# Patient Record
Sex: MALE | Race: BLACK OR AFRICAN AMERICAN | Employment: OTHER | URBAN - METROPOLITAN AREA
[De-identification: names, ages, dates, MRNs, and addresses within clinical notes are randomized per-mention and may not be internally consistent; named-entity substitution may affect disease eponyms.]

---

## 2017-04-02 ENCOUNTER — HOSPITAL ENCOUNTER (OUTPATIENT)
Age: 62
Setting detail: OBSERVATION
LOS: 1 days | Discharge: HOME | End: 2017-04-04
Attending: EMERGENCY MEDICINE | Admitting: INTERNAL MEDICINE
Payer: MEDICARE

## 2017-04-02 DIAGNOSIS — N41.0 PROSTATITIS, ACUTE: Primary | ICD-10-CM

## 2017-04-02 DIAGNOSIS — D64.9 SEVERE ANEMIA: ICD-10-CM

## 2017-04-02 DIAGNOSIS — N28.9 RENAL INSUFFICIENCY: ICD-10-CM

## 2017-04-02 PROCEDURE — 99284 EMERGENCY DEPT VISIT MOD MDM: CPT

## 2017-04-02 PROCEDURE — 96375 TX/PRO/DX INJ NEW DRUG ADDON: CPT

## 2017-04-02 PROCEDURE — 96374 THER/PROPH/DIAG INJ IV PUSH: CPT

## 2017-04-02 PROCEDURE — 77030013169 SET IV BLD ICUM -A

## 2017-04-02 PROCEDURE — 96376 TX/PRO/DX INJ SAME DRUG ADON: CPT

## 2017-04-02 RX ORDER — CIPROFLOXACIN 500 MG/1
500 TABLET ORAL 2 TIMES DAILY
COMMUNITY
End: 2017-05-10

## 2017-04-02 NOTE — IP AVS SNAPSHOT
[address] [facility] 
[phone] Patient: Loida Blake. MRN: [id number] :1955 You are allergic to the following Allergen Reactions Mercury (Bulk) Hives Blisters Recent Documentation Height Weight BMI Smoking Status 1.727 m 84.1 kg 28.19 kg/m2 Former Smoker Unresulted Labs Order Current Status FREE LIGHT CHAINS, KAPPA/LAMBDA, QT In process IMMUNOELECTROPHORESIS (IMMUNOFIX.) In process PROTEIN ELECTROPHORESIS In process PROTEIN ELECTROPHORESIS, URINE RANDOM In process PSA W/ REFLX FREE PSA In process Emergency Contacts  (Rel.) Home Phone Work Phone Mobile Phone [address] (Spouse) [phone] -- [phone] About your hospitalization You were admitted on:  April 3, 2017 You last received care in the:  hospitals 1 MEDICAL ONCOLOGY You were discharged on:  2017 Why you were hospitalized Your primary diagnosis was:  Not on File Your diagnoses also included:  Anemia Providers Seen During Your Hospitalizations Provider Role Specialty Primary office phone Jaison Kimball MD Attending Provider Emergency Medicine [phone] Efren Buchanan MD Attending Provider Hospitalist [phone] Levern Shone, MD Attending Provider Hospitalist [phone] Rita Polk MD Attending Provider Internal Medicine [phone] Your Primary Care Physician (PCP) Primary Care Physician Office Phone Office Fax Rody Best [phone] Follow-up Information Follow up With Details Comments Contact Info Satya Atkinson MD   [address] [place] [address] [address] [place] [address] 
[phone] Current Discharge Medication List  
  
START taking these medications Dose & Instructions Dispensing Information Comments Morning Noon Evening Bedtime  
 tamsulosin 0.4 mg capsule Commonly known as:  FLOMAX Your last dose was: Your next dose is:    
   
   
 Dose:  0.4 mg Take 1 Cap by mouth daily. Quantity:  30 Cap Refills:  1 CONTINUE these medications which have CHANGED Dose & Instructions Dispensing Information Comments Morning Noon Evening Bedtime RYTHMOL 150 mg tablet Generic drug:  propafenone What changed:  Another medication with the same name was removed. Continue taking this medication, and follow the directions you see here. Your last dose was: Your next dose is:    
   
   
 Dose:  50 mg Take 50 mg by mouth every eight (8) hours. Refills:  0 CONTINUE these medications which have NOT CHANGED Dose & Instructions Dispensing Information Comments Morning Noon Evening Bedtime ABILIFY 5 mg tablet Generic drug:  ARIPiprazole Your last dose was: Your next dose is:    
   
   
 Dose:  2.5 mg Take 2.5 mg by mouth every morning. Refills:  0  
     
   
   
   
  
 albuterol 90 mcg/actuation inhaler Commonly known as:  PROVENTIL HFA, VENTOLIN HFA, PROAIR HFA Your last dose was: Your next dose is:    
   
   
 Dose:  2 Puff Take 2 Puffs by inhalation every four (4) hours as needed for Wheezing. Quantity:  1 Inhaler Refills:  0 AMBIEN CR 12.5 mg tablet Generic drug:  zolpidem CR Your last dose was: Your next dose is:    
   
   
 Dose:  12.5 mg Take 12.5 mg by mouth nightly as needed. Refills:  0  
     
   
   
   
  
 amLODIPine 10 mg tablet Commonly known as:  Claudell Hesselbach Your last dose was: Your next dose is:    
   
   
 Dose:  10 mg Take 10 mg by mouth daily. Refills:  0  
     
   
   
   
  
 buPROPion  mg tablet Commonly known as:  Sully Smallwood Your last dose was: Your next dose is:    
   
   
 Dose:  150 mg Take 1 Tab by mouth daily (with dinner). Quantity:  30 Tab Refills:  11  
     
   
   
   
  
 CIALIS PO Your last dose was: Your next dose is:    
   
   
 Dose:  20 mg Take 20 mg by mouth as needed. Refills:  0  
     
   
   
   
  
 CIPRO 500 mg tablet Generic drug:  ciprofloxacin HCl Your last dose was: Your next dose is:    
   
   
 Dose:  500 mg Take 500 mg by mouth two (2) times a day. Refills:  0 LEVITRA PO Your last dose was: Your next dose is: Take  by mouth as needed. Refills:  0  
     
   
   
   
  
 losartan 50 mg tablet Commonly known as:  COZAAR Your last dose was: Your next dose is:    
   
   
 Dose:  100 mg Take 100 mg by mouth daily. Refills:  0  
     
   
   
   
  
 metFORMIN 500 mg tablet Commonly known as:  GLUCOPHAGE Your last dose was: Your next dose is:    
   
   
 Dose:  500 mg Take 500 mg by mouth two (2) times daily (with meals). Refills:  0  
     
   
   
   
  
 NUCYNTA 100 mg tablet Generic drug:  tapentadol Your last dose was: Your next dose is:    
   
   
 Dose:  100 mg Take 100 mg by mouth three (3) times daily. Refills:  0 Where to Get Your Medications Information on where to get these meds will be given to you by the nurse or doctor. ! Ask your nurse or doctor about these medications  
  tamsulosin 0.4 mg capsule Discharge Instructions None Discharge Orders None [facility] Announcement We are excited to announce that we are making your provider's discharge notes available to you in [facility]. You will see these notes when they are completed and signed by the physician that discharged you from your recent hospital stay.   If you have any questions or concerns about any information you see in [employer], please call the Health Information Department where you were seen or reach out to your Primary Care Provider for more information about your plan of care. Introducing [facility]! [employer] introduces [employer] patient portal. Now you can access parts of your medical record, email your doctor's office, and request medication refills online. 1. In your internet browser, go to [email or web address] 2. Click on the First Time User? Click Here link in the Sign In box. You will see the New Member Sign Up page. 3. Enter your [employer] Access Code exactly as it appears below. You will not need to use this code after youve completed the sign-up process. If you do not sign up before the expiration date, you must request a new code. · [employer] Access Code: [email or web address] Expires: 7/1/2017 11:34 PM 
 
4. Enter the last four digits of your Social Security Number (xxxx) and Date of Birth (mm/dd/yyyy) as indicated and click Submit. You will be taken to the next sign-up page. 5. Create a [employer] ID. This will be your [employer] login ID and cannot be changed, so think of one that is secure and easy to remember. 6. Create a [employer] password. You can change your password at any time. 7. Enter your Password Reset Question and Answer. This can be used at a later time if you forget your password. 8. Enter your e-mail address. You will receive e-mail notification when new information is available in [address] 9. Click Sign Up. You can now view and download portions of your medical record. 10. Click the Download Summary menu link to download a portable copy of your medical information. If you have questions, please visit the Frequently Asked Questions section of the [employer] website. Remember, [employer] is NOT to be used for urgent needs. For medical emergencies, dial 911. Now available from your iPhone and Android! General Information Please provide this summary of care documentation to your next provider. Patient Signature:  ____________________________________________________________ Date:  ____________________________________________________________  
  
Nadreia Canes Provider Signature:  ____________________________________________________________ Date:  ____________________________________________________________

## 2017-04-03 ENCOUNTER — APPOINTMENT (OUTPATIENT)
Dept: CT IMAGING | Age: 62
End: 2017-04-03
Attending: EMERGENCY MEDICINE
Payer: MEDICARE

## 2017-04-03 ENCOUNTER — APPOINTMENT (OUTPATIENT)
Dept: GENERAL RADIOLOGY | Age: 62
End: 2017-04-03
Attending: INTERNAL MEDICINE
Payer: MEDICARE

## 2017-04-03 PROBLEM — D64.9 ANEMIA: Status: ACTIVE | Noted: 2017-04-03

## 2017-04-03 LAB
ALBUMIN SERPL BCP-MCNC: 3 G/DL (ref 3.5–5)
ALBUMIN/GLOB SERPL: 0.3 {RATIO} (ref 1.1–2.2)
ALP SERPL-CCNC: 75 U/L (ref 45–117)
ALT SERPL-CCNC: 28 U/L (ref 12–78)
ANION GAP BLD CALC-SCNC: 13 MMOL/L (ref 5–15)
APPEARANCE UR: CLEAR
AST SERPL W P-5'-P-CCNC: 28 U/L (ref 15–37)
BACTERIA URNS QL MICRO: NEGATIVE /HPF
BASOPHILS # BLD AUTO: 0 K/UL (ref 0–0.1)
BASOPHILS # BLD: 0 % (ref 0–1)
BILIRUB SERPL-MCNC: 0.1 MG/DL (ref 0.2–1)
BILIRUB UR QL: NEGATIVE
BUN SERPL-MCNC: 22 MG/DL (ref 6–20)
BUN/CREAT SERPL: 12 (ref 12–20)
CALCIUM SERPL-MCNC: 9 MG/DL (ref 8.5–10.1)
CHLORIDE SERPL-SCNC: 100 MMOL/L (ref 97–108)
CO2 SERPL-SCNC: 23 MMOL/L (ref 21–32)
COLOR UR: ABNORMAL
CREAT SERPL-MCNC: 1.77 MG/DL (ref 0.7–1.3)
DIFFERENTIAL METHOD BLD: ABNORMAL
EOSINOPHIL # BLD: 0 K/UL (ref 0–0.4)
EOSINOPHIL NFR BLD: 0 % (ref 0–7)
EPITH CASTS URNS QL MICRO: ABNORMAL /LPF
ERYTHROCYTE [DISTWIDTH] IN BLOOD BY AUTOMATED COUNT: 22.5 % (ref 11.5–14.5)
FERRITIN SERPL-MCNC: 125 NG/ML (ref 26–388)
GLOBULIN SER CALC-MCNC: 8.8 G/DL (ref 2–4)
GLUCOSE BLD STRIP.AUTO-MCNC: 162 MG/DL (ref 65–100)
GLUCOSE BLD STRIP.AUTO-MCNC: 93 MG/DL (ref 65–100)
GLUCOSE BLD STRIP.AUTO-MCNC: 96 MG/DL (ref 65–100)
GLUCOSE SERPL-MCNC: 83 MG/DL (ref 65–100)
GLUCOSE UR STRIP.AUTO-MCNC: NEGATIVE MG/DL
HAPTOGLOB SERPL-MCNC: 78 MG/DL (ref 30–200)
HCT VFR BLD AUTO: 19.4 % (ref 36.6–50.3)
HGB BLD-MCNC: 6.4 G/DL (ref 12.1–17)
HGB UR QL STRIP: NEGATIVE
HYALINE CASTS URNS QL MICRO: ABNORMAL /LPF (ref 0–5)
IRON SATN MFR SERPL: 37 % (ref 20–50)
IRON SERPL-MCNC: 93 UG/DL (ref 35–150)
KETONES UR QL STRIP.AUTO: NEGATIVE MG/DL
LDH SERPL L TO P-CCNC: 81 U/L (ref 85–241)
LEUKOCYTE ESTERASE UR QL STRIP.AUTO: NEGATIVE
LYMPHOCYTES # BLD AUTO: 33 % (ref 12–49)
LYMPHOCYTES # BLD: 1.6 K/UL (ref 0.8–3.5)
MCH RBC QN AUTO: 27.7 PG (ref 26–34)
MCHC RBC AUTO-ENTMCNC: 33 G/DL (ref 30–36.5)
MCV RBC AUTO: 84 FL (ref 80–99)
MONOCYTES # BLD: 0.5 K/UL (ref 0–1)
MONOCYTES NFR BLD AUTO: 11 % (ref 5–13)
NEUTS SEG # BLD: 2.6 K/UL (ref 1.8–8)
NEUTS SEG NFR BLD AUTO: 56 % (ref 32–75)
NITRITE UR QL STRIP.AUTO: NEGATIVE
PH UR STRIP: 7.5 [PH] (ref 5–8)
PLATELET # BLD AUTO: 123 K/UL (ref 150–400)
POTASSIUM SERPL-SCNC: 4.5 MMOL/L (ref 3.5–5.1)
PROT SERPL-MCNC: 11.8 G/DL (ref 6.4–8.2)
PROT UR STRIP-MCNC: 30 MG/DL
RBC # BLD AUTO: 2.31 M/UL (ref 4.1–5.7)
RBC #/AREA URNS HPF: ABNORMAL /HPF (ref 0–5)
RBC MORPH BLD: ABNORMAL
RBC MORPH BLD: ABNORMAL
RETICS/RBC NFR AUTO: 1 % (ref 0.7–2.1)
SERVICE CMNT-IMP: ABNORMAL
SERVICE CMNT-IMP: NORMAL
SERVICE CMNT-IMP: NORMAL
SODIUM SERPL-SCNC: 136 MMOL/L (ref 136–145)
SP GR UR REFRACTOMETRY: 1.02 (ref 1–1.03)
TIBC SERPL-MCNC: 254 UG/DL (ref 250–450)
UA: UC IF INDICATED,UAUC: ABNORMAL
UROBILINOGEN UR QL STRIP.AUTO: 0.2 EU/DL (ref 0.2–1)
WBC # BLD AUTO: 4.7 K/UL (ref 4.1–11.1)
WBC URNS QL MICRO: ABNORMAL /HPF (ref 0–4)

## 2017-04-03 PROCEDURE — 83010 ASSAY OF HAPTOGLOBIN QUANT: CPT | Performed by: INTERNAL MEDICINE

## 2017-04-03 PROCEDURE — 85025 COMPLETE CBC W/AUTO DIFF WBC: CPT | Performed by: EMERGENCY MEDICINE

## 2017-04-03 PROCEDURE — 85045 AUTOMATED RETICULOCYTE COUNT: CPT | Performed by: INTERNAL MEDICINE

## 2017-04-03 PROCEDURE — 72110 X-RAY EXAM L-2 SPINE 4/>VWS: CPT

## 2017-04-03 PROCEDURE — 36430 TRANSFUSION BLD/BLD COMPNT: CPT

## 2017-04-03 PROCEDURE — 74011250637 HC RX REV CODE- 250/637: Performed by: INTERNAL MEDICINE

## 2017-04-03 PROCEDURE — 74176 CT ABD & PELVIS W/O CONTRAST: CPT

## 2017-04-03 PROCEDURE — 74011250636 HC RX REV CODE- 250/636

## 2017-04-03 PROCEDURE — 83540 ASSAY OF IRON: CPT | Performed by: INTERNAL MEDICINE

## 2017-04-03 PROCEDURE — 36415 COLL VENOUS BLD VENIPUNCTURE: CPT | Performed by: EMERGENCY MEDICINE

## 2017-04-03 PROCEDURE — 99218 HC RM OBSERVATION: CPT

## 2017-04-03 PROCEDURE — 82962 GLUCOSE BLOOD TEST: CPT

## 2017-04-03 PROCEDURE — 81001 URINALYSIS AUTO W/SCOPE: CPT | Performed by: EMERGENCY MEDICINE

## 2017-04-03 PROCEDURE — 82728 ASSAY OF FERRITIN: CPT | Performed by: INTERNAL MEDICINE

## 2017-04-03 PROCEDURE — 74011250636 HC RX REV CODE- 250/636: Performed by: EMERGENCY MEDICINE

## 2017-04-03 PROCEDURE — 80053 COMPREHEN METABOLIC PANEL: CPT | Performed by: EMERGENCY MEDICINE

## 2017-04-03 PROCEDURE — 86920 COMPATIBILITY TEST SPIN: CPT | Performed by: EMERGENCY MEDICINE

## 2017-04-03 PROCEDURE — 86900 BLOOD TYPING SEROLOGIC ABO: CPT | Performed by: EMERGENCY MEDICINE

## 2017-04-03 PROCEDURE — 83615 LACTATE (LD) (LDH) ENZYME: CPT | Performed by: INTERNAL MEDICINE

## 2017-04-03 PROCEDURE — 74011636320 HC RX REV CODE- 636/320: Performed by: EMERGENCY MEDICINE

## 2017-04-03 PROCEDURE — 84156 ASSAY OF PROTEIN URINE: CPT | Performed by: INTERNAL MEDICINE

## 2017-04-03 PROCEDURE — P9016 RBC LEUKOCYTES REDUCED: HCPCS | Performed by: EMERGENCY MEDICINE

## 2017-04-03 RX ORDER — ONDANSETRON 2 MG/ML
4 INJECTION INTRAMUSCULAR; INTRAVENOUS
Status: COMPLETED | OUTPATIENT
Start: 2017-04-03 | End: 2017-04-03

## 2017-04-03 RX ORDER — PROPAFENONE HYDROCHLORIDE 150 MG/1
150 TABLET, FILM COATED ORAL EVERY 8 HOURS
COMMUNITY
End: 2018-11-01

## 2017-04-03 RX ORDER — PROPAFENONE HYDROCHLORIDE 150 MG/1
150 TABLET, FILM COATED ORAL EVERY 8 HOURS
Status: DISCONTINUED | OUTPATIENT
Start: 2017-04-03 | End: 2017-04-05 | Stop reason: HOSPADM

## 2017-04-03 RX ORDER — SODIUM CHLORIDE 0.9 % (FLUSH) 0.9 %
10 SYRINGE (ML) INJECTION
Status: DISCONTINUED | OUTPATIENT
Start: 2017-04-03 | End: 2017-04-03

## 2017-04-03 RX ORDER — SODIUM CHLORIDE 0.9 % (FLUSH) 0.9 %
5-10 SYRINGE (ML) INJECTION AS NEEDED
Status: DISCONTINUED | OUTPATIENT
Start: 2017-04-03 | End: 2017-04-05 | Stop reason: HOSPADM

## 2017-04-03 RX ORDER — CIPROFLOXACIN 250 MG/1
500 TABLET, FILM COATED ORAL 2 TIMES DAILY
Status: DISCONTINUED | OUTPATIENT
Start: 2017-04-03 | End: 2017-04-05 | Stop reason: HOSPADM

## 2017-04-03 RX ORDER — SODIUM CHLORIDE 0.9 % (FLUSH) 0.9 %
5-10 SYRINGE (ML) INJECTION EVERY 8 HOURS
Status: DISCONTINUED | OUTPATIENT
Start: 2017-04-03 | End: 2017-04-05 | Stop reason: HOSPADM

## 2017-04-03 RX ORDER — MORPHINE SULFATE 4 MG/ML
INJECTION, SOLUTION INTRAMUSCULAR; INTRAVENOUS
Status: COMPLETED
Start: 2017-04-03 | End: 2017-04-03

## 2017-04-03 RX ORDER — SODIUM CHLORIDE 9 MG/ML
250 INJECTION, SOLUTION INTRAVENOUS AS NEEDED
Status: DISCONTINUED | OUTPATIENT
Start: 2017-04-03 | End: 2017-04-05 | Stop reason: HOSPADM

## 2017-04-03 RX ORDER — MORPHINE SULFATE 2 MG/ML
2 INJECTION, SOLUTION INTRAMUSCULAR; INTRAVENOUS
Status: COMPLETED | OUTPATIENT
Start: 2017-04-03 | End: 2017-04-03

## 2017-04-03 RX ORDER — ARIPIPRAZOLE 5 MG/1
2.5 TABLET ORAL
Status: DISCONTINUED | OUTPATIENT
Start: 2017-04-03 | End: 2017-04-05 | Stop reason: HOSPADM

## 2017-04-03 RX ORDER — PROPAFENONE HYDROCHLORIDE 225 MG/1
225 CAPSULE, EXTENDED RELEASE ORAL 2 TIMES DAILY
Status: DISCONTINUED | OUTPATIENT
Start: 2017-04-03 | End: 2017-04-03 | Stop reason: CLARIF

## 2017-04-03 RX ORDER — AMLODIPINE BESYLATE 5 MG/1
10 TABLET ORAL DAILY
Status: DISCONTINUED | OUTPATIENT
Start: 2017-04-03 | End: 2017-04-05 | Stop reason: HOSPADM

## 2017-04-03 RX ORDER — FACIAL-BODY WIPES
10 EACH TOPICAL DAILY PRN
Status: DISCONTINUED | OUTPATIENT
Start: 2017-04-03 | End: 2017-04-05 | Stop reason: HOSPADM

## 2017-04-03 RX ORDER — BUPROPION HYDROCHLORIDE 150 MG/1
150 TABLET ORAL
Status: DISCONTINUED | OUTPATIENT
Start: 2017-04-03 | End: 2017-04-05 | Stop reason: HOSPADM

## 2017-04-03 RX ORDER — TAMSULOSIN HYDROCHLORIDE 0.4 MG/1
0.4 CAPSULE ORAL DAILY
Status: DISCONTINUED | OUTPATIENT
Start: 2017-04-03 | End: 2017-04-05 | Stop reason: HOSPADM

## 2017-04-03 RX ORDER — INSULIN LISPRO 100 [IU]/ML
INJECTION, SOLUTION INTRAVENOUS; SUBCUTANEOUS
Status: DISCONTINUED | OUTPATIENT
Start: 2017-04-03 | End: 2017-04-05 | Stop reason: HOSPADM

## 2017-04-03 RX ORDER — SODIUM CHLORIDE 9 MG/ML
50 INJECTION, SOLUTION INTRAVENOUS
Status: DISCONTINUED | OUTPATIENT
Start: 2017-04-03 | End: 2017-04-03

## 2017-04-03 RX ORDER — OXYCODONE HYDROCHLORIDE 5 MG/1
5 TABLET ORAL
Status: DISCONTINUED | OUTPATIENT
Start: 2017-04-03 | End: 2017-04-05 | Stop reason: HOSPADM

## 2017-04-03 RX ORDER — ACETAMINOPHEN 325 MG/1
650 TABLET ORAL
Status: DISCONTINUED | OUTPATIENT
Start: 2017-04-03 | End: 2017-04-05 | Stop reason: HOSPADM

## 2017-04-03 RX ORDER — ONDANSETRON 2 MG/ML
4 INJECTION INTRAMUSCULAR; INTRAVENOUS
Status: DISCONTINUED | OUTPATIENT
Start: 2017-04-03 | End: 2017-04-05 | Stop reason: HOSPADM

## 2017-04-03 RX ORDER — NALOXONE HYDROCHLORIDE 0.4 MG/ML
0.4 INJECTION, SOLUTION INTRAMUSCULAR; INTRAVENOUS; SUBCUTANEOUS AS NEEDED
Status: DISCONTINUED | OUTPATIENT
Start: 2017-04-03 | End: 2017-04-05 | Stop reason: HOSPADM

## 2017-04-03 RX ORDER — MORPHINE SULFATE 4 MG/ML
4 INJECTION, SOLUTION INTRAMUSCULAR; INTRAVENOUS
Status: COMPLETED | OUTPATIENT
Start: 2017-04-03 | End: 2017-04-03

## 2017-04-03 RX ORDER — DEXTROSE 50 % IN WATER (D50W) INTRAVENOUS SYRINGE
12.5-25 AS NEEDED
Status: DISCONTINUED | OUTPATIENT
Start: 2017-04-03 | End: 2017-04-05 | Stop reason: HOSPADM

## 2017-04-03 RX ORDER — MAGNESIUM SULFATE 100 %
4 CRYSTALS MISCELLANEOUS AS NEEDED
Status: DISCONTINUED | OUTPATIENT
Start: 2017-04-03 | End: 2017-04-05 | Stop reason: HOSPADM

## 2017-04-03 RX ADMIN — ONDANSETRON HYDROCHLORIDE 4 MG: 2 INJECTION, SOLUTION INTRAMUSCULAR; INTRAVENOUS at 01:50

## 2017-04-03 RX ADMIN — AMLODIPINE BESYLATE 10 MG: 5 TABLET ORAL at 09:10

## 2017-04-03 RX ADMIN — PROPAFENONE HYDROCHLORIDE 150 MG: 150 TABLET, FILM COATED ORAL at 14:45

## 2017-04-03 RX ADMIN — PROPAFENONE HYDROCHLORIDE 150 MG: 150 TABLET, FILM COATED ORAL at 21:49

## 2017-04-03 RX ADMIN — ARIPIPRAZOLE 2.5 MG: 5 TABLET ORAL at 10:14

## 2017-04-03 RX ADMIN — MORPHINE SULFATE 4 MG: 4 INJECTION, SOLUTION INTRAMUSCULAR; INTRAVENOUS at 01:50

## 2017-04-03 RX ADMIN — DIATRIZOATE MEGLUMINE AND DIATRIZOATE SODIUM 30 ML: 660; 100 LIQUID ORAL; RECTAL at 02:00

## 2017-04-03 RX ADMIN — BUPROPION HYDROCHLORIDE 150 MG: 150 TABLET, FILM COATED, EXTENDED RELEASE ORAL at 17:22

## 2017-04-03 RX ADMIN — CIPROFLOXACIN HYDROCHLORIDE 500 MG: 250 TABLET, FILM COATED ORAL at 17:22

## 2017-04-03 RX ADMIN — OXYCODONE HYDROCHLORIDE 5 MG: 5 TABLET ORAL at 09:08

## 2017-04-03 RX ADMIN — Medication 10 ML: at 09:12

## 2017-04-03 RX ADMIN — OXYCODONE HYDROCHLORIDE 5 MG: 5 TABLET ORAL at 14:45

## 2017-04-03 RX ADMIN — CIPROFLOXACIN HYDROCHLORIDE 500 MG: 250 TABLET, FILM COATED ORAL at 09:09

## 2017-04-03 RX ADMIN — Medication 10 ML: at 21:50

## 2017-04-03 RX ADMIN — TAMSULOSIN HYDROCHLORIDE 0.4 MG: 0.4 CAPSULE ORAL at 14:44

## 2017-04-03 RX ADMIN — PROPAFENONE HYDROCHLORIDE 150 MG: 150 TABLET, FILM COATED ORAL at 09:09

## 2017-04-03 RX ADMIN — OXYCODONE HYDROCHLORIDE 5 MG: 5 TABLET ORAL at 20:11

## 2017-04-03 RX ADMIN — Medication 1 CAPSULE: at 14:44

## 2017-04-03 RX ADMIN — Medication 2 MG: at 04:37

## 2017-04-03 RX ADMIN — Medication 10 ML: at 14:47

## 2017-04-03 NOTE — CONSULTS
[address]   0 [facility]       Name:  Edelmira Chan   MR#:  [id number]   :  1955   Account #:  [de-identified]    Date of Consultation:  2017   Date of Adm:  2017       REFERRING PHYSICIAN: Dr. Melinda Gao: Anemia and possible multiple   myeloma. HISTORY OF PRESENT ILLNESS: The patient is a 65-year-old   gentleman who came to the emergency room last evening because he   was having more pain in his rectal region. He had been just started on   Cipro for prostatitis just last week. He was having some pain radiating   down his legs. No other complaints, but his hemoglobin was found to   be 6.4 and this had been trending down. The patient was admitted for   further evaluation. He was given 2 units of packed red blood cells   overnight and we are asked to see him for further evaluation. The   patient reports that he is feeling a little bit better this morning with no   new complaints. PAST MEDICAL HISTORY: Significant for prostatitis, hepatitis C,   diabetes, chronic pain, GERD, prostatitis, some depression. He has a   history of hypertension as well. He has got a history of some   arrhythmia     SOCIAL HISTORY: He is a former smoker. He quit back in . He   does not drink. FAMILY HISTORY: No history of any cancers. ALLERGIES: MERCURY. CURRENT MEDICATIONS: He is on   1. Amlodipine. 2. Abilify. 3. Wellbutrin. 4. Ciprofloxacin. 5. Maria Eugenia-Q.   6. Rythmol. 7. Nucynta. REVIEW OF SYSTEMS: A 12-point review of systems was done and   negative except for as above. PHYSICAL EXAMINATION   VITAL SIGNS: Temperature 99.0, pulse 91, blood pressure 159/91,   respirations 20, saturating 95% on room air. GENERAL: Sitting up in chair in no acute distress. HEENT: Normocephalic, atraumatic. Extraocular muscles intact. Oropharynx clear, without lesions. NECK: Supple.  No cervical, supraclavicular lymphadenopathy   appreciated. CARDIOVASCULAR: Regular rate and rhythm. LUNGS: Clear to auscultation bilaterally. ABDOMEN: Normoactive bowel sounds, soft, nontender,   nondistended. No hepatosplenomegaly. EXTREMITIES: No clubbing, cyanosis or edema. NEUROLOGIC: Nonfocal.    LABORATORY VALUES: White blood cell count 4.7, hemoglobin 6.4,   platelets 668, reticulocyte count 1.0. Chemistries: Sodium 136,   potassium 4.5, chloride 100, bicarbonate 23, glucose 83, BUN 22,   creatinine 1.77, total protein 11.8, total bilirubin 0.1, ALT 28, AST 28,   alkaline phosphatase 75, LDH 81. Iron panel normal. Haptoglobin   normal at 78. IMPRESSION AND PLAN: The patient is a 19-year-old    gentleman who we were asked to see for anemia. He was admitted   with some lower back and rectal pain, thought possibly due to   prostatitis. He has been having some pain radiating down his legs as   well. He was found to have a hemoglobin in the 6 range. He was   admitted for 2 units of packed red blood cells. He did well with that. At   this point, his iron levels look normal. No signs of hemolysis. We are   going to check for signs for possible multiple myeloma. Check SPEP   with immunofixation and serum free light chain ratio, urine free light   chain ratio. We will see what all that shows. He will continue to get   treatment for his prostatitis. He is getting x-rays of his lumbar spine. We will continue to follow along with you and make further   recommendations as needed. We will also check B12 and folate and   continue to follow along with you.         MD KITTY Henderson / GUS   D:  04/03/2017   10:38   T:  04/03/2017   11:07   Job #:  [id number]

## 2017-04-03 NOTE — PROGRESS NOTES
Oncology Nursing Communication Tool      7:26 PM  4/3/2017     Bedside shift change report given to Sanjiv Mooney, RN (incoming nurse) by Sal Brothers RN (outgoing nurse) on Edward Baker Sr. a 63 y.o. male who was admitted on 4/2/2017 11:41 PM. Report included the following information SBAR. Significant changes during shift:       Issues for physician to address:             Code Status: Full Code     Infections: MRSA     Allergies: Mercury (bulk)     Current diet: DIET DIABETIC CONSISTENT CARB Regular       Pain Controlled [x] yes [] no   Bowel Movement [] yes [x] no   Last Bowel Movement (date)                 Vital Signs:   Patient Vitals for the past 12 hrs:   Temp Pulse Resp BP SpO2   04/03/17 1406 - 87 18 121/78 -   04/03/17 1356 99.1 °F (37.3 °C) 87 18 125/81 92 %   04/03/17 1330 99.1 °F (37.3 °C) 81 18 122/79 93 %   04/03/17 1300 98.9 °F (37.2 °C) 83 18 139/87 95 %   04/03/17 1245 98.8 °F (37.1 °C) 84 18 132/87 96 %   04/03/17 1226 98.8 °F (37.1 °C) 84 20 138/89 96 %   04/03/17 1154 99 °F (37.2 °C) - 20 121/85 93 %   04/03/17 1147 98.8 °F (37.1 °C) 77 20 112/75 93 %   04/03/17 1131 98.8 °F (37.1 °C) - 20 111/72 93 %   04/03/17 1013 99 °F (37.2 °C) 91 20 (!) 159/91 95 %   04/03/17 0754 99 °F (37.2 °C) 81 20 142/87 96 %      Intake & Output:     Intake/Output Summary (Last 24 hours) at 04/03/17 1926  Last data filed at 04/03/17 0754   Gross per 24 hour   Intake              310 ml   Output                0 ml   Net              310 ml      Laboratory Results:     Recent Results (from the past 12 hour(s))   GLUCOSE, POC    Collection Time: 04/03/17  8:12 AM   Result Value Ref Range    Glucose (POC) 93 65 - 100 mg/dL    Performed by Bev Cowan    GLUCOSE, POC    Collection Time: 04/03/17  4:23 PM   Result Value Ref Range    Glucose (POC) 96 65 - 100 mg/dL    Performed by [address] [place] [address] for questions and clarifications were given to the incoming nurse. Patient's bed is in low position, side rails x2, door open PRN, call bell within reach and patient not in distress.       Shweta Hylton RN

## 2017-04-03 NOTE — PROGRESS NOTES
Instructed to notify the nurse of any itching and or chills. Verbalizes understanding. Vss.2nd unit of blood up. Nurse at bedside x15 mins of the start of blood Tx. Nurse call bell within reach.

## 2017-04-03 NOTE — PROGRESS NOTES
Spiritual Care Assessment/Progress Notes    Reyna Wu [id number]  [id number]    1955  63 y.o.  male    Patient Telephone Number: [phone] (home)   Hoahaoism Affiliation: Adrienne Topete   Language: English   Extended Emergency Contact Information  Primary Emergency Contact: [address] Phone: [phone]  Mobile Phone: [phone]  Relation: Spouse   Patient Active Problem List    Diagnosis Date Noted    Anemia 04/03/2017    Chronic hepatitis C ([facility] [facility] 03/30/2014    Depression 03/27/2014    Symptomatic PVCs 07/13/2011    Hypertension 07/13/2011    Diabetes ([facility] 07/13/2011        Date: 4/3/2017       Level of Hoahaoism/Spiritual Activity:  []         Involved in [place] tradition/spiritual practice    []         Not involved in [place] tradition/spiritual practice  []         Spiritually oriented    []         Claims no spiritual orientation    []         seeking spiritual identity  []         Feels alienated from Mandaen practice/tradition  []         Feels angry about Mandaen practice/tradition  []         Spirituality/Mandaen tradition  a resource for coping at this time.   []         Not able to assess due to medical condition    Services Provided Today:  []         crisis intervention    []         reading Scriptures  [x]         spiritual assessment    []         prayer  [x]         empathic listening/emotional support  []         rites and rituals (cite in comments)  []         life review     []         Mandaen support  []         theological development   []         advocacy  []         ethical dialog     []         blessing  []         bereavement support    []         support to family  []         anticipatory grief support   [x]         help with AMD  []         spiritual guidance    []         meditation      Spiritual Care Needs  []         Emotional Support  []         Spiritual/Hoahaoism Care  []         Loss/Adjustment  [] Advocacy/Referral                /Ethics  [x]         No needs expressed at               this time  []         Other: (note in               comments)  Spiritual Care Plan  []         Follow up visits with               pt/family  []         Provide materials  []         Schedule sacraments  []         Contact Community               Clergy  [x]         Follow up as needed  []         Other: (note in               comments)     Comments: Request from in-basket to assist with Advance Medical Directive (AMD) in oncology unit. Consulted with patients nurse. Explained the document briefly to patient. Pt did not want to complete the form at this moment.  left the form and a right to decide booklet with the pt to look over. Pt will contact chaplains when ready to fill it out. No further needs were expressed at this time. Advised of  availability. Coretta Lee M.S.   Spiritual Care Department  If need arise please call JENY ([phone])

## 2017-04-03 NOTE — PROGRESS NOTES
Primary Nurse Roxane Small RN and Clovis Lopez RN performed a dual skin assessment on this patient No impairment noted  Haris score is 20. Skin intact.

## 2017-04-03 NOTE — H&P
Hospitalist Admission Note    NAME:  Lennox Gable Sr.   :   1955   MRN:   [id number]     Date of admit:  4/3/2017    PCP: Audrey Hansen MD    Assessment/Plan:      Anemia (4/3/2017) of chronic disease POA symptomatic, HgB 6.4  No bleeding except for some blood in his    Stool brown heme negative in ED  Recently started treatment for prostatitis, ? Source of inflammation  No evidence of hemolysis, normal retic count, LDH, t bili  Some features worrisome for underlying myeloma(d/w ED MD)   HgB gradually decreasing 11.4 to 8.9 to 8.1 to 6.4   Renal function gradually decreasing over 6 months 1.29 to 1.77   Total protein increasing 7.7 to 11.8   Lori present   Admit, 2 units pRBCS  Serial HgBs  Check iron studies  Check TSH  Check SPEP with immunofixation, UPEP  Diagnostic possibilities include myeloma d/w pt and wife  Ask hematology to see    Rectal pain POA   Recent prostatitis POA started on cipro  No clear pathology on CT scan abdomen/pelvis  Check L-spine X-rays  Continue Tapentadol  PRN oxycodone and tylenol  No pathology on rectal exam  Continue cipro started as an outpatient    ? Chronic renal failure stage 3 POA with rising creatinine  Creatinine increasing 1.29 to 1.77  Stop ARB for now  W/U for myeloma  No hydronephrosis on CT scan    DM type 2 POA on metformin  POC, SSI  Hold metformin in house    Essential HTN POA Continue norvasc  SVT POA on profenone SR  Hold cozaar  PRN hydralazine    DDD C -spine POA  Chronic pain syndrome POA   History of substance abuse POA  Continue tapentadol  PRN narcotics, avoid long-term use    Anxiety/depression POA  Continue abilify and wellbutrin    DVT prophylaxis: SCDs    Stress ulcer prophylaxis: Not indicated    Code status: Full code    Next of Kin: wife    Subjective:   CHIEF COMPLAINT:  My rectum has been burning for days    HISTORY OF PRESENT ILLNESS:      63 y.o.    male:  Known prostatitis from , treated  Saw Urologist recently for routine appointment, prostate not \"normal\" on exam   UA and urine culture abnormal, normal PSA, felt to have prostatitis  Started on PO ciprofloxacin 3/28/17  Developing increasing pain in the rectal region x 304 days, not improved with taking the cipro   No diarrhea   No pain on moving bowels   Throbbing or burning pain, not relieved with his usual pain meds   Progressively severe pain, unmanageable which prompted the ED visit  Legs have felt heavy and has had moderate pain radiating down legs x weeks  No fevers or chills, weight loss, cough, SOB, CP, abdominal pain or back pain  No prior history or any hematologic disorders    ED HD stable  HgB 6.4, has no prior anemia  Rectal exam without melena or blood, stool was heme negative  We were called to admit the patient      Past Medical History:   Diagnosis Date    Acid reflux     Arrhythmia     pvc's    Chronic pain     neck    Depression     Diabetes ([facility] [address]     Femoral hernia 7/11/2012    Hepatitis C     Hypertension     Inguinal hernia 7/11/2012    Liver disease     hepatitis c    Other ill-defined conditions     Hx of PVCs since 2009    Prostatitis, acute     Psychiatric disorder     depression        Past Surgical History:   Procedure Laterality Date    COLONOSCOPY N/A 9/20/2016    COLONOSCOPY performed by [facility] at [facility]    HX APPENDECTOMY      HX CERVICAL FUSION  2008    x 1 as of 4/30/2014    HX HEENT      foreign body removed from right eye    HX HERNIA REPAIR  2012    [facility]      HX ORTHOPAEDIC      cervical fusion    HX OTHER SURGICAL  2000    liver bx - chronic hepatitis       Social History:  Social History   Substance Use Topics    Smoking status: Former Smoker     Packs/day: 1.00     Years: 12.00     Types: Cigarettes     Quit date: 1/1/1988    Smokeless tobacco: Never Used      Comment: former cigarette smoker    Alcohol use No      Comment: former alcoholic-quit [id number]        FAMILY HISTORY:  Family History   Problem Relation Age of Onset    Arthritis-osteo Mother     Arthritis-osteo Father     Diabetes Father     Hypertension Maternal Grandmother     Diabetes Maternal Grandmother     Hypertension Maternal Grandfather     Diabetes Maternal Grandfather    3 children    Allergies   Allergen Reactions    Mercury (Bulk) Hives     Blisters          Prior to Admission medications    Medication Sig Start Date End Date Taking? Authorizing Provider   ciprofloxacin HCl (CIPRO) 500 mg tablet Take 500 mg by mouth two (2) times a day. Yes Goldy Diaz MD   zolpidem CR (AMBIEN CR) 12.5 mg tablet Take 12.5 mg by mouth nightly as needed. Yes Historical Provider   TADALAFIL (CIALIS PO) Take 20 mg by mouth as needed. Yes Historical Provider   losartan (COZAAR) 50 mg tablet Take 100 mg by mouth daily. Yes Historical Provider   amLODIPine (NORVASC) 10 mg tablet Take 10 mg by mouth daily. Yes Historical Provider   tapentadol (NUCYNTA) 100 mg tablet Take 100 mg by mouth three (3) times daily. Yes Goldy Diaz MD   aripiprazole (ABILIFY) 5 mg tablet Take 2.5 mg by mouth every morning. Yes Goldy Diaz MD   buPROPion XL (WELLBUTRIN XL) 150 mg tablet Take 1 Tab by mouth daily (with dinner). 4/25/10  Yes Sebastien Mccray MD   metformin (GLUCOPHAGE) 500 mg tablet Take 500 mg by mouth two (2) times daily (with meals). Yes Goldy Diaz MD   albuterol (PROVENTIL HFA, VENTOLIN HFA, PROAIR HFA) 90 mcg/actuation inhaler Take 2 Puffs by inhalation every four (4) hours as needed for Wheezing. 12/2/16   Cyndy Bruner MD   VARDENAFIL HCL (LEVITRA PO) Take  by mouth as needed. Historical Provider   propafenone SR (RYTHMOL SR) 225 mg SR capsule Take 225 mg by mouth two (2) times a day.       Historical Provider       REVIEW OF SYSTEMS:  Bold equals positive    Yes Total of 12 systems reviewed as follows:  Constitutional: fever Chills  weight loss  Eyes:   Diplopia visual changes  eye pain  ENT: coryza  Sore throat Dysphagia  Respiratory:  cough or sputum  Hemoptysis dyspnea  Cards:  chest pain  orthopnea LE edema  GI:   Nausea/vomiting Diarrhea abdominal pain    melena  BRBPR   Rectal pain  Genitourinary:  frequency  dysuria hematuria  Integument:  Rash Nodules  Hematologic:  Easy bruising, negative gum/nose bleeding  Endocrine: Polyuria/poldipsia heat/cold intolerance  Musculoskel: Myalgias Joint paiin/swelling/redness Gout    Pain radiating down his legs  Neurological:  Headaches  focal motor or sensory changes    Legs feel heavy    Objective:   VITALS:    Patient Vitals for the past 24 hrs:   Temp Pulse Resp BP SpO2   17 0640 99.2 °F (37.3 °C) 85 20 145/88 96 %   17 0600 - - - 118/66 96 %   17 0545 98.9 °F (37.2 °C) - - 124/75 92 %   17 0530 98.8 °F (37.1 °C) - - 121/72 91 %   17 0515 98.9 °F (37.2 °C) 85 16 125/68 92 %   17 0500 99.1 °F (37.3 °C) 84 18 124/72 93 %   17 0442 98.8 °F (37.1 °C) 87 18 131/75 94 %   17 0315 - - - 128/65 95 %   17 0300 - - - 124/75 98 %   17 0245 - - - 138/79 100 %   17 0230 - - - 130/82 100 %   17 0215 - - - 146/86 97 %   17 0200 - - - 130/79 93 %   17 0100 - - - 120/59 96 %   17 0045 - - - 123/89 99 %   17 0042 - - - - 99 %   17 0040 - - - 129/59 -   17 2340 99.8 °F (37.7 °C) 96 18 137/79 99 %     Temp (24hrs), Av.1 °F (37.3 °C), Min:98.8 °F (37.1 °C), Max:99.8 °F (37.7 °C)      O2 Device: Room air    PHYSICAL EXAM:   General:    Alert, cooperative in no distress     HEENT: Normocephalic, atraumatic    PERRL, Sclera no icterus    Nasal mucosa without masses or discharge  Hearing intact to voice    Oropharynx without erythema or exudate  No thrush  Pale MM  Neck:  No meningismus, trachea midline, no carotid bruits     Thyroid not enlarged, no nodules or tenderness  Lungs:   Clear to auscultation bilaterally.  No wheezing or rales    No accessory muscle use or retractions. Heart:   Regular rate and rhythm,  no murmur or gallop. No LE edema  Abdomen:   Soft, non-tender. Not distended. Bowel sounds normal.     No masses, No Hepatosplenomegaly, No Rebound or guarding  Lymph nodes: No cervical or inguinal ED  Musculoskeletal:  No Joint swelling, erythema, warmth. No Cyanosis or clubbing  Skin:      No rashes     Not Jaundiced   No nodules or thickening  Neurologic: Alert and oriented X 3, follows commands     Cranial nerves 2 to 12 intact    Symmetric motor strength bilaterally         LAB DATA REVIEWED:    Recent Results (from the past 24 hour(s))   URINALYSIS W/ REFLEX CULTURE    Collection Time: 04/03/17 12:48 AM   Result Value Ref Range    Color YELLOW/STRAW      Appearance CLEAR CLEAR      Specific gravity 1.018 1.003 - 1.030      pH (UA) 7.5 5.0 - 8.0      Protein 30 (A) NEG mg/dL    Glucose NEGATIVE  NEG mg/dL    Ketone NEGATIVE  NEG mg/dL    Bilirubin NEGATIVE  NEG      Blood NEGATIVE  NEG      Urobilinogen 0.2 0.2 - 1.0 EU/dL    Nitrites NEGATIVE  NEG      Leukocyte Esterase NEGATIVE  NEG      WBC 0-4 0 - 4 /hpf    RBC 0-5 0 - 5 /hpf    Epithelial cells FEW FEW /lpf    Bacteria NEGATIVE  NEG /hpf    UA:UC IF INDICATED CULTURE NOT INDICATED BY UA RESULT CNI      Hyaline cast 0-2 0 - 5 /lpf   CBC WITH AUTOMATED DIFF    Collection Time: 04/03/17  1:49 AM   Result Value Ref Range    WBC 4.7 4.1 - 11.1 K/uL    RBC 2.31 (L) 4.10 - 5.70 M/uL    HGB 6.4 (L) 12.1 - 17.0 g/dL    HCT 19.4 (L) 36.6 - 50.3 %    MCV 84.0 80.0 - 99.0 FL    MCH 27.7 26.0 - 34.0 PG    MCHC 33.0 30.0 - 36.5 g/dL    RDW 22.5 (H) 11.5 - 14.5 %    PLATELET 393 (L) 954 - 400 K/uL    NEUTROPHILS 56 32 - 75 %    LYMPHOCYTES 33 12 - 49 %    MONOCYTES 11 5 - 13 %    EOSINOPHILS 0 0 - 7 %    BASOPHILS 0 0 - 1 %    ABS. NEUTROPHILS 2.6 1.8 - 8.0 K/UL    ABS. LYMPHOCYTES 1.6 0.8 - 3.5 K/UL    ABS. MONOCYTES 0.5 0.0 - 1.0 K/UL    ABS. EOSINOPHILS 0.0 0.0 - 0.4 K/UL    ABS.  BASOPHILS 0.0 0.0 - 0.1 K/UL RBC COMMENTS ANISOCYTOSIS  2+        RBC COMMENTS ROULEAUX  PRESENT        DF SMEAR SCANNED     METABOLIC PANEL, COMPREHENSIVE    Collection Time: 04/03/17  1:49 AM   Result Value Ref Range    Sodium 136 136 - 145 mmol/L    Potassium 4.5 3.5 - 5.1 mmol/L    Chloride 100 97 - 108 mmol/L    CO2 23 21 - 32 mmol/L    Anion gap 13 5 - 15 mmol/L    Glucose 83 65 - 100 mg/dL    BUN 22 (H) 6 - 20 MG/DL    Creatinine 1.77 (H) 0.70 - 1.30 MG/DL    BUN/Creatinine ratio 12 12 - 20      GFR est AA 48 (L) >60 ml/min/1.73m2    GFR est non-AA 40 (L) >60 ml/min/1.73m2    Calcium 9.0 8.5 - 10.1 MG/DL    Bilirubin, total 0.1 (L) 0.2 - 1.0 MG/DL    ALT (SGPT) 28 12 - 78 U/L    AST (SGOT) 28 15 - 37 U/L    Alk.  phosphatase 75 45 - 117 U/L    Protein, total 11.8 (H) 6.4 - 8.2 g/dL    Albumin 3.0 (L) 3.5 - 5.0 g/dL    Globulin 8.8 (H) 2.0 - 4.0 g/dL    A-G Ratio 0.3 (L) 1.1 - 2.2     TYPE & SCREEN    Collection Time: 04/03/17  3:00 AM   Result Value Ref Range    Crossmatch Expiration 04/06/2017     ABO/Rh(D) O NEGATIVE     Antibody screen NEG     Unit number [id number]     Blood component type  LR AS3,1     Unit division 00     Status of unit ALLOCATED     Crossmatch result Compatible     Unit number [id number]     Blood component type  LR AS3,2     Unit division 00     Status of unit ISSUED     Crossmatch result Compatible    FERRITIN    Collection Time: 04/03/17  4:20 AM   Result Value Ref Range    Ferritin 125 26 - 388 NG/ML   IRON PROFILE    Collection Time: 04/03/17  4:20 AM   Result Value Ref Range    Iron 93 35 - 150 ug/dL    TIBC 254 250 - 450 ug/dL    Iron % saturation 37 20 - 50 %   LD    Collection Time: 04/03/17  4:20 AM   Result Value Ref Range    LD 81 (L) 85 - 241 U/L   HAPTOGLOBIN    Collection Time: 04/03/17  4:20 AM   Result Value Ref Range    Haptoglobin 78 30 - 200 mg/dL   RETICULOCYTE COUNT    Collection Time: 04/03/17  4:20 AM   Result Value Ref Range    Reticulocyte count 1.0 0.7 - 2.1 %       I have reviewed the results of all available imaging studies. Yes I have personally reviewed the actual    xray        CXR read by radiology and reviewed by myself shows    CT scan abdomen/pelvis FINDINGS:   No urinary tract stones are seen. There is no hydroureteronephrosis. The kidneys  are normal in size. There is no perirenal fluid or ascites. There is no  prostatolith abscess or enlargement. Liver shows no apparent significant finding without contrast. Pancreas, adrenal  glands, spleen and aorta show no significant enlargement. No focal inflammation  is seen. There is no free air or substantial adenopathy. The bladder is midline and the distal ureters are not dilated. There is no  apparent pelvic mass. The appendix is absent. There is a 4.5 cm spherical fluid collection in the left groin possibly chronic  seroma related to previous herniorrhaphy, apparently present by surgical history  in 2015. IMPRESSION: No Acute Disease.    ___________________________________________________    Inpatient is warranted for this patient because they presents with Legs heavy, rectal burning. I have a high level of concern for anemia of chronic disease  I anticipate the stay in the hospital will span at least 2 midnights. My assessment of the clinical condition and my plan of care is as outlined above  __________________________________________________    Care Plan discussed with:    Patient, Wife, ED Doc     I saw the patient personally, took a history and did a complete physical exam at the bedside. I performed complex decision making in coming up with a diagnostic and treatment plan for the patient. I reviewed the patient's past medical records, current laboratory and radiology results, and actual Xray films/EKG. I have also discussed this case with the involved ED physician.     Risk of deterioration: High    Total Time Coordinating Admission: 65     minutes    Total Critical Care Time:     Admitting Physician: Tadeo Steve Elly Gong MD

## 2017-04-03 NOTE — ED PROVIDER NOTES
HPI Comments: Pauline Pena is a 63 y.o. male with hx of prostatitis who presents ambulatory to [facility] ED with CC of rectal pain x ~1 week, becoming progressively worse x ~3 days. Pt reports he was diagnosed with prostatitis (3/28/17), and started on Cipro, which he has been taking as prescribed without significant improvement in his symptoms. He states his pain is exacerbated by sitting down. Pt states he was scheduled for prostate biopsy (3/31/17), which has been rescheduled since onset of his symptoms, and states he is currently scheduled for follow up with his Urologist in ~2 weeks. Pt also notes increased urinary frequency since onset of his symptoms. He reports last BM this morning, and denies associated pain. He also notes he has had blood in his semen for \"a while. \" Pt reports he takes Nucynta daily, which he notes has not improved his current symptoms. He specifically denies nausea, vomiting, melena, hematochezia, hematuria, difficulty urinating, and ABD pain. PCP: Hayde Cassidy MD  Urology: Deborah Askew MD    There are no other complaints, changes, or physical findings at this time. The history is provided by the patient.         Past Medical History:   Diagnosis Date    Acid reflux     Arrhythmia     pvc's    Chronic pain     neck    Depression     Diabetes ([address]     Femoral hernia 7/11/2012    Hepatitis C     Hypertension     Inguinal hernia 7/11/2012    Liver disease     hepatitis c    Other ill-defined conditions     Hx of PVCs since 2009    Prostatitis, acute     Psychiatric disorder     depression       Past Surgical History:   Procedure Laterality Date    COLONOSCOPY N/A 9/20/2016    COLONOSCOPY performed by Paulette Schultz MD at [facility] ENDOSCOPY    HX APPENDECTOMY      HX CERVICAL FUSION  2008    x 1 as of 4/30/2014    HX HEENT      foreign body removed from right eye    [address]  2012    [facility]      HX ORTHOPAEDIC      cervical fusion    HX OTHER SURGICAL  2000    liver bx - chronic hepatitis         Family History:   Problem Relation Age of Onset    Arthritis-osteo Mother     Arthritis-osteo Father     Diabetes Father     Hypertension Maternal Grandmother     Diabetes Maternal Grandmother     Hypertension Maternal Grandfather     Diabetes Maternal Grandfather        Social History     Social History    Marital status:      Spouse name: N/A    Number of children: N/A    Years of education: N/A     Occupational History    Not on file. Social History Main Topics    Smoking status: Former Smoker     Packs/day: 1.00     Years: 12.00     Types: Cigarettes     Quit date: 1/1/1989    Smokeless tobacco: Never Used      Comment: former cigarette smoker    Alcohol use No      Comment: former alcoholic-quit [id number]    Drug use: No    Sexual activity: Not on file     Other Topics Concern    Not on file     Social History Narrative         ALLERGIES: Mercury (bulk)    Review of Systems   Constitutional: Negative. HENT: Negative. Eyes: Negative. Respiratory: Negative. Cardiovascular: Negative. Gastrointestinal: Positive for rectal pain. Negative for abdominal pain, blood in stool, nausea and vomiting.        -melena   Genitourinary: Negative. Negative for difficulty urinating and hematuria. Musculoskeletal: Negative. Skin: Negative. Neurological: Negative. All other systems reviewed and are negative.       Patient Vitals for the past 12 hrs:   Temp Pulse Resp BP SpO2   04/03/17 0640 99.2 °F (37.3 °C) 85 20 145/88 96 %   04/03/17 0600 - - - 118/66 96 %   04/03/17 0545 98.9 °F (37.2 °C) - - 124/75 92 %   04/03/17 0530 98.8 °F (37.1 °C) - - 121/72 91 %   04/03/17 0515 98.9 °F (37.2 °C) 85 16 125/68 92 %   04/03/17 0500 99.1 °F (37.3 °C) 84 18 124/72 93 %   04/03/17 0442 98.8 °F (37.1 °C) 87 18 131/75 94 %   04/03/17 0315 - - - 128/65 95 %   04/03/17 0300 - - - 124/75 98 %   04/03/17 0245 - - - 138/79 100 %   04/03/17 0230 - - - 130/82 100 %   04/03/17 0215 - - - 146/86 97 %   04/03/17 0200 - - - 130/79 93 %   04/03/17 0100 - - - 120/59 96 %   04/03/17 0045 - - - 123/89 99 %   04/03/17 0042 - - - - 99 %   04/03/17 0040 - - - 129/59 -   04/02/17 2340 99.8 °F (37.7 °C) 96 18 137/79 99 %            Physical Exam     General appearance - well nourished; appears uncomfortable  Eyes - pupils equal and reactive, extraocular eye movements intact  ENT - mucous membranes moist, pharynx normal without lesions  Neck - supple, no significant adenopathy; non-tender to palpation  Chest - clear to auscultation, no wheezes, rales or rhonchi; non-tender to palpation  Heart - normal rate and regular rhythm, S1 and S2 normal, no murmurs noted  Abdomen - soft, nontender, nondistended, no masses or organomegaly  : prostate tender, feels enlarged  Musculoskeletal - no joint tenderness, deformity or swelling; normal ROM  Extremities - peripheral pulses normal, no pedal edema  Skin - normal coloration and turgor, no rashes  Neurological - alert, oriented x3, normal speech, no focal findings or movement disorder noted      MDM  Number of Diagnoses or Management Options  Diagnosis management comments: DDx: UTI, diverticulitis, prostatitis, perirectal abscess       Amount and/or Complexity of Data Reviewed  Clinical lab tests: ordered and reviewed  Tests in the radiology section of CPT®: ordered and reviewed  Review and summarize past medical records: yes  Discuss the patient with other providers: yes (Hospitalist)  Independent visualization of images, tracings, or specimens: yes    Risk of Complications, Morbidity, and/or Mortality  Presenting problems: high  Diagnostic procedures: high  Management options: high    Critical Care  Total time providing critical care: 30-74 minutes    Patient Progress  Patient progress: stable    ED Course       Procedures    Procedure Note - Rectal Exam:   2:23 AM  Performed by: Janny Maciel MD  Chaperoned by: Lindsay Billings Hadder  Rectal exam performed. Brown stool was collected. Stool was Hemoccult tested, and found to be heme Negative. The procedure took 1-15 minutes, and pt tolerated well. CONSULT NOTE:   4:12 AM  Reta Hahn MD spoke with Dr. Kasia Bedolla,   Specialty: Hospitalist  Discussed pt's hx, disposition, and available diagnostic and imaging results. Reviewed care plans. Consultant will evaluate pt for admission. CRITICAL CARE NOTE :    IMPENDING DETERIORATION -Metabolic and Renal  ASSOCIATED RISK FACTORS - Metabolic changes  MANAGEMENT- Bedside Assessment and Supervision of Care  INTERPRETATION -  CT Scan and Blood Pressure  INTERVENTIONS - transfusion  CASE REVIEW - Hospitalist  TREATMENT RESPONSE -Stable  PERFORMED BY - Self    I have spent 30-74 minutes of critical care time involved in lab review, consultations with specialist, family decision- making, bedside attention and documentation. During this entire length of time I was immediately available to the patient .     Reta Mora MD      LABORATORY TESTS:  Recent Results (from the past 12 hour(s))   URINALYSIS W/ REFLEX CULTURE    Collection Time: 04/03/17 12:48 AM   Result Value Ref Range    Color YELLOW/STRAW      Appearance CLEAR CLEAR      Specific gravity 1.018 1.003 - 1.030      pH (UA) 7.5 5.0 - 8.0      Protein 30 (A) NEG mg/dL    Glucose NEGATIVE  NEG mg/dL    Ketone NEGATIVE  NEG mg/dL    Bilirubin NEGATIVE  NEG      Blood NEGATIVE  NEG      Urobilinogen 0.2 0.2 - 1.0 EU/dL    Nitrites NEGATIVE  NEG      Leukocyte Esterase NEGATIVE  NEG      WBC 0-4 0 - 4 /hpf    RBC 0-5 0 - 5 /hpf    Epithelial cells FEW FEW /lpf    Bacteria NEGATIVE  NEG /hpf    UA:UC IF INDICATED CULTURE NOT INDICATED BY UA RESULT CNI      Hyaline cast 0-2 0 - 5 /lpf   CBC WITH AUTOMATED DIFF    Collection Time: 04/03/17  1:49 AM   Result Value Ref Range    WBC 4.7 4.1 - 11.1 K/uL    RBC 2.31 (L) 4.10 - 5.70 M/uL    HGB 6.4 (L) 12.1 - 17.0 g/dL    HCT 19.4 (L) 36.6 - 50.3 % MCV 84.0 80.0 - 99.0 FL    MCH 27.7 26.0 - 34.0 PG    MCHC 33.0 30.0 - 36.5 g/dL    RDW 22.5 (H) 11.5 - 14.5 %    PLATELET 623 (L) 793 - 400 K/uL    NEUTROPHILS 56 32 - 75 %    LYMPHOCYTES 33 12 - 49 %    MONOCYTES 11 5 - 13 %    EOSINOPHILS 0 0 - 7 %    BASOPHILS 0 0 - 1 %    ABS. NEUTROPHILS 2.6 1.8 - 8.0 K/UL    ABS. LYMPHOCYTES 1.6 0.8 - 3.5 K/UL    ABS. MONOCYTES 0.5 0.0 - 1.0 K/UL    ABS. EOSINOPHILS 0.0 0.0 - 0.4 K/UL    ABS. BASOPHILS 0.0 0.0 - 0.1 K/UL    RBC COMMENTS ANISOCYTOSIS  2+        RBC COMMENTS ROULEAUX  PRESENT        DF SMEAR SCANNED     METABOLIC PANEL, COMPREHENSIVE    Collection Time: 04/03/17  1:49 AM   Result Value Ref Range    Sodium 136 136 - 145 mmol/L    Potassium 4.5 3.5 - 5.1 mmol/L    Chloride 100 97 - 108 mmol/L    CO2 23 21 - 32 mmol/L    Anion gap 13 5 - 15 mmol/L    Glucose 83 65 - 100 mg/dL    BUN 22 (H) 6 - 20 MG/DL    Creatinine 1.77 (H) 0.70 - 1.30 MG/DL    BUN/Creatinine ratio 12 12 - 20      GFR est AA 48 (L) >60 ml/min/1.73m2    GFR est non-AA 40 (L) >60 ml/min/1.73m2    Calcium 9.0 8.5 - 10.1 MG/DL    Bilirubin, total 0.1 (L) 0.2 - 1.0 MG/DL    ALT (SGPT) 28 12 - 78 U/L    AST (SGOT) 28 15 - 37 U/L    Alk.  phosphatase 75 45 - 117 U/L    Protein, total 11.8 (H) 6.4 - 8.2 g/dL    Albumin 3.0 (L) 3.5 - 5.0 g/dL    Globulin 8.8 (H) 2.0 - 4.0 g/dL    A-G Ratio 0.3 (L) 1.1 - 2.2     TYPE & SCREEN    Collection Time: 04/03/17  3:00 AM   Result Value Ref Range    Crossmatch Expiration 04/06/2017     ABO/Rh(D) O NEGATIVE     Antibody screen NEG     Unit number [id number]     Blood component type  LR AS3,1     Unit division 00     Status of unit ALLOCATED     Crossmatch result Compatible     Unit number [id number]     Blood component type RC LR AS3,2     Unit division 00     Status of unit ISSUED     Crossmatch result Compatible        IMAGING RESULTS:  CT Results  (Last 48 hours)               04/03/17 0329  CT ABD PELV WO CONT Final result    Impression:  IMPRESSION: No Acute Disease. Narrative:  INDICATION:  Prostatitis,  pelvic pain        EXAM: CT Abdomen and CT Pelvis without IV contrast. Oral contrast was   administered. CT dose reduction was achieved through use of a standardized protocol tailored   for this examination and automatic exposure control for dose modulation. FINDINGS:    No urinary tract stones are seen. There is no hydroureteronephrosis. The kidneys   are normal in size. There is no perirenal fluid or ascites. There is no   prostatolith abscess or enlargement. Liver shows no apparent significant finding without contrast. Pancreas, adrenal   glands, spleen and aorta show no significant enlargement. No focal inflammation   is seen. There is no free air or substantial adenopathy. The bladder is midline and the distal ureters are not dilated. There is no   apparent pelvic mass. The appendix is absent. There is a 4.5 cm spherical fluid collection in the left groin possibly chronic   seroma related to previous herniorrhaphy, apparently present by surgical history   in 2015. MEDICATIONS GIVEN:  Medications   0.9% sodium chloride infusion 250 mL (not administered)   morphine injection 4 mg (4 mg IntraVENous Given 4/3/17 0150)   ondansetron (ZOFRAN) injection 4 mg (4 mg IntraVENous Given 4/3/17 0150)   diatrizoate meglumine-d.sodium (MD-GASTROVIEW,GASTROGRAFIN) 66-10 % contrast solution 30 mL (30 mL Oral Given 4/3/17 0200)   morphine injection 2 mg (2 mg IntraVENous Given 4/3/17 0437)       IMPRESSION:  1. Prostatitis, acute    2. Severe anemia    3. Renal insufficiency        PLAN:  1. Admit to Hospitalist    ADMIT NOTE:  4:12 AM  The patient is being admitted to the hospital by Dr. Erlinda Oliver. The results of their tests and reasons for their admission have been discussed with the patient and/or available family.   They convey agreement and understanding for the need to be admitted and for their admission diagnosis. This note is prepared by Nicole Hampton, acting as Scribe for Reta Millan MD.    Katherine Miller MD: The scribe's documentation has been prepared under my direction and personally reviewed by me in its entirety. I confirm that the note above accurately reflects all work, treatment, procedures, and medical decision making performed by me. This note will not be viewable in [address]

## 2017-04-03 NOTE — ROUTINE PROCESS
Oncology Nursing Communication Tool      8:46 AM  4/3/2017     Bedside shift change report given to Eleni Mary RN (incoming nurse) by Na Crystal RN (outgoing nurse) on Juliaette Landau. a 63 y.o. male who was admitted on 4/2/2017 11:41 PM. Report included the following information SBAR, Kardex, ED Summary, Intake/Output, MAR and Recent Results. Significant changes during shift: report received from [address] in the ED at 0610. Pt arrived on the unit via stretcher at 0625 & was able to ambulate to  from stretcher in the [place] w/o diff. Pt's wife with him. Admission database completed. Pt had #1/2 units of blood infusing on arrival to unit, VSS. Pt oriented to room. Denied pain. 1st unit of blood completed w/o difficulty during bedside change of shift report. Issues for physician to address: pt has question about rhythmol medication dose ordered .             Code Status: Full Code     Infections: MRSA     Allergies: Mercury (bulk)     Current diet: DIET DIABETIC CONSISTENT CARB Regular       Pain Controlled [x] yes [] no   Bowel Movement [] yes [x] no   Last Bowel Movement (date) 4/2/17            Vital Signs:   Patient Vitals for the past 12 hrs:   Temp Pulse Resp BP SpO2   04/03/17 0754 99 °F (37.2 °C) 81 20 142/87 96 %   04/03/17 0640 99.2 °F (37.3 °C) 85 20 145/88 96 %   04/03/17 0600 - - - 118/66 96 %   04/03/17 0545 98.9 °F (37.2 °C) - - 124/75 92 %   04/03/17 0530 98.8 °F (37.1 °C) - - 121/72 91 %   04/03/17 0515 98.9 °F (37.2 °C) 85 16 125/68 92 %   04/03/17 0500 99.1 °F (37.3 °C) 84 18 124/72 93 %   04/03/17 0442 98.8 °F (37.1 °C) 87 18 131/75 94 %   04/03/17 0315 - - - 128/65 95 %   04/03/17 0300 - - - 124/75 98 %   04/03/17 0245 - - - 138/79 100 %   04/03/17 0230 - - - 130/82 100 %   04/03/17 0215 - - - 146/86 97 %   04/03/17 0200 - - - 130/79 93 %   04/03/17 0100 - - - 120/59 96 %   04/03/17 0045 - - - 123/89 99 %   04/03/17 0042 - - - - 99 %   04/03/17 0040 - - - 129/59 -   04/02/17 2340 99.8 °F (37.7 °C) 96 18 137/79 99 %      Intake & Output:     Intake/Output Summary (Last 24 hours) at 04/03/17 0846  Last data filed at 04/03/17 0754   Gross per 24 hour   Intake              310 ml   Output                0 ml   Net              310 ml      Laboratory Results:     Recent Results (from the past 12 hour(s))   URINALYSIS W/ REFLEX CULTURE    Collection Time: 04/03/17 12:48 AM   Result Value Ref Range    Color YELLOW/STRAW      Appearance CLEAR CLEAR      Specific gravity 1.018 1.003 - 1.030      pH (UA) 7.5 5.0 - 8.0      Protein 30 (A) NEG mg/dL    Glucose NEGATIVE  NEG mg/dL    Ketone NEGATIVE  NEG mg/dL    Bilirubin NEGATIVE  NEG      Blood NEGATIVE  NEG      Urobilinogen 0.2 0.2 - 1.0 EU/dL    Nitrites NEGATIVE  NEG      Leukocyte Esterase NEGATIVE  NEG      WBC 0-4 0 - 4 /hpf    RBC 0-5 0 - 5 /hpf    Epithelial cells FEW FEW /lpf    Bacteria NEGATIVE  NEG /hpf    UA:UC IF INDICATED CULTURE NOT INDICATED BY UA RESULT CNI      Hyaline cast 0-2 0 - 5 /lpf   CBC WITH AUTOMATED DIFF    Collection Time: 04/03/17  1:49 AM   Result Value Ref Range    WBC 4.7 4.1 - 11.1 K/uL    RBC 2.31 (L) 4.10 - 5.70 M/uL    HGB 6.4 (L) 12.1 - 17.0 g/dL    HCT 19.4 (L) 36.6 - 50.3 %    MCV 84.0 80.0 - 99.0 FL    MCH 27.7 26.0 - 34.0 PG    MCHC 33.0 30.0 - 36.5 g/dL    RDW 22.5 (H) 11.5 - 14.5 %    PLATELET 900 (L) 914 - 400 K/uL    NEUTROPHILS 56 32 - 75 %    LYMPHOCYTES 33 12 - 49 %    MONOCYTES 11 5 - 13 %    EOSINOPHILS 0 0 - 7 %    BASOPHILS 0 0 - 1 %    ABS. NEUTROPHILS 2.6 1.8 - 8.0 K/UL    ABS. LYMPHOCYTES 1.6 0.8 - 3.5 K/UL    ABS. MONOCYTES 0.5 0.0 - 1.0 K/UL    ABS. EOSINOPHILS 0.0 0.0 - 0.4 K/UL    ABS.  BASOPHILS 0.0 0.0 - 0.1 K/UL    RBC COMMENTS ANISOCYTOSIS  2+        RBC COMMENTS ROULEAUX  PRESENT        DF SMEAR SCANNED     METABOLIC PANEL, COMPREHENSIVE    Collection Time: 04/03/17  1:49 AM   Result Value Ref Range    Sodium 136 136 - 145 mmol/L    Potassium 4.5 3.5 - 5.1 mmol/L    Chloride 100 97 - 108 mmol/L    CO2 23 21 - 32 mmol/L    Anion gap 13 5 - 15 mmol/L    Glucose 83 65 - 100 mg/dL    BUN 22 (H) 6 - 20 MG/DL    Creatinine 1.77 (H) 0.70 - 1.30 MG/DL    BUN/Creatinine ratio 12 12 - 20      GFR est AA 48 (L) >60 ml/min/1.73m2    GFR est non-AA 40 (L) >60 ml/min/1.73m2    Calcium 9.0 8.5 - 10.1 MG/DL    Bilirubin, total 0.1 (L) 0.2 - 1.0 MG/DL    ALT (SGPT) 28 12 - 78 U/L    AST (SGOT) 28 15 - 37 U/L    Alk. phosphatase 75 45 - 117 U/L    Protein, total 11.8 (H) 6.4 - 8.2 g/dL    Albumin 3.0 (L) 3.5 - 5.0 g/dL    Globulin 8.8 (H) 2.0 - 4.0 g/dL    A-G Ratio 0.3 (L) 1.1 - 2.2     TYPE & SCREEN    Collection Time: 04/03/17  3:00 AM   Result Value Ref Range    Crossmatch Expiration 04/06/2017     ABO/Rh(D) O NEGATIVE     Antibody screen NEG     Unit number [id number]     Blood component type  LR AS3,1     Unit division 00     Status of unit ALLOCATED     Crossmatch result Compatible     Unit number [id number]     Blood component type  LR AS3,2     Unit division 00     Status of unit ISSUED     Crossmatch result Compatible    FERRITIN    Collection Time: 04/03/17  4:20 AM   Result Value Ref Range    Ferritin 125 26 - 388 NG/ML   IRON PROFILE    Collection Time: 04/03/17  4:20 AM   Result Value Ref Range    Iron 93 35 - 150 ug/dL    TIBC 254 250 - 450 ug/dL    Iron % saturation 37 20 - 50 %   LD    Collection Time: 04/03/17  4:20 AM   Result Value Ref Range    LD 81 (L) 85 - 241 U/L   HAPTOGLOBIN    Collection Time: 04/03/17  4:20 AM   Result Value Ref Range    Haptoglobin 78 30 - 200 mg/dL   RETICULOCYTE COUNT    Collection Time: 04/03/17  4:20 AM   Result Value Ref Range    Reticulocyte count 1.0 0.7 - 2.1 %   GLUCOSE, POC    Collection Time: 04/03/17  8:12 AM   Result Value Ref Range    Glucose (POC) 93 65 - 100 mg/dL    Performed by [address] [place] [address] for questions and clarifications were given to the incoming nurse.  Patient's bed is in low position, side rails x2, door open PRN, call bell within reach and patient not in distress.       Gosia Pisano RN

## 2017-04-03 NOTE — ROUTINE PROCESS
TRANSFER - OUT REPORT:    Verbal report given to Wilbert Luis (name) on [employer]  being transferred to Oncology (unit) for routine progression of care       Report consisted of patients Situation, Background, Assessment and   Recommendations(SBAR). Information from the following report(s) SBAR, Kardex, ED Summary, Intake/Output, MAR and Recent Results was reviewed with the receiving nurse. Lines:       Opportunity for questions and clarification was provided.       Patient transported with:   Registered Nurse

## 2017-04-03 NOTE — ED NOTES
Bedside and Verbal shift change report given to CLEVELAND Rodríguez (oncoming nurse) by Alma Kenyon (offgoing nurse). Report included the following information SBAR, ED Summary, Intake/Output, MAR and Recent Results.

## 2017-04-03 NOTE — ACP (ADVANCE CARE PLANNING)
Request from in-basket to assist with Advance Medical Directive (AMD) in oncology unit. Consulted with patients nurse. Explained the document briefly to patient. Pt did not want to complete the form at this moment.  left the form and a right to decide booklet with the pt to look over. Pt will contact chaplains when ready to fill it out. No further needs were expressed at this time. Advised of  availability. Ema Francisco M.S.   Spiritual Care Department  If need arise please call Enrike-KARLENE ([phone])

## 2017-04-03 NOTE — ED NOTES
Assumed care of patient from [facility]  , introduced self to patient as primary nurse at this time. Updated patient on plan of care at this time. Pt has no other questions at this time. Bed in lowest position and locked. Side rails up x2 and call bell within reach.

## 2017-04-03 NOTE — PROGRESS NOTES
Pt. Signed his Observ. Letters, [employer], and his  Code 44 sheet.   These were placed on his chart and copies were given to pt. -- Eros Rivera

## 2017-04-03 NOTE — ED NOTES
Blood Transfusion discussed with patient by Dr. Juju Vela. Consent signed by patient, Dr. Brooke Norris and this RN.

## 2017-04-03 NOTE — PROGRESS NOTES
Hospitalist Progress Note    NAME: Mirella Pryor Sr.   :  1955   MRN:  [id number]       Interim Hospital Summary: 63 y.o. male whom presented on 2017 with      Assessment / Plan:  Anemia of chronic disease POA symptomatic, HgB 6.4, no signs of active bleeding, transfusing 2 units PRBC, monitor. Prostatitis/BPH?/Rectal pain: c/w Cipro, add flomax, digital exam prostate around 60g, needs to F/U with Urology as outpatient, check PSA. Hyperproteinemia: concerning for MM, electrophoresis requested, Hematology in the case, should continue follow up with them as outpatient. ? Chronic renal failure stage 3 POA with rising creatinine, start flomax, monitor. No hydronephrosis on CT scan  DM type 2 POA on metformin, POC, SSI, Hold metformin in house if creatinine is high may not be able to resume this. Essential HTN POA Continue norvasc  SVT POA on profenone SR,  Hold cozaar, PRN hydralazine  DDD C -spine POA  Chronic pain syndrome POA   History of substance abuse POA Continue tapentadol PRN narcotics, avoid long-term use  Anxiety/depression POA Continue abilify and wellbutrin  Next of Kin: wife  Code status: Full  Prophylaxis: SCD's  Recommended Disposition: Home w/Family     Subjective:     Chief Complaint / Reason for Physician Visit  \"I have rectal pain\". Discussed with RN events overnight. Review of Systems:  Symptom Y/N Comments  Symptom Y/N Comments   Fever/Chills    Chest Pain     Poor Appetite    Edema     Cough    Abdominal Pain     Sputum    Joint Pain     SOB/GONZALES    Pruritis/Rash     Nausea/vomit    Tolerating PT/OT     Diarrhea    Tolerating Diet y    Constipation    Other       Could NOT obtain due to:      Objective:     VITALS:   Last 24hrs VS reviewed since prior progress note.  Most recent are:  Patient Vitals for the past 24 hrs:   Temp Pulse Resp BP SpO2   17 1406 - 87 18 121/78 -   17 1356 99.1 °F (37.3 °C) 87 18 125/81 92 %   17 1330 99.1 °F (37.3 °C) 81 18 122/79 93 %   04/03/17 1300 98.9 °F (37.2 °C) 83 18 139/87 95 %   04/03/17 1245 98.8 °F (37.1 °C) 84 18 132/87 96 %   04/03/17 1226 98.8 °F (37.1 °C) 84 20 138/89 96 %   04/03/17 1154 99 °F (37.2 °C) - 20 121/85 93 %   04/03/17 1147 98.8 °F (37.1 °C) 77 20 112/75 93 %   04/03/17 1131 98.8 °F (37.1 °C) - 20 111/72 93 %   04/03/17 1013 99 °F (37.2 °C) 91 20 (!) 159/91 95 %   04/03/17 0754 99 °F (37.2 °C) 81 20 142/87 96 %   04/03/17 0640 99.2 °F (37.3 °C) 85 20 145/88 96 %   04/03/17 0600 - - - 118/66 96 %   04/03/17 0545 98.9 °F (37.2 °C) - - 124/75 92 %   04/03/17 0530 98.8 °F (37.1 °C) - - 121/72 91 %   04/03/17 0515 98.9 °F (37.2 °C) 85 16 125/68 92 %   04/03/17 0500 99.1 °F (37.3 °C) 84 18 124/72 93 %   04/03/17 0442 98.8 °F (37.1 °C) 87 18 131/75 94 %   04/03/17 0315 - - - 128/65 95 %   04/03/17 0300 - - - 124/75 98 %   04/03/17 0245 - - - 138/79 100 %   04/03/17 0230 - - - 130/82 100 %   04/03/17 0215 - - - 146/86 97 %   04/03/17 0200 - - - 130/79 93 %   04/03/17 0100 - - - 120/59 96 %   04/03/17 0045 - - - 123/89 99 %   04/03/17 0042 - - - - 99 %   04/03/17 0040 - - - 129/59 -   04/02/17 2340 99.8 °F (37.7 °C) 96 18 137/79 99 %       Intake/Output Summary (Last 24 hours) at 04/03/17 1416  Last data filed at 04/03/17 0754   Gross per 24 hour   Intake              310 ml   Output                0 ml   Net              310 ml        PHYSICAL EXAM:  General: WD, WN. Alert, cooperative, no acute distress    EENT:  EOMI. Anicteric sclerae. MMM  Resp:  CTA bilaterally, no wheezing or rales. No accessory muscle use  CV:  Regular  rhythm,  No edema  GI:  Soft, Non distended, Non tender.  +Bowel sounds  Neurologic:  Alert and oriented X 3, normal speech,   Psych:   Good insight. Not anxious nor agitated  Skin:  No rashes.   No jaundice    Reviewed most current lab test results and cultures  YES  Reviewed most current radiology test results   YES  Review and summation of old records today    NO  Reviewed patient's current orders and MAR    YES  PMH/SH reviewed - no change compared to H&P  ________________________________________________________________________  Care Plan discussed with:    Comments   Patient y    Family  y wife   RN y    Care Manager     Consultant                        Multidiciplinary team rounds were held today with , nursing, pharmacist and clinical coordinator. Patient's plan of care was discussed; medications were reviewed and discharge planning was addressed. ________________________________________________________________________  Total NON critical care TIME:  35   Minutes    Total CRITICAL CARE TIME Spent:   Minutes non procedure based      Comments   >50% of visit spent in counseling and coordination of care     ________________________________________________________________________  Nona Antoine MD     Procedures: see electronic medical records for all procedures/Xrays and details which were not copied into this note but were reviewed prior to creation of Plan. LABS:  I reviewed today's most current labs and imaging studies.   Pertinent labs include:  Recent Labs      04/03/17   0149   WBC  4.7   HGB  6.4*   HCT  19.4*   PLT  123*     Recent Labs      04/03/17   0149   NA  136   K  4.5   CL  100   CO2  23   GLU  83   BUN  22*   CREA  1.77*   CA  9.0   ALB  3.0*   TBILI  0.1*   SGOT  28   ALT  28       Signed: Nona Antoine MD

## 2017-04-04 VITALS
BODY MASS INDEX: 28.1 KG/M2 | RESPIRATION RATE: 18 BRPM | WEIGHT: 185.41 LBS | HEART RATE: 88 BPM | TEMPERATURE: 98.8 F | SYSTOLIC BLOOD PRESSURE: 120 MMHG | DIASTOLIC BLOOD PRESSURE: 86 MMHG | OXYGEN SATURATION: 95 % | HEIGHT: 68 IN

## 2017-04-04 LAB
ABO + RH BLD: NORMAL
ALBUMIN SERPL BCP-MCNC: 2.8 G/DL (ref 3.5–5)
ALBUMIN/GLOB SERPL: 0.3 {RATIO} (ref 1.1–2.2)
ALP SERPL-CCNC: 54 U/L (ref 45–117)
ALT SERPL-CCNC: 25 U/L (ref 12–78)
ANION GAP BLD CALC-SCNC: 12 MMOL/L (ref 5–15)
AST SERPL W P-5'-P-CCNC: 23 U/L (ref 15–37)
BACTERIA SPEC CULT: ABNORMAL
BACTERIA SPEC CULT: ABNORMAL
BASOPHILS # BLD AUTO: 0 K/UL (ref 0–0.1)
BASOPHILS # BLD: 0 % (ref 0–1)
BILIRUB SERPL-MCNC: 0.3 MG/DL (ref 0.2–1)
BLD PROD TYP BPU: NORMAL
BLD PROD TYP BPU: NORMAL
BLOOD GROUP ANTIBODIES SERPL: NORMAL
BPU ID: NORMAL
BPU ID: NORMAL
BUN SERPL-MCNC: 17 MG/DL (ref 6–20)
BUN/CREAT SERPL: 10 (ref 12–20)
CALCIUM SERPL-MCNC: 9.4 MG/DL (ref 8.5–10.1)
CHLORIDE SERPL-SCNC: 103 MMOL/L (ref 97–108)
CO2 SERPL-SCNC: 23 MMOL/L (ref 21–32)
CREAT SERPL-MCNC: 1.65 MG/DL (ref 0.7–1.3)
CROSSMATCH RESULT,%XM: NORMAL
CROSSMATCH RESULT,%XM: NORMAL
DIFFERENTIAL METHOD BLD: ABNORMAL
EOSINOPHIL # BLD: 0 K/UL (ref 0–0.4)
EOSINOPHIL NFR BLD: 0 % (ref 0–7)
ERYTHROCYTE [DISTWIDTH] IN BLOOD BY AUTOMATED COUNT: 20.6 % (ref 11.5–14.5)
GLOBULIN SER CALC-MCNC: 9.1 G/DL (ref 2–4)
GLUCOSE BLD STRIP.AUTO-MCNC: 103 MG/DL (ref 65–100)
GLUCOSE BLD STRIP.AUTO-MCNC: 110 MG/DL (ref 65–100)
GLUCOSE BLD STRIP.AUTO-MCNC: 154 MG/DL (ref 65–100)
GLUCOSE SERPL-MCNC: 83 MG/DL (ref 65–100)
HCT VFR BLD AUTO: 23.3 % (ref 36.6–50.3)
HGB BLD-MCNC: 7.8 G/DL (ref 12.1–17)
LYMPHOCYTES # BLD AUTO: 29 % (ref 12–49)
LYMPHOCYTES # BLD: 1.4 K/UL (ref 0.8–3.5)
MAGNESIUM SERPL-MCNC: 3 MG/DL (ref 1.6–2.4)
MCH RBC QN AUTO: 28.4 PG (ref 26–34)
MCHC RBC AUTO-ENTMCNC: 33.5 G/DL (ref 30–36.5)
MCV RBC AUTO: 84.7 FL (ref 80–99)
MONOCYTES # BLD: 0.7 K/UL (ref 0–1)
MONOCYTES NFR BLD AUTO: 15 % (ref 5–13)
NEUTS SEG # BLD: 2.6 K/UL (ref 1.8–8)
NEUTS SEG NFR BLD AUTO: 56 % (ref 32–75)
PLATELET # BLD AUTO: 115 K/UL (ref 150–400)
POTASSIUM SERPL-SCNC: 4.3 MMOL/L (ref 3.5–5.1)
PROT SERPL-MCNC: 11.9 G/DL (ref 6.4–8.2)
RBC # BLD AUTO: 2.75 M/UL (ref 4.1–5.7)
RBC MORPH BLD: ABNORMAL
SERVICE CMNT-IMP: ABNORMAL
SODIUM SERPL-SCNC: 138 MMOL/L (ref 136–145)
SPECIMEN EXP DATE BLD: NORMAL
STATUS OF UNIT,%ST: NORMAL
STATUS OF UNIT,%ST: NORMAL
UNIT DIVISION, %UDIV: 0
UNIT DIVISION, %UDIV: 0
WBC # BLD AUTO: 4.7 K/UL (ref 4.1–11.1)

## 2017-04-04 PROCEDURE — 85025 COMPLETE CBC W/AUTO DIFF WBC: CPT | Performed by: INTERNAL MEDICINE

## 2017-04-04 PROCEDURE — 82784 ASSAY IGA/IGD/IGG/IGM EACH: CPT | Performed by: INTERNAL MEDICINE

## 2017-04-04 PROCEDURE — 80053 COMPREHEN METABOLIC PANEL: CPT | Performed by: INTERNAL MEDICINE

## 2017-04-04 PROCEDURE — 83735 ASSAY OF MAGNESIUM: CPT | Performed by: INTERNAL MEDICINE

## 2017-04-04 PROCEDURE — 74011250637 HC RX REV CODE- 250/637: Performed by: INTERNAL MEDICINE

## 2017-04-04 PROCEDURE — 82962 GLUCOSE BLOOD TEST: CPT

## 2017-04-04 PROCEDURE — 83883 ASSAY NEPHELOMETRY NOT SPEC: CPT | Performed by: INTERNAL MEDICINE

## 2017-04-04 PROCEDURE — G8979 MOBILITY GOAL STATUS: HCPCS

## 2017-04-04 PROCEDURE — G8980 MOBILITY D/C STATUS: HCPCS

## 2017-04-04 PROCEDURE — 97161 PT EVAL LOW COMPLEX 20 MIN: CPT

## 2017-04-04 PROCEDURE — G8978 MOBILITY CURRENT STATUS: HCPCS

## 2017-04-04 PROCEDURE — 36415 COLL VENOUS BLD VENIPUNCTURE: CPT | Performed by: INTERNAL MEDICINE

## 2017-04-04 PROCEDURE — 99218 HC RM OBSERVATION: CPT

## 2017-04-04 PROCEDURE — 84153 ASSAY OF PSA TOTAL: CPT | Performed by: INTERNAL MEDICINE

## 2017-04-04 PROCEDURE — 84165 PROTEIN E-PHORESIS SERUM: CPT | Performed by: INTERNAL MEDICINE

## 2017-04-04 RX ORDER — TAMSULOSIN HYDROCHLORIDE 0.4 MG/1
0.4 CAPSULE ORAL DAILY
Qty: 30 CAP | Refills: 1 | Status: SHIPPED | OUTPATIENT
Start: 2017-04-04

## 2017-04-04 RX ADMIN — OXYCODONE HYDROCHLORIDE 5 MG: 5 TABLET ORAL at 00:03

## 2017-04-04 RX ADMIN — OXYCODONE HYDROCHLORIDE 5 MG: 5 TABLET ORAL at 06:30

## 2017-04-04 RX ADMIN — ACETAMINOPHEN 650 MG: 325 TABLET, FILM COATED ORAL at 07:13

## 2017-04-04 RX ADMIN — PROPAFENONE HYDROCHLORIDE 150 MG: 150 TABLET, FILM COATED ORAL at 14:58

## 2017-04-04 RX ADMIN — Medication 10 ML: at 06:29

## 2017-04-04 RX ADMIN — Medication 10 ML: at 14:59

## 2017-04-04 RX ADMIN — TAMSULOSIN HYDROCHLORIDE 0.4 MG: 0.4 CAPSULE ORAL at 10:56

## 2017-04-04 RX ADMIN — AMLODIPINE BESYLATE 10 MG: 5 TABLET ORAL at 10:56

## 2017-04-04 RX ADMIN — CIPROFLOXACIN HYDROCHLORIDE 500 MG: 250 TABLET, FILM COATED ORAL at 10:56

## 2017-04-04 RX ADMIN — ACETAMINOPHEN 650 MG: 325 TABLET, FILM COATED ORAL at 00:03

## 2017-04-04 RX ADMIN — OXYCODONE HYDROCHLORIDE 5 MG: 5 TABLET ORAL at 14:58

## 2017-04-04 RX ADMIN — CIPROFLOXACIN HYDROCHLORIDE 500 MG: 250 TABLET, FILM COATED ORAL at 17:10

## 2017-04-04 RX ADMIN — PROPAFENONE HYDROCHLORIDE 150 MG: 150 TABLET, FILM COATED ORAL at 06:30

## 2017-04-04 RX ADMIN — OXYCODONE HYDROCHLORIDE 5 MG: 5 TABLET ORAL at 11:02

## 2017-04-04 RX ADMIN — BUPROPION HYDROCHLORIDE 150 MG: 150 TABLET, FILM COATED, EXTENDED RELEASE ORAL at 17:10

## 2017-04-04 RX ADMIN — Medication 1 CAPSULE: at 10:56

## 2017-04-04 RX ADMIN — ARIPIPRAZOLE 2.5 MG: 5 TABLET ORAL at 11:02

## 2017-04-04 NOTE — PROGRESS NOTES
Pt. Admitted from the ER due to anemia with Hgb. 6.4. He was complaining of rectal burning and pain due to prostatitis. He has been on Cipro for this. He was seen by urologist recently and was told prostate was not \"normal\". He is being seen by heme/onc., Dr. Severo Postal, to be worked up for [employer]. Myeloma. Pt. has hx. Of DM 2, Chronic renal failure, HTN, DDD Cspine with fusion, Chronic pain,  and Hepatitis C. He lives with his wife, is ambulatory, and independent with his ADL. They have 6 children  And 17 grandchildren. CM will follow . -- Troy Cordova, [phone]    Care Management Interventions  PCP Verified by CM: Yes  Mode of Transport at Discharge:  Other (see comment) (wife)  Transition of Care Consult (CM Consult): Discharge Planning  MyChart Signup: No  Discharge Durable Medical Equipment: No  Health Maintenance Reviewed: Yes  Physical Therapy Consult: Yes  Occupational Therapy Consult: No  Speech Therapy Consult: No  Current Support Network: Lives with Spouse, Own Home  Confirm Follow Up Transport: Family  Plan discussed with Pt/Family/Caregiver: Yes  Discharge Location  Discharge Placement: Home with family assistance

## 2017-04-04 NOTE — DISCHARGE SUMMARY
Hospitalist Discharge Summary     Patient ID:  Fazal Payne  [id number]  63 y.o.  1955    PCP on record: Mary Xavier MD    Admit date: 4/2/2017  Discharge date and time: 4/4/2017      DISCHARGE DIAGNOSIS:    Anemia of chronic disease  Prostatitis/BPH?/Rectal pain  Hyperproteinemia  ? Chronic renal failure stage 3  DM type 2 POA  Essential HTN POA   SVT POA   DDD C -spine POA  Chronic pain syndrome POA   History of substance abuse POA   Anxiety/depression POA       CONSULTATIONS:  IP CONSULT TO HEMATOLOGY    Excerpted HPI from H&P of Mary Ann Nunez MD:  63 y.o.  male:  Known prostatitis from 2012, treated  Saw Urologist recently for routine appointment, prostate not \"normal\" on exam  UA and urine culture abnormal, normal PSA, felt to have prostatitis  Started on PO ciprofloxacin 3/28/17  Developing increasing pain in the rectal region x 304 days, not improved with taking the cipro  No diarrhea  No pain on moving bowels  Throbbing or burning pain, not relieved with his usual pain meds  Progressively severe pain, unmanageable which prompted the ED visit  Legs have felt heavy and has had moderate pain radiating down legs x weeks  No fevers or chills, weight loss, cough, SOB, CP, abdominal pain or back pain  No prior history or any hematologic disorders     ED HD stable  HgB 6.4, has no prior anemia  Rectal exam without melena or blood, stool was heme negative  We were called to admit the patient    ______________________________________________________________________  DISCHARGE SUMMARY/HOSPITAL COURSE:  for full details see H&P, daily progress notes, labs, consult notes. Anemia of chronic disease POA symptomatic, HgB 6.4, no signs of active bleeding, transfusing 2 units PRBC, monitor., HB ~ 8, seen by hematology, ok to discharge from hematology stand point. Will f/u hematology as outpt.    Prostatitis/BPH?/Rectal pain: follows with urology, is on long course of cipro by urology, c/w Cipro and flomax,  F/U with Urology as outpatient  Hyperproteinemia: concerning for MM, electrophoresis requested, Hematology in the case, will f/u for results as outpt. ? Chronic renal failure stage 3 POA   Monitor closely as outpt  No hydronephrosis on CT scan  DM type 2 POA   on metformin with CKD for years and lactic acid was normal on admission so it was continued. Talked to paient at length about possible side effects and pt voiced understanding. He does not want any changes made while in hospital but will discuss with PCP to consider transitioning to other medication. Essential HTN POA Continue norvasc  SVT POA resume home medications  DDD C -spine POA  Chronic pain syndrome POA   History of substance abuse POA Continue tapentadol  Anxiety/depression POA Continue abilify and wellbutrin  Next of Kin: wife  Code status: Full  Prophylaxis: SCD's  Recommended Disposition: Home w/Family        _______________________________________________________________________  Patient seen and examined by me on discharge day. Pertinent Findings:  Gen:    Not in distress  Chest: Clear lungs  CVS:   Regular rhythm. No edema  Abd:  Soft, not distended, not tender  Neuro:  Alert, CN 2-12 grossly intact  _______________________________________________________________________  DISCHARGE MEDICATIONS:   Current Discharge Medication List      START taking these medications    Details   tamsulosin (FLOMAX) 0.4 mg capsule Take 1 Cap by mouth daily. Qty: 30 Cap, Refills: 1         CONTINUE these medications which have NOT CHANGED    Details   propafenone (RYTHMOL) 150 mg tablet Take 50 mg by mouth every eight (8) hours. ciprofloxacin HCl (CIPRO) 500 mg tablet Take 500 mg by mouth two (2) times a day. zolpidem CR (AMBIEN CR) 12.5 mg tablet Take 12.5 mg by mouth nightly as needed. TADALAFIL (CIALIS PO) Take 20 mg by mouth as needed. losartan (COZAAR) 50 mg tablet Take 100 mg by mouth daily. amLODIPine (NORVASC) 10 mg tablet Take 10 mg by mouth daily. tapentadol (NUCYNTA) 100 mg tablet Take 100 mg by mouth three (3) times daily. aripiprazole (ABILIFY) 5 mg tablet Take 2.5 mg by mouth every morning. buPROPion XL (WELLBUTRIN XL) 150 mg tablet Take 1 Tab by mouth daily (with dinner). Qty: 30 Tab, Refills: 11      metformin (GLUCOPHAGE) 500 mg tablet Take 500 mg by mouth two (2) times daily (with meals). albuterol (PROVENTIL HFA, VENTOLIN HFA, PROAIR HFA) 90 mcg/actuation inhaler Take 2 Puffs by inhalation every four (4) hours as needed for Wheezing. Qty: 1 Inhaler, Refills: 0      VARDENAFIL HCL (LEVITRA PO) Take  by mouth as needed. STOP taking these medications       propafenone SR (RYTHMOL SR) 225 mg SR capsule Comments:   Reason for Stopping:               My Recommended Diet, Activity, Wound Care, and follow-up labs are listed in the patient's Discharge Insturctions which I have personally completed and reviewed.     _______________________________________________________________________  DISPOSITION:    Home with Family: x   Home with HH/PT/OT/RN:    SNF/LTC:    YANIV:    OTHER:        Condition at Discharge:  Stable  _______________________________________________________________________  Follow up with:   PCP : Janes Talbert MD  Follow-up Information     Follow up With Details Comments Rajan Naylor MD   [address] [place] [address]  [phone]                Total time in minutes spent coordinating this discharge (includes going over instructions, follow-up, prescriptions, and preparing report for sign off to her PCP) :  35 minutes    Signed:  Sergio David MD

## 2017-04-04 NOTE — PROGRESS NOTES
Hematology Oncology Progress Note    Follow up for: Anemia    Chart notes reviewed since last visit. Case discussed with following:    Patient complains of the following: No complaints    Additional concerns noted by the staff:     Patient Vitals for the past 24 hrs:   BP Temp Pulse Resp SpO2   04/04/17 0626 128/86 98.9 °F (37.2 °C) 75 18 95 %   04/03/17 2146 114/80 99.3 °F (37.4 °C) 80 18 94 %   04/03/17 1406 121/78 - 87 18 -   04/03/17 1356 125/81 99.1 °F (37.3 °C) 87 18 92 %   04/03/17 1330 122/79 99.1 °F (37.3 °C) 81 18 93 %   04/03/17 1300 139/87 98.9 °F (37.2 °C) 83 18 95 %   04/03/17 1245 132/87 98.8 °F (37.1 °C) 84 18 96 %   04/03/17 1226 138/89 98.8 °F (37.1 °C) 84 20 96 %   04/03/17 1154 121/85 99 °F (37.2 °C) - 20 93 %   04/03/17 1147 112/75 98.8 °F (37.1 °C) 77 20 93 %   04/03/17 1131 111/72 98.8 °F (37.1 °C) - 20 93 %   04/03/17 1013 (!) 159/91 99 °F (37.2 °C) 91 20 95 %       Review of Systems:  12 point ROS done and negative except as above    Physical Examination:  Gen NAD  CV reg  Lungs clear  abd benign    Labs:  Recent Results (from the past 24 hour(s))   CULTURE, MRSA    Collection Time: 04/03/17  9:15 AM   Result Value Ref Range    Special Requests: NO SPECIAL REQUESTS      Culture result: MRSA PRESENT (A)      Culture result:            Screening of patient nares for MRSA is for surveillance purposes and, if positive, to facilitate isolation considerations in high risk settings. It is not intended for automatic decolonization interventions per se as regimens are not sufficiently effective to warrant routine use.    GLUCOSE, POC    Collection Time: 04/03/17  4:23 PM   Result Value Ref Range    Glucose (POC) 96 65 - 100 mg/dL    Performed by [address], POC    Collection Time: 04/03/17  9:48 PM   Result Value Ref Range    Glucose (POC) 162 (H) 65 - 100 mg/dL    Performed by [address], COMPREHENSIVE    Collection Time: 04/04/17  6:32 AM   Result Value Ref Range    Sodium 138 136 - 145 mmol/L    Potassium 4.3 3.5 - 5.1 mmol/L    Chloride 103 97 - 108 mmol/L    CO2 23 21 - 32 mmol/L    Anion gap 12 5 - 15 mmol/L    Glucose 83 65 - 100 mg/dL    BUN 17 6 - 20 MG/DL    Creatinine 1.65 (H) 0.70 - 1.30 MG/DL    BUN/Creatinine ratio 10 (L) 12 - 20      GFR est AA 52 (L) >60 ml/min/1.73m2    GFR est non-AA 43 (L) >60 ml/min/1.73m2    Calcium 9.4 8.5 - 10.1 MG/DL    Bilirubin, total 0.3 0.2 - 1.0 MG/DL    ALT (SGPT) 25 12 - 78 U/L    AST (SGOT) 23 15 - 37 U/L    Alk. phosphatase 54 45 - 117 U/L    Protein, total 11.9 (H) 6.4 - 8.2 g/dL    Albumin 2.8 (L) 3.5 - 5.0 g/dL    Globulin 9.1 (H) 2.0 - 4.0 g/dL    A-G Ratio 0.3 (L) 1.1 - 2.2     CBC WITH AUTOMATED DIFF    Collection Time: 04/04/17  6:32 AM   Result Value Ref Range    WBC 4.7 4.1 - 11.1 K/uL    RBC 2.75 (L) 4.10 - 5.70 M/uL    HGB 7.8 (L) 12.1 - 17.0 g/dL    HCT 23.3 (L) 36.6 - 50.3 %    MCV 84.7 80.0 - 99.0 FL    MCH 28.4 26.0 - 34.0 PG    MCHC 33.5 30.0 - 36.5 g/dL    RDW 20.6 (H) 11.5 - 14.5 %    PLATELET 431 (L) 490 - 400 K/uL    NEUTROPHILS 56 32 - 75 %    LYMPHOCYTES 29 12 - 49 %    MONOCYTES 15 (H) 5 - 13 %    EOSINOPHILS 0 0 - 7 %    BASOPHILS 0 0 - 1 %    ABS. NEUTROPHILS 2.6 1.8 - 8.0 K/UL    ABS. LYMPHOCYTES 1.4 0.8 - 3.5 K/UL    ABS. MONOCYTES 0.7 0.0 - 1.0 K/UL    ABS. EOSINOPHILS 0.0 0.0 - 0.4 K/UL    ABS.  BASOPHILS 0.0 0.0 - 0.1 K/UL    RBC COMMENTS ANISOCYTOSIS  2+        RBC COMMENTS HYPOCHROMIA  1+        RBC COMMENTS ROULEAUX  PRESENT        DF SMEAR SCANNED     MAGNESIUM    Collection Time: 04/04/17  6:32 AM   Result Value Ref Range    Magnesium 3.0 (H) 1.6 - 2.4 mg/dL   GLUCOSE, POC    Collection Time: 04/04/17  7:46 AM   Result Value Ref Range    Glucose (POC) 103 (H) 65 - 100 mg/dL    Performed by Claudia Wen        Assessment and Plan:   Anemia  -seems to be anemia of chronic disease  -Labs looking for myeloma pending  -Lumbar spine films negative  -Patient can f/u with me in office to follow up on myeloma labs  -HBG better after blood    Prostatitis  -Cont abx per hospitalists

## 2017-04-04 NOTE — PROGRESS NOTES
physical Therapy EVALUATION/DISCHARGE  Patient: Sabrina Victor Sr. (66 y.o. male)  Date: 4/4/2017  Primary Diagnosis: Anemia  Anemia        Precautions:      ASSESSMENT :  Based on the objective data described below, the patient presents with baseline independent functional mobility, performing all bed mobility, sit<>stand transfers, standing ADLs, and ambulation without AD use at independent level. Pt independently ambulated >400ft exhibiting a decrease in gait speed however steady, stable gait with no LOB or difficulty noted. Static and dynamic balance abilities intact as pt independently donned gown to back side, independently donned shoes to bilateral feet in standing, and during standing ADLs at sink. Pt with reports of feeling \"much better\" after receiving PRBCs and feels that he is close to his baseline level. At this time, pt has no further skilled therapy needs as he is functioning at his baseline independent level. Pt is safe to be up ad mina within room and within hallways as well as to discharge home w/ no further therapy needs when medically appropriate. DC PT. Skilled physical therapy is not indicated at this time. PLAN :  Discharge Recommendations: None  Further Equipment Recommendations for Discharge: None     SUBJECTIVE:   Patient stated It's kind of weird to think about having someone else's blood inside me but I do feel better.     OBJECTIVE DATA SUMMARY:   HISTORY:    Past Medical History:   Diagnosis Date    Acid reflux     Arrhythmia     pvc's    Chronic pain     neck    Depression     Diabetes ([facility] [address]     Femoral hernia 7/11/2012    Hepatitis C     Hypertension     Inguinal hernia 7/11/2012    Liver disease     hepatitis c    Other ill-defined conditions     Hx of PVCs since 2009    Prostatitis, acute     Psychiatric disorder     depression     Past Surgical History:   Procedure Laterality Date    COLONOSCOPY N/A 9/20/2016    COLON[facility] by Eloise Landau, [facility] at [facility] ENDOSCOPY    HX APPENDECTOMY      HX CERVICAL FUSION  2008    x 1 as of 4/30/2014    HX HEENT      foreign body removed from right eye    HX HERNIA REPAIR  2012    [facility]      HX ORTHOPAEDIC      cervical fusion    HX OTHER SURGICAL  2000    liver bx - chronic hepatitis     Prior Level of Function/Home Situation: Independent w/ ambulation, ADLs, and denies history of falls. Lives with wife. Still driving. Retired. Personal factors and/or comorbidities impacting plan of care:     Home Situation  Home Environment: Private residence  # Steps to Enter: 3  Rails to Enter: Yes  One/Two Story Residence: Two story  # of Interior Steps: 13  Lift Chair Available: Yes  Living Alone: No  Support Systems: Spouse/Significant Other/Partner, Family member(s)  Patient Expects to be Discharged to[de-identified] Private residence  Current DME Used/Available at Home: Glucometer, Nebulizer, Blood pressure cuff, Wheelchair    EXAMINATION/PRESENTATION/DECISION MAKING:   Critical Behavior:              Hearing: Auditory  Auditory Impairment: None  Skin:  Intact  Edema: none noted   Range Of Motion:  AROM: Within functional limits                       Strength:    Strength:  Within functional limits                    Tone & Sensation:   Tone: Normal              Sensation: Intact               Coordination:  Coordination: Within functional limits  Vision:      Functional Mobility:  Bed Mobility:  Rolling: Independent  Supine to Sit: Independent     Scooting: Independent  Transfers:  Sit to Stand: Independent  Stand to Sit: Independent        Bed to Chair: Independent              Balance:   Sitting: Intact  Standing: Intact  Ambulation/Gait Training:  Distance (ft): 400 Feet (ft)  Assistive Device: Gait belt  Ambulation - Level of Assistance: Independent        Gait Abnormalities: Decreased step clearance        Base of Support: Widened     Speed/Janey: Pace decreased (<100 feet/min)                         Pt independently ambulated >400ft exhibiting a decrease in gait speed however steady, stable gait with no LOB or difficulty noted . Functional Measure:  Barthel Index:    Bathin  Bladder: 10  Bowels: 10  Groomin  Dressing: 10  Feeding: 10  Mobility: 15  Stairs: 10  Toilet Use: 10  Transfer (Bed to Chair and Back): 15  Total: 100       Barthel and G-code impairment scale:  Percentage of impairment CH  0% CI  1-19% CJ  20-39% CK  40-59% CL  60-79% CM  80-99% CN  100%   Barthel Score 0-100 100 99-80 79-60 59-40 20-39 1-19   0   Barthel Score 0-20 20 17-19 13-16 9-12 5-8 1-4 0      The Barthel ADL Index: Guidelines  1. The index should be used as a record of what a patient does, not as a record of what a patient could do. 2. The main aim is to establish degree of independence from any help, physical or verbal, however minor and for whatever reason. 3. The need for supervision renders the patient not independent. 4. A patient's performance should be established using the best available evidence. Asking the patient, friends/relatives and nurses are the usual sources, but direct observation and common sense are also important. However direct testing is not needed. 5. Usually the patient's performance over the preceding 24-48 hours is important, but occasionally longer periods will be relevant. 6. Middle categories imply that the patient supplies over 50 per cent of the effort. 7. Use of aids to be independent is allowed. Lenny Gordon., Barthel, D.W. ([id number]). Functional evaluation: the Barthel Index. [facility] (14)2. Tania Dee katy NICOLASA Quinones, Sina Biggs., Jaison Adams., [employer], [address] [place] [address] (). Measuring the change indisability after inpatient rehabilitation; comparison of the responsiveness of the Barthel Index and Functional [place] Measure. Journal of Neurology, Neurosurgery, and Psychiatry, 66(4), 393-542.   Queen Masood, NBearJ.A, RHODA Link, & Brayan Mccoy M.A. (2004.) Assessment of post-stroke quality of life in cost-effectiveness studies: The usefulness of the Barthel Index and the EuroQoL-5D. Quality of Life Research, 13, 007-38       G codes: In compliance with CMSs Claims Based Outcome Reporting, the following G-code set was chosen for this patient based on their primary functional limitation being treated: The outcome measure chosen to determine the severity of the functional limitation was the Barthel Index with a score of 100/100 which was correlated with the impairment scale. ? Mobility - Walking and Moving Around:     - CURRENT STATUS: CH - 0% impaired, limited or restricted    - GOAL STATUS: CH - 0% impaired, limited or restricted    - D/C STATUS:  CH - 0% impaired, limited or restricted        Physical Therapy Evaluation Charge Determination   History Examination Presentation Decision-Making   LOW Complexity : Zero comorbidities / personal factors that will impact the outcome / POC LOW Complexity : 1-2 Standardized tests and measures addressing body structure, function, activity limitation and / or participation in recreation  LOW Complexity : Stable, uncomplicated  LOW Complexity : FOTO score of       Based on the above components, the patient evaluation is determined to be of the following complexity level: LOW     Pain:  Pain Scale 1: Numeric (0 - 10)  Pain Intensity 1: 0     Activity Tolerance:   VSS throughout on RA  Please refer to the flowsheet for vital signs taken during this treatment. After treatment:   [x]   Patient left in no apparent distress sitting up in chair  []   Patient left in no apparent distress in bed  [x]   Call bell left within reach  [x]   Nursing notified  []   Caregiver present  []   Bed alarm activated    COMMUNICATION/EDUCATION:   Communication/Collaboration:  [x]   Fall prevention education was provided and the patient/caregiver indicated understanding.   [x]   Patient/family have participated as able and agree with findings and recommendations. []   Patient is unable to participate in plan of care at this time.   Findings and recommendations were discussed with: Registered Nurse    Thank you for this referral.  Jonette Lefort, PT, DPT   Time Calculation: 10 mins

## 2017-04-04 NOTE — PROGRESS NOTES
Discharge instructions reviewed and received along with prescriptions ,verbalizes understanding,condition stable. Discharge folder given to patient.

## 2017-04-05 LAB
ALBUMIN MFR UR ELPH: 14.3 %
ALBUMIN SERPL ELPH-MCNC: 3.9 G/DL (ref 2.9–4.4)
ALBUMIN/GLOB SERPL: 0.5 {RATIO} (ref 0.7–1.7)
ALPHA1 GLOB MFR UR ELPH: 3.2 %
ALPHA1 GLOB SERPL ELPH-MCNC: 0.2 G/DL (ref 0–0.4)
ALPHA2 GLOB 24H MFR UR ELPH: 7 %
ALPHA2 GLOB SERPL ELPH-MCNC: 1 G/DL (ref 0.4–1)
B-GLOBULIN 24H MFR UR ELPH: 21.5 %
B-GLOBULIN SERPL ELPH-MCNC: 1 G/DL (ref 0.7–1.3)
GAMMA GLOB 24H MFR UR ELPH: 54.1 %
GAMMA GLOB SERPL ELPH-MCNC: 5.4 G/DL (ref 0.4–1.8)
GLOBULIN SER CALC-MCNC: 7.7 G/DL (ref 2.2–3.9)
IGA SERPL-MCNC: 5738 MG/DL (ref 90–386)
IGG SERPL-MCNC: 207 MG/DL (ref 700–1600)
IGM SERPL-MCNC: <5 MG/DL (ref 20–172)
KAPPA LC FREE SER-MCNC: 1484 MG/L (ref 3.3–19.4)
KAPPA LC FREE/LAMBDA FREE SER: 570.77 {RATIO} (ref 0.26–1.65)
LAMBDA LC FREE SERPL-MCNC: 2.6 MG/L (ref 5.71–26.3)
M PROTEIN MFR UR ELPH: 44.3 %
M PROTEIN SERPL ELPH-MCNC: 4.7 G/DL
PLEASE NOTE:, 133800: ABNORMAL
PROT PATTERN SERPL IFE-IMP: ABNORMAL
PROT SERPL-MCNC: 11.6 G/DL (ref 6–8.5)
PROT UR-MCNC: 21.5 MG/DL
PSA SERPL-MCNC: 0.2 NG/ML (ref 0–4)
REFLEX CRITERIA: NORMAL

## 2017-04-07 LAB
HBA1C MFR BLD HPLC: NORMAL %
MICROALBUMIN UR TEST STR-MCNC: NORMAL MG/DL

## 2017-05-05 ENCOUNTER — OFFICE VISIT (OUTPATIENT)
Dept: ONCOLOGY | Age: 62
End: 2017-05-05

## 2017-05-05 VITALS
WEIGHT: 188 LBS | HEART RATE: 88 BPM | SYSTOLIC BLOOD PRESSURE: 131 MMHG | DIASTOLIC BLOOD PRESSURE: 85 MMHG | TEMPERATURE: 98.7 F | OXYGEN SATURATION: 96 % | BODY MASS INDEX: 28.59 KG/M2 | RESPIRATION RATE: 18 BRPM

## 2017-05-05 DIAGNOSIS — C90.00 MULTIPLE MYELOMA NOT HAVING ACHIEVED REMISSION (HCC): Primary | ICD-10-CM

## 2017-05-05 DIAGNOSIS — D64.9 SEVERE ANEMIA: ICD-10-CM

## 2017-05-05 DIAGNOSIS — N28.9 RENAL INSUFFICIENCY: ICD-10-CM

## 2017-05-05 NOTE — PATIENT INSTRUCTIONS
You will need a bone marrow biopsy to confirm your diagnosis of myeloma. Once your diagnosis is confirmed, treatment will consist of three medications: Dexamethasone, Revlimid, and Velcade (RVD). Dexamethasone is a steroid and will increase your blood sugar. Follow closely with your PCP to manage your diabetes. You will take Dexamethasone 40 mg daily on days 1, 8, and 15 every 21 days (1 cycle). Revlimid is an oral medication that is taken daily for 14 days in a row then you will have 7 days off before starting the medication again. You will repeat this every 21 days (1 cycle). Velcade is an injection that you will be given in the Outpatient Infusion Center. The outpatient infusion center is located in [facility], [address]. This is a shot that you will receive on days 1,4,8,11 every 21 days (1 cycle). We will send a prescription for Acyclovir to your pharmacy. This is to help prevent shingles during your treatment. Bone Marrow Aspiration and Biopsy: Before Your Procedure  What is bone marrow aspiration and biopsy? Bone marrow aspiration is a procedure that takes out a small amount of bone marrow fluid through a needle. Bone marrow biopsy uses a needle to take out a small amount of bone with the marrow inside it. These samples are then checked under a microscope. The hip bone is the most commonly used area for these procedures. Aspiration and biopsy are often done to find a blood problem or an infection. They also may be used to find out if a cancer has spread to the bone marrow. You may get medicine to help you relax before the procedure. The doctor will inject numbing medicine in the skin over your bone. He or she will put a needle through your skin and into your bone to reach the bone marrow. You may feel pressure or some dull pain during the procedure. After the doctor takes the sample, he or she will remove the needle. The doctor may need to take more than one sample.  This can come from the same spot or from a different area on your body. When the procedure is done, the doctor or a nurse will put pressure on the area to stop any bleeding. Follow-up care is a key part of your treatment and safety. Be sure to make and go to all appointments, and call your doctor if you are having problems. It's also a good idea to know your test results and keep a list of the medicines you take. What happens on the day of the procedure? At the hospital or doctor's office  · A doctor or nurse will give you medicine to numb the area where the needle will go. You may feel pain and hear a crunching sound when the needle enters your bone. This usually lasts only a few seconds. But you may have some discomfort during the procedure. · The procedure will take about 20 to 30 minutes. · You will have a bandage over the area where the doctor put the needle. Going home  · You may need someone to drive you home. · You will be given more specific instructions about recovering from your procedure, including activity and when you may return to work. · Your doctor will call you with the results of your test.  When should you call your doctor? · You have questions or concerns. · You dont understand how to prepare for your procedure. · You become ill before the procedure (such as fever, flu, or a cold). · You need to reschedule or have changed your mind about having the procedure. Where can you learn more? Go to http://charley-avi.info/. Enter X869 in the search box to learn more about \"Bone Marrow Aspiration and Biopsy: Before Your Procedure. \"  Current as of: October 14, 2016  Content Version: 11.2  © 1609-1803 [employer]. Care instructions adapted under license by [employer] (which disclaims liability or warranty for this information).  If you have questions about a medical condition or this instruction, always ask your healthcare professional. Tristan Kendall disclaims any warranty or liability for your use of this information. Multiple Myeloma: Care Instructions  Your Care Instructions  Multiple myeloma is a rare type of cancer that causes uncontrolled production of plasma cells, a type of white blood cell in the bone marrow. Plasma cells make antibodies that help your body fight infection. When there are too many plasma cells, they can crowd out normal blood cells. This causes a reduction in red blood cells (anemia) or platelets (thrombocytopenia). The plasma cells can collect in the bone to form small, painful tumors that can sometimes lead to broken bones. The plasma cells can also cause kidney problems. Multiple myeloma is usually treated with medicines called chemotherapy to reduce the number of abnormal cells, antibiotics to help fight infection, and pain medicine. Radiation therapy may be used to treat bone tumors. When you find out that you have cancer, you may feel many emotions and may need some help coping. Seek out family, friends, and counselors for support. You also can do things at home to make yourself feel better while you go through treatment. Call the [employer] (0-652.341.3595) or visit its website at [employer] for more information. Follow-up care is a key part of your treatment and safety. Be sure to make and go to all appointments, and call your doctor if you are having problems. It's also a good idea to know your test results and keep a list of the medicines you take. How can you care for yourself at home? · Tell your doctor if you are experiencing new pain, or pain that interferes with your daily activities. Do not try to \"tough it out. \"  · Take your medicines exactly as prescribed. Call your doctor if you think you are having a problem with your medicine. You may get medicine for nausea and vomiting if you have these side effects. · Eat healthy food.  If you do not feel like eating, try to eat food that has protein and extra calories to keep up your strength and prevent weight loss. Drink liquid meal replacements for extra calories and protein. Try to eat your main meal early. · Get some physical activity every day, but do not get too tired. Keep doing the hobbies you enjoy as your energy allows. · Take steps to control your stress and workload. Learn relaxation techniques. ¨ Share your feelings. Stress and tension affect our emotions. By expressing your feelings to others, you may be able to understand and cope with them. ¨ Consider joining a support group. Talking about a problem with your spouse, a good friend, or other people with similar problems is a good way to reduce tension and stress. ¨ Express yourself through art. Try writing, crafts, dance, or art to relieve stress. Some dance, writing, or art groups may be available just for people who have cancer. ¨ Be kind to your body and mind. Getting enough sleep, eating a healthy diet, and taking time to do things you enjoy can contribute to an overall feeling of balance in your life and can help reduce stress. ¨ Get help if you need it. Discuss your concerns with your doctor or counselor. · If you are vomiting or have diarrhea:  ¨ Drink plenty of fluids (enough so that your urine is light yellow or clear like water) to prevent dehydration. Choose water and other caffeine-free clear liquids until you feel better. If you have kidney, heart, or liver disease and have to limit fluids, talk with your doctor before you increase the amount of fluids you drink. ¨ When you are able to eat, try clear soups, mild foods, and liquids until all symptoms are gone for 12 to 48 hours. Other good choices include dry toast, crackers, cooked cereal, and gelatin dessert, such as Jell-O.  · If you have not already done so, prepare a list of advance directives.  Advance directives are instructions to your doctor and family members about what kind of care you want if you become unable to speak or express yourself. When should you call for help? Call 911 anytime you think you may need emergency care. For example, call if:  · You passed out (lost consciousness). · You have trouble breathing. · You cough up blood. · You vomit blood or what looks like coffee grounds. · You pass maroon or very bloody stools. · You have symptoms of a stroke. These may include:  ¨ Sudden numbness, tingling, weakness, or loss of movement in your face, arm, or leg, especially on only one side of your body. ¨ Sudden vision changes. ¨ Sudden trouble speaking. ¨ Sudden confusion or trouble understanding simple statements. ¨ Sudden problems with walking or balance. ¨ A sudden, severe headache that is different from past headaches. Call your doctor now or seek immediate medical care if:  · You have new or worse pain. · You are dizzy or lightheaded, or you feel like you may faint. · You feel confused or very sleepy. · You have trouble standing or walking. · You are sick to your stomach or cannot keep fluids down. · You are easily short of breath. · You have any unusual bleeding, such as:  ¨ Blood spots under the skin. ¨ A nosebleed that you cannot stop. ¨ Bleeding gums when you brush your teeth. ¨ Blood in your urine. ¨ Vaginal bleeding when you are not having your period, or heavy period bleeding. · Your stools are black and tarlike or have streaks of blood. · You have signs of an infection, such as:  ¨ Increased pain, swelling, warmth, or redness. ¨ Red streaks leading from a bruise. ¨ Pus draining from a wound. ¨ A fever or chills. ¨ Burning when you urinate. Watch closely for changes in your health, and be sure to contact your doctor if:  · You cough up yellow or green mucus. · You have trouble controlling your pain. Where can you learn more? Go to http://charley-avi.info/.   Enter S242 in the search box to learn more about \"Multiple Myeloma: Care Instructions. \"  Current as of: July 26, 2016  Content Version: 11.2  © 8094-0606 [employer], [facility]. Care instructions adapted under license by [employer] (which disclaims liability or warranty for this information). If you have questions about a medical condition or this instruction, always ask your healthcare professional. [place] [address] any warranty or liability for your use of this information.

## 2017-05-05 NOTE — MR AVS SNAPSHOT
Visit Information Date & Time Provider Department Dept. Phone Encounter #  
 5/5/2017  2:00 PM Eliel Anglin MD [address] Oncology at [facility] [phone] [id number] Upcoming Health Maintenance Date Due  
 FOOT EXAM Q1 10/9/1967 MICROALBUMIN Q1 10/9/1967 EYE EXAM RETINAL OR DILATED Q1 10/9/1967 DTaP/Tdap/Td series (1 - Tdap) 10/9/1978 FOBT Q 1 YEAR AGE 50-75 10/9/2007 HEMOGLOBIN A1C Q6M 6/24/2011 LIPID PANEL Q1 7/14/2012 INFLUENZA AGE 9 TO ADULT 8/1/2017 Allergies as of 5/5/2017  Review Complete On: 5/5/2017 By: Andrew Wilkins RN Severity Noted Reaction Type Reactions Mercury (Bulk) Low 04/21/2010   Topical Hives Blisters Current Immunizations  Never Reviewed Name Date Influenza Vaccine Split 11/14/2010  6:57 PM  
 Pneumococcal Vaccine (Unspecified Type) 7/14/2011  6:30 PM  
  
 Not reviewed this visit You Were Diagnosed With   
  
 Codes Comments Multiple myel[facility] not having achieved remission ([facility]    -  Primary ICD-10-CM: C90.00 ICD-9-CM: 203.00 Vitals BP Pulse Temp Resp Weight(growth percentile) SpO2  
 131/85 88 98.7 °F (37.1 °C) 18 188 lb (85.3 kg) 96% BMI Smoking Status 28.59 kg/m2 Former Smoker BMI and BSA Data Body Mass Index Body Surface Area 28.59 kg/m 2 2.02 m 2 Preferred Pharmacy Pharmacy Name Phone [id number] [phone] Your Updated Medication List  
  
   
This list is accurate as of: 5/5/17  2:17 PM.  Always use your most recent med list.  
  
  
  
  
 ABILIFY 5 mg tablet Generic drug:  ARIPiprazole Take 2.5 mg by mouth every morning. albuterol 90 mcg/actuation inhaler Commonly known as:  PROVENTIL HFA, VENTOLIN HFA, PROAIR HFA Take 2 Puffs by inhalation every four (4) hours as needed for Wheezing. AMBIEN CR 12.5 mg tablet Generic drug:  zolpidem CR Take 12.5 mg by mouth nightly as needed. amLODIPine 10 mg tablet Commonly known as:  Sherald Burkitt Take 10 mg by mouth daily. buPROPion  mg tablet Commonly known as:  Laura Silverio Take 1 Tab by mouth daily (with dinner). CIALIS PO Take 20 mg by mouth as needed. CIPRO 500 mg tablet Generic drug:  ciprofloxacin HCl Take 500 mg by mouth two (2) times a day. LEVITRA PO Take  by mouth as needed. losartan 50 mg tablet Commonly known as:  COZAAR Take 100 mg by mouth daily. metFORMIN 500 mg tablet Commonly known as:  GLUCOPHAGE Take 500 mg by mouth two (2) times daily (with meals). NUCYNTA 100 mg tablet Generic drug:  tapentadol Take 100 mg by mouth three (3) times daily. RYTHMOL 150 mg tablet Generic drug:  propafenone Take 50 mg by mouth every eight (8) hours. tamsulosin 0.4 mg capsule Commonly known as:  FLOMAX Take 1 Cap by mouth daily. Patient Instructions You will need a bone marrow biopsy to confirm your diagnosis of myeloma. Once your diagnosis is confirmed, treatment will consist of three medications: Dexamethasone, Revlimid, and Velcade (RVD). Dexamethasone is a steroid and will increase your blood sugar. Follow closely with your PCP to manage your diabetes. You will take Dexamethasone 40 mg daily on days 1, 8, and 15 every 21 days (1 cycle). Revlimid is an oral medication that is taken daily for 14 days in a row then you will have 7 days off before starting the medication again. You will repeat this every 21 days (1 cycle). Velcade is an injection that you will be given in the Outpatient Infusion Center. The outpatient infusion center is located in [facility] [address]. This is a shot that you will receive on days 1,4,8,11 every 21 days (1 cycle). We will send a prescription for Acyclovir to your pharmacy.  This is to help prevent shingles during your treatment. Bone Marrow Aspiration and Biopsy: Before Your Procedure What is bone marrow aspiration and biopsy? Bone marrow aspiration is a procedure that takes out a small amount of bone marrow fluid through a needle. Bone marrow biopsy uses a needle to take out a small amount of bone with the marrow inside it. These samples are then checked under a microscope. The hip bone is the most commonly used area for these procedures. Aspiration and biopsy are often done to find a blood problem or an infection. They also may be used to find out if a cancer has spread to the bone marrow. You may get medicine to help you relax before the procedure. The doctor will inject numbing medicine in the skin over your bone. He or she will put a needle through your skin and into your bone to reach the bone marrow. You may feel pressure or some dull pain during the procedure. After the doctor takes the sample, he or she will remove the needle. The doctor may need to take more than one sample. This can come from the same spot or from a different area on your body. When the procedure is done, the doctor or a nurse will put pressure on the area to stop any bleeding. Follow-up care is a key part of your treatment and safety. Be sure to make and go to all appointments, and call your doctor if you are having problems. It's also a good idea to know your test results and keep a list of the medicines you take. What happens on the day of the procedure? At the hospital or doctor's office · A doctor or nurse will give you medicine to numb the area where the needle will go. You may feel pain and hear a crunching sound when the needle enters your bone. This usually lasts only a few seconds. But you may have some discomfort during the procedure. · The procedure will take about 20 to 30 minutes. · You will have a bandage over the area where the doctor put the needle. Going home · You may need someone to drive you home. · You will be given more specific instructions about recovering from your procedure, including activity and when you may return to work. · Your doctor will call you with the results of your test. 
When should you call your doctor? · You have questions or concerns. · You dont understand how to prepare for your procedure. · You become ill before the procedure (such as fever, flu, or a cold). · You need to reschedule or have changed your mind about having the procedure. Where can you learn more? Go to http://charley-avi.info/. Enter O619 in the search box to learn more about \"Bone Marrow Aspiration and Biopsy: Before Your Procedure. \" Current as of: October 14, 2016 Content Version: 11.2 © 6773-0714 [employer]. Care instructions adapted under license by [employer] (which disclaims liability or warranty for this information). If you have questions about a medical condition or this instruction, always ask your healthcare professional. [place] [address] any warranty or liability for your use of this information. Multiple Myeloma: Care Instructions Your Care Instructions Multiple myeloma is a rare type of cancer that causes uncontrolled production of plasma cells, a type of white blood cell in the bone marrow. Plasma cells make antibodies that help your body fight infection. When there are too many plasma cells, they can crowd out normal blood cells. This causes a reduction in red blood cells (anemia) or platelets (thrombocytopenia). The plasma cells can collect in the bone to form small, painful tumors that can sometimes lead to broken bones. The plasma cells can also cause kidney problems.  
Multiple myeloma is usually treated with medicines called chemotherapy to reduce the number of abnormal cells, antibiotics to help fight infection, and pain medicine. Radiation therapy may be used to treat bone tumors. When you find out that you have cancer, you may feel many emotions and may need some help coping. Seek out family, friends, and counselors for support. You also can do things at home to make yourself feel better while you go through treatment. Call the Kristen Nunez ([phone]) or visit its website at [address] for more information. Follow-up care is a key part of your treatment and safety. Be sure to make and go to all appointments, and call your doctor if you are having problems. It's also a good idea to know your test results and keep a list of the medicines you take. How can you care for yourself at home? · Tell your doctor if you are experiencing new pain, or pain that interferes with your daily activities. Do not try to \"tough it out. \" · Take your medicines exactly as prescribed. Call your doctor if you think you are having a problem with your medicine. You may get medicine for nausea and vomiting if you have these side effects. · Eat healthy food. If you do not feel like eating, try to eat food that has protein and extra calories to keep up your strength and prevent weight loss. Drink liquid meal replacements for extra calories and protein. Try to eat your main meal early. · Get some physical activity every day, but do not get too tired. Keep doing the hobbies you enjoy as your energy allows. · Take steps to control your stress and workload. Learn relaxation techniques. ¨ Share your feelings. Stress and tension affect our emotions. By expressing your feelings to others, you may be able to understand and cope with them. ¨ Consider joining a support group. Talking about a problem with your spouse, a good friend, or other people with similar problems is a good way to reduce tension and stress. ¨ Express yourself through art.  Try writing, crafts, dance, or art to relieve stress. Some dance, writing, or art groups may be available just for people who have cancer. ¨ Be kind to your body and mind. Getting enough sleep, eating a healthy diet, and taking time to do things you enjoy can contribute to an overall feeling of balance in your life and can help reduce stress. ¨ Get help if you need it. Discuss your concerns with your doctor or counselor. · If you are vomiting or have diarrhea: ¨ Drink plenty of fluids (enough so that your urine is light yellow or clear like water) to prevent dehydration. Choose water and other caffeine-free clear liquids until you feel better. If you have kidney, heart, or liver disease and have to limit fluids, talk with your doctor before you increase the amount of fluids you drink. ¨ When you are able to eat, try clear soups, mild foods, and liquids until all symptoms are gone for 12 to 48 hours. Other good choices include dry toast, crackers, cooked cereal, and gelatin dessert, such as Jell-O. · If you have not already done so, prepare a list of advance directives. Advance directives are instructions to your doctor and family members about what kind of care you want if you become unable to speak or express yourself. When should you call for help? Call 911 anytime you think you may need emergency care. For example, call if: 
· You passed out (lost consciousness). · You have trouble breathing. · You cough up blood. · You vomit blood or what looks like coffee grounds. · You pass maroon or very bloody stools. · You have symptoms of a stroke. These may include: 
¨ Sudden numbness, tingling, weakness, or loss of movement in your face, arm, or leg, especially on only one side of your body. ¨ Sudden vision changes. ¨ Sudden trouble speaking. ¨ Sudden confusion or trouble understanding simple statements. ¨ Sudden problems with walking or balance. ¨ A sudden, severe headache that is different from past headaches. Call your doctor now or seek immediate medical care if: 
· You have new or worse pain. · You are dizzy or lightheaded, or you feel like you may faint. · You feel confused or very sleepy. · You have trouble standing or walking. · You are sick to your stomach or cannot keep fluids down. · You are easily short of breath. · You have any unusual bleeding, such as: ¨ Blood spots under the skin. ¨ A nosebleed that you cannot stop. ¨ Bleeding gums when you brush your teeth. ¨ Blood in your urine. ¨ Vaginal bleeding when you are not having your period, or heavy period bleeding. · Your stools are black and tarlike or have streaks of blood. · You have signs of an infection, such as: 
¨ Increased pain, swelling, warmth, or redness. ¨ Red streaks leading from a bruise. ¨ Pus draining from a wound. ¨ A fever or chills. ¨ Burning when you urinate. Watch closely for changes in your health, and be sure to contact your doctor if: 
· You cough up yellow or green mucus. · You have trouble controlling your pain. Where can you learn more? Go to http://charley-avi.info/. Enter T558 in the search box to learn more about \"Multiple Myeloma: Care Instructions. \" Current as of: July 26, 2016 Content Version: 11.2 © 8183-4806 [employer] World. Care instructions adapted under license by [employer] (which disclaims liability or warranty for this information). If you have questions about a medical condition or this instruction, always ask your healthcare professional. [place] [address] any warranty or liability for your use of this information. Introducing [facility]! Shayy Zuniga introduces [employer] patient portal. Now you can access parts of your medical record, email your doctor's office, and request medication refills online. 1. In your internet browser, go to [email or web address] 2. Click on the First Time User? Click Here link in the Sign In box. You will see the New Member Sign Up page. 3. Enter your ADINCON Access Code exactly as it appears below. You will not need to use this code after youve completed the sign-up process. If you do not sign up before the expiration date, you must request a new code. · ADINCON Access Code: [email or web address] Expires: 7/1/2017 11:34 PM 
 
4. Enter the last four digits of your Social Security Number (xxxx) and Date of Birth (mm/dd/yyyy) as indicated and click Submit. You will be taken to the next sign-up page. 5. Create a ADINCON ID. This will be your ADINCON login ID and cannot be changed, so think of one that is secure and easy to remember. 6. Create a ADINCON password. You can change your password at any time. 7. Enter your Password Reset Question and Answer. This can be used at a later time if you forget your password. 8. Enter your e-mail address. You will receive e-mail notification when new information is available in [address] 9. Click Sign Up. You can now view and download portions of your medical record. 10. Click the Download Summary menu link to download a portable copy of your medical information. If you have questions, please visit the Frequently Asked Questions section of the ADINCON website. Remember, ADINCON is NOT to be used for urgent needs. For medical emergencies, dial 911. Now available from your iPhone and Android! Please provide this summary of care documentation to your next provider. Your primary care clinician is listed as Hernán Phan. If you have any questions after today's visit, please call [phone].

## 2017-05-05 NOTE — PROGRESS NOTES
Hematology Consultation        Patient: Yamilka Hays Sr. MRN: [id number]  SSN: [id number]    YOB: 1955  Age: 63 y.o. Sex: male        Subjective:      Yamilka Hays Sr. is a 63 y.o. male who I am seeing in consultation and second opinion for anemia, renal insufficiency and paraproteinemia on request of Dr. Simone Kaplan. He was admitted to the hospital earlier this month for rectal pain and was noted to have severe anemia and renal insufficiency. He was seen by Dr. Simone Kaplan and laboratory studies showed presence of monoclonal gammopathy. A tentative diagnosis of myeloma was made and the patient was then asked to see for an opinion. He denies pain at this time. No recent infections. He suffers with DM but it is diet controlled. He has received systemic anti-viral treatment for chronic Hep C with successful eradication of the virus. He is accompanied by his wife to this office visit.           Review of Systems:    Constitutional: negative  Eyes: negative  Ears, Nose, Mouth, Throat, and Face: negative  Respiratory: negative  Cardiovascular: negative  Gastrointestinal: negative  Genitourinary:negative  Integument/Breast: negative  Hematologic/Lymphatic: negative  Musculoskeletal:negative  Neurological: negative      Past Medical History:   Diagnosis Date    Acid reflux     Arrhythmia     pvc's    Chronic pain     neck    Depression     [facility]     Femoral hernia 7/11/2012    Hepatitis C     Hypertension     Inguinal hernia 7/11/2012    Liver disease     hepatitis c    Other ill-defined conditions     Hx of PVCs since 2009    Prostatitis, acute     Psychiatric disorder     depression     Past Surgical History:   Procedure Laterality Date    COLONOSCOPY N/A 9/20/2016    COLONOSCOPY performed by Carleen [facility] at [facility] ENDOSCOPY    HX APPENDECTOMY      HX CERVICAL FUSION  2008    x 1 as of 4/30/2014    HX HEENT      foreign body removed from right eye    HX [facility]ERN[facility] REPAIR 2012    [facility]      HX ORTHOPAEDIC      cervical fusion    HX OTHER SURGICAL  2000    liver bx - chronic hepatitis      Family History   Problem Relation Age of Onset    Arthritis-osteo Mother     Arthritis-osteo Father     Diabetes Father     Hypertension Maternal Grandmother     Diabetes Maternal Grandmother     Hypertension Maternal Grandfather     Diabetes Maternal Grandfather      Social History   Substance Use Topics    Smoking status: Former Smoker     Packs/day: 1.00     Years: 12.00     Types: Cigarettes     Quit date: 1/1/1989    Smokeless tobacco: Never Used      Comment: former cigarette smoker    Alcohol use No      Comment: former alcoholic-quit [id number]      Prior to Admission medications    Medication Sig Start Date End Date Taking? Authorizing Provider   tamsulosin (FLOMAX) 0.4 mg capsule Take 1 Cap by mouth daily. 4/4/17  Yes Radha Moy MD   propafenone (RYTHMOL) 150 mg tablet Take 50 mg by mouth every eight (8) hours. Yes Historical Provider   ciprofloxacin HCl (CIPRO) 500 mg tablet Take 500 mg by mouth two (2) times a day. Yes Goldy Diaz MD   VARDENAFIL HCL (LEVITRA PO) Take  by mouth as needed. Yes Historical Provider   zolpidem CR (AMBIEN CR) 12.5 mg tablet Take 12.5 mg by mouth nightly as needed. Yes Historical Provider   TADALAFIL (CIALIS PO) Take 20 mg by mouth as needed. Yes Historical Provider   losartan (COZAAR) 50 mg tablet Take 100 mg by mouth daily. Yes Historical Provider   amLODIPine (NORVASC) 10 mg tablet Take 10 mg by mouth daily. Yes Historical Provider   tapentadol (NUCYNTA) 100 mg tablet Take 100 mg by mouth three (3) times daily. Yes Goldy Diaz MD   aripiprazole (ABILIFY) 5 mg tablet Take 2.5 mg by mouth every morning. Yes Goldy Diaz MD   buPROPion XL (WELLBUTRIN XL) 150 mg tablet Take 1 Tab by mouth daily (with dinner).  4/25/10  Yes Jimmie Rodrigues MD   albuterol (PROVENTIL HFA, VENTOLIN HFA, PROAIR HFA) 90 mcg/actuation inhaler Take 2 Puffs by inhalation every four (4) hours as needed for Wheezing. 16   Cyndy Bruner MD   metformin (GLUCOPHAGE) 500 mg tablet Take 500 mg by mouth two (2) times daily (with meals). Goldy Diaz MD              Allergies   Allergen Reactions    Mercury (Bulk) Hives     Blisters             Objective:     Vitals:    17 1323   BP: 131/85   Pulse: 88   Resp: 18   Temp: 98.7 °F (37.1 °C)   SpO2: 96%   Weight: 188 lb (85.3 kg)            Physical Exam:    GENERAL: alert, cooperative  EYE: negative  LYMPHATIC: Cervical, supraclavicular, and axillary nodes normal.   THROAT & NECK: normal and no erythema or exudates noted. LUNG: clear to auscultation bilaterally  HEART: regular rate and rhythm  ABDOMEN: soft, non-tender  EXTREMITIES: no cyanosis or edema  SKIN: Normal.  NEUROLOGIC: negative      Lab Results   Component Value Date/Time    WBC 4.7 2017 06:32 AM    HGB 7.8 2017 06:32 AM    HCT 23.3 2017 06:32 AM    PLATELET 906  06:32 AM    MCV 84.7 2017 06:32 AM       Lab Results   Component Value Date/Time    Sodium 138 2017 06:32 AM    Potassium 4.3 2017 06:32 AM    Chloride 103 2017 06:32 AM    CO2 23 2017 06:32 AM    Anion gap 12 2017 06:32 AM    Glucose 83 2017 06:32 AM    BUN 17 2017 06:32 AM    Creatinine 1.65 2017 06:32 AM    BUN/Creatinine ratio 10 2017 06:32 AM    GFR est AA 52 2017 06:32 AM    GFR est non-AA 43 2017 06:32 AM    Calcium 9.4 2017 06:32 AM    Bilirubin, total 0.3 2017 06:32 AM    AST (SGOT) 23 2017 06:32 AM    Alk. phosphatase 54 2017 06:32 AM    Protein, total 11.9 2017 06:32 AM    Albumin 2.8 2017 06:32 AM    Globulin 9.1 2017 06:32 AM    A-G Ratio 0.3 2017 06:32 AM    ALT (SGPT) 25 2017 06:32 AM       M-spike: 4.7, IgA kappa    Serum F           Assessment:     1.  Multiple Myeloma    Staging in progress  Cytogenetics pending    I spent 65 minute with the patient in a face-to-face encounter. I explained him the stage of the disease, pathophysiology of the disease and the treatment approaches. I answered all his questions. We shall establish the Dx with a bone marrow biopsy  I will stage the disease with PET and MRI of the spine. Then we shall start treatment with systemic therapy. Triple drug combination achieves a high rate of response than two drug combination. Four classes of drugs with most activity in this disease   Steroids, Proteosome inhibitors, Immunomodulatory drugs and Chemotherapy    Thus I will treat him with combination of Revlimid, Velcade, Dexamethasone    For those completing all 4 cycles of therapy the JOYNER is 95% with 47% CR/nCR and 67% VGPR or better. Progression-free survival was significantly improved with RVd (median, 43 months, vs. 31 months in the Rd group. The rates of overall response were 71% and 63% in the RVd and Rd, respectively [SWOG  KIM 2015]        Plan:       1. Bone marrow biopsy  2. PET CT  3. MRI T-L-S spine  4. Return in 2 weeks        Signed by: Jhony Banegas MD                     May 5, 2017       CC. Monica Hudson MD  CC.  Darrian Jacobs MD

## 2017-05-05 NOTE — PROGRESS NOTES
Pt is new Pt here to discuss a new diagnosis of a blood disorder, labs are in the computer. Pt reports pain 5/10 in neck from prior fusion.

## 2017-05-10 ENCOUNTER — HOSPITAL ENCOUNTER (OUTPATIENT)
Dept: CT IMAGING | Age: 62
Discharge: HOME | End: 2017-05-10
Attending: INTERNAL MEDICINE
Payer: MEDICARE

## 2017-05-10 VITALS
OXYGEN SATURATION: 99 % | HEIGHT: 68 IN | HEART RATE: 74 BPM | RESPIRATION RATE: 18 BRPM | WEIGHT: 184 LBS | SYSTOLIC BLOOD PRESSURE: 118 MMHG | BODY MASS INDEX: 27.89 KG/M2 | DIASTOLIC BLOOD PRESSURE: 69 MMHG | TEMPERATURE: 98.8 F

## 2017-05-10 DIAGNOSIS — C90.00 MULTIPLE MYELOMA NOT HAVING ACHIEVED REMISSION (HCC): ICD-10-CM

## 2017-05-10 LAB
BASOPHILS # BLD AUTO: 0 K/UL (ref 0–0.1)
BASOPHILS # BLD: 0 % (ref 0–1)
DIFFERENTIAL METHOD BLD: ABNORMAL
EOSINOPHIL # BLD: 0 K/UL (ref 0–0.4)
EOSINOPHIL NFR BLD: 1 % (ref 0–7)
ERYTHROCYTE [DISTWIDTH] IN BLOOD BY AUTOMATED COUNT: 23.9 % (ref 11.5–14.5)
HCT VFR BLD AUTO: 21.3 % (ref 36.6–50.3)
HGB BLD-MCNC: 6.9 G/DL (ref 12.1–17)
LYMPHOCYTES # BLD AUTO: 39 % (ref 12–49)
LYMPHOCYTES # BLD: 1.1 K/UL (ref 0.8–3.5)
MCH RBC QN AUTO: 27.5 PG (ref 26–34)
MCHC RBC AUTO-ENTMCNC: 32.4 G/DL (ref 30–36.5)
MCV RBC AUTO: 84.9 FL (ref 80–99)
MONOCYTES # BLD: 0.3 K/UL (ref 0–1)
MONOCYTES NFR BLD AUTO: 11 % (ref 5–13)
NEUTS SEG # BLD: 1.5 K/UL (ref 1.8–8)
NEUTS SEG NFR BLD AUTO: 49 % (ref 32–75)
PLATELET # BLD AUTO: 138 K/UL (ref 150–400)
RBC # BLD AUTO: 2.51 M/UL (ref 4.1–5.7)
RBC MORPH BLD: ABNORMAL
WBC # BLD AUTO: 2.9 K/UL (ref 4.1–11.1)

## 2017-05-10 PROCEDURE — 88280 CHROMOSOME KARYOTYPE STUDY: CPT | Performed by: INTERNAL MEDICINE

## 2017-05-10 PROCEDURE — 74011250636 HC RX REV CODE- 250/636: Performed by: RADIOLOGY

## 2017-05-10 PROCEDURE — 88305 TISSUE EXAM BY PATHOLOGIST: CPT | Performed by: INTERNAL MEDICINE

## 2017-05-10 PROCEDURE — 88374 M/PHMTRC ALYS ISHQUANT/SEMIQ: CPT | Performed by: INTERNAL MEDICINE

## 2017-05-10 PROCEDURE — 38221 DX BONE MARROW BIOPSIES: CPT

## 2017-05-10 PROCEDURE — 88365 INSITU HYBRIDIZATION (FISH): CPT | Performed by: INTERNAL MEDICINE

## 2017-05-10 PROCEDURE — 88342 IMHCHEM/IMCYTCHM 1ST ANTB: CPT | Performed by: INTERNAL MEDICINE

## 2017-05-10 PROCEDURE — 36415 COLL VENOUS BLD VENIPUNCTURE: CPT | Performed by: RADIOLOGY

## 2017-05-10 PROCEDURE — 88264 CHROMOSOME ANALYSIS 20-25: CPT | Performed by: INTERNAL MEDICINE

## 2017-05-10 PROCEDURE — 88341 IMHCHEM/IMCYTCHM EA ADD ANTB: CPT | Performed by: INTERNAL MEDICINE

## 2017-05-10 PROCEDURE — 88311 DECALCIFY TISSUE: CPT | Performed by: INTERNAL MEDICINE

## 2017-05-10 PROCEDURE — 88237 TISSUE CULTURE BONE MARROW: CPT | Performed by: INTERNAL MEDICINE

## 2017-05-10 PROCEDURE — 88185 FLOWCYTOMETRY/TC ADD-ON: CPT | Performed by: INTERNAL MEDICINE

## 2017-05-10 PROCEDURE — 88184 FLOWCYTOMETRY/ TC 1 MARKER: CPT | Performed by: INTERNAL MEDICINE

## 2017-05-10 PROCEDURE — 81342 TRG GENE REARRANGEMENT ANAL: CPT | Performed by: INTERNAL MEDICINE

## 2017-05-10 PROCEDURE — 85025 COMPLETE CBC W/AUTO DIFF WBC: CPT | Performed by: RADIOLOGY

## 2017-05-10 PROCEDURE — 88313 SPECIAL STAINS GROUP 2: CPT | Performed by: INTERNAL MEDICINE

## 2017-05-10 RX ORDER — FENTANYL CITRATE 50 UG/ML
12.5-5 INJECTION, SOLUTION INTRAMUSCULAR; INTRAVENOUS
Status: DISCONTINUED | OUTPATIENT
Start: 2017-05-10 | End: 2017-05-10

## 2017-05-10 RX ORDER — MIDAZOLAM HYDROCHLORIDE 1 MG/ML
.5-1 INJECTION, SOLUTION INTRAMUSCULAR; INTRAVENOUS
Status: DISCONTINUED | OUTPATIENT
Start: 2017-05-10 | End: 2017-05-10

## 2017-05-10 RX ORDER — SODIUM CHLORIDE 9 MG/ML
25 INJECTION, SOLUTION INTRAVENOUS CONTINUOUS
Status: DISCONTINUED | OUTPATIENT
Start: 2017-05-10 | End: 2017-05-10

## 2017-05-10 RX ADMIN — FENTANYL CITRATE 25 MCG: 50 INJECTION, SOLUTION INTRAMUSCULAR; INTRAVENOUS at 10:20

## 2017-05-10 RX ADMIN — SODIUM CHLORIDE 25 ML/HR: 900 INJECTION, SOLUTION INTRAVENOUS at 09:58

## 2017-05-10 RX ADMIN — MIDAZOLAM HYDROCHLORIDE 1 MG: 1 INJECTION INTRAMUSCULAR; INTRAVENOUS at 10:20

## 2017-05-10 RX ADMIN — FENTANYL CITRATE 25 MCG: 50 INJECTION, SOLUTION INTRAMUSCULAR; INTRAVENOUS at 10:09

## 2017-05-10 RX ADMIN — FENTANYL CITRATE 25 MCG: 50 INJECTION, SOLUTION INTRAMUSCULAR; INTRAVENOUS at 10:05

## 2017-05-10 RX ADMIN — FENTANYL CITRATE 25 MCG: 50 INJECTION, SOLUTION INTRAMUSCULAR; INTRAVENOUS at 10:15

## 2017-05-10 RX ADMIN — MIDAZOLAM HYDROCHLORIDE 2 MG: 1 INJECTION INTRAMUSCULAR; INTRAVENOUS at 10:05

## 2017-05-10 RX ADMIN — MIDAZOLAM HYDROCHLORIDE 1 MG: 1 INJECTION INTRAMUSCULAR; INTRAVENOUS at 10:15

## 2017-05-10 RX ADMIN — MIDAZOLAM HYDROCHLORIDE 1 MG: 1 INJECTION INTRAMUSCULAR; INTRAVENOUS at 10:09

## 2017-05-10 NOTE — PROGRESS NOTES
Name of procedure: Bone Marrow Biopsy    Complications, if any, r/t procedure: none    Sedation medications given: 5 mg Versed, 100 mcg Fentanyl    Sedation tolerated: well    VS : Stable     Pt tolerated procedure well. VSS. No C/O pain. Dressing to site D&I. No bleeding or hematoma noted to site. IV D/Cd. Discharge instructions given. Pt and family verbalize understanding. Copy on chart and copy given to pt. Pt taken to car by wheelchair and taken home by family. NAD noted at time of discharge.

## 2017-05-10 NOTE — H&P
Radiology History and Physical    Patient: Lorena Christian Sr. 63 y.o. male       Chief Complaint: No chief complaint on file. History of Present Illness: ct guided bone marrow biopsy and aspirate    History:    Past Medical History:   Diagnosis Date    Acid reflux     Arrhythmia     pvc's    Chronic pain     neck    Depression     Diabetes (HCC)     Femoral hernia 7/11/2012    Hepatitis C     Hypertension     Inguinal hernia 7/11/2012    Liver disease     hepatitis c    Other ill-defined conditions     Hx of PVCs since 2009    Prostatitis, acute     Psychiatric disorder     depression     Family History   Problem Relation Age of Onset    Arthritis-osteo Mother     Arthritis-osteo Father     Diabetes Father     Hypertension Maternal Grandmother     Diabetes Maternal Grandmother     Hypertension Maternal Grandfather     Diabetes Maternal Grandfather      Social History     Social History    Marital status:      Spouse name: N/A    Number of children: N/A    Years of education: N/A     Occupational History    Not on file. 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  VARDENAFIL HCL (LEVITRA PO) Take  by mouth as needed.  zolpidem CR (AMBIEN CR) 12.5 mg tablet Take 12.5 mg by mouth nightly as needed.  TADALAFIL (CIALIS PO) Take 20 mg by mouth as needed.     losartan (COZAAR) 50 mg tablet Take 100 mg by mouth daily.  tapentadol (NUCYNTA) 100 mg tablet Take 100 mg by mouth three (3) times daily.  aripiprazole (ABILIFY) 5 mg tablet Take 2.5 mg by mouth every morning.  buPROPion XL (WELLBUTRIN XL) 150 mg tablet Take 1 Tab by mouth daily (with dinner).  tamsulosin (FLOMAX) 0.4 mg capsule Take 1 Cap by mouth daily.  amLODIPine (NORVASC) 10 mg tablet Take 10 mg by mouth daily. No current facility-administered medications for this encounter. Physical Exam:  Blood pressure 118/69, pulse 74, temperature 98.8 °F (37.1 °C), resp. rate 18, height 5' 8\" (1.727 m), weight 83.5 kg (184 lb), SpO2 99 %. LUNG: clear to auscultation bilaterally, HEART: regular rate and rhythm      Alerts:    Hospital Problems  Date Reviewed: 5/5/2017    None          Laboratory:      Recent Labs      05/10/17   0915   HGB  6.9*   HCT  21.3*   WBC  2.9*   PLT  138*         Plan of Care/Planned Procedure:  Risks, benefits, and alternatives reviewed with patient and he agrees to proceed with the procedure.        Gudelia Rodriguez MD

## 2017-05-10 NOTE — IP AVS SNAPSHOT
[address] [facility] 
[phone] Patient: Pauline Sheffield. MRN: [id number] :1955 You are allergic to the following Allergen Reactions Mercury (Bulk) Hives Blisters Recent Documentation Height Weight BMI Smoking Status 1.727 m 83.5 kg 27.98 kg/m2 Former Smoker Unresulted Labs Order Current Status CBC WITH AUTOMATED DIFF In process Emergency Contacts Name Discharge Info Relation Home Work Mobile Danielle Hong  Spouse [3] [phone] About your hospitalization You were admitted on:  May 10, 2017 You last received care in the:  [facility] RAD CT You were discharged on:  May 10, 2017 Unit phone number:  [phone] Why you were hospitalized Your primary diagnosis was:  Not on File Providers Seen During Your Hospitalizations Provider Role Specialty Primary office phone Melvi Mckinnon MD Attending Provider Hematology and Oncology [phone] Your Primary Care Physician (PCP) Primary Care Physician Office Phone Office Fax [address] [phone] Follow-up Information None Your Appointments Wednesday May 10, 2017  9:30 AM EDT  
CT BX BNE MARRW NDL/TROCAR with Cindy Ramachandran MD, [facility] CT 3 [facility] RAD CT ([address]) 200 [facility]  
[phone] DIET RESTRICTIONS 1. For invasive and sedation procedures, patient should be NPO 8 hours prior to exam, unless instructed otherwise. 2. Take all medications not requiring food with sip of water, excluding blood thinners and diabetic medications. GENERAL INSTRUCTIONS 1. Bring a list of all medications you are currently taking, including over the counter medications.  2. Blood thinners and platelet inhibitors must be stopped 3-5 days prior to procedure. Consult your ordering physician prior to stopping them. 3. Check in at registration 1 hour before your appointment time unless you were instructed to do otherwise. 4. If sedation is required, you must have a  present before procedure is started. 5. The procedure may last 1-1 ½ hours. You may be required to stay 4-6 hours after the procedure for observation. 6. Bring any non [facility] facility films/images pertaining to the area of interest with you on the day of appointment. Patient should report to outpatient registration ([facility]) 30 minutes prior to the appointment time unless instructed otherwise. Friday May 12, 2017  4:00 PM EDT  
RAGHU PET/CT TMR IMG SKULL THIGH with [id number] PET 1 [facility] RAD PET ([address]) 200 [facility] 83.  
[phone] 1. Patient should arrive 30 minutes early to register. Patient's entire visit to the hospital will last about 2 Hours. 2.  Eat a low carb/high protein diet the evening before your procedure. Stay away from food and drink that contains sugars the night before and ESPECIALLY the day of the test. 3.  Do Not eat 4 hours prior to your procedure. The patient may drink all the water they want, up until the time of their appointment. Encourage patient to be well hydrated. Coffee is ok, as long as an artificial sweetener is used instead of sugar. 4. Take meds with water or saltine crackers if food required. 5.  Do not exercise the morning of your procedure. 6.  If you take insulin, it must be taken at least 4 hours before your procedure. 7.  You should bring medications for pain, anxiety, or claustrophobia if you need them. If you take medications for anxiety or claustrophobia, a ride needs to come with you. 8.  Materials for this procedure are ordered in advance and are time-sensitive.   If you need to cancel or reschedule, you must call [phone] at least 48 hours prior to your appointment time. 9.  Bring recent CT scan results from other facilities with you. Please have patients report to:  [facility]: ER Registration SFM: 2001 [facility], Radiation/Oncology Department (left of the fireplace) [facility]: OP Registration MOB 1. Patient should report to outpatient registration ([facility]) 30 minutes prior to the appointment time unless instructed otherwise. Monday May 15, 2017  3:15 PM EDT  
MRI CERV SPINE W WO CONT with [facility] MRI 2 [facility] MRI [address]) 200 [facility]  
[phone] 1. Please bring a list or a bag of your current medications to your appointment 2. Please be sure to remove ALL hair clips, pins, extensions, etc., prior to arriving for your MRI procedure. 3. Bring any non [facility] films or CDs pertaining to the area being imaged with you on the day of appointment. 4. A written order with a valid diagnosis and Physicians  signature is required for all scheduled tests. 5. Check in at registration 30min before your appointment time unless you were instructed to do otherwise. Patient should report to outpatient registration ([facility]) 30 minutes prior to the appointment time unless instructed otherwise. Monday May 15, 2017  4:00 PM EDT MRI LUMB SPINE W WO CONT with [facility] MRI 2 [facility] MRI [address]) [address] [place] [address]  
[phone] 1. Please bring a list or a bag of your current medications to your appointment 2. Please be sure to remove ALL hair clips, pins, extensions, etc., prior to arriving for your MRI procedure.  3. Bring any non [facility] films or CDs pertaining to the area being imaged with you on the day of appointment. 4. A written order with a valid diagnosis and Physicians  signature is required for all scheduled tests. 5. Check in at registration 30min before your appointment time unless you were instructed to do otherwise. Patient should report to outpatient registration ([facility]) 30 minutes prior to the appointment time unless instructed otherwise. Monday May 15, 2017  5:00 PM EDT  
MRI [facility] SPINE W WO CONT with ED [facility] MRI 2 [address]  MRI [facility]) [address] [place] [address] 83. [phone] 1. Please bring a list or a bag of your current medications to your appointment 2. Please be sure to remove ALL hair clips, pins, extensions, etc., prior to arriving for your MRI procedure. 3. Bring any non [facility] films or CDs pertaining to the area being imaged with you on the day of appointment. 4. A written order with a valid diagnosis and Physicians  signature is required for all scheduled tests. 5. Check in at registration 30min before your appointment time unless you were instructed to do otherwise. Patient should report to outpatient registration ([facility]) 30 minutes prior to the appointment time unless instructed otherwise. Friday May 26, 2017 10:00 AM EDT Any with Melvi Mckinnon MD  
[address] Oncology at [facility]) [address] [place] [address] [address] [facility] 83. [phone] Current Discharge Medication List  
  
ASK your doctor about these medications Dose & Instructions Dispensing Information Comments Morning Noon Evening Bedtime ABILIFY 5 mg tablet Generic drug:  ARIPiprazole Your last dose was: Your next dose is:    
   
   
 Dose:  2.5 mg Take 2.5 mg by mouth every morning. Refills:  0 AMBIEN CR 12.5 mg tablet Generic drug:  zolpidem CR Your last dose was: Your next dose is:    
   
   
 Dose:  12.5 mg Take 12.5 mg by mouth nightly as needed. Refills:  0  
     
   
   
   
  
 amLODIPine 10 mg tablet Commonly known as:  Marianna Barajast Your last dose was: Your next dose is:    
   
   
 Dose:  10 mg Take 10 mg by mouth daily. Refills:  0  
     
   
   
   
  
 buPROPion  mg tablet Commonly known as:  Coye Ores Your last dose was: Your next dose is:    
   
   
 Dose:  150 mg Take 1 Tab by mouth daily (with dinner). Quantity:  30 Tab Refills:  11  
     
   
   
   
  
 CIALIS PO Your last dose was: Your next dose is:    
   
   
 Dose:  20 mg Take 20 mg by mouth as needed. Refills:  0 LEVITRA PO Your last dose was: Your next dose is: Take  by mouth as needed. Refills:  0  
     
   
   
   
  
 losartan 50 mg tablet Commonly known as:  COZAAR Your last dose was: Your next dose is:    
   
   
 Dose:  100 mg Take 100 mg by mouth daily. Refills:  0  
     
   
   
   
  
 NUCYNTA 100 mg tablet Generic drug:  tapentadol Your last dose was: Your next dose is:    
   
   
 Dose:  100 mg Take 100 mg by mouth three (3) times daily. Refills:  0  
     
   
   
   
  
 RYTHMOL 150 mg tablet Generic drug:  propafenone Your last dose was: Your next dose is:    
   
   
 Dose:  50 mg Take 50 mg by mouth every eight (8) hours. Refills:  0  
     
   
   
   
  
 tamsulosin 0.4 mg capsule Commonly known as:  FLOMAX Your last dose was: Your next dose is:    
   
   
 Dose:  0.4 mg Take 1 Cap by mouth daily. Quantity:  30 Cap Refills:  1 [facility]charge [facility]ructions [facility] Special Procedures/Radiology Department Radiologist:  Dr. Jesús Hernandez Date:   5/10/2017 Bone Marrow Biopsy You may have an aching pain in the biopsy site tonight. Take Tylenol, as directed on the label, for pain, or take your prescribed pain medication. Avoid ibuprofen and aspirin for the next 48 hours as these drugs may cause you to bruise or bleed. Watch for signs of infection:  Redness, pain, drainage, fever and chills. If this occurs, call your doctor. Resume your previous diet and follow medication reconciliation form. Rest today. Because you received sedation, do not drive or sign any documents until tomorrow morning. It may take up to 3 days for your test results to become available to your physician. Call your physician if you have not heard anything after 3 business days. If you have any questions or concerns, please call [phone] and ask to speak to the nurse on-call. Discharge Orders None Introducing [facility]! Sonal Conner introduces RadPad patient portal. Now you can access parts of your medical record, email your doctor's office, and request medication refills online. 1. In your internet browser, go to [email or web address] 2. Click on the First Time User? Click Here link in the Sign In box. You will see the New Member Sign Up page. 3. Enter your RadPad Access Code exactly as it appears below. You will not need to use this code after youve completed the sign-up process. If you do not sign up before the expiration date, you must request a new code. · RadPad Access Code: [email or web address] Expires: 7/1/2017 11:34 PM 
 
4. Enter the last four digits of your Social Security Number (xxxx) and Date of Birth (mm/dd/yyyy) as indicated and click Submit. You will be taken to the next sign-up page. 5. Create a RadPad ID. This will be your RadPad login ID and cannot be changed, so think of one that is secure and easy to remember. 6. Create a [employer] password. You can change your password at any time. 7. Enter your Password Reset Question and Answer. This can be used at a later time if you forget your password. 8. Enter your e-mail address. You will receive e-mail notification when new information is available in [address] 9. Click Sign Up. You can now view and download portions of your medical record. 10. Click the Download Summary menu link to download a portable copy of your medical information. If you have questions, please visit the Frequently Asked Questions section of the [employer] website. Remember, [employer] is NOT to be used for urgent needs. For medical emergencies, dial 911. Now available from your iPhone and Android! General Information Please provide this summary of care documentation to your next provider. Patient Signature:  ____________________________________________________________ Date:  ____________________________________________________________  
  
Maddi Geiger Provider Signature:  ____________________________________________________________ Date:  ____________________________________________________________

## 2017-05-10 NOTE — DISCHARGE INSTRUCTIONS
[address] [facility]  Special Procedures/Radiology Department      Radiologist:  Dr. Rai Nino    Date:   5/10/2017        Bone Marrow Biopsy    You may have an aching pain in the biopsy site tonight. Take Tylenol, as directed on the label, for pain, or take your prescribed pain medication. Avoid ibuprofen and aspirin for the next 48 hours as these drugs may cause you to bruise or bleed. Watch for signs of infection:  Redness, pain, drainage, fever and chills. If this occurs, call your doctor. Resume your previous diet and follow medication reconciliation form. Rest today. Because you received sedation, do not drive or sign any documents until tomorrow morning. It may take up to 3 days for your test results to become available to your physician. Call your physician if you have not heard anything after 3 business days. If you have any questions or concerns, please call [phone] and ask to speak to the nurse on-call.

## 2017-05-11 ENCOUNTER — DOCUMENTATION ONLY (OUTPATIENT)
Dept: ONCOLOGY | Age: 62
End: 2017-05-11

## 2017-05-12 ENCOUNTER — HOSPITAL ENCOUNTER (OUTPATIENT)
Dept: PET IMAGING | Age: 62
Discharge: HOME | End: 2017-05-12
Attending: INTERNAL MEDICINE
Payer: MEDICARE

## 2017-05-12 VITALS — WEIGHT: 185 LBS | HEIGHT: 68 IN | BODY MASS INDEX: 28.04 KG/M2

## 2017-05-12 DIAGNOSIS — C90.00 MULTIPLE MYELOMA NOT HAVING ACHIEVED REMISSION (HCC): ICD-10-CM

## 2017-05-12 PROCEDURE — A9552 F18 FDG: HCPCS

## 2017-05-12 RX ORDER — SODIUM CHLORIDE 0.9 % (FLUSH) 0.9 %
10 SYRINGE (ML) INJECTION
Status: COMPLETED | OUTPATIENT
Start: 2017-05-12 | End: 2017-05-12

## 2017-05-12 RX ADMIN — Medication 10 ML: at 15:28

## 2017-05-16 ENCOUNTER — DOCUMENTATION ONLY (OUTPATIENT)
Dept: ONCOLOGY | Age: 62
End: 2017-05-16

## 2017-05-16 NOTE — PROGRESS NOTES
I called pt, . HIPAA verified by two patient identifiers. to inform he would be receiving a call from [facility] to discuss revlimid. l went over the [employer] risk management with pt and answered his questions. He was curious about the staging of his diease and I informed him we are awaiting his scan results and for the MD to document on this. We will see him in the office on 5/26 to discuss his staging but he will like a call once scan is noted on.

## 2017-05-19 ENCOUNTER — OFFICE VISIT (OUTPATIENT)
Dept: ONCOLOGY | Age: 62
End: 2017-05-19

## 2017-05-19 ENCOUNTER — HOSPITAL ENCOUNTER (OUTPATIENT)
Dept: INFUSION THERAPY | Age: 62
Discharge: HOME | End: 2017-05-19
Payer: MEDICARE

## 2017-05-19 VITALS
RESPIRATION RATE: 16 BRPM | TEMPERATURE: 97.5 F | SYSTOLIC BLOOD PRESSURE: 131 MMHG | BODY MASS INDEX: 27.92 KG/M2 | WEIGHT: 184.2 LBS | OXYGEN SATURATION: 98 % | HEIGHT: 68 IN | DIASTOLIC BLOOD PRESSURE: 86 MMHG | HEART RATE: 85 BPM

## 2017-05-19 VITALS
DIASTOLIC BLOOD PRESSURE: 80 MMHG | TEMPERATURE: 98.1 F | SYSTOLIC BLOOD PRESSURE: 120 MMHG | OXYGEN SATURATION: 98 % | HEART RATE: 74 BPM | RESPIRATION RATE: 16 BRPM

## 2017-05-19 DIAGNOSIS — E83.52 HYPERCALCEMIA OF MALIGNANCY: ICD-10-CM

## 2017-05-19 DIAGNOSIS — C90.00 MULTIPLE MYELOMA NOT HAVING ACHIEVED REMISSION (HCC): Primary | ICD-10-CM

## 2017-05-19 DIAGNOSIS — D64.9 SEVERE ANEMIA: ICD-10-CM

## 2017-05-19 PROCEDURE — 86920 COMPATIBILITY TEST SPIN: CPT | Performed by: INTERNAL MEDICINE

## 2017-05-19 PROCEDURE — 86900 BLOOD TYPING SEROLOGIC ABO: CPT | Performed by: INTERNAL MEDICINE

## 2017-05-19 PROCEDURE — 36415 COLL VENOUS BLD VENIPUNCTURE: CPT | Performed by: INTERNAL MEDICINE

## 2017-05-19 RX ORDER — ACETAMINOPHEN 325 MG/1
650 TABLET ORAL ONCE
Status: COMPLETED | OUTPATIENT
Start: 2017-05-22 | End: 2017-05-22

## 2017-05-19 RX ORDER — DIPHENHYDRAMINE HCL 25 MG
25 CAPSULE ORAL ONCE
Status: COMPLETED | OUTPATIENT
Start: 2017-05-22 | End: 2017-05-22

## 2017-05-19 NOTE — PROGRESS NOTES
62 y/o AAM here for f/u appt for multiple myeloma. He needs to go over plan of care and gather more information on his drugs he will be receiving. He will be receiving RVD and zometa. His family is here with him, spouse and son. Appetite slightly lower than usual but denies nausea. .he has swelling of ankles, but reports it to his positions on his recliner, it does go away once walking. Denies pain. Mr. Remigio Handy has a reminder for a \"due or due soon\" health maintenance. I have asked that he contact his primary care provider for follow-up on this health maintenance.

## 2017-05-19 NOTE — PROGRESS NOTES
[facility] Lab Draw Note:  Arrived - 1110    Visit Vitals    /80 (BP 1 Location: Left arm, BP Patient Position: Sitting)    Pulse 74    Temp 98.1 °F (36.7 °C)    Resp 16    SpO2 98%       Labs drawn peripherally from LAC arm -   Pt left at 1119 - Tolerated well. Pt denies any acute problems/changes. Discharged from [facility] ambulatory. No distress. See connect care for pending lab results.

## 2017-05-22 ENCOUNTER — HOSPITAL ENCOUNTER (OUTPATIENT)
Dept: INFUSION THERAPY | Age: 62
Discharge: HOME | End: 2017-05-22
Payer: MEDICARE

## 2017-05-22 VITALS
TEMPERATURE: 98.5 F | SYSTOLIC BLOOD PRESSURE: 143 MMHG | HEART RATE: 75 BPM | DIASTOLIC BLOOD PRESSURE: 85 MMHG | OXYGEN SATURATION: 98 % | RESPIRATION RATE: 18 BRPM

## 2017-05-22 PROCEDURE — 36415 COLL VENOUS BLD VENIPUNCTURE: CPT | Performed by: INTERNAL MEDICINE

## 2017-05-22 PROCEDURE — 36430 TRANSFUSION BLD/BLD COMPNT: CPT

## 2017-05-22 PROCEDURE — 82232 ASSAY OF BETA-2 PROTEIN: CPT | Performed by: INTERNAL MEDICINE

## 2017-05-22 PROCEDURE — 74011250637 HC RX REV CODE- 250/637: Performed by: INTERNAL MEDICINE

## 2017-05-22 PROCEDURE — 74011250636 HC RX REV CODE- 250/636: Performed by: INTERNAL MEDICINE

## 2017-05-22 PROCEDURE — 77030013169 SET IV BLD ICUM -A

## 2017-05-22 PROCEDURE — P9016 RBC LEUKOCYTES REDUCED: HCPCS | Performed by: INTERNAL MEDICINE

## 2017-05-22 RX ORDER — SODIUM CHLORIDE 9 MG/ML
250 INJECTION, SOLUTION INTRAVENOUS AS NEEDED
Status: DISCONTINUED | OUTPATIENT
Start: 2017-05-22 | End: 2017-05-26 | Stop reason: HOSPADM

## 2017-05-22 RX ORDER — SODIUM CHLORIDE 0.9 % (FLUSH) 0.9 %
10-40 SYRINGE (ML) INJECTION AS NEEDED
Status: ACTIVE | OUTPATIENT
Start: 2017-05-22 | End: 2017-05-23

## 2017-05-22 RX ADMIN — SODIUM CHLORIDE 250 ML: 900 INJECTION, SOLUTION INTRAVENOUS at 10:40

## 2017-05-22 RX ADMIN — Medication 10 ML: at 10:33

## 2017-05-22 RX ADMIN — ACETAMINOPHEN 650 MG: 325 TABLET ORAL at 10:13

## 2017-05-22 RX ADMIN — DIPHENHYDRAMINE HYDROCHLORIDE 25 MG: 25 CAPSULE ORAL at 10:13

## 2017-05-22 NOTE — DISCHARGE INSTRUCTIONS
OUTPATIENT INFUSION CENTER    DISCHARGE INSTRUCTIONS FOR:  BLOOD TRANSFUSION    We hope you are feeling better after your blood transfusion. Some mild tenderness or slight bruising at your IV site is normal.  Avoid lifting or heavy use of that extremity for the rest of the day. Drink plenty of fluids, eat a normal diet and get some rest.    There are some important signs that you need to watch for in case you experience a delayed reaction to the blood you have received. Call your physician immediately if you develop any of the following symptoms:    1. Severe headache or backache;    2. Fever above 100 degrees;    3. Chills;    4. Difficulty breathing;    5.  Blood or red color in urine;    6. The feeling of weakness or constant fatigue;    7. Yellowing of the whites of your eyes or skin (jaundice). If your physician is not available, call or go to the nearest emergency room, or dial 911.     Getachew Yeager., Signature: ___________________________ 5/22/2017  Atiya Bernal RN

## 2017-05-22 NOTE — PROGRESS NOTES
Pt arrived to [facility] ambulatory for 2 Units Blood Transfusion in no acute distress at 1000.  Assessment unremarkable. #22G PIV established in right forearm site without issue and positive blood return noted.  Labs obtained including Beta 2 microglobulin. Visit Vitals    /80 (BP 1 Location: Right arm, BP Patient Position: Supine)    Pulse 77    Temp 98.3 °F (36.8 °C)    Resp 18    SpO2 98%       The following medications administered:  NS @ KVO  Tylenol 650mg PO  Benadryl 25mg PO  1 Unit PRBC's started at 1045, completed at 1245  1 Unit PRBC's started at 1355, completed at 1555    Visit Vitals    /85 (BP 1 Location: Left arm, BP Patient Position: Supine)    Pulse 75    Temp 98.5 °F (36.9 °C)    Resp 18    SpO2 98%       Pt tolerated treatment well.  No adverse reaction noted. Pt declined to stay for 1 hour post transfusion monitoring. Reviewed signs/symptoms of blood transfusion reaction. Pt verbalized understanding. IV flushed per policy and removed, 2x2 and coban placed.  Pt discharged ambulatory in no acute distress at 1610, accompanied by daughter. Next appointment 6/6/17 @ 1100.

## 2017-05-23 LAB
ABO + RH BLD: NORMAL
B2 MICROGLOB SERPL-MCNC: 6.2 MG/L (ref 0.6–2.4)
BLD PROD TYP BPU: NORMAL
BLD PROD TYP BPU: NORMAL
BLOOD GROUP ANTIBODIES SERPL: NORMAL
BPU ID: NORMAL
BPU ID: NORMAL
CROSSMATCH RESULT,%XM: NORMAL
CROSSMATCH RESULT,%XM: NORMAL
SPECIMEN EXP DATE BLD: NORMAL
STATUS OF UNIT,%ST: NORMAL
STATUS OF UNIT,%ST: NORMAL
UNIT DIVISION, %UDIV: 0
UNIT DIVISION, %UDIV: 0

## 2017-05-24 ENCOUNTER — DOCUMENTATION ONLY (OUTPATIENT)
Dept: ONCOLOGY | Age: 62
End: 2017-05-24

## 2017-05-24 DIAGNOSIS — C90.00 MULTIPLE MYELOMA, REMISSION STATUS UNSPECIFIED (HCC): Primary | ICD-10-CM

## 2017-05-24 NOTE — PROGRESS NOTES
Pt will be getting dexamethasone in the infusion center. Infusion center will be giving this to the patient.

## 2017-05-24 NOTE — PROGRESS NOTES
I called, HIPAA verified by two patient identifiers. Informed pt we will be giving pt the dexamethasone in the infusion center instead of outpatient. He stated understanding.

## 2017-06-06 ENCOUNTER — HOSPITAL ENCOUNTER (OUTPATIENT)
Dept: INFUSION THERAPY | Age: 62
Discharge: HOME | End: 2017-06-06
Payer: SELF-PAY

## 2017-06-06 VITALS
OXYGEN SATURATION: 95 % | TEMPERATURE: 97.9 F | WEIGHT: 186 LBS | SYSTOLIC BLOOD PRESSURE: 125 MMHG | HEART RATE: 79 BPM | DIASTOLIC BLOOD PRESSURE: 74 MMHG | BODY MASS INDEX: 28.28 KG/M2 | RESPIRATION RATE: 18 BRPM

## 2017-06-06 DIAGNOSIS — C90.00 MULTIPLE MYELOMA, REMISSION STATUS UNSPECIFIED (HCC): Primary | ICD-10-CM

## 2017-06-06 LAB
ALBUMIN SERPL BCP-MCNC: 2.7 G/DL (ref 3.5–5)
ALBUMIN/GLOB SERPL: 0.3 {RATIO} (ref 1.1–2.2)
ALP SERPL-CCNC: 87 U/L (ref 45–117)
ALT SERPL-CCNC: 23 U/L (ref 12–78)
ANION GAP BLD CALC-SCNC: 6 MMOL/L (ref 5–15)
AST SERPL W P-5'-P-CCNC: 22 U/L (ref 15–37)
BASOPHILS # BLD AUTO: 0 K/UL (ref 0–0.1)
BASOPHILS # BLD: 0 % (ref 0–1)
BILIRUB DIRECT SERPL-MCNC: 0.1 MG/DL (ref 0–0.2)
BILIRUB SERPL-MCNC: 0.2 MG/DL (ref 0.2–1)
BUN SERPL-MCNC: 21 MG/DL (ref 6–20)
BUN/CREAT SERPL: 13 (ref 12–20)
CALCIUM SERPL-MCNC: 9 MG/DL (ref 8.5–10.1)
CHLORIDE SERPL-SCNC: 99 MMOL/L (ref 97–108)
CO2 SERPL-SCNC: 28 MMOL/L (ref 21–32)
CREAT SERPL-MCNC: 1.65 MG/DL (ref 0.7–1.3)
EOSINOPHIL # BLD: 0 K/UL (ref 0–0.4)
EOSINOPHIL NFR BLD: 1 % (ref 0–7)
ERYTHROCYTE [DISTWIDTH] IN BLOOD BY AUTOMATED COUNT: 21.5 % (ref 11.5–14.5)
GLOBULIN SER CALC-MCNC: 9.8 G/DL (ref 2–4)
GLUCOSE SERPL-MCNC: 113 MG/DL (ref 65–100)
HCT VFR BLD AUTO: 21.1 % (ref 36.6–50.3)
HGB BLD-MCNC: 6.6 G/DL (ref 12.1–17)
IGA SERPL-MCNC: 5610 MG/DL (ref 70–400)
IGG SERPL-MCNC: 184 MG/DL (ref 700–1600)
IGM SERPL-MCNC: <21 MG/DL (ref 40–230)
LYMPHOCYTES # BLD AUTO: 32 % (ref 12–49)
LYMPHOCYTES # BLD: 0.8 K/UL (ref 0.8–3.5)
MCH RBC QN AUTO: 26.9 PG (ref 26–34)
MCHC RBC AUTO-ENTMCNC: 31.3 G/DL (ref 30–36.5)
MCV RBC AUTO: 86.1 FL (ref 80–99)
MONOCYTES # BLD: 0.2 K/UL (ref 0–1)
MONOCYTES NFR BLD AUTO: 9 % (ref 5–13)
NEUTS SEG # BLD: 1.6 K/UL (ref 1.8–8)
NEUTS SEG NFR BLD AUTO: 58 % (ref 32–75)
PLATELET # BLD AUTO: 75 K/UL (ref 150–400)
POTASSIUM SERPL-SCNC: 4 MMOL/L (ref 3.5–5.1)
PROT SERPL-MCNC: 12.5 G/DL (ref 6.4–8.2)
RBC # BLD AUTO: 2.45 M/UL (ref 4.1–5.7)
RBC MORPH BLD: ABNORMAL
SODIUM SERPL-SCNC: 133 MMOL/L (ref 136–145)
WBC # BLD AUTO: 2.6 K/UL (ref 4.1–11.1)

## 2017-06-06 PROCEDURE — 96374 THER/PROPH/DIAG INJ IV PUSH: CPT

## 2017-06-06 PROCEDURE — 82784 ASSAY IGA/IGD/IGG/IGM EACH: CPT | Performed by: INTERNAL MEDICINE

## 2017-06-06 PROCEDURE — 96401 CHEMO ANTI-NEOPL SQ/IM: CPT

## 2017-06-06 PROCEDURE — 83883 ASSAY NEPHELOMETRY NOT SPEC: CPT | Performed by: INTERNAL MEDICINE

## 2017-06-06 PROCEDURE — 96365 THER/PROPH/DIAG IV INF INIT: CPT

## 2017-06-06 PROCEDURE — 86920 COMPATIBILITY TEST SPIN: CPT | Performed by: NURSE PRACTITIONER

## 2017-06-06 PROCEDURE — 80048 BASIC METABOLIC PNL TOTAL CA: CPT | Performed by: INTERNAL MEDICINE

## 2017-06-06 PROCEDURE — 74011250636 HC RX REV CODE- 250/636: Performed by: INTERNAL MEDICINE

## 2017-06-06 PROCEDURE — 74011000258 HC RX REV CODE- 258: Performed by: INTERNAL MEDICINE

## 2017-06-06 PROCEDURE — 36415 COLL VENOUS BLD VENIPUNCTURE: CPT | Performed by: INTERNAL MEDICINE

## 2017-06-06 PROCEDURE — 85025 COMPLETE CBC W/AUTO DIFF WBC: CPT | Performed by: INTERNAL MEDICINE

## 2017-06-06 PROCEDURE — 74011000250 HC RX REV CODE- 250: Performed by: INTERNAL MEDICINE

## 2017-06-06 PROCEDURE — 80076 HEPATIC FUNCTION PANEL: CPT | Performed by: INTERNAL MEDICINE

## 2017-06-06 PROCEDURE — 84165 PROTEIN E-PHORESIS SERUM: CPT | Performed by: INTERNAL MEDICINE

## 2017-06-06 PROCEDURE — 86900 BLOOD TYPING SEROLOGIC ABO: CPT | Performed by: NURSE PRACTITIONER

## 2017-06-06 PROCEDURE — 74011250636 HC RX REV CODE- 250/636

## 2017-06-06 PROCEDURE — A9270 NON-COVERED ITEM OR SERVICE: HCPCS | Performed by: INTERNAL MEDICINE

## 2017-06-06 RX ORDER — DEXAMETHASONE 4 MG/1
40 TABLET ORAL ONCE
Status: COMPLETED | OUTPATIENT
Start: 2017-06-06 | End: 2017-06-06

## 2017-06-06 RX ORDER — ACETAMINOPHEN 325 MG/1
650 TABLET ORAL ONCE
Status: COMPLETED | OUTPATIENT
Start: 2017-06-07 | End: 2017-06-07

## 2017-06-06 RX ORDER — SODIUM CHLORIDE 0.9 % (FLUSH) 0.9 %
10-40 SYRINGE (ML) INJECTION AS NEEDED
Status: DISCONTINUED | OUTPATIENT
Start: 2017-06-06 | End: 2017-06-10 | Stop reason: HOSPADM

## 2017-06-06 RX ORDER — VALACYCLOVIR HYDROCHLORIDE 1 G/1
1000 TABLET, FILM COATED ORAL DAILY
Qty: 90 TAB | Refills: 3 | Status: SHIPPED | OUTPATIENT
Start: 2017-06-06 | End: 2018-11-01

## 2017-06-06 RX ORDER — DIPHENHYDRAMINE HCL 25 MG
25 CAPSULE ORAL ONCE
Status: COMPLETED | OUTPATIENT
Start: 2017-06-07 | End: 2017-06-07

## 2017-06-06 RX ADMIN — ZOLEDRONIC ACID 3.3 MG: 4 INJECTION, SOLUTION, CONCENTRATE INTRAVENOUS at 14:02

## 2017-06-06 RX ADMIN — DEXAMETHASONE 40 MG: 4 TABLET ORAL at 13:59

## 2017-06-06 RX ADMIN — BORTEZOMIB 2.6 MG: 3.5 INJECTION, POWDER, LYOPHILIZED, FOR SOLUTION INTRAVENOUS; SUBCUTANEOUS at 14:02

## 2017-06-06 RX ADMIN — Medication 10 ML: at 14:06

## 2017-06-06 NOTE — DISCHARGE INSTRUCTIONS
OUTPATIENT INFUSION CENTER    DISCHARGE INSTRUCTIONS FOR:  CHEMOTHERAPY / BIOTHERAPY    Chemotherapy has the potential to cause many side effects. The following are general precautions that chemo patients should take:    1. Practice good hand washing:   * Use soap and water for at least 15 seconds, covering all areas of hands. * Always wash hands before eating. * Wash hands after contact with public surfaces such as door knobs and         handles, shopping carts, telephones and elevator buttons. 2. Get plenty of rest:    * You will likely experience fatigue three to five days following your treatment. It may last as long as seven days. 3. Drink plenty of fluids. Water is best.    4. Eat a well balanced diet:  * Small frequent meals may help if you are having trouble with nausea or your  appetite. Some people also do well with nutritional supplements. 5. Pace yourself with daily activities:   * Take frequent breaks and ask for help if you need it. 6. Exercise is very important:  * It will increase circulation and will help the fatigue. Do what you can each day. 7. If your regimen results in hair loss:  *  You will likely notice effects between two and three weeks following your first treatment. Some lose all hair while others only experience thinning. 8. Practice good oral hygiene:   *  Notify your M.D. immediately if any mouth sores or discomfort develop. 9. Protect yourself from the sun. Signs/Symptoms of an allergic reaction and/or some side effects may require immediate medical attention. Notify your physician if you develop one or more of the following:     Temperature of 100.5 degrees or greater;   Skin redness, itching, swelling, blistering, weeping, crusting, rash, or hives;    Wheezing, chest tightness, cough, or shortness of breath;   Swelling of the face, eyelids, lips, tongue, or throat;  Severe, persistent headache;  Stuffy nose, runny nose, sneezing;   Red (bloodshot), itchy, swollen, or watery eyes;   Stomach  pain, nausea, vomiting, diarrhea, or bloody stools;  Mouth sores        Your physician should also be aware of the following symptoms:    Persistent and unresolved nausea and/or vomiting;   Persistent and unresolved diarrhea or constipation;   Numbness/tingling/burning of the extremities, including the fingers and toes; Bleeding or unexplained bruising; Unexplained redness/swelling/pain in the arms or legs; Shortness of breath or fatigue that worsens;   Pain with urination or blood in the urine; Chills;  Cough, especially a productive cough;  Mouth sores or a white coating of the tongue; Redness, swelling, pain or drainage at the port-a-cath or IV site; Increased feeling of bloating or water retention; Excessive weight loss or gain;  Ringing in the ears; Difficulty swallowing;  Dizziness, vertigo, lightheadedness or fainting. Aiden Hong . Signature: ____________________________ 6/6/2017  Mike Goodman RN     MyChart Activation    Thank you for enrolling in [address] Please follow the instructions below to securely access your online medical record. [employer] allows you to send messages to your doctor, view your test results, renew your prescriptions, schedule appointments, and more. How Do I Sign Up? 1. In your internet browser, go to [email or web address]. 2. Click on the First Time User? Click Here link in the Sign In box. You will see the New Member Sign Up page. 3. Enter your [employer] Access Code exactly as it appears below. You will not need to use this code after youve completed the sign-up process. If you do not sign up before the expiration date, you must request a new code. [employer] Access Code: [email or web address]  Expires: 7/1/2017 11:34 PM     4. Enter the last four digits of your Social Security Number (xxxx) and Date of Birth (mm/dd/yyyy) as indicated and click Submit.  You will be taken to the next sign-up page. 5. Create a [employer] ID. This will be your [employer] login ID and cannot be changed, so think of one that is secure and easy to remember. 6. Create a [employer] password. You can change your password at any time. 7. Enter your Password Reset Question and Answer. This can be used at a later time if you forget your password. 8. Enter your e-mail address. You will receive e-mail notification when new information is available in [address] 9. Click Sign Up. You can now view your medical record. Additional Information    Remember, [employer] is NOT to be used for urgent needs. For medical emergencies, dial 911. Now available from your iPhone and Android! [address]

## 2017-06-06 NOTE — PROGRESS NOTES
1100 Pt arrived at [facility] ambulatory and in no distress for C1D1 VRD/ zometa. Assessment completed, no new complaints voiced. Reviewed drug handouts, side effect management, scheduling of medications, disease process. Per pt he sees a psychiatrist and is on meds for depression. Per wife, pt sits at home most of the time and doesn't go out. Encouraged pt to follow up with his doctor if he thinks depression symptoms are worse, and encouraged him to get out and be active. IV started left forearm with 22 gauge, labs drawn. Pt states site his hurting, restarted right forearm with 22 gauge. Labs resulted, Jalyn Night NP notified of platelets 75, hgb 6.6. Orders to treat today, transfuse tomorrow with 2 units PBRCs. Pt scheduled, type and crossmatch obtained. Pt's wife asked about anti-viral meds, per Julienne, pt to  prescription today. He is due to see Dr. Juan Cruz with Friday's velcade appointment    Visit Vitals    /74 (BP 1 Location: Left arm, BP Patient Position: Sitting)    Pulse 79    Temp 97.9 °F (36.6 °C)    Resp 18    Wt 84.4 kg (186 lb)    SpO2 95%    BMI 28.28 kg/m2       Medications received:  Dexamethasone 40 mg PO  Revlimid PO - pt's own supply  velcade 2.6 SQ left abdomen  zometa 3.3 mg IV over 15 minutes    Right IV flushed, capped and secured for transfusion tomorrow per pt request.  Discharge instructions reviewed with pt, copy signed and given. Pt also given handout on [employer]. 1440 Tolerated treatment well, no adverse reaction noted. D/Cd from [facility] ambulatory and in no distress accompanied by wife.   Next appt tomorrow for transfusion    Recent Results (from the past 12 hour(s))   CBC WITH AUTOMATED DIFF    Collection Time: 06/06/17 11:36 AM   Result Value Ref Range    WBC 2.6 (L) 4.1 - 11.1 K/uL    RBC 2.45 (L) 4.10 - 5.70 M/uL    HGB 6.6 (L) 12.1 - 17.0 g/dL    HCT 21.1 (L) 36.6 - 50.3 %    MCV 86.1 80.0 - 99.0 FL    MCH 26.9 26.0 - 34.0 PG    MCHC 31.3 30.0 - 36.5 g/dL    RDW 21.5 (H) 11.5 - 14.5 %    PLATELET 75 (L) 357 - 400 K/uL    NEUTROPHILS 58 32 - 75 %    LYMPHOCYTES 32 12 - 49 %    MONOCYTES 9 5 - 13 %    EOSINOPHILS 1 0 - 7 %    BASOPHILS 0 0 - 1 %    ABS. NEUTROPHILS 1.6 (L) 1.8 - 8.0 K/UL    ABS. LYMPHOCYTES 0.8 0.8 - 3.5 K/UL    ABS. MONOCYTES 0.2 0.0 - 1.0 K/UL    ABS. EOSINOPHILS 0.0 0.0 - 0.4 K/UL    ABS. BASOPHILS 0.0 0.0 - 0.1 K/UL    RBC COMMENTS HYPOCHROMIA  1+        RBC COMMENTS ROULEAUX  PRESENT        RBC COMMENTS ANISOCYTOSIS  2+       METABOLIC PANEL, BASIC    Collection Time: 06/06/17 11:36 AM   Result Value Ref Range    Sodium 133 (L) 136 - 145 mmol/L    Potassium 4.0 3.5 - 5.1 mmol/L    Chloride 99 97 - 108 mmol/L    CO2 28 21 - 32 mmol/L    Anion gap 6 5 - 15 mmol/L    Glucose 113 (H) 65 - 100 mg/dL    BUN 21 (H) 6 - 20 MG/DL    Creatinine 1.65 (H) 0.70 - 1.30 MG/DL    BUN/Creatinine ratio 13 12 - 20      GFR est AA 52 (L) >60 ml/min/1.73m2    GFR est non-AA 43 (L) >60 ml/min/1.73m2    Calcium 9.0 8.5 - 10.1 MG/DL   HEPATIC FUNCTION PANEL    Collection Time: 06/06/17 11:36 AM   Result Value Ref Range    Protein, total 12.5 (H) 6.4 - 8.2 g/dL    Albumin 2.7 (L) 3.5 - 5.0 g/dL    Globulin 9.8 (H) 2.0 - 4.0 g/dL    A-G Ratio 0.3 (L) 1.1 - 2.2      Bilirubin, total 0.2 0.2 - 1.0 MG/DL    Bilirubin, direct 0.1 0.0 - 0.2 MG/DL    Alk. phosphatase 87 45 - 117 U/L    AST (SGOT) 22 15 - 37 U/L    ALT (SGPT) 23 12 - 78 U/L   IMMUNOGLOBULINS, G/A/M, QT.     Collection Time: 06/06/17 11:36 AM   Result Value Ref Range    Immunoglobulin G 184 (L) 700 - 1600 mg/dL    Immunoglobulin A 5610 (H) 70 - 400 mg/dL    Immunoglobulin M <21 (L) 40 - 230 mg/dL   TYPE & CROSSMATCH    Collection Time: 06/06/17  1:37 PM   Result Value Ref Range    Crossmatch Expiration 06/09/2017     ABO/Rh(D) O NEGATIVE     Antibody screen NEG     Unit number [id number]     Blood component type RC LR AS1     Unit division 00     Status of unit ALLOCATED     Crossmatch result Compatible     Unit number [id number]     Blood component type RC LR AS1     Unit division 00     Status of unit ALLOCATED     Crossmatch result Compatible

## 2017-06-06 NOTE — PROGRESS NOTES
Problem: Patient Education: Go to Education Activity  Goal: Patient/Family Education  Outcome: Progressing Towards Goal  Regimen schedule, drugs and side effects reviewed with patient and wife.

## 2017-06-07 ENCOUNTER — HOSPITAL ENCOUNTER (OUTPATIENT)
Dept: INFUSION THERAPY | Age: 62
Discharge: HOME | End: 2017-06-07
Payer: SELF-PAY

## 2017-06-07 VITALS
HEART RATE: 86 BPM | OXYGEN SATURATION: 98 % | TEMPERATURE: 98.3 F | DIASTOLIC BLOOD PRESSURE: 69 MMHG | RESPIRATION RATE: 18 BRPM | SYSTOLIC BLOOD PRESSURE: 119 MMHG

## 2017-06-07 LAB
ALBUMIN SERPL ELPH-MCNC: 3.8 G/DL (ref 2.9–4.4)
ALBUMIN/GLOB SERPL: 0.5 {RATIO} (ref 0.7–1.7)
ALPHA1 GLOB SERPL ELPH-MCNC: 0.2 G/DL (ref 0–0.4)
ALPHA2 GLOB SERPL ELPH-MCNC: 0.8 G/DL (ref 0.4–1)
B-GLOBULIN SERPL ELPH-MCNC: 1 G/DL (ref 0.7–1.3)
GAMMA GLOB SERPL ELPH-MCNC: 6.1 G/DL (ref 0.4–1.8)
GLOBULIN SER CALC-MCNC: 8 G/DL (ref 2.2–3.9)
M PROTEIN SERPL ELPH-MCNC: 5.8 G/DL
PROT SERPL-MCNC: 11.8 G/DL (ref 6–8.5)

## 2017-06-07 PROCEDURE — 74011250636 HC RX REV CODE- 250/636: Performed by: INTERNAL MEDICINE

## 2017-06-07 PROCEDURE — P9016 RBC LEUKOCYTES REDUCED: HCPCS | Performed by: NURSE PRACTITIONER

## 2017-06-07 PROCEDURE — 74011250637 HC RX REV CODE- 250/637: Performed by: INTERNAL MEDICINE

## 2017-06-07 PROCEDURE — 77030013169 SET IV BLD ICUM -A

## 2017-06-07 PROCEDURE — 36415 COLL VENOUS BLD VENIPUNCTURE: CPT | Performed by: INTERNAL MEDICINE

## 2017-06-07 PROCEDURE — 36430 TRANSFUSION BLD/BLD COMPNT: CPT

## 2017-06-07 PROCEDURE — 82232 ASSAY OF BETA-2 PROTEIN: CPT | Performed by: INTERNAL MEDICINE

## 2017-06-07 RX ORDER — SODIUM CHLORIDE 0.9 % (FLUSH) 0.9 %
10-40 SYRINGE (ML) INJECTION AS NEEDED
Status: ACTIVE | OUTPATIENT
Start: 2017-06-07 | End: 2017-06-08

## 2017-06-07 RX ORDER — SODIUM CHLORIDE 9 MG/ML
250 INJECTION, SOLUTION INTRAVENOUS AS NEEDED
Status: DISCONTINUED | OUTPATIENT
Start: 2017-06-07 | End: 2017-06-11 | Stop reason: HOSPADM

## 2017-06-07 RX ORDER — INSULIN ASPART 100 [IU]/ML
INJECTION, SUSPENSION SUBCUTANEOUS
COMMUNITY
End: 2017-07-10 | Stop reason: ALTCHOICE

## 2017-06-07 RX ORDER — LENALIDOMIDE 25 MG/1
CAPSULE ORAL
COMMUNITY
End: 2017-08-27

## 2017-06-07 RX ADMIN — Medication 10 ML: at 14:00

## 2017-06-07 RX ADMIN — Medication 10 ML: at 08:46

## 2017-06-07 RX ADMIN — ACETAMINOPHEN 650 MG: 325 TABLET ORAL at 08:23

## 2017-06-07 RX ADMIN — DIPHENHYDRAMINE HYDROCHLORIDE 25 MG: 25 CAPSULE ORAL at 08:23

## 2017-06-07 RX ADMIN — SODIUM CHLORIDE 250 ML: 900 INJECTION, SOLUTION INTRAVENOUS at 08:48

## 2017-06-07 NOTE — PROGRESS NOTES
0800-Pt arrived at [facility] in no distress for transfusion of 2 units PRBCs. Assessment completed, no new complaints voiced. #22 PIV in R wrist flushed without difficulty with positive blood return noted. Signs/symptoms of adverse blood reaction discussed with pt, voiced understanding. Beta 2 microglobulin drawn and sent to lab for processing. Please follow up with CC for results. Medications received:   NS @ KVO   Tylenol 650 mg PO  Benadryl 25 mg PO    0850: 1st unit PRBCs started and infusing without difficulty, will monitor. 1045: 1st unit completed without adverse reaction noted, NS flushing line. 1105: 2nd unit PRBCs started and infusing without difficulty   1305: 2nd unit completed without adverse reaction noted, NS flushing line. Patient Vitals for the past 12 hrs:   Temp Pulse Resp BP SpO2   06/07/17 1405 98.3 °F (36.8 °C) 86 18 119/69 -   06/07/17 1305 98.5 °F (36.9 °C) 81 18 117/71 -   06/07/17 1205 98.3 °F (36.8 °C) 89 18 127/76 -   06/07/17 1135 98.2 °F (36.8 °C) 88 18 130/76 -   06/07/17 1120 98.1 °F (36.7 °C) 87 18 132/78 -   06/07/17 1100 98.1 °F (36.7 °C) 86 18 135/76 -   06/07/17 1045 98.2 °F (36.8 °C) 83 18 124/71 -   06/07/17 0950 98.6 °F (37 °C) 83 18 121/78 -   06/07/17 0920 98.1 °F (36.7 °C) 82 18 127/83 -   06/07/17 0905 98.5 °F (36.9 °C) 81 18 128/84 -   06/07/17 0844 98.8 °F (37.1 °C) 82 18 119/71 -   06/07/17 0809 98.2 °F (36.8 °C) 80 18 133/78 98 %       Pt monitored for 1 hour post transfusion. Tolerated transfusion well, no adverse reaction noted. PIV discontinued. D/C instructions reviewed, copy to pt, voiced understanding. 1410-D/Cd from [facility] in no distress accompanied by wife.  Next appt 6/9 at 10 AM.

## 2017-06-07 NOTE — DISCHARGE INSTRUCTIONS
OUTPATIENT INFUSION CENTER    DISCHARGE INSTRUCTIONS FOR:  BLOOD TRANSFUSION    We hope you are feeling better after your blood transfusion. Some mild tenderness or slight bruising at your IV site is normal.  Avoid lifting or heavy use of that extremity for the rest of the day. Drink plenty of fluids, eat a normal diet and get some rest.    There are some important signs that you need to watch for in case you experience a delayed reaction to the blood you have received. Call your physician immediately if you develop any of the following symptoms:    1. Severe headache or backache;    2. Fever above 100 degrees;    3. Chills;    4. Difficulty breathing;    5.  Blood or red color in urine;    6. The feeling of weakness or constant fatigue;    7. Yellowing of the whites of your eyes or skin (jaundice). If your physician is not available, call or go to the nearest emergency room, or dial 911.     Zoraida Brasher., Signature: ___________________________ 6/7/2017  Miguel Turner RN

## 2017-06-08 ENCOUNTER — HOSPITAL ENCOUNTER (EMERGENCY)
Age: 62
Discharge: HOME | End: 2017-06-08
Attending: EMERGENCY MEDICINE
Payer: MEDICARE

## 2017-06-08 ENCOUNTER — APPOINTMENT (OUTPATIENT)
Dept: GENERAL RADIOLOGY | Age: 62
End: 2017-06-08
Attending: EMERGENCY MEDICINE
Payer: MEDICARE

## 2017-06-08 ENCOUNTER — TELEPHONE (OUTPATIENT)
Dept: ONCOLOGY | Age: 62
End: 2017-06-08

## 2017-06-08 VITALS
WEIGHT: 194.45 LBS | RESPIRATION RATE: 16 BRPM | TEMPERATURE: 99.6 F | HEART RATE: 92 BPM | HEIGHT: 68 IN | BODY MASS INDEX: 29.47 KG/M2 | DIASTOLIC BLOOD PRESSURE: 90 MMHG | OXYGEN SATURATION: 94 % | SYSTOLIC BLOOD PRESSURE: 132 MMHG

## 2017-06-08 DIAGNOSIS — R09.81 NASAL CONGESTION: Primary | ICD-10-CM

## 2017-06-08 DIAGNOSIS — J06.9 UPPER RESPIRATORY TRACT INFECTION, UNSPECIFIED TYPE: ICD-10-CM

## 2017-06-08 DIAGNOSIS — D64.9 ANEMIA, UNSPECIFIED TYPE: ICD-10-CM

## 2017-06-08 DIAGNOSIS — R50.9 FEVER, UNSPECIFIED FEVER CAUSE: ICD-10-CM

## 2017-06-08 LAB
ABO + RH BLD: NORMAL
ALBUMIN SERPL BCP-MCNC: 2.7 G/DL (ref 3.5–5)
ALBUMIN/GLOB SERPL: 0.3 {RATIO} (ref 1.1–2.2)
ALP SERPL-CCNC: 80 U/L (ref 45–117)
ALT SERPL-CCNC: 25 U/L (ref 12–78)
ANION GAP BLD CALC-SCNC: 9 MMOL/L (ref 5–15)
APPEARANCE UR: CLEAR
AST SERPL W P-5'-P-CCNC: 23 U/L (ref 15–37)
B2 MICROGLOB SERPL-MCNC: 5.9 MG/L (ref 0.6–2.4)
BACTERIA URNS QL MICRO: NEGATIVE /HPF
BASOPHILS # BLD AUTO: 0 K/UL (ref 0–0.1)
BASOPHILS # BLD: 0 % (ref 0–1)
BILIRUB SERPL-MCNC: 0.5 MG/DL (ref 0.2–1)
BILIRUB UR QL: NEGATIVE
BLD PROD TYP BPU: NORMAL
BLD PROD TYP BPU: NORMAL
BLOOD GROUP ANTIBODIES SERPL: NORMAL
BPU ID: NORMAL
BPU ID: NORMAL
BUN SERPL-MCNC: 32 MG/DL (ref 6–20)
BUN/CREAT SERPL: 20 (ref 12–20)
CALCIUM SERPL-MCNC: 7.9 MG/DL (ref 8.5–10.1)
CHLORIDE SERPL-SCNC: 105 MMOL/L (ref 97–108)
CO2 SERPL-SCNC: 22 MMOL/L (ref 21–32)
COLOR UR: ABNORMAL
CREAT SERPL-MCNC: 1.59 MG/DL (ref 0.7–1.3)
CROSSMATCH RESULT,%XM: NORMAL
CROSSMATCH RESULT,%XM: NORMAL
DIFFERENTIAL METHOD BLD: ABNORMAL
EOSINOPHIL # BLD: 0 K/UL (ref 0–0.4)
EOSINOPHIL NFR BLD: 0 % (ref 0–7)
EPITH CASTS URNS QL MICRO: ABNORMAL /LPF
ERYTHROCYTE [DISTWIDTH] IN BLOOD BY AUTOMATED COUNT: 21.5 % (ref 11.5–14.5)
GLOBULIN SER CALC-MCNC: 9.2 G/DL (ref 2–4)
GLUCOSE SERPL-MCNC: 94 MG/DL (ref 65–100)
GLUCOSE UR STRIP.AUTO-MCNC: NEGATIVE MG/DL
HCT VFR BLD AUTO: 22.9 % (ref 36.6–50.3)
HGB BLD-MCNC: 7.6 G/DL (ref 12.1–17)
HGB UR QL STRIP: ABNORMAL
HYALINE CASTS URNS QL MICRO: ABNORMAL /LPF (ref 0–5)
IGA SERPL-MCNC: 6325 MG/DL (ref 90–386)
IGG SERPL-MCNC: 215 MG/DL (ref 700–1600)
IGM SERPL-MCNC: <5 MG/DL (ref 20–172)
KETONES UR QL STRIP.AUTO: NEGATIVE MG/DL
LACTATE SERPL-SCNC: 1.3 MMOL/L (ref 0.4–2)
LEUKOCYTE ESTERASE UR QL STRIP.AUTO: NEGATIVE
LYMPHOCYTES # BLD AUTO: 8 % (ref 12–49)
LYMPHOCYTES # BLD: 0.3 K/UL (ref 0.8–3.5)
MCH RBC QN AUTO: 27.9 PG (ref 26–34)
MCHC RBC AUTO-ENTMCNC: 33.2 G/DL (ref 30–36.5)
MCV RBC AUTO: 84.2 FL (ref 80–99)
MONOCYTES # BLD: 0.2 K/UL (ref 0–1)
MONOCYTES NFR BLD AUTO: 5 % (ref 5–13)
NEUTS SEG # BLD: 2.8 K/UL (ref 1.8–8)
NEUTS SEG NFR BLD AUTO: 87 % (ref 32–75)
NITRITE UR QL STRIP.AUTO: NEGATIVE
PH UR STRIP: 6 [PH] (ref 5–8)
PLATELET # BLD AUTO: 73 K/UL (ref 150–400)
POTASSIUM SERPL-SCNC: 3.7 MMOL/L (ref 3.5–5.1)
PROT PATTERN SERPL IFE-IMP: ABNORMAL
PROT SERPL-MCNC: 11.9 G/DL (ref 6.4–8.2)
PROT UR STRIP-MCNC: NEGATIVE MG/DL
RBC # BLD AUTO: 2.72 M/UL (ref 4.1–5.7)
RBC #/AREA URNS HPF: ABNORMAL /HPF (ref 0–5)
RBC MORPH BLD: ABNORMAL
SODIUM SERPL-SCNC: 136 MMOL/L (ref 136–145)
SP GR UR REFRACTOMETRY: 1.02 (ref 1–1.03)
SPECIMEN EXP DATE BLD: NORMAL
STATUS OF UNIT,%ST: NORMAL
STATUS OF UNIT,%ST: NORMAL
UA: UC IF INDICATED,UAUC: ABNORMAL
UNIT DIVISION, %UDIV: 0
UNIT DIVISION, %UDIV: 0
UROBILINOGEN UR QL STRIP.AUTO: 0.2 EU/DL (ref 0.2–1)
WBC # BLD AUTO: 3.3 K/UL (ref 4.1–11.1)
WBC URNS QL MICRO: ABNORMAL /HPF (ref 0–4)

## 2017-06-08 PROCEDURE — 96361 HYDRATE IV INFUSION ADD-ON: CPT

## 2017-06-08 PROCEDURE — 96360 HYDRATION IV INFUSION INIT: CPT

## 2017-06-08 PROCEDURE — 71020 XR CHEST PA LAT: CPT

## 2017-06-08 PROCEDURE — 81001 URINALYSIS AUTO W/SCOPE: CPT | Performed by: EMERGENCY MEDICINE

## 2017-06-08 PROCEDURE — 80053 COMPREHEN METABOLIC PANEL: CPT | Performed by: EMERGENCY MEDICINE

## 2017-06-08 PROCEDURE — 99285 EMERGENCY DEPT VISIT HI MDM: CPT

## 2017-06-08 PROCEDURE — 74011250637 HC RX REV CODE- 250/637: Performed by: EMERGENCY MEDICINE

## 2017-06-08 PROCEDURE — 83605 ASSAY OF LACTIC ACID: CPT | Performed by: EMERGENCY MEDICINE

## 2017-06-08 PROCEDURE — 87040 BLOOD CULTURE FOR BACTERIA: CPT | Performed by: EMERGENCY MEDICINE

## 2017-06-08 PROCEDURE — 85025 COMPLETE CBC W/AUTO DIFF WBC: CPT | Performed by: EMERGENCY MEDICINE

## 2017-06-08 PROCEDURE — 36415 COLL VENOUS BLD VENIPUNCTURE: CPT | Performed by: EMERGENCY MEDICINE

## 2017-06-08 PROCEDURE — 74011250636 HC RX REV CODE- 250/636: Performed by: EMERGENCY MEDICINE

## 2017-06-08 RX ORDER — IBUPROFEN 600 MG/1
600 TABLET ORAL
Status: COMPLETED | OUTPATIENT
Start: 2017-06-08 | End: 2017-06-08

## 2017-06-08 RX ORDER — AMOXICILLIN AND CLAVULANATE POTASSIUM 875; 125 MG/1; MG/1
1 TABLET, FILM COATED ORAL 2 TIMES DAILY
Qty: 20 TAB | Refills: 0 | Status: SHIPPED | OUTPATIENT
Start: 2017-06-08 | End: 2017-06-18

## 2017-06-08 RX ORDER — ACETAMINOPHEN 325 MG/1
975 TABLET ORAL
Status: COMPLETED | OUTPATIENT
Start: 2017-06-08 | End: 2017-06-08

## 2017-06-08 RX ORDER — AMOXICILLIN AND CLAVULANATE POTASSIUM 875; 125 MG/1; MG/1
1 TABLET, FILM COATED ORAL
Status: COMPLETED | OUTPATIENT
Start: 2017-06-08 | End: 2017-06-08

## 2017-06-08 RX ADMIN — ACETAMINOPHEN 975 MG: 325 TABLET, FILM COATED ORAL at 04:14

## 2017-06-08 RX ADMIN — AMOXICILLIN AND CLAVULANATE POTASSIUM 1 TABLET: 875; 125 TABLET, FILM COATED ORAL at 03:58

## 2017-06-08 RX ADMIN — IBUPROFEN 600 MG: 600 TABLET, FILM COATED ORAL at 03:12

## 2017-06-08 RX ADMIN — SODIUM CHLORIDE 1000 ML: 900 INJECTION, SOLUTION INTRAVENOUS at 01:53

## 2017-06-08 NOTE — ED NOTES
Discharge instructions reviewed with pt and copy given along with RX by Dr Clint Shelley. All questions answered at this time. Pt discharged via wheelchair.

## 2017-06-08 NOTE — ED PROVIDER NOTES
HPI Comments: Car Ochoa is a 63 y.o. male with a pertinent PMHx of CA, DM, and HTN who presents ambulatory to the ED c/o fever since this afternoon. He endorses associated congestion and chills. Pt states that he has been taking Tylenol around the clock with no relief of symptoms. He explains that he received a blood transfusion today, and had his first chemotherapy session on 5/6/17 for the treatment of melanoma. Pt notes that when he took his fever the first two times, it was 98.3 and 99.3 respectively. When he took his fever again just PTA in the ED, it was 100.6. Pt specifically denies cough, nor Hx of CHF. Social hx: +(former) Tobacco use, +(former) EtOH use, - Illicit drug use    PCP: Susana Molina MD  Hematologist:   Jhony Banegas MD  There are no other complaints, changes or physical findings at this time. The history is provided by the patient. No  was used.         Past Medical History:   Diagnosis Date    Acid reflux     Arrhythmia     pvc's    Chronic pain     neck    Depression     Diabetes ([facility] [address]     Femoral hernia 7/11/2012    Hepatitis C     Hypertension     Inguinal hernia 7/11/2012    Liver disease     hepatitis c    Other ill-defined conditions     Hx of PVCs since 2009    Prostatitis, acute     Psychiatric disorder     depression       Past Surgical History:   Procedure Laterality Date    COLONOSCOPY N/A 9/20/2016    COL[facility]COPY    HX APPENDECTOMY      HX CERVICAL FUSION  2008    x 1 as of 4/30/2014    HX HEENT      foreign body removed from right eye    HX HERNIA REPAIR  2012    [facility]      HX ORTHOPAEDIC      cervical fusion    HX OTHER SURGICAL  2000    liver bx - chronic hepatitis         Family History:   Problem Relation Age of Onset    Arthritis-osteo Mother     Arthritis-osteo Father     Diabetes Father     Hypertension Maternal Grandmother     Diabetes Maternal Grandmother     Hypertension Maternal Grandfather     Diabetes Maternal Grandfather        Social History     Social History    Marital status:      Spouse name: N/A    Number of children: N/A    Years of education: N/A     Occupational History    Not on file. Social History Main Topics    Smoking status: Former Smoker     Packs/day: 1.00     Years: 12.00     Types: Cigarettes     Quit date: 1/1/1989    Smokeless tobacco: Never Used      Comment: former cigarette smoker    Alcohol use No      Comment: former alcoholic-quit 2833    Drug use: No    Sexual activity: Not on file     Other Topics Concern    Not on file     Social History Narrative         ALLERGIES: Mercury (bulk)    Review of Systems   Constitutional: Positive for chills and fever. HENT: Positive for congestion. Negative for rhinorrhea, sneezing and sore throat. Eyes: Negative. Negative for redness and visual disturbance. Respiratory: Negative. Negative for cough, shortness of breath and wheezing. Cardiovascular: Negative. Negative for chest pain and leg swelling. Gastrointestinal: Negative. Negative for abdominal pain, diarrhea, nausea and vomiting. Genitourinary: Negative. Negative for difficulty urinating, discharge and frequency. Musculoskeletal: Negative. Negative for arthralgias, back pain, myalgias and neck stiffness. Skin: Negative. Negative for color change and rash. Neurological: Negative. Negative for dizziness, syncope, weakness, numbness and headaches. Hematological: Negative for adenopathy. Psychiatric/Behavioral: Negative. All other systems reviewed and are negative.       Patient Vitals for the past 12 hrs:   Temp Pulse Resp BP SpO2   06/08/17 0510 99.6 °F (37.6 °C) - - - -   06/08/17 0500 - - - 132/90 94 %   06/08/17 0430 - - - 137/84 91 %   06/08/17 0400 - - - 126/69 91 %   06/08/17 0359 (!) 102.6 °F (39.2 °C) - - - -   06/08/17 0330 - - - 127/72 91 %   06/08/17 0308 - - - 130/76 92 %   06/08/17 0304 (!) 102.4 °F (39.1 °C) - - - -   06/08/17 0033 (!) 101 °F (38.3 °C) 92 16 (!) 134/97 93 %             Physical Exam   Constitutional: He is oriented to person, place, and time. Tired and mildly ill appearing  Febrile, but no other SIRS criteria   HENT:   Head: Atraumatic. Nasal congestion   Eyes: EOM are normal.   Cardiovascular: Normal rate, regular rhythm, normal heart sounds and intact distal pulses. Exam reveals no gallop and no friction rub. No murmur heard. Pulmonary/Chest: Effort normal and breath sounds normal. No respiratory distress. He has no wheezes. He has no rales. He exhibits no tenderness. Abdominal: Soft. Bowel sounds are normal. He exhibits no distension and no mass. There is no tenderness. There is no rebound and no guarding. Musculoskeletal: Normal range of motion. He exhibits no edema or tenderness. Neurological: He is alert and oriented to person, place, and time. Skin: Skin is warm. Psychiatric: He has a normal mood and affect. Nursing note and vitals reviewed. MDM  Number of Diagnoses or Management Options  Diagnosis management comments: DDx: Pt is s/p tranfusion and s/p chemotherapy. Pt could be having a mild transfusion reaction vs infection due to recent chemotherapy. Will treat pt for both and consult with Dr. Chavez Avalos. Amount and/or Complexity of Data Reviewed  Clinical lab tests: ordered and reviewed  Tests in the radiology section of CPT®: ordered and reviewed  Review and summarize past medical records: yes  Discuss the patient with other providers: yes (Hematology)    Patient Progress  Patient progress: stable    ED Course       Procedures    PROGRESS NOTE:  3:07 AM  Pt's temperature is now 102.   Written by Ulices Tejeda ED Scribe, as dictated by Lyndon Fleischer, MD.    CONSULT NOTE:  3:13 AM  Lyndon Fleischer, MD spoke with Benjy Veloz  Specialty: Hematology  Discussed patient's hx, disposition, and available diagnostic and imaging results. Reviewed care plans. Consultant agrees with plans as outlined. Since the pt is not neutropenic, lower concern for infection however, recommend treating any focal symptoms/findings with antibiotics. Rajat Gamino does not think his symptoms are related to the transfusion. Dr. Fredrick Leblanc and Parish Robertson MD agreed on Augmentin, and to call Dr. Nicho Fowler in the morning. Written by SARIAH Estrada, as dictated by Parish Robertson MD.    PROGRESS NOTE:  5:13 AM  Pt's fever has decreased to 99.6. Written by SARIAH Estrada, as dictated by Parish Robertson MD.    LABORATORY TESTS:  Recent Results (from the past 12 hour(s))   CBC WITH AUTOMATED DIFF    Collection Time: 06/08/17  1:12 AM   Result Value Ref Range    WBC 3.3 (L) 4.1 - 11.1 K/uL    RBC 2.72 (L) 4.10 - 5.70 M/uL    HGB 7.6 (L) 12.1 - 17.0 g/dL    HCT 22.9 (L) 36.6 - 50.3 %    MCV 84.2 80.0 - 99.0 FL    MCH 27.9 26.0 - 34.0 PG    MCHC 33.2 30.0 - 36.5 g/dL    RDW 21.5 (H) 11.5 - 14.5 %    PLATELET 73 (L) 752 - 400 K/uL    NEUTROPHILS 87 (H) 32 - 75 %    LYMPHOCYTES 8 (L) 12 - 49 %    MONOCYTES 5 5 - 13 %    EOSINOPHILS 0 0 - 7 %    BASOPHILS 0 0 - 1 %    ABS. NEUTROPHILS 2.8 1.8 - 8.0 K/UL    ABS. LYMPHOCYTES 0.3 (L) 0.8 - 3.5 K/UL    ABS. MONOCYTES 0.2 0.0 - 1.0 K/UL    ABS. EOSINOPHILS 0.0 0.0 - 0.4 K/UL    ABS.  BASOPHILS 0.0 0.0 - 0.1 K/UL    DF SMEAR SCANNED      RBC COMMENTS ROULEAUX  ANISOCYTOSIS  2+       METABOLIC PANEL, COMPREHENSIVE    Collection Time: 06/08/17  1:12 AM   Result Value Ref Range    Sodium 136 136 - 145 mmol/L    Potassium 3.7 3.5 - 5.1 mmol/L    Chloride 105 97 - 108 mmol/L    CO2 22 21 - 32 mmol/L    Anion gap 9 5 - 15 mmol/L    Glucose 94 65 - 100 mg/dL    BUN 32 (H) 6 - 20 MG/DL    Creatinine 1.59 (H) 0.70 - 1.30 MG/DL    BUN/Creatinine ratio 20 12 - 20      GFR est AA 54 (L) >60 ml/min/1.73m2    GFR est non-AA 45 (L) >60 ml/min/1.73m2    Calcium 7.9 (L) 8.5 - 10.1 MG/DL    Bilirubin, total 0.5 0.2 - 1.0 MG/DL    ALT (SGPT) 25 12 - 78 U/L    AST (SGOT) 23 15 - 37 U/L    Alk. phosphatase 80 45 - 117 U/L    Protein, total 11.9 (H) 6.4 - 8.2 g/dL    Albumin 2.7 (L) 3.5 - 5.0 g/dL    Globulin 9.2 (H) 2.0 - 4.0 g/dL    A-G Ratio 0.3 (L) 1.1 - 2.2     LACTIC ACID, PLASMA    Collection Time: 06/08/17  1:12 AM   Result Value Ref Range    Lactic acid 1.3 0.4 - 2.0 MMOL/L   URINALYSIS W/ REFLEX CULTURE    Collection Time: 06/08/17  3:14 AM   Result Value Ref Range    Color YELLOW/STRAW      Appearance CLEAR CLEAR      Specific gravity 1.018 1.003 - 1.030      pH (UA) 6.0 5.0 - 8.0      Protein NEGATIVE  NEG mg/dL    Glucose NEGATIVE  NEG mg/dL    Ketone NEGATIVE  NEG mg/dL    Bilirubin NEGATIVE  NEG      Blood TRACE (A) NEG      Urobilinogen 0.2 0.2 - 1.0 EU/dL    Nitrites NEGATIVE  NEG      Leukocyte Esterase NEGATIVE  NEG      UA:UC IF INDICATED CULTURE NOT INDICATED BY UA RESULT CNI      WBC 0-4 0 - 4 /hpf    RBC 0-5 0 - 5 /hpf    Epithelial cells FEW FEW /lpf    Bacteria NEGATIVE  NEG /hpf    Hyaline cast 0-2 0 - 5 /lpf       IMAGING RESULTS:    CXR Results  (Last 48 hours)               06/08/17 0132  XR CHEST PA LAT Final result    Impression:  Impression: No acute process or change compared to the prior exam.           Narrative:  Exam:  2 view chest       Indication: Fever, chest pain       Comparison to 2/2/2016. PA and lateral views demonstrate normal heart size. There is no acute process in   the lung fields. The osseous structures are unremarkable. MEDICATIONS GIVEN:  Medications   sodium chloride 0.9 % bolus infusion 1,000 mL (0 mL IntraVENous IV Completed 6/8/17 0417)   ibuprofen (MOTRIN) tablet 600 mg (600 mg Oral Given 6/8/17 0312)   amoxicillin-clavulanate (AUGMENTIN) 875-125 mg per tablet 1 Tab (1 Tab Oral Given 6/8/17 2941)   acetaminophen (TYLENOL) tablet 975 mg (975 mg Oral Given 6/8/17 3814)       IMPRESSION:  1. Nasal congestion    2. Upper respiratory tract infection, unspecified type    3. Fever, unspecified fever cause    4. Anemia, unspecified type        PLAN:  1. Current Discharge Medication List      START taking these medications    Details   amoxicillin-clavulanate (AUGMENTIN) 875-125 mg per tablet Take 1 Tab by mouth two (2) times a day for 10 days. Qty: 20 Tab, Refills: 0           2. Follow-up Information     Follow up With Details Comments Contact Info    Summer Trevizo MD Call today  [facility] [address]   [place] [facility]  [phone]      hospitals EMERGENCY DEPT  If symptoms worsen [address] [place] [address] Drive  [address] [facility]  [phone]        Return to ED if worse     DISCHARGE NOTE  3:52 AM  The patient has been re-evaluated and is ready for discharge. Reviewed available results with patient. Counseled patient on diagnosis and care plan. Patient has expressed understanding, and all questions have been answered. Patient agrees with plan and agrees to follow up as recommended, or return to the ED if their symptoms worsen. Discharge instructions have been provided and explained to the patient, along with reasons to return to the ED. ATTESTATION:  This note is prepared by Le Montanez, acting as Scribe for Casey Cooper MD.    Casey Cooper MD: The scribe's documentation has been prepared under my direction and personally reviewed by me in its entirety. I confirm that the note above accurately reflects all work, treatment, procedures, and medical decision making performed by me.

## 2017-06-08 NOTE — ED TRIAGE NOTES
Assumed care of patient in the Emergency Department, addressed patient and family with AIDET process. The patient presents to the ED with complaints of fever since midnight. Reports started feeling bad at 4pm yesterday, and this evening started with a fever. Reports getting chemo yesterday and a blood transfusion. Patient has multiple myeloma. The patient denies chest pain or shortness of breath, denies dizziness or weakness. The patient denies nausea, vomiting, and diarrhea. The patient is connected to the continuous pulse oximeter and blood pressure cuff. Patient is resting comfortably, bed in the lowest position, side rails raised, call bell in hand, lights dim. Instructed patient to not get up without assistance, and to ring the call bell for any questions or concerns. Updated patient on the plan of care.

## 2017-06-08 NOTE — ED NOTES
Bedside and Verbal shift change report given to [address] (oncoming nurse) by Julio Doss (offgoing nurse). Report included the following information SBAR, Kardex, ED Summary, Procedure Summary, Intake/Output, MAR, Recent Results and Med Rec Status.

## 2017-06-08 NOTE — TELEPHONE ENCOUNTER
Patient called office HIPAA verified by two patient identifiers, to inform that he went to ED last night with fever of 103, later after receiving transfusion of blood 6/7/17, and wanted to know if he should still take his oral chemo. Patient in formed that he should still take the Revlimid and the doctor will see him in the morning 6/9/17.

## 2017-06-08 NOTE — ED NOTES
Assumed care of patient from [facility]. Patient resting comfortably in no apparent distress. On monitor x 2. Call bell within reach. Plan of care discussed.

## 2017-06-08 NOTE — LETTER
[address] 
[facility] EMERGENCY DEPT 
[address] [place] [address] [phone] 
[phone] Work/School Note Date: 6/8/2017 To Whom It May concern: 
 
Marlene Roberts Sr. was seen and treated today in the emergency room by the following provider(s): 
Attending Provider: Sergio Clifford MD. Marlene Roberts Sr. was accompanied by his wife, Rosy Shanks. She will be unable to attend work today. Sincerely, Sergio Clifford MD

## 2017-06-08 NOTE — DISCHARGE INSTRUCTIONS
Anemia: Care Instructions  Your Care Instructions    Anemia is a low level of red blood cells, which carry oxygen throughout your body. Many things can cause anemia. Lack of iron is one of the most common causes. Your body needs iron to make hemoglobin, a substance in red blood cells that carries oxygen from the lungs to your body's cells. Without enough iron, the body produces fewer and smaller red blood cells. As a result, your body's cells do not get enough oxygen, and you feel tired and weak. And you may have trouble concentrating. Bleeding is the most common cause of a lack of iron. You may have heavy menstrual bleeding or bleeding caused by conditions such as ulcers, hemorrhoids, or cancer. Regular use of aspirin or other anti-inflammatory medicines (such as ibuprofen) also can cause bleeding in some people. A lack of iron in your diet also can cause anemia, especially at times when the body needs more iron, such as during pregnancy, infancy, and the teen years. Your doctor may have prescribed iron pills. It may take several months of treatment for your iron levels to return to normal. Your doctor also may suggest that you eat foods that are rich in iron, such as meat and beans. There are many other causes of anemia. It is not always due to a lack of iron. Finding the specific cause of your anemia will help your doctor find the right treatment for you. Follow-up care is a key part of your treatment and safety. Be sure to make and go to all appointments, and call your doctor if you are having problems. It's also a good idea to know your test results and keep a list of the medicines you take. How can you care for yourself at home? · Take your medicines exactly as prescribed. Call your doctor if you think you are having a problem with your medicine. · If your doctor recommends iron pills, take them as directed:  ¨ Try to take the pills on an empty stomach about 1 hour before or 2 hours after meals. But you may need to take iron with food to avoid an upset stomach. ¨ Do not take antacids or drink milk or caffeine drinks (such as coffee, tea, or cola) at the same time or within 2 hours of the time that you take your iron. They can make it hard for your body to absorb the iron. ¨ Vitamin C (from food or supplements) helps your body absorb iron. Try taking iron pills with a glass of orange juice or some other food that is high in vitamin C, such as citrus fruits. ¨ Iron pills may cause stomach problems, such as heartburn, nausea, diarrhea, constipation, and cramps. Be sure to drink plenty of fluids, and include fruits, vegetables, and fiber in your diet each day. Iron pills often make your bowel movements dark or green. ¨ If you forget to take an iron pill, do not take a double dose of iron the next time you take a pill. ¨ Keep iron pills out of the reach of small children. An overdose of iron can be very dangerous. · Follow your doctor's advice about eating iron-rich foods. These include red meat, shellfish, poultry, eggs, beans, raisins, whole-grain bread, and leafy green vegetables. · Steam vegetables to help them keep their iron content. When should you call for help? Call 911 anytime you think you may need emergency care. For example, call if:  · You have symptoms of a heart attack. These may include:  ¨ Chest pain or pressure, or a strange feeling in the chest.  ¨ Sweating. ¨ Shortness of breath. ¨ Nausea or vomiting. ¨ Pain, pressure, or a strange feeling in the back, neck, jaw, or upper belly or in one or both shoulders or arms. ¨ Lightheadedness or sudden weakness. ¨ A fast or irregular heartbeat. After you call 911, the  may tell you to chew 1 adult-strength or 2 to 4 low-dose aspirin. Wait for an ambulance. Do not try to drive yourself. · You passed out (lost consciousness).   Call your doctor now or seek immediate medical care if:  · You have new or increased shortness of breath. · You are dizzy or lightheaded, or you feel like you may faint. · Your fatigue and weakness continue or get worse. · You have any abnormal bleeding, such as:  ¨ Nosebleeds. ¨ Vaginal bleeding that is different (heavier, more frequent, at a different time of the month) than what you are used to. ¨ Bloody or black stools, or rectal bleeding. ¨ Bloody or pink urine. Watch closely for changes in your health, and be sure to contact your doctor if:  · You do not get better as expected. Where can you learn more? Go to http://charley-avi.info/. Enter R301 in the search box to learn more about \"Anemia: Care Instructions. \"  Current as of: October 13, 2016  Content Version: 11.2  © 1203-8773 [employer]. Care instructions adapted under license by [employer] (which disclaims liability or warranty for this information). If you have questions about a medical condition or this instruction, always ask your healthcare professional. [place] [address] any warranty or liability for your use of this information.

## 2017-06-09 ENCOUNTER — HOSPITAL ENCOUNTER (OUTPATIENT)
Dept: INFUSION THERAPY | Age: 62
Discharge: HOME | End: 2017-06-09
Payer: SELF-PAY

## 2017-06-09 ENCOUNTER — OFFICE VISIT (OUTPATIENT)
Dept: ONCOLOGY | Age: 62
End: 2017-06-09

## 2017-06-09 VITALS
RESPIRATION RATE: 18 BRPM | TEMPERATURE: 98.4 F | BODY MASS INDEX: 27.89 KG/M2 | WEIGHT: 184 LBS | SYSTOLIC BLOOD PRESSURE: 124 MMHG | HEIGHT: 68 IN | HEART RATE: 81 BPM | OXYGEN SATURATION: 97 % | DIASTOLIC BLOOD PRESSURE: 75 MMHG

## 2017-06-09 DIAGNOSIS — T45.1X5A CHEMOTHERAPY-INDUCED THROMBOCYTOPENIA: ICD-10-CM

## 2017-06-09 DIAGNOSIS — C90.00 MULTIPLE MYELOMA NOT HAVING ACHIEVED REMISSION (HCC): Primary | ICD-10-CM

## 2017-06-09 DIAGNOSIS — E83.52 HYPERCALCEMIA: ICD-10-CM

## 2017-06-09 DIAGNOSIS — T45.1X5A ANEMIA ASSOCIATED WITH CHEMOTHERAPY: ICD-10-CM

## 2017-06-09 DIAGNOSIS — D69.59 CHEMOTHERAPY-INDUCED THROMBOCYTOPENIA: ICD-10-CM

## 2017-06-09 DIAGNOSIS — D64.81 ANEMIA ASSOCIATED WITH CHEMOTHERAPY: ICD-10-CM

## 2017-06-09 LAB
ALBUMIN SERPL BCP-MCNC: 2.6 G/DL (ref 3.5–5)
ALBUMIN/GLOB SERPL: 0.3 {RATIO} (ref 1.1–2.2)
ALP SERPL-CCNC: 65 U/L (ref 45–117)
ALT SERPL-CCNC: 26 U/L (ref 12–78)
ANION GAP BLD CALC-SCNC: 7 MMOL/L (ref 5–15)
AST SERPL W P-5'-P-CCNC: 28 U/L (ref 15–37)
BASOPHILS # BLD AUTO: 0 K/UL (ref 0–0.1)
BASOPHILS # BLD: 0 % (ref 0–1)
BILIRUB SERPL-MCNC: 0.6 MG/DL (ref 0.2–1)
BUN SERPL-MCNC: 25 MG/DL (ref 6–20)
BUN/CREAT SERPL: 18 (ref 12–20)
CALCIUM SERPL-MCNC: 7.4 MG/DL (ref 8.5–10.1)
CHLORIDE SERPL-SCNC: 104 MMOL/L (ref 97–108)
CO2 SERPL-SCNC: 22 MMOL/L (ref 21–32)
CREAT SERPL-MCNC: 1.42 MG/DL (ref 0.7–1.3)
EOSINOPHIL # BLD: 0 K/UL (ref 0–0.4)
EOSINOPHIL NFR BLD: 2 % (ref 0–7)
ERYTHROCYTE [DISTWIDTH] IN BLOOD BY AUTOMATED COUNT: 21.3 % (ref 11.5–14.5)
GLOBULIN SER CALC-MCNC: 8.9 G/DL (ref 2–4)
GLUCOSE SERPL-MCNC: 105 MG/DL (ref 65–100)
HCT VFR BLD AUTO: 24.6 % (ref 36.6–50.3)
HGB BLD-MCNC: 8.1 G/DL (ref 12.1–17)
LYMPHOCYTES # BLD AUTO: 21 % (ref 12–49)
LYMPHOCYTES # BLD: 0.3 K/UL (ref 0.8–3.5)
MCH RBC QN AUTO: 27.9 PG (ref 26–34)
MCHC RBC AUTO-ENTMCNC: 32.9 G/DL (ref 30–36.5)
MCV RBC AUTO: 84.8 FL (ref 80–99)
MONOCYTES # BLD: 0.2 K/UL (ref 0–1)
MONOCYTES NFR BLD AUTO: 13 % (ref 5–13)
NEUTS SEG # BLD: 1 K/UL (ref 1.8–8)
NEUTS SEG NFR BLD AUTO: 64 % (ref 32–75)
PLATELET # BLD AUTO: 64 K/UL (ref 150–400)
POTASSIUM SERPL-SCNC: 3.3 MMOL/L (ref 3.5–5.1)
PROT SERPL-MCNC: 11.5 G/DL (ref 6.4–8.2)
RBC # BLD AUTO: 2.9 M/UL (ref 4.1–5.7)
RBC MORPH BLD: ABNORMAL
RBC MORPH BLD: ABNORMAL
SODIUM SERPL-SCNC: 133 MMOL/L (ref 136–145)
WBC # BLD AUTO: 1.5 K/UL (ref 4.1–11.1)

## 2017-06-09 PROCEDURE — 85025 COMPLETE CBC W/AUTO DIFF WBC: CPT | Performed by: INTERNAL MEDICINE

## 2017-06-09 PROCEDURE — 80053 COMPREHEN METABOLIC PANEL: CPT | Performed by: INTERNAL MEDICINE

## 2017-06-09 PROCEDURE — 36415 COLL VENOUS BLD VENIPUNCTURE: CPT | Performed by: INTERNAL MEDICINE

## 2017-06-09 RX ORDER — DEXAMETHASONE 2 MG/1
10 TABLET ORAL
Qty: 120 TAB | Refills: 3 | Status: SHIPPED | OUTPATIENT
Start: 2017-06-09 | End: 2017-06-30

## 2017-06-09 RX ORDER — INSULIN GLARGINE 100 [IU]/ML
INJECTION, SOLUTION SUBCUTANEOUS
Qty: 15 ML | Refills: 1 | Status: SHIPPED | OUTPATIENT
Start: 2017-06-09 | End: 2018-02-21 | Stop reason: SDUPTHER

## 2017-06-09 NOTE — PROGRESS NOTES
Hematology Follow Up        Patient: Sabrina Victor Sr. MRN: [id number]  SSN: [id number]    YOB: 1955  Age: 63 y.o. Sex: male          Diagnosis:     1. Multiple Myeloma, IgA Kappa   [place] stage IIIB      Treatment:     1. Systemic therapy with RVd - Cycle 1 Day 4      Subjective:      Sabrina Victor Sr. is a 63 y.o. male with a new diagnosis of IgA kappa myeloma. Bone marrow shows 100% aberrant plasma cells. He is fatigued. No bone pains or recent infection. He suffers with DM but it is diet controlled. He has received systemic anti-viral treatment for chronic Hep C with successful eradication of the virus. Mr. Maddy Butler is receiving systemic therapy with RVd. He has been having intermittent fevers and chills. He went to the emergency room earlier this week and blood cultures were negative.        Review of Systems:    Constitutional: fatigue, fevers  Eyes: negative  Ears, Nose, Mouth, Throat, and Face: negative  Respiratory: negative  Cardiovascular: negative  Gastrointestinal: negative  Genitourinary:negative  Integument/Breast: negative  Hematologic/Lymphatic: negative  Musculoskeletal:negative  Neurological: negative      Past Medical History:   Diagnosis Date    Acid reflux     Arrhythmia     pvc's    Chronic pain     neck    Depression     Diabetes ([address]     Femoral hernia 7/11/2012    Hepatitis C     Hypertension     Inguinal hernia 7/11/2012    Liver disease     hepatitis c    Other ill-defined conditions     Hx of PVCs since 2009    Prostatitis, acute     Psychiatric disorder     depression     Past Surgical History:   Procedure Laterality Date    COLONOSCOPY N/A 9/20/2016    COLONOSCOPY performed by Eloise Landau, [facility] at [facility] ENDOSCOPY    HX APPENDECTOMY      HX CERVICAL FUSION  2008    x 1 as of 4/30/2014    HX HEENT      foreign body removed from right eye    HX HERNIA REPAIR  2012    [facility]      HX ORTHOPAEDIC      cervical fusion    HX OTHER SURGICAL  2000    liver bx - chronic hepatitis      Family History   Problem Relation Age of Onset    Arthritis-osteo Mother     Arthritis-osteo Father     Diabetes Father     Hypertension Maternal Grandmother     Diabetes Maternal Grandmother     Hypertension Maternal Grandfather     Diabetes Maternal Grandfather      Social History   Substance Use Topics    Smoking status: Former Smoker     Packs/day: 1.00     Years: 12.00     Types: Cigarettes     Quit date: 1/1/1989    Smokeless tobacco: Never Used      Comment: former cigarette smoker    Alcohol use No      Comment: former alcoholic-quit [id number]      Prior to Admission medications    Medication Sig Start Date End Date Taking? Authorizing Provider   amoxicillin-clavulanate (AUGMENTIN) 875-125 mg per tablet Take 1 Tab by mouth two (2) times a day for 10 days. 6/8/17 6/18/17 Yes Arina Sullivan MD   lenalidomide 25 mg cap Take  by mouth. Yes Historical Provider   insulin aspart protamine/insulin aspart (NOVOLOG MIX 70-30) 100 unit/mL (70-30) injection by SubCUTAneous route. Yes Historical Provider   valACYclovir (VALTREX) 1 gram tablet Take 1 Tab by mouth daily. 6/6/17  Yes Ying Carter MD   tamsulosin (FLOMAX) 0.4 mg capsule Take 1 Cap by mouth daily. 4/4/17  Yes Ramon Sharma MD   propafenone (RYTHMOL) 150 mg tablet Take 50 mg by mouth every eight (8) hours. Yes Historical Provider   zolpidem CR (AMBIEN CR) 12.5 mg tablet Take 12.5 mg by mouth nightly as needed. Yes Historical Provider   TADALAFIL (CIALIS PO) Take 20 mg by mouth as needed. Yes Historical Provider   losartan (COZAAR) 50 mg tablet Take 100 mg by mouth daily. Yes Historical Provider   amLODIPine (NORVASC) 10 mg tablet Take 10 mg by mouth daily. Yes Historical Provider   tapentadol (NUCYNTA) 100 mg tablet Take 100 mg by mouth three (3) times daily. Yes Goldy Diaz MD   aripiprazole (ABILIFY) 5 mg tablet Take 2.5 mg by mouth every morning.    Yes Goldy Diaz MD buPROPion XL (WELLBUTRIN XL) 150 mg tablet Take 1 Tab by mouth daily (with dinner). 4/25/10  Yes Yoselyn Jerry MD              Allergies   Allergen Reactions    Mercury (Bulk) Hives     Blisters             Objective:     Vitals:    17 1044   BP: 124/75   Pulse: 81   Resp: 18   Temp: 98.4 °F (36.9 °C)   SpO2: 97%   Weight: 184 lb (83.5 kg)   Height: 5' 8\" (1.727 m)            Physical Exam:    GENERAL: alert, cooperative  EYE: negative  LYMPHATIC: Cervical, supraclavicular, and axillary nodes normal.   THROAT & NECK: normal and no erythema or exudates noted. LUNG: clear to auscultation bilaterally  HEART: regular rate and rhythm  ABDOMEN: soft, non-tender  EXTREMITIES: no cyanosis or edema  SKIN: Normal.  NEUROLOGIC: negative      Lab Results   Component Value Date/Time    WBC 1.5 2017 10:24 AM    HGB 8.1 2017 10:24 AM    HCT 24.6 2017 10:24 AM    PLATELET 64  10:24 AM    MCV 84.8 2017 10:24 AM       Lab Results   Component Value Date/Time    Sodium 133 2017 10:24 AM    Potassium 3.3 2017 10:24 AM    Chloride 104 2017 10:24 AM    CO2 22 2017 10:24 AM    Anion gap 7 2017 10:24 AM    Glucose 105 2017 10:24 AM    BUN 25 2017 10:24 AM    Creatinine 1.42 2017 10:24 AM    BUN/Creatinine ratio 18 2017 10:24 AM    GFR est AA >60 2017 10:24 AM    GFR est non-AA 51 2017 10:24 AM    Calcium 7.4 2017 10:24 AM    Bilirubin, total 0.6 2017 10:24 AM    AST (SGOT) 28 2017 10:24 AM    Alk. phosphatase 65 2017 10:24 AM    Protein, total 11.5 2017 10:24 AM    Albumin 2.6 2017 10:24 AM    Globulin 8.9 2017 10:24 AM    A-G Ratio 0.3 2017 10:24 AM    ALT (SGPT) 26 2017 10:24 AM       M-spike: 5.8, IgA kappa    Serum F       Assessment:     1.  Multiple Myeloma, IgA Kappa   [place]ie [place] stage IIIB    Cytogenetics -pending  ECOG PS - 0   Intent of therapy: palliative    Receiving systemic therapy with RVd - Cycle 1 Day 4   Hold today d/t thrombocytopenia - Dose reduce Lenalidomide to 15 mg and Bortezomib to 1.1 mg/m2    Tolerating treatment   A detailed system by system evaluation of side effect was performed to assess chemotherapy related toxicity. Blood counts are low. Results reviewed with the patient. Symptom management form reviewed with patient. 2. Anemia, myeloma related    > s/p 2 units PRBCs on 6/7      3. Thrombocytopenia   chemotherapy and bone marrow infiltration    > Hold therapy  > Reduce the dose of Revlimid and Velcade      4. Hypercalcemia, myeloma related   Resolved    > Zometa      5. Diabetes    > Continue Novolog sliding scale  > Start Lantus SQ 10 units at bedtime  > Refer to Endocrine      Plan:       > Hold chemotherapy today and for next few days  > Decrease Revlimid to 15 mg daily untill platelet count is above 10,000  > Decrease Velcade to 1.1 mg/m2  > Continue Zometa  > Dexamethasone 10 mg daily (on the days he is not taking 40 mg)  > Lantus SQ 10 units at bedtime  > Refer to Endocrine for DM management  > Follow-up in 1 week        Signed by: Abiola Barrera MD                     Eva 10, 2017          CC. Tremaine Cr MD  CC.  Anahi Maldonado MD

## 2017-06-09 NOTE — PROGRESS NOTES
1005 Pt arrived at [facility] ambulatory and in no distress for C1D4. Assessment completed. Pt reports feeling like he is walking in slow motion, like he is on sand. Pt having night fevers. Pt reports fever of 103 6/7/17 and 101  6/8/17. Labs drawn. Pt to MD office for scheduled visit. Visit Vitals    Pulse (P) 79    Temp (P) 97.8 °F (36.6 °C)    Resp (P) 18     Recent Results (from the past 12 hour(s))   CBC WITH AUTOMATED DIFF    Collection Time: 06/09/17 10:24 AM   Result Value Ref Range    WBC 1.5 (L) 4.1 - 11.1 K/uL    RBC 2.90 (L) 4.10 - 5.70 M/uL    HGB 8.1 (L) 12.1 - 17.0 g/dL    HCT 24.6 (L) 36.6 - 50.3 %    MCV 84.8 80.0 - 99.0 FL    MCH 27.9 26.0 - 34.0 PG    MCHC 32.9 30.0 - 36.5 g/dL    RDW 21.3 (H) 11.5 - 14.5 %    PLATELET 64 (L) 132 - 400 K/uL    NEUTROPHILS 64 32 - 75 %    LYMPHOCYTES 21 12 - 49 %    MONOCYTES 13 5 - 13 %    EOSINOPHILS 2 0 - 7 %    BASOPHILS 0 0 - 1 %    ABS. NEUTROPHILS 1.0 (L) 1.8 - 8.0 K/UL    ABS. LYMPHOCYTES 0.3 (L) 0.8 - 3.5 K/UL    ABS. MONOCYTES 0.2 0.0 - 1.0 K/UL    ABS. EOSINOPHILS 0.0 0.0 - 0.4 K/UL    ABS. BASOPHILS 0.0 0.0 - 0.1 K/UL    RBC COMMENTS ANISOCYTOSIS  2+        RBC COMMENTS HYPOCHROMIA  1+       METABOLIC PANEL, COMPREHENSIVE    Collection Time: 06/09/17 10:24 AM   Result Value Ref Range    Sodium 133 (L) 136 - 145 mmol/L    Potassium 3.3 (L) 3.5 - 5.1 mmol/L    Chloride 104 97 - 108 mmol/L    CO2 22 21 - 32 mmol/L    Anion gap 7 5 - 15 mmol/L    Glucose 105 (H) 65 - 100 mg/dL    BUN 25 (H) 6 - 20 MG/DL    Creatinine 1.42 (H) 0.70 - 1.30 MG/DL    BUN/Creatinine ratio 18 12 - 20      GFR est AA >60 >60 ml/min/1.73m2    GFR est non-AA 51 (L) >60 ml/min/1.73m2    Calcium 7.4 (L) 8.5 - 10.1 MG/DL    Bilirubin, total 0.6 0.2 - 1.0 MG/DL    ALT (SGPT) 26 12 - 78 U/L    AST (SGOT) 28 15 - 37 U/L    Alk.  phosphatase 65 45 - 117 U/L    Protein, total 11.5 (H) 6.4 - 8.2 g/dL    Albumin 2.6 (L) 3.5 - 5.0 g/dL    Globulin 8.9 (H) 2.0 - 4.0 g/dL    A-G Ratio 0.3 (L) 1.1 - 2.2         1200 Phone called received from [facility]. Treatment being held and pt will not return to [facility] today.

## 2017-06-09 NOTE — PATIENT INSTRUCTIONS
Start taking Dexamethasone 10 mg daily on days you do not receive Dexamethasone in the infusion center. A prescription for long-acting insulin, Lantus, has been sent to your pharmacy. You should inject 10 units at bedtime. Hold your Revlimid until next Tuesday. We will send you a new prescription with a lower dose, 15 mg daily. We will check your blood counts on Tuesday to see if you should restart.

## 2017-06-09 NOTE — MR AVS SNAPSHOT
Visit Information Date & Time Provider Department Dept. Phone Encounter #  
 6/9/2017 10:30 AM Roly Glezist, NP [address] [facility] at [facility] [phone] [id number] Upcoming Health Maintenance Date Due  
 FOOT EXAM Q1 10/9/1967 MICROALBUMIN Q1 10/9/1967 EYE EXAM RETINAL OR DILATED Q1 10/9/1967 DTaP/Tdap/Td series (1 - Tdap) 10/9/1978 FOBT Q 1 YEAR AGE 50-75 10/9/2007 HEMOGLOBIN A1C Q6M 6/24/2011 Pneumococcal 19-64 Highest Risk (2 of 3 - PCV13) 7/14/2012 LIPID PANEL Q1 7/14/2012 INFLUENZA AGE 9 TO ADULT 8/1/2017 Allergies as of 6/9/2017  Review Complete On: 6/9/2017 By: Pearl Ward LPN Severity Noted Reaction Type Reactions Mercury (Bulk) Low 04/21/2010   Topical Hives Blisters Current Immunizations  Reviewed on 6/9/2017 Name Date Influenza Vaccine Split 11/14/2010  6:57 PM  
 Pneumococcal Vaccine (Unspecified Type) 7/14/2011  6:30 PM  
  
 Reviewed by Pearl Ward LPN on 6/1/8164 at 57:51 AM  
You Were Diagnosed With   
  
 Codes Comments Multiple myeloma not having achieved remission ([facility]    -  Primary ICD-10-CM: C90.00 ICD-9-CM: 203.00 Vitals BP Pulse Temp Resp Height(growth percentile) Weight(growth percentile) 124/75 81 98.4 °F (36.9 °C) 18 5' 8\" (1.727 m) 184 lb (83.5 kg) SpO2 BMI Smoking Status 97% 27.98 kg/m2 Former Smoker Vitals History BMI and BSA Data Body Mass Index Body Surface Area  
 27.98 kg/m 2 2 m 2 Preferred Pharmacy Pharmacy Name Phone [phone], [address] AT [facility] [address] [phone] Your Updated Medication List  
  
   
This list is accurate as of: 6/9/17 11:49 AM.  Always use your most recent med list.  
  
  
  
  
 ABILIFY 5 mg tablet Generic drug:  ARIPiprazole Take 2.5 mg by mouth every morning. AMBIEN CR 12.5 mg tablet Generic drug:  zolpidem CR Take 12.5 mg by mouth nightly as needed. amLODIPine 10 mg tablet Commonly known as:  Mosetta Hark Take 10 mg by mouth daily. amoxicillin-clavulanate 875-125 mg per tablet Commonly known as:  AUGMENTIN Take 1 Tab by mouth two (2) times a day for 10 days. buPROPion  mg tablet Commonly known as:  Jeppie Reagin Take 1 Tab by mouth daily (with dinner). CIALIS PO Take 20 mg by mouth as needed. dexamethasone 2 mg tablet Commonly known as:  DECADRON Take 5 Tabs by mouth Daily (before breakfast). Take 10 mg daily on days you do not receive dexamethasone in the infusion center  
  
 insulin glargine 100 unit/mL (3 mL) Inpn Commonly known as:  LANTUS,BASAGLAR  
10 units daily at bedtime  
  
 lenalidomide 25 mg Cap Take  by mouth.  
  
 losartan 50 mg tablet Commonly known as:  COZAAR Take 100 mg by mouth daily. NovoLOG Mix 70-30 100 unit/mL (70-30) injection Generic drug:  insulin aspart protamine/insulin aspart  
by SubCUTAneous route. NUCYNTA 100 mg tablet Generic drug:  tapentadol Take 100 mg by mouth three (3) times daily. RYTHMOL 150 mg tablet Generic drug:  propafenone Take 50 mg by mouth every eight (8) hours. tamsulosin 0.4 mg capsule Commonly known as:  FLOMAX Take 1 Cap by mouth daily. valACYclovir 1 gram tablet Commonly known as:  VALTREX Take 1 Tab by mouth daily. Prescriptions Sent to Pharmacy Refills  
 dexamethasone (DECADRON) 2 mg tablet 3 Sig: Take 5 Tabs by mouth Daily (before breakfast). Take 10 mg daily on days you do not receive dexamethasone in the infusion center Class: Normal  
 Pharmacy: [facility] [address] [place] [address] #: [phone] Route: Oral  
 insulin glargine (LANTUS,BASAGLAR) 100 unit/mL (3 mL) inpn 1 Sig: 10 units daily at bedtime Class: Normal  
 Pharmacy: [facility] [address] [place] [address] #: [phone] To-Do List   
 06/13/2017  8:00 AM  
  Appointment with CHAIR 3 [facility] JESSICA at [facility] ([phone])  
  
 06/16/2017  8:00 AM  
  Appointment with [facility] at [facility] ([phone])  
  
 06/27/2017 10:00 AM  
  Appointment with [facility] at [facility] ([phone])  
  
 06/30/2017 9:00 AM  
  Appointment with [facility] at [facility] ([phone])  
  
 07/03/2017 10:00 AM  
  Appointment with [address] at [facility] ([phone])  
  
 07/06/2017 10:00 AM  
  Appointment with [facility] at [facility] ([phone])  
  
 07/18/2017 10:00 AM  
  Appointment with [facility] at [facility] ([phone])  
  
 07/21/2017 10:00 AM  
  Appointment with [facility] at [facility] ([phone])  
  
 07/25/2017 10:00 AM  
  Appointment with [address] at [facility] ([phone])  
  
 07/28/2017 10:00 AM  
  Appointment with [facility] at [facility] ([phone]) Patient Instructions Start taking Dexamethasone 10 mg daily on days you do not receive Dexamethasone in the infusion center. A prescription for long-acting insulin, Lantus, has been sent to your pharmacy. You should inject 10 units at bedtime. Hold your Revlimid until next Tuesday. We will send you a new prescription with a lower dose, 15 mg daily. We will check your blood counts on Tuesday to see if you should restart. Introducing [facility]! Mechelle Llanso introduces [employer] patient portal. Now you can access parts of your medical record, email your doctor's office, and request medication refills online. 1. In your internet browser, go to [email or web address] 2. Click on the First Time User? Click Here link in the Sign In box. You will see the New Member Sign Up page. 3. Enter your [employer] Access Code exactly as it appears below. You will not need to use this code after youve completed the sign-up process. If you do not sign up before the expiration date, you must request a new code. · [employer] Access Code: [email or web address] Expires: 7/1/2017 11:34 PM 
 
4. Enter the last four digits of your Social Security Number (xxxx) and Date of Birth (mm/dd/yyyy) as indicated and click Submit. You will be taken to the next sign-up page. 5. Create a [employer] ID. This will be your [employer] login ID and cannot be changed, so think of one that is secure and easy to remember. 6. Create a [employer] password. You can change your password at any time. 7. Enter your Password Reset Question and Answer. This can be used at a later time if you forget your password. 8. Enter your e-mail address. You will receive e-mail notification when new information is available in [address] 9. Click Sign Up. You can now view and download portions of your medical record. 10. Click the Download Summary menu link to download a portable copy of your medical information. If you have questions, please visit the Frequently Asked Questions section of the [employer] website. Remember, [employer] is NOT to be used for urgent needs. For medical emergencies, dial 911. Now available from your iPhone and Android! Please provide this summary of care documentation to your next provider. Your primary care clinician is listed as Gabriela Gupta. If you have any questions after today's visit, please call [phone].

## 2017-06-09 NOTE — PROGRESS NOTES
Negro Banegas. is a 63 y.o. male    had concerns including Follow-up and Multiple Myeloma. Seen in ED for fever of 103 on 6/7 after blood transfusion, currently on Revlimid and Valcade, c/omild chills /fever and decreased libido, denies sob and pain    Mr. Jessica Haddad has a reminder for a \"due or due soon\" health maintenance. I have asked that he contact his primary care provider for follow-up on this health maintenance.

## 2017-06-13 ENCOUNTER — HOSPITAL ENCOUNTER (OUTPATIENT)
Dept: INFUSION THERAPY | Age: 62
Discharge: HOME | End: 2017-06-13
Payer: SELF-PAY

## 2017-06-13 VITALS
OXYGEN SATURATION: 100 % | TEMPERATURE: 97.7 F | WEIGHT: 182.6 LBS | BODY MASS INDEX: 27.68 KG/M2 | HEART RATE: 75 BPM | RESPIRATION RATE: 16 BRPM | HEIGHT: 68 IN | DIASTOLIC BLOOD PRESSURE: 72 MMHG | SYSTOLIC BLOOD PRESSURE: 115 MMHG

## 2017-06-13 LAB
BASOPHILS # BLD AUTO: 0 K/UL (ref 0–0.1)
BASOPHILS # BLD: 0 % (ref 0–1)
EOSINOPHIL # BLD: 0 K/UL (ref 0–0.4)
EOSINOPHIL NFR BLD: 0 % (ref 0–7)
ERYTHROCYTE [DISTWIDTH] IN BLOOD BY AUTOMATED COUNT: 20.8 % (ref 11.5–14.5)
HCT VFR BLD AUTO: 24.6 % (ref 36.6–50.3)
HGB BLD-MCNC: 7.8 G/DL (ref 12.1–17)
KAPPA LC FREE SER-MCNC: NORMAL MG/L
LAMBDA LC FREE SERPL-MCNC: NORMAL
LYMPHOCYTES # BLD AUTO: 14 % (ref 12–49)
LYMPHOCYTES # BLD: 0.7 K/UL (ref 0.8–3.5)
MCH RBC QN AUTO: 27.6 PG (ref 26–34)
MCHC RBC AUTO-ENTMCNC: 31.7 G/DL (ref 30–36.5)
MCV RBC AUTO: 86.9 FL (ref 80–99)
MONOCYTES # BLD: 0.6 K/UL (ref 0–1)
MONOCYTES NFR BLD AUTO: 13 % (ref 5–13)
NEUTS SEG # BLD: 3.6 K/UL (ref 1.8–8)
NEUTS SEG NFR BLD AUTO: 73 % (ref 32–75)
PLATELET # BLD AUTO: 89 K/UL (ref 150–400)
RBC # BLD AUTO: 2.83 M/UL (ref 4.1–5.7)
RBC MORPH BLD: ABNORMAL
WBC # BLD AUTO: 4.9 K/UL (ref 4.1–11.1)

## 2017-06-13 PROCEDURE — 74011250636 HC RX REV CODE- 250/636: Performed by: INTERNAL MEDICINE

## 2017-06-13 PROCEDURE — A9270 NON-COVERED ITEM OR SERVICE: HCPCS | Performed by: INTERNAL MEDICINE

## 2017-06-13 PROCEDURE — 74011000250 HC RX REV CODE- 250: Performed by: INTERNAL MEDICINE

## 2017-06-13 PROCEDURE — 36415 COLL VENOUS BLD VENIPUNCTURE: CPT | Performed by: INTERNAL MEDICINE

## 2017-06-13 PROCEDURE — 85025 COMPLETE CBC W/AUTO DIFF WBC: CPT | Performed by: INTERNAL MEDICINE

## 2017-06-13 PROCEDURE — 74011250636 HC RX REV CODE- 250/636

## 2017-06-13 PROCEDURE — 96401 CHEMO ANTI-NEOPL SQ/IM: CPT

## 2017-06-13 RX ORDER — DEXAMETHASONE 4 MG/1
40 TABLET ORAL ONCE
Status: COMPLETED | OUTPATIENT
Start: 2017-06-13 | End: 2017-06-13

## 2017-06-13 RX ADMIN — DEXAMETHASONE 40 MG: 4 TABLET ORAL at 10:09

## 2017-06-13 RX ADMIN — SODIUM CHLORIDE 2.2 MG: 9 INJECTION INTRAMUSCULAR; INTRAVENOUS; SUBCUTANEOUS at 10:29

## 2017-06-13 NOTE — PROGRESS NOTES
0805 Pt arrived at [facility] ambulatory and in no distress for C1D4. Assessment completed, no new complaints voiced. Pt reports night fevers and chills have subsided. CBCap failed. Spec sent to lab. /72  Pulse 75  Temp 97.7 °F (36.5 °C)  Resp 16  SpO2 100%     Recent Results (from the past 12 hour(s))   CBC WITH AUTOMATED DIFF    Collection Time: 06/13/17  8:37 AM   Result Value Ref Range    WBC 4.9 4.1 - 11.1 K/uL    RBC 2.83 (L) 4.10 - 5.70 M/uL    HGB 7.8 (L) 12.1 - 17.0 g/dL    HCT 24.6 (L) 36.6 - 50.3 %    MCV 86.9 80.0 - 99.0 FL    MCH 27.6 26.0 - 34.0 PG    MCHC 31.7 30.0 - 36.5 g/dL    RDW 20.8 (H) 11.5 - 14.5 %    PLATELET 89 (L) 144 - 400 K/uL    NEUTROPHILS 73 32 - 75 %    LYMPHOCYTES 14 12 - 49 %    MONOCYTES 13 5 - 13 %    EOSINOPHILS 0 0 - 7 %    BASOPHILS 0 0 - 1 %    ABS. NEUTROPHILS 3.6 1.8 - 8.0 K/UL    ABS. LYMPHOCYTES 0.7 (L) 0.8 - 3.5 K/UL    ABS. MONOCYTES 0.6 0.0 - 1.0 K/UL    ABS. EOSINOPHILS 0.0 0.0 - 0.4 K/UL    ABS. BASOPHILS 0.0 0.0 - 0.1 K/UL    RBC COMMENTS ANISOCYTOSIS  2+        RBC COMMENTS ROULEAUX  PRESENT        RBC COMMENTS TARGET CELLS  1+           Medications received:  Decadron 40 mg PO  Velcade sq    1030 Tolerated treatment well, no adverse reaction noted. D/Cd from [facility] ambulatory and in no distress accompanied by spouse. Next appt 6/16 @ 0800.

## 2017-06-14 LAB
BACTERIA SPEC CULT: NORMAL
BACTERIA SPEC CULT: NORMAL
SERVICE CMNT-IMP: NORMAL
SERVICE CMNT-IMP: NORMAL

## 2017-06-16 ENCOUNTER — OFFICE VISIT (OUTPATIENT)
Dept: ONCOLOGY | Age: 62
End: 2017-06-16

## 2017-06-16 ENCOUNTER — HOSPITAL ENCOUNTER (OUTPATIENT)
Dept: INFUSION THERAPY | Age: 62
Discharge: HOME | End: 2017-06-16
Payer: SELF-PAY

## 2017-06-16 VITALS
TEMPERATURE: 98.6 F | DIASTOLIC BLOOD PRESSURE: 76 MMHG | OXYGEN SATURATION: 97 % | SYSTOLIC BLOOD PRESSURE: 120 MMHG | WEIGHT: 180.6 LBS | RESPIRATION RATE: 16 BRPM | HEIGHT: 68 IN | BODY MASS INDEX: 27.37 KG/M2 | HEART RATE: 80 BPM

## 2017-06-16 VITALS
HEIGHT: 68 IN | HEART RATE: 77 BPM | DIASTOLIC BLOOD PRESSURE: 75 MMHG | WEIGHT: 180.44 LBS | RESPIRATION RATE: 18 BRPM | BODY MASS INDEX: 27.35 KG/M2 | SYSTOLIC BLOOD PRESSURE: 120 MMHG | OXYGEN SATURATION: 100 % | TEMPERATURE: 98.9 F

## 2017-06-16 DIAGNOSIS — D64.81 ANEMIA ASSOCIATED WITH CHEMOTHERAPY: ICD-10-CM

## 2017-06-16 DIAGNOSIS — T45.1X5A ANEMIA ASSOCIATED WITH CHEMOTHERAPY: ICD-10-CM

## 2017-06-16 DIAGNOSIS — C90.00 MULTIPLE MYELOMA NOT HAVING ACHIEVED REMISSION (HCC): Primary | ICD-10-CM

## 2017-06-16 PROCEDURE — 74011250636 HC RX REV CODE- 250/636: Performed by: INTERNAL MEDICINE

## 2017-06-16 PROCEDURE — 74011250636 HC RX REV CODE- 250/636

## 2017-06-16 PROCEDURE — 96401 CHEMO ANTI-NEOPL SQ/IM: CPT

## 2017-06-16 PROCEDURE — 74011000250 HC RX REV CODE- 250: Performed by: INTERNAL MEDICINE

## 2017-06-16 RX ADMIN — BORTEZOMIB 2.2 MG: 3.5 INJECTION, POWDER, LYOPHILIZED, FOR SOLUTION INTRAVENOUS; SUBCUTANEOUS at 11:37

## 2017-06-16 NOTE — PROGRESS NOTES
Hematology Follow Up        Patient: Ann-Marie Hernandez Sr. MRN: [id number]  SSN: [id number]    YOB: 1955  Age: 63 y.o. Sex: male          Diagnosis:     1. Multiple Myeloma, IgA Kappa   Durie Burbank stage IIIB      Treatment:     1. Systemic therapy with RVd - Cycle 1 Day 11      Subjective:      Ann-Marie Hernandez Sr. is a 63 y.o. male with a new diagnosis of IgA kappa myeloma. Bone marrow shows 100% aberrant plasma cells. He is fatigued. No bone pains or recent infection. He suffers with DM but it is diet controlled. He has received systemic anti-viral treatment for chronic Hep C with successful eradication of the virus. Mr. Lorean Crigler is receiving systemic therapy with RVd. He is fatigued but otherwise tolerating treatment well.         Review of Systems:    Constitutional: fatigue  Eyes: negative  Ears, Nose, Mouth, Throat, and Face: negative  Respiratory: negative  Cardiovascular: negative  Gastrointestinal: negative  Genitourinary:negative  Integument/Breast: negative  Hematologic/Lymphatic: negative  Musculoskeletal:negative  Neurological: negative      Past Medical History:   Diagnosis Date    Acid reflux     Arrhythmia     pvc's    Chronic pain     neck    Depression     Diabetes ([facility]     Femoral hernia 7/11/2012    Hepatitis C     Hypertension     Inguinal hernia 7/11/2012    Liver disease     hepatitis c    Other ill-defined conditions     Hx of PVCs since 2009    Prostatitis, acute     Psychiatric disorder     depression     Past Surgical History:   Procedure Laterality Date    COLONOSCOPY N/A 9/20/2016    COLONOSCOPY performed by Khloe Gee MD at [facility] ENDOSCOPY    HX APPENDECTOMY      HX CERVICAL FUSION  2008    x 1 as of 4/30/2014    HX HEENT      foreign body removed from right eye    HX HERNIA REPAIR  2012    [facility]      HX ORTHOPAEDIC      cervical fusion    HX OTHER SURGICAL  2000    liver bx - chronic hepatitis      Family History   Problem Relation Age of Onset    Arthritis-osteo Mother     Arthritis-osteo Father     Diabetes Father     Hypertension Maternal Grandmother     Diabetes Maternal Grandmother     Hypertension Maternal Grandfather     Diabetes Maternal Grandfather      Social History   Substance Use Topics    Smoking status: Former Smoker     Packs/day: 1.00     Years: 12.00     Types: Cigarettes     Quit date: 1/1/1989    Smokeless tobacco: Never Used      Comment: former cigarette smoker    Alcohol use No      Comment: former alcoholic-quit 1351      Prior to Admission medications    Medication Sig Start Date End Date Taking? Authorizing Provider   dexamethasone (DECADRON) 2 mg tablet Take 5 Tabs by mouth Daily (before breakfast). Take 10 mg daily on days you do not receive dexamethasone in the infusion center 6/9/17  Yes Michael Patel NP   insulin glargine (LANTUS,BASAGLAR) 100 unit/mL (3 mL) inpn 10 units daily at bedtime 6/9/17  Yes Kayla Long NP   amoxicillin-clavulanate (AUGMENTIN) 875-125 mg per tablet Take 1 Tab by mouth two (2) times a day for 10 days. 6/8/17 6/18/17 Yes Sergio Clifford MD   lenalidomide 25 mg cap Take  by mouth. Yes Historical Provider   insulin aspart protamine/insulin aspart (NOVOLOG MIX 70-30) 100 unit/mL (70-30) injection by SubCUTAneous route. Yes Historical Provider   valACYclovir (VALTREX) 1 gram tablet Take 1 Tab by mouth daily. 6/6/17  Yes Peyton Mcarthur MD   tamsulosin (FLOMAX) 0.4 mg capsule Take 1 Cap by mouth daily. 4/4/17  Yes Stella Green MD   propafenone (RYTHMOL) 150 mg tablet Take 50 mg by mouth every eight (8) hours. Yes Historical Provider   zolpidem CR (AMBIEN CR) 12.5 mg tablet Take 12.5 mg by mouth nightly as needed. Yes Historical Provider   TADALAFIL (CIALIS PO) Take 20 mg by mouth as needed. Yes Historical Provider   losartan (COZAAR) 50 mg tablet Take 100 mg by mouth daily.    Yes Historical Provider   amLODIPine (NORVASC) 10 mg tablet Take 10 mg by mouth daily. Yes Historical Provider   tapentadol (NUCYNTA) 100 mg tablet Take 100 mg by mouth three (3) times daily. Yes Goldy Diaz MD   aripiprazole (ABILIFY) 5 mg tablet Take 2.5 mg by mouth every morning. Yes Goldy Diaz MD   buPROPion XL (WELLBUTRIN XL) 150 mg tablet Take 1 Tab by mouth daily (with dinner). 4/25/10  Yes Fab Muñoz MD              Allergies   Allergen Reactions    Mercury (Bulk) Hives     Blisters             Objective:     Vitals:    17 1110   BP: 120/76   Pulse: 80   Resp: 16   Temp: 98.6 °F (37 °C)   TempSrc: Oral   SpO2: 97%   Weight: 180 lb 9.6 oz (81.9 kg)   Height: 5' 8.11\" (1.73 m)            Physical Exam:    GENERAL: alert, cooperative  EYE: negative  LYMPHATIC: Cervical, supraclavicular, and axillary nodes normal.   THROAT & NECK: normal and no erythema or exudates noted. LUNG: clear to auscultation bilaterally  HEART: regular rate and rhythm  ABDOMEN: soft, non-tender  EXTREMITIES: no cyanosis or edema  SKIN: Normal.  NEUROLOGIC: negative      Lab Results   Component Value Date/Time    WBC 4.9 2017 08:37 AM    HGB 7.8 2017 08:37 AM    HCT 24.6 2017 08:37 AM    PLATELET 89  08:37 AM    MCV 86.9 2017 08:37 AM       M-spike: 5.8, IgA kappa    Serum F       Assessment:     1. Multiple Myeloma, IgA Kappa   Durie Louin stage IIIB    Cytogenetics -pending  ECOG PS - 0   Intent of therapy: palliative    Receiving systemic therapy with RVd - Cycle 1 Day 11   (Dose reduced Lenalidomide to 15 mg and Bortezomib to 1.1 mg/m2, Dexamethasone 40 mg on Days 1 and 8 only)    Tolerating treatment   A detailed system by system evaluation of side effect was performed to assess chemotherapy related toxicity. Blood counts are acceptable. Results reviewed with the patient. Symptom management form reviewed with patient. 2. Anemia, myeloma related    > s/p 2 units PRBCs on       3.  Thrombocytopenia   chemotherapy and bone marrow infiltration    > Revlimid reduced to 15 mg daily  > Velcade reduced to 1.1 mg/m2      4. Hypercalcemia, myeloma related   Resolved    > Zometa      5. Diabetes    > Continue Novolog sliding scale  > Start Lantus SQ 10 units at bedtime  > Refer to Endocrine      Plan:       > Continue chemotherapy with RVd  > Continue Zometa  > Decrease Dexamethasone to 4 mg daily (on the days he is not taking 40 mg)  > Follow-up in 3 weeks        Signed by: Jose De Jesus Broussard MD                     June 17, 2017          CC. Mirela Salazar MD  CC.  Radha Mayer MD

## 2017-06-16 NOTE — PROGRESS NOTES
Pt arrived to [facility] ambulatory in no acute distress at 1040 for Velcade C1D11.  Assessment unremarkable except pt c/o generalized fatigue. Pt to office for visit. Visit Vitals    /75 (BP 1 Location: Left arm, BP Patient Position: At rest)    Pulse 77    Temp 98.9 °F (37.2 °C)    Resp 18    Ht 5' 8.11\" (1.73 m)    Wt 81.8 kg (180 lb 7 oz)    SpO2 100%    BMI 27.35 kg/m2       Labs obtained: Drawn on 6/13/17    The following medications administered:  Velcade 2.2 mg Sub-Q injection in the R side abdomen    Pt tolerated treatment well. No adverse reactions noted.  Pt discharged ambulatory in no acute distress at 1140, accompanied by self.   Next appointment 6/27/17

## 2017-06-16 NOTE — PROGRESS NOTES
64 y/o AAM here for f/u appt for multiple myeloma. Cycle 1 day 11 is today. He is on dex 10mg daily and 40mg once weekly days 1,8,15 of chemo. We reduced him to 15mg which he  Begun on Tuesday which is a week supply. Somewhat reduced level of attention span reported and mild anxiety. Energy level is \"so/so\"pt reports \"i am pain but it is a different kind of pain from my neck so i dont report it\" he will report if he has a different type of pain. Mr. Hammad Blake has a reminder for a \"due or due soon\" health maintenance. I have asked that he contact his primary care provider for follow-up on this health maintenance.

## 2017-06-22 RX ORDER — DEXAMETHASONE 4 MG/1
40 TABLET ORAL ONCE
Status: COMPLETED | OUTPATIENT
Start: 2017-06-27 | End: 2017-06-27

## 2017-06-27 ENCOUNTER — HOSPITAL ENCOUNTER (OUTPATIENT)
Dept: INFUSION THERAPY | Age: 62
Discharge: HOME | End: 2017-06-27
Payer: SELF-PAY

## 2017-06-27 VITALS
TEMPERATURE: 98.6 F | RESPIRATION RATE: 20 BRPM | HEART RATE: 73 BPM | OXYGEN SATURATION: 99 % | SYSTOLIC BLOOD PRESSURE: 112 MMHG | BODY MASS INDEX: 27.13 KG/M2 | WEIGHT: 179 LBS | HEIGHT: 68 IN | DIASTOLIC BLOOD PRESSURE: 74 MMHG

## 2017-06-27 LAB
ALBUMIN SERPL BCP-MCNC: 3.1 G/DL (ref 3.5–5)
ALBUMIN/GLOB SERPL: 0.5 {RATIO} (ref 1.1–2.2)
ALP SERPL-CCNC: 95 U/L (ref 45–117)
ALT SERPL-CCNC: 39 U/L (ref 12–78)
ANION GAP BLD CALC-SCNC: 5 MMOL/L (ref 5–15)
AST SERPL W P-5'-P-CCNC: 15 U/L (ref 15–37)
BASO+EOS+MONOS # BLD AUTO: 0.6 K/UL (ref 0.2–1.2)
BASO+EOS+MONOS # BLD AUTO: 16 % (ref 3.2–16.9)
BILIRUB DIRECT SERPL-MCNC: <0.1 MG/DL (ref 0–0.2)
BILIRUB SERPL-MCNC: 0.2 MG/DL (ref 0.2–1)
BUN SERPL-MCNC: 22 MG/DL (ref 6–20)
BUN/CREAT SERPL: 18 (ref 12–20)
CALCIUM SERPL-MCNC: 8.2 MG/DL (ref 8.5–10.1)
CHLORIDE SERPL-SCNC: 105 MMOL/L (ref 97–108)
CO2 SERPL-SCNC: 27 MMOL/L (ref 21–32)
CREAT SERPL-MCNC: 1.25 MG/DL (ref 0.7–1.3)
DIFFERENTIAL METHOD BLD: ABNORMAL
ERYTHROCYTE [DISTWIDTH] IN BLOOD BY AUTOMATED COUNT: 21.6 % (ref 11.8–15.8)
GLOBULIN SER CALC-MCNC: 6.4 G/DL (ref 2–4)
GLUCOSE SERPL-MCNC: 144 MG/DL (ref 65–100)
HCT VFR BLD AUTO: 25 % (ref 36.6–50.3)
HGB BLD-MCNC: 8.1 G/DL (ref 12.1–17)
IGA SERPL-MCNC: 2880 MG/DL (ref 70–400)
IGG SERPL-MCNC: 202 MG/DL (ref 700–1600)
IGM SERPL-MCNC: <21 MG/DL (ref 40–230)
LYMPHOCYTES # BLD AUTO: 30 % (ref 12–49)
LYMPHOCYTES # BLD: 1.1 K/UL (ref 0.8–3.5)
MCH RBC QN AUTO: 29.5 PG (ref 26–34)
MCHC RBC AUTO-ENTMCNC: 32.4 G/DL (ref 30–36.5)
MCV RBC AUTO: 90.9 FL (ref 80–99)
NEUTS SEG # BLD: 2.1 K/UL (ref 1.8–8)
NEUTS SEG NFR BLD AUTO: 54 % (ref 32–75)
PLATELET # BLD AUTO: 112 K/UL (ref 150–400)
POTASSIUM SERPL-SCNC: 4.1 MMOL/L (ref 3.5–5.1)
PROT SERPL-MCNC: 9.5 G/DL (ref 6.4–8.2)
RBC # BLD AUTO: 2.75 M/UL (ref 4.1–5.7)
SODIUM SERPL-SCNC: 137 MMOL/L (ref 136–145)
WBC # BLD AUTO: 3.8 K/UL (ref 4.1–11.1)

## 2017-06-27 PROCEDURE — 74011250636 HC RX REV CODE- 250/636

## 2017-06-27 PROCEDURE — 80076 HEPATIC FUNCTION PANEL: CPT | Performed by: INTERNAL MEDICINE

## 2017-06-27 PROCEDURE — 36415 COLL VENOUS BLD VENIPUNCTURE: CPT | Performed by: INTERNAL MEDICINE

## 2017-06-27 PROCEDURE — 74011250636 HC RX REV CODE- 250/636: Performed by: INTERNAL MEDICINE

## 2017-06-27 PROCEDURE — 74011000258 HC RX REV CODE- 258: Performed by: INTERNAL MEDICINE

## 2017-06-27 PROCEDURE — 96375 TX/PRO/DX INJ NEW DRUG ADDON: CPT

## 2017-06-27 PROCEDURE — 83883 ASSAY NEPHELOMETRY NOT SPEC: CPT | Performed by: INTERNAL MEDICINE

## 2017-06-27 PROCEDURE — 85025 COMPLETE CBC W/AUTO DIFF WBC: CPT | Performed by: INTERNAL MEDICINE

## 2017-06-27 PROCEDURE — 84165 PROTEIN E-PHORESIS SERUM: CPT | Performed by: INTERNAL MEDICINE

## 2017-06-27 PROCEDURE — 74011000250 HC RX REV CODE- 250: Performed by: INTERNAL MEDICINE

## 2017-06-27 PROCEDURE — 80048 BASIC METABOLIC PNL TOTAL CA: CPT | Performed by: INTERNAL MEDICINE

## 2017-06-27 PROCEDURE — 96374 THER/PROPH/DIAG INJ IV PUSH: CPT

## 2017-06-27 PROCEDURE — 82784 ASSAY IGA/IGD/IGG/IGM EACH: CPT | Performed by: INTERNAL MEDICINE

## 2017-06-27 PROCEDURE — 96401 CHEMO ANTI-NEOPL SQ/IM: CPT

## 2017-06-27 PROCEDURE — A9270 NON-COVERED ITEM OR SERVICE: HCPCS | Performed by: INTERNAL MEDICINE

## 2017-06-27 RX ORDER — SODIUM CHLORIDE 0.9 % (FLUSH) 0.9 %
10-40 SYRINGE (ML) INJECTION AS NEEDED
Status: DISCONTINUED | OUTPATIENT
Start: 2017-06-27 | End: 2017-07-01 | Stop reason: HOSPADM

## 2017-06-27 RX ADMIN — DEXAMETHASONE 40 MG: 4 TABLET ORAL at 11:22

## 2017-06-27 RX ADMIN — Medication 10 ML: at 11:24

## 2017-06-27 RX ADMIN — BORTEZOMIB 2.2 MG: 3.5 INJECTION, POWDER, LYOPHILIZED, FOR SOLUTION INTRAVENOUS; SUBCUTANEOUS at 11:55

## 2017-06-27 RX ADMIN — SODIUM CHLORIDE 4 MG: 900 INJECTION, SOLUTION INTRAVENOUS at 11:55

## 2017-06-27 NOTE — PROGRESS NOTES
0950 Pt arrived at [facility] and in no distress for C2D1 VRD. Assessment completed, pt admits to fatigue as only problem. IV started x 2 attempts left forearm, labs drawn. Visit Vitals    /73 (BP 1 Location: Left arm, BP Patient Position: Sitting)    Pulse 79    Temp 98.6 °F (37 °C)    Resp 18    Ht 5' 8.11\" (1.73 m)    Wt 81.2 kg (179 lb)    SpO2 99%    BMI 27.13 kg/m2       Medications received:  zometa 4 mg IV over 15 minutes  velcade 2.2 mg SQ Left abdomen    1215 Tolerated treatment well, no adverse reaction noted. D/Cd from [facility] and in no distress. Next appt Friday    Recent Results (from the past 8 hour(s))   CBC WITH 3 PART DIFF    Collection Time: 06/27/17 10:02 AM   Result Value Ref Range    WBC 3.8 (L) 4.1 - 11.1 K/uL    RBC 2.75 (L) 4.10 - 5.70 M/uL    HGB 8.1 (L) 12.1 - 17.0 g/dL    HCT 25.0 (L) 36.6 - 50.3 %    MCV 90.9 80.0 - 99.0 FL    MCH 29.5 26.0 - 34.0 PG    MCHC 32.4 30.0 - 36.5 g/dL    RDW 21.6 (H) 11.8 - 15.8 %    PLATELET 459 (L) 479 - 400 K/uL    NEUTROPHILS 54 32 - 75 %    MIXED CELLS 16 3.2 - 16.9 %    LYMPHOCYTES 30 12 - 49 %    ABS. NEUTROPHILS 2.1 1.8 - 8.0 K/UL    ABS. MIXED CELLS 0.6 0.2 - 1.2 K/uL    ABS.  LYMPHOCYTES 1.1 0.8 - 3.5 K/UL    DF AUTOMATED     METABOLIC PANEL, BASIC    Collection Time: 06/27/17 10:02 AM   Result Value Ref Range    Sodium 137 136 - 145 mmol/L    Potassium 4.1 3.5 - 5.1 mmol/L    Chloride 105 97 - 108 mmol/L    CO2 27 21 - 32 mmol/L    Anion gap 5 5 - 15 mmol/L    Glucose 144 (H) 65 - 100 mg/dL    BUN 22 (H) 6 - 20 MG/DL    Creatinine 1.25 0.70 - 1.30 MG/DL    BUN/Creatinine ratio 18 12 - 20      GFR est AA >60 >60 ml/min/1.73m2    GFR est non-AA 59 (L) >60 ml/min/1.73m2    Calcium 8.2 (L) 8.5 - 10.1 MG/DL   HEPATIC FUNCTION PANEL    Collection Time: 06/27/17 10:02 AM   Result Value Ref Range    Protein, total 9.5 (H) 6.4 - 8.2 g/dL    Albumin 3.1 (L) 3.5 - 5.0 g/dL    Globulin 6.4 (H) 2.0 - 4.0 g/dL    A-G Ratio 0.5 (L) 1.1 - 2.2      Bilirubin, total 0.2 0.2 - 1.0 MG/DL    Bilirubin, direct <0.1 0.0 - 0.2 MG/DL    Alk.  phosphatase 95 45 - 117 U/L    AST (SGOT) 15 15 - 37 U/L    ALT (SGPT) 39 12 - 78 U/L

## 2017-06-28 LAB
ALBUMIN SERPL ELPH-MCNC: 3.7 G/DL (ref 2.9–4.4)
ALBUMIN/GLOB SERPL: 0.7 {RATIO} (ref 0.7–1.7)
ALPHA1 GLOB SERPL ELPH-MCNC: 0.2 G/DL (ref 0–0.4)
ALPHA2 GLOB SERPL ELPH-MCNC: 0.9 G/DL (ref 0.4–1)
B-GLOBULIN SERPL ELPH-MCNC: 0.9 G/DL (ref 0.7–1.3)
GAMMA GLOB SERPL ELPH-MCNC: 3.4 G/DL (ref 0.4–1.8)
GLOBULIN SER CALC-MCNC: 5.4 G/DL (ref 2.2–3.9)
IGA SERPL-MCNC: 3095 MG/DL (ref 90–386)
IGG SERPL-MCNC: 217 MG/DL (ref 700–1600)
IGM SERPL-MCNC: <5 MG/DL (ref 20–172)
KAPPA LC FREE SER-MCNC: 1141.6 MG/L (ref 3.3–19.4)
KAPPA LC FREE/LAMBDA FREE SER: 496.35 {RATIO} (ref 0.26–1.65)
LAMBDA LC FREE SERPL-MCNC: 2.3 MG/L (ref 5.7–26.3)
M PROTEIN SERPL ELPH-MCNC: 2.9 G/DL
PROT PATTERN SERPL IFE-IMP: ABNORMAL
PROT SERPL-MCNC: 9.1 G/DL (ref 6–8.5)

## 2017-06-30 ENCOUNTER — HOSPITAL ENCOUNTER (OUTPATIENT)
Dept: INFUSION THERAPY | Age: 62
Discharge: HOME | End: 2017-06-30
Payer: SELF-PAY

## 2017-06-30 ENCOUNTER — OFFICE VISIT (OUTPATIENT)
Dept: ONCOLOGY | Age: 62
End: 2017-06-30

## 2017-06-30 VITALS
BODY MASS INDEX: 27.34 KG/M2 | WEIGHT: 180.4 LBS | TEMPERATURE: 98.8 F | SYSTOLIC BLOOD PRESSURE: 113 MMHG | HEIGHT: 68 IN | RESPIRATION RATE: 18 BRPM | HEART RATE: 85 BPM | OXYGEN SATURATION: 97 % | DIASTOLIC BLOOD PRESSURE: 72 MMHG

## 2017-06-30 VITALS
DIASTOLIC BLOOD PRESSURE: 64 MMHG | OXYGEN SATURATION: 100 % | RESPIRATION RATE: 18 BRPM | TEMPERATURE: 99.4 F | SYSTOLIC BLOOD PRESSURE: 111 MMHG | HEART RATE: 88 BPM

## 2017-06-30 DIAGNOSIS — C90.00 MULTIPLE MYELOMA NOT HAVING ACHIEVED REMISSION (HCC): Primary | ICD-10-CM

## 2017-06-30 DIAGNOSIS — N28.9 RENAL INSUFFICIENCY: ICD-10-CM

## 2017-06-30 DIAGNOSIS — E83.52 HYPERCALCEMIA OF MALIGNANCY: ICD-10-CM

## 2017-06-30 DIAGNOSIS — E83.52 HYPERCALCEMIA: ICD-10-CM

## 2017-06-30 PROCEDURE — 74011250636 HC RX REV CODE- 250/636

## 2017-06-30 PROCEDURE — 74011000250 HC RX REV CODE- 250: Performed by: INTERNAL MEDICINE

## 2017-06-30 PROCEDURE — 96401 CHEMO ANTI-NEOPL SQ/IM: CPT

## 2017-06-30 PROCEDURE — 74011250636 HC RX REV CODE- 250/636: Performed by: INTERNAL MEDICINE

## 2017-06-30 RX ORDER — DEXAMETHASONE 4 MG/1
40 TABLET ORAL ONCE
Status: ACTIVE | OUTPATIENT
Start: 2017-07-03 | End: 2017-07-03

## 2017-06-30 RX ADMIN — BORTEZOMIB 2.2 MG: 3.5 INJECTION, POWDER, LYOPHILIZED, FOR SOLUTION INTRAVENOUS; SUBCUTANEOUS at 09:38

## 2017-06-30 NOTE — PROGRESS NOTES
Natalee Del Valle. is a 63 y.o. male here today for follow up for   had concerns including Follow-up and Multiple Myeloma. C2,D4 ofRVd, Zometa, c/o decreased libido and moderate erectile impotence,denies pain and sob. Mr. Dulce Beckwith has a reminder for a \"due or due soon\" health maintenance. I have asked that he contact his primary care provider for follow-up on this health maintenance.

## 2017-06-30 NOTE — PROGRESS NOTES
0850 Pt arrived at [facility] ambulatory and in no distress for C2D4 velcade. Assessment completed, no new complaints voiced. Visit Vitals    /64 (BP 1 Location: Left arm, BP Patient Position: Sitting)    Pulse 88    Temp 99.4 °F (37.4 °C)    Resp 18    SpO2 100%       Medications received:  Velcade 2.2 mg SQ right abdomen    0940 Tolerated treatment well, no adverse reaction noted. D/Cd from [facility] ambulatory and in no distress.   Next appt Monday 7/3/17

## 2017-06-30 NOTE — PROGRESS NOTES
Hematology Follow Up        Patient: Quiana Shen Sr. MRN: [id number]  SSN: [id number]    YOB: 1955  Age: 63 y.o. Sex: male          Diagnosis:     1. Multiple Myeloma, IgA Kappa   [place] stage IIIB      Treatment:       1. Systemic therapy with RVd - Cycle 2 Day 4      Subjective:      Quiana Shen Sr. is a 63 y.o. male with a new diagnosis of IgA kappa myeloma. Bone marrow shows 100% aberrant plasma cells. He is fatigued. No bone pains or recent infection. He suffers with DM but it is diet controlled. He has received systemic anti-viral treatment for chronic Hep C with successful eradication of the virus. Mr. Julio De Leon is receiving systemic therapy with RVd. He has achieved a partial response after 1 cycle of treatment. He is tolerating treatment well.         Review of Systems:    Constitutional: fatigue  Eyes: negative  Ears, Nose, Mouth, Throat, and Face: negative  Respiratory: negative  Cardiovascular: negative  Gastrointestinal: negative  Genitourinary:negative  Integument/Breast: negative  Hematologic/Lymphatic: negative  Musculoskeletal:negative  Neurological: negative      Past Medical History:   Diagnosis Date    Acid reflux     Arrhythmia     pvc's    Chronic pain     neck    Depression     Diabetes ([facility] [address]     Femoral hernia 7/11/2012    Hepatitis C     Hypertension     Inguinal hernia 7/11/2012    Liver disease     hepatitis c    Other ill-defined conditions     Hx of PVCs since 2009    Prostatitis, acute     Psychiatric disorder     depression     Past Surgical History:   Procedure Laterality Date    COLONOSCOPY N/A 9/20/2016    COLONOSCOPY performed by Jose Luque MD at \[facility]\"" ENDOSCOPY    HX APPENDECTOMY      HX CERVICAL FUSION  2008    x 1 as of 4/30/2014    HX HEENT      foreign body removed from right eye    HX HERNIA REPAIR  2012    [facility]      HX ORTHOPAEDIC      cervical fusion    HX OTHER SURGICAL  2000    liver bx - chronic hepatitis      Family History   Problem Relation Age of Onset    Arthritis-osteo Mother     Arthritis-osteo Father     Diabetes Father     Hypertension Maternal Grandmother     Diabetes Maternal Grandmother     Hypertension Maternal Grandfather     Diabetes Maternal Grandfather      Social History   Substance Use Topics    Smoking status: Former Smoker     Packs/day: 1.00     Years: 12.00     Types: Cigarettes     Quit date: 1/1/1989    Smokeless tobacco: Never Used      Comment: former cigarette smoker    Alcohol use No      Comment: former alcoholic-quit 2252      Prior to Admission medications    Medication Sig Start Date End Date Taking? Authorizing Provider   insulin glargine (LANTUS,BASAGLAR) 100 unit/mL (3 mL) inpn 10 units daily at bedtime 6/9/17  Yes Media Jose Elias Kapinos, NP   lenalidomide 25 mg cap Take  by mouth. Yes Historical Provider   insulin aspart protamine/insulin aspart (NOVOLOG MIX 70-30) 100 unit/mL (70-30) injection by SubCUTAneous route. Yes Historical Provider   valACYclovir (VALTREX) 1 gram tablet Take 1 Tab by mouth daily. 6/6/17  Yes Patric Cohen MD   tamsulosin (FLOMAX) 0.4 mg capsule Take 1 Cap by mouth daily. 4/4/17  Yes Fatou Rincon MD   propafenone (RYTHMOL) 150 mg tablet Take 50 mg by mouth every eight (8) hours. Yes Historical Provider   zolpidem CR (AMBIEN CR) 12.5 mg tablet Take 12.5 mg by mouth nightly as needed. Yes Historical Provider   TADALAFIL (CIALIS PO) Take 20 mg by mouth as needed. Yes Historical Provider   losartan (COZAAR) 50 mg tablet Take 100 mg by mouth daily. Yes Historical Provider   amLODIPine (NORVASC) 10 mg tablet Take 10 mg by mouth daily. Yes Historical Provider   tapentadol (NUCYNTA) 100 mg tablet Take 100 mg by mouth three (3) times daily. Yes Goldy Diaz MD   aripiprazole (ABILIFY) 5 mg tablet Take 2.5 mg by mouth every morning.    Yes Goldy Diaz MD   buPROPion XL (WELLBUTRIN XL) 150 mg tablet Take 1 Tab by mouth daily (with dinner). 4/25/10  Yes Vinny Spence MD              Allergies   Allergen Reactions    Mercury (Bulk) Hives     Blisters             Objective:     Vitals:    17 0954   BP: 113/72   Pulse: 85   Resp: 18   Temp: 98.8 °F (37.1 °C)   SpO2: 97%   Weight: 180 lb 6.4 oz (81.8 kg)   Height: 5' 8.11\" (1.73 m)            Physical Exam:    GENERAL: alert, cooperative  EYE: negative  LYMPHATIC: Cervical, supraclavicular, and axillary nodes normal.   THROAT & NECK: normal and no erythema or exudates noted. LUNG: clear to auscultation bilaterally  HEART: regular rate and rhythm  ABDOMEN: soft, non-tender  EXTREMITIES: no cyanosis or edema  SKIN: Normal.  NEUROLOGIC: negative      Lab Results   Component Value Date/Time    WBC 3.8 2017 10:02 AM    HGB 8.1 2017 10:02 AM    HCT 25.0 2017 10:02 AM    PLATELET 159 9646 10:02 AM    MCV 90.9 2017 10:02 AM       M-spike: 5.8 => 2.9, IgA kappa    Serum F => 496       Assessment:     1. Multiple Myeloma, IgA Kappa   Durie [place] stage IIIB    Cytogenetics -pending  ECOG PS - 0   Intent of therapy: palliative    Receiving systemic therapy with RVd - Cycle 2 Day 4    Tolerating treatment   No side effects  A detailed system by system evaluation of side effect was performed to assess chemotherapy related toxicity. Blood counts are acceptable. Results reviewed with the patient. 50% reduction in M-protein level - Has achieved Partial Response with one cycle of therapy  Symptom management form reviewed with patient. 2. Anemia, myeloma related - improved    > s/p 2 units PRBCs on       3. Thrombocytopenia - improved   From myeloma    > On reduced dose of Revlimid - will increase to 25 mg  > On reduced dose of Velcade - will increase to 1.3 mg/m2      4. Hypercalcemia, myeloma related   Resolved    > Zometa      5.  Diabetes    > Continue Novolog sliding scale  > Continue Lantus SQ 10 units at bedtime  > Refer to Endocrine      Plan: > Continue chemotherapy with RVd - increase Revlimid to 25 mg and Velcade to 1.3 mg/m2  > Continue Zometa  > Continue Valacyclovir prophylaxis   > Follow-up in 3 weeks        Signed by: Luc Vance MD                     June 30, 2017          CCBear Gastelum MD

## 2017-07-03 ENCOUNTER — HOSPITAL ENCOUNTER (OUTPATIENT)
Dept: INFUSION THERAPY | Age: 62
Discharge: HOME | End: 2017-07-03
Payer: COMMERCIAL

## 2017-07-03 VITALS
SYSTOLIC BLOOD PRESSURE: 111 MMHG | RESPIRATION RATE: 20 BRPM | TEMPERATURE: 98.4 F | DIASTOLIC BLOOD PRESSURE: 73 MMHG | OXYGEN SATURATION: 99 % | HEART RATE: 90 BPM

## 2017-07-03 LAB
BASOPHILS # BLD AUTO: 0 K/UL (ref 0–0.1)
BASOPHILS # BLD: 1 % (ref 0–1)
DIFFERENTIAL METHOD BLD: ABNORMAL
EOSINOPHIL # BLD: 0.1 K/UL (ref 0–0.4)
EOSINOPHIL NFR BLD: 7 % (ref 0–7)
ERYTHROCYTE [DISTWIDTH] IN BLOOD BY AUTOMATED COUNT: 21.2 % (ref 11.5–14.5)
HCT VFR BLD AUTO: 27.7 % (ref 36.6–50.3)
HGB BLD-MCNC: 8.7 G/DL (ref 12.1–17)
LYMPHOCYTES # BLD AUTO: 33 % (ref 12–49)
LYMPHOCYTES # BLD: 0.6 K/UL (ref 0.8–3.5)
MCH RBC QN AUTO: 28.7 PG (ref 26–34)
MCHC RBC AUTO-ENTMCNC: 31.4 G/DL (ref 30–36.5)
MCV RBC AUTO: 91.4 FL (ref 80–99)
MONOCYTES # BLD: 0.2 K/UL (ref 0–1)
MONOCYTES NFR BLD AUTO: 13 % (ref 5–13)
NEUTS SEG # BLD: 0.9 K/UL (ref 1.8–8)
NEUTS SEG NFR BLD AUTO: 46 % (ref 32–75)
PLATELET # BLD AUTO: 74 K/UL (ref 150–400)
PLATELET COMMENTS,PCOM: ABNORMAL
RBC # BLD AUTO: 3.03 M/UL (ref 4.1–5.7)
RBC MORPH BLD: ABNORMAL
WBC # BLD AUTO: 1.8 K/UL (ref 4.1–11.1)

## 2017-07-03 PROCEDURE — 85025 COMPLETE CBC W/AUTO DIFF WBC: CPT | Performed by: INTERNAL MEDICINE

## 2017-07-03 PROCEDURE — 36415 COLL VENOUS BLD VENIPUNCTURE: CPT | Performed by: INTERNAL MEDICINE

## 2017-07-03 NOTE — PROGRESS NOTES
0950 Pt arrived at [facility] ambulatory and in no distress for C2D8 VRD. Treatment HELD. Assessment completed, no new complaints voiced. CBC drawn via left arm. CBC resulted, ANC 0.9, plt 74. Kirti Jimenez NP notified by Rupal Bhandari PharmD. Orders to hold revlimid, hold Velcade/ dexamethasone until Thursday, recheck CBC. Pt instructed above. Questions answered, pt reassured. Visit Vitals    /73 (BP 1 Location: Left arm, BP Patient Position: Sitting)    Pulse 90    Temp 98.4 °F (36.9 °C)    Resp 20    SpO2 99%       1100 Tolerated treatment well, no adverse reaction noted. D/Cd from [facility] ambulatory and in no distress. Next appt Thursday    Recent Results (from the past 8 hour(s))   CBC WITH AUTOMATED DIFF    Collection Time: 07/03/17  9:52 AM   Result Value Ref Range    WBC 1.8 (L) 4.1 - 11.1 K/uL    RBC 3.03 (L) 4.10 - 5.70 M/uL    HGB 8.7 (L) 12.1 - 17.0 g/dL    HCT 27.7 (L) 36.6 - 50.3 %    MCV 91.4 80.0 - 99.0 FL    MCH 28.7 26.0 - 34.0 PG    MCHC 31.4 30.0 - 36.5 g/dL    RDW 21.2 (H) 11.5 - 14.5 %    PLATELET 74 (L) 277 - 400 K/uL    NEUTROPHILS 46 32 - 75 %    LYMPHOCYTES 33 12 - 49 %    MONOCYTES 13 5 - 13 %    EOSINOPHILS 7 0 - 7 %    BASOPHILS 1 0 - 1 %    ABS. NEUTROPHILS 0.9 (L) 1.8 - 8.0 K/UL    ABS. LYMPHOCYTES 0.6 (L) 0.8 - 3.5 K/UL    ABS. MONOCYTES 0.2 0.0 - 1.0 K/UL    ABS. EOSINOPHILS 0.1 0.0 - 0.4 K/UL    ABS.  BASOPHILS 0.0 0.0 - 0.1 K/UL    PLATELET COMMENTS LARGE PLATELETS      RBC COMMENTS ROULEAUX  PRESENT        RBC COMMENTS ANISOCYTOSIS  2+        RBC COMMENTS HYPOCHROMIA  1+        RBC COMMENTS POLYCHROMASIA  PRESENT        DF SMEAR SCANNED

## 2017-07-06 ENCOUNTER — HOSPITAL ENCOUNTER (OUTPATIENT)
Dept: INFUSION THERAPY | Age: 62
Discharge: HOME | End: 2017-07-06
Payer: COMMERCIAL

## 2017-07-06 VITALS
HEART RATE: 85 BPM | SYSTOLIC BLOOD PRESSURE: 106 MMHG | BODY MASS INDEX: 28.24 KG/M2 | OXYGEN SATURATION: 100 % | DIASTOLIC BLOOD PRESSURE: 70 MMHG | TEMPERATURE: 98.9 F | WEIGHT: 186.31 LBS | RESPIRATION RATE: 18 BRPM | HEIGHT: 68 IN

## 2017-07-06 LAB
BASO+EOS+MONOS # BLD AUTO: 0.3 K/UL (ref 0.2–1.2)
BASO+EOS+MONOS # BLD AUTO: 17 % (ref 3.2–16.9)
DIFFERENTIAL METHOD BLD: ABNORMAL
ERYTHROCYTE [DISTWIDTH] IN BLOOD BY AUTOMATED COUNT: 21 % (ref 11.8–15.8)
HCT VFR BLD AUTO: 26.8 % (ref 36.6–50.3)
HGB BLD-MCNC: 8.7 G/DL (ref 12.1–17)
LYMPHOCYTES # BLD AUTO: 21 % (ref 12–49)
LYMPHOCYTES # BLD: 0.4 K/UL (ref 0.8–3.5)
MCH RBC QN AUTO: 29.8 PG (ref 26–34)
MCHC RBC AUTO-ENTMCNC: 32.5 G/DL (ref 30–36.5)
MCV RBC AUTO: 91.8 FL (ref 80–99)
NEUTS SEG # BLD: 1.2 K/UL (ref 1.8–8)
NEUTS SEG NFR BLD AUTO: 63 % (ref 32–75)
PLATELET # BLD AUTO: 100 K/UL (ref 150–400)
RBC # BLD AUTO: 2.92 M/UL (ref 4.1–5.7)
WBC # BLD AUTO: 1.9 K/UL (ref 4.1–11.1)

## 2017-07-06 PROCEDURE — 74011250636 HC RX REV CODE- 250/636: Performed by: INTERNAL MEDICINE

## 2017-07-06 PROCEDURE — 74011250636 HC RX REV CODE- 250/636

## 2017-07-06 PROCEDURE — 36415 COLL VENOUS BLD VENIPUNCTURE: CPT | Performed by: INTERNAL MEDICINE

## 2017-07-06 PROCEDURE — 85025 COMPLETE CBC W/AUTO DIFF WBC: CPT | Performed by: INTERNAL MEDICINE

## 2017-07-06 PROCEDURE — 74011000250 HC RX REV CODE- 250: Performed by: INTERNAL MEDICINE

## 2017-07-06 PROCEDURE — 96401 CHEMO ANTI-NEOPL SQ/IM: CPT

## 2017-07-06 RX ADMIN — BORTEZOMIB 2.2 MG: 3.5 INJECTION, POWDER, LYOPHILIZED, FOR SOLUTION INTRAVENOUS; SUBCUTANEOUS at 11:58

## 2017-07-06 NOTE — PROGRESS NOTES
0997 Pt arrived at [facility] ambulatory and in no distress for VRD C2D11. Assessment unremarkable except pt has a red rash to his trunk, bilateral legs and slightly on his arms. Pt reports he has not been taking his lenalidomide Rico Salazar NP notified of [address] 1.2 as well as of the rash. Per Rico Salazar NP have the patient stop lenalidomide. Also pt is to come to Dr. Maria Luisa Atkinson office 7//7/17 @ 0815 to follow up about his rash. Pt voiced understanding. VS:   Visit Vitals    /70 (BP 1 Location: Left arm, BP Patient Position: At rest)    Pulse 85    Temp 98.9 °F (37.2 °C)    Resp 18    Ht 5' 8.11\" (1.73 m)    Wt 84.5 kg (186 lb 5 oz)    SpO2 100%    BMI 28.24 kg/m2       Labs:   Recent Results (from the past 12 hour(s))   CBC WITH 3 PART DIFF    Collection Time: 07/06/17 10:19 AM   Result Value Ref Range    WBC 1.9 (L) 4.1 - 11.1 K/uL    RBC 2.92 (L) 4.10 - 5.70 M/uL    HGB 8.7 (L) 12.1 - 17.0 g/dL    HCT 26.8 (L) 36.6 - 50.3 %    MCV 91.8 80.0 - 99.0 FL    MCH 29.8 26.0 - 34.0 PG    MCHC 32.5 30.0 - 36.5 g/dL    RDW 21.0 (H) 11.8 - 15.8 %    PLATELET 762 (L) 254 - 400 K/uL    NEUTROPHILS 63 32 - 75 %    MIXED CELLS 17 (H) 3.2 - 16.9 %    LYMPHOCYTES 21 12 - 49 %    ABS. NEUTROPHILS 1.2 (L) 1.8 - 8.0 K/UL    ABS. MIXED CELLS 0.3 0.2 - 1.2 K/uL    ABS. LYMPHOCYTES 0.4 (L) 0.8 - 3.5 K/UL    DF AUTOMATED           Medications received:  Velcade 2.2 mg Sub-Q injection in Left abdomen        1205 Tolerated treatment well, no adverse reaction noted. D/Cd from [facility] ambulatory and in no distress accompanied by self.   Next appt 7/18/17

## 2017-07-07 ENCOUNTER — APPOINTMENT (OUTPATIENT)
Dept: INFUSION THERAPY | Age: 62
End: 2017-07-07
Payer: COMMERCIAL

## 2017-07-10 ENCOUNTER — OFFICE VISIT (OUTPATIENT)
Dept: ENDOCRINOLOGY | Age: 62
End: 2017-07-10

## 2017-07-10 VITALS
SYSTOLIC BLOOD PRESSURE: 104 MMHG | WEIGHT: 184.5 LBS | HEART RATE: 97 BPM | BODY MASS INDEX: 27.96 KG/M2 | HEIGHT: 68 IN | DIASTOLIC BLOOD PRESSURE: 70 MMHG

## 2017-07-10 DIAGNOSIS — R73.9 STEROID-INDUCED HYPERGLYCEMIA: ICD-10-CM

## 2017-07-10 DIAGNOSIS — T38.0X5A STEROID-INDUCED HYPERGLYCEMIA: ICD-10-CM

## 2017-07-10 DIAGNOSIS — E11.9 TYPE 2 DIABETES MELLITUS WITHOUT COMPLICATION, WITH LONG-TERM CURRENT USE OF INSULIN (HCC): Primary | ICD-10-CM

## 2017-07-10 DIAGNOSIS — Z79.4 TYPE 2 DIABETES MELLITUS WITHOUT COMPLICATION, WITH LONG-TERM CURRENT USE OF INSULIN (HCC): Primary | ICD-10-CM

## 2017-07-10 DIAGNOSIS — I10 ESSENTIAL HYPERTENSION WITH GOAL BLOOD PRESSURE LESS THAN 130/80: ICD-10-CM

## 2017-07-10 RX ORDER — BLOOD SUGAR DIAGNOSTIC
STRIP MISCELLANEOUS
Refills: 4 | COMMUNITY
Start: 2017-07-03 | End: 2018-11-01

## 2017-07-10 RX ORDER — INSULIN LISPRO 100 [IU]/ML
4-6 INJECTION, SOLUTION INTRAVENOUS; SUBCUTANEOUS
COMMUNITY
End: 2019-07-09

## 2017-07-10 RX ORDER — PEN NEEDLE, DIABETIC 31 GX5/16"
NEEDLE, DISPOSABLE MISCELLANEOUS
Refills: 2 | COMMUNITY
Start: 2017-06-07 | End: 2018-11-01

## 2017-07-10 RX ORDER — LENALIDOMIDE 15 MG/1
10 CAPSULE ORAL
COMMUNITY
Start: 2017-06-12 | End: 2017-08-27

## 2017-07-10 RX ORDER — LOSARTAN POTASSIUM 100 MG/1
TABLET ORAL
Refills: 4 | COMMUNITY
Start: 2017-07-03 | End: 2018-11-01

## 2017-07-10 NOTE — MR AVS SNAPSHOT
Visit Information Date & Time Provider Department Dept. Phone Encounter #  
 7/10/2017  9:50 AM Karina Salazar, [address] [facility] [phone] [id number] Follow-up Instructions Return in about 1 month (around 8/10/2017). Your Appointments 7/14/2017 11:45 AM  
Any with Cesar Ruiz, [place] [address] Oncology at [facility]) Appt Note: onc 2 wk f/u  
 [address] [address]  
[address] [place] [address] [address] [facility] [address] Rajan Teran Upcoming Health Maintenance Date Due  
 FOOT EXAM Q1 10/9/1967 MICROALBUMIN Q1 10/9/1967 EYE EXAM RETINAL OR DILATED Q1 10/9/1967 DTaP/Tdap/Td series (1 - Tdap) 10/9/1978 FOBT Q 1 YEAR AGE 50-75 10/9/2007 HEMOGLOBIN A1C Q6M 6/24/2011 Pneumococcal 19-64 Highest Risk (2 of 3 - PCV13) 7/14/2012 LIPID PANEL Q1 7/14/2012 INFLUENZA AGE 9 TO ADULT 8/1/2017 Allergies as of 7/10/2017  Review Complete On: 7/10/2017 By: Karina Salazar MD  
  
 Severity Noted Reaction Type Reactions Mercury (Bulk) Low 04/21/2010   Topical Hives Blisters Current Immunizations  Reviewed on 7/6/2017 Name Date Influenza Vaccine Split 11/14/2010  6:57 PM  
 Pneumococcal Vaccine (Unspecified Type) 7/14/2011  6:30 PM  
  
 Not reviewed this visit You Were Diagnosed With   
  
 Codes Comments Type 2 diabetes mellitus without complication, with long-term current use of insulin (HCC)    -  Primary ICD-10-CM: E11.9, Z79.4 ICD-9-CM: 250.00, V58.67 Essential hypertension with goal blood pressure less than 130/80     ICD-10-CM: I10 
ICD-9-CM: 401.9 Steroid-induced hyperglycemia     ICD-10-CM: R73.9 ICD-9-CM: 790.29 Vitals BP Pulse Height(growth percentile) Weight(growth percentile) BMI Smoking Status  104/70 (BP 1 Location: Left arm, BP Patient Position: Sitting) 97 5' 8\" (1.727 m) 184 lb 8 oz (83.7 kg) 28.05 kg/m2 Former Smoker BMI and BSA Data Body Mass Index Body Surface Area 28.05 kg/m 2 2 m 2 Preferred Pharmacy Pharmacy Name Phone [facility] [phone] - [address], [address] AT [place] [address] [phone] Your Updated Medication List  
  
   
This list is accurate as of: 7/10/17 10:35 AM.  Always use your most recent med list.  
  
  
  
  
 ABILIFY 5 mg tablet Generic drug:  ARIPiprazole Take 2.5 mg by mouth every morning. AMBIEN CR 12.5 mg tablet Generic drug:  zolpidem CR Take 12.5 mg by mouth nightly as needed. amLODIPine 10 mg tablet Commonly known as:  Chong Ortiz Take 10 mg by mouth daily. BD INSULIN PEN NEEDLE UF MINI 31 gauge x 3/16\" Ndle Generic drug:  Insulin Needles (Disposable) USE AS DIRECTED  
  
 buPROPion  mg tablet Commonly known as:  [place] Luke Take 1 Tab by mouth daily (with dinner). CIALIS PO Take 20 mg by mouth as needed. insulin glargine 100 unit/mL (3 mL) Inpn Commonly known as:  LANTUS,BASAGLAR  
10 units daily at bedtime * lenalidomide 25 mg Cap Take  by mouth. * REVLIMID 15 mg Cap Generic drug:  lenalidomide * losartan 100 mg tablet Commonly known as:  COZAAR TK 1 T PO QD  
  
 * losartan 50 mg tablet Commonly known as:  COZAAR Take 100 mg by mouth daily. NovoLOG Mix 70-30 100 unit/mL (70-30) injection Generic drug:  insulin aspart protamine/insulin aspart  
by SubCUTAneous route. NUCYNTA 100 mg tablet Generic drug:  tapentadol Take 100 mg by mouth three (3) times daily. ONETOUCH ULTRA TEST strip Generic drug:  glucose blood VI test strips USE TO TEST BLOOD SUGAR TID BEFORE EACH MEAL UTD. RYTHMOL 150 mg tablet Generic drug:  propafenone Take 50 mg by mouth every eight (8) hours. tamsulosin 0.4 mg capsule Commonly known as:  FLOMAX Take 1 Cap by mouth daily. valACYclovir 1 gram tablet Commonly known as:  VALTREX Take 1 Tab by mouth daily. * Notice: This list has 4 medication(s) that are the same as other medications prescribed for you. Read the directions carefully, and ask your doctor or other care provider to review them with you. Follow-up Instructions Return in about 1 month (around 8/10/2017). To-Do List   
 07/18/2017 10:00 AM  
  Appointment with [address] at [facility] ([phone])  
  
 07/21/2017 10:00 AM  
  Appointment with [address] at [facility] ([phone])  
  
 07/25/2017 10:00 AM  
  Appointment with [address] [place] [address] at [facility] ([phone])  
  
 07/28/2017 10:00 AM  
  Appointment with [address] at [facility] ([phone]) Patient Instructions Lantus 15 units, Tuesday & Wednesday Lantus 10 units, Thursday & Friday Lantus 10 units only if needed Saturday, Sunday, and Monday ( if glucose >150 at bedtime ) Humalog 3 units with breakfast and dinner Tuesday and Wednesday, Humalog per correction scale as needed in addition to the 3 units on Tuesday and Wednesday, Humalog per correction scale as needed for all other days with breakfast and dinner only also. Correction Scale  1:25>150 Currently you are taking insulin with your meals. As we have discussed it is important to always check your glucose level just before taking your mealtime insulin dose. Sometimes before eating your meal, your glucose level is already much higher than the goal and so you may require a few units of extra insulin. This is in addition to the scheduled dose that you plan to take for the meal. Using the scale below, add the amount of extra insulin you need to the dose you already plan to take and inject together as one injection.  As always continue to monitor your glucose afterward to assure recovery of your glucose levels. IF GLUCOSE IS:                 THEN TAKE: 
    0   Extra Unit 151-175   1   Extra Unit 
176-200   2   Extra Units 253-054   3   Extra Units 226-250   4   Extra Units 251-275   5   Extra Units 276-300   6   Extra Units 093-975   7   Extra Units 
 
---------------------------------------------------------------------------------------------------------------------- Below you will find a glucose log sheet which you can use to record your blood sugars. Without checking and recording what your home glucose levels are, it will be difficult to make any changes to your medication dose, even when significant changes may be needed. Please feel free to use the log below to record your home glucose levels. At the very least, I would like for you to login the entire 2-3 weeks just before your visit so we can make your visit much more productive and beneficial to you. GLUCOSE LOG SHEET: 
 
Date Breakfast Lunch Dinner Bedtime Comments ? GLUCOSE LOG SHEET: 
 
Date Breakfast Lunch Dinner Bedtime Comments ? GLUCOSE LOG SHEET: 
 
Date Breakfast Lunch Dinner Bedtime Comments ? Dr. Suresh Beat Back to basics: When you have diabetes or pre-diabetes, as you know, your body has difficulty dealing with the sugar it absorbs from your meals. An unrelated but very relevant term you may have heard before, quantitative easing, has a new meaning: By gradually reducing the load of sugars entering the body, you ease the stress on the body and allow it to catch up with the work it must do. This in turn will allow your body to work better. On top of this, remember one point, the more sugar stress your body has, the more you enter a state of glucose toxicity and this is a state where you become even more resistant. Thats right higher sugar = more resistance, not only to your own bodies attempt to fix the problem but also resistance to your medications. This is why medications work best when a proper diet is followed. Tip 1. Dont forget the protein. When you add a portion of meat or other low carb protein food in your meal, it provides healthy calories which contribute to reducing that feeling of hunger that drives you to eat what you dont want. Tip 2. Portions are a real thing. Before you eat, stop and look at your plate/table. Count how many items have sugars/carbs in them. A sandwich (bread) ? Sudie Chalk ? Sweet drink ? Potatoe ? Pasta ? Rice ? You would be surprised when you become aware. Aim for a reasonable portion of carbs, and if you feel you absolutely cannot do this, at least start working toward this. 45-60 grams is usually more than enough in one meal. And YES, than includes the desert! Tip 3. Three meals per day, snack-free in between! Your body needs a break. Eating an adequate meal keeps you from getting hungry and reaching for a snack in between meals. Recall that most of the time, diabetic patients are not treating these snacks. Dont eat dinner late at night, but rather allow more overnight fasting time for your body to recover.  If you eat dinner at 8 PM, try 6 PM.  Sporadic eating is the opposite of what you need, and adjusting to a regular eating schedule such as this will not only be a great benefit to your body, but your medications will also tend to work better at keeping your diabetes under control. Tip 4. There is no best diet when it comes to weight loss. When comparing diets and outcomes, the main ingredient when searching for weight loss was calories ! Lower calories = better weight loss. Pick a healthy diet thats right for you, i.e. diabetes friendly, and evaluate your daily calorie intake. And of course this would be of no use without exercise. Calories in need calories out. Introducing [facility]! Jennifer Knight introduces [employer] patient portal. Now you can access parts of your medical record, email your doctor's office, and request medication refills online. 1. In your internet browser, go to [email or web address] 2. Click on the First Time User? Click Here link in the Sign In box. You will see the New Member Sign Up page. 3. Enter your [employer] Access Code exactly as it appears below. You will not need to use this code after youve completed the sign-up process. If you do not sign up before the expiration date, you must request a new code. · [employer] Access Code: [email or web address] Expires: 10/1/2017  8:12 AM 
 
4. Enter the last four digits of your Social Security Number (xxxx) and Date of Birth (mm/dd/yyyy) as indicated and click Submit. You will be taken to the next sign-up page. 5. Create a [employer] ID. This will be your e[employer] login ID and cannot be changed, so think of one that is secure and easy to remember. 6. Create a [employer] password. You can change your password at any time. 7. Enter your Password Reset Question and Answer. This can be used at a later time if you forget your password. 8. Enter your e-mail address.  You will receive e-mail notification when new information is available in [employer]. 9. Click Sign Up. You can now view and download portions of your medical record. 10. Click the Download Summary menu link to download a portable copy of your medical information. If you have questions, please visit the Frequently Asked Questions section of the [employer] website. Remember, [employer] is NOT to be used for urgent needs. For medical emergencies, dial 911. Now available from your iPhone and Android! Please provide this summary of care documentation to your next provider. Your primary care clinician is listed as Be Chicas. If you have any questions after today's visit, please call [phone].

## 2017-07-10 NOTE — PATIENT INSTRUCTIONS
Lantus 15 units, Tuesday & Wednesday  Lantus 10 units, Thursday & Friday  Lantus 10 units only if needed Saturday, Sunday, and Monday ( if glucose >150 at bedtime )    Humalog 3 units with breakfast and dinner Tuesday and Wednesday,   Humalog per correction scale as needed in addition to the 3 units on Tuesday and Wednesday,  Humalog per correction scale as needed for all other days with breakfast and dinner only also. Correction Scale  1:25>150    Currently you are taking insulin with your meals. As we have discussed it is important to always check your glucose level just before taking your mealtime insulin dose. Sometimes before eating your meal, your glucose level is already much higher than the goal and so you may require a few units of extra insulin. This is in addition to the scheduled dose that you plan to take for the meal. Using the scale below, add the amount of extra insulin you need to the dose you already plan to take and inject together as one injection. As always continue to monitor your glucose afterward to assure recovery of your glucose levels. IF GLUCOSE IS:                 THEN TAKE:      0   Extra Unit  151-175   1   Extra Unit  176-200   2   Extra Units  201-225   3   Extra Units  226-250   4   Extra Units  251-275   5   Extra Units  276-300   6   Extra Units  301-325   7   Extra Units    ----------------------------------------------------------------------------------------------------------------------    Below you will find a glucose log sheet which you can use to record your blood sugars. Without checking and recording what your home glucose levels are, it will be difficult to make any changes to your medication dose, even when significant changes may be needed. Please feel free to use the log below to record your home glucose levels.  At the very least, I would like for you to login the entire 2-3 weeks just before your visit so we can make your visit much more productive and beneficial to you. GLUCOSE LOG SHEET:    Date Breakfast Lunch Dinner Bedtime Comments ? GLUCOSE LOG SHEET:    Date Breakfast Lunch Dinner Bedtime Comments ? GLUCOSE LOG SHEET:    Date Breakfast Lunch Dinner Bedtime Comments ? Dr. Kiya Kaur to basics:    When you have diabetes or pre-diabetes, as you know, your body has difficulty dealing with the sugar it absorbs from your meals. An unrelated but very relevant term you may have heard before, quantitative easing, has a new meaning: By gradually reducing the load of sugars entering the body, you ease the stress on the body and allow it to catch up with the work it must do. This in turn will allow your body to work better. On top of this, remember one point, the more sugar stress your body has, the more you enter a state of glucose toxicity and this is a state where you become even more resistant. Thats right higher sugar = more resistance, not only to your own bodies attempt to fix the problem but also resistance to your medications. This is why medications work best when a proper diet is followed. Tip 1. Dont forget the protein.  When you add a portion of meat or other low carb protein food in your meal, it provides healthy calories which contribute to reducing that feeling of hunger that drives you to eat what you dont want. Tip 2. Portions are a real thing. Before you eat, stop and look at your plate/table. Count how many items have sugars/carbs in them. A sandwich (bread) ? Deatrice Spillers ? Sweet drink ? Potatoe ? Pasta ? Rice ? You would be surprised when you become aware. Aim for a reasonable portion of carbs, and if you feel you absolutely cannot do this, at least start working toward this. 45-60 grams is usually more than enough in one meal. And YES, than includes the desert! Tip 3. Three meals per day, snack-free in between! Your body needs a break. Eating an adequate meal keeps you from getting hungry and reaching for a snack in between meals. Recall that most of the time, diabetic patients are not treating these snacks. Dont eat dinner late at night, but rather allow more overnight fasting time for your body to recover. If you eat dinner at 8 PM, try 6 PM.  Sporadic eating is the opposite of what you need, and adjusting to a regular eating schedule such as this will not only be a great benefit to your body, but your medications will also tend to work better at keeping your diabetes under control. Tip 4. There is no best diet when it comes to weight loss. When comparing diets and outcomes, the main ingredient when searching for weight loss was calories ! Lower calories = better weight loss. Pick a healthy diet thats right for you, i.e. diabetes friendly, and evaluate your daily calorie intake. And of course this would be of no use without exercise. Calories in need calories out.

## 2017-07-10 NOTE — PROGRESS NOTES
CONSULTATION REQUESTED BY: Lorraine Paiz MD     REASON FOR CONSULT:  Uncontrolled type 2 diabetes    CHIEF COMPLAINT: Blood glucose is high    HISTORY OF PRESENT ILLNESS:   Alice Miller Sr. is a 63 y.o. male with a PMHx as noted below who was referred to our endocrinology clinic for evaluation of uncontrolled type 2 diabetes with steroid induced hyperglycemia. Patient was diagnosed with diabetes in 2000 and also with multiple myeloma around April 2017, for which he was referred to Dr. Krystyna De León for evaluation and management. Prior to that he had stable diet controlled diabetes with A1c of 6.0%, followed by Dr. Seng Mendez. Patient was subsequently started on RVd therapy along with dexamethasone for induction chemo and continues on dexamethasone 40mg every Tuesday (2 weeks on , 1 week off), no longer taking steroids in between. Patient notes he is aiming for 14 weeks of treatment with potential marrow transplant according to the patient. Diabetes History:  Diabetes was diagnosed in 2000. Family History of diabetes is positive in his father and his maternal grandmother. Last A1c prior to initial visit was 6.0% (reported by patient) around 1st of may 2017. Previously diet controlled prior to RVd therapy. Current Home Regimen:  - lantus 10 units at bedtime, held when gluc at bedtime <130 (with subsequent AM gluco of 102-11)  - Humalog sliding scale, reports: 0-200 no insulin, 200-240 = 3 units, 240-260 = 4 units, >300 = 6 units    Review of home glucose: checks 3-4x/day  Blood sugars are generally good when weekly steroid wears off. Tuesday-Thurs blood sugars run 200's, then back down with fasting gluc 100-130       Using the Humalog BID T/W only, taking it fasting and mid-day, not necessarily with food.      Review of most recent diabetes-related labs:  Lab Results   Component Value Date    HBA1C 8.3 (H) 12/24/2010    CHOL 101 07/14/2011    LDLC 27.8 07/14/2011    GFRAA >60 06/27/2017 GFRNA 59 (L) 06/27/2017    TSH 0.94 09/19/2011         PAST MEDICAL/SURGICAL HISTORY:   Past Medical History:   Diagnosis Date    Acid reflux     Arrhythmia     pvc's    Chronic pain     neck    Depression     Diabetes ([address]     Femoral hernia 7/11/2012    Hepatitis C     Hypertension     Inguinal hernia 7/11/2012    Liver disease     hepatitis c    Other ill-defined conditions     Hx of PVCs since 2009    Prostatitis, acute     Psychiatric disorder     depression     Past Surgical History:   Procedure Laterality Date    COLONOSCOPY N/A 9/20/2016    COL[facility] performed [facility]ukri [facility] at [facility]COPY    HX APPENDECTOMY      HX CERVICAL FUSION  2008    x 1 as of 4/30/2014    HX HEENT      foreign body removed from right eye    HX HERNIA REPAIR  2012    [facility]      HX ORTHOPAEDIC      cervical fusion    HX OTHER SURGICAL  2000    liver bx - chronic hepatitis       ALLERGIES:   Allergies   Allergen Reactions    Mercury (Bulk) Hives     Blisters         MEDICATIONS ON ADMISSION:     Current Outpatient Prescriptions:     ONETOUCH ULTRA TEST strip, USE TO TEST BLOOD SUGAR TID BEFORE EACH MEAL UTD., Disp: , Rfl: 4    REVLIMID 15 mg cap, , Disp: , Rfl:     losartan (COZAAR) 100 mg tablet, TK 1 T PO QD, Disp: , Rfl: 4    insulin glargine (LANTUS,BASAGLAR) 100 unit/mL (3 mL) inpn, 10 units daily at bedtime, Disp: 15 mL, Rfl: 1    insulin aspart protamine/insulin aspart (NOVOLOG MIX 70-30) 100 unit/mL (70-30) injection, by SubCUTAneous route., Disp: , Rfl:     valACYclovir (VALTREX) 1 gram tablet, Take 1 Tab by mouth daily. , Disp: 90 Tab, Rfl: 3    propafenone (RYTHMOL) 150 mg tablet, Take 50 mg by mouth every eight (8) hours. , Disp: , Rfl:     zolpidem CR (AMBIEN CR) 12.5 mg tablet, Take 12.5 mg by mouth nightly as needed. , Disp: , Rfl:     TADALAFIL (CIALIS PO), Take 20 mg by mouth as needed. , Disp: , Rfl:     losartan (COZAAR) 50 mg tablet, Take 100 mg by mouth daily. , Disp: , Rfl:     amLODIPine (NORVASC) 10 mg tablet, Take 10 mg by mouth daily. , Disp: , Rfl:     tapentadol (NUCYNTA) 100 mg tablet, Take 100 mg by mouth three (3) times daily. , Disp: , Rfl:     aripiprazole (ABILIFY) 5 mg tablet, Take 2.5 mg by mouth every morning., Disp: , Rfl:     buPROPion XL (WELLBUTRIN XL) 150 mg tablet, Take 1 Tab by mouth daily (with dinner). , Disp: 30 Tab, Rfl: 11    BD INSULIN PEN NEEDLE UF MINI 31 gauge x 3/16\" ndle, USE AS DIRECTED, Disp: , Rfl: 2    lenalidomide 25 mg cap, Take  by mouth., Disp: , Rfl:     tamsulosin (FLOMAX) 0.4 mg capsule, Take 1 Cap by mouth daily. , Disp: 30 Cap, Rfl: 1    SOCIAL HISTORY:   Social History     Social History    Marital status:      Spouse name: N/A    Number of children: N/A    Years of education: N/A     Occupational History    Not on file. Social History Main Topics    Smoking status: Former Smoker     Packs/day: 1.00     Years: 12.00     Types: Cigarettes     Quit date: 1/1/1989    Smokeless tobacco: Never Used      Comment: former cigarette smoker    Alcohol use No      Comment: former alcoholic-quit [id number]    Drug use: No    Sexual activity: Not on file     Other Topics Concern    Not on file     Social History Narrative       FAMILY HISTORY:  Family History   Problem Relation Age of Onset    Arthritis-osteo Mother     Arthritis-osteo Father     Diabetes Father     Hypertension Maternal Grandmother     Diabetes Maternal Grandmother     Hypertension Maternal Grandfather     Diabetes Maternal Grandfather        REVIEW OF SYSTEMS: Complete ROS assessed and noted for that which is described above, all else are negative.   Eyes: normal  ENT: normal  CVS: normal  Resp: normal  GI: normal  : normal  GYN: normal  Endocrine: normal  Integument: normal  Musculoskeletal: normal  Neuro: normal  Psych: normal      PHYSICAL EXAMINATION:    VITAL SIGNS:  Visit Vitals    /70 (BP 1 Location: Left arm, BP Patient Position: Sitting)    Pulse 97    Ht 5' 8\" (1.727 m)    Wt 184 lb 8 oz (83.7 kg)    BMI 28.05 kg/m2       GENERAL: NCAT, Sitting comfortably, NAD  EYES: EOMI, non-icteric, no proptosis  Ear/Nose/Throat: NCAT, no inflammation, no masses  LYMPH NODES: No LAD  CARDIOVASCULAR: S1 S2, RRR, No murmur, 2+ radial pulses  RESPIRATORY: CTA b/l, no wheeze/rales  GASTROINTESTINAL: NT, ND  MUSCULOSKELETAL: Normal ROM, no atrophy  SKIN: warm, no edema/rash/ or other skin changes  NEUROLOGIC: 5/5 power all extremities, no tremor, AAOx3  PSYCHIATRIC: Normal affect, Normal insight and judgement    REVIEW OF LABORATORY AND RADIOLOGY DATA:   Labs and documentation have been reviewed as described above. ASSESSMENT AND PLAN:   Alice Miller Sr. is a 63 y.o. male with a PMHx as noted above who was referred to our endocrinology clinic for evaluation of uncontrolled type 2 diabetes with steroid induced hyperglycemia. Problems:  Type 2 diabetes Uncontrolled  Steroid induced hyperglycemia  Hypertension    We had the pleasure of reviewing together the basics of diabetes including basic pathophysiology and diabetes care. We further discussed the importance of checking home glucose regularly and taking all of their scheduled medications in order to have the best possible outcome. I was able to answer any questions they had in clinic today and they are invited to reach me if they have any further questions. Based upon our discussion together today we have decided to make the following changes: Today we spent time clarifying his patterns of hyperglycemia with treatment for multiple myeloma including dexamethasone. We have uncovered that the first few days have been very difficult to control, and that he achieves return to insulin sensitivity after those first two days. We discussed taking a customized approach in his case which is appropriate, with care however to avoid hypoglycemia.  Currently his sliding scale only allows treatment if his blood sugar rises above 200, however this is not ideal for a diabetic patient since the consequences of hyperglycemia is still present when due to steroid use. We thus agreed on the following below with understanding that this is a starting point and will likely need to be adjusted. Since his blood sugars generally is always >200 during the first two days of receiving steroids, with the lowest numbers around the 170's during those days, we will focus with a more aggressive approach during those days. We discussed his use of chronic long acting steroids and the possibility of adrenal insufficiency soon after discontinuing therapy if used for more than several months, and that we would consider that if he is not feeling well after his course of treatments. Patient denies history of lipidemia and notes his PCP Dr. Brennon Gamez has already collected his diabetes labs and has performed his annual foot exam, and we are working to get in touch with his office to get those records. PLAN  Type 2 Diabetes  Medications:  Lantus 15 units, Tuesday & Wednesday  Lantus 10 units, Thursday & Friday  Lantus 10 units only if needed Saturday, Sunday, and Monday ( if glucose >150 at bedtime )    Humalog 3 units with breakfast and dinner Tuesday and Wednesday,   Humalog per correction scale as needed in addition to the 3 units on Tuesday and Wednesday,  Humalog per correction scale as needed for all other days with breakfast and dinner only also. Correction Scale  1:25>150  IF GLUCOSE IS:                 THEN TAKE:      0   Extra Unit  151-175   1   Extra Unit  176-200   2   Extra Units  201-225   3   Extra Units  226-250   4   Extra Units  251-275   5   Extra Units  276-300   6   Extra Units  301-325   7   Extra Units    Advised to check glucose ACHS  Provided with glucose log sheets for later review. Labs:  Will retrieve from Dr. Leena Ortega office  Feet: Declares it performed already by Dr. Leena Ortega office. Eyes: Advised updated eye exam report faxed to our office for review. Kidney: GFR/Urine microalbumin as discussed, GFR recovering per lab trend. HTN: BP stable on current medications, norvasc, losartan. Lipids: reports on lipidemia, will review outside labs when received. RTC: I would like to see them back in 2 weeks for close f/u, he was advised to give me a call if he is having any issues with his blood sugars otherwise. He is advised to bring his log provided today so that we can adequately continue to customize his treatment. >60 minutes spent together with patient today of which >50% of this time was spent in counseling and coordination of care. Mateo Ramesh.  [address] [facility]

## 2017-07-13 ENCOUNTER — DOCUMENTATION ONLY (OUTPATIENT)
Dept: ENDOCRINOLOGY | Age: 62
End: 2017-07-13

## 2017-07-13 NOTE — PROGRESS NOTES
Received outside labs from [facility],  Patient only missing an updated lipid panel, will review on f/u. Mateo Ramesh.  [address] [facility]  [address] [place] [address]

## 2017-07-14 ENCOUNTER — OFFICE VISIT (OUTPATIENT)
Dept: ONCOLOGY | Age: 62
End: 2017-07-14

## 2017-07-14 VITALS
WEIGHT: 184 LBS | OXYGEN SATURATION: 98 % | BODY MASS INDEX: 27.89 KG/M2 | TEMPERATURE: 99 F | RESPIRATION RATE: 18 BRPM | HEART RATE: 76 BPM | SYSTOLIC BLOOD PRESSURE: 103 MMHG | DIASTOLIC BLOOD PRESSURE: 76 MMHG | HEIGHT: 68 IN

## 2017-07-14 DIAGNOSIS — C90.00 MULTIPLE MYELOMA NOT HAVING ACHIEVED REMISSION (HCC): Primary | ICD-10-CM

## 2017-07-14 NOTE — PROGRESS NOTES
Jannetta Felty. 63year old male here for follow of Multiple Myeloma, currently on Velcade, Revlimid /Zometa    Mr. Lulu Reid has a reminder for a \"due or due soon\" health maintenance. I have asked that he contact his primary care provider for follow-up on this health maintenance.

## 2017-07-14 NOTE — PROGRESS NOTES
Hematology Follow Up        Patient: Viri Chung Sr. MRN: [id number]  SSN: [id number]    YOB: 1955  Age: 63 y.o. Sex: male          Diagnosis:     1. Multiple Myeloma, IgA Kappa   [place] stage IIIB      Treatment:       1. Systemic therapy with RVd - s/p 2 cycles      Subjective:      Viri Chung Sr. is a 63 y.o. male with a new diagnosis of IgA kappa myeloma. Bone marrow shows 100% aberrant plasma cells. He is fatigued. No bone pains or recent infection. He suffers with DM but it is diet controlled. He has received systemic anti-viral treatment for chronic Hep C with successful eradication of the virus. Mr. Rogelio Amezquita is receiving systemic therapy with RVd. He has achieved a partial response after 1 cycle of treatment. He had a mild rash on his face and chest last week which resolved after 1 day. He is currently on his off week and feels well.      Review of Systems:    Constitutional: fatigue  Eyes: negative  Ears, Nose, Mouth, Throat, and Face: negative  Respiratory: negative  Cardiovascular: negative  Gastrointestinal: negative  Genitourinary:negative  Integument/Breast: negative  Hematologic/Lymphatic: negative  Musculoskeletal:negative  Neurological: negative      Past Medical History:   Diagnosis Date    Acid reflux     Arrhythmia     pvc's    Chronic pain     neck    Depression     Diabetes ([address]     Femoral hernia 7/11/2012    Hepatitis C     Hypertension     Inguinal hernia 7/11/2012    Liver disease     hepatitis c    Other ill-defined conditions     Hx of PVCs since 2009    Prostatitis, acute     Psychiatric disorder     depression     Past Surgical History:   Procedure Laterality Date    COLONOSCOPY N/A 9/20/2016    [facility] END[facility]COPY    HX APPENDECTOMY      HX CERVICAL FUSION  2008    x 1 as of 4/30/2014    HX HEENT      foreign body removed from right eye    [facility]X [facility]THOPAEDIC      cervical fusion    HX OTHER SURGICAL  2000    liver bx - chronic hepatitis      Family History   Problem Relation Age of Onset    Arthritis-osteo Mother     Arthritis-osteo Father     Diabetes Father     Hypertension Maternal Grandmother     Diabetes Maternal Grandmother     Hypertension Maternal Grandfather     Diabetes Maternal Grandfather      Social History   Substance Use Topics    Smoking status: Former Smoker     Packs/day: 1.00     Years: 12.00     Types: Cigarettes     Quit date: 1/1/1989    Smokeless tobacco: Never Used      Comment: former cigarette smoker    Alcohol use No      Comment: former alcoholic-quit [id number]      Prior to Admission medications    Medication Sig Start Date End Date Taking? Authorizing Provider   ONETOUCH ULTRA TEST strip USE TO TEST BLOOD SUGAR TID BEFORE EACH MEAL UTD. 7/3/17  Yes Historical Provider   REVLIMID 15 mg cap  6/12/17  Yes Historical Provider   losartan (COZAAR) 100 mg tablet TK 1 T PO QD 7/3/17  Yes Historical Provider   BD INSULIN PEN NEEDLE UF MINI 31 gauge x 3/16\" ndle USE AS DIRECTED 6/7/17  Yes Historical Provider   insulin lispro (HUMALOG) 100 unit/mL kwikpen 3 Units by SubCUTAneous route Before breakfast, lunch, and dinner. Plus correction sliding scale as needed   Yes Historical Provider   insulin glargine (LANTUS,BASAGLAR) 100 unit/mL (3 mL) inpn 10 units daily at bedtime 6/9/17  Yes Natividad Long NP   lenalidomide 25 mg cap Take  by mouth. Yes Historical Provider   valACYclovir (VALTREX) 1 gram tablet Take 1 Tab by mouth daily. 6/6/17  Yes Rita Green MD   tamsulosin (FLOMAX) 0.4 mg capsule Take 1 Cap by mouth daily. 4/4/17  Yes Corey Paz MD   propafenone (RYTHMOL) 150 mg tablet Take 50 mg by mouth every eight (8) hours. Yes Historical Provider   zolpidem CR (AMBIEN CR) 12.5 mg tablet Take 12.5 mg by mouth nightly as needed. Yes Historical Provider   TADALAFIL (CIALIS PO) Take 20 mg by mouth as needed.    Yes Historical Provider   losartan (COZAAR) 50 mg tablet Take 100 mg by mouth daily. Yes Historical Provider   amLODIPine (NORVASC) 10 mg tablet Take 10 mg by mouth daily. Yes Historical Provider   tapentadol (NUCYNTA) 100 mg tablet Take 100 mg by mouth three (3) times daily. Yes Goldy Diaz MD   aripiprazole (ABILIFY) 5 mg tablet Take 2.5 mg by mouth every morning. Yes Goldy Diaz MD   buPROPion XL (WELLBUTRIN XL) 150 mg tablet Take 1 Tab by mouth daily (with dinner). 4/25/10  Yes Dash Nunes MD              Allergies   Allergen Reactions    Mercury (Bulk) Hives     Blisters             Objective:     Vitals:    17 1138   BP: 103/76   Pulse: 76   Resp: 18   Temp: 99 °F (37.2 °C)   TempSrc: Oral   SpO2: 98%   Weight: 184 lb (83.5 kg)   Height: 5' 8\" (1.727 m)            Physical Exam:    GENERAL: alert, cooperative  EYE: negative  LYMPHATIC: Cervical, supraclavicular, and axillary nodes normal.   THROAT & NECK: normal and no erythema or exudates noted. LUNG: clear to auscultation bilaterally  HEART: regular rate and rhythm  ABDOMEN: soft, non-tender  EXTREMITIES: no cyanosis or edema  SKIN: Normal.  NEUROLOGIC: negative      Lab Results   Component Value Date/Time    WBC 1.9 2017 10:19 AM    HGB 8.7 2017 10:19 AM    HCT 26.8 2017 10:19 AM    PLATELET 371  10:19 AM    MCV 91.8 2017 10:19 AM       M-spike: 5.8 => 2.9, IgA kappa    Serum F => 496       Assessment:     1. Multiple Myeloma, IgA Kappa   Durie [place] stage IIIB    Cytogenetics -pending  ECOG PS - 0   Intent of therapy: palliative    Receiving systemic therapy with RVd - s/p 2 cycles    Tolerating treatment   A detailed system by system evaluation of side effect was performed to assess chemotherapy related toxicity. Blood counts are acceptable. Results reviewed with the patient. Responding well to the treatment  Symptom management form reviewed with patient.       2. Anemia, myeloma related - improved    > s/p 2 units PRBCs on 6/7      3. Thrombocytopenia - improved   From myeloma    > On reduced dose of Revlimid 15 mg  > On reduced dose of Velcade - will increase to 1.3 mg/m2      4. Hypercalcemia, myeloma related   Resolved    > Zometa      5.  Diabetes    > Continue Novolog sliding scale  > Continue Lantus SQ 10 units at bedtime  > Endocrine on board        Plan:       > Continue chemotherapy with RVd  > Continue Zometa  > Continue Valacyclovir prophylaxis   > Follow-up in 3 weeks        Signed by: Dottie Slade MD                     July 14, 2017            Christi Espinoza MD

## 2017-07-18 ENCOUNTER — HOSPITAL ENCOUNTER (OUTPATIENT)
Dept: INFUSION THERAPY | Age: 62
Discharge: HOME | End: 2017-07-18
Payer: COMMERCIAL

## 2017-07-18 VITALS
TEMPERATURE: 98.8 F | HEART RATE: 80 BPM | DIASTOLIC BLOOD PRESSURE: 70 MMHG | SYSTOLIC BLOOD PRESSURE: 112 MMHG | RESPIRATION RATE: 18 BRPM | BODY MASS INDEX: 28.3 KG/M2 | WEIGHT: 186.7 LBS | HEIGHT: 68 IN

## 2017-07-18 LAB
ALBUMIN SERPL BCP-MCNC: 3.4 G/DL (ref 3.5–5)
ALBUMIN SERPL BCP-MCNC: 3.5 G/DL (ref 3.5–5)
ALBUMIN/GLOB SERPL: 0.7 {RATIO} (ref 1.1–2.2)
ALP SERPL-CCNC: 116 U/L (ref 45–117)
ALT SERPL-CCNC: 23 U/L (ref 12–78)
ANION GAP BLD CALC-SCNC: 6 MMOL/L (ref 5–15)
AST SERPL W P-5'-P-CCNC: 18 U/L (ref 15–37)
BASO+EOS+MONOS # BLD AUTO: 0.5 K/UL (ref 0.2–1.2)
BASO+EOS+MONOS # BLD AUTO: 21 % (ref 3.2–16.9)
BILIRUB DIRECT SERPL-MCNC: <0.1 MG/DL (ref 0–0.2)
BILIRUB SERPL-MCNC: 0.2 MG/DL (ref 0.2–1)
BUN SERPL-MCNC: 15 MG/DL (ref 6–20)
BUN/CREAT SERPL: 12 (ref 12–20)
CALCIUM SERPL-MCNC: 8.3 MG/DL (ref 8.5–10.1)
CHLORIDE SERPL-SCNC: 107 MMOL/L (ref 97–108)
CO2 SERPL-SCNC: 26 MMOL/L (ref 21–32)
CREAT SERPL-MCNC: 1.23 MG/DL (ref 0.7–1.3)
DIFFERENTIAL METHOD BLD: ABNORMAL
ERYTHROCYTE [DISTWIDTH] IN BLOOD BY AUTOMATED COUNT: 20.6 % (ref 11.8–15.8)
GLOBULIN SER CALC-MCNC: 4.9 G/DL (ref 2–4)
GLUCOSE SERPL-MCNC: 104 MG/DL (ref 65–100)
HCT VFR BLD AUTO: 26.6 % (ref 36.6–50.3)
HGB BLD-MCNC: 8.6 G/DL (ref 12.1–17)
IGA SERPL-MCNC: 1820 MG/DL (ref 70–400)
IGG SERPL-MCNC: 210 MG/DL (ref 700–1600)
IGM SERPL-MCNC: <21 MG/DL (ref 40–230)
LYMPHOCYTES # BLD AUTO: 34 % (ref 12–49)
LYMPHOCYTES # BLD: 0.8 K/UL (ref 0.8–3.5)
MCH RBC QN AUTO: 29.4 PG (ref 26–34)
MCHC RBC AUTO-ENTMCNC: 32.3 G/DL (ref 30–36.5)
MCV RBC AUTO: 90.8 FL (ref 80–99)
NEUTS SEG # BLD: 1.2 K/UL (ref 1.8–8)
NEUTS SEG NFR BLD AUTO: 45 % (ref 32–75)
PHOSPHATE SERPL-MCNC: 2.9 MG/DL (ref 2.6–4.7)
PLATELET # BLD AUTO: 133 K/UL (ref 150–400)
POTASSIUM SERPL-SCNC: 4.1 MMOL/L (ref 3.5–5.1)
PROT SERPL-MCNC: 8.3 G/DL (ref 6.4–8.2)
RBC # BLD AUTO: 2.93 M/UL (ref 4.1–5.7)
SODIUM SERPL-SCNC: 139 MMOL/L (ref 136–145)
WBC # BLD AUTO: 2.5 K/UL (ref 4.1–11.1)

## 2017-07-18 PROCEDURE — 74011000258 HC RX REV CODE- 258: Performed by: INTERNAL MEDICINE

## 2017-07-18 PROCEDURE — 74011000250 HC RX REV CODE- 250: Performed by: INTERNAL MEDICINE

## 2017-07-18 PROCEDURE — 36415 COLL VENOUS BLD VENIPUNCTURE: CPT | Performed by: INTERNAL MEDICINE

## 2017-07-18 PROCEDURE — 96374 THER/PROPH/DIAG INJ IV PUSH: CPT

## 2017-07-18 PROCEDURE — 83883 ASSAY NEPHELOMETRY NOT SPEC: CPT | Performed by: INTERNAL MEDICINE

## 2017-07-18 PROCEDURE — A9270 NON-COVERED ITEM OR SERVICE: HCPCS | Performed by: INTERNAL MEDICINE

## 2017-07-18 PROCEDURE — 74011250636 HC RX REV CODE- 250/636: Performed by: INTERNAL MEDICINE

## 2017-07-18 PROCEDURE — 82784 ASSAY IGA/IGD/IGG/IGM EACH: CPT | Performed by: INTERNAL MEDICINE

## 2017-07-18 PROCEDURE — 96401 CHEMO ANTI-NEOPL SQ/IM: CPT

## 2017-07-18 PROCEDURE — 85025 COMPLETE CBC W/AUTO DIFF WBC: CPT | Performed by: INTERNAL MEDICINE

## 2017-07-18 PROCEDURE — 80076 HEPATIC FUNCTION PANEL: CPT | Performed by: INTERNAL MEDICINE

## 2017-07-18 PROCEDURE — 80069 RENAL FUNCTION PANEL: CPT | Performed by: INTERNAL MEDICINE

## 2017-07-18 PROCEDURE — 84165 PROTEIN E-PHORESIS SERUM: CPT | Performed by: INTERNAL MEDICINE

## 2017-07-18 RX ORDER — SODIUM CHLORIDE 0.9 % (FLUSH) 0.9 %
10-40 SYRINGE (ML) INJECTION AS NEEDED
Status: ACTIVE | OUTPATIENT
Start: 2017-07-18 | End: 2017-07-19

## 2017-07-18 RX ORDER — DEXAMETHASONE 4 MG/1
40 TABLET ORAL ONCE
Status: COMPLETED | OUTPATIENT
Start: 2017-07-18 | End: 2017-07-18

## 2017-07-18 RX ADMIN — SODIUM CHLORIDE 4 MG: 900 INJECTION, SOLUTION INTRAVENOUS at 11:45

## 2017-07-18 RX ADMIN — DEXAMETHASONE 40 MG: 4 TABLET ORAL at 11:48

## 2017-07-18 RX ADMIN — BORTEZOMIB 2.2 MG: 3.5 INJECTION, POWDER, LYOPHILIZED, FOR SOLUTION INTRAVENOUS; SUBCUTANEOUS at 12:00

## 2017-07-18 RX ADMIN — Medication 10 ML: at 12:13

## 2017-07-18 NOTE — PROGRESS NOTES
Pt arrived to [facility] ambulatory in no acute distress at 0955 for VRD C3D1/Zometa.  Assessment unremarkable. IV established in R forearm site WNL, and positive blood return noted.  Labs obtained, CBCap, Renal Panel, Hepatic Panel, SPEP, Immunoglobulins, Serum Free Light Chains, Immunofixation. Visit Vitals    /70 (BP 1 Location: Left arm, BP Patient Position: Sitting)    Pulse 80    Temp 98.8 °F (37.1 °C)    Resp 18    Ht 5' 8\" (1.727 m)    Wt 84.7 kg (186 lb 11.2 oz)    BMI 28.39 kg/m2     Recent Results (from the past 12 hour(s))   RENAL FUNCTION PANEL    Collection Time: 07/18/17  9:58 AM   Result Value Ref Range    Sodium 139 136 - 145 mmol/L    Potassium 4.1 3.5 - 5.1 mmol/L    Chloride 107 97 - 108 mmol/L    CO2 26 21 - 32 mmol/L    Anion gap 6 5 - 15 mmol/L    Glucose 104 (H) 65 - 100 mg/dL    BUN 15 6 - 20 MG/DL    Creatinine 1.23 0.70 - 1.30 MG/DL    BUN/Creatinine ratio 12 12 - 20      GFR est AA >60 >60 ml/min/1.73m2    GFR est non-AA >60 >60 ml/min/1.73m2    Calcium 8.3 (L) 8.5 - 10.1 MG/DL    Phosphorus 2.9 2.6 - 4.7 MG/DL    Albumin 3.5 3.5 - 5.0 g/dL   HEPATIC FUNCTION PANEL    Collection Time: 07/18/17  9:58 AM   Result Value Ref Range    Protein, total 8.3 (H) 6.4 - 8.2 g/dL    Albumin 3.4 (L) 3.5 - 5.0 g/dL    Globulin 4.9 (H) 2.0 - 4.0 g/dL    A-G Ratio 0.7 (L) 1.1 - 2.2      Bilirubin, total 0.2 0.2 - 1.0 MG/DL    Bilirubin, direct <0.1 0.0 - 0.2 MG/DL    Alk. phosphatase 116 45 - 117 U/L    AST (SGOT) 18 15 - 37 U/L    ALT (SGPT) 23 12 - 78 U/L   IMMUNOGLOBULINS, G/A/M, QT.     Collection Time: 07/18/17  9:58 AM   Result Value Ref Range    Immunoglobulin G 210 (L) 700 - 1600 mg/dL    Immunoglobulin A 1820 (H) 70 - 400 mg/dL    Immunoglobulin M <21 (L) 40 - 230 mg/dL   CBC WITH 3 PART DIFF    Collection Time: 07/18/17 10:00 AM   Result Value Ref Range    WBC 2.5 (L) 4.1 - 11.1 K/uL    RBC 2.93 (L) 4.10 - 5.70 M/uL    HGB 8.6 (L) 12.1 - 17.0 g/dL    HCT 26.6 (L) 36.6 - 50.3 % MCV 90.8 80.0 - 99.0 FL    MCH 29.4 26.0 - 34.0 PG    MCHC 32.3 30.0 - 36.5 g/dL    RDW 20.6 (H) 11.8 - 15.8 %    PLATELET 287 (L) 001 - 400 K/uL    NEUTROPHILS 45 32 - 75 %    MIXED CELLS 21 (H) 3.2 - 16.9 %    LYMPHOCYTES 34 12 - 49 %    ABS. NEUTROPHILS 1.2 (L) 1.8 - 8.0 K/UL    ABS. MIXED CELLS 0.5 0.2 - 1.2 K/uL    ABS. LYMPHOCYTES 0.8 0.8 - 3.5 K/UL    DF AUTOMATED       The following medications administered:  Decadron 40mg PO  Zometa 4mg IV over 15 minutes (PIV restarted due to infiltration)  Velcade 2.2mg SQ in abdomen    Pt tolerated treatment well.  IV flushed per policy and removed, 2x2 and coban placed.  Pt discharged ambulatory in no acute distress at 1215, accompanied by self. Next appointment 7/21/17 at 1000.

## 2017-07-19 LAB
ALBUMIN SERPL ELPH-MCNC: 3.6 G/DL (ref 2.9–4.4)
ALBUMIN/GLOB SERPL: 0.8 {RATIO} (ref 0.7–1.7)
ALPHA1 GLOB SERPL ELPH-MCNC: 0.2 G/DL (ref 0–0.4)
ALPHA2 GLOB SERPL ELPH-MCNC: 1 G/DL (ref 0.4–1)
B-GLOBULIN SERPL ELPH-MCNC: 0.9 G/DL (ref 0.7–1.3)
GAMMA GLOB SERPL ELPH-MCNC: 2.4 G/DL (ref 0.4–1.8)
GLOBULIN SER CALC-MCNC: 4.7 G/DL (ref 2.2–3.9)
IGA SERPL-MCNC: >6400 MG/DL (ref 90–386)
IGG SERPL-MCNC: 228 MG/DL (ref 700–1600)
IGM SERPL-MCNC: 6 MG/DL (ref 20–172)
KAPPA LC FREE SER-MCNC: 911.1 MG/L (ref 3.3–19.4)
KAPPA LC FREE/LAMBDA FREE SER: 246.24 {RATIO} (ref 0.26–1.65)
LAMBDA LC FREE SERPL-MCNC: 3.7 MG/L (ref 5.7–26.3)
M PROTEIN SERPL ELPH-MCNC: 1.9 G/DL
PROT PATTERN SERPL IFE-IMP: ABNORMAL
PROT SERPL-MCNC: 8.3 G/DL (ref 6–8.5)

## 2017-07-21 ENCOUNTER — HOSPITAL ENCOUNTER (OUTPATIENT)
Dept: INFUSION THERAPY | Age: 62
Discharge: HOME | End: 2017-07-21
Payer: COMMERCIAL

## 2017-07-21 ENCOUNTER — OFFICE VISIT (OUTPATIENT)
Dept: ENDOCRINOLOGY | Age: 62
End: 2017-07-21

## 2017-07-21 VITALS
SYSTOLIC BLOOD PRESSURE: 98 MMHG | HEIGHT: 68 IN | WEIGHT: 187.6 LBS | DIASTOLIC BLOOD PRESSURE: 64 MMHG | BODY MASS INDEX: 28.43 KG/M2 | HEART RATE: 83 BPM

## 2017-07-21 VITALS
WEIGHT: 187.3 LBS | DIASTOLIC BLOOD PRESSURE: 72 MMHG | HEART RATE: 98 BPM | BODY MASS INDEX: 28.39 KG/M2 | HEIGHT: 68 IN | SYSTOLIC BLOOD PRESSURE: 122 MMHG | RESPIRATION RATE: 18 BRPM | OXYGEN SATURATION: 99 % | TEMPERATURE: 98.3 F

## 2017-07-21 DIAGNOSIS — R73.9 STEROID-INDUCED HYPERGLYCEMIA: ICD-10-CM

## 2017-07-21 DIAGNOSIS — T38.0X5A STEROID-INDUCED HYPERGLYCEMIA: ICD-10-CM

## 2017-07-21 DIAGNOSIS — E11.9 TYPE 2 DIABETES MELLITUS WITHOUT COMPLICATION, WITH LONG-TERM CURRENT USE OF INSULIN (HCC): Primary | ICD-10-CM

## 2017-07-21 DIAGNOSIS — I10 ESSENTIAL HYPERTENSION WITH GOAL BLOOD PRESSURE LESS THAN 130/80: ICD-10-CM

## 2017-07-21 DIAGNOSIS — Z79.4 TYPE 2 DIABETES MELLITUS WITHOUT COMPLICATION, WITH LONG-TERM CURRENT USE OF INSULIN (HCC): Primary | ICD-10-CM

## 2017-07-21 PROCEDURE — 74011250636 HC RX REV CODE- 250/636: Performed by: INTERNAL MEDICINE

## 2017-07-21 PROCEDURE — 96401 CHEMO ANTI-NEOPL SQ/IM: CPT

## 2017-07-21 PROCEDURE — 74011000250 HC RX REV CODE- 250: Performed by: INTERNAL MEDICINE

## 2017-07-21 RX ORDER — DEXAMETHASONE 4 MG/1
40 TABLET ORAL ONCE
Status: ACTIVE | OUTPATIENT
Start: 2017-07-25 | End: 2017-07-25

## 2017-07-21 RX ADMIN — BORTEZOMIB 2.2 MG: 3.5 INJECTION, POWDER, LYOPHILIZED, FOR SOLUTION INTRAVENOUS; SUBCUTANEOUS at 10:00

## 2017-07-21 NOTE — PATIENT INSTRUCTIONS
Current Home Regimen: (as of changes during prior visit)  Lantus 15 units, Tuesday  Lantus 10 units, Wednesday    Humalog per correction scale as needed with meals based on blood sugar:     Correction Scale  1:25>150    Currently you are taking insulin with your meals. As we have discussed it is important to always check your glucose level just before taking your mealtime insulin dose. Sometimes before eating your meal, your glucose level is already much higher than the goal and so you may require a few units of extra insulin. This is in addition to the scheduled dose that you plan to take for the meal. Using the scale below, add the amount of extra insulin you need to the dose you already plan to take and inject together as one injection. As always continue to monitor your glucose afterward to assure recovery of your glucose levels. IF GLUCOSE IS:                 THEN TAKE:      0   Extra Unit  151-175   1   Extra Unit  176-200   2   Extra Units  201-225   3   Extra Units  226-250   4   Extra Units  251-275   5   Extra Units  276-300   6   Extra Units  301-325   7   Extra Units    ----------------------------------------------------------------------------------------------------------------------    Below you will find a glucose log sheet which you can use to record your blood sugars. Without checking and recording what your home glucose levels are, it will be difficult to make any changes to your medication dose, even when significant changes may be needed. Please feel free to use the log below to record your home glucose levels. At the very least, I would like for you to login the entire 2-3 weeks just before your visit so we can make your visit much more productive and beneficial to you. GLUCOSE LOG SHEET:    Date Breakfast Lunch Dinner Bedtime Comments ? GLUCOSE LOG SHEET:    Date Breakfast Lunch Dinner Bedtime Comments ? GLUCOSE LOG SHEET:    Date Breakfast Lunch Dinner Bedtime Comments ?

## 2017-07-21 NOTE — PROGRESS NOTES
[facility] - Chemotherapy Progress Note    6571- Pt admit to [facility] for VRD C3D4 ambulatory in stable condition. Assessment completed. No new concerns voiced. Visit Vitals    /72 (BP 1 Location: Left arm, BP Patient Position: Sitting)    Pulse 98    Temp 98.3 °F (36.8 °C)    Resp 18    Ht 5' 8\" (1.727 m)    Wt 85 kg (187 lb 4.8 oz)    SpO2 99%    BMI 28.48 kg/m2       Medications:  Velcade 2.2 mg SQ injection to R side of abdomen    1005- Pt tolerated treatment well. D/c home ambulatory in no distress.  Pt aware of next [facility] appointment scheduled for 7/25 at 10 AM.

## 2017-07-21 NOTE — MR AVS SNAPSHOT
Visit Information Date & Time Provider Department Dept. Phone Encounter #  
 7/21/2017  2:50 PM Lis Deshpande, [address] [facility] End[facility]ology [phone] [id number] Follow-up Instructions Return in about 6 weeks (around 9/1/2017). Your Appointments 8/4/2017 11:45 AM  
Any with Roc Downey NP [address] Oncology at [facility]) Appt Note: onc 3 wk f/u  
 [phone]  
[address] [place] [address] [address] [facility] [address] Rajan Teran Upcoming Health Maintenance Date Due  
 EYE EXAM RETINAL OR DILATED Q1 10/9/1967 DTaP/Tdap/Td series (1 - Tdap) 10/9/1978 FOBT Q 1 YEAR AGE 50-75 10/9/2007 Pneumococcal 19-64 Highest Risk (2 of 3 - PCV13) 7/14/2012 LIPID PANEL Q1 7/14/2012 INFLUENZA AGE 9 TO ADULT 8/1/2017 HEMOGLOBIN A1C Q6M 10/7/2017 MICROALBUMIN Q1 4/7/2018 FOOT EXAM Q1 7/10/2018 Allergies as of 7/21/2017  Review Complete On: 7/21/2017 By: Lis Deshpande MD  
  
 Severity Noted Reaction Type Reactions Mercury (Bulk) Low 04/21/2010   Topical Hives Blisters Current Immunizations  Reviewed on 7/21/2017 Name Date Influenza Vaccine Split 11/14/2010  6:57 PM  
 Pneumococcal Vaccine (Unspecified Type) 7/14/2011  6:30 PM  
  
 Reviewed by Roma Eddy RN on 7/21/2017 at  9:58 AM  
You Were Diagnosed With   
  
 Codes Comments Type 2 diabetes mellitus without complication, with long-term current use of insulin (HCC)    -  Primary ICD-10-CM: E11.9, Z79.4 ICD-9-CM: 250.00, V58.67 Essential hypertension with goal blood pressure less than 130/80     ICD-10-CM: I10 
ICD-9-CM: 401.9 Steroid-induced hyperglycemia     ICD-10-CM: R73.9 ICD-9-CM: 790.29 Vitals BP Pulse Height(growth percentile) Weight(growth percentile) BMI Smoking Status 98/64 (BP 1 Location: Left arm, BP Patient Position: Sitting) 83 5' 8\" (1.727 m) 187 lb 9.6 oz (85.1 kg) 28.52 kg/m2 Former Smoker Vitals History BMI and BSA Data Body Mass Index Body Surface Area 28.52 kg/m 2 2.02 m 2 Preferred Pharmacy Pharmacy Name Phone [phone], [address] AT [place] [address] [phone] Your Updated Medication List  
  
   
This list is accurate as of: 7/21/17  3:13 PM.  Always use your most recent med list.  
  
  
  
  
 ABILIFY 5 mg tablet Generic drug:  ARIPiprazole Take 2.5 mg by mouth every morning. AMBIEN CR 12.5 mg tablet Generic drug:  zolpidem CR Take 12.5 mg by mouth nightly as needed. amLODIPine 10 mg tablet Commonly known as:  Vita Matar Take 10 mg by mouth daily. BD INSULIN PEN NEEDLE UF MINI 31 gauge x 3/16\" Ndle Generic drug:  Insulin Needles (Disposable) USE AS DIRECTED  
  
 buPROPion  mg tablet Commonly known as:  Stephane Priestly Take 1 Tab by mouth daily (with dinner). CIALIS PO Take 20 mg by mouth as needed. insulin glargine 100 unit/mL (3 mL) Inpn Commonly known as:  LANTUS,BASAGLAR  
10 units daily at bedtime  
  
 insulin lispro 100 unit/mL kwikpen Commonly known as:  HUMALOG  
3 Units by SubCUTAneous route Before breakfast, lunch, and dinner. Plus correction sliding scale as needed * lenalidomide 25 mg Cap Take  by mouth. * REVLIMID 15 mg Cap Generic drug:  lenalidomide * losartan 100 mg tablet Commonly known as:  COZAAR TK 1 T PO QD  
  
 * losartan 50 mg tablet Commonly known as:  COZAAR Take 100 mg by mouth daily. NUCYNTA 100 mg tablet Generic drug:  tapentadol Take 100 mg by mouth three (3) times daily. ONETOUCH ULTRA TEST strip Generic drug:  glucose blood VI test strips USE TO TEST BLOOD SUGAR TID BEFORE EACH MEAL UTD. RYTHMOL 150 mg tablet Generic drug:  propafenone Take 50 mg by mouth every eight (8) hours. tamsulosin 0.4 mg capsule Commonly known as:  FLOMAX Take 1 Cap by mouth daily. valACYclovir 1 gram tablet Commonly known as:  VALTREX Take 1 Tab by mouth daily. * Notice: This list has 4 medication(s) that are the same as other medications prescribed for you. Read the directions carefully, and ask your doctor or other care provider to review them with you. We Performed the Following LIPID PANEL [56663 CPT(R)] Follow-up Instructions Return in about 6 weeks (around 9/1/2017). To-Do List   
 07/25/2017  8:00 AM  
  App[facility] with CHAIR 3 [facility] ([phone])  
  
 07/28/2017 10:00 AM  
  Appointment with [address] at [facility] ([phone]) Patient Instructions Current Home Regimen: (as of changes during prior visit) Lantus 15 units, Tuesday Lantus 10 units, Wednesday Humalog per correction scale as needed with meals based on blood sugar:  
 
Correction Scale  1:25>150 Currently you are taking insulin with your meals. As we have discussed it is important to always check your glucose level just before taking your mealtime insulin dose. Sometimes before eating your meal, your glucose level is already much higher than the goal and so you may require a few units of extra insulin. This is in addition to the scheduled dose that you plan to take for the meal. Using the scale below, add the amount of extra insulin you need to the dose you already plan to take and inject together as one injection. As always continue to monitor your glucose afterward to assure recovery of your glucose levels. IF GLUCOSE IS:                 THEN TAKE: 
    0   Extra Unit 151-175   1   Extra Unit 
176-200   2   Extra Units 805-982   3   Extra Units 226-250   4   Extra Units 251-275   5   Extra Units 276-300   6   Extra Units 886-818   7   Extra Units 
 
---------------------------------------------------------------------------------------------------------------------- Below you will find a glucose log sheet which you can use to record your blood sugars. Without checking and recording what your home glucose levels are, it will be difficult to make any changes to your medication dose, even when significant changes may be needed. Please feel free to use the log below to record your home glucose levels. At the very least, I would like for you to login the entire 2-3 weeks just before your visit so we can make your visit much more productive and beneficial to you. GLUCOSE LOG SHEET: 
 
Date Breakfast Lunch Dinner Bedtime Comments ? GLUCOSE LOG SHEET: 
 
Date Breakfast Lunch Dinner Bedtime Comments ? GLUCOSE LOG SHEET: 
 
Date Breakfast Lunch Dinner Bedtime Comments ? Introducing [facility]! Jimmie Joy introduces W4 patient portal. Now you can access parts of your medical record, email your doctor's office, and request medication refills online. 1. In your internet browser, go to [email or web address] 2. Click on the First Time User? Click Here link in the Sign In box. You will see the New Member Sign Up page. 3. Enter your [employer] Access Code exactly as it appears below. You will not need to use this code after youve completed the sign-up process. If you do not sign up before the expiration date, you must request a new code. · [employer] Access Code: [email or web address] Expires: 10/1/2017  8:12 AM 
 
4. Enter the last four digits of your Social Security Number (xxxx) and Date of Birth (mm/dd/yyyy) as indicated and click Submit. You will be taken to the next sign-up page. 5. Create a [employer] ID. This will be your [employer] login ID and cannot be changed, so think of one that is secure and easy to remember. 6. Create a [employer] password. You can change your password at any time. 7. Enter your Password Reset Question and Answer. This can be used at a later time if you forget your password. 8. Enter your e-mail address. You will receive e-mail notification when new information is available in [address] 9. Click Sign Up. You can now view and download portions of your medical record. 10. Click the Download Summary menu link to download a portable copy of your medical information. If you have questions, please visit the Frequently Asked Questions section of the [employer] website. Remember, [employer] is NOT to be used for urgent needs. For medical emergencies, dial 911. Now available from your iPhone and Android! Please provide this summary of care documentation to your next provider. Your primary care clinician is listed as Schuyler Broussard. If you have any questions after today's visit, please call [phone].

## 2017-07-21 NOTE — PROGRESS NOTES
CHIEF COMPLAINT: f/u uncontrolled diabetes, exacerbated by steroids    HISTORY OF PRESENT ILLNESS:   Nishant Mckeon Sr. is a 63 y.o. male with a PMHx as noted below who presents for f/u of uncontrolled type 2 diabetes with steroid induced hyperglycemia. Initial History:  Patient was diagnosed with diabetes in 2000 and also with multiple myeloma around April 2017, for which he was referred to Dr. Gregorio Cosme for evaluation and management. Prior to that he had stable diet controlled diabetes with A1c of 6.0%, followed by Dr. Isidro Cordova. Patient was subsequently started on RVd therapy along with dexamethasone for induction chemo and continues on dexamethasone 40mg every Tuesday (2 weeks on , 1 week off), no longer taking steroids in between. Patient notes he is aiming for 14 weeks of treatment with potential marrow transplant according to the patient. INTERVAL HISTORY:     Current Home Regimen: (as of changes during prior visit)  Lantus 15 units, Tuesday & Wednesday  Lantus 10 units, Thursday & Friday  Lantus 10 units only if needed Saturday, Sunday, and Monday ( if glucose >150 at bedtime )    Humalog 3 units with breakfast and dinner Tuesday and Wednesday,   Humalog per correction scale as needed in addition to the 3 units on Tuesday and Wednesday,  Humalog per correction scale as needed for all other days with breakfast and dinner only also.      Correction Scale  1:25>150    Review of home glucose: checks 3-4x/day  -130  Lunch 107-130's, 172  Dinner 110-125, 212  Bedtime 105-147, 314  (didnt take the 15 units this day)    Review of most recent diabetes-related labs:  Lab Results   Component Value Date    HBA1C 8.3 (H) 12/24/2010    LXA4JNEG 6.6% 04/07/2017    CHOL 101 07/14/2011    LDLC 27.8 07/14/2011    GFRAA >60 07/18/2017    GFRNA >60 07/18/2017    MALBEXT microalbumin:Cr ratio 54 04/07/2017    TSH 0.94 09/19/2011       Feeling well, no chest pain or SOB,    PAST MEDICAL/SURGICAL HISTORY:   Past Medical History:   Diagnosis Date    Acid reflux     Arrhythmia     pvc's    Chronic pain     neck    Depression     Diabetes ([address]     Femoral hernia 7/11/2012    Hepatitis C     Hypertension     Inguinal hernia 7/11/2012    Liver disease     hepatitis c    Other ill-defined conditions     Hx of PVCs since 2009    Prostatitis, acute     Psychiatric disorder     depression     Past Surgical History:   Procedure Laterality Date    COLONOSCOPY N/A 9/20/2016    COLONOSCOPY performed by Abdon Sanford MD at [facility] ENDOSCOPY    HX APPENDECTOMY      HX CERVICAL FUSION  2008    x 1 as of 4/30/2014    HX HEENT      foreign body removed from right eye    HX [facility]ERN[facility] REPAIR  [facility]    [facility]      HX ORTHOPAEDIC      cervical fusion    HX OTHER SURGICAL  2000    liver bx - chronic hepatitis       ALLERGIES:   Allergies   Allergen Reactions    Mercury (Bulk) Hives     Blisters         MEDICATIONS ON ADMISSION:     Current Outpatient Prescriptions:     ONETOUCH ULTRA TEST strip, USE TO TEST BLOOD SUGAR TID BEFORE EACH MEAL UTD., Disp: , Rfl: 4    REVLIMID 15 mg cap, , Disp: , Rfl:     losartan (COZAAR) 100 mg tablet, TK 1 T PO QD, Disp: , Rfl: 4    BD INSULIN PEN NEEDLE UF MINI 31 gauge x 3/16\" ndle, USE AS DIRECTED, Disp: , Rfl: 2    insulin lispro (HUMALOG) 100 unit/mL kwikpen, 3 Units by SubCUTAneous route Before breakfast, lunch, and dinner. Plus correction sliding scale as needed, Disp: , Rfl:     insulin glargine (LANTUS,BASAGLAR) 100 unit/mL (3 mL) inpn, 10 units daily at bedtime, Disp: 15 mL, Rfl: 1    lenalidomide 25 mg cap, Take  by mouth., Disp: , Rfl:     valACYclovir (VALTREX) 1 gram tablet, Take 1 Tab by mouth daily. , Disp: 90 Tab, Rfl: 3    tamsulosin (FLOMAX) 0.4 mg capsule, Take 1 Cap by mouth daily. , Disp: 30 Cap, Rfl: 1    propafenone (RYTHMOL) 150 mg tablet, Take 50 mg by mouth every eight (8) hours. , Disp: , Rfl:     zolpidem CR (AMBIEN CR) 12.5 mg tablet, Take 12.5 mg by mouth nightly as needed. , Disp: , Rfl:     TADALAFIL (CIALIS PO), Take 20 mg by mouth as needed. , Disp: , Rfl:     amLODIPine (NORVASC) 10 mg tablet, Take 10 mg by mouth daily. , Disp: , Rfl:     tapentadol (NUCYNTA) 100 mg tablet, Take 100 mg by mouth three (3) times daily. , Disp: , Rfl:     aripiprazole (ABILIFY) 5 mg tablet, Take 2.5 mg by mouth every morning., Disp: , Rfl:     buPROPion XL (WELLBUTRIN XL) 150 mg tablet, Take 1 Tab by mouth daily (with dinner). , Disp: 30 Tab, Rfl: 11    losartan (COZAAR) 50 mg tablet, Take 100 mg by mouth daily. , Disp: , Rfl:   No current facility-administered medications for this visit. Facility-Administered Medications Ordered in Other Visits:     [START ON 7/25/2017] bortezomib (VELCADE) 2.2 mg in sodium chloride 0.9 % SQ chemo syringe, 2.2 mg, SubCUTAneous, ONCE, MD Yvette Marmolejo  [START ON 7/25/2017] dexamethasone (DECADRON) tablet 40 mg, 40 mg, Oral, ONCE, MD Yvette Marmolejo  [START ON 7/28/2017] bortezomib (VELCADE) 2.2 mg in sodium chloride 0.9 % SQ chemo syringe, 2.2 mg, SubCUTAneous, ONCE, Tiffanie Mendez MD    SOCIAL HISTORY:   Social History     Social History    Marital status:      Spouse name: N/A    Number of children: N/A    Years of education: N/A     Occupational History    Not on file.      Social History Main Topics    Smoking status: Former Smoker     Packs/day: 1.00     Years: 12.00     Types: Cigarettes     Quit date: 1/1/1989    Smokeless tobacco: Never Used      Comment: former cigarette smoker    Alcohol use No      Comment: former alcoholic-quit 3074    Drug use: No    Sexual activity: Not on file     Other Topics Concern    Not on file     Social History Narrative       FAMILY HISTORY:  Family History   Problem Relation Age of Onset    Arthritis-osteo Mother     Arthritis-osteo Father     Diabetes Father     Hypertension Maternal Grandmother     Diabetes Maternal Grandmother     Hypertension Maternal Grandfather     Diabetes Maternal Grandfather        REVIEW OF SYSTEMS: Complete ROS assessed and noted for that which is described above, all else are negative. Eyes: normal  ENT: normal  CVS: normal  Resp: normal  GI: normal  : normal  GYN: normal  Endocrine: normal  Integument: normal  Musculoskeletal: normal  Neuro: normal  Psych: normal      PHYSICAL EXAMINATION:    VITAL SIGNS:  Visit Vitals    BP 98/64 (BP 1 Location: Left arm, BP Patient Position: Sitting)    Pulse 83    Ht 5' 8\" (1.727 m)    Wt 187 lb 9.6 oz (85.1 kg)    BMI 28.52 kg/m2       GENERAL: NCAT, Sitting comfortably, NAD  EYES: EOMI, non-icteric, no proptosis  Ear/Nose/Throat: NCAT, no inflammation, no masses  LYMPH NODES: No LAD  CARDIOVASCULAR: S1 S2, RRR, No murmur, 2+ radial pulses  RESPIRATORY: CTA b/l, no wheeze/rales  GASTROINTESTINAL: NT, ND  MUSCULOSKELETAL: Normal ROM, no atrophy  SKIN: warm, no edema/rash/ or other skin changes  NEUROLOGIC: 5/5 power all extremities, no tremor, AAOx3  PSYCHIATRIC: Normal affect, Normal insight and judgement    REVIEW OF LABORATORY AND RADIOLOGY DATA:   Labs and documentation have been reviewed as described above. ASSESSMENT AND PLAN:   Maris Smallwood Sr. is a 63 y.o. male with a PMHx as noted above who presents for f/u of uncontrolled type 2 diabetes with steroid induced hyperglycemia. Problems:  Type 2 diabetes Uncontrolled  Steroid induced hyperglycemia  Hypertension    Patient has good response with lantus at 15 units on the first day of steroids, 10 units on the second day. He was noted to fear taking 15 units but when he didn't sugars went up to the 300's. He has not required much humalog with his meals so we will keep it as needed based on his blood sugars for now. His insulin resistance improves after the first two days quite well. We discussed taking the lantus soon after his injection rather than waiting till bedtime.      PLAN  Type 2 Diabetes  Medications:  Lantus 15 units, Tuesday  Lantus 10 units, Wednesday  Humalog 1:25>150 correction scale provided as needed  Advised to check glucose ACHS  Provided with glucose log sheets for later review. HTN: norvasc, losartan, advised to stay hydrated in this heat  Lipids: will obtain lipid panel, ordered through lab as not fasting today    RTC: 6 week f/u, if remains stable at that point can extend f/u appts further, he is advised he can reach me here if he has any concerns between now and his next visit. Laura Mccauley.  [facility] Endocrinology  [address] [place] [address]

## 2017-07-24 ENCOUNTER — DOCUMENTATION ONLY (OUTPATIENT)
Dept: ONCOLOGY | Age: 62
End: 2017-07-24

## 2017-07-24 NOTE — PROGRESS NOTES
I called pt to inquire about a call he placed to [facility]. Pt reports 3 days ago he started having peeling of some fingers and peeling where his toes meet the sole of his feet. He called robert JACKSON on saturday but did not receive a call back. Pt has hx of rash with drug but he denies the rash occurring this time, only the peeling. Tomorrow is day 8 of revlimid. Pt does not feel the peeling has gotten worse he has been using utterly smooth as directed by [facility], he said he was using vaseline before. He will be in the [facility] tomorrow and we will follow up with him over there.

## 2017-07-25 ENCOUNTER — DOCUMENTATION ONLY (OUTPATIENT)
Dept: ONCOLOGY | Age: 62
End: 2017-07-25

## 2017-07-25 ENCOUNTER — HOSPITAL ENCOUNTER (OUTPATIENT)
Dept: INFUSION THERAPY | Age: 62
Discharge: HOME | End: 2017-07-25
Payer: COMMERCIAL

## 2017-07-25 VITALS — OXYGEN SATURATION: 100 %

## 2017-07-25 LAB
BASOPHILS # BLD AUTO: 0 K/UL (ref 0–0.1)
BASOPHILS # BLD: 0 % (ref 0–1)
EOSINOPHIL # BLD: 0.3 K/UL (ref 0–0.4)
EOSINOPHIL NFR BLD: 11 % (ref 0–7)
ERYTHROCYTE [DISTWIDTH] IN BLOOD BY AUTOMATED COUNT: 20.2 % (ref 11.5–14.5)
HCT VFR BLD AUTO: 27.5 % (ref 36.6–50.3)
HGB BLD-MCNC: 8.6 G/DL (ref 12.1–17)
LYMPHOCYTES # BLD AUTO: 33 % (ref 12–49)
LYMPHOCYTES # BLD: 0.8 K/UL (ref 0.8–3.5)
MCH RBC QN AUTO: 28.7 PG (ref 26–34)
MCHC RBC AUTO-ENTMCNC: 31.3 G/DL (ref 30–36.5)
MCV RBC AUTO: 91.7 FL (ref 80–99)
MONOCYTES # BLD: 0.1 K/UL (ref 0–1)
MONOCYTES NFR BLD AUTO: 5 % (ref 5–13)
NEUTS SEG # BLD: 1.1 K/UL (ref 1.8–8)
NEUTS SEG NFR BLD AUTO: 51 % (ref 32–75)
PLATELET # BLD AUTO: 97 K/UL (ref 150–400)
RBC # BLD AUTO: 3 M/UL (ref 4.1–5.7)
RBC MORPH BLD: ABNORMAL
WBC # BLD AUTO: 2.3 K/UL (ref 4.1–11.1)
WBC MORPH BLD: ABNORMAL

## 2017-07-25 PROCEDURE — 85025 COMPLETE CBC W/AUTO DIFF WBC: CPT | Performed by: INTERNAL MEDICINE

## 2017-07-25 PROCEDURE — 36415 COLL VENOUS BLD VENIPUNCTURE: CPT | Performed by: INTERNAL MEDICINE

## 2017-07-25 NOTE — PROGRESS NOTES
Pt arrived to [facility] ambulatory in no acute distress at 0800 for VRD C3D8.  Assessment unremarkable except peeling/dryness of hands. Labs obtained, CBC. Mounika Blanchard NP examined Pt's hands at [facility]. Visit Vitals    BP (P) 115/74 (BP 1 Location: Left arm, BP Patient Position: Sitting)    Pulse (P) 80    Temp (P) 99 °F (37.2 °C)    Resp (P) 18    Ht (P) 5' 8\" (1.727 m)    Wt (P) 83.1 kg (183 lb 4.8 oz)    SpO2 100%    BMI (P) 27.87 kg/m2     Recent Results (from the past 12 hour(s))   CBC WITH AUTOMATED DIFF    Collection Time: 07/25/17  8:32 AM   Result Value Ref Range    WBC 2.3 (L) 4.1 - 11.1 K/uL    RBC 3.00 (L) 4.10 - 5.70 M/uL    HGB 8.6 (L) 12.1 - 17.0 g/dL    HCT 27.5 (L) 36.6 - 50.3 %    MCV 91.7 80.0 - 99.0 FL    MCH 28.7 26.0 - 34.0 PG    MCHC 31.3 30.0 - 36.5 g/dL    RDW 20.2 (H) 11.5 - 14.5 %    PLATELET 97 (L) 017 - 400 K/uL    NEUTROPHILS 51 32 - 75 %    LYMPHOCYTES 33 12 - 49 %    MONOCYTES 5 5 - 13 %    EOSINOPHILS 11 (H) 0 - 7 %    BASOPHILS 0 0 - 1 %    ABS. NEUTROPHILS 1.1 (L) 1.8 - 8.0 K/UL    ABS. LYMPHOCYTES 0.8 0.8 - 3.5 K/UL    ABS. MONOCYTES 0.1 0.0 - 1.0 K/UL    ABS. EOSINOPHILS 0.3 0.0 - 0.4 K/UL    ABS. BASOPHILS 0.0 0.0 - 0.1 K/UL    RBC COMMENTS ANISOCYTOSIS  2+        RBC COMMENTS POLYCHROMASIA  1+        RBC COMMENTS SCHISTOCYTES  PRESENT        RBC COMMENTS TARGET CELLS  1+        WBC COMMENTS LARGE PLATELETS       The following medications administered:  NONE    Treatment held today, will resume with C3D11 on Friday. Pt discharged ambulatory in no acute distress at 0930, accompanied by self. Next appointment 7/28/17 at 1000.

## 2017-07-25 NOTE — PROGRESS NOTES
Mr. Jimmy Montelongo was in the infusion center for treatment today. Both hands and right foot are excoriated. He has been moisturizing daily but not as frequently as he should. Instructed him to moisturize hands and feet multiple times throughout the day and after washing hands. He is currently on Day 8 of Revlimid 15 mg daily. We will hold Revlimid for the remainder of this cycle and resume at 10 mg daily. We will hold Velcade today and resume on Friday as scheduled.     Trinidad Homans, NP

## 2017-07-28 ENCOUNTER — HOSPITAL ENCOUNTER (OUTPATIENT)
Dept: INFUSION THERAPY | Age: 62
Discharge: HOME | End: 2017-07-28
Payer: COMMERCIAL

## 2017-07-28 VITALS
RESPIRATION RATE: 18 BRPM | HEIGHT: 68 IN | OXYGEN SATURATION: 99 % | DIASTOLIC BLOOD PRESSURE: 56 MMHG | BODY MASS INDEX: 27.78 KG/M2 | TEMPERATURE: 98.7 F | HEART RATE: 62 BPM | WEIGHT: 183.3 LBS | SYSTOLIC BLOOD PRESSURE: 115 MMHG

## 2017-07-28 PROCEDURE — 74011250636 HC RX REV CODE- 250/636: Performed by: INTERNAL MEDICINE

## 2017-07-28 PROCEDURE — 74011000250 HC RX REV CODE- 250: Performed by: INTERNAL MEDICINE

## 2017-07-28 PROCEDURE — 96401 CHEMO ANTI-NEOPL SQ/IM: CPT

## 2017-07-28 RX ADMIN — BORTEZOMIB 2.2 MG: 3.5 INJECTION, POWDER, LYOPHILIZED, FOR SOLUTION INTRAVENOUS; SUBCUTANEOUS at 10:51

## 2017-07-28 NOTE — PROGRESS NOTES
Pt arrived to [id number] [facility]. ambulatory in no acute distress at 0959 for VRD C3D11.  Assessment unremarkable except pt hands peeling bilaterally. Per pt the peeling in his hands has improved with the lotion he is using. Patient Vitals for the past 12 hrs:   Temp Pulse Resp BP SpO2   07/28/17 1138 - 62 - - -   07/28/17 1002 98.7 °F (37.1 °C) (!) 48 18 115/56 99 %        Labs obtained: 7/25/17    The following medications administered:  Velcade 2.2 mg Sub-Q injection in R side of abdomen    Pt tolerated treatment well. No adverse reactions noted. Pt discharged ambulatory in no acute distress at 1059, accompanied by self.   Next appointment 8/8/17

## 2017-08-04 ENCOUNTER — OFFICE VISIT (OUTPATIENT)
Dept: ONCOLOGY | Age: 62
End: 2017-08-04

## 2017-08-04 VITALS
HEIGHT: 68 IN | DIASTOLIC BLOOD PRESSURE: 75 MMHG | SYSTOLIC BLOOD PRESSURE: 112 MMHG | WEIGHT: 187.2 LBS | HEART RATE: 85 BPM | RESPIRATION RATE: 18 BRPM | TEMPERATURE: 98.6 F | BODY MASS INDEX: 28.37 KG/M2 | OXYGEN SATURATION: 97 %

## 2017-08-04 DIAGNOSIS — D69.59 CHEMOTHERAPY-INDUCED THROMBOCYTOPENIA: ICD-10-CM

## 2017-08-04 DIAGNOSIS — T45.1X5A ANEMIA ASSOCIATED WITH CHEMOTHERAPY: ICD-10-CM

## 2017-08-04 DIAGNOSIS — R60.0 BILATERAL EDEMA OF LOWER EXTREMITY: ICD-10-CM

## 2017-08-04 DIAGNOSIS — E83.52 HYPERCALCEMIA: ICD-10-CM

## 2017-08-04 DIAGNOSIS — D64.81 ANEMIA ASSOCIATED WITH CHEMOTHERAPY: ICD-10-CM

## 2017-08-04 DIAGNOSIS — C90.00 MULTIPLE MYELOMA NOT HAVING ACHIEVED REMISSION (HCC): Primary | ICD-10-CM

## 2017-08-04 DIAGNOSIS — T45.1X5A CHEMOTHERAPY-INDUCED THROMBOCYTOPENIA: ICD-10-CM

## 2017-08-04 RX ORDER — FUROSEMIDE 20 MG/1
TABLET ORAL
Qty: 90 TAB | Refills: 0 | Status: SHIPPED | OUTPATIENT
Start: 2017-08-04 | End: 2017-11-07 | Stop reason: SDUPTHER

## 2017-08-04 RX ORDER — FUROSEMIDE 20 MG/1
20 TABLET ORAL DAILY
Qty: 30 TAB | Refills: 0 | Status: SHIPPED | OUTPATIENT
Start: 2017-08-04 | End: 2017-08-04 | Stop reason: SDUPTHER

## 2017-08-04 NOTE — PROGRESS NOTES
John Montano. is a 63 y.o. male  Here for 3 week follow up for Multiple Myeloma. Patient states pain in the neck #4/10. Mr. Yaz Lobo has a reminder for a \"due or due soon\" health maintenance. I have asked that he contact his primary care provider for follow-up on this health maintenance.

## 2017-08-04 NOTE — PROGRESS NOTES
Hematology Follow Up        Patient: Evelyn Bryan Sr. MRN: [id number]  SSN: [id number]    YOB: 1955  Age: 63 y.o. Sex: male          Diagnosis:     1. Multiple Myeloma, IgA Kappa   [place] stage IIIB   t (14;16)      Treatment:       1. Systemic therapy with RVd - s/p 3 cycles      Subjective:      Evelyn Bryan Sr. is a 63 y.o. male with a new diagnosis of IgA kappa myeloma. Bone marrow shows 100% aberrant plasma cells. He is fatigued. No bone pains or recent infection. He suffers with DM but it is diet controlled. He has received systemic anti-viral treatment for chronic Hep C with successful eradication of the virus. Mr. Herman Sanchez is receiving systemic therapy with RVd. He is having a good response to treatment. Hand and feet are improving. He has been moisturizing and this has improved significantly. He notes bilateral lower extremity edema.      Review of Systems:    Constitutional: fatigue  Eyes: negative  Ears, Nose, Mouth, Throat, and Face: negative  Respiratory: negative  Cardiovascular: negative  Gastrointestinal: negative  Genitourinary:negative  Integument/Breast: negative  Hematologic/Lymphatic: negative  Musculoskeletal:bilateral lower extremity edema  Neurological: negative      Past Medical History:   Diagnosis Date    Acid reflux     Arrhythmia     pvc's    Chronic pain     neck    Depression     Diabetes ([facility] [address]     Femoral hernia 7/11/2012    Hepatitis C     Hypertension     Inguinal hernia 7/11/2012    Liver disease     hepatitis c    Other ill-defined conditions     Hx of PVCs since 2009    Prostatitis, acute     Psychiatric disorder     depression     Past Surgical History:   Procedure Laterality Date    COLONOSCOPY N/A 9/20/2016    [facility]    HX APPENDECTOMY      HX CERVICAL FUSION  2008    x 1 as of 4/30/2014    HX HEENT      foreign body removed from right eye    [facility]      HX ORTHOPAEDIC      cervical fusion    HX OTHER SURGICAL  2000    liver bx - chronic hepatitis      Family History   Problem Relation Age of Onset    Arthritis-osteo Mother     Arthritis-osteo Father     Diabetes Father     Hypertension Maternal Grandmother     Diabetes Maternal Grandmother     Hypertension Maternal Grandfather     Diabetes Maternal Grandfather      Social History   Substance Use Topics    Smoking status: Former Smoker     Packs/day: 1.00     Years: 12.00     Types: Cigarettes     Quit date: 1/1/1989    Smokeless tobacco: Never Used      Comment: former cigarette smoker    Alcohol use No      Comment: former alcoholic-quit [id number]      Prior to Admission medications    Medication Sig Start Date End Date Taking? Authorizing Provider   ONETOUCH ULTRA TEST strip USE TO TEST BLOOD SUGAR TID BEFORE EACH MEAL UTD. 7/3/17  Yes Historical Provider   REVLIMID 15 mg cap  6/12/17  Yes Historical Provider   losartan (COZAAR) 100 mg tablet TK 1 T PO QD 7/3/17  Yes Historical Provider   BD INSULIN PEN NEEDLE UF MINI 31 gauge x 3/16\" ndle USE AS DIRECTED 6/7/17  Yes Historical Provider   insulin lispro (HUMALOG) 100 unit/mL kwikpen 3 Units by SubCUTAneous route Before breakfast, lunch, and dinner. Plus correction sliding scale as needed   Yes Historical Provider   insulin glargine (LANTUS,BASAGLAR) 100 unit/mL (3 mL) inpn 10 units daily at bedtime 6/9/17  Yes Lashell Long NP   lenalidomide 25 mg cap Take  by mouth. Yes Historical Provider   valACYclovir (VALTREX) 1 gram tablet Take 1 Tab by mouth daily. 6/6/17  Yes Amanda Tenorio MD   tamsulosin (FLOMAX) 0.4 mg capsule Take 1 Cap by mouth daily. 4/4/17  Yes Tyrese Rodriguez MD   propafenone (RYTHMOL) 150 mg tablet Take 50 mg by mouth every eight (8) hours. Yes Historical Provider   zolpidem CR (AMBIEN CR) 12.5 mg tablet Take 12.5 mg by mouth nightly as needed.      Yes Historical Provider   TADALAFIL (CIALIS PO) Take 20 mg by mouth as needed. Yes Historical Provider   losartan (COZAAR) 50 mg tablet Take 100 mg by mouth daily. Yes Historical Provider   amLODIPine (NORVASC) 10 mg tablet Take 10 mg by mouth daily. Yes Historical Provider   tapentadol (NUCYNTA) 100 mg tablet Take 100 mg by mouth three (3) times daily. Yes Goldy Diaz MD   aripiprazole (ABILIFY) 5 mg tablet Take 2.5 mg by mouth every morning. Yes Goldy Diaz MD   buPROPion XL (WELLBUTRIN XL) 150 mg tablet Take 1 Tab by mouth daily (with dinner). 4/25/10  Yes Calista Parker MD              Allergies   Allergen Reactions    Mercury (Bulk) Hives     Blisters             Objective:     Vitals:    17 1120   BP: 112/75   Pulse: 85   Resp: 18   Temp: 98.6 °F (37 °C)   TempSrc: Oral   SpO2: 97%   Weight: 187 lb 3.2 oz (84.9 kg)   Height: 5' 8\" (1.727 m)            Physical Exam:    GENERAL: alert, cooperative  EYE: negative  LYMPHATIC: Cervical, supraclavicular, and axillary nodes normal.   THROAT & NECK: normal and no erythema or exudates noted. LUNG: clear to auscultation bilaterally  HEART: regular rate and rhythm  ABDOMEN: soft, non-tender  EXTREMITIES: 2+ b/l LE edema  SKIN: Normal.  NEUROLOGIC: negative      Lab Results   Component Value Date/Time    WBC 2.3 2017 08:32 AM    HGB 8.6 2017 08:32 AM    HCT 27.5 2017 08:32 AM    PLATELET 97  08:32 AM    MCV 91.7 2017 08:32 AM       M-spike: 5.8 => 2.9 => 1.9, IgA kappa    Serum F => 496 => 246       Assessment:     1. Multiple Myeloma, IgA Kappa   Dur[place] stage IIIB    Cytogenetics/FISH: t(14;16) - high risk  ECOG PS - 0   Intent of therapy: palliative    Receiving systemic therapy with RVd - s/p 3 cycles    Tolerating treatment   A detailed system by system evaluation of side effect was performed to assess chemotherapy related toxicity. Blood counts are acceptable. Results reviewed with the patient.   Responding well to the treatment  Symptom management form reviewed with patient. 2. Anemia, myeloma related - improved    > s/p 2 units PRBCs on 6/7      3. Thrombocytopenia - improved   From myeloma    > On reduced dose of Revlimid 10 mg  > On reduced dose of Velcade - 1.1 mg/m2      4. Hypercalcemia, myeloma related   Resolved    > Zometa      5. Hand-foot syndrome - improved    > Moisturizing liberally  > Revlimid dose reduced to 10 mg daily      7. Bilateral lower extremity edema    > Furosemide 20 mg daily      Plan:       > Continue chemotherapy with RVd  > Continue Zometa  > Continue Valacyclovir prophylaxis   > Furosemide 20 mg daily  > Follow-up in 6 weeks        I saw the patient in conjunction with Bert Segura NP.          Signed by: Emma Garcia MD                     August 4, 2017            Sade Nixon MD

## 2017-08-06 RX ORDER — DEXAMETHASONE 4 MG/1
40 TABLET ORAL ONCE
Status: ACTIVE | OUTPATIENT
Start: 2017-08-08 | End: 2017-08-08

## 2017-08-08 ENCOUNTER — HOSPITAL ENCOUNTER (OUTPATIENT)
Dept: INFUSION THERAPY | Age: 62
Discharge: HOME | End: 2017-08-08
Payer: MEDICARE

## 2017-08-08 VITALS
HEIGHT: 68 IN | SYSTOLIC BLOOD PRESSURE: 104 MMHG | RESPIRATION RATE: 18 BRPM | HEART RATE: 72 BPM | OXYGEN SATURATION: 99 % | BODY MASS INDEX: 28.92 KG/M2 | TEMPERATURE: 98.7 F | DIASTOLIC BLOOD PRESSURE: 68 MMHG | WEIGHT: 190.8 LBS

## 2017-08-08 LAB
ALBUMIN SERPL BCP-MCNC: 3.4 G/DL (ref 3.5–5)
ALBUMIN/GLOB SERPL: 0.6 {RATIO} (ref 1.1–2.2)
ALP SERPL-CCNC: 97 U/L (ref 45–117)
ALT SERPL-CCNC: 20 U/L (ref 12–78)
ANION GAP BLD CALC-SCNC: 6 MMOL/L (ref 5–15)
AST SERPL W P-5'-P-CCNC: 16 U/L (ref 15–37)
BASOPHILS # BLD AUTO: 0 K/UL
BASOPHILS # BLD: 1 %
BILIRUB DIRECT SERPL-MCNC: <0.1 MG/DL (ref 0–0.2)
BILIRUB SERPL-MCNC: 0.2 MG/DL (ref 0.2–1)
BUN SERPL-MCNC: 12 MG/DL (ref 6–20)
BUN/CREAT SERPL: 10 (ref 12–20)
CALCIUM SERPL-MCNC: 7.7 MG/DL (ref 8.5–10.1)
CHLORIDE SERPL-SCNC: 107 MMOL/L (ref 97–108)
CO2 SERPL-SCNC: 25 MMOL/L (ref 21–32)
CREAT SERPL-MCNC: 1.22 MG/DL (ref 0.7–1.3)
DIFFERENTIAL METHOD BLD: ABNORMAL
EOSINOPHIL # BLD: 0 K/UL
EOSINOPHIL NFR BLD: 1 %
ERYTHROCYTE [DISTWIDTH] IN BLOOD BY AUTOMATED COUNT: 19.6 % (ref 11.5–14.5)
GLOBULIN SER CALC-MCNC: 5.4 G/DL (ref 2–4)
GLUCOSE SERPL-MCNC: 103 MG/DL (ref 65–100)
HCT VFR BLD AUTO: 30.1 % (ref 36.6–50.3)
HGB BLD-MCNC: 9.4 G/DL (ref 12.1–17)
IGA SERPL-MCNC: 2150 MG/DL (ref 70–400)
IGG SERPL-MCNC: 203 MG/DL (ref 700–1600)
IGM SERPL-MCNC: <21 MG/DL (ref 40–230)
LYMPHOCYTES # BLD AUTO: 42 %
LYMPHOCYTES # BLD: 0.8 K/UL
MCH RBC QN AUTO: 28.7 PG (ref 26–34)
MCHC RBC AUTO-ENTMCNC: 31.2 G/DL (ref 30–36.5)
MCV RBC AUTO: 91.8 FL (ref 80–99)
MONOCYTES # BLD: 0.4 K/UL
MONOCYTES NFR BLD AUTO: 21 %
NEUTS SEG # BLD: 0.7 K/UL
NEUTS SEG NFR BLD AUTO: 35 %
PLATELET # BLD AUTO: 116 K/UL (ref 150–400)
POTASSIUM SERPL-SCNC: 4 MMOL/L (ref 3.5–5.1)
PROT SERPL-MCNC: 8.8 G/DL (ref 6.4–8.2)
RBC # BLD AUTO: 3.28 M/UL (ref 4.1–5.7)
RBC MORPH BLD: ABNORMAL
RBC MORPH BLD: ABNORMAL
SODIUM SERPL-SCNC: 138 MMOL/L (ref 136–145)
WBC # BLD AUTO: 1.9 K/UL (ref 4.1–11.1)

## 2017-08-08 PROCEDURE — 74011000258 HC RX REV CODE- 258: Performed by: INTERNAL MEDICINE

## 2017-08-08 PROCEDURE — 80048 BASIC METABOLIC PNL TOTAL CA: CPT | Performed by: INTERNAL MEDICINE

## 2017-08-08 PROCEDURE — 82784 ASSAY IGA/IGD/IGG/IGM EACH: CPT | Performed by: INTERNAL MEDICINE

## 2017-08-08 PROCEDURE — 74011250636 HC RX REV CODE- 250/636: Performed by: INTERNAL MEDICINE

## 2017-08-08 PROCEDURE — 36415 COLL VENOUS BLD VENIPUNCTURE: CPT | Performed by: INTERNAL MEDICINE

## 2017-08-08 PROCEDURE — 84165 PROTEIN E-PHORESIS SERUM: CPT | Performed by: INTERNAL MEDICINE

## 2017-08-08 PROCEDURE — 96374 THER/PROPH/DIAG INJ IV PUSH: CPT

## 2017-08-08 PROCEDURE — 85025 COMPLETE CBC W/AUTO DIFF WBC: CPT | Performed by: INTERNAL MEDICINE

## 2017-08-08 PROCEDURE — 80076 HEPATIC FUNCTION PANEL: CPT | Performed by: INTERNAL MEDICINE

## 2017-08-08 PROCEDURE — 83883 ASSAY NEPHELOMETRY NOT SPEC: CPT | Performed by: INTERNAL MEDICINE

## 2017-08-08 RX ORDER — SODIUM CHLORIDE 0.9 % (FLUSH) 0.9 %
10-40 SYRINGE (ML) INJECTION AS NEEDED
Status: ACTIVE | OUTPATIENT
Start: 2017-08-08 | End: 2017-08-09

## 2017-08-08 RX ORDER — SODIUM CHLORIDE 9 MG/ML
25 INJECTION, SOLUTION INTRAVENOUS AS NEEDED
Status: DISPENSED | OUTPATIENT
Start: 2017-08-08 | End: 2017-08-09

## 2017-08-08 RX ADMIN — SODIUM CHLORIDE 25 ML/HR: 900 INJECTION, SOLUTION INTRAVENOUS at 12:09

## 2017-08-08 RX ADMIN — SODIUM CHLORIDE 4 MG: 900 INJECTION, SOLUTION INTRAVENOUS at 12:15

## 2017-08-08 RX ADMIN — Medication 10 ML: at 10:18

## 2017-08-08 RX ADMIN — Medication 10 ML: at 12:35

## 2017-08-08 NOTE — PROGRESS NOTES
Outpatient Infusion Center - Chemotherapy Progress Note    1000- Pt admit to [facility] for VRD + Zometa C4D1 ambulatory in stable condition. Assessment completed. No concerns voiced, pt's hands are no longer peeling. PIV established with positive blood return. Labs drawn per order and sent. Trudy Das NP at chairside assessing pt. Orders received to HOLD Velcade today due to lab results and resume next week. OK to proceed with Zometa. Pt's Revlimid dose to be reduced to 5 mg PO. Pt verbalizes understanding. Pt educated on importance of hand hygiene and avoiding others who are sick to decrease risk for infection. Visit Vitals    /69 (BP 1 Location: Left arm, BP Patient Position: Sitting)    Pulse 80    Temp 98.7 °F (37.1 °C)    Resp 18    Ht 5' 8\" (1.727 m)    Wt 86.5 kg (190 lb 12.8 oz)    SpO2 99%    BMI 29.01 kg/m2     Recent Results (from the past 12 hour(s))   CBC WITH AUTOMATED DIFF    Collection Time: 08/08/17 10:04 AM   Result Value Ref Range    WBC 1.9 (L) 4.1 - 11.1 K/uL    RBC 3.28 (L) 4.10 - 5.70 M/uL    HGB 9.4 (L) 12.1 - 17.0 g/dL    HCT 30.1 (L) 36.6 - 50.3 %    MCV 91.8 80.0 - 99.0 FL    MCH 28.7 26.0 - 34.0 PG    MCHC 31.2 30.0 - 36.5 g/dL    RDW 19.6 (H) 11.5 - 14.5 %    PLATELET 655 (L) 926 - 400 K/uL    NEUTROPHILS 35 %    LYMPHOCYTES 42 %    MONOCYTES 21 %    EOSINOPHILS 1 %    BASOPHILS 1 %    ABS. NEUTROPHILS 0.7 K/UL    ABS. LYMPHOCYTES 0.8 K/UL    ABS. MONOCYTES 0.4 K/UL    ABS. EOSINOPHILS 0.0 K/UL    ABS.  BASOPHILS 0.0 K/UL    RBC COMMENTS ANISOCYTOSIS  1+        RBC COMMENTS SPHEROCYTES  PRESENT        DF MANUAL     METABOLIC PANEL, BASIC    Collection Time: 08/08/17 10:04 AM   Result Value Ref Range    Sodium 138 136 - 145 mmol/L    Potassium 4.0 3.5 - 5.1 mmol/L    Chloride 107 97 - 108 mmol/L    CO2 25 21 - 32 mmol/L    Anion gap 6 5 - 15 mmol/L    Glucose 103 (H) 65 - 100 mg/dL    BUN 12 6 - 20 MG/DL    Creatinine 1.22 0.70 - 1.30 MG/DL    BUN/Creatinine ratio 10 (L) 12 - 20      GFR est AA >60 >60 ml/min/1.73m2    GFR est non-AA >60 >60 ml/min/1.73m2    Calcium 7.7 (L) 8.5 - 10.1 MG/DL   HEPATIC FUNCTION PANEL    Collection Time: 08/08/17 10:04 AM   Result Value Ref Range    Protein, total 8.8 (H) 6.4 - 8.2 g/dL    Albumin 3.4 (L) 3.5 - 5.0 g/dL    Globulin 5.4 (H) 2.0 - 4.0 g/dL    A-G Ratio 0.6 (L) 1.1 - 2.2      Bilirubin, total 0.2 0.2 - 1.0 MG/DL    Bilirubin, direct <0.1 0.0 - 0.2 MG/DL    Alk. phosphatase 97 45 - 117 U/L    AST (SGOT) 16 15 - 37 U/L    ALT (SGPT) 20 12 - 78 U/L     *Please follow up in CC for remaining lab results. Medications:  Velcade- HELD  NS KVO  Zometa 4 mg IV    Visit Vitals    /68    Pulse 72    Temp 98.7 °F (37.1 °C)    Resp 18    Ht 5' 8\" (1.727 m)    Wt 86.5 kg (190 lb 12.8 oz)    SpO2 99%    BMI 29.01 kg/m2       1235- Pt tolerated treatment well. PIV maintained positive blood return throughout treatment, flushed with positive blood return at conclusion and removed. D/c home ambulatory in no distress.  Pt aware of next [facility] appointment scheduled for 8/11 at 10 AM.

## 2017-08-09 LAB
ALBUMIN SERPL ELPH-MCNC: 3.8 G/DL (ref 2.9–4.4)
ALBUMIN/GLOB SERPL: 0.9 {RATIO} (ref 0.7–1.7)
ALPHA1 GLOB SERPL ELPH-MCNC: 0.2 G/DL (ref 0–0.4)
ALPHA2 GLOB SERPL ELPH-MCNC: 0.8 G/DL (ref 0.4–1)
B-GLOBULIN SERPL ELPH-MCNC: 0.8 G/DL (ref 0.7–1.3)
GAMMA GLOB SERPL ELPH-MCNC: 2.4 G/DL (ref 0.4–1.8)
GLOBULIN SER CALC-MCNC: 4.1 G/DL (ref 2.2–3.9)
IGA SERPL-MCNC: 2177 MG/DL (ref 90–386)
IGG SERPL-MCNC: 193 MG/DL (ref 700–1600)
IGM SERPL-MCNC: <5 MG/DL (ref 20–172)
KAPPA LC FREE SER-MCNC: 856.6 MG/L (ref 3.3–19.4)
KAPPA LC FREE/LAMBDA FREE SER: 305.93 {RATIO} (ref 0.26–1.65)
LAMBDA LC FREE SERPL-MCNC: 2.8 MG/L (ref 5.7–26.3)
M PROTEIN SERPL ELPH-MCNC: 2.1 G/DL
PROT PATTERN SERPL IFE-IMP: ABNORMAL
PROT SERPL-MCNC: 7.9 G/DL (ref 6–8.5)

## 2017-08-11 ENCOUNTER — HOSPITAL ENCOUNTER (OUTPATIENT)
Dept: INFUSION THERAPY | Age: 62
Discharge: HOME | End: 2017-08-11
Payer: MEDICARE

## 2017-08-11 ENCOUNTER — HOSPITAL ENCOUNTER (EMERGENCY)
Age: 62
Discharge: HOME | End: 2017-08-11
Attending: EMERGENCY MEDICINE
Payer: COMMERCIAL

## 2017-08-11 ENCOUNTER — APPOINTMENT (OUTPATIENT)
Dept: GENERAL RADIOLOGY | Age: 62
End: 2017-08-11
Attending: PHYSICIAN ASSISTANT
Payer: COMMERCIAL

## 2017-08-11 VITALS
WEIGHT: 189.82 LBS | DIASTOLIC BLOOD PRESSURE: 69 MMHG | TEMPERATURE: 98.2 F | BODY MASS INDEX: 28.86 KG/M2 | SYSTOLIC BLOOD PRESSURE: 126 MMHG | HEART RATE: 83 BPM | OXYGEN SATURATION: 100 % | RESPIRATION RATE: 16 BRPM

## 2017-08-11 DIAGNOSIS — S16.1XXA CERVICAL STRAIN, INITIAL ENCOUNTER: Primary | ICD-10-CM
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
## 2017-08-11 PROCEDURE — 99282 EMERGENCY DEPT VISIT SF MDM: CPT

## 2017-08-11 PROCEDURE — 72050 X-RAY EXAM NECK SPINE 4/5VWS: CPT

## 2017-08-11 RX ORDER — DEXAMETHASONE 4 MG/1
40 TABLET ORAL ONCE
Status: COMPLETED | OUTPATIENT
Start: 2017-08-15 | End: 2017-08-15
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
## 2017-08-11 NOTE — ED NOTES
Patient discharged and given discharge instructions by MALINDA Hawthorne. Patient had an opportunity to ask questions. Patient verbalized understanding of discharge instructions. Patient ambulatory from ED, discharge instructions and prescriptions in hand. Patient accompanied by family.

## 2017-08-15 ENCOUNTER — HOSPITAL ENCOUNTER (OUTPATIENT)
Dept: INFUSION THERAPY | Age: 62
Discharge: HOME | End: 2017-08-15
Payer: MEDICARE

## 2017-08-15 VITALS
HEART RATE: 72 BPM | BODY MASS INDEX: 28.89 KG/M2 | HEIGHT: 68 IN | WEIGHT: 190.6 LBS | RESPIRATION RATE: 18 BRPM | DIASTOLIC BLOOD PRESSURE: 75 MMHG | SYSTOLIC BLOOD PRESSURE: 113 MMHG | OXYGEN SATURATION: 99 % | TEMPERATURE: 98.8 F

## 2017-08-15 LAB
ALBUMIN SERPL BCP-MCNC: 3.5 G/DL (ref 3.5–5)
ALBUMIN/GLOB SERPL: 0.6 {RATIO} (ref 1.1–2.2)
ALP SERPL-CCNC: 92 U/L (ref 45–117)
ALT SERPL-CCNC: 21 U/L (ref 12–78)
ANION GAP BLD CALC-SCNC: 8 MMOL/L (ref 5–15)
AST SERPL W P-5'-P-CCNC: 16 U/L (ref 15–37)
BASOPHILS # BLD AUTO: 0 K/UL (ref 0–0.1)
BASOPHILS # BLD: 1 % (ref 0–1)
BILIRUB DIRECT SERPL-MCNC: <0.1 MG/DL (ref 0–0.2)
BILIRUB SERPL-MCNC: 0.2 MG/DL (ref 0.2–1)
BUN SERPL-MCNC: 13 MG/DL (ref 6–20)
BUN/CREAT SERPL: 11 (ref 12–20)
CALCIUM SERPL-MCNC: 8.9 MG/DL (ref 8.5–10.1)
CHLORIDE SERPL-SCNC: 105 MMOL/L (ref 97–108)
CO2 SERPL-SCNC: 25 MMOL/L (ref 21–32)
CREAT SERPL-MCNC: 1.23 MG/DL (ref 0.7–1.3)
CRP SERPL-MCNC: <0.29 MG/DL (ref 0–0.6)
EOSINOPHIL # BLD: 0 K/UL (ref 0–0.4)
EOSINOPHIL NFR BLD: 2 % (ref 0–7)
ERYTHROCYTE [DISTWIDTH] IN BLOOD BY AUTOMATED COUNT: 19.3 % (ref 11.5–14.5)
GLOBULIN SER CALC-MCNC: 6 G/DL (ref 2–4)
GLUCOSE SERPL-MCNC: 108 MG/DL (ref 65–100)
HCT VFR BLD AUTO: 28.5 % (ref 36.6–50.3)
HGB BLD-MCNC: 9 G/DL (ref 12.1–17)
IGA SERPL-MCNC: 2670 MG/DL (ref 70–400)
IGG SERPL-MCNC: 216 MG/DL (ref 700–1600)
IGM SERPL-MCNC: <21 MG/DL (ref 40–230)
LYMPHOCYTES # BLD AUTO: 43 % (ref 12–49)
LYMPHOCYTES # BLD: 0.9 K/UL (ref 0.8–3.5)
MCH RBC QN AUTO: 28.7 PG (ref 26–34)
MCHC RBC AUTO-ENTMCNC: 31.6 G/DL (ref 30–36.5)
MCV RBC AUTO: 90.8 FL (ref 80–99)
MONOCYTES # BLD: 0.2 K/UL (ref 0–1)
MONOCYTES NFR BLD AUTO: 9 % (ref 5–13)
NEUTS SEG # BLD: 1 K/UL (ref 1.8–8)
NEUTS SEG NFR BLD AUTO: 45 % (ref 32–75)
PLATELET # BLD AUTO: 111 K/UL (ref 150–400)
POTASSIUM SERPL-SCNC: 4 MMOL/L (ref 3.5–5.1)
PROT SERPL-MCNC: 9.5 G/DL (ref 6.4–8.2)
RBC # BLD AUTO: 3.14 M/UL (ref 4.1–5.7)
RBC MORPH BLD: ABNORMAL
SODIUM SERPL-SCNC: 138 MMOL/L (ref 136–145)
WBC # BLD AUTO: 2.1 K/UL (ref 4.1–11.1)

## 2017-08-15 PROCEDURE — 85025 COMPLETE CBC W/AUTO DIFF WBC: CPT | Performed by: INTERNAL MEDICINE

## 2017-08-15 PROCEDURE — 74011250636 HC RX REV CODE- 250/636: Performed by: INTERNAL MEDICINE

## 2017-08-15 PROCEDURE — 36415 COLL VENOUS BLD VENIPUNCTURE: CPT | Performed by: INTERNAL MEDICINE

## 2017-08-15 PROCEDURE — 74011000258 HC RX REV CODE- 258: Performed by: INTERNAL MEDICINE

## 2017-08-15 PROCEDURE — 82784 ASSAY IGA/IGD/IGG/IGM EACH: CPT | Performed by: INTERNAL MEDICINE

## 2017-08-15 PROCEDURE — 84165 PROTEIN E-PHORESIS SERUM: CPT | Performed by: INTERNAL MEDICINE

## 2017-08-15 PROCEDURE — 74011250636 HC RX REV CODE- 250/636

## 2017-08-15 PROCEDURE — 80076 HEPATIC FUNCTION PANEL: CPT | Performed by: INTERNAL MEDICINE

## 2017-08-15 PROCEDURE — 96402 CHEMO HORMON ANTINEOPL SQ/IM: CPT

## 2017-08-15 PROCEDURE — 80048 BASIC METABOLIC PNL TOTAL CA: CPT | Performed by: INTERNAL MEDICINE

## 2017-08-15 PROCEDURE — A9270 NON-COVERED ITEM OR SERVICE: HCPCS | Performed by: INTERNAL MEDICINE

## 2017-08-15 PROCEDURE — 86140 C-REACTIVE PROTEIN: CPT | Performed by: INTERNAL MEDICINE

## 2017-08-15 PROCEDURE — 83883 ASSAY NEPHELOMETRY NOT SPEC: CPT | Performed by: INTERNAL MEDICINE

## 2017-08-15 RX ADMIN — BORTEZOMIB 2.2 MG: 3.5 INJECTION, POWDER, LYOPHILIZED, FOR SOLUTION INTRAVENOUS; SUBCUTANEOUS at 12:04

## 2017-08-15 RX ADMIN — DEXAMETHASONE 40 MG: 4 TABLET ORAL at 11:40

## 2017-08-15 NOTE — PROGRESS NOTES
1005 Pt admit to [facility] for C4D1 Velcade ambulatory in stable condition. Assessment completed. No new concerns voiced. Labs drawn peripherally per order and sent for processing. Labs reviewed, spoke with Adalberto Beach NP ok to treat. Visit Vitals    /75    Pulse 72    Temp 98.8 °F (37.1 °C)    Resp 18    SpO2 99%       Medications:  Velcade SQ left abdomen        Chemotherapy Flowsheet 8/15/2017   Cycle C4D1   Date 8/15/2017   Drug / Regimen Velcade   Notes given           1205 Pt tolerated treatment well. D/c home ambulatory in no distress. Pt aware of next appointment scheduled for 8/22/17. Recent Results (from the past 12 hour(s))   CBC WITH AUTOMATED DIFF    Collection Time: 08/15/17 10:12 AM   Result Value Ref Range    WBC 2.1 (L) 4.1 - 11.1 K/uL    RBC 3.14 (L) 4.10 - 5.70 M/uL    HGB 9.0 (L) 12.1 - 17.0 g/dL    HCT 28.5 (L) 36.6 - 50.3 %    MCV 90.8 80.0 - 99.0 FL    MCH 28.7 26.0 - 34.0 PG    MCHC 31.6 30.0 - 36.5 g/dL    RDW 19.3 (H) 11.5 - 14.5 %    PLATELET 665 (L) 113 - 400 K/uL    NEUTROPHILS 45 32 - 75 %    LYMPHOCYTES 43 12 - 49 %    MONOCYTES 9 5 - 13 %    EOSINOPHILS 2 0 - 7 %    BASOPHILS 1 0 - 1 %    ABS. NEUTROPHILS 1.0 (L) 1.8 - 8.0 K/UL    ABS. LYMPHOCYTES 0.9 0.8 - 3.5 K/UL    ABS. MONOCYTES 0.2 0.0 - 1.0 K/UL    ABS. EOSINOPHILS 0.0 0.0 - 0.4 K/UL    ABS.  BASOPHILS 0.0 0.0 - 0.1 K/UL    RBC COMMENTS ANISOCYTOSIS  2+       METABOLIC PANEL, BASIC    Collection Time: 08/15/17 10:12 AM   Result Value Ref Range    Sodium 138 136 - 145 mmol/L    Potassium 4.0 3.5 - 5.1 mmol/L    Chloride 105 97 - 108 mmol/L    CO2 25 21 - 32 mmol/L    Anion gap 8 5 - 15 mmol/L    Glucose 108 (H) 65 - 100 mg/dL    BUN 13 6 - 20 MG/DL    Creatinine 1.23 0.70 - 1.30 MG/DL    BUN/Creatinine ratio 11 (L) 12 - 20      GFR est AA >60 >60 ml/min/1.73m2    GFR est non-AA >60 >60 ml/min/1.73m2    Calcium 8.9 8.5 - 10.1 MG/DL   HEPATIC FUNCTION PANEL    Collection Time: 08/15/17 10:12 AM   Result Value Ref Range Protein, total 9.5 (H) 6.4 - 8.2 g/dL    Albumin 3.5 3.5 - 5.0 g/dL    Globulin 6.0 (H) 2.0 - 4.0 g/dL    A-G Ratio 0.6 (L) 1.1 - 2.2      Bilirubin, total 0.2 0.2 - 1.0 MG/DL    Bilirubin, direct <0.1 0.0 - 0.2 MG/DL    Alk.  phosphatase 92 45 - 117 U/L    AST (SGOT) 16 15 - 37 U/L    ALT (SGPT) 21 12 - 78 U/L

## 2017-08-16 LAB
ALBUMIN SERPL ELPH-MCNC: 3.8 G/DL (ref 2.9–4.4)
ALBUMIN/GLOB SERPL: 0.8 {RATIO} (ref 0.7–1.7)
ALPHA1 GLOB SERPL ELPH-MCNC: 0.2 G/DL (ref 0–0.4)
ALPHA2 GLOB SERPL ELPH-MCNC: 0.9 G/DL (ref 0.4–1)
B-GLOBULIN SERPL ELPH-MCNC: 0.9 G/DL (ref 0.7–1.3)
GAMMA GLOB SERPL ELPH-MCNC: 3 G/DL (ref 0.4–1.8)
GLOBULIN SER CALC-MCNC: 5 G/DL (ref 2.2–3.9)
KAPPA LC FREE SER-MCNC: 943.1 MG/L (ref 3.3–19.4)
KAPPA LC FREE/LAMBDA FREE SER: 314.37 {RATIO} (ref 0.26–1.65)
LAMBDA LC FREE SERPL-MCNC: 3 MG/L (ref 5.7–26.3)
M PROTEIN SERPL ELPH-MCNC: 2.8 G/DL
PROT SERPL-MCNC: 8.8 G/DL (ref 6–8.5)

## 2017-08-16 NOTE — PROGRESS NOTES
Kappa lambda ratio is not improving as rapidly as I would like. Will re-evaluate after 1-2 more cycles of therapy.

## 2017-08-18 ENCOUNTER — APPOINTMENT (OUTPATIENT)
Dept: INFUSION THERAPY | Age: 62
End: 2017-08-18
Payer: MEDICARE

## 2017-08-18 ENCOUNTER — OFFICE VISIT (OUTPATIENT)
Dept: ONCOLOGY | Age: 62
End: 2017-08-18

## 2017-08-18 VITALS
RESPIRATION RATE: 18 BRPM | OXYGEN SATURATION: 97 % | TEMPERATURE: 98.6 F | HEIGHT: 68 IN | DIASTOLIC BLOOD PRESSURE: 63 MMHG | SYSTOLIC BLOOD PRESSURE: 104 MMHG | WEIGHT: 193.8 LBS | HEART RATE: 60 BPM | BODY MASS INDEX: 29.37 KG/M2

## 2017-08-18 DIAGNOSIS — T45.1X5A ANEMIA ASSOCIATED WITH CHEMOTHERAPY: ICD-10-CM

## 2017-08-18 DIAGNOSIS — D69.59 CHEMOTHERAPY-INDUCED THROMBOCYTOPENIA: ICD-10-CM

## 2017-08-18 DIAGNOSIS — C90.00 MULTIPLE MYELOMA NOT HAVING ACHIEVED REMISSION (HCC): Primary | ICD-10-CM

## 2017-08-18 DIAGNOSIS — E83.52 HYPERCALCEMIA: ICD-10-CM

## 2017-08-18 DIAGNOSIS — D64.81 ANEMIA ASSOCIATED WITH CHEMOTHERAPY: ICD-10-CM

## 2017-08-18 DIAGNOSIS — T45.1X5A CHEMOTHERAPY-INDUCED THROMBOCYTOPENIA: ICD-10-CM

## 2017-08-18 NOTE — MR AVS SNAPSHOT
Visit Information Date & Time Provider Department Dept. Phone Encounter #  
 8/18/2017  8:45 AM Roc Downey NP [id number] [facility] at [facility] [phone] [id number] Your Appointments 9/1/2017  2:50 PM  
Follow Up with MD Gee [facility]) [address] \[facility] [place] [facility]\"" [address] [phone] [address] [place] [address] Road 9/15/2017 11:45 AM  
Any with AN[facility] at [facility]) Appt Note: onc 6 wk f/u  
 [address] [facility] [address] [id number]  
[address] [place] [address] Drive [address] Rajan Teran Upcoming Health Maintenance Date Due  
 EYE EXAM RETINAL OR DILATED Q1 10/9/1967 DTaP/Tdap/Td series (1 - Tdap) 10/9/1978 FOBT Q 1 YEAR AGE 50-75 10/9/2007 Pneumococcal 19-64 Highest Risk (2 of 3 - PCV13) 7/14/2012 LIPID PANEL Q1 7/14/2012 INFLUENZA AGE 9 TO ADULT 8/1/2017 ZOSTER VACCINE AGE 60> 8/9/2017 HEMOGLOBIN A1C Q6M 10/7/2017 MICROALBUMIN Q1 4/11/2018 FOOT EXAM Q1 7/10/2018 Allergies as of 8/18/2017  Review Complete On: 8/18/2017 By: Kaycee Hart LPN Severity Noted Reaction Type Reactions Mercury (Bulk) Low 04/21/2010   Topical Hives Blisters Current Immunizations  Reviewed on 8/18/2017 Name Date Influenza Vaccine Split 11/14/2010  6:57 PM  
 ZZZ-RETIRED (DO NOT USE) Pneumococcal Vaccine (Unspecified Type) 7/14/2011  6:30 PM  
  
 Reviewed by Kaycee Hart LPN on 5/16/3966 at  8:36 AM  
Vitals BP Pulse Temp Resp Height(growth percentile) Weight(growth percentile) 104/63 (BP 1 Location: Left arm, BP Patient Position: Sitting) 60 98.6 °F (37 °C) (Oral) 18 5' 8\" (1.727 m) 193 lb 12.8 oz (87.9 kg) SpO2 BMI Smoking Status 97% 29.47 kg/m2 Former Smoker Vitals History BMI and BSA Data Body Mass Index Body Surface Area  
 29.47 kg/m 2 2.05 m 2 Preferred Pharmacy Pharmacy Name Phone [facility] [phone], [address] AT [place] [address] [phone] Your Updated Medication List  
  
   
This list is accurate as of: 8/18/17  9:19 AM.  Always use your most recent med list.  
  
  
  
  
 ABILIFY 5 mg tablet Generic drug:  ARIPiprazole Take 2.5 mg by mouth every morning. AMBIEN CR 12.5 mg tablet Generic drug:  zolpidem CR Take 12.5 mg by mouth nightly as needed. amLODIPine 10 mg tablet Commonly known as:  Caralee Dupes Take 10 mg by mouth daily. BD INSULIN PEN NEEDLE UF MINI 31 gauge x 3/16\" Ndle Generic drug:  Insulin Needles (Disposable) USE AS DIRECTED  
  
 buPROPion  mg tablet Commonly known as:  Rita Erm Take 1 Tab by mouth daily (with dinner). CIALIS PO Take 20 mg by mouth as needed. furosemide 20 mg tablet Commonly known as:  LASIX TAKE 1 TABLET BY MOUTH DAILY  
  
 insulin glargine 100 unit/mL (3 mL) Inpn Commonly known as:  LANTUS,BASAGLAR  
10 units daily at bedtime  
  
 insulin lispro 100 unit/mL kwikpen Commonly known as:  HUMALOG  
3 Units by SubCUTAneous route Before breakfast, lunch, and dinner. Plus correction sliding scale as needed * lenalidomide 25 mg Cap Take  by mouth. * REVLIMID 15 mg Cap Generic drug:  lenalidomide 10 mg.  
  
 * losartan 100 mg tablet Commonly known as:  COZAAR TK 1 T PO QD  
  
 * losartan 50 mg tablet Commonly known as:  COZAAR Take 100 mg by mouth daily. NUCYNTA 100 mg tablet Generic drug:  tapentadol Take 100 mg by mouth three (3) times daily. ONETOUCH ULTRA TEST strip Generic drug:  glucose blood VI test strips USE TO TEST BLOOD SUGAR TID BEFORE EACH MEAL UTD. RYTHMOL 150 mg tablet Generic drug:  propafenone Take 50 mg by mouth every eight (8) hours. tamsulosin 0.4 mg capsule Commonly known as:  FLOMAX Take 1 Cap by mouth daily. valACYclovir 1 gram tablet Commonly known as:  VALTREX Take 1 Tab by mouth daily. * Notice: This list has 4 medication(s) that are the same as other medications prescribed for you. Read the directions carefully, and ask your doctor or other care provider to review them with you. To-Do List   
 08/22/2017 10:00 AM  
  Appointment with CHAIR 3 [address] at [facility] ([phone]) Patient Instructions We will switch your treatment to VTD-PACE 
 
VTD-PACE (bortezomib, dexamethasone, thalidomide, cisplatin, adriamycin, cyclophosphamide, and etoposide - will omit thalidomide), followed by Kyporlis/Pom/Dex/Daratumumab quadruplet This is a continuous infusion over 5-6 days, so you will need to be admitted to the hospital for treatment. Prior to starting chemotherapy, you will need: 
 Port placed Echocardiogram 
 
We will have this done when you are in the hospital. We will plan to admit you on Tuesday, 8/22. Introducing [facility]! Sharifa Fallon introduces [employer] patient portal. Now you can access parts of your medical record, email your doctor's office, and request medication refills online. 1. In your internet browser, go to [email or web address] 2. Click on the First Time User? Click Here link in the Sign In box. You will see the New Member Sign Up page. 3. Enter your [employer] Access Code exactly as it appears below. You will not need to use this code after youve completed the sign-up process. If you do not sign up before the expiration date, you must request a new code. · [employer] Access Code: [email or web address] Expires: 10/1/2017  8:12 AM 
 
4.  Enter the last four digits of your Social Security Number (xxxx) and Date of Birth (mm/dd/yyyy) as indicated and click Submit. You will be taken to the next sign-up page. 5. Create a [employer] ID. This will be your [employer] login ID and cannot be changed, so think of one that is secure and easy to remember. 6. Create a [employer] password. You can change your password at any time. 7. Enter your Password Reset Question and Answer. This can be used at a later time if you forget your password. 8. Enter your e-mail address. You will receive e-mail notification when new information is available in [address] 9. Click Sign Up. You can now view and download portions of your medical record. 10. Click the Download Summary menu link to download a portable copy of your medical information. If you have questions, please visit the Frequently Asked Questions section of the [employer] website. Remember, [employer] is NOT to be used for urgent needs. For medical emergencies, dial 911. Now available from your iPhone and Android! Please provide this summary of care documentation to your next provider. Your primary care clinician is listed as Jesse Lornez. If you have any questions after today's visit, please call [phone].

## 2017-08-18 NOTE — PATIENT INSTRUCTIONS
We will switch your treatment to VTD-PACE    VTD-PACE (bortezomib, dexamethasone, thalidomide, cisplatin, adriamycin, cyclophosphamide, and etoposide - will omit thalidomide), followed by Kyporlis/Pom/Dex/Daratumumab quadruplet    This is a continuous infusion over 5-6 days, so you will need to be admitted to the hospital for treatment. Prior to starting chemotherapy, you will need:   Port placed   Echocardiogram    We will have this done when you are in the hospital. We will plan to admit you on Tuesday, 8/22.

## 2017-08-18 NOTE — PROGRESS NOTES
Hematology Follow Up        Patient: Evelyn Bryan Sr. MRN: [id number]  SSN: [id number]    YOB: 1955  Age: 63 y.o. Sex: male          Diagnosis:     1. Multiple Myeloma, IgA Kappa   Durie [place] stage IIIB   t (14;16)      Treatment:       1. Systemic therapy with RVd - s/p 4 cycles      Subjective:      Evelyn Bryan Sr. is a 63 y.o. male with a new diagnosis of IgA kappa myeloma. Bone marrow shows 100% aberrant plasma cells. He is fatigued. No bone pains or recent infection. He suffers with DM but it is diet controlled. He has received systemic anti-viral treatment for chronic Hep C with successful eradication of the virus. Mr. Herman Sanchez is receiving systemic therapy with RVd. He had an initial good response to treatment. However d/t on-going side effects, treatment was dose reduced and paraprotein is now rising. He is here today to discuss alternate treatment options.     Review of Systems:    Constitutional: fatigue  Eyes: negative  Ears, Nose, Mouth, Throat, and Face: negative  Respiratory: negative  Cardiovascular: negative  Gastrointestinal: negative  Genitourinary:negative  Integument/Breast: negative  Hematologic/Lymphatic: negative  Musculoskeletal:bilateral lower extremity edema  Neurological: negative      Past Medical History:   Diagnosis Date    Acid reflux     Arrhythmia     pvc's    Chronic pain     neck    Depression     Diabetes ([address]     Femoral hernia 7/11/2012    Hepatitis C     Hypertension     Inguinal hernia 7/11/2012    Liver disease     hepatitis c    Other ill-defined conditions     Hx of PVCs since 2009    Prostatitis, acute     Psychiatric disorder     depression     Past Surgical History:   Procedure Laterality Date    COLONOSCOPY N/A 9/20/2016    COLONOSCOPY performed by Zev Franklin [facility] at [facility] ENDOSCOPY    HX APPENDECTOMY      HX CERVICAL FUSION  2008    x 1 as of 4/30/2014    HX HEENT      foreign body removed from right eye    [facility]      HX ORTHOPAEDIC      cervical fusion    HX OTHER SURGICAL  2000    liver bx - chronic hepatitis      Family History   Problem Relation Age of Onset    Arthritis-osteo Mother     Arthritis-osteo Father     Diabetes Father     Hypertension Maternal Grandmother     Diabetes Maternal Grandmother     Hypertension Maternal Grandfather     Diabetes Maternal Grandfather      Social History   Substance Use Topics    Smoking status: Former Smoker     Packs/day: 1.00     Years: 12.00     Types: Cigarettes     Quit date: 1/1/1989    Smokeless tobacco: Never Used      Comment: former cigarette smoker    Alcohol use No      Comment: former alcoholic-quit [id number]      Prior to Admission medications    Medication Sig Start Date End Date Taking? Authorizing Provider   furosemide (LASIX) 20 mg tablet TAKE 1 TABLET BY MOUTH DAILY 8/4/17  Yes Anuel Long NP   ONETOUCH ULTRA TEST strip USE TO TEST BLOOD SUGAR TID BEFORE EACH MEAL UTD. 7/3/17  Yes Historical Provider   REVLIMID 15 mg cap 10 mg. 6/12/17  Yes Historical Provider   losartan (COZAAR) 100 mg tablet TK 1 T PO QD 7/3/17  Yes Historical Provider   BD INSULIN PEN NEEDLE UF MINI 31 gauge x 3/16\" ndle USE AS DIRECTED 6/7/17  Yes Historical Provider   insulin lispro (HUMALOG) 100 unit/mL kwikpen 3 Units by SubCUTAneous route Before breakfast, lunch, and dinner. Plus correction sliding scale as needed   Yes Historical Provider   insulin glargine (LANTUS,BASAGLAR) 100 unit/mL (3 mL) inpn 10 units daily at bedtime 6/9/17  Yes Macie Long NP   valACYclovir (VALTREX) 1 gram tablet Take 1 Tab by mouth daily. 6/6/17  Yes Lori Perez MD   tamsulosin (FLOMAX) 0.4 mg capsule Take 1 Cap by mouth daily. 4/4/17  Yes Constance Velazquez MD   propafenone (RYTHMOL) 150 mg tablet Take 50 mg by mouth every eight (8) hours. Yes Historical Provider   zolpidem CR (AMBIEN CR) 12.5 mg tablet Take 12.5 mg by mouth nightly as needed.      Yes Historical Provider   TADALAFIL (CIALIS PO) Take 20 mg by mouth as needed. Yes Historical Provider   losartan (COZAAR) 50 mg tablet Take 100 mg by mouth daily. Yes Historical Provider   amLODIPine (NORVASC) 10 mg tablet Take 10 mg by mouth daily. Yes Historical Provider   tapentadol (NUCYNTA) 100 mg tablet Take 100 mg by mouth three (3) times daily. Yes Goldy Diaz MD   aripiprazole (ABILIFY) 5 mg tablet Take 2.5 mg by mouth every morning. Yes Goldy Diaz MD   buPROPion XL (WELLBUTRIN XL) 150 mg tablet Take 1 Tab by mouth daily (with dinner). 4/25/10  Yes Neto Yi MD   lenalidomide 25 mg cap Take  by mouth. Historical Provider              Allergies   Allergen Reactions    Mercury (Bulk) Hives     Blisters             Objective:     Vitals:    17 0834   BP: 104/63   Pulse: 60   Resp: 18   Temp: 98.6 °F (37 °C)   TempSrc: Oral   SpO2: 97%   Weight: 193 lb 12.8 oz (87.9 kg)   Height: 5' 8\" (1.727 m)            Physical Exam:    GENERAL: alert, cooperative  EYE: negative  LYMPHATIC: Cervical, supraclavicular, and axillary nodes normal.   THROAT & NECK: normal and no erythema or exudates noted. LUNG: clear to auscultation bilaterally  HEART: regular rate and rhythm  ABDOMEN: soft, non-tender  EXTREMITIES: 2+ b/l LE edema  SKIN: Normal.  NEUROLOGIC: negative      Lab Results   Component Value Date/Time    WBC 2.1 08/15/2017 10:12 AM    HGB 9.0 08/15/2017 10:12 AM    HCT 28.5 08/15/2017 10:12 AM    PLATELET 939  10:12 AM    MCV 90.8 08/15/2017 10:12 AM       M-spike: 5.8 => 2.9 => 1.9 => 2.1 => 2.8, IgA kappa    Serum F => 496 => 246 => 305 => 314       Assessment:     1. Multiple Myeloma, IgA Kappa   Durie [place] stage IIIB    Cytogenetics/FISH: t(14;16) - high risk  ECOG PS - 0   Intent of therapy: palliative    Receiving systemic therapy with RVd - s/p 3 cycles  Dose reduced Revlimid and Velcade d/t continued cytopenias. M-spike now rising. This is primary refractory myeloma. Carries poor prognosis  I discussed the case with Dr. Zechariah Alexander at [facility] [place] and Dr. Deyanira Alva at [facility]. All in agreement with aggressive multiagent chemotherapy. I will admit him to the hospital and treat him with VD-PACE (bortezomib, dexamethasone, thalidomide, cisplatin, adriamycin, cyclophosphamide, and etoposide - will omit thalidomide). I counseled the patient regarding the chemotherapy. Discussions included side-effect, toxicity, benefit and risks of chemotherapy. He understood the expected side-effect which includes alopecia, nausea, peripheral neuropathy, neutropenic fever, anemia, need for transfusion, non-ischemic cardiomyopathy and renal failure. I spent 65 minute with the patient in a face-to-face encounter. I explained him the stage of the disease, pathophysiology of the disease and the treatment approaches. I answered all his questions. Following one cycle of VD-PACE, I will have him seen by Dr. Deyanira Alva at [facility] for consideration of allogeneic transplant. He will need bridge therapy until he gets to transplant. I plan to treat him with a 1-3 cycles of Kyporlis/Pom/Dex/Daratumumab quadruplet till he is ready for transplant. Symptom management form reviewed with patient. 2. Anemia, myeloma related - improved    > s/p 2 units PRBCs on 6/7      3. Thrombocytopenia - improved   From myeloma    > On reduced dose of Revlimid and Velcade      4. Hypercalcemia, myeloma related   Resolved    > Zometa      5. Hand-foot syndrome - improved    > Moisturizing liberally  > Revlimid dose reduced to 10 mg daily      7. Bilateral lower extremity edema    > Furosemide 20 mg daily      Plan:       > Stop chemotherapy with RVd  > Continue Zometa  > Plan to admit next week for VD-PACE  > Port placement and echocardiogram in hospital      I saw the patient in conjunction with Zulma Baires NP. Signed by: Ngozi Mcgowan MD                     August 18, 2017          CCBear Lee Margie Dorman MD

## 2017-08-18 NOTE — PROGRESS NOTES
Iliana Gutierrez. is a 63 y.o. male here for follow of multiple myeloma. Mr. Erin Hsu has a reminder for a \"due or due soon\" health maintenance. I have asked that he contact his primary care provider for follow-up on this health maintenance.

## 2017-08-21 LAB
IGA SERPL-MCNC: 2747 MG/DL (ref 90–386)
IGG SERPL-MCNC: 221 MG/DL (ref 700–1600)
IGM SERPL-MCNC: <5 MG/DL (ref 20–172)
PROT PATTERN SERPL IFE-IMP: ABNORMAL

## 2017-08-21 RX ORDER — LORAZEPAM 1 MG/1
1 TABLET ORAL
Status: DISCONTINUED | OUTPATIENT
Start: 2017-08-21 | End: 2017-08-27 | Stop reason: HOSPADM

## 2017-08-21 RX ORDER — AMOXICILLIN 250 MG
2 CAPSULE ORAL EVERY 12 HOURS
Status: DISCONTINUED | OUTPATIENT
Start: 2017-08-21 | End: 2017-08-22

## 2017-08-21 RX ORDER — ENOXAPARIN SODIUM 100 MG/ML
40 INJECTION SUBCUTANEOUS DAILY
Status: DISCONTINUED | OUTPATIENT
Start: 2017-08-21 | End: 2017-08-27 | Stop reason: HOSPADM

## 2017-08-21 RX ORDER — ONDANSETRON 2 MG/ML
8 INJECTION INTRAMUSCULAR; INTRAVENOUS
Status: DISCONTINUED | OUTPATIENT
Start: 2017-08-21 | End: 2017-08-27 | Stop reason: HOSPADM

## 2017-08-22 ENCOUNTER — HOSPITAL ENCOUNTER (OUTPATIENT)
Dept: INFUSION THERAPY | Age: 62
End: 2017-08-22
Payer: MEDICARE

## 2017-08-22 ENCOUNTER — HOSPITAL ENCOUNTER (INPATIENT)
Age: 62
LOS: 5 days | Discharge: HOME | DRG: 846 | End: 2017-08-27
Attending: INTERNAL MEDICINE | Admitting: INTERNAL MEDICINE
Payer: COMMERCIAL

## 2017-08-22 DIAGNOSIS — R06.6 HICCUPS: ICD-10-CM

## 2017-08-22 DIAGNOSIS — T45.1X5A ANEMIA ASSOCIATED WITH CHEMOTHERAPY: ICD-10-CM

## 2017-08-22 DIAGNOSIS — D64.81 ANEMIA ASSOCIATED WITH CHEMOTHERAPY: ICD-10-CM

## 2017-08-22 DIAGNOSIS — N28.9 RENAL INSUFFICIENCY: ICD-10-CM

## 2017-08-22 DIAGNOSIS — B18.2 CHRONIC HEPATITIS C WITHOUT HEPATIC COMA (HCC): ICD-10-CM

## 2017-08-22 DIAGNOSIS — C90.00 MULTIPLE MYELOMA NOT HAVING ACHIEVED REMISSION (HCC): ICD-10-CM

## 2017-08-22 LAB
ALBUMIN SERPL-MCNC: 3.4 G/DL (ref 3.5–5)
ALBUMIN/GLOB SERPL: 0.6 {RATIO} (ref 1.1–2.2)
ALP SERPL-CCNC: 65 U/L (ref 45–117)
ALT SERPL-CCNC: 21 U/L (ref 12–78)
ANION GAP SERPL CALC-SCNC: 6 MMOL/L (ref 5–15)
AST SERPL-CCNC: 16 U/L (ref 15–37)
BASOPHILS # BLD: 0 K/UL (ref 0–0.1)
BASOPHILS NFR BLD: 0 % (ref 0–1)
BILIRUB SERPL-MCNC: 0.2 MG/DL (ref 0.2–1)
BUN SERPL-MCNC: 23 MG/DL (ref 6–20)
BUN/CREAT SERPL: 17 (ref 12–20)
CALCIUM SERPL-MCNC: 8.8 MG/DL (ref 8.5–10.1)
CHLORIDE SERPL-SCNC: 106 MMOL/L (ref 97–108)
CO2 SERPL-SCNC: 25 MMOL/L (ref 21–32)
CREAT SERPL-MCNC: 1.39 MG/DL (ref 0.7–1.3)
EOSINOPHIL # BLD: 0.1 K/UL (ref 0–0.4)
EOSINOPHIL NFR BLD: 2 % (ref 0–7)
ERYTHROCYTE [DISTWIDTH] IN BLOOD BY AUTOMATED COUNT: 18.8 % (ref 11.5–14.5)
GLOBULIN SER CALC-MCNC: 5.7 G/DL (ref 2–4)
GLUCOSE BLD STRIP.AUTO-MCNC: 135 MG/DL (ref 65–100)
GLUCOSE BLD STRIP.AUTO-MCNC: 179 MG/DL (ref 65–100)
GLUCOSE BLD STRIP.AUTO-MCNC: 98 MG/DL (ref 65–100)
GLUCOSE SERPL-MCNC: 105 MG/DL (ref 65–100)
HCT VFR BLD AUTO: 28.8 % (ref 36.6–50.3)
HGB BLD-MCNC: 9.4 G/DL (ref 12.1–17)
LYMPHOCYTES # BLD: 0.9 K/UL (ref 0.8–3.5)
LYMPHOCYTES NFR BLD: 29 % (ref 12–49)
MCH RBC QN AUTO: 29.9 PG (ref 26–34)
MCHC RBC AUTO-ENTMCNC: 32.6 G/DL (ref 30–36.5)
MCV RBC AUTO: 91.7 FL (ref 80–99)
MONOCYTES # BLD: 0.6 K/UL (ref 0–1)
MONOCYTES NFR BLD: 18 % (ref 5–13)
NEUTS SEG # BLD: 1.6 K/UL (ref 1.8–8)
NEUTS SEG NFR BLD: 51 % (ref 32–75)
PLATELET # BLD AUTO: 123 K/UL (ref 150–400)
POTASSIUM SERPL-SCNC: 4.3 MMOL/L (ref 3.5–5.1)
PROT SERPL-MCNC: 9.1 G/DL (ref 6.4–8.2)
RBC # BLD AUTO: 3.14 M/UL (ref 4.1–5.7)
SERVICE CMNT-IMP: ABNORMAL
SERVICE CMNT-IMP: ABNORMAL
SERVICE CMNT-IMP: NORMAL
SODIUM SERPL-SCNC: 137 MMOL/L (ref 136–145)
WBC # BLD AUTO: 3.1 K/UL (ref 4.1–11.1)

## 2017-08-22 PROCEDURE — C1751 CATH, INF, PER/CENT/MIDLINE: HCPCS

## 2017-08-22 PROCEDURE — 76937 US GUIDE VASCULAR ACCESS: CPT

## 2017-08-22 PROCEDURE — 74011250637 HC RX REV CODE- 250/637: Performed by: INTERNAL MEDICINE

## 2017-08-22 PROCEDURE — 36415 COLL VENOUS BLD VENIPUNCTURE: CPT | Performed by: INTERNAL MEDICINE

## 2017-08-22 PROCEDURE — 36569 INSJ PICC 5 YR+ W/O IMAGING: CPT | Performed by: INTERNAL MEDICINE

## 2017-08-22 PROCEDURE — 77030018719 HC DRSG PTCH ANTIMIC J&J -A

## 2017-08-22 PROCEDURE — 74011000258 HC RX REV CODE- 258: Performed by: INTERNAL MEDICINE

## 2017-08-22 PROCEDURE — 77030018786 HC NDL GD F/USND BARD -B

## 2017-08-22 PROCEDURE — 82962 GLUCOSE BLOOD TEST: CPT

## 2017-08-22 PROCEDURE — 93306 TTE W/DOPPLER COMPLETE: CPT

## 2017-08-22 PROCEDURE — 74011000250 HC RX REV CODE- 250: Performed by: INTERNAL MEDICINE

## 2017-08-22 PROCEDURE — 3E03305 INTRODUCTION OF OTHER ANTINEOPLASTIC INTO PERIPHERAL VEIN, PERCUTANEOUS APPROACH: ICD-10-PCS | Performed by: INTERNAL MEDICINE

## 2017-08-22 PROCEDURE — 65270000015 HC RM PRIVATE ONCOLOGY

## 2017-08-22 PROCEDURE — 85025 COMPLETE CBC W/AUTO DIFF WBC: CPT | Performed by: INTERNAL MEDICINE

## 2017-08-22 PROCEDURE — 74011250636 HC RX REV CODE- 250/636: Performed by: INTERNAL MEDICINE

## 2017-08-22 PROCEDURE — 06HY33Z INSERTION OF INFUSION DEVICE INTO LOWER VEIN, PERCUTANEOUS APPROACH: ICD-10-PCS | Performed by: INTERNAL MEDICINE

## 2017-08-22 PROCEDURE — 80053 COMPREHEN METABOLIC PANEL: CPT | Performed by: INTERNAL MEDICINE

## 2017-08-22 PROCEDURE — 74011250637 HC RX REV CODE- 250/637: Performed by: NURSE PRACTITIONER

## 2017-08-22 PROCEDURE — 74011636637 HC RX REV CODE- 636/637: Performed by: INTERNAL MEDICINE

## 2017-08-22 RX ORDER — AMOXICILLIN 250 MG
2 CAPSULE ORAL EVERY 12 HOURS
Status: DISCONTINUED | OUTPATIENT
Start: 2017-08-22 | End: 2017-08-27 | Stop reason: HOSPADM

## 2017-08-22 RX ORDER — SODIUM CHLORIDE 9 MG/ML
25 INJECTION, SOLUTION INTRAVENOUS CONTINUOUS
Status: DISCONTINUED | OUTPATIENT
Start: 2017-08-22 | End: 2017-08-27 | Stop reason: HOSPADM

## 2017-08-22 RX ORDER — INSULIN ASPART 100 [IU]/ML
3 INJECTION, SOLUTION INTRAVENOUS; SUBCUTANEOUS
Status: DISCONTINUED | OUTPATIENT
Start: 2017-08-22 | End: 2017-08-22 | Stop reason: CLARIF

## 2017-08-22 RX ORDER — ARIPIPRAZOLE 5 MG/1
2.5 TABLET ORAL
Status: DISCONTINUED | OUTPATIENT
Start: 2017-08-22 | End: 2017-08-27 | Stop reason: HOSPADM

## 2017-08-22 RX ORDER — MAGNESIUM SULFATE 100 %
4 CRYSTALS MISCELLANEOUS AS NEEDED
Status: DISCONTINUED | OUTPATIENT
Start: 2017-08-22 | End: 2017-08-27 | Stop reason: HOSPADM

## 2017-08-22 RX ORDER — EPINEPHRINE 1 MG/ML
0.3 INJECTION, SOLUTION, CONCENTRATE INTRAVENOUS
Status: DISCONTINUED | OUTPATIENT
Start: 2017-08-22 | End: 2017-08-27 | Stop reason: HOSPADM

## 2017-08-22 RX ORDER — LOSARTAN POTASSIUM 50 MG/1
100 TABLET ORAL DAILY
Status: DISCONTINUED | OUTPATIENT
Start: 2017-08-23 | End: 2017-08-27 | Stop reason: HOSPADM

## 2017-08-22 RX ORDER — DIPHENHYDRAMINE HYDROCHLORIDE 50 MG/ML
50 INJECTION, SOLUTION INTRAMUSCULAR; INTRAVENOUS
Status: DISCONTINUED | OUTPATIENT
Start: 2017-08-22 | End: 2017-08-27 | Stop reason: HOSPADM

## 2017-08-22 RX ORDER — ALBUTEROL SULFATE 0.83 MG/ML
2.5 SOLUTION RESPIRATORY (INHALATION)
Status: DISCONTINUED | OUTPATIENT
Start: 2017-08-22 | End: 2017-08-27 | Stop reason: HOSPADM

## 2017-08-22 RX ORDER — TAMSULOSIN HYDROCHLORIDE 0.4 MG/1
0.4 CAPSULE ORAL DAILY
Status: DISCONTINUED | OUTPATIENT
Start: 2017-08-22 | End: 2017-08-22

## 2017-08-22 RX ORDER — INSULIN LISPRO 100 [IU]/ML
3 INJECTION, SOLUTION INTRAVENOUS; SUBCUTANEOUS
Status: DISCONTINUED | OUTPATIENT
Start: 2017-08-22 | End: 2017-08-22

## 2017-08-22 RX ORDER — SODIUM CHLORIDE 0.9 % (FLUSH) 0.9 %
10-40 SYRINGE (ML) INJECTION EVERY 8 HOURS
Status: DISCONTINUED | OUTPATIENT
Start: 2017-08-22 | End: 2017-08-27 | Stop reason: HOSPADM

## 2017-08-22 RX ORDER — ONDANSETRON 2 MG/ML
8 INJECTION INTRAMUSCULAR; INTRAVENOUS
Status: DISCONTINUED | OUTPATIENT
Start: 2017-08-22 | End: 2017-08-27 | Stop reason: HOSPADM

## 2017-08-22 RX ORDER — DEXAMETHASONE 4 MG/1
40 TABLET ORAL DAILY
Status: COMPLETED | OUTPATIENT
Start: 2017-08-22 | End: 2017-08-25

## 2017-08-22 RX ORDER — PANTOPRAZOLE SODIUM 40 MG/1
40 TABLET, DELAYED RELEASE ORAL
Status: DISCONTINUED | OUTPATIENT
Start: 2017-08-23 | End: 2017-08-27 | Stop reason: HOSPADM

## 2017-08-22 RX ORDER — BUPROPION HYDROCHLORIDE 150 MG/1
150 TABLET ORAL
Status: DISCONTINUED | OUTPATIENT
Start: 2017-08-22 | End: 2017-08-22

## 2017-08-22 RX ORDER — VALACYCLOVIR HYDROCHLORIDE 500 MG/1
500 TABLET, FILM COATED ORAL EVERY 8 HOURS
Status: DISCONTINUED | OUTPATIENT
Start: 2017-08-22 | End: 2017-08-27 | Stop reason: HOSPADM

## 2017-08-22 RX ORDER — FUROSEMIDE 20 MG/1
20 TABLET ORAL DAILY
Status: DISCONTINUED | OUTPATIENT
Start: 2017-08-22 | End: 2017-08-27 | Stop reason: HOSPADM

## 2017-08-22 RX ORDER — INSULIN GLARGINE 100 [IU]/ML
10 INJECTION, SOLUTION SUBCUTANEOUS
Status: DISCONTINUED | OUTPATIENT
Start: 2017-08-22 | End: 2017-08-26

## 2017-08-22 RX ORDER — HEPARIN 100 UNIT/ML
300 SYRINGE INTRAVENOUS AS NEEDED
Status: DISCONTINUED | OUTPATIENT
Start: 2017-08-22 | End: 2017-08-27 | Stop reason: HOSPADM

## 2017-08-22 RX ORDER — DEXAMETHASONE SODIUM PHOSPHATE 4 MG/ML
40 INJECTION, SOLUTION INTRA-ARTICULAR; INTRALESIONAL; INTRAMUSCULAR; INTRAVENOUS; SOFT TISSUE DAILY
Status: DISCONTINUED | OUTPATIENT
Start: 2017-08-22 | End: 2017-08-22

## 2017-08-22 RX ORDER — SULFAMETHOXAZOLE AND TRIMETHOPRIM 800; 160 MG/1; MG/1
1 TABLET ORAL DAILY
Status: DISCONTINUED | OUTPATIENT
Start: 2017-08-23 | End: 2017-08-27 | Stop reason: HOSPADM

## 2017-08-22 RX ORDER — SODIUM CHLORIDE 9 MG/ML
999 INJECTION, SOLUTION INTRAVENOUS
Status: DISCONTINUED | OUTPATIENT
Start: 2017-08-22 | End: 2017-08-27 | Stop reason: HOSPADM

## 2017-08-22 RX ORDER — SODIUM CHLORIDE 9 MG/ML
100 INJECTION, SOLUTION INTRAVENOUS
Status: DISCONTINUED | OUTPATIENT
Start: 2017-08-22 | End: 2017-08-27 | Stop reason: HOSPADM

## 2017-08-22 RX ORDER — DEXTROSE 50 % IN WATER (D50W) INTRAVENOUS SYRINGE
12.5-25 AS NEEDED
Status: DISCONTINUED | OUTPATIENT
Start: 2017-08-22 | End: 2017-08-27 | Stop reason: HOSPADM

## 2017-08-22 RX ORDER — AMLODIPINE BESYLATE 5 MG/1
10 TABLET ORAL DAILY
Status: DISCONTINUED | OUTPATIENT
Start: 2017-08-22 | End: 2017-08-27 | Stop reason: HOSPADM

## 2017-08-22 RX ORDER — ACETAMINOPHEN 325 MG/1
650 TABLET ORAL
Status: DISCONTINUED | OUTPATIENT
Start: 2017-08-22 | End: 2017-08-27 | Stop reason: HOSPADM

## 2017-08-22 RX ORDER — FLUCONAZOLE 100 MG/1
400 TABLET ORAL DAILY
Status: DISCONTINUED | OUTPATIENT
Start: 2017-08-23 | End: 2017-08-27 | Stop reason: HOSPADM

## 2017-08-22 RX ORDER — GRANISETRON HYDROCHLORIDE 1 MG/ML
1 INJECTION, SOLUTION INTRAVENOUS ONCE
Status: COMPLETED | OUTPATIENT
Start: 2017-08-22 | End: 2017-08-22

## 2017-08-22 RX ORDER — SODIUM CHLORIDE 0.9 % (FLUSH) 0.9 %
10-30 SYRINGE (ML) INJECTION AS NEEDED
Status: DISCONTINUED | OUTPATIENT
Start: 2017-08-22 | End: 2017-08-27 | Stop reason: HOSPADM

## 2017-08-22 RX ORDER — VALACYCLOVIR HYDROCHLORIDE 500 MG/1
1000 TABLET, FILM COATED ORAL DAILY
Status: DISCONTINUED | OUTPATIENT
Start: 2017-08-22 | End: 2017-08-22

## 2017-08-22 RX ORDER — PROPAFENONE HYDROCHLORIDE 150 MG/1
150 TABLET, FILM COATED ORAL EVERY 8 HOURS
Status: DISCONTINUED | OUTPATIENT
Start: 2017-08-22 | End: 2017-08-27 | Stop reason: HOSPADM

## 2017-08-22 RX ORDER — LEVOFLOXACIN 750 MG/1
750 TABLET ORAL DAILY
Status: DISCONTINUED | OUTPATIENT
Start: 2017-08-23 | End: 2017-08-27 | Stop reason: HOSPADM

## 2017-08-22 RX ORDER — ZOLPIDEM TARTRATE 12.5 MG/1
12.5 TABLET, FILM COATED, EXTENDED RELEASE ORAL
Status: DISCONTINUED | OUTPATIENT
Start: 2017-08-22 | End: 2017-08-27 | Stop reason: HOSPADM

## 2017-08-22 RX ORDER — BUPROPION HYDROCHLORIDE 150 MG/1
150 TABLET ORAL 2 TIMES DAILY
Status: DISCONTINUED | OUTPATIENT
Start: 2017-08-22 | End: 2017-08-27 | Stop reason: HOSPADM

## 2017-08-22 RX ORDER — HYDROCORTISONE SODIUM SUCCINATE 100 MG/2ML
100 INJECTION, POWDER, FOR SOLUTION INTRAMUSCULAR; INTRAVENOUS
Status: DISCONTINUED | OUTPATIENT
Start: 2017-08-22 | End: 2017-08-27 | Stop reason: HOSPADM

## 2017-08-22 RX ORDER — INSULIN LISPRO 100 [IU]/ML
INJECTION, SOLUTION INTRAVENOUS; SUBCUTANEOUS
Status: DISCONTINUED | OUTPATIENT
Start: 2017-08-22 | End: 2017-08-27 | Stop reason: HOSPADM

## 2017-08-22 RX ORDER — SODIUM CHLORIDE 0.9 % (FLUSH) 0.9 %
10 SYRINGE (ML) INJECTION EVERY 24 HOURS
Status: DISCONTINUED | OUTPATIENT
Start: 2017-08-22 | End: 2017-08-27 | Stop reason: HOSPADM

## 2017-08-22 RX ADMIN — PROPAFENONE HYDROCHLORIDE 150 MG: 150 TABLET, FILM COATED ORAL at 21:41

## 2017-08-22 RX ADMIN — MAGNESIUM SULFATE HEPTAHYDRATE: 500 INJECTION, SOLUTION INTRAMUSCULAR; INTRAVENOUS at 17:34

## 2017-08-22 RX ADMIN — VALACYCLOVIR HYDROCHLORIDE 500 MG: 500 TABLET, FILM COATED ORAL at 15:39

## 2017-08-22 RX ADMIN — INSULIN GLARGINE 10 UNITS: 100 INJECTION, SOLUTION SUBCUTANEOUS at 21:42

## 2017-08-22 RX ADMIN — SODIUM CHLORIDE 150 MG: 900 INJECTION, SOLUTION INTRAVENOUS at 21:43

## 2017-08-22 RX ADMIN — CISPLATIN 20 MG: 1 INJECTION, SOLUTION INTRAVENOUS at 22:49

## 2017-08-22 RX ADMIN — GRANISETRON HYDROCHLORIDE 1 MG: 1 INJECTION INTRAVENOUS at 21:59

## 2017-08-22 RX ADMIN — BORTEZOMIB 2 MG: 3.5 INJECTION, POWDER, LYOPHILIZED, FOR SOLUTION INTRAVENOUS; SUBCUTANEOUS at 22:01

## 2017-08-22 RX ADMIN — Medication 10 ML: at 21:42

## 2017-08-22 RX ADMIN — SODIUM CHLORIDE 20 MG: 900 INJECTION, SOLUTION INTRAVENOUS at 22:59

## 2017-08-22 RX ADMIN — Medication 10 ML: at 17:05

## 2017-08-22 RX ADMIN — BUPROPION HYDROCHLORIDE 150 MG: 150 TABLET, FILM COATED, EXTENDED RELEASE ORAL at 17:39

## 2017-08-22 RX ADMIN — VALACYCLOVIR HYDROCHLORIDE 500 MG: 500 TABLET, FILM COATED ORAL at 21:39

## 2017-08-22 RX ADMIN — SODIUM CHLORIDE 25 ML/HR: 900 INJECTION, SOLUTION INTRAVENOUS at 15:32

## 2017-08-22 RX ADMIN — DOCUSATE SODIUM AND SENNOSIDES 2 TABLET: 8.6; 5 TABLET, FILM COATED ORAL at 22:48

## 2017-08-22 RX ADMIN — ZOLPIDEM TARTRATE 12.5 MG: 12.5 TABLET, FILM COATED, EXTENDED RELEASE ORAL at 21:40

## 2017-08-22 RX ADMIN — DEXAMETHASONE 40 MG: 4 TABLET ORAL at 15:37

## 2017-08-22 NOTE — PROGRESS NOTES
Primary Nurse Merline Holler and Chester Ascencio, [place] performed a dual skin assessment on this patient No impairment noted  Haris score is 23

## 2017-08-22 NOTE — H&P
History and Physical        Patient: Dallin Mcqueen Sr. MRN: [id number]  SSN: [id number]    YOB: 1955  Age: 63 y.o. Sex: male          Reason for Admission:     Multiple Myeloma, IgA Kappa - inpatient chemotherapy with VD-PACE      Subjective:      Dallin Mcqueen Sr. is a 63 y.o. male with a diagnosis of IgA kappa myeloma. Bone marrow shows 100% aberrant plasma cells. He suffers with DM but it is diet controlled. He has received systemic anti-viral treatment for chronic Hep C with successful eradication of the virus. Mr. Taniya Hamilton received 4 cycles of systemic therapy with RVd. He had an initial good response to treatment. However d/t on-going side effects, treatment was dose reduced and paraprotein is now rising. He is being admitted for inpatient chemotherapy. Mr. Taniya Hamilton feels well today and is accompanied by his wife and several family members. We had a lengthy discussion about the treatment plan and answered questions.       Review of Systems:    Constitutional: fatigue  Eyes: negative  Ears, Nose, Mouth, Throat, and Face: negative  Respiratory: negative  Cardiovascular: negative  Gastrointestinal: negative  Genitourinary:negative  Integument/Breast: negative  Hematologic/Lymphatic: negative  Musculoskeletal:bilateral lower extremity edema  Neurological: negative      Past Medical History:   Diagnosis Date    Acid reflux     Arrhythmia     pvc's    Chronic pain     neck    Depression     Diabetes ([address]     Femoral hernia 7/11/2012    Hepatitis C     Hypertension     Inguinal hernia 7/11/2012    Liver disease     hepatitis c    Other ill-defined conditions     Hx of PVCs since 2009    Prostatitis, acute     Psychiatric disorder     depression     Past Surgical History:   Procedure Laterality Date    COLONOSCOPY N/A 9/20/2016    COLONOSCOPY performed by Shellie Finley MD at [facility] ENDOSCOPY    HX APPENDECTOMY      HX CERVICAL FUSION  2008    x 1 as of 4/30/2014  HX HEENT      foreign body removed from right eye    [facility]      HX ORTHOPAEDIC      cervical fusion    HX OTHER SURGICAL  2000    liver bx - chronic hepatitis      Family History   Problem Relation Age of Onset    Arthritis-osteo Mother     Arthritis-osteo Father     Diabetes Father     Hypertension Maternal Grandmother     Diabetes Maternal Grandmother     Hypertension Maternal Grandfather     Diabetes Maternal Grandfather      Social History   Substance Use Topics    Smoking status: Former Smoker     Packs/day: 1.00     Years: 12.00     Types: Cigarettes     Quit date: 1/1/1989    Smokeless tobacco: Never Used      Comment: former cigarette smoker    Alcohol use No      Comment: former alcoholic-quit [id number]      Prior to Admission medications    Medication Sig Start Date End Date Taking? Authorizing Provider   amLODIPine (NORVASC) 10 mg tablet Take 10 mg by mouth daily. Yes Historical Provider   furosemide (LASIX) 20 mg tablet TAKE 1 TABLET BY MOUTH DAILY 8/4/17   Maris Ortiz NP   ONETOUCH ULTRA TEST strip USE TO TEST BLOOD SUGAR TID BEFORE EACH MEAL UTD. 7/3/17   Historical Provider   REVLIMID 15 mg cap 10 mg. 6/12/17   Historical Provider   losartan (COZAAR) 100 mg tablet TK 1 T PO QD 7/3/17   Historical Provider   BD INSULIN PEN NEEDLE UF MINI 31 gauge x 3/16\" ndle USE AS DIRECTED 6/7/17   Historical Provider   insulin lispro (HUMALOG) 100 unit/mL kwikpen 3 Units by SubCUTAneous route Before breakfast, lunch, and dinner. Plus correction sliding scale as needed    Historical Provider   insulin glargine (LANTUS,BASAGLAR) 100 unit/mL (3 mL) inpn 10 units daily at bedtime 6/9/17   Maris Ortiz NP   lenalidomide 25 mg cap Take  by mouth. Historical Provider   valACYclovir (VALTREX) 1 gram tablet Take 1 Tab by mouth daily. 6/6/17   Amanda Tenorio MD   tamsulosin (FLOMAX) 0.4 mg capsule Take 1 Cap by mouth daily.  4/4/17   Tyrese Rodriguez MD   propafenone (RYTHMOL) 150 mg tablet Take 50 mg by mouth every eight (8) hours. Historical Provider   zolpidem CR (AMBIEN CR) 12.5 mg tablet Take 12.5 mg by mouth nightly as needed. Historical Provider   TADALAFIL (CIALIS PO) Take 20 mg by mouth as needed. Historical Provider   losartan (COZAAR) 50 mg tablet Take 100 mg by mouth daily. Historical Provider   tapentadol (NUCYNTA) 100 mg tablet Take 100 mg by mouth three (3) times daily. Goldy Diaz MD   aripiprazole (ABILIFY) 5 mg tablet Take 2.5 mg by mouth every morning. Goldy Diaz MD   buPROPion XL (WELLBUTRIN XL) 150 mg tablet Take 1 Tab by mouth daily (with dinner). 4/25/10   Jakob Batista MD              Allergies   Allergen Reactions    Mercury (Bulk) Hives     Blisters             Objective:     Vitals:    17 1106   BP: 143/83   Pulse: 90   Resp: 16   Temp: 98.8 °F (37.1 °C)   SpO2: 95%            Physical Exam:    GENERAL: alert, cooperative  EYE: negative  LYMPHATIC: Cervical, supraclavicular, and axillary nodes normal.   THROAT & NECK: normal and no erythema or exudates noted. LUNG: clear to auscultation bilaterally  HEART: regular rate and rhythm  ABDOMEN: soft, non-tender  EXTREMITIES: negative  SKIN: Normal.  NEUROLOGIC: negative      Lab Results   Component Value Date/Time    WBC 2.1 08/15/2017 10:12 AM    HGB 9.0 08/15/2017 10:12 AM    HCT 28.5 08/15/2017 10:12 AM    PLATELET 010  10:12 AM    MCV 90.8 08/15/2017 10:12 AM       M-spike: 5.8 => 2.9 => 1.9 => 2.1 => 2.8, IgA kappa    Serum F => 496 => 246 => 305 => 314       Assessment:     1. Multiple Myeloma, IgA Kappa   Durie [place] stage IIIB    Cytogenetics/FISH: t(14;16) - high risk  ECOG PS - 0   Intent of therapy: palliative    Receiving systemic therapy with RVd - s/p 3 cycles  Dose reduced Revlimid and Velcade d/t continued cytopenias. M-spike now rising. This is primary refractory myeloma.  Carries poor prognosis  I discussed the case with Dr. Osmar Rainey at [facility] and Dr. Miguel Sullivan at [facility]. All in agreement with aggressive multiagent chemotherapy. I will admit him to the hospital and treat him with VD-PACE (bortezomib, dexamethasone, thalidomide, cisplatin, adriamycin, cyclophosphamide, and etoposide - will omit thalidomide). Receiving inpatient chemotherapy   VD-PACE - Cycle 1 Day 1    Reviewed the expected side-effects of chemotherapy which includes alopecia, nausea, peripheral neuropathy, neutropenic fever, anemia, need for transfusion, non-ischemic cardiomyopathy and renal failure. Neulasta after hospital discharge in [facility] on 8/28 at 4:00 pm      2. Diabetes    > Sliding scale insulin  > Hospitalist consult for diabetes management        Plan:       > PICC today - port will be placed outpatient  > Echocardiogram   > Hospitalist consult for diabetes management  > Start VD-PACE      Signed by: Jose Schofield NP                     August 22, 2017         Attending Physician Note:     I have reviewed the history, physical examination, assessment and the plan of the care of the patient. I saw the patient with Lisa Kelly and I participated in the examination and critical decision making. I agree with the note as stated above. Mr. Erin Hsu is a gentleman with primary refractory myeloma who is being admitted to the hospital for multiagent chemotherapy with VD-PACE. He feels anxious but otherwise well.        Signed by: Kaylan Shetty MD                     August 22, 2017

## 2017-08-22 NOTE — PROGRESS NOTES
PICC (Peripherally Inserted Central Catheter) line insertion  procedure note :     Procedure explained to patient along with risks and benefits  and patient agreed to proceed. Informed consent obtained from  patient. Patient teaching completed. Timeout completed. Pre-procedure assessment done. Maximum sterile barrier precautions observed throughout procedure. Lidocaine 1%  3.0    ml sq given prior to cannulation. Cannulated basilic  vein using ultrasound guidance and modified seldinger technique. Inserted 5  [place] double  lumen PICC to right arm using [place] Tip Location System and  [address]. Pt has    sinus   rhythm. PICC tip location was confirmed by 3 CG tip positioning system, indicating tall P wave and no negative deflection before P wave which would indicate that the PICC tip is properly placed in the distal SVC or at the [place]. PICC tip location was  confirmed by 2 PICC nurses and 3CG printout placed on patient's chart. Blood return verified and flushed with 20 ml normal saline in each port. Sterile dressing applied with biopatch, statLock and occlusive dressing as per protocol. Curos caps applied to each port. Patient tolerated procedure well with minimal blood loss ( less than 5 ml.)   Patient education material provided. PICC procedure performed by  :  Millie Alves RN. PICC nurse  Assisted by : Sachin De Leon RN  PICC nurse  Reason for access : reliable access / MD order/ Vesicant IV medication infusion / Chemo medication  Complications related to insertion  : none  X-Ray : not applicable  Notified primary nurse    RN  that  PICC line can be used. Total Trimmed Length :  43   cm   External Length : 0  cm   PICC line site arm circumference:  32    cm   PICC catheter occupies  15   % of vein  Type of PICC: Bard Solo Power PICC   Ref # :     [id number]     Lot # :  [id number]   Expiration Date :    2018-10-31     Millie Mountain RN. BSN. CRNI,CMSRN.  Clinician IV . PICC Nurse, Vascular Access Team.

## 2017-08-22 NOTE — PROGRESS NOTES
Patient Chemo worksheet:   Normal saline with 10 MEQ potassium, 1 gram magnesium sulfate. 500 ml bag. Started at [employer]. Runs for 1 hour.

## 2017-08-22 NOTE — PROGRESS NOTES
Oncology Nursing Communication Tool      7:50 PM  8/22/2017     Bedside and Verbal shift change report given to [facility], RN (incoming nurse) by Brooke Carrillo (outgoing nurse) on Kourtney Culver Sr. a 63 y.o. male who was admitted on 8/22/2017 10:41 AM. Report included the following information SBAR, Kardex, Intake/Output, MAR and Accordion. Oncoming RN aware of chemo orders and medications that still need to be administered. Significant changes during shift: Chemo day 1. Patients wife to bring some meds from home for patient. Issues for physician to address:none          Code Status: Full Code     Infections: MRSA     Allergies: Mercury (bulk)     Current diet: DIET REGULAR       Pain Controlled [x] yes [] no   Bowel Movement [] yes [x] no   Last Bowel Movement (date)             Vital Signs:   Patient Vitals for the past 12 hrs:   Temp Pulse Resp BP SpO2   08/22/17 1525 98.9 °F (37.2 °C) 80 18 122/70 -   08/22/17 1106 98.8 °F (37.1 °C) 90 16 143/83 95 %      Intake & Output:   No intake or output data in the 24 hours ending 08/22/17 1950   Laboratory Results:     Recent Results (from the past 12 hour(s))   GLUCOSE, POC    Collection Time: 08/22/17 11:07 AM   Result Value Ref Range    Glucose (POC) 135 (H) 65 - 100 mg/dL    Performed by Susie     CBC WITH AUTOMATED DIFF    Collection Time: 08/22/17  3:20 PM   Result Value Ref Range    WBC 3.1 (L) 4.1 - 11.1 K/uL    RBC 3.14 (L) 4.10 - 5.70 M/uL    HGB 9.4 (L) 12.1 - 17.0 g/dL    HCT 28.8 (L) 36.6 - 50.3 %    MCV 91.7 80.0 - 99.0 FL    MCH 29.9 26.0 - 34.0 PG    MCHC 32.6 30.0 - 36.5 g/dL    RDW 18.8 (H) 11.5 - 14.5 %    PLATELET 683 (L) 400 - 400 K/uL    NEUTROPHILS 51 32 - 75 %    LYMPHOCYTES 29 12 - 49 %    MONOCYTES 18 (H) 5 - 13 %    EOSINOPHILS 2 0 - 7 %    BASOPHILS 0 0 - 1 %    ABS. NEUTROPHILS 1.6 (L) 1.8 - 8.0 K/UL    ABS. LYMPHOCYTES 0.9 0.8 - 3.5 K/UL    ABS. MONOCYTES 0.6 0.0 - 1.0 K/UL    ABS.  EOSINOPHILS 0.1 0.0 - 0.4 K/UL    ABS. BASOPHILS 0.0 0.0 - 0.1 K/UL   METABOLIC PANEL, COMPREHENSIVE    Collection Time: 08/22/17  3:20 PM   Result Value Ref Range    Sodium 137 136 - 145 mmol/L    Potassium 4.3 3.5 - 5.1 mmol/L    Chloride 106 97 - 108 mmol/L    CO2 25 21 - 32 mmol/L    Anion gap 6 5 - 15 mmol/L    Glucose 105 (H) 65 - 100 mg/dL    BUN 23 (H) 6 - 20 MG/DL    Creatinine 1.39 (H) 0.70 - 1.30 MG/DL    BUN/Creatinine ratio 17 12 - 20      GFR est AA >60 >60 ml/min/1.73m2    GFR est non-AA 52 (L) >60 ml/min/1.73m2    Calcium 8.8 8.5 - 10.1 MG/DL    Bilirubin, total 0.2 0.2 - 1.0 MG/DL    ALT (SGPT) 21 12 - 78 U/L    AST (SGOT) 16 15 - 37 U/L    Alk. phosphatase 65 45 - 117 U/L    Protein, total 9.1 (H) 6.4 - 8.2 g/dL    Albumin 3.4 (L) 3.5 - 5.0 g/dL    Globulin 5.7 (H) 2.0 - 4.0 g/dL    A-G Ratio 0.6 (L) 1.1 - 2.2     GLUCOSE, POC    Collection Time: 08/22/17  4:31 PM   Result Value Ref Range    Glucose (POC) 98 65 - 100 mg/dL    Performed by [address] [place] [address] for questions and clarifications were given to the incoming nurse. Patient's bed is in low position, side rails x2, door open PRN, call bell within reach and patient not in distress.       Ulysses Morgan

## 2017-08-22 NOTE — IP AVS SNAPSHOT
[address] [facility] 
[phone] Patient: Hannah Ramesh. MRN: [id number] :1955 You are allergic to the following Allergen Reactions Mercury (Bulk) Hives Blisters Recent Documentation Weight BMI Smoking Status 85.9 kg 28.8 kg/m2 Former Smoker Emergency Contacts  (Rel.) Home Phone Work Phone Mobile Phone [address] (Spouse) [phone] -- [phone] About your hospitalization You were admitted on:  2017 You last received care in the:  [address] You were discharged on:  2017 Why you were hospitalized Your primary diagnosis was:  Not on File Your diagnoses also included:  Multiple Myeloma (Hcc), Anemia Associated With Chemotherapy, Renal Insufficiency Providers Seen During Your Hospitalizations Provider Role Specialty Primary office phone Lori Perez MD Attending Provider Hematology and Oncology [phone] Your Primary Care Physician (PCP) Primary Care Physician Office Phone Office Fax [address] [phone] Follow-up Information Follow up With Details Comments Contact Info Jayden Ohara MD   [address] [place] [address] 
[phone] Appointments for Next 14 days 2017  4:00 PM  INFUSION [facility]  
 2017  2:50 PM  FOLLOW UP [facility] Current Discharge Medication List  
  
START taking these medications Dose & Instructions Dispensing Information Comments Morning Noon Evening Bedtime  
 fluconazole 200 mg tablet Commonly known as:  DIFLUCAN Your last dose was: Your next dose is:    
   
   
 Dose:  400 mg Take 2 Tabs by mouth daily for 30 days.  FDA advises cautious prescribing of oral fluconazole in pregnancy. Quantity:  60 Tab Refills:  0  
     
   
   
   
  
 levoFLOXacin 750 mg tablet Commonly known as:  Javon Backbone Your last dose was: Your next dose is:    
   
   
 Dose:  750 mg Take 1 Tab by mouth daily for 30 days. Quantity:  30 Tab Refills:  0  
     
   
   
   
  
 trimethoprim-sulfamethoxazole 160-800 mg per tablet Commonly known as:  BACTRIM DS, SEPTRA DS Your last dose was: Your next dose is:    
   
   
 Dose:  1 Tab Take 1 Tab by mouth daily for 30 days. Quantity:  30 Tab Refills:  0 CONTINUE these medications which have CHANGED Dose & Instructions Dispensing Information Comments Morning Noon Evening Bedtime  
 losartan 100 mg tablet Commonly known as:  COZAAR What changed:  Another medication with the same name was removed. Continue taking this medication, and follow the directions you see here. Your last dose was: Your next dose is:    
   
   
 TK 1 T PO QD Refills:  4 CONTINUE these medications which have NOT CHANGED Dose & Instructions Dispensing Information Comments Morning Noon Evening Bedtime ABILIFY 5 mg tablet Generic drug:  ARIPiprazole Your last dose was: Your next dose is:    
   
   
 Dose:  2.5 mg Take 2.5 mg by mouth every morning. Refills:  0 AMBIEN CR 12.5 mg tablet Generic drug:  zolpidem CR Your last dose was: Your next dose is:    
   
   
 Dose:  12.5 mg Take 12.5 mg by mouth nightly as needed. Refills:  0  
     
   
   
   
  
 amLODIPine 10 mg tablet Commonly known as:  Sundra Ventura Your last dose was: Your next dose is:    
   
   
 Dose:  10 mg Take 10 mg by mouth daily. Refills:  0 BD INSULIN PEN NEEDLE UF MINI 31 gauge x 3/16\" Ndle Generic drug:  Insulin Needles (Disposable) Your last dose was: Your next dose is: USE AS DIRECTED Refills:  2  
     
   
   
   
  
 buPROPion  mg tablet Commonly known as:  Miguel Murphy Your last dose was: Your next dose is:    
   
   
 Dose:  150 mg Take 1 Tab by mouth daily (with dinner). Quantity:  30 Tab Refills:  11  
     
   
   
   
  
 CIALIS PO Your last dose was: Your next dose is:    
   
   
 Dose:  20 mg Take 20 mg by mouth as needed. Refills:  0  
     
   
   
   
  
 furosemide 20 mg tablet Commonly known as:  LASIX Your last dose was: Your next dose is: TAKE 1 TABLET BY MOUTH DAILY Quantity:  90 Tab Refills:  0  
 **Patient requests 90 days supply**  
    
   
   
   
  
 insulin glargine 100 unit/mL (3 mL) Inpn Commonly known as:  Melvina Núñez Your last dose was: Your next dose is:    
   
   
 10 units daily at bedtime Quantity:  15 mL Refills:  1  
     
   
   
   
  
 insulin lispro 100 unit/mL kwikpen Commonly known as:  HUMALOG Your last dose was: Your next dose is:    
   
   
 Dose:  3 Units 3 Units by SubCUTAneous route Before breakfast, lunch, and dinner. Plus correction sliding scale as needed Refills:  0  
     
   
   
   
  
 NUCYNTA 100 mg tablet Generic drug:  tapentadol Your last dose was: Your next dose is:    
   
   
 Dose:  100 mg Take 100 mg by mouth three (3) times daily. Refills:  0  
     
   
   
   
  
 ONETOUCH ULTRA TEST strip Generic drug:  glucose blood VI test strips Your last dose was: Your next dose is:    
   
   
 USE TO TEST BLOOD SUGAR TID BEFORE EACH MEAL UTD. Refills:  4 RYTHMOL 150 mg tablet Generic drug:  propafenone Your last dose was: Your next dose is:    
   
   
 Dose:  150 mg Take 150 mg by mouth every eight (8) hours. Refills:  0 tamsulosin 0.4 mg capsule Commonly known as:  FLOMAX Your last dose was: Your next dose is:    
   
   
 Dose:  0.4 mg Take 1 Cap by mouth daily. Quantity:  30 Cap Refills:  1  
     
   
   
   
  
 valACYclovir 1 gram tablet Commonly known as:  VALTREX Your last dose was: Your next dose is:    
   
   
 Dose:  1000 mg Take 1 Tab by mouth daily. Quantity:  90 Tab Refills:  3 STOP taking these medications   
 lenalidomide 25 mg Cap REVLIMID 15 mg Cap Generic drug:  lenalidomide ASK your doctor about these medications Dose & Instructions Dispensing Information Comments Morning Noon Evening Bedtime  
 prochlorperazine 10 mg tablet Commonly known as:  COMPAZINE Your last dose was: Your next dose is: TAKE 1 TABLET BY MOUTH EVERY 6 HOURS AS NEEDED FOR UP TO 7 DAYS. Quantity:  385 Tab Refills:  0  
 **Patient requests 90 days supply** Where to Get Your Medications These medications were sent to [facility], [address] [place] [address] AT [address] [place] [address], [address] [place] [id number] Hours:  24-hours Phone:  [phone]  
  fluconazole 200 mg tablet  
 levoFLOXacin 750 mg tablet  
 prochlorperazine 10 mg tablet  
 trimethoprim-sulfamethoxazole 160-800 mg per tablet Discharge Instructions [facility] Monday, [facility] at 4:00 pm for a Neulasta injection to boost your white blood cell count. Discharge Orders None [facility] Announcement We are excited to announce that we are making your provider's discharge notes available to you in [facility].   You will see these notes when they are completed and signed by the physician that discharged you from your recent hospital stay. If you have any questions or concerns about any information you see in [employer], please call the Health Information Department where you were seen or reach out to your Primary Care Provider for more information about your plan of care. Introducing hospitals & HEALTH SERVICES! [facility] introduces [employer] patient portal. Now you can access parts of your medical record, email your doctor's office, and request medication refills online. 1. In your internet browser, go to [email or web address] 2. Click on the First Time User? Click Here link in the Sign In box. You will see the New Member Sign Up page. 3. Enter your [employer] Access Code exactly as it appears below. You will not need to use this code after youve completed the sign-up process. If you do not sign up before the expiration date, you must request a new code. · [employer] Access Code: [email or web address] Expires: 10/1/2017  8:12 AM 
 
4. Enter the last four digits of your Social Security Number (xxxx) and Date of Birth (mm/dd/yyyy) as indicated and click Submit. You will be taken to the next sign-up page. 5. Create a [employer] ID. This will be your [employer] login ID and cannot be changed, so think of one that is secure and easy to remember. 6. Create a [employer] password. You can change your password at any time. 7. Enter your Password Reset Question and Answer. This can be used at a later time if you forget your password. 8. Enter your e-mail address. You will receive e-mail notification when new information is available in [address] 9. Click Sign Up. You can now view and download portions of your medical record. 10. Click the Download Summary menu link to download a portable copy of your medical information. If you have questions, please visit the Frequently Asked Questions section of the [employer] website. Remember, [employer] is NOT to be used for urgent needs. For medical emergencies, dial 911. Now available from your iPhone and Android! General Information Please provide this summary of care documentation to your next provider. Patient Signature:  ____________________________________________________________ Date:  ____________________________________________________________  
  
Arna Pipestone Provider Signature:  ____________________________________________________________ Date:  ____________________________________________________________

## 2017-08-23 ENCOUNTER — APPOINTMENT (OUTPATIENT)
Dept: ULTRASOUND IMAGING | Age: 62
DRG: 846 | End: 2017-08-23
Attending: INTERNAL MEDICINE
Payer: COMMERCIAL

## 2017-08-23 PROBLEM — N28.9 RENAL INSUFFICIENCY: Status: ACTIVE | Noted: 2017-08-23

## 2017-08-23 PROBLEM — D64.81 ANEMIA ASSOCIATED WITH CHEMOTHERAPY: Status: ACTIVE | Noted: 2017-08-23

## 2017-08-23 PROBLEM — T45.1X5A ANEMIA ASSOCIATED WITH CHEMOTHERAPY: Status: ACTIVE | Noted: 2017-08-23

## 2017-08-23 LAB
ANION GAP SERPL CALC-SCNC: 7 MMOL/L (ref 5–15)
BACTERIA SPEC CULT: NORMAL
BACTERIA SPEC CULT: NORMAL
BASOPHILS # BLD: 0 K/UL
BASOPHILS NFR BLD: 0 %
BUN SERPL-MCNC: 26 MG/DL (ref 6–20)
BUN/CREAT SERPL: 21 (ref 12–20)
CALCIUM SERPL-MCNC: 9.2 MG/DL (ref 8.5–10.1)
CHLORIDE SERPL-SCNC: 107 MMOL/L (ref 97–108)
CO2 SERPL-SCNC: 21 MMOL/L (ref 21–32)
CREAT SERPL-MCNC: 1.25 MG/DL (ref 0.7–1.3)
DIFFERENTIAL METHOD BLD: ABNORMAL
EOSINOPHIL # BLD: 0 K/UL
EOSINOPHIL NFR BLD: 0 %
ERYTHROCYTE [DISTWIDTH] IN BLOOD BY AUTOMATED COUNT: 18.8 % (ref 11.5–14.5)
GLUCOSE BLD STRIP.AUTO-MCNC: 136 MG/DL (ref 65–100)
GLUCOSE BLD STRIP.AUTO-MCNC: 143 MG/DL (ref 65–100)
GLUCOSE BLD STRIP.AUTO-MCNC: 183 MG/DL (ref 65–100)
GLUCOSE BLD STRIP.AUTO-MCNC: 202 MG/DL (ref 65–100)
GLUCOSE SERPL-MCNC: 155 MG/DL (ref 65–100)
HCT VFR BLD AUTO: 30.5 % (ref 36.6–50.3)
HGB BLD-MCNC: 9.5 G/DL (ref 12.1–17)
LYMPHOCYTES # BLD: 0.4 K/UL
LYMPHOCYTES NFR BLD: 5 %
MCH RBC QN AUTO: 28.5 PG (ref 26–34)
MCHC RBC AUTO-ENTMCNC: 31.1 G/DL (ref 30–36.5)
MCV RBC AUTO: 91.6 FL (ref 80–99)
METAMYELOCYTES NFR BLD MANUAL: 1 %
MONOCYTES # BLD: 0.2 K/UL
MONOCYTES NFR BLD: 3 %
NEUTS BAND NFR BLD MANUAL: 6 %
NEUTS SEG # BLD: 7.2 K/UL
NEUTS SEG NFR BLD: 85 %
PLATELET # BLD AUTO: 128 K/UL (ref 150–400)
POTASSIUM SERPL-SCNC: 5.4 MMOL/L (ref 3.5–5.1)
RBC # BLD AUTO: 3.33 M/UL (ref 4.1–5.7)
RBC MORPH BLD: ABNORMAL
SERVICE CMNT-IMP: ABNORMAL
SERVICE CMNT-IMP: NORMAL
SODIUM SERPL-SCNC: 135 MMOL/L (ref 136–145)
WBC # BLD AUTO: 7.9 K/UL (ref 4.1–11.1)
WBC MORPH BLD: ABNORMAL

## 2017-08-23 PROCEDURE — 82962 GLUCOSE BLOOD TEST: CPT

## 2017-08-23 PROCEDURE — 74011250637 HC RX REV CODE- 250/637: Performed by: INTERNAL MEDICINE

## 2017-08-23 PROCEDURE — 74011636637 HC RX REV CODE- 636/637: Performed by: INTERNAL MEDICINE

## 2017-08-23 PROCEDURE — 93971 EXTREMITY STUDY: CPT

## 2017-08-23 PROCEDURE — 74011250636 HC RX REV CODE- 250/636: Performed by: INTERNAL MEDICINE

## 2017-08-23 PROCEDURE — 85025 COMPLETE CBC W/AUTO DIFF WBC: CPT | Performed by: NURSE PRACTITIONER

## 2017-08-23 PROCEDURE — 80048 BASIC METABOLIC PNL TOTAL CA: CPT | Performed by: NURSE PRACTITIONER

## 2017-08-23 PROCEDURE — 65270000015 HC RM PRIVATE ONCOLOGY

## 2017-08-23 PROCEDURE — 74011250637 HC RX REV CODE- 250/637: Performed by: NURSE PRACTITIONER

## 2017-08-23 PROCEDURE — 36415 COLL VENOUS BLD VENIPUNCTURE: CPT | Performed by: NURSE PRACTITIONER

## 2017-08-23 RX ORDER — OXYCODONE HYDROCHLORIDE 5 MG/1
5 TABLET ORAL
Status: DISCONTINUED | OUTPATIENT
Start: 2017-08-23 | End: 2017-08-27 | Stop reason: HOSPADM

## 2017-08-23 RX ADMIN — VALACYCLOVIR HYDROCHLORIDE 500 MG: 500 TABLET, FILM COATED ORAL at 16:07

## 2017-08-23 RX ADMIN — BUPROPION HYDROCHLORIDE 150 MG: 150 TABLET, FILM COATED, EXTENDED RELEASE ORAL at 09:15

## 2017-08-23 RX ADMIN — INSULIN LISPRO 3 UNITS: 100 INJECTION, SOLUTION INTRAVENOUS; SUBCUTANEOUS at 11:47

## 2017-08-23 RX ADMIN — Medication 10 ML: at 16:11

## 2017-08-23 RX ADMIN — Medication 10 ML: at 21:10

## 2017-08-23 RX ADMIN — ARIPIPRAZOLE 2.5 MG: 5 TABLET ORAL at 09:16

## 2017-08-23 RX ADMIN — INSULIN LISPRO 2 UNITS: 100 INJECTION, SOLUTION INTRAVENOUS; SUBCUTANEOUS at 17:53

## 2017-08-23 RX ADMIN — ZOLPIDEM TARTRATE 12.5 MG: 12.5 TABLET, FILM COATED, EXTENDED RELEASE ORAL at 21:09

## 2017-08-23 RX ADMIN — PROPAFENONE HYDROCHLORIDE 150 MG: 150 TABLET, FILM COATED ORAL at 21:09

## 2017-08-23 RX ADMIN — CISPLATIN 20 MG: 1 INJECTION, SOLUTION INTRAVENOUS at 21:31

## 2017-08-23 RX ADMIN — VALACYCLOVIR HYDROCHLORIDE 500 MG: 500 TABLET, FILM COATED ORAL at 06:29

## 2017-08-23 RX ADMIN — PANTOPRAZOLE SODIUM 40 MG: 40 TABLET, DELAYED RELEASE ORAL at 09:15

## 2017-08-23 RX ADMIN — Medication 10 ML: at 06:29

## 2017-08-23 RX ADMIN — ENOXAPARIN SODIUM 40 MG: 40 INJECTION SUBCUTANEOUS at 11:11

## 2017-08-23 RX ADMIN — SODIUM CHLORIDE 20 MG: 900 INJECTION, SOLUTION INTRAVENOUS at 21:33

## 2017-08-23 RX ADMIN — INSULIN GLARGINE 10 UNITS: 100 INJECTION, SOLUTION SUBCUTANEOUS at 21:10

## 2017-08-23 RX ADMIN — FUROSEMIDE 20 MG: 20 TABLET ORAL at 11:12

## 2017-08-23 RX ADMIN — BUPROPION HYDROCHLORIDE 150 MG: 150 TABLET, FILM COATED, EXTENDED RELEASE ORAL at 17:53

## 2017-08-23 RX ADMIN — DOCUSATE SODIUM AND SENNOSIDES 2 TABLET: 8.6; 5 TABLET, FILM COATED ORAL at 09:15

## 2017-08-23 RX ADMIN — LOSARTAN POTASSIUM 100 MG: 50 TABLET, FILM COATED ORAL at 09:16

## 2017-08-23 RX ADMIN — LEVOFLOXACIN 750 MG: 750 TABLET, FILM COATED ORAL at 09:15

## 2017-08-23 RX ADMIN — VALACYCLOVIR HYDROCHLORIDE 500 MG: 500 TABLET, FILM COATED ORAL at 21:09

## 2017-08-23 RX ADMIN — AMLODIPINE BESYLATE 10 MG: 5 TABLET ORAL at 11:11

## 2017-08-23 RX ADMIN — FLUCONAZOLE 400 MG: 100 TABLET ORAL at 09:15

## 2017-08-23 RX ADMIN — DEXAMETHASONE 40 MG: 4 TABLET ORAL at 09:14

## 2017-08-23 RX ADMIN — PROPAFENONE HYDROCHLORIDE 150 MG: 150 TABLET, FILM COATED ORAL at 06:34

## 2017-08-23 RX ADMIN — DOCUSATE SODIUM AND SENNOSIDES 2 TABLET: 8.6; 5 TABLET, FILM COATED ORAL at 21:09

## 2017-08-23 RX ADMIN — SULFAMETHOXAZOLE AND TRIMETHOPRIM 1 TABLET: 800; 160 TABLET ORAL at 09:15

## 2017-08-23 RX ADMIN — PROPAFENONE HYDROCHLORIDE 150 MG: 150 TABLET, FILM COATED ORAL at 16:07

## 2017-08-23 NOTE — PROGRESS NOTES
Progress Note        Patient: Victorino Forde Sr. MRN: [id number]  SSN: [id number]    YOB: 1955  Age: 63 y.o. Sex: male        Subjective:      Victorino Forde Sr. is a 63 y.o. male with a diagnosis of IgA kappa myeloma. Bone marrow shows 100% aberrant plasma cells. He suffers with DM but it is diet controlled. He has received systemic anti-viral treatment for chronic Hep C with successful eradication of the virus. Mr. Paulie Antonio received 4 cycles of systemic therapy with RVd. He had an initial good response to treatment. However d/t on-going side effects, treatment was dose reduced and paraprotein is now rising. He is being admitted for inpatient chemotherapy. Mr. Paulie Antonio is receiving chemotherapy and feels well with no complaints. His wife and several family members are at the bedside.       Review of Systems:    Constitutional: fatigue  Eyes: negative  Ears, Nose, Mouth, Throat, and Face: negative  Respiratory: negative  Cardiovascular: negative  Gastrointestinal: negative  Genitourinary:negative  Integument/Breast: negative  Hematologic/Lymphatic: negative  Musculoskeletal:bilateral lower extremity edema  Neurological: negative      Past Medical History:   Diagnosis Date    Acid reflux     Arrhythmia     pvc's    Chronic pain     neck    Depression     Diabetes ([facility] [address]     Femoral hernia 7/11/2012    Hepatitis C     Hypertension     Inguinal hernia 7/11/2012    Liver disease     hepatitis c    Other ill-defined conditions     Hx of PVCs since 2009    Prostatitis, acute     Psychiatric disorder     depression     Past Surgical History:   Procedure Laterality Date    COLONOSCOPY N/A 9/20/2016    COL[facility] performed [facility]OSCOPY    HX APPENDECTOMY      HX CERVICAL FUSION  2008    x 1 as of 4/30/2014    HX HEENT      foreign body removed from right eye    HX [facility]ERN[facility]PA[facility]  [facility]      HX ORTHOPAEDIC      cervical fusion    HX OTHER SURGICAL  2000    liver bx - chronic hepatitis      Family History   Problem Relation Age of Onset    Arthritis-osteo Mother     Arthritis-osteo Father     Diabetes Father     Hypertension Maternal Grandmother     Diabetes Maternal Grandmother     Hypertension Maternal Grandfather     Diabetes Maternal Grandfather      Social History   Substance Use Topics    Smoking status: Former Smoker     Packs/day: 1.00     Years: 12.00     Types: Cigarettes     Quit date: 1/1/1989    Smokeless tobacco: Never Used      Comment: former cigarette smoker    Alcohol use No      Comment: former alcoholic-quit 2113      Prior to Admission medications    Medication Sig Start Date End Date Taking? Authorizing Provider   amLODIPine (NORVASC) 10 mg tablet Take 10 mg by mouth daily. Yes Historical Provider   furosemide (LASIX) 20 mg tablet TAKE 1 TABLET BY MOUTH DAILY 8/4/17   Pj Quiñones NP   ONETOUCH ULTRA TEST strip USE TO TEST BLOOD SUGAR TID BEFORE EACH MEAL UTD. 7/3/17   Historical Provider   REVLIMID 15 mg cap 10 mg. 6/12/17   Historical Provider   losartan (COZAAR) 100 mg tablet TK 1 T PO QD 7/3/17   Historical Provider   BD INSULIN PEN NEEDLE UF MINI 31 gauge x 3/16\" ndle USE AS DIRECTED 6/7/17   Historical Provider   insulin lispro (HUMALOG) 100 unit/mL kwikpen 3 Units by SubCUTAneous route Before breakfast, lunch, and dinner. Plus correction sliding scale as needed    Historical Provider   insulin glargine (LANTUS,BASAGLAR) 100 unit/mL (3 mL) inpn 10 units daily at bedtime 6/9/17   Pj Quiñones NP   lenalidomide 25 mg cap Take  by mouth. Historical Provider   valACYclovir (VALTREX) 1 gram tablet Take 1 Tab by mouth daily. 6/6/17   Sidney Nieves MD   tamsulosin (FLOMAX) 0.4 mg capsule Take 1 Cap by mouth daily. 4/4/17   Toña Ritchie MD   propafenone (RYTHMOL) 150 mg tablet Take 150 mg by mouth every eight (8) hours.     Historical Provider   zolpidem CR (AMBIEN CR) 12.5 mg tablet Take 12.5 mg by mouth nightly as needed. Historical Provider   TADALAFIL (CIALIS PO) Take 20 mg by mouth as needed. Historical Provider   losartan (COZAAR) 50 mg tablet Take 100 mg by mouth daily. Historical Provider   tapentadol (NUCYNTA) 100 mg tablet Take 100 mg by mouth three (3) times daily. Goldy Diaz MD   aripiprazole (ABILIFY) 5 mg tablet Take 2.5 mg by mouth every morning. Goldy Diaz MD   buPROPion XL (WELLBUTRIN XL) 150 mg tablet Take 1 Tab by mouth daily (with dinner). 4/25/10   Roseanna Jeffers MD              Allergies   Allergen Reactions    Mercury (Bulk) Hives     Blisters             Objective:     Vitals:    17 0634 17 0801 17 1111 17 1607   BP: 116/63 139/78 139/81 136/75   Pulse: 70 84 91 85   Resp:  16     Temp:  98.1 °F (36.7 °C)     SpO2:  98%            Physical Exam:    GENERAL: alert, cooperative  EYE: negative  LYMPHATIC: Cervical, supraclavicular, and axillary nodes normal.   THROAT & NECK: normal and no erythema or exudates noted. LUNG: clear to auscultation bilaterally  HEART: regular rate and rhythm  ABDOMEN: soft, non-tender  EXTREMITIES: bilateral LE edema  SKIN: Normal.  NEUROLOGIC: negative      Lab Results   Component Value Date/Time    WBC 7.9 2017 06:27 AM    HGB 9.5 2017 06:27 AM    HCT 30.5 2017 06:27 AM    PLATELET 324  06:27 AM    MCV 91.6 2017 06:27 AM       M-spike: 5.8 => 2.9 => 1.9 => 2.1 => 2.8, IgA kappa    Serum F => 496 => 246 => 305 => 314       Assessment:     1. Multiple Myeloma, IgA Kappa   [place]ie [place] stage IIIB    Cytogenetics/FISH: t(14;16) - high risk  ECOG PS - 0   Intent of therapy: palliative    Receiving systemic therapy with RVd - s/p 3 cycles  Dose reduced Revlimid and Velcade d/t continued cytopenias. M-spike now rising. This is primary refractory myeloma. Carries poor prognosis  [facility].   All in agreement with aggressive multiagent chemotherapy. I will admit him to the hospital and treat him with VD-PACE (bortezomib, dexamethasone, thalidomide, cisplatin, adriamycin, cyclophosphamide, and etoposide - will omit thalidomide). Receiving inpatient chemotherapy   VD-PACE - Cycle 1 Day 2    Tolerating treatment   A detailed system by system evaluation of side effect was performed to assess chemotherapy related toxicity. Blood counts are acceptable. Results reviewed with the patient. Neulasta after hospital discharge in [facility] on 8/28 at 4:00 pm      2. Diabetes    > Sliding scale insulin  > Hospitalist following for diabetes management      3. Bilateral lower extremity edema    > Dopplers left LE negative for DVT  > Continue lasix 20 mg po daily      4. Anemia chemotherapy related    Observation      5. Renal insufficiency    Observation      Plan:       > Hospitalist following for diabetes management  > Continue VD-PACE  > Continue lasix      Signed by: Cj Rico NP                     August 23, 2017         Attending Physician Note:     I have reviewed the history, physical examination, assessment and the plan of the care of the patient. I saw the patient with Beckie Yip and I participated in the examination and critical decision making. I agree with the note as stated above. Mr. Kati Glasgow is a gentleman with primary Refractory myeloma. He is admited for the first cycle of VD-PACE chemotherapy. He is tolerating treatment well so far.        Signed by: Vika Dalton MD                     August 23, 2017

## 2017-08-23 NOTE — PROGRESS NOTES
Oncology Nursing Communication Tool      7:39 PM  8/23/2017     Bedside shift change report [facility], RN (incoming nurse) by Jimmy Alvarez RN (outgoing nurse) on Marcela Jay Sr. a 63 y.o. male who was admitted on 8/22/2017 10:41 AM. Report included the following information SBAR and Kardex. Significant changes during shift: tolerated chemo without side effects. Duplex of LLE negative for DVT      Issues for physician to address: none            Code Status: Full Code     Infections: MRSA     Allergies: Mercury (bulk)     Current diet: DIET REGULAR       Pain Controlled [] yes [] no   Bowel Movement [] yes [] no   Last Bowel Movement (date)                  Vital Signs:   Patient Vitals for the past 12 hrs:   Temp Pulse Resp BP SpO2   08/23/17 1919 98.7 °F (37.1 °C) 86 17 111/72 97 %   08/23/17 1607 - 85 - 136/75 -   08/23/17 1111 - 91 - 139/81 -   08/23/17 0801 98.1 °F (36.7 °C) 84 16 139/78 98 %      Intake & Output:     Intake/Output Summary (Last 24 hours) at 08/23/17 1939  Last data filed at 08/23/17 5801   Gross per 24 hour   Intake          1269.58 ml   Output                0 ml   Net          1269.58 ml      Laboratory Results:     Recent Results (from the past 12 hour(s))   GLUCOSE, POC    Collection Time: 08/23/17  8:07 AM   Result Value Ref Range    Glucose (POC) 136 (H) 65 - 100 mg/dL    Performed by [place]    GLUCOSE, POC    Collection Time: 08/23/17 11:23 AM   Result Value Ref Range    Glucose (POC) 202 (H) 65 - 100 mg/dL    Performed by [place]    GLUCOSE, POC    Collection Time: 08/23/17  5:01 PM   Result Value Ref Range    Glucose (POC) 143 (H) 65 - 100 mg/dL    Performed by Zeus Zafar               Opportunity for questions and clarifications were given to the incoming nurse. Patient's bed is in low position, side rails x2, door open PRN, call bell within reach and patient not in distress.       Jimmy Alvarez RN

## 2017-08-23 NOTE — PROGRESS NOTES
Problem: Falls - Risk of  Goal: *Absence of Falls  Document Eusebio Fall Risk and appropriate interventions in the flowsheet.    Outcome: Progressing Towards Goal  Fall Risk Interventions:              Medication Interventions: Patient to call before getting OOB

## 2017-08-23 NOTE — CONSULTS
Hospitalist Consult Note    NAME: Nishant Mckeon Sr.   :  1955   MRN:  [id number]     Date/Time:  2017 9:44 AM    Patient PCP: Corey Nunn MD   Consult request from: Dr. Gregorio Cosme  ________________________________________________________________________    My assessment of this patient's clinical condition and my plan of care is as follows. Assessment / Plan:  Diabetes type II POA  -Previously managed with metformin but patient reports worsening blood glucose since starting steroids with chemotherapy earlier this year. At home checks BG twice daily. Reports high of ~210. Uses long acting insulin at night but does this prn. Also uses SSI. -Check A1C  -Continue lantus 10 units qhs and accuchecks with SSI as BG has been well controlled on this regimen so far.  -should aim for fasting blood sugar goal of  mg/dL and less than 180 mg/dL at all times. Adjust insulin prn.  -Diabetes education consult    Multiple Myeloma, IgA Kappa  Anemia/thrombocytopenia  -Admitted to Dr. Jackie Ivy service for inpatient chemotherapy with VD-PACE  -symptomatic treatment and empiric antibiotics/antivirals per oncology.  -Continue to monitor for side effects of chemotherapy. Left ankle swelling  -reports this started following initiation of chemotherapy prior to hospital admission. Ankle is nontender. On DVT proph lovenox in hospital.  -continue pta lasix  -check LE duplex to rule out dvt    Essential HTN POA  -continue pta norvasc and losartan    Anxiety/depression  -continue pta abilify and wellbutrin    Hx of SVT  -on pta profenone  -Echo 17 with EF 55%, mild concentric hypertrophy.  -follows with cardiology, Dr. Chau Locke.     Hx hep C s/p treatment  -follows with Dr. Ruelas Oh    Code Status: Full  Surrogate Decision Maker: Wife, Mayr Ramirez [phone]  DVT Prophylaxis: lovenox  GI Prophylaxis: not indicated  Baseline: independent        Subjective:   Reason for Consult: Diabetes management    HISTORY OF PRESENT ILLNESS:     Jessica Del Toro is a 63 y.o.  male with recently diagnosed multiple myeloma who is admitted to the oncology service for inpatient chemotherapy. Patient has a history of diabetes type II and the hospitalist service was consulted for assistance with management. Patient reports his diabetes with previously managed with metformin. Once starting on steroids with chemotherapy his blood glucose became more difficult to control and he began using insulin. At home he was using a sliding scale and occasionally using long acting insulin at night as needed. Reports highs at home of ~210. He is currently on lantus 10 units qhs and sliding scale insulin with good control of his BG so far this admission. Denies fever/chills, N/V, diarrhea/constipation, CP, or SOB. Patient does have swelling of his left ankle on exam which he reports started following chemo as an outpatient. Denies pain. Says he was started on lasix for this. We were asked to admit for work up and evaluation of the above problems.      Past Medical History:   Diagnosis Date    Acid reflux     Arrhythmia     pvc's    Chronic pain     neck    Depression     Diabetes ([facility] [address]     Femoral hernia 7/11/2012    Hepatitis C     Hypertension     Inguinal hernia 7/11/2012    Liver disease     hepatitis c    Other ill-defined conditions     Hx of PVCs since 2009    Prostatitis, acute     Psychiatric disorder     depression        Past Surgical History:   Procedure Laterality Date    COLONOSCOPY N/A 9/20/2016    COLONOSCOPY performed by [facility]    HX APPENDECTOMY      HX CERVICAL FUSION  2008    x 1 as of 4/30/2014    HX HEENT      foreign body removed from right eye    HX HERNIA REPAIR  2012    [facility]      HX ORTHOPAEDIC      cervical fusion    HX OTHER SURGICAL  2000    liver bx - chronic hepatitis       Social History   Substance Use Topics    Smoking status: Former Smoker     Packs/day: 1.00     Years: 12.00     Types: Cigarettes     Quit date: 1/1/1989    Smokeless tobacco: Never Used      Comment: former cigarette smoker    Alcohol use No      Comment: former alcoholic-quit 0192        Family History   Problem Relation Age of Onset    Arthritis-osteo Mother     Arthritis-osteo Father     Diabetes Father     Hypertension Maternal Grandmother     Diabetes Maternal Grandmother     Hypertension Maternal Grandfather     Diabetes Maternal Grandfather      Allergies   Allergen Reactions    Mercury (Bulk) Hives     Blisters          Prior to Admission medications    Medication Sig Start Date End Date Taking? Authorizing Provider   amLODIPine (NORVASC) 10 mg tablet Take 10 mg by mouth daily. Yes Historical Provider   furosemide (LASIX) 20 mg tablet TAKE 1 TABLET BY MOUTH DAILY 8/4/17   Cj Rico NP   ONETOUCH ULTRA TEST strip USE TO TEST BLOOD SUGAR TID BEFORE EACH MEAL UTD. 7/3/17   Historical Provider   REVLIMID 15 mg cap 10 mg. 6/12/17   Historical Provider   losartan (COZAAR) 100 mg tablet TK 1 T PO QD 7/3/17   Historical Provider   BD INSULIN PEN NEEDLE UF MINI 31 gauge x 3/16\" ndle USE AS DIRECTED 6/7/17   Historical Provider   insulin lispro (HUMALOG) 100 unit/mL kwikpen 3 Units by SubCUTAneous route Before breakfast, lunch, and dinner. Plus correction sliding scale as needed    Historical Provider   insulin glargine (LANTUS,BASAGLAR) 100 unit/mL (3 mL) inpn 10 units daily at bedtime 6/9/17   Cj Rico NP   lenalidomide 25 mg cap Take  by mouth. Historical Provider   valACYclovir (VALTREX) 1 gram tablet Take 1 Tab by mouth daily. 6/6/17   Vika Dalton MD   tamsulosin (FLOMAX) 0.4 mg capsule Take 1 Cap by mouth daily. 4/4/17   Amy Swain MD   propafenone (RYTHMOL) 150 mg tablet Take 150 mg by mouth every eight (8) hours. Historical Provider   zolpidem CR (AMBIEN CR) 12.5 mg tablet Take 12.5 mg by mouth nightly as needed. Historical Provider   TADALAFIL (CIALIS PO) Take 20 mg by mouth as needed. Historical Provider   losartan (COZAAR) 50 mg tablet Take 100 mg by mouth daily. Historical Provider   tapentadol (NUCYNTA) 100 mg tablet Take 100 mg by mouth three (3) times daily. Goldy Diaz MD   aripiprazole (ABILIFY) 5 mg tablet Take 2.5 mg by mouth every morning. Goldy Diaz MD   buPROPion XL (WELLBUTRIN XL) 150 mg tablet Take 1 Tab by mouth daily (with dinner). 4/25/10   Antonina Mobley MD       REVIEW OF SYSTEMS:     I am not able to complete the review of systems because:    The patient is intubated and sedated    The patient has altered mental status due to his acute medical problems    The patient has baseline aphasia from prior stroke(s)    The patient has baseline dementia and is not reliable historian    The patient is in acute medical distress and unable to provide information           Total of 12 systems reviewed as follows:       POSITIVE= underlined text  Negative = text not underlined  General:  fever, chills, sweats, generalized weakness, weight loss/gain,      loss of appetite   Eyes:    blurred vision, eye pain, loss of vision, double vision  ENT:    rhinorrhea, pharyngitis   Respiratory:   cough, sputum production, SOB, GONZALES, wheezing, pleuritic pain   Cardiology:   chest pain, palpitations, orthopnea, PND, left ankle edema, syncope   Gastrointestinal:  abdominal pain , N/V, diarrhea, dysphagia, constipation, bleeding   Genitourinary:  frequency, urgency, dysuria, hematuria, incontinence   Muskuloskeletal :  arthralgia, myalgia, back pain, occasional neck pain  Hematology:  easy bruising, nose or gum bleeding, lymphadenopathy   Dermatological: rash, ulceration, pruritis, color change / jaundice  Endocrine:   hot flashes or polydipsia   Neurological:  headache, dizziness, confusion, focal weakness, paresthesia,     Speech difficulties, memory loss, gait difficulty  Psychological: Feelings of anxiety, depression, agitation    Objective:   VITALS:    Visit Vitals    /78    Pulse 84    Temp 98.1 °F (36.7 °C)    Resp 16    SpO2 98%       PHYSICAL EXAM:    General:    Alert, cooperative, no distress, appears stated age. HEENT: Atraumatic, anicteric sclerae, pink conjunctivae     No oral ulcers, mucosa moist, throat clear, dentition fair  Neck:  Supple, symmetrical,  thyroid: non tender  Lungs:   Clear to auscultation bilaterally. No Wheezing or Rhonchi. No rales. Chest wall:  No tenderness  No Accessory muscle use. Heart:   Regular  rhythm,  No  murmur    Abdomen:   Soft, non-tender. Not distended. Bowel sounds normal  Extremities: Left ankle swelling without warmth or tenderness. No cyanosis. No clubbing,      Skin turgor normal, Capillary refill normal, Radial dial pulse 2+  Skin:     Not pale. Not Jaundiced  No rashes   Psych:  Good insight. Not depressed. Not anxious or agitated. Neurologic: EOMs intact. No facial asymmetry. No aphasia or slurred speech. Symmetrical strength, Sensation grossly intact.  Alert and oriented X 4.   RUE PICC    _______________________________________________________________________  Care Plan discussed with:    Comments   Patient y    Family      RN y    Care Manager                    Consultant:      _______________________________________________________________________  Expected  Disposition:   Home with Family x   HH/PT/OT/RN    SNF/LTC    YANIV    ________________________________________________________________________  TOTAL TIME:  54 Minutes    Critical Care Provided     Minutes non procedure based      Comments    x Reviewed previous records   >50% of visit spent in counseling and coordination of care  Discussion with patient and/or family and questions answered       ________________________________________________________________________  Signed: Gil Dumont MD    Procedures: see electronic medical records for all procedures/Xrays and details which were not copied into this note but were reviewed prior to creation of Plan. LAB DATA REVIEWED:    Recent Results (from the past 24 hour(s))   GLUCOSE, POC    Collection Time: 08/22/17 11:07 AM   Result Value Ref Range    Glucose (POC) 135 (H) 65 - 100 mg/dL    Performed by [address] [place] [address] [facility] [place], MRSA    Collection Time: 08/22/17  1:50 PM   Result Value Ref Range    Special Requests: NO SPECIAL REQUESTS      Culture result: MRSA NOT PRESENT AT 13 HOURS     CBC WITH AUTOMATED DIFF    Collection Time: 08/22/17  3:20 PM   Result Value Ref Range    WBC 3.1 (L) 4.1 - 11.1 K/uL    RBC 3.14 (L) 4.10 - 5.70 M/uL    HGB 9.4 (L) 12.1 - 17.0 g/dL    HCT 28.8 (L) 36.6 - 50.3 %    MCV 91.7 80.0 - 99.0 FL    MCH 29.9 26.0 - 34.0 PG    MCHC 32.6 30.0 - 36.5 g/dL    RDW 18.8 (H) 11.5 - 14.5 %    PLATELET 728 (L) 325 - 400 K/uL    NEUTROPHILS 51 32 - 75 %    LYMPHOCYTES 29 12 - 49 %    MONOCYTES 18 (H) 5 - 13 %    EOSINOPHILS 2 0 - 7 %    BASOPHILS 0 0 - 1 %    ABS. NEUTROPHILS 1.6 (L) 1.8 - 8.0 K/UL    ABS. LYMPHOCYTES 0.9 0.8 - 3.5 K/UL    ABS. MONOCYTES 0.6 0.0 - 1.0 K/UL    ABS. EOSINOPHILS 0.1 0.0 - 0.4 K/UL    ABS. BASOPHILS 0.0 0.0 - 0.1 K/UL   METABOLIC PANEL, COMPREHENSIVE    Collection Time: 08/22/17  3:20 PM   Result Value Ref Range    Sodium 137 136 - 145 mmol/L    Potassium 4.3 3.5 - 5.1 mmol/L    Chloride 106 97 - 108 mmol/L    CO2 25 21 - 32 mmol/L    Anion gap 6 5 - 15 mmol/L    Glucose 105 (H) 65 - 100 mg/dL    BUN 23 (H) 6 - 20 MG/DL    Creatinine 1.39 (H) 0.70 - 1.30 MG/DL    BUN/Creatinine ratio 17 12 - 20      GFR est AA >60 >60 ml/min/1.73m2    GFR est non-AA 52 (L) >60 ml/min/1.73m2    Calcium 8.8 8.5 - 10.1 MG/DL    Bilirubin, total 0.2 0.2 - 1.0 MG/DL    ALT (SGPT) 21 12 - 78 U/L    AST (SGOT) 16 15 - 37 U/L    Alk.  phosphatase 65 45 - 117 U/L    Protein, total 9.1 (H) 6.4 - 8.2 g/dL    Albumin 3.4 (L) 3.5 - 5.0 g/dL    Globulin 5.7 (H) 2.0 - 4.0 g/dL    A-G Ratio 0.6 (L) 1.1 - 2.2     GLUCOSE, POC Collection Time: 08/22/17  4:31 PM   Result Value Ref Range    Glucose (POC) 98 65 - 100 mg/dL    Performed by Milvia Hamilton    GLUCOSE, POC    Collection Time: 08/22/17  8:54 PM   Result Value Ref Range    Glucose (POC) 179 (H) 65 - 100 mg/dL    Performed by Ken Miller    CBC WITH AUTOMATED DIFF    Collection Time: 08/23/17  6:27 AM   Result Value Ref Range    WBC 7.9 4.1 - 11.1 K/uL    RBC 3.33 (L) 4.10 - 5.70 M/uL    HGB 9.5 (L) 12.1 - 17.0 g/dL    HCT 30.5 (L) 36.6 - 50.3 %    MCV 91.6 80.0 - 99.0 FL    MCH 28.5 26.0 - 34.0 PG    MCHC 31.1 30.0 - 36.5 g/dL    RDW 18.8 (H) 11.5 - 14.5 %    PLATELET 848 (L) 556 - 400 K/uL    NEUTROPHILS 85 %    BAND NEUTROPHILS 6 %    LYMPHOCYTES 5 %    MONOCYTES 3 %    EOSINOPHILS 0 %    BASOPHILS 0 %    METAMYELOCYTES 1 %    ABS. NEUTROPHILS 7.2 K/UL    ABS. LYMPHOCYTES 0.4 K/UL    ABS. MONOCYTES 0.2 K/UL    ABS. EOSINOPHILS 0.0 K/UL    ABS.  BASOPHILS 0.0 K/UL    RBC COMMENTS ANISOCYTOSIS  1+        WBC COMMENTS ATYPICAL LYMPHOCYTES PRESENT      DF MANUAL     METABOLIC PANEL, BASIC    Collection Time: 08/23/17  6:27 AM   Result Value Ref Range    Sodium 135 (L) 136 - 145 mmol/L    Potassium 5.4 (H) 3.5 - 5.1 mmol/L    Chloride 107 97 - 108 mmol/L    CO2 21 21 - 32 mmol/L    Anion gap 7 5 - 15 mmol/L    Glucose 155 (H) 65 - 100 mg/dL    BUN 26 (H) 6 - 20 MG/DL    Creatinine 1.25 0.70 - 1.30 MG/DL    BUN/Creatinine ratio 21 (H) 12 - 20      GFR est AA >60 >60 ml/min/1.73m2    GFR est non-AA 59 (L) >60 ml/min/1.73m2    Calcium 9.2 8.5 - 10.1 MG/DL   GLUCOSE, POC    Collection Time: 08/23/17  8:07 AM   Result Value Ref Range    Glucose (POC) 136 (H) 65 - 100 mg/dL    Performed by Ambreen Santos

## 2017-08-23 NOTE — PROGRESS NOTES
PICC site and dressing check 24 hours post insertion :     PICC line site and dressing clean, dry and intact. No complications noted. Rodney Sheppard RN. BSN. CMSRN. NALLELY, PICC Nurse.  Vascular Access Team

## 2017-08-23 NOTE — DIABETES MGMT
[facility]:    Attempted to meet with pt for consult, currently off floor for testing. DTC to follow-up at later time. Agree with current insulin regimen, however, may need to titrate Lantus dosing if BG continues to rise from steroids. Also may consider adding diabetic restriction to diet to aid in glucose control.      Thank you,    Sachin Deal, [address] [facility]  Office: [phone]

## 2017-08-24 LAB
ANION GAP SERPL CALC-SCNC: 7 MMOL/L (ref 5–15)
BASOPHILS # BLD: 0 K/UL (ref 0–0.1)
BASOPHILS NFR BLD: 0 % (ref 0–1)
BUN SERPL-MCNC: 31 MG/DL (ref 6–20)
BUN/CREAT SERPL: 23 (ref 12–20)
CALCIUM SERPL-MCNC: 8.1 MG/DL (ref 8.5–10.1)
CHLORIDE SERPL-SCNC: 108 MMOL/L (ref 97–108)
CO2 SERPL-SCNC: 20 MMOL/L (ref 21–32)
CREAT SERPL-MCNC: 1.35 MG/DL (ref 0.7–1.3)
EOSINOPHIL # BLD: 0 K/UL (ref 0–0.4)
EOSINOPHIL NFR BLD: 0 % (ref 0–7)
ERYTHROCYTE [DISTWIDTH] IN BLOOD BY AUTOMATED COUNT: 19.1 % (ref 11.5–14.5)
GLUCOSE BLD STRIP.AUTO-MCNC: 154 MG/DL (ref 65–100)
GLUCOSE BLD STRIP.AUTO-MCNC: 204 MG/DL (ref 65–100)
GLUCOSE BLD STRIP.AUTO-MCNC: 206 MG/DL (ref 65–100)
GLUCOSE BLD STRIP.AUTO-MCNC: 209 MG/DL (ref 65–100)
GLUCOSE SERPL-MCNC: 248 MG/DL (ref 65–100)
HCT VFR BLD AUTO: 27 % (ref 36.6–50.3)
HGB BLD-MCNC: 8.5 G/DL (ref 12.1–17)
LYMPHOCYTES # BLD: 0.3 K/UL (ref 0.8–3.5)
LYMPHOCYTES NFR BLD: 4 % (ref 12–49)
MCH RBC QN AUTO: 28.6 PG (ref 26–34)
MCHC RBC AUTO-ENTMCNC: 31.5 G/DL (ref 30–36.5)
MCV RBC AUTO: 90.9 FL (ref 80–99)
MONOCYTES # BLD: 0.7 K/UL (ref 0–1)
MONOCYTES NFR BLD: 10 % (ref 5–13)
NEUTS SEG # BLD: 6.2 K/UL (ref 1.8–8)
NEUTS SEG NFR BLD: 86 % (ref 32–75)
PLATELET # BLD AUTO: 99 K/UL (ref 150–400)
POTASSIUM SERPL-SCNC: 4.9 MMOL/L (ref 3.5–5.1)
RBC # BLD AUTO: 2.97 M/UL (ref 4.1–5.7)
SERVICE CMNT-IMP: ABNORMAL
SODIUM SERPL-SCNC: 135 MMOL/L (ref 136–145)
WBC # BLD AUTO: 7.2 K/UL (ref 4.1–11.1)

## 2017-08-24 PROCEDURE — 74011250637 HC RX REV CODE- 250/637: Performed by: INTERNAL MEDICINE

## 2017-08-24 PROCEDURE — 74011636637 HC RX REV CODE- 636/637: Performed by: INTERNAL MEDICINE

## 2017-08-24 PROCEDURE — 80048 BASIC METABOLIC PNL TOTAL CA: CPT | Performed by: NURSE PRACTITIONER

## 2017-08-24 PROCEDURE — 65270000015 HC RM PRIVATE ONCOLOGY

## 2017-08-24 PROCEDURE — 74011250636 HC RX REV CODE- 250/636: Performed by: INTERNAL MEDICINE

## 2017-08-24 PROCEDURE — 85025 COMPLETE CBC W/AUTO DIFF WBC: CPT | Performed by: NURSE PRACTITIONER

## 2017-08-24 PROCEDURE — 74011250637 HC RX REV CODE- 250/637: Performed by: NURSE PRACTITIONER

## 2017-08-24 PROCEDURE — 82962 GLUCOSE BLOOD TEST: CPT

## 2017-08-24 PROCEDURE — 36415 COLL VENOUS BLD VENIPUNCTURE: CPT | Performed by: NURSE PRACTITIONER

## 2017-08-24 RX ORDER — POLYETHYLENE GLYCOL 3350 17 G/17G
17 POWDER, FOR SOLUTION ORAL DAILY
Status: DISCONTINUED | OUTPATIENT
Start: 2017-08-25 | End: 2017-08-27 | Stop reason: HOSPADM

## 2017-08-24 RX ADMIN — INSULIN GLARGINE 10 UNITS: 100 INJECTION, SOLUTION SUBCUTANEOUS at 21:03

## 2017-08-24 RX ADMIN — PROPAFENONE HYDROCHLORIDE 150 MG: 150 TABLET, FILM COATED ORAL at 06:41

## 2017-08-24 RX ADMIN — Medication 10 ML: at 14:31

## 2017-08-24 RX ADMIN — LOSARTAN POTASSIUM 100 MG: 50 TABLET, FILM COATED ORAL at 09:41

## 2017-08-24 RX ADMIN — ARIPIPRAZOLE 2.5 MG: 5 TABLET ORAL at 08:10

## 2017-08-24 RX ADMIN — SULFAMETHOXAZOLE AND TRIMETHOPRIM 1 TABLET: 800; 160 TABLET ORAL at 09:38

## 2017-08-24 RX ADMIN — LEVOFLOXACIN 750 MG: 750 TABLET, FILM COATED ORAL at 09:41

## 2017-08-24 RX ADMIN — ZOLPIDEM TARTRATE 12.5 MG: 12.5 TABLET, FILM COATED, EXTENDED RELEASE ORAL at 21:03

## 2017-08-24 RX ADMIN — CISPLATIN 20 MG: 1 INJECTION, SOLUTION INTRAVENOUS at 21:04

## 2017-08-24 RX ADMIN — DEXAMETHASONE 40 MG: 4 TABLET ORAL at 09:42

## 2017-08-24 RX ADMIN — Medication 20 ML: at 21:03

## 2017-08-24 RX ADMIN — PROPAFENONE HYDROCHLORIDE 150 MG: 150 TABLET, FILM COATED ORAL at 14:30

## 2017-08-24 RX ADMIN — OXYCODONE HYDROCHLORIDE 5 MG: 5 TABLET ORAL at 11:38

## 2017-08-24 RX ADMIN — VALACYCLOVIR HYDROCHLORIDE 500 MG: 500 TABLET, FILM COATED ORAL at 14:29

## 2017-08-24 RX ADMIN — VALACYCLOVIR HYDROCHLORIDE 500 MG: 500 TABLET, FILM COATED ORAL at 06:41

## 2017-08-24 RX ADMIN — BUPROPION HYDROCHLORIDE 150 MG: 150 TABLET, FILM COATED, EXTENDED RELEASE ORAL at 18:15

## 2017-08-24 RX ADMIN — ENOXAPARIN SODIUM 40 MG: 40 INJECTION SUBCUTANEOUS at 09:47

## 2017-08-24 RX ADMIN — FLUCONAZOLE 400 MG: 100 TABLET ORAL at 09:39

## 2017-08-24 RX ADMIN — AMLODIPINE BESYLATE 10 MG: 5 TABLET ORAL at 09:45

## 2017-08-24 RX ADMIN — INSULIN LISPRO 2 UNITS: 100 INJECTION, SOLUTION INTRAVENOUS; SUBCUTANEOUS at 11:25

## 2017-08-24 RX ADMIN — FUROSEMIDE 20 MG: 20 TABLET ORAL at 09:46

## 2017-08-24 RX ADMIN — Medication 10 ML: at 15:53

## 2017-08-24 RX ADMIN — SODIUM CHLORIDE 20 MG: 900 INJECTION, SOLUTION INTRAVENOUS at 21:05

## 2017-08-24 RX ADMIN — PROPAFENONE HYDROCHLORIDE 150 MG: 150 TABLET, FILM COATED ORAL at 21:03

## 2017-08-24 RX ADMIN — INSULIN LISPRO 3 UNITS: 100 INJECTION, SOLUTION INTRAVENOUS; SUBCUTANEOUS at 08:03

## 2017-08-24 RX ADMIN — Medication 10 ML: at 06:41

## 2017-08-24 RX ADMIN — VALACYCLOVIR HYDROCHLORIDE 500 MG: 500 TABLET, FILM COATED ORAL at 21:08

## 2017-08-24 RX ADMIN — DOCUSATE SODIUM AND SENNOSIDES 2 TABLET: 8.6; 5 TABLET, FILM COATED ORAL at 09:39

## 2017-08-24 RX ADMIN — BUPROPION HYDROCHLORIDE 150 MG: 150 TABLET, FILM COATED, EXTENDED RELEASE ORAL at 09:37

## 2017-08-24 RX ADMIN — INSULIN LISPRO 3 UNITS: 100 INJECTION, SOLUTION INTRAVENOUS; SUBCUTANEOUS at 18:15

## 2017-08-24 RX ADMIN — PANTOPRAZOLE SODIUM 40 MG: 40 TABLET, DELAYED RELEASE ORAL at 07:54

## 2017-08-24 RX ADMIN — DOCUSATE SODIUM AND SENNOSIDES 2 TABLET: 8.6; 5 TABLET, FILM COATED ORAL at 21:02

## 2017-08-24 NOTE — PROGRESS NOTES
Problem: Falls - Risk of  Goal: *Absence of Falls  Document Eusebio Fall Risk and appropriate interventions in the flowsheet.    Outcome: Progressing Towards Goal  Fall Risk Interventions:              Medication Interventions: Teach patient to arise slowly, Patient to call before getting OOB

## 2017-08-24 NOTE — DIABETES MGMT
DTC Consult Note    Recommendations/ Comments: If appropriate, please consider increasing Lantus dose to 12 units daily. Also may consider resuming home prandial insulin, Humalog 3 units ac tid. Pt has required 10 units of correction over the past 24 hours. Met with pt and family for consult. Pt reported that he sees Dr. Gwendolyn Astorga for DM management. Pt verbalized proper understanding of insulin pen usage and storage. Pt stated that he also takes Ambien every night and typically wakes around 4am and has snacks - little concha cakes, chips, cookies, etc. Discussed healthy snack options with pt to help improve fasting BG readings. Pt interested in outpatient education and plans to call after d/c. Provided education materials and DTC contact info. Consult received for:  [x]             Assessment of home management                []      Medication Recommendations                []             Meter/monitoring     [x]             Insulin instruction     []             New diagnosis     []             Outpatient education     []             Insulin pump patient     []             Insulin infusion     []             DKA/HHS    Chart reviewed and initial evaluation complete on Aiden Hong Sr. .    Patient is a 63 y.o. male with known history of Type 2 Diabetes on Lantus and SSI prn for steroid usage secondary to chemo treatment at home. BG monitoring at home 2 times per day. Patient reports BG levels at home - varies depending on if pt currently taking steroids for chemo treatment.     Assessed and instructed patient on the following:   ·  interpretation of lab results, blood sugar goals, complications of diabetes mellitus, hypoglycemia prevention and treatment, exercise, illness management, SMBG skills, nutrition, referred to Diabetes Educator, site rotation and use of insulin pen    Encouraged the following:   · dietary modifications: well balanced meals including carb and protein at every meal and snack    Provided patient with the following: [x]             Survival skills education materials               [x]             Insulin education materials               []             CHO counting education materials               [x]             Outpatient DTC contact number               []             Glucometer               Discussed with patient and/or family need for follow up appointment for diabetes management after discharge. A1c:   Lab Results   Component Value Date/Time    Hemoglobin A1c 8.3 12/24/2010 07:00 AM    Hemoglobin A1c, External 6.6% 04/07/2017       Recent Glucose Results:   Lab Results   Component Value Date/Time     (H) 08/24/2017 06:45 AM    GLUCPOC 154 (H) 08/24/2017 11:05 AM    GLUCPOC 206 (H) 08/24/2017 07:24 AM    GLUCPOC 183 (H) 08/23/2017 08:56 PM        Lab Results   Component Value Date/Time    Creatinine 1.35 08/24/2017 06:45 AM     CrCl cannot be calculated (Unknown ideal weight. ). Active Orders   Diet    DIET REGULAR        PO intake: No data found. Current hospital DM medication:   -Lantus 10 units every bedtime  -Humalog normal sensitivity correction    Will continue to follow as needed. Thank you.     Harshad Belle, [address]  Office: [phone]

## 2017-08-24 NOTE — PROGRESS NOTES
Hospitalist Progress Note    NAME: Victorino Forde Sr.   :  1955   MRN:  [id number]       Assessment / Plan:  Diabetes type II POA:  -Previously managed with metformin but patient reports worsening blood glucose since starting steroids with chemotherapy earlier this year. At home checks BG twice daily. Reports high of ~210. Uses long acting insulin at night but does this prn. Also uses SSI. -Pending A1C  -Continue lantus 10 units qhs and accuchecks with SSI. Blood sugars are expected to go up as he is on steroids so will adjust insulin as needed.  -Consult Diabetic nurse.     Primary refractory Multiple Myeloma, IgA Kappa  Anemia/thrombocytopenia  -Admitted to Dr. Savanna Cho service for inpatient chemotherapy with VD-PACE  -On  empiric antibiotics/antivirals per oncology.  -Further mgmt deferred to oncology.     Left ankle swelling  -reports this started following initiation of chemotherapy prior to hospital admission. -Ankle is nontender.   -continue lasix  -Doppler negative for DVT. Anemia likely from multiple myeloma: Transfuse to keep Hb more than 7.0.     Essential HTN POA  -continue pta norvasc and losartan     Anxiety/depression  -continue pta abilify and wellbutrin     Hx of SVT  -on pta profenone  -Echo 17 with EF 55%, mild concentric hypertrophy.  -follows with cardiology, Dr. Del Anaya.     Hx hep C s/p treatment  -follows with Dr. Kenny Arambula     Code Status: Full  Surrogate Decision Maker: Wife, Sissy Emmanuel [phone]  DVT Prophylaxis: lovenox  GI Prophylaxis: not indicated  Baseline: independent                                      Subjective:     Chief Complaint / Reason for Physician Visit  He says that he does not have any complaints today. Denies chest pain or sob.     Review of Systems:  Symptom Y/N Comments  Symptom Y/N Comments   Fever/Chills n   Chest Pain n    Poor Appetite n   Edema n    Cough n   Abdominal Pain n    Sputum n   Joint Pain n    SOB/GONZALES n   Pruritis/Rash n Nausea/vomit n   Tolerating PT/OT     Diarrhea    Tolerating Diet y    Constipation    Other       Could NOT obtain due to:      Objective:     VITALS:   Last 24hrs VS reviewed since prior progress note. Most recent are:  Patient Vitals for the past 24 hrs:   Temp Pulse Resp BP SpO2   08/24/17 0945 - 82 - 121/85 -   08/24/17 0800 98.2 °F (36.8 °C) 81 18 114/66 96 %   08/24/17 0641 - 81 - 115/73 -   08/24/17 0025 98 °F (36.7 °C) 81 17 105/60 97 %   08/23/17 1919 98.7 °F (37.1 °C) 86 17 111/72 97 %   08/23/17 1607 - 85 - 136/75 -       Intake/Output Summary (Last 24 hours) at 08/24/17 1226  Last data filed at 08/24/17 0709   Gross per 24 hour   Intake          1520.83 ml   Output                0 ml   Net          1520.83 ml        PHYSICAL EXAM:  General: Alert, cooperative, no acute distress    EENT:  EOMI. Anicteric sclerae. MMM  Resp:  CTA bilaterally, no wheezing or rales. No accessory muscle use  CV:  Regular  rhythm,  No edema  GI:  Soft, Non distended, Non tender.  +Bowel sounds  Neurologic:  Alert and oriented X 3, normal speech,   Skin:  No rashes.   No jaundice    Reviewed most current lab test results and cultures  YES  Reviewed most current radiology test results   YES  Review and summation of old records today    NO  Reviewed patient's current orders and MAR    YES  PMH/SH reviewed - no change compared to H&P      Current Facility-Administered Medications:     tapentadol (NUCYNTA) tablet 200 mg, 200 mg, Oral, TID, Dottie Slade MD, 200 mg at 08/24/17 0804    oxyCODONE IR (ROXICODONE) tablet 5 mg, 5 mg, Oral, Q4H PRN, Ame Long NP, 5 mg at 08/24/17 1138    losartan (COZAAR) tablet 100 mg, 100 mg, Oral, DAILY, Dottie Slade MD, 100 mg at 08/24/17 0941    furosemide (LASIX) tablet 20 mg, 20 mg, Oral, DAILY, Dottie Slade MD, 20 mg at 08/24/17 0946    insulin glargine (LANTUS) injection 10 Units, 10 Units, SubCUTAneous, QHS, Dottie Slade MD, 10 Units at 08/23/17 2110    propafenone (RYTHMOL) tablet 150 mg, 150 mg, Oral, Q8H, Chiquis Reyes MD, 150 mg at 08/24/17 0641    zolpidem CR (AMBIEN CR) tablet 12.5 mg, 12.5 mg, Oral, QHS PRN, Chiquis Reyes MD, 12.5 mg at 08/23/17 2109    amLODIPine (NORVASC) tablet 10 mg, 10 mg, Oral, DAILY, Chiquis Reyes MD, 10 mg at 08/24/17 0945    ARIPiprazole (ABILIFY) tablet 2.5 mg, 2.5 mg, Oral, 7am, Chiquis Reyes MD, 2.5 mg at 08/24/17 0810    CISplatin (PLATINOL) 20 mg, cyclophosphamide (CYTOXAN) 820 mg, etoposide (VEPESID) 80 mg in 0.9% sodium chloride 500 mL, overfill volume 50 mL chemo infusion, 20 mg, IntraVENous, Q24H, Chiquis Reyes MD, 20 mg at 08/23/17 2131    DOXOrubicin (ADRIAMYCIN) 20 mg in 0.9% sodium chloride 250 mL, overfill volume 25 mL chemo infusion, 20 mg, IntraVENous, Q24H, Chiquis Reyes MD, 20 mg at 08/23/17 2133    buPROPion XL (WELLBUTRIN XL) tablet 150 mg, 150 mg, Oral, BID, Ann Tuttle NP, 150 mg at 08/24/17 8495    levoFLOXacin (LEVAQUIN) tablet 750 mg, 750 mg, Oral, DAILY, Chiquis Reyes MD, 750 mg at 08/24/17 0941    trimethoprim-sulfamethoxazole (BACTRIM DS, SEPTRA DS) 160-800 mg per tablet 1 Tab, 1 Tab, Oral, DAILY, Chiquis Reyes MD, 1 Tab at 08/24/17 5546    valACYclovir (VALTREX) tablet 500 mg, 500 mg, Oral, Q8H, Chiquis Reyes MD, 500 mg at 08/24/17 0641    fluconazole (DIFLUCAN) tablet 400 mg, 400 mg, Oral, DAILY, Chiquis Reyes MD, 400 mg at 08/24/17 3593    pantoprazole (PROTONIX) tablet 40 mg, 40 mg, Oral, ACB, Chiquis Reyes MD, 40 mg at 08/24/17 0754    0.9% sodium chloride infusion, 25 mL/hr, IntraVENous, CONTINUOUS, Chiquis Reyes MD, Last Rate: 25 mL/hr at 08/22/17 1532, 25 mL/hr at 08/22/17 1532    [START ON 8/26/2017] 0.9% sodium chloride 500 mL with potassium chloride 10 mEq, magnesium sulfate 1 g infusion, , IntraVENous, ONCE, Chiquis Reyes MD  [facility]  Remy Jane ON 8/25/2017] bortezomib (VELCADE) 2 mg in sodium chloride 0.9 % SQ chemo syringe, 2 mg, SubCUTAneous, ONCE, Chiquis Reyes MD  [facility] prochlorperazine (COMPAZINE) with saline injection 10 mg, 10 mg, IntraVENous, Q6H PRN, Farhat Dao MD    insulin lispro (HUMALOG) injection, , SubCUTAneous, Jacinta Brunson MD, 2 Units at 08/24/17 1125    glucose chewable tablet 16 g, 4 Tab, Oral, PRN, Farhat Dao MD    dextrose (D50W) injection syrg 12.5-25 g, 12.5-25 g, IntraVENous, PRN, Farhat Dao MD    glucagon (GLUCAGEN) injection 1 mg, 1 mg, IntraMUSCular, PRN, Farhat Dao MD    dexamethasone (DECADRON) tablet 40 mg, 40 mg, Oral, DAILY, Farhat aDo MD, 40 mg at 08/24/17 4351    sodium chloride (NS) flush 10-30 mL, 10-30 mL, InterCATHeter, PRN, Farhat Dao MD    sodium chloride (NS) flush 10 mL, 10 mL, InterCATHeter, Q24H, Farhat Dao MD, 10 mL at 08/22/17 1705    sodium chloride (NS) flush 10-40 mL, 10-40 mL, InterCATHeter, Q8H, Farhat Dao MD, 10 mL at 08/24/17 0641    heparin (porcine) pf 300 Units, 300 Units, InterCATHeter, PRN, Farhat Dao MD    0.9% sodium chloride infusion, 100 mL/hr, IntraVENous, ONCE PRN, Farhat Dao MD    diphenhydrAMINE (BENADRYL) injection 50 mg, 50 mg, IntraVENous, ONCE PRN, Farhat Dao MD    acetaminophen (TYLENOL) tablet 650 mg, 650 mg, Oral, ONCE PRN, Farhat Dao MD    meperidine (DEMEROL) injection 25 mg, 25 mg, IntraVENous, ONCE PRN, Farhat Doa MD    ondansetron [facility]) injection 8 mg, 8 mg, IntraVENous, ONCE PRN, Farhat Dao MD    0.9% sodium chloride infusion, 999 mL/hr, IntraVENous, ONCE PRN, Farhat Dao MD    EPINEPHrine HCl (PF) (ADRENALIN) 1 mg/mL (1 mL) injection 0.3 mg, 0.3 mg, SubCUTAneous, ONCE PRN, Farhat Dao MD    hydrocortisone Sod Succ (PF) (SOLU-CORTEF) injection 100 mg, 100 mg, IntraVENous, ONCE PRN, Farhat Dao MD    diphenhydrAMINE (BENADRYL) injection 50 mg, 50 mg, IntraVENous, ONCE PRN, Farhat Dao MD    albuterol (PROVENTIL VENTOLIN) nebulizer solution 2.5 mg, 2.5 mg, Nebulization, ONCE PRN, Ema Edman MD Jose    senna-docusate (PERICOLACE) 8.6-50 mg per tablet 2 Tab, 2 Tab, Oral, Q12H, Jaylon Kim MD, 2 Tab at 08/24/17 0939    ondansetron (ZOFRAN) injection 8 mg, 8 mg, IntraVENous, Q6H PRN, Jaylon Kim MD    LORazepam (ATIVAN) tablet 1 mg, 1 mg, Oral, Q4H PRN, Jaylon Kim MD    enoxaparin (LOVENOX) injection 40 mg, 40 mg, SubCUTAneous, DAILY, Jaylon Kim MD, 40 mg at 08/24/17 6950    Facility-Administered Medications Ordered in Other Encounters:     [START ON 8/28/2017] pegfilgrastim (NEULASTA) injection 6 mg, 6 mg, SubCUTAneous, ONCE, Jaylon Kim MD    ________________________________________________________________________  Care Plan discussed with:    Comments   Patient y    Family      RN y    Care Manager     Consultant                        Multidiciplinary team rounds were held today with , nursing, pharmacist and clinical coordinator. Patient's plan of care was discussed; medications were reviewed and discharge planning was addressed. ________________________________________________________________________  Total NON critical care TIME:  35    Minutes    Total CRITICAL CARE TIME Spent:   Minutes non procedure based      Comments   >50% of visit spent in counseling and coordination of care     ________________________________________________________________________  Lali Finn MD     Procedures: see electronic medical records for all procedures/Xrays and details which were not copied into this note but were reviewed prior to creation of Plan. LABS:  I reviewed today's most current labs and imaging studies.   Pertinent labs include:  Recent Labs      08/24/17 0645 08/23/17 0627 08/22/17   1520   WBC  7.2  7.9  3.1*   HGB  8.5*  9.5*  9.4*   HCT  27.0*  30.5*  28.8*   PLT  99*  128*  123*     Recent Labs      08/24/17   0645  08/23/17 0627 08/22/17   1520   NA  135*  135*  137   K  4.9  5.4*  4.3   CL  108  107  106   CO2  20*  21  25   GLU 248*  155*  105*   BUN  31*  26*  23*   CREA  1.35*  1.25  1.39*   CA  8.1*  9.2  8.8   ALB   --    --   3.4*   TBILI   --    --   0.2   SGOT   --    --   16   ALT   --    --   21       Signed: Latoya Herron MD

## 2017-08-24 NOTE — PROGRESS NOTES
Oncology Nursing Communication Tool      6:59 PM  8/24/2017     Bedside and Verbal shift change report given to [facility], RN (incoming nurse) by Corey Guidry RN (outgoing nurse) on Rumaldo Goldberg Sr. a 63 y.o. male who was admitted on 8/22/2017 10:41 AM. Report included the following information SBAR, Kardex, Intake/Output, MAR and Recent Results. Significant changes during shift: none      Issues for physician to address: none            Code Status: Full Code     Infections: No current active infections     Allergies: Mercury (bulk)     Current diet: DIET REGULAR       Pain Controlled [] yes [x] no   Bowel Movement [] yes [x] no   Last Bowel Movement (date)               Vital Signs:   Patient Vitals for the past 12 hrs:   Temp Pulse Resp BP SpO2   08/24/17 1415 98.1 °F (36.7 °C) 83 18 112/63 97 %   08/24/17 0945 - 82 - 121/85 -   08/24/17 0800 98.2 °F (36.8 °C) 81 18 114/66 96 %      Intake & Output:     Intake/Output Summary (Last 24 hours) at 08/24/17 1859  Last data filed at 08/24/17 3717   Gross per 24 hour   Intake          1520.83 ml   Output                0 ml   Net          1520.83 ml      Laboratory Results:     Recent Results (from the past 12 hour(s))   GLUCOSE, POC    Collection Time: 08/24/17  7:24 AM   Result Value Ref Range    Glucose (POC) 206 (H) 65 - 100 mg/dL    Performed by Jennifer Ac, POC    Collection Time: 08/24/17 11:05 AM   Result Value Ref Range    Glucose (POC) 154 (H) 65 - 100 mg/dL    Performed by Melinda Ward    GLUCOSE, POC    Collection Time: 08/24/17  3:59 PM   Result Value Ref Range    Glucose (POC) 209 (H) 65 - 100 mg/dL    Performed by Beryle Gills for questions and clarifications were given to the incoming nurse. Patient's bed is in low position, side rails x2, door open PRN, call bell within reach and patient not in distress.       Corey Guidry RN

## 2017-08-24 NOTE — DISCHARGE INSTRUCTIONS
You have an appointment at the [place] [address] on Monday, 8/28 at 4:00 pm for a Neulasta injection to boost your white blood cell count.

## 2017-08-24 NOTE — PROGRESS NOTES
Progress Note        Patient: Ramos Pulliam Sr. MRN: [id number]  SSN: [id number]    YOB: 1955  Age: 63 y.o. Sex: male        Subjective:      Ramos Pulliam Sr. is a 63 y.o. male with a diagnosis of IgA kappa myeloma. Bone marrow shows 100% aberrant plasma cells. He suffers with DM but it is diet controlled. He has received systemic anti-viral treatment for chronic Hep C with successful eradication of the virus. Mr. Ralph Soto received 4 cycles of systemic therapy with RVd. He had an initial good response to treatment. However d/t on-going side effects, treatment was dose reduced and paraprotein is now rising. He is being admitted for inpatient chemotherapy. Mr. Ralph Soto feels well today and has been walking in the hallway. He has not had a bowel movement since admission. His wife and a friend are at the bedside.       Review of Systems:    Constitutional: fatigue  Eyes: negative  Ears, Nose, Mouth, Throat, and Face: negative  Respiratory: negative  Cardiovascular: negative  Gastrointestinal: constipation  Genitourinary:negative  Integument/Breast: negative  Hematologic/Lymphatic: negative  Musculoskeletal:bilateral lower extremity edema  Neurological: negative      Past Medical History:   Diagnosis Date    Acid reflux     Arrhythmia     pvc's    Chronic pain     neck    Depression     Diabetes ([facility] [address]     Femoral hernia 7/11/2012    Hepatitis C     Hypertension     Inguinal hernia 7/11/2012    Liver disease     hepatitis c    Other ill-defined conditions     Hx of PVCs since 2009    Prostatitis, acute     Psychiatric disorder     depression     Past Surgical History:   Procedure Laterality Date    COLONOSCOPY N/A 9/20/2016    COLONOSCOPY performed by Zane San [facility] at [facility] ENDOSCOPY    HX APPENDECTOMY      HX CERVICAL FUSION  2008    x 1 as of 4/30/2014    HX HEENT      foreign body removed from right eye    HX HERNIA REPAIR  [address]      HX ORTHOPAEDIC      cervical fusion    HX OTHER SURGICAL  2000    liver bx - chronic hepatitis      Family History   Problem Relation Age of Onset    Arthritis-osteo Mother     Arthritis-osteo Father     Diabetes Father     Hypertension Maternal Grandmother     Diabetes Maternal Grandmother     Hypertension Maternal Grandfather     Diabetes Maternal Grandfather      Social History   Substance Use Topics    Smoking status: Former Smoker     Packs/day: 1.00     Years: 12.00     Types: Cigarettes     Quit date: 1/1/1989    Smokeless tobacco: Never Used      Comment: former cigarette smoker    Alcohol use No      Comment: former alcoholic-quit [id number]      Prior to Admission medications    Medication Sig Start Date End Date Taking? Authorizing Provider   amLODIPine (NORVASC) 10 mg tablet Take 10 mg by mouth daily. Yes Historical Provider   furosemide (LASIX) 20 mg tablet TAKE 1 TABLET BY MOUTH DAILY 8/4/17   Preet Hawkins NP   ONETOUCH ULTRA TEST strip USE TO TEST BLOOD SUGAR TID BEFORE EACH MEAL UTD. 7/3/17   Historical Provider   REVLIMID 15 mg cap 10 mg. 6/12/17   Historical Provider   losartan (COZAAR) 100 mg tablet TK 1 T PO QD 7/3/17   Historical Provider   BD INSULIN PEN NEEDLE UF MINI 31 gauge x 3/16\" ndle USE AS DIRECTED 6/7/17   Historical Provider   insulin lispro (HUMALOG) 100 unit/mL kwikpen 3 Units by SubCUTAneous route Before breakfast, lunch, and dinner. Plus correction sliding scale as needed    Historical Provider   insulin glargine (LANTUS,BASAGLAR) 100 unit/mL (3 mL) inpn 10 units daily at bedtime 6/9/17   Preet Hawkins NP   lenalidomide 25 mg cap Take  by mouth. Historical Provider   valACYclovir (VALTREX) 1 gram tablet Take 1 Tab by mouth daily. 6/6/17   Joey Allen MD   tamsulosin (FLOMAX) 0.4 mg capsule Take 1 Cap by mouth daily. 4/4/17   Blas Miranda MD   propafenone (RYTHMOL) 150 mg tablet Take 150 mg by mouth every eight (8) hours.     Historical Provider   zolpidem CR (AMBIEN CR) 12.5 mg tablet Take 12.5 mg by mouth nightly as needed. Historical Provider   TADALAFIL (CIALIS PO) Take 20 mg by mouth as needed. Historical Provider   losartan (COZAAR) 50 mg tablet Take 100 mg by mouth daily. Historical Provider   tapentadol (NUCYNTA) 100 mg tablet Take 100 mg by mouth three (3) times daily. Goldy Diaz MD   aripiprazole (ABILIFY) 5 mg tablet Take 2.5 mg by mouth every morning. Goldy Diaz MD   buPROPion XL (WELLBUTRIN XL) 150 mg tablet Take 1 Tab by mouth daily (with dinner). 4/25/10   Deon Dalton MD              Allergies   Allergen Reactions    Mercury (Bulk) Hives     Blisters             Objective:     Vitals:    17 0641 17 0800 17 0945 17 1415   BP: 115/73 114/66 121/85 112/63   Pulse: 81 81 82 83   Resp:  18  18   Temp:  98.2 °F (36.8 °C)  98.1 °F (36.7 °C)   SpO2:  96%  97%          Physical Exam:    GENERAL: alert, cooperative  EYE: negative  LYMPHATIC: Cervical, supraclavicular, and axillary nodes normal.   THROAT & NECK: normal and no erythema or exudates noted. LUNG: clear to auscultation bilaterally  HEART: regular rate and rhythm  ABDOMEN: soft, non-tender  EXTREMITIES: bilateral LE edema  SKIN: Normal.  NEUROLOGIC: negative      Lab Results   Component Value Date/Time    WBC 7.2 2017 06:45 AM    HGB 8.5 2017 06:45 AM    HCT 27.0 2017 06:45 AM    PLATELET 99  06:45 AM    MCV 90.9 2017 06:45 AM       M-spike: 5.8 => 2.9 => 1.9 => 2.1 => 2.8, IgA kappa    Serum F => 496 => 246 => 305 => 314       Assessment:     1. Multiple Myeloma, IgA Kappa   Durie [place] stage IIIB    Cytogenetics/FISH: t(14;16) - high risk  ECOG PS - 0   Intent of therapy: palliative    Receiving systemic therapy with RVd - s/p 3 cycles  Dose reduced Revlimid and Velcade d/t continued cytopenias. M-spike now rising. This is primary refractory myeloma.  Carries poor prognosis  I discussed the case with  Huber at [address] [place] [address] and Dr. Annette Gonzalez at [facility]. All in agreement with aggressive multiagent chemotherapy. I will admit him to the hospital and treat him with VD-PACE (bortezomib, dexamethasone, thalidomide, cisplatin, adriamycin, cyclophosphamide, and etoposide - will omit thalidomide). Receiving inpatient chemotherapy   VD-PACE - Cycle 1 Day 3    Tolerating treatment   A detailed system by system evaluation of side effect was performed to assess chemotherapy related toxicity. Blood counts are acceptable. Results reviewed with the patient. Neulasta after hospital discharge in [facility] on 8/28 at 4:00 pm      2. Diabetes    > Sliding scale insulin  > Hospitalist following for diabetes management      3. Bilateral lower extremity edema    > Dopplers left LE negative for DVT  > Continue lasix 20 mg po daily  > Compression stockings      4. Anemia chemotherapy related    Observation      5. Renal insufficiency    Observation      6. Constipation    > Continue natalie-colace BID  > Start Miralax daily      Plan:       > Hospitalist following for diabetes management  > Continue VD-PACE  > Continue lasix  > Compression stockings  > Miralax daily       Signed by: Sandro Chaves NP                     August 24, 2017           Attending Physician Note:     I have reviewed the history, physical examination, assessment and the plan of the care of the patient. I saw the patient with Chelsie Don and I participated in the examination and critical decision making. I agree with the note as stated above. Mr. Stevie Villaseñor is a gentleman with primary refractory myeloma who is receiving VD-PACE. He is tolerating therapy well.          Signed by: Jaylon Kim MD                     August 24, 2017

## 2017-08-25 LAB
ANION GAP SERPL CALC-SCNC: 8 MMOL/L (ref 5–15)
BASOPHILS # BLD: 0 K/UL (ref 0–0.1)
BASOPHILS NFR BLD: 0 % (ref 0–1)
BUN SERPL-MCNC: 27 MG/DL (ref 6–20)
BUN/CREAT SERPL: 21 (ref 12–20)
CALCIUM SERPL-MCNC: 7.5 MG/DL (ref 8.5–10.1)
CHLORIDE SERPL-SCNC: 106 MMOL/L (ref 97–108)
CO2 SERPL-SCNC: 21 MMOL/L (ref 21–32)
CREAT SERPL-MCNC: 1.29 MG/DL (ref 0.7–1.3)
EOSINOPHIL # BLD: 0 K/UL (ref 0–0.4)
EOSINOPHIL NFR BLD: 0 % (ref 0–7)
ERYTHROCYTE [DISTWIDTH] IN BLOOD BY AUTOMATED COUNT: 19.1 % (ref 11.5–14.5)
EST. AVERAGE GLUCOSE BLD GHB EST-MCNC: 131 MG/DL
GLUCOSE BLD STRIP.AUTO-MCNC: 132 MG/DL (ref 65–100)
GLUCOSE BLD STRIP.AUTO-MCNC: 157 MG/DL (ref 65–100)
GLUCOSE BLD STRIP.AUTO-MCNC: 173 MG/DL (ref 65–100)
GLUCOSE BLD STRIP.AUTO-MCNC: 233 MG/DL (ref 65–100)
GLUCOSE SERPL-MCNC: 175 MG/DL (ref 65–100)
HBA1C MFR BLD: 6.2 % (ref 4.2–6.3)
HCT VFR BLD AUTO: 26.8 % (ref 36.6–50.3)
HGB BLD-MCNC: 8.6 G/DL (ref 12.1–17)
LYMPHOCYTES # BLD: 0.2 K/UL (ref 0.8–3.5)
LYMPHOCYTES NFR BLD: 4 % (ref 12–49)
MCH RBC QN AUTO: 29 PG (ref 26–34)
MCHC RBC AUTO-ENTMCNC: 32.1 G/DL (ref 30–36.5)
MCV RBC AUTO: 90.2 FL (ref 80–99)
MONOCYTES # BLD: 0.5 K/UL (ref 0–1)
MONOCYTES NFR BLD: 8 % (ref 5–13)
NEUTS SEG # BLD: 5.2 K/UL (ref 1.8–8)
NEUTS SEG NFR BLD: 88 % (ref 32–75)
PLATELET # BLD AUTO: 93 K/UL (ref 150–400)
POTASSIUM SERPL-SCNC: 4.5 MMOL/L (ref 3.5–5.1)
RBC # BLD AUTO: 2.97 M/UL (ref 4.1–5.7)
SERVICE CMNT-IMP: ABNORMAL
SODIUM SERPL-SCNC: 135 MMOL/L (ref 136–145)
WBC # BLD AUTO: 5.9 K/UL (ref 4.1–11.1)

## 2017-08-25 PROCEDURE — 36415 COLL VENOUS BLD VENIPUNCTURE: CPT | Performed by: INTERNAL MEDICINE

## 2017-08-25 PROCEDURE — 74011636637 HC RX REV CODE- 636/637: Performed by: INTERNAL MEDICINE

## 2017-08-25 PROCEDURE — 65270000015 HC RM PRIVATE ONCOLOGY

## 2017-08-25 PROCEDURE — 74011250637 HC RX REV CODE- 250/637: Performed by: INTERNAL MEDICINE

## 2017-08-25 PROCEDURE — 74011000250 HC RX REV CODE- 250: Performed by: INTERNAL MEDICINE

## 2017-08-25 PROCEDURE — 82962 GLUCOSE BLOOD TEST: CPT

## 2017-08-25 PROCEDURE — 77030012890

## 2017-08-25 PROCEDURE — 74011250636 HC RX REV CODE- 250/636: Performed by: INTERNAL MEDICINE

## 2017-08-25 PROCEDURE — 85025 COMPLETE CBC W/AUTO DIFF WBC: CPT | Performed by: NURSE PRACTITIONER

## 2017-08-25 PROCEDURE — 83036 HEMOGLOBIN GLYCOSYLATED A1C: CPT | Performed by: INTERNAL MEDICINE

## 2017-08-25 PROCEDURE — 74011250637 HC RX REV CODE- 250/637: Performed by: NURSE PRACTITIONER

## 2017-08-25 PROCEDURE — 80048 BASIC METABOLIC PNL TOTAL CA: CPT | Performed by: NURSE PRACTITIONER

## 2017-08-25 RX ORDER — LEVOFLOXACIN 750 MG/1
750 TABLET ORAL DAILY
Qty: 30 TAB | Refills: 0 | Status: SHIPPED | OUTPATIENT
Start: 2017-08-25 | End: 2017-09-24

## 2017-08-25 RX ORDER — SULFAMETHOXAZOLE AND TRIMETHOPRIM 800; 160 MG/1; MG/1
1 TABLET ORAL DAILY
Qty: 30 TAB | Refills: 0 | Status: SHIPPED | OUTPATIENT
Start: 2017-08-25 | End: 2017-09-24

## 2017-08-25 RX ORDER — FLUCONAZOLE 200 MG/1
400 TABLET ORAL DAILY
Qty: 60 TAB | Refills: 0 | Status: SHIPPED | OUTPATIENT
Start: 2017-08-25 | End: 2017-09-24

## 2017-08-25 RX ADMIN — ZOLPIDEM TARTRATE 12.5 MG: 12.5 TABLET, FILM COATED, EXTENDED RELEASE ORAL at 21:27

## 2017-08-25 RX ADMIN — Medication 10 ML: at 15:30

## 2017-08-25 RX ADMIN — BORTEZOMIB 2 MG: 3.5 INJECTION, POWDER, LYOPHILIZED, FOR SOLUTION INTRAVENOUS; SUBCUTANEOUS at 18:01

## 2017-08-25 RX ADMIN — FLUCONAZOLE 400 MG: 100 TABLET ORAL at 09:34

## 2017-08-25 RX ADMIN — SULFAMETHOXAZOLE AND TRIMETHOPRIM 1 TABLET: 800; 160 TABLET ORAL at 09:35

## 2017-08-25 RX ADMIN — DOCUSATE SODIUM AND SENNOSIDES 2 TABLET: 8.6; 5 TABLET, FILM COATED ORAL at 21:25

## 2017-08-25 RX ADMIN — DEXAMETHASONE 40 MG: 4 TABLET ORAL at 09:33

## 2017-08-25 RX ADMIN — AMLODIPINE BESYLATE 10 MG: 5 TABLET ORAL at 09:46

## 2017-08-25 RX ADMIN — Medication 10 ML: at 06:32

## 2017-08-25 RX ADMIN — PANTOPRAZOLE SODIUM 40 MG: 40 TABLET, DELAYED RELEASE ORAL at 09:35

## 2017-08-25 RX ADMIN — VALACYCLOVIR HYDROCHLORIDE 500 MG: 500 TABLET, FILM COATED ORAL at 21:25

## 2017-08-25 RX ADMIN — DOCUSATE SODIUM AND SENNOSIDES 2 TABLET: 8.6; 5 TABLET, FILM COATED ORAL at 09:35

## 2017-08-25 RX ADMIN — BUPROPION HYDROCHLORIDE 150 MG: 150 TABLET, FILM COATED, EXTENDED RELEASE ORAL at 17:39

## 2017-08-25 RX ADMIN — LOSARTAN POTASSIUM 100 MG: 50 TABLET, FILM COATED ORAL at 09:35

## 2017-08-25 RX ADMIN — FUROSEMIDE 20 MG: 20 TABLET ORAL at 09:45

## 2017-08-25 RX ADMIN — INSULIN LISPRO 2 UNITS: 100 INJECTION, SOLUTION INTRAVENOUS; SUBCUTANEOUS at 17:39

## 2017-08-25 RX ADMIN — PROPAFENONE HYDROCHLORIDE 150 MG: 150 TABLET, FILM COATED ORAL at 14:40

## 2017-08-25 RX ADMIN — INSULIN LISPRO 2 UNITS: 100 INJECTION, SOLUTION INTRAVENOUS; SUBCUTANEOUS at 12:42

## 2017-08-25 RX ADMIN — VALACYCLOVIR HYDROCHLORIDE 500 MG: 500 TABLET, FILM COATED ORAL at 06:31

## 2017-08-25 RX ADMIN — VALACYCLOVIR HYDROCHLORIDE 500 MG: 500 TABLET, FILM COATED ORAL at 14:40

## 2017-08-25 RX ADMIN — ENOXAPARIN SODIUM 40 MG: 40 INJECTION SUBCUTANEOUS at 09:33

## 2017-08-25 RX ADMIN — CISPLATIN 20 MG: 1 INJECTION, SOLUTION INTRAVENOUS at 20:50

## 2017-08-25 RX ADMIN — LEVOFLOXACIN 750 MG: 750 TABLET, FILM COATED ORAL at 09:35

## 2017-08-25 RX ADMIN — BUPROPION HYDROCHLORIDE 150 MG: 150 TABLET, FILM COATED, EXTENDED RELEASE ORAL at 09:35

## 2017-08-25 RX ADMIN — PROPAFENONE HYDROCHLORIDE 150 MG: 150 TABLET, FILM COATED ORAL at 21:28

## 2017-08-25 RX ADMIN — INSULIN GLARGINE 10 UNITS: 100 INJECTION, SOLUTION SUBCUTANEOUS at 21:25

## 2017-08-25 RX ADMIN — PROPAFENONE HYDROCHLORIDE 150 MG: 150 TABLET, FILM COATED ORAL at 06:32

## 2017-08-25 RX ADMIN — SODIUM CHLORIDE 20 MG: 900 INJECTION, SOLUTION INTRAVENOUS at 20:51

## 2017-08-25 RX ADMIN — Medication 10 ML: at 21:31

## 2017-08-25 RX ADMIN — ARIPIPRAZOLE 2.5 MG: 5 TABLET ORAL at 09:34

## 2017-08-25 NOTE — PROGRESS NOTES
Hospitalist Progress Note    NAME: Victorino Forde Sr.   :  1955   MRN:  [id number]       Assessment / Plan:  Diabetes type II POA:  -Previously managed with metformin but patient reports worsening blood glucose since starting steroids with chemotherapy earlier this year. At home checks BG twice daily. Reports high of ~210. Uses long acting insulin at night but does this prn. Also uses SSI.  - A1C is 6.2  -Continue lantus 10 units qhs and accuchecks with SSI. Blood sugars are expected to go up as he is on steroids so will adjust insulin as needed. -Most blood sugars are under 200 and around 150 so will continue current regimen.       Primary refractory Multiple Myeloma, IgA Kappa  Anemia/thrombocytopenia  -Admitted to Dr. Savanna Cho service for inpatient chemotherapy with VD-PACE  -On  empiric antibiotics/antivirals per oncology.  -Further mgmt deferred to oncology.     Left ankle swelling  -reports this started following initiation of chemotherapy prior to hospital admission. -Ankle is nontender.   -continue lasix  -Doppler negative for DVT. Anemia likely from multiple myeloma: Transfuse to keep Hb more than 7.0.     Essential HTN POA  -continue pta norvasc and losartan     Anxiety/depression  -continue pta abilify and wellbutrin     Hx of SVT  -on pta profenone  -Echo 17 with EF 55%, mild concentric hypertrophy.  -follows with cardiology, Dr. Del Anaya.     Hx hep C s/p treatment  -follows with Dr. Kenny Arambula     Code Status: Full  Surrogate Decision Maker: Wife, Sissy Emmanuel [phone]  DVT Prophylaxis: lovenox  GI Prophylaxis: not indicated  Baseline: independent                                      Subjective:     Chief Complaint / Reason for Physician Visit  No complaints today. Blood sugars are under 200.     Review of Systems:  Symptom Y/N Comments  Symptom Y/N Comments   Fever/Chills n   Chest Pain n    Poor Appetite n   Edema n    Cough n   Abdominal Pain n    Sputum n   Joint Pain n SOB/GONZALES n   Pruritis/Rash n    Nausea/vomit n   Tolerating PT/OT     Diarrhea    Tolerating Diet y    Constipation    Other       Could NOT obtain due to:      Objective:     VITALS:   Last 24hrs VS reviewed since prior progress note. Most recent are:  Patient Vitals for the past 24 hrs:   Temp Pulse Resp BP SpO2   08/25/17 0945 - 83 - 122/78 -   08/25/17 0800 98.4 °F (36.9 °C) 82 16 118/72 100 %   08/25/17 0631 - 76 - 120/70 -   08/24/17 2251 98.3 °F (36.8 °C) 87 16 109/70 99 %   08/24/17 1930 98.4 °F (36.9 °C) 86 14 120/76 100 %   08/24/17 1415 98.1 °F (36.7 °C) 83 18 112/63 97 %       Intake/Output Summary (Last 24 hours) at 08/25/17 1207  Last data filed at 08/25/17 0620   Gross per 24 hour   Intake          1479.58 ml   Output                0 ml   Net          1479.58 ml        PHYSICAL EXAM:  General: Alert, cooperative, no acute distress    EENT:  EOMI. Anicteric sclerae. MMM  Resp:  CTA bilaterally, no wheezing or rales. No accessory muscle use  CV:  Regular  rhythm,  No edema  GI:  Soft, Non distended, Non tender.  +Bowel sounds  Neurologic:  Alert and oriented X 3, normal speech,   Skin:  No rashes.   No jaundice    Reviewed most current lab test results and cultures  YES  Reviewed most current radiology test results   YES  Review and summation of old records today    NO  Reviewed patient's current orders and MAR    YES  PMH/ reviewed - no change compared to H&P      Current Facility-Administered Medications:     polyethylene glycol (MIRALAX) packet 17 g, 17 g, Oral, DAILY, Macie H Ethan, NP    tapentadol (NUCYNTA) tablet 200 mg, 200 mg, Oral, TID, Kaylan Shetty MD, 200 mg at 08/25/17 0823    oxyCODONE IR (ROXICODONE) tablet 5 mg, 5 mg, Oral, Q4H PRN, Dori Long NP, 5 mg at 08/24/17 1138    losartan (COZAAR) tablet 100 mg, 100 mg, Oral, DAILY, Kaylan Shetty MD, 100 mg at 08/25/17 0981    furosemide (LASIX) tablet 20 mg, 20 mg, Oral, DAILY, Kaylan Shetty MD, 20 mg at 08/25/17 0955   insulin glargine (LANTUS) injection 10 Units, 10 Units, SubCUTAneous, QHS, Farhat Dao MD, 10 Units at 08/24/17 2103    propafenone (RYTHMOL) tablet 150 mg, 150 mg, Oral, Q8H, Farhat Dao MD, 150 mg at 08/25/17 7840    zolpidem CR (AMBIEN CR) tablet 12.5 mg, 12.5 mg, Oral, QHS PRN, Farhat Dao MD, 12.5 mg at 08/24/17 2103    amLODIPine (NORVASC) tablet 10 mg, 10 mg, Oral, DAILY, Farhat Dao MD, 10 mg at 08/25/17 0946    ARIPiprazole (ABILIFY) tablet 2.5 mg, 2.5 mg, Oral, 7am, Farhat Dao MD, 2.5 mg at 08/25/17 0934    CISplatin (PLATINOL) 20 mg, cyclophosphamide (CYTOXAN) 820 mg, etoposide (VEPESID) 80 mg in 0.9% sodium chloride 500 mL, overfill volume 50 mL chemo infusion, 20 mg, IntraVENous, Q24H, Farhat Dao MD, 20 mg at 08/24/17 2104    DOXOrubicin (ADRIAMYCIN) 20 mg in 0.9% sodium chloride 250 mL, overfill volume 25 mL chemo infusion, 20 mg, IntraVENous, Q24H, Farhat Dao MD, 20 mg at 08/24/17 2105    buPROPion XL (WELLBUTRIN XL) tablet 150 mg, 150 mg, Oral, BID, Saima Valencia NP, 150 mg at 08/25/17 0935    levoFLOXacin (LEVAQUIN) tablet 750 mg, 750 mg, Oral, DAILY, Farhat Dao MD, 750 mg at 08/25/17 0935    trimethoprim-sulfamethoxazole (BACTRIM DS, SEPTRA DS) 160-800 mg per tablet 1 Tab, 1 Tab, Oral, DAILY, Farhat Dao MD, 1 Tab at 08/25/17 0935    valACYclovir (VALTREX) tablet 500 mg, 500 mg, Oral, Q8H, Farhat Dao MD, 500 mg at 08/25/17 0631    fluconazole (DIFLUCAN) tablet 400 mg, 400 mg, Oral, DAILY, Farhat Dao MD, 400 mg at 08/25/17 0934    pantoprazole (PROTONIX) tablet 40 mg, 40 mg, Oral, ACB, Farhat Dao MD, 40 mg at 08/25/17 0935    0.9% sodium chloride infusion, 25 mL/hr, IntraVENous, CONTINUOUS, Farhat Dao MD, Last Rate: 25 mL/hr at 08/22/17 1532, 25 mL/hr at 08/22/17 1532    [START ON 8/26/2017] 0.9% sodium chloride 500 mL with potassium chloride 10 mEq, magnesium sulfate 1 g infusion, , IntraVENous, ONCE, Farhat Dao MD  [place] bortezomib (VELCADE) 2 mg in sodium chloride 0.9 % SQ chemo syringe, 2 mg, SubCUTAneous, ONCE, Ricky Moser MD    prochlorperazine (COMPAZINE) with saline injection 10 mg, 10 mg, IntraVENous, Q6H PRN, Ricky Moser MD    insulin lispro (HUMALOG) injection, , SubCUTAneous, Martinez Bettencourt MD, Stopped at 08/25/17 0730    glucose chewable tablet 16 g, 4 Tab, Oral, PRN, Ricky Moser MD    dextrose (D50W) injection syrg 12.5-25 g, 12.5-25 g, IntraVENous, PRN, Ricky Moser MD    glucagon (GLUCAGEN) injection 1 mg, 1 mg, IntraMUSCular, PRN, Ricky Moser MD    sodium chloride (NS) flush 10-30 mL, 10-30 mL, InterCATHeter, PRN, Ricky Moser MD    sodium chloride (NS) flush 10 mL, 10 mL, InterCATHeter, Q24H, Ricky Moser MD, 10 mL at 08/24/17 1553    sodium chloride (NS) flush 10-40 mL, 10-40 mL, InterCATHeter, Q8H, Ricky Moser MD, 10 mL at 08/25/17 9197    heparin (porcine) pf 300 Units, 300 Units, InterCATHeter, PRN, Ricky Moser MD    0.9% sodium chloride infusion, 100 mL/hr, IntraVENous, ONCE PRN, Ricky Moser MD    diphenhydrAMINE (BENADRYL) injection 50 mg, 50 mg, IntraVENous, ONCE PRN, Ricky Moser MD    acetaminophen (TYLENOL) tablet 650 mg, 650 mg, Oral, ONCE PRN, Ricky Moser MD    meperidine (DEMEROL) injection 25 mg, 25 mg, IntraVENous, ONCE PRN, Ricky Moser MD    ondansetron [facility]) injection 8 mg, 8 mg, IntraVENous, ONCE PRN, Ricky Moser MD    0.9% sodium chloride infusion, 999 mL/hr, IntraVENous, ONCE PRN, Ricky Moser MD    EPINEPHrine HCl (PF) (ADRENALIN) 1 mg/mL (1 mL) injection 0.3 mg, 0.3 mg, SubCUTAneous, ONCE PRN, Ricky Moser MD    hydrocortisone Sod Succ (PF) (SOLU-CORTEF) injection 100 mg, 100 mg, IntraVENous, ONCE PRN, Ricky Moser MD    diphenhydrAMINE (BENADRYL) injection 50 mg, 50 mg, IntraVENous, ONCE PRN, Ricky Moser MD    albuterol (PROVENTIL VENTOLIN) nebulizer solution 2.5 mg, 2.5 mg, Nebulization, ONCE PRN, Sarah Foster MD    senna-docusate (PERICOLACE) 8.6-50 mg per tablet 2 Tab, 2 Tab, Oral, Q12H, Sarah Foster MD, 2 Tab at 08/25/17 0935    ondansetron [facility] injection 8 mg, 8 mg, IntraVENous, Q6H PRN, Sarah Foster MD    LORazepam (ATIVAN) tablet 1 mg, 1 mg, Oral, Q4H PRN, Sarah Foster MD    enoxaparin (LOVENOX) injection 40 mg, 40 mg, SubCUTAneous, DAILY, Sarah Foster MD, 40 mg at 08/25/17 2149    Facility-Administered Medications Ordered in Other Encounters:     [START ON 8/28/2017] pegfilgrastim (NEULASTA) injection 6 mg, 6 mg, SubCUTAneous, ONCE, Sarah Foster MD    ________________________________________________________________________  Care Plan discussed with:    Comments   Patient y    Family      RN y    Care Manager     Consultant                        Multidiciplinary team rounds were held today with , nursing, pharmacist and clinical coordinator. Patient's plan of care was discussed; medications were reviewed and discharge planning was addressed. ________________________________________________________________________  Total NON critical care TIME:  35    Minutes    Total CRITICAL CARE TIME Spent:   Minutes non procedure based      Comments   >50% of visit spent in counseling and coordination of care     ________________________________________________________________________  Yoel Wagoner MD     Procedures: see electronic medical records for all procedures/Xrays and details which were not copied into this note but were reviewed prior to creation of Plan. LABS:  I reviewed today's most current labs and imaging studies.   Pertinent labs include:  Recent Labs      08/25/17   0644 08/24/17 0645 08/23/17 0627   WBC  5.9  7.2  7.9   HGB  8.6*  8.5*  9.5*   HCT  26.8*  27.0*  30.5*   PLT  93*  99*  128*     Recent Labs      08/25/17   0644  08/24/17   0645  08/23/17 0627 08/22/17   1520   NA  135*  135*  135*  137   K  4.5  4.9  5.4*  4.3   CL  106  108 107  106   CO2  21  20*  21  25   GLU  175*  248*  155*  105*   BUN  27*  31*  26*  23*   CREA  1.29  1.35*  1.25  1.39*   CA  7.5*  8.1*  9.2  8.8   ALB   --    --    --   3.4*   TBILI   --    --    --   0.2   SGOT   --    --    --   16   ALT   --    --    --   21       Signed: Yoel Wagoner MD

## 2017-08-25 NOTE — PROGRESS NOTES
Visited by Yuliana Phillips Partner on 8/25/17.   ANN MARIE Carrillo, [facility], [address]     Paging Service  [phone])

## 2017-08-25 NOTE — PROGRESS NOTES
Pt. Admitted by Dr. April Jovel for chemo. To treat Refractory Multiple Myeloma, which has a poor prognosis. He has a hx. Of HTN, DM, Depression, and has been treated for Hep. C.  Pt. Is usually independent and lives with his wife. Will follow and assist, as needed. Helder WERNER,,[phone]    Care Management Interventions  PCP Verified by CM: Yes  Mode of Transport at Discharge:  Other (see comment) (Family)  Transition of Care Consult (CM Consult): Discharge Planning  MyChart Signup: No  Discharge Durable Medical Equipment: No  Health Maintenance Reviewed: Yes  Physical Therapy Consult: No  Occupational Therapy Consult: No  Speech Therapy Consult: No  Current Support Network: Lives with Spouse, Own Home  Confirm Follow Up Transport: Family

## 2017-08-25 NOTE — PROGRESS NOTES
Progress Note        Patient: Tessie Serrato Sr. MRN: [id number]  SSN: [id number]    YOB: 1955  Age: 63 y.o. Sex: male        Subjective:      Tessie Serrato Sr. is a 63 y.o. male with a diagnosis of IgA kappa myeloma. Bone marrow shows 100% aberrant plasma cells. He suffers with DM but it is diet controlled. He has received systemic anti-viral treatment for chronic Hep C with successful eradication of the virus. Mr. Erin Hsu received 4 cycles of systemic therapy with RVd. He had an initial good response to treatment. However d/t on-going side effects, treatment was dose reduced and paraprotein is now rising. He is being admitted for inpatient chemotherapy. Mr. Erin Hsu feels well today and has been walking in the hallway. He had several bowel movements today. Chemotherapy is scheduled to be completed Saturday evening with planned discharge home on Sunday morning. Patient made aware of appointment on Monday at [facility] for Neulasta injection.        Review of Systems:    Constitutional: fatigue  Eyes: negative  Ears, Nose, Mouth, Throat, and Face: negative  Respiratory: negative  Cardiovascular: negative  Gastrointestinal: constipation - improved  Genitourinary:negative  Integument/Breast: negative  Hematologic/Lymphatic: negative  Musculoskeletal:bilateral lower extremity edema  Neurological: negative      Past Medical History:   Diagnosis Date    Acid reflux     Arrhythmia     pvc's    Chronic pain     neck    Depression     Diabetes ([facility] [address]     Femoral hernia 7/11/2012    Hepatitis C     Hypertension     Inguinal hernia 7/11/2012    Liver disease     hepatitis c    Other ill-defined conditions     Hx of PVCs since 2009    Prostatitis, acute     Psychiatric disorder     depression     Past Surgical History:   Procedure Laterality Date    COLONOSCOPY N/A 9/20/2016    COLONOSCOPY performed by Alverto Aguilar [facility] at [facility]OSCOPY    HX APPENDECTOMY      HX CERVICAL FUSION  2008    x 1 as of 4/30/2014    HX HEENT      foreign body removed from right eye    HX HERNIA REPAIR  2012    [facility]      HX ORTHOPAEDIC      cervical fusion    HX OTHER SURGICAL  2000    liver bx - chronic hepatitis      Family History   Problem Relation Age of Onset    Arthritis-osteo Mother     Arthritis-osteo Father     Diabetes Father     Hypertension Maternal Grandmother     Diabetes Maternal Grandmother     Hypertension Maternal Grandfather     Diabetes Maternal Grandfather      Social History   Substance Use Topics    Smoking status: Former Smoker     Packs/day: 1.00     Years: 12.00     Types: Cigarettes     Quit date: 1/1/1989    Smokeless tobacco: Never Used      Comment: former cigarette smoker    Alcohol use No      Comment: former alcoholic-quit [id number]      Prior to Admission medications    Medication Sig Start Date End Date Taking? Authorizing Provider   fluconazole (DIFLUCAN) 200 mg tablet Take 2 Tabs by mouth daily for 30 days. FDA advises cautious prescribing of oral fluconazole in pregnancy. 8/25/17 9/24/17 Yes Macie Long NP   levoFLOXacin (LEVAQUIN) 750 mg tablet Take 1 Tab by mouth daily for 30 days. 8/25/17 9/24/17 Yes Macie Long NP   trimethoprim-sulfamethoxazole (BACTRIM DS, SEPTRA DS) 160-800 mg per tablet Take 1 Tab by mouth daily for 30 days. 8/25/17 9/24/17 Yes Macie Long NP   amLODIPine (NORVASC) 10 mg tablet Take 10 mg by mouth daily.    Yes Historical Provider   furosemide (LASIX) 20 mg tablet TAKE 1 TABLET BY MOUTH DAILY 8/4/17   Daija Li, NP   ONETOUCH ULTRA TEST strip USE TO TEST BLOOD SUGAR TID BEFORE EACH MEAL UTD. 7/3/17   Historical Provider   REVLIMID 15 mg cap 10 mg. 6/12/17   Historical Provider   losartan (COZAAR) 100 mg tablet TK 1 T PO QD 7/3/17   Historical Provider   BD INSULIN PEN NEEDLE UF MINI 31 gauge x 3/16\" ndle USE AS DIRECTED 6/7/17   Historical Provider   insulin lispro (HUMALOG) 100 unit/mL kwikpen 3 Units by SubCUTAneous route Before breakfast, lunch, and dinner. Plus correction sliding scale as needed    Historical Provider   insulin glargine (LANTUS,BASAGLAR) 100 unit/mL (3 mL) inpn 10 units daily at bedtime 6/9/17   Blanche Zhou NP   lenalidomide 25 mg cap Take  by mouth. Historical Provider   valACYclovir (VALTREX) 1 gram tablet Take 1 Tab by mouth daily. 6/6/17   Rita Green MD   tamsulosin (FLOMAX) 0.4 mg capsule Take 1 Cap by mouth daily. 4/4/17   Corey Paz MD   propafenone (RYTHMOL) 150 mg tablet Take 150 mg by mouth every eight (8) hours. Historical Provider   zolpidem CR (AMBIEN CR) 12.5 mg tablet Take 12.5 mg by mouth nightly as needed. Historical Provider   TADALAFIL (CIALIS PO) Take 20 mg by mouth as needed. Historical Provider   losartan (COZAAR) 50 mg tablet Take 100 mg by mouth daily. Historical Provider   tapentadol (NUCYNTA) 100 mg tablet Take 100 mg by mouth three (3) times daily. Goldy Diaz MD   aripiprazole (ABILIFY) 5 mg tablet Take 2.5 mg by mouth every morning. Goldy Diaz MD   buPROPion XL (WELLBUTRIN XL) 150 mg tablet Take 1 Tab by mouth daily (with dinner). 4/25/10   Tulio Beverly MD              Allergies   Allergen Reactions    Mercury (Bulk) Hives     Blisters             Objective:     Vitals:    08/25/17 0631 08/25/17 0800 08/25/17 0945 08/25/17 1430   BP: 120/70 118/72 122/78 118/84   Pulse: 76 82 83 84   Resp:  16  18   Temp:  98.4 °F (36.9 °C)  98.6 °F (37 °C)   SpO2:  100%  100%          Physical Exam:    GENERAL: alert, cooperative  EYE: negative  LYMPHATIC: Cervical, supraclavicular, and axillary nodes normal.   THROAT & NECK: normal and no erythema or exudates noted.    LUNG: clear to auscultation bilaterally  HEART: regular rate and rhythm  ABDOMEN: soft, non-tender  EXTREMITIES: bilateral LE edema  SKIN: Normal.  NEUROLOGIC: negative      Lab Results   Component Value Date/Time    WBC 5.9 08/25/2017 06:44 AM    HGB 8.6 08/25/2017 06:44 AM HCT 26.8 2017 06:44 AM    PLATELET 93  06:44 AM    MCV 90.2 2017 06:44 AM       M-spike: 5.8 => 2.9 => 1.9 => 2.1 => 2.8, IgA kappa    Serum F => 496 => 246 => 305 => 314       Assessment:     1. Multiple Myeloma, IgA Kappa   Durie Range stage IIIB    Cytogenetics/FISH: t(14;16) - high risk  ECOG PS - 0   Intent of therapy: palliative    Receiving systemic therapy with RVd - s/p 3 cycles  Dose reduced Revlimid and Velcade d/t continued cytopenias. M-spike now rising. This is primary refractory myeloma. Carries poor prognosis  I discussed the case with Dr. Kyle Nelson at [facility] and Dr. Linda Garcia at [facility]. All in agreement with aggressive multiagent chemotherapy. I will admit him to the hospital and treat him with VD-PACE (bortezomib, dexamethasone, thalidomide, cisplatin, adriamycin, cyclophosphamide, and etoposide - will omit thalidomide). Receiving inpatient chemotherapy   VD-PACE - Cycle 1 Day 4    Tolerating treatment   A detailed system by system evaluation of side effect was performed to assess chemotherapy related toxicity. Blood counts are acceptable. Results reviewed with the patient. Neulasta after hospital discharge in [facility] on  at 4:00 pm      2. Diabetes    > Sliding scale insulin  > Hospitalist following for diabetes management      3. Bilateral lower extremity edema    > Dopplers left LE negative for DVT  > Continue lasix 20 mg po daily  > Compression stockings      4. Anemia chemotherapy related    Observation      5. Renal insufficiency    Observation      6.  Constipation    > Continue natalie-colace BID  > Continue Miralax daily      Plan:       > Hospitalist following for diabetes management  > Continue VD-PACE, scheduled to finish Saturday evening - plan for discharge home on Sancho morning  > Continue lasix  > Compression stockings  > Miralax daily       Signed by: Kathleen Antoine NP                     2017         Attending Physician Note:     I have reviewed the history, physical examination, assessment and the plan of the care of the patient. I saw the patient with Archie Delvalle and I participated in the examination and critical decision making. I agree with the note as stated above. Mr. Juan David Urrutia is a gentleman with primary refractory myeloma. He is receiving salvage VD-PACE chemotherapy. He is tolerating treatment well. Denies any symptoms.         Signed by: Mike Cornelius MD                     August 26, 2017

## 2017-08-25 NOTE — DISCHARGE SUMMARY
Discharge Summary     Patient: Staci Knowles Sr. MRN: [id number]  SSN: [id number]    YOB: 1955  Age: 63 y.o. Sex: male       Admit Date: 8/22/2017    Discharge Date: 8/27/2017      Admission Diagnoses: chemo;Multiple myeloma [facility]), Renal insufficiency    Discharge Diagnoses: Refractory Myeloma, Chemotherapy related pancytopenia, Renal insufficiency    Problem List as of 8/25/2017  Date Reviewed: 8/18/2017          Codes Class Noted - Resolved    Anemia associated with chemotherapy ICD-10-CM: D64.81, T45.1X5A  ICD-9-CM: 285.3, E933.1  8/23/2017 - Present        Renal insufficiency ICD-10-CM: N28.9  ICD-9-CM: 593.9  8/23/2017 - Present        Multiple myeloma ([facility] ICD-10-CM: C90.00  ICD-9-CM: 203.00  5/19/2017 - Present        Anemia ICD-10-CM: D64.9  ICD-9-CM: 285.9  4/3/2017 - Present        Chronic hepatitis C ([facility] ICD-10-CM: B18.2  ICD-9-CM: 070.54  3/30/2014 - Present    Overview Signed 3/30/2014  8:19 AM by Jenna Box MD     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              Depression ICD-10-CM: F32.9  ICD-9-CM: 953  3/27/2014 - Present        Symptomatic PVCs (Chronic) ICD-10-CM: I49.3  ICD-9-CM: 427.69  7/13/2011 - Present        Hypertension (Chronic) ICD-10-CM: I10  ICD-9-CM: 401.9  7/13/2011 - Present        Diabetes ([facility] (Chronic) ICD-10-CM: E11.9  ICD-9-CM: 250.00  7/13/2011 - Present        RESOLVED: Inguinal hernia ICD-10-CM: K40.90  ICD-9-CM: 550.90  7/11/2012 - 3/27/2014        RESOLVED: Chest pain at rest ICD-10-CM: R07.9  ICD-9-CM: 786.50  7/13/2011 - 3/27/2014        RESOLVED: DM (diabetes mellitus) ([facility] ICD-10-CM: E11.9  ICD-9-CM: 250.00  5/30/2011 - 7/13/2011        RESOLVED: Headache ICD-10-CM: R51  ICD-9-CM: 784.0  5/30/2011 - 7/13/2011        RESOLVED: HTN (hypertension) ICD-10-CM: I10  ICD-9-CM: 401.9  5/30/2011 - 7/13/2011        RESOLVED: Paroxysmal VT ([facility] ICD-10-CM: I47.2  ICD-9-CM: 427.1  4/21/2010 - 12/24/2010        RESOLVED: Hepatitis C antibody test positive (Chronic) ICD-10-CM: R76.8  ICD-9-CM: 795.79  4/21/2010 - 12/24/2010        RESOLVED: Headache ICD-10-CM: R51  ICD-9-CM: 784.0  4/21/2010 - 12/24/2010        RESOLVED: Hypotension ICD-10-CM: I95.9  ICD-9-CM: 458.9  4/21/2010 - 4/25/2010        RESOLVED: Diabetes mellitus ([facility] (Chronic) ICD-10-CM: E11.9  ICD-9-CM: 250.00  4/21/2010 - 12/24/2010        RESOLVED: Depression (Chronic) ICD-10-CM: F32.9  ICD-9-CM: 227  Unknown - 12/24/2010        RESOLVED: Prostatitis, acute ICD-10-CM: N41.0  ICD-9-CM: 601.0  Unknown - 12/24/2010               Discharge Condition: Good    Hospital Course: Mr. Juan David Urrutia is ah gentleman with IgA kappa primary refractory myeloma. He was admitted for the first cycle of VD-PACE. He tolerated therapy well. He had constipation which was relieved with Miralax. He developed chemotherapy related pancytopenia but did not require any intervention. Consults: Hospitalist    Significant Diagnostic Studies: None    Disposition: home    Discharge Medications:   Current Discharge Medication List      START taking these medications    Details   fluconazole (DIFLUCAN) 200 mg tablet Take 2 Tabs by mouth daily for 30 days. FDA advises cautious prescribing of oral fluconazole in pregnancy. Qty: 60 Tab, Refills: 0      levoFLOXacin (LEVAQUIN) 750 mg tablet Take 1 Tab by mouth daily for 30 days. Qty: 30 Tab, Refills: 0      trimethoprim-sulfamethoxazole (BACTRIM DS, SEPTRA DS) 160-800 mg per tablet Take 1 Tab by mouth daily for 30 days. Qty: 30 Tab, Refills: 0         CONTINUE these medications which have NOT CHANGED    Details   amLODIPine (NORVASC) 10 mg tablet Take 10 mg by mouth daily. furosemide (LASIX) 20 mg tablet TAKE 1 TABLET BY MOUTH DAILY  Qty: 90 Tab, Refills: 0    Comments: **Patient requests 90 days supply**  Associated Diagnoses: Bilateral edema of lower extremity      ONETOUCH ULTRA TEST strip USE TO TEST BLOOD SUGAR TID BEFORE EACH MEAL UTD.   Refills: 4 losartan (COZAAR) 100 mg tablet TK 1 T PO QD  Refills: 4      BD INSULIN PEN NEEDLE UF MINI 31 gauge x 3/16\" ndle USE AS DIRECTED  Refills: 2      insulin lispro (HUMALOG) 100 unit/mL kwikpen 3 Units by SubCUTAneous route Before breakfast, lunch, and dinner. Plus correction sliding scale as needed      insulin glargine (LANTUS,BASAGLAR) 100 unit/mL (3 mL) inpn 10 units daily at bedtime  Qty: 15 mL, Refills: 1      valACYclovir (VALTREX) 1 gram tablet Take 1 Tab by mouth daily. Qty: 90 Tab, Refills: 3    Associated Diagnoses: Multiple myeloma, remission status unspecified (HCC)      tamsulosin (FLOMAX) 0.4 mg capsule Take 1 Cap by mouth daily. Qty: 30 Cap, Refills: 1      propafenone (RYTHMOL) 150 mg tablet Take 150 mg by mouth every eight (8) hours. zolpidem CR (AMBIEN CR) 12.5 mg tablet Take 12.5 mg by mouth nightly as needed. TADALAFIL (CIALIS PO) Take 20 mg by mouth as needed. tapentadol (NUCYNTA) 100 mg tablet Take 100 mg by mouth three (3) times daily. aripiprazole (ABILIFY) 5 mg tablet Take 2.5 mg by mouth every morning. buPROPion XL (WELLBUTRIN XL) 150 mg tablet Take 1 Tab by mouth daily (with dinner). Qty: 30 Tab, Refills: 11         STOP taking these medications       REVLIMID 15 mg cap Comments:   Reason for Stopping:         lenalidomide 25 mg cap Comments:   Reason for Stopping:               Activity: Activity as tolerated  Diet: Regular Diet  Wound Care: None needed    Total time spent in dc of the patient 35 minutes. More than 50% was used to  and co-ordinate care.          Signed By: Magaly Atwood MD     August 25, 2017

## 2017-08-25 NOTE — DIABETES MGMT
DTC Progress Note    Recommendations/ Comments: If appropriate, please consider resuming home prandial insulin, Humalog 3 units ac tid, to aid in daytime glucose control. Chart reviewed on Aiden Hong Sr. for hyperglycemia. Patient is a 63 y.o. male with known history of Type 2 Diabetes on Lantus 10 units at bedtime and Humalog 3 units ac tid + SSI at home. A1c:   Lab Results   Component Value Date/Time    Hemoglobin A1c 8.3 12/24/2010 07:00 AM    Hemoglobin A1c, External 6.6% 04/07/2017       Recent Glucose Results:   Lab Results   Component Value Date/Time     (H) 08/25/2017 06:44 AM    GLUCPOC 132 (H) 08/25/2017 08:11 AM    GLUCPOC 204 (H) 08/24/2017 08:45 PM    GLUCPOC 209 (H) 08/24/2017 03:59 PM        Lab Results   Component Value Date/Time    Creatinine 1.29 08/25/2017 06:44 AM     CrCl cannot be calculated (Unknown ideal weight. ). Active Orders   Diet    DIET REGULAR        PO intake: No data found. Current hospital DM medication:   -Lantus 10 units every bedtime  -Humalog normal sensitivity correction    Will continue to follow as needed.     Thank you    Hollie Cunningham, [facility]  Office: [phone]

## 2017-08-25 NOTE — PROGRESS NOTES
Oncology Nursing Communication Tool      6:22 PM  8/25/2017     Bedside shift change report given to Genoveva Halsted, RN (incoming nurse) by Earlene Medina RN (outgoing nurse) [facility] 63 y.o. male who was admitted on 8/22/2017 10:41 AM. Report included the following information SBAR and Kardex. Significant changes during shift: velcade given at 1800. Discharge orders written for Sancho morning. Issues for physician to address: none            Code Status: Full Code     Infections: No current active infections     Allergies: Mercury (bulk)     Current diet: DIET REGULAR       Pain Controlled [x] yes [] no   Bowel Movement [x] yes [] no   Last Bowel Movement (date) 08-25-17            Vital Signs:   Patient Vitals for the past 12 hrs:   Temp Pulse Resp BP SpO2   08/25/17 1430 98.6 °F (37 °C) 84 18 118/84 100 %   08/25/17 0945 - 83 - 122/78 -   08/25/17 0800 98.4 °F (36.9 °C) 82 16 118/72 100 %   08/25/17 0631 - 76 - 120/70 -      Intake & Output:     Intake/Output Summary (Last 24 hours) at 08/25/17 1822  Last data filed at 08/25/17 0620   Gross per 24 hour   Intake          1479.58 ml   Output                0 ml   Net          1479.58 ml      Laboratory Results:     Recent Results (from the past 12 hour(s))   HEMOGLOBIN A1C WITH EAG    Collection Time: 08/25/17  6:44 AM   Result Value Ref Range    Hemoglobin A1c 6.2 4.2 - 6.3 %    Est. average glucose 131 mg/dL   CBC WITH AUTOMATED DIFF    Collection Time: 08/25/17  6:44 AM   Result Value Ref Range    WBC 5.9 4.1 - 11.1 K/uL    RBC 2.97 (L) 4.10 - 5.70 M/uL    HGB 8.6 (L) 12.1 - 17.0 g/dL    HCT 26.8 (L) 36.6 - 50.3 %    MCV 90.2 80.0 - 99.0 FL    MCH 29.0 26.0 - 34.0 PG    MCHC 32.1 30.0 - 36.5 g/dL    RDW 19.1 (H) 11.5 - 14.5 %    PLATELET 93 (L) 959 - 400 K/uL    NEUTROPHILS 88 (H) 32 - 75 %    LYMPHOCYTES 4 (L) 12 - 49 %    MONOCYTES 8 5 - 13 %    EOSINOPHILS 0 0 - 7 %    BASOPHILS 0 0 - 1 %    ABS.  NEUTROPHILS 5.2 1.8 - 8.0 K/UL    ABS. LYMPHOCYTES 0.2 (L) 0.8 - 3.5 K/UL    ABS. MONOCYTES 0.5 0.0 - 1.0 K/UL    ABS. EOSINOPHILS 0.0 0.0 - 0.4 K/UL    ABS. BASOPHILS 0.0 0.0 - 0.1 K/UL   METABOLIC PANEL, BASIC    Collection Time: 08/25/17  6:44 AM   Result Value Ref Range    Sodium 135 (L) 136 - 145 mmol/L    Potassium 4.5 3.5 - 5.1 mmol/L    Chloride 106 97 - 108 mmol/L    CO2 21 21 - 32 mmol/L    Anion gap 8 5 - 15 mmol/L    Glucose 175 (H) 65 - 100 mg/dL    BUN 27 (H) 6 - 20 MG/DL    Creatinine 1.29 0.70 - 1.30 MG/DL    BUN/Creatinine ratio 21 (H) 12 - 20      GFR est AA >60 >60 ml/min/1.73m2    GFR est non-AA 57 (L) >60 ml/min/1.73m2    Calcium 7.5 (L) 8.5 - 10.1 MG/DL   GLUCOSE, POC    Collection Time: 08/25/17  8:11 AM   Result Value Ref Range    Glucose (POC) 132 (H) 65 - 100 mg/dL    Performed by Sully England, POC    Collection Time: 08/25/17 11:03 AM   Result Value Ref Range    Glucose (POC) 157 (H) 65 - 100 mg/dL    Performed by Asim Damian    GLUCOSE, POC    Collection Time: 08/25/17  4:18 PM   Result Value Ref Range    Glucose (POC) 173 (H) 65 - 100 mg/dL    Performed by [address] [place] [address] for questions and clarifications were given to the incoming nurse. Patient's bed is in low position, side rails x2, door open PRN, call bell within reach and patient not in distress.       Lanelle Holter, RN

## 2017-08-26 LAB
ANION GAP SERPL CALC-SCNC: 8 MMOL/L (ref 5–15)
BASOPHILS # BLD: 0 K/UL (ref 0–0.1)
BASOPHILS NFR BLD: 0 % (ref 0–1)
BUN SERPL-MCNC: 29 MG/DL (ref 6–20)
BUN/CREAT SERPL: 21 (ref 12–20)
CALCIUM SERPL-MCNC: 7 MG/DL (ref 8.5–10.1)
CHLORIDE SERPL-SCNC: 107 MMOL/L (ref 97–108)
CO2 SERPL-SCNC: 21 MMOL/L (ref 21–32)
CREAT SERPL-MCNC: 1.37 MG/DL (ref 0.7–1.3)
EOSINOPHIL # BLD: 0 K/UL (ref 0–0.4)
EOSINOPHIL NFR BLD: 0 % (ref 0–7)
ERYTHROCYTE [DISTWIDTH] IN BLOOD BY AUTOMATED COUNT: 18.9 % (ref 11.5–14.5)
GLUCOSE BLD STRIP.AUTO-MCNC: 116 MG/DL (ref 65–100)
GLUCOSE BLD STRIP.AUTO-MCNC: 136 MG/DL (ref 65–100)
GLUCOSE BLD STRIP.AUTO-MCNC: 145 MG/DL (ref 65–100)
GLUCOSE BLD STRIP.AUTO-MCNC: 163 MG/DL (ref 65–100)
GLUCOSE SERPL-MCNC: 194 MG/DL (ref 65–100)
HCT VFR BLD AUTO: 25.6 % (ref 36.6–50.3)
HGB BLD-MCNC: 8.3 G/DL (ref 12.1–17)
LYMPHOCYTES # BLD: 0.2 K/UL (ref 0.8–3.5)
LYMPHOCYTES NFR BLD: 6 % (ref 12–49)
MCH RBC QN AUTO: 28.9 PG (ref 26–34)
MCHC RBC AUTO-ENTMCNC: 32.4 G/DL (ref 30–36.5)
MCV RBC AUTO: 89.2 FL (ref 80–99)
MONOCYTES # BLD: 0.4 K/UL (ref 0–1)
MONOCYTES NFR BLD: 11 % (ref 5–13)
NEUTS SEG # BLD: 3.1 K/UL (ref 1.8–8)
NEUTS SEG NFR BLD: 83 % (ref 32–75)
PLATELET # BLD AUTO: 94 K/UL (ref 150–400)
POTASSIUM SERPL-SCNC: 4.4 MMOL/L (ref 3.5–5.1)
RBC # BLD AUTO: 2.87 M/UL (ref 4.1–5.7)
SERVICE CMNT-IMP: ABNORMAL
SODIUM SERPL-SCNC: 136 MMOL/L (ref 136–145)
WBC # BLD AUTO: 3.8 K/UL (ref 4.1–11.1)

## 2017-08-26 PROCEDURE — 74011250636 HC RX REV CODE- 250/636: Performed by: INTERNAL MEDICINE

## 2017-08-26 PROCEDURE — 74011636637 HC RX REV CODE- 636/637: Performed by: INTERNAL MEDICINE

## 2017-08-26 PROCEDURE — 36415 COLL VENOUS BLD VENIPUNCTURE: CPT | Performed by: NURSE PRACTITIONER

## 2017-08-26 PROCEDURE — 85025 COMPLETE CBC W/AUTO DIFF WBC: CPT | Performed by: NURSE PRACTITIONER

## 2017-08-26 PROCEDURE — 80048 BASIC METABOLIC PNL TOTAL CA: CPT | Performed by: NURSE PRACTITIONER

## 2017-08-26 PROCEDURE — 65270000015 HC RM PRIVATE ONCOLOGY

## 2017-08-26 PROCEDURE — 74011250637 HC RX REV CODE- 250/637: Performed by: NURSE PRACTITIONER

## 2017-08-26 PROCEDURE — 74011000250 HC RX REV CODE- 250: Performed by: INTERNAL MEDICINE

## 2017-08-26 PROCEDURE — 74011250637 HC RX REV CODE- 250/637: Performed by: INTERNAL MEDICINE

## 2017-08-26 PROCEDURE — 82962 GLUCOSE BLOOD TEST: CPT

## 2017-08-26 RX ORDER — INSULIN GLARGINE 100 [IU]/ML
14 INJECTION, SOLUTION SUBCUTANEOUS
Status: DISCONTINUED | OUTPATIENT
Start: 2017-08-26 | End: 2017-08-27 | Stop reason: HOSPADM

## 2017-08-26 RX ADMIN — FLUCONAZOLE 400 MG: 100 TABLET ORAL at 09:13

## 2017-08-26 RX ADMIN — VALACYCLOVIR HYDROCHLORIDE 500 MG: 500 TABLET, FILM COATED ORAL at 14:13

## 2017-08-26 RX ADMIN — LEVOFLOXACIN 750 MG: 750 TABLET, FILM COATED ORAL at 09:43

## 2017-08-26 RX ADMIN — SODIUM CHLORIDE 25 ML/HR: 900 INJECTION, SOLUTION INTRAVENOUS at 04:42

## 2017-08-26 RX ADMIN — PANTOPRAZOLE SODIUM 40 MG: 40 TABLET, DELAYED RELEASE ORAL at 06:43

## 2017-08-26 RX ADMIN — MAGNESIUM SULFATE HEPTAHYDRATE: 500 INJECTION, SOLUTION INTRAMUSCULAR; INTRAVENOUS at 09:44

## 2017-08-26 RX ADMIN — PROPAFENONE HYDROCHLORIDE 150 MG: 150 TABLET, FILM COATED ORAL at 06:43

## 2017-08-26 RX ADMIN — ARIPIPRAZOLE 2.5 MG: 5 TABLET ORAL at 06:54

## 2017-08-26 RX ADMIN — Medication 20 ML: at 21:00

## 2017-08-26 RX ADMIN — SODIUM CHLORIDE 10 MG: 9 INJECTION INTRAMUSCULAR; INTRAVENOUS; SUBCUTANEOUS at 23:37

## 2017-08-26 RX ADMIN — BUPROPION HYDROCHLORIDE 150 MG: 150 TABLET, FILM COATED, EXTENDED RELEASE ORAL at 17:31

## 2017-08-26 RX ADMIN — LOSARTAN POTASSIUM 100 MG: 50 TABLET, FILM COATED ORAL at 09:12

## 2017-08-26 RX ADMIN — SODIUM CHLORIDE 10 MG: 9 INJECTION INTRAMUSCULAR; INTRAVENOUS; SUBCUTANEOUS at 04:42

## 2017-08-26 RX ADMIN — DOCUSATE SODIUM AND SENNOSIDES 2 TABLET: 8.6; 5 TABLET, FILM COATED ORAL at 09:15

## 2017-08-26 RX ADMIN — FUROSEMIDE 20 MG: 20 TABLET ORAL at 09:18

## 2017-08-26 RX ADMIN — VALACYCLOVIR HYDROCHLORIDE 500 MG: 500 TABLET, FILM COATED ORAL at 06:43

## 2017-08-26 RX ADMIN — PROPAFENONE HYDROCHLORIDE 150 MG: 150 TABLET, FILM COATED ORAL at 20:59

## 2017-08-26 RX ADMIN — INSULIN LISPRO 2 UNITS: 100 INJECTION, SOLUTION INTRAVENOUS; SUBCUTANEOUS at 17:31

## 2017-08-26 RX ADMIN — ENOXAPARIN SODIUM 40 MG: 40 INJECTION SUBCUTANEOUS at 09:20

## 2017-08-26 RX ADMIN — INSULIN LISPRO 2 UNITS: 100 INJECTION, SOLUTION INTRAVENOUS; SUBCUTANEOUS at 11:45

## 2017-08-26 RX ADMIN — Medication 10 ML: at 06:58

## 2017-08-26 RX ADMIN — ZOLPIDEM TARTRATE 12.5 MG: 12.5 TABLET, FILM COATED, EXTENDED RELEASE ORAL at 20:59

## 2017-08-26 RX ADMIN — Medication 10 ML: at 14:13

## 2017-08-26 RX ADMIN — SULFAMETHOXAZOLE AND TRIMETHOPRIM 1 TABLET: 800; 160 TABLET ORAL at 09:43

## 2017-08-26 RX ADMIN — VALACYCLOVIR HYDROCHLORIDE 500 MG: 500 TABLET, FILM COATED ORAL at 21:00

## 2017-08-26 RX ADMIN — AMLODIPINE BESYLATE 10 MG: 5 TABLET ORAL at 09:18

## 2017-08-26 RX ADMIN — INSULIN GLARGINE 14 UNITS: 100 INJECTION, SOLUTION SUBCUTANEOUS at 22:30

## 2017-08-26 RX ADMIN — Medication 10 ML: at 17:32

## 2017-08-26 RX ADMIN — PROPAFENONE HYDROCHLORIDE 150 MG: 150 TABLET, FILM COATED ORAL at 14:13

## 2017-08-26 RX ADMIN — BUPROPION HYDROCHLORIDE 150 MG: 150 TABLET, FILM COATED, EXTENDED RELEASE ORAL at 09:17

## 2017-08-26 NOTE — PROGRESS NOTES
Hospitalist Progress Note    NAME: Tessie Serrato Sr.   :  1955   MRN:  [id number]       Assessment / Plan:  Diabetes type II POA:  -Previously managed with metformin but patient reports worsening blood glucose since starting steroids with chemotherapy earlier this year. At home checks BG twice daily. Reports high of ~210. Uses long acting insulin at night but does this prn. Also uses SSI.  - A1C is 6.2  -Increase lantus to  14 units qhs based on his SSI requirement and accuchecks with SSI.        Primary refractory Multiple Myeloma, IgA Kappa  Anemia/thrombocytopenia  -Admitted to Dr. Sae Hayden service for inpatient chemotherapy with VD-PACE  -On  empiric antibiotics/antivirals per oncology.  -Further mgmt deferred to oncology.     Left ankle swelling  -reports this started following initiation of chemotherapy prior to hospital admission. -Ankle is nontender.   -continue lasix  -Doppler negative for DVT. Anemia likely from multiple myeloma: Transfuse to keep Hb more than 7.0.     Essential HTN POA  -continue pta norvasc and losartan     Anxiety/depression  -continue pta abilify and wellbutrin     Hx of SVT  -on pta profenone  -Echo 17 with EF 55%, mild concentric hypertrophy.  -follows with cardiology, Dr. Abdulkadir Head.     Hx hep C s/p treatment  -follows with Dr. Agustin Lua    Hiccups improved with compazine: Asking for prescription of compazine     Code Status: Full  Surrogate Decision Maker: Wife, Author Media [phone]  DVT Prophylaxis: lovenox  GI Prophylaxis: not indicated  Baseline: independent                           Disposition: Per oncology. Subjective:     Chief Complaint / Reason for Physician Visit  No complaints today. Blood sugars are mostly under 200 except for one reading of 230.       Review of Systems:  Symptom Y/N Comments  Symptom Y/N Comments   Fever/Chills n   Chest Pain n    Poor Appetite n   Edema n    Cough n   Abdominal Pain n    Sputum n   Joint Pain n    SOB/GONZALES n   Pruritis/Rash n    Nausea/vomit n   Tolerating PT/OT     Diarrhea    Tolerating Diet y    Constipation    Other       Could NOT obtain due to:      Objective:     VITALS:   Last 24hrs VS reviewed since prior progress note. Most recent are:  Patient Vitals for the past 24 hrs:   Temp Pulse Resp BP SpO2   08/26/17 0727 98.2 °F (36.8 °C) 75 16 107/68 100 %   08/25/17 2259 98.4 °F (36.9 °C) 81 14 118/78 99 %   08/25/17 1857 97.8 °F (36.6 °C) 82 18 124/74 100 %   08/25/17 1430 98.6 °F (37 °C) 84 18 118/84 100 %     No intake or output data in the 24 hours ending 08/26/17 1123     PHYSICAL EXAM:  General: Alert, cooperative, no acute distress    EENT:  EOMI. Anicteric sclerae. MMM  Resp:  CTA bilaterally, no wheezing or rales. No accessory muscle use  CV:  Regular  rhythm,  No edema  GI:  Soft, Non distended, Non tender.  +Bowel sounds  Neurologic:  Alert and oriented X 3, normal speech,   Skin:  No rashes.   No jaundice    Reviewed most current lab test results and cultures  YES  Reviewed most current radiology test results   YES  Review and summation of old records today    NO  Reviewed patient's current orders and MAR    YES  PMH/SH reviewed - no change compared to H&P      Current Facility-Administered Medications:     insulin glargine (LANTUS) injection 14 Units, 14 Units, SubCUTAneous, QHS, Jean Paul Odom MD    polyethylene glycol (MIRALAX) packet 17 g, 17 g, Oral, DAILY, Macie H ANGLE Long    tapentadol (NUCYNTA) tablet 200 mg, 200 mg, Oral, TID, Elliott Paige MD, 200 mg at 08/26/17 0910    oxyCODONE IR (ROXICODONE) tablet 5 mg, 5 mg, Oral, Q4H PRN, Lesa Long NP, 5 mg at 08/24/17 1138    losartan (COZAAR) tablet 100 mg, 100 mg, Oral, DAILY, Elliott Paige MD, 100 mg at 08/26/17 0912    furosemide (LASIX) tablet 20 mg, 20 mg, Oral, DAILY, Elliott Paige MD, 20 mg at 08/26/17 0918    propafenone (RYTHMOL) tablet 150 mg, 150 mg, Oral, Q8H, Elliott Paige MD, 150 mg at 08/26/17 0643    zolpidem CR (AMBIEN CR) tablet 12.5 mg, 12.5 mg, Oral, QHS PRN, Dottie Slade MD, 12.5 mg at 08/25/17 2127    amLODIPine (NORVASC) tablet 10 mg, 10 mg, Oral, DAILY, Dottie Slade MD, 10 mg at 08/26/17 2916    ARIPiprazole (ABILIFY) tablet 2.5 mg, 2.5 mg, Oral, 7am, Dottie Slade MD, 2.5 mg at 08/26/17 0654    CISplatin (PLATINOL) 20 mg, cyclophosphamide (CYTOXAN) 820 mg, etoposide (VEPESID) 80 mg in 0.9% sodium chloride 500 mL, overfill volume 50 mL chemo infusion, 20 mg, IntraVENous, Q24H, Dottie Slade MD, 20 mg at 08/25/17 2050    DOXOrubicin (ADRIAMYCIN) 20 mg in 0.9% sodium chloride 250 mL, overfill volume 25 mL chemo infusion, 20 mg, IntraVENous, Q24H, Dottie Slade MD, 20 mg at 08/25/17 2051    buPROPion XL (WELLBUTRIN XL) tablet 150 mg, 150 mg, Oral, BID, Dylon Avilez, NP, 150 mg at 08/26/17 0917    levoFLOXacin (LEVAQUIN) tablet 750 mg, 750 mg, Oral, DAILY, Dottie Slade MD, 750 mg at 08/26/17 1425    trimethoprim-sulfamethoxazole (BACTRIM DS, SEPTRA DS) 160-800 mg per tablet 1 Tab, 1 Tab, Oral, DAILY, Dottie Slade MD, 1 Tab at 08/26/17 3932    valACYclovir (VALTREX) tablet 500 mg, 500 mg, Oral, Q8H, Dottie Slade MD, 500 mg at 08/26/17 9164    fluconazole (DIFLUCAN) tablet 400 mg, 400 mg, Oral, DAILY, Dottie Slade MD, 400 mg at 08/26/17 0913    pantoprazole (PROTONIX) tablet 40 mg, 40 mg, Oral, ACB, Dottie Slade MD, 40 mg at 08/26/17 5563    0.9% sodium chloride infusion, 25 mL/hr, IntraVENous, CONTINUOUS, Dottie Slade MD, Last Rate: 25 mL/hr at 08/26/17 0442, 25 mL/hr at 08/26/17 0442    prochlorperazine (COMPAZINE) with saline injection 10 mg, 10 mg, IntraVENous, Q6H PRN, Dottie Slade MD, 10 mg at 08/26/17 0442    insulin lispro (HUMALOG) injection, , SubCUTAneous, TIDAC, Dottie Slade MD, Stopped at 08/26/17 0730    glucose chewable tablet 16 g, 4 Tab, Oral, PRN, Dottie Slade MD    dextrose (D50W) injection syrg 12.5-25 g, 12.5-25 g, IntraVENous, PRN, Jitendra Fairbanks Zackary Shea MD    glucagon (GLUCAGEN) injection 1 mg, 1 mg, IntraMUSCular, PRN, Sarah Foster MD    sodium chloride (NS) flush 10-30 mL, 10-30 mL, InterCATHeter, PRN, Sarah Foster MD    sodium chloride (NS) flush 10 mL, 10 mL, InterCATHeter, Q24H, Sarah Foster MD, 10 mL at 08/25/17 1530    sodium chloride (NS) flush 10-40 mL, 10-40 mL, InterCATHeter, Q8H, Sarah Foster MD, 10 mL at 08/26/17 0658    heparin (porcine) pf 300 Units, 300 Units, InterCATHeter, PRN, Sarah Foster MD    0.9% sodium chloride infusion, 100 mL/hr, IntraVENous, ONCE PRN, Sarah Foster MD    diphenhydrAMINE (BENADRYL) injection 50 mg, 50 mg, IntraVENous, ONCE PRN, Sarah Foster MD    acetaminophen (TYLENOL) tablet 650 mg, 650 mg, Oral, ONCE PRN, Sarah Foster MD    ondansetron [facility]) injection 8 mg, 8 mg, IntraVENous, ONCE PRN, Sarah Foster MD    0.9% sodium chloride infusion, 999 mL/hr, IntraVENous, ONCE PRN, Sarah Foster MD    EPINEPHrine HCl (PF) (ADRENALIN) 1 mg/mL (1 mL) injection 0.3 mg, 0.3 mg, SubCUTAneous, ONCE PRN, Sarah Foster MD    hydrocortisone Sod Succ (PF) (SOLU-CORTEF) injection 100 mg, 100 mg, IntraVENous, ONCE PRN, Sarah Foster MD    diphenhydrAMINE (BENADRYL) injection 50 mg, 50 mg, IntraVENous, ONCE PRN, Sarah Foster MD    albuterol (PROVENTIL VENTOLIN) nebulizer solution 2.5 mg, 2.5 mg, Nebulization, ONCE PRN, Sarah Foster MD    senna-docusate (PERICOLACE) 8.6-50 mg per tablet 2 Tab, 2 Tab, Oral, Q12H, Sarah Foster MD, 2 Tab at 08/26/17 0915    ondansetron (ZOFRAN) injection 8 mg, 8 mg, IntraVENous, Q6H PRN, Sarah Foster MD    LORazepam (ATIVAN) tablet 1 mg, 1 mg, Oral, Q4H PRN, Sarah Foster MD    enoxaparin (LOVENOX) injection 40 mg, 40 mg, SubCUTAneous, DAILY, Sarah Foster MD, 40 mg at 08/26/17 0920    Facility-Administered Medications Ordered in Other Encounters:     [START ON 8/28/2017] pegfilgrastim (NEULASTA) injection 6 mg, 6 mg, SubCUTAneous, ONCE, Chiquis Reyes MD    ________________________________________________________________________  Care Plan discussed with:    Comments   Patient y    Family      RN y    Care Manager     Consultant                        Multidiciplinary team rounds were held today with , nursing, pharmacist and clinical coordinator. Patient's plan of care was discussed; medications were reviewed and discharge planning was addressed. ________________________________________________________________________  Total NON critical care TIME:  35    Minutes    Total CRITICAL CARE TIME Spent:   Minutes non procedure based      Comments   >50% of visit spent in counseling and coordination of care     ________________________________________________________________________  Seema Chavez MD     Procedures: see electronic medical records for all procedures/Xrays and details which were not copied into this note but were reviewed prior to creation of Plan. LABS:  I reviewed today's most current labs and imaging studies.   Pertinent labs include:  Recent Labs      08/26/17 0433 08/25/17   0644  08/24/17   0645   WBC  3.8*  5.9  7.2   HGB  8.3*  8.6*  8.5*   HCT  25.6*  26.8*  27.0*   PLT  94*  93*  99*     Recent Labs      08/26/17   0433  08/25/17   0644  08/24/17   0645   NA  136  135*  135*   K  4.4  4.5  4.9   CL  107  106  108   CO2  21  21  20*   GLU  194*  175*  248*   BUN  29*  27*  31*   CREA  1.37*  1.29  1.35*   CA  7.0*  7.5*  8.1*       Signed: Seema Chavez MD

## 2017-08-26 NOTE — PROGRESS NOTES
Oncology Nursing Communication Tool      8:16 AM  8/26/2017     Bedside and Verbal shift change report given to Lisa RN (incoming nurse) by Azam Desai RN (outgoing nurse) on Tessie Serrato Sr. a 63 y.o. male who was admitted on 8/22/2017 10:41 AM. Report included the following information SBAR, Kardex, MAR, Recent Results and Med Rec Status. Significant changes during shift: none      Issues for physician to address: none           Code Status: Full Code     Infections: No current active infections     Allergies: Mercury (bulk)     Current diet: DIET REGULAR       Pain Controlled [x] yes [] no   Bowel Movement [] yes [x] no   Last Bowel Movement (date) 8/25/2017            Vital Signs:   Patient Vitals for the past 12 hrs:   Temp Pulse Resp BP SpO2   08/26/17 0727 98.2 °F (36.8 °C) 75 16 107/68 100 %   08/25/17 2259 98.4 °F (36.9 °C) 81 14 118/78 99 %      Intake & Output:   No intake or output data in the 24 hours ending 08/26/17 0816   Laboratory Results:     Recent Results (from the past 12 hour(s))   GLUCOSE, POC    Collection Time: 08/25/17  9:08 PM   Result Value Ref Range    Glucose (POC) 233 (H) 65 - 100 mg/dL    Performed by [facility]    CBC WITH AUTOMATED DIFF    Collection Time: 08/26/17  4:33 AM   Result Value Ref Range    WBC 3.8 (L) 4.1 - 11.1 K/uL    RBC 2.87 (L) 4.10 - 5.70 M/uL    HGB 8.3 (L) 12.1 - 17.0 g/dL    HCT 25.6 (L) 36.6 - 50.3 %    MCV 89.2 80.0 - 99.0 FL    MCH 28.9 26.0 - 34.0 PG    MCHC 32.4 30.0 - 36.5 g/dL    RDW 18.9 (H) 11.5 - 14.5 %    PLATELET 94 (L) 025 - 400 K/uL    NEUTROPHILS 83 (H) 32 - 75 %    LYMPHOCYTES 6 (L) 12 - 49 %    MONOCYTES 11 5 - 13 %    EOSINOPHILS 0 0 - 7 %    BASOPHILS 0 0 - 1 %    ABS. NEUTROPHILS 3.1 1.8 - 8.0 K/UL    ABS. LYMPHOCYTES 0.2 (L) 0.8 - 3.5 K/UL    ABS. MONOCYTES 0.4 0.0 - 1.0 K/UL    ABS. EOSINOPHILS 0.0 0.0 - 0.4 K/UL    ABS.  BASOPHILS 0.0 0.0 - 0.1 K/UL   METABOLIC PANEL, BASIC    Collection Time: 08/26/17  4:33 AM   Result Value Ref Range    Sodium 136 136 - 145 mmol/L    Potassium 4.4 3.5 - 5.1 mmol/L    Chloride 107 97 - 108 mmol/L    CO2 21 21 - 32 mmol/L    Anion gap 8 5 - 15 mmol/L    Glucose 194 (H) 65 - 100 mg/dL    BUN 29 (H) 6 - 20 MG/DL    Creatinine 1.37 (H) 0.70 - 1.30 MG/DL    BUN/Creatinine ratio 21 (H) 12 - 20      GFR est AA >60 >60 ml/min/1.73m2    GFR est non-AA 53 (L) >60 ml/min/1.73m2    Calcium 7.0 (L) 8.5 - 10.1 MG/DL   GLUCOSE, POC    Collection Time: 08/26/17  7:31 AM   Result Value Ref Range    Glucose (POC) 136 (H) 65 - 100 mg/dL    Performed by Shaina Llamas               Opportunity for questions and clarifications were given to the incoming nurse. Patient's bed is in low position, side rails x2, door open PRN, call bell within reach and patient not in distress.       Candelario Louis RN

## 2017-08-26 NOTE — PROGRESS NOTES
Patient has the hiccups last night. The night nurse gave the patient Compazine for the said hiccups which helped the patient last night. The patient rang out and was inquiring if he could have a prescription for Compazine in case they were to come back. Called Dr. Alan Martinez who stated she would write the scrip before he is discharge in the morning.

## 2017-08-26 NOTE — PROGRESS NOTES
Progress Note        Patient: Staci Knowles Sr. MRN: [id number]  SSN: [id number]    YOB: 1955  Age: 63 y.o. Sex: male        Subjective:      Staci Knowles Sr. is a 63 y.o. male with a diagnosis of IgA kappa myeloma. Bone marrow shows 100% aberrant plasma cells. He suffers with DM but it is diet controlled. He has received systemic anti-viral treatment for chronic Hep C with successful eradication of the virus. Mr. Cecilia Alva received 4 cycles of systemic therapy with RVd. He had an initial good response to treatment. However d/t on-going side effects, treatment was dose reduced and paraprotein is now rising. He is being admitted for inpatient chemotherapy. Mr. Cecilia Alva feels well today and has been walking in the hallway. He had several bowel movements today. Chemotherapy is scheduled to be completed Saturday evening with planned discharge home on Sunday morning. Patient made aware of appointment on Monday at [facility] for Neulasta injection. Interim Hx:  Pt seen today for on call f/u of MM on chemo. Pt is in bed. Slept well except for some hiccups and medicine helped. Family at bedside. Tolerating chemo well. Chemo to finish tonight.   No new issues this am.       Review of Systems:  Per HPI    Constitutional: fatigue  Eyes: negative  Ears, Nose, Mouth, Throat, and Face: negative  Respiratory: negative  Cardiovascular: negative  Gastrointestinal: constipation - improved  Genitourinary:negative  Integument/Breast: negative  Hematologic/Lymphatic: negative  Musculoskeletal:bilateral lower extremity edema  Neurological: negative      Past Medical History:   Diagnosis Date    Acid reflux     Arrhythmia     pvc's    Chronic pain     neck    Depression     Diabetes ([facility] [address]     Femoral hernia 7/11/2012    Hepatitis C     Hypertension     Inguinal hernia 7/11/2012    Liver disease     hepatitis c    Other ill-defined conditions     Hx of PVCs since 2009    Prostatitis, acute  Psychiatric disorder     depression     Past Surgical History:   Procedure Laterality Date    COLONOSCOPY N/A 9/20/2016    COL[facility]ad [facility]COPY    HX APPENDECTOMY      HX CERVICAL FUSION  2008    x 1 as of 4/30/2014    HX HEENT      foreign body removed from right eye    [facility]PAIR  2012    [facility]      HX ORTHOPAEDIC      cervical fusion    HX OTHER SURGICAL  2000    liver bx - chronic hepatitis      Family History   Problem Relation Age of Onset    Arthritis-osteo Mother     Arthritis-osteo Father     Diabetes Father     Hypertension Maternal Grandmother     Diabetes Maternal Grandmother     Hypertension Maternal Grandfather     Diabetes Maternal Grandfather      Social History   Substance Use Topics    Smoking status: Former Smoker     Packs/day: 1.00     Years: 12.00     Types: Cigarettes     Quit date: 1/1/1989    Smokeless tobacco: Never Used      Comment: former cigarette smoker    Alcohol use No      Comment: former alcoholic-quit [id number]      Prior to Admission medications    Medication Sig Start Date End Date Taking? Authorizing Provider   fluconazole (DIFLUCAN) 200 mg tablet Take 2 Tabs by mouth daily for 30 days. FDA advises cautious prescribing of oral fluconazole in pregnancy. 8/25/17 9/24/17 Yes Macie Long NP   levoFLOXacin (LEVAQUIN) 750 mg tablet Take 1 Tab by mouth daily for 30 days. 8/25/17 9/24/17 Yes Macie Long NP   trimethoprim-sulfamethoxazole (BACTRIM DS, SEPTRA DS) 160-800 mg per tablet Take 1 Tab by mouth daily for 30 days. 8/25/17 9/24/17 Yes Macie Long NP   amLODIPine (NORVASC) 10 mg tablet Take 10 mg by mouth daily.    Yes Historical Provider   furosemide (LASIX) 20 mg tablet TAKE 1 TABLET BY MOUTH DAILY 8/4/17   Collette Marina, NP   ONETOUCH ULTRA TEST strip USE TO TEST BLOOD SUGAR TID BEFORE EACH MEAL UTD. 7/3/17   Historical Provider   REVLIMID 15 mg cap 10 mg. 6/12/17   Historical Provider losartan (COZAAR) 100 mg tablet TK 1 T PO QD 7/3/17   Historical Provider   BD INSULIN PEN NEEDLE UF MINI 31 gauge x 3/16\" ndle USE AS DIRECTED 6/7/17   Historical Provider   insulin lispro (HUMALOG) 100 unit/mL kwikpen 3 Units by SubCUTAneous route Before breakfast, lunch, and dinner. Plus correction sliding scale as needed    Historical Provider   insulin glargine (LANTUS,BASAGLAR) 100 unit/mL (3 mL) inpn 10 units daily at bedtime 6/9/17   Casey Joe NP   lenalidomide 25 mg cap Take  by mouth. Historical Provider   valACYclovir (VALTREX) 1 gram tablet Take 1 Tab by mouth daily. 6/6/17   Clementine Cheadle, MD   tamsulosin (FLOMAX) 0.4 mg capsule Take 1 Cap by mouth daily. 4/4/17   Jeff Shaffer MD   propafenone (RYTHMOL) 150 mg tablet Take 150 mg by mouth every eight (8) hours. Historical Provider   zolpidem CR (AMBIEN CR) 12.5 mg tablet Take 12.5 mg by mouth nightly as needed. Historical Provider   TADALAFIL (CIALIS PO) Take 20 mg by mouth as needed. Historical Provider   losartan (COZAAR) 50 mg tablet Take 100 mg by mouth daily. Historical Provider   tapentadol (NUCYNTA) 100 mg tablet Take 100 mg by mouth three (3) times daily. Goldy Diaz MD   aripiprazole (ABILIFY) 5 mg tablet Take 2.5 mg by mouth every morning. Goldy Diaz MD   buPROPion XL (WELLBUTRIN XL) 150 mg tablet Take 1 Tab by mouth daily (with dinner).  4/25/10   Alcira Beltran MD              Allergies   Allergen Reactions    Mercury (Bulk) Hives     Blisters             Objective:     Vitals:    08/25/17 1754 08/25/17 1857 08/25/17 2259 08/26/17 0727   BP:  124/74 118/78 107/68   Pulse:  82 81 75   Resp:  18 14 16   Temp:  97.8 °F (36.6 °C) 98.4 °F (36.9 °C) 98.2 °F (36.8 °C)   SpO2:  100% 99% 100%   Weight: 189 lb 6.4 oz (85.9 kg)             Physical Exam:    GENERAL: alert, cooperative  EYE: negative    NECK: supple  LUNG: clear to auscultation bilaterally  HEART: regular rate and rhythm  ABDOMEN: soft, non-tender  EXTREMITIES: bilateral LE edema  SKIN: Normal.  NEUROLOGIC: negative    Labs   reviewed  Lab Results   Component Value Date/Time    WBC 3.8 2017 04:33 AM    HGB 8.3 2017 04:33 AM    HCT 25.6 2017 04:33 AM    PLATELET 94  04:33 AM    MCV 89.2 2017 04:33 AM       M-spike: 5.8 => 2.9 => 1.9 => 2.1 => 2.8, IgA kappa    Serum F => 496 => 246 => 305 => 314       Assessment:     1. Multiple Myeloma, IgA Kappa   Durie [place] stage IIIB    Cytogenetics/FISH: t(14;16) - high risk  ECOG PS - 0   Intent of therapy: palliative    Hx for record:  Receiving systemic therapy with RVd - s/p 3 cycles  Dose reduced Revlimid and Velcade d/t continued cytopenias. M-spike now rising. This is primary refractory myeloma. Carries poor prognosis  I discussed the case with Dr. Alexandra Goodwin at [facility] [place] and Dr. Shannon Murphy at [facility]. All in agreement with aggressive multiagent chemotherapy. I will admit him to the hospital and treat him with VD-PACE (bortezomib, dexamethasone, thalidomide, cisplatin, adriamycin, cyclophosphamide, and etoposide - will omit thalidomide). Pt seen today for on call f/u. Receiving inpatient chemotherapy   VD-PACE - Cycle 1 Day 4    Tolerating treatment   No new issues today. Clinically stable  Neulasta after hospital discharge in [facility] on  at 4:00 pm      2. Diabetes    > Sliding scale insulin  > Hospitalist following for diabetes management      3. Bilateral lower extremity edema    > Dopplers left LE negative for DVT  > Continue lasix 20 mg po daily  > Compression stockings      4. Anemia chemotherapy related    Observation      5. Renal insufficiency    Observation      6.  Constipation    > Continue natalie-colace BID  > Continue Miralax daily      Plan:       > Hospitalist following for diabetes management  > Continue VD-PACE, scheduled to finish Saturday evening - plan for discharge home on Sancho morning  > Continue lasix  > Compression stockings  > Miralax daily       Signed by: Nick Boucher,                      August 26, 2017

## 2017-08-26 NOTE — PROGRESS NOTES
Chemotherapy administered; regimen consisted of Cisplatin, Cyclophosphamide, Etopside, and Doxorubicin. The following was dual verified and witnessed by Kathleen Roman RN: chemotherapy orders, pertinent labs, medication calculations and dosage, blood return from double lumen PICC, and IV pump settings. Patient education, verbal and written, provided on chemotherapy regimen as well as chemotherapy precautions to be followed after discharge for at least 48 hours after treatment is finished. The patient and his wife verbalized understanding of teaching provided. Will continue to reinforce education and will continue to monitor.

## 2017-08-26 NOTE — PROGRESS NOTES
Oncology Nursing Communication Tool      6:56 PM  8/26/2017     Bedside and Verbal shift change report given to Saleem Carlisle RN (incoming nurse) by Nathaniel Navarrete RN (outgoing nurse) on Kit Nix Sr. a 63 y.o. male who was admitted on 8/22/2017 10:41 AM. Report included the following information SBAR, Kardex, Intake/Output, MAR and Recent Results. Significant changes during shift: patients chemo finish tonight D/C on Sunday      Issues for physician to address: please write a script for compazine please            Code Status: Full Code     Infections: No current active infections     Allergies: Mercury (bulk)     Current diet: DIET REGULAR       Pain Controlled [] yes [] no   Bowel Movement [] yes [x] no   Last Bowel Movement (date)               Vital Signs:   Patient Vitals for the past 12 hrs:   Temp Pulse Resp BP SpO2   08/26/17 1507 98.4 °F (36.9 °C) 80 16 119/74 100 %   08/26/17 1413 - 72 - 110/68 -   08/26/17 0727 98.2 °F (36.8 °C) 75 16 107/68 100 %      Intake & Output:   No intake or output data in the 24 hours ending 08/26/17 1856   Laboratory Results:     Recent Results (from the past 12 hour(s))   GLUCOSE, POC    Collection Time: 08/26/17  7:31 AM   Result Value Ref Range    Glucose (POC) 136 (H) 65 - 100 mg/dL    Performed by Renny Booth, POC    Collection Time: 08/26/17 11:17 AM   Result Value Ref Range    Glucose (POC) 163 (H) 65 - 100 mg/dL    Performed by Renny Booth, POC    Collection Time: 08/26/17  4:55 PM   Result Value Ref Range    Glucose (POC) 145 (H) 65 - 100 mg/dL    Performed by Chichi Carlton (PCT)               Opportunity for questions and clarifications were given to the incoming nurse. Patient's bed is in low position, side rails x2, door open PRN, call bell within reach and patient not in distress.       Nathaniel Navarrete RN

## 2017-08-26 NOTE — PROGRESS NOTES
Problem: Falls - Risk of  Goal: *Absence of Falls  Document Eusebio Fall Risk and appropriate interventions in the flowsheet.    Outcome: Progressing Towards Goal  Fall Risk Interventions:              Medication Interventions: Assess postural VS orthostatic hypotension, Patient to call before getting OOB, Teach patient to arise slowly           History of Falls Interventions: Evaluate medications/consider consulting pharmacy

## 2017-08-27 VITALS
SYSTOLIC BLOOD PRESSURE: 110 MMHG | BODY MASS INDEX: 28.8 KG/M2 | RESPIRATION RATE: 14 BRPM | OXYGEN SATURATION: 99 % | TEMPERATURE: 97.7 F | DIASTOLIC BLOOD PRESSURE: 67 MMHG | WEIGHT: 189.4 LBS | HEART RATE: 76 BPM

## 2017-08-27 LAB
ANION GAP SERPL CALC-SCNC: 7 MMOL/L (ref 5–15)
BASOPHILS # BLD: 0 K/UL
BASOPHILS NFR BLD: 0 %
BUN SERPL-MCNC: 25 MG/DL (ref 6–20)
BUN/CREAT SERPL: 21 (ref 12–20)
CALCIUM SERPL-MCNC: 7.4 MG/DL (ref 8.5–10.1)
CHLORIDE SERPL-SCNC: 108 MMOL/L (ref 97–108)
CO2 SERPL-SCNC: 23 MMOL/L (ref 21–32)
CREAT SERPL-MCNC: 1.17 MG/DL (ref 0.7–1.3)
DIFFERENTIAL METHOD BLD: ABNORMAL
EOSINOPHIL # BLD: 0 K/UL
EOSINOPHIL NFR BLD: 0 %
ERYTHROCYTE [DISTWIDTH] IN BLOOD BY AUTOMATED COUNT: 19 % (ref 11.5–14.5)
GLUCOSE BLD STRIP.AUTO-MCNC: 109 MG/DL (ref 65–100)
GLUCOSE BLD STRIP.AUTO-MCNC: 163 MG/DL (ref 65–100)
GLUCOSE SERPL-MCNC: 120 MG/DL (ref 65–100)
HCT VFR BLD AUTO: 26.8 % (ref 36.6–50.3)
HGB BLD-MCNC: 8.7 G/DL (ref 12.1–17)
LYMPHOCYTES # BLD: 0.3 K/UL
LYMPHOCYTES NFR BLD: 18 %
MCH RBC QN AUTO: 29 PG (ref 26–34)
MCHC RBC AUTO-ENTMCNC: 32.5 G/DL (ref 30–36.5)
MCV RBC AUTO: 89.3 FL (ref 80–99)
MONOCYTES # BLD: 0.1 K/UL
MONOCYTES NFR BLD: 6 %
NEUTS SEG # BLD: 1 K/UL
NEUTS SEG NFR BLD: 76 %
PLATELET # BLD AUTO: 64 K/UL (ref 150–400)
POTASSIUM SERPL-SCNC: 4.2 MMOL/L (ref 3.5–5.1)
RBC # BLD AUTO: 3 M/UL (ref 4.1–5.7)
RBC MORPH BLD: ABNORMAL
SERVICE CMNT-IMP: ABNORMAL
SERVICE CMNT-IMP: ABNORMAL
SODIUM SERPL-SCNC: 138 MMOL/L (ref 136–145)
WBC # BLD AUTO: 1.4 K/UL (ref 4.1–11.1)

## 2017-08-27 PROCEDURE — 74011250637 HC RX REV CODE- 250/637: Performed by: NURSE PRACTITIONER

## 2017-08-27 PROCEDURE — 36415 COLL VENOUS BLD VENIPUNCTURE: CPT | Performed by: NURSE PRACTITIONER

## 2017-08-27 PROCEDURE — 74011250637 HC RX REV CODE- 250/637: Performed by: INTERNAL MEDICINE

## 2017-08-27 PROCEDURE — 85025 COMPLETE CBC W/AUTO DIFF WBC: CPT | Performed by: NURSE PRACTITIONER

## 2017-08-27 PROCEDURE — 74011636637 HC RX REV CODE- 636/637: Performed by: INTERNAL MEDICINE

## 2017-08-27 PROCEDURE — 80048 BASIC METABOLIC PNL TOTAL CA: CPT | Performed by: NURSE PRACTITIONER

## 2017-08-27 PROCEDURE — 82962 GLUCOSE BLOOD TEST: CPT

## 2017-08-27 RX ORDER — PROCHLORPERAZINE MALEATE 10 MG
TABLET ORAL
Qty: 385 TAB | Refills: 0 | Status: SHIPPED | OUTPATIENT
Start: 2017-08-27 | End: 2018-11-01

## 2017-08-27 RX ORDER — PROCHLORPERAZINE MALEATE 10 MG
10 TABLET ORAL
Qty: 30 TAB | Refills: 0 | Status: SHIPPED | OUTPATIENT
Start: 2017-08-27 | End: 2017-08-27 | Stop reason: SDUPTHER

## 2017-08-27 RX ADMIN — FLUCONAZOLE 400 MG: 100 TABLET ORAL at 08:40

## 2017-08-27 RX ADMIN — SULFAMETHOXAZOLE AND TRIMETHOPRIM 1 TABLET: 800; 160 TABLET ORAL at 08:39

## 2017-08-27 RX ADMIN — INSULIN LISPRO 2 UNITS: 100 INJECTION, SOLUTION INTRAVENOUS; SUBCUTANEOUS at 13:05

## 2017-08-27 RX ADMIN — Medication 40 ML: at 06:22

## 2017-08-27 RX ADMIN — AMLODIPINE BESYLATE 10 MG: 5 TABLET ORAL at 08:37

## 2017-08-27 RX ADMIN — PANTOPRAZOLE SODIUM 40 MG: 40 TABLET, DELAYED RELEASE ORAL at 08:39

## 2017-08-27 RX ADMIN — BUPROPION HYDROCHLORIDE 150 MG: 150 TABLET, FILM COATED, EXTENDED RELEASE ORAL at 08:39

## 2017-08-27 RX ADMIN — PROPAFENONE HYDROCHLORIDE 150 MG: 150 TABLET, FILM COATED ORAL at 07:32

## 2017-08-27 RX ADMIN — LEVOFLOXACIN 750 MG: 750 TABLET, FILM COATED ORAL at 08:39

## 2017-08-27 RX ADMIN — VALACYCLOVIR HYDROCHLORIDE 500 MG: 500 TABLET, FILM COATED ORAL at 07:32

## 2017-08-27 RX ADMIN — LOSARTAN POTASSIUM 100 MG: 50 TABLET, FILM COATED ORAL at 11:01

## 2017-08-27 RX ADMIN — FUROSEMIDE 20 MG: 20 TABLET ORAL at 08:39

## 2017-08-27 RX ADMIN — ARIPIPRAZOLE 2.5 MG: 5 TABLET ORAL at 07:32

## 2017-08-27 NOTE — PROGRESS NOTES
Patient ready for discharge. Discharge instructions and medications reviewed with patient and wife. Mediations reviewed along with side effects in addition to medication information sheets given to patient. Chemotherapy precautions reviewed with patient and wife. Follow up instruction reviewed with both patient and wife. Patient PICC line discontinued by Cristina Gamez RN. Patient remained flat and still for 45 minutes after removal of PICC. Patients home medications including Nucynta, Ambien and blood pressure medication returned to patient prior to discharge. Patient in no apparent distress at time of discharge. Patient wheeled to car for discharge.

## 2017-08-27 NOTE — PROGRESS NOTES
Pt cleared from IM stand point to be discharged from the hospital.  Please continue his previous home dose of insulin and recommend out pt f/u with his pcp,. His previous home dose was    Insulin lantus 10 units at qhs  Insulin lispro 3 units pre meal  Insulin lispro sliding scale.

## 2017-08-27 NOTE — PROGRESS NOTES
Progress Note        Patient: Marcela Jay Sr. MRN: [id number]  SSN: [id number]    YOB: 1955  Age: 63 y.o. Sex: male        Subjective:      Marcela Jay Sr. is a 63 y.o. male with a diagnosis of IgA kappa myeloma. Bone marrow shows 100% aberrant plasma cells. He suffers with DM but it is diet controlled. He has received systemic anti-viral treatment for chronic Hep C with successful eradication of the virus. Mr. Stevie Villaseñor received 4 cycles of systemic therapy with RVd. He had an initial good response to treatment. However d/t on-going side effects, treatment was dose reduced and paraprotein is now rising. He is being admitted for inpatient chemotherapy. Mr. Stevie Villaseñor feels well today and has been walking in the hallway. He had several bowel movements today. Chemotherapy is scheduled to be completed Saturday evening with planned discharge home on Sunday morning. Patient made aware of appointment on Monday at [facility] for Neulasta injection. Interim Hx:  Pt seen today for on call f/u of MM on chemo. Pt sitting up in bed. Eating well. Ready to go home this am.   Wife at bedside. Had hiccups and compazine works. Labs stable so far.      Review of Systems:  Per HPI    Constitutional: fatigue  Eyes: negative  Ears, Nose, Mouth, Throat, and Face: negative  Respiratory: negative  Cardiovascular: negative  Gastrointestinal: constipation - improved  Genitourinary:negative  Integument/Breast: negative  Hematologic/Lymphatic: negative  Musculoskeletal:bilateral lower extremity edema  Neurological: negative      Past Medical History:   Diagnosis Date    Acid reflux     Arrhythmia     pvc's    Chronic pain     neck    Depression     Diabetes ([facility] [address]     Femoral hernia 7/11/2012    Hepatitis C     Hypertension     Inguinal hernia 7/11/2012    Liver disease     hepatitis c    Other ill-defined conditions     Hx of PVCs since 2009    Prostatitis, acute     Psychiatric disorder depression     Past Surgical History:   Procedure Laterality Date    COLONOSCOPY N/A 9/20/2016    COLONOSCOPY performed by Rachel Renteria MD at [facility] ENDOSCOPY    HX APPENDECTOMY      HX CERVICAL FUSION  2008    x 1 as of 4/30/2014    HX HEENT      foreign body removed from right eye    [facility]PAIR  2012    [facility]      HX ORTHOPAEDIC      cervical fusion    HX OTHER SURGICAL  2000    liver bx - chronic hepatitis      Family History   Problem Relation Age of Onset    Arthritis-osteo Mother     Arthritis-osteo Father     Diabetes Father     Hypertension Maternal Grandmother     Diabetes Maternal Grandmother     Hypertension Maternal Grandfather     Diabetes Maternal Grandfather      Social History   Substance Use Topics    Smoking status: Former Smoker     Packs/day: 1.00     Years: 12.00     Types: Cigarettes     Quit date: 1/1/1989    Smokeless tobacco: Never Used      Comment: former cigarette smoker    Alcohol use No      Comment: former alcoholic-quit [id number]      Prior to Admission medications    Medication Sig Start Date End Date Taking? Authorizing Provider   fluconazole (DIFLUCAN) 200 mg tablet Take 2 Tabs by mouth daily for 30 days. FDA advises cautious prescribing of oral fluconazole in pregnancy. 8/25/17 9/24/17 Yes Macie Long NP   levoFLOXacin (LEVAQUIN) 750 mg tablet Take 1 Tab by mouth daily for 30 days. 8/25/17 9/24/17 Yes Macie Long NP   trimethoprim-sulfamethoxazole (BACTRIM DS, SEPTRA DS) 160-800 mg per tablet Take 1 Tab by mouth daily for 30 days. 8/25/17 9/24/17 Yes Macie Long NP   amLODIPine (NORVASC) 10 mg tablet Take 10 mg by mouth daily.    Yes Historical Provider   furosemide (LASIX) 20 mg tablet TAKE 1 TABLET BY MOUTH DAILY 8/4/17   Hanane Goins NP   ONETOUCH ULTRA TEST strip USE TO TEST BLOOD SUGAR TID BEFORE EACH MEAL UTD. 7/3/17   Historical Provider   REVLIMID 15 mg cap 10 mg. 6/12/17   Historical Provider   losartan (COZAAR) 100 mg tablet TK 1 T PO QD 7/3/17   Historical Provider   BD INSULIN PEN NEEDLE UF MINI 31 gauge x 3/16\" ndle USE AS DIRECTED 6/7/17   Historical Provider   insulin lispro (HUMALOG) 100 unit/mL kwikpen 3 Units by SubCUTAneous route Before breakfast, lunch, and dinner. Plus correction sliding scale as needed    Historical Provider   insulin glargine (LANTUS,BASAGLAR) 100 unit/mL (3 mL) inpn 10 units daily at bedtime 6/9/17   Patsie Morning, NP   lenalidomide 25 mg cap Take  by mouth. Historical Provider   valACYclovir (VALTREX) 1 gram tablet Take 1 Tab by mouth daily. 6/6/17   Jaylon Kim MD   tamsulosin (FLOMAX) 0.4 mg capsule Take 1 Cap by mouth daily. 4/4/17   Ronnie Myers MD   propafenone (RYTHMOL) 150 mg tablet Take 150 mg by mouth every eight (8) hours. Historical Provider   zolpidem CR (AMBIEN CR) 12.5 mg tablet Take 12.5 mg by mouth nightly as needed. Historical Provider   TADALAFIL (CIALIS PO) Take 20 mg by mouth as needed. Historical Provider   losartan (COZAAR) 50 mg tablet Take 100 mg by mouth daily. Historical Provider   tapentadol (NUCYNTA) 100 mg tablet Take 100 mg by mouth three (3) times daily. Goldy Diaz MD   aripiprazole (ABILIFY) 5 mg tablet Take 2.5 mg by mouth every morning. Goldy Diaz MD   buPROPion XL (WELLBUTRIN XL) 150 mg tablet Take 1 Tab by mouth daily (with dinner).  4/25/10   Maryjane Yeung MD              Allergies   Allergen Reactions    Mercury (Bulk) Hives     Blisters             Objective:     Vitals:    08/26/17 2321 08/27/17 0606 08/27/17 0652 08/27/17 0732   BP: 126/78 113/57 107/64 150/83   Pulse: 80 73  83   Resp: 16      Temp: 98.5 °F (36.9 °C)      SpO2: 99%      Weight:              Physical Exam:    GENERAL: alert, cooperative  EYE: negative    NECK: supple  LUNG: clear to auscultation bilaterally  HEART: regular rate and rhythm  ABDOMEN: soft, non-tender  EXTREMITIES: bilateral LE edema  SKIN: Normal.  NEUROLOGIC: negative    Labs   reviewed  Lab Results   Component Value Date/Time    WBC 3.8 2017 04:33 AM    HGB 8.3 2017 04:33 AM    HCT 25.6 2017 04:33 AM    PLATELET 94  04:33 AM    MCV 89.2 2017 04:33 AM       M-spike: 5.8 => 2.9 => 1.9 => 2.1 => 2.8, IgA kappa    Serum F => 496 => 246 => 305 => 314       Assessment:     1. Multiple Myeloma, IgA Kappa   Durie [place] stage IIIB    Cytogenetics/FISH: t(14;16) - high risk  ECOG PS - 0   Intent of therapy: palliative    Hx for record:  Receiving systemic therapy with RVd - s/p 3 cycles  Dose reduced Revlimid and Velcade d/t continued cytopenias. M-spike now rising. This is primary refractory myeloma. Carries poor prognosis  I discussed the case with Dr. Normie Najjar at [facility] and Dr. Иван Calixto at [facility]. All in agreement with aggressive multiagent chemotherapy. I will admit him to the hospital and treat him with VD-PACE (bortezomib, dexamethasone, thalidomide, cisplatin, adriamycin, cyclophosphamide, and etoposide - will omit thalidomide). Pt seen today for on call f/u. Receiving inpatient chemotherapy   VD-PACE - Cycle 1 Day 4    Tolerated treatment   No new issues today. Clinically stable  Neulasta after hospital discharge in [facility] on  at 4:00 pm      2. Diabetes    > Sliding scale insulin  > Hospitalist following for diabetes management      3. Bilateral lower extremity edema    > Dopplers left LE negative for DVT  > Continue lasix 20 mg po daily  > Compression stockings      4. Anemia chemotherapy related    Observation      5. Renal insufficiency    Observation      6.  Constipation    > Continue natalie-colace BID  > Continue Miralax daily      Plan:       > Hospitalist following for diabetes management  > Continue VD-PACE, done  D/c home today    Signed by: Mana Tan DO                     2017

## 2017-08-27 NOTE — PROGRESS NOTES
Hospitalist Progress Note    NAME: Rawland Lesches Sr.   :  1955   MRN:  [id number]       Assessment / Plan:  Diabetes type II POA:  -Cont lantus 10 units, insulin lispro 3 units tid and insulin sliding scale at home. F/u with PCP in 1 week.       Primary refractory Multiple Myeloma, IgA Kappa  Anemia/thrombocytopenia  -Admitted to Dr. Yuli Oswald service for inpatient chemotherapy with VD-PACE  -On  empiric antibiotics/antivirals per oncology.  -Further mgmt deferred to oncology.     Left ankle swelling  -reports this started following initiation of chemotherapy prior to hospital admission. -Ankle is nontender.   -continue lasix  -Doppler negative for DVT. Anemia likely from multiple myeloma: Transfuse to keep Hb more than 7.0. Pancytopenia likely from Chemotherapy : Mgmt per oncology. Advised close follow up with oncology if discharged.     Essential HTN POA  -continue pta norvasc and losartan     Anxiety/depression  -continue pta abilify and wellbutrin     Hx of SVT  -on pta profenone  -Echo 17 with EF 55%, mild concentric hypertrophy.  -follows with cardiology, Dr. Abiola Thomas.     Hx hep C s/p treatment  -follows with Dr. Yanely Gallardo    Hiccups improved with compazine:     Rt eye irritation mild: May be viral conjunctivitis. Advised out pt f/u with pcp if symptoms are not better in 1-2 days.     Code Status: Full  Surrogate Decision Maker: Wife, Leslee Oden [phone]  DVT Prophylaxis: lovenox  GI Prophylaxis: not indicated  Baseline: independent                           Disposition: Per oncology. Cleared to be discharge from IM stand point. Subjective:     Chief Complaint / Reason for Physician Visit  Some rt eye irritation which is mild with no pain or vision changes. No other complaints today.       Review of Systems:  Symptom Y/N Comments  Symptom Y/N Comments   Fever/Chills n   Chest Pain n    Poor Appetite n   Edema n    Cough n   Abdominal Pain n    Sputum n   Joint Pain n SOB/GONZALES n   Pruritis/Rash n    Nausea/vomit n   Tolerating PT/OT     Diarrhea    Tolerating Diet y    Constipation    Other       Could NOT obtain due to:      Objective:     VITALS:   Last 24hrs VS reviewed since prior progress note. Most recent are:  Patient Vitals for the past 24 hrs:   Temp Pulse Resp BP SpO2   08/27/17 1125 97.7 °F (36.5 °C) 76 14 110/67 99 %   08/27/17 0837 - 82 - 125/89 -   08/27/17 0751 98.3 °F (36.8 °C) - 16 - -   08/27/17 0732 - 83 - 150/83 -   08/27/17 0652 - - - 107/64 -   08/27/17 0606 - 73 - 113/57 -   08/26/17 2321 98.5 °F (36.9 °C) 80 16 126/78 99 %   08/26/17 1952 98.6 °F (37 °C) 83 16 145/81 100 %   08/26/17 1507 98.4 °F (36.9 °C) 80 16 119/74 100 %   08/26/17 1413 - 72 - 110/68 -       Intake/Output Summary (Last 24 hours) at 08/27/17 1233  Last data filed at 08/27/17 0535   Gross per 24 hour   Intake           508.75 ml   Output                0 ml   Net           508.75 ml        PHYSICAL EXAM:  General: Alert, cooperative, no acute distress    EENT:  EOMI. Anicteric sclerae. MMM  Resp:  CTA bilaterally, no wheezing or rales. No accessory muscle use  CV:  Regular  rhythm,  No edema  GI:  Soft, Non distended, Non tender.  +Bowel sounds  Neurologic:  Alert and oriented X 3, normal speech,   Skin:  No rashes.   No jaundice    Reviewed most current lab test results and cultures  YES  Reviewed most current radiology test results   YES  Review and summation of old records today    NO  Reviewed patient's current orders and MAR    YES  PMH/SH reviewed - no change compared to H&P      Current Facility-Administered Medications:     insulin glargine (LANTUS) injection 14 Units, 14 Units, SubCUTAneous, QHS, Griffin Alford MD, 14 Units at 08/26/17 2230    polyethylene glycol (MIRALAX) packet 17 g, 17 g, Oral, DAILY, Macie H Kapinos, NP    tapentadol (NUCYNTA) tablet 200 mg, 200 mg, Oral, TID, Virginia Corcoran MD, 200 mg at 08/27/17 0843    oxyCODONE IR (ROXICODONE) tablet 5 mg, 5 mg, Oral, Q4H PRN, Sheri Long, NP, 5 mg at 08/24/17 1138    losartan (COZAAR) tablet 100 mg, 100 mg, Oral, DAILY, Sinan Lomax MD, 100 mg at 08/27/17 1101    furosemide (LASIX) tablet 20 mg, 20 mg, Oral, DAILY, Sinan Lomax MD, 20 mg at 08/27/17 0839    propafenone (RYTHMOL) tablet 150 mg, 150 mg, Oral, Q8H, Sinan Lomax MD, 150 mg at 08/27/17 0732    zolpidem CR (AMBIEN CR) tablet 12.5 mg, 12.5 mg, Oral, QHS PRN, Sinan Lomax MD, 12.5 mg at 08/26/17 2059    amLODIPine (NORVASC) tablet 10 mg, 10 mg, Oral, DAILY, Sinan Lomax MD, 10 mg at 08/27/17 0837    ARIPiprazole (ABILIFY) tablet 2.5 mg, 2.5 mg, Oral, 7am, Sinan Lomax MD, 2.5 mg at 08/27/17 0732    buPROPion XL (WELLBUTRIN XL) tablet 150 mg, 150 mg, Oral, BID, Costa Blue NP, 150 mg at 08/27/17 0839    levoFLOXacin (LEVAQUIN) tablet 750 mg, 750 mg, Oral, DAILY, Sinan Lomax MD, 750 mg at 08/27/17 4611    trimethoprim-sulfamethoxazole (BACTRIM DS, SEPTRA DS) 160-800 mg per tablet 1 Tab, 1 Tab, Oral, DAILY, Sinan Lomax MD, 1 Tab at 08/27/17 9420    valACYclovir (VALTREX) tablet 500 mg, 500 mg, Oral, Q8H, Sinan Lomax MD, 500 mg at 08/27/17 0732    fluconazole (DIFLUCAN) tablet 400 mg, 400 mg, Oral, DAILY, Sinan Lomax MD, 400 mg at 08/27/17 0840    pantoprazole (PROTONIX) tablet 40 mg, 40 mg, Oral, ACB, Sinan Lomax MD, 40 mg at 08/27/17 0839    0.9% sodium chloride infusion, 25 mL/hr, IntraVENous, CONTINUOUS, Sinan Lomax MD, Last Rate: 25 mL/hr at 08/26/17 0442, 25 mL/hr at 08/26/17 0442    prochlorperazine (COMPAZINE) with saline injection 10 mg, 10 mg, IntraVENous, Q6H PRN, Sinan Lomax MD, 10 mg at 08/26/17 2337    insulin lispro (HUMALOG) injection, , SubCUTAneous, TIDAC, Sinan Lomax MD, Stopped at 08/27/17 0730    glucose chewable tablet 16 g, 4 Tab, Oral, PRN, Sinan Lomax MD    dextrose (D50W) injection syrg 12.5-25 g, 12.5-25 g, IntraVENous, PRN, Sinan Lomax MD    glucagon (GLUCAGEN) injection 1 mg, 1 mg, IntraMUSCular, PRN, Rickie Christiansen MD    sodium chloride (NS) flush 10-30 mL, 10-30 mL, InterCATHeter, PRN, Rickie Christiansen MD    sodium chloride (NS) flush 10 mL, 10 mL, InterCATHeter, Q24H, Rickie Christiansen MD, 10 mL at 08/26/17 1732    sodium chloride (NS) flush 10-40 mL, 10-40 mL, InterCATHeter, Q8H, Rickie Christiansen MD, 40 mL at 08/27/17 0622    heparin (porcine) pf 300 Units, 300 Units, InterCATHeter, PRN, Rickie Christiansen MD    0.9% sodium chloride infusion, 100 mL/hr, IntraVENous, ONCE PRN, Rickie Christiansen MD    diphenhydrAMINE (BENADRYL) injection 50 mg, 50 mg, IntraVENous, ONCE PRN, Rickie Christiansen MD    acetaminophen (TYLENOL) tablet 650 mg, 650 mg, Oral, ONCE PRN, Rickie Christiansen MD    ondansetron [facility]) injection 8 mg, 8 mg, IntraVENous, ONCE PRN, Rickie Christiansen MD    0.9% sodium chloride infusion, 999 mL/hr, IntraVENous, ONCE PRN, Rickie Christiansen MD    EPINEPHrine HCl (PF) (ADRENALIN) 1 mg/mL (1 mL) injection 0.3 mg, 0.3 mg, SubCUTAneous, ONCE PRN, Rickie Christiansen MD    hydrocortisone Sod Succ (PF) (SOLU-CORTEF) injection 100 mg, 100 mg, IntraVENous, ONCE PRN, Rickie Christiansen MD    diphenhydrAMINE (BENADRYL) injection 50 mg, 50 mg, IntraVENous, ONCE PRN, Rickie Christiansen MD    albuterol (PROVENTIL VENTOLIN) nebulizer solution 2.5 mg, 2.5 mg, Nebulization, ONCE PRN, Rickie Christiansen MD    senna-docusate (PERICOLACE) 8.6-50 mg per tablet 2 Tab, 2 Tab, Oral, Q12H, Rickie Christiansen MD, Stopped at 08/26/17 2100    ondansetron (ZOFRAN) injection 8 mg, 8 mg, IntraVENous, Q6H PRN, Rickie Christiansen MD    LORazepam (ATIVAN) tablet 1 mg, 1 mg, Oral, Q4H PRN, Rickie Christiansen MD    enoxaparin (LOVENOX) injection 40 mg, 40 mg, SubCUTAneous, DAILY, Rickie Christiansen MD, 40 mg at 08/26/17 0920    Facility-Administered Medications Ordered in Other Encounters:     [START ON 8/28/2017] pegfilgrastim (NEULASTA) injection 6 mg, 6 mg, SubCUTAneous, ONCE, Rickie Christiansen MD    ________________________________________________________________________  Care Plan discussed with:    Comments   Patient y    Family      RN y    Care Manager     Consultant                        Multidiciplinary team rounds were held today with , nursing, pharmacist and clinical coordinator. Patient's plan of care was discussed; medications were reviewed and discharge planning was addressed. ________________________________________________________________________  Total NON critical care TIME:  35    Minutes    Total CRITICAL CARE TIME Spent:   Minutes non procedure based      Comments   >50% of visit spent in counseling and coordination of care     ________________________________________________________________________  Duyen Goss MD     Procedures: see electronic medical records for all procedures/Xrays and details which were not copied into this note but were reviewed prior to creation of Plan. LABS:  I reviewed today's most current labs and imaging studies.   Pertinent labs include:  Recent Labs      08/27/17   0620  08/26/17   0433  08/25/17   0644   WBC  1.4*  3.8*  5.9   HGB  8.7*  8.3*  8.6*   HCT  26.8*  25.6*  26.8*   PLT  64*  94*  93*     Recent Labs      08/27/17   0620  08/26/17   0433  08/25/17   0644   NA  138  136  135*   K  4.2  4.4  4.5   CL  108  107  106   CO2  23  21  21   GLU  120*  194*  175*   BUN  25*  29*  27*   CREA  1.17  1.37*  1.29   CA  7.4*  7.0*  7.5*       Signed: Duyen Goss MD

## 2017-08-27 NOTE — ROUTINE PROCESS
Oncology Nursing Communication Tool      8:50 AM  8/27/2017     Bedside shift change report given to Aiyana Jose, RN (incoming nurse) by Idalia Fields RN (outgoing nurse) on [facility]. a 63 y.o. male who was admitted on 8/22/2017 10:41 AM. Report included the following information SBAR, Kardex, Intake/Output, MAR and Recent Results. Significant changes during shift: chemo completed. Pt c/o of itchy, irritated R eye this am during report. Pt anticipating discharge home this am.      Issues for physician to address: eye irritation. Code Status: Full Code     Infections: No current active infections     Allergies: Mercury (bulk)     Current diet: DIET REGULAR       Pain Controlled [x] yes [] no   Bowel Movement [x] yes [] no   Last Bowel Movement (date)     8/26/17            Vital Signs:   Patient Vitals for the past 12 hrs:   Temp Pulse Resp BP SpO2   08/27/17 0837 - 82 - 125/89 -   08/27/17 0751 98.3 °F (36.8 °C) - 16 - -   08/27/17 0732 - 83 - 150/83 -   08/27/17 0652 - - - 107/64 -   08/27/17 0606 - 73 - 113/57 -   08/26/17 2321 98.5 °F (36.9 °C) 80 16 126/78 99 %      Intake & Output:     Intake/Output Summary (Last 24 hours) at 08/27/17 0850  Last data filed at 08/27/17 2495   Gross per 24 hour   Intake           508.75 ml   Output                0 ml   Net           508.75 ml      Laboratory Results:     Recent Results (from the past 12 hour(s))   CBC WITH AUTOMATED DIFF    Collection Time: 08/27/17  6:20 AM   Result Value Ref Range    WBC 1.4 (L) 4.1 - 11.1 K/uL    RBC 3.00 (L) 4.10 - 5.70 M/uL    HGB 8.7 (L) 12.1 - 17.0 g/dL    HCT 26.8 (L) 36.6 - 50.3 %    MCV 89.3 80.0 - 99.0 FL    MCH 29.0 26.0 - 34.0 PG    MCHC 32.5 30.0 - 36.5 g/dL    RDW 19.0 (H) 11.5 - 14.5 %    PLATELET 64 (L) 085 - 400 K/uL    NEUTROPHILS PENDING %    LYMPHOCYTES PENDING %    MONOCYTES PENDING %    EOSINOPHILS PENDING %    BASOPHILS PENDING %    ABS. NEUTROPHILS PENDING K/UL    ABS. LYMPHOCYTES PENDING K/UL    ABS. MONOCYTES PENDING K/UL    ABS. EOSINOPHILS PENDING K/UL    ABS. BASOPHILS PENDING K/UL    DF PENDING    METABOLIC PANEL, BASIC    Collection Time: 08/27/17  6:20 AM   Result Value Ref Range    Sodium 138 136 - 145 mmol/L    Potassium 4.2 3.5 - 5.1 mmol/L    Chloride 108 97 - 108 mmol/L    CO2 23 21 - 32 mmol/L    Anion gap 7 5 - 15 mmol/L    Glucose 120 (H) 65 - 100 mg/dL    BUN 25 (H) 6 - 20 MG/DL    Creatinine 1.17 0.70 - 1.30 MG/DL    BUN/Creatinine ratio 21 (H) 12 - 20      GFR est AA >60 >60 ml/min/1.73m2    GFR est non-AA >60 >60 ml/min/1.73m2    Calcium 7.4 (L) 8.5 - 10.1 MG/DL   GLUCOSE, POC    Collection Time: 08/27/17  7:35 AM   Result Value Ref Range    Glucose (POC) 109 (H) 65 - 100 mg/dL    Performed by [address] [place] [address] for questions and clarifications were given to the incoming nurse. Patient's bed is in low position, side rails x2, door open PRN, call bell within reach and patient not in distress.       Petrona Lopez RN

## 2017-08-28 ENCOUNTER — HOSPITAL ENCOUNTER (OUTPATIENT)
Dept: INFUSION THERAPY | Age: 62
Discharge: HOME | End: 2017-08-28
Payer: MEDICARE

## 2017-08-28 VITALS
OXYGEN SATURATION: 95 % | HEART RATE: 66 BPM | DIASTOLIC BLOOD PRESSURE: 73 MMHG | TEMPERATURE: 98.4 F | SYSTOLIC BLOOD PRESSURE: 114 MMHG | RESPIRATION RATE: 18 BRPM

## 2017-08-28 PROCEDURE — 74011250636 HC RX REV CODE- 250/636: Performed by: INTERNAL MEDICINE

## 2017-08-28 PROCEDURE — 96372 THER/PROPH/DIAG INJ SC/IM: CPT

## 2017-08-28 RX ADMIN — PEGFILGRASTIM 6 MG: 6 INJECTION SUBCUTANEOUS at 14:20

## 2017-08-28 NOTE — PROGRESS NOTES
[facility] Stay Note:  Arrived - 1415  Assessment - unchanged. Visit Vitals    /73    Pulse 66    Temp 98.4 °F (36.9 °C)    Resp 18    SpO2 95%     Neulasta 6mg SQ slowly in R arm. Visit Vitals    /73    Pulse 66    Temp 98.4 °F (36.9 °C)    Resp 18    SpO2 95%     1450 - Tolerated well. Pt observed for 30 minutes post injection. No reaction noted. Pt educated about med, and given handouts. Pt denies any acute problems/changes. Discharged from [facility] ambulatory. No distress. Next appt not yet scheduled.

## 2017-08-29 ENCOUNTER — HOSPITAL ENCOUNTER (OUTPATIENT)
Dept: INFUSION THERAPY | Age: 62
Discharge: HOME | End: 2017-08-29
Payer: MEDICARE

## 2017-08-29 VITALS
HEART RATE: 98 BPM | DIASTOLIC BLOOD PRESSURE: 79 MMHG | TEMPERATURE: 98.1 F | SYSTOLIC BLOOD PRESSURE: 105 MMHG | RESPIRATION RATE: 18 BRPM

## 2017-08-29 DIAGNOSIS — C90.00 MULTIPLE MYELOMA NOT HAVING ACHIEVED REMISSION (HCC): Primary | ICD-10-CM

## 2017-08-29 PROCEDURE — 74011250636 HC RX REV CODE- 250/636: Performed by: INTERNAL MEDICINE

## 2017-08-29 PROCEDURE — 96401 CHEMO ANTI-NEOPL SQ/IM: CPT

## 2017-08-29 PROCEDURE — 74011000250 HC RX REV CODE- 250: Performed by: INTERNAL MEDICINE

## 2017-08-29 PROCEDURE — 74011250636 HC RX REV CODE- 250/636

## 2017-08-29 RX ADMIN — BORTEZOMIB 2 MG: 3.5 INJECTION, POWDER, LYOPHILIZED, FOR SOLUTION INTRAVENOUS; SUBCUTANEOUS at 09:20

## 2017-08-29 NOTE — PROGRESS NOTES
0910 Pt arrived at [facility] ambulatory and in no distress for C4D8. Assessment completed. Pt complains of more fatigue than usual.    /79  Pulse 98  Temp 98.1 °F (36.7 °C)  Resp 18    Medications received:  Velcade    0920 Tolerated treatment well, no adverse reaction noted. D/Cd from [facility] ambulatory and in no distress accompanied by family. Next appt 9/1/17.

## 2017-09-01 ENCOUNTER — TELEPHONE (OUTPATIENT)
Dept: ENDOCRINOLOGY | Age: 62
End: 2017-09-01

## 2017-09-01 ENCOUNTER — HOSPITAL ENCOUNTER (OUTPATIENT)
Dept: INFUSION THERAPY | Age: 62
Discharge: HOME | End: 2017-09-01
Payer: MEDICARE

## 2017-09-01 VITALS
DIASTOLIC BLOOD PRESSURE: 70 MMHG | TEMPERATURE: 99.1 F | SYSTOLIC BLOOD PRESSURE: 98 MMHG | RESPIRATION RATE: 18 BRPM | HEART RATE: 103 BPM

## 2017-09-01 PROCEDURE — 74011000250 HC RX REV CODE- 250: Performed by: INTERNAL MEDICINE

## 2017-09-01 PROCEDURE — 74011250636 HC RX REV CODE- 250/636: Performed by: INTERNAL MEDICINE

## 2017-09-01 PROCEDURE — 74011250636 HC RX REV CODE- 250/636

## 2017-09-01 PROCEDURE — 96401 CHEMO ANTI-NEOPL SQ/IM: CPT

## 2017-09-01 RX ADMIN — SODIUM CHLORIDE 2 MG: 9 INJECTION INTRAMUSCULAR; INTRAVENOUS; SUBCUTANEOUS at 11:11

## 2017-09-01 NOTE — TELEPHONE ENCOUNTER
Sure, what about we reschedule him after my last patient next Wednesday afternoon. Thanks,    America Castano.  [facility]  [address] [place] [address]

## 2017-09-01 NOTE — PROGRESS NOTES
Pt arrived to [facility] ambulatory in no acute distress at 1110 for Velcade C4D11.  Assessment unremarkable except mouth sores. Visit Vitals    BP 98/70 (BP 1 Location: Left arm, BP Patient Position: Sitting)    Pulse (!) 103    Temp 99.1 °F (37.3 °C)    Resp 18       The following medications administered:  Velcade 2mg SQ in R abdomen    Pt tolerated treatment well. Pt agreed to call the doctor if he had difficulty hydrating due to mouth sores.  Pt discharged ambulatory in no acute distress at 1120, accompanied by self. Next appointment not yet scheduled.

## 2017-09-01 NOTE — TELEPHONE ENCOUNTER
Patient is currently scheduled with you for today at 2:50pm, however, he stated that he has had 4 days of chemo and is not feeling well. He would like to be seen sooner than next available which is November. Please let me know if you can see him. Thanks!

## 2017-09-07 ENCOUNTER — HOSPITAL ENCOUNTER (OUTPATIENT)
Dept: INTERVENTIONAL RADIOLOGY/VASCULAR | Age: 62
Discharge: HOME | End: 2017-09-07
Attending: INTERNAL MEDICINE
Payer: OTHER GOVERNMENT

## 2017-09-07 VITALS
WEIGHT: 180 LBS | DIASTOLIC BLOOD PRESSURE: 60 MMHG | RESPIRATION RATE: 14 BRPM | HEIGHT: 68 IN | OXYGEN SATURATION: 97 % | TEMPERATURE: 98.6 F | BODY MASS INDEX: 27.28 KG/M2 | HEART RATE: 78 BPM | SYSTOLIC BLOOD PRESSURE: 99 MMHG

## 2017-09-07 DIAGNOSIS — C90.00 MULTIPLE MYELOMA NOT HAVING ACHIEVED REMISSION (HCC): ICD-10-CM

## 2017-09-07 PROCEDURE — 74011636320 HC RX REV CODE- 636/320: Performed by: RADIOLOGY

## 2017-09-07 PROCEDURE — 74011000250 HC RX REV CODE- 250: Performed by: RADIOLOGY

## 2017-09-07 PROCEDURE — C1892 INTRO/SHEATH,FIXED,PEEL-AWAY: HCPCS

## 2017-09-07 PROCEDURE — C1788 PORT, INDWELLING, IMP: HCPCS

## 2017-09-07 PROCEDURE — 74011250636 HC RX REV CODE- 250/636: Performed by: RADIOLOGY

## 2017-09-07 PROCEDURE — 77030031139 HC SUT VCRL2 J&J -A

## 2017-09-07 PROCEDURE — 36561 INSERT TUNNELED CV CATH: CPT

## 2017-09-07 PROCEDURE — 77030002996 HC SUT SLK J&J -A

## 2017-09-07 PROCEDURE — 77030010507 HC ADH SKN DERMBND J&J -B

## 2017-09-07 RX ORDER — MIDAZOLAM HYDROCHLORIDE 1 MG/ML
5 INJECTION, SOLUTION INTRAMUSCULAR; INTRAVENOUS
Status: DISCONTINUED | OUTPATIENT
Start: 2017-09-07 | End: 2017-09-07

## 2017-09-07 RX ORDER — HEPARIN 100 UNIT/ML
300 SYRINGE INTRAVENOUS ONCE
Status: COMPLETED | OUTPATIENT
Start: 2017-09-07 | End: 2017-09-07

## 2017-09-07 RX ORDER — LIDOCAINE HYDROCHLORIDE 20 MG/ML
20 INJECTION, SOLUTION INFILTRATION; PERINEURAL ONCE
Status: COMPLETED | OUTPATIENT
Start: 2017-09-07 | End: 2017-09-07

## 2017-09-07 RX ORDER — CEFAZOLIN SODIUM IN 0.9 % NACL 2 G/100 ML
2 PLASTIC BAG, INJECTION (ML) INTRAVENOUS ONCE
Status: COMPLETED | OUTPATIENT
Start: 2017-09-07 | End: 2017-09-07

## 2017-09-07 RX ORDER — LIDOCAINE HYDROCHLORIDE AND EPINEPHRINE 10; 10 MG/ML; UG/ML
1.5 INJECTION, SOLUTION INFILTRATION; PERINEURAL ONCE
Status: COMPLETED | OUTPATIENT
Start: 2017-09-07 | End: 2017-09-07

## 2017-09-07 RX ORDER — SODIUM CHLORIDE 9 MG/ML
25 INJECTION, SOLUTION INTRAVENOUS CONTINUOUS
Status: DISCONTINUED | OUTPATIENT
Start: 2017-09-07 | End: 2017-09-07

## 2017-09-07 RX ORDER — FENTANYL CITRATE 50 UG/ML
100 INJECTION, SOLUTION INTRAMUSCULAR; INTRAVENOUS
Status: DISCONTINUED | OUTPATIENT
Start: 2017-09-07 | End: 2017-09-07

## 2017-09-07 RX ORDER — HEPARIN SODIUM 200 [USP'U]/100ML
400 INJECTION, SOLUTION INTRAVENOUS ONCE
Status: COMPLETED | OUTPATIENT
Start: 2017-09-07 | End: 2017-09-07

## 2017-09-07 RX ADMIN — FENTANYL CITRATE 25 MCG: 50 INJECTION, SOLUTION INTRAMUSCULAR; INTRAVENOUS at 13:34

## 2017-09-07 RX ADMIN — MIDAZOLAM HYDROCHLORIDE 2 MG: 1 INJECTION INTRAMUSCULAR; INTRAVENOUS at 13:37

## 2017-09-07 RX ADMIN — LIDOCAINE HYDROCHLORIDE AND EPINEPHRINE 15 MG: 10; 10 INJECTION, SOLUTION INFILTRATION; PERINEURAL at 13:30

## 2017-09-07 RX ADMIN — LIDOCAINE HYDROCHLORIDE 400 MG: 20 INJECTION, SOLUTION INFILTRATION; PERINEURAL at 13:30

## 2017-09-07 RX ADMIN — MIDAZOLAM HYDROCHLORIDE 1 MG: 1 INJECTION INTRAMUSCULAR; INTRAVENOUS at 13:32

## 2017-09-07 RX ADMIN — CEFAZOLIN 2 G: 10 INJECTION, POWDER, FOR SOLUTION INTRAVENOUS; PARENTERAL at 11:34

## 2017-09-07 RX ADMIN — FENTANYL CITRATE 50 MCG: 50 INJECTION, SOLUTION INTRAMUSCULAR; INTRAVENOUS at 13:28

## 2017-09-07 RX ADMIN — FENTANYL CITRATE 25 MCG: 50 INJECTION, SOLUTION INTRAMUSCULAR; INTRAVENOUS at 13:38

## 2017-09-07 RX ADMIN — SODIUM CHLORIDE 25 ML/HR: 900 INJECTION, SOLUTION INTRAVENOUS at 11:34

## 2017-09-07 RX ADMIN — HEPARIN SODIUM IN SODIUM CHLORIDE 800 UNITS: 200 INJECTION INTRAVENOUS at 13:30

## 2017-09-07 RX ADMIN — SODIUM CHLORIDE, PRESERVATIVE FREE 300 UNITS: 5 INJECTION INTRAVENOUS at 13:30

## 2017-09-07 RX ADMIN — IOPAMIDOL 15 ML: 755 INJECTION, SOLUTION INTRAVENOUS at 13:30

## 2017-09-07 RX ADMIN — MIDAZOLAM HYDROCHLORIDE 2 MG: 1 INJECTION INTRAMUSCULAR; INTRAVENOUS at 13:27

## 2017-09-07 NOTE — IP AVS SNAPSHOT
Summary of Care Report The Summary of Care report has been created to help improve care coordination. Users with access to [employer] or [employer] (Web-based application) may access additional patient information including the Discharge Summary. If you are not currently a [employer] user and need more information, please call the number listed below in the [place] [address] section and ask to be connected with Medical Records. Facility Information Name Address Phone [address] [address] [phone] Patient Information Patient Name Sex  Kana Dorman ([id number]) Male 1955 Discharge Information Admitting Provider Service Area Unit  
 (none) [address] [place] [address] Brooke Ramirez / [phone] Discharge Provider Discharge Date/Time Discharge Disposition Destination (none) (none) (none) (none) Patient Language Language ENGLISH [13] Hospital Problems as of 2017  Reviewed: 2017 11:02 AM by Karoline Giles MD  
 None Non-Hospital Problems as of 2017  Reviewed: 2017 11:02 AM by Karoline Giles MD  
  
  
  
 Class Noted - Resolved Last Modified Active Problems Symptomatic PVCs (Chronic)  2011 - Present 2011 Entered by Sancho Bonner MD  
  Hypertension (Chronic)  2011 - Present 2011 Entered by Sancho Bonner MD  
  Diabetes [facility]) (Chronic)  2011 - Present 2011   Entered by Sancho Bonner MD  
  Depression  3/27/2014 - Present 3/27/2014 by Albina Cuevas MD  
  Entered by Albina Cuevas MD  
  Chronic hepatitis C ([facility] [address]  3/30/2014 - Present 3/30/2014 by Albina Cuevas MD  
  Entered by Albina Cuevas MD  
  Overview Signed 3/30/2014  8:19 AM by Albina Cuevas MD  
 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 Anemia  4/3/2017 - Present 4/3/2017 by Lady Singer MD  
  Entered by Lady Singer MD  
  Multiple myeloma ([facility] [address]  5/19/2017 - Present 8/21/2017 by Preet Hawkins NP Entered by Yudith Coronado NP Anemia associated with chemotherapy  8/23/2017 - Present 8/23/2017 by Joey Allen MD  
  Entered by Joey Allen MD  
  Renal insufficiency  8/23/2017 - Present 8/23/2017 by Joey Allen MD  
  Entered by Joey Allen MD  
  
You are allergic to the following Allergen Reactions Mercury (Bulk) Hives Blisters Current Discharge Medication List  
  
ASK your doctor about these medications Dose & Instructions Dispensing Information Comments ABILIFY 5 mg tablet Generic drug:  ARIPiprazole Dose:  2.5 mg Take 2.5 mg by mouth every morning. Refills:  0 AMBIEN CR 12.5 mg tablet Generic drug:  zolpidem CR Dose:  12.5 mg Take 12.5 mg by mouth nightly as needed. Refills:  0  
   
 amLODIPine 10 mg tablet Commonly known as:  Harlon Doyne Dose:  10 mg Take 10 mg by mouth daily. Refills:  0 BD INSULIN PEN NEEDLE UF MINI 31 gauge x 3/16\" Ndle Generic drug:  Insulin Needles (Disposable) USE AS DIRECTED Refills:  2  
   
 buPROPion  mg tablet Commonly known as:  Jeraline Arnol Dose:  150 mg Take 1 Tab by mouth daily (with dinner). Quantity:  30 Tab Refills:  11  
   
 CIALIS PO Dose:  20 mg Take 20 mg by mouth as needed. Refills:  0  
   
 fluconazole 200 mg tablet Commonly known as:  DIFLUCAN Dose:  400 mg Take 2 Tabs by mouth daily for 30 days. FDA advises cautious prescribing of oral fluconazole in pregnancy. Quantity:  60 Tab Refills:  0  
   
 furosemide 20 mg tablet Commonly known as:  LASIX TAKE 1 TABLET BY MOUTH DAILY Quantity:  90 Tab Refills:  0  
 **Patient requests 90 days supply**  
  
 insulin glargine 100 unit/mL (3 mL) Inpn Commonly known as:  LANTUSBASAGLAR  
 10 units daily at bedtime Quantity:  15 mL Refills:  1  
   
 insulin lispro 100 unit/mL kwikpen Commonly known as:  HUMALOG Dose:  3 Units 3 Units by SubCUTAneous route Before breakfast, lunch, and dinner. Plus correction sliding scale as needed Refills:  0  
   
 levoFLOXacin 750 mg tablet Commonly known as:  Nona Beards Dose:  750 mg Take 1 Tab by mouth daily for 30 days. Quantity:  30 Tab Refills:  0  
   
 losartan 100 mg tablet Commonly known as:  COZAAR TK 1 T PO QD Refills:  4 NUCYNTA 100 mg tablet Generic drug:  tapentadol Dose:  100 mg Take 100 mg by mouth three (3) times daily. Refills:  0  
   
 ONETOUCH ULTRA TEST strip Generic drug:  glucose blood VI test strips USE TO TEST BLOOD SUGAR TID BEFORE EACH MEAL UTD. Refills:  4  
   
 prochlorperazine 10 mg tablet Commonly known as:  COMPAZINE  
 TAKE 1 TABLET BY MOUTH EVERY 6 HOURS AS NEEDED FOR UP TO 7 DAYS. Quantity:  385 Tab Refills:  0  
 **Patient requests 90 days supply** RYTHMOL 150 mg tablet Generic drug:  propafenone Dose:  150 mg Take 150 mg by mouth every eight (8) hours. Refills:  0  
   
 tamsulosin 0.4 mg capsule Commonly known as:  FLOMAX Dose:  0.4 mg Take 1 Cap by mouth daily. Quantity:  30 Cap Refills:  1  
   
 trimethoprim-sulfamethoxazole 160-800 mg per tablet Commonly known as:  BACTRIM DS, SEPTRA DS Dose:  1 Tab Take 1 Tab by mouth daily for 30 days. Quantity:  30 Tab Refills:  0  
   
 valACYclovir 1 gram tablet Commonly known as:  VALTREX Dose:  1000 mg Take 1 Tab by mouth daily. Quantity:  90 Tab Refills:  3 Current Immunizations Name Date Influenza Vaccine Split 11/14/2010 ZZZ-RETIRED (DO NOT USE) Pneumococcal Vaccine (Unspecified Type) 7/14/2011 Follow-up Information None Discharge Instructions [facility] Special Procedures/Angiography Department Radiologist:   MD Richard Guadarrama   
 
Date:   September 7, 2017 [facility] Discharge Instructions Watch for signs of infection: 1. Redness,  
2. Fever, chills,  
3. Increased pain, and/or drainage from the site. If this occurs, call your physician at once. Return next week for a [employer] check: Do not sign in. Go to the podium, and ask them to call our department [phone] Please try to come between 9:00AM and 1:00PM 
 
Keep your dressing clean and dry. Leave the dressing in place until seen here next week. The dressing may be changed in your physicians office. Continue your previous diet and follow the medication reconciliation list. 
 
You may take Tylenol, as directed on the label, for pain. Avoid ibuprofen (Advil, Motrin) and aspirin as they may cause you to bleed. Because you received sedation, you are not to drive or sign any legal documents for the next 24 hours. Do not lift anything heavier than 5 pounds with the affected arm for the next week. If you have any questions or concerns, please call [phone] and ask for the nurse on-call Chart Review Routing History Recipient Method Report Sent By Macarena Mejia MD [[id number]] 2/2/2012  4:32 AM 02/02/2012 Priscila Palacios MD  
Phone: [phone] In Basket Notes/Transcriptions Neverfail [place], [place] [[id number]] 0/93/6580 10:16 AM 09/12/2012 Celia Gaines MD  
Phone: [phone] In Basket IP Auto Routed Beau Brooks MD [[id number]] 9/25/2012  6:51 AM 09/25/2012 Rajni Brandt MD  
Fax: [phone] Phone: [phone] Fax [facility] IP MD NOTES AUTO ROUTING REPORT Logan Linares MD [[id number]] 4/3/2017  7:57 AM 04/03/2017 Logan Linares MD  
Phone: [phone] In Basket IP Auto Routed [employer] IIIMD Bella Economy 4/3/2017 12:40 PM 04/03/2017 Renald Phalen, MD  
Fax: [phone] Phone: [phone] Fax [facility] IP MD NOTES AUTO ROUTING REPORT Jasiel Leblanc MD [[id number]] 4/8/2017  1:06 PM 04/08/2017 Jeff Solis MD  
Fax: [phone] Phone: [phone] Fax [facility] IP MD NOTES AUTO ROUTING REPORT MD Artur Babcock 5/10/2017  4:25 PM 05/10/2017 Zack Charles MD  
Fax: [phone] Phone: [phone] Fax Notes Report Margarita Pereyra RN [[id number]] 5/15/2017 11:55 AM 5/11/2017 Notes Report Margarita Pereyra RN [[id number]] 5/15/2017 11:55 AM 5/10/2017 Notes Report Margarita Pereyra RN [[id number]] 5/15/2017 11:55 AM 5/10/2017 Notes Report Margarita Pereyra RN [[id number]] 5/15/2017 11:55 AM 5/10/2017 Notes Report Margarita Pereyra RN [[id number]] 5/15/2017 11:55 AM 5/5/2017 Notes Report Margarita Pereyra RN [[id number]] 5/15/2017 11:55 AM 5/7/2017 Flor Ryan MD  
Phone: [phone] In Basket Notes Report Zack Charles MD [[id number]] 6/10/2017  1:44 PM 6/10/2017 Flor yRan MD  
Phone: [phone] In Basket Notes Report Zack Charles MD [[id number]] 6/30/2017  3:19 PM 6/30/2017 Jeff Solis MD  
Fax: [phone] Phone: [phone] Fax Notes Report Flor Ryan MD [[id number]] 7/10/2017 11:01 AM 7/10/2017 Zack Charles MD  
Phone: [phone] In Basket Notes Report Flor Ryan MD [[id number]] 7/10/2017 11:01 AM 7/10/2017 Zack Charles MD  
Fax: [phone] Phone: [phone] Fax Notes Report Kenia Zarate RN [[id number]] 8/9/2017  1:23 PM 8/4/2017

## 2017-09-07 NOTE — DISCHARGE INSTRUCTIONS
[address] [facility]  Special Procedures/Angiography Department      Radiologist:   MD Cynthia Macedo      Date:   September 7, 2017      [facility]      Watch for signs of infection:    1. Redness,   2. Fever, chills,   3. Increased pain, and/or drainage from the site. If this occurs, call your physician at once. Return next week for a [employer] Check check:     Do not sign in. Go to the podium, and ask them to call our department [phone]). Please try to come between 9:00AM and 1:00PM    Keep your dressing clean and dry. Leave the dressing in place until seen here next week. The dressing may be changed in your physicians office. Continue your previous diet and follow the medication reconciliation list.    You may take Tylenol, as directed on the label, for pain. Avoid ibuprofen (Advil, Motrin) and aspirin as they may cause you to bleed. Because you received sedation, you are not to drive or sign any legal documents for the next 24 hours. Do not lift anything heavier than 5 pounds with the affected arm for the next week.     If you have any questions or concerns, please call [phone] and ask for the nurse on-call

## 2017-09-07 NOTE — PROCEDURES
PROCEDURE:Port placement. INDICATION:myeloma. ANESTHESIA:sedation and local.  COMPLICATION:NONE. SPECIMENS REMOVED:none. BLOOD LOSS:NONE. /ASSISTANT:MASTER Samson RECOMMENDATIONS:may use. CONSENT OBTAINED:YES.  NOTES:none.

## 2017-09-07 NOTE — H&P
Radiology History and Physical    Patient: Amelia Galvan Sr. 63 y.o. male     Chief Complaint: No chief complaint on file. History of Present Illness: Myeloma. History:    Past Medical History:   Diagnosis Date    Acid reflux     Arrhythmia     pvc's    Chronic pain     neck    Depression     Diabetes ([facility] [address]     Femoral hernia 7/11/2012    Hepatitis C     Hypertension     Inguinal hernia 7/11/2012    Liver disease     hepatitis c    Other ill-defined conditions     Hx of PVCs since 2009    Prostatitis, acute     Psychiatric disorder     depression     Family History   Problem Relation Age of Onset    Arthritis-osteo Mother     Arthritis-osteo Father     Diabetes Father     Hypertension Maternal Grandmother     Diabetes Maternal Grandmother     Hypertension Maternal Grandfather     Diabetes Maternal Grandfather      Social History     Social History    Marital status:      Spouse name: N/A    Number of children: N/A    Years of education: N/A     Occupational History    Not on file. Social History Main Topics    Smoking status: Former Smoker     Packs/day: 1.00     Years: 12.00     Types: Cigarettes     Quit date: 1/1/1989    Smokeless tobacco: Never Used      Comment: former cigarette smoker    Alcohol use No      Comment: former alcoholic-quit [id number]    Drug use: No    Sexual activity: Not on file     Other Topics Concern    Not on file     Social History Narrative       Allergies: Allergies   Allergen Reactions    Mercury (Bulk) Hives     Blisters         Current Medications:  Current Outpatient Prescriptions   Medication Sig    prochlorperazine (COMPAZINE) 10 mg tablet TAKE 1 TABLET BY MOUTH EVERY 6 HOURS AS NEEDED FOR UP TO 7 DAYS.  fluconazole (DIFLUCAN) 200 mg tablet Take 2 Tabs by mouth daily for 30 days. FDA advises cautious prescribing of oral fluconazole in pregnancy.  levoFLOXacin (LEVAQUIN) 750 mg tablet Take 1 Tab by mouth daily for 30 days.  trimethoprim-sulfamethoxazole (BACTRIM DS, SEPTRA DS) 160-800 mg per tablet Take 1 Tab by mouth daily for 30 days.  furosemide (LASIX) 20 mg tablet TAKE 1 TABLET BY MOUTH DAILY    ONETOUCH ULTRA TEST strip USE TO TEST BLOOD SUGAR TID BEFORE EACH MEAL UTD.  losartan (COZAAR) 100 mg tablet TK 1 T PO QD    BD INSULIN PEN NEEDLE UF MINI 31 gauge x 3/16\" ndle USE AS DIRECTED    insulin lispro (HUMALOG) 100 unit/mL kwikpen 3 Units by SubCUTAneous route Before breakfast, lunch, and dinner. Plus correction sliding scale as needed    insulin glargine (LANTUS,BASAGLAR) 100 unit/mL (3 mL) inpn 10 units daily at bedtime    valACYclovir (VALTREX) 1 gram tablet Take 1 Tab by mouth daily.  tamsulosin (FLOMAX) 0.4 mg capsule Take 1 Cap by mouth daily.  propafenone (RYTHMOL) 150 mg tablet Take 150 mg by mouth every eight (8) hours.  zolpidem CR (AMBIEN CR) 12.5 mg tablet Take 12.5 mg by mouth nightly as needed.  TADALAFIL (CIALIS PO) Take 20 mg by mouth as needed.  amLODIPine (NORVASC) 10 mg tablet Take 10 mg by mouth daily.  tapentadol (NUCYNTA) 100 mg tablet Take 100 mg by mouth three (3) times daily.  aripiprazole (ABILIFY) 5 mg tablet Take 2.5 mg by mouth every morning.  buPROPion XL (WELLBUTRIN XL) 150 mg tablet Take 1 Tab by mouth daily (with dinner). No current facility-administered medications for this encounter. Physical Exam:  Blood pressure 93/60, pulse 80, temperature 98.6 °F (37 °C), resp. rate 16, height 5' 8\" (1.727 m), weight 81.6 kg (180 lb), SpO2 97 %. GENERAL: alert, cooperative, no distress, appears stated age, LUNG: clear to auscultation bilaterally, HEART: regular rate and rhythm      Alerts:    Hospital Problems  Date Reviewed: 8/18/2017    None          Laboratory:    No results for input(s): HGB, HCT, WBC, PLT, INR, BUN, CREA, K, CRCLT, HGBEXT, HCTEXT, PLTEXT in the last 72 hours.     No lab exists for component: PTT, PT, INREXT      Plan of Care/Planned Procedure:  Risks, benefits, and alternatives reviewed with patient and he agrees to proceed with the procedure.

## 2017-09-14 ENCOUNTER — HOSPITAL ENCOUNTER (OUTPATIENT)
Dept: LAB | Age: 62
Discharge: HOME | End: 2017-09-14

## 2017-09-14 ENCOUNTER — OFFICE VISIT (OUTPATIENT)
Dept: ONCOLOGY | Age: 62
End: 2017-09-14

## 2017-09-14 VITALS
TEMPERATURE: 96.9 F | HEIGHT: 68 IN | WEIGHT: 181 LBS | RESPIRATION RATE: 18 BRPM | SYSTOLIC BLOOD PRESSURE: 109 MMHG | HEART RATE: 85 BPM | OXYGEN SATURATION: 96 % | BODY MASS INDEX: 27.43 KG/M2 | DIASTOLIC BLOOD PRESSURE: 75 MMHG

## 2017-09-14 DIAGNOSIS — D64.81 ANEMIA ASSOCIATED WITH CHEMOTHERAPY: ICD-10-CM

## 2017-09-14 DIAGNOSIS — C90.00 MULTIPLE MYELOMA NOT HAVING ACHIEVED REMISSION (HCC): Primary | ICD-10-CM

## 2017-09-14 DIAGNOSIS — N28.9 RENAL INSUFFICIENCY: ICD-10-CM

## 2017-09-14 DIAGNOSIS — T45.1X5A ANEMIA ASSOCIATED WITH CHEMOTHERAPY: ICD-10-CM

## 2017-09-14 PROCEDURE — 99001 SPECIMEN HANDLING PT-LAB: CPT | Performed by: INTERNAL MEDICINE

## 2017-09-14 NOTE — PROGRESS NOTES
Progress Note        Patient: Sathya Merrill Sr. MRN: [id number]  SSN: [id number]    YOB: 1955  Age: 63 y.o. Sex: male        Subjective:      Sathya Merrill Sr. is a 63 y.o. male with a diagnosis of IgA kappa myeloma. Bone marrow shows 100% aberrant plasma cells. He suffers with DM but it is diet controlled. He has received systemic anti-viral treatment for chronic Hep C with successful eradication of the virus. Mr. Orlin Watt received 4 cycles of systemic therapy with RVd. He had an initial good response to treatment. However his paraprotein level started rising and the myeloma became refractory to treatment. I then administered one cycle of VD-PACE in the hospital. He is recovering from the side effects of this treatment. He comes in to discuss the next steps.          Review of Systems:    Constitutional: fatigue  Eyes: negative  Ears, Nose, Mouth, Throat, and Face: negative  Respiratory: negative  Cardiovascular: negative  Gastrointestinal: negative  Genitourinary:negative  Integument/Breast: negative  Hematologic/Lymphatic: negative  Musculoskeletal:bilateral lower extremity edema  Neurological: negative      Past Medical History:   Diagnosis Date    Acid reflux     Arrhythmia     pvc's    Chronic pain     neck    Depression     Diabetes ([address]     Femoral hernia 7/11/2012    Hepatitis C     Hypertension     Inguinal hernia 7/11/2012    Liver disease     hepatitis c    Other ill-defined conditions     Hx of PVCs since 2009    Prostatitis, acute     Psychiatric disorder     depression     Past Surgical History:   Procedure Laterality Date    COLONOSCOPY N/A 9/20/2016    COLONOSCOPY [facility] Bubba [facility]ez [facility]COPY    HX APPENDECTOMY      HX CERVICAL FUSION  2008    x 1 as of 4/30/2014    HX HEENT      foreign body removed from right eye    HX [facility]PA[facility]  [facility]      HX ORTHOPAEDIC      cervical fusion    HX OTHER SURGICAL  2000 liver bx - chronic hepatitis      Family History   Problem Relation Age of Onset    Arthritis-osteo Mother     Arthritis-osteo Father     Diabetes Father     Hypertension Maternal Grandmother     Diabetes Maternal Grandmother     Hypertension Maternal Grandfather     Diabetes Maternal Grandfather      Social History   Substance Use Topics    Smoking status: Former Smoker     Packs/day: 1.00     Years: 12.00     Types: Cigarettes     Quit date: 1/1/1989    Smokeless tobacco: Never Used      Comment: former cigarette smoker    Alcohol use No      Comment: former alcoholic-quit [id number]      Prior to Admission medications    Medication Sig Start Date End Date Taking? Authorizing Provider   prochlorperazine (COMPAZINE) 10 mg tablet TAKE 1 TABLET BY MOUTH EVERY 6 HOURS AS NEEDED FOR UP TO 7 DAYS. 8/27/17  Yes Rajan Maldonado DO   levoFLOXacin (LEVAQUIN) 750 mg tablet Take 1 Tab by mouth daily for 30 days. 8/25/17 9/24/17 Yes Macie Long NP   trimethoprim-sulfamethoxazole (BACTRIM DS, SEPTRA DS) 160-800 mg per tablet Take 1 Tab by mouth daily for 30 days. 8/25/17 9/24/17 Yes Macie Long NP   furosemide (LASIX) 20 mg tablet TAKE 1 TABLET BY MOUTH DAILY 8/4/17  Yes Lesa Long NP   ONETOUCH ULTRA TEST strip USE TO TEST BLOOD SUGAR TID BEFORE EACH MEAL UTD. 7/3/17  Yes Historical Provider   BD INSULIN PEN NEEDLE UF MINI 31 gauge x 3/16\" ndle USE AS DIRECTED 6/7/17  Yes Historical Provider   insulin lispro (HUMALOG) 100 unit/mL kwikpen 3 Units by SubCUTAneous route Before breakfast, lunch, and dinner. Plus correction sliding scale as needed   Yes Historical Provider   insulin glargine (LANTUS,BASAGLAR) 100 unit/mL (3 mL) inpn 10 units daily at bedtime 6/9/17  Yes Macie Long NP   valACYclovir (VALTREX) 1 gram tablet Take 1 Tab by mouth daily. 6/6/17  Yes Elliott Paige MD   tamsulosin (FLOMAX) 0.4 mg capsule Take 1 Cap by mouth daily.  4/4/17  Yes Misty Lea MD   propafenone (RYTHMOL) 150 mg tablet Take 150 mg by mouth every eight (8) hours. Yes Historical Provider   zolpidem CR (AMBIEN CR) 12.5 mg tablet Take 12.5 mg by mouth nightly as needed. Yes Historical Provider   TADALAFIL (CIALIS PO) Take 20 mg by mouth as needed. Yes Historical Provider   amLODIPine (NORVASC) 10 mg tablet Take 10 mg by mouth daily. Yes Historical Provider   tapentadol (NUCYNTA) 100 mg tablet Take 100 mg by mouth three (3) times daily. Yes Goldy Diaz MD   aripiprazole (ABILIFY) 5 mg tablet Take 2.5 mg by mouth every morning. Yes Goldy Diaz MD   buPROPion XL (WELLBUTRIN XL) 150 mg tablet Take 1 Tab by mouth daily (with dinner). 4/25/10  Yes Neto Yi MD   fluconazole (DIFLUCAN) 200 mg tablet Take 2 Tabs by mouth daily for 30 days. FDA advises cautious prescribing of oral fluconazole in pregnancy. 17  Daija Li NP   losartan (COZAAR) 100 mg tablet TK 1 T PO QD 7/3/17   Historical Provider              Allergies   Allergen Reactions    Mercury (Bulk) Hives     Blisters             Objective:     Vitals:    17 1046   BP: 109/75   Pulse: 85   Resp: 18   Temp: 96.9 °F (36.1 °C)   SpO2: 96%   Weight: 181 lb (82.1 kg)   Height: 5' 8\" (1.727 m)          Physical Exam:    GENERAL: alert, cooperative  EYE: negative  LYMPHATIC: Cervical, supraclavicular, and axillary nodes normal.   THROAT & NECK: normal and no erythema or exudates noted. LUNG: clear to auscultation bilaterally  HEART: regular rate and rhythm  ABDOMEN: soft, non-tender  EXTREMITIES: bilateral LE edema  SKIN: Normal.  NEUROLOGIC: negative      Lab Results   Component Value Date/Time    WBC 1.4 2017 06:20 AM    HGB 8.7 2017 06:20 AM    HCT 26.8 2017 06:20 AM    PLATELET 64 10/51/0410 06:20 AM    MCV 89.3 2017 06:20 AM       M-spike: 5.8 => 2.9 => 1.9 => 2.1 => 2.8, IgA kappa    Serum F => 496 => 246 => 305 => 314       Assessment:     1.  Multiple Myeloma, IgA Kappa   Germaine Grew stage IIIB    Cytogenetics/FISH: t(14;16) - high risk  ECOG PS - 0   Intent of therapy: palliative    Receiving systemic therapy with RVd - s/p 3 cycles  Dose reduced Revlimid and Velcade d/t continued cytopenias. M-spike now rising. This is primary refractory myeloma. Carries poor prognosis  He has received one cycle of VD-PACE (bortezomib, dexamethasone, thalidomide, cisplatin, adriamycin, cyclophosphamide, and etoposide - will omit thalidomide). Had mouth sores and fatigue. Recovering from the side effects  We discussed the next steps including, auto and allotransplant. He has an appointment to see [place]. Eliane Perez [facility].       2. Anemia. Myeloma and chemotherapy related    Observation      3. Renal insufficiency    Observation        Plan:       > Labs today  > Connect with other transplant recipients   > Appointment with [place]  > Follow up with [facility] regarding transplant      I saw the patient in conjunction with LUBNA Baum      Signed by: Chiquis Reyes MD                     September 15, 2017        CC. Lucia Nunn MD  CC.  Nimesh Thomas MD

## 2017-09-14 NOTE — PROGRESS NOTES
Pt is here for a scheduled follow up for Multiple Myeloma, Pt reports feeling ok today, just tired, had mouth sores last week but healed now, Pt denies N/V/D, eating ok. Pt is here with wife and son to talk about further treatment. Pt recently had port inserted, area is still sore.

## 2017-09-19 LAB
ALBUMIN MFR UR ELPH: 29.4 %
ALBUMIN SERPL ELPH-MCNC: 3.8 G/DL (ref 2.9–4.4)
ALBUMIN SERPL-MCNC: 3.9 G/DL (ref 3.5–5.5)
ALBUMIN/GLOB SERPL: 0.9 {RATIO} (ref 0.7–1.7)
ALBUMIN/GLOB SERPL: 1 {RATIO} (ref 1.2–2.2)
ALP SERPL-CCNC: 115 IU/L (ref 39–117)
ALPHA1 GLOB MFR UR ELPH: 4.5 %
ALPHA1 GLOB SERPL ELPH-MCNC: 0.3 G/DL (ref 0–0.4)
ALPHA2 GLOB MFR UR ELPH: 21.8 %
ALPHA2 GLOB SERPL ELPH-MCNC: 1 G/DL (ref 0.4–1)
ALT SERPL-CCNC: 32 IU/L (ref 0–44)
AST SERPL-CCNC: 23 IU/L (ref 0–40)
B-GLOBULIN MFR UR ELPH: 23.3 %
B-GLOBULIN SERPL ELPH-MCNC: 0.9 G/DL (ref 0.7–1.3)
B2 MICROGLOB SERPL-MCNC: 3.1 MG/L (ref 0.6–2.4)
BASOPHILS # BLD AUTO: 0 X10E3/UL (ref 0–0.2)
BASOPHILS NFR BLD AUTO: 0 %
BILIRUB SERPL-MCNC: <0.2 MG/DL (ref 0–1.2)
BUN SERPL-MCNC: 18 MG/DL (ref 6–24)
BUN/CREAT SERPL: 12 (ref 9–20)
CALCIUM SERPL-MCNC: 8.7 MG/DL (ref 8.7–10.2)
CHLORIDE SERPL-SCNC: 99 MMOL/L (ref 96–106)
CO2 SERPL-SCNC: 22 MMOL/L (ref 18–29)
CREAT SERPL-MCNC: 1.54 MG/DL (ref 0.76–1.27)
EOSINOPHIL # BLD AUTO: 0 X10E3/UL (ref 0–0.4)
EOSINOPHIL NFR BLD AUTO: 0 %
ERYTHROCYTE [DISTWIDTH] IN BLOOD BY AUTOMATED COUNT: 18.5 % (ref 12.3–15.4)
GAMMA GLOB MFR UR ELPH: 21.1 %
GAMMA GLOB SERPL ELPH-MCNC: 1.9 G/DL (ref 0.4–1.8)
GLOBULIN SER CALC-MCNC: 3.9 G/DL (ref 1.5–4.5)
GLOBULIN SER CALC-MCNC: 4 G/DL (ref 2.2–3.9)
GLUCOSE SERPL-MCNC: 186 MG/DL (ref 65–99)
HCT VFR BLD AUTO: 27.4 % (ref 37.5–51)
HGB BLD-MCNC: 8.6 G/DL (ref 12.6–17.7)
IGA SERPL-MCNC: 3815 MG/DL (ref 90–386)
IGG SERPL-MCNC: 221 MG/DL (ref 700–1600)
IGM SERPL-MCNC: <5 MG/DL (ref 20–172)
IMM GRANULOCYTES # BLD: 0 X10E3/UL (ref 0–0.1)
IMM GRANULOCYTES NFR BLD: 0 %
KAPPA LC FREE SER-MCNC: 442.1 MG/L (ref 3.3–19.4)
KAPPA LC FREE/LAMBDA FREE SER: 200.95 {RATIO} (ref 0.26–1.65)
LAMBDA LC FREE SERPL-MCNC: 2.2 MG/L (ref 5.7–26.3)
LYMPHOCYTES # BLD AUTO: 1.4 X10E3/UL (ref 0.7–3.1)
LYMPHOCYTES NFR BLD AUTO: 13 %
M PROTEIN MFR UR ELPH: 6.5 %
M PROTEIN SERPL ELPH-MCNC: 1.4 G/DL
MCH RBC QN AUTO: 28.1 PG (ref 26.6–33)
MCHC RBC AUTO-ENTMCNC: 31.4 G/DL (ref 31.5–35.7)
MCV RBC AUTO: 90 FL (ref 79–97)
MONOCYTES # BLD AUTO: 0.4 X10E3/UL (ref 0.1–0.9)
MONOCYTES NFR BLD AUTO: 4 %
NEUTROPHILS # BLD AUTO: 8.9 X10E3/UL (ref 1.4–7)
NEUTROPHILS NFR BLD AUTO: 83 %
PLATELET # BLD AUTO: 239 X10E3/UL (ref 150–379)
PLEASE NOTE, 011150: ABNORMAL
PLEASE NOTE:, 133800: ABNORMAL
POTASSIUM SERPL-SCNC: 4.9 MMOL/L (ref 3.5–5.2)
PROT PATTERN SERPL IFE-IMP: ABNORMAL
PROT SERPL-MCNC: 7.8 G/DL (ref 6–8.5)
PROT UR-MCNC: 12 MG/DL
RBC # BLD AUTO: 3.06 X10E6/UL (ref 4.14–5.8)
SODIUM SERPL-SCNC: 138 MMOL/L (ref 134–144)
WBC # BLD AUTO: 10.8 X10E3/UL (ref 3.4–10.8)

## 2017-09-19 NOTE — PROGRESS NOTES
M-protein is knocked down by half with once cycle of intensive therapy. Lets see what transplanters say?

## 2017-09-22 DIAGNOSIS — C90.00 MULTIPLE MYELOMA NOT HAVING ACHIEVED REMISSION (HCC): Primary | ICD-10-CM

## 2017-09-22 RX ORDER — LIDOCAINE AND PRILOCAINE 25; 25 MG/G; MG/G
CREAM TOPICAL AS NEEDED
Qty: 30 G | Refills: 0 | Status: SHIPPED | OUTPATIENT
Start: 2017-09-22 | End: 2018-02-02 | Stop reason: SDUPTHER

## 2017-09-22 RX ORDER — LIDOCAINE AND PRILOCAINE 25; 25 MG/G; MG/G
CREAM TOPICAL AS NEEDED
Qty: 30 G | Refills: 0 | Status: SHIPPED | OUTPATIENT
Start: 2017-09-22 | End: 2017-09-22 | Stop reason: SDUPTHER

## 2017-09-28 ENCOUNTER — OFFICE VISIT (OUTPATIENT)
Dept: ONCOLOGY | Age: 62
End: 2017-09-28

## 2017-09-28 VITALS
RESPIRATION RATE: 16 BRPM | HEIGHT: 68 IN | BODY MASS INDEX: 28.07 KG/M2 | TEMPERATURE: 96.5 F | OXYGEN SATURATION: 97 % | DIASTOLIC BLOOD PRESSURE: 81 MMHG | HEART RATE: 90 BPM | SYSTOLIC BLOOD PRESSURE: 115 MMHG | WEIGHT: 185.2 LBS

## 2017-09-28 DIAGNOSIS — N28.9 RENAL INSUFFICIENCY: ICD-10-CM

## 2017-09-28 DIAGNOSIS — T45.1X5A ANEMIA ASSOCIATED WITH CHEMOTHERAPY: ICD-10-CM

## 2017-09-28 DIAGNOSIS — D64.81 ANEMIA ASSOCIATED WITH CHEMOTHERAPY: ICD-10-CM

## 2017-09-28 DIAGNOSIS — C90.00 MULTIPLE MYELOMA NOT HAVING ACHIEVED REMISSION (HCC): Primary | ICD-10-CM

## 2017-09-28 RX ORDER — ZOLPIDEM TARTRATE 3.5 MG/1
TABLET SUBLINGUAL
Refills: 5 | COMMUNITY
Start: 2017-07-28 | End: 2018-11-01

## 2017-09-28 RX ORDER — SILDENAFIL CITRATE 20 MG/1
TABLET ORAL
Refills: 3 | COMMUNITY
Start: 2017-09-11 | End: 2018-11-01

## 2017-09-28 NOTE — PROGRESS NOTES
Progress Note        Patient: Kit Nix Sr. MRN: [id number]  SSN: [id number]    YOB: 1955  Age: 63 y.o. Sex: male        Subjective:      Kit Nix Sr. is a 63 y.o. male with a diagnosis of IgA kappa myeloma. Bone marrow shows 100% aberrant plasma cells. He suffers with DM but it is diet controlled. He has received systemic anti-viral treatment for chronic Hep C with successful eradication of the virus. Mr. Denia Navarro received 4 cycles of systemic therapy with RVd. He had an initial good response to treatment. However his paraprotein level started rising and the myeloma became refractory to treatment. I then administered one cycle of VD-PACE in the hospital. He has achieved NH from therapy. He has seen transplant team at [facility]. He comes in to discuss the next steps.            Review of Systems:    Constitutional: fatigue  Eyes: negative  Ears, Nose, Mouth, Throat, and Face: negative  Respiratory: negative  Cardiovascular: negative  Gastrointestinal: negative  Genitourinary:negative  Integument/Breast: negative  Hematologic/Lymphatic: negative  Musculoskeletal:bilateral lower extremity edema  Neurological: negative      Past Medical History:   Diagnosis Date    Acid reflux     Arrhythmia     pvc's    Chronic pain     neck    Depression     Diabetes ([address]     Femoral hernia 7/11/2012    Hepatitis C     Hypertension     Inguinal hernia 7/11/2012    Liver disease     hepatitis c    Other ill-defined conditions     Hx of PVCs since 2009    Prostatitis, acute     Psychiatric disorder     depression     Past Surgical History:   Procedure Laterality Date    COLONOSCOPY N/A 9/20/2016    [facility] at [facility]OSCOPY    HX APPENDECTOMY      HX CERVICAL FUSION  2008    x 1 as of 4/30/2014    HX HEENT      foreign body removed from right eye    H[facility]PAIR  [facility]      HX ORTHOPAEDIC      cervical fusion    HX OTHER SURGICAL  2000    liver bx - chronic hepatitis      Family History   Problem Relation Age of Onset    Arthritis-osteo Mother     Arthritis-osteo Father     Diabetes Father     Hypertension Maternal Grandmother     Diabetes Maternal Grandmother     Hypertension Maternal Grandfather     Diabetes Maternal Grandfather      Social History   Substance Use Topics    Smoking status: Former Smoker     Packs/day: 1.00     Years: 12.00     Types: Cigarettes     Quit date: 1/1/1989    Smokeless tobacco: Never Used      Comment: former cigarette smoker    Alcohol use No      Comment: former alcoholic-quit [id number]      Prior to Admission medications    Medication Sig Start Date End Date Taking? Authorizing Provider   sildenafil (REVATIO) 20 mg tablet TK 5 TS PO QD PRN 9/11/17  Yes Historical Provider   zolpidem 3.5 mg subl TAKE 1 TABLET AT BEDTIME 7/28/17  Yes Historical Provider   lidocaine-prilocaine (EMLA) topical cream Apply  to affected area as needed for Pain. 9/22/17  Yes Macie Long NP   prochlorperazine (COMPAZINE) 10 mg tablet TAKE 1 TABLET BY MOUTH EVERY 6 HOURS AS NEEDED FOR UP TO 7 DAYS. 8/27/17  Yes Lila Yates DO   furosemide (LASIX) 20 mg tablet TAKE 1 TABLET BY MOUTH DAILY 8/4/17  Yes Mague Enciso NP   ONETOUCH ULTRA TEST strip USE TO TEST BLOOD SUGAR TID BEFORE EACH MEAL UTD. 7/3/17  Yes Historical Provider   BD INSULIN PEN NEEDLE UF MINI 31 gauge x 3/16\" ndle USE AS DIRECTED 6/7/17  Yes Historical Provider   insulin lispro (HUMALOG) 100 unit/mL kwikpen 3 Units by SubCUTAneous route Before breakfast, lunch, and dinner. Plus correction sliding scale as needed   Yes Historical Provider   insulin glargine (LANTUS,BASAGLAR) 100 unit/mL (3 mL) inpn 10 units daily at bedtime 6/9/17  Yes Macie Long NP   valACYclovir (VALTREX) 1 gram tablet Take 1 Tab by mouth daily. 6/6/17  Yes Dottie Slade MD   tamsulosin (FLOMAX) 0.4 mg capsule Take 1 Cap by mouth daily.  4/4/17  Yes Maris Rock Cowden, MD   propafenone (RYTHMOL) 150 mg tablet Take 150 mg by mouth every eight (8) hours. Yes Historical Provider   zolpidem CR (AMBIEN CR) 12.5 mg tablet Take 12.5 mg by mouth nightly as needed. Yes Historical Provider   TADALAFIL (CIALIS PO) Take 20 mg by mouth as needed. Yes Historical Provider   tapentadol (NUCYNTA) 100 mg tablet Take 100 mg by mouth three (3) times daily. Yes Goldy Diaz MD   aripiprazole (ABILIFY) 5 mg tablet Take 2.5 mg by mouth every morning. Yes Goldy Diaz MD   buPROPion XL (WELLBUTRIN XL) 150 mg tablet Take 1 Tab by mouth daily (with dinner). 4/25/10  Yes Smita Kowalski MD   losartan (COZAAR) 100 mg tablet TK 1 T PO QD 7/3/17   Historical Provider   amLODIPine (NORVASC) 10 mg tablet Take 10 mg by mouth daily. Historical Provider              Allergies   Allergen Reactions    Mercury (Bulk) Hives     Blisters             Objective:     Vitals:    17 0952   BP: 115/81   Pulse: 90   Resp: 16   Temp: 96.5 °F (35.8 °C)   TempSrc: Oral   SpO2: 97%   Weight: 185 lb 3.2 oz (84 kg)   Height: 5' 8\" (1.727 m)          Physical Exam:    GENERAL: alert, cooperative  EYE: negative  LYMPHATIC: Cervical, supraclavicular, and axillary nodes normal.   THROAT & NECK: normal and no erythema or exudates noted. LUNG: clear to auscultation bilaterally  HEART: regular rate and rhythm  ABDOMEN: soft, non-tender  EXTREMITIES: bilateral LE edema  SKIN: Normal.  NEUROLOGIC: negative      Lab Results   Component Value Date/Time    WBC 10.8 2017 12:00 AM    HGB 8.6 2017 12:00 AM    HCT 27.4 2017 12:00 AM    PLATELET 479  12:00 AM    MCV 90 2017 12:00 AM       M-spike: 5.8 => 2.9 => 1.9 => 2.1 => 2.8 => 1.4    Serum F => 496 => 246 => 305 => 314 => 200       Assessment:     1.  Multiple Myeloma, IgA Kappa   Durie Cleveland stage IIIB    Cytogenetics/FISH: t(14;16) - high risk  ECOG PS - 0   Intent of therapy: palliative    Receiving systemic therapy with RVd - s/p 3 cycles  Dose reduced Revlimid and Velcade d/t continued cytopenias. M-spike now rising. This is primary refractory myeloma. Carries poor prognosis  He has received one cycle of VD-PACE (bortezomib, dexamethasone, thalidomide, cisplatin, adriamycin, cyclophosphamide, and etoposide - will omit thalidomide). Achieved MN with once cycle  He has seen Dr. Kamlesh [facility]ala at [facility] for an auto-transplant. We shall plan on getting him into a deeper response prior to an auto-transplant. We discussed another cycle of VD-PACE vs KPd. VD-PACE is more toxic to the bone marrow. Thus we settled on KPd. If he responds to the first 2 cycles of this therapy, we shall continue it until he gets an auto-transplant. I discussed with him the potential side effects of the treatment and the benefit from it. He agrees to start therapy. 2. Anemia. Myeloma and chemotherapy related    Observation      3. Renal insufficiency    Observation        Plan:       1. Start Carfilzomib/Pom/Dex  2. Auto-transplant in the future  3. F/U when starting new therapy. 4. We will get him see at [facility] or [address] [place] [address] in the future. I saw the patient in conjunction with LUBNA Woody        Signed by: Jeny Wing MD                     September 28, 2017        CC. Cory Andrade MD  CC.  Oscar Jay MD

## 2017-09-28 NOTE — PROGRESS NOTES
Noelle Dejesus is a 63 y.o. male here today for IgA Kappa multiple myeloma f/u. Patient accompanied by wife to [address] VS stable. Patient states he neck pain 5/10. Patient continues to having difficulty sleeping even with sleep aide.

## 2017-09-29 ENCOUNTER — DOCUMENTATION ONLY (OUTPATIENT)
Dept: ONCOLOGY | Age: 62
End: 2017-09-29

## 2017-10-03 RX ORDER — SODIUM CHLORIDE 9 MG/ML
25 INJECTION, SOLUTION INTRAVENOUS CONTINUOUS
Status: DISPENSED | OUTPATIENT
Start: 2017-10-06 | End: 2017-10-07

## 2017-10-03 RX ORDER — SODIUM CHLORIDE 9 MG/ML
25 INJECTION, SOLUTION INTRAVENOUS CONTINUOUS
Status: DISPENSED | OUTPATIENT
Start: 2017-10-05 | End: 2017-10-06

## 2017-10-03 RX ORDER — DEXAMETHASONE SODIUM PHOSPHATE 4 MG/ML
4 INJECTION, SOLUTION INTRA-ARTICULAR; INTRALESIONAL; INTRAMUSCULAR; INTRAVENOUS; SOFT TISSUE ONCE
Status: COMPLETED | OUTPATIENT
Start: 2017-10-05 | End: 2017-10-05

## 2017-10-03 RX ORDER — DEXAMETHASONE SODIUM PHOSPHATE 4 MG/ML
4 INJECTION, SOLUTION INTRA-ARTICULAR; INTRALESIONAL; INTRAMUSCULAR; INTRAVENOUS; SOFT TISSUE ONCE
Status: COMPLETED | OUTPATIENT
Start: 2017-10-06 | End: 2017-10-06

## 2017-10-05 ENCOUNTER — HOSPITAL ENCOUNTER (OUTPATIENT)
Dept: INFUSION THERAPY | Age: 62
Discharge: HOME | End: 2017-10-05
Payer: OTHER GOVERNMENT

## 2017-10-05 VITALS
DIASTOLIC BLOOD PRESSURE: 82 MMHG | OXYGEN SATURATION: 99 % | BODY MASS INDEX: 28.75 KG/M2 | TEMPERATURE: 98.7 F | RESPIRATION RATE: 18 BRPM | HEART RATE: 67 BPM | WEIGHT: 189.7 LBS | SYSTOLIC BLOOD PRESSURE: 120 MMHG | HEIGHT: 68 IN

## 2017-10-05 LAB
ALBUMIN SERPL-MCNC: 3.3 G/DL (ref 3.5–5)
ALBUMIN/GLOB SERPL: 0.7 {RATIO} (ref 1.1–2.2)
ALP SERPL-CCNC: 67 U/L (ref 45–117)
ALT SERPL-CCNC: 21 U/L (ref 12–78)
ANION GAP SERPL CALC-SCNC: 8 MMOL/L (ref 5–15)
AST SERPL-CCNC: 16 U/L (ref 15–37)
BASOPHILS # BLD: 0 K/UL
BASOPHILS NFR BLD: 0 %
BILIRUB DIRECT SERPL-MCNC: <0.1 MG/DL (ref 0–0.2)
BILIRUB SERPL-MCNC: 0.2 MG/DL (ref 0.2–1)
BUN SERPL-MCNC: 14 MG/DL (ref 6–20)
BUN/CREAT SERPL: 12 (ref 12–20)
CALCIUM SERPL-MCNC: 8.3 MG/DL (ref 8.5–10.1)
CHLORIDE SERPL-SCNC: 104 MMOL/L (ref 97–108)
CO2 SERPL-SCNC: 28 MMOL/L (ref 21–32)
CREAT SERPL-MCNC: 1.18 MG/DL (ref 0.7–1.3)
DIFFERENTIAL METHOD BLD: ABNORMAL
EOSINOPHIL # BLD: 0.2 K/UL
EOSINOPHIL NFR BLD: 5 %
ERYTHROCYTE [DISTWIDTH] IN BLOOD BY AUTOMATED COUNT: 19.7 % (ref 11.5–14.5)
GLOBULIN SER CALC-MCNC: 4.9 G/DL (ref 2–4)
GLUCOSE SERPL-MCNC: 108 MG/DL (ref 65–100)
HCT VFR BLD AUTO: 30.3 % (ref 36.6–50.3)
HGB BLD-MCNC: 9.6 G/DL (ref 12.1–17)
IGA SERPL-MCNC: 2060 MG/DL (ref 70–400)
IGG SERPL-MCNC: 149 MG/DL (ref 700–1600)
IGM SERPL-MCNC: <21 MG/DL (ref 40–230)
LYMPHOCYTES # BLD: 0.6 K/UL
LYMPHOCYTES NFR BLD: 20 %
MCH RBC QN AUTO: 28.4 PG (ref 26–34)
MCHC RBC AUTO-ENTMCNC: 31.7 G/DL (ref 30–36.5)
MCV RBC AUTO: 89.6 FL (ref 80–99)
MONOCYTES # BLD: 0.5 K/UL
MONOCYTES NFR BLD: 15 %
NEUTS SEG # BLD: 1.7 K/UL
NEUTS SEG NFR BLD: 60 %
PLATELET # BLD AUTO: 107 K/UL (ref 150–400)
POTASSIUM SERPL-SCNC: 3.9 MMOL/L (ref 3.5–5.1)
PROT SERPL-MCNC: 8.2 G/DL (ref 6.4–8.2)
RBC # BLD AUTO: 3.38 M/UL (ref 4.1–5.7)
RBC MORPH BLD: ABNORMAL
SODIUM SERPL-SCNC: 140 MMOL/L (ref 136–145)
WBC # BLD AUTO: 3 K/UL (ref 4.1–11.1)

## 2017-10-05 PROCEDURE — 82784 ASSAY IGA/IGD/IGG/IGM EACH: CPT | Performed by: INTERNAL MEDICINE

## 2017-10-05 PROCEDURE — 74011000258 HC RX REV CODE- 258: Performed by: INTERNAL MEDICINE

## 2017-10-05 PROCEDURE — 96375 TX/PRO/DX INJ NEW DRUG ADDON: CPT

## 2017-10-05 PROCEDURE — 84165 PROTEIN E-PHORESIS SERUM: CPT | Performed by: INTERNAL MEDICINE

## 2017-10-05 PROCEDURE — 80048 BASIC METABOLIC PNL TOTAL CA: CPT | Performed by: INTERNAL MEDICINE

## 2017-10-05 PROCEDURE — 74011250636 HC RX REV CODE- 250/636: Performed by: INTERNAL MEDICINE

## 2017-10-05 PROCEDURE — 80076 HEPATIC FUNCTION PANEL: CPT | Performed by: INTERNAL MEDICINE

## 2017-10-05 PROCEDURE — 96361 HYDRATE IV INFUSION ADD-ON: CPT

## 2017-10-05 PROCEDURE — 96409 CHEMO IV PUSH SNGL DRUG: CPT

## 2017-10-05 PROCEDURE — 83883 ASSAY NEPHELOMETRY NOT SPEC: CPT | Performed by: INTERNAL MEDICINE

## 2017-10-05 PROCEDURE — 36415 COLL VENOUS BLD VENIPUNCTURE: CPT | Performed by: INTERNAL MEDICINE

## 2017-10-05 PROCEDURE — 85025 COMPLETE CBC W/AUTO DIFF WBC: CPT | Performed by: INTERNAL MEDICINE

## 2017-10-05 PROCEDURE — 77030012965 HC NDL HUBR BBMI -A

## 2017-10-05 RX ORDER — HEPARIN 100 UNIT/ML
500 SYRINGE INTRAVENOUS AS NEEDED
Status: ACTIVE | OUTPATIENT
Start: 2017-10-05 | End: 2017-10-06

## 2017-10-05 RX ORDER — SODIUM CHLORIDE 0.9 % (FLUSH) 0.9 %
10-40 SYRINGE (ML) INJECTION AS NEEDED
Status: ACTIVE | OUTPATIENT
Start: 2017-10-05 | End: 2017-10-06

## 2017-10-05 RX ADMIN — CARFILZOMIB 40 MG: 60 INJECTION, POWDER, LYOPHILIZED, FOR SOLUTION INTRAVENOUS at 14:19

## 2017-10-05 RX ADMIN — DEXAMETHASONE SODIUM PHOSPHATE 4 MG: 4 INJECTION, SOLUTION INTRA-ARTICULAR; INTRALESIONAL; INTRAMUSCULAR; INTRAVENOUS; SOFT TISSUE at 13:20

## 2017-10-05 RX ADMIN — Medication 10 ML: at 14:58

## 2017-10-05 RX ADMIN — Medication 500 UNITS: at 14:59

## 2017-10-05 RX ADMIN — Medication 20 ML: at 11:22

## 2017-10-05 RX ADMIN — SODIUM CHLORIDE 500 ML: 900 INJECTION, SOLUTION INTRAVENOUS at 11:26

## 2017-10-05 NOTE — DISCHARGE INSTRUCTIONS

## 2017-10-05 NOTE — PROGRESS NOTES
Pt arrived to [facility] ambulatory in no acute distress at 1105 for CPD C1D1.  Assessment unremarkable except fatigue. R chest port accessed without issue and positive blood return noted.  Labs obtained, CBC, BMP, Hepatic Panel, SPEP, Serum Free Light Chains, Immunoglobulins, Immunofixation. Visit Vitals    /80 (BP 1 Location: Left arm, BP Patient Position: Sitting)    Pulse 82    Temp 98.7 °F (37.1 °C)    Resp 18    Ht 5' 8\" (1.727 m)    Wt 86 kg (189 lb 11.2 oz)    SpO2 99%    BMI 28.84 kg/m2     Recent Results (from the past 12 hour(s))   CBC WITH AUTOMATED DIFF    Collection Time: 10/05/17 11:12 AM   Result Value Ref Range    WBC 3.0 (L) 4.1 - 11.1 K/uL    RBC 3.38 (L) 4.10 - 5.70 M/uL    HGB 9.6 (L) 12.1 - 17.0 g/dL    HCT 30.3 (L) 36.6 - 50.3 %    MCV 89.6 80.0 - 99.0 FL    MCH 28.4 26.0 - 34.0 PG    MCHC 31.7 30.0 - 36.5 g/dL    RDW 19.7 (H) 11.5 - 14.5 %    PLATELET 900 (L) 908 - 400 K/uL    NEUTROPHILS 60 %    LYMPHOCYTES 20 %    MONOCYTES 15 %    EOSINOPHILS 5 %    BASOPHILS 0 %    ABS. NEUTROPHILS 1.7 K/UL    ABS. LYMPHOCYTES 0.6 K/UL    ABS. MONOCYTES 0.5 K/UL    ABS. EOSINOPHILS 0.2 K/UL    ABS.  BASOPHILS 0.0 K/UL    RBC COMMENTS NORMOCYTIC, NORMOCHROMIC      DF MANUAL     METABOLIC PANEL, BASIC    Collection Time: 10/05/17 11:12 AM   Result Value Ref Range    Sodium 140 136 - 145 mmol/L    Potassium 3.9 3.5 - 5.1 mmol/L    Chloride 104 97 - 108 mmol/L    CO2 28 21 - 32 mmol/L    Anion gap 8 5 - 15 mmol/L    Glucose 108 (H) 65 - 100 mg/dL    BUN 14 6 - 20 MG/DL    Creatinine 1.18 0.70 - 1.30 MG/DL    BUN/Creatinine ratio 12 12 - 20      GFR est AA >60 >60 ml/min/1.73m2    GFR est non-AA >60 >60 ml/min/1.73m2    Calcium 8.3 (L) 8.5 - 10.1 MG/DL   HEPATIC FUNCTION PANEL    Collection Time: 10/05/17 11:12 AM   Result Value Ref Range    Protein, total 8.2 6.4 - 8.2 g/dL    Albumin 3.3 (L) 3.5 - 5.0 g/dL    Globulin 4.9 (H) 2.0 - 4.0 g/dL    A-G Ratio 0.7 (L) 1.1 - 2.2      Bilirubin, total 0.2 0.2 - 1.0 MG/DL    Bilirubin, direct <0.1 0.0 - 0.2 MG/DL    Alk. phosphatase 67 45 - 117 U/L    AST (SGOT) 16 15 - 37 U/L    ALT (SGPT) 21 12 - 78 U/L     The following medications administered:  NS 500mls IV over 30 minutes  Decadron 4mg IVP  Carfilzomib 40mg IV over 10 minutes    Visit Vitals    /82    Pulse 67    Temp 98.7 °F (37.1 °C)    Resp 18    Ht 5' 8\" (1.727 m)    Wt 86 kg (189 lb 11.2 oz)    SpO2 99%    BMI 28.84 kg/m2       Pt tolerated treatment well. Pt observed for 30 minutes post infusion. Pt educated about medication, and handouts given. Discharge instructions reviewed. Port flushed per policy and de-accessed, 2x2 and tape placed.  Pt discharged ambulatory in no acute distress at 1505, accompanied by spouse.   Next appointment 10/6/17 at 1000

## 2017-10-06 ENCOUNTER — HOSPITAL ENCOUNTER (OUTPATIENT)
Dept: INFUSION THERAPY | Age: 62
Discharge: HOME | End: 2017-10-06
Payer: OTHER GOVERNMENT

## 2017-10-06 VITALS
OXYGEN SATURATION: 98 % | HEIGHT: 68 IN | SYSTOLIC BLOOD PRESSURE: 135 MMHG | BODY MASS INDEX: 29.15 KG/M2 | TEMPERATURE: 98.6 F | HEART RATE: 75 BPM | DIASTOLIC BLOOD PRESSURE: 90 MMHG | WEIGHT: 192.3 LBS | RESPIRATION RATE: 18 BRPM

## 2017-10-06 LAB
ALBUMIN SERPL ELPH-MCNC: 3.7 G/DL (ref 2.9–4.4)
ALBUMIN/GLOB SERPL: 0.9 {RATIO} (ref 0.7–1.7)
ALPHA1 GLOB SERPL ELPH-MCNC: 0.2 G/DL (ref 0–0.4)
ALPHA2 GLOB SERPL ELPH-MCNC: 0.8 G/DL (ref 0.4–1)
B-GLOBULIN SERPL ELPH-MCNC: 0.9 G/DL (ref 0.7–1.3)
GAMMA GLOB SERPL ELPH-MCNC: 2.3 G/DL (ref 0.4–1.8)
GLOBULIN SER CALC-MCNC: 4.2 G/DL (ref 2.2–3.9)
KAPPA LC FREE SER-MCNC: 586.7 MG/L (ref 3.3–19.4)
KAPPA LC FREE/LAMBDA FREE SER: 266.68 {RATIO} (ref 0.26–1.65)
LAMBDA LC FREE SERPL-MCNC: 2.2 MG/L (ref 5.7–26.3)
M PROTEIN SERPL ELPH-MCNC: 1.6 G/DL
PROT SERPL-MCNC: 7.9 G/DL (ref 6–8.5)

## 2017-10-06 PROCEDURE — 74011250636 HC RX REV CODE- 250/636: Performed by: INTERNAL MEDICINE

## 2017-10-06 PROCEDURE — 96409 CHEMO IV PUSH SNGL DRUG: CPT

## 2017-10-06 PROCEDURE — 74011000250 HC RX REV CODE- 250

## 2017-10-06 PROCEDURE — 96375 TX/PRO/DX INJ NEW DRUG ADDON: CPT

## 2017-10-06 PROCEDURE — 96361 HYDRATE IV INFUSION ADD-ON: CPT

## 2017-10-06 PROCEDURE — 74011000258 HC RX REV CODE- 258: Performed by: INTERNAL MEDICINE

## 2017-10-06 PROCEDURE — 77030012965 HC NDL HUBR BBMI -A

## 2017-10-06 PROCEDURE — 74011250636 HC RX REV CODE- 250/636

## 2017-10-06 PROCEDURE — 96366 THER/PROPH/DIAG IV INF ADDON: CPT

## 2017-10-06 RX ORDER — SODIUM CHLORIDE 9 MG/ML
10 INJECTION INTRAMUSCULAR; INTRAVENOUS; SUBCUTANEOUS AS NEEDED
Status: ACTIVE | OUTPATIENT
Start: 2017-10-06 | End: 2017-10-07

## 2017-10-06 RX ORDER — SODIUM CHLORIDE 0.9 % (FLUSH) 0.9 %
10-40 SYRINGE (ML) INJECTION AS NEEDED
Status: ACTIVE | OUTPATIENT
Start: 2017-10-06 | End: 2017-10-07

## 2017-10-06 RX ORDER — SODIUM CHLORIDE 9 MG/ML
INJECTION INTRAMUSCULAR; INTRAVENOUS; SUBCUTANEOUS
Status: COMPLETED
Start: 2017-10-06 | End: 2017-10-06

## 2017-10-06 RX ORDER — HEPARIN 100 UNIT/ML
500 SYRINGE INTRAVENOUS AS NEEDED
Status: ACTIVE | OUTPATIENT
Start: 2017-10-06 | End: 2017-10-07

## 2017-10-06 RX ADMIN — Medication 10 ML: at 11:45

## 2017-10-06 RX ADMIN — SODIUM CHLORIDE 4 MG: 900 INJECTION, SOLUTION INTRAVENOUS at 10:24

## 2017-10-06 RX ADMIN — SODIUM CHLORIDE 500 ML: 900 INJECTION, SOLUTION INTRAVENOUS at 10:21

## 2017-10-06 RX ADMIN — SODIUM CHLORIDE 10 ML: 9 INJECTION INTRAMUSCULAR; INTRAVENOUS; SUBCUTANEOUS at 10:15

## 2017-10-06 RX ADMIN — Medication 10 ML: at 10:15

## 2017-10-06 RX ADMIN — SODIUM CHLORIDE, PRESERVATIVE FREE 500 UNITS: 5 INJECTION INTRAVENOUS at 11:45

## 2017-10-06 RX ADMIN — CARFILZOMIB 40 MG: 60 INJECTION, POWDER, LYOPHILIZED, FOR SOLUTION INTRAVENOUS at 11:30

## 2017-10-06 RX ADMIN — SODIUM CHLORIDE 25 ML/HR: 900 INJECTION, SOLUTION INTRAVENOUS at 11:00

## 2017-10-06 RX ADMIN — DEXAMETHASONE SODIUM PHOSPHATE 4 MG: 4 INJECTION, SOLUTION INTRA-ARTICULAR; INTRALESIONAL; INTRAMUSCULAR; INTRAVENOUS; SOFT TISSUE at 11:24

## 2017-10-06 NOTE — PROGRESS NOTES
Pt arrived to [facility] ambulatory for CPd C1D2/Zometa in no acute distress at 1005.  Assessment unremarkable. R chest port accessed without issue and positive blood return noted.  Labs obtained on 10/5/17. Patient Vitals for the past 12 hrs:   Temp Pulse Resp BP SpO2   10/06/17 1145 - 75 18 135/90 -   10/06/17 1006 98.6 °F (37 °C) 73 18 143/87 98 %     Corrected [place] 5.52    The following medications administered:  NS 500mL IV over 1 hour  Zometa 4mg IV over 15 minutes  NS @ KVO  Decadron 4mg IVP  Kyprolis 40mg IV over 10 minutes    Pt tolerated treatment well.  No adverse reaction noted. Port flushed per policy and needle removed, 2x2 and paper tape placed.  Pt discharged ambulatory in no acute distress at 1150, accompanied by brother. Next appointment 10/12/17 @ 1100.

## 2017-10-09 LAB
IGA SERPL-MCNC: 2179 MG/DL (ref 90–386)
IGG SERPL-MCNC: 149 MG/DL (ref 700–1600)
IGM SERPL-MCNC: <5 MG/DL (ref 20–172)
PROT PATTERN SERPL IFE-IMP: ABNORMAL

## 2017-10-10 RX ORDER — SODIUM CHLORIDE 9 MG/ML
25 INJECTION, SOLUTION INTRAVENOUS CONTINUOUS
Status: DISPENSED | OUTPATIENT
Start: 2017-10-12 | End: 2017-10-13

## 2017-10-10 RX ORDER — SODIUM CHLORIDE 9 MG/ML
25 INJECTION, SOLUTION INTRAVENOUS CONTINUOUS
Status: DISPENSED | OUTPATIENT
Start: 2017-10-13 | End: 2017-10-14

## 2017-10-12 ENCOUNTER — HOSPITAL ENCOUNTER (OUTPATIENT)
Dept: INFUSION THERAPY | Age: 62
Discharge: HOME | End: 2017-10-12
Payer: OTHER GOVERNMENT

## 2017-10-12 VITALS
HEIGHT: 68 IN | WEIGHT: 195 LBS | BODY MASS INDEX: 29.55 KG/M2 | SYSTOLIC BLOOD PRESSURE: 138 MMHG | TEMPERATURE: 98 F | DIASTOLIC BLOOD PRESSURE: 88 MMHG | OXYGEN SATURATION: 99 % | HEART RATE: 66 BPM | RESPIRATION RATE: 18 BRPM

## 2017-10-12 LAB
BASOPHILS # BLD: 0 K/UL (ref 0–0.1)
BASOPHILS NFR BLD: 0 % (ref 0–1)
EOSINOPHIL # BLD: 0.1 K/UL (ref 0–0.4)
EOSINOPHIL NFR BLD: 2 % (ref 0–7)
ERYTHROCYTE [DISTWIDTH] IN BLOOD BY AUTOMATED COUNT: 19.9 % (ref 11.5–14.5)
HCT VFR BLD AUTO: 31.9 % (ref 36.6–50.3)
HGB BLD-MCNC: 10.1 G/DL (ref 12.1–17)
LYMPHOCYTES # BLD: 0.6 K/UL (ref 0.8–3.5)
LYMPHOCYTES NFR BLD: 13 % (ref 12–49)
MCH RBC QN AUTO: 28.5 PG (ref 26–34)
MCHC RBC AUTO-ENTMCNC: 31.7 G/DL (ref 30–36.5)
MCV RBC AUTO: 90.1 FL (ref 80–99)
MONOCYTES # BLD: 0.8 K/UL (ref 0–1)
MONOCYTES NFR BLD: 17 % (ref 5–13)
NEUTS SEG # BLD: 3.1 K/UL (ref 1.8–8)
NEUTS SEG NFR BLD: 68 % (ref 32–75)
PLATELET # BLD AUTO: 90 K/UL (ref 150–400)
RBC # BLD AUTO: 3.54 M/UL (ref 4.1–5.7)
RBC MORPH BLD: ABNORMAL
RBC MORPH BLD: ABNORMAL
WBC # BLD AUTO: 4.6 K/UL (ref 4.1–11.1)

## 2017-10-12 PROCEDURE — 96360 HYDRATION IV INFUSION INIT: CPT

## 2017-10-12 PROCEDURE — 96361 HYDRATE IV INFUSION ADD-ON: CPT

## 2017-10-12 PROCEDURE — 77030012965 HC NDL HUBR BBMI -A

## 2017-10-12 PROCEDURE — 96409 CHEMO IV PUSH SNGL DRUG: CPT

## 2017-10-12 PROCEDURE — 85025 COMPLETE CBC W/AUTO DIFF WBC: CPT | Performed by: INTERNAL MEDICINE

## 2017-10-12 PROCEDURE — 36415 COLL VENOUS BLD VENIPUNCTURE: CPT | Performed by: INTERNAL MEDICINE

## 2017-10-12 PROCEDURE — 36591 DRAW BLOOD OFF VENOUS DEVICE: CPT

## 2017-10-12 PROCEDURE — 74011250636 HC RX REV CODE- 250/636: Performed by: INTERNAL MEDICINE

## 2017-10-12 PROCEDURE — 74011250636 HC RX REV CODE- 250/636

## 2017-10-12 PROCEDURE — 74011000258 HC RX REV CODE- 258: Performed by: INTERNAL MEDICINE

## 2017-10-12 RX ORDER — HEPARIN 100 UNIT/ML
500 SYRINGE INTRAVENOUS AS NEEDED
Status: ACTIVE | OUTPATIENT
Start: 2017-10-12 | End: 2017-10-13

## 2017-10-12 RX ORDER — SODIUM CHLORIDE 0.9 % (FLUSH) 0.9 %
5-10 SYRINGE (ML) INJECTION AS NEEDED
Status: ACTIVE | OUTPATIENT
Start: 2017-10-12 | End: 2017-10-13

## 2017-10-12 RX ADMIN — CARFILZOMIB 40 MG: 60 INJECTION, POWDER, LYOPHILIZED, FOR SOLUTION INTRAVENOUS at 13:00

## 2017-10-12 RX ADMIN — Medication 10 ML: at 11:10

## 2017-10-12 RX ADMIN — SODIUM CHLORIDE 25 ML/HR: 900 INJECTION, SOLUTION INTRAVENOUS at 12:15

## 2017-10-12 RX ADMIN — SODIUM CHLORIDE 500 ML: 900 INJECTION, SOLUTION INTRAVENOUS at 11:15

## 2017-10-12 RX ADMIN — Medication 500 UNITS: at 13:25

## 2017-10-12 NOTE — PROGRESS NOTES
Problem: Chemotherapy Treatment  Goal: *Chemotherapy regimen followed  Outcome: Progressing Towards Goal  Pt receiving CPd C1 D8

## 2017-10-12 NOTE — PROGRESS NOTES
[facility] VISIT NOTE    1100  Pt arrived at [facility] ambulatory and in no distress for CPd C1 D8. Assessment completed, pt without complaints. Right chest port accessed with . 75  in eddy with no difficulty. Positive blood return noted and labs drawn. Pt's PLT level 90,000 called Martin Paiz NP she was advised by Dr Ander Pryor to proceed with the Kyprolis only. Medications received:  NS IV  Krypolis IV  Tolerated treatment well, no adverse reaction noted. Port de-accessed and flushed per protocol. Positive blood return noted. Patient Vitals for the past 12 hrs:   Temp Pulse Resp BP SpO2   10/12/17 1321 98 °F (36.7 °C) - 18 138/88 -   10/12/17 1100 98.4 °F (36.9 °C) 66 18 139/83 99 %     Recent Results (from the past 12 hour(s))   CBC WITH AUTOMATED DIFF    Collection Time: 10/12/17 11:17 AM   Result Value Ref Range    WBC 4.6 4.1 - 11.1 K/uL    RBC 3.54 (L) 4.10 - 5.70 M/uL    HGB 10.1 (L) 12.1 - 17.0 g/dL    HCT 31.9 (L) 36.6 - 50.3 %    MCV 90.1 80.0 - 99.0 FL    MCH 28.5 26.0 - 34.0 PG    MCHC 31.7 30.0 - 36.5 g/dL    RDW 19.9 (H) 11.5 - 14.5 %    PLATELET 90 (L) 812 - 400 K/uL    NEUTROPHILS 68 32 - 75 %    LYMPHOCYTES 13 12 - 49 %    MONOCYTES 17 (H) 5 - 13 %    EOSINOPHILS 2 0 - 7 %    BASOPHILS 0 0 - 1 %    ABS. NEUTROPHILS 3.1 1.8 - 8.0 K/UL    ABS. LYMPHOCYTES 0.6 (L) 0.8 - 3.5 K/UL    ABS. MONOCYTES 0.8 0.0 - 1.0 K/UL    ABS. EOSINOPHILS 0.1 0.0 - 0.4 K/UL    ABS. BASOPHILS 0.0 0.0 - 0.1 K/UL    RBC COMMENTS ANISOCYTOSIS  2+        RBC COMMENTS ROULEAUX  PRESENT         1330  D/C'd from [facility] ambulatory and in no distress accompanied by self.  Next appointment is 10/19/17 at 100pm.

## 2017-10-13 ENCOUNTER — HOSPITAL ENCOUNTER (OUTPATIENT)
Dept: INFUSION THERAPY | Age: 62
Discharge: HOME | End: 2017-10-13
Payer: OTHER GOVERNMENT

## 2017-10-13 VITALS
WEIGHT: 192.8 LBS | RESPIRATION RATE: 18 BRPM | HEIGHT: 68 IN | BODY MASS INDEX: 29.22 KG/M2 | SYSTOLIC BLOOD PRESSURE: 159 MMHG | TEMPERATURE: 98.6 F | DIASTOLIC BLOOD PRESSURE: 94 MMHG | HEART RATE: 67 BPM | OXYGEN SATURATION: 99 %

## 2017-10-13 PROCEDURE — 74011250636 HC RX REV CODE- 250/636

## 2017-10-13 PROCEDURE — 74011000258 HC RX REV CODE- 258: Performed by: INTERNAL MEDICINE

## 2017-10-13 PROCEDURE — 96361 HYDRATE IV INFUSION ADD-ON: CPT

## 2017-10-13 PROCEDURE — 74011250636 HC RX REV CODE- 250/636: Performed by: INTERNAL MEDICINE

## 2017-10-13 PROCEDURE — 77030012965 HC NDL HUBR BBMI -A

## 2017-10-13 PROCEDURE — 96409 CHEMO IV PUSH SNGL DRUG: CPT

## 2017-10-13 PROCEDURE — 74011000250 HC RX REV CODE- 250

## 2017-10-13 RX ORDER — SODIUM CHLORIDE 0.9 % (FLUSH) 0.9 %
10-40 SYRINGE (ML) INJECTION AS NEEDED
Status: ACTIVE | OUTPATIENT
Start: 2017-10-13 | End: 2017-10-14

## 2017-10-13 RX ORDER — HEPARIN 100 UNIT/ML
500 SYRINGE INTRAVENOUS AS NEEDED
Status: ACTIVE | OUTPATIENT
Start: 2017-10-13 | End: 2017-10-14

## 2017-10-13 RX ORDER — SODIUM CHLORIDE 9 MG/ML
10 INJECTION INTRAMUSCULAR; INTRAVENOUS; SUBCUTANEOUS AS NEEDED
Status: ACTIVE | OUTPATIENT
Start: 2017-10-13 | End: 2017-10-14

## 2017-10-13 RX ORDER — SODIUM CHLORIDE 9 MG/ML
INJECTION INTRAMUSCULAR; INTRAVENOUS; SUBCUTANEOUS
Status: COMPLETED
Start: 2017-10-13 | End: 2017-10-13

## 2017-10-13 RX ADMIN — SODIUM CHLORIDE 10 ML: 9 INJECTION INTRAMUSCULAR; INTRAVENOUS; SUBCUTANEOUS at 10:15

## 2017-10-13 RX ADMIN — CARFILZOMIB 40 MG: 60 INJECTION, POWDER, LYOPHILIZED, FOR SOLUTION INTRAVENOUS at 10:17

## 2017-10-13 RX ADMIN — SODIUM CHLORIDE 500 ML: 900 INJECTION, SOLUTION INTRAVENOUS at 10:30

## 2017-10-13 RX ADMIN — Medication 500 UNITS: at 11:36

## 2017-10-13 RX ADMIN — Medication 10 ML: at 11:36

## 2017-10-13 NOTE — PROGRESS NOTES
Pt arrived to [facility] ambulatory for [address] in no acute distress at 1000.  Assessment unremarkable. R chest port accessed without issue and positive blood return noted.  Labs obtained on 10/12/17. Patient Vitals for the past 12 hrs:   Temp Pulse Resp BP SpO2   10/13/17 1134 - 67 18 (!) 159/94 -   10/13/17 1001 98.6 °F (37 °C) 64 18 (!) 149/92 99 %       The following medications administered:  Kyprolis 40mg IV over 10 minutes  NS 500mL bolus over 1 hour  Pomalldomide held per orders    Pt tolerated treatment well.  No adverse reaction noted. Port flushed per policy and needle removed, 2x2 and paper tape placed.  Pt discharged ambulatory in no acute distress at 1140, accompanied by self. Next appointment 10/19/17 @ 1300.

## 2017-10-17 RX ORDER — SODIUM CHLORIDE 9 MG/ML
25 INJECTION, SOLUTION INTRAVENOUS CONTINUOUS
Status: DISPENSED | OUTPATIENT
Start: 2017-10-19 | End: 2017-10-20

## 2017-10-17 RX ORDER — SODIUM CHLORIDE 9 MG/ML
25 INJECTION, SOLUTION INTRAVENOUS CONTINUOUS
Status: DISPENSED | OUTPATIENT
Start: 2017-10-20 | End: 2017-10-21

## 2017-10-19 ENCOUNTER — OFFICE VISIT (OUTPATIENT)
Dept: ONCOLOGY | Age: 62
End: 2017-10-19

## 2017-10-19 ENCOUNTER — HOSPITAL ENCOUNTER (OUTPATIENT)
Dept: INFUSION THERAPY | Age: 62
Discharge: HOME | End: 2017-10-19
Payer: OTHER GOVERNMENT

## 2017-10-19 VITALS
TEMPERATURE: 98.8 F | DIASTOLIC BLOOD PRESSURE: 82 MMHG | BODY MASS INDEX: 29.6 KG/M2 | OXYGEN SATURATION: 96 % | SYSTOLIC BLOOD PRESSURE: 126 MMHG | HEIGHT: 68 IN | HEART RATE: 77 BPM | WEIGHT: 195.3 LBS

## 2017-10-19 VITALS
DIASTOLIC BLOOD PRESSURE: 83 MMHG | HEART RATE: 85 BPM | WEIGHT: 194.5 LBS | RESPIRATION RATE: 16 BRPM | OXYGEN SATURATION: 97 % | BODY MASS INDEX: 29.48 KG/M2 | TEMPERATURE: 98.7 F | SYSTOLIC BLOOD PRESSURE: 132 MMHG | HEIGHT: 68 IN

## 2017-10-19 DIAGNOSIS — N28.9 RENAL INSUFFICIENCY: ICD-10-CM

## 2017-10-19 DIAGNOSIS — D64.81 ANEMIA ASSOCIATED WITH CHEMOTHERAPY: ICD-10-CM

## 2017-10-19 DIAGNOSIS — C90.00 MULTIPLE MYELOMA NOT HAVING ACHIEVED REMISSION (HCC): Primary | ICD-10-CM

## 2017-10-19 DIAGNOSIS — T45.1X5A ANEMIA ASSOCIATED WITH CHEMOTHERAPY: ICD-10-CM

## 2017-10-19 LAB
BASOPHILS # BLD: 0 K/UL (ref 0–0.1)
BASOPHILS NFR BLD: 0 % (ref 0–1)
DIFFERENTIAL METHOD BLD: ABNORMAL
EOSINOPHIL # BLD: 0 K/UL (ref 0–0.4)
EOSINOPHIL NFR BLD: 0 % (ref 0–7)
ERYTHROCYTE [DISTWIDTH] IN BLOOD BY AUTOMATED COUNT: 20.1 % (ref 11.5–14.5)
HCT VFR BLD AUTO: 35.6 % (ref 36.6–50.3)
HGB BLD-MCNC: 11.3 G/DL (ref 12.1–17)
LYMPHOCYTES # BLD: 0.4 K/UL (ref 0.8–3.5)
LYMPHOCYTES NFR BLD: 5 % (ref 12–49)
MCH RBC QN AUTO: 29 PG (ref 26–34)
MCHC RBC AUTO-ENTMCNC: 31.7 G/DL (ref 30–36.5)
MCV RBC AUTO: 91.3 FL (ref 80–99)
MONOCYTES # BLD: 0.5 K/UL (ref 0–1)
MONOCYTES NFR BLD: 6 % (ref 5–13)
NEUTS SEG # BLD: 7 K/UL (ref 1.8–8)
NEUTS SEG NFR BLD: 89 % (ref 32–75)
PLATELET # BLD AUTO: 118 K/UL (ref 150–400)
RBC # BLD AUTO: 3.9 M/UL (ref 4.1–5.7)
RBC MORPH BLD: ABNORMAL
WBC # BLD AUTO: 7.9 K/UL (ref 4.1–11.1)

## 2017-10-19 PROCEDURE — 36415 COLL VENOUS BLD VENIPUNCTURE: CPT | Performed by: INTERNAL MEDICINE

## 2017-10-19 PROCEDURE — 74011000250 HC RX REV CODE- 250: Performed by: INTERNAL MEDICINE

## 2017-10-19 PROCEDURE — 77030012965 HC NDL HUBR BBMI -A

## 2017-10-19 PROCEDURE — 74011250636 HC RX REV CODE- 250/636

## 2017-10-19 PROCEDURE — 74011250636 HC RX REV CODE- 250/636: Performed by: INTERNAL MEDICINE

## 2017-10-19 PROCEDURE — 96409 CHEMO IV PUSH SNGL DRUG: CPT

## 2017-10-19 PROCEDURE — 85025 COMPLETE CBC W/AUTO DIFF WBC: CPT | Performed by: INTERNAL MEDICINE

## 2017-10-19 PROCEDURE — 74011000258 HC RX REV CODE- 258: Performed by: INTERNAL MEDICINE

## 2017-10-19 RX ORDER — SODIUM CHLORIDE 9 MG/ML
10 INJECTION INTRAMUSCULAR; INTRAVENOUS; SUBCUTANEOUS AS NEEDED
Status: ACTIVE | OUTPATIENT
Start: 2017-10-19 | End: 2017-10-20

## 2017-10-19 RX ORDER — HEPARIN 100 UNIT/ML
500 SYRINGE INTRAVENOUS AS NEEDED
Status: ACTIVE | OUTPATIENT
Start: 2017-10-19 | End: 2017-10-20

## 2017-10-19 RX ORDER — POMALIDOMIDE 2 MG/1
CAPSULE ORAL
COMMUNITY
Start: 2017-09-29 | End: 2017-10-26 | Stop reason: SDUPTHER

## 2017-10-19 RX ORDER — DEXAMETHASONE 4 MG/1
40 TABLET ORAL
Qty: 40 TAB | Refills: 3 | Status: SHIPPED | OUTPATIENT
Start: 2017-10-19 | End: 2018-11-01

## 2017-10-19 RX ORDER — SODIUM CHLORIDE 0.9 % (FLUSH) 0.9 %
5-10 SYRINGE (ML) INJECTION AS NEEDED
Status: ACTIVE | OUTPATIENT
Start: 2017-10-19 | End: 2017-10-20

## 2017-10-19 RX ORDER — LENALIDOMIDE 10 MG/1
CAPSULE ORAL
COMMUNITY
Start: 2017-07-31 | End: 2018-11-01

## 2017-10-19 RX ORDER — LENALIDOMIDE 5 MG/1
CAPSULE ORAL
COMMUNITY
Start: 2017-08-10 | End: 2018-11-01

## 2017-10-19 RX ADMIN — SODIUM CHLORIDE 25 ML/HR: 900 INJECTION, SOLUTION INTRAVENOUS at 15:16

## 2017-10-19 RX ADMIN — CARFILZOMIB 40 MG: 60 INJECTION, POWDER, LYOPHILIZED, FOR SOLUTION INTRAVENOUS at 15:16

## 2017-10-19 RX ADMIN — Medication 10 ML: at 15:35

## 2017-10-19 RX ADMIN — Medication 500 UNITS: at 15:35

## 2017-10-19 RX ADMIN — SODIUM CHLORIDE 10 ML: 9 INJECTION INTRAMUSCULAR; INTRAVENOUS; SUBCUTANEOUS at 13:26

## 2017-10-19 RX ADMIN — Medication 10 ML: at 13:27

## 2017-10-19 NOTE — PROGRESS NOTES
1315 Pt arrived at [facility] ambulatory and in no distress for C1D15. Assessment completed, no new complaints voiced. Port accessed per protocol with positive blood return. Visit Vitals    /84    Pulse 99    Temp 98.8 °F (37.1 °C)     Recent Results (from the past 12 hour(s))   CBC WITH AUTOMATED DIFF    Collection Time: 10/19/17  1:27 PM   Result Value Ref Range    WBC 7.9 4.1 - 11.1 K/uL    RBC 3.90 (L) 4.10 - 5.70 M/uL    HGB 11.3 (L) 12.1 - 17.0 g/dL    HCT 35.6 (L) 36.6 - 50.3 %    MCV 91.3 80.0 - 99.0 FL    MCH 29.0 26.0 - 34.0 PG    MCHC 31.7 30.0 - 36.5 g/dL    RDW 20.1 (H) 11.5 - 14.5 %    PLATELET 872 (L) 695 - 400 K/uL    NEUTROPHILS 89 (H) 32 - 75 %    LYMPHOCYTES 5 (L) 12 - 49 %    MONOCYTES 6 5 - 13 %    EOSINOPHILS 0 0 - 7 %    BASOPHILS 0 0 - 1 %    ABS. NEUTROPHILS 7.0 1.8 - 8.0 K/UL    ABS. LYMPHOCYTES 0.4 (L) 0.8 - 3.5 K/UL    ABS. MONOCYTES 0.5 0.0 - 1.0 K/UL    ABS. EOSINOPHILS 0.0 0.0 - 0.4 K/UL    ABS. BASOPHILS 0.0 0.0 - 0.1 K/UL    DF SMEAR SCANNED      RBC COMMENTS ANISOCYTOSIS  1+           Medications received:  Kyprolis 40 mg IV    1535 Tolerated treatment well, no adverse reaction noted. Port flushed and de-accessed. D/Cd from [facility] ambulatory and in no distress accompanied by self. Next appt 10/20.

## 2017-10-19 NOTE — PROGRESS NOTES
Kati Knox. is a 63 y.o. male here today for multiple myeloma f/u. Chemo tx CPD; cycle 1 day 15. Patient seeing Dr. Rishabh Lopes. VS stable. Patient states he has neck pain 5/10. Patient denies N/V/D.

## 2017-10-20 ENCOUNTER — HOSPITAL ENCOUNTER (OUTPATIENT)
Dept: INFUSION THERAPY | Age: 62
Discharge: HOME | End: 2017-10-20
Payer: OTHER GOVERNMENT

## 2017-10-20 VITALS
TEMPERATURE: 98.2 F | BODY MASS INDEX: 29.77 KG/M2 | OXYGEN SATURATION: 96 % | SYSTOLIC BLOOD PRESSURE: 131 MMHG | WEIGHT: 196.4 LBS | DIASTOLIC BLOOD PRESSURE: 86 MMHG | HEART RATE: 86 BPM

## 2017-10-20 PROCEDURE — 74011000258 HC RX REV CODE- 258: Performed by: INTERNAL MEDICINE

## 2017-10-20 PROCEDURE — 96409 CHEMO IV PUSH SNGL DRUG: CPT

## 2017-10-20 PROCEDURE — 74011250636 HC RX REV CODE- 250/636

## 2017-10-20 PROCEDURE — 74011000250 HC RX REV CODE- 250: Performed by: INTERNAL MEDICINE

## 2017-10-20 PROCEDURE — 74011250636 HC RX REV CODE- 250/636: Performed by: INTERNAL MEDICINE

## 2017-10-20 PROCEDURE — 77030012965 HC NDL HUBR BBMI -A

## 2017-10-20 RX ORDER — SODIUM CHLORIDE 0.9 % (FLUSH) 0.9 %
5-10 SYRINGE (ML) INJECTION AS NEEDED
Status: ACTIVE | OUTPATIENT
Start: 2017-10-20 | End: 2017-10-21

## 2017-10-20 RX ORDER — SODIUM CHLORIDE 9 MG/ML
10 INJECTION INTRAMUSCULAR; INTRAVENOUS; SUBCUTANEOUS AS NEEDED
Status: ACTIVE | OUTPATIENT
Start: 2017-10-20 | End: 2017-10-21

## 2017-10-20 RX ORDER — HEPARIN 100 UNIT/ML
500 SYRINGE INTRAVENOUS AS NEEDED
Status: ACTIVE | OUTPATIENT
Start: 2017-10-20 | End: 2017-10-21

## 2017-10-20 RX ADMIN — Medication 10 ML: at 10:30

## 2017-10-20 RX ADMIN — SODIUM CHLORIDE 25 ML/HR: 900 INJECTION, SOLUTION INTRAVENOUS at 10:09

## 2017-10-20 RX ADMIN — SODIUM CHLORIDE 10 ML: 9 INJECTION INTRAMUSCULAR; INTRAVENOUS; SUBCUTANEOUS at 10:00

## 2017-10-20 RX ADMIN — Medication 500 UNITS: at 10:30

## 2017-10-20 RX ADMIN — CARFILZOMIB 40 MG: 60 INJECTION, POWDER, LYOPHILIZED, FOR SOLUTION INTRAVENOUS at 10:09

## 2017-10-20 NOTE — PROGRESS NOTES
0908 Pt arrived at [facility] ambulatory and in no distress for C1D16. Assessment completed, no new complaints voiced. Port accessed per protocol with positive blood return. Visit Vitals    /86    Pulse 86    Temp 98.2 °F (36.8 °C)    Wt 89.1 kg (196 lb 6.4 oz)    SpO2 96%    BMI 29.77 kg/m2       Medications received:  Kyprolis 40 mg IV    1030 Tolerated treatment well, no adverse reaction noted. Port flushed and de-accessed. D/Cd from [facility] ambulatory and in no distress accompanied by self. Next appt 11/2 @ 1100.

## 2017-10-26 ENCOUNTER — APPOINTMENT (OUTPATIENT)
Dept: INFUSION THERAPY | Age: 62
End: 2017-10-26
Payer: OTHER GOVERNMENT

## 2017-10-26 RX ORDER — POMALIDOMIDE 2 MG/1
CAPSULE ORAL
Qty: 21 CAP | Refills: 0 | Status: SHIPPED | OUTPATIENT
Start: 2017-10-26 | End: 2018-01-03 | Stop reason: SDUPTHER

## 2017-11-01 ENCOUNTER — TELEPHONE (OUTPATIENT)
Dept: ENDOCRINOLOGY | Age: 62
End: 2017-11-01

## 2017-11-01 NOTE — TELEPHONE ENCOUNTER
I returned Mr. Orlando Mckinnon call and he wanted us to know that his sugars have been running high all the time. He has not been keeping a record and does not know how to get old numbers off his meter. I am having him bring the meter in tomorrow and I will down load it and then give that information to Dr. Lita Acosta to see what we may need to do.   Angie Mabry

## 2017-11-01 NOTE — TELEPHONE ENCOUNTER
Patient is requesting an appointment with you sometime next week. He stated that his fasting sugars are running in the 260s. Would you be able to see him next week? Please let me know. Thank you!

## 2017-11-02 ENCOUNTER — HOSPITAL ENCOUNTER (OUTPATIENT)
Dept: INFUSION THERAPY | Age: 62
Discharge: HOME | End: 2017-11-02
Payer: OTHER GOVERNMENT

## 2017-11-02 VITALS
BODY MASS INDEX: 29.95 KG/M2 | TEMPERATURE: 97.8 F | HEIGHT: 68 IN | DIASTOLIC BLOOD PRESSURE: 83 MMHG | RESPIRATION RATE: 18 BRPM | SYSTOLIC BLOOD PRESSURE: 135 MMHG | WEIGHT: 197.6 LBS | HEART RATE: 104 BPM | OXYGEN SATURATION: 96 %

## 2017-11-02 LAB
ALBUMIN SERPL-MCNC: 3.8 G/DL (ref 3.5–5)
ALBUMIN/GLOB SERPL: 1 {RATIO} (ref 1.1–2.2)
ALP SERPL-CCNC: 137 U/L (ref 45–117)
ALT SERPL-CCNC: 53 U/L (ref 12–78)
ANION GAP SERPL CALC-SCNC: 8 MMOL/L (ref 5–15)
AST SERPL-CCNC: 20 U/L (ref 15–37)
BASOPHILS # BLD: 0 K/UL (ref 0–0.1)
BASOPHILS NFR BLD: 0 % (ref 0–1)
BILIRUB DIRECT SERPL-MCNC: 0.1 MG/DL (ref 0–0.2)
BILIRUB SERPL-MCNC: 0.4 MG/DL (ref 0.2–1)
BUN SERPL-MCNC: 17 MG/DL (ref 6–20)
BUN/CREAT SERPL: 13 (ref 12–20)
CALCIUM SERPL-MCNC: 9 MG/DL (ref 8.5–10.1)
CHLORIDE SERPL-SCNC: 104 MMOL/L (ref 97–108)
CO2 SERPL-SCNC: 27 MMOL/L (ref 21–32)
CREAT SERPL-MCNC: 1.31 MG/DL (ref 0.7–1.3)
DIFFERENTIAL METHOD BLD: ABNORMAL
EOSINOPHIL # BLD: 0 K/UL (ref 0–0.4)
EOSINOPHIL NFR BLD: 0 % (ref 0–7)
ERYTHROCYTE [DISTWIDTH] IN BLOOD BY AUTOMATED COUNT: 18.7 % (ref 11.5–14.5)
GLOBULIN SER CALC-MCNC: 4 G/DL (ref 2–4)
GLUCOSE SERPL-MCNC: 265 MG/DL (ref 65–100)
HCT VFR BLD AUTO: 36.8 % (ref 36.6–50.3)
HGB BLD-MCNC: 11.5 G/DL (ref 12.1–17)
IGA SERPL-MCNC: 1010 MG/DL (ref 70–400)
IGG SERPL-MCNC: 230 MG/DL (ref 700–1600)
IGM SERPL-MCNC: <21 MG/DL (ref 40–230)
LYMPHOCYTES # BLD: 0.3 K/UL (ref 0.8–3.5)
LYMPHOCYTES NFR BLD: 10 % (ref 12–49)
MCH RBC QN AUTO: 28.3 PG (ref 26–34)
MCHC RBC AUTO-ENTMCNC: 31.3 G/DL (ref 30–36.5)
MCV RBC AUTO: 90.6 FL (ref 80–99)
MONOCYTES # BLD: 0 K/UL (ref 0–1)
MONOCYTES NFR BLD: 1 % (ref 5–13)
NEUTS SEG # BLD: 2.3 K/UL (ref 1.8–8)
NEUTS SEG NFR BLD: 89 % (ref 32–75)
PLATELET # BLD AUTO: 153 K/UL (ref 150–400)
POTASSIUM SERPL-SCNC: 4.2 MMOL/L (ref 3.5–5.1)
PROT SERPL-MCNC: 7.8 G/DL (ref 6.4–8.2)
RBC # BLD AUTO: 4.06 M/UL (ref 4.1–5.7)
RBC MORPH BLD: ABNORMAL
SODIUM SERPL-SCNC: 139 MMOL/L (ref 136–145)
WBC # BLD AUTO: 2.6 K/UL (ref 4.1–11.1)

## 2017-11-02 PROCEDURE — 77030012965 HC NDL HUBR BBMI -A

## 2017-11-02 PROCEDURE — 74011000250 HC RX REV CODE- 250: Performed by: INTERNAL MEDICINE

## 2017-11-02 PROCEDURE — 36415 COLL VENOUS BLD VENIPUNCTURE: CPT | Performed by: INTERNAL MEDICINE

## 2017-11-02 PROCEDURE — 82784 ASSAY IGA/IGD/IGG/IGM EACH: CPT | Performed by: INTERNAL MEDICINE

## 2017-11-02 PROCEDURE — 85025 COMPLETE CBC W/AUTO DIFF WBC: CPT | Performed by: INTERNAL MEDICINE

## 2017-11-02 PROCEDURE — 96413 CHEMO IV INFUSION 1 HR: CPT

## 2017-11-02 PROCEDURE — 83883 ASSAY NEPHELOMETRY NOT SPEC: CPT | Performed by: INTERNAL MEDICINE

## 2017-11-02 PROCEDURE — 74011000258 HC RX REV CODE- 258: Performed by: INTERNAL MEDICINE

## 2017-11-02 PROCEDURE — 84165 PROTEIN E-PHORESIS SERUM: CPT | Performed by: INTERNAL MEDICINE

## 2017-11-02 PROCEDURE — 74011250636 HC RX REV CODE- 250/636: Performed by: INTERNAL MEDICINE

## 2017-11-02 PROCEDURE — 74011250636 HC RX REV CODE- 250/636

## 2017-11-02 PROCEDURE — 80048 BASIC METABOLIC PNL TOTAL CA: CPT | Performed by: INTERNAL MEDICINE

## 2017-11-02 PROCEDURE — 80076 HEPATIC FUNCTION PANEL: CPT | Performed by: INTERNAL MEDICINE

## 2017-11-02 RX ORDER — SODIUM CHLORIDE 0.9 % (FLUSH) 0.9 %
5-10 SYRINGE (ML) INJECTION AS NEEDED
Status: ACTIVE | OUTPATIENT
Start: 2017-11-02 | End: 2017-11-03

## 2017-11-02 RX ORDER — HEPARIN 100 UNIT/ML
500 SYRINGE INTRAVENOUS AS NEEDED
Status: ACTIVE | OUTPATIENT
Start: 2017-11-02 | End: 2017-11-03

## 2017-11-02 RX ORDER — SODIUM CHLORIDE 9 MG/ML
10 INJECTION INTRAMUSCULAR; INTRAVENOUS; SUBCUTANEOUS AS NEEDED
Status: ACTIVE | OUTPATIENT
Start: 2017-11-02 | End: 2017-11-03

## 2017-11-02 RX ADMIN — Medication 10 ML: at 11:26

## 2017-11-02 RX ADMIN — SODIUM CHLORIDE 10 ML: 9 INJECTION INTRAMUSCULAR; INTRAVENOUS; SUBCUTANEOUS at 11:26

## 2017-11-02 RX ADMIN — SODIUM CHLORIDE, PRESERVATIVE FREE 500 UNITS: 5 INJECTION INTRAVENOUS at 15:05

## 2017-11-02 RX ADMIN — CARFILZOMIB 50 MG: 60 INJECTION, POWDER, LYOPHILIZED, FOR SOLUTION INTRAVENOUS at 14:21

## 2017-11-02 RX ADMIN — Medication 10 ML: at 15:05

## 2017-11-02 NOTE — PROGRESS NOTES
1110 Pt arrived at [facility] ambulatory and in no distress for C2D1. Assessment completed, no new complaints voiced. 1310 CBC still pending. Lab notified. Patient Vitals for the past 12 hrs:   Temp Pulse Resp BP SpO2   11/02/17 1502 - (!) 104 - 135/83 -   11/02/17 1110 97.8 °F (36.6 °C) 98 18 102/72 96 %       Recent Results (from the past 12 hour(s))   CBC WITH AUTOMATED DIFF    Collection Time: 11/02/17 11:15 AM   Result Value Ref Range    WBC 2.6 (L) 4.1 - 11.1 K/uL    RBC 4.06 (L) 4.10 - 5.70 M/uL    HGB 11.5 (L) 12.1 - 17.0 g/dL    HCT 36.8 36.6 - 50.3 %    MCV 90.6 80.0 - 99.0 FL    MCH 28.3 26.0 - 34.0 PG    MCHC 31.3 30.0 - 36.5 g/dL    RDW 18.7 (H) 11.5 - 14.5 %    PLATELET 768 654 - 961 K/uL    NEUTROPHILS 89 (H) 32 - 75 %    LYMPHOCYTES 10 (L) 12 - 49 %    MONOCYTES 1 (L) 5 - 13 %    EOSINOPHILS 0 0 - 7 %    BASOPHILS 0 0 - 1 %    ABS. NEUTROPHILS 2.3 1.8 - 8.0 K/UL    ABS. LYMPHOCYTES 0.3 (L) 0.8 - 3.5 K/UL    ABS. MONOCYTES 0.0 0.0 - 1.0 K/UL    ABS. EOSINOPHILS 0.0 0.0 - 0.4 K/UL    ABS. BASOPHILS 0.0 0.0 - 0.1 K/UL    RBC COMMENTS ANISOCYTOSIS  1+        DF SMEAR SCANNED     METABOLIC PANEL, BASIC    Collection Time: 11/02/17 11:15 AM   Result Value Ref Range    Sodium 139 136 - 145 mmol/L    Potassium 4.2 3.5 - 5.1 mmol/L    Chloride 104 97 - 108 mmol/L    CO2 27 21 - 32 mmol/L    Anion gap 8 5 - 15 mmol/L    Glucose 265 (H) 65 - 100 mg/dL    BUN 17 6 - 20 MG/DL    Creatinine 1.31 (H) 0.70 - 1.30 MG/DL    BUN/Creatinine ratio 13 12 - 20      GFR est AA >60 >60 ml/min/1.73m2    GFR est non-AA 56 (L) >60 ml/min/1.73m2    Calcium 9.0 8.5 - 10.1 MG/DL   HEPATIC FUNCTION PANEL    Collection Time: 11/02/17 11:15 AM   Result Value Ref Range    Protein, total 7.8 6.4 - 8.2 g/dL    Albumin 3.8 3.5 - 5.0 g/dL    Globulin 4.0 2.0 - 4.0 g/dL    A-G Ratio 1.0 (L) 1.1 - 2.2      Bilirubin, total 0.4 0.2 - 1.0 MG/DL    Bilirubin, direct 0.1 0.0 - 0.2 MG/DL    Alk.  phosphatase 137 (H) 45 - 117 U/L    AST (SGOT) 20 15 - 37 U/L    ALT (SGPT) 53 12 - 78 U/L       Medications received:  [id number] mg IV    1505 Tolerated treatment well, no adverse reaction noted. D/Cd from [facility] ambulatory and in no distress accompanied by self. Next appt 11/3.

## 2017-11-03 ENCOUNTER — HOSPITAL ENCOUNTER (OUTPATIENT)
Dept: INFUSION THERAPY | Age: 62
Discharge: HOME | End: 2017-11-03
Payer: OTHER GOVERNMENT

## 2017-11-03 VITALS
RESPIRATION RATE: 18 BRPM | HEIGHT: 68 IN | HEART RATE: 97 BPM | WEIGHT: 195.8 LBS | SYSTOLIC BLOOD PRESSURE: 118 MMHG | TEMPERATURE: 97.4 F | DIASTOLIC BLOOD PRESSURE: 71 MMHG | OXYGEN SATURATION: 97 % | BODY MASS INDEX: 29.67 KG/M2

## 2017-11-03 LAB
ALBUMIN SERPL ELPH-MCNC: 3.7 G/DL (ref 2.9–4.4)
ALBUMIN/GLOB SERPL: 1.1 {RATIO} (ref 0.7–1.7)
ALPHA1 GLOB SERPL ELPH-MCNC: 0.2 G/DL (ref 0–0.4)
ALPHA2 GLOB SERPL ELPH-MCNC: 1 G/DL (ref 0.4–1)
B-GLOBULIN SERPL ELPH-MCNC: 0.9 G/DL (ref 0.7–1.3)
GAMMA GLOB SERPL ELPH-MCNC: 1.3 G/DL (ref 0.4–1.8)
GLOBULIN SER CALC-MCNC: 3.4 G/DL (ref 2.2–3.9)
M PROTEIN SERPL ELPH-MCNC: 0.9 G/DL
PROT SERPL-MCNC: 7.1 G/DL (ref 6–8.5)

## 2017-11-03 PROCEDURE — 74011250636 HC RX REV CODE- 250/636: Performed by: INTERNAL MEDICINE

## 2017-11-03 PROCEDURE — 77030012965 HC NDL HUBR BBMI -A

## 2017-11-03 PROCEDURE — 74011250636 HC RX REV CODE- 250/636

## 2017-11-03 PROCEDURE — 96375 TX/PRO/DX INJ NEW DRUG ADDON: CPT

## 2017-11-03 PROCEDURE — 96413 CHEMO IV INFUSION 1 HR: CPT

## 2017-11-03 PROCEDURE — 74011000258 HC RX REV CODE- 258: Performed by: INTERNAL MEDICINE

## 2017-11-03 RX ORDER — SODIUM CHLORIDE 0.9 % (FLUSH) 0.9 %
5-10 SYRINGE (ML) INJECTION AS NEEDED
Status: ACTIVE | OUTPATIENT
Start: 2017-11-03 | End: 2017-11-04

## 2017-11-03 RX ORDER — SODIUM CHLORIDE 9 MG/ML
10 INJECTION INTRAMUSCULAR; INTRAVENOUS; SUBCUTANEOUS AS NEEDED
Status: ACTIVE | OUTPATIENT
Start: 2017-11-03 | End: 2017-11-04

## 2017-11-03 RX ORDER — HEPARIN 100 UNIT/ML
500 SYRINGE INTRAVENOUS AS NEEDED
Status: ACTIVE | OUTPATIENT
Start: 2017-11-03 | End: 2017-11-04

## 2017-11-03 RX ADMIN — SODIUM CHLORIDE, PRESERVATIVE FREE 500 UNITS: 5 INJECTION INTRAVENOUS at 09:59

## 2017-11-03 RX ADMIN — SODIUM CHLORIDE 4 MG: 900 INJECTION, SOLUTION INTRAVENOUS at 08:51

## 2017-11-03 RX ADMIN — CARFILZOMIB 50 MG: 60 INJECTION, POWDER, LYOPHILIZED, FOR SOLUTION INTRAVENOUS at 09:21

## 2017-11-03 RX ADMIN — Medication 10 ML: at 09:59

## 2017-11-03 RX ADMIN — Medication 10 ML: at 08:15

## 2017-11-03 NOTE — TELEPHONE ENCOUNTER
I called Mr. Norm Perez back and let him know what Dr. Priscila Nguyen said, to take 12 units of lantus in the morning and 4 units at night. He is also to take his humalog with meals and to do the sliding scale of 6 units above 250 as needed. I also told him to call back with any questions and that Dr. Ebony Burkitt was on call this weekend if he needed anything as well. He understood this information and wrote it down as well.   Earvin Sovereign

## 2017-11-03 NOTE — PROGRESS NOTES
0805 Pt arrived at [facility] ambulatory and in no distress for C2D2. Assessment completed, no new complaints voiced. Port accessed per protocol with positive blood return. Labs reviewed from 11/2. /76  Pulse 97  Temp 97.4 °F (36.3 °C)  Resp 18  Ht 5' 8.11\" (1.73 m)  Wt 88.8 kg (195 lb 12.8 oz)  SpO2 97%  BMI 29.68 kg/m2    Medications received:  Kyprolis 50 mg IV  Zometa    1000 Tolerated treatment well, no adverse reaction noted. Port flushed and de-accessed. D/Cd from [facility] ambulatory and in no distress accompanied by self. Next appt 11/9 @ 1100.

## 2017-11-03 NOTE — TELEPHONE ENCOUNTER
Call patient and see if he still has some lantus at home and if so, he can take 12 units once daily in the morning over the next week until he sees Dr. Farhat Sommer. If he does not and needs to come by and  a sample, that's fine too. If we don't have lantus, he can use basaglar samples instead.

## 2017-11-06 ENCOUNTER — TELEPHONE (OUTPATIENT)
Dept: ONCOLOGY | Age: 62
End: 2017-11-06

## 2017-11-06 LAB
IGA SERPL-MCNC: 1077 MG/DL (ref 90–386)
IGG SERPL-MCNC: 215 MG/DL (ref 700–1600)
IGM SERPL-MCNC: <5 MG/DL (ref 20–172)
KAPPA LC FREE SER-MCNC: 274 MG/L (ref 3.3–19.4)
KAPPA LC FREE/LAMBDA FREE SER: 114.17 {RATIO} (ref 0.26–1.65)
LAMBDA LC FREE SERPL-MCNC: 2.4 MG/L (ref 5.7–26.3)
PROT PATTERN SERPL IFE-IMP: ABNORMAL

## 2017-11-07 DIAGNOSIS — R60.0 BILATERAL EDEMA OF LOWER EXTREMITY: ICD-10-CM

## 2017-11-07 RX ORDER — FUROSEMIDE 20 MG/1
TABLET ORAL
Qty: 90 TAB | Refills: 0 | Status: SHIPPED | OUTPATIENT
Start: 2017-11-07 | End: 2018-11-01

## 2017-11-09 ENCOUNTER — HOSPITAL ENCOUNTER (OUTPATIENT)
Dept: INFUSION THERAPY | Age: 62
Discharge: HOME | End: 2017-11-09
Payer: OTHER GOVERNMENT

## 2017-11-09 ENCOUNTER — OFFICE VISIT (OUTPATIENT)
Dept: ONCOLOGY | Age: 62
End: 2017-11-09

## 2017-11-09 VITALS
HEART RATE: 93 BPM | BODY MASS INDEX: 29.7 KG/M2 | DIASTOLIC BLOOD PRESSURE: 77 MMHG | HEIGHT: 68 IN | OXYGEN SATURATION: 98 % | WEIGHT: 196 LBS | TEMPERATURE: 97.7 F | RESPIRATION RATE: 18 BRPM | SYSTOLIC BLOOD PRESSURE: 121 MMHG

## 2017-11-09 VITALS
HEIGHT: 68 IN | SYSTOLIC BLOOD PRESSURE: 119 MMHG | WEIGHT: 198 LBS | HEART RATE: 88 BPM | TEMPERATURE: 98.3 F | DIASTOLIC BLOOD PRESSURE: 80 MMHG | OXYGEN SATURATION: 96 % | BODY MASS INDEX: 30.01 KG/M2 | RESPIRATION RATE: 16 BRPM

## 2017-11-09 DIAGNOSIS — C90.00 MULTIPLE MYELOMA NOT HAVING ACHIEVED REMISSION (HCC): Primary | ICD-10-CM

## 2017-11-09 DIAGNOSIS — D64.81 ANEMIA ASSOCIATED WITH CHEMOTHERAPY: ICD-10-CM

## 2017-11-09 DIAGNOSIS — N28.9 RENAL INSUFFICIENCY: ICD-10-CM

## 2017-11-09 DIAGNOSIS — T45.1X5A ANEMIA ASSOCIATED WITH CHEMOTHERAPY: ICD-10-CM

## 2017-11-09 LAB
BASOPHILS # BLD: 0 K/UL
BASOPHILS NFR BLD: 0 %
DIFFERENTIAL METHOD BLD: ABNORMAL
EOSINOPHIL # BLD: 0 K/UL
EOSINOPHIL NFR BLD: 1 %
ERYTHROCYTE [DISTWIDTH] IN BLOOD BY AUTOMATED COUNT: 18 % (ref 11.5–14.5)
HCT VFR BLD AUTO: 37.7 % (ref 36.6–50.3)
HGB BLD-MCNC: 11.7 G/DL (ref 12.1–17)
LYMPHOCYTES # BLD: 0.3 K/UL
LYMPHOCYTES NFR BLD: 12 %
MCH RBC QN AUTO: 28.1 PG (ref 26–34)
MCHC RBC AUTO-ENTMCNC: 31 G/DL (ref 30–36.5)
MCV RBC AUTO: 90.6 FL (ref 80–99)
MONOCYTES # BLD: 0.1 K/UL
MONOCYTES NFR BLD: 5 %
NEUTS SEG # BLD: 2 K/UL
NEUTS SEG NFR BLD: 82 %
PLATELET # BLD AUTO: 131 K/UL (ref 150–400)
RBC # BLD AUTO: 4.16 M/UL (ref 4.1–5.7)
RBC MORPH BLD: ABNORMAL
WBC # BLD AUTO: 2.4 K/UL (ref 4.1–11.1)

## 2017-11-09 PROCEDURE — 77030012965 HC NDL HUBR BBMI -A

## 2017-11-09 PROCEDURE — 74011250636 HC RX REV CODE- 250/636: Performed by: INTERNAL MEDICINE

## 2017-11-09 PROCEDURE — 85025 COMPLETE CBC W/AUTO DIFF WBC: CPT | Performed by: INTERNAL MEDICINE

## 2017-11-09 PROCEDURE — 74011000250 HC RX REV CODE- 250: Performed by: INTERNAL MEDICINE

## 2017-11-09 PROCEDURE — 74011000258 HC RX REV CODE- 258: Performed by: INTERNAL MEDICINE

## 2017-11-09 PROCEDURE — 74011250636 HC RX REV CODE- 250/636

## 2017-11-09 PROCEDURE — 36415 COLL VENOUS BLD VENIPUNCTURE: CPT | Performed by: INTERNAL MEDICINE

## 2017-11-09 PROCEDURE — 96413 CHEMO IV INFUSION 1 HR: CPT

## 2017-11-09 RX ORDER — HEPARIN 100 UNIT/ML
500 SYRINGE INTRAVENOUS AS NEEDED
Status: ACTIVE | OUTPATIENT
Start: 2017-11-09 | End: 2017-11-10

## 2017-11-09 RX ORDER — SODIUM CHLORIDE 9 MG/ML
10 INJECTION INTRAMUSCULAR; INTRAVENOUS; SUBCUTANEOUS AS NEEDED
Status: ACTIVE | OUTPATIENT
Start: 2017-11-09 | End: 2017-11-10

## 2017-11-09 RX ORDER — SODIUM CHLORIDE 0.9 % (FLUSH) 0.9 %
5-10 SYRINGE (ML) INJECTION AS NEEDED
Status: ACTIVE | OUTPATIENT
Start: 2017-11-09 | End: 2017-11-10

## 2017-11-09 RX ADMIN — Medication 10 ML: at 11:19

## 2017-11-09 RX ADMIN — SODIUM CHLORIDE 10 ML: 9 INJECTION INTRAMUSCULAR; INTRAVENOUS; SUBCUTANEOUS at 11:19

## 2017-11-09 RX ADMIN — Medication 500 UNITS: at 13:46

## 2017-11-09 RX ADMIN — CARFILZOMIB 50 MG: 60 INJECTION, POWDER, LYOPHILIZED, FOR SOLUTION INTRAVENOUS at 13:07

## 2017-11-09 RX ADMIN — Medication 10 ML: at 13:46

## 2017-11-09 NOTE — PROGRESS NOTES
Progress Note        Patient: Luis Fernando Rodriguez Sr. MRN: [id number]  SSN: [id number]    YOB: 1955  Age: 63 y.o. Sex: male        Diagnosis:      1. Multiple Myeloma, IgA Kappa   Durie [place] stage IIIB    Cytogenetics/FISH: t(14;16) - high risk      treatment:      1. Carfilzomib/Pom/Dex cycle 2 Day 2  2. VD-PACE s/p 1 cycle  3. RVD - s/p 4 cycles    Subjective:      Luis Fernando Rodriguez Sr. is a 63 y.o. male with a diagnosis of IgA kappa myeloma. Bone marrow shows 100% aberrant plasma cells. He suffers with DM but it is diet controlled. He has received systemic anti-viral treatment for chronic Hep C with successful eradication of the virus. Mr. Larissa Cooper received 4 cycles of systemic therapy with RVd. He had an initial good response to treatment. However his paraprotein level started rising and the myeloma became refractory to treatment. I then administered one cycle of VD-PACE in the hospital. He has achieved MS from therapy. He has seen transplant team at [facility] and is currently receiving Carfilzomib/Pom/Dex and is doing well with no complaints.       Review of Systems:    Constitutional: fatigue  Eyes: negative  Ears, Nose, Mouth, Throat, and Face: negative  Respiratory: negative  Cardiovascular: negative  Gastrointestinal: negative  Genitourinary:negative  Integument/Breast: negative  Hematologic/Lymphatic: negative  Musculoskeletal:bilateral lower extremity edema  Neurological: negative      Past Medical History:   Diagnosis Date    Acid reflux     Arrhythmia     pvc's    Chronic pain     neck    Depression     Diabetes ([facility] [address]     Femoral hernia 7/11/2012    Hepatitis C     Hypertension     Inguinal hernia 7/11/2012    Liver disease     hepatitis c    Other ill-defined conditions(799.89)     Hx of PVCs since 2009    Prostatitis, acute     Psychiatric disorder     depression     Past Surgical History:   Procedure Laterality Date    COLONOSCOPY N/A 9/20/2016    COLONOSCOPY performed by Yolanda Montano MD at [facility] ENDOSCOPY    HX APPENDECTOMY      HX CERVICAL FUSION  2008    x 1 as of 4/30/2014    HX HEENT      foreign body removed from right eye    HX HERNIA REPAIR  2012    [facility]      HX ORTHOPAEDIC      cervical fusion    HX OTHER SURGICAL  2000    liver bx - chronic hepatitis      Family History   Problem Relation Age of Onset    Arthritis-osteo Mother     Arthritis-osteo Father     Diabetes Father     Hypertension Maternal Grandmother     Diabetes Maternal Grandmother     Hypertension Maternal Grandfather     Diabetes Maternal Grandfather      Social History   Substance Use Topics    Smoking status: Former Smoker     Packs/day: 1.00     Years: 12.00     Types: Cigarettes     Quit date: 1/1/1989    Smokeless tobacco: Never Used      Comment: former cigarette smoker    Alcohol use No      Comment: former alcoholic-quit [id number]      Prior to Admission medications    Medication Sig Start Date End Date Taking? Authorizing Provider   furosemide (LASIX) 20 mg tablet TAKE 1 TABLET BY MOUTH DAILY 11/7/17  Yes Dennie Modest, NP   POMALYST 2 mg cap Take 1 capsule (2mg) by mouth daily on days 1-21 then 7 days off. 10/26/17  Yes Jennyfer Murphy MD   dexamethasone (DECADRON) 4 mg tablet Take 10 Tabs by mouth Every Thursday. 10/19/17  Yes Dennie Modest, NP   zolpidem 3.5 mg subl TAKE 1 TABLET AT BEDTIME 7/28/17  Yes Historical Provider   lidocaine-prilocaine (EMLA) topical cream Apply  to affected area as needed for Pain. 9/22/17  Yes Macie Long NP   prochlorperazine (COMPAZINE) 10 mg tablet TAKE 1 TABLET BY MOUTH EVERY 6 HOURS AS NEEDED FOR UP TO 7 DAYS.  8/27/17  Yes Debbie Yates,    ONETOUCH ULTRA TEST strip USE TO TEST BLOOD SUGAR TID BEFORE EACH MEAL UTD. 7/3/17  Yes Historical Provider   losartan (COZAAR) 100 mg tablet TK 1 T PO QD 7/3/17  Yes Historical Provider   BD INSULIN PEN NEEDLE UF MINI 31 gauge x 3/16\" ndle USE AS DIRECTED 6/7/17  Yes Historical Provider   insulin lispro (HUMALOG) 100 unit/mL kwikpen 3 Units by SubCUTAneous route Before breakfast, lunch, and dinner. Plus correction sliding scale as needed   Yes Historical Provider   insulin glargine (LANTUS,BASAGLAR) 100 unit/mL (3 mL) inpn 10 units daily at bedtime 6/9/17  Yes Macie Long NP   valACYclovir (VALTREX) 1 gram tablet Take 1 Tab by mouth daily. 6/6/17  Yes Noah Eckert MD   tamsulosin (FLOMAX) 0.4 mg capsule Take 1 Cap by mouth daily. 4/4/17  Yes Gabby Taveras MD   propafenone (RYTHMOL) 150 mg tablet Take 150 mg by mouth every eight (8) hours. Yes Historical Provider   zolpidem CR (AMBIEN CR) 12.5 mg tablet Take 12.5 mg by mouth nightly as needed. Yes Historical Provider   TADALAFIL (CIALIS PO) Take 20 mg by mouth as needed. Yes Historical Provider   amLODIPine (NORVASC) 10 mg tablet Take 10 mg by mouth daily. Yes Historical Provider   tapentadol (NUCYNTA) 100 mg tablet Take 100 mg by mouth three (3) times daily. Yes Goldy Diaz MD   aripiprazole (ABILIFY) 5 mg tablet Take 2.5 mg by mouth every morning. Yes Goldy Diaz MD   buPROPion XL (WELLBUTRIN XL) 150 mg tablet Take 1 Tab by mouth daily (with dinner). 4/25/10  Yes Abdulkadir Avila MD   REVLIMID 5 mg cap  8/10/17   Historical Provider   REVLIMID 10 mg cap  7/31/17   Historical Provider   sildenafil (REVATIO) 20 mg tablet TK 5 TS PO QD PRN 9/11/17   Historical Provider          Allergies   Allergen Reactions    Mercury (Bulk) Hives     Blisters             Objective:     Vitals:    11/09/17 1411   BP: 119/80   Pulse: 88   Resp: 16   Temp: 98.3 °F (36.8 °C)   TempSrc: Oral   SpO2: 96%   Weight: 198 lb (89.8 kg)   Height: 5' 8.11\" (1.73 m)          Physical Exam:    GENERAL: alert, cooperative  EYE: negative  LYMPHATIC: Cervical, supraclavicular, and axillary nodes normal.   THROAT & NECK: normal and no erythema or exudates noted.    LUNG: clear to auscultation bilaterally  HEART: regular rate and rhythm  ABDOMEN: soft, non-tender  EXTREMITIES: bilateral LE edema  SKIN: Normal.  NEUROLOGIC: negative      Lab Results   Component Value Date/Time    WBC 2.4 2017 11:22 AM    HGB 11.7 2017 11:22 AM    HCT 37.7 2017 11:22 AM    PLATELET 823 3023 11:22 AM    MCV 90.6 2017 11:22 AM       M-spike: 5.8 => 2.9 => 1.9 => 2.1 => 2.8 => 1.4 => 0.9    Serum F => 496 => 246 => 305 => 314 => 200 => 114       Assessment:     1. Multiple Myeloma, IgA Kappa   Durie [place] stage IIIB    Cytogenetics/FISH: t(14;16) - high risk  ECOG PS - 0   Intent of therapy: palliative    Received 3 cycles of RVd   Dose reduced Revlimid and Velcade d/t continued cytopenias. M-spike started rising. This is primary refractory myeloma. Carries poor prognosis    He received one cycle of VD-PACE (bortezomib, dexamethasone, thalidomide, cisplatin, adriamycin, cyclophosphamide, and etoposide - will omit thalidomide). Achieved WA with once cycle    He has seen Dr. Shant Gordon at [facility] for an auto-transplant. We shall plan on getting him into a deeper response prior to an auto-transplant. Receiving KPd, s/p 1 cycle    Tolerating treatment very well  Denies any side effects. A detailed system by system evaluation of side effect was performed to assess chemotherapy related toxicity. Blood counts are acceptable. Results reviewed with the patient      2. Anemia. Myeloma and chemotherapy related    Observation      3. Renal insufficiency    Observation        Plan:       1. Continue Carfilzomib/Pom/Dex  2. Auto-transplant in the future  3. He will follow up at [facility] transplant team      I saw the patient in conjunction with LUBNA Torres        Signed by: Pedro Sellers MD                     November 10, 2017        CC. Aidan Leung MD  CC.  Mayur Sarabia MD

## 2017-11-09 NOTE — PROGRESS NOTES
1100 Pt arrived at [facility] ambulatory and in no distress for C2D8. Assessment completed, no new complaints voiced today. Pt does intermittently feel like he has the flu, aching and fatigue. Port accessed per protocol with positive blood return. CBCap failed. Spec sent to lab. /83  Pulse 90  Temp 97.7 °F (36.5 °C)  Resp 18  Ht 5' 8.11\" (1.73 m)  Wt 88.9 kg (196 lb)  SpO2 98%  BMI 29.71 kg/m2    Recent Results (from the past 12 hour(s))   CBC WITH AUTOMATED DIFF    Collection Time: 11/09/17 11:22 AM   Result Value Ref Range    WBC 2.4 (L) 4.1 - 11.1 K/uL    RBC 4.16 4.10 - 5.70 M/uL    HGB 11.7 (L) 12.1 - 17.0 g/dL    HCT 37.7 36.6 - 50.3 %    MCV 90.6 80.0 - 99.0 FL    MCH 28.1 26.0 - 34.0 PG    MCHC 31.0 30.0 - 36.5 g/dL    RDW 18.0 (H) 11.5 - 14.5 %    PLATELET 108 (L) 674 - 400 K/uL    NEUTROPHILS 82 %    LYMPHOCYTES 12 %    MONOCYTES 5 %    EOSINOPHILS 1 %    BASOPHILS 0 %    ABS. NEUTROPHILS 2.0 K/UL    ABS. LYMPHOCYTES 0.3 K/UL    ABS. MONOCYTES 0.1 K/UL    ABS. EOSINOPHILS 0.0 K/UL    ABS. BASOPHILS 0.0 K/UL    RBC COMMENTS NORMOCYTIC, NORMOCHROMIC      DF MANUAL         Medications received:  NS @ KVO IV  Kyprolis 50 mg IV      1345 Tolerated treatment well, no adverse reaction noted. Port flushed and de-accessed. D/Cd from [facility] ambulatory and in no distress accompanied by spouse. Next appt 11/10 @ 1100.

## 2017-11-09 NOTE — PROGRESS NOTES
Alex Fleming. is a 63 y.o. male here today for multiple myeloma f/u. VS stable. Patient states he has chronic neck pain 5/10. Patient denies N/V/D and constipation.

## 2017-11-10 ENCOUNTER — HOSPITAL ENCOUNTER (OUTPATIENT)
Dept: INFUSION THERAPY | Age: 62
Discharge: HOME | End: 2017-11-10
Payer: OTHER GOVERNMENT

## 2017-11-10 ENCOUNTER — OFFICE VISIT (OUTPATIENT)
Dept: ENDOCRINOLOGY | Age: 62
End: 2017-11-10

## 2017-11-10 VITALS
SYSTOLIC BLOOD PRESSURE: 123 MMHG | TEMPERATURE: 97.3 F | HEIGHT: 68 IN | OXYGEN SATURATION: 98 % | DIASTOLIC BLOOD PRESSURE: 80 MMHG | RESPIRATION RATE: 18 BRPM | HEART RATE: 87 BPM | BODY MASS INDEX: 30.01 KG/M2 | WEIGHT: 198 LBS

## 2017-11-10 VITALS
HEART RATE: 55 BPM | WEIGHT: 198.9 LBS | DIASTOLIC BLOOD PRESSURE: 82 MMHG | BODY MASS INDEX: 30.14 KG/M2 | HEIGHT: 68 IN | SYSTOLIC BLOOD PRESSURE: 125 MMHG

## 2017-11-10 DIAGNOSIS — R73.9 STEROID-INDUCED HYPERGLYCEMIA: ICD-10-CM

## 2017-11-10 DIAGNOSIS — Z79.4 TYPE 2 DIABETES MELLITUS WITHOUT COMPLICATION, WITH LONG-TERM CURRENT USE OF INSULIN (HCC): Primary | ICD-10-CM

## 2017-11-10 DIAGNOSIS — E11.9 TYPE 2 DIABETES MELLITUS WITHOUT COMPLICATION, WITH LONG-TERM CURRENT USE OF INSULIN (HCC): Primary | ICD-10-CM

## 2017-11-10 DIAGNOSIS — T38.0X5A STEROID-INDUCED HYPERGLYCEMIA: ICD-10-CM

## 2017-11-10 DIAGNOSIS — I10 ESSENTIAL HYPERTENSION WITH GOAL BLOOD PRESSURE LESS THAN 130/80: ICD-10-CM

## 2017-11-10 LAB — HBA1C MFR BLD HPLC: 7.3 %

## 2017-11-10 PROCEDURE — 74011000258 HC RX REV CODE- 258: Performed by: INTERNAL MEDICINE

## 2017-11-10 PROCEDURE — 74011250636 HC RX REV CODE- 250/636: Performed by: INTERNAL MEDICINE

## 2017-11-10 PROCEDURE — 74011250636 HC RX REV CODE- 250/636

## 2017-11-10 PROCEDURE — 96413 CHEMO IV INFUSION 1 HR: CPT

## 2017-11-10 PROCEDURE — 77030012965 HC NDL HUBR BBMI -A

## 2017-11-10 RX ORDER — METFORMIN HYDROCHLORIDE 500 MG/1
500 TABLET, EXTENDED RELEASE ORAL 2 TIMES DAILY
Qty: 180 TAB | Refills: 3 | Status: SHIPPED | OUTPATIENT
Start: 2017-11-10 | End: 2019-01-16 | Stop reason: SDUPTHER

## 2017-11-10 RX ADMIN — CARFILZOMIB 50 MG: 60 INJECTION, POWDER, LYOPHILIZED, FOR SOLUTION INTRAVENOUS at 11:16

## 2017-11-10 NOTE — MR AVS SNAPSHOT
Visit Information Date & Time Provider Department Dept. Phone Encounter #  
 11/10/2017  3:10 PM Mauro Garcia, [facility] Diabetes and Endocrinology [phone] [id number] Follow-up Instructions Return in about 6 weeks (around 12/22/2017). Your Appointments 12/6/2017 11:00 AM  
ESTABLISHED PATIENT with Rosa Moctezuma MD  
[facility] Appt Note: 4wk follow up  
 [address] [facility] [address] [address]  
[address] [facility] 600 [facility] [address] Rajan Teran Upcoming Health Maintenance Date Due  
 EYE EXAM RETINAL OR DILATED Q1 10/9/1967 DTaP/Tdap/Td series (1 - Tdap) 10/9/1978 FOBT Q 1 YEAR AGE 50-75 10/9/2007 Pneumococcal 19-64 Highest Risk (2 of 3 - PCV13) 7/14/2012 LIPID PANEL Q1 7/14/2012 Influenza Age 5 to Adult 8/1/2017 ZOSTER VACCINE AGE 60> 8/9/2017 HEMOGLOBIN A1C Q6M 2/25/2018 MICROALBUMIN Q1 4/11/2018 FOOT EXAM Q1 7/10/2018 Allergies as of 11/10/2017  Review Complete On: 11/10/2017 By: Mauro Garcia MD  
  
 Severity Noted Reaction Type Reactions Mercury (Bulk) Low 04/21/2010   Topical Hives Blisters Current Immunizations  Reviewed on 11/10/2017 Name Date Influenza Vaccine Split 11/14/2010  6:57 PM  
 ZZZ-RETIRED (DO NOT USE) Pneumococcal Vaccine (Unspecified Type) 7/14/2011  6:30 PM  
  
 Reviewed by Paula Chang RN on 11/10/2017 at  1:19 PM  
You Were Diagnosed With   
  
 Codes Comments Type 2 diabetes mellitus without complication, with long-term current use of insulin (HCC)    -  Primary ICD-10-CM: E11.9, Z79.4 ICD-9-CM: 250.00, V58.67 Essential hypertension with goal blood pressure less than 130/80     ICD-10-CM: I10 
ICD-9-CM: 401.9 Steroid-induced hyperglycemia     ICD-10-CM: R73.9, T38.0X5A 
ICD-9-CM: 790.29, E932.0 Vitals BP Pulse Height(growth percentile) Weight(growth percentile) BMI Smoking Status 125/82 (BP 1 Location: Left arm, BP Patient Position: Sitting) (!) 55 5' 8\" (1.727 m) 198 lb 14.4 oz (90.2 kg) 30.24 kg/m2 Former Smoker BMI and BSA Data Body Mass Index Body Surface Area  
 30.24 kg/m 2 2.08 m 2 Preferred Pharmacy Pharmacy Name Phone [phone] - [address], [address] AT [place] [address] [phone] Your Updated Medication List  
  
   
This list is accurate as of: 11/10/17  3:48 PM.  Always use your most recent med list.  
  
  
  
  
 ABILIFY 5 mg tablet Generic drug:  ARIPiprazole Take 2.5 mg by mouth every morning. * AMBIEN CR 12.5 mg tablet Generic drug:  zolpidem CR Take 12.5 mg by mouth nightly as needed. * zolpidem 3.5 mg Subl TAKE 1 TABLET AT BEDTIME  
  
 amLODIPine 10 mg tablet Commonly known as:  Bhardwaj Del Take 10 mg by mouth daily. BD INSULIN PEN NEEDLE UF MINI 31 gauge x 3/16\" Ndle Generic drug:  Insulin Needles (Disposable) USE AS DIRECTED  
  
 buPROPion  mg tablet Commonly known as:  Rosevelt [place] Take 1 Tab by mouth daily (with dinner). CIALIS PO Take 20 mg by mouth as needed. dexamethasone 4 mg tablet Commonly known as:  DECADRON Take 10 Tabs by mouth Every Thursday. furosemide 20 mg tablet Commonly known as:  LASIX TAKE 1 TABLET BY MOUTH DAILY  
  
 insulin glargine 100 unit/mL (3 mL) Inpn Commonly known as:  LANTUS,BASAGLAR  
10 units daily at bedtime  
  
 insulin lispro 100 unit/mL kwikpen Commonly known as:  HUMALOG  
3 Units by SubCUTAneous route Before breakfast, lunch, and dinner. Plus correction sliding scale as needed  
  
 lidocaine-prilocaine topical cream  
Commonly known as:  EMLA Apply  to affected area as needed for Pain.  
  
 losartan 100 mg tablet Commonly known as:  COZAAR TK 1 T PO QD  
  
 NUCYNTA 100 mg tablet Generic drug:  tapentadol Take 100 mg by mouth three (3) times daily. ONETOUCH ULTRA TEST strip Generic drug:  glucose blood VI test strips USE TO TEST BLOOD SUGAR TID BEFORE EACH MEAL UTD. POMALYST 2 mg Cap Generic drug:  pomalidomide Take 1 capsule (2mg) by mouth daily on days 1-21 then 7 days off.  
  
 prochlorperazine 10 mg tablet Commonly known as:  COMPAZINE  
TAKE 1 TABLET BY MOUTH EVERY 6 HOURS AS NEEDED FOR UP TO 7 DAYS. * REVLIMID 10 mg Cap Generic drug:  lenalidomide * REVLIMID 5 mg Cap Generic drug:  lenalidomide RYTHMOL 150 mg tablet Generic drug:  propafenone Take 150 mg by mouth every eight (8) hours. sildenafil 20 mg tablet Commonly known as:  REVATIO TK 5 TS PO QD PRN  
  
 tamsulosin 0.4 mg capsule Commonly known as:  FLOMAX Take 1 Cap by mouth daily. valACYclovir 1 gram tablet Commonly known as:  VALTREX Take 1 Tab by mouth daily. * Notice: This list has 4 medication(s) that are the same as other medications prescribed for you. Read the directions carefully, and ask your doctor or other care provider to review them with you. We Performed the Following AMB POC HEMOGLOBIN A1C [38675 CPT(R)] Follow-up Instructions Return in about 6 weeks (around 12/22/2017). To-Do List   
 11/16/2017 11:00 AM  
  Appointment with [address] [facility] ([phone])  
  
 11/17/2017 11:00 AM  
  Appointment with [address] [facility] ([phone])  
  
 11/30/2017 11:00 AM  
  Appointment with CHAIR 2 [facility] ([phone]) 12/01/2017 11:00 AM  
  Appointment with CHAIR 2 [facility] ([phone]) Patient Instructions 1. Start metformin 500mg twice daily 2.  Lantus: Thursday/Friday, take 25 units, then take 20 units all other mornings,  
 
 3. Humalog: All Days: take 8 units with meals + correction scale below:  
 
Correction Scale  1:25>150 Currently you are taking insulin with your meals. As we have discussed it is important to always check your glucose level just before taking your mealtime insulin dose. Sometimes before eating your meal, your glucose level is already much higher than the goal and so you may require a few units of extra insulin. This is in addition to the scheduled dose that you plan to take for the meal. Using the scale below, add the amount of extra insulin you need to the dose you already plan to take and inject together as one injection. As always continue to monitor your glucose afterward to assure recovery of your glucose levels. IF GLUCOSE IS:                 THEN TAKE: 
    0   Extra Unit 151-175   1   Extra Unit 
176-200   2   Extra Units 038-638   3   Extra Units 226-250   4   Extra Units 251-275   5   Extra Units 276-300   6   Extra Units 200-227   7   Extra Units 
 
--------------------------- 
Keep in mind, that we will need to modify this regimen based on the results. The metformin may also help reduce your sugars, so a dose decrease in your insulin may be needed. Introducing [facility]! Omer Murguia introduces [employer] patient portal. Now you can access parts of your medical record, email your doctor's office, and request medication refills online. 1. In your internet browser, go to [email or web address] 2. Click on the First Time User? Click Here link in the Sign In box. You will see the New Member Sign Up page. 3. Enter your [employer] Access Code exactly as it appears below. You will not need to use this code after youve completed the sign-up process. If you do not sign up before the expiration date, you must request a new code. · [employer] Access Code: [email or web address] Expires: 1/2/2018  6:28 AM 
 
 4. Enter the last four digits of your Social Security Number (xxxx) and Date of Birth (mm/dd/yyyy) as indicated and click Submit. You will be taken to the next sign-up page. 5. Create a [employer] ID. This will be your [employer] login ID and cannot be changed, so think of one that is secure and easy to remember. 6. Create a [employer] password. You can change your password at any time. 7. Enter your Password Reset Question and Answer. This can be used at a later time if you forget your password. 8. Enter your e-mail address. You will receive e-mail notification when new information is available in [address] 9. Click Sign Up. You can now view and download portions of your medical record. 10. Click the Download Summary menu link to download a portable copy of your medical information. If you have questions, please visit the Frequently Asked Questions section of the [employer] website. Remember, [employer] is NOT to be used for urgent needs. For medical emergencies, dial 911. Now available from your iPhone and Android! Please provide this summary of care documentation to your next provider. Your primary care clinician is listed as Emir Marie. If you have any questions after today's visit, please call [phone].

## 2017-11-10 NOTE — PATIENT INSTRUCTIONS
1. Start metformin 500mg twice daily    2. Lantus: Thursday/Friday, take 25 units, then take 20 units all other mornings,     3. Humalog: All Days: take 8 units with meals + correction scale below:     Correction Scale  1:25>150    Currently you are taking insulin with your meals. As we have discussed it is important to always check your glucose level just before taking your mealtime insulin dose. Sometimes before eating your meal, your glucose level is already much higher than the goal and so you may require a few units of extra insulin. This is in addition to the scheduled dose that you plan to take for the meal. Using the scale below, add the amount of extra insulin you need to the dose you already plan to take and inject together as one injection. As always continue to monitor your glucose afterward to assure recovery of your glucose levels. IF GLUCOSE IS:                 THEN TAKE:      0   Extra Unit  151-175   1   Extra Unit  176-200   2   Extra Units  201-225   3   Extra Units  226-250   4   Extra Units  251-275   5   Extra Units  276-300   6   Extra Units  301-325   7   Extra Units    ---------------------------  Keep in mind, that we will need to modify this regimen based on the results. The metformin may also help reduce your sugars, so a dose decrease in your insulin may be needed.

## 2017-11-10 NOTE — PROGRESS NOTES
CHIEF COMPLAINT: f/u uncontrolled diabetes, exacerbated by steroids    HISTORY OF PRESENT ILLNESS:   Tabby Emanuel Sr. is a 63 y.o. male with a PMHx as noted below who presents for f/u of uncontrolled type 2 diabetes with steroid induced hyperglycemia. Initial History:  Patient was diagnosed with diabetes in 2000 and also with multiple myeloma around April 2017, for which he was referred to Dr. Navid Mims for evaluation and management. Prior to that he had stable diet controlled diabetes with A1c of 6.0%, followed by Dr. Patricia Macias. Patient was subsequently started on RVd therapy along with dexamethasone for induction chemo and continues on dexamethasone 40mg every Tuesday (2 weeks on , 1 week off), no longer taking steroids in between. Patient notes he is aiming for 14 weeks of treatment with potential marrow transplant according to the patient. INTERVAL HISTORY:   Patient notes he gained about 20 pounds,   Blood sugars started rising,  A1c today is 7.3%  Steroid regimen changed, now on Thursdays, 3 weeks on, one week off  Had dose adjusted by phone with Dr. Krupa Strickland,    Current Home Regimen:   Per advise while I was away on vacation:  Lantus 12 units AM 4 units PM  Humalog 6 units with each meal  Not using a correction scale    Previously I had recommended the following:  Lantus 15 units, Tuesday  Lantus 10 units, Wednesday  Humalog 1:25>150 correction scale provided as needed    Review of home glucose: checks 3-4x/day  Sugars have been high in the 200's mostly,  Even his week off steroids has not come down,  Not sleeping well at night, reports the [place] makes him hungry,   Eating oatmeal, peanuts, and cheese overnight. Sleeps 7:30 PM, wakes up at midnight.    AM   Lunch   Dinner   Bedtime     Review of most recent diabetes-related labs:  Lab Results   Component Value Date    HBA1C 6.2 08/25/2017    HBA1C 8.3 (H) 12/24/2010    WPQ0MWBU 6.6% 04/07/2017    CHOL 101 07/14/2011    LDLC 27.8 07/14/2011 GFRAA >60 11/02/2017    GFRNA 56 (L) 11/02/2017    MALBEXT microalbumin:Cr ratio 54 04/07/2017    TSH 0.94 09/19/2011       Feeling well, no chest pain or SOB,    PAST MEDICAL/SURGICAL HISTORY:   Past Medical History:   Diagnosis Date    Acid reflux     Arrhythmia     pvc's    Chronic pain     neck    Depression     Diabetes ([address]     Femoral hernia 7/11/2012    Hepatitis C     Hypertension     Inguinal hernia 7/11/2012    Liver disease     hepatitis c    Other ill-defined conditions(799.89)     Hx of PVCs since 2009    Prostatitis, acute     Psychiatric disorder     depression     Past Surgical History:   Procedure Laterality Date    COLONOSCOPY N/A 9/20/2016    COLON[facility]as [facility] at [facility] ENDOSCOPY    HX APPENDECTOMY      HX CERVICAL FUSION  2008    x 1 as of 4/30/2014    HX HEENT      foreign body removed from right eye    HX HERNIA REPAIR  2012    [facility]      HX ORTHOPAEDIC      cervical fusion    HX OTHER SURGICAL  2000    liver bx - chronic hepatitis       ALLERGIES:   Allergies   Allergen Reactions    Mercury (Bulk) Hives     Blisters         MEDICATIONS ON ADMISSION:     Current Outpatient Prescriptions:     furosemide (LASIX) 20 mg tablet, TAKE 1 TABLET BY MOUTH DAILY, Disp: 90 Tab, Rfl: 0    POMALYST 2 mg cap, Take 1 capsule (2mg) by mouth daily on days 1-21 then 7 days off., Disp: 21 Cap, Rfl: 0    REVLIMID 5 mg cap, , Disp: , Rfl:     REVLIMID 10 mg cap, , Disp: , Rfl:     dexamethasone (DECADRON) 4 mg tablet, Take 10 Tabs by mouth Every Thursday. , Disp: 40 Tab, Rfl: 3    sildenafil (REVATIO) 20 mg tablet, TK 5 TS PO QD PRN, Disp: , Rfl: 3    zolpidem 3.5 mg subl, TAKE 1 TABLET AT BEDTIME, Disp: , Rfl: 5    lidocaine-prilocaine (EMLA) topical cream, Apply  to affected area as needed for Pain., Disp: 30 g, Rfl: 0    prochlorperazine (COMPAZINE) 10 mg tablet, TAKE 1 TABLET BY MOUTH EVERY 6 HOURS AS NEEDED FOR UP TO 7 DAYS., Disp: 385 Tab, Rfl: 0   ONETOUCH ULTRA TEST strip, USE TO TEST BLOOD SUGAR TID BEFORE EACH MEAL UTD., Disp: , Rfl: 4    losartan (COZAAR) 100 mg tablet, TK 1 T PO QD, Disp: , Rfl: 4    BD INSULIN PEN NEEDLE UF MINI 31 gauge x 3/16\" ndle, USE AS DIRECTED, Disp: , Rfl: 2    insulin lispro (HUMALOG) 100 unit/mL kwikpen, 3 Units by SubCUTAneous route Before breakfast, lunch, and dinner. Plus correction sliding scale as needed, Disp: , Rfl:     insulin glargine (LANTUS,BASAGLAR) 100 unit/mL (3 mL) inpn, 10 units daily at bedtime, Disp: 15 mL, Rfl: 1    valACYclovir (VALTREX) 1 gram tablet, Take 1 Tab by mouth daily. , Disp: 90 Tab, Rfl: 3    tamsulosin (FLOMAX) 0.4 mg capsule, Take 1 Cap by mouth daily. , Disp: 30 Cap, Rfl: 1    propafenone (RYTHMOL) 150 mg tablet, Take 150 mg by mouth every eight (8) hours. , Disp: , Rfl:     zolpidem CR (AMBIEN CR) 12.5 mg tablet, Take 12.5 mg by mouth nightly as needed. , Disp: , Rfl:     TADALAFIL (CIALIS PO), Take 20 mg by mouth as needed. , Disp: , Rfl:     amLODIPine (NORVASC) 10 mg tablet, Take 10 mg by mouth daily. , Disp: , Rfl:     tapentadol (NUCYNTA) 100 mg tablet, Take 100 mg by mouth three (3) times daily. , Disp: , Rfl:     aripiprazole (ABILIFY) 5 mg tablet, Take 2.5 mg by mouth every morning., Disp: , Rfl:     buPROPion XL (WELLBUTRIN XL) 150 mg tablet, Take 1 Tab by mouth daily (with dinner). , Disp: 30 Tab, Rfl: 11  No current facility-administered medications for this visit. SOCIAL HISTORY:   Social History     Social History    Marital status:      Spouse name: N/A    Number of children: N/A    Years of education: N/A     Occupational History    Not on file.      Social History Main Topics    Smoking status: Former Smoker     Packs/day: 1.00     Years: 12.00     Types: Cigarettes     Quit date: 1/1/1989    Smokeless tobacco: Never Used      Comment: former cigarette smoker    Alcohol use No      Comment: former alcoholic-quit 5068    Drug use: No    Sexual activity: Not on file     Other Topics Concern    Not on file     Social History Narrative       FAMILY HISTORY:  Family History   Problem Relation Age of Onset    Arthritis-osteo Mother     Arthritis-osteo Father     Diabetes Father     Hypertension Maternal Grandmother     Diabetes Maternal Grandmother     Hypertension Maternal Grandfather     Diabetes Maternal Grandfather        REVIEW OF SYSTEMS: Complete ROS assessed and noted for that which is described above, all else are negative. Eyes: normal  ENT: normal  CVS: normal  Resp: normal  GI: normal  : normal  GYN: normal  Endocrine: normal  Integument: normal  Musculoskeletal: normal  Neuro: normal  Psych: normal      PHYSICAL EXAMINATION:    VITAL SIGNS:  Visit Vitals    /82 (BP 1 Location: Left arm, BP Patient Position: Sitting)    Pulse (!) 55    Ht 5' 8\" (1.727 m)    Wt 198 lb 14.4 oz (90.2 kg)    BMI 30.24 kg/m2       GENERAL: NCAT, Sitting comfortably, NAD  EYES: EOMI, non-icteric, no proptosis  Ear/Nose/Throat: NCAT, no inflammation, no masses  LYMPH NODES: No LAD  CARDIOVASCULAR: S1 S2, RRR, No murmur, 2+ radial pulses  RESPIRATORY: CTA b/l, no wheeze/rales  GASTROINTESTINAL: NT, ND  MUSCULOSKELETAL: Normal ROM, no atrophy  SKIN: warm, no edema/rash/ or other skin changes  NEUROLOGIC: 5/5 power all extremities, no tremor, AAOx3  PSYCHIATRIC: Normal affect, Normal insight and judgement    REVIEW OF LABORATORY AND RADIOLOGY DATA:   Labs and documentation have been reviewed as described above. ASSESSMENT AND PLAN:   Wojciech Rangel Sr. is a 63 y.o. male with a PMHx as noted above who presents for f/u of uncontrolled type 2 diabetes with steroid induced hyperglycemia. Problems:  Type 2 diabetes Uncontrolled  Steroid induced hyperglycemia  Hypertension    Patients steroid regimen has changed a bit and his blood sugars are not recovering as they had previously. We discussed the need to modify his regimen a bit.  We also discussed the role of metformin which he had been on in the past. His kidney function would gladly allow for it and it would improve his insulin resistance a bit, hopefully preventing the need to keep increasing his insulin which can result in further weight gain and further insulin resistance. As seen, his 20 pound weight gain may have contributed to his current resistance. He is aware that todays adjustment is the best starting point from which we will most likely need to modify based on his results. It is noted that he is concerned about lows, though we are reassured as we reviewed his glucose log / graph, he never gets any lows, and his average is always in the 200's. PLAN  Type 2 Diabetes  Medications:  1. Start metformin 500mg twice daily  2. Lantus: Thursday/Friday, take 25 units, then take 20 units all other mornings,   3. Humalog: All Days: take 8 units with meals + correction scale below:   4. Humalog Correction Scale  1:25>150  Advised to check glucose ACHS  Provided with glucose log sheets for later review. HTN: norvasc, losartan, stable  Lipids: prev ordered but not obtained by patient, will recheck after diabetes better controlled. RTC: 6 week f/u, I recommended he sign up for \"mychart\" to communicate his blood sugars with me. Isidra Moses.  [address] Endocrinology  [address] [place] [address]

## 2017-11-10 NOTE — PROGRESS NOTES
Outpatient Infusion Center - Chemotherapy Progress Note    5464 Pt admit to [facility] for C2D9 Kyprolis ambulatory in stable condition. Assessment completed. No new concerns voiced. Port accessed with 20 G 0.75 inch eddy needle, with positive blood return. No Labs needed today. Medications:  Normal Saline KVO  Kyprolis 50 MG - infused over 30 minutes    1150 Pt tolerated treatment well. Port maintained positive blood return throughout treatment, flushed with positive blood return at conclusion and eddy needle removed, site covered with 2x2 guaze and band aid. D/c home ambulatory in no distress. Pt aware of next [facility] appointment scheduled for 11/16/17.   Patient Vitals for the past 12 hrs:   Temp Pulse Resp BP SpO2   11/10/17 1148 - 87 18 123/80 -   11/10/17 1103 97.3 °F (36.3 °C) 84 18 135/81 98 %

## 2017-11-16 ENCOUNTER — HOSPITAL ENCOUNTER (OUTPATIENT)
Dept: INFUSION THERAPY | Age: 62
Discharge: HOME | End: 2017-11-16
Payer: OTHER GOVERNMENT

## 2017-11-16 VITALS
RESPIRATION RATE: 18 BRPM | HEART RATE: 91 BPM | WEIGHT: 199.3 LBS | SYSTOLIC BLOOD PRESSURE: 128 MMHG | HEIGHT: 68 IN | BODY MASS INDEX: 30.2 KG/M2 | OXYGEN SATURATION: 97 % | DIASTOLIC BLOOD PRESSURE: 78 MMHG | TEMPERATURE: 98.2 F

## 2017-11-16 LAB
BASO+EOS+MONOS # BLD AUTO: 0.7 K/UL (ref 0.2–1.2)
BASO+EOS+MONOS # BLD AUTO: 9 % (ref 3.2–16.9)
DIFFERENTIAL METHOD BLD: ABNORMAL
ERYTHROCYTE [DISTWIDTH] IN BLOOD BY AUTOMATED COUNT: 17.7 % (ref 11.8–15.8)
HCT VFR BLD AUTO: 36.4 % (ref 36.6–50.3)
HGB BLD-MCNC: 11.4 G/DL (ref 12.1–17)
LYMPHOCYTES # BLD: 0.5 K/UL (ref 0.8–3.5)
LYMPHOCYTES NFR BLD: 7 % (ref 12–49)
MCH RBC QN AUTO: 28.9 PG (ref 26–34)
MCHC RBC AUTO-ENTMCNC: 31.3 G/DL (ref 30–36.5)
MCV RBC AUTO: 92.4 FL (ref 80–99)
NEUTS SEG # BLD: 6.5 K/UL (ref 1.8–8)
NEUTS SEG NFR BLD: 85 % (ref 32–75)
PLATELET # BLD AUTO: 151 K/UL (ref 150–400)
RBC # BLD AUTO: 3.94 M/UL (ref 4.1–5.7)
WBC # BLD AUTO: 7.7 K/UL (ref 4.1–11.1)

## 2017-11-16 PROCEDURE — 74011250636 HC RX REV CODE- 250/636: Performed by: INTERNAL MEDICINE

## 2017-11-16 PROCEDURE — 85025 COMPLETE CBC W/AUTO DIFF WBC: CPT | Performed by: INTERNAL MEDICINE

## 2017-11-16 PROCEDURE — 36415 COLL VENOUS BLD VENIPUNCTURE: CPT | Performed by: INTERNAL MEDICINE

## 2017-11-16 PROCEDURE — 74011000258 HC RX REV CODE- 258: Performed by: INTERNAL MEDICINE

## 2017-11-16 PROCEDURE — 96413 CHEMO IV INFUSION 1 HR: CPT

## 2017-11-16 PROCEDURE — 74011250636 HC RX REV CODE- 250/636

## 2017-11-16 PROCEDURE — 77030012965 HC NDL HUBR BBMI -A

## 2017-11-16 RX ORDER — HEPARIN 100 UNIT/ML
500 SYRINGE INTRAVENOUS AS NEEDED
Status: ACTIVE | OUTPATIENT
Start: 2017-11-16 | End: 2017-11-17

## 2017-11-16 RX ORDER — SODIUM CHLORIDE 0.9 % (FLUSH) 0.9 %
10-40 SYRINGE (ML) INJECTION AS NEEDED
Status: ACTIVE | OUTPATIENT
Start: 2017-11-16 | End: 2017-11-17

## 2017-11-16 RX ORDER — SODIUM CHLORIDE 9 MG/ML
25 INJECTION, SOLUTION INTRAVENOUS AS NEEDED
Status: DISPENSED | OUTPATIENT
Start: 2017-11-16 | End: 2017-11-17

## 2017-11-16 RX ADMIN — SODIUM CHLORIDE 25 ML/HR: 900 INJECTION, SOLUTION INTRAVENOUS at 11:59

## 2017-11-16 RX ADMIN — Medication 10 ML: at 12:50

## 2017-11-16 RX ADMIN — Medication 500 UNITS: at 12:50

## 2017-11-16 RX ADMIN — Medication 10 ML: at 10:55

## 2017-11-16 RX ADMIN — CARFILZOMIB 50 MG: 60 INJECTION, POWDER, LYOPHILIZED, FOR SOLUTION INTRAVENOUS at 12:10

## 2017-11-16 NOTE — PROGRESS NOTES
Outpatient Infusion Center - Chemotherapy Progress Note    1050- Pt admit to [facility] for C2D15 Kyprolis ambulatory in stable condition. Assessment completed. No new concerns voiced. R chest port accessed with positive blood return. CBCap drawn. Line flushed, clamped, Curos Cap applied to end clave. Patient Vitals for the past 4 hrs:   Temp Pulse Resp BP SpO2   11/16/17 1250 - 91 - 128/78 -   11/16/17 1049 98.2 °F (36.8 °C) 93 18 115/73 97 %       Recent Results (from the past 12 hour(s))   CBC WITH 3 PART DIFF    Collection Time: 11/16/17 11:03 AM   Result Value Ref Range    WBC 7.7 4.1 - 11.1 K/uL    RBC 3.94 (L) 4.10 - 5.70 M/uL    HGB 11.4 (L) 12.1 - 17.0 g/dL    HCT 36.4 (L) 36.6 - 50.3 %    MCV 92.4 80.0 - 99.0 FL    MCH 28.9 26.0 - 34.0 PG    MCHC 31.3 30.0 - 36.5 g/dL    RDW 17.7 (H) 11.8 - 15.8 %    PLATELET 442 259 - 888 K/uL    NEUTROPHILS 85 (H) 32 - 75 %    MIXED CELLS 9 3.2 - 16.9 %    LYMPHOCYTES 7 (L) 12 - 49 %    ABS. NEUTROPHILS 6.5 1.8 - 8.0 K/UL    ABS. MIXED CELLS 0.7 0.2 - 1.2 K/uL    ABS. LYMPHOCYTES 0.5 (L) 0.8 - 3.5 K/UL    DF AUTOMATED         Medications:  NS KVO  Kyprolis 50 mg IV    1250- Pt tolerated treatment well. Port maintained positive blood return throughout treatment, flushed with positive blood return at conclusion and de-accessed. D/c home ambulatory in no distress.  Pt aware of next [facility] appointment scheduled for 11/17 at 11 AM.

## 2017-11-17 ENCOUNTER — HOSPITAL ENCOUNTER (OUTPATIENT)
Dept: INFUSION THERAPY | Age: 62
Discharge: HOME | End: 2017-11-17
Payer: OTHER GOVERNMENT

## 2017-11-17 VITALS
HEART RATE: 87 BPM | SYSTOLIC BLOOD PRESSURE: 119 MMHG | TEMPERATURE: 98 F | DIASTOLIC BLOOD PRESSURE: 77 MMHG | RESPIRATION RATE: 18 BRPM | OXYGEN SATURATION: 99 %

## 2017-11-17 PROCEDURE — 77030012965 HC NDL HUBR BBMI -A

## 2017-11-17 PROCEDURE — 74011000258 HC RX REV CODE- 258: Performed by: INTERNAL MEDICINE

## 2017-11-17 PROCEDURE — 74011250636 HC RX REV CODE- 250/636: Performed by: INTERNAL MEDICINE

## 2017-11-17 PROCEDURE — 96413 CHEMO IV INFUSION 1 HR: CPT

## 2017-11-17 PROCEDURE — 74011250636 HC RX REV CODE- 250/636

## 2017-11-17 RX ORDER — SODIUM CHLORIDE 0.9 % (FLUSH) 0.9 %
10-40 SYRINGE (ML) INJECTION AS NEEDED
Status: ACTIVE | OUTPATIENT
Start: 2017-11-17 | End: 2017-11-18

## 2017-11-17 RX ORDER — HEPARIN 100 UNIT/ML
500 SYRINGE INTRAVENOUS AS NEEDED
Status: ACTIVE | OUTPATIENT
Start: 2017-11-17 | End: 2017-11-18

## 2017-11-17 RX ORDER — SODIUM CHLORIDE 9 MG/ML
25 INJECTION, SOLUTION INTRAVENOUS AS NEEDED
Status: DISPENSED | OUTPATIENT
Start: 2017-11-17 | End: 2017-11-18

## 2017-11-17 RX ADMIN — Medication 10 ML: at 11:11

## 2017-11-17 RX ADMIN — CARFILZOMIB 50 MG: 60 INJECTION, POWDER, LYOPHILIZED, FOR SOLUTION INTRAVENOUS at 11:20

## 2017-11-17 RX ADMIN — Medication 10 ML: at 11:54

## 2017-11-17 RX ADMIN — SODIUM CHLORIDE 25 ML/HR: 900 INJECTION, SOLUTION INTRAVENOUS at 11:15

## 2017-11-17 RX ADMIN — Medication 500 UNITS: at 11:55

## 2017-11-17 NOTE — PROGRESS NOTES
Pt arrived to [facility] ambulatory in no acute distress at 1053 for Kyprolis C2D16 .  Assessment unremarkable pt voiced no complaints or concerns. R chest port accessed without issue and positive blood return noted.     Patient Vitals for the past 12 hrs:   Temp Pulse Resp BP SpO2   11/17/17 1156 - 87 18 119/77 -   11/17/17 1055 98 °F (36.7 °C) 84 18 136/78 99 %        Labs obtained: 11/16/17    The following medications administered:  NS KVO  Kyprolis 50 mg IV over 30 min  NS Flush  Heparin Flush    Pt tolerated treatment well. No adverse reactions noted.  IV flushed per policy and removed, 2x2 and paper tape placed.  Pt discharged ambulatory in no acute distress at 1201, accompanied by self. Next appointment 11/30/17.

## 2017-11-27 ENCOUNTER — HOSPITAL ENCOUNTER (OUTPATIENT)
Dept: INFUSION THERAPY | Age: 62
Discharge: HOME | End: 2017-11-27
Payer: OTHER GOVERNMENT

## 2017-11-27 VITALS
DIASTOLIC BLOOD PRESSURE: 86 MMHG | TEMPERATURE: 98.3 F | RESPIRATION RATE: 18 BRPM | SYSTOLIC BLOOD PRESSURE: 124 MMHG | HEART RATE: 100 BPM

## 2017-11-27 PROCEDURE — 77030012965 HC NDL HUBR BBMI -A

## 2017-11-27 PROCEDURE — 74011250636 HC RX REV CODE- 250/636: Performed by: INTERNAL MEDICINE

## 2017-11-27 PROCEDURE — 96523 IRRIG DRUG DELIVERY DEVICE: CPT

## 2017-11-27 RX ORDER — HEPARIN 100 UNIT/ML
500 SYRINGE INTRAVENOUS AS NEEDED
Status: ACTIVE | OUTPATIENT
Start: 2017-11-27 | End: 2017-11-28

## 2017-11-27 RX ORDER — SODIUM CHLORIDE 0.9 % (FLUSH) 0.9 %
10-40 SYRINGE (ML) INJECTION AS NEEDED
Status: ACTIVE | OUTPATIENT
Start: 2017-11-27 | End: 2017-11-28

## 2017-11-27 RX ADMIN — Medication 500 UNITS: at 08:31

## 2017-11-27 RX ADMIN — Medication 10 ML: at 08:30

## 2017-11-27 NOTE — PROGRESS NOTES
[facility] Note:  Arrived - 0830     Pt arrived at [facility] concerned and wanting someone to look at his port. Pt reports his neurologist \"pressed down hard\" on the port last week during procedure and pt states his \"port cord\" feels differently after that. Called placed over to MD office. Notified Kenneth Payan NP and received verbal order to flush port. Visit Vitals    /86 (BP 1 Location: Left arm, BP Patient Position: Sitting)    Pulse 100    Temp 98.3 °F (36.8 °C)    Resp 18       Port accessed & flushed per protocol w/o difficulty. Positive blood return noted. Scott needle removed. 2x2 and paper tape placed over port site. 5926 - Tolerated well. Pt denies any acute problems/changes. Discharged from [facility] ambulatory. No distress.  Next appt: 11/30 at 11 AM.

## 2017-11-30 ENCOUNTER — HOSPITAL ENCOUNTER (OUTPATIENT)
Dept: INFUSION THERAPY | Age: 62
Discharge: HOME | End: 2017-11-30
Payer: OTHER GOVERNMENT

## 2017-11-30 VITALS
SYSTOLIC BLOOD PRESSURE: 120 MMHG | WEIGHT: 200.6 LBS | HEART RATE: 95 BPM | DIASTOLIC BLOOD PRESSURE: 72 MMHG | HEIGHT: 68 IN | RESPIRATION RATE: 18 BRPM | BODY MASS INDEX: 30.4 KG/M2 | TEMPERATURE: 98.3 F | OXYGEN SATURATION: 97 %

## 2017-11-30 LAB
ALBUMIN SERPL-MCNC: 3.9 G/DL (ref 3.5–5)
ALBUMIN/GLOB SERPL: 1.1 {RATIO} (ref 1.1–2.2)
ALP SERPL-CCNC: 112 U/L (ref 45–117)
ALT SERPL-CCNC: 23 U/L (ref 12–78)
ANION GAP SERPL CALC-SCNC: 9 MMOL/L (ref 5–15)
AST SERPL-CCNC: 13 U/L (ref 15–37)
BASO+EOS+MONOS # BLD AUTO: 0.3 K/UL (ref 0.2–1.2)
BASO+EOS+MONOS # BLD AUTO: 7 % (ref 3.2–16.9)
BILIRUB DIRECT SERPL-MCNC: 0.1 MG/DL (ref 0–0.2)
BILIRUB SERPL-MCNC: 0.4 MG/DL (ref 0.2–1)
BUN SERPL-MCNC: 14 MG/DL (ref 6–20)
BUN/CREAT SERPL: 10 (ref 12–20)
CALCIUM SERPL-MCNC: 8.6 MG/DL (ref 8.5–10.1)
CHLORIDE SERPL-SCNC: 106 MMOL/L (ref 97–108)
CO2 SERPL-SCNC: 26 MMOL/L (ref 21–32)
CREAT SERPL-MCNC: 1.39 MG/DL (ref 0.7–1.3)
DIFFERENTIAL METHOD BLD: ABNORMAL
ERYTHROCYTE [DISTWIDTH] IN BLOOD BY AUTOMATED COUNT: 16.9 % (ref 11.8–15.8)
GLOBULIN SER CALC-MCNC: 3.4 G/DL (ref 2–4)
GLUCOSE SERPL-MCNC: 206 MG/DL (ref 65–100)
HCT VFR BLD AUTO: 38.2 % (ref 36.6–50.3)
HGB BLD-MCNC: 12 G/DL (ref 12.1–17)
IGA SERPL-MCNC: 315 MG/DL (ref 70–400)
IGG SERPL-MCNC: 277 MG/DL (ref 700–1600)
IGM SERPL-MCNC: <21 MG/DL (ref 40–230)
LYMPHOCYTES # BLD: 0.3 K/UL (ref 0.8–3.5)
LYMPHOCYTES NFR BLD: 9 % (ref 12–49)
MCH RBC QN AUTO: 29.9 PG (ref 26–34)
MCHC RBC AUTO-ENTMCNC: 31.4 G/DL (ref 30–36.5)
MCV RBC AUTO: 95.3 FL (ref 80–99)
NEUTS SEG # BLD: 2.8 K/UL (ref 1.8–8)
NEUTS SEG NFR BLD: 84 % (ref 32–75)
PLATELET # BLD AUTO: 194 K/UL (ref 150–400)
POTASSIUM SERPL-SCNC: 4.1 MMOL/L (ref 3.5–5.1)
PROT SERPL-MCNC: 7.3 G/DL (ref 6.4–8.2)
RBC # BLD AUTO: 4.01 M/UL (ref 4.1–5.7)
SODIUM SERPL-SCNC: 141 MMOL/L (ref 136–145)
WBC # BLD AUTO: 3.4 K/UL (ref 4.1–11.1)

## 2017-11-30 PROCEDURE — 80076 HEPATIC FUNCTION PANEL: CPT | Performed by: INTERNAL MEDICINE

## 2017-11-30 PROCEDURE — 83883 ASSAY NEPHELOMETRY NOT SPEC: CPT | Performed by: INTERNAL MEDICINE

## 2017-11-30 PROCEDURE — 82784 ASSAY IGA/IGD/IGG/IGM EACH: CPT | Performed by: INTERNAL MEDICINE

## 2017-11-30 PROCEDURE — 36415 COLL VENOUS BLD VENIPUNCTURE: CPT | Performed by: INTERNAL MEDICINE

## 2017-11-30 PROCEDURE — 74011250636 HC RX REV CODE- 250/636: Performed by: INTERNAL MEDICINE

## 2017-11-30 PROCEDURE — 84165 PROTEIN E-PHORESIS SERUM: CPT | Performed by: INTERNAL MEDICINE

## 2017-11-30 PROCEDURE — 80048 BASIC METABOLIC PNL TOTAL CA: CPT | Performed by: INTERNAL MEDICINE

## 2017-11-30 PROCEDURE — 74011000258 HC RX REV CODE- 258: Performed by: INTERNAL MEDICINE

## 2017-11-30 PROCEDURE — 96413 CHEMO IV INFUSION 1 HR: CPT

## 2017-11-30 PROCEDURE — 85025 COMPLETE CBC W/AUTO DIFF WBC: CPT | Performed by: INTERNAL MEDICINE

## 2017-11-30 PROCEDURE — 77030012965 HC NDL HUBR BBMI -A

## 2017-11-30 PROCEDURE — 74011250636 HC RX REV CODE- 250/636

## 2017-11-30 RX ORDER — HEPARIN 100 UNIT/ML
500 SYRINGE INTRAVENOUS AS NEEDED
Status: ACTIVE | OUTPATIENT
Start: 2017-11-30 | End: 2017-12-01

## 2017-11-30 RX ORDER — SODIUM CHLORIDE 9 MG/ML
10 INJECTION INTRAMUSCULAR; INTRAVENOUS; SUBCUTANEOUS AS NEEDED
Status: ACTIVE | OUTPATIENT
Start: 2017-11-30 | End: 2017-12-01

## 2017-11-30 RX ORDER — SODIUM CHLORIDE 0.9 % (FLUSH) 0.9 %
5-10 SYRINGE (ML) INJECTION AS NEEDED
Status: ACTIVE | OUTPATIENT
Start: 2017-11-30 | End: 2017-12-01

## 2017-11-30 RX ADMIN — Medication 500 UNITS: at 14:15

## 2017-11-30 RX ADMIN — CARFILZOMIB 50 MG: 60 INJECTION, POWDER, LYOPHILIZED, FOR SOLUTION INTRAVENOUS at 13:16

## 2017-11-30 RX ADMIN — Medication 10 ML: at 14:15

## 2017-11-30 NOTE — PROGRESS NOTES
1100 Pt arrived at [facility] ambulatory and in no distress for C3D1. Assessment completed, no new complaints voiced. Port accessed per protocol with positive blood return. /76 (BP 1 Location: Left arm, BP Patient Position: Sitting)  Pulse 93  Temp 98.3 °F (36.8 °C)  Resp 18  Wt 91 kg (200 lb 9.6 oz)  SpO2 97%  BMI 30.4 kg/m2     Recent Results (from the past 12 hour(s))   CBC WITH 3 PART DIFF    Collection Time: 11/30/17 11:15 AM   Result Value Ref Range    WBC 3.4 (L) 4.1 - 11.1 K/uL    RBC 4.01 (L) 4.10 - 5.70 M/uL    HGB 12.0 (L) 12.1 - 17.0 g/dL    HCT 38.2 36.6 - 50.3 %    MCV 95.3 80.0 - 99.0 FL    MCH 29.9 26.0 - 34.0 PG    MCHC 31.4 30.0 - 36.5 g/dL    RDW 16.9 (H) 11.8 - 15.8 %    PLATELET 172 582 - 773 K/uL    NEUTROPHILS 84 (H) 32 - 75 %    MIXED CELLS 7 3.2 - 16.9 %    LYMPHOCYTES 9 (L) 12 - 49 %    ABS. NEUTROPHILS 2.8 1.8 - 8.0 K/UL    ABS. MIXED CELLS 0.3 0.2 - 1.2 K/uL    ABS. LYMPHOCYTES 0.3 (L) 0.8 - 3.5 K/UL    DF AUTOMATED     METABOLIC PANEL, BASIC    Collection Time: 11/30/17 11:15 AM   Result Value Ref Range    Sodium 141 136 - 145 mmol/L    Potassium 4.1 3.5 - 5.1 mmol/L    Chloride 106 97 - 108 mmol/L    CO2 26 21 - 32 mmol/L    Anion gap 9 5 - 15 mmol/L    Glucose 206 (H) 65 - 100 mg/dL    BUN 14 6 - 20 MG/DL    Creatinine 1.39 (H) 0.70 - 1.30 MG/DL    BUN/Creatinine ratio 10 (L) 12 - 20      GFR est AA >60 >60 ml/min/1.73m2    GFR est non-AA 52 (L) >60 ml/min/1.73m2    Calcium 8.6 8.5 - 10.1 MG/DL   HEPATIC FUNCTION PANEL    Collection Time: 11/30/17 11:15 AM   Result Value Ref Range    Protein, total 7.3 6.4 - 8.2 g/dL    Albumin 3.9 3.5 - 5.0 g/dL    Globulin 3.4 2.0 - 4.0 g/dL    A-G Ratio 1.1 1.1 - 2.2      Bilirubin, total 0.4 0.2 - 1.0 MG/DL    Bilirubin, direct 0.1 0.0 - 0.2 MG/DL    Alk. phosphatase 112 45 - 117 U/L    AST (SGOT) 13 (L) 15 - 37 U/L    ALT (SGPT) 23 12 - 78 U/L   IMMUNOGLOBULINS, G/A/M, QT.     Collection Time: 11/30/17 11:15 AM   Result Value Ref Range Immunoglobulin G 277 (L) 700 - 1600 mg/dL    Immunoglobulin A 315 70 - 400 mg/dL    Immunoglobulin M <21 (L) 40 - 230 mg/dL       Medications received:  Kyprolis     1196 Tolerated treatment well, no adverse reaction noted. Port flushed and de-accessed. D/Cd from [facility] ambulatory and in no distress accompanied by self. Next appt 12/1.

## 2017-12-01 ENCOUNTER — HOSPITAL ENCOUNTER (OUTPATIENT)
Dept: INFUSION THERAPY | Age: 62
Discharge: HOME | End: 2017-12-01
Payer: MEDICARE

## 2017-12-01 VITALS
HEIGHT: 68 IN | TEMPERATURE: 98.1 F | HEART RATE: 85 BPM | SYSTOLIC BLOOD PRESSURE: 121 MMHG | WEIGHT: 198.9 LBS | BODY MASS INDEX: 30.14 KG/M2 | DIASTOLIC BLOOD PRESSURE: 65 MMHG | RESPIRATION RATE: 18 BRPM

## 2017-12-01 LAB
ALBUMIN SERPL ELPH-MCNC: 4.2 G/DL (ref 2.9–4.4)
ALBUMIN/GLOB SERPL: 1.7 {RATIO} (ref 0.7–1.7)
ALPHA1 GLOB SERPL ELPH-MCNC: 0.2 G/DL (ref 0–0.4)
ALPHA2 GLOB SERPL ELPH-MCNC: 0.9 G/DL (ref 0.4–1)
B-GLOBULIN SERPL ELPH-MCNC: 0.9 G/DL (ref 0.7–1.3)
GAMMA GLOB SERPL ELPH-MCNC: 0.5 G/DL (ref 0.4–1.8)
GLOBULIN SER CALC-MCNC: 2.5 G/DL (ref 2.2–3.9)
KAPPA LC FREE SER-MCNC: 133.7 MG/L (ref 3.3–19.4)
KAPPA LC FREE/LAMBDA FREE SER: 13.64 {RATIO} (ref 0.26–1.65)
LAMBDA LC FREE SERPL-MCNC: 9.8 MG/L (ref 5.7–26.3)
M PROTEIN SERPL ELPH-MCNC: 0.2 G/DL
PROT SERPL-MCNC: 6.7 G/DL (ref 6–8.5)

## 2017-12-01 PROCEDURE — 74011000258 HC RX REV CODE- 258: Performed by: INTERNAL MEDICINE

## 2017-12-01 PROCEDURE — 77030012965 HC NDL HUBR BBMI -A

## 2017-12-01 PROCEDURE — 74011250636 HC RX REV CODE- 250/636

## 2017-12-01 PROCEDURE — 74011000250 HC RX REV CODE- 250

## 2017-12-01 PROCEDURE — 74011250636 HC RX REV CODE- 250/636: Performed by: INTERNAL MEDICINE

## 2017-12-01 PROCEDURE — 96413 CHEMO IV INFUSION 1 HR: CPT

## 2017-12-01 RX ORDER — SODIUM CHLORIDE 0.9 % (FLUSH) 0.9 %
10-40 SYRINGE (ML) INJECTION AS NEEDED
Status: ACTIVE | OUTPATIENT
Start: 2017-12-01 | End: 2017-12-02

## 2017-12-01 RX ORDER — SODIUM CHLORIDE 9 MG/ML
10 INJECTION INTRAMUSCULAR; INTRAVENOUS; SUBCUTANEOUS AS NEEDED
Status: ACTIVE | OUTPATIENT
Start: 2017-12-01 | End: 2017-12-02

## 2017-12-01 RX ORDER — SODIUM CHLORIDE 9 MG/ML
INJECTION INTRAMUSCULAR; INTRAVENOUS; SUBCUTANEOUS
Status: COMPLETED
Start: 2017-12-01 | End: 2017-12-01

## 2017-12-01 RX ORDER — HEPARIN 100 UNIT/ML
500 SYRINGE INTRAVENOUS AS NEEDED
Status: ACTIVE | OUTPATIENT
Start: 2017-12-01 | End: 2017-12-02

## 2017-12-01 RX ADMIN — Medication 500 UNITS: at 12:06

## 2017-12-01 RX ADMIN — Medication 10 ML: at 12:05

## 2017-12-01 RX ADMIN — CARFILZOMIB 50 MG: 60 INJECTION, POWDER, LYOPHILIZED, FOR SOLUTION INTRAVENOUS at 11:28

## 2017-12-01 RX ADMIN — SODIUM CHLORIDE 100 ML: 900 INJECTION, SOLUTION INTRAVENOUS at 11:27

## 2017-12-01 RX ADMIN — SODIUM CHLORIDE 10 ML: 9 INJECTION INTRAMUSCULAR; INTRAVENOUS; SUBCUTANEOUS at 11:24

## 2017-12-01 RX ADMIN — Medication 10 ML: at 11:25

## 2017-12-01 NOTE — PROGRESS NOTES
Pt arrived to [facility] ambulatory for Kyprolis C3D2 in no acute distress at 1100.  Assessment unremarkable. R chest port accessed without issue and positive blood return noted. Patient Vitals for the past 12 hrs:   Temp Pulse Resp BP   12/01/17 1206 - 85 18 121/65   12/01/17 1101 98.1 °F (36.7 °C) 81 18 114/65       The following medications administered:  Kyprolis 50mg IV over 30 minutes    Pt tolerated treatment well.  No adverse reaction noted. Port flushed per policy and needle removed, 2x2 and paper tape placed.  Pt discharged ambulatory in no acute distress at 1210, accompanied by self. Next appointment 12/7/17 @ 1130.

## 2017-12-04 LAB
IGA SERPL-MCNC: 319 MG/DL (ref 90–386)
IGG SERPL-MCNC: 263 MG/DL (ref 700–1600)
IGM SERPL-MCNC: 27 MG/DL (ref 20–172)
PROT PATTERN SERPL IFE-IMP: ABNORMAL

## 2017-12-06 ENCOUNTER — OFFICE VISIT (OUTPATIENT)
Dept: ONCOLOGY | Age: 62
End: 2017-12-06

## 2017-12-06 VITALS
SYSTOLIC BLOOD PRESSURE: 131 MMHG | WEIGHT: 202.2 LBS | BODY MASS INDEX: 30.65 KG/M2 | RESPIRATION RATE: 14 BRPM | HEART RATE: 50 BPM | HEIGHT: 68 IN | TEMPERATURE: 98.4 F | OXYGEN SATURATION: 95 % | DIASTOLIC BLOOD PRESSURE: 85 MMHG

## 2017-12-06 DIAGNOSIS — D64.81 ANEMIA ASSOCIATED WITH CHEMOTHERAPY: ICD-10-CM

## 2017-12-06 DIAGNOSIS — C90.00 MULTIPLE MYELOMA NOT HAVING ACHIEVED REMISSION (HCC): Primary | ICD-10-CM

## 2017-12-06 DIAGNOSIS — N28.9 RENAL INSUFFICIENCY: ICD-10-CM

## 2017-12-06 DIAGNOSIS — T45.1X5A ANEMIA ASSOCIATED WITH CHEMOTHERAPY: ICD-10-CM

## 2017-12-06 NOTE — PROGRESS NOTES
Jose Luis Sheriff is a 63 y.o. male here today for IgA Kappa Multiple Myeloma f/u. Patient states he has chronic neck pain 5/10; pain relieved with pain medication. Zometa held on 12/1; N.O. Zometa Q 3 months starting 2/2/18. Chem. Tx.: Carilzomib/Pom/Dex. Low pulse noted. Patient states he sweats all the time. Blood pressure 131/85, pulse (!) 50, temperature 98.4 °F (36.9 °C), temperature source Oral, resp. rate 14, height 5' 8.11\" (1.73 m), weight 202 lb 3.2 oz (91.7 kg), SpO2 95 %.

## 2017-12-06 NOTE — PROGRESS NOTES
Progress Note        Patient: Wiliam Hall Sr. MRN: [id number]  SSN: [id number]    YOB: 1955  Age: 63 y.o. Sex: male        Diagnosis:      1. Multiple Myeloma, IgA Kappa   Durie [place] stage IIIB    Cytogenetics/FISH: t(14;16) - high risk      treatment:      1. Carfilzomib/Pom/Dex - Cycle 3 Day 8  2. VD-PACE s/p 1 cycle  3. RVD - s/p 4 cycles    Subjective:      Wiliam Hall Sr. is a 63 y.o. male with a diagnosis of IgA kappa myeloma. Bone marrow shows 100% aberrant plasma cells. He suffers with DM but it is diet controlled. He has received systemic anti-viral treatment for chronic Hep C with successful eradication of the virus. Mr. Meg Savage received 4 cycles of systemic therapy with RVd. He had an initial good response to treatment. However his paraprotein level started rising and the myeloma became refractory to treatment. I then administered one cycle of VD-PACE in the hospital. He has seen transplant team at [facility] and is currently receiving Carfilzomib/Pom/Dex and is doing well. He has now achieved a VGPR at this point.        Review of Systems:    Constitutional: fatigue  Eyes: negative  Ears, Nose, Mouth, Throat, and Face: negative  Respiratory: negative  Cardiovascular: negative  Gastrointestinal: negative  Genitourinary:negative  Integument/Breast: negative  Hematologic/Lymphatic: negative  Musculoskeletal:bilateral lower extremity edema  Neurological: negative      Past Medical History:   Diagnosis Date    Acid reflux     Arrhythmia     pvc's    Chronic pain     neck    Depression     Diabetes ([facility] [address]     Femoral hernia 7/11/2012    Hepatitis C     Hypertension     Inguinal hernia 7/11/2012    Liver disease     hepatitis c    Other ill-defined conditions(799.89)     Hx of PVCs since 2009    Prostatitis, acute     Psychiatric disorder     depression     Past Surgical History:   Procedure Laterality Date    COLONOSCOPY N/A 9/20/2016    COLONOSCOPY performed by Roverto Wheeler MD at [facility] ENDOSCOPY    HX APPENDECTOMY      HX CERVICAL FUSION  2008    x 1 as of 4/30/2014    HX HEENT      foreign body removed from right eye    HX HERNIA REPAIR  2012    [facility]      HX ORTHOPAEDIC      cervical fusion    HX OTHER SURGICAL  2000    liver bx - chronic hepatitis      Family History   Problem Relation Age of Onset    Arthritis-osteo Mother     Arthritis-osteo Father     Diabetes Father     Hypertension Maternal Grandmother     Diabetes Maternal Grandmother     Hypertension Maternal Grandfather     Diabetes Maternal Grandfather      Social History   Substance Use Topics    Smoking status: Former Smoker     Packs/day: 1.00     Years: 12.00     Types: Cigarettes     Quit date: 1/1/1989    Smokeless tobacco: Never Used      Comment: former cigarette smoker    Alcohol use No      Comment: former alcoholic-quit 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 Authorizing Provider   metFORMIN ER (GLUCOPHAGE XR) 500 mg tablet Take 1 Tab by mouth two (2) times a day. 11/10/17  Yes Saundra Camacho MD   furosemide (LASIX) 20 mg tablet TAKE 1 TABLET BY MOUTH DAILY 11/7/17  Yes Jl Cox NP   POMALYST 2 mg cap Take 1 capsule (2mg) by mouth daily on days 1-21 then 7 days off. 10/26/17  Yes Cole Hogan MD   dexamethasone (DECADRON) 4 mg tablet Take 10 Tabs by mouth Every Thursday. 10/19/17  Yes Jl Cox NP   sildenafil (REVATIO) 20 mg tablet TK 5 TS PO QD PRN 9/11/17  Yes Historical Provider   zolpidem 3.5 mg subl TAKE 1 TABLET AT BEDTIME 7/28/17  Yes Historical Provider   lidocaine-prilocaine (EMLA) topical cream Apply  to affected area as needed for Pain. 9/22/17  Yes Macie Long NP   prochlorperazine (COMPAZINE) 10 mg tablet TAKE 1 TABLET BY MOUTH EVERY 6 HOURS AS NEEDED FOR UP TO 7 DAYS. 8/27/17  Yes Salinas Yates,    3dimTOUCH ULTRA TEST strip USE TO TEST BLOOD SUGAR TID BEFORE EACH MEAL UTD. 7/3/17  Yes Historical Provider   losartan (COZAAR) 100 mg tablet TK 1 T PO QD 7/3/17  Yes Historical Provider   BD INSULIN PEN NEEDLE UF MINI 31 gauge x 3/16\" ndle USE AS DIRECTED 6/7/17  Yes Historical Provider   insulin lispro (HUMALOG) 100 unit/mL kwikpen 3 Units by SubCUTAneous route Before breakfast, lunch, and dinner. Plus correction sliding scale as needed   Yes Historical Provider   insulin glargine (LANTUS,BASAGLAR) 100 unit/mL (3 mL) inpn 10 units daily at bedtime 6/9/17  Yes Macie Long NP   valACYclovir (VALTREX) 1 gram tablet Take 1 Tab by mouth daily. 6/6/17  Yes Vinh Arora MD   tamsulosin (FLOMAX) 0.4 mg capsule Take 1 Cap by mouth daily. 4/4/17  Yes Dennis Gee MD   propafenone (RYTHMOL) 150 mg tablet Take 150 mg by mouth every eight (8) hours. Yes Historical Provider   zolpidem CR (AMBIEN CR) 12.5 mg tablet Take 12.5 mg by mouth nightly as needed. Yes Historical Provider   TADALAFIL (CIALIS PO) Take 20 mg by mouth as needed. Yes Historical Provider   amLODIPine (NORVASC) 10 mg tablet Take 10 mg by mouth daily. Yes Historical Provider   tapentadol (NUCYNTA) 100 mg tablet Take 100 mg by mouth three (3) times daily. Yes Goldy Diaz MD   aripiprazole (ABILIFY) 5 mg tablet Take 2.5 mg by mouth every morning. Yes Goldy Diaz MD   buPROPion XL (WELLBUTRIN XL) 150 mg tablet Take 1 Tab by mouth daily (with dinner).  4/25/10  Yes Laura Smith MD   REVLIMID 5 mg cap  8/10/17   Historical Provider   REVLIMID 10 mg cap  7/31/17   Historical Provider          Allergies   Allergen Reactions    Mercury (Bulk) Hives     Blisters             Objective:     Vitals:    12/06/17 1119   BP: 131/85   Pulse: (!) 50   Resp: 14   Temp: 98.4 °F (36.9 °C)   TempSrc: Oral   SpO2: 95%   Weight: 202 lb 3.2 oz (91.7 kg)   Height: 5' 8.11\" (1.73 m)          Physical Exam:    GENERAL: alert, cooperative  EYE: negative  LYMPHATIC: Cervical, supraclavicular, and axillary nodes normal.   THROAT & NECK: normal and no erythema or exudates noted. LUNG: clear to auscultation bilaterally  HEART: regular rate and rhythm  ABDOMEN: soft, non-tender  EXTREMITIES: bilateral LE edema  SKIN: Normal.  NEUROLOGIC: negative      Lab Results   Component Value Date/Time    WBC 3.4 2017 11:15 AM    HGB 12.0 2017 11:15 AM    HCT 38.2 2017 11:15 AM    PLATELET 225 53/15/4094 11:15 AM    MCV 95.3 2017 11:15 AM       Lab Results   Component Value Date/Time    Sodium 141 2017 11:15 AM    Potassium 4.1 2017 11:15 AM    Chloride 106 2017 11:15 AM    CO2 26 2017 11:15 AM    Anion gap 9 2017 11:15 AM    Glucose 206 2017 11:15 AM    BUN 14 2017 11:15 AM    Creatinine 1.39 2017 11:15 AM    BUN/Creatinine ratio 10 2017 11:15 AM    GFR est AA >60 2017 11:15 AM    GFR est non-AA 52 2017 11:15 AM    Calcium 8.6 2017 11:15 AM         M-spike: 5.8 => 2.9 => 1.9 => 2.1 => 2.8 => 1.4 => 0.9    Serum F => 496 => 246 => 305 => 314 => 200 => 114       Assessment:     1. Multiple Myeloma, IgA Kappa   Durie [place] stage IIIB    Cytogenetics/FISH: t(14;16) - high risk  ECOG PS - 0   Intent of therapy: palliative    Received 3 cycles of RVd   Dose reduced Revlimid and Velcade d/t continued cytopenias. M-spike started rising. This is primary refractory myeloma. Carries poor prognosis    He received one cycle of VD-PACE (bortezomib, dexamethasone, thalidomide, cisplatin, adriamycin, cyclophosphamide, and etoposide - will omit thalidomide). Achieved NV with once cycle    He has seen Dr. Naima Griffin at [facility] for an auto-transplant. We shall plan on getting him into a deeper response prior to an auto-transplant. Receiving KPd - Cycle 3 Day 8    Tolerating treatment very well  Denies any side effects. A detailed system by system evaluation of side effect was performed to assess chemotherapy related toxicity. Blood counts are acceptable.  Results reviewed with the patient    Symptom management form reviewed with patient. 2. Anemia. Myeloma and chemotherapy related    Observation      3. Renal insufficiency    Observation        Plan:       > Continue Carfilzomib/Pom/Dex  > Auto-transplant in the future  > Follow-up with [facility] transplant team  > Follow-up in 4 weeks      . Signed by: Janet Zacarias MD                     December 6, 2017        CC. Primo Cummins MD  CC.  Kranthi Campo MD

## 2017-12-07 ENCOUNTER — HOSPITAL ENCOUNTER (OUTPATIENT)
Dept: INFUSION THERAPY | Age: 62
Discharge: HOME | End: 2017-12-07
Payer: MEDICARE

## 2017-12-07 VITALS
HEART RATE: 86 BPM | DIASTOLIC BLOOD PRESSURE: 79 MMHG | HEIGHT: 68 IN | RESPIRATION RATE: 18 BRPM | BODY MASS INDEX: 30.57 KG/M2 | SYSTOLIC BLOOD PRESSURE: 117 MMHG | OXYGEN SATURATION: 95 % | TEMPERATURE: 98.8 F | WEIGHT: 201.7 LBS

## 2017-12-07 LAB
BASO+EOS+MONOS # BLD AUTO: 0.1 K/UL (ref 0.2–1.2)
BASO+EOS+MONOS # BLD AUTO: 3 % (ref 3.2–16.9)
DIFFERENTIAL METHOD BLD: ABNORMAL
ERYTHROCYTE [DISTWIDTH] IN BLOOD BY AUTOMATED COUNT: 16.9 % (ref 11.8–15.8)
HCT VFR BLD AUTO: 40.4 % (ref 36.6–50.3)
HGB BLD-MCNC: 12.9 G/DL (ref 12.1–17)
LYMPHOCYTES # BLD: 0.4 K/UL (ref 0.8–3.5)
LYMPHOCYTES NFR BLD: 12 % (ref 12–49)
MCH RBC QN AUTO: 30.1 PG (ref 26–34)
MCHC RBC AUTO-ENTMCNC: 31.9 G/DL (ref 30–36.5)
MCV RBC AUTO: 94.4 FL (ref 80–99)
NEUTS SEG # BLD: 2.7 K/UL (ref 1.8–8)
NEUTS SEG NFR BLD: 86 % (ref 32–75)
PLATELET # BLD AUTO: 181 K/UL (ref 150–400)
RBC # BLD AUTO: 4.28 M/UL (ref 4.1–5.7)
WBC # BLD AUTO: 3.2 K/UL (ref 4.1–11.1)

## 2017-12-07 PROCEDURE — 74011000250 HC RX REV CODE- 250: Performed by: INTERNAL MEDICINE

## 2017-12-07 PROCEDURE — 74011250636 HC RX REV CODE- 250/636: Performed by: INTERNAL MEDICINE

## 2017-12-07 PROCEDURE — 36415 COLL VENOUS BLD VENIPUNCTURE: CPT | Performed by: INTERNAL MEDICINE

## 2017-12-07 PROCEDURE — 96413 CHEMO IV INFUSION 1 HR: CPT

## 2017-12-07 PROCEDURE — 85025 COMPLETE CBC W/AUTO DIFF WBC: CPT | Performed by: INTERNAL MEDICINE

## 2017-12-07 PROCEDURE — 74011250636 HC RX REV CODE- 250/636

## 2017-12-07 PROCEDURE — 74011000258 HC RX REV CODE- 258: Performed by: INTERNAL MEDICINE

## 2017-12-07 PROCEDURE — 77030012965 HC NDL HUBR BBMI -A

## 2017-12-07 RX ORDER — HEPARIN 100 UNIT/ML
500 SYRINGE INTRAVENOUS AS NEEDED
Status: ACTIVE | OUTPATIENT
Start: 2017-12-07 | End: 2017-12-08

## 2017-12-07 RX ORDER — SODIUM CHLORIDE 9 MG/ML
10 INJECTION INTRAMUSCULAR; INTRAVENOUS; SUBCUTANEOUS AS NEEDED
Status: DISPENSED | OUTPATIENT
Start: 2017-12-07 | End: 2017-12-08

## 2017-12-07 RX ORDER — SODIUM CHLORIDE 9 MG/ML
25 INJECTION, SOLUTION INTRAVENOUS AS NEEDED
Status: SHIPPED | OUTPATIENT
Start: 2017-12-07 | End: 2017-12-08

## 2017-12-07 RX ORDER — SODIUM CHLORIDE 0.9 % (FLUSH) 0.9 %
5-10 SYRINGE (ML) INJECTION AS NEEDED
Status: ACTIVE | OUTPATIENT
Start: 2017-12-07 | End: 2017-12-08

## 2017-12-07 RX ADMIN — SODIUM CHLORIDE 25 ML/HR: 900 INJECTION, SOLUTION INTRAVENOUS at 11:30

## 2017-12-07 RX ADMIN — SODIUM CHLORIDE 10 ML: 9 INJECTION INTRAMUSCULAR; INTRAVENOUS; SUBCUTANEOUS at 11:05

## 2017-12-07 RX ADMIN — CARFILZOMIB 50 MG: 60 INJECTION, POWDER, LYOPHILIZED, FOR SOLUTION INTRAVENOUS at 12:13

## 2017-12-07 RX ADMIN — Medication 10 ML: at 11:05

## 2017-12-07 RX ADMIN — Medication 500 UNITS: at 12:54

## 2017-12-07 RX ADMIN — Medication 10 ML: at 12:54

## 2017-12-07 NOTE — PROGRESS NOTES
1055 Pt arrived at [facility] ambulatory and in no distress for C3D8. Assessment completed, no new complaints voiced. Port accessed per protocol with positive blood return. Patient Vitals for the past 12 hrs:   Temp Pulse Resp BP SpO2   12/07/17 1250 - 86 - 117/79 -   12/07/17 1056 98.8 °F (37.1 °C) (!) 110 18 (!) 139/92 95 %     Recent Results (from the past 12 hour(s))   CBC WITH 3 PART DIFF    Collection Time: 12/07/17 11:10 AM   Result Value Ref Range    WBC 3.2 (L) 4.1 - 11.1 K/uL    RBC 4.28 4.10 - 5.70 M/uL    HGB 12.9 12.1 - 17.0 g/dL    HCT 40.4 36.6 - 50.3 %    MCV 94.4 80.0 - 99.0 FL    MCH 30.1 26.0 - 34.0 PG    MCHC 31.9 30.0 - 36.5 g/dL    RDW 16.9 (H) 11.8 - 15.8 %    PLATELET 570 700 - 467 K/uL    NEUTROPHILS 86 (H) 32 - 75 %    MIXED CELLS 3 (L) 3.2 - 16.9 %    LYMPHOCYTES 12 12 - 49 %    ABS. NEUTROPHILS 2.7 1.8 - 8.0 K/UL    ABS. MIXED CELLS 0.1 (L) 0.2 - 1.2 K/uL    ABS. LYMPHOCYTES 0.4 (L) 0.8 - 3.5 K/UL    DF AUTOMATED         Medications received:  NS   Kyprolis 50 mg IV    1255 Tolerated treatment well, no adverse reaction noted. Port flushed and de-accessed. D/Cd from [facility] ambulatory and in no distress accompanied by self. Next appt 12/8.

## 2017-12-08 ENCOUNTER — HOSPITAL ENCOUNTER (OUTPATIENT)
Dept: INFUSION THERAPY | Age: 62
Discharge: HOME | End: 2017-12-08
Payer: MEDICARE

## 2017-12-08 VITALS
TEMPERATURE: 98.5 F | SYSTOLIC BLOOD PRESSURE: 120 MMHG | RESPIRATION RATE: 18 BRPM | DIASTOLIC BLOOD PRESSURE: 77 MMHG | OXYGEN SATURATION: 99 % | HEART RATE: 77 BPM

## 2017-12-08 PROCEDURE — 74011250636 HC RX REV CODE- 250/636: Performed by: INTERNAL MEDICINE

## 2017-12-08 PROCEDURE — 77030012965 HC NDL HUBR BBMI -A

## 2017-12-08 PROCEDURE — 74011250636 HC RX REV CODE- 250/636

## 2017-12-08 PROCEDURE — 74011000258 HC RX REV CODE- 258: Performed by: INTERNAL MEDICINE

## 2017-12-08 PROCEDURE — 96413 CHEMO IV INFUSION 1 HR: CPT

## 2017-12-08 RX ADMIN — CARFILZOMIB 50 MG: 60 INJECTION, POWDER, LYOPHILIZED, FOR SOLUTION INTRAVENOUS at 11:38

## 2017-12-08 NOTE — PROGRESS NOTES
Outpatient Infusion Center - Chemotherapy Progress Note    1120 Pt admit to [facility] for C3D9 CPD ambulatory in stable condition. Assessment completed. No new concerns voiced. Port accessed with 20 G 0.75 inch eddy needle with positive blood return. Medications:  Normal saline KVO  Kyprolis 50 MG -infused over 30 minutes    1225 Pt tolerated treatment well. Port maintained positive blood return throughout treatment, flushed with positive blood return at conclusion and eddy needle was removed, site covered with 2x2 guaze and band aid. D/c home ambulatory in no distress. Pt aware of next [facility] appointment scheduled for 12/14/17.   Patient Vitals for the past 12 hrs:   Temp Pulse Resp BP SpO2   12/08/17 1217 - 77 18 120/77 -   12/08/17 1123 98.5 °F (36.9 °C) (!) 54 18 123/74 99 %

## 2017-12-14 ENCOUNTER — HOSPITAL ENCOUNTER (OUTPATIENT)
Dept: INFUSION THERAPY | Age: 62
Discharge: HOME | End: 2017-12-14
Payer: MEDICARE

## 2017-12-14 VITALS
TEMPERATURE: 98.7 F | DIASTOLIC BLOOD PRESSURE: 76 MMHG | SYSTOLIC BLOOD PRESSURE: 132 MMHG | BODY MASS INDEX: 30.43 KG/M2 | HEART RATE: 89 BPM | OXYGEN SATURATION: 97 % | HEIGHT: 68 IN | WEIGHT: 200.8 LBS | RESPIRATION RATE: 16 BRPM

## 2017-12-14 LAB
BASOPHILS # BLD: 0 K/UL (ref 0–0.1)
BASOPHILS NFR BLD: 0 % (ref 0–1)
DIFFERENTIAL METHOD BLD: ABNORMAL
EOSINOPHIL # BLD: 0 K/UL (ref 0–0.4)
EOSINOPHIL NFR BLD: 0 % (ref 0–7)
ERYTHROCYTE [DISTWIDTH] IN BLOOD BY AUTOMATED COUNT: 16 % (ref 11.5–14.5)
HCT VFR BLD AUTO: 39.6 % (ref 36.6–50.3)
HGB BLD-MCNC: 12.6 G/DL (ref 12.1–17)
LYMPHOCYTES # BLD: 0.5 K/UL (ref 0.8–3.5)
LYMPHOCYTES NFR BLD: 15 % (ref 12–49)
MCH RBC QN AUTO: 29.6 PG (ref 26–34)
MCHC RBC AUTO-ENTMCNC: 31.8 G/DL (ref 30–36.5)
MCV RBC AUTO: 93 FL (ref 80–99)
MONOCYTES # BLD: 0.2 K/UL (ref 0–1)
MONOCYTES NFR BLD: 5 % (ref 5–13)
NEUTS SEG # BLD: 2.7 K/UL (ref 1.8–8)
NEUTS SEG NFR BLD: 80 % (ref 32–75)
PLATELET # BLD AUTO: 156 K/UL (ref 150–400)
RBC # BLD AUTO: 4.26 M/UL (ref 4.1–5.7)
RBC MORPH BLD: ABNORMAL
WBC # BLD AUTO: 3.4 K/UL (ref 4.1–11.1)

## 2017-12-14 PROCEDURE — 85025 COMPLETE CBC W/AUTO DIFF WBC: CPT | Performed by: INTERNAL MEDICINE

## 2017-12-14 PROCEDURE — 77030012965 HC NDL HUBR BBMI -A

## 2017-12-14 PROCEDURE — 74011000250 HC RX REV CODE- 250: Performed by: INTERNAL MEDICINE

## 2017-12-14 PROCEDURE — 74011250636 HC RX REV CODE- 250/636: Performed by: INTERNAL MEDICINE

## 2017-12-14 PROCEDURE — 36415 COLL VENOUS BLD VENIPUNCTURE: CPT | Performed by: INTERNAL MEDICINE

## 2017-12-14 PROCEDURE — 96413 CHEMO IV INFUSION 1 HR: CPT

## 2017-12-14 PROCEDURE — 74011250636 HC RX REV CODE- 250/636

## 2017-12-14 PROCEDURE — 74011000258 HC RX REV CODE- 258: Performed by: INTERNAL MEDICINE

## 2017-12-14 RX ORDER — SODIUM CHLORIDE 9 MG/ML
10 INJECTION INTRAMUSCULAR; INTRAVENOUS; SUBCUTANEOUS AS NEEDED
Status: DISPENSED | OUTPATIENT
Start: 2017-12-14 | End: 2017-12-15

## 2017-12-14 RX ORDER — HEPARIN 100 UNIT/ML
500 SYRINGE INTRAVENOUS AS NEEDED
Status: ACTIVE | OUTPATIENT
Start: 2017-12-14 | End: 2017-12-15

## 2017-12-14 RX ORDER — SODIUM CHLORIDE 0.9 % (FLUSH) 0.9 %
10-40 SYRINGE (ML) INJECTION AS NEEDED
Status: ACTIVE | OUTPATIENT
Start: 2017-12-14 | End: 2017-12-15

## 2017-12-14 RX ADMIN — CARFILZOMIB 50 MG: 60 INJECTION, POWDER, LYOPHILIZED, FOR SOLUTION INTRAVENOUS at 13:18

## 2017-12-14 RX ADMIN — Medication 10 ML: at 14:02

## 2017-12-14 RX ADMIN — Medication 500 UNITS: at 14:02

## 2017-12-14 RX ADMIN — SODIUM CHLORIDE 10 ML: 9 INJECTION INTRAMUSCULAR; INTRAVENOUS; SUBCUTANEOUS at 11:10

## 2017-12-14 NOTE — PROGRESS NOTES
[facility] VISIT NOTE    1055. Pt arrived at [facility] ambulatory and in no distress for Kyprolis C3D15. Assessment completed, pt c/o swelling in hands in the morning. Otherwise, no other complaints. RCW chest port accessed with . 75 in eddy with no difficulty. Positive blood return noted and labs drawn. Patient Vitals for the past 12 hrs:   Temp Pulse Resp BP SpO2   12/14/17 1358 98.7 °F (37.1 °C) 89 16 132/76 97 %   12/14/17 1056 98.8 °F (37.1 °C) 99 16 134/90 97 %         Recent Results (from the past 12 hour(s))   CBC WITH AUTOMATED DIFF    Collection Time: 12/14/17 11:24 AM   Result Value Ref Range    WBC 3.4 (L) 4.1 - 11.1 K/uL    RBC 4.26 4.10 - 5.70 M/uL    HGB 12.6 12.1 - 17.0 g/dL    HCT 39.6 36.6 - 50.3 %    MCV 93.0 80.0 - 99.0 FL    MCH 29.6 26.0 - 34.0 PG    MCHC 31.8 30.0 - 36.5 g/dL    RDW 16.0 (H) 11.5 - 14.5 %    PLATELET 031 155 - 339 K/uL    NEUTROPHILS 80 (H) 32 - 75 %    LYMPHOCYTES 15 12 - 49 %    MONOCYTES 5 5 - 13 %    EOSINOPHILS 0 0 - 7 %    BASOPHILS 0 0 - 1 %    ABS. NEUTROPHILS 2.7 1.8 - 8.0 K/UL    ABS. LYMPHOCYTES 0.5 (L) 0.8 - 3.5 K/UL    ABS. MONOCYTES 0.2 0.0 - 1.0 K/UL    ABS. EOSINOPHILS 0.0 0.0 - 0.4 K/UL    ABS. BASOPHILS 0.0 0.0 - 0.1 K/UL    RBC COMMENTS NORMOCYTIC, NORMOCHROMIC      DF SMEAR SCANNED         Medications received:  Kyprolis IV    Tolerated treatment well, no adverse reaction noted. Port de-accessed and flushed per protocol. Positive blood return noted. 1405. D/C'd from [facility] ambulatory and in no distress.  Next appointment is 12/15/17 at 1:00 pm.

## 2017-12-15 ENCOUNTER — HOSPITAL ENCOUNTER (OUTPATIENT)
Dept: INFUSION THERAPY | Age: 62
Discharge: HOME | End: 2017-12-15
Payer: MEDICARE

## 2017-12-15 VITALS
HEART RATE: 80 BPM | HEIGHT: 68 IN | TEMPERATURE: 98.2 F | DIASTOLIC BLOOD PRESSURE: 78 MMHG | WEIGHT: 204.1 LBS | RESPIRATION RATE: 18 BRPM | BODY MASS INDEX: 30.93 KG/M2 | SYSTOLIC BLOOD PRESSURE: 132 MMHG

## 2017-12-15 PROCEDURE — 74011250636 HC RX REV CODE- 250/636

## 2017-12-15 PROCEDURE — 74011250636 HC RX REV CODE- 250/636: Performed by: INTERNAL MEDICINE

## 2017-12-15 PROCEDURE — 96413 CHEMO IV INFUSION 1 HR: CPT

## 2017-12-15 PROCEDURE — 77030012965 HC NDL HUBR BBMI -A

## 2017-12-15 PROCEDURE — 74011000250 HC RX REV CODE- 250: Performed by: INTERNAL MEDICINE

## 2017-12-15 PROCEDURE — 74011000258 HC RX REV CODE- 258: Performed by: INTERNAL MEDICINE

## 2017-12-15 RX ORDER — HEPARIN 100 UNIT/ML
500 SYRINGE INTRAVENOUS AS NEEDED
Status: ACTIVE | OUTPATIENT
Start: 2017-12-15 | End: 2017-12-16

## 2017-12-15 RX ORDER — SODIUM CHLORIDE 0.9 % (FLUSH) 0.9 %
5-10 SYRINGE (ML) INJECTION AS NEEDED
Status: ACTIVE | OUTPATIENT
Start: 2017-12-15 | End: 2017-12-16

## 2017-12-15 RX ORDER — SODIUM CHLORIDE 9 MG/ML
10 INJECTION INTRAMUSCULAR; INTRAVENOUS; SUBCUTANEOUS AS NEEDED
Status: ACTIVE | OUTPATIENT
Start: 2017-12-15 | End: 2017-12-16

## 2017-12-15 RX ADMIN — CARFILZOMIB 50 MG: 60 INJECTION, POWDER, LYOPHILIZED, FOR SOLUTION INTRAVENOUS at 13:24

## 2017-12-15 RX ADMIN — SODIUM CHLORIDE 10 ML: 9 INJECTION INTRAMUSCULAR; INTRAVENOUS; SUBCUTANEOUS at 13:20

## 2017-12-15 RX ADMIN — Medication 500 UNITS: at 14:07

## 2017-12-15 RX ADMIN — Medication 10 ML: at 14:07

## 2017-12-15 NOTE — PROGRESS NOTES
1310 Pt arrived at [facility] ambulatory and in no distress for C3D16. Assessment completed, no new complaints voiced. Port accessed per protocol with positive blood return. Visit Vitals    /78    Pulse 80    Temp 98.2 °F (36.8 °C)    Resp 18    Ht 5' 8.11\" (1.73 m)    Wt 92.6 kg (204 lb 1.6 oz)    BMI 30.93 kg/m2       Medications received:  Carfilzomib 50 mg IV    1410 Tolerated treatment well, no adverse reaction noted. D/Cd from [facility] ambulatory and in no distress accompanied by self. Next appt 12/28.

## 2017-12-28 ENCOUNTER — HOSPITAL ENCOUNTER (OUTPATIENT)
Dept: INFUSION THERAPY | Age: 62
Discharge: HOME | End: 2017-12-28
Payer: MEDICARE

## 2017-12-28 VITALS
HEIGHT: 68 IN | SYSTOLIC BLOOD PRESSURE: 116 MMHG | RESPIRATION RATE: 18 BRPM | HEART RATE: 95 BPM | TEMPERATURE: 99.5 F | BODY MASS INDEX: 31.14 KG/M2 | DIASTOLIC BLOOD PRESSURE: 75 MMHG | WEIGHT: 205.5 LBS

## 2017-12-28 LAB
ALBUMIN SERPL-MCNC: 3.8 G/DL (ref 3.5–5)
ALBUMIN/GLOB SERPL: 1.2 {RATIO} (ref 1.1–2.2)
ALP SERPL-CCNC: 84 U/L (ref 45–117)
ALT SERPL-CCNC: 28 U/L (ref 12–78)
ANION GAP SERPL CALC-SCNC: 9 MMOL/L (ref 5–15)
AST SERPL-CCNC: 18 U/L (ref 15–37)
BASOPHILS # BLD: 0 K/UL
BASOPHILS NFR BLD: 0 %
BILIRUB DIRECT SERPL-MCNC: 0.2 MG/DL (ref 0–0.2)
BILIRUB SERPL-MCNC: 0.4 MG/DL (ref 0.2–1)
BUN SERPL-MCNC: 8 MG/DL (ref 6–20)
BUN/CREAT SERPL: 7 (ref 12–20)
CALCIUM SERPL-MCNC: 8 MG/DL (ref 8.5–10.1)
CHLORIDE SERPL-SCNC: 105 MMOL/L (ref 97–108)
CO2 SERPL-SCNC: 25 MMOL/L (ref 21–32)
CREAT SERPL-MCNC: 1.18 MG/DL (ref 0.7–1.3)
DIFFERENTIAL METHOD BLD: ABNORMAL
EOSINOPHIL # BLD: 0.1 K/UL
EOSINOPHIL NFR BLD: 1 %
ERYTHROCYTE [DISTWIDTH] IN BLOOD BY AUTOMATED COUNT: 15.6 % (ref 11.5–14.5)
GLOBULIN SER CALC-MCNC: 3.1 G/DL (ref 2–4)
GLUCOSE SERPL-MCNC: 192 MG/DL (ref 65–100)
HCT VFR BLD AUTO: 38.7 % (ref 36.6–50.3)
HGB BLD-MCNC: 12.3 G/DL (ref 12.1–17)
IGA SERPL-MCNC: 197 MG/DL (ref 70–400)
IGG SERPL-MCNC: 331 MG/DL (ref 700–1600)
IGM SERPL-MCNC: <21 MG/DL (ref 40–230)
LYMPHOCYTES # BLD: 0.9 K/UL
LYMPHOCYTES NFR BLD: 16 %
MCH RBC QN AUTO: 29.8 PG (ref 26–34)
MCHC RBC AUTO-ENTMCNC: 31.8 G/DL (ref 30–36.5)
MCV RBC AUTO: 93.7 FL (ref 80–99)
MONOCYTES # BLD: 1.3 K/UL
MONOCYTES NFR BLD: 23 %
NEUTS SEG # BLD: 3.5 K/UL
NEUTS SEG NFR BLD: 60 %
PLATELET # BLD AUTO: 188 K/UL (ref 150–400)
POTASSIUM SERPL-SCNC: 4.4 MMOL/L (ref 3.5–5.1)
PROT SERPL-MCNC: 6.9 G/DL (ref 6.4–8.2)
RBC # BLD AUTO: 4.13 M/UL (ref 4.1–5.7)
RBC MORPH BLD: ABNORMAL
SODIUM SERPL-SCNC: 139 MMOL/L (ref 136–145)
WBC # BLD AUTO: 5.8 K/UL (ref 4.1–11.1)

## 2017-12-28 PROCEDURE — 96413 CHEMO IV INFUSION 1 HR: CPT

## 2017-12-28 PROCEDURE — 74011250636 HC RX REV CODE- 250/636

## 2017-12-28 PROCEDURE — 82784 ASSAY IGA/IGD/IGG/IGM EACH: CPT | Performed by: INTERNAL MEDICINE

## 2017-12-28 PROCEDURE — 84165 PROTEIN E-PHORESIS SERUM: CPT | Performed by: INTERNAL MEDICINE

## 2017-12-28 PROCEDURE — 74011250636 HC RX REV CODE- 250/636: Performed by: INTERNAL MEDICINE

## 2017-12-28 PROCEDURE — 74011000250 HC RX REV CODE- 250: Performed by: INTERNAL MEDICINE

## 2017-12-28 PROCEDURE — 36415 COLL VENOUS BLD VENIPUNCTURE: CPT | Performed by: INTERNAL MEDICINE

## 2017-12-28 PROCEDURE — 80076 HEPATIC FUNCTION PANEL: CPT | Performed by: INTERNAL MEDICINE

## 2017-12-28 PROCEDURE — 74011000258 HC RX REV CODE- 258: Performed by: INTERNAL MEDICINE

## 2017-12-28 PROCEDURE — 83883 ASSAY NEPHELOMETRY NOT SPEC: CPT | Performed by: INTERNAL MEDICINE

## 2017-12-28 PROCEDURE — 77030012965 HC NDL HUBR BBMI -A

## 2017-12-28 PROCEDURE — 85025 COMPLETE CBC W/AUTO DIFF WBC: CPT | Performed by: INTERNAL MEDICINE

## 2017-12-28 PROCEDURE — 80048 BASIC METABOLIC PNL TOTAL CA: CPT | Performed by: INTERNAL MEDICINE

## 2017-12-28 RX ORDER — HEPARIN 100 UNIT/ML
500 SYRINGE INTRAVENOUS AS NEEDED
Status: ACTIVE | OUTPATIENT
Start: 2017-12-28 | End: 2017-12-29

## 2017-12-28 RX ORDER — SODIUM CHLORIDE 0.9 % (FLUSH) 0.9 %
10-40 SYRINGE (ML) INJECTION AS NEEDED
Status: ACTIVE | OUTPATIENT
Start: 2017-12-28 | End: 2017-12-29

## 2017-12-28 RX ORDER — SODIUM CHLORIDE 9 MG/ML
10 INJECTION INTRAMUSCULAR; INTRAVENOUS; SUBCUTANEOUS AS NEEDED
Status: ACTIVE | OUTPATIENT
Start: 2017-12-28 | End: 2017-12-29

## 2017-12-28 RX ADMIN — Medication 500 UNITS: at 13:42

## 2017-12-28 RX ADMIN — Medication 10 ML: at 11:16

## 2017-12-28 RX ADMIN — SODIUM CHLORIDE 100 ML: 900 INJECTION, SOLUTION INTRAVENOUS at 12:57

## 2017-12-28 RX ADMIN — SODIUM CHLORIDE 10 ML: 9 INJECTION INTRAMUSCULAR; INTRAVENOUS; SUBCUTANEOUS at 11:16

## 2017-12-28 RX ADMIN — CARFILZOMIB 50 MG: 60 INJECTION, POWDER, LYOPHILIZED, FOR SOLUTION INTRAVENOUS at 13:00

## 2017-12-28 RX ADMIN — Medication 10 ML: at 13:42

## 2017-12-28 NOTE — PROGRESS NOTES
Pt arrived to [facility] ambulatory for Svépomoc 219 in no acute distress at 1105.  Assessment unremarkable except congestion from a cold. R chest port accessed without issue and positive blood return noted.  Labs obtained- CBCap (failed, CBC with diff sent to lab), BMP, Hepatic Function Panel, SPEP, Immunoglobulins QT, Immunofixation, and Free light chains. Visit Vitals    /90 (BP 1 Location: Left arm, BP Patient Position: Sitting)    Pulse (!) 110    Temp 99.5 °F (37.5 °C)    Resp 18    Ht 5' 8.11\" (1.73 m)    Wt 93.2 kg (205 lb 8 oz)    BMI 31.15 kg/m2     Recent Results (from the past 12 hour(s))   METABOLIC PANEL, BASIC    Collection Time: 12/28/17 11:09 AM   Result Value Ref Range    Sodium 139 136 - 145 mmol/L    Potassium 4.4 3.5 - 5.1 mmol/L    Chloride 105 97 - 108 mmol/L    CO2 25 21 - 32 mmol/L    Anion gap 9 5 - 15 mmol/L    Glucose 192 (H) 65 - 100 mg/dL    BUN 8 6 - 20 MG/DL    Creatinine 1.18 0.70 - 1.30 MG/DL    BUN/Creatinine ratio 7 (L) 12 - 20      GFR est AA >60 >60 ml/min/1.73m2    GFR est non-AA >60 >60 ml/min/1.73m2    Calcium 8.0 (L) 8.5 - 10.1 MG/DL   HEPATIC FUNCTION PANEL    Collection Time: 12/28/17 11:09 AM   Result Value Ref Range    Protein, total 6.9 6.4 - 8.2 g/dL    Albumin 3.8 3.5 - 5.0 g/dL    Globulin 3.1 2.0 - 4.0 g/dL    A-G Ratio 1.2 1.1 - 2.2      Bilirubin, total 0.4 0.2 - 1.0 MG/DL    Bilirubin, direct 0.2 0.0 - 0.2 MG/DL    Alk. phosphatase 84 45 - 117 U/L    AST (SGOT) 18 15 - 37 U/L    ALT (SGPT) 28 12 - 78 U/L   CBC WITH AUTOMATED DIFF    Collection Time: 12/28/17 11:09 AM   Result Value Ref Range    WBC 5.8 4.1 - 11.1 K/uL    RBC 4.13 4.10 - 5.70 M/uL    HGB 12.3 12.1 - 17.0 g/dL    HCT 38.7 36.6 - 50.3 %    MCV 93.7 80.0 - 99.0 FL    MCH 29.8 26.0 - 34.0 PG    MCHC 31.8 30.0 - 36.5 g/dL    RDW 15.6 (H) 11.5 - 14.5 %    PLATELET 369 790 - 153 K/uL    NEUTROPHILS 60 %    LYMPHOCYTES 16 %    MONOCYTES 23 %    EOSINOPHILS 1 %    BASOPHILS 0 %    ABS. NEUTROPHILS 3.5 K/UL    ABS. LYMPHOCYTES 0.9 K/UL    ABS. MONOCYTES 1.3 K/UL    ABS. EOSINOPHILS 0.1 K/UL    ABS. BASOPHILS 0.0 K/UL    RBC COMMENTS NORMOCYTIC, NORMOCHROMIC      DF MANUAL       Other labs pending at time of note. See [facility] for results. The following medications administered:  NS @ KVO  Kyprolis 50mg IV over 30 minutes    Visit Vitals    /75 (BP 1 Location: Left arm, BP Patient Position: Sitting)    Pulse 95    Temp 99.5 °F (37.5 °C)    Resp 18    Ht 5' 8.11\" (1.73 m)    Wt 93.2 kg (205 lb 8 oz)    BMI 31.15 kg/m2       Pt tolerated treatment well.  No adverse reaction noted. Port flushed per policy and needle removed, 2x2 and paper tape placed.  Pt discharged ambulatory in no acute distress at 1345, accompanied by self. Next appointment 12/29/17 @ 1100.

## 2017-12-29 ENCOUNTER — HOSPITAL ENCOUNTER (OUTPATIENT)
Dept: INFUSION THERAPY | Age: 62
Discharge: HOME | End: 2017-12-29
Payer: MEDICARE

## 2017-12-29 VITALS
DIASTOLIC BLOOD PRESSURE: 80 MMHG | BODY MASS INDEX: 30.93 KG/M2 | RESPIRATION RATE: 18 BRPM | WEIGHT: 204.1 LBS | OXYGEN SATURATION: 98 % | HEIGHT: 68 IN | TEMPERATURE: 98.5 F | SYSTOLIC BLOOD PRESSURE: 129 MMHG | HEART RATE: 95 BPM

## 2017-12-29 PROCEDURE — 74011000258 HC RX REV CODE- 258: Performed by: INTERNAL MEDICINE

## 2017-12-29 PROCEDURE — 74011250636 HC RX REV CODE- 250/636: Performed by: INTERNAL MEDICINE

## 2017-12-29 PROCEDURE — 74011000250 HC RX REV CODE- 250: Performed by: INTERNAL MEDICINE

## 2017-12-29 PROCEDURE — 74011250636 HC RX REV CODE- 250/636

## 2017-12-29 PROCEDURE — 77030012965 HC NDL HUBR BBMI -A

## 2017-12-29 PROCEDURE — 96413 CHEMO IV INFUSION 1 HR: CPT

## 2017-12-29 RX ORDER — SODIUM CHLORIDE 0.9 % (FLUSH) 0.9 %
10-40 SYRINGE (ML) INJECTION AS NEEDED
Status: SHIPPED | OUTPATIENT
Start: 2017-12-29 | End: 2017-12-30

## 2017-12-29 RX ORDER — SODIUM CHLORIDE 9 MG/ML
10 INJECTION INTRAMUSCULAR; INTRAVENOUS; SUBCUTANEOUS AS NEEDED
Status: SHIPPED | OUTPATIENT
Start: 2017-12-29 | End: 2017-12-30

## 2017-12-29 RX ORDER — HEPARIN 100 UNIT/ML
500 SYRINGE INTRAVENOUS AS NEEDED
Status: SHIPPED | OUTPATIENT
Start: 2017-12-29 | End: 2017-12-30

## 2017-12-29 RX ADMIN — SODIUM CHLORIDE 10 ML: 9 INJECTION INTRAMUSCULAR; INTRAVENOUS; SUBCUTANEOUS at 11:30

## 2017-12-29 RX ADMIN — SODIUM CHLORIDE 100 ML: 900 INJECTION, SOLUTION INTRAVENOUS at 11:34

## 2017-12-29 RX ADMIN — Medication 10 ML: at 12:13

## 2017-12-29 RX ADMIN — Medication 500 UNITS: at 12:13

## 2017-12-29 RX ADMIN — CARFILZOMIB 50 MG: 60 INJECTION, POWDER, LYOPHILIZED, FOR SOLUTION INTRAVENOUS at 11:35

## 2017-12-29 RX ADMIN — Medication 10 ML: at 11:30

## 2017-12-29 NOTE — PROGRESS NOTES
Pt arrived to [facility] ambulatory for [address] in no acute distress at 1120.  Assessment unremarkable. R chest port accessed without issue and positive blood return noted.      Visit Vitals    /80 (BP 1 Location: Left arm, BP Patient Position: Sitting)    Pulse 95    Temp 98.5 °F (36.9 °C)    Resp 18    Ht 5' 8.11\" (1.73 m)    Wt 92.6 kg (204 lb 1.6 oz)    SpO2 98%    BMI 30.93 kg/m2       The following medications administered:  NS @ KVO  Kyprolis 50mg IV over 30 minutes    Pt tolerated treatment well.  No adverse reaction noted. Port flushed per policy and needle removed, 2x2 and paper tape placed.  Pt discharged ambulatory in no acute distress at 1215, accompanied by spouse. Next appointment 1/4/18 @ 1100.

## 2018-01-02 LAB
ALBUMIN SERPL ELPH-MCNC: 3.7 G/DL (ref 2.9–4.4)
ALBUMIN/GLOB SERPL: 1.4 {RATIO} (ref 0.7–1.7)
ALPHA1 GLOB SERPL ELPH-MCNC: 0.3 G/DL (ref 0–0.4)
ALPHA2 GLOB SERPL ELPH-MCNC: 1.1 G/DL (ref 0.4–1)
B-GLOBULIN SERPL ELPH-MCNC: 0.9 G/DL (ref 0.7–1.3)
GAMMA GLOB SERPL ELPH-MCNC: 0.4 G/DL (ref 0.4–1.8)
GLOBULIN SER CALC-MCNC: 2.6 G/DL (ref 2.2–3.9)
IGA SERPL-MCNC: 202 MG/DL (ref 90–386)
IGG SERPL-MCNC: 266 MG/DL (ref 700–1600)
IGM SERPL-MCNC: 14 MG/DL (ref 20–172)
KAPPA LC FREE SER-MCNC: 80.4 MG/L (ref 3.3–19.4)
KAPPA LC FREE/LAMBDA FREE SER: 9.24 {RATIO} (ref 0.26–1.65)
LAMBDA LC FREE SERPL-MCNC: 8.7 MG/L (ref 5.7–26.3)
M PROTEIN SERPL ELPH-MCNC: 0.1 G/DL
PROT PATTERN SERPL IFE-IMP: ABNORMAL
PROT SERPL-MCNC: 6.3 G/DL (ref 6–8.5)

## 2018-01-04 ENCOUNTER — HOSPITAL ENCOUNTER (OUTPATIENT)
Dept: INFUSION THERAPY | Age: 63
Discharge: HOME | End: 2018-01-04
Payer: COMMERCIAL

## 2018-01-04 VITALS
OXYGEN SATURATION: 99 % | HEIGHT: 68 IN | WEIGHT: 201.5 LBS | HEART RATE: 90 BPM | SYSTOLIC BLOOD PRESSURE: 131 MMHG | RESPIRATION RATE: 18 BRPM | TEMPERATURE: 98.2 F | DIASTOLIC BLOOD PRESSURE: 81 MMHG | BODY MASS INDEX: 30.54 KG/M2

## 2018-01-04 LAB
BASOPHILS # BLD: 0 K/UL (ref 0–0.1)
BASOPHILS NFR BLD: 0 % (ref 0–1)
DIFFERENTIAL METHOD BLD: ABNORMAL
EOSINOPHIL # BLD: 0.1 K/UL (ref 0–0.4)
EOSINOPHIL NFR BLD: 2 % (ref 0–7)
ERYTHROCYTE [DISTWIDTH] IN BLOOD BY AUTOMATED COUNT: 15.5 % (ref 11.5–14.5)
HCT VFR BLD AUTO: 40.2 % (ref 36.6–50.3)
HGB BLD-MCNC: 12.9 G/DL (ref 12.1–17)
LYMPHOCYTES # BLD: 0.9 K/UL (ref 0.8–3.5)
LYMPHOCYTES NFR BLD: 25 % (ref 12–49)
MCH RBC QN AUTO: 29.7 PG (ref 26–34)
MCHC RBC AUTO-ENTMCNC: 32.1 G/DL (ref 30–36.5)
MCV RBC AUTO: 92.4 FL (ref 80–99)
METAMYELOCYTES NFR BLD MANUAL: 1 %
MONOCYTES # BLD: 0.7 K/UL (ref 0–1)
MONOCYTES NFR BLD: 19 % (ref 5–13)
NEUTS BAND NFR BLD MANUAL: 2 % (ref 0–6)
NEUTS SEG # BLD: 2 K/UL (ref 1.8–8)
NEUTS SEG NFR BLD: 51 % (ref 32–75)
PLATELET # BLD AUTO: 214 K/UL (ref 150–400)
RBC # BLD AUTO: 4.35 M/UL (ref 4.1–5.7)
RBC MORPH BLD: ABNORMAL
WBC # BLD AUTO: 3.7 K/UL (ref 4.1–11.1)

## 2018-01-04 PROCEDURE — 77030012965 HC NDL HUBR BBMI -A

## 2018-01-04 PROCEDURE — 74011000258 HC RX REV CODE- 258: Performed by: INTERNAL MEDICINE

## 2018-01-04 PROCEDURE — 74011250636 HC RX REV CODE- 250/636: Performed by: INTERNAL MEDICINE

## 2018-01-04 PROCEDURE — 74011250636 HC RX REV CODE- 250/636

## 2018-01-04 PROCEDURE — 85025 COMPLETE CBC W/AUTO DIFF WBC: CPT | Performed by: INTERNAL MEDICINE

## 2018-01-04 PROCEDURE — 36415 COLL VENOUS BLD VENIPUNCTURE: CPT | Performed by: INTERNAL MEDICINE

## 2018-01-04 PROCEDURE — 96413 CHEMO IV INFUSION 1 HR: CPT

## 2018-01-04 RX ORDER — HEPARIN 100 UNIT/ML
500 SYRINGE INTRAVENOUS AS NEEDED
Status: ACTIVE | OUTPATIENT
Start: 2018-01-04 | End: 2018-01-05

## 2018-01-04 RX ORDER — SODIUM CHLORIDE 9 MG/ML
25 INJECTION, SOLUTION INTRAVENOUS AS NEEDED
Status: DISPENSED | OUTPATIENT
Start: 2018-01-04 | End: 2018-01-05

## 2018-01-04 RX ORDER — POMALIDOMIDE 2 MG/1
CAPSULE ORAL
Qty: 21 CAP | Refills: 0 | Status: SHIPPED | OUTPATIENT
Start: 2018-01-04 | End: 2018-11-01

## 2018-01-04 RX ORDER — SODIUM CHLORIDE 0.9 % (FLUSH) 0.9 %
10-40 SYRINGE (ML) INJECTION AS NEEDED
Status: ACTIVE | OUTPATIENT
Start: 2018-01-04 | End: 2018-01-05

## 2018-01-04 RX ORDER — DEXTROSE MONOHYDRATE 50 MG/ML
25 INJECTION, SOLUTION INTRAVENOUS AS NEEDED
Status: DISCONTINUED | OUTPATIENT
Start: 2018-01-04 | End: 2018-01-04

## 2018-01-04 RX ADMIN — Medication 10 ML: at 12:33

## 2018-01-04 RX ADMIN — Medication 500 UNITS: at 15:20

## 2018-01-04 RX ADMIN — Medication 10 ML: at 15:20

## 2018-01-04 RX ADMIN — CARFILZOMIB 50 MG: 60 INJECTION, POWDER, LYOPHILIZED, FOR SOLUTION INTRAVENOUS at 14:40

## 2018-01-04 RX ADMIN — Medication 10 ML: at 12:34

## 2018-01-04 RX ADMIN — SODIUM CHLORIDE 25 ML/HR: 900 INJECTION, SOLUTION INTRAVENOUS at 14:30

## 2018-01-04 NOTE — PROGRESS NOTES
Outpatient Infusion Center - Chemotherapy Progress Note    3170- Pt admit to [facility] for C4D8 Kyprolis ambulatory in stable condition. Assessment completed. No new concerns voiced. R chest port accessed with positive blood return. Labs drawn per order and sent. Line flushed, clamped, Curos Cap applied to end clave. Visit Vitals    /81 (BP 1 Location: Left arm, BP Patient Position: Sitting)    Pulse 90    Temp 98.2 °F (36.8 °C)    Resp 18    Ht 5' 8.11\" (1.73 m)    Wt 91.4 kg (201 lb 8 oz)    SpO2 99%    BMI 30.54 kg/m2     Recent Results (from the past 12 hour(s))   CBC WITH AUTOMATED DIFF    Collection Time: 01/04/18 12:32 PM   Result Value Ref Range    WBC 3.7 (L) 4.1 - 11.1 K/uL    RBC 4.35 4.10 - 5.70 M/uL    HGB 12.9 12.1 - 17.0 g/dL    HCT 40.2 36.6 - 50.3 %    MCV 92.4 80.0 - 99.0 FL    MCH 29.7 26.0 - 34.0 PG    MCHC 32.1 30.0 - 36.5 g/dL    RDW 15.5 (H) 11.5 - 14.5 %    PLATELET 253 922 - 622 K/uL    NEUTROPHILS 51 32 - 75 %    BAND NEUTROPHILS 2 0 - 6 %    LYMPHOCYTES 25 12 - 49 %    MONOCYTES 19 (H) 5 - 13 %    EOSINOPHILS 2 0 - 7 %    BASOPHILS 0 0 - 1 %    METAMYELOCYTES 1 %    ABS. NEUTROPHILS 2.0 1.8 - 8.0 K/UL    ABS. LYMPHOCYTES 0.9 0.8 - 3.5 K/UL    ABS. MONOCYTES 0.7 0.0 - 1.0 K/UL    ABS. EOSINOPHILS 0.1 0.0 - 0.4 K/UL    ABS. BASOPHILS 0.0 0.0 - 0.1 K/UL    RBC COMMENTS NORMOCYTIC, NORMOCHROMIC      DF MANUAL         Medications:  NS KVO  Kyprolis 50 mg IV    Visit Vitals    BP (P) 130/82    Pulse (P) 83    Temp 98.2 °F (36.8 °C)    Resp 18    Ht 5' 8.11\" (1.73 m)    Wt 91.4 kg (201 lb 8 oz)    SpO2 99%    BMI 30.54 kg/m2       1520- Pt tolerated treatment well. Port maintained positive blood return throughout treatment, flushed with positive blood return at conclusion, and de-accessed. Gauze and paper tape placed over site. D/c home ambulatory in no distress.  Pt aware of next [facility] appointment scheduled for 1/5 at 11 AM.

## 2018-01-05 ENCOUNTER — TELEPHONE (OUTPATIENT)
Dept: ONCOLOGY | Age: 63
End: 2018-01-05

## 2018-01-05 ENCOUNTER — DOCUMENTATION ONLY (OUTPATIENT)
Dept: ONCOLOGY | Age: 63
End: 2018-01-05

## 2018-01-05 ENCOUNTER — HOSPITAL ENCOUNTER (OUTPATIENT)
Dept: INFUSION THERAPY | Age: 63
Discharge: HOME | End: 2018-01-05
Payer: COMMERCIAL

## 2018-01-05 VITALS
DIASTOLIC BLOOD PRESSURE: 86 MMHG | BODY MASS INDEX: 30.54 KG/M2 | SYSTOLIC BLOOD PRESSURE: 133 MMHG | TEMPERATURE: 98.8 F | OXYGEN SATURATION: 97 % | RESPIRATION RATE: 18 BRPM | WEIGHT: 201.5 LBS | HEIGHT: 68 IN | HEART RATE: 96 BPM

## 2018-01-05 PROCEDURE — 74011000258 HC RX REV CODE- 258: Performed by: INTERNAL MEDICINE

## 2018-01-05 PROCEDURE — 74011250636 HC RX REV CODE- 250/636: Performed by: INTERNAL MEDICINE

## 2018-01-05 PROCEDURE — 74011250636 HC RX REV CODE- 250/636

## 2018-01-05 PROCEDURE — 96413 CHEMO IV INFUSION 1 HR: CPT

## 2018-01-05 PROCEDURE — 77030012965 HC NDL HUBR BBMI -A

## 2018-01-05 RX ORDER — SODIUM CHLORIDE 0.9 % (FLUSH) 0.9 %
10-40 SYRINGE (ML) INJECTION AS NEEDED
Status: SHIPPED | OUTPATIENT
Start: 2018-01-05 | End: 2018-01-06

## 2018-01-05 RX ORDER — SODIUM CHLORIDE 9 MG/ML
25 INJECTION, SOLUTION INTRAVENOUS AS NEEDED
Status: SHIPPED | OUTPATIENT
Start: 2018-01-05 | End: 2018-01-06

## 2018-01-05 RX ORDER — HEPARIN 100 UNIT/ML
500 SYRINGE INTRAVENOUS AS NEEDED
Status: SHIPPED | OUTPATIENT
Start: 2018-01-05 | End: 2018-01-06

## 2018-01-05 RX ADMIN — Medication 500 UNITS: at 12:20

## 2018-01-05 RX ADMIN — CARFILZOMIB 50 MG: 60 INJECTION, POWDER, LYOPHILIZED, FOR SOLUTION INTRAVENOUS at 11:40

## 2018-01-05 RX ADMIN — Medication 10 ML: at 11:10

## 2018-01-05 RX ADMIN — Medication 10 ML: at 12:20

## 2018-01-05 RX ADMIN — SODIUM CHLORIDE 25 ML/HR: 900 INJECTION, SOLUTION INTRAVENOUS at 11:36

## 2018-01-05 NOTE — PROGRESS NOTES
Pt arrived to [facility] ambulatory in no acute distress at 1055 for Kyprolis C4D9 .  Assessment unremarkable no complaints or concerns voiced. R chest port accessed without issue and positive blood return noted.      Patient Vitals for the past 12 hrs:   Temp Pulse Resp BP SpO2   01/05/18 1217 - 96 - 133/86 -   01/05/18 1100 98.8 °F (37.1 °C) 92 18 123/84 97 %       Labs: Drawn on 1/4/18 and ok for treatment    The following medications administered:  NS KVO  Kyprolis 50 mg IV over 30 min  NS Flush  Heparin Flush    Pt tolerated treatment well.  IV flushed per policy and removed, 2x2 and paper tape placed.  Pt discharged ambulatory in no acute distress at 1225, accompanied by 1225.   Next appointment 1/11/18

## 2018-01-05 NOTE — PROGRESS NOTES
[facility] pharmacy called and given Sandrita Thomas of [phone]. The state they will work on refill and contact Pt for delivery.

## 2018-01-11 ENCOUNTER — HOSPITAL ENCOUNTER (OUTPATIENT)
Dept: INFUSION THERAPY | Age: 63
Discharge: HOME | End: 2018-01-11
Payer: COMMERCIAL

## 2018-01-11 VITALS
TEMPERATURE: 98.4 F | HEART RATE: 79 BPM | BODY MASS INDEX: 30.23 KG/M2 | WEIGHT: 199.5 LBS | SYSTOLIC BLOOD PRESSURE: 140 MMHG | DIASTOLIC BLOOD PRESSURE: 91 MMHG | OXYGEN SATURATION: 100 % | RESPIRATION RATE: 18 BRPM | HEIGHT: 68 IN

## 2018-01-11 LAB
BASO+EOS+MONOS # BLD AUTO: 1 K/UL (ref 0.2–1.2)
BASO+EOS+MONOS # BLD AUTO: 20 % (ref 3.2–16.9)
DIFFERENTIAL METHOD BLD: ABNORMAL
ERYTHROCYTE [DISTWIDTH] IN BLOOD BY AUTOMATED COUNT: 15.9 % (ref 11.8–15.8)
HCT VFR BLD AUTO: 38.9 % (ref 36.6–50.3)
HGB BLD-MCNC: 12.2 G/DL (ref 12.1–17)
LYMPHOCYTES # BLD: 1.3 K/UL (ref 0.8–3.5)
LYMPHOCYTES NFR BLD: 26 % (ref 12–49)
MCH RBC QN AUTO: 29.4 PG (ref 26–34)
MCHC RBC AUTO-ENTMCNC: 31.4 G/DL (ref 30–36.5)
MCV RBC AUTO: 93.7 FL (ref 80–99)
NEUTS SEG # BLD: 2.5 K/UL (ref 1.8–8)
NEUTS SEG NFR BLD: 54 % (ref 32–75)
PLATELET # BLD AUTO: 188 K/UL (ref 150–400)
RBC # BLD AUTO: 4.15 M/UL (ref 4.1–5.7)
WBC # BLD AUTO: 4.8 K/UL (ref 4.1–11.1)

## 2018-01-11 PROCEDURE — 74011250636 HC RX REV CODE- 250/636: Performed by: INTERNAL MEDICINE

## 2018-01-11 PROCEDURE — 77030012965 HC NDL HUBR BBMI -A

## 2018-01-11 PROCEDURE — 74011000250 HC RX REV CODE- 250: Performed by: INTERNAL MEDICINE

## 2018-01-11 PROCEDURE — 85025 COMPLETE CBC W/AUTO DIFF WBC: CPT | Performed by: INTERNAL MEDICINE

## 2018-01-11 PROCEDURE — 96413 CHEMO IV INFUSION 1 HR: CPT

## 2018-01-11 PROCEDURE — 36415 COLL VENOUS BLD VENIPUNCTURE: CPT | Performed by: INTERNAL MEDICINE

## 2018-01-11 PROCEDURE — 74011000258 HC RX REV CODE- 258: Performed by: INTERNAL MEDICINE

## 2018-01-11 PROCEDURE — 74011250636 HC RX REV CODE- 250/636

## 2018-01-11 RX ORDER — HEPARIN 100 UNIT/ML
500 SYRINGE INTRAVENOUS AS NEEDED
Status: ACTIVE | OUTPATIENT
Start: 2018-01-11 | End: 2018-01-12

## 2018-01-11 RX ORDER — SODIUM CHLORIDE 9 MG/ML
10 INJECTION INTRAMUSCULAR; INTRAVENOUS; SUBCUTANEOUS AS NEEDED
Status: DISPENSED | OUTPATIENT
Start: 2018-01-11 | End: 2018-01-12

## 2018-01-11 RX ORDER — SODIUM CHLORIDE 0.9 % (FLUSH) 0.9 %
10-40 SYRINGE (ML) INJECTION AS NEEDED
Status: ACTIVE | OUTPATIENT
Start: 2018-01-11 | End: 2018-01-12

## 2018-01-11 RX ADMIN — Medication 10 ML: at 11:54

## 2018-01-11 RX ADMIN — SODIUM CHLORIDE, PRESERVATIVE FREE 500 UNITS: 5 INJECTION INTRAVENOUS at 11:54

## 2018-01-11 RX ADMIN — Medication 10 ML: at 09:35

## 2018-01-11 RX ADMIN — SODIUM CHLORIDE 10 ML: 9 INJECTION INTRAMUSCULAR; INTRAVENOUS; SUBCUTANEOUS at 09:20

## 2018-01-11 RX ADMIN — CARFILZOMIB 50 MG: 60 INJECTION, POWDER, LYOPHILIZED, FOR SOLUTION INTRAVENOUS at 10:58

## 2018-01-11 NOTE — PROGRESS NOTES
[facility] Progress Note    Date: 2018    Name: Albaro Romero MRN: [id number]         : 1955    Mr. Krista Lutz arrived 4600 ambulatory and in no distress for cycle Kyprolis C4D15. Assessment was completed, no acute issues at this time, no new complaints voiced. Right chest wall port accessed without difficulty with 0.75 eddy needle; with positive blood return noted, labs drawn and sent. Mr. Mich Slade vitals were reviewed. Patient Vitals for the past 12 hrs:   Temp Pulse Resp BP SpO2   18 1147 98.4 °F (36.9 °C) 79 18 (!) 140/91 -   18 0855 97.9 °F (36.6 °C) (!) 54 18 130/84 100 %       Lab results were obtained and reviewed. Recent Results (from the past 12 hour(s))   CBC WITH 3 PART DIFF    Collection Time: 18  9:25 AM   Result Value Ref Range    WBC 4.8 4.1 - 11.1 K/uL    RBC 4.15 4.10 - 5.70 M/uL    HGB 12.2 12.1 - 17.0 g/dL    HCT 38.9 36.6 - 50.3 %    MCV 93.7 80.0 - 99.0 FL    MCH 29.4 26.0 - 34.0 PG    MCHC 31.4 30.0 - 36.5 g/dL    RDW 15.9 (H) 11.8 - 15.8 %    PLATELET 622 804 - 665 K/uL    NEUTROPHILS 54 32 - 75 %    MIXED CELLS 20 (H) 3.2 - 16.9 %    LYMPHOCYTES 26 12 - 49 %    ABS. NEUTROPHILS 2.5 1.8 - 8.0 K/UL    ABS. MIXED CELLS 1.0 0.2 - 1.2 K/uL    ABS. LYMPHOCYTES 1.3 0.8 - 3.5 K/UL    DF AUTOMATED          Medication:  Kyprolis IV      Mr. Krista Lutz tolerated treatment well and was discharged from [place] [address] in stable condition at 1200. He is to return on 2018 at 11:00am for his next appointment.     Cherry Sanders  2018

## 2018-01-11 NOTE — PROGRESS NOTES
Problem: Chemotherapy Treatment  Goal: *Chemotherapy regimen followed  Outcome: Progressing Towards Goal  Patient here for Kyprolis [phone]

## 2018-01-12 ENCOUNTER — HOSPITAL ENCOUNTER (OUTPATIENT)
Dept: INFUSION THERAPY | Age: 63
Discharge: HOME | End: 2018-01-12
Payer: COMMERCIAL

## 2018-01-12 VITALS
WEIGHT: 200 LBS | OXYGEN SATURATION: 100 % | DIASTOLIC BLOOD PRESSURE: 78 MMHG | RESPIRATION RATE: 16 BRPM | TEMPERATURE: 98.9 F | SYSTOLIC BLOOD PRESSURE: 124 MMHG | HEIGHT: 68 IN | BODY MASS INDEX: 30.31 KG/M2 | HEART RATE: 78 BPM

## 2018-01-12 PROCEDURE — 74011000258 HC RX REV CODE- 258: Performed by: INTERNAL MEDICINE

## 2018-01-12 PROCEDURE — 74011250636 HC RX REV CODE- 250/636: Performed by: INTERNAL MEDICINE

## 2018-01-12 PROCEDURE — 96413 CHEMO IV INFUSION 1 HR: CPT

## 2018-01-12 PROCEDURE — 74011250636 HC RX REV CODE- 250/636

## 2018-01-12 PROCEDURE — 77030012965 HC NDL HUBR BBMI -A

## 2018-01-12 RX ADMIN — CARFILZOMIB 50 MG: 60 INJECTION, POWDER, LYOPHILIZED, FOR SOLUTION INTRAVENOUS at 11:20

## 2018-01-12 NOTE — PROGRESS NOTES
Outpatient Infusion Center - Chemotherapy Progress Note    4625 Pt admit to [facility] for C4D16 CPD ambulatory in stable condition. Assessment completed. No new concerns voiced. Port accessed with 20 G 0.75 inch eddy needle, with positive blood return. No labs were needed for treatment today. Medications:  Normal Saline KVO  Kyprolis 50 MG - infused over 30 minutes    1155 Pt tolerated treatment well. Port maintained positive blood return throughout treatment, flushed with positive blood return at conclusion and eddy needle removed, site covered with 2x2 guaze and band aid. D/c home ambulatory in no distress. Pt aware of next [facility] appointment scheduled for 01/25/18.       Patient Vitals for the past 12 hrs:   Temp Pulse Resp BP SpO2   01/12/18 1154 - 78 16 124/78 -   01/12/18 1045 98.9 °F (37.2 °C) 88 18 128/81 100 %

## 2018-01-18 ENCOUNTER — APPOINTMENT (OUTPATIENT)
Dept: INFUSION THERAPY | Age: 63
End: 2018-01-18
Payer: COMMERCIAL

## 2018-01-19 ENCOUNTER — OFFICE VISIT (OUTPATIENT)
Dept: ONCOLOGY | Age: 63
End: 2018-01-19

## 2018-01-19 ENCOUNTER — APPOINTMENT (OUTPATIENT)
Dept: INFUSION THERAPY | Age: 63
End: 2018-01-19
Payer: COMMERCIAL

## 2018-01-19 VITALS
WEIGHT: 203.2 LBS | HEIGHT: 68 IN | BODY MASS INDEX: 30.8 KG/M2 | OXYGEN SATURATION: 99 % | HEART RATE: 91 BPM | SYSTOLIC BLOOD PRESSURE: 130 MMHG | DIASTOLIC BLOOD PRESSURE: 84 MMHG | RESPIRATION RATE: 16 BRPM | TEMPERATURE: 98.3 F

## 2018-01-19 DIAGNOSIS — C90.00 MULTIPLE MYELOMA NOT HAVING ACHIEVED REMISSION (HCC): Primary | ICD-10-CM

## 2018-01-19 RX ORDER — NAPROXEN 500 MG/1
TABLET ORAL
Refills: 11 | COMMUNITY
Start: 2017-12-07 | End: 2018-11-01

## 2018-01-19 NOTE — PROGRESS NOTES
Lyndell Blizzard. is a 63 y.o. male here today for multiple myeloma f/u. Chem Tx: Carfilzomib/Pom/Dex; cycle 5; day 1. VS stable. Patient states he has pain 4/10 to the back of his neck; pt states his neck pain does not have anything to do with the myeloma. Patient denies  N/V/D and constipation. Patient denies numbness and tingling.

## 2018-01-19 NOTE — PROGRESS NOTES
Progress Note        Patient: Palma Osuna Sr. MRN: [id number]  SSN: [id number]    YOB: 1955  Age: 63 y.o. Sex: male        Diagnosis:      1. Multiple Myeloma, IgA Kappa   Durie [place] stage IIIB    Cytogenetics/FISH: t(14;16) - high risk      treatment:      1. Carfilzomib/Pom/Dex - s/p 4 cycles  2. VD-PACE s/p 1 cycle  3. RVD - Cycle 5 Day 1    Subjective:      Palma Osuna Sr. is a 63 y.o. male with a diagnosis of IgA kappa myeloma. Bone marrow shows 100% aberrant plasma cells. He suffers with DM but it is diet controlled. He has received systemic anti-viral treatment for chronic Hep C with successful eradication of the virus. Mr. Yvette Boone received 4 cycles of systemic therapy with RVd. He had an initial good response to treatment. However his paraprotein level started rising and the myeloma became refractory to treatment. I then administered one cycle of VD-PACE in the hospital. He has seen transplant team at [facility] and is currently receiving Carfilzomib/Pom/Dex and is doing well. He has achieved a VGPR and the response continues to improve. He is moving forward with transplant and has testing scheduled later this month.        Review of Systems:    Constitutional: fatigue  Eyes: negative  Ears, Nose, Mouth, Throat, and Face: negative  Respiratory: negative  Cardiovascular: negative  Gastrointestinal: negative  Genitourinary:negative  Integument/Breast: negative  Hematologic/Lymphatic: negative  Musculoskeletal:negative  Neurological: negative      Past Medical History:   Diagnosis Date    Acid reflux     Arrhythmia     pvc's    Chronic pain     neck    Depression     Diabetes ([facility] [address]     Femoral hernia 7/11/2012    Hepatitis C     Hypertension     Inguinal hernia 7/11/2012    Liver disease     hepatitis c    Other ill-defined conditions(799.89)     Hx of PVCs since 2009    Prostatitis, acute     Psychiatric disorder     depression     Past Surgical History:   Procedure Laterality Date    COLONOSCOPY N/A 9/20/2016    COLONOSCOPY performed by Bia Galaviz MD at [facility] ENDOSCOPY    HX APPENDECTOMY      HX CERVICAL FUSION  2008    x 1 as of 4/30/2014    HX HEENT      foreign body removed from right eye    HX HERNIA REPAIR  2012    [facility]      HX ORTHOPAEDIC      cervical fusion    HX OTHER SURGICAL  2000    liver bx - chronic hepatitis      Family History   Problem Relation Age of Onset    Arthritis-osteo Mother     Arthritis-osteo Father     Diabetes Father     Hypertension Maternal Grandmother     Diabetes Maternal Grandmother     Hypertension Maternal Grandfather     Diabetes Maternal Grandfather      Social History   Substance Use Topics    Smoking status: Former Smoker     Packs/day: 1.00     Years: 12.00     Types: Cigarettes     Quit date: 1/1/1989    Smokeless tobacco: Never Used      Comment: former cigarette smoker    Alcohol use No      Comment: 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 Authorizing Provider   naproxen (NAPROSYN) 500 mg tablet TAKE 1 TABLET BY MOUTH TWICE A DAY 12/7/17  Yes Historical Provider   POMALYST 2 mg cap Take 1 capsule (2mg) by mouth daily for 21 days then 7 days off. 1/4/18  Yes Zeus Valentin MD   metFORMIN ER (GLUCOPHAGE XR) 500 mg tablet Take 1 Tab by mouth two (2) times a day. 11/10/17  Yes Estevan Howard MD   furosemide (LASIX) 20 mg tablet TAKE 1 TABLET BY MOUTH DAILY 11/7/17  Yes Janna Murdock NP   dexamethasone (DECADRON) 4 mg tablet Take 10 Tabs by mouth Every Thursday. 10/19/17  Yes Janna Murdock NP   sildenafil (REVATIO) 20 mg tablet TK 5 TS PO QD PRN 9/11/17  Yes Historical Provider   zolpidem 3.5 mg subl TAKE 1 TABLET AT BEDTIME 7/28/17  Yes Historical Provider   lidocaine-prilocaine (EMLA) topical cream Apply  to affected area as needed for Pain.  9/22/17  Yes Amilcar Long NP   prochlorperazine (COMPAZINE) 10 mg tablet TAKE 1 TABLET BY MOUTH EVERY 6 HOURS AS NEEDED FOR UP TO 7 DAYS. 8/27/17  Yes Douglas Yates,    ONETOUCH ULTRA TEST strip USE TO TEST BLOOD SUGAR TID BEFORE EACH MEAL UTD. 7/3/17  Yes Historical Provider   losartan (COZAAR) 100 mg tablet TK 1 T PO QD 7/3/17  Yes Historical Provider   BD INSULIN PEN NEEDLE UF MINI 31 gauge x 3/16\" ndle USE AS DIRECTED 6/7/17  Yes Historical Provider   insulin lispro (HUMALOG) 100 unit/mL kwikpen 3 Units by SubCUTAneous route Before breakfast, lunch, and dinner. Plus correction sliding scale as needed   Yes Historical Provider   insulin glargine (LANTUS,BASAGLAR) 100 unit/mL (3 mL) inpn 10 units daily at bedtime 6/9/17  Yes Macie Long NP   valACYclovir (VALTREX) 1 gram tablet Take 1 Tab by mouth daily. 6/6/17  Yes Lindsay Anthony MD   tamsulosin (FLOMAX) 0.4 mg capsule Take 1 Cap by mouth daily. 4/4/17  Yes Alee Ramirez MD   propafenone (RYTHMOL) 150 mg tablet Take 150 mg by mouth every eight (8) hours. Yes Historical Provider   zolpidem CR (AMBIEN CR) 12.5 mg tablet Take 12.5 mg by mouth nightly as needed. Yes Historical Provider   TADALAFIL (CIALIS PO) Take 20 mg by mouth as needed. Yes Historical Provider   amLODIPine (NORVASC) 10 mg tablet Take 10 mg by mouth daily. Yes Historical Provider   tapentadol (NUCYNTA) 100 mg tablet Take 100 mg by mouth three (3) times daily. Yes Goldy Diaz MD   aripiprazole (ABILIFY) 5 mg tablet Take 2.5 mg by mouth every morning. Yes Goldy Diaz MD   buPROPion XL (WELLBUTRIN XL) 150 mg tablet Take 1 Tab by mouth daily (with dinner).  4/25/10  Yes Drew Smith MD   REVLIMID 5 mg cap  8/10/17   Historical Provider   REVLIMID 10 mg cap  7/31/17   Historical Provider          Allergies   Allergen Reactions    Mercury (Bulk) Hives     Blisters             Objective:     Vitals:    01/19/18 0912   BP: 130/84   Pulse: 91   Resp: 16   Temp: 98.3 °F (36.8 °C)   TempSrc: Oral   SpO2: 99%   Weight: 203 lb 3.2 oz (92.2 kg)   Height: 5' 8\" (1.727 m)          Physical Exam:    GENERAL: alert, cooperative  EYE: negative  LYMPHATIC: Cervical, supraclavicular, and axillary nodes normal.   THROAT & NECK: normal and no erythema or exudates noted. LUNG: clear to auscultation bilaterally  HEART: regular rate and rhythm  ABDOMEN: soft, non-tender  EXTREMITIES: bilateral LE edema  SKIN: Normal.  NEUROLOGIC: negative      Lab Results   Component Value Date/Time    WBC 4.8 2018 09:25 AM    HGB 12.2 2018 09:25 AM    HCT 38.9 2018 09:25 AM    PLATELET 404  09:25 AM    MCV 93.7 2018 09:25 AM       Lab Results   Component Value Date/Time    Sodium 139 2017 11:09 AM    Potassium 4.4 2017 11:09 AM    Chloride 105 2017 11:09 AM    CO2 25 2017 11:09 AM    Anion gap 9 2017 11:09 AM    Glucose 192 2017 11:09 AM    BUN 8 2017 11:09 AM    Creatinine 1.18 2017 11:09 AM    BUN/Creatinine ratio 7 2017 11:09 AM    GFR est AA >60 2017 11:09 AM    GFR est non-AA >60 2017 11:09 AM    Calcium 8.0 2017 11:09 AM         M-spike: 5.8 => 2.9 => 1.9 => 2.1 => 2.8 => 1.4 => 0.9 => 0.1    Serum F => 496 => 246 => 305 => 314 => 200 => 114 => 80.4       Assessment:     1. Multiple Myeloma, IgA Kappa   Durie [place] stage IIIB    Cytogenetics/FISH: t(14;16) - high risk  ECOG PS - 0   Intent of therapy: palliative    Received 3 cycles of RVd   Dose reduced Revlimid and Velcade d/t continued cytopenias. M-spike started rising. This is primary refractory myeloma. Carries poor prognosis    He received one cycle of VD-PACE (bortezomib, dexamethasone, thalidomide, cisplatin, adriamycin, cyclophosphamide, and etoposide - will omit thalidomide). Achieved [place] with once cycle    He has seen Dr. Jacek Vázquez at [facility] for an auto-transplant. We shall plan on getting him into a deeper response prior to an auto-transplant.      Receiving KPd - cycle 5 day 1    Excellent response with M-protein down to 0.1  Anemia and renal insufficiency has resolved. Tolerating treatment very well  Denies any side effects. A detailed system by system evaluation of side effect was performed to assess chemotherapy related toxicity. Blood counts are acceptable. Results reviewed with the patient  Symptom management form reviewed with patient. 2. Anemia. Myeloma and chemotherapy related    Resolved      3. Renal insufficiency    Resolved        Plan:       > Continue Carfilzomib/Dex  > Hold Pomalidamide on transplant team's recommendation (prior to stem cell collection)  > Follow-up in 4 weeks        Signed by: Doron Kolb MD                     January 19, 2018        CC. Refugio Nascimento MD  CC.  James Brown MD

## 2018-01-25 ENCOUNTER — HOSPITAL ENCOUNTER (OUTPATIENT)
Dept: INFUSION THERAPY | Age: 63
Discharge: HOME | End: 2018-01-25
Payer: COMMERCIAL

## 2018-01-25 VITALS
SYSTOLIC BLOOD PRESSURE: 128 MMHG | DIASTOLIC BLOOD PRESSURE: 78 MMHG | HEIGHT: 68 IN | WEIGHT: 202.1 LBS | HEART RATE: 63 BPM | BODY MASS INDEX: 30.63 KG/M2

## 2018-01-25 LAB
ALBUMIN SERPL-MCNC: 4 G/DL (ref 3.5–5)
ALBUMIN/GLOB SERPL: 1.5 {RATIO} (ref 1.1–2.2)
ALP SERPL-CCNC: 69 U/L (ref 45–117)
ALT SERPL-CCNC: 16 U/L (ref 12–78)
ANION GAP SERPL CALC-SCNC: 10 MMOL/L (ref 5–15)
AST SERPL-CCNC: 12 U/L (ref 15–37)
BASOPHILS # BLD: 0 K/UL (ref 0–0.1)
BASOPHILS NFR BLD: 1 % (ref 0–1)
BILIRUB DIRECT SERPL-MCNC: 0.2 MG/DL (ref 0–0.2)
BILIRUB SERPL-MCNC: 0.5 MG/DL (ref 0.2–1)
BUN SERPL-MCNC: 15 MG/DL (ref 6–20)
BUN/CREAT SERPL: 13 (ref 12–20)
CALCIUM SERPL-MCNC: 8.8 MG/DL (ref 8.5–10.1)
CHLORIDE SERPL-SCNC: 108 MMOL/L (ref 97–108)
CO2 SERPL-SCNC: 24 MMOL/L (ref 21–32)
CREAT SERPL-MCNC: 1.14 MG/DL (ref 0.7–1.3)
EOSINOPHIL # BLD: 0 K/UL (ref 0–0.4)
EOSINOPHIL NFR BLD: 0 % (ref 0–7)
ERYTHROCYTE [DISTWIDTH] IN BLOOD BY AUTOMATED COUNT: 16 % (ref 11.5–14.5)
GLOBULIN SER CALC-MCNC: 2.6 G/DL (ref 2–4)
GLUCOSE SERPL-MCNC: 247 MG/DL (ref 65–100)
HCT VFR BLD AUTO: 37.4 % (ref 36.6–50.3)
HGB BLD-MCNC: 12 G/DL (ref 12.1–17)
IGA SERPL-MCNC: 152 MG/DL (ref 70–400)
IGG SERPL-MCNC: 326 MG/DL (ref 700–1600)
IGM SERPL-MCNC: <21 MG/DL (ref 40–230)
LYMPHOCYTES # BLD: 0.3 K/UL (ref 0.8–3.5)
LYMPHOCYTES NFR BLD: 10 % (ref 12–49)
MCH RBC QN AUTO: 30 PG (ref 26–34)
MCHC RBC AUTO-ENTMCNC: 32.1 G/DL (ref 30–36.5)
MCV RBC AUTO: 93.5 FL (ref 80–99)
MONOCYTES # BLD: 0.1 K/UL (ref 0–1)
MONOCYTES NFR BLD: 2 % (ref 5–13)
NEUTS SEG # BLD: 3 K/UL (ref 1.8–8)
NEUTS SEG NFR BLD: 87 % (ref 32–75)
PLATELET # BLD AUTO: 195 K/UL (ref 150–400)
POTASSIUM SERPL-SCNC: 4.3 MMOL/L (ref 3.5–5.1)
PROT SERPL-MCNC: 6.6 G/DL (ref 6.4–8.2)
RBC # BLD AUTO: 4 M/UL (ref 4.1–5.7)
RBC MORPH BLD: ABNORMAL
SODIUM SERPL-SCNC: 142 MMOL/L (ref 136–145)
WBC # BLD AUTO: 3.4 K/UL (ref 4.1–11.1)

## 2018-01-25 PROCEDURE — 80048 BASIC METABOLIC PNL TOTAL CA: CPT | Performed by: INTERNAL MEDICINE

## 2018-01-25 PROCEDURE — 96413 CHEMO IV INFUSION 1 HR: CPT

## 2018-01-25 PROCEDURE — 85025 COMPLETE CBC W/AUTO DIFF WBC: CPT | Performed by: INTERNAL MEDICINE

## 2018-01-25 PROCEDURE — 84165 PROTEIN E-PHORESIS SERUM: CPT | Performed by: INTERNAL MEDICINE

## 2018-01-25 PROCEDURE — 83883 ASSAY NEPHELOMETRY NOT SPEC: CPT | Performed by: INTERNAL MEDICINE

## 2018-01-25 PROCEDURE — 74011000250 HC RX REV CODE- 250: Performed by: INTERNAL MEDICINE

## 2018-01-25 PROCEDURE — 77030012965 HC NDL HUBR BBMI -A

## 2018-01-25 PROCEDURE — 82784 ASSAY IGA/IGD/IGG/IGM EACH: CPT | Performed by: INTERNAL MEDICINE

## 2018-01-25 PROCEDURE — 74011250636 HC RX REV CODE- 250/636: Performed by: INTERNAL MEDICINE

## 2018-01-25 PROCEDURE — 36415 COLL VENOUS BLD VENIPUNCTURE: CPT | Performed by: INTERNAL MEDICINE

## 2018-01-25 PROCEDURE — 74011000258 HC RX REV CODE- 258: Performed by: INTERNAL MEDICINE

## 2018-01-25 PROCEDURE — 80076 HEPATIC FUNCTION PANEL: CPT | Performed by: INTERNAL MEDICINE

## 2018-01-25 PROCEDURE — 74011250636 HC RX REV CODE- 250/636

## 2018-01-25 RX ORDER — SODIUM CHLORIDE 9 MG/ML
10 INJECTION INTRAMUSCULAR; INTRAVENOUS; SUBCUTANEOUS AS NEEDED
Status: ACTIVE | OUTPATIENT
Start: 2018-01-25 | End: 2018-01-26

## 2018-01-25 RX ORDER — SODIUM CHLORIDE 0.9 % (FLUSH) 0.9 %
5-10 SYRINGE (ML) INJECTION AS NEEDED
Status: ACTIVE | OUTPATIENT
Start: 2018-01-25 | End: 2018-01-26

## 2018-01-25 RX ORDER — HEPARIN 100 UNIT/ML
500 SYRINGE INTRAVENOUS AS NEEDED
Status: ACTIVE | OUTPATIENT
Start: 2018-01-25 | End: 2018-01-26

## 2018-01-25 RX ADMIN — Medication 10 ML: at 15:08

## 2018-01-25 RX ADMIN — SODIUM CHLORIDE 10 ML: 9 INJECTION INTRAMUSCULAR; INTRAVENOUS; SUBCUTANEOUS at 11:30

## 2018-01-25 RX ADMIN — Medication 500 UNITS: at 15:08

## 2018-01-25 RX ADMIN — Medication 10 ML: at 11:30

## 2018-01-25 RX ADMIN — CARFILZOMIB 50 MG: 60 INJECTION, POWDER, LYOPHILIZED, FOR SOLUTION INTRAVENOUS at 14:11

## 2018-01-25 NOTE — PROGRESS NOTES
1110 Pt arrived at [facility] ambulatory and in no distress for C5D1. Assessment completed, no new complaints voiced. Port accessed per protocol with positive blood return. 1300 ANC pending. Lab called. Will fax results when completed. Visit Vitals    /78    Pulse 63    Ht 5' 8.11\" (1.73 m)    Wt 91.7 kg (202 lb 1.6 oz)    BMI 30.63 kg/m2     Recent Results (from the past 12 hour(s))   CBC WITH AUTOMATED DIFF    Collection Time: 01/25/18 11:24 AM   Result Value Ref Range    WBC 3.4 (L) 4.1 - 11.1 K/uL    RBC 4.00 (L) 4.10 - 5.70 M/uL    HGB 12.0 (L) 12.1 - 17.0 g/dL    HCT 37.4 36.6 - 50.3 %    MCV 93.5 80.0 - 99.0 FL    MCH 30.0 26.0 - 34.0 PG    MCHC 32.1 30.0 - 36.5 g/dL    RDW 16.0 (H) 11.5 - 14.5 %    PLATELET 405 014 - 922 K/uL    NEUTROPHILS PENDING %    LYMPHOCYTES PENDING %    MONOCYTES PENDING %    EOSINOPHILS PENDING %    BASOPHILS PENDING %    ABS. NEUTROPHILS PENDING K/UL    ABS. LYMPHOCYTES PENDING K/UL    ABS. MONOCYTES PENDING K/UL    ABS. EOSINOPHILS PENDING K/UL    ABS. BASOPHILS PENDING K/UL    DF PENDING    METABOLIC PANEL, BASIC    Collection Time: 01/25/18 11:24 AM   Result Value Ref Range    Sodium 142 136 - 145 mmol/L    Potassium 4.3 3.5 - 5.1 mmol/L    Chloride 108 97 - 108 mmol/L    CO2 24 21 - 32 mmol/L    Anion gap 10 5 - 15 mmol/L    Glucose 247 (H) 65 - 100 mg/dL    BUN 15 6 - 20 MG/DL    Creatinine 1.14 0.70 - 1.30 MG/DL    BUN/Creatinine ratio 13 12 - 20      GFR est AA >60 >60 ml/min/1.73m2    GFR est non-AA >60 >60 ml/min/1.73m2    Calcium 8.8 8.5 - 10.1 MG/DL   HEPATIC FUNCTION PANEL    Collection Time: 01/25/18 11:24 AM   Result Value Ref Range    Protein, total 6.6 6.4 - 8.2 g/dL    Albumin 4.0 3.5 - 5.0 g/dL    Globulin 2.6 2.0 - 4.0 g/dL    A-G Ratio 1.5 1.1 - 2.2      Bilirubin, total 0.5 0.2 - 1.0 MG/DL    Bilirubin, direct 0.2 0.0 - 0.2 MG/DL    Alk.  phosphatase 69 45 - 117 U/L    AST (SGOT) 12 (L) 15 - 37 U/L    ALT (SGPT) 16 12 - 78 U/L   IMMUNOGLOBULINS, G/A/M, QT. Collection Time: 01/25/18 11:24 AM   Result Value Ref Range    Immunoglobulin G 326 (L) 700 - 1600 mg/dL    Immunoglobulin A 152 70 - 400 mg/dL    Immunoglobulin M PENDING mg/dL   ANC 2.9 (faxed from lab)    Medications received:  Kyprolis 50 mg IV    1510 Tolerated treatment well, no adverse reaction noted. Port flushed and de-accessed. D/Cd from [facility] ambulatory and in no distress accompanied by self. Next appt 1/26.

## 2018-01-26 ENCOUNTER — HOSPITAL ENCOUNTER (OUTPATIENT)
Dept: INFUSION THERAPY | Age: 63
Discharge: HOME | End: 2018-01-26
Payer: COMMERCIAL

## 2018-01-26 VITALS — BODY MASS INDEX: 30.62 KG/M2 | HEIGHT: 68 IN | TEMPERATURE: 98.6 F | RESPIRATION RATE: 18 BRPM | WEIGHT: 202 LBS

## 2018-01-26 LAB
ALBUMIN SERPL ELPH-MCNC: 3.7 G/DL (ref 2.9–4.4)
ALBUMIN/GLOB SERPL: 1.5 {RATIO} (ref 0.7–1.7)
ALPHA1 GLOB SERPL ELPH-MCNC: 0.2 G/DL (ref 0–0.4)
ALPHA2 GLOB SERPL ELPH-MCNC: 0.9 G/DL (ref 0.4–1)
B-GLOBULIN SERPL ELPH-MCNC: 0.8 G/DL (ref 0.7–1.3)
GAMMA GLOB SERPL ELPH-MCNC: 0.5 G/DL (ref 0.4–1.8)
GLOBULIN SER CALC-MCNC: 2.4 G/DL (ref 2.2–3.9)
KAPPA LC FREE SER-MCNC: 55.3 MG/L (ref 3.3–19.4)
KAPPA LC FREE/LAMBDA FREE SER: 7 {RATIO} (ref 0.26–1.65)
LAMBDA LC FREE SERPL-MCNC: 7.9 MG/L (ref 5.7–26.3)
M PROTEIN SERPL ELPH-MCNC: 0.2 G/DL
PROT SERPL-MCNC: 6.1 G/DL (ref 6–8.5)

## 2018-01-26 PROCEDURE — 96413 CHEMO IV INFUSION 1 HR: CPT

## 2018-01-26 PROCEDURE — 74011250636 HC RX REV CODE- 250/636: Performed by: INTERNAL MEDICINE

## 2018-01-26 PROCEDURE — 74011250636 HC RX REV CODE- 250/636

## 2018-01-26 PROCEDURE — 77030012965 HC NDL HUBR BBMI -A

## 2018-01-26 PROCEDURE — 74011000258 HC RX REV CODE- 258: Performed by: INTERNAL MEDICINE

## 2018-01-26 RX ORDER — HEPARIN 100 UNIT/ML
500 SYRINGE INTRAVENOUS AS NEEDED
Status: ACTIVE | OUTPATIENT
Start: 2018-01-26 | End: 2018-01-27

## 2018-01-26 RX ORDER — SODIUM CHLORIDE 0.9 % (FLUSH) 0.9 %
10-40 SYRINGE (ML) INJECTION AS NEEDED
Status: ACTIVE | OUTPATIENT
Start: 2018-01-26 | End: 2018-01-27

## 2018-01-26 RX ADMIN — Medication 10 ML: at 14:05

## 2018-01-26 RX ADMIN — Medication 10 ML: at 13:20

## 2018-01-26 RX ADMIN — CARFILZOMIB 50 MG: 60 INJECTION, POWDER, LYOPHILIZED, FOR SOLUTION INTRAVENOUS at 13:29

## 2018-01-26 NOTE — PROGRESS NOTES
1255 Pt admit to [facility] for C5D2 CPD ambulatory in stable condition. Assessment completed. No new concerns voiced. Port accessed with 20 G 0.75 inch eddy needle, with positive blood return. No labs were needed for treatment today.       Medications:  Normal Saline KVO  Kyprolis 50 MG - infused over 30 minutes     1410 Pt tolerated treatment well. Port maintained positive blood return throughout treatment, flushed with positive blood return at conclusion and eddy needle removed, site covered with 2x2 guaze and band aid. D/c home ambulatory in no distress. Pt aware of next [facility] appointment scheduled for 02/01/18.   Patient Vitals for the past 12 hrs:   Temp Resp   01/26/18 1256 98.6 °F (37 °C) 18

## 2018-01-29 LAB
IGA SERPL-MCNC: 174 MG/DL (ref 90–386)
IGG SERPL-MCNC: 334 MG/DL (ref 700–1600)
IGM SERPL-MCNC: 9 MG/DL (ref 20–172)
PROT PATTERN SERPL IFE-IMP: ABNORMAL

## 2018-02-01 ENCOUNTER — HOSPITAL ENCOUNTER (OUTPATIENT)
Dept: INFUSION THERAPY | Age: 63
Discharge: HOME | End: 2018-02-01
Payer: COMMERCIAL

## 2018-02-01 VITALS
OXYGEN SATURATION: 96 % | RESPIRATION RATE: 20 BRPM | BODY MASS INDEX: 31.24 KG/M2 | HEIGHT: 68 IN | DIASTOLIC BLOOD PRESSURE: 78 MMHG | HEART RATE: 91 BPM | TEMPERATURE: 98.2 F | WEIGHT: 206.1 LBS | SYSTOLIC BLOOD PRESSURE: 130 MMHG

## 2018-02-01 LAB
BASOPHILS # BLD: 0 K/UL (ref 0–0.1)
BASOPHILS NFR BLD: 0 % (ref 0–1)
DIFFERENTIAL METHOD BLD: ABNORMAL
EOSINOPHIL # BLD: 0 K/UL (ref 0–0.4)
EOSINOPHIL NFR BLD: 0 % (ref 0–7)
ERYTHROCYTE [DISTWIDTH] IN BLOOD BY AUTOMATED COUNT: 15.7 % (ref 11.5–14.5)
HCT VFR BLD AUTO: 38.7 % (ref 36.6–50.3)
HGB BLD-MCNC: 12.3 G/DL (ref 12.1–17)
IMM GRANULOCYTES # BLD: 0 K/UL (ref 0–0.04)
IMM GRANULOCYTES NFR BLD AUTO: 0 % (ref 0–0.5)
LYMPHOCYTES # BLD: 0.4 K/UL (ref 0.8–3.5)
LYMPHOCYTES NFR BLD: 8 % (ref 12–49)
MCH RBC QN AUTO: 30.8 PG (ref 26–34)
MCHC RBC AUTO-ENTMCNC: 31.8 G/DL (ref 30–36.5)
MCV RBC AUTO: 96.8 FL (ref 80–99)
MONOCYTES # BLD: 0 K/UL (ref 0–1)
MONOCYTES NFR BLD: 1 % (ref 5–13)
NEUTS SEG # BLD: 4.2 K/UL (ref 1.8–8)
NEUTS SEG NFR BLD: 91 % (ref 32–75)
NRBC # BLD: 0 K/UL (ref 0–0.01)
NRBC BLD-RTO: 0 PER 100 WBC
PLATELET # BLD AUTO: 158 K/UL (ref 150–400)
PMV BLD AUTO: 11.7 FL (ref 8.9–12.9)
RBC # BLD AUTO: 4 M/UL (ref 4.1–5.7)
RBC MORPH BLD: ABNORMAL
WBC # BLD AUTO: 4.6 K/UL (ref 4.1–11.1)

## 2018-02-01 PROCEDURE — 85025 COMPLETE CBC W/AUTO DIFF WBC: CPT | Performed by: INTERNAL MEDICINE

## 2018-02-01 PROCEDURE — 74011250636 HC RX REV CODE- 250/636: Performed by: INTERNAL MEDICINE

## 2018-02-01 PROCEDURE — 74011000258 HC RX REV CODE- 258: Performed by: INTERNAL MEDICINE

## 2018-02-01 PROCEDURE — 74011250636 HC RX REV CODE- 250/636

## 2018-02-01 PROCEDURE — 96413 CHEMO IV INFUSION 1 HR: CPT

## 2018-02-01 PROCEDURE — 36415 COLL VENOUS BLD VENIPUNCTURE: CPT | Performed by: INTERNAL MEDICINE

## 2018-02-01 PROCEDURE — 77030012965 HC NDL HUBR BBMI -A

## 2018-02-01 RX ORDER — SODIUM CHLORIDE 0.9 % (FLUSH) 0.9 %
10-40 SYRINGE (ML) INJECTION AS NEEDED
Status: DISCONTINUED | OUTPATIENT
Start: 2018-02-01 | End: 2018-02-05 | Stop reason: HOSPADM

## 2018-02-01 RX ORDER — HEPARIN 100 UNIT/ML
500 SYRINGE INTRAVENOUS AS NEEDED
Status: ACTIVE | OUTPATIENT
Start: 2018-02-01 | End: 2018-02-02

## 2018-02-01 RX ORDER — SODIUM CHLORIDE 9 MG/ML
50 INJECTION, SOLUTION INTRAVENOUS ONCE
Status: COMPLETED | OUTPATIENT
Start: 2018-02-01 | End: 2018-02-01

## 2018-02-01 RX ADMIN — CARFILZOMIB 50 MG: 60 INJECTION, POWDER, LYOPHILIZED, FOR SOLUTION INTRAVENOUS at 12:32

## 2018-02-01 RX ADMIN — Medication 500 UNITS: at 13:05

## 2018-02-01 RX ADMIN — Medication 10 ML: at 13:05

## 2018-02-01 RX ADMIN — SODIUM CHLORIDE 50 ML/HR: 900 INJECTION, SOLUTION INTRAVENOUS at 12:32

## 2018-02-01 NOTE — PROGRESS NOTES
1115 Pt arrived at [facility] ambulatory and in no distress for C5D8 kyprolis. Assessment completed, no new complaints voiced. Port accessed, CBC drawn. Visit Vitals    /88 (BP 1 Location: Left arm, BP Patient Position: Sitting)    Pulse (!) 102    Temp 98.2 °F (36.8 °C)    Resp 18    Ht 5' 8\" (1.727 m)    Wt 93.5 kg (206 lb 1.6 oz)    SpO2 96%    BMI 31.34 kg/m2       Medications received:  NS @ KVO  kyprolis 50 mg IV over 30 min    Port flushed with NS and heparin and needle removed. Visit Vitals    /78    Pulse 91    Temp 98.2 °F (36.8 °C)    Resp 20    Ht 5' 8\" (1.727 m)    Wt 93.5 kg (206 lb 1.6 oz)    SpO2 96%    BMI 31.34 kg/m2       1310 Tolerated treatment well, no adverse reaction noted. D/Cd from [facility] ambulatory and in no distress accompanied by family. Next appt tomorrow    Recent Results (from the past 8 hour(s))   CBC WITH AUTOMATED DIFF    Collection Time: 02/01/18 11:21 AM   Result Value Ref Range    WBC 4.6 4.1 - 11.1 K/uL    RBC 4.00 (L) 4.10 - 5.70 M/uL    HGB 12.3 12.1 - 17.0 g/dL    HCT 38.7 36.6 - 50.3 %    MCV 96.8 80.0 - 99.0 FL    MCH 30.8 26.0 - 34.0 PG    MCHC 31.8 30.0 - 36.5 g/dL    RDW 15.7 (H) 11.5 - 14.5 %    PLATELET 497 741 - 455 K/uL    MPV 11.7 8.9 - 12.9 FL    NRBC 0.0 0  WBC    ABSOLUTE NRBC 0.00 0.00 - 0.01 K/uL    NEUTROPHILS 91 (H) 32 - 75 %    LYMPHOCYTES 8 (L) 12 - 49 %    MONOCYTES 1 (L) 5 - 13 %    EOSINOPHILS 0 0 - 7 %    BASOPHILS 0 0 - 1 %    IMMATURE GRANULOCYTES 0 0.0 - 0.5 %    ABS. NEUTROPHILS 4.2 1.8 - 8.0 K/UL    ABS. LYMPHOCYTES 0.4 (L) 0.8 - 3.5 K/UL    ABS. MONOCYTES 0.0 0.0 - 1.0 K/UL    ABS. EOSINOPHILS 0.0 0.0 - 0.4 K/UL    ABS. BASOPHILS 0.0 0.0 - 0.1 K/UL    ABS. IMM.  GRANS. 0.0 0.00 - 0.04 K/UL    DF SMEAR SCANNED      RBC COMMENTS NORMOCYTIC, NORMOCHROMIC

## 2018-02-02 ENCOUNTER — HOSPITAL ENCOUNTER (OUTPATIENT)
Dept: INFUSION THERAPY | Age: 63
Discharge: HOME | End: 2018-02-02
Payer: COMMERCIAL

## 2018-02-02 VITALS
HEIGHT: 68 IN | TEMPERATURE: 98.6 F | SYSTOLIC BLOOD PRESSURE: 141 MMHG | RESPIRATION RATE: 18 BRPM | BODY MASS INDEX: 31.22 KG/M2 | DIASTOLIC BLOOD PRESSURE: 83 MMHG | OXYGEN SATURATION: 100 % | HEART RATE: 82 BPM | WEIGHT: 206 LBS

## 2018-02-02 DIAGNOSIS — C90.00 MULTIPLE MYELOMA NOT HAVING ACHIEVED REMISSION (HCC): ICD-10-CM

## 2018-02-02 PROCEDURE — 74011000258 HC RX REV CODE- 258: Performed by: INTERNAL MEDICINE

## 2018-02-02 PROCEDURE — 74011250636 HC RX REV CODE- 250/636

## 2018-02-02 PROCEDURE — 96413 CHEMO IV INFUSION 1 HR: CPT

## 2018-02-02 PROCEDURE — 77030012965 HC NDL HUBR BBMI -A

## 2018-02-02 PROCEDURE — 96375 TX/PRO/DX INJ NEW DRUG ADDON: CPT

## 2018-02-02 PROCEDURE — 74011250636 HC RX REV CODE- 250/636: Performed by: INTERNAL MEDICINE

## 2018-02-02 RX ORDER — LIDOCAINE AND PRILOCAINE 25; 25 MG/G; MG/G
CREAM TOPICAL AS NEEDED
Qty: 30 G | Refills: 2 | Status: SHIPPED | OUTPATIENT
Start: 2018-02-02 | End: 2018-11-01

## 2018-02-02 RX ORDER — SODIUM CHLORIDE 0.9 % (FLUSH) 0.9 %
10-40 SYRINGE (ML) INJECTION AS NEEDED
Status: ACTIVE | OUTPATIENT
Start: 2018-02-02 | End: 2018-02-03

## 2018-02-02 RX ORDER — HEPARIN 100 UNIT/ML
500 SYRINGE INTRAVENOUS AS NEEDED
Status: ACTIVE | OUTPATIENT
Start: 2018-02-02 | End: 2018-02-03

## 2018-02-02 RX ADMIN — Medication 10 ML: at 11:58

## 2018-02-02 RX ADMIN — SODIUM CHLORIDE 4 MG: 900 INJECTION, SOLUTION INTRAVENOUS at 10:58

## 2018-02-02 RX ADMIN — Medication 500 UNITS: at 11:58

## 2018-02-02 RX ADMIN — Medication 10 ML: at 10:54

## 2018-02-02 RX ADMIN — CARFILZOMIB 50 MG: 60 INJECTION, POWDER, LYOPHILIZED, FOR SOLUTION INTRAVENOUS at 11:25

## 2018-02-02 NOTE — PROGRESS NOTES
Outpatient Infusion Center - Chemotherapy Progress Note    1045 Pt admit to [facility] for C5D9 CPD ambulatory in stable condition. Assessment completed. No new concerns voiced. Port accessed with 20 G 0.75 inch eddy needle, with positive blood return. No labs were needed for treatment today.        Medications:  Normal Saline KVO  Kyprolis 50 MG - infused over 30 minutes  Zometa 4 MG -infused over 15 minutes      1200 Pt tolerated treatment well. Port maintained positive blood return throughout treatment, flushed with positive blood return at conclusion and eddy needle removed, site covered with 2x2 guaze and band aid. D/c home ambulatory in no distress. Pt aware of next [facility] appointment scheduled for 02/08/18.   Patient Vitals for the past 12 hrs:   Temp Pulse Resp BP SpO2   02/02/18 1158 - 82 18 141/83 -   02/02/18 1047 98.6 °F (37 °C) 80 18 133/80 100 %

## 2018-02-08 ENCOUNTER — HOSPITAL ENCOUNTER (OUTPATIENT)
Dept: INFUSION THERAPY | Age: 63
Discharge: HOME | End: 2018-02-08
Payer: COMMERCIAL

## 2018-02-08 VITALS
WEIGHT: 207 LBS | SYSTOLIC BLOOD PRESSURE: 119 MMHG | HEIGHT: 68 IN | BODY MASS INDEX: 31.37 KG/M2 | DIASTOLIC BLOOD PRESSURE: 78 MMHG | RESPIRATION RATE: 16 BRPM | TEMPERATURE: 98.2 F | HEART RATE: 92 BPM

## 2018-02-08 LAB
BASOPHILS # BLD: 0 K/UL (ref 0–0.1)
BASOPHILS NFR BLD: 0 % (ref 0–1)
DIFFERENTIAL METHOD BLD: ABNORMAL
EOSINOPHIL # BLD: 0 K/UL (ref 0–0.4)
EOSINOPHIL NFR BLD: 1 % (ref 0–7)
ERYTHROCYTE [DISTWIDTH] IN BLOOD BY AUTOMATED COUNT: 15.7 % (ref 11.5–14.5)
HCT VFR BLD AUTO: 38.1 % (ref 36.6–50.3)
HGB BLD-MCNC: 12.1 G/DL (ref 12.1–17)
IMM GRANULOCYTES # BLD: 0 K/UL (ref 0–0.04)
IMM GRANULOCYTES NFR BLD AUTO: 0 % (ref 0–0.5)
LYMPHOCYTES # BLD: 0.5 K/UL (ref 0.8–3.5)
LYMPHOCYTES NFR BLD: 13 % (ref 12–49)
MCH RBC QN AUTO: 30.9 PG (ref 26–34)
MCHC RBC AUTO-ENTMCNC: 31.8 G/DL (ref 30–36.5)
MCV RBC AUTO: 97.2 FL (ref 80–99)
MONOCYTES # BLD: 0.4 K/UL (ref 0–1)
MONOCYTES NFR BLD: 10 % (ref 5–13)
NEUTS SEG # BLD: 2.8 K/UL (ref 1.8–8)
NEUTS SEG NFR BLD: 76 % (ref 32–75)
NRBC # BLD: 0 K/UL (ref 0–0.01)
NRBC BLD-RTO: 0 PER 100 WBC
PLATELET # BLD AUTO: 174 K/UL (ref 150–400)
PMV BLD AUTO: 12.3 FL (ref 8.9–12.9)
RBC # BLD AUTO: 3.92 M/UL (ref 4.1–5.7)
RBC MORPH BLD: ABNORMAL
WBC # BLD AUTO: 3.7 K/UL (ref 4.1–11.1)

## 2018-02-08 PROCEDURE — 74011000250 HC RX REV CODE- 250: Performed by: INTERNAL MEDICINE

## 2018-02-08 PROCEDURE — 74011250636 HC RX REV CODE- 250/636: Performed by: INTERNAL MEDICINE

## 2018-02-08 PROCEDURE — 74011250636 HC RX REV CODE- 250/636

## 2018-02-08 PROCEDURE — 85025 COMPLETE CBC W/AUTO DIFF WBC: CPT | Performed by: INTERNAL MEDICINE

## 2018-02-08 PROCEDURE — 77030012965 HC NDL HUBR BBMI -A

## 2018-02-08 PROCEDURE — 96413 CHEMO IV INFUSION 1 HR: CPT

## 2018-02-08 PROCEDURE — 36415 COLL VENOUS BLD VENIPUNCTURE: CPT | Performed by: INTERNAL MEDICINE

## 2018-02-08 PROCEDURE — 74011000258 HC RX REV CODE- 258: Performed by: INTERNAL MEDICINE

## 2018-02-08 RX ORDER — SODIUM CHLORIDE 0.9 % (FLUSH) 0.9 %
5-10 SYRINGE (ML) INJECTION AS NEEDED
Status: ACTIVE | OUTPATIENT
Start: 2018-02-08 | End: 2018-02-09

## 2018-02-08 RX ORDER — HEPARIN 100 UNIT/ML
500 SYRINGE INTRAVENOUS AS NEEDED
Status: ACTIVE | OUTPATIENT
Start: 2018-02-08 | End: 2018-02-09

## 2018-02-08 RX ORDER — SODIUM CHLORIDE 9 MG/ML
10 INJECTION INTRAMUSCULAR; INTRAVENOUS; SUBCUTANEOUS AS NEEDED
Status: ACTIVE | OUTPATIENT
Start: 2018-02-08 | End: 2018-02-09

## 2018-02-08 RX ADMIN — CARFILZOMIB 50 MG: 60 INJECTION, POWDER, LYOPHILIZED, FOR SOLUTION INTRAVENOUS at 12:35

## 2018-02-08 RX ADMIN — Medication 10 ML: at 13:20

## 2018-02-08 RX ADMIN — Medication 10 ML: at 10:18

## 2018-02-08 RX ADMIN — SODIUM CHLORIDE 10 ML: 9 INJECTION INTRAMUSCULAR; INTRAVENOUS; SUBCUTANEOUS at 10:18

## 2018-02-08 RX ADMIN — Medication 500 UNITS: at 13:20

## 2018-02-08 NOTE — PROGRESS NOTES
1000 Pt arrived at [place] ambulatory and in no distress for C5D15. Assessment completed, no new complaints voiced. Port accessed per protocol with positive blood return. /84  Pulse (!) 104  Temp 98.2 °F (36.8 °C)  Resp 16  Ht 5' 8.11\" (1.73 m)  Wt 93.9 kg (207 lb)  BMI 31.37 kg/m2     Recent Results (from the past 12 hour(s))   CBC WITH AUTOMATED DIFF    Collection Time: 02/08/18 10:14 AM   Result Value Ref Range    WBC 3.7 (L) 4.1 - 11.1 K/uL    RBC 3.92 (L) 4.10 - 5.70 M/uL    HGB 12.1 12.1 - 17.0 g/dL    HCT 38.1 36.6 - 50.3 %    MCV 97.2 80.0 - 99.0 FL    MCH 30.9 26.0 - 34.0 PG    MCHC 31.8 30.0 - 36.5 g/dL    RDW 15.7 (H) 11.5 - 14.5 %    PLATELET 087 020 - 722 K/uL    MPV 12.3 8.9 - 12.9 FL    NRBC 0.0 0  WBC    ABSOLUTE NRBC 0.00 0.00 - 0.01 K/uL    NEUTROPHILS 76 (H) 32 - 75 %    LYMPHOCYTES 13 12 - 49 %    MONOCYTES 10 5 - 13 %    EOSINOPHILS 1 0 - 7 %    BASOPHILS 0 0 - 1 %    IMMATURE GRANULOCYTES 0 0.0 - 0.5 %    ABS. NEUTROPHILS 2.8 1.8 - 8.0 K/UL    ABS. LYMPHOCYTES 0.5 (L) 0.8 - 3.5 K/UL    ABS. MONOCYTES 0.4 0.0 - 1.0 K/UL    ABS. EOSINOPHILS 0.0 0.0 - 0.4 K/UL    ABS. BASOPHILS 0.0 0.0 - 0.1 K/UL    ABS. IMM. GRANS. 0.0 0.00 - 0.04 K/UL    DF AUTOMATED      RBC COMMENTS NORMOCYTIC, NORMOCHROMIC         Medications received:  Kyprolis 50 mg IV    1320 Tolerated treatment well, no adverse reaction noted. Port flushed and de-accessed. D/Cd from [facility] [employer] and in no distress accompanied by spouse. Next appt 2/9.

## 2018-02-09 ENCOUNTER — HOSPITAL ENCOUNTER (OUTPATIENT)
Dept: INFUSION THERAPY | Age: 63
Discharge: HOME | End: 2018-02-09
Payer: COMMERCIAL

## 2018-02-09 ENCOUNTER — OFFICE VISIT (OUTPATIENT)
Dept: ONCOLOGY | Age: 63
End: 2018-02-09

## 2018-02-09 VITALS
BODY MASS INDEX: 30.77 KG/M2 | DIASTOLIC BLOOD PRESSURE: 90 MMHG | WEIGHT: 203 LBS | SYSTOLIC BLOOD PRESSURE: 129 MMHG | HEART RATE: 100 BPM | HEIGHT: 68 IN | TEMPERATURE: 98.1 F | RESPIRATION RATE: 18 BRPM | OXYGEN SATURATION: 98 %

## 2018-02-09 VITALS
HEART RATE: 94 BPM | RESPIRATION RATE: 16 BRPM | OXYGEN SATURATION: 98 % | DIASTOLIC BLOOD PRESSURE: 81 MMHG | SYSTOLIC BLOOD PRESSURE: 126 MMHG | HEIGHT: 68 IN | TEMPERATURE: 98.4 F | BODY MASS INDEX: 30.9 KG/M2 | WEIGHT: 203.9 LBS

## 2018-02-09 DIAGNOSIS — Z79.4 CONTROLLED TYPE 2 DIABETES MELLITUS WITH COMPLICATION, WITH LONG-TERM CURRENT USE OF INSULIN (HCC): ICD-10-CM

## 2018-02-09 DIAGNOSIS — C90.00 MULTIPLE MYELOMA NOT HAVING ACHIEVED REMISSION (HCC): Primary | ICD-10-CM

## 2018-02-09 DIAGNOSIS — B18.2 CHRONIC HEPATITIS C WITHOUT HEPATIC COMA (HCC): ICD-10-CM

## 2018-02-09 DIAGNOSIS — E11.8 CONTROLLED TYPE 2 DIABETES MELLITUS WITH COMPLICATION, WITH LONG-TERM CURRENT USE OF INSULIN (HCC): ICD-10-CM

## 2018-02-09 PROCEDURE — 74011250636 HC RX REV CODE- 250/636: Performed by: INTERNAL MEDICINE

## 2018-02-09 PROCEDURE — 96413 CHEMO IV INFUSION 1 HR: CPT

## 2018-02-09 PROCEDURE — 77030012965 HC NDL HUBR BBMI -A

## 2018-02-09 PROCEDURE — 74011000258 HC RX REV CODE- 258: Performed by: INTERNAL MEDICINE

## 2018-02-09 PROCEDURE — 74011250636 HC RX REV CODE- 250/636

## 2018-02-09 RX ADMIN — CARFILZOMIB 50 MG: 60 INJECTION, POWDER, LYOPHILIZED, FOR SOLUTION INTRAVENOUS at 11:43

## 2018-02-09 NOTE — PROGRESS NOTES
Outpatient Infusion Center - Chemotherapy Progress Note    1125 Pt admit to [facility] for C5D16 CPD ambulatory in stable condition. Assessment completed. No new concerns voiced. Port accessed with 20 G 0.75 inch eddy needle, with positive blood return. No labs were needed for treatment today.        Medications:  Normal Saline KVO  Kyprolis 50 MG - infused over 30 minutes      1220 Pt tolerated treatment well. Port maintained positive blood return throughout treatment, flushed with positive blood return at conclusion and eddy needle removed, site covered with 2x2 guaze and band aid. D/c home ambulatory in no distress. Pt does not have any future appointments at this time. Patient scheduled to have procedure done in a few weeks and unaware of need to come back for infusions at this time.   Patient Vitals for the past 12 hrs:   Temp Pulse Resp BP SpO2   02/09/18 1211 - - 16 - -   02/09/18 1128 98.4 °F (36.9 °C) 94 18 126/81 98 %

## 2018-02-09 NOTE — PROGRESS NOTES
Pt is a 63 yr old Pt here for a routine follow up for Multiple Myeloma. Pt has stopped Pomalyst per instruction of [facility] transplant team in preparation for upcoming Bone Marrow Transplant. Pt reports feeling well, no N/V/D, Blood pressure 129/90, pulse 100, temperature 98.1 °F (36.7 °C), resp. rate 18, height 5' 8\" (1.727 m), weight 203 lb (92.1 kg), SpO2 98 %.

## 2018-02-09 NOTE — PROGRESS NOTES
Progress Note        Patient: Talat Medrano Sr. MRN: [id number]  SSN: [id number]    YOB: 1955  Age: 63 y.o. Sex: male        Diagnosis:      1. Multiple Myeloma, IgA Kappa   Durie Jewett stage IIIB    Cytogenetics/FISH: t(14;16) - high risk      treatment:      1. Carfilzomib/Pom/Dex - Cycle 5 Day 16  2. VD-PACE s/p 1 cycle  3. RVD - s/p 4 cycles    Subjective:      Talat Medrano Sr. is a 63 y.o. male with a diagnosis of IgA kappa myeloma. Bone marrow shows 100% aberrant plasma cells. He suffers with DM but it is diet controlled. He has received systemic anti-viral treatment for chronic Hep C with successful eradication of the virus. Mr. Kenneth Chaves received 4 cycles of systemic therapy with RVd. He had an initial good response to treatment. However his paraprotein level started rising and the myeloma became refractory to treatment. I then administered one cycle of VD-PACE in the hospital. He then received Carfilzomib/Pom/Dex. He has achieved a VGPR and the response continues to improve. He is moving forward with transplant at [address] [place] [address] and tentatively scheduled for transplant at the beginning of March.        Review of Systems:    Constitutional: fatigue  Eyes: negative  Ears, Nose, Mouth, Throat, and Face: negative  Respiratory: negative  Cardiovascular: negative  Gastrointestinal: negative  Genitourinary:negative  Integument/Breast: negative  Hematologic/Lymphatic: negative  Musculoskeletal:negative  Neurological: negative      Past Medical History:   Diagnosis Date    Acid reflux     Arrhythmia     pvc's    Chronic pain     neck    Depression     Diabetes ([facility] [address]     Femoral hernia 7/11/2012    Hepatitis C     Hypertension     Inguinal hernia 7/11/2012    Liver disease     hepatitis c    Other ill-defined conditions(799.89)     Hx of PVCs since 2009    Prostatitis, acute     Psychiatric disorder     depression     Past Surgical History:   Procedure Laterality Date    COLONOSCOPY N/A 9/20/2016    COLONOSCOPY performed by Rivas Cohn MD at [facility] ENDOSCOPY    HX APPENDECTOMY      HX CERVICAL FUSION  2008    x 1 as of 4/30/2014    HX HEENT      foreign body removed from right eye    HX HERNIA REPAIR  2012    [facility]      HX ORTHOPAEDIC      cervical fusion    HX OTHER SURGICAL  2000    liver bx - chronic hepatitis      Family History   Problem Relation Age of Onset    Arthritis-osteo Mother     Arthritis-osteo Father     Diabetes Father     Hypertension Maternal Grandmother     Diabetes Maternal Grandmother     Hypertension Maternal Grandfather     Diabetes Maternal Grandfather      Social History   Substance Use Topics    Smoking status: Former Smoker     Packs/day: 1.00     Years: 12.00     Types: Cigarettes     Quit date: 1/1/1989    Smokeless tobacco: Never Used      Comment: former cigarette smoker    Alcohol use No      Comment: former alcoholic-quit 1089      Prior to Admission medications    Medication Sig Start Date End Date Taking? Authorizing Provider   lidocaine-prilocaine (EMLA) topical cream Apply  to affected area as needed for Pain. 2/2/18  Yes French Velazquez MD   naproxen (NAPROSYN) 500 mg tablet TAKE 1 TABLET BY MOUTH TWICE A DAY 12/7/17  Yes Historical Provider   metFORMIN ER (GLUCOPHAGE XR) 500 mg tablet Take 1 Tab by mouth two (2) times a day. 11/10/17  Yes Marilu Rai MD   furosemide (LASIX) 20 mg tablet TAKE 1 TABLET BY MOUTH DAILY 11/7/17  Yes Sarah Rueda NP   REVLIMID 5 mg cap  8/10/17  Yes Historical Provider   REVLIMID 10 mg cap  7/31/17  Yes Historical Provider   dexamethasone (DECADRON) 4 mg tablet Take 10 Tabs by mouth Every Thursday.  10/19/17  Yes Sarah Rueda NP   sildenafil (REVATIO) 20 mg tablet TK 5 TS PO QD PRN 9/11/17  Yes Historical Provider   zolpidem 3.5 mg subl TAKE 1 TABLET AT BEDTIME 7/28/17  Yes Historical Provider   prochlorperazine (COMPAZINE) 10 mg tablet TAKE 1 TABLET BY MOUTH EVERY 6 HOURS AS NEEDED FOR UP TO 7 DAYS. 8/27/17  Yes Pippa Yates,    ONETOUCH ULTRA TEST strip USE TO TEST BLOOD SUGAR TID BEFORE EACH MEAL UTD. 7/3/17  Yes Historical Provider   losartan (COZAAR) 100 mg tablet TK 1 T PO QD 7/3/17  Yes Historical Provider   BD INSULIN PEN NEEDLE UF MINI 31 gauge x 3/16\" ndle USE AS DIRECTED 6/7/17  Yes Historical Provider   insulin lispro (HUMALOG) 100 unit/mL kwikpen 3 Units by SubCUTAneous route Before breakfast, lunch, and dinner. Plus correction sliding scale as needed   Yes Historical Provider   insulin glargine (LANTUS,BASAGLAR) 100 unit/mL (3 mL) inpn 10 units daily at bedtime 6/9/17  Yes Macie Long NP   valACYclovir (VALTREX) 1 gram tablet Take 1 Tab by mouth daily. 6/6/17  Yes Syl Izquierdo MD   tamsulosin (FLOMAX) 0.4 mg capsule Take 1 Cap by mouth daily. 4/4/17  Yes Sherice Mckeon MD   propafenone (RYTHMOL) 150 mg tablet Take 150 mg by mouth every eight (8) hours. Yes Historical Provider   zolpidem CR (AMBIEN CR) 12.5 mg tablet Take 12.5 mg by mouth nightly as needed. Yes Historical Provider   TADALAFIL (CIALIS PO) Take 20 mg by mouth as needed. Yes Historical Provider   amLODIPine (NORVASC) 10 mg tablet Take 10 mg by mouth daily. Yes Historical Provider   tapentadol (NUCYNTA) 100 mg tablet Take 100 mg by mouth three (3) times daily. Yes Goldy Diaz MD   aripiprazole (ABILIFY) 5 mg tablet Take 2.5 mg by mouth every morning. Yes Goldy Diaz MD   buPROPion XL (WELLBUTRIN XL) 150 mg tablet Take 1 Tab by mouth daily (with dinner).  4/25/10  Yes Keira Mehta MD   POMALYST 2 mg cap Take 1 capsule (2mg) by mouth daily for 21 days then 7 days off. 1/4/18   Syl Izquierdo MD          Allergies   Allergen Reactions    Mercury (Bulk) Hives     Blisters             Objective:     Vitals:    02/09/18 1056   BP: 129/90   Pulse: 100   Resp: 18   Temp: 98.1 °F (36.7 °C)   SpO2: 98%   Weight: 203 lb (92.1 kg)   Height: 5' 8\" (1.727 m)          Physical Exam:    GENERAL: alert, cooperative  EYE: negative  LYMPHATIC: Cervical, supraclavicular, and axillary nodes normal.   THROAT & NECK: normal and no erythema or exudates noted. LUNG: clear to auscultation bilaterally  HEART: regular rate and rhythm  ABDOMEN: soft, non-tender  EXTREMITIES: bilateral LE edema  SKIN: Normal.  NEUROLOGIC: negative      Lab Results   Component Value Date/Time    WBC 3.7 (L) 2018 10:14 AM    HGB 12.1 2018 10:14 AM    HCT 38.1 2018 10:14 AM    PLATELET 939  10:14 AM    MCV 97.2 2018 10:14 AM       Lab Results   Component Value Date/Time    Sodium 142 2018 11:24 AM    Potassium 4.3 2018 11:24 AM    Chloride 108 2018 11:24 AM    CO2 24 2018 11:24 AM    Anion gap 10 2018 11:24 AM    Glucose 247 (H) 2018 11:24 AM    BUN 15 2018 11:24 AM    Creatinine 1.14 2018 11:24 AM    BUN/Creatinine ratio 13 2018 11:24 AM    GFR est AA >60 2018 11:24 AM    GFR est non-AA >60 2018 11:24 AM    Calcium 8.8 2018 11:24 AM         M-spike: 5.8 => 2.9 => 1.9 => 2.1 => 2.8 => 1.4 => 0.9 => 0.1 => 0.2    Serum F => 496 => 246 => 305 => 314 => 200 => 114 => 80.4       Assessment:     1. Multiple Myeloma, IgA Kappa   Durie Woodstock stage IIIB    Cytogenetics/FISH: t(14;16) - high risk  ECOG PS - 0   Intent of therapy: palliative    Received 3 cycles of RVd   Dose reduced Revlimid and Velcade d/t continued cytopenias. M-spike started rising. This is primary refractory myeloma. Carries poor prognosis    He received one cycle of VD-PACE (bortezomib, dexamethasone, thalidomide, cisplatin, adriamycin, cyclophosphamide, and etoposide - will omit thalidomide). Achieved KS with once cycle    He has seen [facility]. Yaneli Nieves at [facility] for an auto[facility]transplant. We shall plan on getting him into a deeper response prior to an auto-transplant.      Receiving KPd - Cycle 5 Day 16    Excellent response with M-protein down to 0.1  Anemia and renal insufficiency has resolved. Tolerating treatment very well  Denies any side effects. A detailed system by system evaluation of side effect was performed to assess chemotherapy related toxicity. Blood counts are acceptable. Results reviewed with the patient  Symptom management form reviewed with patient. He is now moving on to autotransplant at [address] [place] [address]. I will see him back after transplant. 2. Anemia. Myeloma and chemotherapy related    Resolved      3. Renal insufficiency    Resolved      4. Type 2 DM with complication    Managed by Endocrine      5. Chronic Hep C    In sustained virological remission        Plan:       > Continue Carfilzomib/Dex today  > Proceed with transplant at [facility]  > Follow-up in 4 months        Signed by: Randy You MD                     February 9, 2018        CC. Azra Mojica MD  CC.  Jordan Werner MD

## 2018-02-21 ENCOUNTER — OFFICE VISIT (OUTPATIENT)
Dept: ENDOCRINOLOGY | Age: 63
End: 2018-02-21

## 2018-02-21 VITALS
HEART RATE: 99 BPM | DIASTOLIC BLOOD PRESSURE: 82 MMHG | SYSTOLIC BLOOD PRESSURE: 121 MMHG | BODY MASS INDEX: 30.51 KG/M2 | WEIGHT: 201.3 LBS | HEIGHT: 68 IN

## 2018-02-21 DIAGNOSIS — Z79.4 TYPE 2 DIABETES MELLITUS WITHOUT COMPLICATION, WITH LONG-TERM CURRENT USE OF INSULIN (HCC): Primary | ICD-10-CM

## 2018-02-21 DIAGNOSIS — E11.9 TYPE 2 DIABETES MELLITUS WITHOUT COMPLICATION, WITH LONG-TERM CURRENT USE OF INSULIN (HCC): Primary | ICD-10-CM

## 2018-02-21 DIAGNOSIS — R73.9 STEROID-INDUCED HYPERGLYCEMIA: ICD-10-CM

## 2018-02-21 DIAGNOSIS — T38.0X5A STEROID-INDUCED HYPERGLYCEMIA: ICD-10-CM

## 2018-02-21 DIAGNOSIS — I10 ESSENTIAL HYPERTENSION WITH GOAL BLOOD PRESSURE LESS THAN 130/80: ICD-10-CM

## 2018-02-21 LAB — HBA1C MFR BLD HPLC: 6.4 %

## 2018-02-21 RX ORDER — INSULIN GLARGINE 100 [IU]/ML
22 INJECTION, SOLUTION SUBCUTANEOUS DAILY
Qty: 15 ML | Refills: 1
Start: 2018-02-21 | End: 2018-11-01

## 2018-02-21 RX ORDER — GLUCOSAMINE/CHONDR SU A SOD 750-600 MG
1 TABLET ORAL DAILY
COMMUNITY
End: 2019-07-20

## 2018-02-21 RX ORDER — BISMUTH SUBSALICYLATE 262 MG
1 TABLET,CHEWABLE ORAL DAILY
COMMUNITY

## 2018-02-21 NOTE — PATIENT INSTRUCTIONS
Will not start metformin 500mg twice daily until after upcoming hospitalization    REGIMEN:  Lantus: 22 units each morning  Humalo units breakfast, 10 units lunch, 10 units lunch    IF GLUCOSE IS:                 THEN TAKE:      0   Extra Unit  151-175   1   Extra Unit  176-200   2   Extra Units  201-225   3   Extra Units  226-250   4   Extra Units  251-275   5   Extra Units  276-300   6   Extra Units  301-325   7   Extra Units    ----------------------------------------------------------------------------------------------------------------------    Below you will find a glucose log sheet which you can use to record your blood sugars. Without checking and recording what your home glucose levels are, it will be difficult to make any changes to your medication dose, even when significant changes may be needed. Please feel free to use the log below to record your home glucose levels. At the very least, I would like for you to login the entire 2-3 weeks just before your visit so we can make your visit much more productive and beneficial to you. GLUCOSE LOG SHEET:    Date Breakfast Lunch Dinner Bedtime Comments ? GLUCOSE LOG SHEET:    Date Breakfast Lunch Dinner Bedtime Comments ? GLUCOSE LOG SHEET:    Date Breakfast Lunch Dinner Bedtime Comments ?

## 2018-02-21 NOTE — MR AVS SNAPSHOT
[address] [facility] [address] [address] [phone] Patient: Laz Urbina MRN: [id number] :1955 Visit Information Date & Time Provider Department Dept. Phone Encounter #  
 2018 10:50 AM Maddison Victoria, [address] [place] [address] Diabetes and Endocrinology [phone] [id number] Follow-up Instructions Return in about 2 months (around 2018). Your Appointments 2018 10:00 AM  
ESTABLISHED PATIENT with Sea Fajardo MD  
[address] Oncology at [facility]) Appt Note: onc 4 mth f/u  
 [address] [facility] [address] [id number]  
[address] [facility] [address] [facility] [address] Rajan Teran Upcoming Health Maintenance Date Due  
 EYE EXAM RETINAL OR DILATED Q1 10/9/1967 DTaP/Tdap/Td series (1 - Tdap) 10/9/1978 FOBT Q 1 YEAR AGE 50-75 10/9/2007 Pneumococcal 19-64 Highest Risk (2 of 3 - PCV13) 2012 LIPID PANEL Q1 2012 Influenza Age 5 to Adult 2017 ZOSTER VACCINE AGE 60> 2017 MICROALBUMIN Q1 2018 HEMOGLOBIN A1C Q6M 5/10/2018 FOOT EXAM Q1 7/10/2018 Allergies as of 2018  Review Complete On: 2018 By: Maddison Victoria MD  
  
 Severity Noted Reaction Type Reactions Mercury (Bulk) Low 2010   Topical Hives Blisters Current Immunizations  Reviewed on 2018 Name Date Influenza Vaccine Split 2010  6:57 PM  
 ZZZ-RETIRED (DO NOT USE) Pneumococcal Vaccine (Unspecified Type) 2011  6:30 PM  
  
 Not reviewed this visit You Were Diagnosed With   
  
 Codes Comments Type 2 diabetes mellitus without complication, with long-term current use of insulin (HCC)    -  Primary ICD-10-CM: E11.9, Z79.4 ICD-9-CM: 250.00, V58.67  Essential hypertension with goal blood pressure less than 130/80     ICD-10-CM: I10 
 ICD-9-CM: 401.9 Steroid-induced hyperglycemia     ICD-10-CM: R73.9, T38.0X5A 
ICD-9-CM: 790.29, E932.0 Vitals BP Pulse Height(growth percentile) Weight(growth percentile) BMI Smoking Status 121/82 (BP 1 Location: Left arm, BP Patient Position: Sitting) 99 5' 8\" (1.727 m) 201 lb 4.8 oz (91.3 kg) 30.61 kg/m2 Former Smoker BMI and BSA Data Body Mass Index Body Surface Area  
 30.61 kg/m 2 2.09 m 2 Preferred Pharmacy Pharmacy Name Phone [phone], [address] AT [place] [address] [phone] Your Updated Medication List  
  
   
This list is accurate as of 2/21/18 11:31 AM.  Always use your most recent med list.  
  
  
  
  
 ABILIFY 5 mg tablet Generic drug:  ARIPiprazole Take 2.5 mg by mouth every morning. * AMBIEN CR 12.5 mg tablet Generic drug:  zolpidem CR Take 12.5 mg by mouth nightly as needed. * zolpidem 3.5 mg Subl TAKE 1 TABLET AT BEDTIME  
  
 amLODIPine 10 mg tablet Commonly known as:  Karena Moises Take 10 mg by mouth daily. BD INSULIN PEN NEEDLE UF MINI 31 gauge x 3/16\" Ndle Generic drug:  Insulin Needles (Disposable) USE AS DIRECTED Biotin 2,500 mcg Cap Take  by mouth. buPROPion  mg tablet Commonly known as:  Exie Ellison Take 1 Tab by mouth daily (with dinner). CIALIS PO Take 20 mg by mouth as needed. dexamethasone 4 mg tablet Commonly known as:  DECADRON Take 10 Tabs by mouth Every Thursday. furosemide 20 mg tablet Commonly known as:  LASIX TAKE 1 TABLET BY MOUTH DAILY  
  
 insulin glargine 100 unit/mL (3 mL) Inpn Commonly known as:  LANTUS,BASAGLAR  
10 units daily at bedtime  
  
 insulin lispro 100 unit/mL kwikpen Commonly known as:  HUMALOG  
3 Units by SubCUTAneous route. Plus correction sliding scale as needed  
  
 lidocaine-prilocaine topical cream  
Commonly known as:  EMLA Apply  to affected area as needed for Pain.  
  
 losartan 100 mg tablet Commonly known as:  COZAAR TK 1 T PO QD  
  
 metFORMIN  mg tablet Commonly known as:  GLUCOPHAGE XR Take 1 Tab by mouth two (2) times a day. multivitamin tablet Commonly known as:  ONE A DAY Take 1 Tab by mouth daily. naproxen 500 mg tablet Commonly known as:  NAPROSYN  
TAKE 1 TABLET BY MOUTH TWICE A DAY  
  
 NUCYNTA 100 mg tablet Generic drug:  tapentadol Take 100 mg by mouth three (3) times daily. ONETOUCH ULTRA TEST strip Generic drug:  glucose blood VI test strips USE TO TEST BLOOD SUGAR TID BEFORE EACH MEAL UTD. POMALYST 2 mg Cap Generic drug:  pomalidomide Take 1 capsule (2mg) by mouth daily for 21 days then 7 days off.  
  
 prochlorperazine 10 mg tablet Commonly known as:  COMPAZINE  
TAKE 1 TABLET BY MOUTH EVERY 6 HOURS AS NEEDED FOR UP TO 7 DAYS. * REVLIMID 10 mg Cap Generic drug:  lenalidomide * REVLIMID 5 mg Cap Generic drug:  lenalidomide RYTHMOL 150 mg tablet Generic drug:  propafenone Take 150 mg by mouth every eight (8) hours. sildenafil (antihypertensive) 20 mg tablet Commonly known as:  REVATIO TK 5 TS PO QD PRN  
  
 tamsulosin 0.4 mg capsule Commonly known as:  FLOMAX Take 1 Cap by mouth daily. valACYclovir 1 gram tablet Commonly known as:  VALTREX Take 1 Tab by mouth daily. * Notice: This list has 4 medication(s) that are the same as other medications prescribed for you. Read the directions carefully, and ask your doctor or other care provider to review them with you. We Performed the Following AMB POC HEMOGLOBIN A1C [69596 CPT(R)] HEMOGLOBIN A1C WITH EAG [33320 CPT(R)] LIPID PANEL [67490 CPT(R)] METABOLIC PANEL, COMPREHENSIVE [50266 CPT(R)] MICROALBUMIN, UR, RAND W/ MICROALBUMIN/CREA RATIO [id number] CPT(R)] Follow-up Instructions Return in about 2 months (around 2018). Patient Instructions Will not start metformin 500mg twice daily until after upcoming hospitalization REGIMEN: 
Lantus: 22 units each morning Humalo units breakfast, 10 units lunch, 10 units lunch IF GLUCOSE IS:                 THEN TAKE: 
    0   Extra Unit 151-175   1   Extra Unit 
176-200   2   Extra Units 652-340   3   Extra Units 226-250   4   Extra Units 251-275   5   Extra Units 276-300   6   Extra Units 306-296   7   Extra Units 
 
---------------------------------------------------------------------------------------------------------------------- Below you will find a glucose log sheet which you can use to record your blood sugars. Without checking and recording what your home glucose levels are, it will be difficult to make any changes to your medication dose, even when significant changes may be needed. Please feel free to use the log below to record your home glucose levels. At the very least, I would like for you to login the entire 2-3 weeks just before your visit so we can make your visit much more productive and beneficial to you. GLUCOSE LOG SHEET: 
 
Date Breakfast Lunch Dinner Bedtime Comments ? GLUCOSE LOG SHEET: 
 
Date Breakfast Lunch Dinner Bedtime Comments ? GLUCOSE LOG SHEET: 
 
Date Breakfast Lunch Dinner Bedtime Comments ? Introducing [facility]! [place] introduces [employer] patient portal. Now you can access parts of your medical record, email your doctor's office, and request medication refills online. 1. In your internet browser, go to [email or web address] 2. Click on the First Time User? Click Here link in the Sign In box. You will see the New Member Sign Up page. 3. Enter your Vysrt Access Code exactly as it appears below. You will not need to use this code after youve completed the sign-up process. If you do not sign up before the expiration date, you must request a new code. · On[employer] Access Code: [facility] Expires: 4/3/2018  8:01 AM 
 
4. Enter the last four digits of your Social Security Number (xxxx) and Date of Birth (mm/dd/yyyy) as indicated and click Submit. You will be taken to the next sign-up page. 5. Create a OnAir[employer] ID. This will be your [employer] login ID and cannot be changed, so think of one that is secure and easy to remember. 6. Create a On[employer] password. You can change your password at any time. 7. Enter your Password Reset Question and Answer. This can be used at a later time if you forget your password. 8. Enter your e-mail address. You will receive e-mail notification when new information is available in [address] 9. Click Sign Up. You can now view and download portions of your medical record. 10. Click the Download Summary menu link to download a portable copy of your medical information. If you have questions, please visit the Frequently Asked Questions section of the [employer] website. Remember, [employer] is NOT to be used for urgent needs. For medical emergencies, dial 911. Now available from your iPhone and Android! Please provide this summary of care documentation to your next provider. Your primary care clinician is listed as Maria Rod. If you have any questions after today's visit, please call [phone].

## 2018-02-21 NOTE — PROGRESS NOTES
CHIEF COMPLAINT: f/u uncontrolled diabetes, exacerbated by steroids    HISTORY OF PRESENT ILLNESS:   Caroline Crews Sr. is a 63 y.o. male with a PMHx as noted below who presents for f/u of uncontrolled type 2 diabetes with steroid induced hyperglycemia. Initial History:  Patient was diagnosed with diabetes in 2000 and also with multiple myeloma around April 2017, for which he was referred to Dr. Wan Jovel for evaluation and management. Prior to that he had stable diet controlled diabetes with A1c of 6.0%, followed by Dr. Jorje Larson. Patient was subsequently started on RVd therapy along with dexamethasone for induction chemo and continues on dexamethasone 40mg every Tuesday (2 weeks on , 1 week off), no longer taking steroids in between. Patient notes he is aiming for 14 weeks of treatment with potential marrow transplant according to the patient. INTERVAL HISTORY:   A1c today is 6.4%, down from 7.3%  Last dose of steroids was 2 weeks ago, not planning further,   [place] [facility] [place]    Current Home Regimen:   metformin 500mg twice daily (has been off for 2 weeks per outside advice)  Lantus: 20 units q AM  Humalog: All Days: take 8 units with meals + correction scale below:   Humalog Correction Scale  1:25>150    Review of home glucose: checks 3-4x/day  In the past 2 weeks, his AM sugars went from the prior 110-140's to >200. This is consistent with his having been off metformin for 2 weeks.      Review of most recent diabetes-related labs:  Lab Results   Component Value Date    HBA1C 6.2 08/25/2017    HBA1C 8.3 (H) 12/24/2010    OKI3YUPX 6.6% 04/07/2017    BXV8DVAV 6.4 02/21/2018    QKX1JQMQ 7.3 11/10/2017    CHOL 101 07/14/2011    LDLC 27.8 07/14/2011    GFRAA >60 01/25/2018    GFRNA >60 01/25/2018    MALBEXT microalbumin:Cr ratio 54 04/07/2017    TSH 0.94 09/19/2011       Feeling well, no chest pain or SOB,    PAST MEDICAL/SURGICAL HISTORY:   Past Medical History: Diagnosis Date    Acid reflux     Arrhythmia     pvc's    Chronic pain     neck    Depression     Diabetes ([facility] [address]     Femoral hernia 7/11/2012    Hepatitis C     Hypertension     Inguinal hernia 7/11/2012    Liver disease     hepatitis c    Other ill-defined conditions(799.89)     Hx of PVCs since 2009    Prostatitis, acute     Psychiatric disorder     depression     Past Surgical History:   Procedure Laterality Date    COLONOSCOPY N/A 9/20/2016    COLONOSCOPY performed by Juan Espinoza MD at [facility] ENDOSCOPY    HX APPENDECTOMY      HX CERVICAL FUSION  2008    x 1 as of 4/30/2014    HX HEENT      foreign body removed from right eye    [facility] REPAIR  2012    [facility]      HX ORTHOPAEDIC      cervical fusion    HX OTHER SURGICAL  2000    liver bx - chronic hepatitis       ALLERGIES:   Allergies   Allergen Reactions    Mercury (Bulk) Hives     Blisters         MEDICATIONS ON ADMISSION:     Current Outpatient Prescriptions:     multivitamin (ONE A DAY) tablet, Take 1 Tab by mouth daily. , Disp: , Rfl:     Biotin 2,500 mcg cap, Take  by mouth., Disp: , Rfl:     insulin glargine (LANTUS,BASAGLAR) 100 unit/mL (3 mL) inpn, 22 Units by SubCUTAneous route daily. , Disp: 15 mL, Rfl: 1    lidocaine-prilocaine (EMLA) topical cream, Apply  to affected area as needed for Pain., Disp: 30 g, Rfl: 2    naproxen (NAPROSYN) 500 mg tablet, TAKE 1 TABLET BY MOUTH TWICE A DAY, Disp: , Rfl: 11    furosemide (LASIX) 20 mg tablet, TAKE 1 TABLET BY MOUTH DAILY, Disp: 90 Tab, Rfl: 0    dexamethasone (DECADRON) 4 mg tablet, Take 10 Tabs by mouth Every Thursday. , Disp: 40 Tab, Rfl: 3    zolpidem 3.5 mg subl, TAKE 1 TABLET AT BEDTIME, Disp: , Rfl: 5    ONETOUCH ULTRA TEST strip, USE TO TEST BLOOD SUGAR TID BEFORE EACH MEAL UTD., Disp: , Rfl: 4    BD INSULIN PEN NEEDLE UF MINI 31 gauge x 3/16\" ndle, USE AS DIRECTED, Disp: , Rfl: 2    insulin lispro (HUMALOG) 100 unit/mL kwikpen, 8-10 Units by SubCUTAneous route. 8 units breakfast, 10 units lunch, 10 units dinner + correction scale of 1:25>150, Disp: , Rfl:     valACYclovir (VALTREX) 1 gram tablet, Take 1 Tab by mouth daily. , Disp: 90 Tab, Rfl: 3    tamsulosin (FLOMAX) 0.4 mg capsule, Take 1 Cap by mouth daily. , Disp: 30 Cap, Rfl: 1    propafenone (RYTHMOL) 150 mg tablet, Take 150 mg by mouth every eight (8) hours. , Disp: , Rfl:     zolpidem CR (AMBIEN CR) 12.5 mg tablet, Take 12.5 mg by mouth nightly as needed. , Disp: , Rfl:     amLODIPine (NORVASC) 10 mg tablet, Take 10 mg by mouth daily. , Disp: , Rfl:     tapentadol (NUCYNTA) 100 mg tablet, Take 100 mg by mouth three (3) times daily. , Disp: , Rfl:     aripiprazole (ABILIFY) 5 mg tablet, Take 2.5 mg by mouth every morning., Disp: , Rfl:     buPROPion XL (WELLBUTRIN XL) 150 mg tablet, Take 1 Tab by mouth daily (with dinner). , Disp: 30 Tab, Rfl: 11    POMALYST 2 mg cap, Take 1 capsule (2mg) by mouth daily for 21 days then 7 days off., Disp: 21 Cap, Rfl: 0    metFORMIN ER (GLUCOPHAGE XR) 500 mg tablet, Take 1 Tab by mouth two (2) times a day., Disp: 180 Tab, Rfl: 3    REVLIMID 5 mg cap, , Disp: , Rfl:     REVLIMID 10 mg cap, , Disp: , Rfl:     sildenafil (REVATIO) 20 mg tablet, TK 5 TS PO QD PRN, Disp: , Rfl: 3    prochlorperazine (COMPAZINE) 10 mg tablet, TAKE 1 TABLET BY MOUTH EVERY 6 HOURS AS NEEDED FOR UP TO 7 DAYS., Disp: 385 Tab, Rfl: 0    losartan (COZAAR) 100 mg tablet, TK 1 T PO QD, Disp: , Rfl: 4    TADALAFIL (CIALIS PO), Take 20 mg by mouth as needed. , Disp: , Rfl:     SOCIAL HISTORY:   Social History     Social History    Marital status:      Spouse name: N/A    Number of children: N/A    Years of education: N/A     Occupational History    Not on file.      Social History Main Topics    Smoking status: Former Smoker     Packs/day: 1.00     Years: 12.00     Types: Cigarettes     Quit date: 1/1/1989    Smokeless tobacco: Never Used      Comment: former cigarette smoker    Alcohol use No      Comment: former alcoholic-quit [id number]    Drug use: No    Sexual activity: Not on file     Other Topics Concern    Not on file     Social History Narrative       FAMILY HISTORY:  Family History   Problem Relation Age of Onset    Arthritis-osteo Mother     Arthritis-osteo Father     Diabetes Father     Hypertension Maternal Grandmother     Diabetes Maternal Grandmother     Hypertension Maternal Grandfather     Diabetes Maternal Grandfather        REVIEW OF SYSTEMS: Complete ROS assessed and noted for that which is described above, all else are negative. Eyes: normal  ENT: normal  CVS: normal  Resp: normal  GI: normal  : normal  GYN: normal  Endocrine: normal  Integument: normal  Musculoskeletal: normal  Neuro: normal  Psych: normal      PHYSICAL EXAMINATION:    VITAL SIGNS:  Visit Vitals    /82 (BP 1 Location: Left arm, BP Patient Position: Sitting)    Pulse 99    Ht 5' 8\" (1.727 m)    Wt 201 lb 4.8 oz (91.3 kg)    BMI 30.61 kg/m2       GENERAL: NCAT, Sitting comfortably, NAD  EYES: EOMI, non-icteric, no proptosis  Ear/Nose/Throat: NCAT, no inflammation, no masses  LYMPH NODES: No LAD  CARDIOVASCULAR: S1 S2, RRR, No murmur, 2+ radial pulses  RESPIRATORY: CTA b/l, no wheeze/rales  GASTROINTESTINAL: NT, ND  MUSCULOSKELETAL: Normal ROM, no atrophy  SKIN: warm, no edema/rash/ or other skin changes  NEUROLOGIC: 5/5 power all extremities, no tremor, AAOx3  PSYCHIATRIC: Normal affect, Normal insight and judgement    REVIEW OF LABORATORY AND RADIOLOGY DATA:   Labs and documentation have been reviewed as described above. ASSESSMENT AND PLAN:   Rashad Kendrick Sr. is a 63 y.o. male with a PMHx as noted above who presents for f/u of uncontrolled type 2 diabetes with steroid induced hyperglycemia. Problems:  Type 2 diabetes uncontrolled  Steroid induced hyperglycemia  Hypertension    Patient doing well but his sugars have risen out of control after stopping the metformin.  Advised him that was not toxic to his kidney and had a great benefit for him. However, considering that he will be admitted soon for stem cell transplant, and thus it will be discontinued again, we will hold off on this for now and restart after he completes his therapy/hospitalization. Although his A1c is 6.4%, that does not reflect his current hyperglycemia. For now we will increase his insulin as below to help with his current hyperglycemia. Sugars are highest at dinner and bedtime, resulting in highs in the AM. Note however he is not planning to receive any more steroids and this will likely reduce the insulin resistance and need for increasing amounts of insulins so we will carefully adjust his regimen with this in mind. He is advised to call with concerns between now and next visit. PLAN  Type 2 Diabetes  Medications: Will not start metformin 500mg twice daily until after his upcoming hospitalization  Lantus: increase to 22 units each morning  Humalog: increase to 8 units breakfast, 10 units lunch, 10 units lunch  Humalog Correction Scale  1:25>150  Advised to check glucose ACHS    HTN: stable on amlodipine  Lipids: was not obtained, will order with dm prelabs,     RTC: 2 months with DM prelabs, to call with concerns,     Jam Smith T.  [address] Endocrinology  [employer]

## 2018-02-22 ENCOUNTER — APPOINTMENT (OUTPATIENT)
Dept: INFUSION THERAPY | Age: 63
End: 2018-02-22
Payer: COMMERCIAL

## 2018-02-23 ENCOUNTER — APPOINTMENT (OUTPATIENT)
Dept: INFUSION THERAPY | Age: 63
End: 2018-02-23
Payer: COMMERCIAL

## 2018-03-01 ENCOUNTER — APPOINTMENT (OUTPATIENT)
Dept: INFUSION THERAPY | Age: 63
End: 2018-03-01

## 2018-03-02 ENCOUNTER — APPOINTMENT (OUTPATIENT)
Dept: INFUSION THERAPY | Age: 63
End: 2018-03-02

## 2018-06-11 ENCOUNTER — OFFICE VISIT (OUTPATIENT)
Dept: ONCOLOGY | Age: 63
End: 2018-06-11

## 2018-06-11 VITALS
OXYGEN SATURATION: 99 % | DIASTOLIC BLOOD PRESSURE: 86 MMHG | RESPIRATION RATE: 16 BRPM | TEMPERATURE: 98.8 F | SYSTOLIC BLOOD PRESSURE: 126 MMHG | HEART RATE: 75 BPM | HEIGHT: 68 IN

## 2018-06-11 DIAGNOSIS — C90.00 MULTIPLE MYELOMA NOT HAVING ACHIEVED REMISSION (HCC): Primary | ICD-10-CM

## 2018-06-11 RX ORDER — FOLIC ACID 1 MG/1
TABLET ORAL
Refills: 4 | COMMUNITY
Start: 2018-05-14 | End: 2019-11-12

## 2018-06-11 RX ORDER — ATOVAQUONE 750 MG/5ML
SUSPENSION ORAL
Refills: 6 | COMMUNITY
Start: 2018-05-15 | End: 2018-11-01

## 2018-06-11 RX ORDER — ERGOCALCIFEROL 1.25 MG/1
CAPSULE ORAL
Refills: 4 | COMMUNITY
Start: 2018-05-14 | End: 2020-02-05

## 2018-06-11 RX ORDER — LISINOPRIL 10 MG/1
TABLET ORAL
Refills: 11 | COMMUNITY
Start: 2018-05-12 | End: 2019-11-12

## 2018-06-11 RX ORDER — FAMOTIDINE 20 MG/1
TABLET, FILM COATED ORAL
Refills: 6 | COMMUNITY
Start: 2018-05-05 | End: 2018-11-01 | Stop reason: SDUPTHER

## 2018-06-11 NOTE — PROGRESS NOTES
Progress Note        Patient: David Millan Sr. MRN: [id number]  SSN: [id number]    YOB: 1955  Age: 63 y.o. Sex: male        Diagnosis:      1. Multiple Myeloma, IgA Kappa   Durie [place] stage IIIB    Cytogenetics/FISH: t(14;16) - high risk    Ttreatment:      1. S/P 1 autotransplant   2. Carfilzomib/Pom/Dex - s/p 5 cycles  3. VD-PACE s/p 1 cycle  4. RVD - s/p 4 cycles    Subjective:      David Millan Sr. is a 63 y.o. male with a diagnosis of IgA kappa myeloma. Bone marrow shows 100% aberrant plasma cells. He suffers with DM but it is diet controlled. He has received systemic anti-viral treatment for chronic Hep C with successful eradication of the virus. Mr. Cha Estrada received 4 cycles of systemic therapy with RVd. He had an initial good response to treatment. However his paraprotein level started rising and the myeloma became refractory to treatment. I then administered one cycle of VD-PACE in the hospital. He then received Carfilzomib/Pom/Dex. He has achieved VGPR. He underwent autotransplant at [facility] on 2/28/2018. According to the patient he has achieved a complete response with therapy. He is here today for follow-up and is being admitted this week for his second/tandem transplant.        Review of Systems:    Constitutional: fatigue  Eyes: negative  Ears, Nose, Mouth, Throat, and Face: negative  Respiratory: negative  Cardiovascular: negative  Gastrointestinal: negative  Genitourinary:negative  Integument/Breast: negative  Hematologic/Lymphatic: negative  Musculoskeletal:negative  Neurological: negative      Past Medical History:   Diagnosis Date    Acid reflux     Arrhythmia     pvc's    Chronic pain     neck    Depression     Diabetes ([facility] [address]     Femoral hernia 7/11/2012    Hepatitis C     Hypertension     Inguinal hernia 7/11/2012    Liver disease     hepatitis c    Other ill-defined conditions(799.89)     Hx of PVCs since 2009    Prostatitis, acute     Psychiatric disorder     depression     Past Surgical History:   Procedure Laterality Date    COLONOSCOPY N/A 9/20/2016    COL[facility] Edgar [facility] ENDOSCOPY    HX APPENDECTOMY      HX CERVICAL FUSION  2008    x 1 as of 4/30/2014    HX HEENT      foreign body removed from right eye    [facility]IA REPAIR  2012    [facility]      HX ORTHOPAEDIC      cervical fusion    HX OTHER SURGICAL  2000    liver bx - chronic hepatitis      Family History   Problem Relation Age of Onset    Arthritis-osteo Mother     Arthritis-osteo Father     Diabetes Father     Hypertension Maternal Grandmother     Diabetes Maternal Grandmother     Hypertension Maternal Grandfather     Diabetes Maternal Grandfather      Social History   Substance Use Topics    Smoking status: Former Smoker     Packs/day: 1.00     Years: 12.00     Types: Cigarettes     Quit date: 1/1/1989    Smokeless tobacco: Never Used      Comment: former cigarette smoker    Alcohol use No      Comment: former alcoholic-quit [id number]      Prior to Admission medications    Medication Sig Start Date End Date Taking? Authorizing Provider   ergocalciferol (ERGOCALCIFEROL) 50,000 unit capsule TAKE 1 CAP BY MOUTH EVERY 7 DAYS 5/14/18  Yes Historical Provider   famotidine (PEPCID) 20 mg tablet TAKE 1 TABLET BY MOUTH TWICE A DAY 5/5/18  Yes Historical Provider   lisinopril (PRINIVIL, ZESTRIL) 10 mg tablet TAKE 1 TABLET BY MOUTH EVERY DAY 5/12/18  Yes Historical Provider   folic acid (FOLVITE) 1 mg tablet TAKE 1 TABLET BY MOUTH EVERY DAY 5/14/18  Yes Historical Provider   atovaquone (MEPRON) 750 mg/5 mL suspension GIVE 5 ML BY MOUTH TWICE A DAY X30 DAY(S) 5/15/18  Yes Historical Provider   multivitamin (ONE A DAY) tablet Take 1 Tab by mouth daily. Yes Historical Provider   Biotin 2,500 mcg cap Take  by mouth. Yes Historical Provider   insulin glargine (LANTUS,BASAGLAR) 100 unit/mL (3 mL) inpn 22 Units by SubCUTAneous route daily.  2/21/18  Yes [facility] [place] Prim[facility] Ton Ledbetter MD   lidocaine-prilocaine (EMLA) topical cream Apply  to affected area as needed for Pain. 2/2/18  Yes Bianca Price MD   naproxen (NAPROSYN) 500 mg tablet TAKE 1 TABLET BY MOUTH TWICE A DAY 12/7/17  Yes Historical Provider   dexamethasone (DECADRON) 4 mg tablet Take 10 Tabs by mouth Every Thursday. 10/19/17  Yes [address] [place] [address] Drive, NP   zolpidem 3.5 mg subl TAKE 1 TABLET AT BEDTIME 7/28/17  Yes Historical Provider   prochlorperazine (COMPAZINE) 10 mg tablet TAKE 1 TABLET BY MOUTH EVERY 6 HOURS AS NEEDED FOR UP TO 7 DAYS. 8/27/17  Yes Sloan Egan,    ONETOUCH ULTRA TEST strip USE TO TEST BLOOD SUGAR TID BEFORE EACH MEAL UTD. 7/3/17  Yes Historical Provider   BD INSULIN PEN NEEDLE UF MINI 31 gauge x 3/16\" ndle USE AS DIRECTED 6/7/17  Yes Historical Provider   insulin lispro (HUMALOG) 100 unit/mL kwikpen 8-10 Units by SubCUTAneous route. 8 units breakfast, 10 units lunch, 10 units dinner + correction scale of 1:25>150   Yes Historical Provider   valACYclovir (VALTREX) 1 gram tablet Take 1 Tab by mouth daily. 6/6/17  Yes Bianca Price MD   tamsulosin (FLOMAX) 0.4 mg capsule Take 1 Cap by mouth daily. 4/4/17  Yes Alberta Crook MD   zolpidem CR (AMBIEN CR) 12.5 mg tablet Take 12.5 mg by mouth nightly as needed. Yes Historical Provider   TADALAFIL (CIALIS PO) Take 20 mg by mouth as needed. Yes Historical Provider   tapentadol (NUCYNTA) 100 mg tablet Take 100 mg by mouth three (3) times daily. Yes Goldy Diaz MD   aripiprazole (ABILIFY) 5 mg tablet Take 2.5 mg by mouth every morning. Yes Goldy Diaz MD   buPROPion XL (WELLBUTRIN XL) 150 mg tablet Take 1 Tab by mouth daily (with dinner). 4/25/10  Yes Calista Parker MD   POMALYST 2 mg cap Take 1 capsule (2mg) by mouth daily for 21 days then 7 days off. 1/4/18   Bianca Price MD   metFORMIN ER (GLUCOPHAGE XR) 500 mg tablet Take 1 Tab by mouth two (2) times a day.  11/10/17   Bill Green MD   furosemide (LASIX) 20 mg tablet TAKE 1 TABLET BY MOUTH DAILY 17   Tabatha Saeed NP   REVLIMID 5 mg cap  8/10/17   Historical Provider   REVLIMID 10 mg cap  17   Historical Provider   sildenafil (REVATIO) 20 mg tablet TK 5 TS PO QD PRN 17   Historical Provider   losartan (COZAAR) 100 mg tablet TK 1 T PO QD 7/3/17   Historical Provider   propafenone (RYTHMOL) 150 mg tablet Take 150 mg by mouth every eight (8) hours. Historical Provider   amLODIPine (NORVASC) 10 mg tablet Take 10 mg by mouth daily. Historical Provider          Allergies   Allergen Reactions    Mercury (Bulk) Hives     Blisters             Objective:     Vitals:    18 1023   BP: 126/86   Pulse: 75   Resp: 16   Temp: 98.8 °F (37.1 °C)   TempSrc: Oral   SpO2: 99%   Height: 5' 8\" (1.727 m)          Physical Exam:    GENERAL: alert, cooperative  EYE: negative  LYMPHATIC: Cervical, supraclavicular, and axillary nodes normal.   THROAT & NECK: normal and no erythema or exudates noted.    LUNG: clear to auscultation bilaterally  HEART: regular rate and rhythm  ABDOMEN: soft, non-tender  EXTREMITIES: bilateral LE edema  SKIN: Normal.  NEUROLOGIC: negative      Lab Results   Component Value Date/Time    WBC 3.7 (L) 2018 10:14 AM    HGB 12.1 2018 10:14 AM    HCT 38.1 2018 10:14 AM    PLATELET 018  10:14 AM    MCV 97.2 2018 10:14 AM       Lab Results   Component Value Date/Time    Sodium 142 2018 11:24 AM    Potassium 4.3 2018 11:24 AM    Chloride 108 2018 11:24 AM    CO2 24 2018 11:24 AM    Anion gap 10 2018 11:24 AM    Glucose 247 (H) 2018 11:24 AM    BUN 15 2018 11:24 AM    Creatinine 1.14 2018 11:24 AM    BUN/Creatinine ratio 13 2018 11:24 AM    GFR est AA >60 2018 11:24 AM    GFR est non-AA >60 2018 11:24 AM    Calcium 8.8 2018 11:24 AM         M-spike: 5.8 => 2.9 => 1.9 => 2.1 => 2.8 => 1.4 => 0.9 => 0.1 => 0.2    Serum F => 496 => 246 => 305 => 314 => 200 => 114 => 80.4       Assessment:     1. Multiple Myeloma, IgA Kappa   Durie [place] stage IIIB    Cytogenetics/FISH: t(14;16) - high risk  ECOG PS - 0   Intent of therapy: palliative    Received 3 cycles of RVd   Dose reduced Revlimid and Velcade d/t continued cytopenias. M-spike started rising. He received one cycle of VD-PACE (bortezomib, dexamethasone, thalidomide, cisplatin, adriamycin, cyclophosphamide, and etoposide - will omit thalidomide). Achieved [place] with once cycle    Receivied KPd - s/p 5 cycles    Excellent response with M-protein came down to 0.1    S/p autotransplant at [address] [place] [address] on 2/28/2018  Per Mr. Kati Glasgow - he achieved CR after the first transplant. He is scheduled for re-admission for second/tandem transplant at [address] [place] [address] on 6/13/2018      2. Anemia. Myeloma and chemotherapy related    Resolved      3. Renal insufficiency    Resolved      4. Type 2 DM with complication    Managed by Endocrine      5. Chronic Hep C    In sustained virological remission      Plan:       > Admission for 2nd autotransplant at [address] [place] [address] on 6/13  > Follow-up in 4 months      Signed by: Vika Dalton MD                     June 11, 2018      CC. Mahnaz Jacobs MD  CC.  Mayte Avalos MD

## 2018-06-11 NOTE — PROGRESS NOTES
Meghan Fuchs is a 63 y.o. male here today for IgA Kappa Multiple Myeloma f/u. Cytogenetics/FISH- T (14/16); high risk. Chronic Hep C; remission. Pomalyst stopped at [facility]. Patient had his 1st transplant at [facility]; pt scheduled for 2nd transplant this Wed. VS stable. Patient has neck; pt taking Nucynta; relief. Good appetite. Patient denies N/V/D and constipation. Patient denies numbness and tingling. Patient denies mouth ulcers. Patient denies cough. Patient denies SOB. Patient has a rash to his scalp; patches and discoloration noted to scalp; pt denies itching and pain; pt saw a dermatologist this past Friday.      Visit Vitals    /86 (BP 1 Location: Left arm, BP Patient Position: Sitting)    Pulse 75    Temp 98.8 °F (37.1 °C) (Oral)    Resp 16    Ht 5' 8\" (1.727 m)    SpO2 99%

## 2018-07-03 NOTE — PROGRESS NOTES
Progress Note        Patient: Paul Lockwood Sr. MRN: [id number]  SSN: [id number]    YOB: 1955  Age: 63 y.o. Sex: male        Subjective:      Paul Lockwood Sr. is a 63 y.o. male with a diagnosis of IgA kappa myeloma. Bone marrow shows 100% aberrant plasma cells. He suffers with DM but it is diet controlled. He has received systemic anti-viral treatment for chronic Hep C with successful eradication of the virus. Mr. Marcela Kay received 4 cycles of systemic therapy with RVd. He had an initial good response to treatment. However his paraprotein level started rising and the myeloma became refractory to treatment. I then administered one cycle of VD-PACE in the hospital. He has achieved MO from therapy. He has seen transplant team at [facility]. He is starting KPd today. He feels well.          Review of Systems:    Constitutional: fatigue  Eyes: negative  Ears, Nose, Mouth, Throat, and Face: negative  Respiratory: negative  Cardiovascular: negative  Gastrointestinal: negative  Genitourinary:negative  Integument/Breast: negative  Hematologic/Lymphatic: negative  Musculoskeletal:bilateral lower extremity edema  Neurological: negative      Past Medical History:   Diagnosis Date    Acid reflux     Arrhythmia     pvc's    Chronic pain     neck    Depression     Diabetes ([facility] [address]     Femoral hernia 7/11/2012    Hepatitis C     Hypertension     Inguinal hernia 7/11/2012    Liver disease     hepatitis c    Other ill-defined conditions(799.89)     Hx of PVCs since 2009    Prostatitis, acute     Psychiatric disorder     depression     Past Surgical History:   Procedure Laterality Date    COLONOSCOPY N/A 9/20/2016    COLON[facility] ENDOSCOPY    HX APPENDECTOMY      HX CERVICAL FUSION  2008    x 1 as of 4/30/2014    HX HEENT      foreign body removed from right eye    HX [facility]ERN[facility]PAIR  2012    [facility]      HX ORTHOPAEDIC      cervical fusion    HX OTHER SURGICAL  2000    liver bx - chronic hepatitis      Family History   Problem Relation Age of Onset    Arthritis-osteo Mother     Arthritis-osteo Father     Diabetes Father     Hypertension Maternal Grandmother     Diabetes Maternal Grandmother     Hypertension Maternal Grandfather     Diabetes Maternal Grandfather      Social History   Substance Use Topics    Smoking status: Former Smoker     Packs/day: 1.00     Years: 12.00     Types: Cigarettes     Quit date: 1/1/1989    Smokeless tobacco: Never Used      Comment: former cigarette smoker    Alcohol use No      Comment: former alcoholic-quit [id number]      Prior to Admission medications    Medication Sig Start Date End Date Taking? Authorizing Provider   prochlorperazine (COMPAZINE) 10 mg tablet TAKE 1 TABLET BY MOUTH EVERY 6 HOURS AS NEEDED FOR UP TO 7 DAYS. 8/27/17  Yes Kayla Yates,    ONETOUCH ULTRA TEST strip USE TO TEST BLOOD SUGAR TID BEFORE EACH MEAL UTD. 7/3/17  Yes Historical Provider   losartan (COZAAR) 100 mg tablet TK 1 T PO QD 7/3/17  Yes Historical Provider   BD INSULIN PEN NEEDLE UF MINI 31 gauge x 3/16\" ndle USE AS DIRECTED 6/7/17  Yes Historical Provider   insulin lispro (HUMALOG) 100 unit/mL kwikpen 8-10 Units by SubCUTAneous route. 8 units breakfast, 10 units lunch, 10 units dinner + correction scale of 1:25>150   Yes Historical Provider   valACYclovir (VALTREX) 1 gram tablet Take 1 Tab by mouth daily. 6/6/17  Yes Noah Eckert MD   tamsulosin (FLOMAX) 0.4 mg capsule Take 1 Cap by mouth daily. 4/4/17  Yes Gabby Taveras MD   propafenone (RYTHMOL) 150 mg tablet Take 150 mg by mouth every eight (8) hours. Yes Historical Provider   zolpidem CR (AMBIEN CR) 12.5 mg tablet Take 12.5 mg by mouth nightly as needed. Yes Historical Provider   TADALAFIL (CIALIS PO) Take 20 mg by mouth as needed. Yes Historical Provider   amLODIPine (NORVASC) 10 mg tablet Take 10 mg by mouth daily.    Yes Historical Provider   tapentadol (NUCYNTA) 100 mg tablet Take 100 mg by mouth three (3) times daily. Yes Goldy Diaz MD   aripiprazole (ABILIFY) 5 mg tablet Take 2.5 mg by mouth every morning. Yes Goldy Diaz MD   buPROPion XL (WELLBUTRIN XL) 150 mg tablet Take 1 Tab by mouth daily (with dinner). 4/25/10  Yes Herminia Ledesma MD   ergocalciferol (ERGOCALCIFEROL) 50,000 unit capsule TAKE 1 CAP BY MOUTH EVERY 7 DAYS 5/14/18   Historical Provider   famotidine (PEPCID) 20 mg tablet TAKE 1 TABLET BY MOUTH TWICE A DAY 5/5/18   Historical Provider   lisinopril (PRINIVIL, ZESTRIL) 10 mg tablet TAKE 1 TABLET BY MOUTH EVERY DAY 5/12/18   Historical Provider   folic acid (FOLVITE) 1 mg tablet TAKE 1 TABLET BY MOUTH EVERY DAY 5/14/18   Historical Provider   atovaquone (MEPRON) 750 mg/5 mL suspension GIVE 5 ML BY MOUTH TWICE A DAY X30 DAY(S) 5/15/18   Historical Provider   multivitamin (ONE A DAY) tablet Take 1 Tab by mouth daily. Historical Provider   Biotin 2,500 mcg cap Take  by mouth. Historical Provider   insulin glargine (LANTUS,BASAGLAR) 100 unit/mL (3 mL) inpn 22 Units by SubCUTAneous route daily. 2/21/18   Ana Maria Penn MD   lidocaine-prilocaine (EMLA) topical cream Apply  to affected area as needed for Pain. 2/2/18   Alejo Duran MD   naproxen (NAPROSYN) 500 mg tablet TAKE 1 TABLET BY MOUTH TWICE A DAY 12/7/17   Historical Provider   POMALYST 2 mg cap Take 1 capsule (2mg) by mouth daily for 21 days then 7 days off. 1/4/18   Alejo Duran MD   metFORMIN ER (GLUCOPHAGE XR) 500 mg tablet Take 1 Tab by mouth two (2) times a day. 11/10/17   Ana Maria Penn MD   furosemide (LASIX) 20 mg tablet TAKE 1 TABLET BY MOUTH DAILY 11/7/17   Jess Desai NP   REVLIMID 5 mg cap  8/10/17   Historical Provider   REVLIMID 10 mg cap  7/31/17   Historical Provider   dexamethasone (DECADRON) 4 mg tablet Take 10 Tabs by mouth Every Thursday.  10/19/17   Jess Desai NP   sildenafil (REVATIO) 20 mg tablet TK 5 TS PO QD PRN 9/11/17   Historical Provider   zolpidem 3.5 mg subl TAKE 1 TABLET AT BEDTIME 17   Historical Provider              Allergies   Allergen Reactions    Mercury (Bulk) Hives     Blisters             Objective:     Vitals:    10/19/17 1415   BP: 132/83   Pulse: 85   Resp: 16   Temp: 98.7 °F (37.1 °C)   TempSrc: Oral   SpO2: 97%   Weight: 194 lb 8 oz (88.2 kg)   Height: 5' 8.11\" (1.73 m)          Physical Exam:    GENERAL: alert, cooperative  EYE: negative  LYMPHATIC: Cervical, supraclavicular, and axillary nodes normal.   THROAT & NECK: normal and no erythema or exudates noted. LUNG: clear to auscultation bilaterally  HEART: regular rate and rhythm  ABDOMEN: soft, non-tender  EXTREMITIES: bilateral LE edema  SKIN: Normal.  NEUROLOGIC: negative      LABS:    WBC: 7.9  Hgb: 11.3  Plt: 118    M-spike: 5.8 => 2.9 => 1.9 => 2.1 => 2.8 => 1.4 => 0.9    Serum F => 496 => 246 => 305 => 314 => 200 => 114       Assessment:     1. Multiple Myeloma, IgA Kappa   Durie [place] stage IIIB    Cytogenetics/FISH: t(14;16) - high risk  ECOG PS - 0   Intent of therapy: palliative    > Receiving systemic therapy with RVd - s/p 3 cycles  (Dose reduced Revlimid and Velcade d/t continued cytopenias. M-spike started rising)    > Received one cycle of VD-PACE (bortezomib, dexamethasone, thalidomide, cisplatin, adriamycin, cyclophosphamide, and etoposide - will omit thalidomide). Achieved AK with once cycle    He has seen Dr. Sandy Singer at [facility] for an auto-transplant.     > Starting Carfilzomib, Pomalidomide, Dex, cycle 1 Day 1    Patient understands the possible side effects and ways to manage them  Blood counts are acceptable. Reviewed results with the patient. 2. Anemia. Myeloma and chemotherapy related    Observation      3. Renal insufficiency    Observation        Plan:       1. Start Carfilzomib/Pom/Dex  2. Auto-transplant in the future        Signed by: Noah Eckert MD                     2018        CC. Aj Fong MD  CC. Britt Carlton MD

## 2018-08-10 ENCOUNTER — OFFICE VISIT (OUTPATIENT)
Dept: ONCOLOGY | Age: 63
End: 2018-08-10

## 2018-08-10 VITALS
TEMPERATURE: 99.3 F | OXYGEN SATURATION: 98 % | RESPIRATION RATE: 16 BRPM | SYSTOLIC BLOOD PRESSURE: 116 MMHG | HEART RATE: 84 BPM | BODY MASS INDEX: 27.43 KG/M2 | HEIGHT: 68 IN | DIASTOLIC BLOOD PRESSURE: 80 MMHG | WEIGHT: 181 LBS

## 2018-08-10 DIAGNOSIS — T45.1X5A ANEMIA ASSOCIATED WITH CHEMOTHERAPY: ICD-10-CM

## 2018-08-10 DIAGNOSIS — D64.81 ANEMIA ASSOCIATED WITH CHEMOTHERAPY: ICD-10-CM

## 2018-08-10 DIAGNOSIS — C90.00 MULTIPLE MYELOMA NOT HAVING ACHIEVED REMISSION (HCC): Primary | ICD-10-CM

## 2018-08-10 DIAGNOSIS — Z94.84 H/O AUTOLOGOUS STEM CELL TRANSPLANT (HCC): ICD-10-CM

## 2018-08-10 RX ORDER — PENTAMIDINE ISETHIONATE 300 MG/300MG
300 INHALANT RESPIRATORY (INHALATION)
COMMUNITY
End: 2019-07-09

## 2018-08-10 RX ORDER — AMIODARONE HYDROCHLORIDE 200 MG/1
200 TABLET ORAL
COMMUNITY
End: 2018-11-01

## 2018-08-10 RX ORDER — TENOFOVIR DISOPROXIL FUMARATE 300 MG/1
300 TABLET, FILM COATED ORAL DAILY
COMMUNITY
Start: 2018-07-07 | End: 2019-11-12

## 2018-08-10 RX ORDER — ACYCLOVIR 400 MG/1
400 TABLET ORAL 2 TIMES DAILY
COMMUNITY
Start: 2018-07-07 | End: 2019-07-09

## 2018-08-10 RX ORDER — NYSTATIN 100000 [USP'U]/ML
SUSPENSION ORAL 4 TIMES DAILY
COMMUNITY
End: 2018-11-01

## 2018-08-10 NOTE — PROGRESS NOTES
Progress Note        Patient: Leni Berman Sr. MRN: [id number]  SSN: [id number]    YOB: 1955  Age: 63 y.o. Sex: male        Diagnosis:      1. Multiple Myeloma, IgA Kappa   Durie [place] stage IIIB    Cytogenetics/FISH: t(14;16) - high risk    Ttreatment:      1. S/P tandem  Autotransplant    First on 2/28/2018   2nd on 06/13/2018  2. Carfilzomib/Pom/Dex - s/p 5 cycles  3. VD-PACE s/p 1 cycle  4. RVD - s/p 4 cycles    Subjective:      Leni Berman Sr. is a 63 y.o. male with a diagnosis of IgA kappa myeloma. Bone marrow shows 100% aberrant plasma cells. He suffers with DM but it is diet controlled. He has received systemic anti-viral treatment for chronic Hep C with successful eradication of the virus. Mr. Irasema Luque received 4 cycles of systemic therapy with RVd. He had an initial good response to treatment. However his paraprotein level started rising and the myeloma became refractory to treatment. I then administered one cycle of VD-PACE in the hospital. He then received Carfilzomib/Pom/Dex. He achieved VGPR. He underwent tandem autotransplant at [facility]. According to the patient he achieved complete response with therapy. He is here today for follow-up after receiving his second/tandem transplant in June. He is doing well and continues to recover.      Review of Systems:    Constitutional: fatigue  Eyes: negative  Ears, Nose, Mouth, Throat, and Face: negative  Respiratory: negative  Cardiovascular: negative  Gastrointestinal: negative  Genitourinary:negative  Integument/Breast: negative  Hematologic/Lymphatic: negative  Musculoskeletal: chronic neck pain  Neurological: negative      Past Medical History:   Diagnosis Date    Acid reflux     Arrhythmia     pvc's    Chronic pain     neck    Depression     Diabetes ([facility] [address]     Femoral hernia 7/11/2012    Hepatitis C     Hypertension     Inguinal hernia 7/11/2012    Liver disease     hepatitis c    Other ill-defined conditions(799.89)     Hx of PVCs since 2009    Prostatitis, acute     Psychiatric disorder     depression     Past Surgical History:   Procedure Laterality Date    COLONOSCOPY N/A 9/20/2016    COLONOSCOPY performed by Connor Guerrero MD at [facility] ENDOSCOPY    HX APPENDECTOMY      HX CERVICAL FUSION  2008    x 1 as of 4/30/2014    HX HEENT      foreign body removed from right eye    HX HERNIA REPAIR  2012    [facility]      HX ORTHOPAEDIC      cervical fusion    HX OTHER SURGICAL  2000    liver bx - chronic hepatitis      Family History   Problem Relation Age of Onset    Arthritis-osteo Mother     Arthritis-osteo Father     Diabetes Father     Hypertension Maternal Grandmother     Diabetes Maternal Grandmother     Hypertension Maternal Grandfather     Diabetes Maternal Grandfather      Social History   Substance Use Topics    Smoking status: Former Smoker     Packs/day: 1.00     Years: 12.00     Types: Cigarettes     Quit date: 1/1/1989    Smokeless tobacco: Never Used      Comment: former cigarette smoker    Alcohol use No      Comment: former alcoholic-quit [id number]      Prior to Admission medications    Medication Sig Start Date End Date Taking? Authorizing Provider   acyclovir (ZOVIRAX) 400 mg tablet  7/7/18  Yes Historical Provider   tenofovir DISOPROXIL FUMARATE (VIREAD) 300 mg tablet  7/7/18  Yes Historical Provider   pentamidine (NEBUPENT) 300 mg inhalation solution Take 300 mg by inhalation once. Yes Historical Provider   amiodarone (CORDARONE) 200 mg tablet Take  by mouth. Yes Historical Provider   nystatin (MYCOSTATIN) 100,000 unit/mL suspension Take  by mouth four (4) times daily.  swish and spit   Yes Historical Provider   ergocalciferol (ERGOCALCIFEROL) 50,000 unit capsule TAKE 1 CAP BY MOUTH EVERY 7 DAYS 5/14/18  Yes Historical Provider   lisinopril (PRINIVIL, ZESTRIL) 10 mg tablet TAKE 1 TABLET BY MOUTH EVERY DAY 5/12/18  Yes Historical Provider   folic acid (FOLVITE) 1 mg tablet TAKE 1 TABLET BY MOUTH EVERY DAY 5/14/18  Yes Historical Provider   insulin glargine (LANTUS,BASAGLAR) 100 unit/mL (3 mL) inpn 22 Units by SubCUTAneous route daily. 2/21/18  Yes Christian Hayward MD   metFORMIN ER (GLUCOPHAGE XR) 500 mg tablet Take 1 Tab by mouth two (2) times a day. 11/10/17  Yes Christian Hayward MD   furosemide (LASIX) 20 mg tablet TAKE 1 TABLET BY MOUTH DAILY 11/7/17  Yes Macie Logn NP   prochlorperazine (COMPAZINE) 10 mg tablet TAKE 1 TABLET BY MOUTH EVERY 6 HOURS AS NEEDED FOR UP TO 7 DAYS. 8/27/17  Yes Sb Odom,    ONETOUCH ULTRA TEST strip USE TO TEST BLOOD SUGAR TID BEFORE EACH MEAL UTD. 7/3/17  Yes Historical Provider   BD INSULIN PEN NEEDLE UF MINI 31 gauge x 3/16\" ndle USE AS DIRECTED 6/7/17  Yes Historical Provider   insulin lispro (HUMALOG) 100 unit/mL kwikpen 8-10 Units by SubCUTAneous route. 8 units breakfast, 10 units lunch, 10 units dinner + correction scale of 1:25>150   Yes Historical Provider   tamsulosin (FLOMAX) 0.4 mg capsule Take 1 Cap by mouth daily. 4/4/17  Yes Olimpia Layne MD   zolpidem CR (AMBIEN CR) 12.5 mg tablet Take 12.5 mg by mouth nightly as needed. Yes Historical Provider   tapentadol (NUCYNTA) 100 mg tablet Take 100 mg by mouth three (3) times daily. Yes Goldy Diaz MD   aripiprazole (ABILIFY) 5 mg tablet Take 2.5 mg by mouth every morning. Yes Goldy Diaz MD   buPROPion XL (WELLBUTRIN XL) 150 mg tablet Take 1 Tab by mouth daily (with dinner). 4/25/10  Yes Evelina Rodriguez MD   famotidine (PEPCID) 20 mg tablet TAKE 1 TABLET BY MOUTH TWICE A DAY 5/5/18   Historical Provider   atovaquone (MEPRON) 750 mg/5 mL suspension GIVE 5 ML BY MOUTH TWICE A DAY X30 DAY(S) 5/15/18   Historical Provider   multivitamin (ONE A DAY) tablet Take 1 Tab by mouth daily. Historical Provider   Biotin 2,500 mcg cap Take  by mouth. Historical Provider   lidocaine-prilocaine (EMLA) topical cream Apply  to affected area as needed for Pain.  2/2/18 Campos Moon MD   naproxen (NAPROSYN) 500 mg tablet TAKE 1 TABLET BY MOUTH TWICE A DAY 12/7/17   Historical Provider   POMALYST 2 mg cap Take 1 capsule (2mg) by mouth daily for 21 days then 7 days off. 1/4/18   Campos Moon MD   REVLIMID 5 mg cap  8/10/17   Historical Provider   REVLIMID 10 mg cap  7/31/17   Historical Provider   dexamethasone (DECADRON) 4 mg tablet Take 10 Tabs by mouth Every Thursday. 10/19/17   Layla Kolb NP   sildenafil (REVATIO) 20 mg tablet TK 5 TS PO QD PRN 9/11/17   Historical Provider   zolpidem 3.5 mg subl TAKE 1 TABLET AT BEDTIME 7/28/17   Historical Provider   losartan (COZAAR) 100 mg tablet TK 1 T PO QD 7/3/17   Historical Provider   valACYclovir (VALTREX) 1 gram tablet Take 1 Tab by mouth daily. 6/6/17   Campos Moon MD   propafenone (RYTHMOL) 150 mg tablet Take 150 mg by mouth every eight (8) hours. Historical Provider   TADALAFIL (CIALIS PO) Take 20 mg by mouth as needed. Historical Provider   amLODIPine (NORVASC) 10 mg tablet Take 10 mg by mouth daily. Historical Provider          Allergies   Allergen Reactions    Mercury (Bulk) Hives     Blisters             Objective:     Vitals:    08/10/18 0908   BP: 116/80   Pulse: 84   Resp: (!) 98   Temp: 99.3 °F (37.4 °C)   Weight: 181 lb (82.1 kg)   Height: 5' 8\" (1.727 m)          Physical Exam:    GENERAL: alert, cooperative  EYE: negative  LYMPHATIC: Cervical, supraclavicular, and axillary nodes normal.   THROAT & NECK: normal and no erythema or exudates noted.    LUNG: clear to auscultation bilaterally  HEART: regular rate and rhythm  ABDOMEN: soft, non-tender  EXTREMITIES: bilateral LE edema  SKIN: Normal.  NEUROLOGIC: negative      Lab Results   Component Value Date/Time    WBC 3.7 (L) 02/08/2018 10:14 AM    HGB 12.1 02/08/2018 10:14 AM    HCT 38.1 02/08/2018 10:14 AM    PLATELET 938 29/14/8428 10:14 AM    MCV 97.2 02/08/2018 10:14 AM       Lab Results   Component Value Date/Time    Sodium 142 01/25/2018 11:24 AM    Potassium 4.3 01/25/2018 11:24 AM    Chloride 108 01/25/2018 11:24 AM    CO2 24 01/25/2018 11:24 AM    Anion gap 10 01/25/2018 11:24 AM    Glucose 247 (H) 01/25/2018 11:24 AM    BUN 15 01/25/2018 11:24 AM    Creatinine 1.14 01/25/2018 11:24 AM    BUN/Creatinine ratio 13 01/25/2018 11:24 AM    GFR est AA >60 01/25/2018 11:24 AM    GFR est non-AA >60 01/25/2018 11:24 AM    Calcium 8.8 01/25/2018 11:24 AM            Assessment:     1. Multiple Myeloma, IgA Kappa   Durie [place] stage IIIB    Cytogenetics/FISH: t(14;16) - high risk  ECOG PS - 0   Intent of therapy: palliative    Received 3 cycles of RVd   Dose reduced Revlimid and Velcade d/t continued cytopenias. M-spike started rising. He received one cycle of VD-PACE (bortezomib, dexamethasone, thalidomide, cisplatin, adriamycin, cyclophosphamide, and etoposide). Achieved DC with once cycle    Receivied KPd - s/p 5 cycles    Excellent response with M-protein came down to 0.1    S/p tandem autotransplant    First on 2/28/2018   2nd on 06/13/2018    Achieved CR after the first transplant. Recovering counts  Doing well  Some fatigue  Repeat bone marrow Bx in Oct 2018  Considerations for maintenance. I will discuss with Dr. Aditya Louie at [facility]. 2. Type 2 DM with complication    Managed by Endocrine      3. Chronic Hep C    In sustained virological remission      Plan:       > Bone marrow biopsy in Oct 2018 @[facility]  > Follow-up in 3 months - will have a bone marrow in October 2018.   > Will discuss maintenance therapy with the BMT team at [facility]  > labs weekly in [address] [place] [address] by: Guerrero Gold MD                     August 10, 2018      CC. Carey Tellez MD  CC.  Rupali Nguyen MD

## 2018-08-10 NOTE — PROGRESS NOTES
David Brown. is a 63 y.o. male here today for muktiple myeloma f/u. S/P 1 transplant 2/28/18; 2nd transplant 6/13. VS stable. Patient state he has chronic neck pain; pain 5/10. Good appetite. Patient denies N/V/D and constipation. Patient denies numbness and tingling. Patient denies mouth ulcers. Patient denies cough. Patient denies SOB.        Visit Vitals    /80 (BP 1 Location: Left arm, BP Patient Position: Sitting)    Pulse 84    Temp 99.3 °F (37.4 °C) (Oral)    Resp 16    Ht 5' 8\" (1.727 m)    Wt 181 lb (82.1 kg)    SpO2 98%    BMI 27.52 kg/m2

## 2018-09-12 ENCOUNTER — HOSPITAL ENCOUNTER (OUTPATIENT)
Dept: INFUSION THERAPY | Age: 63
Discharge: HOME | End: 2018-09-12
Payer: COMMERCIAL

## 2018-09-12 VITALS
TEMPERATURE: 98.9 F | RESPIRATION RATE: 18 BRPM | DIASTOLIC BLOOD PRESSURE: 85 MMHG | SYSTOLIC BLOOD PRESSURE: 123 MMHG | HEART RATE: 88 BPM | OXYGEN SATURATION: 100 %

## 2018-09-12 LAB
ALBUMIN SERPL-MCNC: 4.2 G/DL (ref 3.5–5)
ALBUMIN/GLOB SERPL: 1.5 {RATIO} (ref 1.1–2.2)
ALP SERPL-CCNC: 83 U/L (ref 45–117)
ALT SERPL-CCNC: 22 U/L (ref 12–78)
ANION GAP SERPL CALC-SCNC: 7 MMOL/L (ref 5–15)
AST SERPL-CCNC: 20 U/L (ref 15–37)
BASOPHILS # BLD: 0 K/UL (ref 0–0.1)
BASOPHILS NFR BLD: 0 % (ref 0–1)
BILIRUB SERPL-MCNC: 0.2 MG/DL (ref 0.2–1)
BUN SERPL-MCNC: 13 MG/DL (ref 6–20)
BUN/CREAT SERPL: 10 (ref 12–20)
CALCIUM SERPL-MCNC: 8.8 MG/DL (ref 8.5–10.1)
CHLORIDE SERPL-SCNC: 109 MMOL/L (ref 97–108)
CO2 SERPL-SCNC: 27 MMOL/L (ref 21–32)
CREAT SERPL-MCNC: 1.34 MG/DL (ref 0.7–1.3)
DIFFERENTIAL METHOD BLD: ABNORMAL
EOSINOPHIL # BLD: 0 K/UL (ref 0–0.4)
EOSINOPHIL NFR BLD: 0 % (ref 0–7)
ERYTHROCYTE [DISTWIDTH] IN BLOOD BY AUTOMATED COUNT: 15.5 % (ref 11.5–14.5)
GLOBULIN SER CALC-MCNC: 2.8 G/DL (ref 2–4)
GLUCOSE SERPL-MCNC: 106 MG/DL (ref 65–100)
HCT VFR BLD AUTO: 35.5 % (ref 36.6–50.3)
HGB BLD-MCNC: 11.4 G/DL (ref 12.1–17)
IMM GRANULOCYTES # BLD: 0 K/UL (ref 0–0.04)
IMM GRANULOCYTES NFR BLD AUTO: 0 % (ref 0–0.5)
LYMPHOCYTES # BLD: 0.9 K/UL (ref 0.8–3.5)
LYMPHOCYTES NFR BLD: 24 % (ref 12–49)
MCH RBC QN AUTO: 30.2 PG (ref 26–34)
MCHC RBC AUTO-ENTMCNC: 32.1 G/DL (ref 30–36.5)
MCV RBC AUTO: 93.9 FL (ref 80–99)
MONOCYTES # BLD: 0.5 K/UL (ref 0–1)
MONOCYTES NFR BLD: 13 % (ref 5–13)
NEUTS SEG # BLD: 2.5 K/UL (ref 1.8–8)
NEUTS SEG NFR BLD: 64 % (ref 32–75)
NRBC # BLD: 0 K/UL (ref 0–0.01)
NRBC BLD-RTO: 0 PER 100 WBC
PLATELET # BLD AUTO: 148 K/UL (ref 150–400)
PMV BLD AUTO: 10.6 FL (ref 8.9–12.9)
POTASSIUM SERPL-SCNC: 3.8 MMOL/L (ref 3.5–5.1)
PROT SERPL-MCNC: 7 G/DL (ref 6.4–8.2)
RBC # BLD AUTO: 3.78 M/UL (ref 4.1–5.7)
SODIUM SERPL-SCNC: 143 MMOL/L (ref 136–145)
WBC # BLD AUTO: 4 K/UL (ref 4.1–11.1)

## 2018-09-12 PROCEDURE — 85025 COMPLETE CBC W/AUTO DIFF WBC: CPT | Performed by: INTERNAL MEDICINE

## 2018-09-12 PROCEDURE — 80053 COMPREHEN METABOLIC PANEL: CPT | Performed by: INTERNAL MEDICINE

## 2018-09-12 PROCEDURE — 77030012965 HC NDL HUBR BBMI -A

## 2018-09-12 PROCEDURE — 36415 COLL VENOUS BLD VENIPUNCTURE: CPT | Performed by: INTERNAL MEDICINE

## 2018-09-12 PROCEDURE — 74011250636 HC RX REV CODE- 250/636: Performed by: INTERNAL MEDICINE

## 2018-09-12 PROCEDURE — 36591 DRAW BLOOD OFF VENOUS DEVICE: CPT

## 2018-09-12 RX ORDER — SODIUM CHLORIDE 0.9 % (FLUSH) 0.9 %
10-40 SYRINGE (ML) INJECTION AS NEEDED
Status: ACTIVE | OUTPATIENT
Start: 2018-09-12 | End: 2018-09-13

## 2018-09-12 RX ORDER — HEPARIN 100 UNIT/ML
500 SYRINGE INTRAVENOUS AS NEEDED
Status: ACTIVE | OUTPATIENT
Start: 2018-09-12 | End: 2018-09-13

## 2018-09-12 RX ADMIN — Medication 500 UNITS: at 16:21

## 2018-09-12 RX ADMIN — Medication 20 ML: at 16:20

## 2018-09-12 NOTE — PROGRESS NOTES
[facility] Lab Draw Note: 
Arrived - 0491 Visit Vitals  /85 (BP 1 Location: Left arm, BP Patient Position: At rest)  Pulse 88  Temp 98.9 °F (37.2 °C)  Resp 18  SpO2 100% Chest port accessed w/o difficulty with Angelica Russian needle. Labs drawn and port flushed per policy. Chest port de accessed and paper tape and gauze placed. Labs:  
Recent Results (from the past 12 hour(s)) CBC WITH AUTOMATED DIFF Collection Time: 09/12/18  4:19 PM  
Result Value Ref Range WBC 4.0 (L) 4.1 - 11.1 K/uL  
 RBC 3.78 (L) 4.10 - 5.70 M/uL  
 HGB 11.4 (L) 12.1 - 17.0 g/dL HCT 35.5 (L) 36.6 - 50.3 % MCV 93.9 80.0 - 99.0 FL  
 MCH 30.2 26.0 - 34.0 PG  
 MCHC 32.1 30.0 - 36.5 g/dL  
 RDW 15.5 (H) 11.5 - 14.5 % PLATELET 184 (L) 869 - 400 K/uL MPV 10.6 8.9 - 12.9 FL  
 NRBC 0.0 0  WBC ABSOLUTE NRBC 0.00 0.00 - 0.01 K/uL NEUTROPHILS 64 32 - 75 % LYMPHOCYTES 24 12 - 49 % MONOCYTES 13 5 - 13 % EOSINOPHILS 0 0 - 7 % BASOPHILS 0 0 - 1 % IMMATURE GRANULOCYTES 0 0.0 - 0.5 % ABS. NEUTROPHILS 2.5 1.8 - 8.0 K/UL  
 ABS. LYMPHOCYTES 0.9 0.8 - 3.5 K/UL  
 ABS. MONOCYTES 0.5 0.0 - 1.0 K/UL  
 ABS. EOSINOPHILS 0.0 0.0 - 0.4 K/UL  
 ABS. BASOPHILS 0.0 0.0 - 0.1 K/UL  
 ABS. IMM. GRANS. 0.0 0.00 - 0.04 K/UL  
 DF AUTOMATED METABOLIC PANEL, COMPREHENSIVE Collection Time: 09/12/18  4:19 PM  
Result Value Ref Range Sodium 143 136 - 145 mmol/L Potassium 3.8 3.5 - 5.1 mmol/L Chloride 109 (H) 97 - 108 mmol/L  
 CO2 27 21 - 32 mmol/L Anion gap 7 5 - 15 mmol/L Glucose 106 (H) 65 - 100 mg/dL BUN 13 6 - 20 MG/DL Creatinine 1.34 (H) 0.70 - 1.30 MG/DL  
 BUN/Creatinine ratio 10 (L) 12 - 20 GFR est AA >60 >60 ml/min/1.73m2 GFR est non-AA 54 (L) >60 ml/min/1.73m2 Calcium 8.8 8.5 - 10.1 MG/DL Bilirubin, total 0.2 0.2 - 1.0 MG/DL  
 ALT (SGPT) 22 12 - 78 U/L  
 AST (SGOT) 20 15 - 37 U/L Alk.  phosphatase 83 45 - 117 U/L  
 Protein, total 7.0 6.4 - 8.2 g/dL Albumin 4.2 3.5 - 5.0 g/dL Globulin 2.8 2.0 - 4.0 g/dL A-G Ratio 1.5 1.1 - 2.2    
 
 
1625 - Tolerated well. Pt denies any acute problems/changes. Discharged from [place] ambulatory. No distress.  Next appt: 9/19/18 @ 4 pm

## 2018-09-19 ENCOUNTER — HOSPITAL ENCOUNTER (OUTPATIENT)
Dept: INFUSION THERAPY | Age: 63
Discharge: HOME | End: 2018-09-19
Payer: COMMERCIAL

## 2018-09-19 VITALS
OXYGEN SATURATION: 97 % | HEART RATE: 91 BPM | DIASTOLIC BLOOD PRESSURE: 86 MMHG | RESPIRATION RATE: 16 BRPM | SYSTOLIC BLOOD PRESSURE: 122 MMHG | TEMPERATURE: 99.1 F

## 2018-09-19 LAB
BASOPHILS # BLD: 0 K/UL (ref 0–0.1)
BASOPHILS NFR BLD: 0 % (ref 0–1)
DIFFERENTIAL METHOD BLD: ABNORMAL
EOSINOPHIL # BLD: 0 K/UL (ref 0–0.4)
EOSINOPHIL NFR BLD: 1 % (ref 0–7)
ERYTHROCYTE [DISTWIDTH] IN BLOOD BY AUTOMATED COUNT: 15.4 % (ref 11.5–14.5)
HCT VFR BLD AUTO: 35 % (ref 36.6–50.3)
HGB BLD-MCNC: 11.1 G/DL (ref 12.1–17)
IMM GRANULOCYTES # BLD: 0 K/UL (ref 0–0.04)
IMM GRANULOCYTES NFR BLD AUTO: 0 % (ref 0–0.5)
LYMPHOCYTES # BLD: 1 K/UL (ref 0.8–3.5)
LYMPHOCYTES NFR BLD: 31 % (ref 12–49)
MCH RBC QN AUTO: 29.8 PG (ref 26–34)
MCHC RBC AUTO-ENTMCNC: 31.7 G/DL (ref 30–36.5)
MCV RBC AUTO: 94.1 FL (ref 80–99)
MONOCYTES # BLD: 0.5 K/UL (ref 0–1)
MONOCYTES NFR BLD: 15 % (ref 5–13)
NEUTS SEG # BLD: 1.8 K/UL (ref 1.8–8)
NEUTS SEG NFR BLD: 53 % (ref 32–75)
NRBC # BLD: 0 K/UL (ref 0–0.01)
NRBC BLD-RTO: 0 PER 100 WBC
PLATELET # BLD AUTO: 136 K/UL (ref 150–400)
PMV BLD AUTO: 10.4 FL (ref 8.9–12.9)
RBC # BLD AUTO: 3.72 M/UL (ref 4.1–5.7)
WBC # BLD AUTO: 3.4 K/UL (ref 4.1–11.1)

## 2018-09-19 PROCEDURE — 85025 COMPLETE CBC W/AUTO DIFF WBC: CPT | Performed by: INTERNAL MEDICINE

## 2018-09-19 PROCEDURE — 36415 COLL VENOUS BLD VENIPUNCTURE: CPT | Performed by: INTERNAL MEDICINE

## 2018-09-19 PROCEDURE — 36591 DRAW BLOOD OFF VENOUS DEVICE: CPT

## 2018-09-19 PROCEDURE — 74011250636 HC RX REV CODE- 250/636: Performed by: INTERNAL MEDICINE

## 2018-09-19 PROCEDURE — 77030012965 HC NDL HUBR BBMI -A

## 2018-09-19 RX ORDER — SODIUM CHLORIDE 0.9 % (FLUSH) 0.9 %
10-40 SYRINGE (ML) INJECTION AS NEEDED
Status: ACTIVE | OUTPATIENT
Start: 2018-09-19 | End: 2018-09-20

## 2018-09-19 RX ORDER — HEPARIN 100 UNIT/ML
500 SYRINGE INTRAVENOUS AS NEEDED
Status: ACTIVE | OUTPATIENT
Start: 2018-09-19 | End: 2018-09-20

## 2018-09-19 RX ADMIN — Medication 500 UNITS: at 16:13

## 2018-09-19 RX ADMIN — Medication 10 ML: at 16:13

## 2018-09-19 NOTE — PROGRESS NOTES
[facility] Note: 
Arrived - 3188 Visit Vitals  /86 (BP 1 Location: Left arm, BP Patient Position: At rest)  Pulse 91  Temp 99.1 °F (37.3 °C)  Resp 16  SpO2 97% Assessment - unchanged. Labs drawn and Port accessed & flushed per protocol w/o difficulty. Scott needle removed. 1616 - Tolerated well. Pt denies any acute problems/changes. Discharged from [facility] ambulatory. No distress. Next appt: 9/26/18

## 2018-09-25 NOTE — PROGRESS NOTES
[facility] Lab Draw Note: 
 
1600- Arrived for Labs Patient Vitals for the past 12 hrs: 
 Temp Pulse Resp BP SpO2  
09/26/18 1600 (P) 98.9 °F (37.2 °C) (P) 85 (P) 18 (P) 116/77 (P) 99 % R chest port accessed without issue with positive blood return noted. CBC w/ diff drawn and sent for processing. Post flushed, heparinized, and de-accessed. Site covered with gauze and tape. 1615- Tolerated well. Pt denies any acute problems/changes. Discharged from [facility] ambulatory. No distress. Next appt: 10/3 at 4 PM.   
 
 
See connect care for pending lab results.

## 2018-09-26 ENCOUNTER — HOSPITAL ENCOUNTER (OUTPATIENT)
Dept: INFUSION THERAPY | Age: 63
Discharge: HOME | End: 2018-09-26
Payer: COMMERCIAL

## 2018-09-26 LAB
BASOPHILS # BLD: 0 K/UL (ref 0–0.1)
BASOPHILS NFR BLD: 0 % (ref 0–1)
DIFFERENTIAL METHOD BLD: ABNORMAL
EOSINOPHIL # BLD: 0 K/UL (ref 0–0.4)
EOSINOPHIL NFR BLD: 0 % (ref 0–7)
ERYTHROCYTE [DISTWIDTH] IN BLOOD BY AUTOMATED COUNT: 15 % (ref 11.5–14.5)
HCT VFR BLD AUTO: 33.4 % (ref 36.6–50.3)
HGB BLD-MCNC: 10.7 G/DL (ref 12.1–17)
IMM GRANULOCYTES # BLD: 0 K/UL (ref 0–0.04)
IMM GRANULOCYTES NFR BLD AUTO: 0 % (ref 0–0.5)
LYMPHOCYTES # BLD: 0.9 K/UL (ref 0.8–3.5)
LYMPHOCYTES NFR BLD: 28 % (ref 12–49)
MCH RBC QN AUTO: 30.2 PG (ref 26–34)
MCHC RBC AUTO-ENTMCNC: 32 G/DL (ref 30–36.5)
MCV RBC AUTO: 94.4 FL (ref 80–99)
MONOCYTES # BLD: 0.6 K/UL (ref 0–1)
MONOCYTES NFR BLD: 17 % (ref 5–13)
NEUTS SEG # BLD: 1.7 K/UL (ref 1.8–8)
NEUTS SEG NFR BLD: 54 % (ref 32–75)
NRBC # BLD: 0 K/UL (ref 0–0.01)
NRBC BLD-RTO: 0 PER 100 WBC
PLATELET # BLD AUTO: 120 K/UL (ref 150–400)
PMV BLD AUTO: 10.4 FL (ref 8.9–12.9)
RBC # BLD AUTO: 3.54 M/UL (ref 4.1–5.7)
WBC # BLD AUTO: 3.2 K/UL (ref 4.1–11.1)

## 2018-09-26 PROCEDURE — 74011250636 HC RX REV CODE- 250/636: Performed by: INTERNAL MEDICINE

## 2018-09-26 PROCEDURE — 36415 COLL VENOUS BLD VENIPUNCTURE: CPT | Performed by: INTERNAL MEDICINE

## 2018-09-26 PROCEDURE — 85025 COMPLETE CBC W/AUTO DIFF WBC: CPT | Performed by: INTERNAL MEDICINE

## 2018-09-26 PROCEDURE — 77030012965 HC NDL HUBR BBMI -A

## 2018-09-26 PROCEDURE — 36591 DRAW BLOOD OFF VENOUS DEVICE: CPT

## 2018-09-26 RX ORDER — SODIUM CHLORIDE 9 MG/ML
10 INJECTION INTRAMUSCULAR; INTRAVENOUS; SUBCUTANEOUS AS NEEDED
Status: ACTIVE | OUTPATIENT
Start: 2018-09-26 | End: 2018-09-27

## 2018-09-26 RX ORDER — SODIUM CHLORIDE 0.9 % (FLUSH) 0.9 %
10-40 SYRINGE (ML) INJECTION AS NEEDED
Status: ACTIVE | OUTPATIENT
Start: 2018-09-26 | End: 2018-09-27

## 2018-09-26 RX ORDER — HEPARIN 100 UNIT/ML
500 SYRINGE INTRAVENOUS AS NEEDED
Status: ACTIVE | OUTPATIENT
Start: 2018-09-26 | End: 2018-09-27

## 2018-09-26 RX ADMIN — SODIUM CHLORIDE 10 ML: 9 INJECTION INTRAMUSCULAR; INTRAVENOUS; SUBCUTANEOUS at 16:10

## 2018-09-26 RX ADMIN — Medication 10 ML: at 16:11

## 2018-09-26 RX ADMIN — Medication 500 UNITS: at 16:11

## 2018-10-03 ENCOUNTER — HOSPITAL ENCOUNTER (OUTPATIENT)
Dept: INFUSION THERAPY | Age: 63
Discharge: HOME | End: 2018-10-03
Payer: COMMERCIAL

## 2018-10-03 VITALS
SYSTOLIC BLOOD PRESSURE: 128 MMHG | RESPIRATION RATE: 18 BRPM | OXYGEN SATURATION: 98 % | HEART RATE: 88 BPM | DIASTOLIC BLOOD PRESSURE: 85 MMHG | TEMPERATURE: 98.9 F

## 2018-10-03 LAB
BASOPHILS # BLD: 0 K/UL (ref 0–0.1)
BASOPHILS NFR BLD: 0 % (ref 0–1)
DIFFERENTIAL METHOD BLD: ABNORMAL
EOSINOPHIL # BLD: 0 K/UL (ref 0–0.4)
EOSINOPHIL NFR BLD: 0 % (ref 0–7)
ERYTHROCYTE [DISTWIDTH] IN BLOOD BY AUTOMATED COUNT: 14.7 % (ref 11.5–14.5)
HCT VFR BLD AUTO: 34.7 % (ref 36.6–50.3)
HGB BLD-MCNC: 11 G/DL (ref 12.1–17)
IMM GRANULOCYTES # BLD: 0 K/UL (ref 0–0.04)
IMM GRANULOCYTES NFR BLD AUTO: 0 % (ref 0–0.5)
LYMPHOCYTES # BLD: 1 K/UL (ref 0.8–3.5)
LYMPHOCYTES NFR BLD: 29 % (ref 12–49)
MCH RBC QN AUTO: 30.1 PG (ref 26–34)
MCHC RBC AUTO-ENTMCNC: 31.7 G/DL (ref 30–36.5)
MCV RBC AUTO: 94.8 FL (ref 80–99)
MONOCYTES # BLD: 0.6 K/UL (ref 0–1)
MONOCYTES NFR BLD: 16 % (ref 5–13)
NEUTS SEG # BLD: 1.9 K/UL (ref 1.8–8)
NEUTS SEG NFR BLD: 55 % (ref 32–75)
NRBC # BLD: 0 K/UL (ref 0–0.01)
NRBC BLD-RTO: 0 PER 100 WBC
PLATELET # BLD AUTO: 132 K/UL (ref 150–400)
PMV BLD AUTO: 10.5 FL (ref 8.9–12.9)
RBC # BLD AUTO: 3.66 M/UL (ref 4.1–5.7)
WBC # BLD AUTO: 3.5 K/UL (ref 4.1–11.1)

## 2018-10-03 PROCEDURE — 85025 COMPLETE CBC W/AUTO DIFF WBC: CPT | Performed by: INTERNAL MEDICINE

## 2018-10-03 PROCEDURE — 77030012965 HC NDL HUBR BBMI -A

## 2018-10-03 PROCEDURE — 74011250636 HC RX REV CODE- 250/636: Performed by: INTERNAL MEDICINE

## 2018-10-03 PROCEDURE — 36415 COLL VENOUS BLD VENIPUNCTURE: CPT | Performed by: INTERNAL MEDICINE

## 2018-10-03 PROCEDURE — 36591 DRAW BLOOD OFF VENOUS DEVICE: CPT

## 2018-10-03 RX ORDER — HEPARIN 100 UNIT/ML
500 SYRINGE INTRAVENOUS AS NEEDED
Status: ACTIVE | OUTPATIENT
Start: 2018-10-03 | End: 2018-10-04

## 2018-10-03 RX ORDER — SODIUM CHLORIDE 0.9 % (FLUSH) 0.9 %
10-40 SYRINGE (ML) INJECTION AS NEEDED
Status: ACTIVE | OUTPATIENT
Start: 2018-10-03 | End: 2018-10-04

## 2018-10-03 RX ADMIN — SODIUM CHLORIDE, PRESERVATIVE FREE 500 UNITS: 5 INJECTION INTRAVENOUS at 16:05

## 2018-10-03 RX ADMIN — Medication 10 ML: at 16:05

## 2018-10-03 NOTE — PROGRESS NOTES
[facility] Note: 
Arrived - 1600 Labs obtained: CBC Visit Vitals  /85 (BP 1 Location: Left arm, BP Patient Position: Sitting)  Pulse 88  Temp 98.9 °F (37.2 °C)  Resp 18  SpO2 98% Assessment - unchanged. Port accessed & flushed per protocol w/o difficulty. Scott needle removed. 1605 - Tolerated well. Pt denies any acute problems/changes. Discharged from [facility] ambulatory. No distress. Next appt: 10/10/18 at 1600

## 2018-10-05 ENCOUNTER — OFFICE VISIT (OUTPATIENT)
Dept: ONCOLOGY | Age: 63
End: 2018-10-05

## 2018-10-05 VITALS
SYSTOLIC BLOOD PRESSURE: 137 MMHG | BODY MASS INDEX: 27.51 KG/M2 | WEIGHT: 181.5 LBS | HEIGHT: 68 IN | HEART RATE: 76 BPM | RESPIRATION RATE: 16 BRPM | OXYGEN SATURATION: 97 % | TEMPERATURE: 98.8 F | DIASTOLIC BLOOD PRESSURE: 92 MMHG

## 2018-10-05 DIAGNOSIS — T45.1X5A ANEMIA ASSOCIATED WITH CHEMOTHERAPY: ICD-10-CM

## 2018-10-05 DIAGNOSIS — Z94.84 H/O AUTOLOGOUS STEM CELL TRANSPLANT (HCC): ICD-10-CM

## 2018-10-05 DIAGNOSIS — C90.00 MULTIPLE MYELOMA NOT HAVING ACHIEVED REMISSION (HCC): Primary | ICD-10-CM

## 2018-10-05 DIAGNOSIS — D64.81 ANEMIA ASSOCIATED WITH CHEMOTHERAPY: ICD-10-CM

## 2018-10-05 NOTE — PROGRESS NOTES
Progress Note        Patient: Izzy Rojas MRN: [id number]  SSN: [id number]    YOB: 1955  Age: 63 y.o. Sex: male        Diagnosis:      1. Multiple Myeloma, IgA Kappa   Durie [place] stage IIIB    Cytogenetics/FISH: t(14;16) - high risk    Ttreatment:      1. S/P tandem  Autotransplant    First on 2/28/2018   2nd on 06/13/2018  2. Carfilzomib/Pom/Dex - s/p 5 cycles  3. VD-PACE s/p 1 cycle  4. RVD - s/p 4 cycles    Subjective:      Izzy Rojas is a 63 y.o. male with a diagnosis of IgA kappa myeloma. Bone marrow shows 100% aberrant plasma cells. He suffers with DM but it is diet controlled. He has received systemic anti-viral treatment for chronic Hep C with successful eradication of the virus. Mr. Donte Song received 4 cycles of systemic therapy with RVd. He had an initial good response to treatment. However his paraprotein level started rising and the myeloma became refractory to treatment. I then administered one cycle of VD-PACE in the hospital. He then received Carfilzomib/Pom/Dex. He achieved VGPR. He underwent tandem autotransplant at [facility]. According to the patient he achieved complete response with therapy. He has undergone second/tandem transplant in June. He is doing well and continues to recover. Denies any symptoms.        Review of Systems:    Constitutional: fatigue  Eyes: negative  Ears, Nose, Mouth, Throat, and Face: negative  Respiratory: negative  Cardiovascular: negative  Gastrointestinal: negative  Genitourinary:negative  Integument/Breast: negative  Hematologic/Lymphatic: negative  Musculoskeletal: chronic neck pain  Neurological: negative      Past Medical History:   Diagnosis Date    Acid reflux     Arrhythmia     pvc's    Chronic pain     neck    Depression     Diabetes ([facility] [address]     Femoral hernia 7/11/2012    Hepatitis C     Hypertension     Inguinal hernia 7/11/2012    Liver disease     hepatitis c    Other ill-defined conditions(799.89)     Hx of PVCs since 2009    Prostatitis, acute     Psychiatric disorder     depression     Past Surgical History:   Procedure Laterality Date    COLONOSCOPY N/A 9/20/2016    COLON[facility]COPY performed by Florence Winters [facility] at [facility] ENDOSCOPY    HX APPENDECTOMY      HX CERVICAL FUSION  2008    x 1 as of 4/30/2014    HX HEENT      foreign body removed from right eye    [facility]PAIR  2012    [facility]      HX ORTHOPAEDIC      cervical fusion    HX OTHER SURGICAL  2000    liver bx - chronic hepatitis      Family History   Problem Relation Age of Onset    Arthritis-osteo Mother     Arthritis-osteo Father     Diabetes Father     Hypertension Maternal Grandmother     Diabetes Maternal Grandmother     Hypertension Maternal Grandfather     Diabetes Maternal Grandfather      Social History   Substance Use Topics    Smoking status: Former Smoker     Packs/day: 1.00     Years: 12.00     Types: Cigarettes     Quit date: 1/1/1989    Smokeless tobacco: Never Used      Comment: former cigarette smoker    Alcohol use No      Comment: former alcoholic-quit [id number]      Prior to Admission medications    Medication Sig Start Date End Date Taking? Authorizing Provider   acyclovir (ZOVIRAX) 400 mg tablet  7/7/18  Yes Historical Provider   tenofovir DISOPROXIL FUMARATE (VIREAD) 300 mg tablet  7/7/18  Yes Historical Provider   pentamidine (NEBUPENT) 300 mg inhalation solution Take 300 mg by inhalation once. Yes Historical Provider   amiodarone (CORDARONE) 200 mg tablet Take  by mouth. Yes Historical Provider   nystatin (MYCOSTATIN) 100,000 unit/mL suspension Take  by mouth four (4) times daily.  swish and spit   Yes Historical Provider   ergocalciferol (ERGOCALCIFEROL) 50,000 unit capsule TAKE 1 CAP BY MOUTH EVERY 7 DAYS 5/14/18  Yes Historical Provider   famotidine (PEPCID) 20 mg tablet TAKE 1 TABLET BY MOUTH TWICE A DAY 5/5/18  Yes Historical Provider   lisinopril (PRINIVIL, ZESTRIL) 10 mg tablet TAKE 1 TABLET BY MOUTH EVERY DAY 5/12/18  Yes Historical Provider   folic acid (FOLVITE) 1 mg tablet TAKE 1 TABLET BY MOUTH EVERY DAY 5/14/18  Yes Historical Provider   atovaquone (MEPRON) 750 mg/5 mL suspension GIVE 5 ML BY MOUTH TWICE A DAY X30 DAY(S) 5/15/18  Yes Historical Provider   multivitamin (ONE A DAY) tablet Take 1 Tab by mouth daily. Yes Historical Provider   Biotin 2,500 mcg cap Take  by mouth. Yes Historical Provider   insulin glargine (LANTUS,BASAGLAR) 100 unit/mL (3 mL) inpn 22 Units by SubCUTAneous route daily. 2/21/18  Yes Shirin Ozuna MD   lidocaine-prilocaine (EMLA) topical cream Apply  to affected area as needed for Pain. 2/2/18  Yes Radha Salomon MD   naproxen (NAPROSYN) 500 mg tablet TAKE 1 TABLET BY MOUTH TWICE A DAY 12/7/17  Yes Historical Provider   POMALYST 2 mg cap Take 1 capsule (2mg) by mouth daily for 21 days then 7 days off. 1/4/18  Yes Radha Salomon MD   metFORMIN ER (GLUCOPHAGE XR) 500 mg tablet Take 1 Tab by mouth two (2) times a day. 11/10/17  Yes Shirin Ozuna MD   furosemide (LASIX) 20 mg tablet TAKE 1 TABLET BY MOUTH DAILY 11/7/17  Yes Costa Saleem NP   REVLIMID 5 mg cap  8/10/17  Yes Historical Provider   REVLIMID 10 mg cap  7/31/17  Yes Historical Provider   dexamethasone (DECADRON) 4 mg tablet Take 10 Tabs by mouth Every Thursday. 10/19/17  Yes Costa Saleem NP   sildenafil (REVATIO) 20 mg tablet TK 5 TS PO QD PRN 9/11/17  Yes Historical Provider   zolpidem 3.5 mg subl TAKE 1 TABLET AT BEDTIME 7/28/17  Yes Historical Provider   prochlorperazine (COMPAZINE) 10 mg tablet TAKE 1 TABLET BY MOUTH EVERY 6 HOURS AS NEEDED FOR UP TO 7 DAYS.  8/27/17  Yes Johanna Yates,    ONET[employer]CH ULTRA TEST strip USE TO TEST BLOOD SUGAR TID BEFORE EACH MEAL UTD. 7/3/17  Yes Historical Provider   losartan (COZAAR) 100 mg tablet TK 1 T PO QD 7/3/17  Yes Historical Provider   BD INSULIN PEN NEEDLE UF MINI 31 gauge x 3/16\" ndle USE AS DIRECTED 6/7/17  Yes Historical Provider   insulin lispro (HUMALOG) 100 unit/mL kwikpen 8-10 Units by SubCUTAneous route. 8 units breakfast, 10 units lunch, 10 units dinner + correction scale of 1:25>150   Yes Historical Provider   valACYclovir (VALTREX) 1 gram tablet Take 1 Tab by mouth daily. 6/6/17  Yes Amaya Whyte MD   tamsulosin (FLOMAX) 0.4 mg capsule Take 1 Cap by mouth daily. 4/4/17  Yes Tracey De Santiago MD   propafenone (RYTHMOL) 150 mg tablet Take 150 mg by mouth every eight (8) hours. Yes Historical Provider   zolpidem CR (AMBIEN CR) 12.5 mg tablet Take 12.5 mg by mouth nightly as needed. Yes Historical Provider   TADALAFIL (CIALIS PO) Take 20 mg by mouth as needed. Yes Historical Provider   amLODIPine (NORVASC) 10 mg tablet Take 10 mg by mouth daily. Yes Historical Provider   tapentadol (NUCYNTA) 100 mg tablet Take 100 mg by mouth three (3) times daily. Yes Goldy Diaz MD   aripiprazole (ABILIFY) 5 mg tablet Take 2.5 mg by mouth every morning. Yes Goldy Diaz MD   buPROPion XL (WELLBUTRIN XL) 150 mg tablet Take 1 Tab by mouth daily (with dinner). 4/25/10  Yes Javier Cisneros MD          Allergies   Allergen Reactions    Mercury (Bulk) Hives     Blisters             Objective:     Vitals:    10/05/18 1001   BP: (!) 137/92   Pulse: 76   Resp: 16   Temp: 98.8 °F (37.1 °C)   TempSrc: Oral   SpO2: 97%   Weight: 181 lb 8 oz (82.3 kg)   Height: 5' 8\" (1.727 m)          Physical Exam:    GENERAL: alert, cooperative  EYE: negative  LYMPHATIC: Cervical, supraclavicular, and axillary nodes normal.   THROAT & NECK: normal and no erythema or exudates noted.    LUNG: clear to auscultation bilaterally  HEART: regular rate and rhythm  ABDOMEN: soft, non-tender  EXTREMITIES: bilateral LE edema  SKIN: Normal.  NEUROLOGIC: negative      Lab Results   Component Value Date/Time    WBC 3.5 (L) 10/03/2018 04:01 PM    HGB 11.0 (L) 10/03/2018 04:01 PM    HCT 34.7 (L) 10/03/2018 04:01 PM    PLATELET 155 (L) 03/32/5251 04:01 PM    MCV 94.8 10/03/2018 04:01 PM       Lab Results   Component Value Date/Time    Sodium 143 09/12/2018 04:19 PM    Potassium 3.8 09/12/2018 04:19 PM    Chloride 109 (H) 09/12/2018 04:19 PM    CO2 27 09/12/2018 04:19 PM    Anion gap 7 09/12/2018 04:19 PM    Glucose 106 (H) 09/12/2018 04:19 PM    BUN 13 09/12/2018 04:19 PM    Creatinine 1.34 (H) 09/12/2018 04:19 PM    BUN/Creatinine ratio 10 (L) 09/12/2018 04:19 PM    GFR est AA >60 09/12/2018 04:19 PM    GFR est non-AA 54 (L) 09/12/2018 04:19 PM    Calcium 8.8 09/12/2018 04:19 PM            Assessment:     1. Multiple Myeloma, IgA Kappa   Durie [place] stage IIIB    Cytogenetics/FISH: t(14;16) - high risk  ECOG PS - 0   Intent of therapy: palliative    Received 3 cycles of RVd   Dose reduced Revlimid and Velcade d/t continued cytopenias. M-spike started rising. He received one cycle of VD-PACE (bortezomib, dexamethasone, thalidomide, cisplatin, adriamycin, cyclophosphamide, and etoposide). Achieved NC with once cycle    Receivied KPd - s/p 5 cycles    Excellent response with M-protein came down to 0.1    S/p tandem autotransplant    First on 2/28/2018   2nd on 06/13/2018    Achieved CR after the first transplant. Blood count has recovered  Doing well  Some fatigue  Repeat bone marrow Bx in one week ago @ [facility]    I discussed various strategies for maintenance. Since he is high risk based on cytogenetics, I recommend proteosome inhibitor as maintenance therapy. I will start him on Ixazomib maintenance. I discussed the pros, cons, side effects and benefit of the treatment. 2. Type 2 DM with complication    Managed by Endocrine      3. Chronic Hep C    In sustained virological remission      Plan:       > Start maintenance therapy with Ixazomib  > F/U at the time of starting treatment        Signed by: Perla Adams MD                     October 7, 2018        CC. Ashwin Garcia MD  CC.  Anabel Lee MD

## 2018-10-05 NOTE — PROGRESS NOTES
Nicholos Babinski. is a 63 y.o. male here today for multiple myeloma f/u. Patient reports he had a BM Bx last week at [facility]; he will have results on 10/9. Elevated B/P noted; pt reports he has not taken his B/P medication this morning; pt states he will take it as soon as he gets home; other VS stable. Patient denies pain. Good appetite. Patient denies N/V/D and constipation. Patient denies numbness and tingling. Patient denies mouth ulcers. Patient denies cough. Patient denies SOB.      Visit Vitals    BP (!) 137/92 (BP 1 Location: Left arm, BP Patient Position: Sitting)    Pulse 76    Temp 98.8 °F (37.1 °C) (Oral)    Resp 16    Ht 5' 8\" (1.727 m)    Wt 181 lb 8 oz (82.3 kg)    SpO2 97%    BMI 27.6 kg/m2

## 2018-10-07 PROBLEM — E11.21 TYPE 2 DIABETES WITH NEPHROPATHY (HCC): Status: ACTIVE | Noted: 2018-10-07

## 2018-10-10 ENCOUNTER — APPOINTMENT (OUTPATIENT)
Dept: INFUSION THERAPY | Age: 63
End: 2018-10-10
Payer: COMMERCIAL

## 2018-10-11 ENCOUNTER — HOSPITAL ENCOUNTER (OUTPATIENT)
Dept: INFUSION THERAPY | Age: 63
Discharge: HOME | End: 2018-10-11
Payer: COMMERCIAL

## 2018-10-11 VITALS
OXYGEN SATURATION: 98 % | HEART RATE: 88 BPM | RESPIRATION RATE: 18 BRPM | DIASTOLIC BLOOD PRESSURE: 83 MMHG | SYSTOLIC BLOOD PRESSURE: 121 MMHG | TEMPERATURE: 99.1 F

## 2018-10-11 LAB
BASOPHILS # BLD: 0 K/UL (ref 0–0.1)
BASOPHILS NFR BLD: 0 % (ref 0–1)
DIFFERENTIAL METHOD BLD: ABNORMAL
EOSINOPHIL # BLD: 0 K/UL (ref 0–0.4)
EOSINOPHIL NFR BLD: 0 % (ref 0–7)
ERYTHROCYTE [DISTWIDTH] IN BLOOD BY AUTOMATED COUNT: 14.7 % (ref 11.5–14.5)
HCT VFR BLD AUTO: 35.4 % (ref 36.6–50.3)
HGB BLD-MCNC: 11.4 G/DL (ref 12.1–17)
IMM GRANULOCYTES # BLD: 0 K/UL (ref 0–0.04)
IMM GRANULOCYTES NFR BLD AUTO: 0 % (ref 0–0.5)
LYMPHOCYTES # BLD: 1.1 K/UL (ref 0.8–3.5)
LYMPHOCYTES NFR BLD: 31 % (ref 12–49)
MCH RBC QN AUTO: 30.3 PG (ref 26–34)
MCHC RBC AUTO-ENTMCNC: 32.2 G/DL (ref 30–36.5)
MCV RBC AUTO: 94.1 FL (ref 80–99)
MONOCYTES # BLD: 0.5 K/UL (ref 0–1)
MONOCYTES NFR BLD: 15 % (ref 5–13)
NEUTS SEG # BLD: 1.8 K/UL (ref 1.8–8)
NEUTS SEG NFR BLD: 53 % (ref 32–75)
NRBC # BLD: 0 K/UL (ref 0–0.01)
NRBC BLD-RTO: 0 PER 100 WBC
PLATELET # BLD AUTO: 126 K/UL (ref 150–400)
PMV BLD AUTO: 10.9 FL (ref 8.9–12.9)
RBC # BLD AUTO: 3.76 M/UL (ref 4.1–5.7)
WBC # BLD AUTO: 3.5 K/UL (ref 4.1–11.1)

## 2018-10-11 PROCEDURE — 74011250636 HC RX REV CODE- 250/636: Performed by: INTERNAL MEDICINE

## 2018-10-11 PROCEDURE — 36591 DRAW BLOOD OFF VENOUS DEVICE: CPT

## 2018-10-11 PROCEDURE — 36415 COLL VENOUS BLD VENIPUNCTURE: CPT | Performed by: INTERNAL MEDICINE

## 2018-10-11 PROCEDURE — 85025 COMPLETE CBC W/AUTO DIFF WBC: CPT | Performed by: INTERNAL MEDICINE

## 2018-10-11 PROCEDURE — 77030012965 HC NDL HUBR BBMI -A

## 2018-10-11 RX ORDER — SODIUM CHLORIDE 0.9 % (FLUSH) 0.9 %
10-40 SYRINGE (ML) INJECTION AS NEEDED
Status: ACTIVE | OUTPATIENT
Start: 2018-10-11 | End: 2018-10-12

## 2018-10-11 RX ORDER — HEPARIN 100 UNIT/ML
500 SYRINGE INTRAVENOUS AS NEEDED
Status: ACTIVE | OUTPATIENT
Start: 2018-10-11 | End: 2018-10-12

## 2018-10-11 RX ADMIN — Medication 500 UNITS: at 16:08

## 2018-10-11 RX ADMIN — Medication 20 ML: at 16:08

## 2018-10-11 NOTE — PROGRESS NOTES
[facility] Note: 
Arrived - 1600 Labs obtained: CBC Visit Vitals  /83 (BP 1 Location: Left arm, BP Patient Position: Sitting)  Pulse 88  Temp 99.1 °F (37.3 °C)  Resp 18  SpO2 98% Assessment - unchanged. Port accessed & flushed per protocol w/o difficulty. Scott needle removed. 1610 - Tolerated well. Pt denies any acute problems/changes. Discharged from [facility] ambulatory. No distress. Next appt: 10/17/18 at 1600

## 2018-10-15 ENCOUNTER — OFFICE VISIT (OUTPATIENT)
Dept: ONCOLOGY | Age: 63
End: 2018-10-15

## 2018-10-15 VITALS
BODY MASS INDEX: 27.98 KG/M2 | SYSTOLIC BLOOD PRESSURE: 117 MMHG | HEART RATE: 88 BPM | OXYGEN SATURATION: 97 % | HEIGHT: 68 IN | TEMPERATURE: 98.7 F | RESPIRATION RATE: 16 BRPM | WEIGHT: 184.6 LBS | DIASTOLIC BLOOD PRESSURE: 82 MMHG

## 2018-10-15 DIAGNOSIS — T45.1X5A ANEMIA ASSOCIATED WITH CHEMOTHERAPY: ICD-10-CM

## 2018-10-15 DIAGNOSIS — N28.9 RENAL INSUFFICIENCY: ICD-10-CM

## 2018-10-15 DIAGNOSIS — D64.81 ANEMIA ASSOCIATED WITH CHEMOTHERAPY: ICD-10-CM

## 2018-10-15 DIAGNOSIS — C90.01 MULTIPLE MYELOMA IN REMISSION (HCC): Primary | ICD-10-CM

## 2018-10-15 NOTE — PROGRESS NOTES
Jodeane Severin is a 66 y.o. male here today for IgA kappa myeloma f/u. Patient starting maintenance therapy with Ninlaro. Patient accompanied by his spouse to today's appt. VS stable. Patient denies pain. Good appetite. Patient denies N/V/D and constipation. Patient denies numbness and tingling. Patient denies mouth ulcers. Patient denies cough. Patient denies SOB.      Visit Vitals    /82 (BP 1 Location: Right arm, BP Patient Position: Sitting)    Pulse 88    Temp 98.7 °F (37.1 °C) (Oral)    Resp 16    Ht 5' 8\" (1.727 m)    Wt 184 lb 9.6 oz (83.7 kg)    SpO2 97%    BMI 28.07 kg/m2

## 2018-10-15 NOTE — PROGRESS NOTES
Progress Note        Patient: Franco Duran Sr. MRN: [id number]  SSN: [id number]    YOB: 1955  Age: 66 y.o. Sex: male        Diagnosis:      1. Multiple Myeloma, IgA Kappa   Durie [place] stage IIIB    Cytogenetics/FISH: t(14;16) - high risk    Ttreatment:      1. Maintenance therapy - Ixazomib - started 10/15/2018  2. S/P tandem  Autotransplant    First on 2/28/2018   2nd on 06/13/2018  3. Carfilzomib/Pom/Dex - s/p 5 cycles  4. VD-PACE s/p 1 cycle  5. RVD - s/p 4 cycles    Subjective:      Franco Duran Sr. is a 66 y.o. male with a diagnosis of IgA kappa myeloma. Bone marrow shows 100% aberrant plasma cells. He suffers with DM but it is diet controlled. He has received systemic anti-viral treatment for chronic Hep C with successful eradication of the virus. Mr. Ayad Vega received 4 cycles of systemic therapy with RVd. He had an initial good response to treatment. However his paraprotein level started rising and the myeloma became refractory to treatment. I then administered one cycle of VD-PACE in the hospital. He then received Carfilzomib/Pom/Dex. He achieved VGPR. He underwent tandem autotransplant at [facility]. According to the patient he achieved complete response with therapy. He has undergone second/tandem transplant in June. He is doing well and continues to recover. Denies any symptoms. He is here today with his wife to start maintenance therapy with Ninlaro.       Review of Systems:    Constitutional: fatigue  Eyes: negative  Ears, Nose, Mouth, Throat, and Face: negative  Respiratory: negative  Cardiovascular: negative  Gastrointestinal: negative  Genitourinary:negative  Integument/Breast: negative  Hematologic/Lymphatic: negative  Musculoskeletal: chronic neck pain  Neurological: negative      Past Medical History:   Diagnosis Date    Acid reflux     Arrhythmia     pvc's    Chronic pain     neck    Depression     Diabetes ([facility] [address]     Femoral hernia 7/11/2012    Hepatitis C  Hypertension     Inguinal hernia 7/11/2012    Liver disease     hepatitis c    Other ill-defined conditions(799.89)     Hx of PVCs since 2009    Prostatitis, acute     Psychiatric disorder     depression     Past Surgical History:   Procedure Laterality Date    COLONOSCOPY N/A 9/20/2016    COLONOSCOPY performed by Filiberto Odonnell MD at hospitals ENDOSCOPY    HX APPENDECTOMY      HX CERVICAL FUSION  2008    x 1 as of 4/30/2014    HX HEENT      foreign body removed from right eye    [facility] REPAIR  2012    [facility]      HX ORTHOPAEDIC      cervical fusion    HX OTHER SURGICAL  2000    liver bx - chronic hepatitis      Family History   Problem Relation Age of Onset    Arthritis-osteo Mother     Arthritis-osteo Father     Diabetes Father     Hypertension Maternal Grandmother     Diabetes Maternal Grandmother     Hypertension Maternal Grandfather     Diabetes Maternal Grandfather      Social History   Substance Use Topics    Smoking status: Former Smoker     Packs/day: 1.00     Years: 12.00     Types: Cigarettes     Quit date: 1/1/1989    Smokeless tobacco: Never Used      Comment: former cigarette smoker    Alcohol use No      Comment: former alcoholic-quit [id number]      Prior to Admission medications    Medication Sig Start Date End Date Taking? Authorizing Provider   ixazomib (NINLARO) 4 mg cap Take 1 Cap by mouth every seven (7) days. Take 1 cap weekly on Days 1, 8, and 15 every 28 days 10/5/18  Yes Ethan Clayton MD   acyclovir (ZOVIRAX) 400 mg tablet  7/7/18  Yes Historical Provider   tenofovir DISOPROXIL FUMARATE (VIREAD) 300 mg tablet  7/7/18  Yes Historical Provider   pentamidine (NEBUPENT) 300 mg inhalation solution Take 300 mg by inhalation once. Yes Historical Provider   amiodarone (CORDARONE) 200 mg tablet Take  by mouth. Yes Historical Provider   nystatin (MYCOSTATIN) 100,000 unit/mL suspension Take  by mouth four (4) times daily.  swish and spit   Yes Historical Provider ergocalciferol (ERGOCALCIFEROL) 50,000 unit capsule TAKE 1 CAP BY MOUTH EVERY 7 DAYS 5/14/18  Yes Historical Provider   famotidine (PEPCID) 20 mg tablet TAKE 1 TABLET BY MOUTH TWICE A DAY 5/5/18  Yes Historical Provider   lisinopril (PRINIVIL, ZESTRIL) 10 mg tablet TAKE 1 TABLET BY MOUTH EVERY DAY 5/12/18  Yes Historical Provider   folic acid (FOLVITE) 1 mg tablet TAKE 1 TABLET BY MOUTH EVERY DAY 5/14/18  Yes Historical Provider   atovaquone (MEPRON) 750 mg/5 mL suspension GIVE 5 ML BY MOUTH TWICE A DAY X30 DAY(S) 5/15/18  Yes Historical Provider   multivitamin (ONE A DAY) tablet Take 1 Tab by mouth daily. Yes Historical Provider   Biotin 2,500 mcg cap Take  by mouth. Yes Historical Provider   insulin glargine (LANTUS,BASAGLAR) 100 unit/mL (3 mL) inpn 22 Units by SubCUTAneous route daily. 2/21/18  Yes Serafin Jarvis MD   lidocaine-prilocaine (EMLA) topical cream Apply  to affected area as needed for Pain. 2/2/18  Yes Shell Steinberg MD   naproxen (NAPROSYN) 500 mg tablet TAKE 1 TABLET BY MOUTH TWICE A DAY 12/7/17  Yes Historical Provider   metFORMIN ER (GLUCOPHAGE XR) 500 mg tablet Take 1 Tab by mouth two (2) times a day. 11/10/17  Yes Serafin Jarvis MD   furosemide (LASIX) 20 mg tablet TAKE 1 TABLET BY MOUTH DAILY 11/7/17  Yes Javy Allen NP   dexamethasone (DECADRON) 4 mg tablet Take 10 Tabs by mouth Every Thursday. 10/19/17  Yes Javy Allen NP   sildenafil (REVATIO) 20 mg tablet TK 5 TS PO QD PRN 9/11/17  Yes Historical Provider   zolpidem 3.5 mg subl TAKE 1 TABLET AT BEDTIME 7/28/17  Yes Historical Provider   prochlorperazine (COMPAZINE) 10 mg tablet TAKE 1 TABLET BY MOUTH EVERY 6 HOURS AS NEEDED FOR UP TO 7 DAYS.  8/27/17  Yes Rajan Maldonado,    [employer] ULTRA TEST strip USE TO TEST BLOOD SUGAR TID BEFORE EACH MEAL UTD. 7/3/17  Yes Historical Provider   losartan (COZAAR) 100 mg tablet TK 1 T PO QD 7/3/17  Yes Historical Provider   BD INSULIN PEN NEEDLE UF MINI 31 gauge x 3/16\" ndle USE AS DIRECTED 6/7/17  Yes Historical Provider   insulin lispro (HUMALOG) 100 unit/mL kwikpen 8-10 Units by SubCUTAneous route. 8 units breakfast, 10 units lunch, 10 units dinner + correction scale of 1:25>150   Yes Historical Provider   valACYclovir (VALTREX) 1 gram tablet Take 1 Tab by mouth daily. 6/6/17  Yes Perla Adams MD   tamsulosin (FLOMAX) 0.4 mg capsule Take 1 Cap by mouth daily. 4/4/17  Yes Priyank Monreal MD   propafenone (RYTHMOL) 150 mg tablet Take 150 mg by mouth every eight (8) hours. Yes Historical Provider   zolpidem CR (AMBIEN CR) 12.5 mg tablet Take 12.5 mg by mouth nightly as needed. Yes Historical Provider   TADALAFIL (CIALIS PO) Take 20 mg by mouth as needed. Yes Historical Provider   amLODIPine (NORVASC) 10 mg tablet Take 10 mg by mouth daily. Yes Historical Provider   tapentadol (NUCYNTA) 100 mg tablet Take 100 mg by mouth three (3) times daily. Yes Goldy Diaz MD   aripiprazole (ABILIFY) 5 mg tablet Take 2.5 mg by mouth every morning. Yes Goldy Diaz MD   buPROPion XL (WELLBUTRIN XL) 150 mg tablet Take 1 Tab by mouth daily (with dinner). 4/25/10  Yes Uzair Joshua MD   POMALYST 2 mg cap Take 1 capsule (2mg) by mouth daily for 21 days then 7 days off. 1/4/18   Perla Adams MD   REVLIMID 5 mg cap  8/10/17   Historical Provider   REVLIMID 10 mg cap  7/31/17   Historical Provider          Allergies   Allergen Reactions    Mercury (Bulk) Hives     Blisters             Objective:     Vitals:    10/15/18 0909   BP: 117/82   Pulse: 88   Resp: 16   Temp: 98.7 °F (37.1 °C)   TempSrc: Oral   SpO2: 97%   Weight: 184 lb 9.6 oz (83.7 kg)   Height: 5' 8\" (1.727 m)          Physical Exam:    GENERAL: alert, cooperative  EYE: negative  LYMPHATIC: Cervical, supraclavicular, and axillary nodes normal.   THROAT & NECK: normal and no erythema or exudates noted.    LUNG: clear to auscultation bilaterally  HEART: regular rate and rhythm  ABDOMEN: soft, non-tender  EXTREMITIES: bilateral LE edema  SKIN: Normal.  NEUROLOGIC: negative      Lab Results   Component Value Date/Time    WBC 3.5 (L) 10/11/2018 04:04 PM    HGB 11.4 (L) 10/11/2018 04:04 PM    HCT 35.4 (L) 10/11/2018 04:04 PM    PLATELET 203 (L) 19/45/0908 04:04 PM    MCV 94.1 10/11/2018 04:04 PM       Lab Results   Component Value Date/Time    Sodium 143 09/12/2018 04:19 PM    Potassium 3.8 09/12/2018 04:19 PM    Chloride 109 (H) 09/12/2018 04:19 PM    CO2 27 09/12/2018 04:19 PM    Anion gap 7 09/12/2018 04:19 PM    Glucose 106 (H) 09/12/2018 04:19 PM    BUN 13 09/12/2018 04:19 PM    Creatinine 1.34 (H) 09/12/2018 04:19 PM    BUN/Creatinine ratio 10 (L) 09/12/2018 04:19 PM    GFR est AA >60 09/12/2018 04:19 PM    GFR est non-AA 54 (L) 09/12/2018 04:19 PM    Calcium 8.8 09/12/2018 04:19 PM            Assessment:     1. Multiple Myeloma, IgA Kappa   Durie [place] stage IIIB    Cytogenetics/FISH: t(14;16) - high risk  ECOG PS - 0   Intent of therapy: palliative    Received 3 cycles of RVd   Dose reduced Revlimid and Velcade d/t continued cytopenias. M-spike started rising. He received one cycle of VD-PACE (bortezomib, dexamethasone, thalidomide, cisplatin, adriamycin, cyclophosphamide, and etoposide). Achieved MO with once cycle    Receivied KPd - s/p 5 cycles    Excellent response with M-protein came down to 0.1    S/p tandem autotransplant    First on 2/28/2018   2nd on 06/13/2018    Achieved CR after the first transplant. Blood count has recovered  Doing well  Some fatigue  Repeat bone marrow @ [facility] - Complete response. Since he is high risk based on cytogenetics, I recommend proteosome inhibitor as maintenance therapy. Starting maintenance therapy with Ixazomib   Discussed side effects and ways to manage  Symptom management form reviewed with patient. 2. Type 2 DM with complication    Managed by Endocrine      3.  Chronic Hep C    In sustained virological remission      Plan:       > Start maintenance therapy with Ixazomib  > Weekly CBC for first cycle then monthly CMP and CBC  > Follow-up in 4 weeks        Signed by: Magaly Weber MD                     October 15, 2018        CC. Alexy Reardon MD  CC.  Natalie Shaikh MD

## 2018-10-15 NOTE — PROGRESS NOTES
Chemo teaching completed on [facility], printed copies reviewed and given to Pt. Chemo consent signed.

## 2018-10-17 ENCOUNTER — HOSPITAL ENCOUNTER (OUTPATIENT)
Dept: INFUSION THERAPY | Age: 63
Discharge: HOME | End: 2018-10-17
Payer: COMMERCIAL

## 2018-10-17 VITALS
TEMPERATURE: 98.3 F | HEART RATE: 94 BPM | RESPIRATION RATE: 18 BRPM | DIASTOLIC BLOOD PRESSURE: 75 MMHG | SYSTOLIC BLOOD PRESSURE: 113 MMHG | OXYGEN SATURATION: 97 %

## 2018-10-17 LAB
BASOPHILS # BLD: 0 K/UL (ref 0–0.1)
BASOPHILS NFR BLD: 0 % (ref 0–1)
DIFFERENTIAL METHOD BLD: ABNORMAL
EOSINOPHIL # BLD: 0 K/UL (ref 0–0.4)
EOSINOPHIL NFR BLD: 0 % (ref 0–7)
ERYTHROCYTE [DISTWIDTH] IN BLOOD BY AUTOMATED COUNT: 14.7 % (ref 11.5–14.5)
HCT VFR BLD AUTO: 36.6 % (ref 36.6–50.3)
HGB BLD-MCNC: 11.7 G/DL (ref 12.1–17)
IMM GRANULOCYTES # BLD: 0 K/UL (ref 0–0.04)
IMM GRANULOCYTES NFR BLD AUTO: 0 % (ref 0–0.5)
LYMPHOCYTES # BLD: 1.2 K/UL (ref 0.8–3.5)
LYMPHOCYTES NFR BLD: 32 % (ref 12–49)
MCH RBC QN AUTO: 29.9 PG (ref 26–34)
MCHC RBC AUTO-ENTMCNC: 32 G/DL (ref 30–36.5)
MCV RBC AUTO: 93.6 FL (ref 80–99)
MONOCYTES # BLD: 0.6 K/UL (ref 0–1)
MONOCYTES NFR BLD: 16 % (ref 5–13)
NEUTS SEG # BLD: 1.9 K/UL (ref 1.8–8)
NEUTS SEG NFR BLD: 52 % (ref 32–75)
NRBC # BLD: 0 K/UL (ref 0–0.01)
NRBC BLD-RTO: 0 PER 100 WBC
PLATELET # BLD AUTO: 142 K/UL (ref 150–400)
PMV BLD AUTO: 10.2 FL (ref 8.9–12.9)
RBC # BLD AUTO: 3.91 M/UL (ref 4.1–5.7)
WBC # BLD AUTO: 3.7 K/UL (ref 4.1–11.1)

## 2018-10-17 PROCEDURE — 36591 DRAW BLOOD OFF VENOUS DEVICE: CPT

## 2018-10-17 PROCEDURE — 85025 COMPLETE CBC W/AUTO DIFF WBC: CPT | Performed by: INTERNAL MEDICINE

## 2018-10-17 PROCEDURE — 74011250636 HC RX REV CODE- 250/636: Performed by: INTERNAL MEDICINE

## 2018-10-17 PROCEDURE — 77030012965 HC NDL HUBR BBMI -A

## 2018-10-17 PROCEDURE — 36415 COLL VENOUS BLD VENIPUNCTURE: CPT | Performed by: INTERNAL MEDICINE

## 2018-10-17 RX ORDER — SODIUM CHLORIDE 0.9 % (FLUSH) 0.9 %
10-40 SYRINGE (ML) INJECTION AS NEEDED
Status: ACTIVE | OUTPATIENT
Start: 2018-10-17 | End: 2018-10-18

## 2018-10-17 RX ORDER — HEPARIN 100 UNIT/ML
500 SYRINGE INTRAVENOUS AS NEEDED
Status: ACTIVE | OUTPATIENT
Start: 2018-10-17 | End: 2018-10-18

## 2018-10-17 RX ADMIN — Medication 10 ML: at 16:03

## 2018-10-17 RX ADMIN — Medication 500 UNITS: at 16:03

## 2018-10-17 NOTE — PROGRESS NOTES
[address] [facility] Note: 
Arrived - 7466 Labs obtained: CBC Visit Vitals /75 (BP 1 Location: Left arm, BP Patient Position: Sitting) Pulse 94 Temp 98.3 °F (36.8 °C) Resp 18 SpO2 97% Assessment - unchanged. Port accessed & flushed per protocol w/o difficulty. Scott needle removed. 1605 - Tolerated well. Pt denies any acute problems/changes. Discharged from [facility] ambulatory. No distress. Next appt: 10/24/18 at 1600

## 2018-10-24 ENCOUNTER — HOSPITAL ENCOUNTER (OUTPATIENT)
Dept: INFUSION THERAPY | Age: 63
Discharge: HOME | End: 2018-10-24
Payer: COMMERCIAL

## 2018-10-24 VITALS
HEART RATE: 83 BPM | TEMPERATURE: 98.2 F | DIASTOLIC BLOOD PRESSURE: 78 MMHG | SYSTOLIC BLOOD PRESSURE: 113 MMHG | RESPIRATION RATE: 18 BRPM

## 2018-10-24 LAB
BASOPHILS # BLD: 0 K/UL (ref 0–0.1)
BASOPHILS NFR BLD: 0 % (ref 0–1)
DIFFERENTIAL METHOD BLD: ABNORMAL
EOSINOPHIL # BLD: 0 K/UL (ref 0–0.4)
EOSINOPHIL NFR BLD: 0 % (ref 0–7)
ERYTHROCYTE [DISTWIDTH] IN BLOOD BY AUTOMATED COUNT: 14.4 % (ref 11.5–14.5)
HCT VFR BLD AUTO: 35.2 % (ref 36.6–50.3)
HGB BLD-MCNC: 11.1 G/DL (ref 12.1–17)
IMM GRANULOCYTES # BLD: 0 K/UL (ref 0–0.04)
IMM GRANULOCYTES NFR BLD AUTO: 0 % (ref 0–0.5)
LYMPHOCYTES # BLD: 1.1 K/UL (ref 0.8–3.5)
LYMPHOCYTES NFR BLD: 32 % (ref 12–49)
MCH RBC QN AUTO: 29.7 PG (ref 26–34)
MCHC RBC AUTO-ENTMCNC: 31.5 G/DL (ref 30–36.5)
MCV RBC AUTO: 94.1 FL (ref 80–99)
MONOCYTES # BLD: 0.5 K/UL (ref 0–1)
MONOCYTES NFR BLD: 14 % (ref 5–13)
NEUTS SEG # BLD: 1.8 K/UL (ref 1.8–8)
NEUTS SEG NFR BLD: 53 % (ref 32–75)
NRBC # BLD: 0 K/UL (ref 0–0.01)
NRBC BLD-RTO: 0 PER 100 WBC
PLATELET # BLD AUTO: 114 K/UL (ref 150–400)
PMV BLD AUTO: 10.6 FL (ref 8.9–12.9)
RBC # BLD AUTO: 3.74 M/UL (ref 4.1–5.7)
WBC # BLD AUTO: 3.3 K/UL (ref 4.1–11.1)

## 2018-10-24 PROCEDURE — 36415 COLL VENOUS BLD VENIPUNCTURE: CPT | Performed by: INTERNAL MEDICINE

## 2018-10-24 PROCEDURE — 77030012965 HC NDL HUBR BBMI -A

## 2018-10-24 PROCEDURE — 36591 DRAW BLOOD OFF VENOUS DEVICE: CPT

## 2018-10-24 PROCEDURE — 74011250636 HC RX REV CODE- 250/636: Performed by: INTERNAL MEDICINE

## 2018-10-24 PROCEDURE — 85025 COMPLETE CBC W/AUTO DIFF WBC: CPT | Performed by: INTERNAL MEDICINE

## 2018-10-24 RX ORDER — HEPARIN 100 UNIT/ML
500 SYRINGE INTRAVENOUS AS NEEDED
Status: ACTIVE | OUTPATIENT
Start: 2018-10-24 | End: 2018-10-25

## 2018-10-24 RX ORDER — SODIUM CHLORIDE 0.9 % (FLUSH) 0.9 %
10-40 SYRINGE (ML) INJECTION AS NEEDED
Status: ACTIVE | OUTPATIENT
Start: 2018-10-24 | End: 2018-10-25

## 2018-10-24 RX ADMIN — Medication 500 UNITS: at 15:50

## 2018-10-24 RX ADMIN — Medication 10 ML: at 15:49

## 2018-10-24 NOTE — PROGRESS NOTES
Problem: Patient Education:  Go to Education Activity Goal: Patient/Family Education Outcome: Progressing Towards Goal 
Patient [facility]

## 2018-10-24 NOTE — PROGRESS NOTES
Outpatient Infusion Center Short Visit Progress Note 1540 Patient admitted to [facility] for [facility] Flush/labs ambulatory in stable condition. Assessment completed. No new concerns voiced. Visit Vitals /78 (BP 1 Location: Left arm, BP Patient Position: Sitting) Pulse 83 Temp 98.2 °F (36.8 °C) Resp 18 Labs remain in processing at time of this note. RCW port accessed without difficulty with 0.75 eddy needle; with positive blood return. Labs drawn and sent for processing. Patient tolerated treatment well. Patient discharged from [place] [address] ambulatory in no distress at 1550.  Patient aware of next appointment scheduled for 10/31/2018 at 4:00pm.

## 2018-10-31 ENCOUNTER — HOSPITAL ENCOUNTER (OUTPATIENT)
Dept: INFUSION THERAPY | Age: 63
Discharge: HOME | End: 2018-10-31
Payer: COMMERCIAL

## 2018-10-31 VITALS
HEART RATE: 104 BPM | SYSTOLIC BLOOD PRESSURE: 135 MMHG | DIASTOLIC BLOOD PRESSURE: 84 MMHG | TEMPERATURE: 99.4 F | OXYGEN SATURATION: 97 % | RESPIRATION RATE: 18 BRPM

## 2018-10-31 LAB
BASOPHILS # BLD: 0 K/UL (ref 0–0.1)
BASOPHILS NFR BLD: 0 % (ref 0–1)
DIFFERENTIAL METHOD BLD: ABNORMAL
EOSINOPHIL # BLD: 0 K/UL (ref 0–0.4)
EOSINOPHIL NFR BLD: 0 % (ref 0–7)
ERYTHROCYTE [DISTWIDTH] IN BLOOD BY AUTOMATED COUNT: 14.4 % (ref 11.5–14.5)
HCT VFR BLD AUTO: 35.4 % (ref 36.6–50.3)
HGB BLD-MCNC: 11.3 G/DL (ref 12.1–17)
IMM GRANULOCYTES # BLD: 0 K/UL (ref 0–0.04)
IMM GRANULOCYTES NFR BLD AUTO: 0 % (ref 0–0.5)
LYMPHOCYTES # BLD: 0.4 K/UL (ref 0.8–3.5)
LYMPHOCYTES NFR BLD: 10 % (ref 12–49)
MCH RBC QN AUTO: 29.7 PG (ref 26–34)
MCHC RBC AUTO-ENTMCNC: 31.9 G/DL (ref 30–36.5)
MCV RBC AUTO: 93.2 FL (ref 80–99)
MONOCYTES # BLD: 0.6 K/UL (ref 0–1)
MONOCYTES NFR BLD: 15 % (ref 5–13)
NEUTS SEG # BLD: 3 K/UL (ref 1.8–8)
NEUTS SEG NFR BLD: 75 % (ref 32–75)
NRBC # BLD: 0 K/UL (ref 0–0.01)
NRBC BLD-RTO: 0 PER 100 WBC
PLATELET # BLD AUTO: 89 K/UL (ref 150–400)
PMV BLD AUTO: 11.4 FL (ref 8.9–12.9)
RBC # BLD AUTO: 3.8 M/UL (ref 4.1–5.7)
RBC MORPH BLD: ABNORMAL
WBC # BLD AUTO: 4 K/UL (ref 4.1–11.1)

## 2018-10-31 PROCEDURE — 36415 COLL VENOUS BLD VENIPUNCTURE: CPT | Performed by: INTERNAL MEDICINE

## 2018-10-31 PROCEDURE — 74011000250 HC RX REV CODE- 250: Performed by: INTERNAL MEDICINE

## 2018-10-31 PROCEDURE — 74011250636 HC RX REV CODE- 250/636: Performed by: INTERNAL MEDICINE

## 2018-10-31 PROCEDURE — 85025 COMPLETE CBC W/AUTO DIFF WBC: CPT | Performed by: INTERNAL MEDICINE

## 2018-10-31 PROCEDURE — 36591 DRAW BLOOD OFF VENOUS DEVICE: CPT

## 2018-10-31 PROCEDURE — 77030012965 HC NDL HUBR BBMI -A

## 2018-10-31 RX ORDER — SODIUM CHLORIDE 9 MG/ML
10 INJECTION INTRAMUSCULAR; INTRAVENOUS; SUBCUTANEOUS AS NEEDED
Status: ACTIVE | OUTPATIENT
Start: 2018-10-31 | End: 2018-11-01

## 2018-10-31 RX ORDER — HEPARIN 100 UNIT/ML
500 SYRINGE INTRAVENOUS AS NEEDED
Status: ACTIVE | OUTPATIENT
Start: 2018-10-31 | End: 2018-11-01

## 2018-10-31 RX ORDER — SODIUM CHLORIDE 0.9 % (FLUSH) 0.9 %
10-40 SYRINGE (ML) INJECTION AS NEEDED
Status: ACTIVE | OUTPATIENT
Start: 2018-10-31 | End: 2018-11-01

## 2018-10-31 RX ADMIN — SODIUM CHLORIDE 10 ML: 9 INJECTION INTRAMUSCULAR; INTRAVENOUS; SUBCUTANEOUS at 15:50

## 2018-10-31 RX ADMIN — Medication 500 UNITS: at 15:50

## 2018-10-31 RX ADMIN — Medication 10 ML: at 15:50

## 2018-10-31 NOTE — PROGRESS NOTES
[address] [facility] Note: 
Arrived - 9724 Visit Vitals /84 (BP 1 Location: Left arm, BP Patient Position: Sitting) Pulse (!) 104 Temp 99.4 °F (37.4 °C) Resp 18 SpO2 97% Assessment - unchanged. Port accessed, CBC with diff drawn. Flushed per protocol w/o difficulty and eddy needle removed, 2x2 and gauze applied to site. See [facility] for pending labs. 1550 - Tolerated well. Pt denies any acute problems/changes. Discharged from [facility] ambulatory. No distress. Next appt: 11/7/18 @ 1600.

## 2018-11-01 ENCOUNTER — APPOINTMENT (OUTPATIENT)
Dept: GENERAL RADIOLOGY | Age: 63
DRG: 872 | End: 2018-11-01
Attending: EMERGENCY MEDICINE
Payer: COMMERCIAL

## 2018-11-01 ENCOUNTER — APPOINTMENT (OUTPATIENT)
Dept: CT IMAGING | Age: 63
DRG: 872 | End: 2018-11-01
Attending: EMERGENCY MEDICINE
Payer: COMMERCIAL

## 2018-11-01 ENCOUNTER — HOSPITAL ENCOUNTER (INPATIENT)
Age: 63
LOS: 3 days | Discharge: HOME | DRG: 872 | End: 2018-11-05
Attending: EMERGENCY MEDICINE | Admitting: INTERNAL MEDICINE
Payer: COMMERCIAL

## 2018-11-01 DIAGNOSIS — D61.818 PANCYTOPENIA (HCC): ICD-10-CM

## 2018-11-01 DIAGNOSIS — C90.00 MULTIPLE MYELOMA, REMISSION STATUS UNSPECIFIED (HCC): ICD-10-CM

## 2018-11-01 DIAGNOSIS — A41.9 SEPSIS, DUE TO UNSPECIFIED ORGANISM: ICD-10-CM

## 2018-11-01 DIAGNOSIS — N17.9 AKI (ACUTE KIDNEY INJURY) (HCC): ICD-10-CM

## 2018-11-01 DIAGNOSIS — T45.1X5A CHEMOTHERAPY-INDUCED THROMBOCYTOPENIA: ICD-10-CM

## 2018-11-01 DIAGNOSIS — D69.59 CHEMOTHERAPY-INDUCED THROMBOCYTOPENIA: ICD-10-CM

## 2018-11-01 DIAGNOSIS — R50.9 ACUTE FEBRILE ILLNESS: Primary | ICD-10-CM

## 2018-11-01 DIAGNOSIS — N41.0 ACUTE PROSTATITIS: ICD-10-CM

## 2018-11-01 DIAGNOSIS — D69.6 THROMBOCYTOPENIA (HCC): ICD-10-CM

## 2018-11-01 DIAGNOSIS — C90.01 MULTIPLE MYELOMA IN REMISSION (HCC): ICD-10-CM

## 2018-11-01 PROBLEM — N41.9 PROSTATITIS: Status: ACTIVE | Noted: 2018-11-01

## 2018-11-01 LAB
ALBUMIN SERPL-MCNC: 4 G/DL (ref 3.5–5)
ALBUMIN/GLOB SERPL: 1.3 {RATIO} (ref 1.1–2.2)
ALP SERPL-CCNC: 91 U/L (ref 45–117)
ALT SERPL-CCNC: 38 U/L (ref 12–78)
ANION GAP SERPL CALC-SCNC: 8 MMOL/L (ref 5–15)
APPEARANCE UR: CLEAR
AST SERPL-CCNC: 37 U/L (ref 15–37)
ATRIAL RATE: 106 BPM
BACTERIA URNS QL MICRO: NEGATIVE /HPF
BASOPHILS # BLD: 0 K/UL (ref 0–0.1)
BASOPHILS NFR BLD: 0 % (ref 0–1)
BILIRUB SERPL-MCNC: 0.2 MG/DL (ref 0.2–1)
BILIRUB UR QL: NEGATIVE
BUN SERPL-MCNC: 14 MG/DL (ref 6–20)
BUN/CREAT SERPL: 10 (ref 12–20)
CALCIUM SERPL-MCNC: 8.5 MG/DL (ref 8.5–10.1)
CALCULATED P AXIS, ECG09: 22 DEGREES
CALCULATED R AXIS, ECG10: -28 DEGREES
CALCULATED T AXIS, ECG11: 29 DEGREES
CHLORIDE SERPL-SCNC: 103 MMOL/L (ref 97–108)
CO2 SERPL-SCNC: 26 MMOL/L (ref 21–32)
COLOR UR: NORMAL
CREAT SERPL-MCNC: 1.46 MG/DL (ref 0.7–1.3)
DIAGNOSIS, 93000: NORMAL
DIFFERENTIAL METHOD BLD: ABNORMAL
EOSINOPHIL # BLD: 0 K/UL (ref 0–0.4)
EOSINOPHIL NFR BLD: 1 % (ref 0–7)
EPITH CASTS URNS QL MICRO: NORMAL /LPF
ERYTHROCYTE [DISTWIDTH] IN BLOOD BY AUTOMATED COUNT: 14.4 % (ref 11.5–14.5)
FLUAV AG NPH QL IA: NEGATIVE
FLUBV AG NOSE QL IA: NEGATIVE
GLOBULIN SER CALC-MCNC: 3.1 G/DL (ref 2–4)
GLUCOSE BLD STRIP.AUTO-MCNC: 113 MG/DL (ref 65–100)
GLUCOSE BLD STRIP.AUTO-MCNC: 125 MG/DL (ref 65–100)
GLUCOSE BLD STRIP.AUTO-MCNC: 89 MG/DL (ref 65–100)
GLUCOSE SERPL-MCNC: 124 MG/DL (ref 65–100)
GLUCOSE UR STRIP.AUTO-MCNC: NEGATIVE MG/DL
HCT VFR BLD AUTO: 35.1 % (ref 36.6–50.3)
HGB BLD-MCNC: 11.4 G/DL (ref 12.1–17)
HGB UR QL STRIP: NEGATIVE
HYALINE CASTS URNS QL MICRO: NORMAL /LPF (ref 0–5)
IMM GRANULOCYTES # BLD: 0 K/UL (ref 0–0.04)
IMM GRANULOCYTES NFR BLD AUTO: 0 % (ref 0–0.5)
KETONES UR QL STRIP.AUTO: NEGATIVE MG/DL
LACTATE SERPL-SCNC: 1.7 MMOL/L (ref 0.4–2)
LEUKOCYTE ESTERASE UR QL STRIP.AUTO: NEGATIVE
LYMPHOCYTES # BLD: 0.3 K/UL (ref 0.8–3.5)
LYMPHOCYTES NFR BLD: 10 % (ref 12–49)
MCH RBC QN AUTO: 29.9 PG (ref 26–34)
MCHC RBC AUTO-ENTMCNC: 32.5 G/DL (ref 30–36.5)
MCV RBC AUTO: 92.1 FL (ref 80–99)
MONOCYTES # BLD: 0.5 K/UL (ref 0–1)
MONOCYTES NFR BLD: 17 % (ref 5–13)
NEUTS SEG # BLD: 2.4 K/UL (ref 1.8–8)
NEUTS SEG NFR BLD: 72 % (ref 32–75)
NITRITE UR QL STRIP.AUTO: NEGATIVE
NRBC # BLD: 0 K/UL (ref 0–0.01)
NRBC BLD-RTO: 0 PER 100 WBC
P-R INTERVAL, ECG05: 146 MS
PH UR STRIP: 5.5 [PH] (ref 5–8)
PLATELET # BLD AUTO: 79 K/UL (ref 150–400)
PMV BLD AUTO: 11.9 FL (ref 8.9–12.9)
POTASSIUM SERPL-SCNC: 3.9 MMOL/L (ref 3.5–5.1)
PROT SERPL-MCNC: 7.1 G/DL (ref 6.4–8.2)
PROT UR STRIP-MCNC: NEGATIVE MG/DL
Q-T INTERVAL, ECG07: 368 MS
QRS DURATION, ECG06: 100 MS
QTC CALCULATION (BEZET), ECG08: 488 MS
RBC # BLD AUTO: 3.81 M/UL (ref 4.1–5.7)
RBC #/AREA URNS HPF: NORMAL /HPF (ref 0–5)
RBC MORPH BLD: ABNORMAL
SERVICE CMNT-IMP: ABNORMAL
SERVICE CMNT-IMP: ABNORMAL
SERVICE CMNT-IMP: NORMAL
SODIUM SERPL-SCNC: 137 MMOL/L (ref 136–145)
SP GR UR REFRACTOMETRY: 1.01 (ref 1–1.03)
UA: UC IF INDICATED,UAUC: NORMAL
UROBILINOGEN UR QL STRIP.AUTO: 0.2 EU/DL (ref 0.2–1)
VENTRICULAR RATE, ECG03: 106 BPM
WBC # BLD AUTO: 3.2 K/UL (ref 4.1–11.1)
WBC URNS QL MICRO: NORMAL /HPF (ref 0–4)

## 2018-11-01 PROCEDURE — 93005 ELECTROCARDIOGRAM TRACING: CPT

## 2018-11-01 PROCEDURE — 96367 TX/PROPH/DG ADDL SEQ IV INF: CPT

## 2018-11-01 PROCEDURE — 99218 HC RM OBSERVATION: CPT

## 2018-11-01 PROCEDURE — 74011250636 HC RX REV CODE- 250/636: Performed by: EMERGENCY MEDICINE

## 2018-11-01 PROCEDURE — 74011250637 HC RX REV CODE- 250/637: Performed by: EMERGENCY MEDICINE

## 2018-11-01 PROCEDURE — 85025 COMPLETE CBC W/AUTO DIFF WBC: CPT | Performed by: EMERGENCY MEDICINE

## 2018-11-01 PROCEDURE — 82962 GLUCOSE BLOOD TEST: CPT

## 2018-11-01 PROCEDURE — 74011250637 HC RX REV CODE- 250/637: Performed by: HOSPITALIST

## 2018-11-01 PROCEDURE — 36415 COLL VENOUS BLD VENIPUNCTURE: CPT | Performed by: EMERGENCY MEDICINE

## 2018-11-01 PROCEDURE — 80053 COMPREHEN METABOLIC PANEL: CPT | Performed by: EMERGENCY MEDICINE

## 2018-11-01 PROCEDURE — 96375 TX/PRO/DX INJ NEW DRUG ADDON: CPT

## 2018-11-01 PROCEDURE — 99284 EMERGENCY DEPT VISIT MOD MDM: CPT

## 2018-11-01 PROCEDURE — 96376 TX/PRO/DX INJ SAME DRUG ADON: CPT

## 2018-11-01 PROCEDURE — 96361 HYDRATE IV INFUSION ADD-ON: CPT

## 2018-11-01 PROCEDURE — 87804 INFLUENZA ASSAY W/OPTIC: CPT | Performed by: EMERGENCY MEDICINE

## 2018-11-01 PROCEDURE — 94760 N-INVAS EAR/PLS OXIMETRY 1: CPT

## 2018-11-01 PROCEDURE — 83605 ASSAY OF LACTIC ACID: CPT | Performed by: EMERGENCY MEDICINE

## 2018-11-01 PROCEDURE — 74011250636 HC RX REV CODE- 250/636: Performed by: HOSPITALIST

## 2018-11-01 PROCEDURE — 74011636320 HC RX REV CODE- 636/320: Performed by: EMERGENCY MEDICINE

## 2018-11-01 PROCEDURE — 96366 THER/PROPH/DIAG IV INF ADDON: CPT

## 2018-11-01 PROCEDURE — 87040 BLOOD CULTURE FOR BACTERIA: CPT | Performed by: EMERGENCY MEDICINE

## 2018-11-01 PROCEDURE — 74177 CT ABD & PELVIS W/CONTRAST: CPT

## 2018-11-01 PROCEDURE — 81001 URINALYSIS AUTO W/SCOPE: CPT | Performed by: EMERGENCY MEDICINE

## 2018-11-01 PROCEDURE — 71045 X-RAY EXAM CHEST 1 VIEW: CPT

## 2018-11-01 PROCEDURE — 96365 THER/PROPH/DIAG IV INF INIT: CPT

## 2018-11-01 RX ORDER — LEVOFLOXACIN 5 MG/ML
750 INJECTION, SOLUTION INTRAVENOUS
Status: COMPLETED | OUTPATIENT
Start: 2018-11-01 | End: 2018-11-01

## 2018-11-01 RX ORDER — SODIUM CHLORIDE 0.9 % (FLUSH) 0.9 %
5-10 SYRINGE (ML) INJECTION EVERY 8 HOURS
Status: DISCONTINUED | OUTPATIENT
Start: 2018-11-01 | End: 2018-11-05 | Stop reason: HOSPADM

## 2018-11-01 RX ORDER — LEVOFLOXACIN 5 MG/ML
750 INJECTION, SOLUTION INTRAVENOUS EVERY 24 HOURS
Status: DISCONTINUED | OUTPATIENT
Start: 2018-11-02 | End: 2018-11-05

## 2018-11-01 RX ORDER — PANTOPRAZOLE SODIUM 40 MG/1
40 TABLET, DELAYED RELEASE ORAL
Status: DISCONTINUED | OUTPATIENT
Start: 2018-11-01 | End: 2018-11-05 | Stop reason: HOSPADM

## 2018-11-01 RX ORDER — AMIODARONE HYDROCHLORIDE 200 MG/1
200 TABLET ORAL DAILY
Status: DISCONTINUED | OUTPATIENT
Start: 2018-11-01 | End: 2018-11-01

## 2018-11-01 RX ORDER — METFORMIN HYDROCHLORIDE 500 MG/1
500 TABLET, EXTENDED RELEASE ORAL 2 TIMES DAILY
Status: DISCONTINUED | OUTPATIENT
Start: 2018-11-01 | End: 2018-11-05

## 2018-11-01 RX ORDER — SODIUM CHLORIDE 0.9 % (FLUSH) 0.9 %
10 SYRINGE (ML) INJECTION
Status: COMPLETED | OUTPATIENT
Start: 2018-11-01 | End: 2018-11-01

## 2018-11-01 RX ORDER — KETOROLAC TROMETHAMINE 30 MG/ML
15 INJECTION, SOLUTION INTRAMUSCULAR; INTRAVENOUS
Status: COMPLETED | OUTPATIENT
Start: 2018-11-01 | End: 2018-11-01

## 2018-11-01 RX ORDER — ONDANSETRON 8 MG/1
8 TABLET, ORALLY DISINTEGRATING ORAL
COMMUNITY

## 2018-11-01 RX ORDER — OXYCODONE HYDROCHLORIDE 5 MG/1
10 TABLET ORAL
Status: DISCONTINUED | OUTPATIENT
Start: 2018-11-01 | End: 2018-11-05 | Stop reason: HOSPADM

## 2018-11-01 RX ORDER — SODIUM CHLORIDE 9 MG/ML
50 INJECTION, SOLUTION INTRAVENOUS
Status: COMPLETED | OUTPATIENT
Start: 2018-11-01 | End: 2018-11-01

## 2018-11-01 RX ORDER — INSULIN GLARGINE 100 [IU]/ML
12 INJECTION, SOLUTION SUBCUTANEOUS DAILY
Status: DISCONTINUED | OUTPATIENT
Start: 2018-11-01 | End: 2018-11-01

## 2018-11-01 RX ORDER — BUPROPION HYDROCHLORIDE 150 MG/1
150 TABLET ORAL
Status: DISCONTINUED | OUTPATIENT
Start: 2018-11-01 | End: 2018-11-05 | Stop reason: HOSPADM

## 2018-11-01 RX ORDER — OXYCODONE AND ACETAMINOPHEN 5; 325 MG/1; MG/1
1 TABLET ORAL
Status: COMPLETED | OUTPATIENT
Start: 2018-11-01 | End: 2018-11-01

## 2018-11-01 RX ORDER — SODIUM CHLORIDE 0.9 % (FLUSH) 0.9 %
5-10 SYRINGE (ML) INJECTION AS NEEDED
Status: DISCONTINUED | OUTPATIENT
Start: 2018-11-01 | End: 2018-11-05 | Stop reason: HOSPADM

## 2018-11-01 RX ORDER — VANCOMYCIN 1.75 GRAM/500 ML IN 0.9 % SODIUM CHLORIDE INTRAVENOUS
1750
Status: COMPLETED | OUTPATIENT
Start: 2018-11-01 | End: 2018-11-01

## 2018-11-01 RX ORDER — TAMSULOSIN HYDROCHLORIDE 0.4 MG/1
0.4 CAPSULE ORAL DAILY
Status: DISCONTINUED | OUTPATIENT
Start: 2018-11-01 | End: 2018-11-05 | Stop reason: HOSPADM

## 2018-11-01 RX ORDER — ZOLPIDEM TARTRATE 5 MG/1
10 TABLET ORAL
Status: DISCONTINUED | OUTPATIENT
Start: 2018-11-01 | End: 2018-11-05 | Stop reason: HOSPADM

## 2018-11-01 RX ORDER — FAMOTIDINE 20 MG/1
20 TABLET, FILM COATED ORAL
COMMUNITY
End: 2019-07-20

## 2018-11-01 RX ORDER — BUPROPION HYDROCHLORIDE 150 MG/1
300 TABLET ORAL
COMMUNITY
End: 2020-06-15

## 2018-11-01 RX ORDER — ARIPIPRAZOLE 5 MG/1
2.5 TABLET ORAL
Status: DISCONTINUED | OUTPATIENT
Start: 2018-11-01 | End: 2018-11-05 | Stop reason: HOSPADM

## 2018-11-01 RX ORDER — ACYCLOVIR 800 MG/1
400 TABLET ORAL 2 TIMES DAILY
Status: DISCONTINUED | OUTPATIENT
Start: 2018-11-01 | End: 2018-11-01

## 2018-11-01 RX ORDER — TENOFOVIR DISOPROXIL FUMARATE 300 MG/1
300 TABLET, FILM COATED ORAL DAILY
Status: DISCONTINUED | OUTPATIENT
Start: 2018-11-01 | End: 2018-11-05 | Stop reason: HOSPADM

## 2018-11-01 RX ORDER — LEVOFLOXACIN 5 MG/ML
750 INJECTION, SOLUTION INTRAVENOUS EVERY 24 HOURS
Status: DISCONTINUED | OUTPATIENT
Start: 2018-11-01 | End: 2018-11-01

## 2018-11-01 RX ORDER — FOLIC ACID 1 MG/1
1 TABLET ORAL DAILY
Status: DISCONTINUED | OUTPATIENT
Start: 2018-11-01 | End: 2018-11-05 | Stop reason: HOSPADM

## 2018-11-01 RX ORDER — OMEPRAZOLE 20 MG/1
20 CAPSULE, DELAYED RELEASE ORAL
COMMUNITY

## 2018-11-01 RX ORDER — DEXTROSE 50 % IN WATER (D50W) INTRAVENOUS SYRINGE
25-50 AS NEEDED
Status: DISCONTINUED | OUTPATIENT
Start: 2018-11-01 | End: 2018-11-05 | Stop reason: HOSPADM

## 2018-11-01 RX ORDER — INSULIN LISPRO 100 [IU]/ML
INJECTION, SOLUTION INTRAVENOUS; SUBCUTANEOUS
Status: DISCONTINUED | OUTPATIENT
Start: 2018-11-01 | End: 2018-11-05 | Stop reason: HOSPADM

## 2018-11-01 RX ORDER — MAGNESIUM SULFATE 100 %
4 CRYSTALS MISCELLANEOUS AS NEEDED
Status: DISCONTINUED | OUTPATIENT
Start: 2018-11-01 | End: 2018-11-05 | Stop reason: HOSPADM

## 2018-11-01 RX ORDER — SODIUM CHLORIDE 9 MG/ML
125 INJECTION, SOLUTION INTRAVENOUS CONTINUOUS
Status: DISPENSED | OUTPATIENT
Start: 2018-11-01 | End: 2018-11-02

## 2018-11-01 RX ORDER — ERGOCALCIFEROL 1.25 MG/1
50000 CAPSULE ORAL
Status: DISCONTINUED | OUTPATIENT
Start: 2018-11-05 | End: 2018-11-05 | Stop reason: HOSPADM

## 2018-11-01 RX ORDER — ACYCLOVIR 800 MG/1
400 TABLET ORAL 2 TIMES DAILY
Status: DISCONTINUED | OUTPATIENT
Start: 2018-11-01 | End: 2018-11-05 | Stop reason: HOSPADM

## 2018-11-01 RX ORDER — ACETAMINOPHEN 325 MG/1
650 TABLET ORAL
Status: DISCONTINUED | OUTPATIENT
Start: 2018-11-01 | End: 2018-11-05 | Stop reason: HOSPADM

## 2018-11-01 RX ORDER — LEVOFLOXACIN 5 MG/ML
500 INJECTION, SOLUTION INTRAVENOUS EVERY 24 HOURS
Status: DISCONTINUED | OUTPATIENT
Start: 2018-11-02 | End: 2018-11-01

## 2018-11-01 RX ADMIN — ARIPIPRAZOLE 2.5 MG: 5 TABLET ORAL at 10:20

## 2018-11-01 RX ADMIN — IOPAMIDOL 100 ML: 755 INJECTION, SOLUTION INTRAVENOUS at 07:29

## 2018-11-01 RX ADMIN — ZOLPIDEM TARTRATE 10 MG: 5 TABLET ORAL at 22:18

## 2018-11-01 RX ADMIN — SODIUM CHLORIDE 1000 ML: 900 INJECTION, SOLUTION INTRAVENOUS at 05:29

## 2018-11-01 RX ADMIN — SODIUM CHLORIDE 50 ML/HR: 900 INJECTION, SOLUTION INTRAVENOUS at 07:31

## 2018-11-01 RX ADMIN — TAMSULOSIN HYDROCHLORIDE 0.4 MG: 0.4 CAPSULE ORAL at 10:06

## 2018-11-01 RX ADMIN — BUPROPION HYDROCHLORIDE 150 MG: 150 TABLET, FILM COATED, EXTENDED RELEASE ORAL at 17:21

## 2018-11-01 RX ADMIN — ACYCLOVIR 400 MG: 800 TABLET ORAL at 17:22

## 2018-11-01 RX ADMIN — LEVOFLOXACIN 750 MG: 5 INJECTION, SOLUTION INTRAVENOUS at 08:25

## 2018-11-01 RX ADMIN — VANCOMYCIN HYDROCHLORIDE 1750 MG: 10 INJECTION, POWDER, LYOPHILIZED, FOR SOLUTION INTRAVENOUS at 09:56

## 2018-11-01 RX ADMIN — Medication 10 ML: at 22:16

## 2018-11-01 RX ADMIN — KETOROLAC TROMETHAMINE 15 MG: 30 INJECTION, SOLUTION INTRAMUSCULAR at 06:57

## 2018-11-01 RX ADMIN — SODIUM CHLORIDE 125 ML/HR: 900 INJECTION, SOLUTION INTRAVENOUS at 19:41

## 2018-11-01 RX ADMIN — ACETAMINOPHEN 650 MG: 325 TABLET ORAL at 17:21

## 2018-11-01 RX ADMIN — KETOROLAC TROMETHAMINE 15 MG: 30 INJECTION, SOLUTION INTRAMUSCULAR at 05:30

## 2018-11-01 RX ADMIN — FOLIC ACID 1 MG: 1 TABLET ORAL at 10:20

## 2018-11-01 RX ADMIN — OXYCODONE AND ACETAMINOPHEN 1 TABLET: 5; 325 TABLET ORAL at 06:57

## 2018-11-01 RX ADMIN — VANCOMYCIN HYDROCHLORIDE 750 MG: 750 INJECTION, POWDER, LYOPHILIZED, FOR SOLUTION INTRAVENOUS at 22:16

## 2018-11-01 RX ADMIN — Medication 10 ML: at 07:31

## 2018-11-01 RX ADMIN — SODIUM CHLORIDE 125 ML/HR: 900 INJECTION, SOLUTION INTRAVENOUS at 10:22

## 2018-11-01 RX ADMIN — Medication 10 ML: at 14:41

## 2018-11-01 NOTE — PROGRESS NOTES
Pharmacy Automatic Renal Dosing Protocol - Antimicrobials/Vancomycin Indication for Antimicrobials: acute prostatitis/suspected bacteremia from existing central line access Current Regimen of Each Antimicrobial: 
Levofloxacin 750 mg IV q 24 hours (Start Date ; Day # 1) Vancomycin IV (Start Date ; Day # 1) Previous Antimicrobial Therapy: N/A Goal Level: VANCOMYCIN TROUGH GOAL RANGE Vancomycin Trough: 15 - 20 mcg/mL Date Dose & Interval Measured (mcg/mL) Extrapolated (mcg/mL) Significant Cultures:  
 (blood): in process Radiology / Imaging results: (X-ray, CT scan or MRI): no clinically significant findings identified Paralysis, amputations, malnutrition: N/A Labs: 
Recent Labs 18 
0510 10/31/18 
1548 CREA 1.46*  --   
BUN 14  --   
WBC 3.2* 4.0* Temp (24hrs), Av.2 °F (37.3 °C), Min:98 °F (36.7 °C), Max:101.3 °F (38.5 °C) Creatinine Clearance (mL/min) or Dialysis: ~50.1 mL/min Impression/Plan: · Scr looks a little above baseline · Levofloxacin dosing appropriate per protocol for indication and renal function; consider reducing dose to 500 mg IV q 24 hours for sole prostitis coverage (once other infection sources are ruled out) · Vancomycin initiated as a 1750 mg (~21 mg/kg) loading dose, followed by a maintenance regimen of 750 mg IV q 12 hours for an expected trough level of ~16 mcg/mL · A vancomycin trough level has been ordered prior to 4th dose of regimen · Daily BMP ordered per protocol · Antimicrobial stop date TBD (if only prostitis recommend 6 weeks of levofloxacin therapy) Pharmacy will follow daily and adjust medications as appropriate for renal function and/or serum levels. Thank you, 
 
Velasquez Gipson, PharmD, BCPS Clinical Pharmacy Specialist

## 2018-11-01 NOTE — ED NOTES
Assumed care of pt, bedside shift change report received from Steve Darby RN, pt resting in position of comfort, awaiting CT scan

## 2018-11-01 NOTE — ED NOTES
TRANSFER - OUT REPORT: 
 
Verbal report given to KENDALL RN(name) on [employer]  being transferred to [facility](unit) for routine progression of care Report consisted of patients Situation, Background, Assessment and  
Recommendations(SBAR). Information from the following report(s) SBAR, ED Summary and MAR was reviewed with the receiving nurse. Lines:  
Venous Access Device Ashishkhanh [place] [id number] 09/07/17 Upper chest (subclavicular area, right (Active) Peripheral IV 11/01/18 Anterior; Left Wrist (Active) Site Assessment Clean, dry, & intact 11/1/2018  5:00 AM  
Phlebitis Assessment 0 11/1/2018  5:00 AM  
Infiltration Assessment 0 11/1/2018  5:00 AM  
Dressing Status Clean, dry, & intact 11/1/2018  5:00 AM  
Dressing Type Transparent;Tape 11/1/2018  5:00 AM  
Hub Color/Line Status Pink 11/1/2018  5:00 AM  
  
 
Opportunity for questions and clarification was provided. Patient transported with: 
 [employer]

## 2018-11-01 NOTE — CONSULTS
Consult seen. I am OK with stopping amiodarone. On it for PVC's, symptomatic. Hasn't had a problem in a while. Weeks before they may emerge off the drug. Plan on F/U appt in 2 months to address this as OP.     Signed By: David Bolden MD     November 1, 2018

## 2018-11-01 NOTE — PROGRESS NOTES
1630: Bedside shift change report given to [facility] (oncoming nurse) by Rafael Phillips RN (offgoing nurse). Report included the following information SBAR and Kardex. 1630: Primary Nurse Amadeo Rico and CLEVELAND Maldonado performed a dual skin assessment on this patient No impairment noted. Has tattoos in [address] [place] [address].  
Haris score is 21

## 2018-11-01 NOTE — ED NOTES
Pt reports fever since 2000 yesterday evening and prostate pain. Pt states it feels like \"a hot poker in my butt. \" Pt has had prostatitis in the past. Hx of 2 bone marrow transplants (last one in June). Pt has had fever and has been taking tylenol at home.

## 2018-11-01 NOTE — PROGRESS NOTES
1900:  
Bedside shift change report given to Fernanda Romo RN (oncoming nurse). Report included the following information SBAR and Kardex. SHIFT SUMMARY: 
 
 
 
 
 
[address] NURSING NOTE Admission Date 11/1/2018 Admission Diagnosis Fever Prostatitis Consults IP CONSULT TO ONCOLOGY 
IP CONSULT TO UROLOGY 
IP CONSULT TO ONCOLOGY 
IP CONSULT TO CARDIOLOGY Cardiac Monitoring [x] Yes [] No  
  
Purposeful Hourly Rounding [x] Yes   
Eusebio Score Total Score: 1 Eusebio score 3 or > [x] Bed Alarm [] Avasys [] 1:1 sitter [] Patient refused (Signed refusal form in chart) Haris Score Haris Score: 21 Haris score 14 or < [] PMT consult [] Wound Care consult  
 []  Specialty bed  [] Nutrition consult Influenza Vaccine Oxygen needs? [x] Room air Oxygen @  []1L    []2L    []3L   []4L    []5L   []6L via NC Chronic home O2 use? [] Yes [x] No 
Perform O2 challenge test and document in progress note using smartphrase (.Homeoxygen) Last bowel movement Urinary Catheter LDAs Venous Access Device [facility] [id number] 09/07/17 Upper chest (subclavicular area, right (Active) Peripheral IV 11/01/18 Anterior; Left Wrist (Active) Site Assessment Clean, dry, & intact 11/1/2018  4:47 PM  
Phlebitis Assessment 0 11/1/2018  4:47 PM  
Infiltration Assessment 0 11/1/2018  4:47 PM  
Dressing Status Clean, dry, & intact 11/1/2018  4:47 PM  
Dressing Type Transparent 11/1/2018  4:47 PM  
Hub Color/Line Status Pink 11/1/2018  4:47 PM  
                  
  
Readmission Risk Assessment Tool Score High Risk   
      
 22 Total Score 3 Has Seen PCP in Last 6 Months (Yes=3, No=0)  
 5 Pt. Coverage (Medicare=5 , Medicaid, or Self-Pay=4) 14 Charlson Comorbidity Score (Age + Comorbid Conditions) Criteria that do not apply:  
 . Living with Significant Other. Assisted Living. LTAC. SNF. or  
Rehab Patient Length of Stay (>5 days = 3) IP Visits Last 12 Months (1-3=4, 4=9, >4=11) Expected Length of Stay - - - Actual Length of Stay 0

## 2018-11-01 NOTE — ED NOTES
pts own med, nucynta, was labeled by pharmacy, count verified by myself and Veverly Fanta with pharmacy and placed in pt bin (POD 2) in pyxis, count now is 11 tabs

## 2018-11-01 NOTE — CONSULTS
EP/ ARRHYTHMIA CONSULT requested due to PVC's, VT    Patient ID:  Patient: Benny Baeza. MRN: [id number]  Age: 66 y.o.  : 1955    Date of  Admission: 2018  4:48 AM   PCP:  Shanae Garvey MD    Assessment: 1. Symptomatic PVC's and nonsustained VT, suppressed with amiodarone. Previously was on propafenone for this with OK result. 2. Echo EF 55% 2017, mild LVH. 3. Acute prostatitis with rectal pain. 4. Immunocompromised following bone marrow transplant  for multiple myeloma. 5. Chronic pancytopenia, suspect due to chemotherapy, with acute worsening thrombocytopenia. 6. THIAGO on CKD stage 2.  7. Chronic pain due to cervical DDD. 8. Hx substance abuse. 9. DM type 2.  10. HTN with currently borderline hypotension. 11. Hx treated hepatitis C.     Plan:     1. I am OK stopping amiodarone. Will be 4-6 weeks before this drug washes out in a meaningful way. I would NOT restart propafenone until he's seen in the office in about 2 months for reassessment. 2. Tele, since on QT prolonging medication. 3. OK with holding lisinopril and taking IVF, his EF is normal.    All questions answered for him. The \"current medication\" list is under review. He is not taking propafenone currently. [x]       High complexity decision making was performed in this patient    Benny Baeza. is a 66 y.o. male with a history of symptomatic PVC's and nonsustained VT, normal LV systolic fx EF 48% on prior echo in 2017. He was on propafenone prior to a BM transplant but changed to amiodarone at [facility]. He's done well with suppression with this. Today, he came to the ER and diagnosed with prostatitis with sx of fever, chills, myalgias, and rectal pain. I spoke with Dr. Esau Duverney about stopping amiodarone since we also had discussed this in the office prior and getting him back to propafenone at some point in the future. He denies chest, jaw, shoulder, neck, or arm pain. No syncope, dizziness, palpitations. No orthopnea, PND, or edema. Per the ER:  \"Aiden Hong Sr., 66 y.o. male with PMHx significant for multiple myeloma (on course of Ninlaro), prostatitis, HTN, DM, Hep C, PVC presents via wheelchair to the ED with cc of burning sensation in rectum for the past 2 days. He also reports with tmax 100.5 degrees that he last took tylenol at 2 AM today. Patient states current symptoms are similar to when he had prostatitis in the past He also reports productive cough. He states his last visit with Dr. Veronica Jamil was normal.  Additionally, pt specifically denies any recent, chills, headache, nausea, vomiting, diarrhea, abdominal pain, CP, SOB, lightheadedness, dizziness, numbness, weakness, tingling, BLE swelling, heart palpitations, urinary sxs, rectal bleeding, changes in PO intake, melena, hematochezia, or congestion. \"        Past Medical History:   Diagnosis Date    Acid reflux     Arrhythmia     pvc's    Chronic pain     neck    Depression     Diabetes ([facility] [address]     Femoral hernia 2012    Hepatitis C     Hypertension     Inguinal hernia 2012    Liver disease     hepatitis c    Other ill-defined conditions(799.89)     Hx of PVCs since     Prostatitis, acute     Psychiatric disorder     depression        Past Surgical History:   Procedure Laterality Date    HX APPENDECTOMY      HX CERVICAL FUSION  2008    x 1 as of 2014    HX HEENT      foreign body removed from right eye    HX HERNIA REPAIR      [facility]'s      HX ORTHOPAEDIC      cervical fusion    HX OTHER SURGICAL  2000    liver bx - chronic hepatitis       Social History     Tobacco Use    Smoking status: Former Smoker     Packs/day: 1.00     Years: 12.00     Pack years: 12.00     Types: Cigarettes     Last attempt to quit: 1989     Years since quittin.8    Smokeless tobacco: Never Used    Tobacco comment: former cigarette smoker   Substance Use Topics    Alcohol use:  No Comment: former alcoholic-quit 1834        Family History   Problem Relation Age of Onset    Arthritis-osteo Mother     Arthritis-osteo Father     Diabetes Father     Hypertension Maternal Grandmother     Diabetes Maternal Grandmother     Hypertension Maternal Grandfather     Diabetes Maternal Grandfather         Allergies   Allergen Reactions    Mercury (Bulk) Hives     Blisters            Current Facility-Administered Medications   Medication Dose Route Frequency    sodium chloride (NS) flush 5-10 mL  5-10 mL IntraVENous PRN    sodium chloride 0.9 % bolus infusion 1,000 mL  1,000 mL IntraVENous ONCE    vancomycin (VANCOCIN) 1750 mg in  ml infusion  1,750 mg IntraVENous NOW    levoFLOXacin (LEVAQUIN) 750 mg in D5W IVPB  750 mg IntraVENous NOW    sodium chloride (NS) flush 5-10 mL  5-10 mL IntraVENous Q8H    sodium chloride (NS) flush 5-10 mL  5-10 mL IntraVENous PRN    0.9% sodium chloride infusion  125 mL/hr IntraVENous CONTINUOUS    levoFLOXacin (LEVAQUIN) 750 mg in D5W IVPB  750 mg IntraVENous Q24H    acetaminophen (TYLENOL) tablet 650 mg  650 mg Oral Q6H PRN    oxyCODONE IR (ROXICODONE) tablet 10 mg  10 mg Oral Q6H PRN    acyclovir (ZOVIRAX) tablet 400 mg  400 mg Oral BID    ARIPiprazole (ABILIFY) tablet 2.5 mg  2.5 mg Oral 7am    buPROPion XL (WELLBUTRIN XL) tablet 150 mg  150 mg Oral DAILY WITH DINNER    [START ON 11/5/2018] ergocalciferol capsule 50,000 Units  50,000 Units Oral every Monday    folic acid (FOLVITE) tablet 1 mg  1 mg Oral DAILY    insulin glargine (LANTUS) injection 12 Units  12 Units SubCUTAneous DAILY    metFORMIN ER (GLUCOPHAGE XR) tablet 500 mg  500 mg Oral BID    pantoprazole (PROTONIX) tablet 40 mg  40 mg Oral DAILY PRN    tamsulosin (FLOMAX) capsule 0.4 mg  0.4 mg Oral DAILY    tapentadol (NUCYNTA) tablet 200 mg  200 mg Oral TID    tenofovir DISOPROXIL FUMARATE (VIREAD) tablet 300 mg  300 mg Oral DAILY    zolpidem (AMBIEN) tablet 10 mg  10 mg Oral HS     Current Outpatient Medications   Medication Sig    omeprazole (PRILOSEC) 20 mg capsule Take 20 mg by mouth daily as needed.  ondansetron (ZOFRAN ODT) 8 mg disintegrating tablet Take 8 mg by mouth every eight (8) hours as needed for Nausea.  ixazomib (NINLARO) 4 mg cap Take 1 Cap by mouth every seven (7) days. Take 1 cap weekly on Days 1, 8, and 15 every 28 days    acyclovir (ZOVIRAX) 400 mg tablet Take 400 mg by mouth two (2) times a day.  tenofovir DISOPROXIL FUMARATE (VIREAD) 300 mg tablet Take 300 mg by mouth daily.  pentamidine (NEBUPENT) 300 mg inhalation solution Take 300 mg by inhalation once.  amiodarone (CORDARONE) 200 mg tablet Take  by mouth.  ergocalciferol (ERGOCALCIFEROL) 50,000 unit capsule TAKE 1 CAP BY MOUTH EVERY 7 DAYS, Mondays    lisinopril (PRINIVIL, ZESTRIL) 10 mg tablet TAKE 1 TABLET BY MOUTH EVERY DAY as needed. Hold if SBP <154    folic acid (FOLVITE) 1 mg tablet TAKE 1 TABLET BY MOUTH EVERY DAY    multivitamin (ONE A DAY) tablet Take 1 Tab by mouth daily.  Biotin 2,500 mcg cap Take 1 Cap by mouth daily.  insulin glargine (LANTUS,BASAGLAR) 100 unit/mL (3 mL) inpn 22 Units by SubCUTAneous route daily. (Patient taking differently: 12 Units by SubCUTAneous route daily. Hold if BS <110)    metFORMIN ER (GLUCOPHAGE XR) 500 mg tablet Take 1 Tab by mouth two (2) times a day.  prochlorperazine (COMPAZINE) 10 mg tablet TAKE 1 TABLET BY MOUTH EVERY 6 HOURS AS NEEDED FOR UP TO 7 DAYS.  ONETOUCH ULTRA TEST strip USE TO TEST BLOOD SUGAR TID BEFORE EACH MEAL UTD.  BD INSULIN PEN NEEDLE UF MINI 31 gauge x 3/16\" ndle USE AS DIRECTED    insulin lispro (HUMALOG) 100 unit/mL kwikpen 8-10 Units by SubCUTAneous route three (3) times daily as needed. correction scale of 1:25>150    tamsulosin (FLOMAX) 0.4 mg capsule Take 1 Cap by mouth daily.  zolpidem CR (AMBIEN CR) 12.5 mg tablet Take 12.5 mg by mouth nightly.     tapentadol (NUCYNTA) 100 mg tablet Take 200 mg by mouth three (3) times daily. At 7a, 3p, 11pm    aripiprazole (ABILIFY) 5 mg tablet Take 2.5 mg by mouth every morning.  buPROPion XL (WELLBUTRIN XL) 150 mg tablet Take 1 Tab by mouth daily (with dinner).  famotidine (PEPCID) 20 mg tablet TAKE 1 TABLET BY MOUTH TWICE A DAY prn     Facility-Administered Medications Ordered in Other Encounters   Medication Dose Route Frequency    sodium chloride (NS) flush 10-40 mL  10-40 mL IntraVENous PRN    sodium chloride 0.9% injection 10 mL  10 mL IntraVENous PRN    heparin (porcine) pf 500 Units  500 Units IntraVENous PRN       Review of Symptoms:  See HPI as well.   General: +fever, chills, negative for weakness, recent weight loss   Eyes: negative for blurred vision, eye pain, loss of vision, diplopia   Ear Nose and Throat: negative for rhinorrhea, pharyngitis, otalgia, tinnitus, speech or swallowing difficulties   Respiratory: negative for SOB, cough, sputum production, wheezing, GONZALES, pleuritic pain   Cardiology: negative for chest pain, palpitations, orthopnea, PND, edema, syncope   Gastrointestinal: negative for abdominal pain, N/V, dysphagia, change in bowel habits, bleeding   Genitourinary: negative for +dysuria, rectal pain, negative for gross hematuria  Muskuloskeletal : negative for arthralgia, but +myalgia   Hematology: negative for easy bruising, bleeding, lymphadenopathy   Dermatological: negative for rash, ulceration, mole change, new lesion   Endocrine: negative for hot flashes or polydipsia   Neurological: negative for headache, dizziness, confusion, focal weakness, paresthesia, memory loss, gait disturbance   Psychological: negative for anxiety, depression, agitation       Objective:      Physical Exam:    Patient Vitals for the past 12 hrs:   Temp Pulse Resp BP SpO2   11/01/18 0830  84 15 108/72 100 %   11/01/18 0750    105/69    11/01/18 0749  88 16  100 %   11/01/18 0700 98.2 °F (36.8 °C) 89 19 113/69 100 %   11/01/18 0445 (!) 101.3 °F (38.5 °C) (!) 106 20 116/75 97 %         Intake/Output Summary (Last 24 hours) at 11/1/2018 0948  Last data filed at 11/1/2018 0629  Gross per 24 hour   Intake 1000 ml   Output    Net 1000 ml       Nondiaphoretic, not in acute distress. Wearing mask  Supple, no palpable thyromegaly. No scleral icterus, mucous membranes moist, conjuctivae pink, no xanthelasma. Unlabored, clear to auscultation bilaterally anteriorly, symmetric air movement. Regular rate and rhythm, no murmur, pericardial rub, knock, or gallop. No JVD or peripheral edema. Palpable radial pulses bilaterally. Abdomen, soft, nontender, nondistended. Extremities without cyanosis or clubbing. Muscle tone and bulk normal.  Skin warm and dry. No rashes or ulcers. Neuro grossly nonfocal.  No tremor. Awake and appropriate. CARDIOGRAPHICS and STUDIES, I reviewed:    Telemetry:  SR.    ECG:  Sinus tachycardia 106 with nonspecific repolarization abnormality. CXR:  No edema or infiltrate. Labs:  No results for input(s): CPK, CKMB, CKNDX, TROIQ in the last 72 hours. No lab exists for component: CPKMB  Lab Results   Component Value Date/Time    Cholesterol, total 101 07/14/2011 04:15 AM    HDL Cholesterol 38 07/14/2011 04:15 AM    LDL, calculated 27.8 07/14/2011 04:15 AM    Triglyceride 176 (H) 07/14/2011 04:15 AM    CHOL/HDL Ratio 2.7 07/14/2011 04:15 AM     No results for input(s): INR, PTP, APTT in the last 72 hours. No lab exists for component: INREXT   Recent Labs     11/01/18  0510 10/31/18  1548     --    K 3.9  --      --    CO2 26  --    BUN 14  --    CREA 1.46*  --    *  --    CA 8.5  --    ALB 4.0  --    WBC 3.2* 4.0*   HGB 11.4* 11.3*   HCT 35.1* 35.4*   PLT 79* 89*     Recent Labs     11/01/18  0510   SGOT 37   AP 91   TP 7.1   ALB 4.0   GLOB 3.1     No components found for: GLPOC  No results for input(s): PH, PCO2, PO2 in the last 72 hours.         Patrice Chawla MD  11/1/2018

## 2018-11-01 NOTE — PROGRESS NOTES
Pharmacy Clarification of the Prior to Admission Medication Regimen Retrospective to the Admission Medication Reconciliation The patient was interviewed regarding clarification of the prior to admission medication regimen. Patient's wife was present in room and obtained permission from patient to discuss drug regimen with visitor(s) present. Patient was questioned regarding use of any other inhalers, topical products, over the counter medications, herbal medications, vitamin products or ophthalmic/nasal/otic medication use. Information Obtained From: Patient, Lobo Olivera Recommendations/Findings: The following amendments were made to the [facility] [address] [facility]  
 
1) Additions: None 2) Removals:  
? Amiodarone ? Insulin glargine (Lantus) ? Compazine ? Propafenone 3) Changes: 
? famotidine (PEPCID) 20 mg tablet (Old regimen: 20 mg BID /New regimen: 20 mg daily PRN) 4) Pertinent Pharmacy Findings: 
? Updated patients preferred outpatient pharmacy to: [place] [id number], [address] ? Identified High Alert Medication Information 
o Oral Chemotherapy: ixazomib 4 mg cap ? Indication: Multiple myeloma ? Dosing Instructions: 4 mg every Tuesday on week 1-3 of cycle (OFF on next week) ? Can patient supply from home: Yes PTA medication list was corrected to the following:  
 
Prior to Admission Medications Prescriptions Last Dose Informant Patient Reported? Taking? Biotin 2,500 mcg cap 10/31/2018 at Unknown time Self Yes Yes Sig: Take 1 Cap by mouth daily. acyclovir (ZOVIRAX) 400 mg tablet 10/31/2018 at Unknown time Self Yes Yes Sig: Take 400 mg by mouth two (2) times a day. aripiprazole (ABILIFY) 5 mg tablet 10/31/2018 at Unknown time Self Yes Yes Sig: Take 2.5 mg by mouth every morning. buPROPion XL (WELLBUTRIN XL) 150 mg tablet 10/31/2018 at Unknown time Self Yes Yes Sig: Take 150 mg by mouth every morning.  
ergocalciferol (ERGOCALCIFEROL) 50,000 unit capsule 10/29/2018 at Unknown time Self Yes Yes Sig: TAKE 1 CAP BY MOUTH EVERY 7 DAYS,   
famotidine (PEPCID) 20 mg tablet 10/18/2018 at Unknown time Self Yes Yes Sig: Take 20 mg by mouth daily as needed (acid reflux). folic acid (FOLVITE) 1 mg tablet 10/31/2018 at Unknown time Self Yes Yes Sig: TAKE 1 TABLET BY MOUTH EVERY DAY  
insulin lispro (HUMALOG) 100 unit/mL kwikpen 10/31/2018 at Unknown time Self Yes Yes Si-6 Units by SubCUTAneous route Before breakfast, lunch, and dinner. Blood Glucose (mg/dL)       Insulin Dose (units) 
            <130                                 skip 130-150                              4  
            >150                                   6  
ixazomib 4 mg cap 10/30/2018 at Unknown time Self Yes Yes Sig: Take 4 mg by mouth every seven (7) days. Patient takes 4 mg every  on week 1-3 of cycle  
lisinopril (PRINIVIL, ZESTRIL) 10 mg tablet 10/31/2018 at Unknown time Self Yes Yes Sig: TAKE 1 TABLET BY MOUTH EVERY DAY as needed. Hold if SBP <115  
metFORMIN ER (GLUCOPHAGE XR) 500 mg tablet 10/31/2018 at Unknown time Self No Yes Sig: Take 1 Tab by mouth two (2) times a day. multivitamin (ONE A DAY) tablet 10/31/2018 at Unknown time Self Yes Yes Sig: Take 1 Tab by mouth daily. omeprazole (PRILOSEC) 20 mg capsule 10/25/2018 at Unknown time Self Yes Yes Sig: Take 20 mg by mouth daily as needed (acid reflux). ondansetron (ZOFRAN ODT) 8 mg disintegrating tablet 10/11/2018 at Unknown time Self Yes Yes Sig: Take 8 mg by mouth every eight (8) hours as needed for Nausea. pentamidine (NEBUPENT) 300 mg inhalation solution 10/25/2018 at Unknown time Self Yes Yes Sig: Take 300 mg by inhalation every month. Once a month at Dr's office  
tamsulosin (FLOMAX) 0.4 mg capsule 10/31/2018 at Unknown time Self No Yes Sig: Take 1 Cap by mouth daily. tapentadol (NUCYNTA) 100 mg tablet 10/31/2018 at Unknown time Self Yes Yes Sig: Take 200 mg by mouth three (3) times daily. At [address] [place] [address], 3p, 11pm  
tenofovir DISOPROXIL FUMARATE (VIREAD) 300 mg tablet 10/31/2018 at Unknown time Self Yes Yes Sig: Take 300 mg by mouth daily. zolpidem CR (AMBIEN CR) 12.5 mg tablet 10/31/2018 at Unknown time Self Yes Yes Sig: Take 12.5 mg by mouth nightly. Facility-Administered Medications: None Thank you, 
Ange Tian PharmD candidate Class of 2019

## 2018-11-01 NOTE — H&P
Hospitalist Admission NoteNAME: Sheila Coreas Sr.  
:  1955 MRN:  [id number] Date/Time:  2018 9:29 AM 
 
Patient PCP: Carlie Marques MD 
______________________________________________________________________ Assessment & Plan: 
Sepsis, POA Suspect prostatitis with rectal pain. Rectal exam per ER physician with tender, boggy prostate. Immunocompromised following bone marrow transplant  for multiple myeloma Chronic pancytopenia, suspect due to chemotherapy, with acute worsening thrombocytopenia THIAGO on CKD stage 2 Hx PVCs/VT Chronic pain due to cervical DDD Hx substance abuse DM type 2 HTN with currently borderline hypotension Hx treated hepatitis C 
 
--continue levaquin for prostatitis. Urology consult since was due to see Dr. Kleber Garcia today outpatient 
--continue vancomycin for now. If blood cultures negative, can discontinue. --oncology consult. SCDs rather than heparin products. Monitor platelets 
--IVF and hold lisinopril. If Cr continue to worsen, will need to stop metformin 
--cardiology consult -- per patient supposed to be switched from amiodarone to propafenone after transplant but didn't f/u with cardiology yet. Discussed with Dr. Gee Solomon, discontinue amiodarone for now.  
--continue tapentadol. Add oxycodone 10mg q6h prn for rectal pain x 36 hours. --continue metformin for now but if cr worsen will need to decrease. Continue lantus but hold if BS <110. Low dose SSI. Body mass index is 28.43 kg/m². Code:  full DVT prophylaxis: SCD Surrogate decision maker:  wife Subjective: CHIEF COMPLAINT:  Fever and chills, myalgia, rectal pain HISTORY OF PRESENT ILLNESS:    
Sheila Coreas Sr. is a 66 y.o.  male with rectal pain, feels like hot poker, for 1.5 days, similar to when had prostatitis 2017. Pain worse with sitting, relieved with BM.   Past 2 days felt like coming down with a cold; has scratchy throat and rhinorrhea. Two nights ago had body aches, chills. Taking tylenol around the clock for past 24 hours. At 8pm last night temperature was 99.0 and at 3:30am was 100.5. Denies cough, SOB, abdominal pain. No headache. We were asked to admit for work up and evaluation of the above problems. Past Medical History:  
Diagnosis Date  Acid reflux  Arrhythmia   
 pvc's  Chronic pain   
 neck  Depression  Diabetes ([address]  Femoral hernia 2012  Hepatitis C   
 Hypertension  Inguinal hernia 2012  Liver disease   
 hepatitis c  
 Other ill-defined conditions(799.89) Hx of PVCs since   Prostatitis, acute  Psychiatric disorder   
 depression Past Surgical History:  
Procedure Laterality Date  HX APPENDECTOMY  HX CERVICAL FUSION  2008  
 x 1 as of 2014  HX HEENT    
 foreign body removed from right eye  HX HERNIA REPAIR   [facility]'s  HX ORTHOPAEDIC    
 cervical fusion  HX OTHER SURGICAL  2000  
 liver bx - chronic hepatitis Social History Tobacco Use  Smoking status: Former Smoker Packs/day: 1.00 Years: 12.00 Pack years: 12.00 Types: Cigarettes Last attempt to quit: 1989 Years since quittin.8  Smokeless tobacco: Never Used  Tobacco comment: former cigarette smoker Substance Use Topics  Alcohol use: No  
  Comment: former alcoholic-quit 0492 Drug use:   
 
 
Family History Problem Relation Age of Onset [facility] Arthritis-osteo Mother  Arthritis-osteo Father  Diabetes Father  Hypertension Maternal Grandmother  Diabetes Maternal Grandmother  Hypertension Maternal Grandfather  Diabetes Maternal Grandfather Allergies Allergen Reactions  Mercury (Bulk) Hives Blisters Prior to Admission medications Medication Sig Start Date End Date Taking? Authorizing Provider omeprazole (PRILOSEC) 20 mg capsule Take 20 mg by mouth daily as needed (acid reflux). Yes Other, MD Goldy  
ondansetron (ZOFRAN ODT) 8 mg disintegrating tablet Take 8 mg by mouth every eight (8) hours as needed for Nausea. Yes Other, MD Goldy  
buPROPion XL (WELLBUTRIN XL) 150 mg tablet Take 150 mg by mouth every morning. Yes Other, MD Goldy  
famotidine (PEPCID) 20 mg tablet Take 20 mg by mouth daily as needed (acid reflux). Yes Other, MD Goldy  
ixazomib 4 mg cap Take 4 mg by mouth every seven (7) days. Patient takes 4 mg every Tuesdays on week 1-3 of cycle   Yes Other, MD Goldy  
acyclovir (ZOVIRAX) 400 mg tablet Take 400 mg by mouth two (2) times a day. 7/7/18  Yes Provider, Historical  
tenofovir DISOPROXIL FUMARATE (VIREAD) 300 mg tablet Take 300 mg by mouth daily. 7/7/18  Yes Provider, Historical  
pentamidine (NEBUPENT) 300 mg inhalation solution Take 300 mg by inhalation every month. Once a month at Dr's office   Yes Provider, Historical  
ergocalciferol (ERGOCALCIFEROL) 50,000 unit capsule TAKE 1 CAP BY MOUTH EVERY 7 DAYS, Mondays 5/14/18  Yes Provider, Historical  
lisinopril (PRINIVIL, ZESTRIL) 10 mg tablet TAKE 1 TABLET BY MOUTH EVERY DAY as needed. Hold if SBP <115 5/12/18  Yes Provider, Historical  
folic acid (FOLVITE) 1 mg tablet TAKE 1 TABLET BY MOUTH EVERY DAY 5/14/18  Yes Provider, Historical  
multivitamin (ONE A DAY) tablet Take 1 Tab by mouth daily. Yes Provider, Historical  
Biotin 2,500 mcg cap Take 1 Cap by mouth daily. Yes Provider, Historical  
metFORMIN ER (GLUCOPHAGE XR) 500 mg tablet Take 1 Tab by mouth two (2) times a day. 11/10/17  Yes Jerod Nicole MD  
insulin lispro (HUMALOG) 100 unit/mL kwikpen 4-6 Units by SubCUTAneous route Before breakfast, lunch, and dinner. Blood Glucose (mg/dL)       Insulin Dose (units) 
            <130                                 skip             130-150                              4  
 >150                                   6   Yes Provider, Historical  
tamsulosin (FLOMAX) 0.4 mg capsule Take 1 Cap by mouth daily. 17  Yes Errol Guaman MD  
zolpidem CR (AMBIEN CR) 12.5 mg tablet Take 12.5 mg by mouth nightly. Yes Provider, Historical  
tapentadol (NUCYNTA) 100 mg tablet Take 200 mg by mouth three (3) times daily. At 7a, 3p, 11pm   Yes Joe, MD Goldy  
aripiprazole (ABILIFY) 5 mg tablet Take 2.5 mg by mouth every morning. Yes Other, MD Goldy  
 
REVIEW OF SYSTEMS:  POSITIVE= Bold. Negative = normal text General:  fever, chills, sweats, generalized weakness, weight loss/gain, loss of appetite Eyes:  blurred vision, eye pain, loss of vision, diplopia Ear Nose and Throat:  rhinorrhea, pharyngitis Respiratory:   cough, sputum production, SOB, wheezing, GONZALES, pleuritic pain 
Cardiology:  chest pain, palpitations, orthopnea, PND, edema, syncope Gastrointestinal:  abdominal pain, N/V, dysphagia, diarrhea x 1 2 days ago, rectal pain similar to prior prostatitis, constipation, bleeding Genitourinary:  frequency, urgency, dysuria, hematuria, incontinence Muskuloskeletal :  arthralgia, myalgia Hematology:  easy bruising, bleeding, lymphadenopathy Dermatological:  rash, ulceration, pruritis Endocrine:  hot flashes or polydipsia Neurological:  headache, dizziness, confusion, focal weakness, paresthesia, memory loss, gait disturbance Psychological: anxiety, depression, agitation Objective: VITALS:   
Visit Vitals /72 Pulse 84 Temp 98.2 °F (36.8 °C) Resp 15 Ht 5' 8\" (1.727 m) Wt 84.8 kg (186 lb 15.2 oz) SpO2 100% BMI 28.43 kg/m² Temp (24hrs), Av.8 °F (37.7 °C), Min:98.2 °F (36.8 °C), Max:101.3 °F (38.5 °C) Body mass index is 28.43 kg/m². PHYSICAL EXAM: 
 
General:    Alert, cooperative, no distress, appears stated age. HEENT: Atraumatic, anicteric sclerae, pink conjunctivae. No sinus tenderness. B/l tympanic membranes clear No oral ulcers, mucosa moist, throat clear. Hearing intact. Neck:  Supple, symmetrical,  thyroid: non tender Lungs:   Clear to auscultation bilaterally. No Wheezing or Rhonchi. No rales. Chest wall:  Port in right upper chest without redness or tenderness  No Accessory muscle use. Heart:   Regular  rhythm,  No  murmur   No gallop. No edema. Abdomen:   Soft, non-tender. Not distended. Bowel sounds normal. No masses. Left inguinal firm mass c/w   CT finding and patient's hx seroma due to prior inguinal hernia repair Extremities: No cyanosis. No clubbing Skin:     Cool and clammy skin on back. Not pale Not Jaundiced  No rashes Tattoos on left arm Psych:  Good insight. Not depressed. Not anxious or agitated. Neurologic: EOMs intact. No facial asymmetry. No aphasia or slurred speech. Symmetrical strength, Alert and oriented X 3. Peripheral pulse: Left, Radial, 2+ Capillary refill:  normal 
 
IMAGING RESULTS: 
 []       I have personally reviewed the actual   []     CXR  []     CT scan CXR: 
CT : 
EKG:  , nonspecific T abnormality ________________________________________________________________________ Care Plan discussed with: 
  Comments Patient y Family  y   
RN Care Manager Consultant:  y Dr. Cloretta Peabody  
________________________________________________________________________ Prophylaxis: 
GI PPI prn  
DVT SCD  
________________________________________________________________________ Recommended Disposition:  
Home with Family y HH/PT/OT/RN   
SNF/LTC   
YANIV   
________________________________________________________________________ Code Status: 
Full Code y  
DNR/DNI   
________________________________________________________________________ TOTAL TIME:  70 minutes Comments  
 y Reviewed previous records  
>50% of visit spent in counseling and coordination of care  Discussion with patient and/or family and questions answered ______________________________________________________________________ Omer Kelley MD 
 
 
Procedures: see electronic medical records for all procedures/Xrays and details which were not copied into this note but were reviewed prior to creation of Plan. LAB DATA REVIEWED:   
Recent Results (from the past 24 hour(s)) CBC WITH AUTOMATED DIFF Collection Time: 10/31/18  3:48 PM  
Result Value Ref Range WBC 4.0 (L) 4.1 - 11.1 K/uL  
 RBC 3.80 (L) 4.10 - 5.70 M/uL  
 HGB 11.3 (L) 12.1 - 17.0 g/dL HCT 35.4 (L) 36.6 - 50.3 % MCV 93.2 80.0 - 99.0 FL  
 MCH 29.7 26.0 - 34.0 PG  
 MCHC 31.9 30.0 - 36.5 g/dL  
 RDW 14.4 11.5 - 14.5 % PLATELET 89 (L) 514 - 400 K/uL MPV 11.4 8.9 - 12.9 FL  
 NRBC 0.0 0  WBC ABSOLUTE NRBC 0.00 0.00 - 0.01 K/uL NEUTROPHILS 75 32 - 75 % LYMPHOCYTES 10 (L) 12 - 49 % MONOCYTES 15 (H) 5 - 13 % EOSINOPHILS 0 0 - 7 % BASOPHILS 0 0 - 1 % IMMATURE GRANULOCYTES 0 0.0 - 0.5 % ABS. NEUTROPHILS 3.0 1.8 - 8.0 K/UL  
 ABS. LYMPHOCYTES 0.4 (L) 0.8 - 3.5 K/UL  
 ABS. MONOCYTES 0.6 0.0 - 1.0 K/UL  
 ABS. EOSINOPHILS 0.0 0.0 - 0.4 K/UL  
 ABS. BASOPHILS 0.0 0.0 - 0.1 K/UL  
 ABS. IMM. GRANS. 0.0 0.00 - 0.04 K/UL  
 DF SMEAR SCANNED    
 RBC COMMENTS NORMOCYTIC, NORMOCHROMIC    
CBC WITH AUTOMATED DIFF Collection Time: 11/01/18  5:10 AM  
Result Value Ref Range WBC 3.2 (L) 4.1 - 11.1 K/uL  
 RBC 3.81 (L) 4.10 - 5.70 M/uL  
 HGB 11.4 (L) 12.1 - 17.0 g/dL HCT 35.1 (L) 36.6 - 50.3 % MCV 92.1 80.0 - 99.0 FL  
 MCH 29.9 26.0 - 34.0 PG  
 MCHC 32.5 30.0 - 36.5 g/dL  
 RDW 14.4 11.5 - 14.5 % PLATELET 79 (L) 009 - 400 K/uL MPV 11.9 8.9 - 12.9 FL  
 NRBC 0.0 0  WBC ABSOLUTE NRBC 0.00 0.00 - 0.01 K/uL NEUTROPHILS 72 32 - 75 % LYMPHOCYTES 10 (L) 12 - 49 % MONOCYTES 17 (H) 5 - 13 % EOSINOPHILS 1 0 - 7 % BASOPHILS 0 0 - 1 % IMMATURE GRANULOCYTES 0 0.0 - 0.5 % ABS. NEUTROPHILS 2.4 1.8 - 8.0 K/UL ABS. LYMPHOCYTES 0.3 (L) 0.8 - 3.5 K/UL  
 ABS. MONOCYTES 0.5 0.0 - 1.0 K/UL  
 ABS. EOSINOPHILS 0.0 0.0 - 0.4 K/UL  
 ABS. BASOPHILS 0.0 0.0 - 0.1 K/UL  
 ABS. IMM. GRANS. 0.0 0.00 - 0.04 K/UL  
 DF MANUAL    
 RBC COMMENTS NORMOCYTIC, NORMOCHROMIC METABOLIC PANEL, COMPREHENSIVE Collection Time: 11/01/18  5:10 AM  
Result Value Ref Range Sodium 137 136 - 145 mmol/L Potassium 3.9 3.5 - 5.1 mmol/L Chloride 103 97 - 108 mmol/L  
 CO2 26 21 - 32 mmol/L Anion gap 8 5 - 15 mmol/L Glucose 124 (H) 65 - 100 mg/dL BUN 14 6 - 20 MG/DL Creatinine 1.46 (H) 0.70 - 1.30 MG/DL  
 BUN/Creatinine ratio 10 (L) 12 - 20 GFR est AA 59 (L) >60 ml/min/1.73m2 GFR est non-AA 49 (L) >60 ml/min/1.73m2 Calcium 8.5 8.5 - 10.1 MG/DL Bilirubin, total 0.2 0.2 - 1.0 MG/DL  
 ALT (SGPT) 38 12 - 78 U/L  
 AST (SGOT) 37 15 - 37 U/L Alk. phosphatase 91 45 - 117 U/L Protein, total 7.1 6.4 - 8.2 g/dL Albumin 4.0 3.5 - 5.0 g/dL Globulin 3.1 2.0 - 4.0 g/dL A-G Ratio 1.3 1.1 - 2.2 LACTIC ACID Collection Time: 11/01/18  5:10 AM  
Result Value Ref Range Lactic acid 1.7 0.4 - 2.0 MMOL/L  
CULTURE, BLOOD, PAIRED Collection Time: 11/01/18  5:11 AM  
Result Value Ref Range Special Requests: NO SPECIAL REQUESTS Culture result: NO GROWTH AFTER 1 HOUR    
EKG, 12 LEAD, INITIAL Collection Time: 11/01/18  5:11 AM  
Result Value Ref Range Ventricular Rate 106 BPM  
 Atrial Rate 106 BPM  
 P-R Interval 146 ms  
 QRS Duration 100 ms Q-T Interval 368 ms QTC Calculation (Bezet) 488 ms Calculated P Axis 22 degrees Calculated R Axis -28 degrees Calculated T Axis 29 degrees Diagnosis Sinus tachycardia Possible Left atrial enlargement Nonspecific T wave abnormality When compared with ECG of 02-DEC-2016 22:06, No significant change was found Confirmed by Yemi Batista ([id number]) on 11/1/2018 8:38:24 AM 
  
INFLUENZA A & B AG (RAPID TEST) Collection Time: 11/01/18  5:33 AM  
Result Value Ref Range Influenza A Antigen NEGATIVE  NEG Influenza B Antigen NEGATIVE  NEG    
URINALYSIS W/ REFLEX CULTURE Collection Time: 11/01/18  7:02 AM  
Result Value Ref Range Color YELLOW/STRAW Appearance CLEAR CLEAR Specific gravity 1.007 1.003 - 1.030    
 pH (UA) 5.5 5.0 - 8.0 Protein NEGATIVE  NEG mg/dL Glucose NEGATIVE  NEG mg/dL Ketone NEGATIVE  NEG mg/dL Bilirubin NEGATIVE  NEG Blood NEGATIVE  NEG Urobilinogen 0.2 0.2 - 1.0 EU/dL Nitrites NEGATIVE  NEG Leukocyte Esterase NEGATIVE  NEG    
 WBC 0-4 0 - 4 /hpf  
 RBC 0-5 0 - 5 /hpf Epithelial cells FEW FEW /lpf Bacteria NEGATIVE  NEG /hpf  
 UA:UC IF INDICATED CULTURE NOT INDICATED BY UA RESULT CNI Hyaline cast 0-2 0 - 5 /lpf

## 2018-11-01 NOTE — ED NOTES
Spoke with Alvin Judd with pharmacy about pt own med being a controlled substance and in pyxis, she informed that due to not being able to lock and account for med that it should be in pts room, moved to pts room, 11 pills with label, explained to pt that med would need to go upstairs when a bed is assigned

## 2018-11-01 NOTE — ED PROVIDER NOTES
EMERGENCY DEPARTMENT HISTORY AND PHYSICAL EXAM 
 
 
Date: 11/1/2018 Patient Name: Hernan Alarcon. History of Presenting Illness Chief Complaint Patient presents with  Generalized Body Aches Since last night.  Fever Bone marrow transplant on June 15th. Temperature of 100.5 since last night. History Provided By: Patient HPI: Danyel Fajardo Sr., 66 y.o. male with PMHx significant for multiple myeloma (on course of Ninlaro), prostatitis, HTN, DM, Hep C, PVC presents via wheelchair to the ED with cc of burning sensation in rectum for the past 2 days. He also reports with tmax 100.5 degrees that he last took tylenol at 2 AM today. Patient states current symptoms are similar to when he had prostatitis in the past He also reports productive cough. He states his last visit with Dr. Carlo Adair was normal. 
Additionally, pt specifically denies any recent, chills, headache, nausea, vomiting, diarrhea, abdominal pain, CP, SOB, lightheadedness, dizziness, numbness, weakness, tingling, BLE swelling, heart palpitations, urinary sxs, rectal bleeding, changes in PO intake, melena, hematochezia, or congestion. PCP: Yasmany Diallo MD 
 
 
There are no other complaints, changes or physical findings at this time. Past History Past Medical History: 
Past Medical History:  
Diagnosis Date  Acid reflux  Arrhythmia   
 pvc's  Chronic pain   
 neck  Depression  Diabetes ([address]  Femoral hernia 7/11/2012  Hepatitis C   
 Hypertension  Inguinal hernia 7/11/2012  Liver disease   
 hepatitis c  
 Other ill-defined conditions(799.89) Hx of PVCs since 2009  Prostatitis, acute  Psychiatric disorder   
 depression Past Surgical History: 
Past Surgical History:  
Procedure Laterality Date  HX APPENDECTOMY  HX CERVICAL FUSION  2008  
 x 1 as of 4/30/2014  HX HEENT    
 foreign body removed from right eye  HX HERNIA REPAIR  2012 [facility]  HX ORTHOPAEDIC    
 cervical fusion  HX OTHER SURGICAL  2000  
 liver bx - chronic hepatitis Family History: 
Family History Problem Relation Age of Onset Tiff Arthritis-osteo Mother  Arthritis-osteo Father  Diabetes Father  Hypertension Maternal Grandmother  Diabetes Maternal Grandmother  Hypertension Maternal Grandfather  Diabetes Maternal Grandfather Social History: 
Social History Tobacco Use  Smoking status: Former Smoker Packs/day: 1.00 Years: 12.00 Pack years: 12.00 Types: Cigarettes Last attempt to quit: 1989 Years since quittin.8  Smokeless tobacco: Never Used  Tobacco comment: former cigarette smoker Substance Use Topics  Alcohol use: No  
  Comment: former alcoholic-quit 6164  Drug use: No  
 
 
Allergies: Allergies Allergen Reactions  Mercury (Bulk) Hives Blisters Review of Systems Review of Systems Constitutional: Positive for fever. Negative for chills. HENT: Negative. Negative for congestion, facial swelling, rhinorrhea, sore throat, trouble swallowing and voice change. Eyes: Negative. Respiratory: Negative. Negative for apnea, cough, chest tightness, shortness of breath and wheezing. Cardiovascular: Negative. Negative for chest pain, palpitations and leg swelling. Gastrointestinal: Positive for rectal pain. Negative for abdominal distention, abdominal pain, blood in stool, constipation, diarrhea, nausea and vomiting. Endocrine: Negative. Negative for cold intolerance, heat intolerance and polyuria. Genitourinary: Negative. Negative for difficulty urinating, dysuria, flank pain, frequency, hematuria and urgency. Musculoskeletal: Negative. Negative for arthralgias, back pain, myalgias, neck pain and neck stiffness. Skin: Negative. Negative for color change and rash. Neurological: Negative.   Negative for dizziness, syncope, facial asymmetry, speech difficulty, weakness, light-headedness, numbness and headaches. Hematological: Negative. Does not bruise/bleed easily. Psychiatric/Behavioral: Negative. Negative for confusion and self-injury. The patient is not nervous/anxious. Physical Exam  
Physical Exam  
Constitutional: He is oriented to person, place, and time. He is cooperative. He appears toxic. He has a sickly appearance. He appears ill. He appears distressed. HENT:  
Head: Normocephalic and atraumatic. Mouth/Throat: Mucous membranes are dry. No posterior oropharyngeal erythema. Eyes: Conjunctivae and EOM are normal. Pupils are equal, round, and reactive to light. Neck: Normal range of motion. Cardiovascular: Normal rate, regular rhythm, normal heart sounds and intact distal pulses. Exam reveals no gallop and no friction rub. No murmur heard. Pulmonary/Chest: Effort normal and breath sounds normal. No respiratory distress. He has no wheezes. He has no rales. He exhibits no tenderness. Port to right upper chest  
Abdominal: Soft. Bowel sounds are normal. He exhibits no distension and no mass. There is no tenderness. There is no rebound and no guarding. Genitourinary:  
Genitourinary Comments: Tender to prostate. Boggy prostate. No palpable fluctuance Musculoskeletal: Normal range of motion. He exhibits no edema, tenderness or deformity. Neurological: He is alert and oriented to person, place, and time. He displays normal reflexes. No cranial nerve deficit. He exhibits normal muscle tone. Coordination normal.  
Skin: Skin is warm. No rash noted. Psychiatric: He has a normal mood and affect. Nursing note and vitals reviewed. Diagnostic Study Results Labs - No results found for this or any previous visit (from the past 12 hour(s)). Radiologic Studies -  
XR CHEST PORT    (Results Pending) CT Results  (Last 48 hours) None CXR Results  (Last 48 hours) None Medical Decision Making I am the first provider for this patient. I reviewed the vital signs, available nursing notes, past medical history, past surgical history, family history and social history. Vital Signs-Reviewed the patient's vital signs. Patient Vitals for the past 12 hrs: 
 Temp Pulse Resp BP SpO2  
11/01/18 0445 (!) 101.3 °F (38.5 °C) (!) 106 20 116/75 97 % Pulse Oximetry Analysis - 97% on RA Cardiac Monitor:  
Rate: 106 bpm 
Rhythm: Sinus Tachycardia ED EKG interpretation: 8061 Rhythm: sinus tachycardia; and regular . Rate (approx.): 106; Axis: normal; P wave: normal; QRS interval: normal ; ST/T wave: non-specific changes; Other findings: . This EKG was interpreted by Arnaldo Morgan M.D. Records Reviewed: Nursing Notes, Old Medical Records, Previous electrocardiograms, Previous Radiology Studies and Previous Laboratory Studies Provider Notes (Medical Decision Making):  
Patient presents with fever, tachycardia and concerns for infection. Most likely prostatitis  DDx: sepsis 2/2 UTI, PNA, intraabdominal infection (colitis, appendicitis, cholecystitis),  infectious diarrhea, meningitis, soft tissue infection, septic arthritis, flu/viral prodrome. Will follow sepsis protocol and order set by providing IVF resuscitation, obtaining blood and urine cultures, antibiotics, labs, lactate, EKG and frequently reassessing hemodynamic status on the patient. Will hold off on aggressive fluid hydration unless patient shows signs of severe sepsis or shock. ED Course:  
Initial assessment performed. The patients presenting problems have been discussed, and they are in agreement with the care plan formulated and outlined with them. I have encouraged them to ask questions as they arise throughout their visit. Medications  
sodium chloride (NS) flush 5-10 mL (not administered)  
sodium chloride 0.9 % bolus infusion 1,000 mL ( IntraVENous Canceled Entry 11/1/18 0610) sodium chloride (NS) flush 5-10 mL ( IntraVENous Canceled Entry 11/3/18 0600)  
sodium chloride (NS) flush 5-10 mL (not administered) 0.9% sodium chloride infusion (0 mL/hr IntraVENous Stopped 11/2/18 0900)  
acetaminophen (TYLENOL) tablet 650 mg (650 mg Oral Given 11/3/18 0852) oxyCODONE IR (ROXICODONE) tablet 10 mg (not administered) ARIPiprazole (ABILIFY) tablet 2.5 mg (2.5 mg Oral Given 11/3/18 0852) buPROPion XL (WELLBUTRIN XL) tablet 150 mg (150 mg Oral Given 11/3/18 0957) ergocalciferol capsule 50,000 Units (not administered)  
folic acid (FOLVITE) tablet 1 mg (1 mg Oral Given 11/3/18 0852) metFORMIN ER (GLUCOPHAGE XR) tablet 500 mg (500 mg Oral Given 11/3/18 0852) pantoprazole (PROTONIX) tablet 40 mg (not administered)  
tamsulosin (FLOMAX) capsule 0.4 mg (0.4 mg Oral Given 11/3/18 0852) tenofovir DISOPROXIL FUMARATE (VIREAD) tablet 300 mg (300 mg Oral Given 11/3/18 0853)  
zolpidem (AMBIEN) tablet 10 mg (10 mg Oral Given 11/2/18 2215) glucose chewable tablet 16 g (not administered) dextrose (D50W) injection syrg 12.5-25 g (not administered) glucagon (GLUCAGEN) injection 1 mg (not administered)  
insulin lispro (HUMALOG) injection (0 Units SubCUTAneous Held 11/3/18 1130)  
acyclovir (ZOVIRAX) tablet 400 mg (400 mg Oral Given 11/3/18 0851) levoFLOXacin (LEVAQUIN) 750 mg in D5W IVPB (750 mg IntraVENous New Bag 11/3/18 0859)  
sodium chloride (OCEAN) 0.65 % nasal squeeze bottle 2 Spray (not administered)  
benzocaine-menthol (CHLORASEPTIC MAX) lozenge 1 Lozenge (1 Lozenge Oral Given 11/3/18 1229) tapentadol (NUCYNTA) tablet 200 mg (Patient Supplied) (200 mg Oral Given 11/3/18 0850) guaiFENesin ER (MUCINEX) tablet 600 mg (600 mg Oral Given 11/3/18 2311) vancomycin (VANCOCIN) 750 mg in 0.9% sodium chloride 250 mL ([id number]) (not administered) Vancomycin trough draw reminder note (not administered)  
sodium chloride 0.9 % bolus infusion 1,000 mL (0 mL IntraVENous IV Completed 11/1/18 0629)  
ketorolac (TORADOL) injection 15 mg (15 mg IntraVENous Given 11/1/18 0530)  
ketorolac (TORADOL) injection 15 mg (15 mg IntraVENous Given 11/1/18 0657) oxyCODONE-acetaminophen (PERCOCET) 5-325 mg per tablet 1 Tab (1 Tab Oral Given 11/1/18 0657)  
0.9% sodium chloride infusion (0 mL/hr IntraVENous IV Completed 11/1/18 0800) iopamidol (ISOVUE-370) 76 % injection 100 mL (100 mL IntraVENous Given 11/1/18 0729)  
sodium chloride (NS) flush 10 mL (10 mL IntraVENous Given 11/1/18 0731) vancomycin (VANCOCIN) 1750 mg in  ml infusion (0 mg IntraVENous IV Completed 11/1/18 1213) levoFLOXacin (LEVAQUIN) 750 mg in D5W IVPB (0 mg IntraVENous IV Completed 11/1/18 0956) Vancomycin Trough- Please draw prior to 2200 dose tonight.  (1 Each Other Given 11/2/18 2100) I reviewed our electronic medical record system for any past medical records that were available that may contribute to the patients current condition, the nursing notes and vital signs from today's visit. Dennis Remy MD 
 
Procedure Note - Rectal Exam:  
5:23 AM 
Performed by: Nishant Colón M.D. Chaperoned by: Kin Nicolas Rectal exam performed. Pt had exquisite TTP on prostate with bogginess, no fluctuance. The procedure took 1-15 minutes, and pt tolerated well. 7:34 AM 
Updated patient on plan of care Consult Note: 
7:50 Rolanda Retana M.D. spoke with Juan R Juarez, Specialty: Oncology Discussed pt's hx, disposition, and available diagnostic and imaging results. Reviewed care plans. Consultant agrees with plans as outlined. Agree with broad spectrum IV abx, admit to Hospitalist and he will consult. 8:00 AM 
CT reviewed with patient; pt given IV vanc and IV levaquin. Will admit to hospitalist. 
 
CONSULT NOTE:  
8:10AM 
Nishant Colón MD spoke with Jaylon Patrick MD, Specialty: Hospitalist 
Discussed pt's hx, disposition, and available diagnostic and imaging results. Reviewed care plans. Agree with management and plan thus far. Consultant will evaluate pt for admission. Critical Care Time: CRITICAL CARE NOTE : 
 
9:00AM 
 
IMPENDING DETERIORATION -Cardiovascular, Metabolic and Renal 
ASSOCIATED RISK FACTORS - Hypotension, Shock, Metabolic changes and Dehydration MANAGEMENT- Bedside Assessment and Supervision of Care INTERPRETATION -  Xrays, CT Scan, Blood Gases, ECG, Blood Pressure and Cardiac Output Measures INTERVENTIONS - hemodynamic mngmt and Metobolic interventions CASE REVIEW - Hospitalist, Medical Sub-Specialist, Nursing and Family TREATMENT RESPONSE -Stable PERFORMED BY - Self NOTES   : 
 
I have spent 60 minutes of critical care time involved in lab review, consultations with specialist, family decision- making, bedside attention and documentation. During this entire length of time I was immediately available to the patient . Critical Care: The reason for providing this level of medical care for this critically ill patient was due to a critical illness that impaired one or more vital organ systems, such that there was a high probability of imminent or life threatening deterioration in the patient's condition. This care involved high complexity decision making to assess, manipulate, and support vital system functions, to treat this degree of vital organ system failure, and to prevent further life threatening deterioration of the patients condition. Yenny Coronel MD 
 
 
Disposition: 
Admit Note: 
8:10 AM 
Pt is being admitted by Dr. Tory Martell. The results of their tests and reason(s) for their admission have been discussed with pt and/or available family. They convey agreement and understanding for the need to be admitted and for admission diagnosis. PLAN: 
Admit Diagnosis Clinical Impression: 1. Acute febrile illness 2. Multiple myeloma in remission ([facility] [address] 3. Sepsis, due to unspecified organism ([facility] [address] 4. Acute prostatitis 5. Multiple myeloma, remission status unspecified ([address] 6. Thrombocytopenia ([address] 7. Pancytopenia ([address] 8. THIAGO (acute kidney injury) ([address] Attestations This note is prepared by Kenneth Mattson, acting as Scribe for MD Angela Carr MD : The scribe's documentation has been prepared under my direction and personally reviewed by me in its entirety. I confirm that the note above accurately reflects all work, treatment, procedures, and medical decision making performed by me. 
 
: 
This note will not be viewable in [address] Marlon Caruso

## 2018-11-01 NOTE — PROGRESS NOTES
Received report from Lane, [address] [facility]. Assumed care of patient.

## 2018-11-01 NOTE — CONSULTS
[facility]  e at [facility]  [address], [address] [place] [address] Jared Cornelius, [address] [facility] [address]  [phone]        Progress Note        Patient: Fransisco De La Vega Sr. MRN: [id number]  SSN: [id number]    YOB: 1955  Age: 66 y.o. Sex: male        Reason for Consult:     Mr. Allen Meza is a 65 yo male who is s/p transplant for multiple myeloma who we have been asked to see by Dr. Elisa Henley for fever. Diagnosis:      1. Multiple Myeloma, IgA Kappa   Durie [place] stage IIIB    Cytogenetics/FISH: t(14;16) - high risk    Ttreatment:      1. Maintenance therapy - Ixazomib - started 10/15/2018  2. S/P tandem  Autotransplant    First on 2/28/2018   2nd on 06/13/2018  3. Carfilzomib/Pom/Dex - s/p 5 cycles  4. VD-PACE s/p 1 cycle  5. RVD - s/p 4 cycles    Subjective:      Fransisco De La Vega Sr. is a 66 y.o. male with a diagnosis of IgA kappa myeloma. Bone marrow shows 100% aberrant plasma cells. He suffers with DM but it is diet controlled. He has received systemic anti-viral treatment for chronic Hep C with successful eradication of the virus. Mr. Jannette Carpio received 4 cycles of systemic therapy with RVd. He had an initial good response to treatment. However his paraprotein level started rising and the myeloma became refractory to treatment. I then administered one cycle of VD-PACE in the hospital. He then received Carfilzomib/Pom/Dex. He achieved VGPR. He underwent tandem autotransplant at [facility]. According to the patient he achieved complete response with therapy. He has undergone second/tandem transplant in June. He is receiving maintenance therapy with Ninlaro. Mr. Jannette Carpio presented to the ED this morning with complaint of generalized body aches and fever of 100.5 since last night after taking tylenol. He feels better this afternoon but has started to have some nasal congestion.      Review of Systems:    Constitutional: fatigue  Eyes: negative  Ears, Nose, Mouth, Throat, and Face: negative  Respiratory: negative  Cardiovascular: negative  Gastrointestinal: negative  Genitourinary:negative  Integument/Breast: negative  Hematologic/Lymphatic: negative  Musculoskeletal: chronic neck pain  Neurological: negative      Past Medical History:   Diagnosis Date    Acid reflux     Arrhythmia     pvc's    Chronic pain     neck    Depression     Diabetes ([address]     Femoral hernia 2012    Hepatitis C     Hypertension     Inguinal hernia 2012    Liver disease     hepatitis c    Other ill-defined conditions(799.89)     Hx of PVCs since     Prostatitis, acute     Psychiatric disorder     depression     Past Surgical History:   Procedure Laterality Date    HX APPENDECTOMY      HX CERVICAL FUSION  2008    x 1 as of 2014    HX HEENT      foreign body removed from right eye    HX HERNIA REPAIR      [facility]      HX ORTHOPAEDIC      cervical fusion    HX OTHER SURGICAL  2000    liver bx - chronic hepatitis      Family History   Problem Relation Age of Onset    Arthritis-osteo Mother     Arthritis-osteo Father     Diabetes Father     Hypertension Maternal Grandmother     Diabetes Maternal Grandmother     Hypertension Maternal Grandfather     Diabetes Maternal Grandfather      Social History     Tobacco Use    Smoking status: Former Smoker     Packs/day: 1.00     Years: 12.00     Pack years: 12.00     Types: Cigarettes     Last attempt to quit: 1989     Years since quittin.8    Smokeless tobacco: Never Used    Tobacco comment: former cigarette smoker   Substance Use Topics    Alcohol use: No     Comment: former alcoholic-quit 3286      Prior to Admission medications    Medication Sig Start Date End Date Taking? Authorizing Provider   omeprazole (PRILOSEC) 20 mg capsule Take 20 mg by mouth daily as needed (acid reflux).    Yes Other, MD Goldy   ondansetron (ZOFRAN ODT) 8 mg disintegrating tablet Take 8 mg by mouth every eight (8) hours as needed for Nausea. Yes Other, MD Goldy   buPROPion XL (WELLBUTRIN XL) 150 mg tablet Take 150 mg by mouth every morning. Yes Other, MD Goldy   famotidine (PEPCID) 20 mg tablet Take 20 mg by mouth daily as needed (acid reflux). Yes Other, MD Goldy   ixazomib 4 mg cap Take 4 mg by mouth every seven (7) days. Patient takes 4 mg every Tuesdays on week 1-3 of cycle   Yes Other, MD Goldy   acyclovir (ZOVIRAX) 400 mg tablet Take 400 mg by mouth two (2) times a day. 7/7/18  Yes Provider, Historical   tenofovir DISOPROXIL FUMARATE (VIREAD) 300 mg tablet Take 300 mg by mouth daily. 7/7/18  Yes Provider, Historical   pentamidine (NEBUPENT) 300 mg inhalation solution Take 300 mg by inhalation every month. Once a month at Dr's office   Yes Provider, Historical   ergocalciferol (ERGOCALCIFEROL) 50,000 unit capsule TAKE 1 CAP BY MOUTH EVERY 7 DAYS, Mondays 5/14/18  Yes Provider, Historical   lisinopril (PRINIVIL, ZESTRIL) 10 mg tablet TAKE 1 TABLET BY MOUTH EVERY DAY as needed. Hold if SBP <115 5/12/18  Yes Provider, Historical   folic acid (FOLVITE) 1 mg tablet TAKE 1 TABLET BY MOUTH EVERY DAY 5/14/18  Yes Provider, Historical   multivitamin (ONE A DAY) tablet Take 1 Tab by mouth daily. Yes Provider, Historical   Biotin 2,500 mcg cap Take 1 Cap by mouth daily. Yes Provider, Historical   metFORMIN ER (GLUCOPHAGE XR) 500 mg tablet Take 1 Tab by mouth two (2) times a day. 11/10/17  Yes Zina Alvarez MD   insulin lispro (HUMALOG) 100 unit/mL kwikpen 4-6 Units by SubCUTAneous route Before breakfast, lunch, and dinner. Blood Glucose (mg/dL)       Insulin Dose (units)              <130                                 skip              130-150                              4               >150                                   6   Yes Provider, Historical   tamsulosin (FLOMAX) 0.4 mg capsule Take 1 Cap by mouth daily.  4/4/17  Yes Kaden Knight MD   zolpidem CR (AMBIEN CR) 12.5 mg tablet Take 12.5 mg by mouth nightly. Yes Provider, Vick   tapentadol (NUCYNTA) 100 mg tablet Take 200 mg by mouth three (3) times daily. At 7a, 3p, 11pm   Yes Other, MD Goldy   aripiprazole (ABILIFY) 5 mg tablet Take 2.5 mg by mouth every morning. Yes Other, MD Goldy          Allergies   Allergen Reactions    Mercury (Bulk) Hives     Blisters             Objective:     Vitals:    11/01/18 1027 11/01/18 1130 11/01/18 1422 11/01/18 1607   BP: 126/82 124/78 121/69    Pulse: 83 80 80    Resp: 14 19 16    Temp: 98 °F (36.7 °C)  98.6 °F (37 °C) 98.3 °F (36.8 °C)   SpO2: 100% 100% 100%    Weight:       Height:              Physical Exam:    GENERAL: alert, cooperative  EYE: negative  LYMPHATIC: Cervical, supraclavicular, and axillary nodes normal.   THROAT & NECK: normal and no erythema or exudates noted. LUNG: clear to auscultation bilaterally  HEART: regular rate and rhythm  ABDOMEN: soft, non-tender  EXTREMITIES: bilateral LE edema  SKIN: Normal.  NEUROLOGIC: negative      Lab Results   Component Value Date/Time    WBC 3.2 (L) 11/01/2018 05:10 AM    HGB 11.4 (L) 11/01/2018 05:10 AM    HCT 35.1 (L) 11/01/2018 05:10 AM    PLATELET 79 (L) 11/84/7604 05:10 AM    MCV 92.1 11/01/2018 05:10 AM       Lab Results   Component Value Date/Time    Sodium 137 11/01/2018 05:10 AM    Potassium 3.9 11/01/2018 05:10 AM    Chloride 103 11/01/2018 05:10 AM    CO2 26 11/01/2018 05:10 AM    Anion gap 8 11/01/2018 05:10 AM    Glucose 124 (H) 11/01/2018 05:10 AM    BUN 14 11/01/2018 05:10 AM    Creatinine 1.46 (H) 11/01/2018 05:10 AM    BUN/Creatinine ratio 10 (L) 11/01/2018 05:10 AM    GFR est AA 59 (L) 11/01/2018 05:10 AM    GFR est non-AA 49 (L) 11/01/2018 05:10 AM    Calcium 8.5 11/01/2018 05:10 AM            Assessment:     1.  Multiple Myeloma, IgA Kappa   Durie [place] stage IIIB    Cytogenetics/FISH: t(14;16) - high risk  ECOG PS - 0   Intent of therapy: palliative    Received 3 cycles of RVd   Dose reduced Revlimid and Velcade d/t continued cytopenias. M-spike started rising. He received one cycle of VD-PACE (bortezomib, dexamethasone, thalidomide, cisplatin, adriamycin, cyclophosphamide, and etoposide). Achieved [place] with once cycle    Receivied KPd - s/p 5 cycles    Excellent response with M-protein came down to 0.1    S/p tandem autotransplant    First on 2/28/2018   2nd on 06/13/2018    Achieved CR after the first transplant. Blood count has recovered  Doing well  Some fatigue  Repeat bone marrow @ [facility] - Complete response. Since he is high risk based on cytogenetics, I recommend proteosome inhibitor as maintenance therapy. Receiving maintenance therapy with Ixazomib     2. Type 2 DM with complication    Managed by Endocrine    3. Chronic Hep C    In sustained virological remission    4. Fever / generalized body aches  Unknown origin    Afebrile at this time. Blood cultures pending- preliminary - no growth  ANC - normal  CXR - negative  CT abd/ pelvis - negative      Plan:     > admitted for observation only d/t fever in a transplant patient  > follow up as scheduled in the clinic      Signed by: Stanley Dominguez NP                     November 1, 2018        CC. Flor Chou MD  CC.  Alberto Ram MD

## 2018-11-02 PROBLEM — N42.81 PROSTATE PAIN: Status: ACTIVE | Noted: 2018-11-02

## 2018-11-02 LAB
ANION GAP SERPL CALC-SCNC: 6 MMOL/L (ref 5–15)
BUN SERPL-MCNC: 10 MG/DL (ref 6–20)
BUN/CREAT SERPL: 8 (ref 12–20)
CALCIUM SERPL-MCNC: 7.7 MG/DL (ref 8.5–10.1)
CHLORIDE SERPL-SCNC: 109 MMOL/L (ref 97–108)
CO2 SERPL-SCNC: 27 MMOL/L (ref 21–32)
CREAT SERPL-MCNC: 1.32 MG/DL (ref 0.7–1.3)
GLUCOSE BLD STRIP.AUTO-MCNC: 102 MG/DL (ref 65–100)
GLUCOSE BLD STRIP.AUTO-MCNC: 124 MG/DL (ref 65–100)
GLUCOSE BLD STRIP.AUTO-MCNC: 131 MG/DL (ref 65–100)
GLUCOSE BLD STRIP.AUTO-MCNC: 92 MG/DL (ref 65–100)
GLUCOSE SERPL-MCNC: 94 MG/DL (ref 65–100)
POTASSIUM SERPL-SCNC: 4 MMOL/L (ref 3.5–5.1)
SERVICE CMNT-IMP: ABNORMAL
SERVICE CMNT-IMP: NORMAL
SODIUM SERPL-SCNC: 142 MMOL/L (ref 136–145)

## 2018-11-02 PROCEDURE — 36415 COLL VENOUS BLD VENIPUNCTURE: CPT | Performed by: HOSPITALIST

## 2018-11-02 PROCEDURE — 74011250637 HC RX REV CODE- 250/637: Performed by: HOSPITALIST

## 2018-11-02 PROCEDURE — 94760 N-INVAS EAR/PLS OXIMETRY 1: CPT

## 2018-11-02 PROCEDURE — 80202 ASSAY OF VANCOMYCIN: CPT | Performed by: INTERNAL MEDICINE

## 2018-11-02 PROCEDURE — 99218 HC RM OBSERVATION: CPT

## 2018-11-02 PROCEDURE — 82962 GLUCOSE BLOOD TEST: CPT

## 2018-11-02 PROCEDURE — 80048 BASIC METABOLIC PNL TOTAL CA: CPT | Performed by: HOSPITALIST

## 2018-11-02 PROCEDURE — 74011250637 HC RX REV CODE- 250/637: Performed by: INTERNAL MEDICINE

## 2018-11-02 PROCEDURE — 74011250636 HC RX REV CODE- 250/636: Performed by: HOSPITALIST

## 2018-11-02 PROCEDURE — 65270000029 HC RM PRIVATE

## 2018-11-02 RX ORDER — GUAIFENESIN 600 MG/1
600 TABLET, EXTENDED RELEASE ORAL 2 TIMES DAILY
Status: DISCONTINUED | OUTPATIENT
Start: 2018-11-02 | End: 2018-11-05 | Stop reason: HOSPADM

## 2018-11-02 RX ORDER — GUAIFENESIN 600 MG/1
600 TABLET, EXTENDED RELEASE ORAL 2 TIMES DAILY
Status: DISCONTINUED | OUTPATIENT
Start: 2018-11-02 | End: 2018-11-02

## 2018-11-02 RX ADMIN — ACETAMINOPHEN 650 MG: 325 TABLET ORAL at 16:02

## 2018-11-02 RX ADMIN — GUAIFENESIN 600 MG: 600 TABLET, EXTENDED RELEASE ORAL at 14:17

## 2018-11-02 RX ADMIN — ACETAMINOPHEN 650 MG: 325 TABLET ORAL at 22:22

## 2018-11-02 RX ADMIN — LEVOFLOXACIN 750 MG: 5 INJECTION, SOLUTION INTRAVENOUS at 09:17

## 2018-11-02 RX ADMIN — ACYCLOVIR 400 MG: 800 TABLET ORAL at 09:16

## 2018-11-02 RX ADMIN — ZOLPIDEM TARTRATE 10 MG: 5 TABLET ORAL at 22:15

## 2018-11-02 RX ADMIN — BUPROPION HYDROCHLORIDE 150 MG: 150 TABLET, FILM COATED, EXTENDED RELEASE ORAL at 17:54

## 2018-11-02 RX ADMIN — TAMSULOSIN HYDROCHLORIDE 0.4 MG: 0.4 CAPSULE ORAL at 09:16

## 2018-11-02 RX ADMIN — ARIPIPRAZOLE 2.5 MG: 5 TABLET ORAL at 07:59

## 2018-11-02 RX ADMIN — VANCOMYCIN HYDROCHLORIDE 750 MG: 750 INJECTION, POWDER, LYOPHILIZED, FOR SOLUTION INTRAVENOUS at 11:28

## 2018-11-02 RX ADMIN — ACYCLOVIR 400 MG: 800 TABLET ORAL at 17:54

## 2018-11-02 RX ADMIN — VANCOMYCIN HYDROCHLORIDE 750 MG: 750 INJECTION, POWDER, LYOPHILIZED, FOR SOLUTION INTRAVENOUS at 22:16

## 2018-11-02 RX ADMIN — ACETAMINOPHEN 650 MG: 325 TABLET ORAL at 04:28

## 2018-11-02 RX ADMIN — FOLIC ACID 1 MG: 1 TABLET ORAL at 09:16

## 2018-11-02 RX ADMIN — Medication 10 ML: at 22:17

## 2018-11-02 RX ADMIN — TENOFOVIR DISOPROXIL FUMARATE 300 MG: 300 TABLET, COATED ORAL at 09:20

## 2018-11-02 RX ADMIN — Medication 1 LOZENGE: at 14:17

## 2018-11-02 NOTE — PROGRESS NOTES
Received report from Codi Nunez, [facility]. Assumed care of patient.

## 2018-11-02 NOTE — PHYSICIAN ADVISORY
Letter of Status Determination:  
Recommend hospitalization status upgraded from OBSERVATION  to INPATIENT  Status Pt Name:  Raheem Herring.  
MR#  
DEMETRICE # [id number] / 
[id number] Payor: Isa Chaney / Plan: [facility] MEDICARE COMPLETE / Product Type: Managed Care Medicare /   
LAUREL#  [id number] [facility] and [facility]  2213[facility]  @ [facility] Hospitalization date  11/1/2018  4:48 AM  
Current Attending Physician  Sarahy Macario Principal diagnosis  <principal problem not specified> Fever, prostattis Clinicals  66 y.o. y.o  male hospitalized with above diagnosis The pt is immunocompromised from his history of BMt due to MM. He is at high risk of rapid deterioration from infectious process. [place] (MCG) criteria Does   apply UTI . STATUS DETERMINATION  Based on documented presenting clinical data, comorbid conditions, high risk of adverse events and deterioration, it is our recommendation that the patient's status should be upgraded from OBSERVATION to INPATIENT status. The final decision of the patient's hospitalization status depends on the attending physician's judgment. Additional comments Payor: Isa Chaney / Plan: [facility] MEDICARE COMPLETE / Product Type: Managed Care Medicare /   
  
 
Srikanth Grajeda MD MPH [facility] Cell: [phone] Physician Advisor [address] [place] [address]  
President Medical Staff, [address] [place] [address]   
Cell  [phone]   
 
 
[phone] April Santos

## 2018-11-02 NOTE — CONSULTS
Urology Consult      Active Problems:    Prostatitis (11/1/2018)      Fever (11/1/2018)        Admitting MD (and procedure) = Zully Alba -    Established Urologist: [facility]  Primary care MD: Shanae Garvey MD  - [phone]  Code state: Full Code    ____________________________________________________________________________  ____________________________________________________________________________  ASSESSMENT/PLAN:   Normal HEAVEN and CT with negative UA = unlikely prostate/. Reassurance. Look for other etiologies. ____________________________________________________________________________  ____________________________________________________________________________    SUBJECTIVE:  Date of Consultation:  November 2, 2018  Referring Physician: Zully Alba  Reason for Consultation:  ? Prostatitis    Established Urologist: [facility]    Primary care MD: Shanae Garvey MD  - [phone]  Code state: Full Code    History of Present Illness:   66y.o. year old male - He was admitted to the hospital for Fever  Prostatitis. Pain in rectal region and down thighs. No hematochezia.   No hematruia, dysuria, scrotal pain, etc.    Past Medical History:   Diagnosis Date    Acid reflux     Arrhythmia     pvc's    Chronic pain     neck    Depression     Diabetes ([address]     Femoral hernia 7/11/2012    Hepatitis C     Hypertension     Inguinal hernia 7/11/2012    Liver disease     hepatitis c    Other ill-defined conditions(799.89)     Hx of PVCs since 2009    Prostatitis, acute     Psychiatric disorder     depression      Past Surgical History:   Procedure Laterality Date    HX APPENDECTOMY      HX CERVICAL FUSION  2008    x 1 as of 4/30/2014    HX HEENT      foreign body removed from right eye    HX HERNIA REPAIR  2012    [facility]      HX ORTHOPAEDIC      cervical fusion    HX OTHER SURGICAL  2000    liver bx - chronic hepatitis      Family History   Problem Relation Age of Onset    Arthritis-osteo Mother     Arthritis-osteo Father     Diabetes Father     Hypertension Maternal Grandmother     Diabetes Maternal Grandmother     Hypertension Maternal Grandfather     Diabetes Maternal Grandfather       Social History     Tobacco Use    Smoking status: Former Smoker     Packs/day: 1.00     Years: 12.00     Pack years: 12.00     Types: Cigarettes     Last attempt to quit: 1989     Years since quittin.8    Smokeless tobacco: Never Used    Tobacco comment: former cigarette smoker   Substance Use Topics    Alcohol use: No     Comment: former alcoholic-quit 771     Allergies   Allergen Reactions    Mercury (Bulk) Hives     Blisters        Prior to Admission medications    Medication Sig Start Date End Date Taking? Authorizing Provider   omeprazole (PRILOSEC) 20 mg capsule Take 20 mg by mouth daily as needed (acid reflux). Yes Other, MD Goldy   ondansetron (ZOFRAN ODT) 8 mg disintegrating tablet Take 8 mg by mouth every eight (8) hours as needed for Nausea. Yes Other, MD Goldy   buPROPion XL (WELLBUTRIN XL) 150 mg tablet Take 150 mg by mouth every morning. Yes Other, MD Goldy   famotidine (PEPCID) 20 mg tablet Take 20 mg by mouth daily as needed (acid reflux). Yes Other, MD Goldy   ixazomib 4 mg cap Take 4 mg by mouth every seven (7) days. Patient takes 4 mg every  on week 1-3 of cycle   Yes Other, MD Goldy   acyclovir (ZOVIRAX) 400 mg tablet Take 400 mg by mouth two (2) times a day. 18  Yes Provider, Historical   tenofovir DISOPROXIL FUMARATE (VIREAD) 300 mg tablet Take 300 mg by mouth daily. 18  Yes Provider, Historical   pentamidine (NEBUPENT) 300 mg inhalation solution Take 300 mg by inhalation every month.  Once a month at Dr's office   Yes Provider, Historical   ergocalciferol (ERGOCALCIFEROL) 50,000 unit capsule TAKE 1 CAP BY MOUTH EVERY 7 DAYS, 18  Yes Provider, Historical   lisinopril (PRINIVIL, ZESTRIL) 10 mg tablet TAKE 1 TABLET BY MOUTH EVERY DAY as needed. Hold if SBP <115 18  Yes Provider, Historical   folic acid (FOLVITE) 1 mg tablet TAKE 1 TABLET BY MOUTH EVERY DAY 18  Yes Provider, Historical   multivitamin (ONE A DAY) tablet Take 1 Tab by mouth daily. Yes Provider, Historical   Biotin 2,500 mcg cap Take 1 Cap by mouth daily. Yes Provider, Historical   metFORMIN ER (GLUCOPHAGE XR) 500 mg tablet Take 1 Tab by mouth two (2) times a day. 11/10/17  Yes Paul Crawley MD   insulin lispro (HUMALOG) 100 unit/mL kwikpen 4-6 Units by SubCUTAneous route Before breakfast, lunch, and dinner. Blood Glucose (mg/dL)       Insulin Dose (units)              <130                                 skip              130-150                              4               >150                                   6   Yes Provider, Historical   tamsulosin (FLOMAX) 0.4 mg capsule Take 1 Cap by mouth daily. 17  Yes Susana Cancino MD   zolpidem CR (AMBIEN CR) 12.5 mg tablet Take 12.5 mg by mouth nightly. Yes Provider, Historical   tapentadol (NUCYNTA) 100 mg tablet Take 200 mg by mouth three (3) times daily. At 7a, 3p, 11pm   Yes Goldy Diaz MD   aripiprazole (ABILIFY) 5 mg tablet Take 2.5 mg by mouth every morning.    Yes Other, MD Goldy         Review of Systems: (positive-bolded)   Unexplained weight loss, Dry eyes, Dry mouth, Chest pain, SOB, Constipation, Extremity weakness, Pallor, TIA symptoms, Confusion, Easy bruising    OBJECTIVE:  Patient Vitals for the past 8 hrs:   BP Temp Pulse Resp SpO2 Weight   18 0729 119/58 99.3 °F (37.4 °C) 74 20 99 %    18 0447      84.4 kg (186 lb)   18 0328 131/90 100.1 °F (37.8 °C) 89 18 99 %      Temp (24hrs), Av.1 °F (37.3 °C), Min:98 °F (36.7 °C), Max:100.1 °F (37.8 °C)    Intake and Output:   10/31 1901 -  0700  In: 2491.7 [I.V.:2491.7]  Out: 1500 [Urine:1500]  Physical Exam:  Appearance: Well-developed no acute distress  Conjunctiva: WNL  Pupil/iris: WNL  External ears/nose: Normal no lesions or deformities  Hearing:  WNL  Neck: Supple without masses  Thyroid: WNL  Respiratory effort: Breathing easily  Chest palpation: No tactile fremitus  Aortic: No enlargement or bruits  Lower extremity: No edema  Abdomen/flank: Soft nontender without masses no CVA tenderness  Liver/spleen: No organomegaly  Hernia: No ing/ventral hernia  Lymph: No adenopathy  Digits/nails: No clubbing cyanosis or petechiae  Skin inspection: Warm and dry  Skin palpation: No subcutaneous nodularity  Sensation: No sensory deficits  Judgment/Insight: intact  Mood/Affect: normal    Normal HEAVEN small anodular    Recent Results (from the past 24 hour(s))   GLUCOSE, POC    Collection Time: 11/01/18  9:44 AM   Result Value Ref Range    Glucose (POC) 113 (H) 65 - 100 mg/dL    Performed by Brock    GLUCOSE, POC    Collection Time: 11/01/18  5:05 PM   Result Value Ref Range    Glucose (POC) 89 65 - 100 mg/dL    Performed by Denis Gonzalez (PCT)    GLUCOSE, POC    Collection Time: 11/01/18  8:33 PM   Result Value Ref Range    Glucose (POC) 125 (H) 65 - 100 mg/dL    Performed by Robin Pham (PCT)    METABOLIC PANEL, BASIC    Collection Time: 11/02/18  1:56 AM   Result Value Ref Range    Sodium 142 136 - 145 mmol/L    Potassium 4.0 3.5 - 5.1 mmol/L    Chloride 109 (H) 97 - 108 mmol/L    CO2 27 21 - 32 mmol/L    Anion gap 6 5 - 15 mmol/L    Glucose 94 65 - 100 mg/dL    BUN 10 6 - 20 MG/DL    Creatinine 1.32 (H) 0.70 - 1.30 MG/DL    BUN/Creatinine ratio 8 (L) 12 - 20      GFR est AA >60 >60 ml/min/1.73m2    GFR est non-AA 55 (L) >60 ml/min/1.73m2    Calcium 7.7 (L) 8.5 - 10.1 MG/DL   GLUCOSE, POC    Collection Time: 11/02/18  7:14 AM   Result Value Ref Range    Glucose (POC) 102 (H) 65 - 100 mg/dL    Performed by Anisa DILLON(CON)        Current Antimicrobial Therapy (168h ago, onward)    Ordered     Start Stop    11/01/18 1245  levoFLOXacin (LEVAQUIN) 750 mg in D5W IVPB  750 mg,   IntraVENous,   EVERY 24 HOURS      11/02/18 0900 --    11/01/18 1237  vancomycin (VANCOCIN) 750 mg in 0.9% sodium chloride 250 mL (Meam9Pci)  750 mg,   IntraVENous,   EVERY 12 HOURS      11/01/18 2200 --    11/01/18 8953  tenofovir DISOPROXIL FUMARATE (VIREAD) tablet 300 mg  300 mg,   Oral,   DAILY     Comments:  OP SIG:Take 300 mg by mouth daily. 11/01/18 1400 --    11/01/18 1215  acyclovir (ZOVIRAX) tablet 400 mg  400 mg,   Oral,   2 TIMES DAILY     Comments:  OP SIG:Take 400 mg by mouth two (2) times a day. 11/01/18 1300 --        Cultures:    All Micro Results     Procedure Component Value Units Date/Time    CULTURE, BLOOD, PAIRED [[id number]] Collected:  11/01/18 0511    Order Status:  Completed Specimen:  Blood Updated:  11/01/18 0732     Special Requests: NO SPECIAL REQUESTS        Culture result: NO GROWTH AFTER 1 HOUR       INFLUENZA A & B AG (RAPID TEST) [[id number]] Collected:  11/01/18 0533    Order Status:  Completed Specimen:  Nasopharyngeal from Nasal washing Updated:  11/01/18 0609     Influenza A Antigen NEGATIVE         Influenza B Antigen NEGATIVE        CULTURE, BLOOD [[id number]]     Order Status:  Canceled Specimen:  Blood     CULTURE, BLOOD [[id number]]     Order Status:  Canceled Specimen:  Blood           Signed By: Edmund Song MD                         November 2, 2018

## 2018-11-02 NOTE — PROGRESS NOTES
Hospitalist Progress Note NAME: Karen Miller Sr.  
:  1955 MRN:  [id number] Assessment / Plan: 
Sepsis, POA Suspect ? prostatitis with rectal pain. Immunocompromised following bone marrow transplant  for multiple myeloma. continue levaquin for ?prostatitis. Urology consulted in ER. seen notes Rectal exam per ER physician with tender, boggy prostate. but per urology it was normal. 
CT abdomen:4.7 cm fluid collection in the left inguinal canal is stable. No acute 
abnormality is identified Chronic pancytopenia, suspect due to chemotherapy, with acute worsening thrombocytopenia Will monitor and oncology consulted as well. No bleeding reported. no transfusion needed. THIAGO on CKD stage 2 IVF and holding lisinopril. Cr slightly better. Hx PVCs/VT Cardio help appreciated and he will continue amiodarone while in hospital 
And tele monitoring. Chronic pain due to cervical DDD Hx substance abuse DM type 2 On metformin, lantus and  Low dose SSI. ontinue tapentadol. Added oxycodone 10mg q6h prn for rectal pain in the ER. HTN Continue current medications. Hx treated hepatitis C Body mass index is 28.28 kg/m².: 25.0 - 29.9 Overweight Code status: Full Prophylaxis: SCD's Recommended Disposition: TBD Subjective: Chief Complaint / Reason for Physician Visit \"my back pain is much better \". Discussed with RN events overnight. Review of Systems: 
Symptom Y/N Comments  Symptom Y/N Comments Fever/Chills n   Chest Pain n   
Poor Appetite    Edema Cough    Abdominal Pain n   
Sputum    Joint Pain SOB/GONZALES n   Pruritis/Rash Nausea/vomit    Tolerating PT/OT Diarrhea    Tolerating Diet Constipation    Other Could NOT obtain due to:   
 
Objective: VITALS:  
Last 24hrs VS reviewed since prior progress note. Most recent are: 
Patient Vitals for the past 24 hrs: 
 Temp Pulse Resp BP SpO2  
18 0729 99.3 °F (37.4 °C) 74 20 119/58 99 % 11/02/18 0328 100.1 °F (37.8 °C) 89 18 131/90 99 % 11/01/18 2219 98.9 °F (37.2 °C) 84 18 107/60 98 % 11/01/18 2000 99.7 °F (37.6 °C) 96 16 113/68 96 % 11/01/18 1724 99.9 °F (37.7 °C)      
11/01/18 1607 98.3 °F (36.8 °C)      
11/01/18 1422 98.6 °F (37 °C) 80 16 121/69 100 % 11/01/18 1130  80 19 124/78 100 % 11/01/18 1027 98 °F (36.7 °C) 83 14 126/82 100 % 11/01/18 0830  84 15 108/72 100 % Intake/Output Summary (Last 24 hours) at 11/2/2018 1952 Last data filed at 11/2/2018 0210 Gross per 24 hour Intake 1491.67 ml Output 1500 ml Net -8.33 ml PHYSICAL EXAM: 
General: WD, WN. Alert, cooperative, no acute distress   
EENT:  EOMI. Anicteric sclerae. MMM Resp:  CTA bilaterally, no wheezing or rales. No accessory muscle use CV:  Regular  rhythm,  No edema GI:  Soft, Non distended, Non tender.  +Bowel sounds Neurologic:  Alert and oriented X 3, normal speech, Psych:   Good insight. Not anxious nor agitated Skin:  No rashes. No jaundice Reviewed most current lab test results and cultures  YES Reviewed most current radiology test results   YES Review and summation of old records today    NO Reviewed patient's current orders and MAR    YES 
PMH/SH reviewed - no change compared to H&P 
________________________________________________________________________ Care Plan discussed with: 
  Comments Patient y Family  y   
RN y   
Care Manager Consultant Multidiciplinary team rounds were held today with , nursing, pharmacist and clinical coordinator. Patient's plan of care was discussed; medications were reviewed and discharge planning was addressed. ________________________________________________________________________ Total NON critical care TIME:  30  Minutes Total CRITICAL CARE TIME Spent:   Minutes non procedure based Comments >50% of visit spent in counseling and coordination of care ________________________________________________________________________ Mahogany Deleon MD  
 
Procedures: see electronic medical records for all procedures/Xrays and details which were not copied into this note but were reviewed prior to creation of Plan. LABS: 
I reviewed today's most current labs and imaging studies. Pertinent labs include: 
Recent Labs 11/01/18 
0510 10/31/18 
1548 WBC 3.2* 4.0* HGB 11.4* 11.3* HCT 35.1* 35.4* PLT 79* 89* Recent Labs 11/02/18 
0156 11/01/18 
0510  137  
K 4.0 3.9 * 103 CO2 27 26 GLU 94 124* BUN 10 14 CREA 1.32* 1.46* CA 7.7* 8.5 ALB  --  4.0 TBILI  --  0.2 SGOT  --  37 ALT  --  38 Signed:  Mahogany Deleon MD

## 2018-11-02 NOTE — PROGRESS NOTES
Problem: Falls - Risk of 
Goal: *Absence of Falls Document Juan Bello Fall Risk and appropriate interventions in the flowsheet. Outcome: Progressing Towards Goal 
Fall Risk Interventions: 
  
 
  
 
Medication Interventions: Bed/chair exit alarm, Patient to call before getting OOB, Teach patient to arise slowly

## 2018-11-03 LAB
ANION GAP SERPL CALC-SCNC: 7 MMOL/L (ref 5–15)
BASOPHILS # BLD: 0 K/UL (ref 0–0.1)
BASOPHILS NFR BLD: 0 % (ref 0–1)
BUN SERPL-MCNC: 10 MG/DL (ref 6–20)
BUN/CREAT SERPL: 7 (ref 12–20)
CALCIUM SERPL-MCNC: 7.9 MG/DL (ref 8.5–10.1)
CHLORIDE SERPL-SCNC: 109 MMOL/L (ref 97–108)
CO2 SERPL-SCNC: 25 MMOL/L (ref 21–32)
CREAT SERPL-MCNC: 1.4 MG/DL (ref 0.7–1.3)
DATE LAST DOSE: NORMAL
DIFFERENTIAL METHOD BLD: ABNORMAL
EOSINOPHIL # BLD: 0 K/UL (ref 0–0.4)
EOSINOPHIL NFR BLD: 1 % (ref 0–7)
ERYTHROCYTE [DISTWIDTH] IN BLOOD BY AUTOMATED COUNT: 14.6 % (ref 11.5–14.5)
GLUCOSE BLD STRIP.AUTO-MCNC: 100 MG/DL (ref 65–100)
GLUCOSE BLD STRIP.AUTO-MCNC: 110 MG/DL (ref 65–100)
GLUCOSE BLD STRIP.AUTO-MCNC: 122 MG/DL (ref 65–100)
GLUCOSE BLD STRIP.AUTO-MCNC: 143 MG/DL (ref 65–100)
GLUCOSE SERPL-MCNC: 123 MG/DL (ref 65–100)
HCT VFR BLD AUTO: 32 % (ref 36.6–50.3)
HGB BLD-MCNC: 10.4 G/DL (ref 12.1–17)
IMM GRANULOCYTES # BLD: 0 K/UL (ref 0–0.04)
IMM GRANULOCYTES NFR BLD AUTO: 0 % (ref 0–0.5)
LYMPHOCYTES # BLD: 0.6 K/UL (ref 0.8–3.5)
LYMPHOCYTES NFR BLD: 24 % (ref 12–49)
MCH RBC QN AUTO: 30 PG (ref 26–34)
MCHC RBC AUTO-ENTMCNC: 32.5 G/DL (ref 30–36.5)
MCV RBC AUTO: 92.2 FL (ref 80–99)
MONOCYTES # BLD: 0.6 K/UL (ref 0–1)
MONOCYTES NFR BLD: 22 % (ref 5–13)
NEUTS BAND NFR BLD MANUAL: 1 %
NEUTS SEG # BLD: 1.3 K/UL (ref 1.8–8)
NEUTS SEG NFR BLD: 52 % (ref 32–75)
NRBC # BLD: 0 K/UL (ref 0–0.01)
NRBC BLD-RTO: 0 PER 100 WBC
PLATELET # BLD AUTO: 84 K/UL (ref 150–400)
PMV BLD AUTO: 11.7 FL (ref 8.9–12.9)
POTASSIUM SERPL-SCNC: 3.8 MMOL/L (ref 3.5–5.1)
RBC # BLD AUTO: 3.47 M/UL (ref 4.1–5.7)
RBC MORPH BLD: ABNORMAL
REPORTED DOSE,DOSE: NORMAL UNITS
REPORTED DOSE/TIME,TMG: NORMAL
SERVICE CMNT-IMP: ABNORMAL
SERVICE CMNT-IMP: NORMAL
SODIUM SERPL-SCNC: 141 MMOL/L (ref 136–145)
VANCOMYCIN TROUGH SERPL-MCNC: 8.9 UG/ML (ref 5–10)
WBC # BLD AUTO: 2.5 K/UL (ref 4.1–11.1)

## 2018-11-03 PROCEDURE — 74011250637 HC RX REV CODE- 250/637: Performed by: HOSPITALIST

## 2018-11-03 PROCEDURE — 85025 COMPLETE CBC W/AUTO DIFF WBC: CPT | Performed by: INTERNAL MEDICINE

## 2018-11-03 PROCEDURE — 36415 COLL VENOUS BLD VENIPUNCTURE: CPT | Performed by: HOSPITALIST

## 2018-11-03 PROCEDURE — 74011250636 HC RX REV CODE- 250/636: Performed by: HOSPITALIST

## 2018-11-03 PROCEDURE — 80048 BASIC METABOLIC PNL TOTAL CA: CPT | Performed by: HOSPITALIST

## 2018-11-03 PROCEDURE — 82962 GLUCOSE BLOOD TEST: CPT

## 2018-11-03 PROCEDURE — 65270000029 HC RM PRIVATE

## 2018-11-03 PROCEDURE — 74011250636 HC RX REV CODE- 250/636: Performed by: INTERNAL MEDICINE

## 2018-11-03 PROCEDURE — 74011250637 HC RX REV CODE- 250/637: Performed by: INTERNAL MEDICINE

## 2018-11-03 RX ADMIN — ARIPIPRAZOLE 2.5 MG: 5 TABLET ORAL at 08:52

## 2018-11-03 RX ADMIN — TAMSULOSIN HYDROCHLORIDE 0.4 MG: 0.4 CAPSULE ORAL at 08:52

## 2018-11-03 RX ADMIN — TENOFOVIR DISOPROXIL FUMARATE 300 MG: 300 TABLET, COATED ORAL at 08:53

## 2018-11-03 RX ADMIN — ACETAMINOPHEN 650 MG: 325 TABLET ORAL at 15:45

## 2018-11-03 RX ADMIN — METFORMIN HYDROCHLORIDE 500 MG: 500 TABLET, EXTENDED RELEASE ORAL at 08:52

## 2018-11-03 RX ADMIN — ACYCLOVIR 400 MG: 800 TABLET ORAL at 17:16

## 2018-11-03 RX ADMIN — BUPROPION HYDROCHLORIDE 150 MG: 150 TABLET, FILM COATED, EXTENDED RELEASE ORAL at 09:57

## 2018-11-03 RX ADMIN — Medication 1 LOZENGE: at 12:29

## 2018-11-03 RX ADMIN — GUAIFENESIN 600 MG: 600 TABLET, EXTENDED RELEASE ORAL at 08:52

## 2018-11-03 RX ADMIN — VANCOMYCIN HYDROCHLORIDE 750 MG: 750 INJECTION, POWDER, LYOPHILIZED, FOR SOLUTION INTRAVENOUS at 10:45

## 2018-11-03 RX ADMIN — ACYCLOVIR 400 MG: 800 TABLET ORAL at 08:51

## 2018-11-03 RX ADMIN — METFORMIN HYDROCHLORIDE 500 MG: 500 TABLET, EXTENDED RELEASE ORAL at 17:22

## 2018-11-03 RX ADMIN — FOLIC ACID 1 MG: 1 TABLET ORAL at 08:52

## 2018-11-03 RX ADMIN — Medication 10 ML: at 22:14

## 2018-11-03 RX ADMIN — VANCOMYCIN HYDROCHLORIDE 750 MG: 750 INJECTION, POWDER, LYOPHILIZED, FOR SOLUTION INTRAVENOUS at 17:16

## 2018-11-03 RX ADMIN — LEVOFLOXACIN 750 MG: 5 INJECTION, SOLUTION INTRAVENOUS at 08:59

## 2018-11-03 RX ADMIN — ACETAMINOPHEN 650 MG: 325 TABLET ORAL at 08:52

## 2018-11-03 RX ADMIN — Medication 10 ML: at 02:46

## 2018-11-03 RX ADMIN — ZOLPIDEM TARTRATE 10 MG: 5 TABLET ORAL at 22:13

## 2018-11-03 RX ADMIN — GUAIFENESIN 600 MG: 600 TABLET, EXTENDED RELEASE ORAL at 17:16

## 2018-11-03 RX ADMIN — Medication 10 ML: at 15:22

## 2018-11-03 NOTE — PROGRESS NOTES
Received report from Fabiola, [facility]. Assumed care of patient.

## 2018-11-03 NOTE — PROGRESS NOTES
Hospitalist Progress Note NAME: Azucena Booker Sr.  
:  1955 MRN:  [id number] Assessment / Plan: 
Sepsis, POA Suspect ? prostatitis with rectal pain. Immunocompromised following bone marrow transplant  for multiple myeloma. continue levaquin for ?prostatitis. Urology consulted in ER. seen notes Rectal exam per ER physician with tender, boggy prostate. but per urology it was normal. 
CT abdomen:4.7 cm fluid collection in the left inguinal canal is stable. No acute 
abnormality is identified 2018 blood culture negative for growth thus far Chronic pancytopenia, suspect due to chemotherapy, with acute worsening thrombocytopenia Oncology following Plt count relatively stable THIAGO on CKD stage 2, srcr relatively stable. Lisinopril medication held (refer to cardiology consult note) History PVCs/VT Refer to Cardiology note, they seem okay to have the amiodarone off Chronic pain due to cervical DDD Hx substance abuse DM type 2 Adequately controlled on current therapy History of hypertension 
bp controlled History of  treated hepatitis C Body mass index is 28.16 kg/m².: 25.0 - 29.9 Overweight Code status: Full Prophylaxis: SCD's Recommended Disposition: patient still has elevated temperature. Patient questions medications by his oncologist as the cause. 2018 blood culture negative for growth thus far. F/u tomorrow ()--if afebrile and clinical improvement, then consider discharge. If still febrile and blood culture negative, then consider ID consult. Subjective: Chief Complaint / Reason for Physician Visit Pt feels better Review of Systems: 
Symptom Y/N Comments  Symptom Y/N Comments Fever/Chills n   Chest Pain n   
Poor Appetite    Edema Cough    Abdominal Pain n   
Sputum    Joint Pain SOB/GONZALES n   Pruritis/Rash Nausea/vomit    Tolerating PT/OT Diarrhea    Tolerating Diet Constipation    Other Could NOT obtain due to:   
 
Objective: VITALS:  
Last 24hrs VS reviewed since prior progress note. Most recent are: 
Patient Vitals for the past 24 hrs: 
 Temp Pulse Resp BP SpO2  
11/03/18 0805 100.4 °F (38 °C) 99 20 121/80 100 % 11/03/18 0242 99 °F (37.2 °C) 80 20 105/68 100 % 11/02/18 2236 98.8 °F (37.1 °C) 77 18 109/57 100 % 11/02/18 1934 99 °F (37.2 °C) 85 18 109/67 92 % 11/02/18 1640 99.9 °F (37.7 °C)      
11/02/18 1540 100.3 °F (37.9 °C) 73 20 136/69 100 % Intake/Output Summary (Last 24 hours) at 11/3/2018 1117 Last data filed at 11/3/2018 0534 Gross per 24 hour Intake 1600 ml Output 950 ml Net 650 ml PHYSICAL EXAM: 
General: Alert, cooperative, no acute distress   
EENT:  Anicteric sclerae. MMM, no plaque, no oral lesions. Resp:  CTA bilaterally, no wheezing or rales. No accessory muscle use CV:  s1s2 no rubs, no edema GI:  Soft, Non distended, Non tender.  +Bowel sounds Neurologic:  Alert and oriented X 3, normal speech, Psych:   Not anxious nor agitated Skin:  No rashes. No jaundice Reviewed most current lab test results and cultures  YES Reviewed most current radiology test results   YES Review and summation of old records today    NO Reviewed patient's current orders and MAR    YES 
PMH/ reviewed - no change compared to H&P 
___________________________________________________________________ Care Plan discussed with: 
  Comments Patient y Family  y Pt had the writer speak with the spouse on speaker phone in the presence of the pt  
RN y   
Care Manager Consultant Multidiciplinary team rounds were held today with , nursing, pharmacist and clinical coordinator. Patient's plan of care was discussed; medications were reviewed and discharge planning was addressed. ________________________________________________________________________ Total NON critical care TIME:  30  Minutes Total CRITICAL CARE TIME Spent:   Minutes non procedure based Comments >50% of visit spent in counseling and coordination of care    
________________________________________________________________________ Jacqueline Michael MD  
 
 
Recent Labs 11/03/18 0241 11/01/18 
0510 10/31/18 
1548 WBC 2.5* 3.2* 4.0* HGB 10.4* 11.4* 11.3* HCT 32.0* 35.1* 35.4* PLT 84* 79* 89* Recent Labs 11/03/18 0241 11/02/18 
0156 11/01/18 
0510  142 137  
K 3.8 4.0 3.9 * 109* 103 CO2 25 27 26 * 94 124* BUN 10 10 14 CREA 1.40* 1.32* 1.46* CA 7.9* 7.7* 8.5 ALB  --   --  4.0 TBILI  --   --  0.2 SGOT  --   --  37 ALT  --   --  38 Signed: Jacqueline Michael MD

## 2018-11-03 NOTE — PROGRESS NOTES
Report received from [facility]. Introduced myself as primary RN. Assumed care of patient. Call bell within reach. 3:37 PM  
Visit Vitals /76 (BP 1 Location: Right arm, BP Patient Position: Sitting) Pulse 97 Temp (!) 101.1 °F (38.4 °C) Resp 20 Ht 5' 8\" (1.727 m) Wt 84 kg (185 lb 3 oz) SpO2 99% BMI 28.16 kg/m² Temperature elevated. Attending (NP) already aware of fevers. RN to administer PO Tylenol. Will continue to follow. 5:31 PM Temperature recheck. 99.0. Will continue to follow. Bedside and Verbal shift change report given to Majo (oncoming nurse) by Gabriela Koehler (offgoing nurse). Report included the following information SBAR, Kardex, Med Rec Status and Cardiac Rhythm NSR.

## 2018-11-03 NOTE — PROGRESS NOTES
This visit was documented on 11/3/2018 for a visit on 11/2/2018. Patient was visited by a Spiritual Care Partner Volunteer in Cardiopulmonary Care Unit on 11/2/2108. Documented by Rev. Telly [place], [address]  paging service: [email or web address] ([phone])

## 2018-11-03 NOTE — PROGRESS NOTES
EP/ ARRHYTHMIA Progress Note Patient ID: 
Patient: Ange Seth. MRN: [id number] Age: 66 y.o.  : 1955 Date of  Admission: 2018  4:48 AM  
PCP:  Nimo Navarrete MD 
 
Assessment: 1. Symptomatic PVC's and nonsustained VT, well-suppressed with amiodarone which was stopped this admission. Previously was on propafenone for this with OK result. 2. Hypertension hx. 
3. Echo EF 55% 2017, mild LVH. 4. Suspected acute prostatitis with rectal pain, urology evaluation however negative. 5. Immunocompromised following bone marrow transplant  for multiple myeloma. 6. Chronic pancytopenia, suspect due to chemotherapy, with acute worsening thrombocytopenia. 7. THIAGO on CKD stage 2. 
8. Chronic pain due to cervical DDD. 9. Hx substance abuse. 10. DM type 2. 
11. Hx treated hepatitis C. 
  
Plan: 1. Stopped amiodarone. Will be 4-6 weeks before this drug washes out in a meaningful way. I would NOT restart propafenone until he's seen in the office in about 2 months for reassessment. 2. Tele, since on QT prolonging medications. This has looked good here so far. 3. OK with holding lisinopril given normal BP in this setting. All questions answered for him. Doubt the fever source is cardiac at this time. [x]       High complexity decision making was performed in this patient Ange Seth. is a 66 y.o. male with a history of symptomatic PVC's and nonsustained VT, normal LV systolic fx EF 71% on prior echo in 2017. He was on propafenone prior to a BM transplant but changed to amiodarone at [facility]. He's done well with arrhythmia suppression with this. He was admitted with fever, started on antibiotic therapy. His amiodarone was stopped in anticipation of later retrying propafenone (to avoid any longterm issues with side effects with amio). TODAY:  He denies chest, jaw, shoulder, neck, or arm pain.   No syncope, dizziness, palpitations. No orthopnea, PND, or edema. Allergies Allergen Reactions  Mercury (Bulk) Hives Blisters Current Facility-Administered Medications Medication Dose Route Frequency  sodium chloride (NS) flush 5-10 mL  5-10 mL IntraVENous PRN  
 sodium chloride 0.9 % bolus infusion 1,000 mL  1,000 mL IntraVENous ONCE  
 vancomycin (VANCOCIN) 1750 mg in  ml infusion  1,750 mg IntraVENous NOW  levoFLOXacin (LEVAQUIN) 750 mg in D5W IVPB  750 mg IntraVENous NOW  sodium chloride (NS) flush 5-10 mL  5-10 mL IntraVENous Q8H  
 sodium chloride (NS) flush 5-10 mL  5-10 mL IntraVENous PRN  
 0.9% sodium chloride infusion  125 mL/hr IntraVENous CONTINUOUS  
 levoFLOXacin (LEVAQUIN) 750 mg in D5W IVPB  750 mg IntraVENous Q24H  
 acetaminophen (TYLENOL) tablet 650 mg  650 mg Oral Q6H PRN  
 oxyCODONE IR (ROXICODONE) tablet 10 mg  10 mg Oral Q6H PRN  
 acyclovir (ZOVIRAX) tablet 400 mg  400 mg Oral BID  ARIPiprazole (ABILIFY) tablet 2.5 mg  2.5 mg Oral 7am  
 buPROPion XL (WELLBUTRIN XL) tablet 150 mg  150 mg Oral DAILY WITH DINNER  
 [START ON 11/5/2018] ergocalciferol capsule 50,000 Units  50,000 Units Oral every Monday  folic acid (FOLVITE) tablet 1 mg  1 mg Oral DAILY  insulin glargine (LANTUS) injection 12 Units  12 Units SubCUTAneous DAILY  metFORMIN ER (GLUCOPHAGE XR) tablet 500 mg  500 mg Oral BID  pantoprazole (PROTONIX) tablet 40 mg  40 mg Oral DAILY PRN  
 tamsulosin (FLOMAX) capsule 0.4 mg  0.4 mg Oral DAILY  tapentadol (NUCYNTA) tablet 200 mg  200 mg Oral TID  tenofovir DISOPROXIL FUMARATE (VIREAD) tablet 300 mg  300 mg Oral DAILY  zolpidem (AMBIEN) tablet 10 mg  10 mg Oral QHS Review of Symptoms:  
Gastrointestinal: negative for abdominal pain, N/V, dysphagia, change in bowel habits, bleeding Muskuloskeletal : negative for arthralgia, but +myalgia Hematology: negative for easy bruising, bleeding Objective: Physical Exam: 
 
Patient Vitals for the past 12 hrs: 
 Temp Pulse Resp BP SpO2  
11/03/18 1527 (!) 101.1 °F (38.4 °C) 97 20 124/76 99 % 11/03/18 1134 99.6 °F (37.6 °C) 97 20 119/86 97 % 11/03/18 0805 100.4 °F (38 °C) 99 20 121/80 100 % Intake/Output Summary (Last 24 hours) at 11/3/2018 1558 Last data filed at 11/3/2018 2779 Gross per 24 hour Intake 650 ml Output 550 ml Net 100 ml Nondiaphoretic, not in acute distress. Wearing mask No scleral icterus, mucous membranes moist, conjuctivae pink, no xanthelasma. Unlabored, clear to auscultation bilaterally anteriorly, symmetric air movement. Regular rate and rhythm, no murmur, pericardial rub, knock, or gallop. No JVD or peripheral edema. Palpable radial pulses bilaterally. Abdomen, soft, nontender, nondistended. Extremities without cyanosis or clubbing. Muscle tone and bulk normal. 
Skin warm and dry. No rashes or ulcers. Neuro grossly nonfocal.  No tremor. Awake and appropriate. CARDIOGRAPHICS and STUDIES, I reviewed: 
 
Telemetry:  SR. 
 
ECG in ER:  Sinus tachycardia 106 with nonspecific repolarization abnormality. CXR in ER:  No edema or infiltrate. Labs: 
No results for input(s): CPK, CKMB, CKNDX, TROIQ in the last 72 hours. No lab exists for component: CPKMB Lab Results Component Value Date/Time Cholesterol, total 101 07/14/2011 04:15 AM  
 HDL Cholesterol 38 07/14/2011 04:15 AM  
 LDL, calculated 27.8 07/14/2011 04:15 AM  
 Triglyceride 176 (H) 07/14/2011 04:15 AM  
 CHOL/HDL Ratio 2.7 07/14/2011 04:15 AM  
 
No results for input(s): INR, PTP, APTT in the last 72 hours. No lab exists for component: INREXT, INREXT Recent Labs 11/03/18 
0241 11/02/18 
0156 11/01/18 
0510  142 137  
K 3.8 4.0 3.9 * 109* 103 CO2 25 27 26 BUN 10 10 14 CREA 1.40* 1.32* 1.46* * 94 124* CA 7.9* 7.7* 8.5 ALB  --   --  4.0 WBC 2.5*  --  3.2* HGB 10.4*  --  11.4*  
 HCT 32.0*  --  35.1*  
PLT 84*  --  79* Recent Labs 11/01/18 
0510 SGOT 37 AP 91  
TP 7.1 ALB 4.0  
GLOB 3.1 No components found for: [place] No results for input(s): PH, PCO2, PO2 in the last 72 hours. Cherelle Leos MD 
11/3/2018

## 2018-11-03 NOTE — PROGRESS NOTES
Pharmacy Automatic Renal Dosing Protocol - Antimicrobials Indication for Antimicrobials: acute prostatitis Current Regimen of Each Antimicrobial: 
Levofloxacin 750 mg IV every 24 hours (Start Date  Day # 3) Vancomycin 750 mg IV every 12 hours (Start Date  Day # 3) Previous Antimicrobial Therapy: N/A Vancomycin trough goal level:  15-20 Date Dose & Interval Measured (mcg/mL) Extrapolated (mcg/mL)  
 21:34 750mg IV q12h 8.9 8.3 Significant Cultures:  
 blood: NGTD x 2 day- pending  influenza - negative Radiology / Imaging results: (X-ray, CT scan or MRI): no clinically significant findings identified Paralysis, amputations, malnutrition: N/A Labs: 
Recent Labs 18 
0241 18 
0286 18 
0510 10/31/18 
1548 CREA 1.40* 1.32* 1.46*  --   
BUN 10 10 14  --   
WBC 2.5*  --  3.2* 4.0* Temp (24hrs), Av.6 °F (37.6 °C), Min:98.8 °F (37.1 °C), Max:100.4 °F (38 °C) Creatinine Clearance (mL/min) or Dialysis:  
Estimated Creatinine Clearance: 58.5 mL/min (A) (based on SCr of 1.4 mg/dL (H)). Estimated Creatinine Clearance (using IBW):53.6 mL/min Impression/Plan:  
· Low grade fever 11/3 am, SCr same · Projected population kinetics Vancomycin calculation still projects that level should be HIGHER than reported. Will adjust Vancomycin to 750mg IV q8h and redraw trough before the third dose on 11/3 10:00. 
· Continue LQ regimen · Daily BMP ordered per protocol · Antimicrobial stop date TBD (if only prostitis recommend 6 weeks of levofloxacin therapy) Pharmacy will follow daily and adjust medications as appropriate for renal function and/or serum levels.  
 
Thank you, 
Era Shepherd, [facility]

## 2018-11-04 ENCOUNTER — APPOINTMENT (OUTPATIENT)
Dept: GENERAL RADIOLOGY | Age: 63
DRG: 872 | End: 2018-11-04
Attending: INTERNAL MEDICINE
Payer: COMMERCIAL

## 2018-11-04 LAB
ANION GAP SERPL CALC-SCNC: 8 MMOL/L (ref 5–15)
BASOPHILS # BLD: 0 K/UL (ref 0–0.1)
BASOPHILS NFR BLD: 0 % (ref 0–1)
BUN SERPL-MCNC: 9 MG/DL (ref 6–20)
BUN/CREAT SERPL: 6 (ref 12–20)
CALCIUM SERPL-MCNC: 8 MG/DL (ref 8.5–10.1)
CHLORIDE SERPL-SCNC: 109 MMOL/L (ref 97–108)
CO2 SERPL-SCNC: 24 MMOL/L (ref 21–32)
CREAT SERPL-MCNC: 1.45 MG/DL (ref 0.7–1.3)
DATE LAST DOSE: ABNORMAL
DIFFERENTIAL METHOD BLD: ABNORMAL
EOSINOPHIL # BLD: 0 K/UL (ref 0–0.4)
EOSINOPHIL NFR BLD: 1 % (ref 0–7)
ERYTHROCYTE [DISTWIDTH] IN BLOOD BY AUTOMATED COUNT: 14.6 % (ref 11.5–14.5)
GLUCOSE BLD STRIP.AUTO-MCNC: 109 MG/DL (ref 65–100)
GLUCOSE BLD STRIP.AUTO-MCNC: 128 MG/DL (ref 65–100)
GLUCOSE BLD STRIP.AUTO-MCNC: 95 MG/DL (ref 65–100)
GLUCOSE BLD STRIP.AUTO-MCNC: 99 MG/DL (ref 65–100)
GLUCOSE SERPL-MCNC: 125 MG/DL (ref 65–100)
HCT VFR BLD AUTO: 32.1 % (ref 36.6–50.3)
HGB BLD-MCNC: 10.4 G/DL (ref 12.1–17)
IMM GRANULOCYTES # BLD: 0 K/UL (ref 0–0.04)
IMM GRANULOCYTES NFR BLD AUTO: 0 % (ref 0–0.5)
LYMPHOCYTES # BLD: 0.9 K/UL (ref 0.8–3.5)
LYMPHOCYTES NFR BLD: 27 % (ref 12–49)
MCH RBC QN AUTO: 29.5 PG (ref 26–34)
MCHC RBC AUTO-ENTMCNC: 32.4 G/DL (ref 30–36.5)
MCV RBC AUTO: 91.2 FL (ref 80–99)
MONOCYTES # BLD: 0.7 K/UL (ref 0–1)
MONOCYTES NFR BLD: 21 % (ref 5–13)
NEUTS SEG # BLD: 1.8 K/UL (ref 1.8–8)
NEUTS SEG NFR BLD: 51 % (ref 32–75)
NRBC # BLD: 0 K/UL (ref 0–0.01)
NRBC BLD-RTO: 0 PER 100 WBC
PLATELET # BLD AUTO: 92 K/UL (ref 150–400)
PMV BLD AUTO: 11.5 FL (ref 8.9–12.9)
POTASSIUM SERPL-SCNC: 3.7 MMOL/L (ref 3.5–5.1)
RBC # BLD AUTO: 3.52 M/UL (ref 4.1–5.7)
RBC MORPH BLD: ABNORMAL
REPORTED DOSE,DOSE: ABNORMAL UNITS
REPORTED DOSE/TIME,TMG: 200
SERVICE CMNT-IMP: ABNORMAL
SERVICE CMNT-IMP: ABNORMAL
SERVICE CMNT-IMP: NORMAL
SERVICE CMNT-IMP: NORMAL
SODIUM SERPL-SCNC: 141 MMOL/L (ref 136–145)
VANCOMYCIN TROUGH SERPL-MCNC: 17.4 UG/ML (ref 5–10)
WBC # BLD AUTO: 3.4 K/UL (ref 4.1–11.1)

## 2018-11-04 PROCEDURE — 74011250636 HC RX REV CODE- 250/636: Performed by: INTERNAL MEDICINE

## 2018-11-04 PROCEDURE — 74011250636 HC RX REV CODE- 250/636: Performed by: HOSPITALIST

## 2018-11-04 PROCEDURE — 82962 GLUCOSE BLOOD TEST: CPT

## 2018-11-04 PROCEDURE — 80048 BASIC METABOLIC PNL TOTAL CA: CPT | Performed by: INTERNAL MEDICINE

## 2018-11-04 PROCEDURE — 74011250637 HC RX REV CODE- 250/637: Performed by: HOSPITALIST

## 2018-11-04 PROCEDURE — 65270000029 HC RM PRIVATE

## 2018-11-04 PROCEDURE — 80202 ASSAY OF VANCOMYCIN: CPT | Performed by: INTERNAL MEDICINE

## 2018-11-04 PROCEDURE — 85025 COMPLETE CBC W/AUTO DIFF WBC: CPT | Performed by: INTERNAL MEDICINE

## 2018-11-04 PROCEDURE — 71045 X-RAY EXAM CHEST 1 VIEW: CPT

## 2018-11-04 PROCEDURE — 36415 COLL VENOUS BLD VENIPUNCTURE: CPT | Performed by: INTERNAL MEDICINE

## 2018-11-04 PROCEDURE — 74011250637 HC RX REV CODE- 250/637: Performed by: INTERNAL MEDICINE

## 2018-11-04 RX ADMIN — GUAIFENESIN 600 MG: 600 TABLET, EXTENDED RELEASE ORAL at 09:13

## 2018-11-04 RX ADMIN — LEVOFLOXACIN 750 MG: 5 INJECTION, SOLUTION INTRAVENOUS at 09:14

## 2018-11-04 RX ADMIN — ZOLPIDEM TARTRATE 10 MG: 5 TABLET ORAL at 21:45

## 2018-11-04 RX ADMIN — ACYCLOVIR 400 MG: 800 TABLET ORAL at 09:13

## 2018-11-04 RX ADMIN — VANCOMYCIN HYDROCHLORIDE 750 MG: 750 INJECTION, POWDER, LYOPHILIZED, FOR SOLUTION INTRAVENOUS at 17:33

## 2018-11-04 RX ADMIN — Medication 10 ML: at 21:45

## 2018-11-04 RX ADMIN — TAMSULOSIN HYDROCHLORIDE 0.4 MG: 0.4 CAPSULE ORAL at 09:12

## 2018-11-04 RX ADMIN — TENOFOVIR DISOPROXIL FUMARATE 300 MG: 300 TABLET, COATED ORAL at 09:13

## 2018-11-04 RX ADMIN — Medication 10 ML: at 06:58

## 2018-11-04 RX ADMIN — METFORMIN HYDROCHLORIDE 500 MG: 500 TABLET, EXTENDED RELEASE ORAL at 17:32

## 2018-11-04 RX ADMIN — GUAIFENESIN 600 MG: 600 TABLET, EXTENDED RELEASE ORAL at 17:33

## 2018-11-04 RX ADMIN — FOLIC ACID 1 MG: 1 TABLET ORAL at 09:13

## 2018-11-04 RX ADMIN — METFORMIN HYDROCHLORIDE 500 MG: 500 TABLET, EXTENDED RELEASE ORAL at 09:13

## 2018-11-04 RX ADMIN — VANCOMYCIN HYDROCHLORIDE 750 MG: 750 INJECTION, POWDER, LYOPHILIZED, FOR SOLUTION INTRAVENOUS at 01:49

## 2018-11-04 RX ADMIN — ACYCLOVIR 400 MG: 800 TABLET ORAL at 17:32

## 2018-11-04 RX ADMIN — Medication 10 ML: at 14:00

## 2018-11-04 RX ADMIN — BUPROPION HYDROCHLORIDE 150 MG: 150 TABLET, FILM COATED, EXTENDED RELEASE ORAL at 09:13

## 2018-11-04 RX ADMIN — ARIPIPRAZOLE 2.5 MG: 5 TABLET ORAL at 08:43

## 2018-11-04 RX ADMIN — VANCOMYCIN HYDROCHLORIDE 750 MG: 750 INJECTION, POWDER, LYOPHILIZED, FOR SOLUTION INTRAVENOUS at 11:45

## 2018-11-04 NOTE — PROGRESS NOTES
Pharmacy Automatic Renal Dosing Protocol - Antimicrobials Indication for Antimicrobials: Acute prostatitis, Immunocompromised, Chronic pancytopenia Current Regimen of Each Antimicrobial: 
Levofloxacin 750 mg IV every 24 hours (Start Date  Day # 3) Vancomycin 750 mg IV every 12 hours (Start Date  Day # 3) Previous Antimicrobial Therapy: N/A Vancomycin trough goal level:  15-20 Date Dose & Interval Measured (mcg/mL) Extrapolated (mcg/mL)  
 21:34 750mg IV q12h 8.9 8.3  
18 9:37 750 IV Q8H 17.4 18.6 Significant Cultures:  
 blood: NGTD x 2 day- pending  influenza - negative Radiology / Imaging results: (X-ray, CT scan or MRI): no clinically significant findings identified Paralysis, amputations, malnutrition: N/A Labs: 
Recent Labs 18 
0774 18 
0241 18 
3425 CREA 1.45* 1.40* 1.32* BUN 9 10 10 WBC 3.4* 2.5*  -- Temp (24hrs), Av.3 °F (37.4 °C), Min:98.7 °F (37.1 °C), Max:101.1 °F (38.4 °C) Creatinine Clearance (mL/min) or Dialysis: 52 Impression/Plan: · Vancomycin trough 17.4 mcg/ml, on 750 mg Q8H, extrapolated to 18.6 mcg/ml. Initial consult notes sepsis and chemoport, BC no growth x 3 days, no data on vancomycin prostate penetration found. · Daily BMP ordered per protocol · Antimicrobial stop date TBD (if only prostitis recommend 6 weeks of levofloxacin therapy) Pharmacy will follow daily and adjust medications as appropriate for renal function and/or serum levels.  
 
Thank you, 
Jonelle Gerard, [facility]

## 2018-11-04 NOTE — PROGRESS NOTES
Hospitalist Progress Note NAME: Thalia Matt Sr.  
:  1955 MRN:  [id number] Assessment / Plan: 
Sepsis, POA Suspect ? prostatitis with rectal pain. Immunocompromised following bone marrow transplant  for multiple myeloma. Currently on abx coverage. Rectal exam per ER physician with tender, boggy prostate Urology was consulted in ER which Urology does not believe pt has prostatitis and the fever/elevated temp spikes are from another source. CT abdomen:4.7 cm fluid collection in the left inguinal canal is stable. No acute 
abnormality is identified 2018 blood culture negative for growth thus far 
11/3/2018 Pt believes his elevated temp spikes are from the medications related to his MM. Oncology has seen the patient. 2018 temp spike last night, ordered repeat blood cx and cxr. Ordered consult requesting oncology to revisit due to unknown source of fever and patients verbalizing concern that it is his oncology meds that has caused this. Chronic pancytopenia, suspect due to chemotherapy, with acute worsening thrombocytopenia Oncology following, refer to above Plt count relatively stable THIAGO on CKD stage 2, srcr relatively stable. Lisinopril medication held (refer to cardiology consult note) History PVCs/VT Refer to Cardiology note from 11/3/2018 Chronic pain due to cervical DDD Hx substance abuse DM type 2 Adequately controlled on current therapy History of hypertension 
bp controlled History of  treated hepatitis C Body mass index is 27.72 kg/m².: 25.0 - 29.9 Overweight Code status: Full Prophylaxis: SCD's Recommended Disposition: patient still has elevated temperature. Patient questions medications by his oncologist as the cause. 2018 blood culture negative for growth thus far. 2018: Recurrent temp spikes which pt states is associated with chills. Pt again concerned his oncology medications caused this. Therefore oncology consult reordered regarding patient's inquiry. Reordered blood culture due to temp spike last night along with cxr. If still febrile and blood culture negative, then consider ID consult. Subjective: Chief Complaint / Reason for Physician Visit Pt state he gets \"chills\" when his temp goes up. Review of Systems: 
Symptom Y/N Comments  Symptom Y/N Comments Fever/Chills n   Chest Pain n   
Poor Appetite    Edema Cough    Abdominal Pain n   
Sputum    Joint Pain SOB/GONZALES n   Pruritis/Rash Nausea/vomit    Tolerating PT/OT Diarrhea    Tolerating Diet Constipation    Other Could NOT obtain due to:   
 
Objective: VITALS:  
Last 24hrs VS reviewed since prior progress note. Most recent are: 
Patient Vitals for the past 24 hrs: 
 Temp Pulse Resp BP SpO2  
11/04/18 0744 98.8 °F (37.1 °C) 81 18 130/77 94 % 11/04/18 0255 99.2 °F (37.3 °C) 90 18 117/71 94 % 11/04/18 0019 98.7 °F (37.1 °C) 86 18 123/88 93 % 11/03/18 1837 99.3 °F (37.4 °C) 93 15 117/83 99 % 11/03/18 1729 99 °F (37.2 °C)      
11/03/18 1527 (!) 101.1 °F (38.4 °C) 97 20 124/76 99 % 11/03/18 1134 99.6 °F (37.6 °C) 97 20 119/86 97 % Intake/Output Summary (Last 24 hours) at 11/4/2018 3581 Last data filed at 11/4/2018 7785 Gross per 24 hour Intake 650 ml Output 1100 ml Net -450 ml PHYSICAL EXAM: 
General: Alert, cooperative, no acute distress   
EENT:  Anicteric sclerae. MMM, no plaque, no oral lesions. Resp:  CTA bilaterally, no wheezing or rales. No accessory muscle use CV:  s1s2 no rubs, no edema GI:  Soft, Non distended, Non tender.  +Bowel sounds Neurologic:  Alert and oriented X 3, normal speech, Psych:   Not anxious nor agitated Skin:  No rashes. No jaundice Reviewed most current lab test results and cultures  YES Reviewed most current radiology test results   YES Review and summation of old records today    NO 
 Reviewed patient's current orders and MAR    YES 
PMH/SH reviewed - no change compared to H&P 
___________________________________________________________________ Care Plan discussed with: 
  Comments Patient y Family  y spouse RN y   
Care Manager Consultant Multidiciplinary team rounds were held today with , nursing, pharmacist and clinical coordinator. Patient's plan of care was discussed; medications were reviewed and discharge planning was addressed. ________________________________________________________________________ Total NON critical care TIME:  30  Minutes Total CRITICAL CARE TIME Spent:   Minutes non procedure based Comments >50% of visit spent in counseling and coordination of care    
________________________________________________________________________ Valentin Kaur MD  
 
 
Recent Labs 11/04/18 
0328 11/03/18 
0241 WBC 3.4* 2.5* HGB 10.4* 10.4* HCT 32.1* 32.0*  
PLT 92* 84* Recent Labs 11/04/18 
4211 11/03/18 
0241 11/02/18 
4540  141 142  
K 3.7 3.8 4.0  
* 109* 109* CO2 24 25 27 * 123* 94 BUN 9 10 10 CREA 1.45* 1.40* 1.32* CA 8.0* 7.9* 7.7* Signed: Valentin Kaur MD

## 2018-11-04 NOTE — PROGRESS NOTES
7:24 AM received bedside verbal report from CLEVELAND Liu. 5:00 PM Pateint has been stuck several times during the day for blood culture by 4 different nurses. pateint refused to be stuck any more for the day and doesnot even want lab draws in the morning. Patient requested PICC team to draw his blood but unfortunately they dont work on Sunday so the message has been passed down to night shift nurse who will relay the message to the day shift nurse tomorrow. 8:08 PM  Pahrmacy to  the paper for the contorolled pain med from the pateint specific bin in the pysix As the tube stations are down.  i

## 2018-11-05 VITALS
BODY MASS INDEX: 27.87 KG/M2 | TEMPERATURE: 98.8 F | WEIGHT: 183.86 LBS | HEART RATE: 81 BPM | OXYGEN SATURATION: 100 % | DIASTOLIC BLOOD PRESSURE: 87 MMHG | RESPIRATION RATE: 16 BRPM | SYSTOLIC BLOOD PRESSURE: 128 MMHG | HEIGHT: 68 IN

## 2018-11-05 PROBLEM — D69.59 CHEMOTHERAPY-INDUCED THROMBOCYTOPENIA: Status: ACTIVE | Noted: 2018-11-05

## 2018-11-05 PROBLEM — T45.1X5A CHEMOTHERAPY-INDUCED THROMBOCYTOPENIA: Status: ACTIVE | Noted: 2018-11-05

## 2018-11-05 PROBLEM — R50.9 FEVER: Status: RESOLVED | Noted: 2018-11-01 | Resolved: 2018-11-05

## 2018-11-05 LAB
ALBUMIN SERPL-MCNC: 2.9 G/DL (ref 3.5–5)
ALBUMIN/GLOB SERPL: 1 {RATIO} (ref 1.1–2.2)
ALP SERPL-CCNC: 107 U/L (ref 45–117)
ALT SERPL-CCNC: 59 U/L (ref 12–78)
ANION GAP SERPL CALC-SCNC: 6 MMOL/L (ref 5–15)
APPEARANCE UR: CLEAR
AST SERPL-CCNC: 53 U/L (ref 15–37)
B PERT DNA SPEC QL NAA+PROBE: NOT DETECTED
BACTERIA URNS QL MICRO: NEGATIVE /HPF
BASOPHILS # BLD: 0 K/UL (ref 0–0.1)
BASOPHILS NFR BLD: 0 % (ref 0–1)
BILIRUB DIRECT SERPL-MCNC: <0.1 MG/DL (ref 0–0.2)
BILIRUB SERPL-MCNC: 0.3 MG/DL (ref 0.2–1)
BILIRUB UR QL: NEGATIVE
BUN SERPL-MCNC: 13 MG/DL (ref 6–20)
BUN/CREAT SERPL: 9 (ref 12–20)
C PNEUM DNA SPEC QL NAA+PROBE: NOT DETECTED
CALCIUM SERPL-MCNC: 7.9 MG/DL (ref 8.5–10.1)
CHLORIDE SERPL-SCNC: 110 MMOL/L (ref 97–108)
CK MB CFR SERPL CALC: 1.2 % (ref 0–2.5)
CK MB SERPL-MCNC: 1.3 NG/ML (ref 5–25)
CK SERPL-CCNC: 110 U/L (ref 39–308)
CO2 SERPL-SCNC: 26 MMOL/L (ref 21–32)
COLOR UR: NORMAL
CREAT SERPL-MCNC: 1.4 MG/DL (ref 0.7–1.3)
DIFFERENTIAL METHOD BLD: ABNORMAL
EOSINOPHIL # BLD: 0 K/UL (ref 0–0.4)
EOSINOPHIL NFR BLD: 1 % (ref 0–7)
EPITH CASTS URNS QL MICRO: NORMAL /LPF
ERYTHROCYTE [DISTWIDTH] IN BLOOD BY AUTOMATED COUNT: 14.3 % (ref 11.5–14.5)
FLUAV H1 2009 PAND RNA SPEC QL NAA+PROBE: NOT DETECTED
FLUAV H1 RNA SPEC QL NAA+PROBE: NOT DETECTED
FLUAV H3 RNA SPEC QL NAA+PROBE: NOT DETECTED
FLUAV SUBTYP SPEC NAA+PROBE: NOT DETECTED
FLUBV RNA SPEC QL NAA+PROBE: NOT DETECTED
GLOBULIN SER CALC-MCNC: 2.9 G/DL (ref 2–4)
GLUCOSE BLD STRIP.AUTO-MCNC: 123 MG/DL (ref 65–100)
GLUCOSE BLD STRIP.AUTO-MCNC: 97 MG/DL (ref 65–100)
GLUCOSE SERPL-MCNC: 92 MG/DL (ref 65–100)
GLUCOSE UR STRIP.AUTO-MCNC: NEGATIVE MG/DL
HADV DNA SPEC QL NAA+PROBE: NOT DETECTED
HCOV 229E RNA SPEC QL NAA+PROBE: NOT DETECTED
HCOV HKU1 RNA SPEC QL NAA+PROBE: NOT DETECTED
HCOV NL63 RNA SPEC QL NAA+PROBE: NOT DETECTED
HCOV OC43 RNA SPEC QL NAA+PROBE: NOT DETECTED
HCT VFR BLD AUTO: 29.3 % (ref 36.6–50.3)
HGB BLD-MCNC: 9.4 G/DL (ref 12.1–17)
HGB UR QL STRIP: NEGATIVE
HMPV RNA SPEC QL NAA+PROBE: NOT DETECTED
HPIV1 RNA SPEC QL NAA+PROBE: NOT DETECTED
HPIV2 RNA SPEC QL NAA+PROBE: NOT DETECTED
HPIV3 RNA SPEC QL NAA+PROBE: NOT DETECTED
HPIV4 RNA SPEC QL NAA+PROBE: NOT DETECTED
HYALINE CASTS URNS QL MICRO: NORMAL /LPF (ref 0–5)
IMM GRANULOCYTES # BLD: 0 K/UL (ref 0–0.04)
IMM GRANULOCYTES NFR BLD AUTO: 0 % (ref 0–0.5)
KETONES UR QL STRIP.AUTO: NEGATIVE MG/DL
LEUKOCYTE ESTERASE UR QL STRIP.AUTO: NEGATIVE
LYMPHOCYTES # BLD: 0.9 K/UL (ref 0.8–3.5)
LYMPHOCYTES NFR BLD: 32 % (ref 12–49)
M PNEUMO DNA SPEC QL NAA+PROBE: NOT DETECTED
MCH RBC QN AUTO: 29.3 PG (ref 26–34)
MCHC RBC AUTO-ENTMCNC: 32.1 G/DL (ref 30–36.5)
MCV RBC AUTO: 91.3 FL (ref 80–99)
MONOCYTES # BLD: 0.6 K/UL (ref 0–1)
MONOCYTES NFR BLD: 19 % (ref 5–13)
NEUTS SEG # BLD: 1.4 K/UL (ref 1.8–8)
NEUTS SEG NFR BLD: 47 % (ref 32–75)
NITRITE UR QL STRIP.AUTO: NEGATIVE
NRBC # BLD: 0 K/UL (ref 0–0.01)
NRBC BLD-RTO: 0 PER 100 WBC
PH UR STRIP: 5.5 [PH] (ref 5–8)
PLATELET # BLD AUTO: 86 K/UL (ref 150–400)
PMV BLD AUTO: 11.2 FL (ref 8.9–12.9)
POTASSIUM SERPL-SCNC: 3.5 MMOL/L (ref 3.5–5.1)
PROT SERPL-MCNC: 5.8 G/DL (ref 6.4–8.2)
PROT UR STRIP-MCNC: NEGATIVE MG/DL
RBC # BLD AUTO: 3.21 M/UL (ref 4.1–5.7)
RBC #/AREA URNS HPF: NORMAL /HPF (ref 0–5)
RSV RNA SPEC QL NAA+PROBE: NOT DETECTED
RV+EV RNA SPEC QL NAA+PROBE: DETECTED
SERVICE CMNT-IMP: ABNORMAL
SERVICE CMNT-IMP: NORMAL
SODIUM SERPL-SCNC: 142 MMOL/L (ref 136–145)
SP GR UR REFRACTOMETRY: 1.01 (ref 1–1.03)
UA: UC IF INDICATED,UAUC: NORMAL
UROBILINOGEN UR QL STRIP.AUTO: 0.2 EU/DL (ref 0.2–1)
WBC # BLD AUTO: 2.9 K/UL (ref 4.1–11.1)
WBC URNS QL MICRO: NORMAL /HPF (ref 0–4)

## 2018-11-05 PROCEDURE — 80076 HEPATIC FUNCTION PANEL: CPT | Performed by: INTERNAL MEDICINE

## 2018-11-05 PROCEDURE — 94760 N-INVAS EAR/PLS OXIMETRY 1: CPT

## 2018-11-05 PROCEDURE — 74011250637 HC RX REV CODE- 250/637: Performed by: INTERNAL MEDICINE

## 2018-11-05 PROCEDURE — 82550 ASSAY OF CK (CPK): CPT | Performed by: INTERNAL MEDICINE

## 2018-11-05 PROCEDURE — 81001 URINALYSIS AUTO W/SCOPE: CPT | Performed by: INTERNAL MEDICINE

## 2018-11-05 PROCEDURE — 74011250636 HC RX REV CODE- 250/636: Performed by: INTERNAL MEDICINE

## 2018-11-05 PROCEDURE — 87633 RESP VIRUS 12-25 TARGETS: CPT | Performed by: INTERNAL MEDICINE

## 2018-11-05 PROCEDURE — 85025 COMPLETE CBC W/AUTO DIFF WBC: CPT | Performed by: INTERNAL MEDICINE

## 2018-11-05 PROCEDURE — 74011250636 HC RX REV CODE- 250/636: Performed by: HOSPITALIST

## 2018-11-05 PROCEDURE — 82962 GLUCOSE BLOOD TEST: CPT

## 2018-11-05 PROCEDURE — 87040 BLOOD CULTURE FOR BACTERIA: CPT | Performed by: INTERNAL MEDICINE

## 2018-11-05 PROCEDURE — 80048 BASIC METABOLIC PNL TOTAL CA: CPT | Performed by: INTERNAL MEDICINE

## 2018-11-05 PROCEDURE — 74011250637 HC RX REV CODE- 250/637: Performed by: HOSPITALIST

## 2018-11-05 PROCEDURE — 36415 COLL VENOUS BLD VENIPUNCTURE: CPT | Performed by: INTERNAL MEDICINE

## 2018-11-05 RX ORDER — GUAIFENESIN 600 MG/1
600 TABLET, EXTENDED RELEASE ORAL 2 TIMES DAILY
Qty: 14 TAB | Refills: 0 | Status: SHIPPED | OUTPATIENT
Start: 2018-11-05 | End: 2018-11-12

## 2018-11-05 RX ADMIN — LEVOFLOXACIN 750 MG: 5 INJECTION, SOLUTION INTRAVENOUS at 09:40

## 2018-11-05 RX ADMIN — TENOFOVIR DISOPROXIL FUMARATE 300 MG: 300 TABLET, COATED ORAL at 09:34

## 2018-11-05 RX ADMIN — METFORMIN HYDROCHLORIDE 500 MG: 500 TABLET, EXTENDED RELEASE ORAL at 09:33

## 2018-11-05 RX ADMIN — Medication 10 ML: at 14:20

## 2018-11-05 RX ADMIN — FOLIC ACID 1 MG: 1 TABLET ORAL at 09:33

## 2018-11-05 RX ADMIN — GUAIFENESIN 600 MG: 600 TABLET, EXTENDED RELEASE ORAL at 09:34

## 2018-11-05 RX ADMIN — TAMSULOSIN HYDROCHLORIDE 0.4 MG: 0.4 CAPSULE ORAL at 09:34

## 2018-11-05 RX ADMIN — BUPROPION HYDROCHLORIDE 150 MG: 150 TABLET, FILM COATED, EXTENDED RELEASE ORAL at 09:52

## 2018-11-05 RX ADMIN — ACYCLOVIR 400 MG: 800 TABLET ORAL at 09:33

## 2018-11-05 RX ADMIN — ARIPIPRAZOLE 2.5 MG: 5 TABLET ORAL at 09:35

## 2018-11-05 RX ADMIN — Medication 10 ML: at 02:25

## 2018-11-05 RX ADMIN — ERGOCALCIFEROL 50000 UNITS: 1.25 CAPSULE ORAL at 09:33

## 2018-11-05 RX ADMIN — VANCOMYCIN HYDROCHLORIDE 750 MG: 750 INJECTION, POWDER, LYOPHILIZED, FOR SOLUTION INTRAVENOUS at 02:24

## 2018-11-05 NOTE — PROGRESS NOTES
[address] [facility] [place] 
e at [facility], [facility], [address] 
[phone] Progress Note Patient: Laura Field. MRN: [id number]  SSN: [id number] YOB: 1955  Age: 66 y.o. Sex: male Reason for Consult:  
 
Mr. Preet Wright is a 65 yo male who is s/p transplant for multiple myeloma who we have been asked to see by Dr. Margarita Rodriguez for fever. Diagnosis: 1. Multiple Myeloma, IgA Kappa Robles Slef stage IIIB Cytogenetics/FISH: t(14;16) - high risk Ttreatment:  
  
1. Maintenance therapy - Ixazomib - started 10/15/2018 - currently on hold 2. S/P tandem  Autotransplant First on 2/28/2018 
 2nd on 06/13/2018 3. Carfilzomib/Pom/Dex - s/p 5 cycles 4. VD-PACE s/p 1 cycle 5. RVD - s/p 4 cycles Subjective:  
  
Laura Mcgregor is a 66 y.o. male with a diagnosis of IgA kappa myeloma. Bone marrow shows 100% aberrant plasma cells. He suffers with DM but it is diet controlled. He has received systemic anti-viral treatment for chronic Hep C with successful eradication of the virus. Mr. Mary Kay Snow received 4 cycles of systemic therapy with RVd. He had an initial good response to treatment. However his paraprotein level started rising and the myeloma became refractory to treatment. I then administered one cycle of VD-PACE in the hospital. He then received Carfilzomib/Pom/Dex. He achieved VGPR. He underwent tandem autotransplant at [facility]. According to the patient he achieved complete response with therapy. He has undergone second/tandem transplant in June. He is receiving maintenance therapy with Ninlaro. Mr. Mary Kay Snow presented to the ED this morning with complaint of generalized body aches and fever of 100.5. He feels better this afternoon. He has not had fever since Sat. Review of Systems: 
 
Constitutional: fatigue Eyes: negative Ears, Nose, Mouth, Throat, and Face: negative Respiratory: negative Cardiovascular: negative Gastrointestinal: negative Genitourinary:negative Integument/Breast: negative Hematologic/Lymphatic: negative Musculoskeletal: chronic neck pain Neurological: negative Past Medical History:  
Diagnosis Date  Acid reflux  Arrhythmia   
 pvc's  Chronic pain   
 neck  Depression  Diabetes ([address]  Femoral hernia 2012  Hepatitis C   
 Hypertension  Inguinal hernia 2012  Liver disease   
 hepatitis c  
 Other ill-defined conditions(799.89) Hx of PVCs since   Prostatitis, acute  Psychiatric disorder   
 depression Past Surgical History:  
Procedure Laterality Date  HX APPENDECTOMY  HX CERVICAL FUSION  2008  
 x 1 as of 2014  HX HEENT    
 foreign body removed from right eye  HX HERNIA REPAIR   [facility]  HX ORTHOPAEDIC    
 cervical fusion  HX OTHER SURGICAL  2000  
 liver bx - chronic hepatitis Family History Problem Relation Age of Onset 24 [facility] Arthritis-osteo Mother  Arthritis-osteo Father  Diabetes Father  Hypertension Maternal Grandmother  Diabetes Maternal Grandmother  Hypertension Maternal Grandfather  Diabetes Maternal Grandfather Social History Tobacco Use  Smoking status: Former Smoker Packs/day: 1.00 Years: 12.00 Pack years: 12.00 Types: Cigarettes Last attempt to quit: 1989 Years since quittin.8  Smokeless tobacco: Never Used  Tobacco comment: former cigarette smoker Substance Use Topics  Alcohol use: No  
  Comment: former alcoholic-quit 8232 Prior to Admission medications Medication Sig Start Date End Date Taking? Authorizing Provider  
guaiFENesin ER (MUCINEX) 600 mg ER tablet Take 1 Tab by mouth two (2) times a day for 7 days.  18 Yes Johnny Reveles MD  
 omeprazole (PRILOSEC) 20 mg capsule Take 20 mg by mouth daily as needed (acid reflux). Yes Other, MD Goldy  
ondansetron (ZOFRAN ODT) 8 mg disintegrating tablet Take 8 mg by mouth every eight (8) hours as needed for Nausea. Yes Other, MD Goldy  
buPROPion XL (WELLBUTRIN XL) 150 mg tablet Take 150 mg by mouth every morning. Yes Other, MD Goldy  
famotidine (PEPCID) 20 mg tablet Take 20 mg by mouth daily as needed (acid reflux). Yes Other, MD Goldy  
ixazomib 4 mg cap Take 4 mg by mouth every seven (7) days. Patient takes 4 mg every Tuesdays on week 1-3 of cycle   Yes Other, MD Goldy  
acyclovir (ZOVIRAX) 400 mg tablet Take 400 mg by mouth two (2) times a day. 7/7/18  Yes Provider, Historical  
tenofovir DISOPROXIL FUMARATE (VIREAD) 300 mg tablet Take 300 mg by mouth daily. 7/7/18  Yes Provider, Historical  
pentamidine (NEBUPENT) 300 mg inhalation solution Take 300 mg by inhalation every month. Once a month at Dr's office   Yes Provider, Historical  
ergocalciferol (ERGOCALCIFEROL) 50,000 unit capsule TAKE 1 CAP BY MOUTH EVERY 7 DAYS, Mondays 5/14/18  Yes Provider, Historical  
lisinopril (PRINIVIL, ZESTRIL) 10 mg tablet TAKE 1 TABLET BY MOUTH EVERY DAY as needed. Hold if SBP <115 5/12/18  Yes Provider, Historical  
folic acid (FOLVITE) 1 mg tablet TAKE 1 TABLET BY MOUTH EVERY DAY 5/14/18  Yes Provider, Historical  
multivitamin (ONE A DAY) tablet Take 1 Tab by mouth daily. Yes Provider, Historical  
Biotin 2,500 mcg cap Take 1 Cap by mouth daily. Yes Provider, Historical  
metFORMIN ER (GLUCOPHAGE XR) 500 mg tablet Take 1 Tab by mouth two (2) times a day. 11/10/17  Yes Thu Richardson MD  
insulin lispro (HUMALOG) 100 unit/mL kwikpen 4-6 Units by SubCUTAneous route Before breakfast, lunch, and dinner. Blood Glucose (mg/dL)       Insulin Dose (units) 
            <130                                 skip             130-150                              4  
 >150                                   6   Yes Provider, Historical  
tamsulosin (FLOMAX) 0.4 mg capsule Take 1 Cap by mouth daily. 4/4/17  Yes Candie Cary MD  
zolpidem CR (AMBIEN CR) 12.5 mg tablet Take 12.5 mg by mouth nightly. Yes Provider, Historical  
tapentadol (NUCYNTA) 100 mg tablet Take 200 mg by mouth three (3) times daily. At 7a, 3p, 11pm   Yes Joe, MD Goldy  
aripiprazole (ABILIFY) 5 mg tablet Take 2.5 mg by mouth every morning. Yes Other, MD Goldy  
  
 
 
Allergies Allergen Reactions  Mercury (Bulk) Hives Blisters Objective:  
 
Vitals:  
 11/05/18 0735 11/05/18 9318 11/05/18 1119 11/05/18 1507 BP: 138/86  126/89 128/87 Pulse: 73  81 81 Resp: 16  16 16 Temp: 98.5 °F (36.9 °C)  98.7 °F (37.1 °C) 98.8 °F (37.1 °C) SpO2: 98%  97% 100% Weight:  183 lb 13.8 oz (83.4 kg) Height:      
  
 
 
Physical Exam: 
 
GENERAL: alert, cooperative EYE: negative LYMPHATIC: Cervical, supraclavicular, and axillary nodes normal.  
THROAT & NECK: normal and no erythema or exudates noted. LUNG: clear to auscultation bilaterally HEART: regular rate and rhythm ABDOMEN: soft, non-tender EXTREMITIES: bilateral LE edema SKIN: Normal. 
NEUROLOGIC: negative Lab Results Component Value Date/Time WBC 2.9 (L) 11/05/2018 02:12 AM  
 HGB 9.4 (L) 11/05/2018 02:12 AM  
 HCT 29.3 (L) 11/05/2018 02:12 AM  
 PLATELET 86 (L) 51/54/2317 02:12 AM  
 MCV 91.3 11/05/2018 02:12 AM  
 
 
Lab Results Component Value Date/Time  Sodium 142 11/05/2018 02:12 AM  
 Potassium 3.5 11/05/2018 02:12 AM  
 Chloride 110 (H) 11/05/2018 02:12 AM  
 CO2 26 11/05/2018 02:12 AM  
 Anion gap 6 11/05/2018 02:12 AM  
 Glucose 92 11/05/2018 02:12 AM  
 BUN 13 11/05/2018 02:12 AM  
 Creatinine 1.40 (H) 11/05/2018 02:12 AM  
 BUN/Creatinine ratio 9 (L) 11/05/2018 02:12 AM  
 GFR est AA >60 11/05/2018 02:12 AM  
 GFR est non-AA 52 (L) 11/05/2018 02:12 AM  
 Calcium 7.9 (L) 11/05/2018 02:12 AM  
 
 
  
 
Assessment:  
 
1. Multiple Myeloma, IgA Kappa Jackson Najjar stage IIIB Cytogenetics/FISH: t(14;16) - high risk ECOG PS - 0  
Intent of therapy: palliative Received 3 cycles of RVd Dose reduced Revlimid and Velcade d/t continued cytopenias. M-spike started rising. He received one cycle of VD-PACE (bortezomib, dexamethasone, thalidomide, cisplatin, adriamycin, cyclophosphamide, and etoposide). Achieved WA with once cycle Receivied KPd - s/p 5 cycles Excellent response with M-protein came down to 0.1 S/p tandem autotransplant First on 2/28/2018 
 2nd on 06/13/2018 Achieved CR after the first transplant. Blood count has recovered Doing well Some fatigue Repeat bone marrow @ [facility] - Complete response. Since he is high risk based on cytogenetics, I recommend proteosome inhibitor as maintenance therapy. Receiving maintenance therapy with Ixazomib Will hold until count recovers 2. Type 2 DM with complication Managed by Endocrine 3. Chronic Hep C In sustained virological remission 4. Fever / generalized body aches Unknown origin Afebrile at this time. Blood cultures pending- preliminary - no growth ANC - normal 
CXR - negative CT abd/ pelvis - negative 5. Thrombocytopenia from chemotherapy Hold Ixazomib Plan:  
 
 
> Ok to d/c to home 
> Levaquin for 5 days 
> F/U in my clinic in 1-2 weeks Signed by: Сергей Dailey MD 
                   November 5, 2018

## 2018-11-05 NOTE — DISCHARGE SUMMARY
Hospitalist Discharge Summary     Patient ID:  Ange Seth.  [id number]  66 y.o.  1955    PCP on record: Nimo Navarrete MD    Admit date: 11/1/2018  Discharge date and time: 11/5/2018      DISCHARGE DIAGNOSIS:  Sepsis  Fever, not febrile neutropenia  Viral infection (enterovirus and rhinovirus positive)  Prostatitis ruled out  Multiple myeloma s/p Bone marrow transplant 6/18  Chronic pancytopenia  THIAGO on CKD 2  DM  HTN  Treated HCV       CONSULTATIONS:  IP CONSULT TO ONCOLOGY  IP CONSULT TO UROLOGY  IP CONSULT TO ONCOLOGY  IP CONSULT TO ONCOLOGY  IP CONSULT TO CARDIOLOGY    _____________________________________________________________________  DISCHARGE SUMMARY/HOSPITAL COURSE:  for full details see H&P, daily progress notes, labs, consult notes. 66 y.o.  male with pmh of MM s/p BMT on Ixazomib, pancytopenia, CKD 2, HTN, and PVCs who presented to ED with chills, myalgias, rectal pain, groin pain that radiates to thighs, sore throat and sinus congestion. His rectal pain resolved after a BM. Fever of 101.3 on admission 11/01. Initially, he received IV Levaquin and Vancomycin for suspected prostatis but this was ruled out after normal HEAVEN with negative UA and CT A/P. He spiked another temp of 101.1 on 11/03 at 3 pm. Afebrile since then and his only symptom is sinus congestion. No skin lesion, no sore throat, no rhinorrhea, no diarrhea, no headaches. Off antibiotics, etiology of fever is upper respiratory infection given symptoms of sinus congestion, sore throat and generalize malaise with positive viral PCR positive for enterovirus and rhinovirus. CT A/P revealed unchanged 4.7 cm fluid collection in the left inguinal canal.  11/1/2018 blood culture negative. 11/04: CXR negative  11/05: repeat blood culture negative to date. Respiratory PCR panel positive for rhinovirus and enterovirus.     Cardiology consulted, off amiodarone.  F/u in 2 months    PLAN:   - if fever persists, consider CT chest.   - f/u with PCP next week  - f/u in the oncology clinic as scheduled  - f/u in the cardiology clinic in 2 months  _______________________________________________________________________  Patient seen and examined by me on discharge day. Pertinent Findings:  Gen:    Not in acute pain or distress  Chest: Clear to auscultation bilaterally. CVS:   Regular rate. No LE edema  Abd:  Soft, not distended, not tender. Rectal exam: no skin lesions, normal looking anus. Neuro:  Alert, Ox3. Normal gait  _______________________________________________________________________  DISCHARGE MEDICATIONS:   Current Discharge Medication List      START taking these medications    Details   guaiFENesin ER (MUCINEX) 600 mg ER tablet Take 1 Tab by mouth two (2) times a day for 7 days. Qty: 14 Tab, Refills: 0         CONTINUE these medications which have NOT CHANGED    Details   omeprazole (PRILOSEC) 20 mg capsule Take 20 mg by mouth daily as needed (acid reflux). ondansetron (ZOFRAN ODT) 8 mg disintegrating tablet Take 8 mg by mouth every eight (8) hours as needed for Nausea. buPROPion XL (WELLBUTRIN XL) 150 mg tablet Take 150 mg by mouth every morning. famotidine (PEPCID) 20 mg tablet Take 20 mg by mouth daily as needed (acid reflux). ixazomib 4 mg cap Take 4 mg by mouth every seven (7) days. Patient takes 4 mg every Tuesdays on week 1-3 of cycle      acyclovir (ZOVIRAX) 400 mg tablet Take 400 mg by mouth two (2) times a day. tenofovir DISOPROXIL FUMARATE (VIREAD) 300 mg tablet Take 300 mg by mouth daily. pentamidine (NEBUPENT) 300 mg inhalation solution Take 300 mg by inhalation every month. Once a month at Dr's office      ergocalciferol (ERGOCALCIFEROL) 50,000 unit capsule TAKE 1 CAP BY MOUTH EVERY 7 DAYS, Mondays  Refills: 4      lisinopril (PRINIVIL, ZESTRIL) 10 mg tablet TAKE 1 TABLET BY MOUTH EVERY DAY as needed.   Hold if SBP <115  Refills: 11      folic acid (FOLVITE) 1 mg tablet TAKE 1 TABLET BY MOUTH EVERY DAY  Refills: 4      multivitamin (ONE A DAY) tablet Take 1 Tab by mouth daily. Biotin 2,500 mcg cap Take 1 Cap by mouth daily. metFORMIN ER (GLUCOPHAGE XR) 500 mg tablet Take 1 Tab by mouth two (2) times a day. Qty: 180 Tab, Refills: 3      insulin lispro (HUMALOG) 100 unit/mL kwikpen 4-6 Units by SubCUTAneous route Before breakfast, lunch, and dinner. Blood Glucose (mg/dL)       Insulin Dose (units)              <130                                 skip              130-150                              4               >150                                   6      tamsulosin (FLOMAX) 0.4 mg capsule Take 1 Cap by mouth daily. Qty: 30 Cap, Refills: 1      zolpidem CR (AMBIEN CR) 12.5 mg tablet Take 12.5 mg by mouth nightly. tapentadol (NUCYNTA) 100 mg tablet Take 200 mg by mouth three (3) times daily. At 7a, 3p, 11pm      aripiprazole (ABILIFY) 5 mg tablet Take 2.5 mg by mouth every morning. My Recommended Diet, Activity, Wound Care, and follow-up labs are listed in the patient's Discharge Insturctions which I have personally completed and reviewed.     ______________________________________________________________________    Risk of deterioration: Low    Condition at Discharge:  Stable  ______________________________________________________________________    Disposition  Home with family, no needs  ______________________________________________________________________    Care Plan discussed with:   Patient, Family, RN, Care Manager, Consultant    ______________________________________________________________________    Code Status: Full Code  ______________________________________________________________________      Follow up with:   PCP : Mago Robles MD  Follow-up Information     Follow up With Specialties Details Why Contact Info    Mike Calvin MD Cardiology  Cardiology - follow up 4 weeks [address] [facility]  [address] [place] [address] [address]      Maggie Choi MD Family Practice  PCP - follow up 1-2 weeks [address] [facility]  [phone]                Total time in minutes spent coordinating this discharge (includes going over instructions, follow-up, prescriptions, and preparing report for sign off to her PCP) :  40 minutes    Signed:  Lani Chaves MD

## 2018-11-05 NOTE — DISCHARGE INSTRUCTIONS
HOSPITALIST DISCHARGE INSTRUCTIONS    NAME: Kim Fay Sr.   :  1955   MRN:  [id number]     Date/Time:  2018 3:32 PM    ADMIT DATE: 2018   DISCHARGE DATE: 2018     Attending Physician: Martin Koehler MD    DISCHARGE DIAGNOSIS:  Fever  Viral upper respiratory infection  Multiple myeloma  CKD 3  DM  HTN  PVCs  Pancytopenia      Medications: Per above medication reconciliation. Pain Management: per above medications    Recommended diet: Diabetic Diet. Keep yourself hydrated, drink at least 2 liters of fluids daily    Recommended activity: Activity as tolerated    Glucose management:  check your blood sugars before breakfast and at bedtime. write down the numbers and bring record to doctor's appt       Call Dr Conrado Betancourt office if fever or chills return. Outside physician follow up: Follow-up Information     Follow up With Specialties Details Why Contact Info    Jo Ling MD Cardiology  Cardiology - follow up 4 weeks [address]  [address]  [address] [facility]      Tomás Marques MD Family Practice  PCP - follow up 1-2 weeks [address] [facility]  [phone]                Information obtained by :  I understand that if any problems occur once I am at home I am to contact my physician. I understand and acknowledge receipt of the instructions indicated above.                                                                                                                                            Physician's or R.N.'s Signature                                                                  Date/Time                                                                                                                                              Patient or Repres

## 2018-11-05 NOTE — PROGRESS NOTES
Cardiopulmonary Care Interdisciplinary Rounds were held today to discuss patient's plan of care and outcomes. The following members were present: NP/Physician, Pharmacy, Nursing and Case Management. Expected Length of Stay:  3d 16h Plan of Care: Continue current treatment plan

## 2018-11-05 NOTE — PROGRESS NOTES
EP/ ARRHYTHMIA Progress Note Patient ID: 
Patient: Yennifer Layne. MRN: [id number] Age: 66 y.o.  : 1955 Date of  Admission: 2018  4:48 AM  
PCP:  Mago Robles MD 
 
Assessment: 1. Symptomatic PVC's and nonsustained VT, well-suppressed with amiodarone which was stopped this admission. Previously was on propafenone for this with OK result. 2. Hypertension hx. 
3. Echo EF 55% 2017, mild LVH. 4. Suspected acute prostatitis with rectal pain, urology evaluation however negative. 5. Immunocompromised following bone marrow transplant  for multiple myeloma. 6. Chronic pancytopenia, suspect due to chemotherapy, with acute worsening thrombocytopenia. 7. THIAGO on CKD stage 2. 
8. Chronic pain due to cervical DDD. 9. Hx substance abuse. 10. DM type 2. 
11. Hx treated hepatitis C. 
  
Plan: 1. Stopped amiodarone. Will be 4-6 weeks before this drug washes out in a meaningful way. I would NOT restart propafenone until he's seen in the office in about 2 months for reassessment. 2. Tele, since on QT prolonging medications. This has looked good here so far. 3. OK with holding lisinopril to date, BP coming up some, he can restart on discharge but I'd do lisinopril 5 mg daily instead of 10 mg. All questions answered for him. Doubt the fever source is cardiac at this time. [x]       High complexity decision making was performed in this patient Yennifer Layne. is a 66 y.o. male with a history of symptomatic PVC's and nonsustained VT, normal LV systolic fx EF 04% on prior echo in 2017. He was on propafenone prior to a BM transplant but changed to amiodarone at [facility]. He's done well with arrhythmia suppression with this. He was admitted with fever, started on antibiotic therapy. His amiodarone was stopped in anticipation of later retrying propafenone (to avoid any longterm issues with side effects with amio). TODAY:  He denies chest, jaw, shoulder, neck, or arm pain. No syncope, dizziness, palpitations. No orthopnea, PND, or edema. Allergies Allergen Reactions  Mercury (Bulk) Hives Blisters Current Facility-Administered Medications Medication Dose Route Frequency  sodium chloride (NS) flush 5-10 mL  5-10 mL IntraVENous PRN  
 sodium chloride 0.9 % bolus infusion 1,000 mL  1,000 mL IntraVENous ONCE  
 vancomycin (VANCOCIN) 1750 mg in  ml infusion  1,750 mg IntraVENous NOW  levoFLOXacin (LEVAQUIN) 750 mg in D5W IVPB  750 mg IntraVENous NOW  sodium chloride (NS) flush 5-10 mL  5-10 mL IntraVENous Q8H  
 sodium chloride (NS) flush 5-10 mL  5-10 mL IntraVENous PRN  
 0.9% sodium chloride infusion  125 mL/hr IntraVENous CONTINUOUS  
 levoFLOXacin (LEVAQUIN) 750 mg in D5W IVPB  750 mg IntraVENous Q24H  
 acetaminophen (TYLENOL) tablet 650 mg  650 mg Oral Q6H PRN  
 oxyCODONE IR (ROXICODONE) tablet 10 mg  10 mg Oral Q6H PRN  
 acyclovir (ZOVIRAX) tablet 400 mg  400 mg Oral BID  ARIPiprazole (ABILIFY) tablet 2.5 mg  2.5 mg Oral 7am  
 buPROPion XL (WELLBUTRIN XL) tablet 150 mg  150 mg Oral DAILY WITH DINNER  
 [START ON 11/5/2018] ergocalciferol capsule 50,000 Units  50,000 Units Oral every Monday  folic acid (FOLVITE) tablet 1 mg  1 mg Oral DAILY  insulin glargine (LANTUS) injection 12 Units  12 Units SubCUTAneous DAILY  metFORMIN ER (GLUCOPHAGE XR) tablet 500 mg  500 mg Oral BID  pantoprazole (PROTONIX) tablet 40 mg  40 mg Oral DAILY PRN  
 tamsulosin (FLOMAX) capsule 0.4 mg  0.4 mg Oral DAILY  tapentadol (NUCYNTA) tablet 200 mg  200 mg Oral TID  tenofovir DISOPROXIL FUMARATE (VIREAD) tablet 300 mg  300 mg Oral DAILY  zolpidem (AMBIEN) tablet 10 mg  10 mg Oral QHS Review of Symptoms:  
Gastrointestinal: negative for abdominal pain, N/V, dysphagia, change in bowel habits, bleeding Muskuloskeletal : negative for arthralgia, but +myalgia Hematology: negative for easy bruising, bleeding Objective:  
  
Physical Exam: 
 
Patient Vitals for the past 12 hrs: 
 Temp Pulse Resp BP SpO2  
11/05/18 0735 98.5 °F (36.9 °C) 73 16 138/86 98 % 11/05/18 0233 98.5 °F (36.9 °C) 83 20 110/69 96 % 11/04/18 2257 98.2 °F (36.8 °C) 79 18 118/68 97 % Intake/Output Summary (Last 24 hours) at 11/5/2018 1049 Last data filed at 11/5/2018 0826 Gross per 24 hour Intake 650 ml Output 800 ml Net -150 ml Nondiaphoretic, not in acute distress. Wearing mask No scleral icterus, mucous membranes moist, conjuctivae pink, no xanthelasma. Unlabored, clear to auscultation bilaterally anteriorly, symmetric air movement. Regular rate and rhythm, no murmur, pericardial rub, knock, or gallop. No JVD or peripheral edema. Palpable radial pulses bilaterally. Abdomen, soft, nontender, nondistended. Extremities without cyanosis or clubbing. Muscle tone and bulk normal. 
Skin warm and dry. No rashes or ulcers. Neuro grossly nonfocal.  No tremor. Awake and appropriate. CARDIOGRAPHICS and STUDIES, I reviewed: 
 
Telemetry:  SR. 
 
ECG in ER:  Sinus tachycardia 106 with nonspecific repolarization abnormality. CXR in ER:  No edema or infiltrate. Labs: 
No results for input(s): CPK, CKMB, CKNDX, TROIQ in the last 72 hours. No lab exists for component: CPKMB Lab Results Component Value Date/Time Cholesterol, total 101 07/14/2011 04:15 AM  
 HDL Cholesterol 38 07/14/2011 04:15 AM  
 LDL, calculated 27.8 07/14/2011 04:15 AM  
 Triglyceride 176 (H) 07/14/2011 04:15 AM  
 CHOL/HDL Ratio 2.7 07/14/2011 04:15 AM  
 
No results for input(s): INR, PTP, APTT in the last 72 hours. No lab exists for component: INREXT, INREXT Recent Labs 11/05/18 
9920 11/04/18 
0920 11/03/18 
0241  141 141  
K 3.5 3.7 3.8 * 109* 109* CO2 26 24 25 BUN 13 9 10 CREA 1.40* 1.45* 1.40* GLU 92 125* 123* CA 7.9* 8.0* 7.9*  
 WBC 2.9* 3.4* 2.5* HGB 9.4* 10.4* 10.4* HCT 29.3* 32.1* 32.0*  
PLT 86* 92* 84* No results for input(s): SGOT, GPT, AP, TBIL, TP, ALB, GLOB, GGT, AML, LPSE in the last 72 hours. No lab exists for component: AMYP, HLPSE No components found for: [place] No results for input(s): PH, PCO2, PO2 in the last 72 hours. Josie Knox MD 
11/5/2018

## 2018-11-05 NOTE — PROGRESS NOTES
Problem: Falls - Risk of 
Goal: *Absence of Falls Document April Mili Fall Risk and appropriate interventions in the flowsheet. Outcome: Progressing Towards Goal 
Fall Risk Interventions: 
  
 
  
 
Medication Interventions: Patient to call before getting OOB, Teach patient to arise slowly

## 2018-11-05 NOTE — PROGRESS NOTES
200 Dr. Lela Ya wanted Abx stopped and if Pt remains afebrile will DC tomorrow. 46 Pt was given DC instructions. Offered mucinex RX, but Pt has some at home and didn't want to wait for Dr to sign. Pt had no further needs.

## 2018-11-05 NOTE — PROGRESS NOTES
EP/ ARRHYTHMIA Progress Note Patient ID: 
Patient: Nelsy Fuentes. MRN: [id number] Age: 66 y.o.  : 1955 Date of  Admission: 2018  4:48 AM  
PCP:  Marlena Sorensen MD 
 
Assessment: 1. Symptomatic PVC's and nonsustained VT, well-suppressed with amiodarone which was stopped this admission. Previously was on propafenone for this with OK result. 2. Hypertension hx. 
3. Echo EF 55% 2017, mild LVH. 4. Suspected acute prostatitis with rectal pain, urology evaluation however negative. 5. Immunocompromised following bone marrow transplant  for multiple myeloma. 6. Chronic pancytopenia, suspect due to chemotherapy, with acute worsening thrombocytopenia. 7. THIAGO on CKD stage 2. 
8. Chronic pain due to cervical DDD. 9. Hx substance abuse. 10. DM type 2. 
11. Hx treated hepatitis C. 
  
Plan: 1. Stopped amiodarone. Will be 4-6 weeks before this drug washes out in a meaningful way. I would NOT restart propafenone until he's seen in the office in about 2 months for reassessment. 2. Tele, since on QT prolonging medications. This has looked good here so far. 3. OK with holding lisinopril given normal BP in this setting. All questions answered for him. Doubt the fever source is cardiac at this time. [x]       High complexity decision making was performed in this patient Nelsy Fuentes. is a 66 y.o. male with a history of symptomatic PVC's and nonsustained VT, normal LV systolic fx EF 50% on prior echo in 2017. He was on propafenone prior to a BM transplant but changed to amiodarone at [facility]. He's done well with arrhythmia suppression with this. He was admitted with fever, started on antibiotic therapy. His amiodarone was stopped in anticipation of later retrying propafenone (to avoid any longterm issues with side effects with amio). TODAY:  He denies chest, jaw, shoulder, neck, or arm pain.   No syncope, dizziness, palpitations. No orthopnea, PND, or edema. Allergies Allergen Reactions  Mercury (Bulk) Hives Blisters Current Facility-Administered Medications Medication Dose Route Frequency  sodium chloride (NS) flush 5-10 mL  5-10 mL IntraVENous PRN  
 sodium chloride 0.9 % bolus infusion 1,000 mL  1,000 mL IntraVENous ONCE  
 vancomycin (VANCOCIN) 1750 mg in  ml infusion  1,750 mg IntraVENous NOW  levoFLOXacin (LEVAQUIN) 750 mg in D5W IVPB  750 mg IntraVENous NOW  sodium chloride (NS) flush 5-10 mL  5-10 mL IntraVENous Q8H  
 sodium chloride (NS) flush 5-10 mL  5-10 mL IntraVENous PRN  
 0.9% sodium chloride infusion  125 mL/hr IntraVENous CONTINUOUS  
 levoFLOXacin (LEVAQUIN) 750 mg in D5W IVPB  750 mg IntraVENous Q24H  
 acetaminophen (TYLENOL) tablet 650 mg  650 mg Oral Q6H PRN  
 oxyCODONE IR (ROXICODONE) tablet 10 mg  10 mg Oral Q6H PRN  
 acyclovir (ZOVIRAX) tablet 400 mg  400 mg Oral BID  ARIPiprazole (ABILIFY) tablet 2.5 mg  2.5 mg Oral 7am  
 buPROPion XL (WELLBUTRIN XL) tablet 150 mg  150 mg Oral DAILY WITH DINNER  
 [START ON 11/5/2018] ergocalciferol capsule 50,000 Units  50,000 Units Oral every Monday  folic acid (FOLVITE) tablet 1 mg  1 mg Oral DAILY  insulin glargine (LANTUS) injection 12 Units  12 Units SubCUTAneous DAILY  metFORMIN ER (GLUCOPHAGE XR) tablet 500 mg  500 mg Oral BID  pantoprazole (PROTONIX) tablet 40 mg  40 mg Oral DAILY PRN  
 tamsulosin (FLOMAX) capsule 0.4 mg  0.4 mg Oral DAILY  tapentadol (NUCYNTA) tablet 200 mg  200 mg Oral TID  tenofovir DISOPROXIL FUMARATE (VIREAD) tablet 300 mg  300 mg Oral DAILY  zolpidem (AMBIEN) tablet 10 mg  10 mg Oral QHS Review of Symptoms:  
Gastrointestinal: negative for abdominal pain, N/V, dysphagia, change in bowel habits, bleeding Muskuloskeletal : negative for arthralgia, but +myalgia Hematology: negative for easy bruising, bleeding Objective: Physical Exam: 
 
Patient Vitals for the past 12 hrs: 
 Temp Pulse Resp BP SpO2  
11/04/18 1610 98.7 °F (37.1 °C) 82 15 130/83 99 % 11/04/18 1025 98.9 °F (37.2 °C) 93 15 122/82 95 % 11/04/18 0744 98.8 °F (37.1 °C) 81 18 130/77 94 % Intake/Output Summary (Last 24 hours) at 11/4/2018 1925 Last data filed at 11/4/2018 1612 Gross per 24 hour Intake 650 ml Output 1050 ml Net -400 ml Nondiaphoretic, not in acute distress. Wearing mask No scleral icterus, mucous membranes moist, conjuctivae pink, no xanthelasma. Unlabored, clear to auscultation bilaterally anteriorly, symmetric air movement. Regular rate and rhythm, no murmur, pericardial rub, knock, or gallop. No JVD or peripheral edema. Palpable radial pulses bilaterally. Abdomen, soft, nontender, nondistended. Extremities without cyanosis or clubbing. Muscle tone and bulk normal. 
Skin warm and dry. No rashes or ulcers. Neuro grossly nonfocal.  No tremor. Awake and appropriate. CARDIOGRAPHICS and STUDIES, I reviewed: 
 
Telemetry:  SR. 
 
ECG in ER:  Sinus tachycardia 106 with nonspecific repolarization abnormality. CXR in ER:  No edema or infiltrate. Labs: 
No results for input(s): CPK, CKMB, CKNDX, TROIQ in the last 72 hours. No lab exists for component: CPKMB Lab Results Component Value Date/Time Cholesterol, total 101 07/14/2011 04:15 AM  
 HDL Cholesterol 38 07/14/2011 04:15 AM  
 LDL, calculated 27.8 07/14/2011 04:15 AM  
 Triglyceride 176 (H) 07/14/2011 04:15 AM  
 CHOL/HDL Ratio 2.7 07/14/2011 04:15 AM  
 
No results for input(s): INR, PTP, APTT in the last 72 hours. No lab exists for component: INREXT, INREXT Recent Labs 11/04/18 
1525 11/03/18 
0241 11/02/18 
7433  141 142  
K 3.7 3.8 4.0  
* 109* 109* CO2 24 25 27 BUN 9 10 10 CREA 1.45* 1.40* 1.32* * 123* 94  
CA 8.0* 7.9* 7.7* WBC 3.4* 2.5*  --   
HGB 10.4* 10.4*  --   
HCT 32.1* 32.0*  --   
PLT 92* 84*  --   
 No results for input(s): SGOT, GPT, AP, TBIL, TP, ALB, GLOB, GGT, AML, LPSE in the last 72 hours. No lab exists for component: AMYP, HLPSE No components found for: [place] No results for input(s): PH, PCO2, PO2 in the last 72 hours. Luca Barnes MD 
11/4/2018

## 2018-11-06 LAB
BACTERIA SPEC CULT: NORMAL
SERVICE CMNT-IMP: NORMAL

## 2018-11-07 ENCOUNTER — HOSPITAL ENCOUNTER (OUTPATIENT)
Dept: INFUSION THERAPY | Age: 63
Discharge: HOME | End: 2018-11-07
Payer: COMMERCIAL

## 2018-11-07 VITALS
DIASTOLIC BLOOD PRESSURE: 87 MMHG | SYSTOLIC BLOOD PRESSURE: 135 MMHG | RESPIRATION RATE: 16 BRPM | HEART RATE: 80 BPM | TEMPERATURE: 99 F | OXYGEN SATURATION: 98 %

## 2018-11-07 LAB
BASOPHILS # BLD: 0 K/UL (ref 0–0.1)
BASOPHILS NFR BLD: 0 % (ref 0–1)
DIFFERENTIAL METHOD BLD: ABNORMAL
EOSINOPHIL # BLD: 0 K/UL (ref 0–0.4)
EOSINOPHIL NFR BLD: 0 % (ref 0–7)
ERYTHROCYTE [DISTWIDTH] IN BLOOD BY AUTOMATED COUNT: 14.3 % (ref 11.5–14.5)
HCT VFR BLD AUTO: 31.2 % (ref 36.6–50.3)
HGB BLD-MCNC: 10.3 G/DL (ref 12.1–17)
IMM GRANULOCYTES # BLD: 0 K/UL (ref 0–0.04)
IMM GRANULOCYTES NFR BLD AUTO: 0 % (ref 0–0.5)
LYMPHOCYTES # BLD: 1 K/UL (ref 0.8–3.5)
LYMPHOCYTES NFR BLD: 27 % (ref 12–49)
MCH RBC QN AUTO: 29.9 PG (ref 26–34)
MCHC RBC AUTO-ENTMCNC: 33 G/DL (ref 30–36.5)
MCV RBC AUTO: 90.4 FL (ref 80–99)
MONOCYTES # BLD: 0.6 K/UL (ref 0–1)
MONOCYTES NFR BLD: 17 % (ref 5–13)
NEUTS SEG # BLD: 2 K/UL (ref 1.8–8)
NEUTS SEG NFR BLD: 55 % (ref 32–75)
NRBC # BLD: 0 K/UL (ref 0–0.01)
NRBC BLD-RTO: 0 PER 100 WBC
PLATELET # BLD AUTO: 141 K/UL (ref 150–400)
PMV BLD AUTO: 11.2 FL (ref 8.9–12.9)
RBC # BLD AUTO: 3.45 M/UL (ref 4.1–5.7)
WBC # BLD AUTO: 3.6 K/UL (ref 4.1–11.1)

## 2018-11-07 PROCEDURE — 85025 COMPLETE CBC W/AUTO DIFF WBC: CPT | Performed by: INTERNAL MEDICINE

## 2018-11-07 PROCEDURE — 36415 COLL VENOUS BLD VENIPUNCTURE: CPT | Performed by: INTERNAL MEDICINE

## 2018-11-07 PROCEDURE — 77030012965 HC NDL HUBR BBMI -A

## 2018-11-07 PROCEDURE — 74011250636 HC RX REV CODE- 250/636: Performed by: INTERNAL MEDICINE

## 2018-11-07 PROCEDURE — 36591 DRAW BLOOD OFF VENOUS DEVICE: CPT

## 2018-11-07 RX ORDER — HEPARIN 100 UNIT/ML
500 SYRINGE INTRAVENOUS AS NEEDED
Status: ACTIVE | OUTPATIENT
Start: 2018-11-07 | End: 2018-11-08

## 2018-11-07 RX ORDER — SODIUM CHLORIDE 0.9 % (FLUSH) 0.9 %
10-40 SYRINGE (ML) INJECTION AS NEEDED
Status: ACTIVE | OUTPATIENT
Start: 2018-11-07 | End: 2018-11-08

## 2018-11-07 RX ADMIN — Medication 10 ML: at 16:05

## 2018-11-07 RX ADMIN — Medication 10 ML: at 16:04

## 2018-11-07 RX ADMIN — Medication 500 UNITS: at 16:05

## 2018-11-07 NOTE — PROGRESS NOTES
[address] [facility] Note: 
Arrived - [address] Visit Vitals /87 (BP 1 Location: Left arm, BP Patient Position: At rest) Pulse 80 Temp 99 °F (37.2 °C) Resp 16 SpO2 98% Assessment - unchanged. No new complaints or concerns. Labs:  
Recent Results (from the past 12 hour(s)) CBC WITH AUTOMATED DIFF Collection Time: 11/07/18  4:08 PM  
Result Value Ref Range WBC 3.6 (L) 4.1 - 11.1 K/uL  
 RBC 3.45 (L) 4.10 - 5.70 M/uL  
 HGB 10.3 (L) 12.1 - 17.0 g/dL HCT 31.2 (L) 36.6 - 50.3 % MCV 90.4 80.0 - 99.0 FL  
 MCH 29.9 26.0 - 34.0 PG  
 MCHC 33.0 30.0 - 36.5 g/dL  
 RDW 14.3 11.5 - 14.5 % PLATELET 148 (L) 018 - 400 K/uL MPV 11.2 8.9 - 12.9 FL  
 NRBC 0.0 0  WBC ABSOLUTE NRBC 0.00 0.00 - 0.01 K/uL NEUTROPHILS 55 32 - 75 % LYMPHOCYTES 27 12 - 49 % MONOCYTES 17 (H) 5 - 13 % EOSINOPHILS 0 0 - 7 % BASOPHILS 0 0 - 1 % IMMATURE GRANULOCYTES 0 0.0 - 0.5 % ABS. NEUTROPHILS 2.0 1.8 - 8.0 K/UL  
 ABS. LYMPHOCYTES 1.0 0.8 - 3.5 K/UL  
 ABS. MONOCYTES 0.6 0.0 - 1.0 K/UL  
 ABS. EOSINOPHILS 0.0 0.0 - 0.4 K/UL  
 ABS. BASOPHILS 0.0 0.0 - 0.1 K/UL  
 ABS. IMM. GRANS. 0.0 0.00 - 0.04 K/UL  
 DF AUTOMATED Port accessed & flushed per protocol w/o difficulty. Scott needle removed. 1610 - Tolerated well. Pt denies any acute problems/changes. Discharged from [facility] ambulatory. No distress.  Next appt: 11/14/18 @ 4 pm

## 2018-11-07 NOTE — ADT AUTH CERT NOTES
Utilization Reviews Systemic or Infectious Condition GRG - Care Day 4 (11/4/2018) by Shilpa Eason RN Review Status Review Entered Completed 11/7/2018 15:09 Criteria Review Care Day: 4 Care Date: 11/4/2018 Level of Care: Inpatient Floor Guideline Day 2 Level Of Care (X) Floor 11/7/2018 3:09 PM EST by Shey Booth Clinical Status  
(X) * No ICU or intermediate care needs 11/7/2018 3:09 PM EST by Jesus Currie, [address] [place] [address]  
  
  
11/7/2018 3:09 PM EST by Brayan Garcia Subject: Additional Clinical Information VS: T 98.5, P 79, R 20, /68, SPO2 97% RA  
  
LABS: WBC 3.4, HGB 10.4, HCT 32.1, PLT 92, , , CREA 1.45, CA 8.0, GFRNA 49  
  
  
  
  
  
* Milestone Additional Notes 11/4/2018 Assessment / Plan:  
Sepsis, POA Suspect ? prostatitis with rectal pain.    
Immunocompromised following bone marrow transplant 6/18 for multiple myeloma. Currently on abx coverage. Rectal exam per ER physician with tender, boggy prostate Urology was consulted in ER which Urology does not believe pt has prostatitis and the fever/elevated temp spikes are from another source. CT abdomen:4.7 cm fluid collection in the left inguinal canal is stable. No acute  
abnormality is identified 11/1/2018 blood culture negative for growth thus far  
11/3/2018 Pt believes his elevated temp spikes are from the medications related to his MM. Oncology has seen the patient. 11/4/2018 temp spike last night, ordered repeat blood cx and cxr. Ordered consult requesting oncology to revisit due to unknown source of fever and patients verbalizing concern that it is his oncology meds that has caused this.  
   
   
Chronic pancytopenia, suspect due to chemotherapy, with acute worsening thrombocytopenia Oncology following, refer to above Plt count relatively stable  
   
THIAGO on CKD stage 2, srcr relatively stable. Lisinopril medication held (refer to cardiology consult note)  
  History PVCs/VT Refer to Cardiology note from 11/3/2018   
   
Chronic pain due to cervical DDD Hx substance abuse DM type 2 Adequately controlled on current therapy  
   
History of hypertension  
bp controlled  
   
History of  treated hepatitis C  
  Body mass index is 27.72 kg/m².: 25.0 - 29.9 Overweight Code status: Full Prophylaxis: SCD's Recommended Disposition: patient still has elevated temperature. Patient questions medications by his oncologist as the cause. 11/1/2018 blood culture negative for growth thus far. 11/4/2018: Recurrent temp spikes which pt states is associated with chills. Pt again concerned his oncology medications caused this. Therefore oncology consult reordered regarding patient's inquiry. Reordered blood culture due to temp spike last night along with cxr.  
Gay Ortiz still febrile and blood culture negative, then consider ID consult.  
   
  
  
CARDIOLOGY PROGRESS NOTE:  
Assessment: 1. Symptomatic PVC's and nonsustained VT, well-suppressed with amiodarone which was stopped this admission.  Previously was on propafenone for this with OK result. 2. Hypertension hx.  
3. Echo EF 55% 8/2017, mild LVH. 4. Suspected acute prostatitis with rectal pain, urology evaluation however negative. 5. Immunocompromised following bone marrow transplant 6/18 for multiple myeloma. 6. Chronic pancytopenia, suspect due to chemotherapy, with acute worsening thrombocytopenia. 7. THIAGO on CKD stage 2.  
8. Chronic pain due to cervical DDD. 9. Hx substance abuse. 10. DM type 2.  
11. Hx treated hepatitis C.  
   
Plan:  
   
1. Stopped amiodarone.  Will be 4-6 weeks before this drug washes out in a meaningful way.  I would NOT restart propafenone until he's seen in the office in about 2 months for reassessment. 2. Tele, since on QT prolonging medications.  This has looked good here so far. 3. OK with holding lisinopril given normal BP in this setting.  
   
  
Medication Dose Route Frequency  sodium chloride (NS) flush 5-10 mL 5-10 mL IntraVENous PRN  
 sodium chloride 0.9 % bolus infusion 1,000 mL 1,000 mL IntraVENous ONCE  
 vancomycin (VANCOCIN) 1750 mg in  ml infusion 1,750 mg IntraVENous NOW  levoFLOXacin (LEVAQUIN) 750 mg in D5W IVPB 750 mg IntraVENous NOW  sodium chloride (NS) flush 5-10 mL 5-10 mL IntraVENous Q8H  
 sodium chloride (NS) flush 5-10 mL 5-10 mL IntraVENous PRN  
 0.9% sodium chloride infusion 125 mL/hr IntraVENous CONTINUOUS  
 levoFLOXacin (LEVAQUIN) 750 mg in D5W IVPB 750 mg IntraVENous Q24H  
 acetaminophen (TYLENOL) tablet 650 mg 650 mg Oral Q6H PRN  
 oxyCODONE IR (ROXICODONE) tablet 10 mg 10 mg Oral Q6H PRN  
 acyclovir (ZOVIRAX) tablet 400 mg 400 mg Oral BID  ARIPiprazole (ABILIFY) tablet 2.5 mg 2.5 mg Oral 7am  
 buPROPion XL (WELLBUTRIN XL) tablet 150 mg 150 mg Oral DAILY WITH DINNER  
 [START ON 11/5/2018] ergocalciferol capsule 50,000 Units 50,000 Units Oral every Monday  folic acid (FOLVITE) tablet 1 mg 1 mg Oral DAILY  insulin glargine (LANTUS) injection 12 Units 12 Units SubCUTAneous DAILY  metFORMIN ER (GLUCOPHAGE XR) tablet 500 mg 500 mg Oral BID  pantoprazole (PROTONIX) tablet 40 mg 40 mg Oral DAILY PRN  
 tamsulosin (FLOMAX) capsule 0.4 mg 0.4 mg Oral DAILY  tapentadol (NUCYNTA) tablet 200 mg 200 mg Oral TID  tenofovir DISOPROXIL FUMARATE (VIREAD) tablet 300 mg 300 mg Oral DAILY  zolpidem (AMBIEN) tablet 10 mg 10 mg Oral QHS  
   
  
  
Intake/Output Summary (Last 24 hours) at 11/4/2018 1925 Last data filed at 11/4/2018 1612 Gross per 24 hour Intake 650 ml Output 1050 ml Net -400 ml Systemic or Infectious Condition GRG - Care Day 3 (11/3/2018) by Vanessa Beal RN Review Status Review Entered Completed 11/7/2018 15:06 Criteria Review Care Day: 3 Care Date: 11/3/2018 Level of Care: Inpatient Floor Guideline Day 1 Clinical Status  
(X) * Clinical Indications met[H] 11/7/2018 3:03 PM EST by Demetrio Remy  
  SEPSIS  
  
  
11/7/2018 3:05 PM EST by Demetrio Remy Subject: Additional Clinical Information VS: T 101.1, P 97, R 20, /76, SPO2 99% RA  
  
LABS: WBC 2.5, HGB 10.4, HCT 32.0, PLT 84, , , CRETA 1.40, CA 7.9, GFRNA 52  
  
  
11/7/2018 3:03 PM EST by Demetrio Remy Subject: Additional Clinical Information CARDIOLOGY PROGRESS NOTE:  
  
Assessment:Symptomatic PVC's and nonsustained VT, well-suppressed with amiodarone which was stopped this admission.   Previously was on propafenone for this with OK result. Hypertension hx. Echo EF 55% 8/2017, mild LVH. Suspected acute prostatitis with rectal pain, urology evaluation however negative. Immunocompromised following bone marrow transplant 6/18 for multiple myeloma. Chronic pancytopenia, suspect due to chemotherapy, with acute worsening thrombocytopenia. THIAGO on CKD stage 2. Chronic pain due to cervical DDD. Hx substance abuse. DM type 2. Hx treated hepatitis C.  
  Plan:  Stopped amiodarone.   Will be 4-6 weeks before this drug washes out in a meaningful way.   I would NOT restart propafenone until he's seen in the office in about 2 months for reassessment. Tele, since on QT prolonging medications.   This has looked good here so far. OK with holding lisinopril given normal BP in this setting.  
    
  
  
11/7/2018 3:02 PM EST by Demetrio Remy Subject: Additional Clinical Information MEDICAL PROGRESS NOTE:  
  
Assessment / Plan:  
  
Sepsis, POASuspect ? prostatitis with rectal pain.   Immunocompromised following bone marrow transplant 6/18 for multiple myeloma. continue levaquin for ?prostatitis.   Urology consulted in ER. seen notes Rectal exam per ER physician with tender, boggy prostate. but per urology it was normal.CT abdomen:4.7 cm fluid collection in the left inguinal canal is stable. No acuteabnormality is [id number]/1/2018 blood culture negative for growth thus far    
  
Chronic pancytopenia, suspect due to chemotherapy, with acute worsening thrombocytopeniaOncology followingPlt count relatively stable    
  
THIAGO on CKD stage 2, srcr relatively stable. Lisinopril medication held (refer to cardiology consult note)   History PVCs/VTRefer to Cardiology note, they seem okay to have the amiodarone off    
  
Chronic pain due to cervical DDDHx substance abuseDM type 2Adequately controlled on current therapy    
  
History of hypertensionbp controlled    
  
History of treated hepatitis C  
  
  
  
  
  
* Milestone Additional Notes 11/3/2018 Current Facility-Administered Medications Medication Dose Route Frequency  sodium chloride (NS) flush 5-10 mL 5-10 mL IntraVENous PRN  
 sodium chloride 0.9 % bolus infusion 1,000 mL 1,000 mL IntraVENous ONCE  
 vancomycin (VANCOCIN) 1750 mg in  ml infusion 1,750 mg IntraVENous NOW  levoFLOXacin (LEVAQUIN) 750 mg in D5W IVPB 750 mg IntraVENous NOW  sodium chloride (NS) flush 5-10 mL 5-10 mL IntraVENous Q8H  
 sodium chloride (NS) flush 5-10 mL 5-10 mL IntraVENous PRN  
 0.9% sodium chloride infusion 125 mL/hr IntraVENous CONTINUOUS  
 levoFLOXacin (LEVAQUIN) 750 mg in D5W IVPB 750 mg IntraVENous Q24H  
 acetaminophen (TYLENOL) tablet 650 mg 650 mg Oral Q6H PRN  
 oxyCODONE IR (ROXICODONE) tablet 10 mg 10 mg Oral Q6H PRN  
 acyclovir (ZOVIRAX) tablet 400 mg 400 mg Oral BID  ARIPiprazole (ABILIFY) tablet 2.5 mg 2.5 mg Oral 7am  
 buPROPion XL (WELLBUTRIN XL) tablet 150 mg 150 mg Oral DAILY WITH DINNER  
 [START ON 11/5/2018] ergocalciferol capsule 50,000 Units 50,000 Units Oral every Monday  folic acid (FOLVITE) tablet 1 mg 1 mg Oral DAILY  insulin glargine (LANTUS) injection 12 Units 12 Units SubCUTAneous DAILY  metFORMIN ER (GLUCOPHAGE XR) tablet 500 mg 500 mg Oral BID  pantoprazole (PROTONIX) tablet 40 mg 40 mg Oral DAILY PRN  
 tamsulosin (FLOMAX) capsule 0.4 mg 0.4 mg Oral DAILY  tapentadol (NUCYNTA) tablet 200 mg 200 mg Oral TID  tenofovir DISOPROXIL FUMARATE (VIREAD) tablet 300 mg 300 mg Oral DAILY  zolpidem (AMBIEN) tablet 10 mg 10 mg Oral QHS

## 2018-11-08 ENCOUNTER — DOCUMENTATION ONLY (OUTPATIENT)
Dept: FAMILY MEDICINE CLINIC | Age: 63
End: 2018-11-08

## 2018-11-11 LAB
BACTERIA SPEC CULT: NORMAL
SERVICE CMNT-IMP: NORMAL

## 2018-11-12 ENCOUNTER — OFFICE VISIT (OUTPATIENT)
Dept: ONCOLOGY | Age: 63
End: 2018-11-12

## 2018-11-12 VITALS
TEMPERATURE: 98.9 F | SYSTOLIC BLOOD PRESSURE: 148 MMHG | DIASTOLIC BLOOD PRESSURE: 94 MMHG | RESPIRATION RATE: 18 BRPM | BODY MASS INDEX: 28.64 KG/M2 | OXYGEN SATURATION: 99 % | HEIGHT: 68 IN | HEART RATE: 82 BPM | WEIGHT: 189 LBS

## 2018-11-12 DIAGNOSIS — C90.01 MULTIPLE MYELOMA IN REMISSION (HCC): Primary | ICD-10-CM

## 2018-11-12 DIAGNOSIS — Z94.84 H/O AUTOLOGOUS STEM CELL TRANSPLANT (HCC): ICD-10-CM

## 2018-11-12 NOTE — PROGRESS NOTES
Chief Complaint   Patient presents with    Multiple Myeloma     4 week follow up     1. Have you been to the ER, urgent care clinic since your last visit? ED [facility]  Hospitalized since your last visit? Yes 4 to 5 days    2. Have you seen or consulted any other health care providers outside of the [facility] since your last visit?   No

## 2018-11-12 NOTE — PROGRESS NOTES
[facility]  [address], [address] [place] [address] Jared Cornelius, [address] [place]  [phone]          Progress Note        Patient: Bridget Llamas Sr. MRN: [id number]  SSN: [id number]    YOB: 1955  Age: 66 y.o. Sex: male        Diagnosis:      1. Multiple Myeloma, IgA Kappa   Durie [place] stage IIIB    Cytogenetics/FISH: t(14;16) - high risk    Ttreatment:     1. Maintenance therapy - Pomalyst 2 mg daily - started 11/19/2018  2. Maintenance therapy - Ixazomib - started 10/15/2018 - stopped 11/12/2018  3. S/P tandem  Autotransplant    First on 2/28/2018   2nd on 06/13/2018  4. Carfilzomib/Pom/Dex - s/p 5 cycles  5. VD-PACE s/p 1 cycle  6. RVD - s/p 4 cycles    Subjective:      Bridget Llamas Sr. is a 66 y.o. male with a diagnosis of IgA kappa myeloma. Bone marrow shows 100% aberrant plasma cells. He suffers with DM but it is diet controlled. He has received systemic anti-viral treatment for chronic Hep C with successful eradication of the virus. Mr. Satinder Dickey received 4 cycles of systemic therapy with RVd. He had an initial good response to treatment. However his paraprotein level started rising and the myeloma became refractory to treatment. I then administered one cycle of VD-PACE in the hospital. He then received Carfilzomib/Pom/Dex. He achieved VGPR. He underwent tandem autotransplant at [facility]. According to the patient he achieved complete response with therapy. He has undergone second/tandem transplant in June. Mr. Satinder Dickey was hospitalized on 11/1/2018 for fever with URI. Blood cultures were negative. Respiratory PCR panel was positive for rhinovirus and enterovirus.        Review of Systems:    Constitutional: fatigue  Eyes: negative  Ears, Nose, Mouth, Throat, and Face: negative  Respiratory: negative  Cardiovascular: negative  Gastrointestinal: negative  Genitourinary:negative  Integument/Breast: negative  Hematologic/Lymphatic: negative  Musculoskeletal: chronic neck pain  Neurological: negative      Past Medical History:   Diagnosis Date    Acid reflux     Arrhythmia     pvc's    Chronic pain     neck    Depression     Diabetes ([address]     Femoral hernia 2012    Hepatitis C     Hypertension     Inguinal hernia 2012    Liver disease     hepatitis c    Other ill-defined conditions(799.89)     Hx of PVCs since     Prostatitis, acute     Psychiatric disorder     depression     Past Surgical History:   Procedure Laterality Date    HX APPENDECTOMY      HX CERVICAL FUSION  2008    x 1 as of 2014    HX HEENT      foreign body removed from right eye    HX HERNIA REPAIR      [facility]      HX ORTHOPAEDIC      cervical fusion    HX OTHER SURGICAL  2000    liver bx - chronic hepatitis      Family History   Problem Relation Age of Onset    Arthritis-osteo Mother     Arthritis-osteo Father     Diabetes Father     Hypertension Maternal Grandmother     Diabetes Maternal Grandmother     Hypertension Maternal Grandfather     Diabetes Maternal Grandfather      Social History     Tobacco Use    Smoking status: Former Smoker     Packs/day: 1.00     Years: 12.00     Pack years: 12.00     Types: Cigarettes     Last attempt to quit: 1989     Years since quittin.8    Smokeless tobacco: Never Used    Tobacco comment: former cigarette smoker   Substance Use Topics    Alcohol use: No     Comment: former alcoholic-quit [id number]      Prior to Admission medications    Medication Sig Start Date End Date Taking? Authorizing Provider   omeprazole (PRILOSEC) 20 mg capsule Take 20 mg by mouth daily as needed (acid reflux). Yes Joe, MD Goldy   buPROPion XL (WELLBUTRIN XL) 150 mg tablet Take 150 mg by mouth every morning. Yes Other, MD Goldy   famotidine (PEPCID) 20 mg tablet Take 20 mg by mouth daily as needed (acid reflux).    Yes Other, MD Goldy   ixazomib 4 mg cap Take 4 mg by mouth every seven (7) days. Patient takes 4 mg every Tuesdays on week 1-3 of cycle   Yes Other, MD Goldy   acyclovir (ZOVIRAX) 400 mg tablet Take 400 mg by mouth two (2) times a day. 7/7/18  Yes Provider, Historical   tenofovir DISOPROXIL FUMARATE (VIREAD) 300 mg tablet Take 300 mg by mouth daily. 7/7/18  Yes Provider, Historical   pentamidine (NEBUPENT) 300 mg inhalation solution Take 300 mg by inhalation every month. Once a month at Dr's office   Yes Provider, Historical   ergocalciferol (ERGOCALCIFEROL) 50,000 unit capsule TAKE 1 CAP BY MOUTH EVERY 7 DAYS, Mondays 5/14/18  Yes Provider, Historical   lisinopril (PRINIVIL, ZESTRIL) 10 mg tablet TAKE 1 TABLET BY MOUTH EVERY DAY as needed. Hold if SBP <115 5/12/18  Yes Provider, Historical   folic acid (FOLVITE) 1 mg tablet TAKE 1 TABLET BY MOUTH EVERY DAY 5/14/18  Yes Provider, Historical   multivitamin (ONE A DAY) tablet Take 1 Tab by mouth daily. Yes Provider, Historical   Biotin 2,500 mcg cap Take 1 Cap by mouth daily. Yes Provider, Historical   metFORMIN ER (GLUCOPHAGE XR) 500 mg tablet Take 1 Tab by mouth two (2) times a day. 11/10/17  Yes Thu Richardson MD   insulin lispro (HUMALOG) 100 unit/mL kwikpen 4-6 Units by SubCUTAneous route Before breakfast, lunch, and dinner. Blood Glucose (mg/dL)       Insulin Dose (units)              <130                                 skip              130-150                              4               >150                                   6   Yes Provider, Historical   tamsulosin (FLOMAX) 0.4 mg capsule Take 1 Cap by mouth daily. 4/4/17  Yes Stevie Reyes MD   zolpidem CR (AMBIEN CR) 12.5 mg tablet Take 12.5 mg by mouth nightly. Yes Provider, Historical   tapentadol (NUCYNTA) 100 mg tablet Take 200 mg by mouth three (3) times daily. At 7a, 3p, 11pm   Yes Other, MD Goldy   aripiprazole (ABILIFY) 5 mg tablet Take 2.5 mg by mouth every morning.    Yes Other, MD Goldy   guaiFENesin ER (MUCINEX) 600 mg ER tablet Take 1 Tab by mouth two (2) times a day for 7 days. 11/5/18 11/12/18  Dave Atwood MD   ondansetron (ZOFRAN ODT) 8 mg disintegrating tablet Take 8 mg by mouth every eight (8) hours as needed for Nausea. Other, MD Goldy          Allergies   Allergen Reactions    Mercury (Bulk) Hives     Blisters             Objective:     Vitals:    11/12/18 0900   BP: (!) 148/94   Pulse: 82   Resp: 18   Temp: 98.9 °F (37.2 °C)   TempSrc: Oral   SpO2: 99%   Weight: 189 lb (85.7 kg)   Height: 5' 8\" (1.727 m)          Physical Exam:    GENERAL: alert, cooperative  EYE: negative  LYMPHATIC: Cervical, supraclavicular, and axillary nodes normal.   THROAT & NECK: normal and no erythema or exudates noted. LUNG: clear to auscultation bilaterally  HEART: regular rate and rhythm  ABDOMEN: soft, non-tender  EXTREMITIES: bilateral LE edema  SKIN: Normal.  NEUROLOGIC: negative      Lab Results   Component Value Date/Time    WBC 3.6 (L) 11/07/2018 04:08 PM    HGB 10.3 (L) 11/07/2018 04:08 PM    HCT 31.2 (L) 11/07/2018 04:08 PM    PLATELET 841 (L) 96/66/0975 04:08 PM    MCV 90.4 11/07/2018 04:08 PM       Lab Results   Component Value Date/Time    Sodium 142 11/05/2018 02:12 AM    Potassium 3.5 11/05/2018 02:12 AM    Chloride 110 (H) 11/05/2018 02:12 AM    CO2 26 11/05/2018 02:12 AM    Anion gap 6 11/05/2018 02:12 AM    Glucose 92 11/05/2018 02:12 AM    BUN 13 11/05/2018 02:12 AM    Creatinine 1.40 (H) 11/05/2018 02:12 AM    BUN/Creatinine ratio 9 (L) 11/05/2018 02:12 AM    GFR est AA >60 11/05/2018 02:12 AM    GFR est non-AA 52 (L) 11/05/2018 02:12 AM    Calcium 7.9 (L) 11/05/2018 02:12 AM            Assessment:     1. Multiple Myeloma, IgA Kappa   Durie [place] stage IIIB    Cytogenetics/FISH: t(14;16) - high risk  ECOG PS - 0   Intent of therapy: palliative    Received 3 cycles of RVd   Dose reduced Revlimid and Velcade d/t continued cytopenias. M-spike started rising.     He received one cycle of VD-PACE (bortezomib, dexamethasone, thalidomide, cisplatin, adriamycin, cyclophosphamide, and etoposide). Achieved [facility] with once cycle    Receivied KPd - s/p 5 cycles    Excellent response with M-protein came down to 0.1    S/p tandem autotransplant    First on 2/28/2018   2nd on 06/13/2018    Achieved CR after the first transplant. Blood count has recovered  Doing well  Some fatigue  Repeat bone marrow @ [facility] - Complete response. Since he is high risk based on cytogenetics, I recommend proteosome inhibitor as maintenance therapy. Started Ixazomib   Developed fever, was in the hospital.   ? Prostatitis vs ? Viral URI    Treated empirically with Abx and fever has resolved  He also developed thrombocytopenia. Platelet count has recovered. Patient prefers to switch therapy to [facility].   He has been on Pomalyst before and knows the side effects and ways to manage it. We shall use a dose of 2 mg daily. Symptom management form reviewed with patient. 2. Type 2 DM with complication    Managed by Endocrine      3. Chronic Hep C    In sustained virological remission      Plan:       > Stop maintenance therapy with Ixazomib  > Start Pomalyst 2 mg daily maintenance next week   > Repeat myeloma labs on 11/19  > Myeloma labs in 2 weeks and then repeat 2 weeks and then start monthly  > Follow-up in 4 weeks        Signed by: Gayle Ledesma MD                     November 12, 2018          CC. Krystin Graves MD  CC.  Jameson Brooks MD

## 2018-11-14 DIAGNOSIS — Z94.84 H/O AUTOLOGOUS STEM CELL TRANSPLANT (HCC): ICD-10-CM

## 2018-11-14 DIAGNOSIS — C90.01 MULTIPLE MYELOMA IN REMISSION (HCC): Primary | ICD-10-CM

## 2018-11-16 ENCOUNTER — HOSPITAL ENCOUNTER (OUTPATIENT)
Dept: INFUSION THERAPY | Age: 63
Discharge: HOME | End: 2018-11-16
Payer: COMMERCIAL

## 2018-11-16 VITALS
SYSTOLIC BLOOD PRESSURE: 124 MMHG | TEMPERATURE: 98.6 F | DIASTOLIC BLOOD PRESSURE: 80 MMHG | RESPIRATION RATE: 18 BRPM | HEART RATE: 84 BPM

## 2018-11-16 LAB
ALBUMIN SERPL-MCNC: 3.6 G/DL (ref 3.5–5)
ALBUMIN/GLOB SERPL: 1.2 {RATIO} (ref 1.1–2.2)
ALP SERPL-CCNC: 90 U/L (ref 45–117)
ALT SERPL-CCNC: 30 U/L (ref 12–78)
ANION GAP SERPL CALC-SCNC: 6 MMOL/L (ref 5–15)
AST SERPL-CCNC: 21 U/L (ref 15–37)
BASOPHILS # BLD: 0 K/UL (ref 0–0.1)
BASOPHILS NFR BLD: 0 % (ref 0–1)
BILIRUB SERPL-MCNC: 0.3 MG/DL (ref 0.2–1)
BUN SERPL-MCNC: 12 MG/DL (ref 6–20)
BUN/CREAT SERPL: 9 (ref 12–20)
CALCIUM SERPL-MCNC: 8.1 MG/DL (ref 8.5–10.1)
CHLORIDE SERPL-SCNC: 108 MMOL/L (ref 97–108)
CO2 SERPL-SCNC: 28 MMOL/L (ref 21–32)
CREAT SERPL-MCNC: 1.38 MG/DL (ref 0.7–1.3)
DIFFERENTIAL METHOD BLD: ABNORMAL
EOSINOPHIL # BLD: 0 K/UL (ref 0–0.4)
EOSINOPHIL NFR BLD: 0 % (ref 0–7)
ERYTHROCYTE [DISTWIDTH] IN BLOOD BY AUTOMATED COUNT: 15.5 % (ref 11.5–14.5)
GLOBULIN SER CALC-MCNC: 3.1 G/DL (ref 2–4)
GLUCOSE SERPL-MCNC: 103 MG/DL (ref 65–100)
HCT VFR BLD AUTO: 33.3 % (ref 36.6–50.3)
HGB BLD-MCNC: 10.8 G/DL (ref 12.1–17)
IGA SERPL-MCNC: 39 MG/DL (ref 70–400)
IGG SERPL-MCNC: 444 MG/DL (ref 700–1600)
IGM SERPL-MCNC: 42 MG/DL (ref 40–230)
IMM GRANULOCYTES # BLD: 0 K/UL (ref 0–0.04)
IMM GRANULOCYTES NFR BLD AUTO: 0 % (ref 0–0.5)
LYMPHOCYTES # BLD: 0.9 K/UL (ref 0.8–3.5)
LYMPHOCYTES NFR BLD: 29 % (ref 12–49)
MCH RBC QN AUTO: 29.8 PG (ref 26–34)
MCHC RBC AUTO-ENTMCNC: 32.4 G/DL (ref 30–36.5)
MCV RBC AUTO: 92 FL (ref 80–99)
MONOCYTES # BLD: 0.5 K/UL (ref 0–1)
MONOCYTES NFR BLD: 15 % (ref 5–13)
NEUTS SEG # BLD: 1.8 K/UL (ref 1.8–8)
NEUTS SEG NFR BLD: 55 % (ref 32–75)
NRBC # BLD: 0 K/UL (ref 0–0.01)
NRBC BLD-RTO: 0 PER 100 WBC
PLATELET # BLD AUTO: 162 K/UL (ref 150–400)
PMV BLD AUTO: 10.2 FL (ref 8.9–12.9)
POTASSIUM SERPL-SCNC: 3.7 MMOL/L (ref 3.5–5.1)
PROT SERPL-MCNC: 6.7 G/DL (ref 6.4–8.2)
RBC # BLD AUTO: 3.62 M/UL (ref 4.1–5.7)
SODIUM SERPL-SCNC: 142 MMOL/L (ref 136–145)
WBC # BLD AUTO: 3.2 K/UL (ref 4.1–11.1)

## 2018-11-16 PROCEDURE — 82784 ASSAY IGA/IGD/IGG/IGM EACH: CPT

## 2018-11-16 PROCEDURE — 36591 DRAW BLOOD OFF VENOUS DEVICE: CPT

## 2018-11-16 PROCEDURE — 84165 PROTEIN E-PHORESIS SERUM: CPT

## 2018-11-16 PROCEDURE — 77030012965 HC NDL HUBR BBMI -A

## 2018-11-16 PROCEDURE — 74011250636 HC RX REV CODE- 250/636: Performed by: INTERNAL MEDICINE

## 2018-11-16 PROCEDURE — 80053 COMPREHEN METABOLIC PANEL: CPT

## 2018-11-16 PROCEDURE — 74011000250 HC RX REV CODE- 250: Performed by: INTERNAL MEDICINE

## 2018-11-16 PROCEDURE — 36415 COLL VENOUS BLD VENIPUNCTURE: CPT

## 2018-11-16 PROCEDURE — 85025 COMPLETE CBC W/AUTO DIFF WBC: CPT

## 2018-11-16 PROCEDURE — 83883 ASSAY NEPHELOMETRY NOT SPEC: CPT

## 2018-11-16 RX ORDER — SODIUM CHLORIDE 9 MG/ML
10 INJECTION INTRAMUSCULAR; INTRAVENOUS; SUBCUTANEOUS AS NEEDED
Status: ACTIVE | OUTPATIENT
Start: 2018-11-16 | End: 2018-11-17

## 2018-11-16 RX ORDER — SODIUM CHLORIDE 0.9 % (FLUSH) 0.9 %
10-40 SYRINGE (ML) INJECTION AS NEEDED
Status: ACTIVE | OUTPATIENT
Start: 2018-11-16 | End: 2018-11-17

## 2018-11-16 RX ORDER — HEPARIN 100 UNIT/ML
500 SYRINGE INTRAVENOUS AS NEEDED
Status: ACTIVE | OUTPATIENT
Start: 2018-11-16 | End: 2018-11-17

## 2018-11-16 RX ADMIN — SODIUM CHLORIDE 10 ML: 9 INJECTION INTRAMUSCULAR; INTRAVENOUS; SUBCUTANEOUS at 08:42

## 2018-11-16 RX ADMIN — Medication 10 ML: at 08:42

## 2018-11-16 RX ADMIN — Medication 500 UNITS: at 08:42

## 2018-11-19 LAB
ALBUMIN SERPL ELPH-MCNC: 3.6 G/DL (ref 2.9–4.4)
ALBUMIN/GLOB SERPL: 1.4 {RATIO} (ref 0.7–1.7)
ALPHA1 GLOB SERPL ELPH-MCNC: 0.2 G/DL (ref 0–0.4)
ALPHA2 GLOB SERPL ELPH-MCNC: 1.1 G/DL (ref 0.4–1)
B-GLOBULIN SERPL ELPH-MCNC: 0.8 G/DL (ref 0.7–1.3)
GAMMA GLOB SERPL ELPH-MCNC: 0.4 G/DL (ref 0.4–1.8)
GLOBULIN SER CALC-MCNC: 2.5 G/DL (ref 2.2–3.9)
IGA SERPL-MCNC: 35 MG/DL (ref 61–437)
IGG SERPL-MCNC: 431 MG/DL (ref 700–1600)
IGM SERPL-MCNC: 38 MG/DL (ref 20–172)
KAPPA LC FREE SER-MCNC: 7.1 MG/L (ref 3.3–19.4)
KAPPA LC FREE/LAMBDA FREE SER: 0.5 {RATIO} (ref 0.26–1.65)
LAMBDA LC FREE SERPL-MCNC: 14.3 MG/L (ref 5.7–26.3)
M PROTEIN SERPL ELPH-MCNC: ABNORMAL G/DL
PROT PATTERN SERPL IFE-IMP: ABNORMAL
PROT SERPL-MCNC: 6.1 G/DL (ref 6–8.5)

## 2018-11-21 ENCOUNTER — HOSPITAL ENCOUNTER (OUTPATIENT)
Dept: INFUSION THERAPY | Age: 63
Discharge: HOME | End: 2018-11-21
Payer: COMMERCIAL

## 2018-11-21 VITALS
TEMPERATURE: 98.3 F | RESPIRATION RATE: 18 BRPM | OXYGEN SATURATION: 98 % | SYSTOLIC BLOOD PRESSURE: 139 MMHG | HEART RATE: 80 BPM | DIASTOLIC BLOOD PRESSURE: 84 MMHG

## 2018-11-21 LAB
ALBUMIN SERPL-MCNC: 3.8 G/DL (ref 3.5–5)
ALBUMIN/GLOB SERPL: 1.2 {RATIO} (ref 1.1–2.2)
ALP SERPL-CCNC: 101 U/L (ref 45–117)
ALT SERPL-CCNC: 26 U/L (ref 12–78)
ANION GAP SERPL CALC-SCNC: 6 MMOL/L (ref 5–15)
AST SERPL-CCNC: 20 U/L (ref 15–37)
BASOPHILS # BLD: 0 K/UL (ref 0–0.1)
BASOPHILS NFR BLD: 0 % (ref 0–1)
BILIRUB SERPL-MCNC: 0.2 MG/DL (ref 0.2–1)
BUN SERPL-MCNC: 15 MG/DL (ref 6–20)
BUN/CREAT SERPL: 11 (ref 12–20)
CALCIUM SERPL-MCNC: 8.6 MG/DL (ref 8.5–10.1)
CHLORIDE SERPL-SCNC: 106 MMOL/L (ref 97–108)
CO2 SERPL-SCNC: 29 MMOL/L (ref 21–32)
CREAT SERPL-MCNC: 1.41 MG/DL (ref 0.7–1.3)
DIFFERENTIAL METHOD BLD: ABNORMAL
EOSINOPHIL # BLD: 0 K/UL (ref 0–0.4)
EOSINOPHIL NFR BLD: 1 % (ref 0–7)
ERYTHROCYTE [DISTWIDTH] IN BLOOD BY AUTOMATED COUNT: 15.6 % (ref 11.5–14.5)
GLOBULIN SER CALC-MCNC: 3.3 G/DL (ref 2–4)
GLUCOSE SERPL-MCNC: 75 MG/DL (ref 65–100)
HCT VFR BLD AUTO: 36.4 % (ref 36.6–50.3)
HGB BLD-MCNC: 11.7 G/DL (ref 12.1–17)
IMM GRANULOCYTES # BLD: 0 K/UL (ref 0–0.04)
IMM GRANULOCYTES NFR BLD AUTO: 0 % (ref 0–0.5)
LYMPHOCYTES # BLD: 0.9 K/UL (ref 0.8–3.5)
LYMPHOCYTES NFR BLD: 27 % (ref 12–49)
MCH RBC QN AUTO: 29.7 PG (ref 26–34)
MCHC RBC AUTO-ENTMCNC: 32.1 G/DL (ref 30–36.5)
MCV RBC AUTO: 92.4 FL (ref 80–99)
MONOCYTES # BLD: 0.5 K/UL (ref 0–1)
MONOCYTES NFR BLD: 16 % (ref 5–13)
NEUTS SEG # BLD: 1.8 K/UL (ref 1.8–8)
NEUTS SEG NFR BLD: 57 % (ref 32–75)
NRBC # BLD: 0 K/UL (ref 0–0.01)
NRBC BLD-RTO: 0 PER 100 WBC
PLATELET # BLD AUTO: 152 K/UL (ref 150–400)
PMV BLD AUTO: 10.3 FL (ref 8.9–12.9)
POTASSIUM SERPL-SCNC: 3.7 MMOL/L (ref 3.5–5.1)
PROT SERPL-MCNC: 7.1 G/DL (ref 6.4–8.2)
RBC # BLD AUTO: 3.94 M/UL (ref 4.1–5.7)
SODIUM SERPL-SCNC: 141 MMOL/L (ref 136–145)
WBC # BLD AUTO: 3.2 K/UL (ref 4.1–11.1)

## 2018-11-21 PROCEDURE — 80053 COMPREHEN METABOLIC PANEL: CPT

## 2018-11-21 PROCEDURE — 36415 COLL VENOUS BLD VENIPUNCTURE: CPT

## 2018-11-21 PROCEDURE — 85025 COMPLETE CBC W/AUTO DIFF WBC: CPT

## 2018-11-21 NOTE — PROGRESS NOTES
[facility] Lab Draw Note: 
 
Arrived -  Visit Vitals /84 Pulse 80 Temp 98.3 °F (36.8 °C) Resp 18 SpO2 98% Labs drawn peripherally from left arm -  
 
1445 - Tolerated well. Pt denies any acute problems/changes. Discharged from [facility] ambulatory. No distress. Next appt: 11/28 See [facility] for pending lab results.

## 2018-11-28 ENCOUNTER — HOSPITAL ENCOUNTER (OUTPATIENT)
Dept: INFUSION THERAPY | Age: 63
Discharge: HOME | End: 2018-11-28
Payer: COMMERCIAL

## 2018-11-28 VITALS
RESPIRATION RATE: 16 BRPM | OXYGEN SATURATION: 98 % | DIASTOLIC BLOOD PRESSURE: 82 MMHG | HEART RATE: 80 BPM | TEMPERATURE: 99.5 F | SYSTOLIC BLOOD PRESSURE: 123 MMHG

## 2018-11-28 LAB
ALBUMIN SERPL-MCNC: 3.9 G/DL (ref 3.5–5)
ALBUMIN/GLOB SERPL: 1.3 {RATIO} (ref 1.1–2.2)
ALP SERPL-CCNC: 88 U/L (ref 45–117)
ALT SERPL-CCNC: 27 U/L (ref 12–78)
ANION GAP SERPL CALC-SCNC: 7 MMOL/L (ref 5–15)
AST SERPL-CCNC: 20 U/L (ref 15–37)
BASOPHILS # BLD: 0 K/UL (ref 0–0.1)
BASOPHILS NFR BLD: 0 % (ref 0–1)
BILIRUB SERPL-MCNC: 0.2 MG/DL (ref 0.2–1)
BUN SERPL-MCNC: 12 MG/DL (ref 6–20)
BUN/CREAT SERPL: 10 (ref 12–20)
CALCIUM SERPL-MCNC: 9 MG/DL (ref 8.5–10.1)
CHLORIDE SERPL-SCNC: 107 MMOL/L (ref 97–108)
CO2 SERPL-SCNC: 28 MMOL/L (ref 21–32)
CREAT SERPL-MCNC: 1.25 MG/DL (ref 0.7–1.3)
DIFFERENTIAL METHOD BLD: ABNORMAL
EOSINOPHIL # BLD: 0 K/UL (ref 0–0.4)
EOSINOPHIL NFR BLD: 1 % (ref 0–7)
ERYTHROCYTE [DISTWIDTH] IN BLOOD BY AUTOMATED COUNT: 15.6 % (ref 11.5–14.5)
GLOBULIN SER CALC-MCNC: 3.1 G/DL (ref 2–4)
GLUCOSE SERPL-MCNC: 85 MG/DL (ref 65–100)
HCT VFR BLD AUTO: 35.9 % (ref 36.6–50.3)
HGB BLD-MCNC: 11.7 G/DL (ref 12.1–17)
IMM GRANULOCYTES # BLD: 0 K/UL (ref 0–0.04)
IMM GRANULOCYTES NFR BLD AUTO: 0 % (ref 0–0.5)
LYMPHOCYTES # BLD: 0.8 K/UL (ref 0.8–3.5)
LYMPHOCYTES NFR BLD: 24 % (ref 12–49)
MCH RBC QN AUTO: 29.8 PG (ref 26–34)
MCHC RBC AUTO-ENTMCNC: 32.6 G/DL (ref 30–36.5)
MCV RBC AUTO: 91.6 FL (ref 80–99)
MONOCYTES # BLD: 0.5 K/UL (ref 0–1)
MONOCYTES NFR BLD: 14 % (ref 5–13)
NEUTS SEG # BLD: 2 K/UL (ref 1.8–8)
NEUTS SEG NFR BLD: 61 % (ref 32–75)
NRBC # BLD: 0 K/UL (ref 0–0.01)
NRBC BLD-RTO: 0 PER 100 WBC
PLATELET # BLD AUTO: 111 K/UL (ref 150–400)
PMV BLD AUTO: 10.6 FL (ref 8.9–12.9)
POTASSIUM SERPL-SCNC: 3.8 MMOL/L (ref 3.5–5.1)
PROT SERPL-MCNC: 7 G/DL (ref 6.4–8.2)
RBC # BLD AUTO: 3.92 M/UL (ref 4.1–5.7)
SODIUM SERPL-SCNC: 142 MMOL/L (ref 136–145)
WBC # BLD AUTO: 3.3 K/UL (ref 4.1–11.1)

## 2018-11-28 PROCEDURE — 80053 COMPREHEN METABOLIC PANEL: CPT

## 2018-11-28 PROCEDURE — 74011250636 HC RX REV CODE- 250/636: Performed by: INTERNAL MEDICINE

## 2018-11-28 PROCEDURE — 85025 COMPLETE CBC W/AUTO DIFF WBC: CPT

## 2018-11-28 PROCEDURE — 36415 COLL VENOUS BLD VENIPUNCTURE: CPT

## 2018-11-28 PROCEDURE — 77030012965 HC NDL HUBR BBMI -A

## 2018-11-28 PROCEDURE — 36591 DRAW BLOOD OFF VENOUS DEVICE: CPT

## 2018-11-28 RX ORDER — HEPARIN 100 UNIT/ML
500 SYRINGE INTRAVENOUS AS NEEDED
Status: ACTIVE | OUTPATIENT
Start: 2018-11-28 | End: 2018-11-29

## 2018-11-28 RX ORDER — SODIUM CHLORIDE 0.9 % (FLUSH) 0.9 %
10-40 SYRINGE (ML) INJECTION AS NEEDED
Status: ACTIVE | OUTPATIENT
Start: 2018-11-28 | End: 2018-11-29

## 2018-11-28 RX ADMIN — Medication 20 ML: at 15:09

## 2018-11-28 RX ADMIN — Medication 500 UNITS: at 15:09

## 2018-11-28 NOTE — PROGRESS NOTES
[facility] Note: Labs via [facility] Arrived - 1510 Visit Vitals /82 (BP 1 Location: Left arm, BP Patient Position: At rest) Pulse 80 Temp 99.5 °F (37.5 °C) Resp 16 SpO2 98% Assessment unchanged no complaints or concerns voiced. Port accessed & flushed per protocol w/o difficulty. Scott needle removed. Labs:  
Recent Results (from the past 12 hour(s)) CBC WITH AUTOMATED DIFF Collection Time: 11/28/18  3:08 PM  
Result Value Ref Range WBC 3.3 (L) 4.1 - 11.1 K/uL  
 RBC 3.92 (L) 4.10 - 5.70 M/uL  
 HGB 11.7 (L) 12.1 - 17.0 g/dL HCT 35.9 (L) 36.6 - 50.3 % MCV 91.6 80.0 - 99.0 FL  
 MCH 29.8 26.0 - 34.0 PG  
 MCHC 32.6 30.0 - 36.5 g/dL  
 RDW 15.6 (H) 11.5 - 14.5 % PLATELET 579 (L) 346 - 400 K/uL MPV 10.6 8.9 - 12.9 FL  
 NRBC 0.0 0  WBC ABSOLUTE NRBC 0.00 0.00 - 0.01 K/uL NEUTROPHILS 61 32 - 75 % LYMPHOCYTES 24 12 - 49 % MONOCYTES 14 (H) 5 - 13 % EOSINOPHILS 1 0 - 7 % BASOPHILS 0 0 - 1 % IMMATURE GRANULOCYTES 0 0.0 - 0.5 % ABS. NEUTROPHILS 2.0 1.8 - 8.0 K/UL  
 ABS. LYMPHOCYTES 0.8 0.8 - 3.5 K/UL  
 ABS. MONOCYTES 0.5 0.0 - 1.0 K/UL  
 ABS. EOSINOPHILS 0.0 0.0 - 0.4 K/UL  
 ABS. BASOPHILS 0.0 0.0 - 0.1 K/UL  
 ABS. IMM. GRANS. 0.0 0.00 - 0.04 K/UL  
 DF AUTOMATED METABOLIC PANEL, COMPREHENSIVE Collection Time: 11/28/18  3:08 PM  
Result Value Ref Range Sodium 142 136 - 145 mmol/L Potassium 3.8 3.5 - 5.1 mmol/L Chloride 107 97 - 108 mmol/L  
 CO2 28 21 - 32 mmol/L Anion gap 7 5 - 15 mmol/L Glucose 85 65 - 100 mg/dL BUN 12 6 - 20 MG/DL Creatinine 1.25 0.70 - 1.30 MG/DL  
 BUN/Creatinine ratio 10 (L) 12 - 20 GFR est AA >60 >60 ml/min/1.73m2 GFR est non-AA 59 (L) >60 ml/min/1.73m2 Calcium 9.0 8.5 - 10.1 MG/DL Bilirubin, total 0.2 0.2 - 1.0 MG/DL  
 ALT (SGPT) 27 12 - 78 U/L  
 AST (SGOT) 20 15 - 37 U/L Alk. phosphatase 88 45 - 117 U/L Protein, total 7.0 6.4 - 8.2 g/dL Albumin 3.9 3.5 - 5.0 g/dL Globulin 3.1 2.0 - 4.0 g/dL A-G Ratio 1.3 1.1 - 2.2    
 
 
 
 
1510 - Tolerated well. Pt denies any acute problems/changes. Discharged from [facility] ambulatory. No distress. Next appt: 12/12/18

## 2018-12-05 ENCOUNTER — HOSPITAL ENCOUNTER (OUTPATIENT)
Dept: INFUSION THERAPY | Age: 63
Discharge: HOME | End: 2018-12-05
Payer: COMMERCIAL

## 2018-12-05 VITALS
HEART RATE: 88 BPM | RESPIRATION RATE: 16 BRPM | TEMPERATURE: 98.3 F | DIASTOLIC BLOOD PRESSURE: 74 MMHG | OXYGEN SATURATION: 100 % | SYSTOLIC BLOOD PRESSURE: 115 MMHG

## 2018-12-05 LAB
ALBUMIN SERPL-MCNC: 3.6 G/DL (ref 3.5–5)
ALBUMIN/GLOB SERPL: 1.3 {RATIO} (ref 1.1–2.2)
ALP SERPL-CCNC: 89 U/L (ref 45–117)
ALT SERPL-CCNC: 27 U/L (ref 12–78)
ANION GAP SERPL CALC-SCNC: 9 MMOL/L (ref 5–15)
AST SERPL-CCNC: 19 U/L (ref 15–37)
BASOPHILS # BLD: 0 K/UL (ref 0–0.1)
BASOPHILS NFR BLD: 0 % (ref 0–1)
BILIRUB SERPL-MCNC: 0.3 MG/DL (ref 0.2–1)
BUN SERPL-MCNC: 12 MG/DL (ref 6–20)
BUN/CREAT SERPL: 9 (ref 12–20)
CALCIUM SERPL-MCNC: 8.7 MG/DL (ref 8.5–10.1)
CHLORIDE SERPL-SCNC: 108 MMOL/L (ref 97–108)
CO2 SERPL-SCNC: 27 MMOL/L (ref 21–32)
CREAT SERPL-MCNC: 1.35 MG/DL (ref 0.7–1.3)
DIFFERENTIAL METHOD BLD: ABNORMAL
EOSINOPHIL # BLD: 0 K/UL (ref 0–0.4)
EOSINOPHIL NFR BLD: 1 % (ref 0–7)
ERYTHROCYTE [DISTWIDTH] IN BLOOD BY AUTOMATED COUNT: 15.5 % (ref 11.5–14.5)
GLOBULIN SER CALC-MCNC: 2.8 G/DL (ref 2–4)
GLUCOSE SERPL-MCNC: 110 MG/DL (ref 65–100)
HCT VFR BLD AUTO: 34.8 % (ref 36.6–50.3)
HGB BLD-MCNC: 11.4 G/DL (ref 12.1–17)
IMM GRANULOCYTES # BLD: 0 K/UL (ref 0–0.04)
IMM GRANULOCYTES NFR BLD AUTO: 0 % (ref 0–0.5)
LYMPHOCYTES # BLD: 1 K/UL (ref 0.8–3.5)
LYMPHOCYTES NFR BLD: 28 % (ref 12–49)
MCH RBC QN AUTO: 29.7 PG (ref 26–34)
MCHC RBC AUTO-ENTMCNC: 32.8 G/DL (ref 30–36.5)
MCV RBC AUTO: 90.6 FL (ref 80–99)
MONOCYTES # BLD: 0.7 K/UL (ref 0–1)
MONOCYTES NFR BLD: 20 % (ref 5–13)
NEUTS SEG # BLD: 1.7 K/UL (ref 1.8–8)
NEUTS SEG NFR BLD: 51 % (ref 32–75)
NRBC # BLD: 0 K/UL (ref 0–0.01)
NRBC BLD-RTO: 0 PER 100 WBC
PLATELET # BLD AUTO: 110 K/UL (ref 150–400)
PMV BLD AUTO: 10.3 FL (ref 8.9–12.9)
POTASSIUM SERPL-SCNC: 3.8 MMOL/L (ref 3.5–5.1)
PROT SERPL-MCNC: 6.4 G/DL (ref 6.4–8.2)
RBC # BLD AUTO: 3.84 M/UL (ref 4.1–5.7)
RBC MORPH BLD: ABNORMAL
SODIUM SERPL-SCNC: 144 MMOL/L (ref 136–145)
WBC # BLD AUTO: 3.4 K/UL (ref 4.1–11.1)

## 2018-12-05 PROCEDURE — 36415 COLL VENOUS BLD VENIPUNCTURE: CPT

## 2018-12-05 PROCEDURE — 80053 COMPREHEN METABOLIC PANEL: CPT

## 2018-12-05 PROCEDURE — 85025 COMPLETE CBC W/AUTO DIFF WBC: CPT

## 2018-12-07 DIAGNOSIS — Z94.84 H/O AUTOLOGOUS STEM CELL TRANSPLANT (HCC): ICD-10-CM

## 2018-12-07 DIAGNOSIS — C90.01 MULTIPLE MYELOMA IN REMISSION (HCC): ICD-10-CM

## 2018-12-07 RX ORDER — POMALIDOMIDE 2 MG/1
CAPSULE ORAL
Qty: 28 CAP | Refills: 0 | Status: SHIPPED | OUTPATIENT
Start: 2018-12-07 | End: 2018-12-10 | Stop reason: SDUPTHER

## 2018-12-10 ENCOUNTER — OFFICE VISIT (OUTPATIENT)
Dept: ONCOLOGY | Age: 63
End: 2018-12-10

## 2018-12-10 VITALS
HEART RATE: 78 BPM | DIASTOLIC BLOOD PRESSURE: 79 MMHG | WEIGHT: 190.6 LBS | RESPIRATION RATE: 16 BRPM | BODY MASS INDEX: 28.98 KG/M2 | TEMPERATURE: 98.6 F | SYSTOLIC BLOOD PRESSURE: 129 MMHG | OXYGEN SATURATION: 98 %

## 2018-12-10 DIAGNOSIS — T45.1X5A ANEMIA ASSOCIATED WITH CHEMOTHERAPY: ICD-10-CM

## 2018-12-10 DIAGNOSIS — C90.01 MULTIPLE MYELOMA IN REMISSION (HCC): ICD-10-CM

## 2018-12-10 DIAGNOSIS — N28.9 RENAL INSUFFICIENCY: ICD-10-CM

## 2018-12-10 DIAGNOSIS — D64.81 ANEMIA ASSOCIATED WITH CHEMOTHERAPY: ICD-10-CM

## 2018-12-10 DIAGNOSIS — C90.01 MULTIPLE MYELOMA IN REMISSION (HCC): Primary | ICD-10-CM

## 2018-12-10 DIAGNOSIS — Z94.84 H/O AUTOLOGOUS STEM CELL TRANSPLANT (HCC): ICD-10-CM

## 2018-12-10 RX ORDER — EPINEPHRINE 1 MG/ML
0.3 INJECTION, SOLUTION, CONCENTRATE INTRAVENOUS AS NEEDED
Status: CANCELLED | OUTPATIENT
Start: 2019-06-24

## 2018-12-10 RX ORDER — ACETAMINOPHEN 325 MG/1
650 TABLET ORAL AS NEEDED
Status: CANCELLED
Start: 2019-03-26

## 2018-12-10 RX ORDER — HYDROCORTISONE SODIUM SUCCINATE 100 MG/2ML
100 INJECTION, POWDER, FOR SOLUTION INTRAMUSCULAR; INTRAVENOUS AS NEEDED
Status: CANCELLED | OUTPATIENT
Start: 2018-12-26

## 2018-12-10 RX ORDER — EPINEPHRINE 1 MG/ML
0.3 INJECTION, SOLUTION, CONCENTRATE INTRAVENOUS AS NEEDED
Status: CANCELLED | OUTPATIENT
Start: 2019-03-26

## 2018-12-10 RX ORDER — ALBUTEROL SULFATE 0.83 MG/ML
2.5 SOLUTION RESPIRATORY (INHALATION) AS NEEDED
Status: CANCELLED
Start: 2018-12-26

## 2018-12-10 RX ORDER — DIPHENHYDRAMINE HYDROCHLORIDE 50 MG/ML
50 INJECTION, SOLUTION INTRAMUSCULAR; INTRAVENOUS AS NEEDED
Status: CANCELLED
Start: 2018-12-26

## 2018-12-10 RX ORDER — ONDANSETRON 2 MG/ML
8 INJECTION INTRAMUSCULAR; INTRAVENOUS AS NEEDED
Status: CANCELLED | OUTPATIENT
Start: 2018-12-26

## 2018-12-10 RX ORDER — HYDROCORTISONE SODIUM SUCCINATE 100 MG/2ML
100 INJECTION, POWDER, FOR SOLUTION INTRAMUSCULAR; INTRAVENOUS AS NEEDED
Status: CANCELLED | OUTPATIENT
Start: 2019-06-24

## 2018-12-10 RX ORDER — SODIUM CHLORIDE 0.9 % (FLUSH) 0.9 %
10 SYRINGE (ML) INJECTION AS NEEDED
Status: CANCELLED
Start: 2019-03-26

## 2018-12-10 RX ORDER — ALBUTEROL SULFATE 0.83 MG/ML
2.5 SOLUTION RESPIRATORY (INHALATION) AS NEEDED
Status: CANCELLED
Start: 2019-03-26

## 2018-12-10 RX ORDER — DIPHENHYDRAMINE HYDROCHLORIDE 50 MG/ML
50 INJECTION, SOLUTION INTRAMUSCULAR; INTRAVENOUS AS NEEDED
Status: CANCELLED
Start: 2019-03-26

## 2018-12-10 RX ORDER — HEPARIN 100 UNIT/ML
300-500 SYRINGE INTRAVENOUS AS NEEDED
Status: CANCELLED
Start: 2019-06-24

## 2018-12-10 RX ORDER — EPINEPHRINE 1 MG/ML
0.3 INJECTION, SOLUTION, CONCENTRATE INTRAVENOUS AS NEEDED
Status: CANCELLED | OUTPATIENT
Start: 2018-12-26

## 2018-12-10 RX ORDER — ALBUTEROL SULFATE 0.83 MG/ML
2.5 SOLUTION RESPIRATORY (INHALATION) AS NEEDED
Status: CANCELLED
Start: 2019-06-24

## 2018-12-10 RX ORDER — HEPARIN 100 UNIT/ML
300-500 SYRINGE INTRAVENOUS AS NEEDED
Status: CANCELLED
Start: 2019-03-26

## 2018-12-10 RX ORDER — SODIUM CHLORIDE 9 MG/ML
25 INJECTION, SOLUTION INTRAVENOUS CONTINUOUS
Status: CANCELLED | OUTPATIENT
Start: 2019-03-26

## 2018-12-10 RX ORDER — SODIUM CHLORIDE 9 MG/ML
25 INJECTION, SOLUTION INTRAVENOUS CONTINUOUS
Status: CANCELLED | OUTPATIENT
Start: 2018-12-26

## 2018-12-10 RX ORDER — ACETAMINOPHEN 325 MG/1
650 TABLET ORAL AS NEEDED
Status: CANCELLED
Start: 2018-12-26

## 2018-12-10 RX ORDER — ACETAMINOPHEN 325 MG/1
650 TABLET ORAL AS NEEDED
Status: CANCELLED
Start: 2019-06-24

## 2018-12-10 RX ORDER — HEPARIN 100 UNIT/ML
300-500 SYRINGE INTRAVENOUS AS NEEDED
Status: CANCELLED
Start: 2018-12-26

## 2018-12-10 RX ORDER — DIPHENHYDRAMINE HYDROCHLORIDE 50 MG/ML
50 INJECTION, SOLUTION INTRAMUSCULAR; INTRAVENOUS AS NEEDED
Status: CANCELLED
Start: 2019-06-24

## 2018-12-10 RX ORDER — SODIUM CHLORIDE 9 MG/ML
10 INJECTION INTRAMUSCULAR; INTRAVENOUS; SUBCUTANEOUS AS NEEDED
Status: CANCELLED | OUTPATIENT
Start: 2018-12-26

## 2018-12-10 RX ORDER — ONDANSETRON 2 MG/ML
8 INJECTION INTRAMUSCULAR; INTRAVENOUS AS NEEDED
Status: CANCELLED | OUTPATIENT
Start: 2019-06-24

## 2018-12-10 RX ORDER — ONDANSETRON 2 MG/ML
8 INJECTION INTRAMUSCULAR; INTRAVENOUS AS NEEDED
Status: CANCELLED | OUTPATIENT
Start: 2019-03-26

## 2018-12-10 RX ORDER — HYDROCORTISONE SODIUM SUCCINATE 100 MG/2ML
100 INJECTION, POWDER, FOR SOLUTION INTRAMUSCULAR; INTRAVENOUS AS NEEDED
Status: CANCELLED | OUTPATIENT
Start: 2019-03-26

## 2018-12-10 RX ORDER — SODIUM CHLORIDE 9 MG/ML
10 INJECTION INTRAMUSCULAR; INTRAVENOUS; SUBCUTANEOUS AS NEEDED
Status: CANCELLED | OUTPATIENT
Start: 2019-06-24

## 2018-12-10 RX ORDER — SODIUM CHLORIDE 0.9 % (FLUSH) 0.9 %
10 SYRINGE (ML) INJECTION AS NEEDED
Status: CANCELLED
Start: 2019-06-24

## 2018-12-10 RX ORDER — SODIUM CHLORIDE 0.9 % (FLUSH) 0.9 %
10 SYRINGE (ML) INJECTION AS NEEDED
Status: CANCELLED
Start: 2018-12-26

## 2018-12-10 RX ORDER — SODIUM CHLORIDE 9 MG/ML
25 INJECTION, SOLUTION INTRAVENOUS CONTINUOUS
Status: CANCELLED | OUTPATIENT
Start: 2019-06-24

## 2018-12-10 RX ORDER — SODIUM CHLORIDE 9 MG/ML
10 INJECTION INTRAMUSCULAR; INTRAVENOUS; SUBCUTANEOUS AS NEEDED
Status: CANCELLED | OUTPATIENT
Start: 2019-03-26

## 2018-12-10 NOTE — PROGRESS NOTES
[address]  Social Work Navigator Encounter     Patient Name:  Yasmine Hameed Gely Tiwari    Medical History: dx multiple myeloma     Advance Directives:    Narrative: SW called [facility] regarding drug pomalyst   - [facility] Mendon stated they will find foundation $ for assistance, so SW did not need to have pt fill an application.      Barriers to Care:     Plan:

## 2018-12-10 NOTE — PROGRESS NOTES
[address] [facility]  [address], [address] [facility] Jared Cornelius, [address] [place]  [phone]          Progress Note        Patient: Sourav Moser Sr. MRN: [id number]  SSN: [id number]    YOB: 1955  Age: 66 y.o. Sex: male        Diagnosis:      1. Multiple Myeloma, IgA Kappa   Durie [place] stage IIIB    Cytogenetics/FISH: t(14;16) - high risk    Ttreatment:     1. Maintenance therapy - Pomalyst 2 mg daily - started 11/19/2018  2. Maintenance therapy - Ixazomib - started 10/15/2018 - stopped 11/12/2018  3. S/P tandem  Autotransplant    First on 2/28/2018   2nd on 06/13/2018  4. Carfilzomib/Pom/Dex - s/p 5 cycles  5. VD-PACE s/p 1 cycle  6. RVD - s/p 4 cycles    Subjective:      Sourav Moser Sr. is a 66 y.o. male with a diagnosis of IgA kappa myeloma. Bone marrow shows 100% aberrant plasma cells. He suffers with DM but it is diet controlled. He has received systemic anti-viral treatment for chronic Hep C with successful eradication of the virus. Mr. Gina Khan received 4 cycles of systemic therapy with RVd. He had an initial good response to treatment. However his paraprotein level started rising and the myeloma became refractory to treatment. I then administered one cycle of VD-PACE in the hospital. He then received Carfilzomib/Pom/Dex. He achieved VGPR. He underwent tandem autotransplant at [facility]. According to the patient he achieved complete response with therapy. He has undergone second/tandem transplant in June. He is now on Pomalyst maintenance and tolerating it well. He notes some dry skin on his forehead but otherwise feels well.       Review of Systems:    Constitutional: fatigue  Eyes: negative  Ears, Nose, Mouth, Throat, and Face: negative  Respiratory: negative  Cardiovascular: negative  Gastrointestinal: negative  Genitourinary:negative  Integument/Breast: negative  Hematologic/Lymphatic: negative  Musculoskeletal: chronic neck pain  Neurological: negative      Past Medical History:   Diagnosis Date    Acid reflux     Arrhythmia     pvc's    Chronic pain     neck    Depression     Diabetes ([facility] [address]     Femoral hernia 2012    Hepatitis C     Hypertension     Inguinal hernia 2012    Liver disease     hepatitis c    Other ill-defined conditions(799.89)     Hx of PVCs since     Prostatitis, acute     Psychiatric disorder     depression     Past Surgical History:   Procedure Laterality Date    COLONOSCOPY N/A 2016    COLONOSCOPY performed by Lori Rios MD at hospitals ENDOSCOPY    HX APPENDECTOMY      HX CERVICAL FUSION  2008    x 1 as of 2014    HX HEENT      foreign body removed from right eye    HX HERNIA REPAIR      [facility]      HX ORTHOPAEDIC      cervical fusion    HX OTHER SURGICAL  2000    liver bx - chronic hepatitis      Family History   Problem Relation Age of Onset    Arthritis-osteo Mother     Arthritis-osteo Father     Diabetes Father     Hypertension Maternal Grandmother     Diabetes Maternal Grandmother     Hypertension Maternal Grandfather     Diabetes Maternal Grandfather      Social History     Tobacco Use    Smoking status: Former Smoker     Packs/day: 1.00     Years: 12.00     Pack years: 12.00     Types: Cigarettes     Last attempt to quit: 1989     Years since quittin.9    Smokeless tobacco: Never Used    Tobacco comment: former cigarette smoker   Substance Use Topics    Alcohol use: No     Comment: former alcoholic-quit [id number]      Prior to Admission medications    Medication Sig Start Date End Date Taking? Authorizing Provider   POMALYST 2 mg cap TAKE ONE CAPSULE BY MOUTH EVERY DAY 18  Yes Deyanira Beyer MD   omeprazole (PRILOSEC) 20 mg capsule Take 20 mg by mouth daily as needed (acid reflux). Yes Joe, MD Goldy   ondansetron (ZOFRAN ODT) 8 mg disintegrating tablet Take 8 mg by mouth every eight (8) hours as needed for Nausea. Yes Other, MD Goldy   buPROPion XL (WELLBUTRIN XL) 150 mg tablet Take 150 mg by mouth every morning. Yes Other, MD Goldy   famotidine (PEPCID) 20 mg tablet Take 20 mg by mouth daily as needed (acid reflux). Yes Other, MD Goldy   ixazomib 4 mg cap Take 4 mg by mouth every seven (7) days. Patient takes 4 mg every Tuesdays on week 1-3 of cycle   Yes Other, MD Goldy   acyclovir (ZOVIRAX) 400 mg tablet Take 400 mg by mouth two (2) times a day. 7/7/18  Yes Provider, Historical   tenofovir DISOPROXIL FUMARATE (VIREAD) 300 mg tablet Take 300 mg by mouth daily. 7/7/18  Yes Provider, Historical   pentamidine (NEBUPENT) 300 mg inhalation solution Take 300 mg by inhalation every month. Once a month at Dr's office   Yes Provider, Historical   ergocalciferol (ERGOCALCIFEROL) 50,000 unit capsule TAKE 1 CAP BY MOUTH EVERY 7 DAYS, Mondays 5/14/18  Yes Provider, Historical   lisinopril (PRINIVIL, ZESTRIL) 10 mg tablet TAKE 1 TABLET BY MOUTH EVERY DAY as needed. Hold if SBP <115 5/12/18  Yes Provider, Historical   folic acid (FOLVITE) 1 mg tablet TAKE 1 TABLET BY MOUTH EVERY DAY 5/14/18  Yes Provider, Historical   multivitamin (ONE A DAY) tablet Take 1 Tab by mouth daily. Yes Provider, Historical   Biotin 2,500 mcg cap Take 1 Cap by mouth daily. Yes Provider, Historical   metFORMIN ER (GLUCOPHAGE XR) 500 mg tablet Take 1 Tab by mouth two (2) times a day. 11/10/17  Yes Abdiaziz Oh MD   insulin lispro (HUMALOG) 100 unit/mL kwikpen 4-6 Units by SubCUTAneous route Before breakfast, lunch, and dinner. Blood Glucose (mg/dL)       Insulin Dose (units)              <130                                 skip              130-150                              4               >150                                   6   Yes Provider, Historical   tamsulosin (FLOMAX) 0.4 mg capsule Take 1 Cap by mouth daily. 4/4/17  Yes Иван Walters MD   zolpidem CR (AMBIEN CR) 12.5 mg tablet Take 12.5 mg by mouth nightly.    Yes Provider, Historical tapentadol (NUCYNTA) 100 mg tablet Take 200 mg by mouth three (3) times daily. At 7a, 3p, 11pm   Yes Other, MD Goldy   aripiprazole (ABILIFY) 5 mg tablet Take 2.5 mg by mouth every morning. Yes Other, MD Goldy          Allergies   Allergen Reactions    Mercury (Bulk) Hives     Blisters             Objective:     Vitals:    12/10/18 1147   BP: 129/79   Pulse: 78   Resp: 16   Temp: 98.6 °F (37 °C)   TempSrc: Oral   SpO2: 98%   Weight: 190 lb 9.6 oz (86.5 kg)          Physical Exam:    GENERAL: alert, cooperative  EYE: negative  LYMPHATIC: Cervical, supraclavicular, and axillary nodes normal.   THROAT & NECK: normal and no erythema or exudates noted. LUNG: clear to auscultation bilaterally  HEART: regular rate and rhythm  ABDOMEN: soft, non-tender  EXTREMITIES: bilateral LE edema  SKIN: Normal.  NEUROLOGIC: negative      Lab Results   Component Value Date/Time    WBC 3.4 (L) 12/05/2018 03:22 PM    HGB 11.4 (L) 12/05/2018 03:22 PM    HCT 34.8 (L) 12/05/2018 03:22 PM    PLATELET 570 (L) 49/67/5853 03:22 PM    MCV 90.6 12/05/2018 03:22 PM       Lab Results   Component Value Date/Time    Sodium 144 12/05/2018 03:22 PM    Potassium 3.8 12/05/2018 03:22 PM    Chloride 108 12/05/2018 03:22 PM    CO2 27 12/05/2018 03:22 PM    Anion gap 9 12/05/2018 03:22 PM    Glucose 110 (H) 12/05/2018 03:22 PM    BUN 12 12/05/2018 03:22 PM    Creatinine 1.35 (H) 12/05/2018 03:22 PM    BUN/Creatinine ratio 9 (L) 12/05/2018 03:22 PM    GFR est AA >60 12/05/2018 03:22 PM    GFR est non-AA 54 (L) 12/05/2018 03:22 PM    Calcium 8.7 12/05/2018 03:22 PM            Assessment:     1. Multiple Myeloma, IgA Kappa   Durie [place] stage IIIB    Cytogenetics/FISH: t(14;16) - high risk  ECOG PS - 0   Intent of therapy: palliative    Received 3 cycles of RVd   Dose reduced Revlimid and Velcade d/t continued cytopenias. M-spike started rising.     He received one cycle of VD-PACE (bortezomib, dexamethasone, thalidomide, cisplatin, adriamycin, cyclophosphamide, and etoposide). Achieved VT with once cycle    Receivied KPd - s/p 5 cycles    Excellent response with M-protein came down to 0.1    S/p tandem autotransplant    First on 2/28/2018   2nd on 06/13/2018    Achieved CR after the first transplant. Blood count has recovered  Doing well  Some fatigue  Repeat bone marrow @ [facility] - Complete response. Since he is high risk based on cytogenetics, I recommend proteosome inhibitor as maintenance therapy. Started Ixazomib   Developed fever, was in the hospital.   ? Prostatitis vs ? Viral URI    Treated empirically with Abx and fever has resolved  He also developed thrombocytopenia. Platelet count has recovered. Patient prefers to switch therapy to Pomalyst.     On Pomalyst 2 mg daily - started 11/19/2018  Tolerating well   In complete remission by serological tests  Symptom management form reviewed with patient. 2. Type 2 DM with complication    Managed by Endocrine      3. Chronic Hep C    In sustained virological remission      Plan:       > Continue Pomalyst 2 mg daily maintenance  > Myeloma labs monthly  > Resume Zometa every 3 months  > Follow-up in 4 weeks      Signed by: Laura Burkett MD                     December 10, 2018          CC. Maye Peñaloza MD  CC.  Kika Patel MD

## 2018-12-10 NOTE — PROGRESS NOTES
Noe Da Silva is a 66 y.o. male here today for IgA kappa myeloma f/u. Patient stopped maintenance therapy with Ninlaro. Patient started Pomalyst 2mg daily. Patient accompanied by his spouse to today's appt. VS stable. Patient denies pain. Good appetite. Patient denies N/V/D and constipation. Patient denies numbness and tingling. Patient denies mouth ulcers. Patient denies cough. Patient denies SOB. Visit Vitals  /79 (BP 1 Location: Left arm, BP Patient Position: Sitting)   Pulse 78   Temp 98.6 °F (37 °C) (Oral)   Resp 16   Wt 190 lb 9.6 oz (86.5 kg)   SpO2 98%   BMI 28.98 kg/m²     Health Maintenance Review: Patient reminded of \"due or due soon\" health maintenance. I have asked the patient to contact his/her primary care provider (PCP) for follow-up on his/her health maintenance.

## 2018-12-12 ENCOUNTER — HOSPITAL ENCOUNTER (OUTPATIENT)
Dept: INFUSION THERAPY | Age: 63
Discharge: HOME | End: 2018-12-12
Payer: COMMERCIAL

## 2018-12-12 VITALS
TEMPERATURE: 98.2 F | HEART RATE: 81 BPM | SYSTOLIC BLOOD PRESSURE: 136 MMHG | OXYGEN SATURATION: 97 % | DIASTOLIC BLOOD PRESSURE: 77 MMHG | RESPIRATION RATE: 16 BRPM

## 2018-12-12 LAB
ALBUMIN SERPL-MCNC: 3.6 G/DL (ref 3.5–5)
ALBUMIN/GLOB SERPL: 1.2 {RATIO} (ref 1.1–2.2)
ALP SERPL-CCNC: 73 U/L (ref 45–117)
ALT SERPL-CCNC: 25 U/L (ref 12–78)
ANION GAP SERPL CALC-SCNC: 5 MMOL/L (ref 5–15)
AST SERPL-CCNC: 19 U/L (ref 15–37)
BASOPHILS # BLD: 0 K/UL (ref 0–0.1)
BASOPHILS NFR BLD: 1 % (ref 0–1)
BILIRUB SERPL-MCNC: 0.3 MG/DL (ref 0.2–1)
BUN SERPL-MCNC: 17 MG/DL (ref 6–20)
BUN/CREAT SERPL: 13 (ref 12–20)
CALCIUM SERPL-MCNC: 8.4 MG/DL (ref 8.5–10.1)
CHLORIDE SERPL-SCNC: 109 MMOL/L (ref 97–108)
CO2 SERPL-SCNC: 27 MMOL/L (ref 21–32)
CREAT SERPL-MCNC: 1.34 MG/DL (ref 0.7–1.3)
DIFFERENTIAL METHOD BLD: ABNORMAL
EOSINOPHIL # BLD: 0 K/UL (ref 0–0.4)
EOSINOPHIL NFR BLD: 1 % (ref 0–7)
ERYTHROCYTE [DISTWIDTH] IN BLOOD BY AUTOMATED COUNT: 15.9 % (ref 11.5–14.5)
GLOBULIN SER CALC-MCNC: 2.9 G/DL (ref 2–4)
GLUCOSE SERPL-MCNC: 96 MG/DL (ref 65–100)
HCT VFR BLD AUTO: 32.9 % (ref 36.6–50.3)
HGB BLD-MCNC: 10.7 G/DL (ref 12.1–17)
IGA SERPL-MCNC: 46 MG/DL (ref 70–400)
IGG SERPL-MCNC: 513 MG/DL (ref 700–1600)
IGM SERPL-MCNC: 42 MG/DL (ref 40–230)
IMM GRANULOCYTES # BLD: 0 K/UL (ref 0–0.04)
IMM GRANULOCYTES NFR BLD AUTO: 0 % (ref 0–0.5)
LYMPHOCYTES # BLD: 1 K/UL (ref 0.8–3.5)
LYMPHOCYTES NFR BLD: 36 % (ref 12–49)
MCH RBC QN AUTO: 29.5 PG (ref 26–34)
MCHC RBC AUTO-ENTMCNC: 32.5 G/DL (ref 30–36.5)
MCV RBC AUTO: 90.6 FL (ref 80–99)
MONOCYTES # BLD: 0.6 K/UL (ref 0–1)
MONOCYTES NFR BLD: 21 % (ref 5–13)
NEUTS SEG # BLD: 1.3 K/UL (ref 1.8–8)
NEUTS SEG NFR BLD: 41 % (ref 32–75)
NRBC # BLD: 0 K/UL (ref 0–0.01)
NRBC BLD-RTO: 0 PER 100 WBC
PLATELET # BLD AUTO: 142 K/UL (ref 150–400)
PMV BLD AUTO: 10.7 FL (ref 8.9–12.9)
POTASSIUM SERPL-SCNC: 3.6 MMOL/L (ref 3.5–5.1)
PROT SERPL-MCNC: 6.5 G/DL (ref 6.4–8.2)
RBC # BLD AUTO: 3.63 M/UL (ref 4.1–5.7)
RBC MORPH BLD: ABNORMAL
SODIUM SERPL-SCNC: 141 MMOL/L (ref 136–145)
WBC # BLD AUTO: 2.9 K/UL (ref 4.1–11.1)

## 2018-12-12 PROCEDURE — 84165 PROTEIN E-PHORESIS SERUM: CPT

## 2018-12-12 PROCEDURE — 82784 ASSAY IGA/IGD/IGG/IGM EACH: CPT

## 2018-12-12 PROCEDURE — 80053 COMPREHEN METABOLIC PANEL: CPT

## 2018-12-12 PROCEDURE — 77030012965 HC NDL HUBR BBMI -A

## 2018-12-12 PROCEDURE — 36415 COLL VENOUS BLD VENIPUNCTURE: CPT

## 2018-12-12 PROCEDURE — 74011250636 HC RX REV CODE- 250/636: Performed by: INTERNAL MEDICINE

## 2018-12-12 PROCEDURE — 83883 ASSAY NEPHELOMETRY NOT SPEC: CPT

## 2018-12-12 PROCEDURE — 36591 DRAW BLOOD OFF VENOUS DEVICE: CPT

## 2018-12-12 PROCEDURE — 85025 COMPLETE CBC W/AUTO DIFF WBC: CPT

## 2018-12-12 RX ORDER — SODIUM CHLORIDE 0.9 % (FLUSH) 0.9 %
10-40 SYRINGE (ML) INJECTION AS NEEDED
Status: ACTIVE | OUTPATIENT
Start: 2018-12-12 | End: 2018-12-13

## 2018-12-12 RX ORDER — HEPARIN 100 UNIT/ML
500 SYRINGE INTRAVENOUS AS NEEDED
Status: ACTIVE | OUTPATIENT
Start: 2018-12-12 | End: 2018-12-13

## 2018-12-12 RX ADMIN — Medication 10 ML: at 15:13

## 2018-12-12 RX ADMIN — Medication 500 UNITS: at 15:13

## 2018-12-12 NOTE — PROGRESS NOTES
Pt arrived to [facility] in no acute distress at [phone] for Labs/Port flush.  Assessment unremarkable no complaints or concerns. R chest port accessed without issue and positive blood return noted.      Visit Vitals  /77 (BP 1 Location: Left arm, BP Patient Position: Sitting)   Pulse 81   Temp 98.2 °F (36.8 °C)   Resp 16   SpO2 97%         Labs obtained: Some labs have not resulted at the time of this note. Recent Results (from the past 12 hour(s))   CBC WITH AUTOMATED DIFF    Collection Time: 12/12/18  3:11 PM   Result Value Ref Range    WBC 2.9 (L) 4.1 - 11.1 K/uL    RBC 3.63 (L) 4.10 - 5.70 M/uL    HGB 10.7 (L) 12.1 - 17.0 g/dL    HCT 32.9 (L) 36.6 - 50.3 %    MCV 90.6 80.0 - 99.0 FL    MCH 29.5 26.0 - 34.0 PG    MCHC 32.5 30.0 - 36.5 g/dL    RDW 15.9 (H) 11.5 - 14.5 %    PLATELET 657 (L) 510 - 400 K/uL    MPV 10.7 8.9 - 12.9 FL    NRBC 0.0 0  WBC    ABSOLUTE NRBC 0.00 0.00 - 0.01 K/uL    NEUTROPHILS 41 32 - 75 %    LYMPHOCYTES 36 12 - 49 %    MONOCYTES 21 (H) 5 - 13 %    EOSINOPHILS 1 0 - 7 %    BASOPHILS 1 0 - 1 %    IMMATURE GRANULOCYTES 0 0.0 - 0.5 %    ABS. NEUTROPHILS 1.3 (L) 1.8 - 8.0 K/UL    ABS. LYMPHOCYTES 1.0 0.8 - 3.5 K/UL    ABS. MONOCYTES 0.6 0.0 - 1.0 K/UL    ABS. EOSINOPHILS 0.0 0.0 - 0.4 K/UL    ABS. BASOPHILS 0.0 0.0 - 0.1 K/UL    ABS. IMM.  GRANS. 0.0 0.00 - 0.04 K/UL    DF AUTOMATED      RBC COMMENTS NORMOCYTIC, NORMOCHROMIC     METABOLIC PANEL, COMPREHENSIVE    Collection Time: 12/12/18  3:11 PM   Result Value Ref Range    Sodium 141 136 - 145 mmol/L    Potassium 3.6 3.5 - 5.1 mmol/L    Chloride 109 (H) 97 - 108 mmol/L    CO2 27 21 - 32 mmol/L    Anion gap 5 5 - 15 mmol/L    Glucose 96 65 - 100 mg/dL    BUN 17 6 - 20 MG/DL    Creatinine 1.34 (H) 0.70 - 1.30 MG/DL    BUN/Creatinine ratio 13 12 - 20      GFR est AA >60 >60 ml/min/1.73m2    GFR est non-AA 54 (L) >60 ml/min/1.73m2    Calcium 8.4 (L) 8.5 - 10.1 MG/DL    Bilirubin, total 0.3 0.2 - 1.0 MG/DL    ALT (SGPT) 25 12 - 78 U/L    AST (SGOT) 19 15 - 37 U/L    Alk. phosphatase 73 45 - 117 U/L    Protein, total 6.5 6.4 - 8.2 g/dL    Albumin 3.6 3.5 - 5.0 g/dL    Globulin 2.9 2.0 - 4.0 g/dL    A-G Ratio 1.2 1.1 - 2.2         The following medications administered:  NS Flush  Heparin Flush    Pt tolerated treatment well.  IV flushed per policy and removed, 2x2 and paper tape placed.  Pt discharged ambulatory in no acute distress at 1520, accompanied by Spouse.   Next appointment 12/19/18 @ 230 pm.

## 2018-12-14 LAB
KAPPA LC FREE SER-MCNC: 16.5 MG/L (ref 3.3–19.4)
KAPPA LC FREE/LAMBDA FREE SER: 0.94 {RATIO} (ref 0.26–1.65)
LAMBDA LC FREE SERPL-MCNC: 17.5 MG/L (ref 5.7–26.3)

## 2018-12-17 LAB
ALBUMIN SERPL ELPH-MCNC: 3.5 G/DL (ref 2.9–4.4)
ALBUMIN/GLOB SERPL: 1.3 {RATIO} (ref 0.7–1.7)
ALPHA1 GLOB SERPL ELPH-MCNC: 0.2 G/DL (ref 0–0.4)
ALPHA2 GLOB SERPL ELPH-MCNC: 1.1 G/DL (ref 0.4–1)
B-GLOBULIN SERPL ELPH-MCNC: 0.9 G/DL (ref 0.7–1.3)
GAMMA GLOB SERPL ELPH-MCNC: 0.5 G/DL (ref 0.4–1.8)
GLOBULIN SER CALC-MCNC: 2.7 G/DL (ref 2.2–3.9)
IGA SERPL-MCNC: 42 MG/DL (ref 61–437)
IGG SERPL-MCNC: 514 MG/DL (ref 700–1600)
IGM SERPL-MCNC: 49 MG/DL (ref 20–172)
M PROTEIN SERPL ELPH-MCNC: ABNORMAL G/DL
PROT PATTERN SERPL IFE-IMP: ABNORMAL
PROT SERPL-MCNC: 6.2 G/DL (ref 6–8.5)

## 2018-12-19 ENCOUNTER — HOSPITAL ENCOUNTER (OUTPATIENT)
Dept: INFUSION THERAPY | Age: 63
Discharge: HOME | End: 2018-12-19
Payer: COMMERCIAL

## 2018-12-19 VITALS
HEART RATE: 67 BPM | RESPIRATION RATE: 20 BRPM | SYSTOLIC BLOOD PRESSURE: 132 MMHG | DIASTOLIC BLOOD PRESSURE: 77 MMHG | TEMPERATURE: 98.9 F | OXYGEN SATURATION: 99 %

## 2018-12-19 LAB
ALBUMIN SERPL-MCNC: 3.5 G/DL (ref 3.5–5)
ALBUMIN/GLOB SERPL: 1.1 {RATIO} (ref 1.1–2.2)
ALP SERPL-CCNC: 93 U/L (ref 45–117)
ALT SERPL-CCNC: 34 U/L (ref 12–78)
ANION GAP SERPL CALC-SCNC: 5 MMOL/L (ref 5–15)
AST SERPL-CCNC: 21 U/L (ref 15–37)
BASOPHILS # BLD: 0 K/UL (ref 0–0.1)
BASOPHILS NFR BLD: 1 % (ref 0–1)
BILIRUB SERPL-MCNC: 0.3 MG/DL (ref 0.2–1)
BUN SERPL-MCNC: 12 MG/DL (ref 6–20)
BUN/CREAT SERPL: 8 (ref 12–20)
CALCIUM SERPL-MCNC: 8.4 MG/DL (ref 8.5–10.1)
CHLORIDE SERPL-SCNC: 108 MMOL/L (ref 97–108)
CO2 SERPL-SCNC: 27 MMOL/L (ref 21–32)
CREAT SERPL-MCNC: 1.42 MG/DL (ref 0.7–1.3)
DIFFERENTIAL METHOD BLD: ABNORMAL
EOSINOPHIL # BLD: 0.1 K/UL (ref 0–0.4)
EOSINOPHIL NFR BLD: 2 % (ref 0–7)
ERYTHROCYTE [DISTWIDTH] IN BLOOD BY AUTOMATED COUNT: 16.1 % (ref 11.5–14.5)
GLOBULIN SER CALC-MCNC: 3.2 G/DL (ref 2–4)
GLUCOSE SERPL-MCNC: 79 MG/DL (ref 65–100)
HCT VFR BLD AUTO: 34.9 % (ref 36.6–50.3)
HGB BLD-MCNC: 11.1 G/DL (ref 12.1–17)
IMM GRANULOCYTES # BLD: 0 K/UL (ref 0–0.04)
IMM GRANULOCYTES NFR BLD AUTO: 0 % (ref 0–0.5)
LYMPHOCYTES # BLD: 1 K/UL (ref 0.8–3.5)
LYMPHOCYTES NFR BLD: 34 % (ref 12–49)
MCH RBC QN AUTO: 29.1 PG (ref 26–34)
MCHC RBC AUTO-ENTMCNC: 31.8 G/DL (ref 30–36.5)
MCV RBC AUTO: 91.6 FL (ref 80–99)
MONOCYTES # BLD: 0.8 K/UL (ref 0–1)
MONOCYTES NFR BLD: 26 % (ref 5–13)
NEUTS SEG # BLD: 1 K/UL (ref 1.8–8)
NEUTS SEG NFR BLD: 37 % (ref 32–75)
NRBC # BLD: 0 K/UL (ref 0–0.01)
NRBC BLD-RTO: 0 PER 100 WBC
PLATELET # BLD AUTO: 141 K/UL (ref 150–400)
PMV BLD AUTO: 10.6 FL (ref 8.9–12.9)
POTASSIUM SERPL-SCNC: 3.6 MMOL/L (ref 3.5–5.1)
PROT SERPL-MCNC: 6.7 G/DL (ref 6.4–8.2)
RBC # BLD AUTO: 3.81 M/UL (ref 4.1–5.7)
RBC MORPH BLD: ABNORMAL
SODIUM SERPL-SCNC: 140 MMOL/L (ref 136–145)
WBC # BLD AUTO: 2.9 K/UL (ref 4.1–11.1)

## 2018-12-19 PROCEDURE — 36415 COLL VENOUS BLD VENIPUNCTURE: CPT

## 2018-12-19 PROCEDURE — 85025 COMPLETE CBC W/AUTO DIFF WBC: CPT

## 2018-12-19 PROCEDURE — 80053 COMPREHEN METABOLIC PANEL: CPT

## 2018-12-26 ENCOUNTER — HOSPITAL ENCOUNTER (OUTPATIENT)
Dept: INFUSION THERAPY | Age: 63
Discharge: HOME | End: 2018-12-26
Payer: COMMERCIAL

## 2018-12-26 VITALS
WEIGHT: 189.6 LBS | TEMPERATURE: 98.6 F | OXYGEN SATURATION: 97 % | DIASTOLIC BLOOD PRESSURE: 88 MMHG | HEIGHT: 68 IN | HEART RATE: 81 BPM | RESPIRATION RATE: 18 BRPM | SYSTOLIC BLOOD PRESSURE: 148 MMHG | BODY MASS INDEX: 28.73 KG/M2

## 2018-12-26 DIAGNOSIS — C90.01 MULTIPLE MYELOMA IN REMISSION (HCC): Primary | ICD-10-CM

## 2018-12-26 LAB
ALBUMIN SERPL-MCNC: 3.8 G/DL (ref 3.5–5)
ALBUMIN/GLOB SERPL: 1.2 {RATIO} (ref 1.1–2.2)
ALP SERPL-CCNC: 82 U/L (ref 45–117)
ALT SERPL-CCNC: 26 U/L (ref 12–78)
ANION GAP SERPL CALC-SCNC: 7 MMOL/L (ref 5–15)
AST SERPL-CCNC: 17 U/L (ref 15–37)
BASOPHILS # BLD: 0 K/UL (ref 0–0.1)
BASOPHILS NFR BLD: 1 % (ref 0–1)
BILIRUB SERPL-MCNC: 0.4 MG/DL (ref 0.2–1)
BUN SERPL-MCNC: 12 MG/DL (ref 6–20)
BUN/CREAT SERPL: 9 (ref 12–20)
CALCIUM SERPL-MCNC: 8.8 MG/DL (ref 8.5–10.1)
CHLORIDE SERPL-SCNC: 105 MMOL/L (ref 97–108)
CO2 SERPL-SCNC: 28 MMOL/L (ref 21–32)
CREAT SERPL-MCNC: 1.37 MG/DL (ref 0.7–1.3)
DIFFERENTIAL METHOD BLD: ABNORMAL
EOSINOPHIL # BLD: 0.1 K/UL (ref 0–0.4)
EOSINOPHIL NFR BLD: 2 % (ref 0–7)
ERYTHROCYTE [DISTWIDTH] IN BLOOD BY AUTOMATED COUNT: 15.9 % (ref 11.5–14.5)
GLOBULIN SER CALC-MCNC: 3.2 G/DL (ref 2–4)
GLUCOSE SERPL-MCNC: 86 MG/DL (ref 65–100)
HCT VFR BLD AUTO: 36.3 % (ref 36.6–50.3)
HGB BLD-MCNC: 11.6 G/DL (ref 12.1–17)
IMM GRANULOCYTES # BLD: 0 K/UL (ref 0–0.04)
IMM GRANULOCYTES NFR BLD AUTO: 0 % (ref 0–0.5)
LYMPHOCYTES # BLD: 1.2 K/UL (ref 0.8–3.5)
LYMPHOCYTES NFR BLD: 40 % (ref 12–49)
MCH RBC QN AUTO: 29.5 PG (ref 26–34)
MCHC RBC AUTO-ENTMCNC: 32 G/DL (ref 30–36.5)
MCV RBC AUTO: 92.4 FL (ref 80–99)
MONOCYTES # BLD: 0.6 K/UL (ref 0–1)
MONOCYTES NFR BLD: 20 % (ref 5–13)
NEUTS SEG # BLD: 1.1 K/UL (ref 1.8–8)
NEUTS SEG NFR BLD: 37 % (ref 32–75)
NRBC # BLD: 0 K/UL (ref 0–0.01)
NRBC BLD-RTO: 0 PER 100 WBC
PLATELET # BLD AUTO: 151 K/UL (ref 150–400)
PMV BLD AUTO: 10.8 FL (ref 8.9–12.9)
POTASSIUM SERPL-SCNC: 3.9 MMOL/L (ref 3.5–5.1)
PROT SERPL-MCNC: 7 G/DL (ref 6.4–8.2)
RBC # BLD AUTO: 3.93 M/UL (ref 4.1–5.7)
RBC MORPH BLD: ABNORMAL
SODIUM SERPL-SCNC: 140 MMOL/L (ref 136–145)
WBC # BLD AUTO: 3 K/UL (ref 4.1–11.1)

## 2018-12-26 PROCEDURE — 85025 COMPLETE CBC W/AUTO DIFF WBC: CPT

## 2018-12-26 PROCEDURE — 96365 THER/PROPH/DIAG IV INF INIT: CPT

## 2018-12-26 PROCEDURE — 36415 COLL VENOUS BLD VENIPUNCTURE: CPT

## 2018-12-26 PROCEDURE — 80053 COMPREHEN METABOLIC PANEL: CPT

## 2018-12-26 PROCEDURE — 74011250636 HC RX REV CODE- 250/636: Performed by: INTERNAL MEDICINE

## 2018-12-26 RX ORDER — SODIUM CHLORIDE 9 MG/ML
10 INJECTION INTRAMUSCULAR; INTRAVENOUS; SUBCUTANEOUS AS NEEDED
Status: ACTIVE | OUTPATIENT
Start: 2018-12-26 | End: 2018-12-27

## 2018-12-26 RX ORDER — SODIUM CHLORIDE 0.9 % (FLUSH) 0.9 %
10 SYRINGE (ML) INJECTION AS NEEDED
Status: ACTIVE | OUTPATIENT
Start: 2018-12-26 | End: 2018-12-27

## 2018-12-26 RX ORDER — HEPARIN 100 UNIT/ML
300-500 SYRINGE INTRAVENOUS AS NEEDED
Status: ACTIVE | OUTPATIENT
Start: 2018-12-26 | End: 2018-12-27

## 2018-12-26 RX ADMIN — Medication 500 UNITS: at 15:06

## 2018-12-26 RX ADMIN — ZOLEDRONIC ACID 4 MG: 0.04 INJECTION, SOLUTION INTRAVENOUS at 14:42

## 2018-12-26 RX ADMIN — Medication 10 ML: at 15:05

## 2018-12-26 RX ADMIN — SODIUM CHLORIDE 10 ML: 9 INJECTION INTRAMUSCULAR; INTRAVENOUS; SUBCUTANEOUS at 15:05

## 2018-12-26 NOTE — PROGRESS NOTES
1430 Pt arrived at [facility] ambulatory and in no distress for labs/ zometa. Assessment completed, no new complaints voiced. Port accessed, labs drawn. Visit Vitals  /88 (BP 1 Location: Left arm, BP Patient Position: Sitting)   Pulse 81   Temp 98.6 °F (37 °C)   Resp 18   Ht 5' 8\" (1.727 m)   Wt 86 kg (189 lb 9.6 oz)   SpO2 97%   BMI 28.83 kg/m²       Medications received:  Zometa 4 mg IV over 15 min    Port flushed with NS and heparin and needle removed. 130 [facility] well, no adverse reaction noted. D/Cd from [facility] ambulatory and in no distress accompanied by wife. Next appt 1/2/19 for labs    Recent Results (from the past 8 hour(s))   CBC WITH AUTOMATED DIFF    Collection Time: 12/26/18  2:33 PM   Result Value Ref Range    WBC 3.0 (L) 4.1 - 11.1 K/uL    RBC 3.93 (L) 4.10 - 5.70 M/uL    HGB 11.6 (L) 12.1 - 17.0 g/dL    HCT 36.3 (L) 36.6 - 50.3 %    MCV 92.4 80.0 - 99.0 FL    MCH 29.5 26.0 - 34.0 PG    MCHC 32.0 30.0 - 36.5 g/dL    RDW 15.9 (H) 11.5 - 14.5 %    PLATELET 291 502 - 140 K/uL    MPV 10.8 8.9 - 12.9 FL    NRBC 0.0 0  WBC    ABSOLUTE NRBC 0.00 0.00 - 0.01 K/uL    NEUTROPHILS 37 32 - 75 %    LYMPHOCYTES 40 12 - 49 %    MONOCYTES 20 (H) 5 - 13 %    EOSINOPHILS 2 0 - 7 %    BASOPHILS 1 0 - 1 %    IMMATURE GRANULOCYTES 0 0.0 - 0.5 %    ABS. NEUTROPHILS 1.1 (L) 1.8 - 8.0 K/UL    ABS. LYMPHOCYTES 1.2 0.8 - 3.5 K/UL    ABS. MONOCYTES 0.6 0.0 - 1.0 K/UL    ABS. EOSINOPHILS 0.1 0.0 - 0.4 K/UL    ABS. BASOPHILS 0.0 0.0 - 0.1 K/UL    ABS. IMM.  GRANS. 0.0 0.00 - 0.04 K/UL    DF AUTOMATED      RBC COMMENTS ANISOCYTOSIS  1+       METABOLIC PANEL, COMPREHENSIVE    Collection Time: 12/26/18  2:33 PM   Result Value Ref Range    Sodium 140 136 - 145 mmol/L    Potassium 3.9 3.5 - 5.1 mmol/L    Chloride 105 97 - 108 mmol/L    CO2 28 21 - 32 mmol/L    Anion gap 7 5 - 15 mmol/L    Glucose 86 65 - 100 mg/dL    BUN 12 6 - 20 MG/DL    Creatinine 1.37 (H) 0.70 - 1.30 MG/DL    BUN/Creatinine ratio 9 (L) 12 - 20      GFR est AA >60 >60 ml/min/1.73m2    GFR est non-AA 53 (L) >60 ml/min/1.73m2    Calcium 8.8 8.5 - 10.1 MG/DL    Bilirubin, total 0.4 0.2 - 1.0 MG/DL    ALT (SGPT) 26 12 - 78 U/L    AST (SGOT) 17 15 - 37 U/L    Alk.  phosphatase 82 45 - 117 U/L    Protein, total 7.0 6.4 - 8.2 g/dL    Albumin 3.8 3.5 - 5.0 g/dL    Globulin 3.2 2.0 - 4.0 g/dL    A-G Ratio 1.2 1.1 - 2.2

## 2019-01-03 ENCOUNTER — HOSPITAL ENCOUNTER (OUTPATIENT)
Dept: INFUSION THERAPY | Age: 64
Discharge: HOME | End: 2019-01-03
Payer: COMMERCIAL

## 2019-01-03 VITALS
OXYGEN SATURATION: 99 % | TEMPERATURE: 98.2 F | SYSTOLIC BLOOD PRESSURE: 140 MMHG | HEART RATE: 71 BPM | DIASTOLIC BLOOD PRESSURE: 87 MMHG | RESPIRATION RATE: 20 BRPM

## 2019-01-03 DIAGNOSIS — Z94.84 H/O AUTOLOGOUS STEM CELL TRANSPLANT (HCC): ICD-10-CM

## 2019-01-03 DIAGNOSIS — C90.01 MULTIPLE MYELOMA IN REMISSION (HCC): ICD-10-CM

## 2019-01-03 LAB
ALBUMIN SERPL-MCNC: 3.6 G/DL (ref 3.5–5)
ALBUMIN/GLOB SERPL: 1.1 {RATIO} (ref 1.1–2.2)
ALP SERPL-CCNC: 104 U/L (ref 45–117)
ALT SERPL-CCNC: 36 U/L (ref 12–78)
ANION GAP SERPL CALC-SCNC: 6 MMOL/L (ref 5–15)
AST SERPL-CCNC: 22 U/L (ref 15–37)
BASOPHILS # BLD: 0 K/UL (ref 0–0.1)
BASOPHILS NFR BLD: 1 % (ref 0–1)
BILIRUB SERPL-MCNC: 0.2 MG/DL (ref 0.2–1)
BUN SERPL-MCNC: 11 MG/DL (ref 6–20)
BUN/CREAT SERPL: 9 (ref 12–20)
CALCIUM SERPL-MCNC: 8.3 MG/DL (ref 8.5–10.1)
CHLORIDE SERPL-SCNC: 110 MMOL/L (ref 97–108)
CO2 SERPL-SCNC: 26 MMOL/L (ref 21–32)
CREAT SERPL-MCNC: 1.29 MG/DL (ref 0.7–1.3)
DIFFERENTIAL METHOD BLD: ABNORMAL
EOSINOPHIL # BLD: 0.1 K/UL (ref 0–0.4)
EOSINOPHIL NFR BLD: 2 % (ref 0–7)
ERYTHROCYTE [DISTWIDTH] IN BLOOD BY AUTOMATED COUNT: 15.9 % (ref 11.5–14.5)
GLOBULIN SER CALC-MCNC: 3.3 G/DL (ref 2–4)
GLUCOSE SERPL-MCNC: 103 MG/DL (ref 65–100)
HCT VFR BLD AUTO: 34.7 % (ref 36.6–50.3)
HGB BLD-MCNC: 11 G/DL (ref 12.1–17)
IGA SERPL-MCNC: 79 MG/DL (ref 70–400)
IGG SERPL-MCNC: 704 MG/DL (ref 700–1600)
IGM SERPL-MCNC: 44 MG/DL (ref 40–230)
IMM GRANULOCYTES # BLD: 0 K/UL (ref 0–0.04)
IMM GRANULOCYTES NFR BLD AUTO: 0 % (ref 0–0.5)
LYMPHOCYTES # BLD: 1 K/UL (ref 0.8–3.5)
LYMPHOCYTES NFR BLD: 38 % (ref 12–49)
MCH RBC QN AUTO: 29.1 PG (ref 26–34)
MCHC RBC AUTO-ENTMCNC: 31.7 G/DL (ref 30–36.5)
MCV RBC AUTO: 91.8 FL (ref 80–99)
MONOCYTES # BLD: 0.6 K/UL (ref 0–1)
MONOCYTES NFR BLD: 23 % (ref 5–13)
NEUTS SEG # BLD: 1 K/UL (ref 1.8–8)
NEUTS SEG NFR BLD: 36 % (ref 32–75)
NRBC # BLD: 0 K/UL (ref 0–0.01)
NRBC BLD-RTO: 0 PER 100 WBC
PLATELET # BLD AUTO: 149 K/UL (ref 150–400)
PMV BLD AUTO: 10.3 FL (ref 8.9–12.9)
POTASSIUM SERPL-SCNC: 4.1 MMOL/L (ref 3.5–5.1)
PROT SERPL-MCNC: 6.9 G/DL (ref 6.4–8.2)
RBC # BLD AUTO: 3.78 M/UL (ref 4.1–5.7)
RBC MORPH BLD: ABNORMAL
RBC MORPH BLD: ABNORMAL
SODIUM SERPL-SCNC: 142 MMOL/L (ref 136–145)
WBC # BLD AUTO: 2.7 K/UL (ref 4.1–11.1)

## 2019-01-03 PROCEDURE — 82784 ASSAY IGA/IGD/IGG/IGM EACH: CPT

## 2019-01-03 PROCEDURE — 80053 COMPREHEN METABOLIC PANEL: CPT

## 2019-01-03 PROCEDURE — 83883 ASSAY NEPHELOMETRY NOT SPEC: CPT

## 2019-01-03 PROCEDURE — 84165 PROTEIN E-PHORESIS SERUM: CPT

## 2019-01-03 PROCEDURE — 85025 COMPLETE CBC W/AUTO DIFF WBC: CPT

## 2019-01-03 PROCEDURE — 36415 COLL VENOUS BLD VENIPUNCTURE: CPT

## 2019-01-03 NOTE — PROGRESS NOTES
[address] [facility] Lab Note: 
 
9055 Pt arrived for labwork. Labs obtained via left AC using 23g butterfly. Patient Vitals for the past 4 hrs: 
 Temp Pulse Resp BP SpO2  
01/03/19 1510 98.2 °F (36.8 °C) 71 20 140/87 99 % 1520 Pt discharged ambulatory with wife. Due to see Dr. William Medina 1/11/19, labs will be drawn monthly. Next appt 1/30/19 Recent Results (from the past 8 hour(s)) CBC WITH AUTOMATED DIFF Collection Time: 01/03/19  3:10 PM  
Result Value Ref Range WBC 2.7 (L) 4.1 - 11.1 K/uL  
 RBC 3.78 (L) 4.10 - 5.70 M/uL  
 HGB 11.0 (L) 12.1 - 17.0 g/dL HCT 34.7 (L) 36.6 - 50.3 % MCV 91.8 80.0 - 99.0 FL  
 MCH 29.1 26.0 - 34.0 PG  
 MCHC 31.7 30.0 - 36.5 g/dL  
 RDW 15.9 (H) 11.5 - 14.5 % PLATELET 315 (L) 582 - 400 K/uL MPV 10.3 8.9 - 12.9 FL  
 NRBC 0.0 0  WBC ABSOLUTE NRBC 0.00 0.00 - 0.01 K/uL NEUTROPHILS PENDING % LYMPHOCYTES PENDING % MONOCYTES PENDING % EOSINOPHILS PENDING % BASOPHILS PENDING % IMMATURE GRANULOCYTES PENDING %  
 ABS. NEUTROPHILS PENDING K/UL  
 ABS. LYMPHOCYTES PENDING K/UL  
 ABS. MONOCYTES PENDING K/UL  
 ABS. EOSINOPHILS PENDING K/UL  
 ABS. BASOPHILS PENDING K/UL  
 ABS. IMM. GRANS.  PENDING K/UL  
 DF PENDING

## 2019-01-07 LAB
ALBUMIN SERPL ELPH-MCNC: 3.5 G/DL (ref 2.9–4.4)
ALBUMIN/GLOB SERPL: 1.3 {RATIO} (ref 0.7–1.7)
ALPHA1 GLOB SERPL ELPH-MCNC: 0.2 G/DL (ref 0–0.4)
ALPHA2 GLOB SERPL ELPH-MCNC: 1.1 G/DL (ref 0.4–1)
B-GLOBULIN SERPL ELPH-MCNC: 0.8 G/DL (ref 0.7–1.3)
GAMMA GLOB SERPL ELPH-MCNC: 0.6 G/DL (ref 0.4–1.8)
GLOBULIN SER CALC-MCNC: 2.7 G/DL (ref 2.2–3.9)
IGA SERPL-MCNC: 72 MG/DL (ref 61–437)
IGG SERPL-MCNC: 649 MG/DL (ref 700–1600)
IGM SERPL-MCNC: 42 MG/DL (ref 20–172)
KAPPA LC FREE SER-MCNC: 17.7 MG/L (ref 3.3–19.4)
KAPPA LC FREE/LAMBDA FREE SER: 0.91 {RATIO} (ref 0.26–1.65)
LAMBDA LC FREE SERPL-MCNC: 19.4 MG/L (ref 5.7–26.3)
M PROTEIN SERPL ELPH-MCNC: ABNORMAL G/DL
PROT PATTERN SERPL IFE-IMP: ABNORMAL
PROT SERPL-MCNC: 6.2 G/DL (ref 6–8.5)

## 2019-01-08 DIAGNOSIS — C90.01 MULTIPLE MYELOMA IN REMISSION (HCC): ICD-10-CM

## 2019-01-08 DIAGNOSIS — Z94.84 H/O AUTOLOGOUS STEM CELL TRANSPLANT (HCC): ICD-10-CM

## 2019-01-09 DIAGNOSIS — C90.01 MULTIPLE MYELOMA IN REMISSION (HCC): ICD-10-CM

## 2019-01-09 DIAGNOSIS — Z94.84 H/O AUTOLOGOUS STEM CELL TRANSPLANT (HCC): ICD-10-CM

## 2019-01-09 RX ORDER — POMALIDOMIDE 2 MG/1
CAPSULE ORAL
Qty: 28 CAP | Refills: 0 | Status: SHIPPED | OUTPATIENT
Start: 2019-01-09 | End: 2019-03-07 | Stop reason: SDUPTHER

## 2019-01-09 RX ORDER — POMALIDOMIDE 2 MG/1
CAPSULE ORAL
Qty: 28 CAP | Refills: 0 | Status: SHIPPED | OUTPATIENT
Start: 2019-01-09 | End: 2019-01-09 | Stop reason: SDUPTHER

## 2019-01-09 NOTE — TELEPHONE ENCOUNTER
PER VORB ROM DR YOUNG Brown 2 mg cap, take one cap daily, disp 30, refills x12  [facility]

## 2019-01-11 ENCOUNTER — OFFICE VISIT (OUTPATIENT)
Dept: ONCOLOGY | Age: 64
End: 2019-01-11

## 2019-01-11 VITALS
SYSTOLIC BLOOD PRESSURE: 164 MMHG | HEART RATE: 86 BPM | BODY MASS INDEX: 28.64 KG/M2 | HEIGHT: 68 IN | DIASTOLIC BLOOD PRESSURE: 92 MMHG | OXYGEN SATURATION: 98 % | RESPIRATION RATE: 16 BRPM | WEIGHT: 189 LBS | TEMPERATURE: 98.3 F

## 2019-01-11 DIAGNOSIS — C90.01 MULTIPLE MYELOMA IN REMISSION (HCC): Primary | ICD-10-CM

## 2019-01-11 NOTE — PROGRESS NOTES
Aiden Hong . is a 66 y. o. male here today for IgA kappa myeloma f/u. Patient stopped maintenance therapy with Ninlaro. Patient started Pomalyst 2mg daily. Patient accompanied by his spouse to today's appt. Elevated B/P noted; other VS stable. Patient reports neck and rectal pain; pt denies rectal bleeding. Helen Meigs reports he has proctitis; pt taking ABT prescribed by urologist; pt unable to recall name of ABT. Good appetite. Patient denies N/V/D and constipation. Patient denies numbness and tingling. Patient denies mouth ulcers. Patient denies cough. Patient denies SOB.      Visit Vitals  BP (!) 164/92 (BP 1 Location: Left arm, BP Patient Position: Sitting)   Pulse 86   Temp 98.3 °F (36.8 °C) (Oral)   Resp 16   Ht 5' 8\" (1.727 m)   Wt 189 lb (85.7 kg)   SpO2 98%   BMI 28.74 kg/m²

## 2019-01-11 NOTE — PROGRESS NOTES
2001 [facility]  [address], [address] [facility] Jared Cornelius, [address]  [phone]          Progress Note        Patient: Rodríguez Hilario Sr. MRN: [id number]  SSN: [id number]    YOB: 1955  Age: 66 y.o. Sex: male        Diagnosis:      1. Multiple Myeloma, IgA Kappa   Durie [place] stage IIIB    Cytogenetics/FISH: t(14;16) - high risk    Ttreatment:     1. Maintenance therapy - Pomalyst 2 mg daily - started 11/19/2018  2. Maintenance therapy - Ixazomib - started 10/15/2018 - stopped 11/12/2018  3. S/P tandem  Autotransplant    First on 2/28/2018   2nd on 06/13/2018  4. Carfilzomib/Pom/Dex - s/p 5 cycles  5. VD-PACE s/p 1 cycle  6. RVD - s/p 4 cycles    Subjective:      Rodríguez Hilario Sr. is a 66 y.o. male with a diagnosis of IgA kappa myeloma. Bone marrow shows 100% aberrant plasma cells. He suffers with DM but it is diet controlled. He has received systemic anti-viral treatment for chronic Hep C with successful eradication of the virus. Mr. Micah Candelaria received 4 cycles of systemic therapy with RVd. He had an initial good response to treatment. However his paraprotein level started rising and the myeloma became refractory to treatment. I then administered one cycle of VD-PACE in the hospital. He then received Carfilzomib/Pom/Dex. He achieved VGPR. He underwent tandem autotransplant at [facility]. According to the patient he achieved complete response with therapy. He has undergone second/tandem transplant in June. He is now on Pomalyst maintenance and tolerating it well. He is scheduled for a repeat BM biopsy in February at [facility]. He is here today with his Frantz Drown and feels well with no complaints.       Review of Systems:    Constitutional: fatigue  Eyes: negative  Ears, Nose, Mouth, Throat, and Face: negative  Respiratory: negative  Cardiovascular: negative  Gastrointestinal: negative  Genitourinary:negative  Integument/Breast: negative  Hematologic/Lymphatic: negative  Musculoskeletal: chronic neck pain  Neurological: negative      Past Medical History:   Diagnosis Date    Acid reflux     Arrhythmia     pvc's    Chronic pain     neck    Depression     Diabetes ([address]     Femoral hernia 2012    Hepatitis C     Hypertension     Inguinal hernia 2012    Liver disease     hepatitis c    Other ill-defined conditions(799.89)     Hx of PVCs since     Prostatitis, acute     Psychiatric disorder     depression     Past Surgical History:   Procedure Laterality Date    COLONOSCOPY N/A 2016    COLONOSCOPY performed by Amy Leblanc MD at [facility] ENDOSCOPY    HX APPENDECTOMY      HX CERVICAL FUSION  2008    x 1 as of 2014    HX HEENT      foreign body removed from right eye    HX HERNIA REPAIR      [facility]      HX ORTHOPAEDIC      cervical fusion    HX OTHER SURGICAL  2000    liver bx - chronic hepatitis      Family History   Problem Relation Age of Onset    Arthritis-osteo Mother     Arthritis-osteo Father     Diabetes Father     Hypertension Maternal Grandmother     Diabetes Maternal Grandmother     Hypertension Maternal Grandfather     Diabetes Maternal Grandfather      Social History     Tobacco Use    Smoking status: Former Smoker     Packs/day: 1.00     Years: 12.00     Pack years: 12.00     Types: Cigarettes     Last attempt to quit: 1989     Years since quittin.0    Smokeless tobacco: Never Used    Tobacco comment: former cigarette smoker   Substance Use Topics    Alcohol use: No     Comment: former alcoholic-quit [id number]      Prior to Admission medications    Medication Sig Start Date End Date Taking? Authorizing Provider   POMALYST 2 mg cap TAKE 1 CAPSULE BY MOUTH ONCE DAILY 19  Yes Cortney Fernández MD   pomalidomide (POMALYST) 2 mg cap Take 1 Cap by mouth daily.  19  Yes Cortney Fernández MD   omeprazole (PRILOSEC) 20 mg capsule Take 20 mg by mouth daily as needed (acid reflux). Yes Other, MD Goldy   ondansetron (ZOFRAN ODT) 8 mg disintegrating tablet Take 8 mg by mouth every eight (8) hours as needed for Nausea. Yes Joe, MD Goldy   buPROPion XL (WELLBUTRIN XL) 150 mg tablet Take 150 mg by mouth every morning. Yes Other, MD Golyd   famotidine (PEPCID) 20 mg tablet Take 20 mg by mouth daily as needed (acid reflux). Yes Other, MD Goldy   ixazomib 4 mg cap Take 4 mg by mouth every seven (7) days. Patient takes 4 mg every Tuesdays on week 1-3 of cycle   Yes Other, MD Goldy   acyclovir (ZOVIRAX) 400 mg tablet Take 400 mg by mouth two (2) times a day. 7/7/18  Yes Provider, Historical   tenofovir DISOPROXIL FUMARATE (VIREAD) 300 mg tablet Take 300 mg by mouth daily. 7/7/18  Yes Provider, Historical   pentamidine (NEBUPENT) 300 mg inhalation solution Take 300 mg by inhalation every month. Once a month at Dr's office   Yes Provider, Historical   ergocalciferol (ERGOCALCIFEROL) 50,000 unit capsule TAKE 1 CAP BY MOUTH EVERY 7 DAYS, Mondays 5/14/18  Yes Provider, Historical   lisinopril (PRINIVIL, ZESTRIL) 10 mg tablet TAKE 1 TABLET BY MOUTH EVERY DAY as needed. Hold if SBP <115 5/12/18  Yes Provider, Historical   folic acid (FOLVITE) 1 mg tablet TAKE 1 TABLET BY MOUTH EVERY DAY 5/14/18  Yes Provider, Historical   multivitamin (ONE A DAY) tablet Take 1 Tab by mouth daily. Yes Provider, Historical   Biotin 2,500 mcg cap Take 1 Cap by mouth daily. Yes Provider, Historical   metFORMIN ER (GLUCOPHAGE XR) 500 mg tablet Take 1 Tab by mouth two (2) times a day. 11/10/17  Yes Tank Corona MD   insulin lispro (HUMALOG) 100 unit/mL kwikpen 4-6 Units by SubCUTAneous route Before breakfast, lunch, and dinner.  Blood Glucose (mg/dL)       Insulin Dose (units)              <130                                 skip              130-150                              4               >150                                   6   Yes Provider, Historical   tamsulosin (FLOMAX) 0.4 mg capsule Take 1 Cap by mouth daily. 4/4/17  Yes Dexter Gupta MD   zolpidem CR (AMBIEN CR) 12.5 mg tablet Take 12.5 mg by mouth nightly. Yes Provider, Historical   tapentadol (NUCYNTA) 100 mg tablet Take 200 mg by mouth three (3) times daily. At 7a, 3p, 11pm   Yes Joe, MD Goldy   aripiprazole (ABILIFY) 5 mg tablet Take 2.5 mg by mouth every morning. Yes Other, MD Goldy          Allergies   Allergen Reactions    Mercury (Bulk) Hives     Blisters             Objective:     Vitals:    01/11/19 1049   BP: (!) 164/92   Pulse: 86   Temp: 98.3 °F (36.8 °C)   TempSrc: Oral   SpO2: 98%          Physical Exam:    GENERAL: alert, cooperative  EYE: negative  LYMPHATIC: Cervical, supraclavicular, and axillary nodes normal.   THROAT & NECK: normal and no erythema or exudates noted. LUNG: clear to auscultation bilaterally  HEART: regular rate and rhythm  ABDOMEN: soft, non-tender  EXTREMITIES: normal  SKIN: Normal.  NEUROLOGIC: negative      Lab Results   Component Value Date/Time    WBC 2.7 (L) 01/03/2019 03:10 PM    HGB 11.0 (L) 01/03/2019 03:10 PM    HCT 34.7 (L) 01/03/2019 03:10 PM    PLATELET 090 (L) 89/26/0942 03:10 PM    MCV 91.8 01/03/2019 03:10 PM       Lab Results   Component Value Date/Time    Sodium 142 01/03/2019 03:10 PM    Potassium 4.1 01/03/2019 03:10 PM    Chloride 110 (H) 01/03/2019 03:10 PM    CO2 26 01/03/2019 03:10 PM    Anion gap 6 01/03/2019 03:10 PM    Glucose 103 (H) 01/03/2019 03:10 PM    BUN 11 01/03/2019 03:10 PM    Creatinine 1.29 01/03/2019 03:10 PM    BUN/Creatinine ratio 9 (L) 01/03/2019 03:10 PM    GFR est AA >60 01/03/2019 03:10 PM    GFR est non-AA 57 (L) 01/03/2019 03:10 PM    Calcium 8.3 (L) 01/03/2019 03:10 PM            Assessment:     1. Multiple Myeloma, IgA Kappa   Durie [place] stage IIIB    Cytogenetics/FISH: t(14;16) - high risk  ECOG PS - 0   Intent of therapy: palliative    Received 3 cycles of RVd   Dose reduced Revlimid and Velcade d/t continued cytopenias. M-spike started rising.     He received one cycle of VD-PACE (bortezomib, dexamethasone, thalidomide, cisplatin, adriamycin, cyclophosphamide, and etoposide). Achieved [place] with once cycle    Receivied KPd - s/p 5 cycles    Excellent response with M-protein came down to 0.1    S/p tandem autotransplant    First on 2/28/2018   2nd on 06/13/2018    Achieved CR after the first transplant. Blood count has recovered  Doing well  Some fatigue  Repeat bone marrow @ [facility] - Complete response. Since he is high risk based on cytogenetics, I recommend proteosome inhibitor as maintenance therapy. Started Ixazomib   Developed fever, was in the hospital.   ? Prostatitis vs ? Viral URI  Also developed cytopenias    Patient prefered to switch therapy to Pomalyst.     On Pomalyst 2 mg daily - started 11/19/2018  Tolerating well   In complete remission by serological tests  Symptom management form reviewed with patient. 2. Type 2 DM with complication    Managed by Endocrine      3. Chronic Hep C    In sustained virological remission      Plan:       > Continue Pomalyst 2 mg daily maintenance  > Myeloma labs monthly  > Bone marrow at [facility] in Feb  > Port flush monthly  > Continue Zometa every 3 months  > Follow-up in 2 months      Signed by: Jeny Wing MD                     January 11, 2019        CC. Cory Andrade MD  CC.  Matt De La Cruz MD

## 2019-01-16 RX ORDER — METFORMIN HYDROCHLORIDE 500 MG/1
TABLET, EXTENDED RELEASE ORAL
Qty: 180 TAB | Refills: 0 | Status: SHIPPED | OUTPATIENT
Start: 2019-01-16 | End: 2019-04-29 | Stop reason: SDUPTHER

## 2019-01-30 ENCOUNTER — HOSPITAL ENCOUNTER (OUTPATIENT)
Dept: INFUSION THERAPY | Age: 64
Discharge: HOME | End: 2019-01-30
Payer: COMMERCIAL

## 2019-01-30 VITALS
DIASTOLIC BLOOD PRESSURE: 83 MMHG | HEART RATE: 75 BPM | SYSTOLIC BLOOD PRESSURE: 125 MMHG | TEMPERATURE: 98.3 F | OXYGEN SATURATION: 98 % | RESPIRATION RATE: 18 BRPM

## 2019-01-30 LAB
ALBUMIN SERPL-MCNC: 3.5 G/DL (ref 3.5–5)
ALBUMIN/GLOB SERPL: 1.1 {RATIO} (ref 1.1–2.2)
ALP SERPL-CCNC: 83 U/L (ref 45–117)
ALT SERPL-CCNC: 26 U/L (ref 12–78)
ANION GAP SERPL CALC-SCNC: 7 MMOL/L (ref 5–15)
AST SERPL-CCNC: 17 U/L (ref 15–37)
BASOPHILS # BLD: 0 K/UL (ref 0–0.1)
BASOPHILS NFR BLD: 1 % (ref 0–1)
BILIRUB SERPL-MCNC: 0.3 MG/DL (ref 0.2–1)
BUN SERPL-MCNC: 12 MG/DL (ref 6–20)
BUN/CREAT SERPL: 10 (ref 12–20)
CALCIUM SERPL-MCNC: 8.6 MG/DL (ref 8.5–10.1)
CHLORIDE SERPL-SCNC: 107 MMOL/L (ref 97–108)
CO2 SERPL-SCNC: 27 MMOL/L (ref 21–32)
CREAT SERPL-MCNC: 1.22 MG/DL (ref 0.7–1.3)
DIFFERENTIAL METHOD BLD: ABNORMAL
EOSINOPHIL # BLD: 0.1 K/UL (ref 0–0.4)
EOSINOPHIL NFR BLD: 3 % (ref 0–7)
ERYTHROCYTE [DISTWIDTH] IN BLOOD BY AUTOMATED COUNT: 16 % (ref 11.5–14.5)
GLOBULIN SER CALC-MCNC: 3.2 G/DL (ref 2–4)
GLUCOSE SERPL-MCNC: 83 MG/DL (ref 65–100)
HCT VFR BLD AUTO: 34 % (ref 36.6–50.3)
HGB BLD-MCNC: 10.9 G/DL (ref 12.1–17)
IGA SERPL-MCNC: 92 MG/DL (ref 70–400)
IGG SERPL-MCNC: 715 MG/DL (ref 700–1600)
IGM SERPL-MCNC: 30 MG/DL (ref 40–230)
IMM GRANULOCYTES # BLD AUTO: 0 K/UL (ref 0–0.04)
IMM GRANULOCYTES NFR BLD AUTO: 0 % (ref 0–0.5)
LYMPHOCYTES # BLD: 0.9 K/UL (ref 0.8–3.5)
LYMPHOCYTES NFR BLD: 37 % (ref 12–49)
MCH RBC QN AUTO: 29.3 PG (ref 26–34)
MCHC RBC AUTO-ENTMCNC: 32.1 G/DL (ref 30–36.5)
MCV RBC AUTO: 91.4 FL (ref 80–99)
MONOCYTES # BLD: 0.6 K/UL (ref 0–1)
MONOCYTES NFR BLD: 25 % (ref 5–13)
NEUTS SEG # BLD: 0.8 K/UL (ref 1.8–8)
NEUTS SEG NFR BLD: 34 % (ref 32–75)
NRBC # BLD: 0 K/UL (ref 0–0.01)
NRBC BLD-RTO: 0 PER 100 WBC
PLATELET # BLD AUTO: 129 K/UL (ref 150–400)
PMV BLD AUTO: 10.8 FL (ref 8.9–12.9)
POTASSIUM SERPL-SCNC: 4.1 MMOL/L (ref 3.5–5.1)
PROT SERPL-MCNC: 6.7 G/DL (ref 6.4–8.2)
RBC # BLD AUTO: 3.72 M/UL (ref 4.1–5.7)
RBC MORPH BLD: ABNORMAL
SODIUM SERPL-SCNC: 141 MMOL/L (ref 136–145)
WBC # BLD AUTO: 2.4 K/UL (ref 4.1–11.1)

## 2019-01-30 PROCEDURE — 85025 COMPLETE CBC W/AUTO DIFF WBC: CPT

## 2019-01-30 PROCEDURE — 77030012965 HC NDL HUBR BBMI -A

## 2019-01-30 PROCEDURE — 82784 ASSAY IGA/IGD/IGG/IGM EACH: CPT

## 2019-01-30 PROCEDURE — 36415 COLL VENOUS BLD VENIPUNCTURE: CPT

## 2019-01-30 PROCEDURE — 36591 DRAW BLOOD OFF VENOUS DEVICE: CPT

## 2019-01-30 PROCEDURE — 80053 COMPREHEN METABOLIC PANEL: CPT

## 2019-01-30 PROCEDURE — 84165 PROTEIN E-PHORESIS SERUM: CPT

## 2019-01-30 PROCEDURE — 83883 ASSAY NEPHELOMETRY NOT SPEC: CPT

## 2019-01-30 PROCEDURE — 74011250636 HC RX REV CODE- 250/636: Performed by: INTERNAL MEDICINE

## 2019-01-30 RX ORDER — HEPARIN 100 UNIT/ML
500 SYRINGE INTRAVENOUS AS NEEDED
Status: ACTIVE | OUTPATIENT
Start: 2019-01-30 | End: 2019-01-31

## 2019-01-30 RX ORDER — SODIUM CHLORIDE 0.9 % (FLUSH) 0.9 %
10-40 SYRINGE (ML) INJECTION AS NEEDED
Status: ACTIVE | OUTPATIENT
Start: 2019-01-30 | End: 2019-01-31

## 2019-01-30 RX ADMIN — Medication 500 UNITS: at 15:09

## 2019-01-30 RX ADMIN — Medication 10 ML: at 15:09

## 2019-01-30 NOTE — PROGRESS NOTES
[facility] Note: 
Arrived - 200 Labs obtained: CBC, CMP, SPEP, Serum Free Light Chains, Immunofixation, Immunoglobulins. Visit Vitals /83 (BP 1 Location: Left arm, BP Patient Position: Sitting) Pulse 75 Temp 98.3 °F (36.8 °C) Resp 18 SpO2 98% See [facility] for pending results. Assessment - unchanged. Port accessed & flushed per protocol with difficulty getting blood to flow. Several flushes, and lying back afforded brisk blood flow. Scott needle removed. 1310 - Tolerated well. Pt denies any acute problems/changes. Discharged from [facility] ambulatory. No distress. Next appt: 2/27/19 at 1500

## 2019-02-01 LAB
ALBUMIN SERPL ELPH-MCNC: 3.6 G/DL (ref 2.9–4.4)
ALBUMIN/GLOB SERPL: 1.4 {RATIO} (ref 0.7–1.7)
ALPHA1 GLOB SERPL ELPH-MCNC: 0.2 G/DL (ref 0–0.4)
ALPHA2 GLOB SERPL ELPH-MCNC: 0.9 G/DL (ref 0.4–1)
B-GLOBULIN SERPL ELPH-MCNC: 0.8 G/DL (ref 0.7–1.3)
GAMMA GLOB SERPL ELPH-MCNC: 0.6 G/DL (ref 0.4–1.8)
GLOBULIN SER CALC-MCNC: 2.5 G/DL (ref 2.2–3.9)
KAPPA LC FREE SER-MCNC: 20 MG/L (ref 3.3–19.4)
KAPPA LC FREE/LAMBDA FREE SER: 1.04 {RATIO} (ref 0.26–1.65)
LAMBDA LC FREE SERPL-MCNC: 19.3 MG/L (ref 5.7–26.3)
M PROTEIN SERPL ELPH-MCNC: NORMAL G/DL
PROT SERPL-MCNC: 6.1 G/DL (ref 6–8.5)

## 2019-02-04 LAB
IGA SERPL-MCNC: 83 MG/DL (ref 61–437)
IGG SERPL-MCNC: 779 MG/DL (ref 700–1600)
IGM SERPL-MCNC: 38 MG/DL (ref 20–172)
PROT PATTERN SERPL IFE-IMP: NORMAL

## 2019-02-06 DIAGNOSIS — C90.01 MULTIPLE MYELOMA IN REMISSION (HCC): ICD-10-CM

## 2019-02-06 DIAGNOSIS — Z94.84 H/O AUTOLOGOUS STEM CELL TRANSPLANT (HCC): ICD-10-CM

## 2019-02-07 DIAGNOSIS — Z94.84 H/O AUTOLOGOUS STEM CELL TRANSPLANT (HCC): ICD-10-CM

## 2019-02-07 DIAGNOSIS — C90.01 MULTIPLE MYELOMA IN REMISSION (HCC): ICD-10-CM

## 2019-02-07 RX ORDER — POMALIDOMIDE 2 MG/1
CAPSULE ORAL
Qty: 28 CAP | Refills: 0 | Status: SHIPPED | OUTPATIENT
Start: 2019-02-07 | End: 2019-02-07 | Stop reason: SDUPTHER

## 2019-02-27 ENCOUNTER — HOSPITAL ENCOUNTER (OUTPATIENT)
Dept: INFUSION THERAPY | Age: 64
Discharge: HOME | End: 2019-02-27
Payer: COMMERCIAL

## 2019-02-27 VITALS
DIASTOLIC BLOOD PRESSURE: 86 MMHG | HEART RATE: 84 BPM | RESPIRATION RATE: 18 BRPM | SYSTOLIC BLOOD PRESSURE: 124 MMHG | OXYGEN SATURATION: 98 % | TEMPERATURE: 99 F

## 2019-02-27 LAB
ALBUMIN SERPL-MCNC: 3.6 G/DL (ref 3.5–5)
ALBUMIN/GLOB SERPL: 1.1 {RATIO} (ref 1.1–2.2)
ALP SERPL-CCNC: 78 U/L (ref 45–117)
ALT SERPL-CCNC: 26 U/L (ref 12–78)
ANION GAP SERPL CALC-SCNC: 5 MMOL/L (ref 5–15)
AST SERPL-CCNC: 26 U/L (ref 15–37)
BASOPHILS # BLD: 0 K/UL (ref 0–0.1)
BASOPHILS NFR BLD: 1 % (ref 0–1)
BILIRUB SERPL-MCNC: 0.2 MG/DL (ref 0.2–1)
BUN SERPL-MCNC: 13 MG/DL (ref 6–20)
BUN/CREAT SERPL: 10 (ref 12–20)
CALCIUM SERPL-MCNC: 8.3 MG/DL (ref 8.5–10.1)
CHLORIDE SERPL-SCNC: 107 MMOL/L (ref 97–108)
CO2 SERPL-SCNC: 28 MMOL/L (ref 21–32)
CREAT SERPL-MCNC: 1.36 MG/DL (ref 0.7–1.3)
DIFFERENTIAL METHOD BLD: ABNORMAL
EOSINOPHIL # BLD: 0.1 K/UL (ref 0–0.4)
EOSINOPHIL NFR BLD: 2 % (ref 0–7)
ERYTHROCYTE [DISTWIDTH] IN BLOOD BY AUTOMATED COUNT: 16 % (ref 11.5–14.5)
GLOBULIN SER CALC-MCNC: 3.4 G/DL (ref 2–4)
GLUCOSE SERPL-MCNC: 110 MG/DL (ref 65–100)
HCT VFR BLD AUTO: 35.5 % (ref 36.6–50.3)
HGB BLD-MCNC: 11.4 G/DL (ref 12.1–17)
IGA SERPL-MCNC: 96 MG/DL (ref 70–400)
IGG SERPL-MCNC: 745 MG/DL (ref 700–1600)
IGM SERPL-MCNC: 26 MG/DL (ref 40–230)
IMM GRANULOCYTES # BLD AUTO: 0 K/UL (ref 0–0.04)
IMM GRANULOCYTES NFR BLD AUTO: 0 % (ref 0–0.5)
LYMPHOCYTES # BLD: 1 K/UL (ref 0.8–3.5)
LYMPHOCYTES NFR BLD: 40 % (ref 12–49)
MCH RBC QN AUTO: 29.5 PG (ref 26–34)
MCHC RBC AUTO-ENTMCNC: 32.1 G/DL (ref 30–36.5)
MCV RBC AUTO: 92 FL (ref 80–99)
MONOCYTES # BLD: 0.5 K/UL (ref 0–1)
MONOCYTES NFR BLD: 19 % (ref 5–13)
NEUTS SEG # BLD: 1 K/UL (ref 1.8–8)
NEUTS SEG NFR BLD: 38 % (ref 32–75)
NRBC # BLD: 0 K/UL (ref 0–0.01)
NRBC BLD-RTO: 0 PER 100 WBC
PLATELET # BLD AUTO: 146 K/UL (ref 150–400)
PMV BLD AUTO: 10.6 FL (ref 8.9–12.9)
POTASSIUM SERPL-SCNC: 3.8 MMOL/L (ref 3.5–5.1)
PROT SERPL-MCNC: 7 G/DL (ref 6.4–8.2)
RBC # BLD AUTO: 3.86 M/UL (ref 4.1–5.7)
RBC MORPH BLD: ABNORMAL
SODIUM SERPL-SCNC: 140 MMOL/L (ref 136–145)
WBC # BLD AUTO: 2.6 K/UL (ref 4.1–11.1)

## 2019-02-27 PROCEDURE — 80053 COMPREHEN METABOLIC PANEL: CPT

## 2019-02-27 PROCEDURE — 83883 ASSAY NEPHELOMETRY NOT SPEC: CPT

## 2019-02-27 PROCEDURE — 36415 COLL VENOUS BLD VENIPUNCTURE: CPT

## 2019-02-27 PROCEDURE — 82784 ASSAY IGA/IGD/IGG/IGM EACH: CPT

## 2019-02-27 PROCEDURE — 77030012965 HC NDL HUBR BBMI -A

## 2019-02-27 PROCEDURE — 84165 PROTEIN E-PHORESIS SERUM: CPT

## 2019-02-27 PROCEDURE — 74011250636 HC RX REV CODE- 250/636: Performed by: INTERNAL MEDICINE

## 2019-02-27 PROCEDURE — 36591 DRAW BLOOD OFF VENOUS DEVICE: CPT

## 2019-02-27 PROCEDURE — 85025 COMPLETE CBC W/AUTO DIFF WBC: CPT

## 2019-02-27 RX ORDER — SODIUM CHLORIDE 0.9 % (FLUSH) 0.9 %
10-40 SYRINGE (ML) INJECTION AS NEEDED
Status: ACTIVE | OUTPATIENT
Start: 2019-02-27 | End: 2019-02-28

## 2019-02-27 RX ORDER — HEPARIN 100 UNIT/ML
500 SYRINGE INTRAVENOUS AS NEEDED
Status: ACTIVE | OUTPATIENT
Start: 2019-02-27 | End: 2019-02-28

## 2019-02-27 RX ADMIN — Medication 500 UNITS: at 15:00

## 2019-02-27 RX ADMIN — Medication 10 ML: at 15:00

## 2019-02-27 NOTE — PROGRESS NOTES
[address] [facility] Note: 
Arrived - 200 Labs obtained: CBC, CMP, SPEP, SFLC, Immunofixation Visit Vitals /86 (BP 1 Location: Left arm, BP Patient Position: Sitting) Pulse 84 Temp 99 °F (37.2 °C) Resp 18 SpO2 98% Recent Results (from the past 12 hour(s)) CBC WITH AUTOMATED DIFF Collection Time: 02/27/19  3:05 PM  
Result Value Ref Range WBC 2.6 (L) 4.1 - 11.1 K/uL  
 RBC 3.86 (L) 4.10 - 5.70 M/uL  
 HGB 11.4 (L) 12.1 - 17.0 g/dL HCT 35.5 (L) 36.6 - 50.3 % MCV 92.0 80.0 - 99.0 FL  
 MCH 29.5 26.0 - 34.0 PG  
 MCHC 32.1 30.0 - 36.5 g/dL  
 RDW 16.0 (H) 11.5 - 14.5 % PLATELET 661 (L) 302 - 400 K/uL MPV 10.6 8.9 - 12.9 FL  
 NRBC 0.0 0  WBC ABSOLUTE NRBC 0.00 0.00 - 0.01 K/uL NEUTROPHILS PENDING % LYMPHOCYTES PENDING % MONOCYTES PENDING % EOSINOPHILS PENDING % BASOPHILS PENDING % IMMATURE GRANULOCYTES PENDING %  
 ABS. NEUTROPHILS PENDING K/UL  
 ABS. LYMPHOCYTES PENDING K/UL  
 ABS. MONOCYTES PENDING K/UL  
 ABS. EOSINOPHILS PENDING K/UL  
 ABS. BASOPHILS PENDING K/UL  
 ABS. IMM. GRANS. PENDING K/UL  
 DF PENDING   
METABOLIC PANEL, COMPREHENSIVE Collection Time: 02/27/19  3:05 PM  
Result Value Ref Range Sodium 140 136 - 145 mmol/L Potassium 3.8 3.5 - 5.1 mmol/L Chloride 107 97 - 108 mmol/L  
 CO2 28 21 - 32 mmol/L Anion gap 5 5 - 15 mmol/L Glucose 110 (H) 65 - 100 mg/dL BUN 13 6 - 20 MG/DL Creatinine 1.36 (H) 0.70 - 1.30 MG/DL  
 BUN/Creatinine ratio 10 (L) 12 - 20 GFR est AA >60 >60 ml/min/1.73m2 GFR est non-AA 53 (L) >60 ml/min/1.73m2 Calcium 8.3 (L) 8.5 - 10.1 MG/DL Bilirubin, total 0.2 0.2 - 1.0 MG/DL  
 ALT (SGPT) 26 12 - 78 U/L  
 AST (SGOT) 26 15 - 37 U/L Alk. phosphatase 78 45 - 117 U/L Protein, total 7.0 6.4 - 8.2 g/dL Albumin 3.6 3.5 - 5.0 g/dL Globulin 3.4 2.0 - 4.0 g/dL A-G Ratio 1.1 1.1 - 2.2 See [facility] for pending results. Assessment - unchanged. Port accessed & flushed per protocol w/o difficulty. Scott needle removed. 1500 - Tolerated well. Pt denies any acute problems/changes. Discharged from [facility] ambulatory. No distress. Next appt: 3/27/19 at 1500

## 2019-03-01 LAB
ALBUMIN SERPL ELPH-MCNC: 3.6 G/DL (ref 2.9–4.4)
ALBUMIN/GLOB SERPL: 1.2 {RATIO} (ref 0.7–1.7)
ALPHA1 GLOB SERPL ELPH-MCNC: 0.2 G/DL (ref 0–0.4)
ALPHA2 GLOB SERPL ELPH-MCNC: 1 G/DL (ref 0.4–1)
B-GLOBULIN SERPL ELPH-MCNC: 0.9 G/DL (ref 0.7–1.3)
GAMMA GLOB SERPL ELPH-MCNC: 0.8 G/DL (ref 0.4–1.8)
GLOBULIN SER CALC-MCNC: 2.9 G/DL (ref 2.2–3.9)
KAPPA LC FREE SER-MCNC: 24.2 MG/L (ref 3.3–19.4)
KAPPA LC FREE/LAMBDA FREE SER: 1.12 {RATIO} (ref 0.26–1.65)
LAMBDA LC FREE SERPL-MCNC: 21.6 MG/L (ref 5.7–26.3)
M PROTEIN SERPL ELPH-MCNC: NORMAL G/DL
PROT SERPL-MCNC: 6.5 G/DL (ref 6–8.5)

## 2019-03-04 LAB
IGA SERPL-MCNC: 94 MG/DL (ref 61–437)
IGG SERPL-MCNC: 807 MG/DL (ref 700–1600)
IGM SERPL-MCNC: 32 MG/DL (ref 20–172)
PROT PATTERN SERPL IFE-IMP: NORMAL

## 2019-03-06 DIAGNOSIS — Z94.84 H/O AUTOLOGOUS STEM CELL TRANSPLANT (HCC): ICD-10-CM

## 2019-03-06 DIAGNOSIS — C90.01 MULTIPLE MYELOMA IN REMISSION (HCC): ICD-10-CM

## 2019-03-06 RX ORDER — POMALIDOMIDE 2 MG/1
CAPSULE ORAL
Qty: 28 CAP | Refills: 0 | Status: SHIPPED | OUTPATIENT
Start: 2019-03-06 | End: 2019-04-11 | Stop reason: SDUPTHER

## 2019-03-07 DIAGNOSIS — Z94.84 H/O AUTOLOGOUS STEM CELL TRANSPLANT (HCC): ICD-10-CM

## 2019-03-07 DIAGNOSIS — C90.01 MULTIPLE MYELOMA IN REMISSION (HCC): ICD-10-CM

## 2019-03-18 ENCOUNTER — OFFICE VISIT (OUTPATIENT)
Dept: ONCOLOGY | Age: 64
End: 2019-03-18

## 2019-03-18 VITALS
OXYGEN SATURATION: 98 % | DIASTOLIC BLOOD PRESSURE: 81 MMHG | TEMPERATURE: 97.7 F | SYSTOLIC BLOOD PRESSURE: 153 MMHG | WEIGHT: 189 LBS | BODY MASS INDEX: 28.64 KG/M2 | RESPIRATION RATE: 18 BRPM | HEIGHT: 68 IN | HEART RATE: 83 BPM

## 2019-03-18 DIAGNOSIS — Z94.84 H/O AUTOLOGOUS STEM CELL TRANSPLANT (HCC): ICD-10-CM

## 2019-03-18 DIAGNOSIS — C90.01 MULTIPLE MYELOMA IN REMISSION (HCC): Primary | ICD-10-CM

## 2019-03-18 NOTE — PROGRESS NOTES
Aiden Hong  is a 66 y. o. male here today for IgA kappa myeloma f/u. Patient taking Pomalyst 2mg daily. Patient accompanied by his spouse to today's appt. VS stable. Patient report neck pain. Good appetite. Patient denies N/V/D and constipation. Patient denies numbness and tingling. Patient denies mouth ulcers. Patient denies cough. Patient denies SOB. Visit Vitals  /81 (BP 1 Location: Left arm, BP Patient Position: Sitting)   Pulse 83   Temp 97.7 °F (36.5 °C) (Oral)   Resp 18   Ht 5' 8\" (1.727 m)   SpO2 98%   BMI 28.74 kg/m²       Pain Scale: 5/10  Pain Location: Neck    Health Maintenance Review: Patient reminded of \"due or due soon\" health maintenance. I have asked the patient to contact his/her primary care provider (PCP) for follow-up on his/her health maintenance.

## 2019-03-18 NOTE — PROGRESS NOTES
2001 [facility]  [address], [address] [place] [address] Jared Cornelius, [address]  [phone]          Progress Note        Patient: Nishant Mckeon Sr. MRN: [id number]  SSN: [id number]    YOB: 1955  Age: 66 y.o. Sex: male        Diagnosis:      1. Multiple Myeloma, IgA Kappa   Durie [place] stage IIIB    Cytogenetics/FISH: t(14;16) - high risk    Ttreatment:     1. Maintenance therapy - Pomalyst 2 mg daily - started 11/19/2018  2. Maintenance therapy - Ixazomib - started 10/15/2018 - stopped 11/12/2018  3. S/P tandem  Autotransplant    First on 2/28/2018   2nd on 06/13/2018  4. Carfilzomib/Pom/Dex - s/p 5 cycles  5. VD-PACE s/p 1 cycle  6. RVD - s/p 4 cycles    Subjective:      Nishant Mckeon Sr. is a 66 y.o. male with a diagnosis of IgA kappa myeloma. Bone marrow shows 100% aberrant plasma cells. He suffers with DM but it is diet controlled. He has received systemic anti-viral treatment for chronic Hep C with successful eradication of the virus. Mr. Jimmy Montelongo received 4 cycles of systemic therapy with RVd. He had an initial good response to treatment. However his paraprotein level started rising and the myeloma became refractory to treatment. I then administered one cycle of VD-PACE in the hospital. He then received Carfilzomib/Pom/Dex. He achieved VGPR. He underwent tandem autotransplant at [facility]. According to the patient he achieved complete response with therapy. He has undergone second/tandem transplant in June. He is now on Pomalyst maintenance and tolerating it well. He had a repeat BM biopsy in February at [facility] which showed complete molecular remission. He is here today with his wife and feels well with no new complaints.        Review of Systems:    Constitutional: negative  Eyes: negative  Ears, Nose, Mouth, Throat, and Face: negative  Respiratory: negative  Cardiovascular: negative  Gastrointestinal: negative  Genitourinary:negative  Integument/Breast: negative  Hematologic/Lymphatic: negative  Musculoskeletal: chronic neck pain  Neurological: negative      Past Medical History:   Diagnosis Date    Acid reflux     Arrhythmia     pvc's    Chronic pain     neck    Depression     Diabetes ([address]     Femoral hernia 2012    Hepatitis C     Hypertension     Inguinal hernia 2012    Liver disease     hepatitis c    Other ill-defined conditions(799.89)     Hx of PVCs since     Prostatitis, acute     Psychiatric disorder     depression     Past Surgical History:   Procedure Laterality Date    COLONOSCOPY N/A 2016    COLONOSCOPY performed by Adelaide Lopez MD at [facility] ENDOSCOPY    HX APPENDECTOMY      HX CERVICAL FUSION  2008    x 1 as of 2014    HX HEENT      foreign body removed from right eye    HX HERNIA REPAIR      [facility]      HX ORTHOPAEDIC      cervical fusion    HX OTHER SURGICAL  2000    liver bx - chronic hepatitis      Family History   Problem Relation Age of Onset    Arthritis-osteo Mother     Arthritis-osteo Father     Diabetes Father     Hypertension Maternal Grandmother     Diabetes Maternal Grandmother     Hypertension Maternal Grandfather     Diabetes Maternal Grandfather      Social History     Tobacco Use    Smoking status: Former Smoker     Packs/day: 1.00     Years: 12.00     Pack years: 12.00     Types: Cigarettes     Last attempt to quit: 1989     Years since quittin.2    Smokeless tobacco: Never Used    Tobacco comment: former cigarette smoker   Substance Use Topics    Alcohol use: No     Comment: former alcoholic-quit [id number]      Prior to Admission medications    Medication Sig Start Date End Date Taking? Authorizing Provider   pomalidomide (POMALYST) 2 mg cap Take 1 Cap by mouth daily.  3/7/19  Yes Ramirez Wilhelm MD   POMALYST 2 mg cap TAKE ONE CAPSULE BY MOUTH ONCE DAILY 3/6/19  Yes Ramirez Wilhelm MD   metFORMIN ER (GLUCOPHAGE XR) 500 mg tablet TAKE 1 TABLET BY MOUTH TWICE DAILY 1/16/19  Yes Tamala Ormond, MD   pomalidomide (POMALYST) 2 mg cap Take 1 Cap by mouth daily. 1/9/19  Yes Gabriella Osuna MD   omeprazole (PRILOSEC) 20 mg capsule Take 20 mg by mouth daily as needed (acid reflux). Yes Other, MD Goldy   ondansetron (ZOFRAN ODT) 8 mg disintegrating tablet Take 8 mg by mouth every eight (8) hours as needed for Nausea. Yes Other, MD Goldy   buPROPion XL (WELLBUTRIN XL) 150 mg tablet Take 150 mg by mouth every morning. Yes Other, MD Goldy   famotidine (PEPCID) 20 mg tablet Take 20 mg by mouth daily as needed (acid reflux). Yes Other, MD Goldy   ixazomib 4 mg cap Take 4 mg by mouth every seven (7) days. Patient takes 4 mg every Tuesdays on week 1-3 of cycle   Yes Other, MD Goldy   acyclovir (ZOVIRAX) 400 mg tablet Take 400 mg by mouth two (2) times a day. 7/7/18  Yes Provider, Historical   tenofovir DISOPROXIL FUMARATE (VIREAD) 300 mg tablet Take 300 mg by mouth daily. 7/7/18  Yes Provider, Historical   pentamidine (NEBUPENT) 300 mg inhalation solution Take 300 mg by inhalation every month. Once a month at Dr's office   Yes Provider, Historical   ergocalciferol (ERGOCALCIFEROL) 50,000 unit capsule TAKE 1 CAP BY MOUTH EVERY 7 DAYS, Mondays 5/14/18  Yes Provider, Historical   lisinopril (PRINIVIL, ZESTRIL) 10 mg tablet TAKE 1 TABLET BY MOUTH EVERY DAY as needed. Hold if SBP <115 5/12/18  Yes Provider, Historical   folic acid (FOLVITE) 1 mg tablet TAKE 1 TABLET BY MOUTH EVERY DAY 5/14/18  Yes Provider, Historical   multivitamin (ONE A DAY) tablet Take 1 Tab by mouth daily. Yes Provider, Historical   Biotin 2,500 mcg cap Take 1 Cap by mouth daily. Yes Provider, Historical   insulin lispro (HUMALOG) 100 unit/mL kwikpen 4-6 Units by SubCUTAneous route Before breakfast, lunch, and dinner.  Blood Glucose (mg/dL)       Insulin Dose (units)              <130                                 skip              130-150 4               >150                                   6   Yes Provider, Historical   tamsulosin (FLOMAX) 0.4 mg capsule Take 1 Cap by mouth daily. 4/4/17  Yes Sydney Alberto MD   zolpidem CR (AMBIEN CR) 12.5 mg tablet Take 12.5 mg by mouth nightly. Yes Provider, Historical   tapentadol (NUCYNTA) 100 mg tablet Take 200 mg by mouth three (3) times daily. At 7a, 3p, 11pm   Yes oJe, MD Goldy   aripiprazole (ABILIFY) 5 mg tablet Take 2.5 mg by mouth every morning. Yes Other, MD Goldy          Allergies   Allergen Reactions    Mercury (Bulk) Hives     Blisters             Objective:     Vitals:    03/18/19 1123   BP: 153/81   Pulse: 83   Resp: 18   Temp: 97.7 °F (36.5 °C)   TempSrc: Oral   SpO2: 98%   Height: 5' 8\" (1.727 m)          Physical Exam:    GENERAL: alert, cooperative  EYE: negative  LYMPHATIC: Cervical, supraclavicular, and axillary nodes normal.   THROAT & NECK: normal and no erythema or exudates noted. LUNG: clear to auscultation bilaterally  HEART: regular rate and rhythm  ABDOMEN: soft, non-tender  EXTREMITIES: normal  SKIN: Normal.  NEUROLOGIC: negative      Lab Results   Component Value Date/Time    WBC 2.6 (L) 02/27/2019 03:05 PM    HGB 11.4 (L) 02/27/2019 03:05 PM    HCT 35.5 (L) 02/27/2019 03:05 PM    PLATELET 938 (L) 66/69/6444 03:05 PM    MCV 92.0 02/27/2019 03:05 PM       Lab Results   Component Value Date/Time    Sodium 140 02/27/2019 03:05 PM    Potassium 3.8 02/27/2019 03:05 PM    Chloride 107 02/27/2019 03:05 PM    CO2 28 02/27/2019 03:05 PM    Anion gap 5 02/27/2019 03:05 PM    Glucose 110 (H) 02/27/2019 03:05 PM    BUN 13 02/27/2019 03:05 PM    Creatinine 1.36 (H) 02/27/2019 03:05 PM    BUN/Creatinine ratio 10 (L) 02/27/2019 03:05 PM    GFR est AA >60 02/27/2019 03:05 PM    GFR est non-AA 53 (L) 02/27/2019 03:05 PM    Calcium 8.3 (L) 02/27/2019 03:05 PM            Assessment:     1.  Multiple Myeloma, IgA Kappa   Durie [place] stage IIIB    Cytogenetics/FISH: t(14;16) - high risk  ECOG PS - 0   Intent of therapy: palliative    Received 3 cycles of RVd   Dose reduced Revlimid and Velcade d/t continued cytopenias. M-spike started rising. He received one cycle of VD-PACE (bortezomib, dexamethasone, thalidomide, cisplatin, adriamycin, cyclophosphamide, and etoposide). Achieved NJ with once cycle    Receivied KPd - s/p 5 cycles    Excellent response with M-protein came down to 0.1    S/p tandem autotransplant    First on 2/28/2018   2nd on 06/13/2018    Achieved CR after the first transplant. Blood count has recovered  Doing well  Some fatigue  Repeat bone marrow @ [facility] - Complete response. Since he is high risk based on cytogenetics, I recommend proteosome inhibitor as maintenance therapy. Started Ixazomib   Developed fever, was in the hospital.   Also developed cytopenias    Patient prefered to switch therapy to Pomalyst.     On Pomalyst 2 mg daily - started 11/19/2018  Tolerating well   In complete remission by serological tests    Repeat bone marrow in February at [facility] shows complete molecular remission    Symptom management form reviewed with patient. 2. Type 2 DM with complication    Managed by Endocrine      3. Chronic Hep C    In sustained virological remission        Plan:       > Continue Pomalyst 2 mg daily maintenance  > Myeloma labs monthly  > Port flush monthly  > Continue Zometa every 3 months  > Follow-up in 2 months        Signed by: Clementine Cheadle, MD                     March 19, 2019'          CC. Susan Escobar MD  CC.  Bob Kirk MD

## 2019-03-20 ENCOUNTER — DOCUMENTATION ONLY (OUTPATIENT)
Dept: ONCOLOGY | Age: 64
End: 2019-03-20

## 2019-03-20 NOTE — PROGRESS NOTES
Spoke with PAF reference attempted to get patient a stephanie for more assistance with [employer].  Advised that patient is not eligible to reapply for funding until 10/09/2019. Also advised that [employer] submitted a claim, which exhausted funds, but then reversed the claim. Patient has an available balance on PAF stephanie of 1043. 09. That can be used up for next refill. Also submitted application for assistance with [employer]. Patient aware, and knows that company may call.

## 2019-03-26 ENCOUNTER — DOCUMENTATION ONLY (OUTPATIENT)
Dept: ONCOLOGY | Age: 64
End: 2019-03-26

## 2019-03-26 NOTE — PROGRESS NOTES
Spoke with Courtney Juarez from [address] [facility]. She has tried several times to reach out to patient to discuss funding, but there is no answer, and phone mailbox is full. Emailed Junie Kong,  at [address] [facility]. Patient scheduled to be in infusion center on 03/27/2019. Asked that she try to connect with patient, and have him call [employer] to discuss assistance.

## 2019-03-26 NOTE — PROGRESS NOTES
[employer]  Social Work Navigator Encounter     Patient Name:  Asim Rodriguez Gely Tiwari    Medical History:     Advance Directives:    Narrative: SW contacted wife and let her know VM for home phone is full and no msg can be left  SW provided [employer] # for her to call. SW also received a call from [facility] stating she couldn't reach pt. SW provided her with pt wife phone #. Bisi said he has exhausted PAF but they could look else where but needed to speak with pt wife for income info. SW also updated Marshall Ray. Per Erasmo Zamarripa pt should have enough medicine and last dose should be April 12 by her record of last shipment.       Barriers to Care:     Plan:

## 2019-03-27 ENCOUNTER — HOSPITAL ENCOUNTER (OUTPATIENT)
Dept: INFUSION THERAPY | Age: 64
Discharge: HOME | End: 2019-03-27
Payer: COMMERCIAL

## 2019-03-27 VITALS
OXYGEN SATURATION: 98 % | RESPIRATION RATE: 18 BRPM | BODY MASS INDEX: 28.36 KG/M2 | SYSTOLIC BLOOD PRESSURE: 117 MMHG | TEMPERATURE: 99.2 F | WEIGHT: 187.1 LBS | DIASTOLIC BLOOD PRESSURE: 81 MMHG | HEART RATE: 93 BPM | HEIGHT: 68 IN

## 2019-03-27 DIAGNOSIS — C90.01 MULTIPLE MYELOMA IN REMISSION (HCC): Primary | ICD-10-CM

## 2019-03-27 LAB
ALBUMIN SERPL-MCNC: 3.9 G/DL (ref 3.5–5)
ALBUMIN/GLOB SERPL: 1.1 {RATIO} (ref 1.1–2.2)
ALP SERPL-CCNC: 82 U/L (ref 45–117)
ALT SERPL-CCNC: 33 U/L (ref 12–78)
ANION GAP BLD CALC-SCNC: 17 MMOL/L (ref 10–20)
AST SERPL-CCNC: 32 U/L (ref 15–37)
BASOPHILS # BLD: 0.1 K/UL (ref 0–0.1)
BASOPHILS NFR BLD: 2 % (ref 0–1)
BILIRUB DIRECT SERPL-MCNC: 0.1 MG/DL (ref 0–0.2)
BILIRUB SERPL-MCNC: 0.3 MG/DL (ref 0.2–1)
BUN BLD-MCNC: 21 MG/DL (ref 9–20)
CA-I BLD-MCNC: 1.26 MMOL/L (ref 1.12–1.32)
CHLORIDE BLD-SCNC: 103 MMOL/L (ref 98–107)
CO2 BLD-SCNC: 25 MMOL/L (ref 21–32)
CREAT BLD-MCNC: 1.4 MG/DL (ref 0.6–1.3)
DIFFERENTIAL METHOD BLD: ABNORMAL
EOSINOPHIL # BLD: 0 K/UL (ref 0–0.4)
EOSINOPHIL NFR BLD: 1 % (ref 0–7)
ERYTHROCYTE [DISTWIDTH] IN BLOOD BY AUTOMATED COUNT: 16.1 % (ref 11.5–14.5)
GLOBULIN SER CALC-MCNC: 3.6 G/DL (ref 2–4)
GLUCOSE BLD-MCNC: 122 MG/DL (ref 65–100)
HCT VFR BLD AUTO: 37.4 % (ref 36.6–50.3)
HCT VFR BLD CALC: 40 % (ref 36.6–50.3)
HGB BLD-MCNC: 12.1 G/DL (ref 12.1–17)
IGA SERPL-MCNC: 119 MG/DL (ref 70–400)
IGG SERPL-MCNC: 882 MG/DL (ref 700–1600)
IGM SERPL-MCNC: 22 MG/DL (ref 40–230)
IMM GRANULOCYTES # BLD AUTO: 0 K/UL (ref 0–0.04)
IMM GRANULOCYTES NFR BLD AUTO: 0 % (ref 0–0.5)
LYMPHOCYTES # BLD: 1.3 K/UL (ref 0.8–3.5)
LYMPHOCYTES NFR BLD: 39 % (ref 12–49)
MCH RBC QN AUTO: 29.7 PG (ref 26–34)
MCHC RBC AUTO-ENTMCNC: 32.4 G/DL (ref 30–36.5)
MCV RBC AUTO: 91.7 FL (ref 80–99)
MONOCYTES # BLD: 0.7 K/UL (ref 0–1)
MONOCYTES NFR BLD: 21 % (ref 5–13)
NEUTS SEG # BLD: 1.3 K/UL (ref 1.8–8)
NEUTS SEG NFR BLD: 37 % (ref 32–75)
NRBC # BLD: 0 K/UL (ref 0–0.01)
NRBC BLD-RTO: 0 PER 100 WBC
PLATELET # BLD AUTO: 179 K/UL (ref 150–400)
PMV BLD AUTO: 10.6 FL (ref 8.9–12.9)
POTASSIUM BLD-SCNC: 3.9 MMOL/L (ref 3.5–5.1)
PROT SERPL-MCNC: 7.5 G/DL (ref 6.4–8.2)
RBC # BLD AUTO: 4.08 M/UL (ref 4.1–5.7)
RBC MORPH BLD: ABNORMAL
RBC MORPH BLD: ABNORMAL
SERVICE CMNT-IMP: ABNORMAL
SODIUM BLD-SCNC: 141 MMOL/L (ref 136–145)
WBC # BLD AUTO: 3.4 K/UL (ref 4.1–11.1)

## 2019-03-27 PROCEDURE — 74011250636 HC RX REV CODE- 250/636: Performed by: INTERNAL MEDICINE

## 2019-03-27 PROCEDURE — 82784 ASSAY IGA/IGD/IGG/IGM EACH: CPT

## 2019-03-27 PROCEDURE — 77030012965 HC NDL HUBR BBMI -A

## 2019-03-27 PROCEDURE — 80076 HEPATIC FUNCTION PANEL: CPT

## 2019-03-27 PROCEDURE — 36415 COLL VENOUS BLD VENIPUNCTURE: CPT

## 2019-03-27 PROCEDURE — 84165 PROTEIN E-PHORESIS SERUM: CPT

## 2019-03-27 PROCEDURE — 83883 ASSAY NEPHELOMETRY NOT SPEC: CPT

## 2019-03-27 PROCEDURE — 96374 THER/PROPH/DIAG INJ IV PUSH: CPT

## 2019-03-27 PROCEDURE — 85025 COMPLETE CBC W/AUTO DIFF WBC: CPT

## 2019-03-27 PROCEDURE — 80047 BASIC METABLC PNL IONIZED CA: CPT

## 2019-03-27 PROCEDURE — 74011000250 HC RX REV CODE- 250: Performed by: INTERNAL MEDICINE

## 2019-03-27 RX ORDER — ZOLEDRONIC ACID 4 MG/100ML
4 SOLUTION INTRAVENOUS ONCE
Status: COMPLETED | OUTPATIENT
Start: 2019-03-27 | End: 2019-03-27

## 2019-03-27 RX ORDER — SODIUM CHLORIDE 9 MG/ML
10 INJECTION INTRAMUSCULAR; INTRAVENOUS; SUBCUTANEOUS AS NEEDED
Status: DISCONTINUED | OUTPATIENT
Start: 2019-03-27 | End: 2019-03-28 | Stop reason: HOSPADM

## 2019-03-27 RX ORDER — SODIUM CHLORIDE 0.9 % (FLUSH) 0.9 %
10-40 SYRINGE (ML) INJECTION AS NEEDED
Status: DISCONTINUED | OUTPATIENT
Start: 2019-03-27 | End: 2019-03-28 | Stop reason: HOSPADM

## 2019-03-27 RX ORDER — HEPARIN 100 UNIT/ML
500 SYRINGE INTRAVENOUS AS NEEDED
Status: DISCONTINUED | OUTPATIENT
Start: 2019-03-27 | End: 2019-03-28 | Stop reason: HOSPADM

## 2019-03-27 RX ADMIN — SODIUM CHLORIDE 10 ML: 9 INJECTION INTRAMUSCULAR; INTRAVENOUS; SUBCUTANEOUS at 14:55

## 2019-03-27 RX ADMIN — SODIUM CHLORIDE 10 ML: 9 INJECTION INTRAMUSCULAR; INTRAVENOUS; SUBCUTANEOUS at 14:56

## 2019-03-27 RX ADMIN — SODIUM CHLORIDE 10 ML: 9 INJECTION INTRAMUSCULAR; INTRAVENOUS; SUBCUTANEOUS at 15:30

## 2019-03-27 RX ADMIN — Medication 500 UNITS: at 15:30

## 2019-03-27 RX ADMIN — ZOLEDRONIC ACID 4 MG: 4 SOLUTION INTRAVENOUS at 15:15

## 2019-03-27 NOTE — PROGRESS NOTES
[facility] Progress Note 1445- Pt admit to [facility] for Zometa/Labs ambulatory in stable condition. Assessment completed. No new concerns voiced. Pt denies any recent or upcoming dental work. Right chest port accessed without issue with positive blood return noted. Labs drawn per order and sent for processing. Results acceptable for treatment today. Visit Vitals /81 (BP 1 Location: Left arm, BP Patient Position: Sitting) Pulse 93 Temp 99.2 °F (37.3 °C) Resp 18 Ht 5' 8\" (1.727 m) Wt 84.9 kg (187 lb 1.6 oz) SpO2 98% BMI 28.45 kg/m² Medications: 
Zometa IV 
 
1530- Pt tolerated treatment well. Port flushed, heparinized, and de-accessed per policy. D/c home ambulatory in no distress. Pt aware of next appointment scheduled for 4/24 at 3 PM. Recent Results (from the past 12 hour(s)) CBC WITH AUTOMATED DIFF Collection Time: 03/27/19  2:54 PM  
Result Value Ref Range WBC 3.4 (L) 4.1 - 11.1 K/uL  
 RBC 4.08 (L) 4.10 - 5.70 M/uL  
 HGB 12.1 12.1 - 17.0 g/dL HCT 37.4 36.6 - 50.3 % MCV 91.7 80.0 - 99.0 FL  
 MCH 29.7 26.0 - 34.0 PG  
 MCHC 32.4 30.0 - 36.5 g/dL  
 RDW 16.1 (H) 11.5 - 14.5 % PLATELET 309 225 - 357 K/uL MPV 10.6 8.9 - 12.9 FL  
 NRBC 0.0 0  WBC ABSOLUTE NRBC 0.00 0.00 - 0.01 K/uL NEUTROPHILS 37 32 - 75 % LYMPHOCYTES 39 12 - 49 % MONOCYTES 21 (H) 5 - 13 % EOSINOPHILS 1 0 - 7 % BASOPHILS 2 (H) 0 - 1 % IMMATURE GRANULOCYTES 0 0.0 - 0.5 % ABS. NEUTROPHILS 1.3 (L) 1.8 - 8.0 K/UL  
 ABS. LYMPHOCYTES 1.3 0.8 - 3.5 K/UL  
 ABS. MONOCYTES 0.7 0.0 - 1.0 K/UL  
 ABS. EOSINOPHILS 0.0 0.0 - 0.4 K/UL  
 ABS. BASOPHILS 0.1 0.0 - 0.1 K/UL  
 ABS. IMM. GRANS. 0.0 0.00 - 0.04 K/UL  
 DF AUTOMATED    
 RBC COMMENTS ANISOCYTOSIS 1+ 
    
 RBC COMMENTS OVALOCYTES PRESENT 
    
HEPATIC FUNCTION PANEL Collection Time: 03/27/19  2:54 PM  
Result Value Ref Range Protein, total 7.5 6.4 - 8.2 g/dL Albumin 3.9 3.5 - 5.0 g/dL Globulin 3.6 2.0 - 4.0 g/dL A-G Ratio 1.1 1.1 - 2.2 Bilirubin, total 0.3 0.2 - 1.0 MG/DL Bilirubin, direct 0.1 0.0 - 0.2 MG/DL Alk. phosphatase 82 45 - 117 U/L  
 AST (SGOT) 32 15 - 37 U/L  
 ALT (SGPT) 33 12 - 78 U/L  
POC CHEM8 Collection Time: 03/27/19  2:58 PM  
Result Value Ref Range Calcium, ionized (POC) 1.26 1.12 - 1.32 mmol/L Sodium (POC) 141 136 - 145 mmol/L Potassium (POC) 3.9 3.5 - 5.1 mmol/L Chloride (POC) 103 98 - 107 mmol/L  
 CO2 (POC) 25 21 - 32 mmol/L Anion gap (POC) 17 10 - 20 mmol/L Glucose (POC) 122 (H) 65 - 100 mg/dL BUN (POC) 21 (H) 9 - 20 mg/dL Creatinine (POC) 1.4 (H) 0.6 - 1.3 mg/dL GFRAA, POC >60 >60 ml/min/1.73m2 GFRNA, POC 52 (L) >60 ml/min/1.73m2 Hematocrit (POC) 40 36.6 - 50.3 % Comment Notified RN or MD immediately by  *Some labs still pending at time of note. Please follow up in [facility] care.

## 2019-03-29 LAB
ALBUMIN SERPL ELPH-MCNC: 3.7 G/DL (ref 2.9–4.4)
ALBUMIN/GLOB SERPL: 1.2 {RATIO} (ref 0.7–1.7)
ALPHA1 GLOB SERPL ELPH-MCNC: 0.2 G/DL (ref 0–0.4)
ALPHA2 GLOB SERPL ELPH-MCNC: 1.2 G/DL (ref 0.4–1)
B-GLOBULIN SERPL ELPH-MCNC: 0.9 G/DL (ref 0.7–1.3)
GAMMA GLOB SERPL ELPH-MCNC: 0.9 G/DL (ref 0.4–1.8)
GLOBULIN SER CALC-MCNC: 3.1 G/DL (ref 2.2–3.9)
KAPPA LC FREE SER-MCNC: 19.2 MG/L (ref 3.3–19.4)
KAPPA LC FREE/LAMBDA FREE SER: 1.15 {RATIO} (ref 0.26–1.65)
LAMBDA LC FREE SERPL-MCNC: 16.7 MG/L (ref 5.7–26.3)
M PROTEIN SERPL ELPH-MCNC: ABNORMAL G/DL
PROT SERPL-MCNC: 6.8 G/DL (ref 6–8.5)

## 2019-04-01 LAB
IGA SERPL-MCNC: 114 MG/DL (ref 61–437)
IGG SERPL-MCNC: 869 MG/DL (ref 700–1600)
IGM SERPL-MCNC: 25 MG/DL (ref 20–172)
PROT PATTERN SERPL IFE-IMP: NORMAL

## 2019-04-11 NOTE — PROGRESS NOTES
barbara Trinidad, refill Pomalyst 2 mg, take one tab daily, disp #28, no refills  New script needed each refill

## 2019-04-24 ENCOUNTER — HOSPITAL ENCOUNTER (OUTPATIENT)
Dept: INFUSION THERAPY | Age: 64
Discharge: HOME | End: 2019-04-24
Payer: COMMERCIAL

## 2019-04-24 VITALS
SYSTOLIC BLOOD PRESSURE: 117 MMHG | DIASTOLIC BLOOD PRESSURE: 74 MMHG | HEART RATE: 89 BPM | OXYGEN SATURATION: 100 % | RESPIRATION RATE: 18 BRPM | TEMPERATURE: 99.3 F

## 2019-04-24 LAB
ALBUMIN SERPL-MCNC: 3.6 G/DL (ref 3.5–5)
ALBUMIN/GLOB SERPL: 1 {RATIO} (ref 1.1–2.2)
ALP SERPL-CCNC: 80 U/L (ref 45–117)
ALT SERPL-CCNC: 30 U/L (ref 12–78)
ANION GAP SERPL CALC-SCNC: 6 MMOL/L (ref 5–15)
AST SERPL-CCNC: 34 U/L (ref 15–37)
BASOPHILS # BLD: 0 K/UL (ref 0–0.1)
BASOPHILS NFR BLD: 0 % (ref 0–1)
BILIRUB SERPL-MCNC: 0.2 MG/DL (ref 0.2–1)
BUN SERPL-MCNC: 19 MG/DL (ref 6–20)
BUN/CREAT SERPL: 13 (ref 12–20)
CALCIUM SERPL-MCNC: 8.8 MG/DL (ref 8.5–10.1)
CHLORIDE SERPL-SCNC: 107 MMOL/L (ref 97–108)
CO2 SERPL-SCNC: 28 MMOL/L (ref 21–32)
CREAT SERPL-MCNC: 1.42 MG/DL (ref 0.7–1.3)
DIFFERENTIAL METHOD BLD: ABNORMAL
EOSINOPHIL # BLD: 0.1 K/UL (ref 0–0.4)
EOSINOPHIL NFR BLD: 3 % (ref 0–7)
ERYTHROCYTE [DISTWIDTH] IN BLOOD BY AUTOMATED COUNT: 16.1 % (ref 11.5–14.5)
GLOBULIN SER CALC-MCNC: 3.7 G/DL (ref 2–4)
GLUCOSE SERPL-MCNC: 125 MG/DL (ref 65–100)
HCT VFR BLD AUTO: 35.2 % (ref 36.6–50.3)
HGB BLD-MCNC: 11.4 G/DL (ref 12.1–17)
IGA SERPL-MCNC: 134 MG/DL (ref 70–400)
IGG SERPL-MCNC: 912 MG/DL (ref 700–1600)
IGM SERPL-MCNC: 26 MG/DL (ref 40–230)
IMM GRANULOCYTES # BLD AUTO: 0 K/UL (ref 0–0.04)
IMM GRANULOCYTES NFR BLD AUTO: 0 % (ref 0–0.5)
LYMPHOCYTES # BLD: 0.9 K/UL (ref 0.8–3.5)
LYMPHOCYTES NFR BLD: 25 % (ref 12–49)
MCH RBC QN AUTO: 29.5 PG (ref 26–34)
MCHC RBC AUTO-ENTMCNC: 32.4 G/DL (ref 30–36.5)
MCV RBC AUTO: 91.2 FL (ref 80–99)
MONOCYTES # BLD: 0.4 K/UL (ref 0–1)
MONOCYTES NFR BLD: 12 % (ref 5–13)
NEUTS SEG # BLD: 2 K/UL (ref 1.8–8)
NEUTS SEG NFR BLD: 60 % (ref 32–75)
NRBC # BLD: 0 K/UL (ref 0–0.01)
NRBC BLD-RTO: 0 PER 100 WBC
PLATELET # BLD AUTO: 160 K/UL (ref 150–400)
PMV BLD AUTO: 10.7 FL (ref 8.9–12.9)
POTASSIUM SERPL-SCNC: 3.9 MMOL/L (ref 3.5–5.1)
PROT SERPL-MCNC: 7.3 G/DL (ref 6.4–8.2)
RBC # BLD AUTO: 3.86 M/UL (ref 4.1–5.7)
RBC MORPH BLD: ABNORMAL
SODIUM SERPL-SCNC: 141 MMOL/L (ref 136–145)
WBC # BLD AUTO: 3.4 K/UL (ref 4.1–11.1)

## 2019-04-24 PROCEDURE — 36415 COLL VENOUS BLD VENIPUNCTURE: CPT

## 2019-04-24 PROCEDURE — 74011250636 HC RX REV CODE- 250/636: Performed by: INTERNAL MEDICINE

## 2019-04-24 PROCEDURE — 80053 COMPREHEN METABOLIC PANEL: CPT

## 2019-04-24 PROCEDURE — 82784 ASSAY IGA/IGD/IGG/IGM EACH: CPT

## 2019-04-24 PROCEDURE — 85025 COMPLETE CBC W/AUTO DIFF WBC: CPT

## 2019-04-24 PROCEDURE — 84165 PROTEIN E-PHORESIS SERUM: CPT

## 2019-04-24 PROCEDURE — 36591 DRAW BLOOD OFF VENOUS DEVICE: CPT

## 2019-04-24 PROCEDURE — 83883 ASSAY NEPHELOMETRY NOT SPEC: CPT

## 2019-04-24 RX ORDER — HEPARIN 100 UNIT/ML
500 SYRINGE INTRAVENOUS AS NEEDED
Status: DISCONTINUED | OUTPATIENT
Start: 2019-04-24 | End: 2019-04-25 | Stop reason: HOSPADM

## 2019-04-24 RX ORDER — SODIUM CHLORIDE 0.9 % (FLUSH) 0.9 %
10-40 SYRINGE (ML) INJECTION AS NEEDED
Status: DISCONTINUED | OUTPATIENT
Start: 2019-04-24 | End: 2019-04-25 | Stop reason: HOSPADM

## 2019-04-24 RX ADMIN — Medication 500 UNITS: at 15:34

## 2019-04-24 RX ADMIN — Medication 80 ML: at 15:33

## 2019-04-24 NOTE — PROGRESS NOTES
[facility] Note: 
Arrived - 1217 Visit Vitals /74 Pulse 89 Temp 99.3 °F (37.4 °C) Resp 18 SpO2 100% Assessment - unchanged. Port accessed, labs drawn, & flushed per protocol with some difficulty obtaining blood return. Scott needle removed. 1535 - Tolerated well. Pt denies any acute problems/changes. Discharged from [facility] ambulatory. No distress. Next appt: 5/22/19 at 1500.

## 2019-04-26 DIAGNOSIS — C90.00 MULTIPLE MYELOMA, REMISSION STATUS UNSPECIFIED (HCC): ICD-10-CM

## 2019-04-26 DIAGNOSIS — Z94.84 H/O AUTOLOGOUS STEM CELL TRANSPLANT (HCC): Primary | ICD-10-CM

## 2019-04-26 LAB
ALBUMIN SERPL ELPH-MCNC: 3.6 G/DL (ref 2.9–4.4)
ALBUMIN/GLOB SERPL: 1.2 {RATIO} (ref 0.7–1.7)
ALPHA1 GLOB SERPL ELPH-MCNC: 0.2 G/DL (ref 0–0.4)
ALPHA2 GLOB SERPL ELPH-MCNC: 1 G/DL (ref 0.4–1)
B-GLOBULIN SERPL ELPH-MCNC: 0.8 G/DL (ref 0.7–1.3)
GAMMA GLOB SERPL ELPH-MCNC: 0.9 G/DL (ref 0.4–1.8)
GLOBULIN SER CALC-MCNC: 2.9 G/DL (ref 2.2–3.9)
KAPPA LC FREE SER-MCNC: 27.8 MG/L (ref 3.3–19.4)
KAPPA LC FREE/LAMBDA FREE SER: 1.06 {RATIO} (ref 0.26–1.65)
LAMBDA LC FREE SERPL-MCNC: 26.3 MG/L (ref 5.7–26.3)
M PROTEIN SERPL ELPH-MCNC: NORMAL G/DL
PROT SERPL-MCNC: 6.5 G/DL (ref 6–8.5)

## 2019-04-29 LAB
IGA SERPL-MCNC: 131 MG/DL (ref 61–437)
IGG SERPL-MCNC: 955 MG/DL (ref 700–1600)
IGM SERPL-MCNC: 29 MG/DL (ref 20–172)
PROT PATTERN SERPL IFE-IMP: NORMAL

## 2019-04-29 RX ORDER — METFORMIN HYDROCHLORIDE 500 MG/1
500 TABLET, EXTENDED RELEASE ORAL 2 TIMES DAILY
Qty: 180 TAB | Refills: 1 | Status: SHIPPED | OUTPATIENT
Start: 2019-04-29 | End: 2019-07-09 | Stop reason: SDUPTHER

## 2019-05-09 DIAGNOSIS — Z94.84 H/O AUTOLOGOUS STEM CELL TRANSPLANT (HCC): ICD-10-CM

## 2019-05-09 DIAGNOSIS — C90.01 MULTIPLE MYELOMA IN REMISSION (HCC): ICD-10-CM

## 2019-05-09 NOTE — TELEPHONE ENCOUNTER
Per [facility] from [facility]. POMALIDOMIDE (POMALYST) 2MG. TAKE 1 CAPSULE BY MOUTH DAILY. QUANTITY 28 REFILL 0    [employer] auth # obtained on 5/9; [employer] auth # [id number].

## 2019-05-13 ENCOUNTER — HOSPITAL ENCOUNTER (OUTPATIENT)
Dept: BONE DENSITY | Age: 64
Discharge: HOME | End: 2019-05-13
Attending: INTERNAL MEDICINE
Payer: COMMERCIAL

## 2019-05-13 DIAGNOSIS — C90.00 MULTIPLE MYELOMA, REMISSION STATUS UNSPECIFIED (HCC): ICD-10-CM

## 2019-05-13 DIAGNOSIS — Z94.84 H/O AUTOLOGOUS STEM CELL TRANSPLANT (HCC): ICD-10-CM

## 2019-05-13 PROCEDURE — 77080 DXA BONE DENSITY AXIAL: CPT

## 2019-05-22 ENCOUNTER — HOSPITAL ENCOUNTER (OUTPATIENT)
Dept: INFUSION THERAPY | Age: 64
Discharge: HOME | End: 2019-05-22
Payer: COMMERCIAL

## 2019-05-22 VITALS
SYSTOLIC BLOOD PRESSURE: 115 MMHG | RESPIRATION RATE: 18 BRPM | DIASTOLIC BLOOD PRESSURE: 65 MMHG | OXYGEN SATURATION: 99 % | TEMPERATURE: 98.4 F | HEART RATE: 84 BPM

## 2019-05-22 LAB
ALBUMIN SERPL-MCNC: 3.6 G/DL (ref 3.5–5)
ALBUMIN/GLOB SERPL: 1.1 {RATIO} (ref 1.1–2.2)
ALP SERPL-CCNC: 82 U/L (ref 45–117)
ALT SERPL-CCNC: 23 U/L (ref 12–78)
ANION GAP SERPL CALC-SCNC: 4 MMOL/L (ref 5–15)
AST SERPL-CCNC: 19 U/L (ref 15–37)
BASOPHILS # BLD: 0 K/UL (ref 0–0.1)
BASOPHILS NFR BLD: 1 % (ref 0–1)
BILIRUB SERPL-MCNC: 0.3 MG/DL (ref 0.2–1)
BUN SERPL-MCNC: 20 MG/DL (ref 6–20)
BUN/CREAT SERPL: 15 (ref 12–20)
CALCIUM SERPL-MCNC: 8 MG/DL (ref 8.5–10.1)
CHLORIDE SERPL-SCNC: 110 MMOL/L (ref 97–108)
CO2 SERPL-SCNC: 26 MMOL/L (ref 21–32)
CREAT SERPL-MCNC: 1.34 MG/DL (ref 0.7–1.3)
DIFFERENTIAL METHOD BLD: ABNORMAL
EOSINOPHIL # BLD: 0.1 K/UL (ref 0–0.4)
EOSINOPHIL NFR BLD: 2 % (ref 0–7)
ERYTHROCYTE [DISTWIDTH] IN BLOOD BY AUTOMATED COUNT: 15.8 % (ref 11.5–14.5)
GLOBULIN SER CALC-MCNC: 3.2 G/DL (ref 2–4)
GLUCOSE SERPL-MCNC: 84 MG/DL (ref 65–100)
HCT VFR BLD AUTO: 34.1 % (ref 36.6–50.3)
HGB BLD-MCNC: 10.9 G/DL (ref 12.1–17)
IGA SERPL-MCNC: 134 MG/DL (ref 70–400)
IGG SERPL-MCNC: 957 MG/DL (ref 700–1600)
IGM SERPL-MCNC: 21 MG/DL (ref 40–230)
IMM GRANULOCYTES # BLD AUTO: 0 K/UL (ref 0–0.04)
IMM GRANULOCYTES NFR BLD AUTO: 0 % (ref 0–0.5)
LYMPHOCYTES # BLD: 1.2 K/UL (ref 0.8–3.5)
LYMPHOCYTES NFR BLD: 42 % (ref 12–49)
MCH RBC QN AUTO: 29.6 PG (ref 26–34)
MCHC RBC AUTO-ENTMCNC: 32 G/DL (ref 30–36.5)
MCV RBC AUTO: 92.7 FL (ref 80–99)
MONOCYTES # BLD: 0.5 K/UL (ref 0–1)
MONOCYTES NFR BLD: 19 % (ref 5–13)
NEUTS SEG # BLD: 1 K/UL (ref 1.8–8)
NEUTS SEG NFR BLD: 36 % (ref 32–75)
NRBC # BLD: 0 K/UL (ref 0–0.01)
NRBC BLD-RTO: 0 PER 100 WBC
PLATELET # BLD AUTO: 154 K/UL (ref 150–400)
PMV BLD AUTO: 10.7 FL (ref 8.9–12.9)
POTASSIUM SERPL-SCNC: 4.2 MMOL/L (ref 3.5–5.1)
PROT SERPL-MCNC: 6.8 G/DL (ref 6.4–8.2)
RBC # BLD AUTO: 3.68 M/UL (ref 4.1–5.7)
SODIUM SERPL-SCNC: 140 MMOL/L (ref 136–145)
WBC # BLD AUTO: 2.8 K/UL (ref 4.1–11.1)

## 2019-05-22 PROCEDURE — 77030012965 HC NDL HUBR BBMI -A

## 2019-05-22 PROCEDURE — 80053 COMPREHEN METABOLIC PANEL: CPT

## 2019-05-22 PROCEDURE — 82784 ASSAY IGA/IGD/IGG/IGM EACH: CPT

## 2019-05-22 PROCEDURE — 36415 COLL VENOUS BLD VENIPUNCTURE: CPT

## 2019-05-22 PROCEDURE — 84165 PROTEIN E-PHORESIS SERUM: CPT

## 2019-05-22 PROCEDURE — 85025 COMPLETE CBC W/AUTO DIFF WBC: CPT

## 2019-05-22 PROCEDURE — 36591 DRAW BLOOD OFF VENOUS DEVICE: CPT

## 2019-05-22 PROCEDURE — 83883 ASSAY NEPHELOMETRY NOT SPEC: CPT

## 2019-05-22 PROCEDURE — 74011000250 HC RX REV CODE- 250: Performed by: INTERNAL MEDICINE

## 2019-05-22 PROCEDURE — 74011250636 HC RX REV CODE- 250/636: Performed by: INTERNAL MEDICINE

## 2019-05-22 RX ORDER — SODIUM CHLORIDE 9 MG/ML
10 INJECTION INTRAMUSCULAR; INTRAVENOUS; SUBCUTANEOUS AS NEEDED
Status: DISCONTINUED | OUTPATIENT
Start: 2019-05-22 | End: 2019-05-23 | Stop reason: HOSPADM

## 2019-05-22 RX ORDER — SODIUM CHLORIDE 0.9 % (FLUSH) 0.9 %
10-40 SYRINGE (ML) INJECTION AS NEEDED
Status: DISCONTINUED | OUTPATIENT
Start: 2019-05-22 | End: 2019-05-23 | Stop reason: HOSPADM

## 2019-05-22 RX ORDER — HEPARIN 100 UNIT/ML
500 SYRINGE INTRAVENOUS AS NEEDED
Status: DISCONTINUED | OUTPATIENT
Start: 2019-05-22 | End: 2019-05-23 | Stop reason: HOSPADM

## 2019-05-22 RX ADMIN — Medication 500 UNITS: at 15:10

## 2019-05-22 RX ADMIN — Medication 10 ML: at 15:10

## 2019-05-22 RX ADMIN — SODIUM CHLORIDE 10 ML: 9 INJECTION, SOLUTION INTRAMUSCULAR; INTRAVENOUS; SUBCUTANEOUS at 15:10

## 2019-05-22 NOTE — PROGRESS NOTES
8000 [facility] Note:  Arrived - 1500     Visit Vitals  /65 (BP 1 Location: Left arm, BP Patient Position: Sitting)   Pulse 84   Temp 98.4 °F (36.9 °C)   Resp 18   SpO2 99%       Assessment - completed, no new concerns voiced. Port accessed, labs drawn including CBC with diff, CMP, and Myeloma panel. Flushed per protocol w/o difficulty. Scott needle removed. Recent Results (from the past 12 hour(s))   CBC WITH AUTOMATED DIFF    Collection Time: 05/22/19  3:06 PM   Result Value Ref Range    WBC 2.8 (L) 4.1 - 11.1 K/uL    RBC 3.68 (L) 4.10 - 5.70 M/uL    HGB 10.9 (L) 12.1 - 17.0 g/dL    HCT 34.1 (L) 36.6 - 50.3 %    MCV 92.7 80.0 - 99.0 FL    MCH 29.6 26.0 - 34.0 PG    MCHC 32.0 30.0 - 36.5 g/dL    RDW 15.8 (H) 11.5 - 14.5 %    PLATELET 220 578 - 933 K/uL    MPV 10.7 8.9 - 12.9 FL    NRBC 0.0 0  WBC    ABSOLUTE NRBC 0.00 0.00 - 0.01 K/uL    NEUTROPHILS 36 32 - 75 %    LYMPHOCYTES 42 12 - 49 %    MONOCYTES 19 (H) 5 - 13 %    EOSINOPHILS 2 0 - 7 %    BASOPHILS 1 0 - 1 %    IMMATURE GRANULOCYTES 0 0.0 - 0.5 %    ABS. NEUTROPHILS 1.0 (L) 1.8 - 8.0 K/UL    ABS. LYMPHOCYTES 1.2 0.8 - 3.5 K/UL    ABS. MONOCYTES 0.5 0.0 - 1.0 K/UL    ABS. EOSINOPHILS 0.1 0.0 - 0.4 K/UL    ABS. BASOPHILS 0.0 0.0 - 0.1 K/UL    ABS. IMM. GRANS. 0.0 0.00 - 0.04 K/UL    DF AUTOMATED     METABOLIC PANEL, COMPREHENSIVE    Collection Time: 05/22/19  3:06 PM   Result Value Ref Range    Sodium 140 136 - 145 mmol/L    Potassium 4.2 3.5 - 5.1 mmol/L    Chloride 110 (H) 97 - 108 mmol/L    CO2 26 21 - 32 mmol/L    Anion gap 4 (L) 5 - 15 mmol/L    Glucose 84 65 - 100 mg/dL    BUN 20 6 - 20 MG/DL    Creatinine 1.34 (H) 0.70 - 1.30 MG/DL    BUN/Creatinine ratio 15 12 - 20      GFR est AA >60 >60 ml/min/1.73m2    GFR est non-AA 54 (L) >60 ml/min/1.73m2    Calcium 8.0 (L) 8.5 - 10.1 MG/DL    Bilirubin, total 0.3 0.2 - 1.0 MG/DL    ALT (SGPT) 23 12 - 78 U/L    AST (SGOT) 19 15 - 37 U/L    Alk.  phosphatase 82 45 - 117 U/L Protein, total 6.8 6.4 - 8.2 g/dL    Albumin 3.6 3.5 - 5.0 g/dL    Globulin 3.2 2.0 - 4.0 g/dL    A-G Ratio 1.1 1.1 - 2.2       Several labs pending at time of note. See [facility] for results. 1510 - Tolerated well. Pt denies any acute problems/changes. Discharged from [facility] ambulatory. No distress. Next appt: 6/24/19 @ 1500.

## 2019-05-24 LAB
KAPPA LC FREE SER-MCNC: 32.8 MG/L (ref 3.3–19.4)
KAPPA LC FREE/LAMBDA FREE SER: 1.41 {RATIO} (ref 0.26–1.65)
LAMBDA LC FREE SERPL-MCNC: 23.3 MG/L (ref 5.7–26.3)

## 2019-05-27 LAB
IGA SERPL-MCNC: 141 MG/DL (ref 61–437)
IGG SERPL-MCNC: 923 MG/DL (ref 700–1600)
IGM SERPL-MCNC: 23 MG/DL (ref 20–172)
PROT PATTERN SERPL IFE-IMP: NORMAL

## 2019-05-28 LAB
ALBUMIN SERPL ELPH-MCNC: 3.7 G/DL (ref 2.9–4.4)
ALBUMIN/GLOB SERPL: 1.3 {RATIO} (ref 0.7–1.7)
ALPHA1 GLOB SERPL ELPH-MCNC: 0.2 G/DL (ref 0–0.4)
ALPHA2 GLOB SERPL ELPH-MCNC: 0.8 G/DL (ref 0.4–1)
B-GLOBULIN SERPL ELPH-MCNC: 0.9 G/DL (ref 0.7–1.3)
GAMMA GLOB SERPL ELPH-MCNC: 0.9 G/DL (ref 0.4–1.8)
GLOBULIN SER CALC-MCNC: 2.8 G/DL (ref 2.2–3.9)
M PROTEIN SERPL ELPH-MCNC: NORMAL G/DL
PROT SERPL-MCNC: 6.5 G/DL (ref 6–8.5)

## 2019-05-29 ENCOUNTER — OFFICE VISIT (OUTPATIENT)
Dept: ONCOLOGY | Age: 64
End: 2019-05-29

## 2019-05-29 VITALS
HEIGHT: 68 IN | RESPIRATION RATE: 18 BRPM | DIASTOLIC BLOOD PRESSURE: 91 MMHG | SYSTOLIC BLOOD PRESSURE: 130 MMHG | TEMPERATURE: 98.5 F | BODY MASS INDEX: 29.55 KG/M2 | WEIGHT: 195 LBS | HEART RATE: 80 BPM

## 2019-05-29 DIAGNOSIS — C90.01 MULTIPLE MYELOMA IN REMISSION (HCC): Primary | ICD-10-CM

## 2019-05-29 NOTE — PROGRESS NOTES
Identified pt with two pt identifiers(name and ). Reviewed record in preparation for visit and have obtained necessary documentation. Chief Complaint   Patient presents with    Multiple Myeloma      Visit Vitals  BP (!) 130/91 (BP 1 Location: Left arm, BP Patient Position: Sitting)   Pulse 80   Temp 98.5 °F (36.9 °C) (Oral)   Resp 18   Ht 5' 8\" (1.727 m)   Wt 195 lb (88.5 kg)   BMI 29.65 kg/m²       Health Maintenance Due   Topic    Pneumococcal 0-64 years (1 of 3 - PCV13)    EYE EXAM RETINAL OR DILATED     DTaP/Tdap/Td series (1 - Tdap)    Shingrix Vaccine Age 50> (1 of 2)    FOBT Q 1 YEAR AGE 50-75     LIPID PANEL Q1     MICROALBUMIN Q1     FOOT EXAM Q1     HEMOGLOBIN A1C Q6M     MEDICARE YEARLY EXAM        Coordination of Care Questionnaire:  :   1) Have you been to an emergency room, urgent care, or hospitalized since your last visit? If yes, where when, and reason for visit? no       2. Have seen or consulted any other health care provider since your last visit? If yes, where when, and reason for visit? NO      3) Do you have an Advanced Directive/ Living Will in place? NO  If yes, do we have a copy on file NO  If no, would you like information NO    Patient is accompanied by wife I have received verbal consent from [address] to discuss any/all medical information while they are present in the room.

## 2019-05-29 NOTE — PROGRESS NOTES
[facility]  [address], [address] [facility] Jared Cornelius, [address]  [phone]          Progress Note        Patient: Leela Douglas Sr. MRN: [id number]  SSN: [id number]    YOB: 1955  Age: 66 y.o. Sex: male        Diagnosis:      1. Multiple Myeloma, IgA Kappa   [place]ie [place] stage IIIB    Cytogenetics/FISH: t(14;16) - high risk    Ttreatment:     1. Maintenance therapy - Pomalyst 2 mg daily - started 11/19/2018  2. Maintenance therapy - Ixazomib - started 10/15/2018 - stopped 11/12/2018  3. S/P tandem  Autotransplant    First on 2/28/2018   2nd on 06/13/2018  4. Carfilzomib/Pom/Dex - s/p 5 cycles  5. VD-PACE s/p 1 cycle  6. RVD - s/p 4 cycles    Subjective:      Leela Douglas Sr. is a 66 y.o. male with a diagnosis of IgA kappa myeloma. Bone marrow shows 100% aberrant plasma cells. He suffers with DM but it is diet controlled. He has received systemic anti-viral treatment for chronic Hep C with successful eradication of the virus. Mr. Jhonatan Holbrook received 4 cycles of systemic therapy with RVd. He had an initial good response to treatment. However his paraprotein level started rising and the myeloma became refractory to treatment. I then administered one cycle of VD-PACE in the hospital. He then received Carfilzomib/Pom/Dex. He achieved VGPR. He underwent tandem autotransplant at [facility]. According to the patient he achieved complete response with therapy. He has undergone second/tandem transplant in June. He is now on Pomalyst maintenance and tolerating it well. He had a repeat BM biopsy in February at [facility] which showed complete molecular remission. He is here today with his wife and feels well with no new complaints.        Review of Systems:    Constitutional: negative  Eyes: negative  Ears, Nose, Mouth, Throat, and Face: negative  Respiratory: negative  Cardiovascular: negative  Gastrointestinal: negative  Genitourinary:negative  Integument/Breast: negative  Hematologic/Lymphatic: negative  Musculoskeletal: chronic neck pain  Neurological: negative      Past Medical History:   Diagnosis Date    Acid reflux     Arrhythmia     pvc's    Chronic pain     neck    Depression     Diabetes ([address]     Femoral hernia 2012    Hepatitis C     Hypertension     Inguinal hernia 2012    Liver disease     hepatitis c    Other ill-defined conditions(799.89)     Hx of PVCs since     Prostatitis, acute     Psychiatric disorder     depression     Past Surgical History:   Procedure Laterality Date    COLONOSCOPY N/A 2016    COLONOSCOPY performed [facility] Xiao Ramirez MD [facility] ENDOSCOPY    HX APPENDECTOMY      HX CERVICAL FUSION  2008    x 1 as of 2014    HX HEENT      foreign body removed from right eye    HX HERNIA REPAIR      [facility]      HX ORTHOPAEDIC      cervical fusion    HX OTHER SURGICAL  2000    liver bx - chronic hepatitis      Family History   Problem Relation Age of Onset    Arthritis-osteo Mother     Arthritis-osteo Father     Diabetes Father     Hypertension Maternal Grandmother     Diabetes Maternal Grandmother     Hypertension Maternal Grandfather     Diabetes Maternal Grandfather      Social History     Tobacco Use    Smoking status: Former Smoker     Packs/day: 1.00     Years: 12.00     Pack years: 12.00     Types: Cigarettes     Last attempt to quit: 1989     Years since quittin.4    Smokeless tobacco: Never Used    Tobacco comment: former cigarette smoker   Substance Use Topics    Alcohol use: No     Comment: former alcoholic-quit [id number]      Prior to Admission medications    Medication Sig Start Date End Date Taking? Authorizing Provider   pomalidomide (POMALYST) 2 mg cap Take 1 Cap by mouth daily. 19  Yes Fifi Long NP   metFORMIN ER (GLUCOPHAGE XR) 500 mg tablet Take 1 Tab by mouth two (2) times a day.  NO FURTHER REFILLS TILL SEEN IN OFFICE 4/29/19  Yes Mich Palomo MD   POMALYST 2 mg cap TAKE 1 CAPSULE BY MOUTH EVERY DAY 4/8/19  Yes Morena Arizmendi MD   omeprazole (PRILOSEC) 20 mg capsule Take 20 mg by mouth daily as needed (acid reflux). Yes Other, MD Goldy   ondansetron (ZOFRAN ODT) 8 mg disintegrating tablet Take 8 mg by mouth every eight (8) hours as needed for Nausea. Yes Other, MD Goldy   buPROPion XL (WELLBUTRIN XL) 150 mg tablet Take 150 mg by mouth every morning. Yes Other, MD Goldy   famotidine (PEPCID) 20 mg tablet Take 20 mg by mouth daily as needed (acid reflux). Yes Other, MD Goldy   ixazomib 4 mg cap Take 4 mg by mouth every seven (7) days. Patient takes 4 mg every Tuesdays on week 1-3 of cycle   Yes Other, MD Goldy   acyclovir (ZOVIRAX) 400 mg tablet Take 400 mg by mouth two (2) times a day. 7/7/18  Yes Provider, Historical   tenofovir DISOPROXIL FUMARATE (VIREAD) 300 mg tablet Take 300 mg by mouth daily. 7/7/18  Yes Provider, Historical   ergocalciferol (ERGOCALCIFEROL) 50,000 unit capsule TAKE 1 CAP BY MOUTH EVERY 7 DAYS, Mondays 5/14/18  Yes Provider, Historical   lisinopril (PRINIVIL, ZESTRIL) 10 mg tablet TAKE 1 TABLET BY MOUTH EVERY DAY as needed. Hold if SBP <115 5/12/18  Yes Provider, Historical   folic acid (FOLVITE) 1 mg tablet TAKE 1 TABLET BY MOUTH EVERY DAY 5/14/18  Yes Provider, Historical   multivitamin (ONE A DAY) tablet Take 1 Tab by mouth daily. Yes Provider, Historical   Biotin 2,500 mcg cap Take 1 Cap by mouth daily. Yes Provider, Historical   insulin lispro (HUMALOG) 100 unit/mL kwikpen 4-6 Units by SubCUTAneous route Before breakfast, lunch, and dinner. Blood Glucose (mg/dL)       Insulin Dose (units)              <130                                 skip              130-150                              4               >150                                   6   Yes Provider, Historical   tamsulosin (FLOMAX) 0.4 mg capsule Take 1 Cap by mouth daily.  4/4/17  Yes Vandana Corbett MD zolpidem CR (AMBIEN CR) 12.5 mg tablet Take 12.5 mg by mouth nightly. Yes Provider, Historical   tapentadol (NUCYNTA) 100 mg tablet Take 200 mg by mouth three (3) times daily. At 7a, 3p, 11pm   Yes Other, MD Goldy   aripiprazole (ABILIFY) 5 mg tablet Take 2.5 mg by mouth every morning. Yes Other, MD Goldy   pentamidine (NEBUPENT) 300 mg inhalation solution Take 300 mg by inhalation every month. Once a month at 's office    Provider, Historical          Allergies   Allergen Reactions    Mercury (Bulk) Hives     Blisters             Objective:     Vitals:    05/29/19 1014   BP: (!) 130/91   Pulse: 80   Resp: 18   Temp: 98.5 °F (36.9 °C)   TempSrc: Oral   Weight: 195 lb (88.5 kg)   Height: 5' 8\" (1.727 m)          Physical Exam:    GENERAL: alert, cooperative  EYE: negative  LYMPHATIC: Cervical, supraclavicular, and axillary nodes normal.   THROAT & NECK: normal and no erythema or exudates noted. LUNG: clear to auscultation bilaterally  HEART: regular rate and rhythm  ABDOMEN: soft, non-tender  EXTREMITIES: normal  SKIN: Normal.  NEUROLOGIC: negative      Lab Results   Component Value Date/Time    WBC 2.8 (L) 05/22/2019 03:06 PM    HGB 10.9 (L) 05/22/2019 03:06 PM    Hematocrit (POC) 40 03/27/2019 02:58 PM    HCT 34.1 (L) 05/22/2019 03:06 PM    PLATELET 686 23/15/2285 03:06 PM    MCV 92.7 05/22/2019 03:06 PM       Lab Results   Component Value Date/Time    Sodium 140 05/22/2019 03:06 PM    Potassium 4.2 05/22/2019 03:06 PM    Chloride 110 (H) 05/22/2019 03:06 PM    CO2 26 05/22/2019 03:06 PM    Anion gap 4 (L) 05/22/2019 03:06 PM    Glucose 84 05/22/2019 03:06 PM    BUN 20 05/22/2019 03:06 PM    Creatinine 1.34 (H) 05/22/2019 03:06 PM    BUN/Creatinine ratio 15 05/22/2019 03:06 PM    GFR est AA >60 05/22/2019 03:06 PM    GFR est non-AA 54 (L) 05/22/2019 03:06 PM    Calcium 8.0 (L) 05/22/2019 03:06 PM            Assessment:     1.  Multiple Myeloma, IgA Kappa   Durie [place] stage IIIB    Cytogenetics/FISH: t(14;16) - high risk  ECOG PS - 0   Intent of therapy: palliative    Received 3 cycles of RVd   Dose reduced Revlimid and Velcade d/t continued cytopenias. M-spike started rising. He received one cycle of VD-PACE (bortezomib, dexamethasone, thalidomide, cisplatin, adriamycin, cyclophosphamide, and etoposide). Achieved DC with once cycle    Receivied KPd - s/p 5 cycles    Excellent response with M-protein came down to 0.1    S/p tandem autotransplant    First on 2/28/2018   2nd on 06/13/2018    Achieved CR after the first transplant. Blood count has recovered  Doing well  Some fatigue  Repeat bone marrow @ [facility] - Complete response. Since he is high risk based on cytogenetics, I recommend proteosome inhibitor as maintenance therapy. Started Ixazomib   Developed fever, was in the hospital.   Also developed cytopenias    Patient prefered to switch therapy to Pomalyst.     On Pomalyst 2 mg daily - started 11/19/2018  Tolerating well   In complete remission by serological tests    Repeat [facility] marrow in  at [facility] shows complete molecular remission    Symptom management form reviewed with patient. 2. Type 2 DM with complication    Managed by Endocrine      3. Chronic Hep C    In sustained virological remission        Plan:       > Continue Pomalyst 2 mg daily maintenance  > Myeloma labs monthly  > Port flush monthly  > Continue Zometa every 3 months  > Follow-up in 2 months        Signed by: Yuriy Tavarez MD                     May 29, 2019        CC. Antonina Aguirre MD  CC.  Lady Lino MD

## 2019-06-07 DIAGNOSIS — Z94.84 H/O AUTOLOGOUS STEM CELL TRANSPLANT (HCC): ICD-10-CM

## 2019-06-07 DIAGNOSIS — C90.01 MULTIPLE MYELOMA IN REMISSION (HCC): ICD-10-CM

## 2019-06-19 ENCOUNTER — APPOINTMENT (OUTPATIENT)
Dept: INFUSION THERAPY | Age: 64
End: 2019-06-19
Payer: COMMERCIAL

## 2019-06-24 ENCOUNTER — HOSPITAL ENCOUNTER (OUTPATIENT)
Dept: INFUSION THERAPY | Age: 64
Discharge: HOME | End: 2019-06-24
Payer: COMMERCIAL

## 2019-06-24 VITALS
RESPIRATION RATE: 18 BRPM | WEIGHT: 192 LBS | BODY MASS INDEX: 29.1 KG/M2 | HEIGHT: 68 IN | OXYGEN SATURATION: 99 % | HEART RATE: 99 BPM | DIASTOLIC BLOOD PRESSURE: 81 MMHG | TEMPERATURE: 99.1 F | SYSTOLIC BLOOD PRESSURE: 117 MMHG

## 2019-06-24 DIAGNOSIS — C90.01 MULTIPLE MYELOMA IN REMISSION (HCC): Primary | ICD-10-CM

## 2019-06-24 LAB
ALBUMIN SERPL-MCNC: 3.8 G/DL (ref 3.5–5)
ALBUMIN/GLOB SERPL: 1.1 {RATIO} (ref 1.1–2.2)
ALP SERPL-CCNC: 93 U/L (ref 45–117)
ALT SERPL-CCNC: 28 U/L (ref 12–78)
ANION GAP BLD CALC-SCNC: 18 MMOL/L (ref 10–20)
AST SERPL-CCNC: 18 U/L (ref 15–37)
BASOPHILS # BLD: 0 K/UL (ref 0–0.1)
BASOPHILS NFR BLD: 1 % (ref 0–1)
BILIRUB DIRECT SERPL-MCNC: 0.1 MG/DL (ref 0–0.2)
BILIRUB SERPL-MCNC: 0.2 MG/DL (ref 0.2–1)
BUN BLD-MCNC: 18 MG/DL (ref 9–20)
CA-I BLD-MCNC: 1.18 MMOL/L (ref 1.12–1.32)
CHLORIDE BLD-SCNC: 104 MMOL/L (ref 98–107)
CO2 BLD-SCNC: 25 MMOL/L (ref 21–32)
CREAT BLD-MCNC: 1.3 MG/DL (ref 0.6–1.3)
DIFFERENTIAL METHOD BLD: ABNORMAL
EOSINOPHIL # BLD: 0 K/UL (ref 0–0.4)
EOSINOPHIL NFR BLD: 1 % (ref 0–7)
ERYTHROCYTE [DISTWIDTH] IN BLOOD BY AUTOMATED COUNT: 15.5 % (ref 11.5–14.5)
GLOBULIN SER CALC-MCNC: 3.6 G/DL (ref 2–4)
GLUCOSE BLD-MCNC: 132 MG/DL (ref 65–100)
HCT VFR BLD AUTO: 38.6 % (ref 36.6–50.3)
HCT VFR BLD CALC: 39 % (ref 36.6–50.3)
HGB BLD-MCNC: 12.1 G/DL (ref 12.1–17)
IGA SERPL-MCNC: 169 MG/DL (ref 70–400)
IGG SERPL-MCNC: 1020 MG/DL (ref 700–1600)
IGM SERPL-MCNC: 26 MG/DL (ref 40–230)
IMM GRANULOCYTES # BLD AUTO: 0 K/UL (ref 0–0.04)
IMM GRANULOCYTES NFR BLD AUTO: 0 % (ref 0–0.5)
LYMPHOCYTES # BLD: 1 K/UL (ref 0.8–3.5)
LYMPHOCYTES NFR BLD: 34 % (ref 12–49)
MCH RBC QN AUTO: 28.9 PG (ref 26–34)
MCHC RBC AUTO-ENTMCNC: 31.3 G/DL (ref 30–36.5)
MCV RBC AUTO: 92.1 FL (ref 80–99)
MONOCYTES # BLD: 0.5 K/UL (ref 0–1)
MONOCYTES NFR BLD: 18 % (ref 5–13)
NEUTS SEG # BLD: 1.5 K/UL (ref 1.8–8)
NEUTS SEG NFR BLD: 46 % (ref 32–75)
NRBC # BLD: 0 K/UL (ref 0–0.01)
NRBC BLD-RTO: 0 PER 100 WBC
PLATELET # BLD AUTO: 172 K/UL (ref 150–400)
PMV BLD AUTO: 10.8 FL (ref 8.9–12.9)
POTASSIUM BLD-SCNC: 4.2 MMOL/L (ref 3.5–5.1)
PROT SERPL-MCNC: 7.4 G/DL (ref 6.4–8.2)
RBC # BLD AUTO: 4.19 M/UL (ref 4.1–5.7)
SERVICE CMNT-IMP: ABNORMAL
SODIUM BLD-SCNC: 141 MMOL/L (ref 136–145)
WBC # BLD AUTO: 3 K/UL (ref 4.1–11.1)

## 2019-06-24 PROCEDURE — 85025 COMPLETE CBC W/AUTO DIFF WBC: CPT

## 2019-06-24 PROCEDURE — 77030012965 HC NDL HUBR BBMI -A

## 2019-06-24 PROCEDURE — 80047 BASIC METABLC PNL IONIZED CA: CPT

## 2019-06-24 PROCEDURE — 83883 ASSAY NEPHELOMETRY NOT SPEC: CPT

## 2019-06-24 PROCEDURE — 82784 ASSAY IGA/IGD/IGG/IGM EACH: CPT

## 2019-06-24 PROCEDURE — 36415 COLL VENOUS BLD VENIPUNCTURE: CPT

## 2019-06-24 PROCEDURE — 74011250636 HC RX REV CODE- 250/636: Performed by: INTERNAL MEDICINE

## 2019-06-24 PROCEDURE — 80076 HEPATIC FUNCTION PANEL: CPT

## 2019-06-24 PROCEDURE — 84165 PROTEIN E-PHORESIS SERUM: CPT

## 2019-06-24 PROCEDURE — 96374 THER/PROPH/DIAG INJ IV PUSH: CPT

## 2019-06-24 RX ORDER — HEPARIN 100 UNIT/ML
300-500 SYRINGE INTRAVENOUS AS NEEDED
Status: DISCONTINUED | OUTPATIENT
Start: 2019-06-24 | End: 2019-06-25 | Stop reason: HOSPADM

## 2019-06-24 RX ORDER — SODIUM CHLORIDE 9 MG/ML
25 INJECTION, SOLUTION INTRAVENOUS CONTINUOUS
Status: DISCONTINUED | OUTPATIENT
Start: 2019-06-24 | End: 2019-06-25 | Stop reason: HOSPADM

## 2019-06-24 RX ORDER — SODIUM CHLORIDE 0.9 % (FLUSH) 0.9 %
10 SYRINGE (ML) INJECTION AS NEEDED
Status: DISCONTINUED | OUTPATIENT
Start: 2019-06-24 | End: 2019-06-25 | Stop reason: HOSPADM

## 2019-06-24 RX ORDER — SODIUM CHLORIDE 9 MG/ML
10 INJECTION INTRAMUSCULAR; INTRAVENOUS; SUBCUTANEOUS AS NEEDED
Status: DISCONTINUED | OUTPATIENT
Start: 2019-06-24 | End: 2019-06-25 | Stop reason: HOSPADM

## 2019-06-24 RX ADMIN — Medication 500 UNITS: at 15:58

## 2019-06-24 RX ADMIN — SODIUM CHLORIDE 10 ML: 9 INJECTION INTRAMUSCULAR; INTRAVENOUS; SUBCUTANEOUS at 16:00

## 2019-06-24 RX ADMIN — Medication 10 ML: at 15:57

## 2019-06-24 RX ADMIN — ZOLEDRONIC ACID 4 MG: 4 SOLUTION INTRAVENOUS at 15:44

## 2019-06-24 NOTE — PROGRESS NOTES
[facility] Note    5370 Pt arrived at [facility] ambulatory and in no distress for labs/ zometa. Assessment completed, no new complaints voiced. Port accessed, labs drawn. Istat chem 8 drawn, cr 1.3, CrCl 160. Visit Vitals  /81 (BP 1 Location: Left arm, BP Patient Position: Sitting)   Pulse 99   Temp 99.1 °F (37.3 °C)   Resp 18   Ht 5' 8\" (1.727 m)   Wt 87.1 kg (192 lb)   SpO2 99%   BMI 29.19 kg/m²       Medications received:  Zometa 4 mg IV over 15 min    Port flushed with NS and heparin and needle removed    1605 Tolerated treatment well, no adverse reaction noted. D/Cd from [place] ambulatory and in no distress. Next appt 7/17/19    Recent Results (from the past 8 hour(s))   POC CHEM8    Collection Time: 06/24/19  3:22 PM   Result Value Ref Range    Calcium, ionized (POC) 1.18 1.12 - 1.32 mmol/L    Sodium (POC) 141 136 - 145 mmol/L    Potassium (POC) 4.2 3.5 - 5.1 mmol/L    Chloride (POC) 104 98 - 107 mmol/L    CO2 (POC) 25 21 - 32 mmol/L    Anion gap (POC) 18 10 - 20 mmol/L    Glucose (POC) 132 (H) 65 - 100 mg/dL    BUN (POC) 18 9 - 20 mg/dL    Creatinine (POC) 1.3 0.6 - 1.3 mg/dL    GFRAA, POC >60 >60 ml/min/1.73m2    GFRNA, POC 56 (L) >60 ml/min/1.73m2    Hematocrit (POC) 39 36.6 - 50.3 %    Comment Comment Not Indicated.

## 2019-06-26 LAB
ALBUMIN SERPL ELPH-MCNC: 3.9 G/DL (ref 2.9–4.4)
ALBUMIN/GLOB SERPL: 1.3 {RATIO} (ref 0.7–1.7)
ALPHA1 GLOB SERPL ELPH-MCNC: 0.2 G/DL (ref 0–0.4)
ALPHA2 GLOB SERPL ELPH-MCNC: 0.9 G/DL (ref 0.4–1)
B-GLOBULIN SERPL ELPH-MCNC: 0.9 G/DL (ref 0.7–1.3)
GAMMA GLOB SERPL ELPH-MCNC: 1 G/DL (ref 0.4–1.8)
GLOBULIN SER CALC-MCNC: 2.9 G/DL (ref 2.2–3.9)
KAPPA LC FREE SER-MCNC: 37.4 MG/L (ref 3.3–19.4)
KAPPA LC FREE/LAMBDA FREE SER: 1.48 {RATIO} (ref 0.26–1.65)
LAMBDA LC FREE SERPL-MCNC: 25.3 MG/L (ref 5.7–26.3)
M PROTEIN SERPL ELPH-MCNC: NORMAL G/DL
PROT SERPL-MCNC: 6.8 G/DL (ref 6–8.5)

## 2019-06-27 LAB
IGA SERPL-MCNC: 186 MG/DL (ref 61–437)
IGG SERPL-MCNC: 1031 MG/DL (ref 700–1600)
IGM SERPL-MCNC: 27 MG/DL (ref 20–172)
PROT PATTERN SERPL IFE-IMP: NORMAL

## 2019-07-02 ENCOUNTER — TELEPHONE (OUTPATIENT)
Dept: ONCOLOGY | Age: 64
End: 2019-07-02

## 2019-07-02 NOTE — TELEPHONE ENCOUNTER
Patient called requesting a return call. Patient did not specify the reason except to say it was urgent. Please return call to discuss [phone].   [facility]

## 2019-07-02 NOTE — TELEPHONE ENCOUNTER
I returned call this morning at 9:15 am and patient is concerned his FLC ratio has increased. He had a follow up with BMT - Dr. Burgess Reyes last week who would like to increase him Pomalyst. Will discuss with Dr. Alberto Calhoun and return his call this afternoon.      Signed By: Vitaly Jimenez NP     July 2, 2019

## 2019-07-05 DIAGNOSIS — C90.01 MULTIPLE MYELOMA IN REMISSION (HCC): ICD-10-CM

## 2019-07-05 DIAGNOSIS — Z94.84 H/O AUTOLOGOUS STEM CELL TRANSPLANT (HCC): ICD-10-CM

## 2019-07-08 DIAGNOSIS — C90.00 MULTIPLE MYELOMA, REMISSION STATUS UNSPECIFIED (HCC): ICD-10-CM

## 2019-07-08 DIAGNOSIS — Z94.84 H/O AUTOLOGOUS STEM CELL TRANSPLANT (HCC): Primary | ICD-10-CM

## 2019-07-09 ENCOUNTER — OFFICE VISIT (OUTPATIENT)
Dept: ENDOCRINOLOGY | Age: 64
End: 2019-07-09

## 2019-07-09 VITALS
HEART RATE: 73 BPM | SYSTOLIC BLOOD PRESSURE: 112 MMHG | HEIGHT: 68 IN | BODY MASS INDEX: 29.43 KG/M2 | DIASTOLIC BLOOD PRESSURE: 76 MMHG | WEIGHT: 194.2 LBS

## 2019-07-09 DIAGNOSIS — E11.9 TYPE 2 DIABETES MELLITUS WITHOUT COMPLICATION, WITH LONG-TERM CURRENT USE OF INSULIN (HCC): Primary | ICD-10-CM

## 2019-07-09 DIAGNOSIS — I10 ESSENTIAL HYPERTENSION WITH GOAL BLOOD PRESSURE LESS THAN 130/80: ICD-10-CM

## 2019-07-09 DIAGNOSIS — Z79.4 TYPE 2 DIABETES MELLITUS WITHOUT COMPLICATION, WITH LONG-TERM CURRENT USE OF INSULIN (HCC): Primary | ICD-10-CM

## 2019-07-09 DIAGNOSIS — C90.00 MULTIPLE MYELOMA, REMISSION STATUS UNSPECIFIED (HCC): Primary | ICD-10-CM

## 2019-07-09 LAB — HBA1C MFR BLD HPLC: 5.3 %

## 2019-07-09 RX ORDER — METFORMIN HYDROCHLORIDE 500 MG/1
500 TABLET, EXTENDED RELEASE ORAL 2 TIMES DAILY
Qty: 180 TAB | Refills: 3 | Status: SHIPPED | OUTPATIENT
Start: 2019-07-09 | End: 2019-10-28 | Stop reason: ALTCHOICE

## 2019-07-09 RX ORDER — NAPROXEN 500 MG/1
500 TABLET ORAL 2 TIMES DAILY WITH MEALS
COMMUNITY
End: 2019-07-20

## 2019-07-09 NOTE — TELEPHONE ENCOUNTER
PER [facility]ORB FROM [facility]. POMALIDOMIDE (POMALYST) 3 MG CAP. TAKE 1 CAP BY MOUTH DAILY.  DISP 28 REFILL 0    Dominick Bah # [id number]; OBTAINED ON 7/5/19; [employer] UPDATED PT'S DOSAGE TO 3 MG

## 2019-07-09 NOTE — PROGRESS NOTES
CHIEF COMPLAINT: f/u uncontrolled diabetes    HISTORY OF PRESENT ILLNESS:   Crispin Bowser Sr. is a 66 y.o. male with a PMHx as noted below who presents for f/u of uncontrolled type 2 diabetes with steroid induced hyperglycemia. Initial History:  Patient was diagnosed with diabetes in 2000 and also with multiple myeloma around April 2017, for which he was referred to Dr. Yamilka Gil for evaluation and management. Prior to that he had stable diet controlled diabetes with A1c of 6.0%, followed by Dr. Mingo No. Patient was subsequently started on RVd therapy along with dexamethasone for induction chemo and continues on dexamethasone 40mg every Tuesday (2 weeks on , 1 week off), no longer taking steroids in between. Patient notes he is aiming for 14 weeks of treatment with potential marrow transplant according to the patient.      INTERVAL HISTORY:   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,   Since last visit he is not taking insulin, but taking metformin twice daily at this time,   Patient is tolerating metformin,   Off steroids, not planning to receive for any reason,  A1c today is 5.3%    Current Home Regimen:   metformin 500mg twice daily    Review of home glucose:   AM:  mostly  Snacking at bedtime    Review of most recent diabetes-related labs:  Lab Results   Component Value Date    HBA1C 6.2 08/25/2017    HBA1C 8.3 (H) 12/24/2010    UHR7NUDU 6.6% 04/07/2017    RDR8ORJI 5.3 07/09/2019    XJA8MZFK 6.4 02/21/2018    NMJ2OUAI 7.3 11/10/2017    CHOL 101 07/14/2011    LDLC 27.8 07/14/2011    GFRAA >60 05/22/2019    GFRNA 54 (L) 05/22/2019    MALBEXT microalbumin:Cr ratio 54 04/07/2017    TSH 0.94 09/19/2011       Feeling well, no chest pain or SOB,    PAST MEDICAL/SURGICAL HISTORY:   Past Medical History:   Diagnosis Date    Acid reflux     Arrhythmia     pvc's    Chronic pain     neck    Depression     Diabetes ([facility] [address]     Femoral hernia 7/11/2012    Hepatitis C     Hypertension     Inguinal hernia 7/11/2012    Liver disease     hepatitis c    Other ill-defined conditions(799.89)     Hx of PVCs since 2009    Prostatitis, acute     Psychiatric disorder     depression     Past Surgical History:   Procedure Laterality Date    COLONOSCOPY N/A 9/20/2016    COLONOSCOPY performed by Issa Benitez MD at hospitals ENDOSCOPY    HX APPENDECTOMY      HX CERVICAL FUSION  2008    x 1 as of 4/30/2014    HX HEENT      foreign body removed from right eye    H[facility] REPAIR  2012    [facility]      HX ORTHOPAEDIC      cervical fusion    HX OTHER SURGICAL  2000    liver bx - chronic hepatitis       ALLERGIES:   Allergies   Allergen Reactions    Mercury (Bulk) Hives     Blisters         MEDICATIONS ON ADMISSION:     Current Outpatient Medications:     naproxen (NAPROSYN) 500 mg tablet, Take 500 mg by mouth two (2) times daily (with meals). , Disp: , Rfl:     metFORMIN ER (GLUCOPHAGE XR) 500 mg tablet, Take 1 Tab by mouth two (2) times a day., Disp: 180 Tab, Rfl: 3    buPROPion XL (WELLBUTRIN XL) 150 mg tablet, Take 300 mg by mouth every morning., Disp: , Rfl:     tenofovir DISOPROXIL FUMARATE (VIREAD) 300 mg tablet, Take 300 mg by mouth daily. , Disp: , Rfl:     ergocalciferol (ERGOCALCIFEROL) 50,000 unit capsule, TAKE 1 CAP BY MOUTH EVERY 7 DAYS, Mondays, Disp: , Rfl: 4    lisinopril (PRINIVIL, ZESTRIL) 10 mg tablet, TAKE 1 TABLET BY MOUTH EVERY DAY as needed. Hold if SBP <115, Disp: , Rfl: 11    multivitamin (ONE A DAY) tablet, Take 1 Tab by mouth daily. , Disp: , Rfl:     Biotin 2,500 mcg cap, Take 1 Cap by mouth daily. , Disp: , Rfl:     tamsulosin (FLOMAX) 0.4 mg capsule, Take 1 Cap by mouth daily. , Disp: 30 Cap, Rfl: 1    zolpidem CR (AMBIEN CR) 12.5 mg tablet, Take 12.5 mg by mouth nightly., Disp: , Rfl:     tapentadol (NUCYNTA) 100 mg tablet, Take 200 mg by mouth three (3) times daily.  At 7a, 3p, 11pm, Disp: , Rfl:     aripiprazole (ABILIFY) 5 mg tablet, Take 2.5 mg by mouth every morning., Disp: , Rfl:     omeprazole (PRILOSEC) 20 mg capsule, Take 20 mg by mouth daily as needed (acid reflux). , Disp: , Rfl:     ondansetron (ZOFRAN ODT) 8 mg disintegrating tablet, Take 8 mg by mouth every eight (8) hours as needed for Nausea., Disp: , Rfl:     famotidine (PEPCID) 20 mg tablet, Take 20 mg by mouth daily as needed (acid reflux). , Disp: , Rfl:     ixazomib 4 mg cap, Take 4 mg by mouth every seven (7) days.  Patient takes 4 mg every  on week 1-3 of cycle, Disp: , Rfl:     folic acid (FOLVITE) 1 mg tablet, TAKE 1 TABLET BY MOUTH EVERY DAY, Disp: , Rfl: 4    SOCIAL HISTORY:   Social History     Socioeconomic History    Marital status:      Spouse name: Not on file    Number of children: Not on file    Years of education: Not on file    Highest education level: Not on file   Occupational History    Not on file   Social Needs    Financial resource strain: Not on file    Food insecurity:     Worry: Not on file     Inability: Not on file    Transportation needs:     Medical: Not on file     Non-medical: Not on file   Tobacco Use    Smoking status: Former Smoker     Packs/day: 1.00     Years: 12.00     Pack years: 12.00     Types: Cigarettes     Last attempt to quit: 1989     Years since quittin.5    Smokeless tobacco: Never Used    Tobacco comment: former cigarette smoker   Substance and Sexual Activity    Alcohol use: No     Comment: former alcoholic-quit 4750    Drug use: No    Sexual activity: Not on file   Lifestyle    Physical activity:     Days per week: Not on file     Minutes per session: Not on file    Stress: Not on file   Relationships    Social connections:     Talks on phone: Not on file     Gets together: Not on file     Attends [employer] service: Not on file     Active member of club or organization: Not on file     Attends meetings of clubs or organizations: Not on file     Relationship status: Not on file    Intimate partner violence:     Fear of current or ex partner: Not on file     Emotionally abused: Not on file     Physically abused: Not on file     Forced sexual activity: Not on file   Other Topics Concern    Not on file   Social History Narrative    Not on file       FAMILY HISTORY:  Family History   Problem Relation Age of Onset    Arthritis-osteo Mother     Arthritis-osteo Father     Diabetes Father     Hypertension Maternal Grandmother     Diabetes Maternal Grandmother     Hypertension Maternal Grandfather     Diabetes Maternal Grandfather        REVIEW OF SYSTEMS: Complete ROS assessed and noted for that which is described above, all else are negative. Eyes: normal  ENT: normal  CVS: normal  Resp: normal  GI: normal  : normal  GYN: normal  Endocrine: normal  Integument: normal  Musculoskeletal: normal  Neuro: normal  Psych: normal    PHYSICAL EXAMINATION:    VITAL SIGNS:  Visit Vitals  /76 (BP 1 Location: Left arm, BP Patient Position: Sitting)   Pulse 73   Ht 5' 8\" (1.727 m)   Wt 194 lb 3.2 oz (88.1 kg)   BMI 29.53 kg/m²       GENERAL: NCAT, Sitting comfortably, NAD  EYES: EOMI, non-icteric, no proptosis  Ear/Nose/Throat: NCAT, no inflammation, no masses  LYMPH NODES: No LAD  CARDIOVASCULAR: S1 S2, RRR, No murmur, 2+ radial pulses  RESPIRATORY: CTA b/l, no wheeze/rales  GASTROINTESTINAL: NT, ND  MUSCULOSKELETAL: Normal ROM, no atrophy  SKIN: warm, no edema/rash/ or other skin changes  NEUROLOGIC: 5/5 power all extremities, no tremor, AAOx3  PSYCHIATRIC: Normal affect, Normal insight and judgement    REVIEW OF LABORATORY AND RADIOLOGY DATA:   Labs and documentation have been reviewed as described above. ASSESSMENT AND PLAN:   Antonio Fernandez Sr. is a 66 y.o. male with a PMHx as noted above who presents for f/u of uncontrolled type 2 diabetes with steroid induced hyperglycemia.     Problems:  Type 2 diabetes uncontrolled  Steroid induced hyperglycemia  Hypertension    PLAN  Type 2 Diabetes  Medications:   Metformin 500mg twice daily    Stop insulin completely, not needed intermittently   Check glucose     HTN: reviewed, stable on current meds  Lipids: not on statin, will obtain updated levels,   Labs today    RTC: 4 month follow up visit  25 minutes spent together with patient today of which >50% of this time was spent in counseling and coordination of care. Lisy Barnhart.  [facility]  [address] [place] [address]

## 2019-07-15 ENCOUNTER — TELEPHONE (OUTPATIENT)
Dept: ONCOLOGY | Age: 64
End: 2019-07-15

## 2019-07-15 ENCOUNTER — OFFICE VISIT (OUTPATIENT)
Dept: ONCOLOGY | Age: 64
End: 2019-07-15

## 2019-07-15 VITALS
BODY MASS INDEX: 29.13 KG/M2 | HEART RATE: 77 BPM | TEMPERATURE: 98.1 F | DIASTOLIC BLOOD PRESSURE: 78 MMHG | OXYGEN SATURATION: 98 % | WEIGHT: 192.2 LBS | SYSTOLIC BLOOD PRESSURE: 115 MMHG | RESPIRATION RATE: 18 BRPM | HEIGHT: 68 IN

## 2019-07-15 DIAGNOSIS — C90.01 MULTIPLE MYELOMA IN REMISSION (HCC): Primary | ICD-10-CM

## 2019-07-15 DIAGNOSIS — C90.00 MULTIPLE MYELOMA, REMISSION STATUS UNSPECIFIED (HCC): ICD-10-CM

## 2019-07-15 NOTE — PROGRESS NOTES
[facility]  [address], [address] [facility], [facility] Jared Cornelius, [address] [facility]  [phone]          Progress Note        Patient: Coleen Castle Sr. MRN: [id number]  SSN: [id number]    YOB: 1955  Age: 66 y.o. Sex: male        Diagnosis:      1. Multiple Myeloma, IgA Kappa   [place] stage IIIB    Cytogenetics/FISH: t(14;16) - high risk    Ttreatment:     1. Maintenance therapy - Pomalyst 2 mg daily - started 11/19/2018  2. Maintenance therapy - Ixazomib - started 10/15/2018 - stopped 11/12/2018  3. S/P tandem  Autotransplant   First on 2/28/2018   2nd on 06/13/2018  4. Carfilzomib/Pom/Dex - s/p 5 cycles  5. VD-PACE s/p 1 cycle  6. RVD - s/p 4 cycles    Subjective:      Coleen Castle Sr. is a 66 y.o. male with a diagnosis of IgA kappa myeloma. Bone marrow shows 100% aberrant plasma cells. He suffers with DM but it is diet controlled. He has received systemic anti-viral treatment for chronic Hep C with successful eradication of the virus. Mr. Dillon Javier received 4 cycles of systemic therapy with RVd. He had an initial good response to treatment. However his paraprotein level started rising and the myeloma became refractory to treatment. I then administered one cycle of VD-PACE in the hospital. He then received Carfilzomib/Pom/Dex. He achieved VGPR. He underwent tandem autotransplant at [facility]. According to the patient he achieved complete response with therapy. He has undergone second/tandem transplant in June. He is now on Pomalyst maintenance and tolerating it well. He had a repeat BM biopsy in February at [facility] which showed complete molecular remission. He had a follow-up with Dr. Andreia Jackson at [facility] who recommended increasing Pomalyst d/t rising FLC ratio. He started the increased dose and is here with complaints of fevers.        Review of Systems:    Constitutional: fevers  Eyes: negative  Ears, Nose, Mouth, Throat, and Face: negative  Respiratory: negative  Cardiovascular: negative  Gastrointestinal: negative  Genitourinary:negative  Integument/Breast: negative  Hematologic/Lymphatic: negative  Musculoskeletal: chronic neck pain  Neurological: negative      Past Medical History:   Diagnosis Date    Acid reflux     Arrhythmia     pvc's    Chronic pain     neck    Depression     Diabetes ([address]     Femoral hernia 2012    Hepatitis C     Hypertension     Inguinal hernia 2012    Liver disease     hepatitis c    Other ill-defined conditions(799.89)     Hx of PVCs since     Prostatitis, acute     Psychiatric disorder     depression     Past Surgical History:   Procedure Laterality Date    COLONOSCOPY N/A 2016    [facility]COPY    HX APPENDECTOMY      HX CERVICAL FUSION  2008    x 1 as of 2014    HX HEENT      foreign body removed from right eye    HX HERNIA REPAIR      [facility]      HX ORTHOPAEDIC      cervical fusion    HX OTHER SURGICAL  2000    liver bx - chronic hepatitis      Family History   Problem Relation Age of Onset    Arthritis-osteo Mother     Arthritis-osteo Father     Diabetes Father     Hypertension Maternal Grandmother     Diabetes Maternal Grandmother     Hypertension Maternal Grandfather     Diabetes Maternal Grandfather      Social History     Tobacco Use    Smoking status: Former Smoker     Packs/day: 1.00     Years: 12.00     Pack years: 12.00     Types: Cigarettes     Last attempt to quit: 1989     Years since quittin.5    Smokeless tobacco: Never Used    Tobacco comment: former cigarette smoker   Substance Use Topics    Alcohol use: No     Comment: former alcoholic-quit 1396      Prior to Admission medications    Medication Sig Start Date End Date Taking? Authorizing Provider   naproxen (NAPROSYN) 500 mg tablet Take 500 mg by mouth two (2) times daily (with meals).    Yes Provider, Historical   metFORMIN ER (GLUCOPHAGE XR) 500 mg tablet Take 1 Tab by mouth two (2) times a day. 7/9/19  Yes Vincent Solis MD   omeprazole (PRILOSEC) 20 mg capsule Take 20 mg by mouth daily as needed (acid reflux). Yes Other, MD Goldy   ondansetron (ZOFRAN ODT) 8 mg disintegrating tablet Take 8 mg by mouth every eight (8) hours as needed for Nausea. Yes Other, MD Goldy   buPROPion XL (WELLBUTRIN XL) 150 mg tablet Take 300 mg by mouth every morning. Yes Other, MD Goldy   famotidine (PEPCID) 20 mg tablet Take 20 mg by mouth daily as needed (acid reflux). Yes Other, MD Goldy   tenofovir DISOPROXIL FUMARATE (VIREAD) 300 mg tablet Take 300 mg by mouth daily. 7/7/18  Yes Provider, Historical   ergocalciferol (ERGOCALCIFEROL) 50,000 unit capsule TAKE 1 CAP BY MOUTH EVERY 7 DAYS, Mondays 5/14/18  Yes Provider, Historical   lisinopril (PRINIVIL, ZESTRIL) 10 mg tablet TAKE 1 TABLET BY MOUTH EVERY DAY as needed. Hold if SBP <115 5/12/18  Yes Provider, Historical   multivitamin (ONE A DAY) tablet Take 1 Tab by mouth daily. Yes Provider, Historical   Biotin 2,500 mcg cap Take 1 Cap by mouth daily. Yes Provider, Historical   tamsulosin (FLOMAX) 0.4 mg capsule Take 1 Cap by mouth daily. 4/4/17  Yes Irish Hernandez MD   zolpidem CR (AMBIEN CR) 12.5 mg tablet Take 12.5 mg by mouth nightly. Yes Provider, Historical   tapentadol (NUCYNTA) 100 mg tablet Take 200 mg by mouth three (3) times daily. At 7a, 3p, 11pm   Yes Other, MD Goldy   aripiprazole (ABILIFY) 5 mg tablet Take 2.5 mg by mouth every morning. Yes Other, MD Goldy   pomalidomide (POMALYST) 3 mg cap Take 1 Cap by mouth daily. 7/9/19   Bella Baca NP   ixazomib 4 mg cap Take 4 mg by mouth every seven (7) days.  Patient takes 4 mg every Tuesdays on week 1-3 of cycle    Other, MD Goldy   folic acid (FOLVITE) 1 mg tablet TAKE 1 TABLET BY MOUTH EVERY DAY 5/14/18   Provider, Historical          Allergies   Allergen Reactions    Mercury (Bulk) Hives     Blisters Objective:     Visit Vitals  /78 (BP 1 Location: Left arm, BP Patient Position: Sitting)   Pulse 77   Temp 98.1 °F (36.7 °C) (Oral)   Resp 18   Ht 5' 8\" (1.727 m)   Wt 192 lb 3.2 oz (87.2 kg)   SpO2 98%   BMI 29.22 kg/m²       Pain Scale: 4/10  Pain Location: Neck      Physical Exam:    GENERAL: alert, cooperative  EYE: negative  LYMPHATIC: Cervical, supraclavicular, and axillary nodes normal.   THROAT & NECK: normal and no erythema or exudates noted. LUNG: clear to auscultation bilaterally  HEART: regular rate and rhythm  ABDOMEN: soft, non-tender  EXTREMITIES: normal  SKIN: Normal.  NEUROLOGIC: negative      Lab Results   Component Value Date/Time    WBC 3.0 (L) 06/24/2019 03:14 PM    HGB 12.1 06/24/2019 03:14 PM    Hematocrit (POC) 39 06/24/2019 03:22 PM    HCT 38.6 06/24/2019 03:14 PM    PLATELET 701 20/63/3794 03:14 PM    MCV 92.1 06/24/2019 03:14 PM       Lab Results   Component Value Date/Time    Sodium 140 05/22/2019 03:06 PM    Potassium 4.2 05/22/2019 03:06 PM    Chloride 110 (H) 05/22/2019 03:06 PM    CO2 26 05/22/2019 03:06 PM    Anion gap 4 (L) 05/22/2019 03:06 PM    Glucose 84 05/22/2019 03:06 PM    BUN 20 05/22/2019 03:06 PM    Creatinine 1.34 (H) 05/22/2019 03:06 PM    BUN/Creatinine ratio 15 05/22/2019 03:06 PM    GFR est AA >60 05/22/2019 03:06 PM    GFR est non-AA 54 (L) 05/22/2019 03:06 PM    Calcium 8.0 (L) 05/22/2019 03:06 PM            Assessment:     1. Multiple Myeloma, IgA Kappa   [place] stage IIIB    Cytogenetics/FISH: t(14;16) - high risk  ECOG PS - 0   Intent of therapy: palliative    Received 3 cycles of RVd   Dose reduced Revlimid and Velcade d/t continued cytopenias. M-spike started rising. He received one cycle of VD-PACE (bortezomib, dexamethasone, thalidomide, cisplatin, adriamycin, cyclophosphamide, and etoposide).   Achieved RI with once cycle    Receivied KPd - s/p 5 cycles    Excellent response with M-protein came down to 0.1    S/p tandem autotransplant    First on 2/28/2018   2nd on 06/13/2018    Achieved CR after the first transplant. Blood count has recovered  Doing well  Some fatigue  Repeat bone marrow @ [facility] - Complete response. Since he is high risk based on cytogenetics, I recommend proteosome inhibitor as maintenance therapy. Started Ixazomib   Developed fever, was in the hospital.   Also developed cytopenias    Patient prefered to switch therapy to Pomalyst.     On Pomalyst 2 mg daily - started 11/19/2018  Tolerating well   In complete remission by serological tests    Repeat bone marrow in  at [facility] shows complete molecular remission    FLC ratio rising - Pomalyst increased to 3 mg daily. Patient stopped d/t fevers. Resume Pomalyst at 2 mg daily. Symptom management form reviewed with patient. 2. Type 2 DM with complication    Managed by Endocrine      3. Chronic Hep C    In sustained virological remission      4. Pancytopenia, thrombocytopenia    > Monitor      Plan:       > Decrease Pomalyst to 2 mg daily   > Myeloma labs monthly  > Port flush monthly  > Continue Zometa every 3 months  > Follow-up in 2 months        Signed by: Robbie Baumgarten, MD                     July 16, 2019        CC. William Bryan MD  CC.  Carlos Smith MD

## 2019-07-15 NOTE — PROGRESS NOTES
Pt presents today for evaluation of fever after increasing Pomalyst to 3mg on Friday 7-12-19 and Saturday 7-13-19. Highest temperature reached 100.8. Pt did not take dose yesterday or this morning. Temp this morning 99.4 at home, tylenol taken at 9:00am, temp today in office 98.1 at 12:00pm.      Visit Vitals  /78 (BP 1 Location: Left arm, BP Patient Position: Sitting)   Pulse 77   Temp 98.1 °F (36.7 °C) (Oral)   Resp 18   Ht 5' 8\" (1.727 m)   Wt 192 lb 3.2 oz (87.2 kg)   SpO2 98%   BMI 29.22 kg/m²       Chief Complaint   Patient presents with    Fever     highest of 100.8 after increasing Polmalyst on Friday 7-12-19 to 3mg     1. Have you been to the ER, urgent care clinic since your last visit? Hospitalized since your last visit? No    2. Have you seen or consulted any other health care providers outside of the [address] [place] [address] since your last visit? Include any pap smears or colon screening.  No

## 2019-07-15 NOTE — TELEPHONE ENCOUNTER
Maris Ortiz from pharmacy hu:    Dose decrease from 3 to 2     Need new Rx and Auth #    maximum no of refill is 0   28 capsules at a time    7/15/19  alireza

## 2019-07-16 ENCOUNTER — TELEPHONE (OUTPATIENT)
Dept: ONCOLOGY | Age: 64
End: 2019-07-16

## 2019-07-16 ENCOUNTER — TELEPHONE (OUTPATIENT)
Dept: ENDOCRINOLOGY | Age: 64
End: 2019-07-16

## 2019-07-16 NOTE — TELEPHONE ENCOUNTER
Call received from [facility] requesting a new Flash Adrianna # d/t pt's dosage change to 2mg Pomalyst daily    Nurse called [employer]; new auth # [id number] obtained from Debbie Wells; new auth number given to [facility] w/ [facility].

## 2019-07-16 NOTE — TELEPHONE ENCOUNTER
We ordered labs for him to complete which will guide us on his cholesterol levels. Should obtain and then I will review as planned. Will discuss with him when labs reviewed. Thanks,     Ravin Cuevas.  [facility]  [address] [place] [address]

## 2019-07-16 NOTE — TELEPHONE ENCOUNTER
----- Message from Jarett Araujo sent at 7/16/2019  3:29 PM EDT -----  Regarding: Dr. Antonio Mendoza  General Message/Vendor Calls    Caller's first and last name:  [facility]    Reason for call:      Farhan Scott required yes/no and why:  yes    Best contact number(s):  [phone]    Details to clarify the request:  Pt is missing therapy. Requesting verbal order for medication \"Atorvastin\" 20 MG if pt is a mild risk if high risk requesting 40 MG of medication. Requesting to speak with provider today.      Jarett Araujo

## 2019-07-17 ENCOUNTER — APPOINTMENT (OUTPATIENT)
Dept: INFUSION THERAPY | Age: 64
End: 2019-07-17
Payer: COMMERCIAL

## 2019-07-17 NOTE — TELEPHONE ENCOUNTER
Spoke with Lotus (pharmacist) at [facility] and informed her of the message from Dr. Andrew Lane in regards to Mr. Hong's atorvastatin medication. Lotus stated that she will document the patient's chart. No further actions required.

## 2019-07-20 ENCOUNTER — APPOINTMENT (OUTPATIENT)
Dept: GENERAL RADIOLOGY | Age: 64
End: 2019-07-20
Attending: EMERGENCY MEDICINE
Payer: COMMERCIAL

## 2019-07-20 ENCOUNTER — APPOINTMENT (OUTPATIENT)
Dept: CT IMAGING | Age: 64
End: 2019-07-20
Attending: EMERGENCY MEDICINE
Payer: COMMERCIAL

## 2019-07-20 ENCOUNTER — HOSPITAL ENCOUNTER (EMERGENCY)
Age: 64
Discharge: HOME | End: 2019-07-21
Attending: EMERGENCY MEDICINE
Payer: COMMERCIAL

## 2019-07-20 VITALS
HEART RATE: 94 BPM | WEIGHT: 188.49 LBS | BODY MASS INDEX: 28.57 KG/M2 | OXYGEN SATURATION: 100 % | SYSTOLIC BLOOD PRESSURE: 124 MMHG | RESPIRATION RATE: 19 BRPM | HEIGHT: 68 IN | TEMPERATURE: 98.5 F | DIASTOLIC BLOOD PRESSURE: 87 MMHG

## 2019-07-20 DIAGNOSIS — J18.9 COMMUNITY ACQUIRED PNEUMONIA, UNSPECIFIED LATERALITY: Primary | ICD-10-CM

## 2019-07-20 DIAGNOSIS — R50.9 FEVER, UNSPECIFIED FEVER CAUSE: ICD-10-CM

## 2019-07-20 LAB
ALBUMIN SERPL-MCNC: 3.2 G/DL (ref 3.5–5)
ALBUMIN/GLOB SERPL: 0.8 {RATIO} (ref 1.1–2.2)
ALP SERPL-CCNC: 159 U/L (ref 45–117)
ALT SERPL-CCNC: 38 U/L (ref 12–78)
ANION GAP SERPL CALC-SCNC: 7 MMOL/L (ref 5–15)
APPEARANCE UR: CLEAR
AST SERPL-CCNC: 24 U/L (ref 15–37)
BACTERIA URNS QL MICRO: NEGATIVE /HPF
BASOPHILS # BLD: 0 K/UL (ref 0–0.1)
BASOPHILS NFR BLD: 1 % (ref 0–1)
BILIRUB SERPL-MCNC: 0.3 MG/DL (ref 0.2–1)
BILIRUB UR QL: NEGATIVE
BUN SERPL-MCNC: 20 MG/DL (ref 6–20)
BUN/CREAT SERPL: 14 (ref 12–20)
CALCIUM SERPL-MCNC: 8.4 MG/DL (ref 8.5–10.1)
CHLORIDE SERPL-SCNC: 107 MMOL/L (ref 97–108)
CO2 SERPL-SCNC: 25 MMOL/L (ref 21–32)
COLOR UR: NORMAL
CREAT SERPL-MCNC: 1.41 MG/DL (ref 0.7–1.3)
DIFFERENTIAL METHOD BLD: ABNORMAL
EOSINOPHIL # BLD: 0.3 K/UL (ref 0–0.4)
EOSINOPHIL NFR BLD: 8 % (ref 0–7)
EPITH CASTS URNS QL MICRO: NORMAL /LPF
ERYTHROCYTE [DISTWIDTH] IN BLOOD BY AUTOMATED COUNT: 14.7 % (ref 11.5–14.5)
GLOBULIN SER CALC-MCNC: 4 G/DL (ref 2–4)
GLUCOSE SERPL-MCNC: 68 MG/DL (ref 65–100)
GLUCOSE UR STRIP.AUTO-MCNC: NEGATIVE MG/DL
HCT VFR BLD AUTO: 35 % (ref 36.6–50.3)
HGB BLD-MCNC: 11.2 G/DL (ref 12.1–17)
HGB UR QL STRIP: NEGATIVE
HYALINE CASTS URNS QL MICRO: NORMAL /LPF (ref 0–5)
IMM GRANULOCYTES # BLD AUTO: 0 K/UL (ref 0–0.04)
IMM GRANULOCYTES NFR BLD AUTO: 0 % (ref 0–0.5)
KETONES UR QL STRIP.AUTO: NEGATIVE MG/DL
LACTATE BLD-SCNC: 1.44 MMOL/L (ref 0.4–2)
LEUKOCYTE ESTERASE UR QL STRIP.AUTO: NEGATIVE
LYMPHOCYTES # BLD: 1.1 K/UL (ref 0.8–3.5)
LYMPHOCYTES NFR BLD: 30 % (ref 12–49)
MCH RBC QN AUTO: 28.9 PG (ref 26–34)
MCHC RBC AUTO-ENTMCNC: 32 G/DL (ref 30–36.5)
MCV RBC AUTO: 90.2 FL (ref 80–99)
MONOCYTES # BLD: 0.9 K/UL (ref 0–1)
MONOCYTES NFR BLD: 25 % (ref 5–13)
NEUTS SEG # BLD: 1.2 K/UL (ref 1.8–8)
NEUTS SEG NFR BLD: 36 % (ref 32–75)
NITRITE UR QL STRIP.AUTO: NEGATIVE
NRBC # BLD: 0 K/UL (ref 0–0.01)
NRBC BLD-RTO: 0 PER 100 WBC
PH UR STRIP: 5.5 [PH] (ref 5–8)
PLATELET # BLD AUTO: 184 K/UL (ref 150–400)
PMV BLD AUTO: 10 FL (ref 8.9–12.9)
POTASSIUM SERPL-SCNC: 3.7 MMOL/L (ref 3.5–5.1)
PROT SERPL-MCNC: 7.2 G/DL (ref 6.4–8.2)
PROT UR STRIP-MCNC: NEGATIVE MG/DL
RBC # BLD AUTO: 3.88 M/UL (ref 4.1–5.7)
RBC #/AREA URNS HPF: NORMAL /HPF (ref 0–5)
RBC MORPH BLD: ABNORMAL
RBC MORPH BLD: ABNORMAL
SODIUM SERPL-SCNC: 139 MMOL/L (ref 136–145)
SP GR UR REFRACTOMETRY: 1.02 (ref 1–1.03)
UA: UC IF INDICATED,UAUC: NORMAL
UROBILINOGEN UR QL STRIP.AUTO: 0.2 EU/DL (ref 0.2–1)
WBC # BLD AUTO: 3.5 K/UL (ref 4.1–11.1)
WBC URNS QL MICRO: NORMAL /HPF (ref 0–4)

## 2019-07-20 PROCEDURE — 93005 ELECTROCARDIOGRAM TRACING: CPT

## 2019-07-20 PROCEDURE — 87040 BLOOD CULTURE FOR BACTERIA: CPT

## 2019-07-20 PROCEDURE — 74011250636 HC RX REV CODE- 250/636: Performed by: EMERGENCY MEDICINE

## 2019-07-20 PROCEDURE — 36415 COLL VENOUS BLD VENIPUNCTURE: CPT

## 2019-07-20 PROCEDURE — 71250 CT THORAX DX C-: CPT

## 2019-07-20 PROCEDURE — 71045 X-RAY EXAM CHEST 1 VIEW: CPT

## 2019-07-20 PROCEDURE — 99284 EMERGENCY DEPT VISIT MOD MDM: CPT

## 2019-07-20 PROCEDURE — 99285 EMERGENCY DEPT VISIT HI MDM: CPT

## 2019-07-20 PROCEDURE — 96365 THER/PROPH/DIAG IV INF INIT: CPT

## 2019-07-20 PROCEDURE — 81001 URINALYSIS AUTO W/SCOPE: CPT

## 2019-07-20 PROCEDURE — 80053 COMPREHEN METABOLIC PANEL: CPT

## 2019-07-20 PROCEDURE — 85025 COMPLETE CBC W/AUTO DIFF WBC: CPT

## 2019-07-20 PROCEDURE — 74011000258 HC RX REV CODE- 258: Performed by: EMERGENCY MEDICINE

## 2019-07-20 PROCEDURE — 83605 ASSAY OF LACTIC ACID: CPT

## 2019-07-20 RX ORDER — SODIUM CHLORIDE 0.9 % (FLUSH) 0.9 %
5-10 SYRINGE (ML) INJECTION AS NEEDED
Status: DISCONTINUED | OUTPATIENT
Start: 2019-07-20 | End: 2019-07-21 | Stop reason: HOSPADM

## 2019-07-20 RX ORDER — LEVOFLOXACIN 500 MG/1
500 TABLET, FILM COATED ORAL DAILY
Qty: 7 TAB | Refills: 0 | Status: SHIPPED | OUTPATIENT
Start: 2019-07-20 | End: 2019-07-21

## 2019-07-20 RX ORDER — HEPARIN 100 UNIT/ML
300 SYRINGE INTRAVENOUS
Status: COMPLETED | OUTPATIENT
Start: 2019-07-20 | End: 2019-07-20

## 2019-07-20 RX ORDER — PROCHLORPERAZINE MALEATE 10 MG
5 TABLET ORAL
COMMUNITY

## 2019-07-20 RX ADMIN — CEFEPIME HYDROCHLORIDE 2 G: 2 INJECTION, POWDER, FOR SOLUTION INTRAVENOUS at 22:26

## 2019-07-20 RX ADMIN — SODIUM CHLORIDE 500 ML: 900 INJECTION, SOLUTION INTRAVENOUS at 21:19

## 2019-07-20 RX ADMIN — Medication 300 UNITS: at 23:54

## 2019-07-20 NOTE — ED PROVIDER NOTES
EMERGENCY DEPARTMENT HISTORY AND PHYSICAL EXAM      Date: 7/20/2019  Patient Name: John Reid Patient Age and Sex: 66 y.o. male     History of Presenting Illness     Chief Complaint   Patient presents with    Fever     pt. reports he has multiple myeloma and has had fevers up to 100.4 with generalized body aches x1 week       History Provided By: Patient    HPI: John Reid is a 82-year-old male with a history of multiple myeloma presenting for fever. Dates that he see is oncology at [facility] Dr. Jerry Scott as well as Dr. Mary Akhtar here through [facility]. Patient states that for the past few months has been in remission. Had a bone biopsy recently which showed no signs of the myeloma. About a week and a half ago was seen by Dr. Jerry Scott and was placed on an increased dose of Pomalyst 3mg. When he started taking it on last Thursday, developed a fever shortly thereafter. The next day he took it again and again had a fever. Thus the next day after that he did not take the medication and followed up with Dr. Mary Akhtar this past Monday. Dr. Mary Akthar thought that it might be related to the medication so they decreased it to 2 mg. Patient states that despite this has continued to have fevers intermittently. He denies any nausea, vomiting, cough, URI symptoms, diarrhea, chest pain, abdominal pain, confusion, dysuria, rash. Prior to arrival his temperature was 100 at home so he took 1 g Tylenol. There are no other complaints, changes, or physical findings at this time. PCP: Narda Milligan MD    No current facility-administered medications on file prior to encounter. Current Outpatient Medications on File Prior to Encounter   Medication Sig Dispense Refill    insulin lispro protamine/insulin lispro (HUMALOG 50-50 MIX) 100 unit/mL (50-50) flexpen by SubCUTAneous route.  prochlorperazine (COMPAZINE) 10 mg tablet Take 5 mg by mouth every six (6) hours as needed.       pomalidomide (POMALYST) 2 mg cap Take 1 Cap by mouth daily. 28 Cap 0    metFORMIN ER (GLUCOPHAGE XR) 500 mg tablet Take 1 Tab by mouth two (2) times a day. 180 Tab 3    omeprazole (PRILOSEC) 20 mg capsule Take 20 mg by mouth daily as needed (acid reflux).  ondansetron (ZOFRAN ODT) 8 mg disintegrating tablet Take 8 mg by mouth every eight (8) hours as needed for Nausea.  buPROPion XL (WELLBUTRIN XL) 150 mg tablet Take 300 mg by mouth every morning.  ixazomib 4 mg cap Take 4 mg by mouth every seven (7) days. Patient takes 4 mg every Tuesdays on week 1-3 of cycle      tenofovir DISOPROXIL FUMARATE (VIREAD) 300 mg tablet Take 300 mg by mouth daily.  ergocalciferol (ERGOCALCIFEROL) 50,000 unit capsule TAKE 1 CAP BY MOUTH EVERY 7 DAYS, Mondays  4    lisinopril (PRINIVIL, ZESTRIL) 10 mg tablet TAKE 1 TABLET BY MOUTH EVERY DAY as needed. Hold if SBP <451  11    folic acid (FOLVITE) 1 mg tablet TAKE 1 TABLET BY MOUTH EVERY DAY  4    multivitamin (ONE A DAY) tablet Take 1 Tab by mouth daily.  tamsulosin (FLOMAX) 0.4 mg capsule Take 1 Cap by mouth daily. 30 Cap 1    zolpidem CR (AMBIEN CR) 12.5 mg tablet Take 12.5 mg by mouth nightly.  tapentadol (NUCYNTA) 100 mg tablet Take 200 mg by mouth three (3) times daily. At 7a, 3p, 11pm      aripiprazole (ABILIFY) 5 mg tablet Take 2.5 mg by mouth every morning.          Past History     Past Medical History:  Past Medical History:   Diagnosis Date    Acid reflux     Arrhythmia     pvc's    Chronic pain     neck    Depression     Diabetes ([address]     Femoral hernia 7/11/2012    Hepatitis C     Hypertension     Inguinal hernia 7/11/2012    Liver disease     hepatitis c    Multiple myeloma ([address]     Other ill-defined conditions(799.89)     Hx of PVCs since 2009    Prostatitis, acute     Psychiatric disorder     depression       Past Surgical History:  Past Surgical History:   Procedure Laterality Date    COLONOSCOPY N/A 9/20/2016    COLONOSCOPY performed by [facility]ank [facility] at [facility] ENDOSCOPY    HX APPENDECTOMY      HX CERVICAL FUSION  2008    x 1 as of 2014    HX HEENT      foreign body removed from right eye    HX HERNIA REPAIR  2012    [facility]      HX ORTHOPAEDIC      cervical fusion    HX OTHER SURGICAL  2000    liver bx - chronic hepatitis       Family History:  Family History   Problem Relation Age of Onset    Arthritis-osteo Mother     Arthritis-osteo Father     Diabetes Father     Hypertension Maternal Grandmother     Diabetes Maternal Grandmother     Hypertension Maternal Grandfather     Diabetes Maternal Grandfather        Social History:  Social History     Tobacco Use    Smoking status: Former Smoker     Packs/day: 1.00     Years: 12.00     Pack years: 12.00     Types: Cigarettes     Last attempt to quit: 1989     Years since quittin.5    Smokeless tobacco: Never Used    Tobacco comment: former cigarette smoker   Substance Use Topics    Alcohol use: No     Comment: former alcoholic-quit 9411    Drug use: No       Allergies: Allergies   Allergen Reactions    Mercury (Bulk) Hives     Blisters           Review of Systems   Review of Systems   Constitutional: Positive for fever. Negative for chills and fatigue. HENT: Negative for congestion and sneezing. Respiratory: Negative for cough and shortness of breath. Cardiovascular: Negative for chest pain. Gastrointestinal: Negative for constipation, diarrhea, nausea and vomiting. Genitourinary: Negative for dysuria and hematuria. Musculoskeletal: Positive for myalgias. Neurological: Negative for weakness and numbness. All other systems reviewed and are negative. Physical Exam   Physical Exam   Constitutional: He is oriented to person, place, and time. He appears well-developed and well-nourished. Patient wearing a mask, warm to touch   HENT:   Head: Normocephalic and atraumatic.    Eyes: Conjunctivae and EOM are normal.   Neck: Normal range of motion. Neck supple. Cardiovascular: Regular rhythm. Tachycardic   Pulmonary/Chest: Effort normal and breath sounds normal. No respiratory distress. He has no wheezes. Abdominal: Soft. He exhibits no distension. There is no tenderness. Musculoskeletal: Normal range of motion. Neurological: He is alert and oriented to person, place, and time. Skin: Skin is warm and dry. Psychiatric: He has a normal mood and affect. Nursing note and vitals reviewed. Diagnostic Study Results     Labs -     Recent Results (from the past 12 hour(s))   EKG, 12 LEAD, INITIAL    Collection Time: 07/20/19  8:04 PM   Result Value Ref Range    Ventricular Rate 97 BPM    Atrial Rate 97 BPM    P-R Interval 138 ms    QRS Duration 88 ms    Q-T Interval 360 ms    QTC Calculation (Bezet) 457 ms    Calculated P Axis 32 degrees    Calculated R Axis -28 degrees    Calculated T Axis 14 degrees    Diagnosis       Normal sinus rhythm  Moderate voltage criteria for LVH, may be normal variant  Nonspecific T wave abnormality  When compared with ECG of 01-NOV-2018 05:11,  No significant change was found     POC LACTIC ACID    Collection Time: 07/20/19  8:57 PM   Result Value Ref Range    Lactic Acid (POC) 1.44 0.40 - 1.52 mmol/L   METABOLIC PANEL, COMPREHENSIVE    Collection Time: 07/20/19  9:13 PM   Result Value Ref Range    Sodium 139 136 - 145 mmol/L    Potassium 3.7 3.5 - 5.1 mmol/L    Chloride 107 97 - 108 mmol/L    CO2 25 21 - 32 mmol/L    Anion gap 7 5 - 15 mmol/L    Glucose 68 65 - 100 mg/dL    BUN 20 6 - 20 MG/DL    Creatinine 1.41 (H) 0.70 - 1.30 MG/DL    BUN/Creatinine ratio 14 12 - 20      GFR est AA >60 >60 ml/min/1.73m2    GFR est non-AA 51 (L) >60 ml/min/1.73m2    Calcium 8.4 (L) 8.5 - 10.1 MG/DL    Bilirubin, total 0.3 0.2 - 1.0 MG/DL    ALT (SGPT) 38 12 - 78 U/L    AST (SGOT) 24 15 - 37 U/L    Alk.  phosphatase 159 (H) 45 - 117 U/L    Protein, total 7.2 6.4 - 8.2 g/dL    Albumin 3.2 (L) 3.5 - 5.0 g/dL    Globulin 4.0 2.0 - 4.0 g/dL    A-G Ratio 0.8 (L) 1.1 - 2.2     CBC WITH AUTOMATED DIFF    Collection Time: 07/20/19  9:13 PM   Result Value Ref Range    WBC 3.5 (L) 4.1 - 11.1 K/uL    RBC 3.88 (L) 4.10 - 5.70 M/uL    HGB 11.2 (L) 12.1 - 17.0 g/dL    HCT 35.0 (L) 36.6 - 50.3 %    MCV 90.2 80.0 - 99.0 FL    MCH 28.9 26.0 - 34.0 PG    MCHC 32.0 30.0 - 36.5 g/dL    RDW 14.7 (H) 11.5 - 14.5 %    PLATELET 392 801 - 040 K/uL    MPV 10.0 8.9 - 12.9 FL    NRBC 0.0 0  WBC    ABSOLUTE NRBC 0.00 0.00 - 0.01 K/uL    NEUTROPHILS 36 32 - 75 %    LYMPHOCYTES 30 12 - 49 %    MONOCYTES 25 (H) 5 - 13 %    EOSINOPHILS 8 (H) 0 - 7 %    BASOPHILS 1 0 - 1 %    IMMATURE GRANULOCYTES 0 0.0 - 0.5 %    ABS. NEUTROPHILS 1.2 (L) 1.8 - 8.0 K/UL    ABS. LYMPHOCYTES 1.1 0.8 - 3.5 K/UL    ABS. MONOCYTES 0.9 0.0 - 1.0 K/UL    ABS. EOSINOPHILS 0.3 0.0 - 0.4 K/UL    ABS. BASOPHILS 0.0 0.0 - 0.1 K/UL    ABS. IMM. GRANS. 0.0 0.00 - 0.04 K/UL    DF AUTOMATED      RBC COMMENTS ANISOCYTOSIS  1+        RBC COMMENTS TEARDROP CELLS  PRESENT       URINALYSIS W/ REFLEX CULTURE    Collection Time: 07/20/19 10:34 PM   Result Value Ref Range    Color YELLOW/STRAW      Appearance CLEAR CLEAR      Specific gravity 1.017 1.003 - 1.030      pH (UA) 5.5 5.0 - 8.0      Protein NEGATIVE  NEG mg/dL    Glucose NEGATIVE  NEG mg/dL    Ketone NEGATIVE  NEG mg/dL    Bilirubin NEGATIVE  NEG      Blood NEGATIVE  NEG      Urobilinogen 0.2 0.2 - 1.0 EU/dL    Nitrites NEGATIVE  NEG      Leukocyte Esterase NEGATIVE  NEG      WBC 0-4 0 - 4 /hpf    RBC 0-5 0 - 5 /hpf    Epithelial cells FEW FEW /lpf    Bacteria NEGATIVE  NEG /hpf    UA:UC IF INDICATED CULTURE NOT INDICATED BY UA RESULT CNI      Hyaline cast 0-2 0 - 5 /lpf       Radiologic Studies -   CT CHEST WO CONT   Final Result   IMPRESSION:   Nonspecific aveolitis versus pneumonia      XR CHEST PORT   Final Result   Impression:   1.  No acute disease           CT Results  (Last 48 hours)               07/20/19 2312  CT CHEST WO CONT Final result    Impression:  IMPRESSION:   Nonspecific aveolitis versus pneumonia       Narrative:  INDICATION: Multiple myeloma with fever and body aches. Evaluation for infection       COMPARISON: None       CONTRAST: None. TECHNIQUE:  5 mm axial images were obtained through the chest. Coronal and   sagittal reconstructions were generated. CT dose reduction was achieved through   use of a standardized protocol tailored for this examination and automatic   exposure control for dose modulation. The absence of intravenous contrast reduces the sensitivity for evaluation of   the mediastinum and upper abdominal organs. FINDINGS:   Port-A-Cath in place   THYROID: No nodule. MEDIASTINUM: No mass or lymphadenopathy. MARLI: No mass or lymphadenopathy. THORACIC AORTA: No aneurysm. MAIN PULMONARY ARTERY: Normal in caliber. TRACHEA/BRONCHI: Patent. ESOPHAGUS: No wall thickening or dilatation. HEART: Normal in size. PLEURA: No effusion or pneumothorax. LUNGS: Diffuse groundglass appearance of the lungs. INCIDENTALLY IMAGED UPPER ABDOMEN: No focal abnormality. BONES: No destructive bone lesion. CXR Results  (Last 48 hours)               07/20/19 2003  XR CHEST PORT Final result    Impression:  Impression:   1. No acute disease           Narrative:  INDICATION:  meets SIRS criteria        Exam: Portable chest 1945. Comparison: 11/4/2018. Findings: Cardiomediastinal silhouette is within normal limits. Pulmonary   vasculature is not engorged. There are no focal parenchymal opacities,   effusions, or pneumothorax. Right-sided port is unchanged                   Medical Decision Making   I am the first provider for this patient. I reviewed the vital signs, available nursing notes, past medical history, past surgical history, family history and social history. Vital Signs-Reviewed the patient's vital signs.   Patient Vitals for the past 12 hrs:   Temp Pulse Resp BP SpO2   07/20/19 2230  94 19 124/87 100 %   07/20/19 2111 98.5 °F (36.9 °C) 93 18 117/75 98 %   07/20/19 1927 99 °F (37.2 °C) (!) 102 18 120/64 99 %       Records Reviewed: Nursing Notes and Old Medical Records    Provider Notes (Medical Decision Making):   Patient presents with fever, tachycardia and concerns for infection. Most likely neutropenic fever, medication related vs. Viral. DDx: sepsis 2/2 UTI, PNA, intraabdominal infection (colitis, appendicitis, cholecystitis),  infectious diarrhea, meningitis, soft tissue infection, septic arthritis, flu/viral prodrome. Will follow sepsis protocol and order set by obtaining fluids, antibiotics, labs, lactate, EKG and frequently reassessing hemodynamic status on the patient. Will hold off on aggressive fluid hydration unless patient shows signs of severe sepsis or shock          ED Course:   Initial assessment performed. The patients presenting problems have been discussed, and they are in agreement with the care plan formulated and outlined with them. I have encouraged them to ask questions as they arise throughout their visit. ED Course as of Jul 20 2345   Sat Jul 20, 2019   2155 White count 3.5 but absolute neutrophil count is not low so he is not neutropenic. Will consult oncology. [JS]   4197 Spoke with  [facility]. [address]. If UA, CXR negative, does not need admission. Would recommend CT chest. If neg, dc on levaquin. [JS]   2652 EKG shows pneumonia versus alveolitis. Given this, will treat with Levaquin. No signs of severe sepsis so patient safe to be discharged home. [JS]      ED Course User Index  [JS] Viri Araujo MD     Disposition:  Discharge Note:  The patient has been re-evaluated and is ready for discharge. Reviewed available results with patient. Counseled patient on diagnosis and care plan. Patient has expressed understanding, and all questions have been answered.  Patient agrees with plan and agrees to follow up as recommended, or to return to the ED if their symptoms worsen. Discharge instructions have been provided and explained to the patient, along with reasons to return to the ED. Diagnosis     Clinical Impression:   1. Community acquired pneumonia, unspecified laterality    2. Fever, unspecified fever cause        Attestations:  Godfrey Aggarwal M.D. Please note that this dictation was completed with [employer], the computer voice recognition software. Quite often unanticipated grammatical, syntax, homophones, and other interpretive errors are inadvertently transcribed by the computer software. Please disregard these errors. Please excuse any errors that have escaped final proofreading. Thank you.

## 2019-07-21 RX ORDER — LEVOFLOXACIN 500 MG/1
500 TABLET, FILM COATED ORAL DAILY
Qty: 7 TAB | Refills: 0 | Status: SHIPPED | OUTPATIENT
Start: 2019-07-21 | End: 2019-11-12

## 2019-07-21 NOTE — ED NOTES
Patient was provided with discharge instructions. Instructions and any medications were reviewed with the patient &/or family by the provider. Questions and concerns addressed by the provider. Patient was ambulatory out of the ED and was escorted by his wife.

## 2019-07-22 ENCOUNTER — TELEPHONE (OUTPATIENT)
Dept: ONCOLOGY | Age: 64
End: 2019-07-22

## 2019-07-22 ENCOUNTER — HOSPITAL ENCOUNTER (OUTPATIENT)
Dept: INFUSION THERAPY | Age: 64
Discharge: HOME | End: 2019-07-22
Payer: COMMERCIAL

## 2019-07-22 LAB
ATRIAL RATE: 97 BPM
CALCULATED P AXIS, ECG09: 32 DEGREES
CALCULATED R AXIS, ECG10: -28 DEGREES
CALCULATED T AXIS, ECG11: 14 DEGREES
DIAGNOSIS, 93000: NORMAL
P-R INTERVAL, ECG05: 138 MS
Q-T INTERVAL, ECG07: 360 MS
QRS DURATION, ECG06: 88 MS
QTC CALCULATION (BEZET), ECG08: 457 MS
VENTRICULAR RATE, ECG03: 97 BPM

## 2019-07-22 RX ORDER — HEPARIN 100 UNIT/ML
500 SYRINGE INTRAVENOUS AS NEEDED
Status: CANCELLED | OUTPATIENT
Start: 2019-07-22 | End: 2019-07-23

## 2019-07-22 RX ORDER — SODIUM CHLORIDE 0.9 % (FLUSH) 0.9 %
10-40 SYRINGE (ML) INJECTION AS NEEDED
Status: CANCELLED | OUTPATIENT
Start: 2019-07-22 | End: 2019-07-23

## 2019-07-22 RX ORDER — SODIUM CHLORIDE 9 MG/ML
10 INJECTION INTRAMUSCULAR; INTRAVENOUS; SUBCUTANEOUS AS NEEDED
Status: CANCELLED | OUTPATIENT
Start: 2019-07-22 | End: 2019-07-23

## 2019-07-24 ENCOUNTER — HOSPITAL ENCOUNTER (OUTPATIENT)
Dept: INFUSION THERAPY | Age: 64
Discharge: HOME | End: 2019-07-24
Payer: COMMERCIAL

## 2019-07-24 ENCOUNTER — TELEPHONE (OUTPATIENT)
Dept: ONCOLOGY | Age: 64
End: 2019-07-24

## 2019-07-24 RX ORDER — SODIUM CHLORIDE 0.9 % (FLUSH) 0.9 %
10-40 SYRINGE (ML) INJECTION AS NEEDED
Status: CANCELLED | OUTPATIENT
Start: 2019-07-24 | End: 2019-07-25

## 2019-07-24 RX ORDER — SODIUM CHLORIDE 9 MG/ML
10 INJECTION INTRAMUSCULAR; INTRAVENOUS; SUBCUTANEOUS AS NEEDED
Status: CANCELLED | OUTPATIENT
Start: 2019-07-24 | End: 2019-07-25

## 2019-07-24 RX ORDER — HEPARIN 100 UNIT/ML
500 SYRINGE INTRAVENOUS AS NEEDED
Status: CANCELLED | OUTPATIENT
Start: 2019-07-24 | End: 2019-07-25

## 2019-07-24 NOTE — TELEPHONE ENCOUNTER
Patient called requesting to have his labs faxed to [facility] BMT office monthly. Labs from June already sent. If any questions return call to discuss [phone].   [place]

## 2019-07-26 ENCOUNTER — HOSPITAL ENCOUNTER (OUTPATIENT)
Dept: INFUSION THERAPY | Age: 64
Discharge: HOME | End: 2019-07-26
Payer: COMMERCIAL

## 2019-07-26 VITALS
SYSTOLIC BLOOD PRESSURE: 128 MMHG | OXYGEN SATURATION: 99 % | TEMPERATURE: 99.9 F | HEART RATE: 89 BPM | RESPIRATION RATE: 18 BRPM | DIASTOLIC BLOOD PRESSURE: 81 MMHG

## 2019-07-26 LAB
ALBUMIN SERPL-MCNC: 3.1 G/DL (ref 3.5–5)
ALBUMIN/GLOB SERPL: 0.7 {RATIO} (ref 1.1–2.2)
ALP SERPL-CCNC: 138 U/L (ref 45–117)
ALT SERPL-CCNC: 37 U/L (ref 12–78)
ANION GAP SERPL CALC-SCNC: 3 MMOL/L (ref 5–15)
AST SERPL-CCNC: 23 U/L (ref 15–37)
BACTERIA SPEC CULT: NORMAL
BACTERIA SPEC CULT: NORMAL
BASOPHILS # BLD: 0 K/UL (ref 0–0.1)
BASOPHILS NFR BLD: 1 % (ref 0–1)
BILIRUB SERPL-MCNC: 0.2 MG/DL (ref 0.2–1)
BUN SERPL-MCNC: 16 MG/DL (ref 6–20)
BUN/CREAT SERPL: 12 (ref 12–20)
CALCIUM SERPL-MCNC: 8.7 MG/DL (ref 8.5–10.1)
CHLORIDE SERPL-SCNC: 108 MMOL/L (ref 97–108)
CO2 SERPL-SCNC: 28 MMOL/L (ref 21–32)
CREAT SERPL-MCNC: 1.38 MG/DL (ref 0.7–1.3)
DIFFERENTIAL METHOD BLD: ABNORMAL
EOSINOPHIL # BLD: 0.1 K/UL (ref 0–0.4)
EOSINOPHIL NFR BLD: 3 % (ref 0–7)
ERYTHROCYTE [DISTWIDTH] IN BLOOD BY AUTOMATED COUNT: 14.6 % (ref 11.5–14.5)
GLOBULIN SER CALC-MCNC: 4.4 G/DL (ref 2–4)
GLUCOSE SERPL-MCNC: 93 MG/DL (ref 65–100)
HCT VFR BLD AUTO: 34 % (ref 36.6–50.3)
HGB BLD-MCNC: 10.7 G/DL (ref 12.1–17)
IGA SERPL-MCNC: 255 MG/DL (ref 70–400)
IGG SERPL-MCNC: 1050 MG/DL (ref 700–1600)
IGM SERPL-MCNC: 40 MG/DL (ref 40–230)
IMM GRANULOCYTES # BLD AUTO: 0 K/UL (ref 0–0.04)
IMM GRANULOCYTES NFR BLD AUTO: 0 % (ref 0–0.5)
LYMPHOCYTES # BLD: 0.8 K/UL (ref 0.8–3.5)
LYMPHOCYTES NFR BLD: 22 % (ref 12–49)
MCH RBC QN AUTO: 28.3 PG (ref 26–34)
MCHC RBC AUTO-ENTMCNC: 31.5 G/DL (ref 30–36.5)
MCV RBC AUTO: 89.9 FL (ref 80–99)
MONOCYTES # BLD: 0.2 K/UL (ref 0–1)
MONOCYTES NFR BLD: 6 % (ref 5–13)
NEUTS SEG # BLD: 2.5 K/UL (ref 1.8–8)
NEUTS SEG NFR BLD: 68 % (ref 32–75)
NRBC # BLD: 0 K/UL (ref 0–0.01)
NRBC BLD-RTO: 0 PER 100 WBC
PLATELET # BLD AUTO: 206 K/UL (ref 150–400)
PMV BLD AUTO: 9.9 FL (ref 8.9–12.9)
POTASSIUM SERPL-SCNC: 4.2 MMOL/L (ref 3.5–5.1)
PROT SERPL-MCNC: 7.5 G/DL (ref 6.4–8.2)
RBC # BLD AUTO: 3.78 M/UL (ref 4.1–5.7)
RBC MORPH BLD: ABNORMAL
SERVICE CMNT-IMP: NORMAL
SERVICE CMNT-IMP: NORMAL
SODIUM SERPL-SCNC: 139 MMOL/L (ref 136–145)
WBC # BLD AUTO: 3.6 K/UL (ref 4.1–11.1)

## 2019-07-26 PROCEDURE — 82784 ASSAY IGA/IGD/IGG/IGM EACH: CPT

## 2019-07-26 PROCEDURE — 85025 COMPLETE CBC W/AUTO DIFF WBC: CPT

## 2019-07-26 PROCEDURE — 77030012965 HC NDL HUBR BBMI -A

## 2019-07-26 PROCEDURE — 83883 ASSAY NEPHELOMETRY NOT SPEC: CPT

## 2019-07-26 PROCEDURE — 36591 DRAW BLOOD OFF VENOUS DEVICE: CPT

## 2019-07-26 PROCEDURE — 74011250636 HC RX REV CODE- 250/636: Performed by: INTERNAL MEDICINE

## 2019-07-26 PROCEDURE — 36415 COLL VENOUS BLD VENIPUNCTURE: CPT

## 2019-07-26 PROCEDURE — 84165 PROTEIN E-PHORESIS SERUM: CPT

## 2019-07-26 PROCEDURE — 80053 COMPREHEN METABOLIC PANEL: CPT

## 2019-07-26 RX ORDER — HEPARIN 100 UNIT/ML
500 SYRINGE INTRAVENOUS AS NEEDED
Status: DISCONTINUED | OUTPATIENT
Start: 2019-07-26 | End: 2019-07-27 | Stop reason: HOSPADM

## 2019-07-26 RX ORDER — SODIUM CHLORIDE 0.9 % (FLUSH) 0.9 %
10-40 SYRINGE (ML) INJECTION AS NEEDED
Status: DISCONTINUED | OUTPATIENT
Start: 2019-07-26 | End: 2019-07-30 | Stop reason: HOSPADM

## 2019-07-26 RX ADMIN — Medication 10 ML: at 16:03

## 2019-07-26 RX ADMIN — Medication 500 UNITS: at 16:03

## 2019-07-26 NOTE — TELEPHONE ENCOUNTER
Myke Drake from [address] [facility] called to advise that pt has been taking pomalyst as prescribed and he is still having fevers, she wanted this office to be aware. This nurse called pt and he said he did not have a fever today, it was 97.3. He has one more dose of levaquin to take. He will check his temperature over the weekend and will let our office know on Monday the readings. He is aware that he will need to get into the BMT office sooner than already scheduled appt. Due to fevers of unknown origin. He stated understanding. Also pt wanted to make sure we send over his lab results to BMT.

## 2019-07-26 NOTE — PROGRESS NOTES
[facility] Visit Note    3323 Pt arrived at [facility] ambulatory and in no distress for port flush/ labs. Assessment completed, pt reports he has pneumonia and has been taking antibiotic. Reports low grade fevers off and on over the past week, including night sweats. Port accessed by Nikunj Keller RN and labs drawn per order. Port flushed with NS and heparin and needle removed. Visit Vitals  /81   Pulse 89   Temp 99.9 °F (37.7 °C)   Resp 18   SpO2 99%       1540 Tolerated treatment well, no adverse reaction noted. D/Cd from [facility] ambulatory and in no distress accompanied by wife.   Next appt 8/23/19    See [facility] for results

## 2019-07-27 ENCOUNTER — APPOINTMENT (OUTPATIENT)
Dept: GENERAL RADIOLOGY | Age: 64
End: 2019-07-27
Attending: EMERGENCY MEDICINE
Payer: COMMERCIAL

## 2019-07-27 ENCOUNTER — HOSPITAL ENCOUNTER (EMERGENCY)
Age: 64
Discharge: HOME | End: 2019-07-28
Attending: EMERGENCY MEDICINE
Payer: COMMERCIAL

## 2019-07-27 DIAGNOSIS — R50.9 FEVER, UNSPECIFIED FEVER CAUSE: Primary | ICD-10-CM

## 2019-07-27 PROCEDURE — 99284 EMERGENCY DEPT VISIT MOD MDM: CPT

## 2019-07-27 PROCEDURE — 96361 HYDRATE IV INFUSION ADD-ON: CPT

## 2019-07-27 PROCEDURE — 71046 X-RAY EXAM CHEST 2 VIEWS: CPT

## 2019-07-27 PROCEDURE — 80053 COMPREHEN METABOLIC PANEL: CPT

## 2019-07-27 PROCEDURE — 77030003560 HC NDL HUBR BARD -A

## 2019-07-27 PROCEDURE — 96360 HYDRATION IV INFUSION INIT: CPT

## 2019-07-27 PROCEDURE — 36415 COLL VENOUS BLD VENIPUNCTURE: CPT

## 2019-07-27 PROCEDURE — 87040 BLOOD CULTURE FOR BACTERIA: CPT

## 2019-07-27 PROCEDURE — 83605 ASSAY OF LACTIC ACID: CPT

## 2019-07-27 PROCEDURE — 85025 COMPLETE CBC W/AUTO DIFF WBC: CPT

## 2019-07-28 VITALS
HEIGHT: 68 IN | HEART RATE: 86 BPM | SYSTOLIC BLOOD PRESSURE: 122 MMHG | WEIGHT: 185.41 LBS | OXYGEN SATURATION: 99 % | TEMPERATURE: 98.4 F | BODY MASS INDEX: 28.1 KG/M2 | DIASTOLIC BLOOD PRESSURE: 77 MMHG | RESPIRATION RATE: 18 BRPM

## 2019-07-28 LAB
ALBUMIN SERPL-MCNC: 3.1 G/DL (ref 3.5–5)
ALBUMIN/GLOB SERPL: 0.7 {RATIO} (ref 1.1–2.2)
ALP SERPL-CCNC: 137 U/L (ref 45–117)
ALT SERPL-CCNC: 43 U/L (ref 12–78)
ANION GAP SERPL CALC-SCNC: 6 MMOL/L (ref 5–15)
AST SERPL-CCNC: 29 U/L (ref 15–37)
BASOPHILS # BLD: 0 K/UL (ref 0–0.1)
BASOPHILS NFR BLD: 1 % (ref 0–1)
BILIRUB SERPL-MCNC: 0.2 MG/DL (ref 0.2–1)
BUN SERPL-MCNC: 16 MG/DL (ref 6–20)
BUN/CREAT SERPL: 11 (ref 12–20)
CALCIUM SERPL-MCNC: 8.5 MG/DL (ref 8.5–10.1)
CHLORIDE SERPL-SCNC: 107 MMOL/L (ref 97–108)
CO2 SERPL-SCNC: 26 MMOL/L (ref 21–32)
CREAT SERPL-MCNC: 1.42 MG/DL (ref 0.7–1.3)
DIFFERENTIAL METHOD BLD: ABNORMAL
EOSINOPHIL # BLD: 0.3 K/UL (ref 0–0.4)
EOSINOPHIL NFR BLD: 6 % (ref 0–7)
ERYTHROCYTE [DISTWIDTH] IN BLOOD BY AUTOMATED COUNT: 14.6 % (ref 11.5–14.5)
GLOBULIN SER CALC-MCNC: 4.3 G/DL (ref 2–4)
GLUCOSE SERPL-MCNC: 79 MG/DL (ref 65–100)
HCT VFR BLD AUTO: 34.1 % (ref 36.6–50.3)
HGB BLD-MCNC: 10.6 G/DL (ref 12.1–17)
IMM GRANULOCYTES # BLD AUTO: 0 K/UL (ref 0–0.04)
IMM GRANULOCYTES NFR BLD AUTO: 0 % (ref 0–0.5)
LACTATE SERPL-SCNC: 1 MMOL/L (ref 0.4–2)
LYMPHOCYTES # BLD: 1.1 K/UL (ref 0.8–3.5)
LYMPHOCYTES NFR BLD: 27 % (ref 12–49)
MCH RBC QN AUTO: 28.3 PG (ref 26–34)
MCHC RBC AUTO-ENTMCNC: 31.1 G/DL (ref 30–36.5)
MCV RBC AUTO: 91.2 FL (ref 80–99)
MONOCYTES # BLD: 0.8 K/UL (ref 0–1)
MONOCYTES NFR BLD: 20 % (ref 5–13)
NEUTS SEG # BLD: 2 K/UL (ref 1.8–8)
NEUTS SEG NFR BLD: 46 % (ref 32–75)
NRBC # BLD: 0 K/UL (ref 0–0.01)
NRBC BLD-RTO: 0 PER 100 WBC
PLATELET # BLD AUTO: 210 K/UL (ref 150–400)
PMV BLD AUTO: 10 FL (ref 8.9–12.9)
POTASSIUM SERPL-SCNC: 4 MMOL/L (ref 3.5–5.1)
PROT SERPL-MCNC: 7.4 G/DL (ref 6.4–8.2)
RBC # BLD AUTO: 3.74 M/UL (ref 4.1–5.7)
RBC MORPH BLD: ABNORMAL
SODIUM SERPL-SCNC: 139 MMOL/L (ref 136–145)
WBC # BLD AUTO: 4.2 K/UL (ref 4.1–11.1)

## 2019-07-28 PROCEDURE — 96361 HYDRATE IV INFUSION ADD-ON: CPT

## 2019-07-28 PROCEDURE — 74011250636 HC RX REV CODE- 250/636: Performed by: EMERGENCY MEDICINE

## 2019-07-28 PROCEDURE — 96360 HYDRATION IV INFUSION INIT: CPT

## 2019-07-28 RX ORDER — HEPARIN 100 UNIT/ML
300 SYRINGE INTRAVENOUS
Status: COMPLETED | OUTPATIENT
Start: 2019-07-28 | End: 2019-07-28

## 2019-07-28 RX ADMIN — SODIUM CHLORIDE, PRESERVATIVE FREE 300 UNITS: 5 INJECTION INTRAVENOUS at 02:14

## 2019-07-28 RX ADMIN — SODIUM CHLORIDE 1000 ML: 900 INJECTION, SOLUTION INTRAVENOUS at 00:05

## 2019-07-28 NOTE — ED NOTES
Patient discharged by Kimberley Rai MD. Patient provided with discharge instructions Rx and instructions on follow up care. Patient out of ED ambulatory accompanied by wife.

## 2019-07-28 NOTE — ED PROVIDER NOTES
EMERGENCY DEPARTMENT HISTORY AND PHYSICAL EXAM          Date: 7/27/2019  Patient Name: Vickie Maxwell. History of Presenting Illness     Chief Complaint   Patient presents with    Fever       History Provided By: Patient    HPI: Vickie Maxwell. is a 66 y.o. male, pmhx MM post BM, DM, HTN, currently on oral chemo, who presents ambulatory to the ED c/o continued fevers    Pt was here last Saturday for fevers. He notes that is has been every day for the past two weeks. Friday in the infusion center they repeated his labs and he has appt with his oncologist Monday to discuss his results. They have been monitoring his VS every day and he appeared to be getting better after they decreased his chemo med back to original dosing on the 19th and he was started on abx the 20th for PNA. His temperature has remained borderline elevated  since last Saturday. Patient specifically denies any recent fevers, chills, nausea, vomiting, sore throat, ear ache, diarrhea, abd pain, CP, palpitations, SOB, urinary sxs, changes in BM, or headache and notes he has a normal appetite. PCP: Britta Taveras MD   OncEster Speed    Allergies: NKDA  Social Hx: -tobacco, -EtOH,     There are no other complaints, changes, or physical findings at this time. Current Outpatient Medications   Medication Sig Dispense Refill    levoFLOXacin (LEVAQUIN) 500 mg tablet Take 1 Tab by mouth daily. 7 Tab 0    insulin lispro protamine/insulin lispro (HUMALOG 50-50 MIX) 100 unit/mL (50-50) flexpen by SubCUTAneous route.  prochlorperazine (COMPAZINE) 10 mg tablet Take 5 mg by mouth every six (6) hours as needed.  pomalidomide (POMALYST) 2 mg cap Take 1 Cap by mouth daily. 28 Cap 0    metFORMIN ER (GLUCOPHAGE XR) 500 mg tablet Take 1 Tab by mouth two (2) times a day. 180 Tab 3    omeprazole (PRILOSEC) 20 mg capsule Take 20 mg by mouth daily as needed (acid reflux).       ondansetron (ZOFRAN ODT) 8 mg disintegrating tablet Take 8 mg by mouth every eight (8) hours as needed for Nausea.  buPROPion XL (WELLBUTRIN XL) 150 mg tablet Take 300 mg by mouth every morning.  ixazomib 4 mg cap Take 4 mg by mouth every seven (7) days. Patient takes 4 mg every Tuesdays on week 1-3 of cycle      tenofovir DISOPROXIL FUMARATE (VIREAD) 300 mg tablet Take 300 mg by mouth daily.  ergocalciferol (ERGOCALCIFEROL) 50,000 unit capsule TAKE 1 CAP BY MOUTH EVERY 7 DAYS, Mondays  4    lisinopril (PRINIVIL, ZESTRIL) 10 mg tablet TAKE 1 TABLET BY MOUTH EVERY DAY as needed. Hold if SBP <753  11    folic acid (FOLVITE) 1 mg tablet TAKE 1 TABLET BY MOUTH EVERY DAY  4    multivitamin (ONE A DAY) tablet Take 1 Tab by mouth daily.  tamsulosin (FLOMAX) 0.4 mg capsule Take 1 Cap by mouth daily. 30 Cap 1    zolpidem CR (AMBIEN CR) 12.5 mg tablet Take 12.5 mg by mouth nightly.  tapentadol (NUCYNTA) 100 mg tablet Take 200 mg by mouth three (3) times daily. At 7a, 3p, 11pm      aripiprazole (ABILIFY) 5 mg tablet Take 2.5 mg by mouth every morning.        Facility-Administered Medications Ordered in Other Encounters   Medication Dose Route Frequency Provider Last Rate Last Dose    sodium chloride (NS) flush 10-40 mL  10-40 mL IntraVENous PRN Vickie Hicks MD   10 mL at 07/26/19 1603       Past History     Past Medical History:  Past Medical History:   Diagnosis Date    Acid reflux     Arrhythmia     pvc's    Chronic pain     neck    Depression     Diabetes ([address]     Femoral hernia 7/11/2012    Hepatitis C     Hypertension     Inguinal hernia 7/11/2012    Liver disease     hepatitis c    Multiple myeloma ([address]     Other ill-defined conditions(799.89)     Hx of PVCs since 2009    Prostatitis, acute     Psychiatric disorder     depression       Past Surgical History:  Past Surgical History:   Procedure Laterality Date    COLONOSCOPY N/A 9/20/2016    COL[facility]OSCOPY performed [facility] at [facility] ENDOSCOPY    HX APPENDECTOMY      HX CERVICAL FUSION  2008    x 1 as of 2014    HX HEENT      foreign body removed from right eye    HX HERNIA REPAIR  2012    [facility]      HX ORTHOPAEDIC      cervical fusion    HX OTHER SURGICAL  2000    liver bx - chronic hepatitis       Family History:  Family History   Problem Relation Age of Onset    Arthritis-osteo Mother     Arthritis-osteo Father     Diabetes Father     Hypertension Maternal Grandmother     Diabetes Maternal Grandmother     Hypertension Maternal Grandfather     Diabetes Maternal Grandfather        Social History:  Social History     Tobacco Use    Smoking status: Former Smoker     Packs/day: 1.00     Years: 12.00     Pack years: 12.00     Types: Cigarettes     Last attempt to quit: 1989     Years since quittin.5    Smokeless tobacco: Never Used    Tobacco comment: former cigarette smoker   Substance Use Topics    Alcohol use: No     Comment: former alcoholic-quit 7991    Drug use: No       Allergies: Allergies   Allergen Reactions    Mercury ([place]) Hives     Blisters           Review of Systems   Review of Systems   Constitutional: Positive for fever. Negative for activity change, appetite change, chills and unexpected weight change. HENT: Negative for congestion. Eyes: Negative for pain and visual disturbance. Respiratory: Negative for cough and shortness of breath. Cardiovascular: Negative for chest pain. Gastrointestinal: Negative for abdominal pain, diarrhea, nausea and vomiting. Genitourinary: Negative for dysuria. Musculoskeletal: Positive for myalgias. Negative for back pain. Skin: Negative for rash. Neurological: Negative for headaches. Physical Exam   Physical Exam   Constitutional: He is oriented to person, place, and time. He appears well-developed and well-nourished. Middle aged male appearing in mild distress. HENT:   Head: Normocephalic and atraumatic.    Mouth/Throat: Oropharynx is clear and moist. Eyes: Pupils are equal, round, and reactive to light. Conjunctivae and EOM are normal. Right eye exhibits no discharge. Left eye exhibits no discharge. Neck: Normal range of motion. Neck supple. Cardiovascular: Regular rhythm and normal heart sounds. No murmur heard. Borderline tachycardia   Pulmonary/Chest: Effort normal and breath sounds normal. No respiratory distress. He has no wheezes. He has no rales. Abdominal: Soft. Bowel sounds are normal. He exhibits no distension. There is no tenderness. Musculoskeletal: Normal range of motion. He exhibits no edema. Neurological: He is alert and oriented to person, place, and time. No cranial nerve deficit. He exhibits normal muscle tone. Skin: Skin is warm and dry. No rash noted. He is not diaphoretic. Nursing note and vitals reviewed. Diagnostic Study Results     Labs -     Recent Results (from the past 12 hour(s))   CBC WITH AUTOMATED DIFF    Collection Time: 07/27/19 11:42 PM   Result Value Ref Range    WBC 4.2 4.1 - 11.1 K/uL    RBC 3.74 (L) 4.10 - 5.70 M/uL    HGB 10.6 (L) 12.1 - 17.0 g/dL    HCT 34.1 (L) 36.6 - 50.3 %    MCV 91.2 80.0 - 99.0 FL    MCH 28.3 26.0 - 34.0 PG    MCHC 31.1 30.0 - 36.5 g/dL    RDW 14.6 (H) 11.5 - 14.5 %    PLATELET 653 554 - 374 K/uL    MPV 10.0 8.9 - 12.9 FL    NRBC 0.0 0  WBC    ABSOLUTE NRBC 0.00 0.00 - 0.01 K/uL    NEUTROPHILS 46 32 - 75 %    LYMPHOCYTES 27 12 - 49 %    MONOCYTES 20 (H) 5 - 13 %    EOSINOPHILS 6 0 - 7 %    BASOPHILS 1 0 - 1 %    IMMATURE GRANULOCYTES 0 0.0 - 0.5 %    ABS. NEUTROPHILS 2.0 1.8 - 8.0 K/UL    ABS. LYMPHOCYTES 1.1 0.8 - 3.5 K/UL    ABS. MONOCYTES 0.8 0.0 - 1.0 K/UL    ABS. EOSINOPHILS 0.3 0.0 - 0.4 K/UL    ABS. BASOPHILS 0.0 0.0 - 0.1 K/UL    ABS. IMM.  GRANS. 0.0 0.00 - 0.04 K/UL    DF SMEAR SCANNED      RBC COMMENTS ANISOCYTOSIS  1+       METABOLIC PANEL, COMPREHENSIVE    Collection Time: 07/27/19 11:42 PM   Result Value Ref Range    Sodium 139 136 - 145 mmol/L    Potassium 4.0 3.5 - 5.1 mmol/L    Chloride 107 97 - 108 mmol/L    CO2 26 21 - 32 mmol/L    Anion gap 6 5 - 15 mmol/L    Glucose 79 65 - 100 mg/dL    BUN 16 6 - 20 MG/DL    Creatinine 1.42 (H) 0.70 - 1.30 MG/DL    BUN/Creatinine ratio 11 (L) 12 - 20      GFR est AA >60 >60 ml/min/1.73m2    GFR est non-AA 51 (L) >60 ml/min/1.73m2    Calcium 8.5 8.5 - 10.1 MG/DL    Bilirubin, total 0.2 0.2 - 1.0 MG/DL    ALT (SGPT) 43 12 - 78 U/L    AST (SGOT) 29 15 - 37 U/L    Alk. phosphatase 137 (H) 45 - 117 U/L    Protein, total 7.4 6.4 - 8.2 g/dL    Albumin 3.1 (L) 3.5 - 5.0 g/dL    Globulin 4.3 (H) 2.0 - 4.0 g/dL    A-G Ratio 0.7 (L) 1.1 - 2.2     LACTIC ACID    Collection Time: 07/27/19 11:42 PM   Result Value Ref Range    Lactic acid 1.0 0.4 - 2.0 MMOL/L       Radiologic Studies -   XR CHEST PA LAT   Final Result   IMPRESSION: No acute disease. No significant interval change. CT Results  (Last 48 hours)    None        CXR Results  (Last 48 hours)               07/27/19 2249  XR CHEST PA LAT Final result    Impression:  IMPRESSION: No acute disease. No significant interval change. Narrative:  INDICATION: cough, fever       EXAM: CXR 2 Views. COMPARISON: 7/20/2019. FINDINGS: Frontal and lateral views of the chest show the lungs are free of   acute disease. Heart size is normal. There is no overt pulmonary edema. There is   no evident pneumothorax, adenopathy or pleural effusion. Port catheter remains. Medical Decision Making   I am the first provider for this patient. I reviewed the vital signs, available nursing notes, past medical history, past surgical history, family history and social history. Vital Signs-Reviewed the patient's vital signs.   Patient Vitals for the past 12 hrs:   Temp Pulse Resp BP SpO2   07/28/19 0130  86  122/77 99 %   07/28/19 0115  85  129/77 100 %   07/28/19 0100  84  127/78 100 %   07/28/19 0045  85  137/82 99 %   07/28/19 0030  93  136/84 99 % 07/28/19 0015  83  128/83 98 %   07/27/19 2114 98.4 °F (36.9 °C) (!) 101 18 141/89 98 %       Pulse Oximetry Analysis - 99% on RA    Cardiac Monitor:   Rate: 102bpm  Rhythm: Sinus Tachycardia      Records Reviewed: Nursing Notes, Old Medical Records, Previous Radiology Studies and Previous Laboratory Studies    Provider Notes (Medical Decision Making):   MDM: Middle-aged male history of multiple myeloma and persistent elevated temperature over the past week despite antibiotic treatment for pneumonia. Will discuss with oncology for further recommendations regarding inpatient versus further outpatient management. ED Course:   Initial assessment performed. The patients presenting problems have been discussed, and they are in agreement with the care plan formulated and outlined with them. I have encouraged them to ask questions as they arise throughout their visit. PROGRESS NOTE:  CONSULT NOTE:   4:34 AM  Fabby Matute MD spoke with Efren Olivas,   Specialty: Oncology  Discussed pt's hx, disposition, and available diagnostic and imaging results. Reviewed care plans. Consultant agrees with plans as outlined. He feels that based on our discussion the patient sounds stable to be followed as an outpatient as already scheduled. 1:30 AM  Pt doing well. His heart rate has decreased to 86 and he remains afebrile with normal blood pressure. Discussed with him recommendations for oncology follow-up Monday as already scheduled with Dr. Kiara Kaba    Discharge note:  1:30 AM  Pt re-evaluated and noted to be feeling better, ready for discharge. Updated pt and family on all final lab and radiology findings. Will follow up as instructed. All questions have been answered, pt voiced understanding and agreement with plan. Specific return precautions provided as well as instructions to return to the ED should sx worsen at any time. Vital signs stable for discharge.        Critical Care Time:   0      Diagnosis Clinical Impression:   1. Fever, unspecified fever cause        PLAN:  1. Discharge Medication List as of 7/28/2019  1:00 AM        2. Follow-up Information     Follow up With Specialties Details Why Contact Info    Shashank Pendleton MD Hematology and Oncology, Internal Medicine, Hematology, Oncology  Monday as scheduled [address]  [address] [phone] [facility] EMERGENCY DEPT Emergency Medicine  If symptoms worsen [address]  [address]  [phone]        Return to ED if worse     Disposition:  home      Please note, this dictation was completed with [employer] Touch, the [employer] voice recognition software. Quite often unanticipated grammatical, syntax, homophones, and other interpretive errors are inadvertently transcribed by the computer software. Please disregard these errors. Please excuse any errors that have escaped final proof reading.

## 2019-07-28 NOTE — DISCHARGE INSTRUCTIONS
You were seen in the ER for persistently elevated temperature over the past 2 weeks. Lab results have been reviewed and discussed with oncology feel at this time you are safe to go home. Please follow-up on Monday as previously scheduled in oncology with Dr. Chetan Malhotra. If you have elevated temperature greater than 101.0, vomiting, weakness or other concerns please return to the emergency room for repeat evaluation.

## 2019-07-28 NOTE — ED TRIAGE NOTES
Pt having fever and chills for 2 weeks, been taking tylenol and motrin at home. Pt reports fever was up to 100.8. Pt reports is taking chemo starting August 2018. Denies CP/SOB, N/V/D.

## 2019-07-28 NOTE — ED TRIAGE NOTES
Pt states he has had fever for 2 wks and was seen last Saturday and diagnosed with pneumonia and has been on antibiotics but still has fever and body aches.

## 2019-07-29 ENCOUNTER — TELEPHONE (OUTPATIENT)
Dept: ONCOLOGY | Age: 64
End: 2019-07-29

## 2019-07-29 LAB
ALBUMIN SERPL ELPH-MCNC: 3.1 G/DL (ref 2.9–4.4)
ALBUMIN/GLOB SERPL: 0.9 {RATIO} (ref 0.7–1.7)
ALPHA1 GLOB SERPL ELPH-MCNC: 0.3 G/DL (ref 0–0.4)
ALPHA2 GLOB SERPL ELPH-MCNC: 1.4 G/DL (ref 0.4–1)
B-GLOBULIN SERPL ELPH-MCNC: 0.9 G/DL (ref 0.7–1.3)
GAMMA GLOB SERPL ELPH-MCNC: 1.1 G/DL (ref 0.4–1.8)
GLOBULIN SER CALC-MCNC: 3.6 G/DL (ref 2.2–3.9)
IGA SERPL-MCNC: 264 MG/DL (ref 61–437)
IGG SERPL-MCNC: 996 MG/DL (ref 700–1600)
IGM SERPL-MCNC: 39 MG/DL (ref 20–172)
KAPPA LC FREE SER-MCNC: 43 MG/L (ref 3.3–19.4)
KAPPA LC FREE/LAMBDA FREE SER: 1.07 {RATIO} (ref 0.26–1.65)
LAMBDA LC FREE SERPL-MCNC: 40.2 MG/L (ref 5.7–26.3)
M PROTEIN SERPL ELPH-MCNC: ABNORMAL G/DL
PROT PATTERN SERPL IFE-IMP: NORMAL
PROT SERPL-MCNC: 6.7 G/DL (ref 6–8.5)

## 2019-07-29 NOTE — TELEPHONE ENCOUNTER
Case d/w MD. Pt needs to see [facility] ASAP. I called pt back and discussed this with pt. I informed pt he has to make an appt ASAP, which he said he will call now to do so  I called to inform with BMT they received faxed lab results which they have. Case Page is who I spoke with who is aware pt needs an appt. ASAP. I will send over last office visit note also. Pt is aware to let this office know when he gets an appt. Scheduled.

## 2019-07-29 NOTE — TELEPHONE ENCOUNTER
Patient contacted the office requesting to speak with Marely about labs he would like results to. Patient stated he was in the ED this past weekend and would like to know if he needs a follow up appointment.     Best contact is [phone]

## 2019-08-02 LAB
BACTERIA SPEC CULT: NORMAL
SERVICE CMNT-IMP: NORMAL

## 2019-08-05 ENCOUNTER — HOSPITAL ENCOUNTER (EMERGENCY)
Age: 64
Discharge: HOME | End: 2019-08-06
Attending: EMERGENCY MEDICINE | Admitting: EMERGENCY MEDICINE
Payer: COMMERCIAL

## 2019-08-05 DIAGNOSIS — R06.00 DYSPNEA, UNSPECIFIED TYPE: Primary | ICD-10-CM

## 2019-08-05 DIAGNOSIS — N28.9 RENAL INSUFFICIENCY: ICD-10-CM

## 2019-08-05 LAB
BASOPHILS # BLD: 0.1 K/UL (ref 0–0.1)
BASOPHILS NFR BLD: 1 % (ref 0–1)
DIFFERENTIAL METHOD BLD: ABNORMAL
EOSINOPHIL # BLD: 0.1 K/UL (ref 0–0.4)
EOSINOPHIL NFR BLD: 2 % (ref 0–7)
ERYTHROCYTE [DISTWIDTH] IN BLOOD BY AUTOMATED COUNT: 14.6 % (ref 11.5–14.5)
HCT VFR BLD AUTO: 35.7 % (ref 36.6–50.3)
HGB BLD-MCNC: 11.7 G/DL (ref 12.1–17)
IMM GRANULOCYTES # BLD AUTO: 0 K/UL (ref 0–0.04)
IMM GRANULOCYTES NFR BLD AUTO: 0 % (ref 0–0.5)
LYMPHOCYTES # BLD: 1.4 K/UL (ref 0.8–3.5)
LYMPHOCYTES NFR BLD: 33 % (ref 12–49)
MCH RBC QN AUTO: 28.7 PG (ref 26–34)
MCHC RBC AUTO-ENTMCNC: 32.8 G/DL (ref 30–36.5)
MCV RBC AUTO: 87.7 FL (ref 80–99)
MONOCYTES # BLD: 0.8 K/UL (ref 0–1)
MONOCYTES NFR BLD: 19 % (ref 5–13)
NEUTS SEG # BLD: 1.9 K/UL (ref 1.8–8)
NEUTS SEG NFR BLD: 45 % (ref 32–75)
NRBC # BLD: 0 K/UL (ref 0–0.01)
NRBC BLD-RTO: 0 PER 100 WBC
PLATELET # BLD AUTO: 254 K/UL (ref 150–400)
PMV BLD AUTO: 9.3 FL (ref 8.9–12.9)
RBC # BLD AUTO: 4.07 M/UL (ref 4.1–5.7)
WBC # BLD AUTO: 4.3 K/UL (ref 4.1–11.1)

## 2019-08-05 PROCEDURE — 93005 ELECTROCARDIOGRAM TRACING: CPT

## 2019-08-05 PROCEDURE — 99284 EMERGENCY DEPT VISIT MOD MDM: CPT

## 2019-08-05 PROCEDURE — 36415 COLL VENOUS BLD VENIPUNCTURE: CPT

## 2019-08-05 PROCEDURE — 80053 COMPREHEN METABOLIC PANEL: CPT

## 2019-08-05 PROCEDURE — 96360 HYDRATION IV INFUSION INIT: CPT

## 2019-08-05 PROCEDURE — 85025 COMPLETE CBC W/AUTO DIFF WBC: CPT

## 2019-08-05 PROCEDURE — 81001 URINALYSIS AUTO W/SCOPE: CPT

## 2019-08-05 PROCEDURE — 96361 HYDRATE IV INFUSION ADD-ON: CPT

## 2019-08-06 ENCOUNTER — APPOINTMENT (OUTPATIENT)
Dept: CT IMAGING | Age: 64
End: 2019-08-06
Attending: EMERGENCY MEDICINE
Payer: COMMERCIAL

## 2019-08-06 VITALS
DIASTOLIC BLOOD PRESSURE: 72 MMHG | RESPIRATION RATE: 21 BRPM | SYSTOLIC BLOOD PRESSURE: 134 MMHG | HEIGHT: 68 IN | HEART RATE: 97 BPM | WEIGHT: 185.63 LBS | TEMPERATURE: 99 F | OXYGEN SATURATION: 98 % | BODY MASS INDEX: 28.13 KG/M2

## 2019-08-06 LAB
ALBUMIN SERPL-MCNC: 3.2 G/DL (ref 3.5–5)
ALBUMIN/GLOB SERPL: 0.7 {RATIO} (ref 1.1–2.2)
ALP SERPL-CCNC: 121 U/L (ref 45–117)
ALT SERPL-CCNC: 26 U/L (ref 12–78)
ANION GAP SERPL CALC-SCNC: 6 MMOL/L (ref 5–15)
APPEARANCE UR: CLEAR
AST SERPL-CCNC: 24 U/L (ref 15–37)
ATRIAL RATE: 112 BPM
BACTERIA URNS QL MICRO: NEGATIVE /HPF
BILIRUB SERPL-MCNC: 0.3 MG/DL (ref 0.2–1)
BILIRUB UR QL: NEGATIVE
BNP SERPL-MCNC: 11 PG/ML
BUN SERPL-MCNC: 21 MG/DL (ref 6–20)
BUN/CREAT SERPL: 13 (ref 12–20)
CALCIUM SERPL-MCNC: 9 MG/DL (ref 8.5–10.1)
CALCULATED P AXIS, ECG09: 35 DEGREES
CALCULATED R AXIS, ECG10: -21 DEGREES
CALCULATED T AXIS, ECG11: 39 DEGREES
CHLORIDE SERPL-SCNC: 107 MMOL/L (ref 97–108)
CK MB CFR SERPL CALC: 2 % (ref 0–2.5)
CK MB SERPL-MCNC: 2.2 NG/ML (ref 5–25)
CK SERPL-CCNC: 112 U/L (ref 39–308)
CO2 SERPL-SCNC: 26 MMOL/L (ref 21–32)
COLOR UR: NORMAL
CREAT SERPL-MCNC: 1.56 MG/DL (ref 0.7–1.3)
D DIMER PPP FEU-MCNC: <0.19 MG/L FEU (ref 0–0.65)
DIAGNOSIS, 93000: NORMAL
EPITH CASTS URNS QL MICRO: NORMAL /LPF
GLOBULIN SER CALC-MCNC: 4.5 G/DL (ref 2–4)
GLUCOSE SERPL-MCNC: 94 MG/DL (ref 65–100)
GLUCOSE UR STRIP.AUTO-MCNC: NEGATIVE MG/DL
HGB UR QL STRIP: NEGATIVE
HYALINE CASTS URNS QL MICRO: NORMAL /LPF (ref 0–5)
KETONES UR QL STRIP.AUTO: NEGATIVE MG/DL
LEUKOCYTE ESTERASE UR QL STRIP.AUTO: NEGATIVE
NITRITE UR QL STRIP.AUTO: NEGATIVE
P-R INTERVAL, ECG05: 130 MS
PH UR STRIP: 6 [PH] (ref 5–8)
POTASSIUM SERPL-SCNC: 4.6 MMOL/L (ref 3.5–5.1)
PROT SERPL-MCNC: 7.7 G/DL (ref 6.4–8.2)
PROT UR STRIP-MCNC: NEGATIVE MG/DL
Q-T INTERVAL, ECG07: 342 MS
QRS DURATION, ECG06: 88 MS
QTC CALCULATION (BEZET), ECG08: 466 MS
RBC #/AREA URNS HPF: NORMAL /HPF (ref 0–5)
SODIUM SERPL-SCNC: 139 MMOL/L (ref 136–145)
SP GR UR REFRACTOMETRY: 1.02 (ref 1–1.03)
TROPONIN I SERPL-MCNC: <0.05 NG/ML
UA: UC IF INDICATED,UAUC: NORMAL
UROBILINOGEN UR QL STRIP.AUTO: 0.2 EU/DL (ref 0.2–1)
VENTRICULAR RATE, ECG03: 112 BPM
WBC URNS QL MICRO: NORMAL /HPF (ref 0–4)

## 2019-08-06 PROCEDURE — 85379 FIBRIN DEGRADATION QUANT: CPT

## 2019-08-06 PROCEDURE — 74011636320 HC RX REV CODE- 636/320: Performed by: EMERGENCY MEDICINE

## 2019-08-06 PROCEDURE — 83880 ASSAY OF NATRIURETIC PEPTIDE: CPT

## 2019-08-06 PROCEDURE — 84484 ASSAY OF TROPONIN QUANT: CPT

## 2019-08-06 PROCEDURE — 36415 COLL VENOUS BLD VENIPUNCTURE: CPT

## 2019-08-06 PROCEDURE — 71275 CT ANGIOGRAPHY CHEST: CPT

## 2019-08-06 PROCEDURE — 74011250636 HC RX REV CODE- 250/636: Performed by: EMERGENCY MEDICINE

## 2019-08-06 PROCEDURE — 82550 ASSAY OF CK (CPK): CPT

## 2019-08-06 RX ORDER — SODIUM CHLORIDE 0.9 % (FLUSH) 0.9 %
10 SYRINGE (ML) INJECTION
Status: COMPLETED | OUTPATIENT
Start: 2019-08-06 | End: 2019-08-06

## 2019-08-06 RX ADMIN — SODIUM CHLORIDE 1000 ML: 900 INJECTION, SOLUTION INTRAVENOUS at 01:40

## 2019-08-06 RX ADMIN — Medication 10 ML: at 02:17

## 2019-08-06 RX ADMIN — IOPAMIDOL 80 ML: 755 INJECTION, SOLUTION INTRAVENOUS at 02:18

## 2019-08-06 NOTE — ED PROVIDER NOTES
EMERGENCY DEPARTMENT HISTORY AND PHYSICAL EXAM      Date: 8/5/2019  Patient Name: Mabel Chand History of Presenting Illness     Chief Complaint   Patient presents with    Shortness of Breath       History Provided By: Patient and Patient's Wife    HPI: Mabel Chand, 66 y.o. male with PMHx significant for multiple myeloma status post bone marrow transplant in 2018) currently in remission), presents to the emergency room with chief complaint of dizziness and shortness of breath that started this evening. Patient states he has had recent diagnosis of pneumonia and was treated with Levaquin for 7 days which he completed about a week ago. Her symptoms improved but then worsened again tonight. He denies chest pain. He has no known history of coronary artery disease, congestive heart failure, but he has had arrhythmia in the past that were evaluated by Dr. Pedro Ulloa. He denies leg swelling, recent travel. He is not currently on any blood thinners. He denies nausea, vomiting, diarrhea, urinary symptoms, cough. He has had intermittent low-grade fevers at home over the past week to 2 weeks. PCP: Lc Owens MD    No current facility-administered medications on file prior to encounter. Current Outpatient Medications on File Prior to Encounter   Medication Sig Dispense Refill    levoFLOXacin (LEVAQUIN) 500 mg tablet Take 1 Tab by mouth daily. 7 Tab 0    insulin lispro protamine/insulin lispro (HUMALOG 50-50 MIX) 100 unit/mL (50-50) flexpen by SubCUTAneous route.  prochlorperazine (COMPAZINE) 10 mg tablet Take 5 mg by mouth every six (6) hours as needed.  pomalidomide (POMALYST) 2 mg cap Take 1 Cap by mouth daily. 28 Cap 0    metFORMIN ER (GLUCOPHAGE XR) 500 mg tablet Take 1 Tab by mouth two (2) times a day. 180 Tab 3    omeprazole (PRILOSEC) 20 mg capsule Take 20 mg by mouth daily as needed (acid reflux).       ondansetron (ZOFRAN ODT) 8 mg disintegrating tablet Take 8 mg by mouth every eight (8) hours as needed for Nausea.  buPROPion XL (WELLBUTRIN XL) 150 mg tablet Take 300 mg by mouth every morning.  ixazomib 4 mg cap Take 4 mg by mouth every seven (7) days. Patient takes 4 mg every Tuesdays on week 1-3 of cycle      tenofovir DISOPROXIL FUMARATE (VIREAD) 300 mg tablet Take 300 mg by mouth daily.  ergocalciferol (ERGOCALCIFEROL) 50,000 unit capsule TAKE 1 CAP BY MOUTH EVERY 7 DAYS, Mondays  4    lisinopril (PRINIVIL, ZESTRIL) 10 mg tablet TAKE 1 TABLET BY MOUTH EVERY DAY as needed. Hold if SBP <335  11    folic acid (FOLVITE) 1 mg tablet TAKE 1 TABLET BY MOUTH EVERY DAY  4    multivitamin (ONE A DAY) tablet Take 1 Tab by mouth daily.  tamsulosin (FLOMAX) 0.4 mg capsule Take 1 Cap by mouth daily. 30 Cap 1    zolpidem CR (AMBIEN CR) 12.5 mg tablet Take 12.5 mg by mouth nightly.  tapentadol (NUCYNTA) 100 mg tablet Take 200 mg by mouth three (3) times daily. At 7a, 3p, 11pm      aripiprazole (ABILIFY) 5 mg tablet Take 2.5 mg by mouth every morning.          Past History     Past Medical History:  Past Medical History:   Diagnosis Date    Acid reflux     Arrhythmia     pvc's    Chronic pain     neck    Depression     Diabetes ([facility] [address]     Femoral hernia 7/11/2012    Hepatitis C     Hypertension     Inguinal hernia 7/11/2012    Liver disease     hepatitis c    Multiple myeloma ([facility] [address]     Other ill-defined conditions(799.89)     Hx of PVCs since 2009    Prostatitis, acute     Psychiatric disorder     depression       Past Surgical History:  Past Surgical History:   Procedure Laterality Date    COLONOSCOPY N/A 9/20/2016    COLONOSCOPY performed by Daysi Valdez MD at [facility] ENDOSCOPY    HX APPENDECTOMY      HX CERVICAL FUSION  2008    x 1 as of 4/30/2014    HX HEENT      foreign body removed from right eye    [address]  2012    [facility]      HX ORTHOPAEDIC      cervical fusion    HX OTHER SURGICAL  2000    liver bx - chronic hepatitis       Family History:  Family History   Problem Relation Age of Onset    Arthritis-osteo Mother     Arthritis-osteo Father     Diabetes Father     Hypertension Maternal Grandmother     Diabetes Maternal Grandmother     Hypertension Maternal Grandfather     Diabetes Maternal Grandfather        Social History:  Social History     Tobacco Use    Smoking status: Former Smoker     Packs/day: 1.00     Years: 12.00     Pack years: 12.00     Types: Cigarettes     Last attempt to quit: 1989     Years since quittin.6    Smokeless tobacco: Never Used    Tobacco comment: former cigarette smoker   Substance Use Topics    Alcohol use: No     Comment: former alcoholic-quit 8444    Drug use: No       Allergies: Allergies   Allergen Reactions    Mercury (Bulk) Hives     Blisters           Review of Systems   Review of Systems   Constitutional: Positive for fever. Negative for chills. HENT: Negative for congestion, ear discharge, rhinorrhea, sinus pressure and sore throat. Respiratory: Positive for shortness of breath. Negative for cough, chest tightness and wheezing. Cardiovascular: Negative for chest pain, palpitations and leg swelling. Gastrointestinal: Negative for abdominal pain, blood in stool, constipation, diarrhea, nausea and vomiting. Genitourinary: Negative for dysuria, flank pain and hematuria. Musculoskeletal: Negative for back pain, myalgias and neck pain. Skin: Negative for rash and wound. Neurological: Positive for dizziness and light-headedness. Negative for syncope, numbness and headaches. Psychiatric/Behavioral: Negative for agitation and confusion. The patient is not nervous/anxious.           Physical Exam   General appearance - well nourished, well appearing, and in no distress  Eyes - pupils equal and reactive, extraocular eye movements intact  ENT - mucous membranes moist, pharynx normal without lesions  Neck - supple, no significant adenopathy; non-tender to palpation  Chest - clear to auscultation, no wheezes, rales or rhonchi; non-tender to palpation, port right upper chest  Heart - normal rate and regular rhythm, S1 and S2 normal, no murmurs noted  Abdomen - soft, nontender, nondistended, no masses or organomegaly  Musculoskeletal - no joint tenderness, deformity or swelling; normal ROM  Extremities - peripheral pulses normal, no pedal edema  Skin - normal coloration and turgor, no rashes  Neurological - alert, oriented x3, normal speech, no focal findings or movement disorder noted    Diagnostic Study Results     Labs -     Recent Results (from the past 12 hour(s))   EKG, 12 LEAD, INITIAL    Collection Time: 08/05/19 10:01 PM   Result Value Ref Range    Ventricular Rate 112 BPM    Atrial Rate 112 BPM    P-R Interval 130 ms    QRS Duration 88 ms    Q-T Interval 342 ms    QTC Calculation (Bezet) 466 ms    Calculated P Axis 35 degrees    Calculated R Axis -21 degrees    Calculated T Axis 39 degrees    Diagnosis       Sinus tachycardia  Possible Left atrial enlargement  Left ventricular hypertrophy  When compared with ECG of 20-JUL-2019 20:04,  No significant change was found     CBC WITH AUTOMATED DIFF    Collection Time: 08/05/19 10:25 PM   Result Value Ref Range    WBC 4.3 4.1 - 11.1 K/uL    RBC 4.07 (L) 4.10 - 5.70 M/uL    HGB 11.7 (L) 12.1 - 17.0 g/dL    HCT 35.7 (L) 36.6 - 50.3 %    MCV 87.7 80.0 - 99.0 FL    MCH 28.7 26.0 - 34.0 PG    MCHC 32.8 30.0 - 36.5 g/dL    RDW 14.6 (H) 11.5 - 14.5 %    PLATELET 779 089 - 355 K/uL    MPV 9.3 8.9 - 12.9 FL    NRBC 0.0 0  WBC    ABSOLUTE NRBC 0.00 0.00 - 0.01 K/uL    NEUTROPHILS 45 32 - 75 %    LYMPHOCYTES 33 12 - 49 %    MONOCYTES 19 (H) 5 - 13 %    EOSINOPHILS 2 0 - 7 %    BASOPHILS 1 0 - 1 %    IMMATURE GRANULOCYTES 0 0.0 - 0.5 %    ABS. NEUTROPHILS 1.9 1.8 - 8.0 K/UL    ABS. LYMPHOCYTES 1.4 0.8 - 3.5 K/UL    ABS. MONOCYTES 0.8 0.0 - 1.0 K/UL    ABS. EOSINOPHILS 0.1 0.0 - 0.4 K/UL    ABS.  BASOPHILS 0.1 0.0 - 0.1 K/UL    ABS. IMM. GRANS. 0.0 0.00 - 0.04 K/UL    DF AUTOMATED     URINALYSIS W/ REFLEX CULTURE    Collection Time: 08/05/19 11:49 PM   Result Value Ref Range    Color YELLOW/STRAW      Appearance CLEAR CLEAR      Specific gravity 1.020 1.003 - 1.030      pH (UA) 6.0 5.0 - 8.0      Protein NEGATIVE  NEG mg/dL    Glucose NEGATIVE  NEG mg/dL    Ketone NEGATIVE  NEG mg/dL    Bilirubin NEGATIVE  NEG      Blood NEGATIVE  NEG      Urobilinogen 0.2 0.2 - 1.0 EU/dL    Nitrites NEGATIVE  NEG      Leukocyte Esterase NEGATIVE  NEG      WBC 0-4 0 - 4 /hpf    RBC 0-5 0 - 5 /hpf    Epithelial cells FEW FEW /lpf    Bacteria NEGATIVE  NEG /hpf    UA:UC IF INDICATED CULTURE NOT INDICATED BY UA RESULT CNI      Hyaline cast 0-2 0 - 5 /lpf   METABOLIC PANEL, COMPREHENSIVE    Collection Time: 08/05/19 11:49 PM   Result Value Ref Range    Sodium 139 136 - 145 mmol/L    Potassium 4.6 3.5 - 5.1 mmol/L    Chloride 107 97 - 108 mmol/L    CO2 26 21 - 32 mmol/L    Anion gap 6 5 - 15 mmol/L    Glucose 94 65 - 100 mg/dL    BUN 21 (H) 6 - 20 MG/DL    Creatinine 1.56 (H) 0.70 - 1.30 MG/DL    BUN/Creatinine ratio 13 12 - 20      GFR est AA 55 (L) >60 ml/min/1.73m2    GFR est non-AA 45 (L) >60 ml/min/1.73m2    Calcium 9.0 8.5 - 10.1 MG/DL    Bilirubin, total 0.3 0.2 - 1.0 MG/DL    ALT (SGPT) 26 12 - 78 U/L    AST (SGOT) 24 15 - 37 U/L    Alk.  phosphatase 121 (H) 45 - 117 U/L    Protein, total 7.7 6.4 - 8.2 g/dL    Albumin 3.2 (L) 3.5 - 5.0 g/dL    Globulin 4.5 (H) 2.0 - 4.0 g/dL    A-G Ratio 0.7 (L) 1.1 - 2.2     CK W/ CKMB & INDEX    Collection Time: 08/06/19  1:20 AM   Result Value Ref Range     39 - 308 U/L    CK - MB 2.2 <3.6 NG/ML    CK-MB Index 2.0 0.0 - 2.5     TROPONIN I    Collection Time: 08/06/19  1:20 AM   Result Value Ref Range    Troponin-I, Qt. <0.05 <0.05 ng/mL   NT-PRO BNP    Collection Time: 08/06/19  1:20 AM   Result Value Ref Range    NT pro-BNP 11 <125 PG/ML   D DIMER    Collection Time: 08/06/19  1:20 AM   Result Value Ref Range    D-dimer <0.19 0.00 - 0.65 mg/L FEU       Radiologic Studies -   CTA CHEST W OR W WO CONT   Final Result   IMPRESSION: No acute changes. CT Results  (Last 48 hours)               08/06/19 0217  CTA CHEST W OR W WO CONT Final result    Impression:  IMPRESSION: No acute changes. Narrative:  Clinical indication: Shortness of breath. Localizer obtained without contrast at the level of the pulmonary arteries. Fast   injection rate of 80 cc of Isovue-370 with review of the raw data and MIP   reconstructions, comparison chest CT July 20. CT dose reduction was achieved   through the use of a standardized protocol tailored for this examination and   automatic exposure control for dose modulation . Hershell Broach There is no pulmonary emboli. There is no pericardial pleural effusion there is   no shift or pneumothorax no adenopathy or parenchymal mass. Prominent chronic   lung parenchymal changes. CXR Results  (Last 48 hours)    None            Medical Decision Making   I am the first provider for this patient. I reviewed the vital signs, available nursing notes, past medical history, past surgical history, family history and social history. Vital Signs-Reviewed the patient's vital signs. Patient Vitals for the past 12 hrs:   Temp Pulse Resp BP SpO2   08/06/19 0100  97 21 134/72 98 %   08/05/19 2154 99 °F (37.2 °C) (!) 116 17 129/89 98 %       EKG: Sinus tachycardia, 112 bpm, normal axis, normal CA, QRS, QTc intervals, nonspecific ST changes    Records Reviewed: Nursing Notes and Old Medical Records    Provider Notes (Medical Decision Making):   Differential diagnosis: Pneumonia, congestive heart failure, pulmonary embolism    ED Course:   Initial assessment performed. The patients presenting problems have been discussed, and they are in agreement with the care plan formulated and outlined with them.   I have encouraged them to ask questions as they arise throughout their visit.    Progress Notes:   Patient feeling better in the ED. CTA of chest is negative for PE. Stable for discharge. Will follow up with heme-onc specialist    Disposition:  Discharge home    PLAN:  1. Discharge Medication List as of 8/6/2019  4:34 AM        2. Follow-up Information     Follow up With Specialties Details Why Contact Info    [facility] EMERGENCY DEPT Emergency Medicine  If symptoms worsen [address] [place] [address]  [phone]    Chino Simpson MD Hematology and Oncology, Internal Medicine, Hematology, Oncology Call  [address]  [address]  [address] [phone]          Return to ED if worse     Diagnosis     Clinical Impression:   1. Dyspnea, unspecified type    2.  Renal insufficiency

## 2019-08-06 NOTE — ED NOTES
Pt provided with discharge paperwork by provider. Pt verbalized understanding of discharge paperwork and follow up information. Iv removed as noted. Pt ambulated out of ed without assistance.

## 2019-08-06 NOTE — ED NOTES
Report given to Duc Pichardo RN to assume care. Patient resting comfortably, no acute needs. VSS. Call light in reach.

## 2019-08-06 NOTE — ED TRIAGE NOTES
Patient c/o fevers at home and dyspnea x1 week. He was recently treated for pneumonia with levaquin but has been having fevers still. Hx multiple myeloma. Patient denies nausea, vomiting, diarrhea.

## 2019-08-06 NOTE — ED NOTES
Bedside report received from kelly downey. Plan of care discussed and all questions answered. Pt currently at ct at this time.

## 2019-08-06 NOTE — ED TRIAGE NOTES
Pt states he has had sob for over a wk and was treated and released from ED last Saturday but not any better. Pt also states his hr is fast at 108.

## 2019-08-06 NOTE — DISCHARGE INSTRUCTIONS

## 2019-08-14 ENCOUNTER — APPOINTMENT (OUTPATIENT)
Dept: INFUSION THERAPY | Age: 64
End: 2019-08-14
Payer: COMMERCIAL

## 2019-08-16 DIAGNOSIS — C90.00 MULTIPLE MYELOMA, REMISSION STATUS UNSPECIFIED (HCC): ICD-10-CM

## 2019-08-16 NOTE — TELEPHONE ENCOUNTER
VORB FROM DR Reta Nagel POMALYST 2MG TAKE ONE TAB BY MOUTH DAILY DISPENSE 2011 [facility] NUMBER IS [id number]

## 2019-08-19 ENCOUNTER — APPOINTMENT (OUTPATIENT)
Dept: INFUSION THERAPY | Age: 64
End: 2019-08-19
Payer: COMMERCIAL

## 2019-08-23 ENCOUNTER — HOSPITAL ENCOUNTER (OUTPATIENT)
Dept: INFUSION THERAPY | Age: 64
Discharge: HOME | End: 2019-08-23
Payer: COMMERCIAL

## 2019-08-23 VITALS
SYSTOLIC BLOOD PRESSURE: 107 MMHG | TEMPERATURE: 98.2 F | HEART RATE: 107 BPM | DIASTOLIC BLOOD PRESSURE: 75 MMHG | RESPIRATION RATE: 18 BRPM | OXYGEN SATURATION: 97 %

## 2019-08-23 LAB
ALBUMIN SERPL-MCNC: 3.5 G/DL (ref 3.5–5)
ALBUMIN/GLOB SERPL: 0.9 {RATIO} (ref 1.1–2.2)
ALP SERPL-CCNC: 96 U/L (ref 45–117)
ALT SERPL-CCNC: 24 U/L (ref 12–78)
ANION GAP SERPL CALC-SCNC: 7 MMOL/L (ref 5–15)
AST SERPL-CCNC: 17 U/L (ref 15–37)
BASOPHILS # BLD: 0 K/UL (ref 0–0.1)
BASOPHILS NFR BLD: 1 % (ref 0–1)
BILIRUB SERPL-MCNC: 0.3 MG/DL (ref 0.2–1)
BUN SERPL-MCNC: 18 MG/DL (ref 6–20)
BUN/CREAT SERPL: 12 (ref 12–20)
CALCIUM SERPL-MCNC: 9.1 MG/DL (ref 8.5–10.1)
CHLORIDE SERPL-SCNC: 108 MMOL/L (ref 97–108)
CO2 SERPL-SCNC: 27 MMOL/L (ref 21–32)
CREAT SERPL-MCNC: 1.45 MG/DL (ref 0.7–1.3)
DIFFERENTIAL METHOD BLD: ABNORMAL
EOSINOPHIL # BLD: 0.1 K/UL (ref 0–0.4)
EOSINOPHIL NFR BLD: 2 % (ref 0–7)
ERYTHROCYTE [DISTWIDTH] IN BLOOD BY AUTOMATED COUNT: 15.5 % (ref 11.5–14.5)
GLOBULIN SER CALC-MCNC: 4.1 G/DL (ref 2–4)
GLUCOSE SERPL-MCNC: 101 MG/DL (ref 65–100)
HCT VFR BLD AUTO: 35.7 % (ref 36.6–50.3)
HGB BLD-MCNC: 11 G/DL (ref 12.1–17)
IGA SERPL-MCNC: 273 MG/DL (ref 70–400)
IGG SERPL-MCNC: 1160 MG/DL (ref 700–1600)
IGM SERPL-MCNC: 106 MG/DL (ref 40–230)
IMM GRANULOCYTES # BLD AUTO: 0 K/UL (ref 0–0.04)
IMM GRANULOCYTES NFR BLD AUTO: 0 % (ref 0–0.5)
LYMPHOCYTES # BLD: 1.3 K/UL (ref 0.8–3.5)
LYMPHOCYTES NFR BLD: 33 % (ref 12–49)
MCH RBC QN AUTO: 27.7 PG (ref 26–34)
MCHC RBC AUTO-ENTMCNC: 30.8 G/DL (ref 30–36.5)
MCV RBC AUTO: 89.9 FL (ref 80–99)
MONOCYTES # BLD: 0.6 K/UL (ref 0–1)
MONOCYTES NFR BLD: 16 % (ref 5–13)
NEUTS SEG # BLD: 1.8 K/UL (ref 1.8–8)
NEUTS SEG NFR BLD: 48 % (ref 32–75)
NRBC # BLD: 0 K/UL (ref 0–0.01)
NRBC BLD-RTO: 0 PER 100 WBC
PLATELET # BLD AUTO: 158 K/UL (ref 150–400)
PMV BLD AUTO: 10.1 FL (ref 8.9–12.9)
POTASSIUM SERPL-SCNC: 3.9 MMOL/L (ref 3.5–5.1)
PROT SERPL-MCNC: 7.6 G/DL (ref 6.4–8.2)
RBC # BLD AUTO: 3.97 M/UL (ref 4.1–5.7)
RBC MORPH BLD: ABNORMAL
SODIUM SERPL-SCNC: 142 MMOL/L (ref 136–145)
WBC # BLD AUTO: 3.8 K/UL (ref 4.1–11.1)

## 2019-08-23 PROCEDURE — 96523 IRRIG DRUG DELIVERY DEVICE: CPT

## 2019-08-23 PROCEDURE — 82784 ASSAY IGA/IGD/IGG/IGM EACH: CPT

## 2019-08-23 PROCEDURE — 84165 PROTEIN E-PHORESIS SERUM: CPT

## 2019-08-23 PROCEDURE — 74011250636 HC RX REV CODE- 250/636: Performed by: INTERNAL MEDICINE

## 2019-08-23 PROCEDURE — 36415 COLL VENOUS BLD VENIPUNCTURE: CPT

## 2019-08-23 PROCEDURE — 83883 ASSAY NEPHELOMETRY NOT SPEC: CPT

## 2019-08-23 PROCEDURE — 85025 COMPLETE CBC W/AUTO DIFF WBC: CPT

## 2019-08-23 PROCEDURE — 80053 COMPREHEN METABOLIC PANEL: CPT

## 2019-08-23 RX ORDER — SODIUM CHLORIDE 0.9 % (FLUSH) 0.9 %
10-40 SYRINGE (ML) INJECTION AS NEEDED
Status: DISCONTINUED | OUTPATIENT
Start: 2019-08-23 | End: 2019-08-27 | Stop reason: HOSPADM

## 2019-08-23 RX ORDER — HEPARIN 100 UNIT/ML
500 SYRINGE INTRAVENOUS AS NEEDED
Status: DISCONTINUED | OUTPATIENT
Start: 2019-08-23 | End: 2019-08-24 | Stop reason: HOSPADM

## 2019-08-23 RX ADMIN — Medication 30 ML: at 15:52

## 2019-08-23 RX ADMIN — HEPARIN SODIUM (PORCINE) LOCK FLUSH IV SOLN 100 UNIT/ML 500 UNITS: 100 SOLUTION at 15:53

## 2019-08-23 NOTE — PROGRESS NOTES
[address] [facility] Visit Note    8431 Pt arrived at [facility] ambulatory and in no distress for port flush/ labs. Assessment completed, no new complaints voiced. Port accessed with 20 gauge, labs drawn per order. Port flushed with NS and heparin and needle removed. Visit Vitals  /75 (BP 1 Location: Left arm, BP Patient Position: Sitting)   Pulse (!) 107   Temp 98.2 °F (36.8 °C)   Resp 18   SpO2 97%       1555 Tolerated treatment well, no adverse reaction noted. D/Cd from [facility] ambulatory and in no distress. Next appt 9/16/19    Recent Results (from the past 8 hour(s))   CBC WITH AUTOMATED DIFF    Collection Time: 08/23/19  3:43 PM   Result Value Ref Range    WBC 3.8 (L) 4.1 - 11.1 K/uL    RBC 3.97 (L) 4.10 - 5.70 M/uL    HGB 11.0 (L) 12.1 - 17.0 g/dL    HCT 35.7 (L) 36.6 - 50.3 %    MCV 89.9 80.0 - 99.0 FL    MCH 27.7 26.0 - 34.0 PG    MCHC 30.8 30.0 - 36.5 g/dL    RDW 15.5 (H) 11.5 - 14.5 %    PLATELET 305 476 - 159 K/uL    MPV 10.1 8.9 - 12.9 FL    NRBC 0.0 0  WBC    ABSOLUTE NRBC 0.00 0.00 - 0.01 K/uL    NEUTROPHILS PENDING %    LYMPHOCYTES PENDING %    MONOCYTES PENDING %    EOSINOPHILS PENDING %    BASOPHILS PENDING %    IMMATURE GRANULOCYTES PENDING %    ABS. NEUTROPHILS PENDING K/UL    ABS. LYMPHOCYTES PENDING K/UL    ABS. MONOCYTES PENDING K/UL    ABS. EOSINOPHILS PENDING K/UL    ABS. BASOPHILS PENDING K/UL    ABS. IMM. GRANS.  PENDING K/UL    DF PENDING    METABOLIC PANEL, COMPREHENSIVE    Collection Time: 08/23/19  3:43 PM   Result Value Ref Range    Sodium 142 136 - 145 mmol/L    Potassium 3.9 3.5 - 5.1 mmol/L    Chloride 108 97 - 108 mmol/L    CO2 27 21 - 32 mmol/L    Anion gap 7 5 - 15 mmol/L    Glucose 101 (H) 65 - 100 mg/dL    BUN 18 6 - 20 MG/DL    Creatinine 1.45 (H) 0.70 - 1.30 MG/DL    BUN/Creatinine ratio 12 12 - 20      GFR est AA 60 (L) >60 ml/min/1.73m2    GFR est non-AA 49 (L) >60 ml/min/1.73m2    Calcium 9.1 8.5 - 10.1 MG/DL    Bilirubin, total 0.3 0.2 - 1.0 MG/DL    ALT (SGPT) 24 12 - 78 U/L    AST (SGOT) 17 15 - 37 U/L    Alk.  phosphatase 96 45 - 117 U/L    Protein, total 7.6 6.4 - 8.2 g/dL    Albumin 3.5 3.5 - 5.0 g/dL    Globulin 4.1 (H) 2.0 - 4.0 g/dL    A-G Ratio 0.9 (L) 1.1 - 2.2

## 2019-08-26 LAB
ALBUMIN SERPL ELPH-MCNC: 3.5 G/DL (ref 2.9–4.4)
ALBUMIN/GLOB SERPL: 1 {RATIO} (ref 0.7–1.7)
ALPHA1 GLOB SERPL ELPH-MCNC: 0.2 G/DL (ref 0–0.4)
ALPHA2 GLOB SERPL ELPH-MCNC: 1.2 G/DL (ref 0.4–1)
B-GLOBULIN SERPL ELPH-MCNC: 0.8 G/DL (ref 0.7–1.3)
GAMMA GLOB SERPL ELPH-MCNC: 1.2 G/DL (ref 0.4–1.8)
GLOBULIN SER CALC-MCNC: 3.4 G/DL (ref 2.2–3.9)
IGA SERPL-MCNC: 296 MG/DL (ref 61–437)
IGG SERPL-MCNC: 1054 MG/DL (ref 700–1600)
IGM SERPL-MCNC: 96 MG/DL (ref 20–172)
KAPPA LC FREE SER-MCNC: 39.5 MG/L (ref 3.3–19.4)
KAPPA LC FREE/LAMBDA FREE SER: 0.57 {RATIO} (ref 0.26–1.65)
LAMBDA LC FREE SERPL-MCNC: 69.8 MG/L (ref 5.7–26.3)
M PROTEIN SERPL ELPH-MCNC: ABNORMAL G/DL
PROT PATTERN SERPL IFE-IMP: NORMAL
PROT SERPL-MCNC: 6.9 G/DL (ref 6–8.5)

## 2019-09-09 ENCOUNTER — DOCUMENTATION ONLY (OUTPATIENT)
Dept: ONCOLOGY | Age: 64
End: 2019-09-09

## 2019-09-09 NOTE — PROGRESS NOTES
Left VM with Precious Sam at [address] [facility] for records to be faxed over for appt 9/11/19.

## 2019-09-11 ENCOUNTER — OFFICE VISIT (OUTPATIENT)
Dept: ONCOLOGY | Age: 64
End: 2019-09-11

## 2019-09-11 VITALS
TEMPERATURE: 98.5 F | DIASTOLIC BLOOD PRESSURE: 75 MMHG | HEART RATE: 82 BPM | SYSTOLIC BLOOD PRESSURE: 108 MMHG | HEIGHT: 68 IN | OXYGEN SATURATION: 97 % | BODY MASS INDEX: 28.22 KG/M2 | RESPIRATION RATE: 18 BRPM | WEIGHT: 186.2 LBS

## 2019-09-11 DIAGNOSIS — C90.01 MULTIPLE MYELOMA IN REMISSION (HCC): Primary | ICD-10-CM

## 2019-09-11 RX ORDER — NAPROXEN 500 MG/1
TABLET ORAL
Refills: 5 | COMMUNITY
Start: 2019-08-26

## 2019-09-11 RX ORDER — ASPIRIN 81 MG/1
TABLET ORAL DAILY
COMMUNITY

## 2019-09-11 RX ORDER — TAPENTADOL HYDROCHLORIDE 100 MG/1
TABLET, FILM COATED ORAL
Refills: 0 | COMMUNITY
Start: 2019-07-27

## 2019-09-11 RX ORDER — DOXYCYCLINE 100 MG/1
CAPSULE ORAL
Refills: 0 | COMMUNITY
Start: 2019-08-22 | End: 2019-11-12

## 2019-09-11 RX ORDER — ACYCLOVIR 400 MG/1
TABLET ORAL
Refills: 3 | COMMUNITY
Start: 2019-07-13 | End: 2019-11-12

## 2019-09-15 NOTE — PROGRESS NOTES
[address] [facility]  [address], [address] [facility] Jared Cornelius, [address]  [phone]          Progress Note        Patient: Tanner Ornelas Sr. MRN: [id number]  SSN: [id number]    YOB: 1955  Age: 66 y.o. Sex: male        Diagnosis:      1. Multiple Myeloma, IgA Kappa   [place] stage IIIB    Cytogenetics/FISH: t(14;16) - high risk    Ttreatment:     1. Maintenance therapy - Pomalyst 2 mg daily - 11/19/2018 - 08/2019  2. Maintenance therapy - Ixazomib - started 10/15/2018 - stopped 11/12/2018  3. S/P tandem  Autotransplant   First on 2/28/2018   2nd on 06/13/2018  4. Carfilzomib/Pom/Dex - s/p 5 cycles  5. VD-PACE s/p 1 cycle  6. RVD - s/p 4 cycles    Subjective:      Tanner Ornelas Sr. is a 66 y.o. male with a diagnosis of IgA kappa myeloma. Bone marrow shows 100% aberrant plasma cells. He suffers with DM but it is diet controlled. He has received systemic anti-viral treatment for chronic Hep C with successful eradication of the virus. Mr. Sabrina Velazquez received 4 cycles of systemic therapy with RVd. He had an initial good response to treatment. However his paraprotein level started rising and the myeloma became refractory to treatment. I then administered one cycle of VD-PACE in the hospital. He then received Carfilzomib/Pom/Dex. He achieved VGPR. He underwent tandem autotransplant at [facility]. According to the patient he achieved complete response with therapy. He has undergone second/tandem transplant in June. He took Pomalyst maintenance until Aug 2019 then had to stop due to recurrent prostatitis. He had a repeat BM biopsy in February at [facility] which showed complete molecular remission. He is receiving prolonged course of Abx for prostatitis and the fever has not recurred.        Review of Systems:    Constitutional: fevers  Eyes: negative  Ears, Nose, Mouth, Throat, and Face: negative  Respiratory: negative  Cardiovascular: negative  Gastrointestinal: negative  Genitourinary:negative  Integument/Breast: negative  Hematologic/Lymphatic: negative  Musculoskeletal: chronic neck pain  Neurological: negative      Past Medical History:   Diagnosis Date    Acid reflux     Arrhythmia     pvc's    Chronic pain     neck    Depression     Diabetes ([facility] [address]     Femoral hernia 2012    Hepatitis C     Hypertension     Inguinal hernia 2012    Liver disease     hepatitis c    Multiple myeloma (HCC)     Other ill-defined conditions(799.89)     Hx of PVCs since     Prostatitis, acute     Psychiatric disorder     depression     Past Surgical History:   Procedure Laterality Date    COLONOSCOPY N/A 2016    COLONOSCOPY performed by Goldie Koyanagi, MD at [facility] ENDOSCOPY    HX APPENDECTOMY      HX CERVICAL FUSION  2008    x 1 as of 2014    HX HEENT      foreign body removed from right eye    HX HERNIA REPAIR      [facility]      HX ORTHOPAEDIC      cervical fusion    HX OTHER SURGICAL  2000    liver bx - chronic hepatitis      Family History   Problem Relation Age of Onset    Arthritis-osteo Mother     Arthritis-osteo Father     Diabetes Father     Hypertension Maternal Grandmother     Diabetes Maternal Grandmother     Hypertension Maternal Grandfather     Diabetes Maternal Grandfather      Social History     Tobacco Use    Smoking status: Former Smoker     Packs/day: 1.00     Years: 12.00     Pack years: 12.00     Types: Cigarettes     Last attempt to quit: 1989     Years since quittin.7    Smokeless tobacco: Never Used    Tobacco comment: former cigarette smoker   Substance Use Topics    Alcohol use: No     Comment: former alcoholic-quit 1342      Prior to Admission medications    Medication Sig Start Date End Date Taking?  Authorizing Provider   naproxen (NAPROSYN) 500 mg tablet TAKE 1 TABLET BY MOUTH TWICE A DAY 19  Yes Provider, Historical   NUCYNTA 100 mg tablet TAKE 2 TABLETS 3 TIMES A DAY 7/27/19  Yes Provider, Historical   aspirin delayed-release 81 mg tablet Take  by mouth daily. Yes Provider, Historical   pomalidomide (POMALYST) 2 mg cap Take 1 Cap by mouth daily. 8/16/19  Yes Juan R Tarango MD   levoFLOXacin (LEVAQUIN) 500 mg tablet Take 1 Tab by mouth daily. 7/21/19  Yes Thao Sánchez DO   metFORMIN ER (GLUCOPHAGE XR) 500 mg tablet Take 1 Tab by mouth two (2) times a day. 7/9/19  Yes Diana Khan MD   omeprazole (PRILOSEC) 20 mg capsule Take 20 mg by mouth daily as needed (acid reflux). Yes Other, MD Goldy   buPROPion XL (WELLBUTRIN XL) 150 mg tablet Take 300 mg by mouth every morning. Yes Other, MD Goldy   tenofovir DISOPROXIL FUMARATE (VIREAD) 300 mg tablet Take 300 mg by mouth daily. 7/7/18  Yes Provider, Historical   ergocalciferol (ERGOCALCIFEROL) 50,000 unit capsule TAKE 1 CAP BY MOUTH EVERY 7 DAYS, Mondays 5/14/18  Yes Provider, Historical   lisinopril (PRINIVIL, ZESTRIL) 10 mg tablet TAKE 1 TABLET BY MOUTH EVERY DAY as needed. Hold if SBP <115 5/12/18  Yes Provider, Historical   multivitamin (ONE A DAY) tablet Take 1 Tab by mouth daily. Yes Provider, Historical   tamsulosin (FLOMAX) 0.4 mg capsule Take 1 Cap by mouth daily. 4/4/17  Yes Segundo Banks MD   zolpidem CR (AMBIEN CR) 12.5 mg tablet Take 12.5 mg by mouth nightly. Yes Provider, Historical   tapentadol (NUCYNTA) 100 mg tablet Take 200 mg by mouth three (3) times daily. At 7a, 3p, 11pm   Yes Other, MD Goldy   aripiprazole (ABILIFY) 5 mg tablet Take 2.5 mg by mouth every morning. Yes Other, MD Goldy   doxycycline (VIBRAMYCIN) 100 mg capsule TAKE 1 CAPSULE BY MOUTH TWICE A DAY 8/22/19   Provider, Historical   acyclovir (ZOVIRAX) 400 mg tablet TAKE 1 TABLET BY MOUTH TWICE A DAY 7/13/19   Provider, Historical   insulin lispro protamine/insulin lispro (HUMALOG 50-50 MIX) 100 unit/mL (50-50) flexpen by SubCUTAneous route.     Other, MD Goldy   prochlorperazine (COMPAZINE) 10 mg tablet Take 5 mg by mouth every six (6) hours as needed. Goldy Diaz MD   ondansetron (ZOFRAN ODT) 8 mg disintegrating tablet Take 8 mg by mouth every eight (8) hours as needed for Nausea. Goldy Diaz MD   ixazomib 4 mg cap Take 4 mg by mouth every seven (7) days. Patient takes 4 mg every Tuesdays on week 1-3 of cycle    Goldy Diaz MD   folic acid (FOLVITE) 1 mg tablet TAKE 1 TABLET BY MOUTH EVERY DAY 5/14/18   Provider, Historical          Allergies   Allergen Reactions    Mercury (Bulk) Hives     Blisters             Objective:     Visit Vitals  /75 (BP 1 Location: Left arm, BP Patient Position: Sitting)   Pulse 82   Temp 98.5 °F (36.9 °C) (Oral)   Resp 18   Ht 5' 8\" (1.727 m)   Wt 186 lb 3.2 oz (84.5 kg)   SpO2 97%   BMI 28.31 kg/m²       Pain Scale: 0 - No pain/10  Pain Location:       Physical Exam:    GENERAL: alert, cooperative  EYE: negative  LYMPHATIC: Cervical, supraclavicular, and axillary nodes normal.   THROAT & NECK: normal and no erythema or exudates noted. LUNG: clear to auscultation bilaterally  HEART: regular rate and rhythm  ABDOMEN: soft, non-tender  EXTREMITIES: normal  SKIN: Normal.  NEUROLOGIC: negative      Lab Results   Component Value Date/Time    WBC 3.8 (L) 08/23/2019 03:43 PM    HGB 11.0 (L) 08/23/2019 03:43 PM    Hematocrit (POC) 39 06/24/2019 03:22 PM    HCT 35.7 (L) 08/23/2019 03:43 PM    PLATELET 652 09/56/6677 03:43 PM    MCV 89.9 08/23/2019 03:43 PM       Lab Results   Component Value Date/Time    Sodium 142 08/23/2019 03:43 PM    Potassium 3.9 08/23/2019 03:43 PM    Chloride 108 08/23/2019 03:43 PM    CO2 27 08/23/2019 03:43 PM    Anion gap 7 08/23/2019 03:43 PM    Glucose 101 (H) 08/23/2019 03:43 PM    BUN 18 08/23/2019 03:43 PM    Creatinine 1.45 (H) 08/23/2019 03:43 PM    BUN/Creatinine ratio 12 08/23/2019 03:43 PM    GFR est AA 60 (L) 08/23/2019 03:43 PM    GFR est non-AA 49 (L) 08/23/2019 03:43 PM    Calcium 9.1 08/23/2019 03:43 PM            Assessment:     1.  Multiple Myeloma, IgA [place] stage IIIB    Cytogenetics/FISH: t(14;16) - high risk  ECOG PS - 0   Intent of therapy: palliative    Received 3 cycles of RVd   Dose reduced Revlimid and Velcade d/t continued cytopenias. M-spike started rising. He received one cycle of VD-PACE (bortezomib, dexamethasone, thalidomide, cisplatin, adriamycin, cyclophosphamide, and etoposide). Achieved KS with once cycle    Receivied KPd - s/p 5 cycles    Excellent response with M-protein came down to 0.1    S/p tandem autotransplant    First on 2/28/2018   2nd on 06/13/2018    Achieved CR after the first transplant. Blood count has recovered  Doing well  Some fatigue  Repeat bone marrow @ [facility] - Complete response. Since he is high risk based on cytogenetics, I recommend proteosome inhibitor as maintenance therapy. Started Ixazomib   Developed fever, was in the hospital.   Also developed cytopenias    Patient prefered to switch therapy to Pomalyst.     Pomalyst 2 mg daily - started 11/19/2018 - 08/2019  In complete remission by serological tests    Repeat bone marrow in  at [facility] shows complete molecular remission  Symptom management form reviewed with patient. Had to stop Pomalyst due to prostatitis. 2. Type 2 DM with complication    Managed by Endocrine      3. Chronic Hep C    In sustained virological remission        Plan:       > Hold Pomalyst until prostatitis is clear  > Myeloma labs monthly  > Port flush monthly  > Continue Zometa every 3 months  > Follow-up in 2 months        Signed by: Kel Anne MD                     September 15, 2019        CC. Polo Elise MD  CC.  Gwendolyn Oliveira MD

## 2019-09-16 ENCOUNTER — APPOINTMENT (OUTPATIENT)
Dept: INFUSION THERAPY | Age: 64
End: 2019-09-16
Payer: COMMERCIAL

## 2019-09-16 DIAGNOSIS — C90.00 MULTIPLE MYELOMA, REMISSION STATUS UNSPECIFIED (HCC): ICD-10-CM

## 2019-09-16 RX ORDER — POMALIDOMIDE 2 MG/1
CAPSULE ORAL
Qty: 28 CAP | Refills: 0 | Status: SHIPPED | OUTPATIENT
Start: 2019-09-16 | End: 2019-10-09 | Stop reason: SDUPTHER

## 2019-09-18 ENCOUNTER — HOSPITAL ENCOUNTER (OUTPATIENT)
Dept: INFUSION THERAPY | Age: 64
Discharge: HOME | End: 2019-09-18
Payer: COMMERCIAL

## 2019-09-18 VITALS
SYSTOLIC BLOOD PRESSURE: 123 MMHG | WEIGHT: 184.9 LBS | DIASTOLIC BLOOD PRESSURE: 85 MMHG | TEMPERATURE: 97.8 F | HEART RATE: 88 BPM | OXYGEN SATURATION: 98 % | BODY MASS INDEX: 28.11 KG/M2 | RESPIRATION RATE: 18 BRPM

## 2019-09-18 DIAGNOSIS — C90.00 MULTIPLE MYELOMA, REMISSION STATUS UNSPECIFIED (HCC): Primary | ICD-10-CM

## 2019-09-18 DIAGNOSIS — C90.01 MULTIPLE MYELOMA IN REMISSION (HCC): ICD-10-CM

## 2019-09-18 LAB
ALBUMIN SERPL-MCNC: 3.4 G/DL (ref 3.5–5)
ALBUMIN/GLOB SERPL: 0.9 {RATIO} (ref 1.1–2.2)
ALP SERPL-CCNC: 89 U/L (ref 45–117)
ALT SERPL-CCNC: 32 U/L (ref 12–78)
ANION GAP BLD CALC-SCNC: 15 MMOL/L (ref 10–20)
AST SERPL-CCNC: 26 U/L (ref 15–37)
BASOPHILS # BLD: 0 K/UL (ref 0–0.1)
BASOPHILS NFR BLD: 1 % (ref 0–1)
BILIRUB DIRECT SERPL-MCNC: 0.1 MG/DL (ref 0–0.2)
BILIRUB SERPL-MCNC: 0.6 MG/DL (ref 0.2–1)
BUN BLD-MCNC: 24 MG/DL (ref 9–20)
CA-I BLD-MCNC: 1.17 MMOL/L (ref 1.12–1.32)
CHLORIDE BLD-SCNC: 105 MMOL/L (ref 98–107)
CO2 BLD-SCNC: 27 MMOL/L (ref 21–32)
CREAT BLD-MCNC: 1.6 MG/DL (ref 0.6–1.3)
DIFFERENTIAL METHOD BLD: ABNORMAL
EOSINOPHIL # BLD: 0.1 K/UL (ref 0–0.4)
EOSINOPHIL NFR BLD: 3 % (ref 0–7)
ERYTHROCYTE [DISTWIDTH] IN BLOOD BY AUTOMATED COUNT: 17.2 % (ref 11.5–14.5)
GLOBULIN SER CALC-MCNC: 3.9 G/DL (ref 2–4)
GLUCOSE BLD-MCNC: 91 MG/DL (ref 65–100)
HCT VFR BLD AUTO: 34.2 % (ref 36.6–50.3)
HCT VFR BLD CALC: 36 % (ref 36.6–50.3)
HGB BLD-MCNC: 11 G/DL (ref 12.1–17)
IGA SERPL-MCNC: 233 MG/DL (ref 70–400)
IGG SERPL-MCNC: 961 MG/DL (ref 700–1600)
IGM SERPL-MCNC: 51 MG/DL (ref 40–230)
IMM GRANULOCYTES # BLD AUTO: 0 K/UL (ref 0–0.04)
IMM GRANULOCYTES NFR BLD AUTO: 0 % (ref 0–0.5)
LYMPHOCYTES # BLD: 1.3 K/UL (ref 0.8–3.5)
LYMPHOCYTES NFR BLD: 39 % (ref 12–49)
MCH RBC QN AUTO: 28.8 PG (ref 26–34)
MCHC RBC AUTO-ENTMCNC: 32.2 G/DL (ref 30–36.5)
MCV RBC AUTO: 89.5 FL (ref 80–99)
MONOCYTES # BLD: 0.5 K/UL (ref 0–1)
MONOCYTES NFR BLD: 16 % (ref 5–13)
NEUTS SEG # BLD: 1.3 K/UL (ref 1.8–8)
NEUTS SEG NFR BLD: 41 % (ref 32–75)
NRBC # BLD: 0 K/UL (ref 0–0.01)
NRBC BLD-RTO: 0 PER 100 WBC
PLATELET # BLD AUTO: 149 K/UL (ref 150–400)
PMV BLD AUTO: 10.6 FL (ref 8.9–12.9)
POTASSIUM BLD-SCNC: 4.4 MMOL/L (ref 3.5–5.1)
PROT SERPL-MCNC: 7.3 G/DL (ref 6.4–8.2)
RBC # BLD AUTO: 3.82 M/UL (ref 4.1–5.7)
SERVICE CMNT-IMP: ABNORMAL
SODIUM BLD-SCNC: 141 MMOL/L (ref 136–145)
WBC # BLD AUTO: 3.2 K/UL (ref 4.1–11.1)

## 2019-09-18 PROCEDURE — 96374 THER/PROPH/DIAG INJ IV PUSH: CPT

## 2019-09-18 PROCEDURE — 77030012965 HC NDL HUBR BBMI -A

## 2019-09-18 PROCEDURE — 80047 BASIC METABLC PNL IONIZED CA: CPT

## 2019-09-18 PROCEDURE — 74011000258 HC RX REV CODE- 258: Performed by: INTERNAL MEDICINE

## 2019-09-18 PROCEDURE — 74011250636 HC RX REV CODE- 250/636: Performed by: NURSE PRACTITIONER

## 2019-09-18 PROCEDURE — 74011000250 HC RX REV CODE- 250: Performed by: NURSE PRACTITIONER

## 2019-09-18 PROCEDURE — 85025 COMPLETE CBC W/AUTO DIFF WBC: CPT

## 2019-09-18 PROCEDURE — 82784 ASSAY IGA/IGD/IGG/IGM EACH: CPT

## 2019-09-18 PROCEDURE — 74011250636 HC RX REV CODE- 250/636: Performed by: INTERNAL MEDICINE

## 2019-09-18 PROCEDURE — 80076 HEPATIC FUNCTION PANEL: CPT

## 2019-09-18 PROCEDURE — 36415 COLL VENOUS BLD VENIPUNCTURE: CPT

## 2019-09-18 PROCEDURE — 84165 PROTEIN E-PHORESIS SERUM: CPT

## 2019-09-18 PROCEDURE — 83883 ASSAY NEPHELOMETRY NOT SPEC: CPT

## 2019-09-18 RX ORDER — ACETAMINOPHEN 325 MG/1
650 TABLET ORAL AS NEEDED
Status: CANCELLED
Start: 2019-09-18

## 2019-09-18 RX ORDER — SODIUM CHLORIDE 9 MG/ML
10 INJECTION INTRAMUSCULAR; INTRAVENOUS; SUBCUTANEOUS AS NEEDED
Status: CANCELLED | OUTPATIENT
Start: 2019-09-18

## 2019-09-18 RX ORDER — SODIUM CHLORIDE 0.9 % (FLUSH) 0.9 %
10 SYRINGE (ML) INJECTION AS NEEDED
Status: CANCELLED
Start: 2019-09-18

## 2019-09-18 RX ORDER — ALBUTEROL SULFATE 0.83 MG/ML
2.5 SOLUTION RESPIRATORY (INHALATION) AS NEEDED
Status: CANCELLED
Start: 2019-09-18

## 2019-09-18 RX ORDER — EPINEPHRINE 1 MG/ML
0.3 INJECTION, SOLUTION, CONCENTRATE INTRAVENOUS AS NEEDED
Status: CANCELLED | OUTPATIENT
Start: 2019-09-18

## 2019-09-18 RX ORDER — DIPHENHYDRAMINE HYDROCHLORIDE 50 MG/ML
50 INJECTION, SOLUTION INTRAMUSCULAR; INTRAVENOUS AS NEEDED
Status: CANCELLED
Start: 2019-09-18

## 2019-09-18 RX ORDER — ONDANSETRON 2 MG/ML
8 INJECTION INTRAMUSCULAR; INTRAVENOUS AS NEEDED
Status: CANCELLED | OUTPATIENT
Start: 2019-09-18

## 2019-09-18 RX ORDER — HEPARIN 100 UNIT/ML
300-500 SYRINGE INTRAVENOUS AS NEEDED
Status: CANCELLED
Start: 2019-09-18

## 2019-09-18 RX ORDER — SODIUM CHLORIDE 0.9 % (FLUSH) 0.9 %
5-10 SYRINGE (ML) INJECTION AS NEEDED
Status: DISCONTINUED | OUTPATIENT
Start: 2019-09-18 | End: 2019-09-19 | Stop reason: HOSPADM

## 2019-09-18 RX ORDER — HEPARIN 100 UNIT/ML
500 SYRINGE INTRAVENOUS AS NEEDED
Status: DISCONTINUED | OUTPATIENT
Start: 2019-09-18 | End: 2019-09-19 | Stop reason: HOSPADM

## 2019-09-18 RX ORDER — HYDROCORTISONE SODIUM SUCCINATE 100 MG/2ML
100 INJECTION, POWDER, FOR SOLUTION INTRAMUSCULAR; INTRAVENOUS AS NEEDED
Status: CANCELLED | OUTPATIENT
Start: 2019-09-18

## 2019-09-18 RX ORDER — SODIUM CHLORIDE 9 MG/ML
10 INJECTION INTRAMUSCULAR; INTRAVENOUS; SUBCUTANEOUS AS NEEDED
Status: DISCONTINUED | OUTPATIENT
Start: 2019-09-18 | End: 2019-09-19 | Stop reason: HOSPADM

## 2019-09-18 RX ORDER — SODIUM CHLORIDE 9 MG/ML
25 INJECTION, SOLUTION INTRAVENOUS CONTINUOUS
Status: CANCELLED | OUTPATIENT
Start: 2019-09-18

## 2019-09-18 RX ADMIN — SODIUM CHLORIDE 10 ML: 9 INJECTION INTRAMUSCULAR; INTRAVENOUS; SUBCUTANEOUS at 15:45

## 2019-09-18 RX ADMIN — Medication 500 UNITS: at 16:24

## 2019-09-18 RX ADMIN — Medication 10 ML: at 16:24

## 2019-09-18 RX ADMIN — ZOLEDRONIC ACID 3.5 MG: 4 INJECTION, SOLUTION, CONCENTRATE INTRAVENOUS at 16:05

## 2019-09-18 NOTE — PROGRESS NOTES
Pt arrived to [facility] ambulatory in no acute distress at 1533 for labs and Zometa infusion.  Assessment unremarkable and pt without any complaints. R chest port accessed without difficulty and positive blood return noted.  Lab obtained - CBC, hepatic panel, chem 8, immunofixation, immunoglobulin G/A/M, Serum free light chains, and SPEP. Visit Vitals  /85 (BP 1 Location: Left arm, BP Patient Position: Sitting)   Pulse 88   Temp 97.8 °F (36.6 °C)   Resp 18   Wt 83.9 kg (184 lb 14.4 oz)   SpO2 98%   BMI 28.11 kg/m²     Recent Results (from the past 12 hour(s))   POC CHEM8    Collection Time: 09/18/19  3:48 PM   Result Value Ref Range    Calcium, ionized (POC) 1.17 1.12 - 1.32 mmol/L    Sodium (POC) 141 136 - 145 mmol/L    Potassium (POC) 4.4 3.5 - 5.1 mmol/L    Chloride (POC) 105 98 - 107 mmol/L    CO2 (POC) 27 21 - 32 mmol/L    Anion gap (POC) 15 10 - 20 mmol/L    Glucose (POC) 91 65 - 100 mg/dL    BUN (POC) 24 (H) 9 - 20 mg/dL    Creatinine (POC) 1.6 (H) 0.6 - 1.3 mg/dL    GFRAA, POC 54 (L) >60 ml/min/1.73m2    GFRNA, POC 44 (L) >60 ml/min/1.73m2    Hematocrit (POC) 36 (L) 36.6 - 50.3 %    Comment Comment Not Indicated. CrCl is 56.8 and dose adjusted. See [facility] for complete lab results. The following medications administered:  Zometa 3.5mg IV    Pt tolerated treatment well.  PAC flushed per policy and removed, 2x2 and band placed. site without redness/swelling noted. Pt discharged ambulatory in no acute distress at 1625, accompanied by wife. Next appointment 10/16 at 3:30 for [facility] flush and labs.

## 2019-09-20 ENCOUNTER — APPOINTMENT (OUTPATIENT)
Dept: INFUSION THERAPY | Age: 64
End: 2019-09-20
Payer: COMMERCIAL

## 2019-09-20 LAB
ALBUMIN SERPL ELPH-MCNC: 3.6 G/DL (ref 2.9–4.4)
ALBUMIN/GLOB SERPL: 1.2 {RATIO} (ref 0.7–1.7)
ALPHA1 GLOB SERPL ELPH-MCNC: 0.2 G/DL (ref 0–0.4)
ALPHA2 GLOB SERPL ELPH-MCNC: 0.9 G/DL (ref 0.4–1)
B-GLOBULIN SERPL ELPH-MCNC: 0.8 G/DL (ref 0.7–1.3)
GAMMA GLOB SERPL ELPH-MCNC: 1 G/DL (ref 0.4–1.8)
GLOBULIN SER CALC-MCNC: 2.9 G/DL (ref 2.2–3.9)
KAPPA LC FREE SER-MCNC: 36.4 MG/L (ref 3.3–19.4)
KAPPA LC FREE/LAMBDA FREE SER: 1.21 {RATIO} (ref 0.26–1.65)
LAMBDA LC FREE SERPL-MCNC: 30.1 MG/L (ref 5.7–26.3)
M PROTEIN SERPL ELPH-MCNC: NORMAL G/DL
PROT SERPL-MCNC: 6.5 G/DL (ref 6–8.5)

## 2019-09-21 LAB
IGA SERPL-MCNC: 257 MG/DL (ref 61–437)
IGG SERPL-MCNC: 958 MG/DL (ref 700–1600)
IGM SERPL-MCNC: 59 MG/DL (ref 20–172)
PROT PATTERN SERPL IFE-IMP: NORMAL

## 2019-10-04 ENCOUNTER — HOSPITAL ENCOUNTER (EMERGENCY)
Age: 64
Discharge: HOME | End: 2019-10-04
Attending: EMERGENCY MEDICINE
Payer: COMMERCIAL

## 2019-10-04 ENCOUNTER — APPOINTMENT (OUTPATIENT)
Dept: GENERAL RADIOLOGY | Age: 64
End: 2019-10-04
Attending: EMERGENCY MEDICINE
Payer: COMMERCIAL

## 2019-10-04 VITALS
TEMPERATURE: 98.5 F | WEIGHT: 181.22 LBS | HEART RATE: 93 BPM | OXYGEN SATURATION: 97 % | RESPIRATION RATE: 21 BRPM | SYSTOLIC BLOOD PRESSURE: 124 MMHG | DIASTOLIC BLOOD PRESSURE: 78 MMHG | BODY MASS INDEX: 27.47 KG/M2 | HEIGHT: 68 IN

## 2019-10-04 DIAGNOSIS — R00.2 PALPITATIONS: Primary | ICD-10-CM

## 2019-10-04 DIAGNOSIS — E86.0 DEHYDRATION: ICD-10-CM

## 2019-10-04 DIAGNOSIS — R07.89 ATYPICAL CHEST PAIN: ICD-10-CM

## 2019-10-04 LAB
ALBUMIN SERPL-MCNC: 3.7 G/DL (ref 3.5–5)
ALBUMIN/GLOB SERPL: 0.9 {RATIO} (ref 1.1–2.2)
ALP SERPL-CCNC: 82 U/L (ref 45–117)
ALT SERPL-CCNC: 26 U/L (ref 12–78)
ANION GAP SERPL CALC-SCNC: 3 MMOL/L (ref 5–15)
APPEARANCE UR: CLEAR
AST SERPL-CCNC: 26 U/L (ref 15–37)
BACTERIA URNS QL MICRO: NEGATIVE /HPF
BASOPHILS # BLD: 0 K/UL (ref 0–0.1)
BASOPHILS NFR BLD: 1 % (ref 0–1)
BILIRUB SERPL-MCNC: 0.5 MG/DL (ref 0.2–1)
BILIRUB UR QL: NEGATIVE
BUN SERPL-MCNC: 17 MG/DL (ref 6–20)
BUN/CREAT SERPL: 10 (ref 12–20)
CALCIUM SERPL-MCNC: 9.3 MG/DL (ref 8.5–10.1)
CHLORIDE SERPL-SCNC: 107 MMOL/L (ref 97–108)
CO2 SERPL-SCNC: 30 MMOL/L (ref 21–32)
COLOR UR: ABNORMAL
CREAT SERPL-MCNC: 1.76 MG/DL (ref 0.7–1.3)
DIFFERENTIAL METHOD BLD: ABNORMAL
EOSINOPHIL # BLD: 0.1 K/UL (ref 0–0.4)
EOSINOPHIL NFR BLD: 4 % (ref 0–7)
EPITH CASTS URNS QL MICRO: ABNORMAL /LPF
ERYTHROCYTE [DISTWIDTH] IN BLOOD BY AUTOMATED COUNT: 18 % (ref 11.5–14.5)
GLOBULIN SER CALC-MCNC: 4.2 G/DL (ref 2–4)
GLUCOSE SERPL-MCNC: 97 MG/DL (ref 65–100)
GLUCOSE UR STRIP.AUTO-MCNC: NEGATIVE MG/DL
GRAN CASTS URNS QL MICRO: ABNORMAL /LPF
HCT VFR BLD AUTO: 40.7 % (ref 36.6–50.3)
HGB BLD-MCNC: 12.6 G/DL (ref 12.1–17)
HGB UR QL STRIP: NEGATIVE
HYALINE CASTS URNS QL MICRO: ABNORMAL /LPF (ref 0–5)
IMM GRANULOCYTES # BLD AUTO: 0 K/UL (ref 0–0.04)
IMM GRANULOCYTES NFR BLD AUTO: 0 % (ref 0–0.5)
KETONES UR QL STRIP.AUTO: NEGATIVE MG/DL
LEUKOCYTE ESTERASE UR QL STRIP.AUTO: NEGATIVE
LYMPHOCYTES # BLD: 1 K/UL (ref 0.8–3.5)
LYMPHOCYTES NFR BLD: 35 % (ref 12–49)
MAGNESIUM SERPL-MCNC: 1.9 MG/DL (ref 1.6–2.4)
MCH RBC QN AUTO: 28.1 PG (ref 26–34)
MCHC RBC AUTO-ENTMCNC: 31 G/DL (ref 30–36.5)
MCV RBC AUTO: 90.6 FL (ref 80–99)
MONOCYTES # BLD: 0.6 K/UL (ref 0–1)
MONOCYTES NFR BLD: 19 % (ref 5–13)
NEUTS SEG # BLD: 1.2 K/UL (ref 1.8–8)
NEUTS SEG NFR BLD: 41 % (ref 32–75)
NITRITE UR QL STRIP.AUTO: NEGATIVE
NRBC # BLD: 0 K/UL (ref 0–0.01)
NRBC BLD-RTO: 0 PER 100 WBC
PH UR STRIP: 5 [PH] (ref 5–8)
PLATELET # BLD AUTO: 204 K/UL (ref 150–400)
PMV BLD AUTO: 10.9 FL (ref 8.9–12.9)
POTASSIUM SERPL-SCNC: 4.3 MMOL/L (ref 3.5–5.1)
PROT SERPL-MCNC: 7.9 G/DL (ref 6.4–8.2)
PROT UR STRIP-MCNC: NEGATIVE MG/DL
RBC # BLD AUTO: 4.49 M/UL (ref 4.1–5.7)
RBC #/AREA URNS HPF: ABNORMAL /HPF (ref 0–5)
SODIUM SERPL-SCNC: 140 MMOL/L (ref 136–145)
SP GR UR REFRACTOMETRY: 1.02 (ref 1–1.03)
TROPONIN I SERPL-MCNC: <0.05 NG/ML
TROPONIN I SERPL-MCNC: <0.05 NG/ML
UA: UC IF INDICATED,UAUC: ABNORMAL
UROBILINOGEN UR QL STRIP.AUTO: 0.2 EU/DL (ref 0.2–1)
WBC # BLD AUTO: 3 K/UL (ref 4.1–11.1)
WBC URNS QL MICRO: ABNORMAL /HPF (ref 0–4)

## 2019-10-04 PROCEDURE — 71046 X-RAY EXAM CHEST 2 VIEWS: CPT

## 2019-10-04 PROCEDURE — 81001 URINALYSIS AUTO W/SCOPE: CPT

## 2019-10-04 PROCEDURE — 84484 ASSAY OF TROPONIN QUANT: CPT

## 2019-10-04 PROCEDURE — 74011250636 HC RX REV CODE- 250/636: Performed by: EMERGENCY MEDICINE

## 2019-10-04 PROCEDURE — 85025 COMPLETE CBC W/AUTO DIFF WBC: CPT

## 2019-10-04 PROCEDURE — 36415 COLL VENOUS BLD VENIPUNCTURE: CPT

## 2019-10-04 PROCEDURE — 93005 ELECTROCARDIOGRAM TRACING: CPT

## 2019-10-04 PROCEDURE — 80053 COMPREHEN METABOLIC PANEL: CPT

## 2019-10-04 PROCEDURE — 99284 EMERGENCY DEPT VISIT MOD MDM: CPT

## 2019-10-04 PROCEDURE — 96360 HYDRATION IV INFUSION INIT: CPT

## 2019-10-04 PROCEDURE — 83735 ASSAY OF MAGNESIUM: CPT

## 2019-10-04 RX ADMIN — SODIUM CHLORIDE 500 ML: 900 INJECTION, SOLUTION INTRAVENOUS at 22:02

## 2019-10-05 LAB
ATRIAL RATE: 93 BPM
CALCULATED P AXIS, ECG09: 33 DEGREES
CALCULATED R AXIS, ECG10: -24 DEGREES
CALCULATED T AXIS, ECG11: 22 DEGREES
DIAGNOSIS, 93000: NORMAL
P-R INTERVAL, ECG05: 134 MS
Q-T INTERVAL, ECG07: 364 MS
QRS DURATION, ECG06: 88 MS
QTC CALCULATION (BEZET), ECG08: 452 MS
VENTRICULAR RATE, ECG03: 93 BPM

## 2019-10-05 NOTE — ED PROVIDER NOTES
EMERGENCY DEPARTMENT HISTORY AND PHYSICAL EXAM      Date: 10/4/2019  Patient Name: Keyur Alberto. History of Presenting Illness     Chief Complaint   Patient presents with    Palpitations     pt reports rapid HR and SOB x 1 week, pt reports hx of PVC's for which he used to be on medication for, pt notes that he was taken off of the medication when he had 2 bone marrow transplants       History Provided By: Patient and Patient's Wife    HPI: Keyur Garcia, 66 y.o. male presents to the ED with cc of palpitations and shortness of breath. The patient's symptoms have been intermittent for 1 week. He denies chest pain, cough, fever or chills. He also denies nausea, vomiting or diarrhea. He has a history of multiple PVCs, and was previously on anti-rhythmic prescribed by Dr. Jazz Guadarrama. He had been taken off of that due to his treatments for multiple myeloma. There are no other complaints, changes, or physical findings at this time. PCP: Kaden Maciel MD    No current facility-administered medications on file prior to encounter. Current Outpatient Medications on File Prior to Encounter   Medication Sig Dispense Refill    POMALYST 2 mg cap TAKE ONE CAPSULE BY MOUTH EVERY DAY 28 Cap 0    naproxen (NAPROSYN) 500 mg tablet TAKE 1 TABLET BY MOUTH TWICE A DAY  5    doxycycline (VIBRAMYCIN) 100 mg capsule TAKE 1 CAPSULE BY MOUTH TWICE A DAY  0    acyclovir (ZOVIRAX) 400 mg tablet TAKE 1 TABLET BY MOUTH TWICE A DAY  3    NUCYNTA 100 mg tablet TAKE 2 TABLETS 3 TIMES A DAY  0    aspirin delayed-release 81 mg tablet Take  by mouth daily.  levoFLOXacin (LEVAQUIN) 500 mg tablet Take 1 Tab by mouth daily. 7 Tab 0    insulin lispro protamine/insulin lispro (HUMALOG 50-50 MIX) 100 unit/mL (50-50) flexpen by SubCUTAneous route.  prochlorperazine (COMPAZINE) 10 mg tablet Take 5 mg by mouth every six (6) hours as needed.       metFORMIN ER (GLUCOPHAGE XR) 500 mg tablet Take 1 Tab by mouth two (2) times a day. 180 Tab 3    omeprazole (PRILOSEC) 20 mg capsule Take 20 mg by mouth daily as needed (acid reflux).  ondansetron (ZOFRAN ODT) 8 mg disintegrating tablet Take 8 mg by mouth every eight (8) hours as needed for Nausea.  buPROPion XL (WELLBUTRIN XL) 150 mg tablet Take 300 mg by mouth every morning.  ixazomib 4 mg cap Take 4 mg by mouth every seven (7) days. Patient takes 4 mg every Tuesdays on week 1-3 of cycle      tenofovir DISOPROXIL FUMARATE (VIREAD) 300 mg tablet Take 300 mg by mouth daily.  ergocalciferol (ERGOCALCIFEROL) 50,000 unit capsule TAKE 1 CAP BY MOUTH EVERY 7 DAYS, Mondays  4    lisinopril (PRINIVIL, ZESTRIL) 10 mg tablet TAKE 1 TABLET BY MOUTH EVERY DAY as needed. Hold if SBP <911  11    folic acid (FOLVITE) 1 mg tablet TAKE 1 TABLET BY MOUTH EVERY DAY  4    multivitamin (ONE A DAY) tablet Take 1 Tab by mouth daily.  tamsulosin (FLOMAX) 0.4 mg capsule Take 1 Cap by mouth daily. 30 Cap 1    zolpidem CR (AMBIEN CR) 12.5 mg tablet Take 12.5 mg by mouth nightly.  tapentadol (NUCYNTA) 100 mg tablet Take 200 mg by mouth three (3) times daily. At 7a, 3p, 11pm      aripiprazole (ABILIFY) 5 mg tablet Take 2.5 mg by mouth every morning.          Past History     Past Medical History:  Past Medical History:   Diagnosis Date    Acid reflux     Arrhythmia     pvc's    Chronic pain     neck    Depression     Diabetes ([facility] [address]     Femoral hernia 7/11/2012    Hepatitis C     Hypertension     Inguinal hernia 7/11/2012    Liver disease     hepatitis c    Multiple myeloma ([address]     Other ill-defined conditions(799.89)     Hx of PVCs since 2009    Prostatitis, acute     Psychiatric disorder     depression       Past Surgical History:  Past Surgical History:   Procedure Laterality Date    COLONOSCOPY N/A 9/20/2016    COL[facility]COPY performed by Patricia High MD at [facility] ENDOSCOPY    HX APPENDECTOMY      HX CERVICAL FUSION  2008    x 1 as of 4/30/2014  HX HEENT      foreign body removed from right eye    HX [facility]PAIR  2012    [facility]      HX ORTHOPAEDIC      cervical fusion    HX OTHER SURGICAL  2000    liver bx - chronic hepatitis       Family History:  Family History   Problem Relation Age of Onset    Arthritis-osteo Mother     Arthritis-osteo Father     Diabetes Father     Hypertension Maternal Grandmother     Diabetes Maternal Grandmother     Hypertension Maternal Grandfather     Diabetes Maternal Grandfather        Social History:  Social History     Tobacco Use    Smoking status: Former Smoker     Packs/day: 1.00     Years: 12.00     Pack years: 12.00     Types: Cigarettes     Last attempt to quit: 1989     Years since quittin.7    Smokeless tobacco: Never Used    Tobacco comment: former cigarette smoker   Substance Use Topics    Alcohol use: No     Comment: former alcoholic-quit [id number]    Drug use: No       Allergies: Allergies   Allergen Reactions    Mercury (Bulk) Hives     Blisters           Review of Systems   Review of Systems   Constitutional: Negative for fever. HENT: Negative for congestion. Eyes: Negative. Respiratory: Positive for shortness of breath. Negative for cough. Cardiovascular: Negative for chest pain. Gastrointestinal: Negative for abdominal pain. Endocrine: Negative for polyuria. Genitourinary: Negative for dysuria. Musculoskeletal: Negative for back pain. Skin: Negative. Allergic/Immunologic: Positive for immunocompromised state. Neurological: Negative for headaches. Psychiatric/Behavioral: Negative. All other systems reviewed and are negative. Physical Exam   Physical Exam   Constitutional: He is oriented to person, place, and time. He appears well-developed and well-nourished. No distress. HENT:   Head: Normocephalic and atraumatic. Eyes: Pupils are equal, round, and reactive to light. EOM are normal.   Neck: Normal range of motion. Neck supple. Cardiovascular: Normal rate, regular rhythm, normal heart sounds and intact distal pulses. Pulmonary/Chest: Effort normal and breath sounds normal. He has no wheezes. Abdominal: Soft. Bowel sounds are normal. There is no tenderness. Musculoskeletal: Normal range of motion. He exhibits no edema or tenderness. Neurological: He is alert and oriented to person, place, and time. No cranial nerve deficit. Skin: Skin is warm and dry. Psychiatric: He has a normal mood and affect. His behavior is normal.   Nursing note and vitals reviewed. Diagnostic Study Results     Labs -     Recent Results (from the past 12 hour(s))   EKG, 12 LEAD, INITIAL    Collection Time: 10/04/19  6:41 PM   Result Value Ref Range    Ventricular Rate 93 BPM    Atrial Rate 93 BPM    P-R Interval 134 ms    QRS Duration 88 ms    Q-T Interval 364 ms    QTC Calculation (Bezet) 452 ms    Calculated P Axis 33 degrees    Calculated R Axis -24 degrees    Calculated T Axis 22 degrees    Diagnosis       Normal sinus rhythm  Minimal voltage criteria for LVH, may be normal variant  Nonspecific T wave abnormality  When compared with ECG of 05-AUG-2019 22:01,  No significant change was found     CBC WITH AUTOMATED DIFF    Collection Time: 10/04/19  7:07 PM   Result Value Ref Range    WBC 3.0 (L) 4.1 - 11.1 K/uL    RBC 4.49 4. 10 - 5.70 M/uL    HGB 12.6 12.1 - 17.0 g/dL    HCT 40.7 36.6 - 50.3 %    MCV 90.6 80.0 - 99.0 FL    MCH 28.1 26.0 - 34.0 PG    MCHC 31.0 30.0 - 36.5 g/dL    RDW 18.0 (H) 11.5 - 14.5 %    PLATELET 771 741 - 245 K/uL    MPV 10.9 8.9 - 12.9 FL    NRBC 0.0 0  WBC    ABSOLUTE NRBC 0.00 0.00 - 0.01 K/uL    NEUTROPHILS 41 32 - 75 %    LYMPHOCYTES 35 12 - 49 %    MONOCYTES 19 (H) 5 - 13 %    EOSINOPHILS 4 0 - 7 %    BASOPHILS 1 0 - 1 %    IMMATURE GRANULOCYTES 0 0.0 - 0.5 %    ABS. NEUTROPHILS 1.2 (L) 1.8 - 8.0 K/UL    ABS. LYMPHOCYTES 1.0 0.8 - 3.5 K/UL    ABS. MONOCYTES 0.6 0.0 - 1.0 K/UL    ABS.  EOSINOPHILS 0.1 0.0 - 0.4 K/UL    ABS. BASOPHILS 0.0 0.0 - 0.1 K/UL    ABS. IMM. GRANS. 0.0 0.00 - 0.04 K/UL    DF AUTOMATED     METABOLIC PANEL, COMPREHENSIVE    Collection Time: 10/04/19  7:07 PM   Result Value Ref Range    Sodium 140 136 - 145 mmol/L    Potassium 4.3 3.5 - 5.1 mmol/L    Chloride 107 97 - 108 mmol/L    CO2 30 21 - 32 mmol/L    Anion gap 3 (L) 5 - 15 mmol/L    Glucose 97 65 - 100 mg/dL    BUN 17 6 - 20 MG/DL    Creatinine 1.76 (H) 0.70 - 1.30 MG/DL    BUN/Creatinine ratio 10 (L) 12 - 20      GFR est AA 48 (L) >60 ml/min/1.73m2    GFR est non-AA 40 (L) >60 ml/min/1.73m2    Calcium 9.3 8.5 - 10.1 MG/DL    Bilirubin, total 0.5 0.2 - 1.0 MG/DL    ALT (SGPT) 26 12 - 78 U/L    AST (SGOT) 26 15 - 37 U/L    Alk. phosphatase 82 45 - 117 U/L    Protein, total 7.9 6.4 - 8.2 g/dL    Albumin 3.7 3.5 - 5.0 g/dL    Globulin 4.2 (H) 2.0 - 4.0 g/dL    A-G Ratio 0.9 (L) 1.1 - 2.2     TROPONIN I    Collection Time: 10/04/19  7:07 PM   Result Value Ref Range    Troponin-I, Qt. <0.05 <0.05 ng/mL       Radiologic Studies -   XR CHEST PA LAT   Final Result   Impression:   No acute cardiopulmonary process. CT Results  (Last 48 hours)    None        CXR Results  (Last 48 hours)               10/04/19 1944  XR CHEST PA LAT Final result    Impression:  Impression:   No acute cardiopulmonary process. Narrative:  Chest PA and lateral       History: Palpitations. Short of breath. Comparison: 7/27/2019       Findings: A right IJ Port-A-Cath is in place. The lungs are well expanded. No   focal consolidation, pleural effusion, or pneumothorax. The cardiomediastinal   silhouette is unremarkable. The patient is status post anterior cervical   fusion. Medical Decision Making   I am the first provider for this patient. I reviewed the vital signs, available nursing notes, past medical history, past surgical history, family history and social history. Vital Signs-Reviewed the patient's vital signs.   Patient Vitals for the past 12 hrs:   Temp Pulse Resp BP SpO2   10/04/19 1823 98.5 °F (36.9 °C) 97 16 134/89 100 %       EKG interpretation: (Preliminary)  Rhythm: normal sinus rhythm; and regular . Rate (approx.): 93; Axis: normal; UT interval: normal; QRS interval: normal ; ST/T wave: non-specific changes; Other findings: unchanged from previous ekg. Records Reviewed: Nursing Notes, Old Medical Records, Previous electrocardiograms, Previous Radiology Studies and Previous Laboratory Studies    Provider Notes (Medical Decision Making):   Palpitations, dehydration, anxiety, electrolyte abnormality, CAD,    ED Course:   Initial assessment performed. The patients presenting problems have been discussed, and they are in agreement with the care plan formulated and outlined with them. I have encouraged them to ask questions as they arise throughout their visit. Progress note: The patient's results were reviewed. The patient is feeling better. He is advised to follow-up and return to ER if worse       Critical Care Time:   0    Disposition:  Home    PLAN:  1. Discharge Medication List as of 10/4/2019 10:34 PM        2. Follow-up Information     Follow up With Specialties Details Why Contact Info    Nixon Turner MD Cardiology Call in 3 days As needed [address]  [address]  [facility]  [phone]      Nimisha Rodriguez MD Family Practice  As needed [address] [place] [address]  [address] [address] [phone]      [facility] EMERGENCY DEPT Emergency Medicine  If symptoms worsen [address] [place] [address] Drive  [address] [facility]  [phone]        Return to ED if worse     Diagnosis     Clinical Impression:   1. Palpitations    2. Dehydration    3. Atypical chest pain        Attestations:    Dallas Anthony MD    Please note that this dictation was completed with [employer] the [employer] voice recognition software.   Quite often unanticipated grammatical, syntax, homophones, and other interpretive errors are inadvertently transcribed by the computer software. Please disregard these errors. Please excuse any errors that have escaped final proofreading. Thank you.

## 2019-10-05 NOTE — ED NOTES
Assumed care of pt via triage. Pt comes to ED for SOB and heart palpitations that has been going on for about a week now. Pt states it feels like his heart is racing and he cant catch his breath. Pt denies any chest pain at this time. Pt resting comfortably on stretcher in position of comfort. Pt in no apparent distress at this time. Call bell within reach. Side rails x2. Connected to monitor x3. Pt a/o x4. Stretcher locked in lowest position. Pt aware of plan to await for MD/PA-C/NP assessment, and pt/family verbalizes understanding. Will continue to monitor pt condition.

## 2019-10-05 NOTE — ED NOTES
MD Yanira Calixto at bedside to provide discharge paperwork to patient. Vital signs stable. Patient in no apparent distress at this time. Mental status at baseline. Discharge paperwork in hand and prescription instructions if applicable. Accompanied by wife.

## 2019-10-05 NOTE — DISCHARGE INSTRUCTIONS
Patient Education        Chest Pain: Care Instructions  Your Care Instructions    There are many things that can cause chest pain. Some are not serious and will get better on their own in a few days. But some kinds of chest pain need more testing and treatment. Your doctor may have recommended a follow-up visit in the next 8 to 12 hours. If you are not getting better, you may need more tests or treatment. Even though your doctor has released you, you still need to watch for any problems. The doctor carefully checked you, but sometimes problems can develop later. If you have new symptoms or if your symptoms do not get better, get medical care right away. If you have worse or different chest pain or pressure that lasts more than 5 minutes or you passed out (lost consciousness), call 911 or seek other emergency help right away. A medical visit is only one step in your treatment. Even if you feel better, you still need to do what your doctor recommends, such as going to all suggested follow-up appointments and taking medicines exactly as directed. This will help you recover and help prevent future problems. How can you care for yourself at home? · Rest until you feel better. · Take your medicine exactly as prescribed. Call your doctor if you think you are having a problem with your medicine. · Do not drive after taking a prescription pain medicine. When should you call for help? Call 911 if:    · You passed out (lost consciousness).     · You have severe difficulty breathing.     · You have symptoms of a heart attack. These may include:  ? Chest pain or pressure, or a strange feeling in your chest.  ? Sweating. ? Shortness of breath. ? Nausea or vomiting. ? Pain, pressure, or a strange feeling in your back, neck, jaw, or upper belly or in one or both shoulders or arms. ? Lightheadedness or sudden weakness. ? A fast or irregular heartbeat.   After you call 911, the  may tell you to chew 1 adult-strength or 2 to 4 low-dose aspirin. Wait for an ambulance. Do not try to drive yourself.    Call your doctor today if:    · You have any trouble breathing.     · Your chest pain gets worse.     · You are dizzy or lightheaded, or you feel like you may faint.     · You are not getting better as expected.     · You are having new or different chest pain. Where can you learn more? Go to http://charley-avi.info/. Enter A120 in the search box to learn more about \"Chest Pain: Care Instructions. \"  Current as of: June 26, 2019  Content Version: 12.2  © 2766-7602 [employer]. Care instructions adapted under license by [employer] (which disclaims liability or warranty for this information). If you have questions about a medical condition or this instruction, always ask your healthcare professional. [place] [address] any warranty or liability for your use of this information. Patient Education        Dehydration: Care Instructions  Your Care Instructions  Dehydration happens when your body loses too much fluid. This might happen when you do not drink enough water or you lose large amounts of fluids from your body because of diarrhea, vomiting, or sweating. Severe dehydration can be life-threatening. Water and minerals called electrolytes help put your body fluids back in balance. Learn the early signs of fluid loss, and drink more fluids to prevent dehydration. Follow-up care is a key part of your treatment and safety. Be sure to make and go to all appointments, and call your doctor if you are having problems. It's also a good idea to know your test results and keep a list of the medicines you take. How can you care for yourself at home? · To prevent dehydration, drink plenty of fluids, enough so that your urine is light yellow or clear like water. Choose water and other caffeine-free clear liquids until you feel better.  If you have kidney, heart, or liver disease and have to limit fluids, talk with your doctor before you increase the amount of fluids you drink. · If you do not feel like eating or drinking, try taking small sips of water, sports drinks, or other rehydration drinks. · Get plenty of rest.  To prevent dehydration  · Add more fluids to your diet and daily routine, unless your doctor has told you not to. · During hot weather, drink more fluids. Drink even more fluids if you exercise a lot. Stay away from drinks with alcohol or caffeine. · Watch for the symptoms of dehydration. These include:  ? A dry, sticky mouth. ? Dark yellow urine, and not much of it. ? Dry and sunken eyes. ? Feeling very tired. · Learn what problems can lead to dehydration. These include:  ? Diarrhea, fever, and vomiting. ? Any illness with a fever, such as pneumonia or the flu. ? Activities that cause heavy sweating, such as endurance races and heavy outdoor work in hot or humid weather. ? Alcohol or drug use or problems caused by quitting their use (withdrawal). ? Certain medicines, such as cold and allergy pills (antihistamines), diet pills (diuretics), and laxatives. ? Certain diseases, such as diabetes, cancer, and heart or kidney disease. When should you call for help? Call 911 anytime you think you may need emergency care. For example, call if:    · You passed out (lost consciousness).    Call your doctor now or seek immediate medical care if:    · You are confused and cannot think clearly.     · You are dizzy or lightheaded, or you feel like you may faint.     · You have signs of needing more fluids. You have sunken eyes and a dry mouth, and you pass only a little dark urine.     · You cannot keep fluids down.    Watch closely for changes in your health, and be sure to contact your doctor if:    · You are not making tears.     · Your skin is very dry and sags slowly back into place after you pinch it.     · Your mouth and eyes are very dry. Where can you learn more? Go to http://charley-avi.info/. Enter X692 in the search box to learn more about \"Dehydration: Care Instructions. \"  Current as of: June 26, 2019  Content Version: 12.2  © 4231-4973 [employer]. Care instructions adapted under license by [employer] (which disclaims liability or warranty for this information). If you have questions about a medical condition or this instruction, always ask your healthcare professional. [place] [address] any warranty or liability for your use of this information. Patient Education        Palpitations: Care Instructions  Your Care Instructions    Heart palpitations are the uncomfortable sensation that your heart is beating fast or irregularly. You might feel pounding or fluttering in your chest. It might feel like your heart is skipping a beat. Although palpitations may be caused by a heart problem, they also occur because of stress, fatigue, or use of alcohol, caffeine, or nicotine. Many medicines, including diet pills, antihistamines, decongestants, and some herbal products, can cause heart palpitations. Nearly everyone has palpitations from time to time. Depending on your symptoms, your doctor may need to do more tests to try to find the cause of your palpitations. Follow-up care is a key part of your treatment and safety. Be sure to make and go to all appointments, and call your doctor if you are having problems. It's also a good idea to know your test results and keep a list of the medicines you take. How can you care for yourself at home? · Avoid caffeine, nicotine, and excess alcohol. · Do not take illegal drugs, such as methamphetamines and cocaine. · Do not take weight loss or diet medicines unless you talk with your doctor first.  · Get plenty of sleep. · Do not overeat. · If you have palpitations again, take deep breaths and try to relax.  This may slow a racing heart.  · If you start to feel lightheaded, lie down to avoid injuries that might result if you pass out and fall down. · Keep a record of your palpitations and bring it to your next doctor's appointment. Write down:  ? The date and time. ? Your pulse. (If your heart is beating fast, it may be hard to count your pulse.)  ? What you were doing when the palpitations started. ? How long the palpitations lasted. ? Any other symptoms. · If an activity causes palpitations, slow down or stop. Talk to your doctor before you do that activity again. · Take your medicines exactly as prescribed. Call your doctor if you think you are having a problem with your medicine. When should you call for help? Call 911 anytime you think you may need emergency care. For example, call if:    · You passed out (lost consciousness).     · You have symptoms of a heart attack. These may include:  ? Chest pain or pressure, or a strange feeling in the chest.  ? Sweating. ? Shortness of breath. ? Pain, pressure, or a strange feeling in the back, neck, jaw, or upper belly or in one or both shoulders or arms. ? Lightheadedness or sudden weakness. ? A fast or irregular heartbeat. After you call 911, the  may tell you to chew 1 adult-strength or 2 to 4 low-dose aspirin. Wait for an ambulance. Do not try to drive yourself.     · You have symptoms of a stroke. These may include:  ? Sudden numbness, tingling, weakness, or loss of movement in your face, arm, or leg, especially on only one side of your body. ? Sudden vision changes. ? Sudden trouble speaking. ? Sudden confusion or trouble understanding simple statements. ? Sudden problems with walking or balance. ? A sudden, severe headache that is different from past headaches.    Call your doctor now or seek immediate medical care if:    · You have heart palpitations and:  ? Are dizzy or lightheaded, or you feel like you may faint. ? Have new or increased shortness of breath.  Watch closely for changes in your health, and be sure to contact your doctor if:    · You continue to have heart palpitations. Where can you learn more? Go to http://charley-avi.info/. Enter R508 in the search box to learn more about \"Palpitations: Care Instructions. \"  Current as of: April 9, 2019  Content Version: 12.2  © 0549-1906 [employer]. Care instructions adapted under license by [employer] (which disclaims liability or warranty for this information). If you have questions about a medical condition or this instruction, always ask your healthcare professional. [place] [address] any warranty or liability for your use of this information.

## 2019-10-09 DIAGNOSIS — C90.00 MULTIPLE MYELOMA, REMISSION STATUS UNSPECIFIED (HCC): ICD-10-CM

## 2019-10-09 RX ORDER — POMALIDOMIDE 2 MG/1
CAPSULE ORAL
Qty: 28 CAP | Refills: 0 | Status: SHIPPED | OUTPATIENT
Start: 2019-10-09 | End: 2019-11-04 | Stop reason: SDUPTHER

## 2019-10-09 NOTE — TELEPHONE ENCOUNTER
This nurse called pt. HIPAA verified by two patient identifiers. Pt prostatitis has cleared up. Will reorder his meds.     VORB FROM DR Sylvia GRUBBS 2MG CAP TAKE 1 BY MOUTH DAILY DISPENSE 28

## 2019-10-16 ENCOUNTER — HOSPITAL ENCOUNTER (OUTPATIENT)
Dept: INFUSION THERAPY | Age: 64
End: 2019-10-16

## 2019-10-17 ENCOUNTER — HOSPITAL ENCOUNTER (OUTPATIENT)
Dept: INFUSION THERAPY | Age: 64
Discharge: HOME | End: 2019-10-17
Payer: COMMERCIAL

## 2019-10-17 VITALS
DIASTOLIC BLOOD PRESSURE: 89 MMHG | BODY MASS INDEX: 28.68 KG/M2 | SYSTOLIC BLOOD PRESSURE: 140 MMHG | WEIGHT: 188.6 LBS | TEMPERATURE: 98.2 F | HEART RATE: 90 BPM | RESPIRATION RATE: 20 BRPM | OXYGEN SATURATION: 99 %

## 2019-10-17 DIAGNOSIS — C90.00 MULTIPLE MYELOMA, REMISSION STATUS UNSPECIFIED (HCC): Primary | ICD-10-CM

## 2019-10-17 LAB
ALBUMIN SERPL-MCNC: 3.5 G/DL (ref 3.5–5)
ALBUMIN/GLOB SERPL: 1 {RATIO} (ref 1.1–2.2)
ALP SERPL-CCNC: 92 U/L (ref 45–117)
ALT SERPL-CCNC: 22 U/L (ref 12–78)
ANION GAP SERPL CALC-SCNC: 12 MMOL/L (ref 5–15)
AST SERPL-CCNC: 20 U/L (ref 15–37)
BASOPHILS # BLD: 0 K/UL (ref 0–0.1)
BASOPHILS NFR BLD: 1 % (ref 0–1)
BILIRUB SERPL-MCNC: 0.5 MG/DL (ref 0.2–1)
BUN SERPL-MCNC: 20 MG/DL (ref 6–20)
BUN/CREAT SERPL: 14 (ref 12–20)
CALCIUM SERPL-MCNC: 8.1 MG/DL (ref 8.5–10.1)
CHLORIDE SERPL-SCNC: 107 MMOL/L (ref 97–108)
CO2 SERPL-SCNC: 25 MMOL/L (ref 21–32)
CREAT SERPL-MCNC: 1.41 MG/DL (ref 0.7–1.3)
DIFFERENTIAL METHOD BLD: ABNORMAL
EOSINOPHIL # BLD: 0.1 K/UL (ref 0–0.4)
EOSINOPHIL NFR BLD: 3 % (ref 0–7)
ERYTHROCYTE [DISTWIDTH] IN BLOOD BY AUTOMATED COUNT: 17.5 % (ref 11.5–14.5)
GLOBULIN SER CALC-MCNC: 3.4 G/DL (ref 2–4)
GLUCOSE SERPL-MCNC: 104 MG/DL (ref 65–100)
HCT VFR BLD AUTO: 33.8 % (ref 36.6–50.3)
HGB BLD-MCNC: 10.5 G/DL (ref 12.1–17)
IGA SERPL-MCNC: 221 MG/DL (ref 70–400)
IGG SERPL-MCNC: 977 MG/DL (ref 700–1600)
IGM SERPL-MCNC: 41 MG/DL (ref 40–230)
IMM GRANULOCYTES # BLD AUTO: 0 K/UL (ref 0–0.04)
IMM GRANULOCYTES NFR BLD AUTO: 0 % (ref 0–0.5)
LYMPHOCYTES # BLD: 1 K/UL (ref 0.8–3.5)
LYMPHOCYTES NFR BLD: 34 % (ref 12–49)
MCH RBC QN AUTO: 28.7 PG (ref 26–34)
MCHC RBC AUTO-ENTMCNC: 31.1 G/DL (ref 30–36.5)
MCV RBC AUTO: 92.3 FL (ref 80–99)
MONOCYTES # BLD: 0.5 K/UL (ref 0–1)
MONOCYTES NFR BLD: 18 % (ref 5–13)
NEUTS SEG # BLD: 1.2 K/UL (ref 1.8–8)
NEUTS SEG NFR BLD: 44 % (ref 32–75)
NRBC # BLD: 0 K/UL (ref 0–0.01)
NRBC BLD-RTO: 0 PER 100 WBC
PLATELET # BLD AUTO: 171 K/UL (ref 150–400)
PMV BLD AUTO: 10.1 FL (ref 8.9–12.9)
POTASSIUM SERPL-SCNC: 4.4 MMOL/L (ref 3.5–5.1)
PROT SERPL-MCNC: 6.9 G/DL (ref 6.4–8.2)
RBC # BLD AUTO: 3.66 M/UL (ref 4.1–5.7)
SODIUM SERPL-SCNC: 144 MMOL/L (ref 136–145)
WBC # BLD AUTO: 2.8 K/UL (ref 4.1–11.1)

## 2019-10-17 PROCEDURE — 36415 COLL VENOUS BLD VENIPUNCTURE: CPT

## 2019-10-17 PROCEDURE — 80053 COMPREHEN METABOLIC PANEL: CPT

## 2019-10-17 PROCEDURE — 82784 ASSAY IGA/IGD/IGG/IGM EACH: CPT

## 2019-10-17 PROCEDURE — 84165 PROTEIN E-PHORESIS SERUM: CPT

## 2019-10-17 PROCEDURE — 36591 DRAW BLOOD OFF VENOUS DEVICE: CPT

## 2019-10-17 PROCEDURE — 85025 COMPLETE CBC W/AUTO DIFF WBC: CPT

## 2019-10-17 PROCEDURE — 83883 ASSAY NEPHELOMETRY NOT SPEC: CPT

## 2019-10-17 PROCEDURE — 74011250636 HC RX REV CODE- 250/636: Performed by: NURSE PRACTITIONER

## 2019-10-17 PROCEDURE — 77030012965 HC NDL HUBR BBMI -A

## 2019-10-17 RX ORDER — SODIUM CHLORIDE 9 MG/ML
10 INJECTION INTRAMUSCULAR; INTRAVENOUS; SUBCUTANEOUS AS NEEDED
Status: CANCELLED | OUTPATIENT
Start: 2019-10-17

## 2019-10-17 RX ORDER — SODIUM CHLORIDE 9 MG/ML
10 INJECTION INTRAMUSCULAR; INTRAVENOUS; SUBCUTANEOUS AS NEEDED
Status: ACTIVE | OUTPATIENT
Start: 2019-10-17 | End: 2019-10-17

## 2019-10-17 RX ORDER — SODIUM CHLORIDE 0.9 % (FLUSH) 0.9 %
5-10 SYRINGE (ML) INJECTION AS NEEDED
Status: ACTIVE | OUTPATIENT
Start: 2019-10-17 | End: 2019-10-17

## 2019-10-17 RX ORDER — SODIUM CHLORIDE 0.9 % (FLUSH) 0.9 %
5-10 SYRINGE (ML) INJECTION AS NEEDED
Status: CANCELLED | OUTPATIENT
Start: 2020-01-15

## 2019-10-17 RX ORDER — HEPARIN 100 UNIT/ML
500 SYRINGE INTRAVENOUS AS NEEDED
Status: CANCELLED | OUTPATIENT
Start: 2020-01-15

## 2019-10-17 RX ORDER — SODIUM CHLORIDE 9 MG/ML
10 INJECTION INTRAMUSCULAR; INTRAVENOUS; SUBCUTANEOUS AS NEEDED
Status: CANCELLED | OUTPATIENT
Start: 2019-11-20

## 2019-10-17 RX ORDER — HEPARIN 100 UNIT/ML
500 SYRINGE INTRAVENOUS AS NEEDED
Status: CANCELLED | OUTPATIENT
Start: 2019-11-20

## 2019-10-17 RX ORDER — SODIUM CHLORIDE 0.9 % (FLUSH) 0.9 %
5-10 SYRINGE (ML) INJECTION AS NEEDED
Status: CANCELLED | OUTPATIENT
Start: 2019-10-17

## 2019-10-17 RX ORDER — SODIUM CHLORIDE 0.9 % (FLUSH) 0.9 %
5-10 SYRINGE (ML) INJECTION AS NEEDED
Status: CANCELLED | OUTPATIENT
Start: 2019-11-20

## 2019-10-17 RX ORDER — HEPARIN 100 UNIT/ML
500 SYRINGE INTRAVENOUS AS NEEDED
Status: CANCELLED | OUTPATIENT
Start: 2019-10-17

## 2019-10-17 RX ORDER — HEPARIN 100 UNIT/ML
500 SYRINGE INTRAVENOUS AS NEEDED
Status: ACTIVE | OUTPATIENT
Start: 2019-10-17 | End: 2019-10-17

## 2019-10-17 RX ORDER — SODIUM CHLORIDE 9 MG/ML
10 INJECTION INTRAMUSCULAR; INTRAVENOUS; SUBCUTANEOUS AS NEEDED
Status: CANCELLED | OUTPATIENT
Start: 2020-01-15

## 2019-10-17 RX ADMIN — Medication 10 ML: at 10:20

## 2019-10-17 RX ADMIN — Medication 10 ML: at 10:18

## 2019-10-17 RX ADMIN — Medication 500 UNITS: at 10:20

## 2019-10-17 NOTE — PROGRESS NOTES
Problem: Infection - Risk of, Central Venous Catheter-Associated Bloodstream Infection  Goal: *Absence of infection signs and symptoms  Outcome: Progressing Towards Goal

## 2019-10-17 NOTE — PROGRESS NOTES
[facility] Visit Note    [id number] Pt arrived at [facility] ambulatory and in no distress for [employer] Assessment completed, no new complaints voiced. Port accessed per protocol. Labs drawn. Port flushed and de-accessed. Visit Vitals  /89 (BP 1 Location: Left arm, BP Patient Position: Sitting)   Pulse 90   Temp 98.2 °F (36.8 °C)   Resp 20   Wt 85.5 kg (188 lb 9.6 oz)   SpO2 99%   BMI 28.68 kg/m²     Recent Results (from the past 12 hour(s))   CBC WITH AUTOMATED DIFF    Collection Time: 10/17/19 10:10 AM   Result Value Ref Range    WBC 2.8 (L) 4.1 - 11.1 K/uL    RBC 3.66 (L) 4.10 - 5.70 M/uL    HGB 10.5 (L) 12.1 - 17.0 g/dL    HCT 33.8 (L) 36.6 - 50.3 %    MCV 92.3 80.0 - 99.0 FL    MCH 28.7 26.0 - 34.0 PG    MCHC 31.1 30.0 - 36.5 g/dL    RDW 17.5 (H) 11.5 - 14.5 %    PLATELET 554 497 - 717 K/uL    MPV 10.1 8.9 - 12.9 FL    NRBC 0.0 0  WBC    ABSOLUTE NRBC 0.00 0.00 - 0.01 K/uL    NEUTROPHILS 44 32 - 75 %    LYMPHOCYTES 34 12 - 49 %    MONOCYTES 18 (H) 5 - 13 %    EOSINOPHILS 3 0 - 7 %    BASOPHILS 1 0 - 1 %    IMMATURE GRANULOCYTES 0 0.0 - 0.5 %    ABS. NEUTROPHILS 1.2 (L) 1.8 - 8.0 K/UL    ABS. LYMPHOCYTES 1.0 0.8 - 3.5 K/UL    ABS. MONOCYTES 0.5 0.0 - 1.0 K/UL    ABS. EOSINOPHILS 0.1 0.0 - 0.4 K/UL    ABS. BASOPHILS 0.0 0.0 - 0.1 K/UL    ABS. IMM.  GRANS. 0.0 0.00 - 0.04 K/UL    DF AUTOMATED     METABOLIC PANEL, COMPREHENSIVE    Collection Time: 10/17/19 10:10 AM   Result Value Ref Range    Sodium 144 136 - 145 mmol/L    Potassium 4.4 3.5 - 5.1 mmol/L    Chloride 107 97 - 108 mmol/L    CO2 25 21 - 32 mmol/L    Anion gap 12 5 - 15 mmol/L    Glucose 104 (H) 65 - 100 mg/dL    BUN 20 6 - 20 MG/DL    Creatinine 1.41 (H) 0.70 - 1.30 MG/DL    BUN/Creatinine ratio 14 12 - 20      GFR est AA >60 >60 ml/min/1.73m2    GFR est non-AA 51 (L) >60 ml/min/1.73m2    Calcium 8.1 (L) 8.5 - 10.1 MG/DL    Bilirubin, total 0.5 0.2 - 1.0 MG/DL    ALT (SGPT) 22 12 - 78 U/L    AST (SGOT) 20 15 - 37 U/L    Alk. phosphatase 92 45 - 117 U/L    Protein, total 6.9 6.4 - 8.2 g/dL    Albumin 3.5 3.5 - 5.0 g/dL    Globulin 3.4 2.0 - 4.0 g/dL    A-G Ratio 1.0 (L) 1.1 - 2.2       See [facility] for pended labs    Medications received:  none    1020 Tolerated treatment well, no adverse reaction noted. D/Cd from [facility] and in no distress accompanied by spouse. Next appt 11/20.

## 2019-10-18 LAB
KAPPA LC FREE SER-MCNC: 33.2 MG/L (ref 3.3–19.4)
KAPPA LC FREE/LAMBDA FREE SER: 1.18 {RATIO} (ref 0.26–1.65)
LAMBDA LC FREE SERPL-MCNC: 28.1 MG/L (ref 5.7–26.3)

## 2019-10-19 LAB
ALBUMIN SERPL ELPH-MCNC: 3.3 G/DL (ref 2.9–4.4)
ALBUMIN/GLOB SERPL: 1.1 {RATIO} (ref 0.7–1.7)
ALPHA1 GLOB SERPL ELPH-MCNC: 0.2 G/DL (ref 0–0.4)
ALPHA2 GLOB SERPL ELPH-MCNC: 0.9 G/DL (ref 0.4–1)
B-GLOBULIN SERPL ELPH-MCNC: 0.9 G/DL (ref 0.7–1.3)
GAMMA GLOB SERPL ELPH-MCNC: 0.9 G/DL (ref 0.4–1.8)
GLOBULIN SER CALC-MCNC: 2.9 G/DL (ref 2.2–3.9)
M PROTEIN SERPL ELPH-MCNC: NORMAL G/DL
PROT SERPL-MCNC: 6.2 G/DL (ref 6–8.5)

## 2019-10-21 LAB
IGA SERPL-MCNC: 233 MG/DL (ref 61–437)
IGG SERPL-MCNC: 922 MG/DL (ref 700–1600)
IGM SERPL-MCNC: 40 MG/DL (ref 20–172)
PROT PATTERN SERPL IFE-IMP: NORMAL

## 2019-11-04 DIAGNOSIS — C90.00 MULTIPLE MYELOMA, REMISSION STATUS UNSPECIFIED (HCC): ICD-10-CM

## 2019-11-11 LAB
CREATININE, EXTERNAL: NORMAL
LDL-C, EXTERNAL: NORMAL
MICROALBUMIN UR TEST STR-MCNC: NORMAL MG/DL

## 2019-11-12 ENCOUNTER — OFFICE VISIT (OUTPATIENT)
Dept: ENDOCRINOLOGY | Age: 64
End: 2019-11-12

## 2019-11-12 VITALS
DIASTOLIC BLOOD PRESSURE: 70 MMHG | HEART RATE: 70 BPM | WEIGHT: 195 LBS | BODY MASS INDEX: 29.55 KG/M2 | SYSTOLIC BLOOD PRESSURE: 113 MMHG | HEIGHT: 68 IN

## 2019-11-12 DIAGNOSIS — E11.9 TYPE 2 DIABETES MELLITUS WITHOUT COMPLICATION, WITH LONG-TERM CURRENT USE OF INSULIN (HCC): Primary | ICD-10-CM

## 2019-11-12 DIAGNOSIS — I10 ESSENTIAL HYPERTENSION WITH GOAL BLOOD PRESSURE LESS THAN 130/80: ICD-10-CM

## 2019-11-12 DIAGNOSIS — Z79.4 TYPE 2 DIABETES MELLITUS WITHOUT COMPLICATION, WITH LONG-TERM CURRENT USE OF INSULIN (HCC): Primary | ICD-10-CM

## 2019-11-12 LAB
ALBUMIN SERPL-MCNC: 4.7 G/DL (ref 3.6–4.8)
ALBUMIN/CREAT UR: 3.1 MG/G CREAT (ref 0–30)
ALBUMIN/GLOB SERPL: 1.8 {RATIO} (ref 1.2–2.2)
ALP SERPL-CCNC: 96 IU/L (ref 39–117)
ALT SERPL-CCNC: 11 IU/L (ref 0–44)
AST SERPL-CCNC: 14 IU/L (ref 0–40)
BILIRUB SERPL-MCNC: 0.2 MG/DL (ref 0–1.2)
BUN SERPL-MCNC: 17 MG/DL (ref 8–27)
BUN/CREAT SERPL: 12 (ref 10–24)
CALCIUM SERPL-MCNC: 9 MG/DL (ref 8.6–10.2)
CHLORIDE SERPL-SCNC: 103 MMOL/L (ref 96–106)
CHOLEST SERPL-MCNC: 140 MG/DL (ref 100–199)
CO2 SERPL-SCNC: 25 MMOL/L (ref 20–29)
CREAT SERPL-MCNC: 1.46 MG/DL (ref 0.76–1.27)
CREAT UR-MCNC: 135 MG/DL
GLOBULIN SER CALC-MCNC: 2.6 G/DL (ref 1.5–4.5)
GLUCOSE SERPL-MCNC: 119 MG/DL (ref 65–99)
HBA1C MFR BLD HPLC: 5.3 %
HDLC SERPL-MCNC: 50 MG/DL
INTERPRETATION, 910389: NORMAL
INTERPRETATION: NORMAL
LDLC SERPL CALC-MCNC: 59 MG/DL (ref 0–99)
Lab: NORMAL
MICROALBUMIN UR-MCNC: 4.2 UG/ML
PDF IMAGE, 910387: NORMAL
POTASSIUM SERPL-SCNC: 4.1 MMOL/L (ref 3.5–5.2)
PROT SERPL-MCNC: 7.3 G/DL (ref 6–8.5)
SODIUM SERPL-SCNC: 141 MMOL/L (ref 134–144)
TRIGL SERPL-MCNC: 155 MG/DL (ref 0–149)
VLDLC SERPL CALC-MCNC: 31 MG/DL (ref 5–40)

## 2019-11-12 RX ORDER — METOPROLOL SUCCINATE 25 MG/1
TABLET, EXTENDED RELEASE ORAL
Refills: 3 | COMMUNITY
Start: 2019-10-24 | End: 2021-04-30 | Stop reason: ALTCHOICE

## 2019-11-12 NOTE — PROGRESS NOTES
CHIEF COMPLAINT: f/u uncontrolled diabetes    HISTORY OF PRESENT ILLNESS:   Karina Jama Sr. is a 60 y.o. male with a PMHx as noted below who presents for f/u of uncontrolled type 2 diabetes with steroid induced hyperglycemia.     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,   Had been on insulin with steroids but now off,  A1c remains at 5.3%,    Current Home Regimen:   metformin 500mg twice daily    Review of home glucose:   AM:  mostly    Review of most recent diabetes-related labs:  Lab Results   Component Value Date    HBA1C 6.2 08/25/2017    HBA1C 8.3 (H) 12/24/2010    HQE2MHNQ 6.6% 04/07/2017    QST0QURX 5.3 07/09/2019    UTS1COYV 6.4 02/21/2018    IML9HVNM 7.3 11/10/2017    CHOL 101 07/14/2011    LDLC 27.8 07/14/2011    GFRAA >60 10/17/2019    GFRNA 51 (L) 10/17/2019    CREATEXT Cr 1.46, GFR 59 11/11/2019    MALBEXT pending 11/11/2019    TSH 0.94 09/19/2011       Feeling well, no chest pain or SOB,    PAST MEDICAL/SURGICAL HISTORY:   Past Medical History:   Diagnosis Date    Acid reflux     Arrhythmia     pvc's    Chronic pain     neck    Depression     Diabetes ([address]     Femoral hernia 7/11/2012    Hepatitis C     Hypertension     Inguinal hernia 7/11/2012    Liver disease     hepatitis c    Multiple myeloma ([address]     Other ill-defined conditions(799.89)     Hx of PVCs since 2009    Prostatitis, acute     Psychiatric disorder     depression     Past Surgical History:   Procedure Laterality Date    COLONOSCOPY N/A 9/20/2016    COL[facility] ENDOSCOPY    HX APPENDECTOMY      HX CERVICAL FUSION  2008    x 1 as of 4/30/2014    HX HEENT      foreign body removed from right eye    HX HERNIA REPAIR  2012    [facility]      HX ORTHOPAEDIC      cervical fusion    HX OTHER SURGICAL  2000    liver bx - chronic hepatitis       ALLERGIES:   Allergies   Allergen Reactions    Mercury (Bulk) Hives     Blisters         MEDICATIONS ON ADMISSION: Current Outpatient Medications:     pomalidomide (POMALYST) 2 mg cap, Take 1 Cap by mouth daily for 28 days. , Disp: 28 Cap, Rfl: 0    metFORMIN ER (GLUCOPHAGE XR) 500 mg tablet, Take 1 Tab by mouth two (2) times a day., Disp: 180 Tab, Rfl: 3    NUCYNTA 100 mg tablet, TAKE 2 TABLETS 3 TIMES A DAY, Disp: , Rfl: 0    aspirin delayed-release 81 mg tablet, Take  by mouth daily. , Disp: , Rfl:     buPROPion XL (WELLBUTRIN XL) 150 mg tablet, Take 300 mg by mouth every morning., Disp: , Rfl:     ergocalciferol (ERGOCALCIFEROL) 50,000 unit capsule, TAKE 1 CAP BY MOUTH EVERY 7 DAYS, Mondays, Disp: , Rfl: 4    multivitamin (ONE A DAY) tablet, Take 1 Tab by mouth daily. , Disp: , Rfl:     tamsulosin (FLOMAX) 0.4 mg capsule, Take 1 Cap by mouth daily. , Disp: 30 Cap, Rfl: 1    zolpidem CR (AMBIEN CR) 12.5 mg tablet, Take 12.5 mg by mouth nightly., Disp: , Rfl:     aripiprazole (ABILIFY) 5 mg tablet, Take 2.5 mg by mouth every morning., Disp: , Rfl:     metoprolol succinate (TOPROL-XL) 25 mg XL tablet, TAKE 1 TABLET BY MOUTH EVERY DAY IN THE EVENING, Disp: , Rfl: 3    naproxen (NAPROSYN) 500 mg tablet, TAKE 1 TABLET BY MOUTH TWICE A DAY, Disp: , Rfl: 5    doxycycline (VIBRAMYCIN) 100 mg capsule, TAKE 1 CAPSULE BY MOUTH TWICE A DAY, Disp: , Rfl: 0    acyclovir (ZOVIRAX) 400 mg tablet, TAKE 1 TABLET BY MOUTH TWICE A DAY, Disp: , Rfl: 3    levoFLOXacin (LEVAQUIN) 500 mg tablet, Take 1 Tab by mouth daily. , Disp: 7 Tab, Rfl: 0    insulin lispro protamine/insulin lispro (HUMALOG 50-50 MIX) 100 unit/mL (50-50) flexpen, by SubCUTAneous route., Disp: , Rfl:     prochlorperazine (COMPAZINE) 10 mg tablet, Take 5 mg by mouth every six (6) hours as needed. , Disp: , Rfl:     omeprazole (PRILOSEC) 20 mg capsule, Take 20 mg by mouth daily as needed (acid reflux). , Disp: , Rfl:     ondansetron (ZOFRAN ODT) 8 mg disintegrating tablet, Take 8 mg by mouth every eight (8) hours as needed for Nausea., Disp: , Rfl:     ixazomib 4 mg cap, Take 4 mg by mouth every seven (7) days. Patient takes 4 mg every  on week 1-3 of cycle, Disp: , Rfl:     tenofovir DISOPROXIL FUMARATE (VIREAD) 300 mg tablet, Take 300 mg by mouth daily. , Disp: , Rfl:     lisinopril (PRINIVIL, ZESTRIL) 10 mg tablet, TAKE 1 TABLET BY MOUTH EVERY DAY as needed.   Hold if SBP <115, Disp: , Rfl: 11    folic acid (FOLVITE) 1 mg tablet, TAKE 1 TABLET BY MOUTH EVERY DAY, Disp: , Rfl: 4    SOCIAL HISTORY:   Social History     Socioeconomic History    Marital status:      Spouse name: Not on file    Number of children: Not on file    Years of education: Not on file    Highest education level: Not on file   Occupational History    Not on file   Social Needs    Financial resource strain: Not on file    Food insecurity:     Worry: Not on file     Inability: Not on file    Transportation needs:     Medical: Not on file     Non-medical: Not on file   Tobacco Use    Smoking status: Former Smoker     Packs/day: 1.00     Years: 12.00     Pack years: 12.00     Types: Cigarettes     Last attempt to quit: 1989     Years since quittin.8    Smokeless tobacco: Never Used    Tobacco comment: former cigarette smoker   Substance and Sexual Activity    Alcohol use: No     Comment: former alcoholic-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40    Drug use: No    Sexual activity: Not Currently   Lifestyle    Physical activity:     Days per week: Not on file     Minutes per session: Not on file    Stress: Not on file   Relationships    Social connections:     Talks on phone: Not on file     Gets together: Not on file     Attends Hoahaoism service: Not on file     Active member of club or organization: Not on file     Attends meetings of clubs or organizations: Not on file     Relationship status: Not on file    Intimate partner violence:     Fear of current or ex partner: Not on file     Emotionally abused: Not on file     Physically abused: Not on file     Forced sexual activity: Not on file Other Topics Concern    Not on file   Social History Narrative    Not on file       FAMILY HISTORY:  Family History   Problem Relation Age of Onset    Arthritis-osteo Mother     Arthritis-osteo Father     Diabetes Father     Hypertension Maternal Grandmother     Diabetes Maternal Grandmother     Hypertension Maternal Grandfather     Diabetes Maternal Grandfather        REVIEW OF SYSTEMS: Complete ROS assessed and noted for that which is described above, all else are negative. Eyes: normal  ENT: normal  CVS: normal  Resp: normal  GI: normal  : normal  GYN: normal  Endocrine: normal  Integument: normal  Musculoskeletal: normal  Neuro: normal  Psych: normal    PHYSICAL EXAMINATION:    VITAL SIGNS:  Visit Vitals  /70 (BP 1 Location: Left arm, BP Patient Position: Sitting)   Pulse 70   Ht 5' 8\" (1.727 m)   Wt 195 lb (88.5 kg)   BMI 29.65 kg/m²       GENERAL: NCAT, Sitting comfortably, NAD  EYES: EOMI, non-icteric, no proptosis  Ear/Nose/Throat: NCAT, no inflammation, no masses  LYMPH NODES: No LAD  CARDIOVASCULAR: S1 S2, RRR, No murmur, 2+ radial pulses  RESPIRATORY: CTA b/l, no wheeze/rales  GASTROINTESTINAL: NT, ND  MUSCULOSKELETAL: Normal ROM, no atrophy  SKIN: warm, no edema/rash/ or other skin changes  NEUROLOGIC: 5/5 power all extremities, no tremor, AAOx3  PSYCHIATRIC: Normal affect, Normal insight and judgement     Diabetic foot exam:     Left Foot:   Visual Exam: callous - great toe   Pulse DP: 2+ (normal)   Filament test: normal sensation    Vibratory sensation: Vibratory sensation: normal       Right Foot:   Visual Exam: callous - great toe   Pulse DP: 2+ (normal)   Filament test: normal sensation    Vibratory sensation: Vibratory sensation: normal      REVIEW OF LABORATORY AND RADIOLOGY DATA:   Labs and documentation have been reviewed as described above.      ASSESSMENT AND PLAN:   Carin Puckett . is a 60 y.o. male with a PMHx as noted above who presents for f/u of uncontrolled type 2 diabetes with steroid induced hyperglycemia. Problems:  Type 2 diabetes uncontrolled  Steroid induced hyperglycemia  Hypertension        Patient has had resolution of his high blood sugars following discontinuation of steroids, and now his blood pressure had come down also and he is not taking the lisinopril. He is however now on metoprolol per cardiology for PVC's stable at this time. Diabetes foot exam completed today. PLAN  Type 2 Diabetes  Medications: Metformin 500mg twice daily      HTN: reviewed, off lisinopril due to low BP, but also now on metoprolol for PVC's with cardiology, stable    Lipids: reviewed, stable this month    RTC: 4 month follow up visit    25 minutes spent together with patient today of which >50% of this time was spent in counseling and coordination of care. Ankur Hinds.  [facility] Endocrinology  [address] [place] [address]

## 2019-11-15 NOTE — PROGRESS NOTES
Renal function stable compared with prior,   Cholesterol and urine microalbumin also stable,   Notified patient,     Bryant Seller.  39 [facility] Endocrinology  [address] [facility] poor

## 2019-11-20 ENCOUNTER — HOSPITAL ENCOUNTER (OUTPATIENT)
Dept: INFUSION THERAPY | Age: 64
Discharge: HOME | End: 2019-11-20
Payer: COMMERCIAL

## 2019-11-20 VITALS
TEMPERATURE: 98.2 F | SYSTOLIC BLOOD PRESSURE: 139 MMHG | HEIGHT: 68 IN | RESPIRATION RATE: 18 BRPM | BODY MASS INDEX: 29.7 KG/M2 | DIASTOLIC BLOOD PRESSURE: 87 MMHG | OXYGEN SATURATION: 98 % | HEART RATE: 70 BPM | WEIGHT: 196 LBS

## 2019-11-20 DIAGNOSIS — C90.00 MULTIPLE MYELOMA, REMISSION STATUS UNSPECIFIED (HCC): Primary | ICD-10-CM

## 2019-11-20 LAB
ALBUMIN SERPL-MCNC: 3.5 G/DL (ref 3.5–5)
ALBUMIN/GLOB SERPL: 1 {RATIO} (ref 1.1–2.2)
ALP SERPL-CCNC: 100 U/L (ref 45–117)
ALT SERPL-CCNC: 18 U/L (ref 12–78)
ANION GAP SERPL CALC-SCNC: 5 MMOL/L (ref 5–15)
AST SERPL-CCNC: 17 U/L (ref 15–37)
BASOPHILS # BLD: 0 K/UL (ref 0–0.1)
BASOPHILS NFR BLD: 1 % (ref 0–1)
BILIRUB SERPL-MCNC: 0.3 MG/DL (ref 0.2–1)
BUN SERPL-MCNC: 18 MG/DL (ref 6–20)
BUN/CREAT SERPL: 12 (ref 12–20)
CALCIUM SERPL-MCNC: 8.7 MG/DL (ref 8.5–10.1)
CHLORIDE SERPL-SCNC: 110 MMOL/L (ref 97–108)
CO2 SERPL-SCNC: 27 MMOL/L (ref 21–32)
CREAT SERPL-MCNC: 1.48 MG/DL (ref 0.7–1.3)
DIFFERENTIAL METHOD BLD: ABNORMAL
EOSINOPHIL # BLD: 0.1 K/UL (ref 0–0.4)
EOSINOPHIL NFR BLD: 4 % (ref 0–7)
ERYTHROCYTE [DISTWIDTH] IN BLOOD BY AUTOMATED COUNT: 17.1 % (ref 11.5–14.5)
GLOBULIN SER CALC-MCNC: 3.4 G/DL (ref 2–4)
GLUCOSE SERPL-MCNC: 84 MG/DL (ref 65–100)
HCT VFR BLD AUTO: 36 % (ref 36.6–50.3)
HGB BLD-MCNC: 11.1 G/DL (ref 12.1–17)
IGA SERPL-MCNC: 228 MG/DL (ref 70–400)
IGG SERPL-MCNC: 945 MG/DL (ref 700–1600)
IGM SERPL-MCNC: 30 MG/DL (ref 40–230)
IMM GRANULOCYTES # BLD AUTO: 0 K/UL (ref 0–0.04)
IMM GRANULOCYTES NFR BLD AUTO: 0 % (ref 0–0.5)
LYMPHOCYTES # BLD: 1 K/UL (ref 0.8–3.5)
LYMPHOCYTES NFR BLD: 35 % (ref 12–49)
MCH RBC QN AUTO: 28.5 PG (ref 26–34)
MCHC RBC AUTO-ENTMCNC: 30.8 G/DL (ref 30–36.5)
MCV RBC AUTO: 92.5 FL (ref 80–99)
MONOCYTES # BLD: 0.6 K/UL (ref 0–1)
MONOCYTES NFR BLD: 20 % (ref 5–13)
NEUTS SEG # BLD: 1.2 K/UL (ref 1.8–8)
NEUTS SEG NFR BLD: 40 % (ref 32–75)
NRBC # BLD: 0 K/UL (ref 0–0.01)
NRBC BLD-RTO: 0 PER 100 WBC
PLATELET # BLD AUTO: 165 K/UL (ref 150–400)
PMV BLD AUTO: 10.4 FL (ref 8.9–12.9)
POTASSIUM SERPL-SCNC: 4 MMOL/L (ref 3.5–5.1)
PROT SERPL-MCNC: 6.9 G/DL (ref 6.4–8.2)
RBC # BLD AUTO: 3.89 M/UL (ref 4.1–5.7)
RBC MORPH BLD: ABNORMAL
SODIUM SERPL-SCNC: 142 MMOL/L (ref 136–145)
WBC # BLD AUTO: 2.9 K/UL (ref 4.1–11.1)

## 2019-11-20 PROCEDURE — 74011000250 HC RX REV CODE- 250: Performed by: NURSE PRACTITIONER

## 2019-11-20 PROCEDURE — 77030012965 HC NDL HUBR BBMI -A

## 2019-11-20 PROCEDURE — 85025 COMPLETE CBC W/AUTO DIFF WBC: CPT

## 2019-11-20 PROCEDURE — 36415 COLL VENOUS BLD VENIPUNCTURE: CPT

## 2019-11-20 PROCEDURE — 84165 PROTEIN E-PHORESIS SERUM: CPT

## 2019-11-20 PROCEDURE — 82784 ASSAY IGA/IGD/IGG/IGM EACH: CPT

## 2019-11-20 PROCEDURE — 83883 ASSAY NEPHELOMETRY NOT SPEC: CPT

## 2019-11-20 PROCEDURE — 36591 DRAW BLOOD OFF VENOUS DEVICE: CPT

## 2019-11-20 PROCEDURE — 74011250636 HC RX REV CODE- 250/636: Performed by: NURSE PRACTITIONER

## 2019-11-20 PROCEDURE — 80053 COMPREHEN METABOLIC PANEL: CPT

## 2019-11-20 RX ORDER — TENOFOVIR DISOPROXIL FUMARATE 300 MG/1
300 TABLET, FILM COATED ORAL DAILY
Qty: 30 TAB | Refills: 5 | Status: SHIPPED | OUTPATIENT
Start: 2019-11-20 | End: 2020-04-23

## 2019-11-20 RX ORDER — SODIUM CHLORIDE 9 MG/ML
10 INJECTION INTRAMUSCULAR; INTRAVENOUS; SUBCUTANEOUS AS NEEDED
Status: DISCONTINUED | OUTPATIENT
Start: 2019-11-20 | End: 2019-11-21 | Stop reason: HOSPADM

## 2019-11-20 RX ORDER — HEPARIN 100 UNIT/ML
500 SYRINGE INTRAVENOUS AS NEEDED
Status: DISCONTINUED | OUTPATIENT
Start: 2019-11-20 | End: 2019-11-21 | Stop reason: HOSPADM

## 2019-11-20 RX ORDER — SODIUM CHLORIDE 0.9 % (FLUSH) 0.9 %
5-10 SYRINGE (ML) INJECTION AS NEEDED
Status: DISCONTINUED | OUTPATIENT
Start: 2019-11-20 | End: 2019-11-21 | Stop reason: HOSPADM

## 2019-11-20 RX ADMIN — Medication 500 UNITS: at 15:40

## 2019-11-20 RX ADMIN — SODIUM CHLORIDE 10 ML: 9 INJECTION INTRAMUSCULAR; INTRAVENOUS; SUBCUTANEOUS at 15:39

## 2019-11-20 RX ADMIN — Medication 10 ML: at 15:39

## 2019-11-20 NOTE — PROGRESS NOTES
[facility] Note:  Arrived - 1525     Visit Vitals  /87 (BP 1 Location: Left arm, BP Patient Position: Sitting)   Pulse 70   Temp 98.2 °F (36.8 °C)   Resp 18   Ht 5' 8\" (1.727 m)   Wt 88.9 kg (196 lb)   SpO2 98%   BMI 29.80 kg/m²       Assessment completed, no new concerns voiced. Port accessed, labs drawn & flushed per protocol w/o difficulty. Scott needle removed. Recent Results (from the past 12 hour(s))   CBC WITH AUTOMATED DIFF    Collection Time: 11/20/19  3:30 PM   Result Value Ref Range    WBC 2.9 (L) 4.1 - 11.1 K/uL    RBC 3.89 (L) 4.10 - 5.70 M/uL    HGB 11.1 (L) 12.1 - 17.0 g/dL    HCT 36.0 (L) 36.6 - 50.3 %    MCV 92.5 80.0 - 99.0 FL    MCH 28.5 26.0 - 34.0 PG    MCHC 30.8 30.0 - 36.5 g/dL    RDW 17.1 (H) 11.5 - 14.5 %    PLATELET 695 521 - 597 K/uL    MPV 10.4 8.9 - 12.9 FL    NRBC 0.0 0  WBC    ABSOLUTE NRBC 0.00 0.00 - 0.01 K/uL    NEUTROPHILS PENDING %    LYMPHOCYTES PENDING %    MONOCYTES PENDING %    EOSINOPHILS PENDING %    BASOPHILS PENDING %    IMMATURE GRANULOCYTES PENDING %    ABS. NEUTROPHILS PENDING K/UL    ABS. LYMPHOCYTES PENDING K/UL    ABS. MONOCYTES PENDING K/UL    ABS. EOSINOPHILS PENDING K/UL    ABS. BASOPHILS PENDING K/UL    ABS. IMM. GRANS. PENDING K/UL    DF PENDING    METABOLIC PANEL, COMPREHENSIVE    Collection Time: 11/20/19  3:30 PM   Result Value Ref Range    Sodium 142 136 - 145 mmol/L    Potassium 4.0 3.5 - 5.1 mmol/L    Chloride 110 (H) 97 - 108 mmol/L    CO2 27 21 - 32 mmol/L    Anion gap 5 5 - 15 mmol/L    Glucose 84 65 - 100 mg/dL    BUN 18 6 - 20 MG/DL    Creatinine 1.48 (H) 0.70 - 1.30 MG/DL    BUN/Creatinine ratio 12 12 - 20      GFR est AA 58 (L) >60 ml/min/1.73m2    GFR est non-AA 48 (L) >60 ml/min/1.73m2    Calcium 8.7 8.5 - 10.1 MG/DL    Bilirubin, total 0.3 0.2 - 1.0 MG/DL    ALT (SGPT) 18 12 - 78 U/L    AST (SGOT) 17 15 - 37 U/L    Alk.  phosphatase 100 45 - 117 U/L    Protein, total 6.9 6.4 - 8.2 g/dL    Albumin 3.5 3.5 - 5.0 g/dL    Globulin 3.4 2.0 - 4.0 g/dL    A-G Ratio 1.0 (L) 1.1 - 2.2       See pending labs in [facility]. 1540 - Tolerated well. Pt denies any acute problems/changes. Discharged from [facility] ambulatory. No distress. Next appt: 12/18/19 @ 8225.

## 2019-11-21 LAB
ALBUMIN SERPL ELPH-MCNC: 3.7 G/DL (ref 2.9–4.4)
ALBUMIN/GLOB SERPL: 1.3 {RATIO} (ref 0.7–1.7)
ALPHA1 GLOB SERPL ELPH-MCNC: 0.2 G/DL (ref 0–0.4)
ALPHA2 GLOB SERPL ELPH-MCNC: 0.9 G/DL (ref 0.4–1)
B-GLOBULIN SERPL ELPH-MCNC: 0.9 G/DL (ref 0.7–1.3)
GAMMA GLOB SERPL ELPH-MCNC: 1 G/DL (ref 0.4–1.8)
GLOBULIN SER CALC-MCNC: 2.9 G/DL (ref 2.2–3.9)
KAPPA LC FREE SER-MCNC: 29.8 MG/L (ref 3.3–19.4)
KAPPA LC FREE/LAMBDA FREE SER: 0.97 {RATIO} (ref 0.26–1.65)
LAMBDA LC FREE SERPL-MCNC: 30.7 MG/L (ref 5.7–26.3)
M PROTEIN SERPL ELPH-MCNC: NORMAL G/DL
PROT SERPL-MCNC: 6.6 G/DL (ref 6–8.5)

## 2019-11-25 LAB
IGA SERPL-MCNC: 266 MG/DL (ref 61–437)
IGG SERPL-MCNC: 1000 MG/DL (ref 700–1600)
IGM SERPL-MCNC: 35 MG/DL (ref 20–172)
PROT PATTERN SERPL IFE-IMP: NORMAL

## 2019-12-09 DIAGNOSIS — C90.00 MULTIPLE MYELOMA, REMISSION STATUS UNSPECIFIED (HCC): Primary | ICD-10-CM

## 2019-12-09 NOTE — TELEPHONE ENCOUNTER
Refill pomalyst 2mg cap take 1 by mouth daily.  VORB FROM DR Colby Walker POMALYST 2MG CAP TAKE 1 CAP BY MOUTH DAILY DISPENSE 28 R 0

## 2019-12-13 ENCOUNTER — OFFICE VISIT (OUTPATIENT)
Dept: ONCOLOGY | Age: 64
End: 2019-12-13

## 2019-12-13 VITALS
HEART RATE: 77 BPM | OXYGEN SATURATION: 96 % | DIASTOLIC BLOOD PRESSURE: 80 MMHG | RESPIRATION RATE: 16 BRPM | TEMPERATURE: 98.5 F | HEIGHT: 68 IN | BODY MASS INDEX: 30.46 KG/M2 | SYSTOLIC BLOOD PRESSURE: 119 MMHG | WEIGHT: 201 LBS

## 2019-12-13 DIAGNOSIS — C90.01 MULTIPLE MYELOMA IN REMISSION (HCC): Primary | ICD-10-CM

## 2019-12-13 DIAGNOSIS — D64.81 ANEMIA ASSOCIATED WITH CHEMOTHERAPY: ICD-10-CM

## 2019-12-13 DIAGNOSIS — T45.1X5A ANEMIA ASSOCIATED WITH CHEMOTHERAPY: ICD-10-CM

## 2019-12-13 DIAGNOSIS — N28.9 RENAL INSUFFICIENCY: ICD-10-CM

## 2019-12-14 NOTE — PROGRESS NOTES
[facility]  [address], [address] [facility]  [address] [place] [address] Ne, [address]  [phone]          Progress Note        Patient: Julio Jaramillo Sr. MRN: [id number]  SSN: [id number]    YOB: 1955  Age: 60 y.o. Sex: male        Diagnosis:      1. Multiple Myeloma, IgA Kappa   [place] stage IIIB    Cytogenetics/FISH: t(14;16) - high risk    Ttreatment:     1. Maintenance therapy - Pomalyst 2 mg daily - 11/19/2018 - 08/2019  2. Maintenance therapy - Ixazomib - started 10/15/2018 - stopped 11/12/2018  3. S/P tandem  Autotransplant   First on 2/28/2018   2nd on 06/13/2018  4. Carfilzomib/Pom/Dex - s/p 5 cycles  5. VD-PACE s/p 1 cycle  6. RVD - s/p 4 cycles    Subjective:      Julio Jaramillo Sr. is a 60 y.o. male with a diagnosis of IgA kappa myeloma. Bone marrow shows 100% aberrant plasma cells. He suffers with DM but it is diet controlled. He has received systemic anti-viral treatment for chronic Hep C with successful eradication of the virus. Mr. Francisca Medina received 4 cycles of systemic therapy with RVd. He had an initial good response to treatment. However his paraprotein level started rising and the myeloma became refractory to treatment. I then administered one cycle of VD-PACE in the hospital. He then received Carfilzomib/Pom/Dex. He achieved VGPR. He underwent tandem autotransplant at [address] [place] [address]. According to the patient he achieved complete response with therapy. He has undergone second/tandem transplant in June. He took Pomalyst maintenance until Aug 2019 then had to stop due to recurrent prostatitis. He had a repeat BM biopsy in February 2019 at [address] [place] [address] which showed complete molecular remission. He has completed a prolonged course of Abx for prostatitis. He is back on Pomalyst and is doing well.         Review of Systems:    Constitutional: negative  Eyes: negative  Ears, Nose, Mouth, Throat, and Face: negative  Respiratory: negative  Cardiovascular: negative  Gastrointestinal: negative  Genitourinary:negative  Integument/Breast: negative  Hematologic/Lymphatic: negative  Musculoskeletal: negative  Neurological: negative      Past Medical History:   Diagnosis Date    Acid reflux     Arrhythmia     pvc's    Chronic pain     neck    Depression     Diabetes ([facility] [address]     Femoral hernia 2012    Hepatitis C     Hypertension     Inguinal hernia 2012    Liver disease     hepatitis c    Multiple myeloma (HCC)     Other ill-defined conditions(799.89)     Hx of PVCs since     Prostatitis, acute     Psychiatric disorder     depression     Past Surgical History:   Procedure Laterality Date    COLONOSCOPY N/A 2016    COLON[facility] Bl[facility] ENDOSCOPY    HX APPENDECTOMY      HX CERVICAL FUSION  2008    x 1 as of 2014    HX HEENT      foreign body removed from right eye    HX HERNIA REPAIR      [facility]      HX ORTHOPAEDIC      cervical fusion    HX OTHER SURGICAL  2000    liver bx - chronic hepatitis      Family History   Problem Relation Age of Onset    Arthritis-osteo Mother     Arthritis-osteo Father     Diabetes Father     Hypertension Maternal Grandmother     Diabetes Maternal Grandmother     Hypertension Maternal Grandfather     Diabetes Maternal Grandfather      Social History     Tobacco Use    Smoking status: Former Smoker     Packs/day: 1.00     Years: 12.00     Pack years: 12.00     Types: Cigarettes     Last attempt to quit: 1989     Years since quittin.9    Smokeless tobacco: Never Used    Tobacco comment: former cigarette smoker   Substance Use Topics    Alcohol use: No     Comment: former alcoholic-quit [id number]      Prior to Admission medications    Medication Sig Start Date End Date Taking? Authorizing Provider   pomalidomide (POMALYST) 2 mg cap Take 1 Cap by mouth daily.  19  Yes Cassandra Mckeon MD   tenofovir DISOPROXIL FUMARATE (VIREAD) 300 mg tablet Take 1 Tab by mouth daily. 11/20/19  Yes Phylicia Isaacs MD   metoprolol succinate (TOPROL-XL) 25 mg XL tablet TAKE 1 TABLET BY MOUTH EVERY DAY IN THE EVENING 10/24/19  Yes Provider, Historical   metFORMIN ER (GLUCOPHAGE XR) 500 mg tablet Take 1 Tab by mouth two (2) times a day. 10/28/19  Yes Frederick Oppenheim, MD   naproxen (NAPROSYN) 500 mg tablet TAKE 1 TABLET BY MOUTH TWICE A DAY 8/26/19  Yes Provider, Historical   NUCYNTA 100 mg tablet TAKE 2 TABLETS 3 TIMES A DAY 7/27/19  Yes Provider, Historical   aspirin delayed-release 81 mg tablet Take  by mouth daily. Yes Provider, Historical   insulin lispro protamine/insulin lispro (HUMALOG 50-50 MIX) 100 unit/mL (50-50) flexpen by SubCUTAneous route. Yes Other, MD Goldy   prochlorperazine (COMPAZINE) 10 mg tablet Take 5 mg by mouth every six (6) hours as needed. Yes Other, MD Goldy   omeprazole (PRILOSEC) 20 mg capsule Take 20 mg by mouth daily as needed (acid reflux). Yes Other, MD Goldy   ondansetron (ZOFRAN ODT) 8 mg disintegrating tablet Take 8 mg by mouth every eight (8) hours as needed for Nausea. Yes Other, MD Goldy   buPROPion XL (WELLBUTRIN XL) 150 mg tablet Take 300 mg by mouth every morning. Yes Other, MD Goldy   ergocalciferol (ERGOCALCIFEROL) 50,000 unit capsule TAKE 1 CAP BY MOUTH EVERY 7 DAYS, Mondays 5/14/18  Yes Provider, Historical   multivitamin (ONE A DAY) tablet Take 1 Tab by mouth daily. Yes Provider, Historical   tamsulosin (FLOMAX) 0.4 mg capsule Take 1 Cap by mouth daily. 4/4/17  Yes Sadaf Fallon MD   zolpidem CR (AMBIEN CR) 12.5 mg tablet Take 12.5 mg by mouth nightly. Yes Provider, Historical   aripiprazole (ABILIFY) 5 mg tablet Take 2.5 mg by mouth every morning.    Yes Other, MD Goldy          Allergies   Allergen Reactions    Mercury (Bulk) Hives     Blisters             Objective:     Visit Vitals  /80 (BP 1 Location: Left arm, BP Patient Position: Sitting)   Pulse 77   Temp 98.5 °F (36.9 °C) (Oral)   Resp 16   Ht 5' 8\" (1.727 m)   Wt 201 lb (91.2 kg)   SpO2 96%   BMI 30.56 kg/m²       Pain Scale: 0/10  Pain Location:       Physical Exam:    GENERAL: alert, cooperative  EYE: negative  LYMPHATIC: Cervical, supraclavicular, and axillary nodes normal.   THROAT & NECK: normal and no erythema or exudates noted. LUNG: clear to auscultation bilaterally  HEART: regular rate and rhythm  ABDOMEN: soft, non-tender  EXTREMITIES: normal  SKIN: Normal.  NEUROLOGIC: negative      Lab Results   Component Value Date/Time    WBC 2.9 (L) 11/20/2019 03:30 PM    HGB 11.1 (L) 11/20/2019 03:30 PM    Hematocrit (POC) 36 (L) 09/18/2019 03:48 PM    HCT 36.0 (L) 11/20/2019 03:30 PM    PLATELET 919 08/62/2400 03:30 PM    MCV 92.5 11/20/2019 03:30 PM       Lab Results   Component Value Date/Time    Sodium 142 11/20/2019 03:30 PM    Potassium 4.0 11/20/2019 03:30 PM    Chloride 110 (H) 11/20/2019 03:30 PM    CO2 27 11/20/2019 03:30 PM    Anion gap 5 11/20/2019 03:30 PM    Glucose 84 11/20/2019 03:30 PM    BUN 18 11/20/2019 03:30 PM    Creatinine 1.48 (H) 11/20/2019 03:30 PM    BUN/Creatinine ratio 12 11/20/2019 03:30 PM    GFR est AA 58 (L) 11/20/2019 03:30 PM    GFR est non-AA 48 (L) 11/20/2019 03:30 PM    Calcium 8.7 11/20/2019 03:30 PM            Assessment:     1. Multiple Myeloma, IgA Kappa   [place] stage IIIB    Cytogenetics/FISH: t(14;16) - high risk  ECOG PS - 0   Intent of therapy: palliative    Received 3 cycles of RVd   Dose reduced Revlimid and Velcade d/t continued cytopenias. M-spike started rising. He received one cycle of VD-PACE (bortezomib, dexamethasone, thalidomide, cisplatin, adriamycin, cyclophosphamide, and etoposide). Achieved IL with once cycle    Receivied KPd - s/p 5 cycles    Excellent response with M-protein came down to 0.1    S/p tandem autotransplant    First on 2/28/2018   2nd on 06/13/2018    Achieved CR after the first transplant. Repeat bone marrow @ [facility] - Complete response. Since he is high risk based on cytogenetics, I recommend proteosome inhibitor as maintenance therapy. Started Ixazomib   Developed fever, was in the hospital.   Also developed cytopenias    Patient prefered to switch therapy to Pomalyst.     Pomalyst 2 mg daily - started 11/19/2018  Tolerating treatment very well  Denies any side effects. A detailed system by system evaluation of side effect was performed to assess chemotherapy related toxicity. Blood counts are acceptable. Results reviewed with the patient    In complete remission by serological tests    Repeat bone marrow in  at [facility] shows complete molecular remission  Symptom management form reviewed with patient. 2. Type 2 DM with complication    Managed by Endocrine      3. Chronic Hep C    In sustained virological remission        Plan:       > Continue Pomalyst maintenance  > Myeloma labs monthly  > Port flush monthly  > Continue Zometa every 3 months  > Follow-up in 3 months        Signed by: Mickey Medina MD                     December 14, 2019        CC. Arlie Heimlich, MD  CC.  Usman Ordaz MD

## 2019-12-18 ENCOUNTER — HOSPITAL ENCOUNTER (OUTPATIENT)
Dept: INFUSION THERAPY | Age: 64
Discharge: HOME | End: 2019-12-18
Payer: COMMERCIAL

## 2019-12-18 VITALS
RESPIRATION RATE: 18 BRPM | BODY MASS INDEX: 30.4 KG/M2 | DIASTOLIC BLOOD PRESSURE: 80 MMHG | WEIGHT: 200.6 LBS | OXYGEN SATURATION: 99 % | TEMPERATURE: 97.9 F | HEIGHT: 68 IN | HEART RATE: 70 BPM | SYSTOLIC BLOOD PRESSURE: 127 MMHG

## 2019-12-18 DIAGNOSIS — C90.01 MULTIPLE MYELOMA IN REMISSION (HCC): Primary | ICD-10-CM

## 2019-12-18 LAB
ALBUMIN SERPL-MCNC: 3.6 G/DL (ref 3.5–5)
ALBUMIN/GLOB SERPL: 1.1 {RATIO} (ref 1.1–2.2)
ALP SERPL-CCNC: 87 U/L (ref 45–117)
ALT SERPL-CCNC: 22 U/L (ref 12–78)
ANION GAP SERPL CALC-SCNC: 4 MMOL/L (ref 5–15)
AST SERPL-CCNC: 12 U/L (ref 15–37)
BASOPHILS # BLD: 0 K/UL (ref 0–0.1)
BASOPHILS NFR BLD: 1 % (ref 0–1)
BILIRUB SERPL-MCNC: 0.1 MG/DL (ref 0.2–1)
BUN SERPL-MCNC: 19 MG/DL (ref 6–20)
BUN/CREAT SERPL: 12 (ref 12–20)
CALCIUM SERPL-MCNC: 8.3 MG/DL (ref 8.5–10.1)
CHLORIDE SERPL-SCNC: 110 MMOL/L (ref 97–108)
CO2 SERPL-SCNC: 28 MMOL/L (ref 21–32)
CREAT SERPL-MCNC: 1.56 MG/DL (ref 0.7–1.3)
DIFFERENTIAL METHOD BLD: ABNORMAL
EOSINOPHIL # BLD: 0.1 K/UL (ref 0–0.4)
EOSINOPHIL NFR BLD: 4 % (ref 0–7)
ERYTHROCYTE [DISTWIDTH] IN BLOOD BY AUTOMATED COUNT: 15.6 % (ref 11.5–14.5)
GLOBULIN SER CALC-MCNC: 3.4 G/DL (ref 2–4)
GLUCOSE SERPL-MCNC: 93 MG/DL (ref 65–100)
HCT VFR BLD AUTO: 35.8 % (ref 36.6–50.3)
HGB BLD-MCNC: 11.2 G/DL (ref 12.1–17)
IGA SERPL-MCNC: 241 MG/DL (ref 70–400)
IGG SERPL-MCNC: 973 MG/DL (ref 700–1600)
IGM SERPL-MCNC: 28 MG/DL (ref 40–230)
IMM GRANULOCYTES # BLD AUTO: 0 K/UL (ref 0–0.04)
IMM GRANULOCYTES NFR BLD AUTO: 0 % (ref 0–0.5)
LYMPHOCYTES # BLD: 1.1 K/UL (ref 0.8–3.5)
LYMPHOCYTES NFR BLD: 35 % (ref 12–49)
MCH RBC QN AUTO: 29.1 PG (ref 26–34)
MCHC RBC AUTO-ENTMCNC: 31.3 G/DL (ref 30–36.5)
MCV RBC AUTO: 93 FL (ref 80–99)
MONOCYTES # BLD: 0.7 K/UL (ref 0–1)
MONOCYTES NFR BLD: 22 % (ref 5–13)
NEUTS SEG # BLD: 1.2 K/UL (ref 1.8–8)
NEUTS SEG NFR BLD: 38 % (ref 32–75)
NRBC # BLD: 0 K/UL (ref 0–0.01)
NRBC BLD-RTO: 0 PER 100 WBC
PLATELET # BLD AUTO: 153 K/UL (ref 150–400)
PMV BLD AUTO: 10.2 FL (ref 8.9–12.9)
POTASSIUM SERPL-SCNC: 4.2 MMOL/L (ref 3.5–5.1)
PROT SERPL-MCNC: 7 G/DL (ref 6.4–8.2)
RBC # BLD AUTO: 3.85 M/UL (ref 4.1–5.7)
RBC MORPH BLD: ABNORMAL
SODIUM SERPL-SCNC: 142 MMOL/L (ref 136–145)
WBC # BLD AUTO: 3.1 K/UL (ref 4.1–11.1)

## 2019-12-18 PROCEDURE — 36591 DRAW BLOOD OFF VENOUS DEVICE: CPT

## 2019-12-18 PROCEDURE — 74011250636 HC RX REV CODE- 250/636: Performed by: NURSE PRACTITIONER

## 2019-12-18 PROCEDURE — 82784 ASSAY IGA/IGD/IGG/IGM EACH: CPT

## 2019-12-18 PROCEDURE — 84165 PROTEIN E-PHORESIS SERUM: CPT

## 2019-12-18 PROCEDURE — 85025 COMPLETE CBC W/AUTO DIFF WBC: CPT

## 2019-12-18 PROCEDURE — 77030012965 HC NDL HUBR BBMI -A

## 2019-12-18 PROCEDURE — 36415 COLL VENOUS BLD VENIPUNCTURE: CPT

## 2019-12-18 PROCEDURE — 83883 ASSAY NEPHELOMETRY NOT SPEC: CPT

## 2019-12-18 PROCEDURE — 80053 COMPREHEN METABOLIC PANEL: CPT

## 2019-12-18 RX ORDER — SODIUM CHLORIDE 0.9 % (FLUSH) 0.9 %
10 SYRINGE (ML) INJECTION AS NEEDED
Status: DISCONTINUED | OUTPATIENT
Start: 2019-12-18 | End: 2019-12-19 | Stop reason: HOSPADM

## 2019-12-18 RX ORDER — SODIUM CHLORIDE 9 MG/ML
25 INJECTION, SOLUTION INTRAVENOUS CONTINUOUS
Status: CANCELLED | OUTPATIENT
Start: 2019-12-18

## 2019-12-18 RX ORDER — HEPARIN 100 UNIT/ML
300-500 SYRINGE INTRAVENOUS AS NEEDED
Status: DISCONTINUED | OUTPATIENT
Start: 2019-12-18 | End: 2019-12-19 | Stop reason: HOSPADM

## 2019-12-18 RX ORDER — DIPHENHYDRAMINE HYDROCHLORIDE 50 MG/ML
50 INJECTION, SOLUTION INTRAMUSCULAR; INTRAVENOUS AS NEEDED
Status: CANCELLED
Start: 2019-12-18

## 2019-12-18 RX ORDER — ACETAMINOPHEN 325 MG/1
650 TABLET ORAL AS NEEDED
Status: CANCELLED
Start: 2019-12-18

## 2019-12-18 RX ORDER — ALBUTEROL SULFATE 0.83 MG/ML
2.5 SOLUTION RESPIRATORY (INHALATION) AS NEEDED
Status: CANCELLED
Start: 2019-12-18

## 2019-12-18 RX ORDER — HYDROCORTISONE SODIUM SUCCINATE 100 MG/2ML
100 INJECTION, POWDER, FOR SOLUTION INTRAMUSCULAR; INTRAVENOUS AS NEEDED
Status: CANCELLED | OUTPATIENT
Start: 2019-12-18

## 2019-12-18 RX ORDER — SODIUM CHLORIDE 9 MG/ML
10 INJECTION INTRAMUSCULAR; INTRAVENOUS; SUBCUTANEOUS AS NEEDED
Status: DISCONTINUED | OUTPATIENT
Start: 2019-12-18 | End: 2019-12-19 | Stop reason: HOSPADM

## 2019-12-18 RX ORDER — ONDANSETRON 2 MG/ML
8 INJECTION INTRAMUSCULAR; INTRAVENOUS AS NEEDED
Status: CANCELLED | OUTPATIENT
Start: 2019-12-18

## 2019-12-18 RX ORDER — EPINEPHRINE 1 MG/ML
0.3 INJECTION, SOLUTION, CONCENTRATE INTRAVENOUS AS NEEDED
Status: CANCELLED | OUTPATIENT
Start: 2019-12-18

## 2019-12-18 RX ADMIN — Medication 10 ML: at 15:43

## 2019-12-18 RX ADMIN — Medication 500 UNITS: at 15:43

## 2019-12-18 NOTE — PROGRESS NOTES
[facility] Note:  Dsnvoxp - 1532  Labs obtained: CBC, CMP, Immunofixation, Immunoglobulins, SFLC, SPEP    Visit Vitals  /80 (BP 1 Location: Left arm, BP Patient Position: Sitting)   Pulse 70   Temp 97.9 °F (36.6 °C)   Resp 18   Ht 5' 8\" (1.727 m)   Wt 91 kg (200 lb 9.6 oz)   SpO2 99%   BMI 30.50 kg/m²     Recent Results (from the past 12 hour(s))   CBC WITH AUTOMATED DIFF    Collection Time: 12/18/19  3:35 PM   Result Value Ref Range    WBC 3.1 (L) 4.1 - 11.1 K/uL    RBC 3.85 (L) 4.10 - 5.70 M/uL    HGB 11.2 (L) 12.1 - 17.0 g/dL    HCT 35.8 (L) 36.6 - 50.3 %    MCV 93.0 80.0 - 99.0 FL    MCH 29.1 26.0 - 34.0 PG    MCHC 31.3 30.0 - 36.5 g/dL    RDW 15.6 (H) 11.5 - 14.5 %    PLATELET 391 178 - 243 K/uL    MPV 10.2 8.9 - 12.9 FL    NRBC 0.0 0  WBC    ABSOLUTE NRBC 0.00 0.00 - 0.01 K/uL    NEUTROPHILS PENDING %    LYMPHOCYTES PENDING %    MONOCYTES PENDING %    EOSINOPHILS PENDING %    BASOPHILS PENDING %    IMMATURE GRANULOCYTES PENDING %    ABS. NEUTROPHILS PENDING K/UL    ABS. LYMPHOCYTES PENDING K/UL    ABS. MONOCYTES PENDING K/UL    ABS. EOSINOPHILS PENDING K/UL    ABS. BASOPHILS PENDING K/UL    ABS. IMM. GRANS. PENDING K/UL    DF PENDING    METABOLIC PANEL, COMPREHENSIVE    Collection Time: 12/18/19  3:35 PM   Result Value Ref Range    Sodium 142 136 - 145 mmol/L    Potassium 4.2 3.5 - 5.1 mmol/L    Chloride 110 (H) 97 - 108 mmol/L    CO2 28 21 - 32 mmol/L    Anion gap 4 (L) 5 - 15 mmol/L    Glucose 93 65 - 100 mg/dL    BUN 19 6 - 20 MG/DL    Creatinine 1.56 (H) 0.70 - 1.30 MG/DL    BUN/Creatinine ratio 12 12 - 20      GFR est AA 55 (L) >60 ml/min/1.73m2    GFR est non-AA 45 (L) >60 ml/min/1.73m2    Calcium 8.3 (L) 8.5 - 10.1 MG/DL    Bilirubin, total 0.1 (L) 0.2 - 1.0 MG/DL    ALT (SGPT) 22 12 - 78 U/L    AST (SGOT) 12 (L) 15 - 37 U/L    Alk.  phosphatase 87 45 - 117 U/L    Protein, total 7.0 6.4 - 8.2 g/dL    Albumin 3.6 3.5 - 5.0 g/dL    Globulin 3.4 2.0 - 4.0 g/dL    A-G Ratio 1.1 1.1 - 2.2       See [facility] for pending results. Assessment - unchanged. Pt declined Zometa due to recent dental work. Port accessed & flushed per protocol w/o difficulty. Scott needle removed. 1545 - Tolerated well. Pt denies any acute problems/changes. Discharged from [facility] ambulatory. No distress.  Next appt: 1/15/20 at 1530

## 2019-12-19 LAB
KAPPA LC FREE SER-MCNC: 33.6 MG/L (ref 3.3–19.4)
KAPPA LC FREE/LAMBDA FREE SER: 1.15 {RATIO} (ref 0.26–1.65)
LAMBDA LC FREE SERPL-MCNC: 29.1 MG/L (ref 5.7–26.3)

## 2019-12-22 LAB
ALBUMIN SERPL ELPH-MCNC: 3.8 G/DL (ref 2.9–4.4)
ALBUMIN/GLOB SERPL: 1.3 {RATIO} (ref 0.7–1.7)
ALPHA1 GLOB SERPL ELPH-MCNC: 0.2 G/DL (ref 0–0.4)
ALPHA2 GLOB SERPL ELPH-MCNC: 0.9 G/DL (ref 0.4–1)
B-GLOBULIN SERPL ELPH-MCNC: 0.9 G/DL (ref 0.7–1.3)
GAMMA GLOB SERPL ELPH-MCNC: 1 G/DL (ref 0.4–1.8)
GLOBULIN SER CALC-MCNC: 2.9 G/DL (ref 2.2–3.9)
M PROTEIN SERPL ELPH-MCNC: NORMAL G/DL
PROT SERPL-MCNC: 6.7 G/DL (ref 6–8.5)

## 2019-12-24 LAB
IGA SERPL-MCNC: 271 MG/DL (ref 61–437)
IGG SERPL-MCNC: 1044 MG/DL (ref 700–1600)
IGM SERPL-MCNC: 29 MG/DL (ref 20–172)
PROT PATTERN SERPL IFE-IMP: NORMAL

## 2020-01-02 DIAGNOSIS — C90.00 MULTIPLE MYELOMA, REMISSION STATUS UNSPECIFIED (HCC): ICD-10-CM

## 2020-01-15 ENCOUNTER — HOSPITAL ENCOUNTER (OUTPATIENT)
Dept: INFUSION THERAPY | Age: 65
Discharge: HOME | End: 2020-01-15
Payer: COMMERCIAL

## 2020-01-15 VITALS
RESPIRATION RATE: 18 BRPM | OXYGEN SATURATION: 98 % | TEMPERATURE: 98.1 F | BODY MASS INDEX: 30.54 KG/M2 | DIASTOLIC BLOOD PRESSURE: 85 MMHG | HEART RATE: 73 BPM | WEIGHT: 201.5 LBS | HEIGHT: 68 IN | SYSTOLIC BLOOD PRESSURE: 118 MMHG

## 2020-01-15 DIAGNOSIS — C90.00 MULTIPLE MYELOMA, REMISSION STATUS UNSPECIFIED (HCC): Primary | ICD-10-CM

## 2020-01-15 LAB
ALBUMIN SERPL-MCNC: 3.7 G/DL (ref 3.5–5)
ALBUMIN/GLOB SERPL: 1 {RATIO} (ref 1.1–2.2)
ALP SERPL-CCNC: 74 U/L (ref 45–117)
ALT SERPL-CCNC: 25 U/L (ref 12–78)
ANION GAP SERPL CALC-SCNC: 5 MMOL/L (ref 5–15)
AST SERPL-CCNC: 15 U/L (ref 15–37)
BASOPHILS # BLD: 0 K/UL (ref 0–0.1)
BASOPHILS NFR BLD: 1 % (ref 0–1)
BILIRUB SERPL-MCNC: 0.3 MG/DL (ref 0.2–1)
BUN SERPL-MCNC: 19 MG/DL (ref 6–20)
BUN/CREAT SERPL: 11 (ref 12–20)
CALCIUM SERPL-MCNC: 9.4 MG/DL (ref 8.5–10.1)
CHLORIDE SERPL-SCNC: 107 MMOL/L (ref 97–108)
CO2 SERPL-SCNC: 28 MMOL/L (ref 21–32)
CREAT SERPL-MCNC: 1.69 MG/DL (ref 0.7–1.3)
DIFFERENTIAL METHOD BLD: ABNORMAL
EOSINOPHIL # BLD: 0.1 K/UL (ref 0–0.4)
EOSINOPHIL NFR BLD: 3 % (ref 0–7)
ERYTHROCYTE [DISTWIDTH] IN BLOOD BY AUTOMATED COUNT: 15.5 % (ref 11.5–14.5)
GLOBULIN SER CALC-MCNC: 3.8 G/DL (ref 2–4)
GLUCOSE SERPL-MCNC: 80 MG/DL (ref 65–100)
HCT VFR BLD AUTO: 38.9 % (ref 36.6–50.3)
HGB BLD-MCNC: 12.4 G/DL (ref 12.1–17)
IGA SERPL-MCNC: 282 MG/DL (ref 70–400)
IGG SERPL-MCNC: 1140 MG/DL (ref 700–1600)
IGM SERPL-MCNC: 27 MG/DL (ref 40–230)
IMM GRANULOCYTES # BLD AUTO: 0 K/UL (ref 0–0.04)
IMM GRANULOCYTES NFR BLD AUTO: 0 % (ref 0–0.5)
LYMPHOCYTES # BLD: 1.3 K/UL (ref 0.8–3.5)
LYMPHOCYTES NFR BLD: 36 % (ref 12–49)
MCH RBC QN AUTO: 29 PG (ref 26–34)
MCHC RBC AUTO-ENTMCNC: 31.9 G/DL (ref 30–36.5)
MCV RBC AUTO: 91.1 FL (ref 80–99)
MONOCYTES # BLD: 0.7 K/UL (ref 0–1)
MONOCYTES NFR BLD: 19 % (ref 5–13)
NEUTS SEG # BLD: 1.4 K/UL (ref 1.8–8)
NEUTS SEG NFR BLD: 41 % (ref 32–75)
NRBC # BLD: 0 K/UL (ref 0–0.01)
NRBC BLD-RTO: 0 PER 100 WBC
PLATELET # BLD AUTO: 170 K/UL (ref 150–400)
PMV BLD AUTO: 10.3 FL (ref 8.9–12.9)
POTASSIUM SERPL-SCNC: 4.3 MMOL/L (ref 3.5–5.1)
PROT SERPL-MCNC: 7.5 G/DL (ref 6.4–8.2)
RBC # BLD AUTO: 4.27 M/UL (ref 4.1–5.7)
SODIUM SERPL-SCNC: 140 MMOL/L (ref 136–145)
WBC # BLD AUTO: 3.5 K/UL (ref 4.1–11.1)

## 2020-01-15 PROCEDURE — 82784 ASSAY IGA/IGD/IGG/IGM EACH: CPT

## 2020-01-15 PROCEDURE — 85025 COMPLETE CBC W/AUTO DIFF WBC: CPT

## 2020-01-15 PROCEDURE — 84165 PROTEIN E-PHORESIS SERUM: CPT

## 2020-01-15 PROCEDURE — 36415 COLL VENOUS BLD VENIPUNCTURE: CPT

## 2020-01-15 PROCEDURE — 83883 ASSAY NEPHELOMETRY NOT SPEC: CPT

## 2020-01-15 PROCEDURE — 74011250636 HC RX REV CODE- 250/636: Performed by: NURSE PRACTITIONER

## 2020-01-15 PROCEDURE — 77030012965 HC NDL HUBR BBMI -A

## 2020-01-15 PROCEDURE — 36591 DRAW BLOOD OFF VENOUS DEVICE: CPT

## 2020-01-15 PROCEDURE — 80053 COMPREHEN METABOLIC PANEL: CPT

## 2020-01-15 RX ORDER — SODIUM CHLORIDE 9 MG/ML
10 INJECTION INTRAMUSCULAR; INTRAVENOUS; SUBCUTANEOUS AS NEEDED
Status: DISCONTINUED | OUTPATIENT
Start: 2020-01-15 | End: 2020-01-16 | Stop reason: HOSPADM

## 2020-01-15 RX ORDER — HEPARIN 100 UNIT/ML
500 SYRINGE INTRAVENOUS AS NEEDED
Status: DISCONTINUED | OUTPATIENT
Start: 2020-01-15 | End: 2020-01-16 | Stop reason: HOSPADM

## 2020-01-15 RX ORDER — SODIUM CHLORIDE 0.9 % (FLUSH) 0.9 %
5-10 SYRINGE (ML) INJECTION AS NEEDED
Status: DISCONTINUED | OUTPATIENT
Start: 2020-01-15 | End: 2020-01-16 | Stop reason: HOSPADM

## 2020-01-15 RX ADMIN — Medication 10 ML: at 15:45

## 2020-01-15 RX ADMIN — Medication 500 UNITS: at 15:45

## 2020-01-15 RX ADMIN — SODIUM CHLORIDE 10 ML: 9 INJECTION INTRAMUSCULAR; INTRAVENOUS; SUBCUTANEOUS at 15:45

## 2020-01-15 NOTE — PROGRESS NOTES
8000 [facility] Note:    1343- Arrived for [facility] flush/labs    Visit Vitals  /85 (BP 1 Location: Left arm, BP Patient Position: Sitting)   Pulse 73   Temp 98.1 °F (36.7 °C)   Resp 18   Ht 5' 8\" (1.727 m)   Wt 91.4 kg (201 lb 8 oz)   SpO2 98%   BMI 30.64 kg/m²       Assessment- unchanged. Port accessed with positive blood return noted. Labs drawn per order & port flushed per protocol w/o difficulty. Scott needle removed. 1545 - Tolerated well. Pt denies any acute problems/changes. Discharged from [facility] ambulatory. No distress. Next appt: Future Appointments   Date Time Provider Dajuan Johnston   2/12/2020  3:30 PM CHAIR [address]ales [address] REG   3/12/2020  2:30 PM Patricia Bettencourt MD RDE ROXY [facility]   3/13/2020 11:30 AM Leti Talley MD [facility] still pending at time of note.

## 2020-01-17 LAB
KAPPA LC FREE SER-MCNC: 29.9 MG/L (ref 3.3–19.4)
KAPPA LC FREE/LAMBDA FREE SER: 1.08 {RATIO} (ref 0.26–1.65)
LAMBDA LC FREE SERPL-MCNC: 27.7 MG/L (ref 5.7–26.3)

## 2020-01-18 LAB
ALBUMIN SERPL ELPH-MCNC: 3.8 G/DL (ref 2.9–4.4)
ALBUMIN/GLOB SERPL: 1.3 {RATIO} (ref 0.7–1.7)
ALPHA1 GLOB SERPL ELPH-MCNC: 0.2 G/DL (ref 0–0.4)
ALPHA2 GLOB SERPL ELPH-MCNC: 0.9 G/DL (ref 0.4–1)
B-GLOBULIN SERPL ELPH-MCNC: 0.9 G/DL (ref 0.7–1.3)
GAMMA GLOB SERPL ELPH-MCNC: 1 G/DL (ref 0.4–1.8)
GLOBULIN SER CALC-MCNC: 3 G/DL (ref 2.2–3.9)
M PROTEIN SERPL ELPH-MCNC: NORMAL G/DL
PROT SERPL-MCNC: 6.8 G/DL (ref 6–8.5)

## 2020-01-20 LAB
IGA SERPL-MCNC: 303 MG/DL (ref 61–437)
IGG SERPL-MCNC: 1096 MG/DL (ref 700–1600)
IGM SERPL-MCNC: 27 MG/DL (ref 20–172)
PROT PATTERN SERPL IFE-IMP: NORMAL

## 2020-01-24 DIAGNOSIS — C90.00 MULTIPLE MYELOMA, REMISSION STATUS UNSPECIFIED (HCC): Primary | ICD-10-CM

## 2020-01-24 DIAGNOSIS — E55.9 VITAMIN D DEFICIENCY: ICD-10-CM

## 2020-01-24 NOTE — TELEPHONE ENCOUNTER
PER VORB from Dr. Salas Rodriguez ERGOCALCIFEROL 50,000IU ONE CAP BY MOUTH WEEKLY QUANTITY 4 REFILL 2.

## 2020-01-30 DIAGNOSIS — C90.00 MULTIPLE MYELOMA, REMISSION STATUS UNSPECIFIED (HCC): ICD-10-CM

## 2020-02-05 RX ORDER — ERGOCALCIFEROL 1.25 MG/1
CAPSULE ORAL
Qty: 12 CAP | Refills: 0 | Status: SHIPPED | OUTPATIENT
Start: 2020-02-05 | End: 2020-04-28

## 2020-02-12 ENCOUNTER — HOSPITAL ENCOUNTER (OUTPATIENT)
Dept: INFUSION THERAPY | Age: 65
Discharge: HOME | End: 2020-02-12
Payer: COMMERCIAL

## 2020-02-12 VITALS
TEMPERATURE: 97.9 F | BODY MASS INDEX: 30.36 KG/M2 | DIASTOLIC BLOOD PRESSURE: 91 MMHG | RESPIRATION RATE: 16 BRPM | WEIGHT: 200.3 LBS | HEIGHT: 68 IN | HEART RATE: 75 BPM | SYSTOLIC BLOOD PRESSURE: 141 MMHG | OXYGEN SATURATION: 98 %

## 2020-02-12 DIAGNOSIS — C90.01 MULTIPLE MYELOMA IN REMISSION (HCC): Primary | ICD-10-CM

## 2020-02-12 LAB
ALBUMIN SERPL-MCNC: 3.5 G/DL (ref 3.5–5)
ALBUMIN/GLOB SERPL: 1 {RATIO} (ref 1.1–2.2)
ALP SERPL-CCNC: 68 U/L (ref 45–117)
ALT SERPL-CCNC: 23 U/L (ref 12–78)
ANION GAP SERPL CALC-SCNC: 6 MMOL/L (ref 5–15)
AST SERPL-CCNC: 13 U/L (ref 15–37)
BASOPHILS # BLD: 0 K/UL (ref 0–0.1)
BASOPHILS NFR BLD: 1 % (ref 0–1)
BILIRUB SERPL-MCNC: 0.9 MG/DL (ref 0.2–1)
BUN SERPL-MCNC: 18 MG/DL (ref 6–20)
BUN/CREAT SERPL: 13 (ref 12–20)
CALCIUM SERPL-MCNC: 8.3 MG/DL (ref 8.5–10.1)
CHLORIDE SERPL-SCNC: 108 MMOL/L (ref 97–108)
CO2 SERPL-SCNC: 26 MMOL/L (ref 21–32)
CREAT SERPL-MCNC: 1.43 MG/DL (ref 0.7–1.3)
DIFFERENTIAL METHOD BLD: ABNORMAL
EOSINOPHIL # BLD: 0.1 K/UL (ref 0–0.4)
EOSINOPHIL NFR BLD: 3 % (ref 0–7)
ERYTHROCYTE [DISTWIDTH] IN BLOOD BY AUTOMATED COUNT: 16.1 % (ref 11.5–14.5)
GLOBULIN SER CALC-MCNC: 3.6 G/DL (ref 2–4)
GLUCOSE SERPL-MCNC: 112 MG/DL (ref 65–100)
HCT VFR BLD AUTO: 36 % (ref 36.6–50.3)
HGB BLD-MCNC: 11.4 G/DL (ref 12.1–17)
IGA SERPL-MCNC: 267 MG/DL (ref 70–400)
IGG SERPL-MCNC: 1080 MG/DL (ref 700–1600)
IGM SERPL-MCNC: 24 MG/DL (ref 40–230)
IMM GRANULOCYTES # BLD AUTO: 0 K/UL (ref 0–0.04)
IMM GRANULOCYTES NFR BLD AUTO: 1 % (ref 0–0.5)
LYMPHOCYTES # BLD: 1.4 K/UL (ref 0.8–3.5)
LYMPHOCYTES NFR BLD: 38 % (ref 12–49)
MCH RBC QN AUTO: 28.8 PG (ref 26–34)
MCHC RBC AUTO-ENTMCNC: 31.7 G/DL (ref 30–36.5)
MCV RBC AUTO: 90.9 FL (ref 80–99)
MONOCYTES # BLD: 0.7 K/UL (ref 0–1)
MONOCYTES NFR BLD: 20 % (ref 5–13)
NEUTS SEG # BLD: 1.4 K/UL (ref 1.8–8)
NEUTS SEG NFR BLD: 37 % (ref 32–75)
NRBC # BLD: 0 K/UL (ref 0–0.01)
NRBC BLD-RTO: 0 PER 100 WBC
PLATELET # BLD AUTO: 171 K/UL (ref 150–400)
PMV BLD AUTO: 10.1 FL (ref 8.9–12.9)
POTASSIUM SERPL-SCNC: 3.6 MMOL/L (ref 3.5–5.1)
PROT SERPL-MCNC: 7.1 G/DL (ref 6.4–8.2)
RBC # BLD AUTO: 3.96 M/UL (ref 4.1–5.7)
SODIUM SERPL-SCNC: 140 MMOL/L (ref 136–145)
WBC # BLD AUTO: 3.7 K/UL (ref 4.1–11.1)

## 2020-02-12 PROCEDURE — 36415 COLL VENOUS BLD VENIPUNCTURE: CPT

## 2020-02-12 PROCEDURE — 82784 ASSAY IGA/IGD/IGG/IGM EACH: CPT

## 2020-02-12 PROCEDURE — 74011250636 HC RX REV CODE- 250/636: Performed by: INTERNAL MEDICINE

## 2020-02-12 PROCEDURE — 74011000250 HC RX REV CODE- 250: Performed by: INTERNAL MEDICINE

## 2020-02-12 PROCEDURE — 84165 PROTEIN E-PHORESIS SERUM: CPT

## 2020-02-12 PROCEDURE — 85025 COMPLETE CBC W/AUTO DIFF WBC: CPT

## 2020-02-12 PROCEDURE — 96374 THER/PROPH/DIAG INJ IV PUSH: CPT

## 2020-02-12 PROCEDURE — 83883 ASSAY NEPHELOMETRY NOT SPEC: CPT

## 2020-02-12 PROCEDURE — 80053 COMPREHEN METABOLIC PANEL: CPT

## 2020-02-12 RX ORDER — ALBUTEROL SULFATE 0.83 MG/ML
2.5 SOLUTION RESPIRATORY (INHALATION) AS NEEDED
Status: CANCELLED
Start: 2020-02-12

## 2020-02-12 RX ORDER — SODIUM CHLORIDE 0.9 % (FLUSH) 0.9 %
10 SYRINGE (ML) INJECTION AS NEEDED
Status: CANCELLED
Start: 2020-02-12

## 2020-02-12 RX ORDER — HYDROCORTISONE SODIUM SUCCINATE 100 MG/2ML
100 INJECTION, POWDER, FOR SOLUTION INTRAMUSCULAR; INTRAVENOUS AS NEEDED
Status: CANCELLED | OUTPATIENT
Start: 2020-02-12

## 2020-02-12 RX ORDER — DIPHENHYDRAMINE HYDROCHLORIDE 50 MG/ML
50 INJECTION, SOLUTION INTRAMUSCULAR; INTRAVENOUS AS NEEDED
Status: CANCELLED
Start: 2020-02-12

## 2020-02-12 RX ORDER — HEPARIN 100 UNIT/ML
300-500 SYRINGE INTRAVENOUS AS NEEDED
Status: DISCONTINUED | OUTPATIENT
Start: 2020-02-12 | End: 2020-02-13 | Stop reason: HOSPADM

## 2020-02-12 RX ORDER — HEPARIN 100 UNIT/ML
300-500 SYRINGE INTRAVENOUS AS NEEDED
Status: CANCELLED
Start: 2020-02-12

## 2020-02-12 RX ORDER — SODIUM CHLORIDE 9 MG/ML
10 INJECTION INTRAMUSCULAR; INTRAVENOUS; SUBCUTANEOUS AS NEEDED
Status: CANCELLED | OUTPATIENT
Start: 2020-02-12

## 2020-02-12 RX ORDER — EPINEPHRINE 1 MG/ML
0.3 INJECTION, SOLUTION, CONCENTRATE INTRAVENOUS AS NEEDED
Status: CANCELLED | OUTPATIENT
Start: 2020-02-12

## 2020-02-12 RX ORDER — ONDANSETRON 2 MG/ML
8 INJECTION INTRAMUSCULAR; INTRAVENOUS AS NEEDED
Status: CANCELLED | OUTPATIENT
Start: 2020-02-12

## 2020-02-12 RX ORDER — SODIUM CHLORIDE 9 MG/ML
25 INJECTION, SOLUTION INTRAVENOUS CONTINUOUS
Status: CANCELLED | OUTPATIENT
Start: 2020-02-12

## 2020-02-12 RX ORDER — ACETAMINOPHEN 325 MG/1
650 TABLET ORAL AS NEEDED
Status: CANCELLED
Start: 2020-02-12

## 2020-02-12 RX ORDER — SODIUM CHLORIDE 0.9 % (FLUSH) 0.9 %
10 SYRINGE (ML) INJECTION AS NEEDED
Status: DISCONTINUED | OUTPATIENT
Start: 2020-02-12 | End: 2020-02-13 | Stop reason: HOSPADM

## 2020-02-12 RX ORDER — SODIUM CHLORIDE 9 MG/ML
10 INJECTION INTRAMUSCULAR; INTRAVENOUS; SUBCUTANEOUS AS NEEDED
Status: DISCONTINUED | OUTPATIENT
Start: 2020-02-12 | End: 2020-02-13 | Stop reason: HOSPADM

## 2020-02-12 RX ADMIN — Medication 500 UNITS: at 16:10

## 2020-02-12 RX ADMIN — Medication 10 ML: at 15:03

## 2020-02-12 RX ADMIN — Medication 10 ML: at 15:02

## 2020-02-12 RX ADMIN — ZOLEDRONIC ACID 4 MG: 4 SOLUTION INTRAVENOUS at 15:55

## 2020-02-12 RX ADMIN — SODIUM CHLORIDE 10 ML: 9 INJECTION, SOLUTION INTRAMUSCULAR; INTRAVENOUS; SUBCUTANEOUS at 15:00

## 2020-02-12 RX ADMIN — Medication 10 ML: at 16:10

## 2020-02-12 NOTE — PROGRESS NOTES
[facility] VISIT NOTE    1450  Pt arrived at [facility] ambulatory and in no distress for Zometa. Assessment completed, pt c/o chronic neck pain. Blood pressure (!) 141/91, pulse 75, temperature 97.9 °F (36.6 °C), resp. rate 16, height 5' 8\" (1.727 m), weight 90.9 kg (200 lb 4.8 oz), SpO2 98 %. Right chest port accessed with 0.75 in eddy no difficulty. Positive blood return noted and labs drawn. 1550 Lab results are within treatment parameters. Cr Cl = 68 and corrected calcium = 8.7    Medications received:  Zometa 4 mg IV    Tolerated treatment well, no adverse reaction noted. Port de-accessed and flushed per protocol. Positive blood return noted. 1615  D/C'd from [facility] ambulatory and in no distress. Next appointment is 3/11/20 at .

## 2020-02-13 LAB
KAPPA LC FREE SER-MCNC: 31.4 MG/L (ref 3.3–19.4)
KAPPA LC FREE/LAMBDA FREE SER: 1.22 {RATIO} (ref 0.26–1.65)
LAMBDA LC FREE SERPL-MCNC: 25.8 MG/L (ref 5.7–26.3)

## 2020-02-14 LAB
ALBUMIN SERPL ELPH-MCNC: 3.8 G/DL (ref 2.9–4.4)
ALBUMIN/GLOB SERPL: 1.3 {RATIO} (ref 0.7–1.7)
ALPHA1 GLOB SERPL ELPH-MCNC: 0.2 G/DL (ref 0–0.4)
ALPHA2 GLOB SERPL ELPH-MCNC: 0.9 G/DL (ref 0.4–1)
B-GLOBULIN SERPL ELPH-MCNC: 0.9 G/DL (ref 0.7–1.3)
GAMMA GLOB SERPL ELPH-MCNC: 1 G/DL (ref 0.4–1.8)
GLOBULIN SER CALC-MCNC: 3 G/DL (ref 2.2–3.9)
IGA SERPL-MCNC: 296 MG/DL (ref 61–437)
IGG SERPL-MCNC: 1081 MG/DL (ref 700–1600)
IGM SERPL-MCNC: 28 MG/DL (ref 20–172)
M PROTEIN SERPL ELPH-MCNC: NORMAL G/DL
PROT PATTERN SERPL IFE-IMP: NORMAL
PROT SERPL-MCNC: 6.8 G/DL (ref 6–8.5)

## 2020-02-26 DIAGNOSIS — C90.00 MULTIPLE MYELOMA, REMISSION STATUS UNSPECIFIED (HCC): ICD-10-CM

## 2020-03-10 RX ORDER — HEPARIN 100 UNIT/ML
500 SYRINGE INTRAVENOUS AS NEEDED
Status: CANCELLED | OUTPATIENT
Start: 2020-06-10

## 2020-03-10 RX ORDER — SODIUM CHLORIDE 0.9 % (FLUSH) 0.9 %
5-10 SYRINGE (ML) INJECTION AS NEEDED
Status: CANCELLED | OUTPATIENT
Start: 2020-03-10

## 2020-03-10 RX ORDER — SODIUM CHLORIDE 9 MG/ML
10 INJECTION INTRAMUSCULAR; INTRAVENOUS; SUBCUTANEOUS AS NEEDED
Status: CANCELLED | OUTPATIENT
Start: 2020-03-10

## 2020-03-10 RX ORDER — SODIUM CHLORIDE 9 MG/ML
10 INJECTION INTRAMUSCULAR; INTRAVENOUS; SUBCUTANEOUS AS NEEDED
Status: CANCELLED | OUTPATIENT
Start: 2020-06-10

## 2020-03-10 RX ORDER — SODIUM CHLORIDE 9 MG/ML
10 INJECTION INTRAMUSCULAR; INTRAVENOUS; SUBCUTANEOUS AS NEEDED
Status: CANCELLED | OUTPATIENT
Start: 2020-03-11

## 2020-03-10 RX ORDER — HEPARIN 100 UNIT/ML
500 SYRINGE INTRAVENOUS AS NEEDED
Status: CANCELLED | OUTPATIENT
Start: 2020-03-11

## 2020-03-10 RX ORDER — HEPARIN 100 UNIT/ML
500 SYRINGE INTRAVENOUS AS NEEDED
Status: CANCELLED | OUTPATIENT
Start: 2020-03-10

## 2020-03-10 RX ORDER — SODIUM CHLORIDE 0.9 % (FLUSH) 0.9 %
5-10 SYRINGE (ML) INJECTION AS NEEDED
Status: CANCELLED | OUTPATIENT
Start: 2020-06-10 | End: 2020-06-10

## 2020-03-10 RX ORDER — SODIUM CHLORIDE 0.9 % (FLUSH) 0.9 %
5-10 SYRINGE (ML) INJECTION AS NEEDED
Status: CANCELLED | OUTPATIENT
Start: 2020-03-11

## 2020-03-11 ENCOUNTER — HOSPITAL ENCOUNTER (OUTPATIENT)
Dept: INFUSION THERAPY | Age: 65
Discharge: HOME | End: 2020-03-11
Payer: COMMERCIAL

## 2020-03-11 VITALS
DIASTOLIC BLOOD PRESSURE: 86 MMHG | SYSTOLIC BLOOD PRESSURE: 133 MMHG | WEIGHT: 208.9 LBS | RESPIRATION RATE: 16 BRPM | HEART RATE: 66 BPM | OXYGEN SATURATION: 99 % | TEMPERATURE: 98 F | BODY MASS INDEX: 31.76 KG/M2

## 2020-03-11 DIAGNOSIS — C90.00 MULTIPLE MYELOMA, REMISSION STATUS UNSPECIFIED (HCC): Primary | ICD-10-CM

## 2020-03-11 LAB
ALBUMIN SERPL-MCNC: 3.6 G/DL (ref 3.5–5)
ALBUMIN/GLOB SERPL: 0.9 {RATIO} (ref 1.1–2.2)
ALP SERPL-CCNC: 89 U/L (ref 45–117)
ALT SERPL-CCNC: 28 U/L (ref 12–78)
ANION GAP SERPL CALC-SCNC: 4 MMOL/L (ref 5–15)
AST SERPL-CCNC: 20 U/L (ref 15–37)
BASOPHILS # BLD: 0.1 K/UL (ref 0–0.1)
BASOPHILS NFR BLD: 2 % (ref 0–1)
BILIRUB SERPL-MCNC: 0.4 MG/DL (ref 0.2–1)
BUN SERPL-MCNC: 21 MG/DL (ref 6–20)
BUN/CREAT SERPL: 13 (ref 12–20)
CALCIUM SERPL-MCNC: 9.4 MG/DL (ref 8.5–10.1)
CHLORIDE SERPL-SCNC: 107 MMOL/L (ref 97–108)
CO2 SERPL-SCNC: 28 MMOL/L (ref 21–32)
CREAT SERPL-MCNC: 1.58 MG/DL (ref 0.7–1.3)
DIFFERENTIAL METHOD BLD: ABNORMAL
EOSINOPHIL # BLD: 0.1 K/UL (ref 0–0.4)
EOSINOPHIL NFR BLD: 3 % (ref 0–7)
ERYTHROCYTE [DISTWIDTH] IN BLOOD BY AUTOMATED COUNT: 16.5 % (ref 11.5–14.5)
GLOBULIN SER CALC-MCNC: 4.1 G/DL (ref 2–4)
GLUCOSE SERPL-MCNC: 105 MG/DL (ref 65–100)
HCT VFR BLD AUTO: 37.6 % (ref 36.6–50.3)
HGB BLD-MCNC: 11.9 G/DL (ref 12.1–17)
IGA SERPL-MCNC: 311 MG/DL (ref 70–400)
IGG SERPL-MCNC: 1130 MG/DL (ref 700–1600)
IGM SERPL-MCNC: 28 MG/DL (ref 40–230)
IMM GRANULOCYTES # BLD AUTO: 0 K/UL (ref 0–0.04)
IMM GRANULOCYTES NFR BLD AUTO: 0 % (ref 0–0.5)
LYMPHOCYTES # BLD: 1.2 K/UL (ref 0.8–3.5)
LYMPHOCYTES NFR BLD: 36 % (ref 12–49)
MCH RBC QN AUTO: 28.7 PG (ref 26–34)
MCHC RBC AUTO-ENTMCNC: 31.6 G/DL (ref 30–36.5)
MCV RBC AUTO: 90.8 FL (ref 80–99)
MONOCYTES # BLD: 0.7 K/UL (ref 0–1)
MONOCYTES NFR BLD: 20 % (ref 5–13)
NEUTS SEG # BLD: 1.3 K/UL (ref 1.8–8)
NEUTS SEG NFR BLD: 39 % (ref 32–75)
NRBC # BLD: 0 K/UL (ref 0–0.01)
NRBC BLD-RTO: 0 PER 100 WBC
PLATELET # BLD AUTO: 171 K/UL (ref 150–400)
PMV BLD AUTO: 10.6 FL (ref 8.9–12.9)
POTASSIUM SERPL-SCNC: 4 MMOL/L (ref 3.5–5.1)
PROT SERPL-MCNC: 7.7 G/DL (ref 6.4–8.2)
RBC # BLD AUTO: 4.14 M/UL (ref 4.1–5.7)
RBC MORPH BLD: ABNORMAL
SODIUM SERPL-SCNC: 139 MMOL/L (ref 136–145)
WBC # BLD AUTO: 3.4 K/UL (ref 4.1–11.1)

## 2020-03-11 PROCEDURE — 82784 ASSAY IGA/IGD/IGG/IGM EACH: CPT

## 2020-03-11 PROCEDURE — 74011250636 HC RX REV CODE- 250/636: Performed by: INTERNAL MEDICINE

## 2020-03-11 PROCEDURE — 84165 PROTEIN E-PHORESIS SERUM: CPT

## 2020-03-11 PROCEDURE — 77030012965 HC NDL HUBR BBMI -A

## 2020-03-11 PROCEDURE — 36415 COLL VENOUS BLD VENIPUNCTURE: CPT

## 2020-03-11 PROCEDURE — 74011000250 HC RX REV CODE- 250: Performed by: INTERNAL MEDICINE

## 2020-03-11 PROCEDURE — 85025 COMPLETE CBC W/AUTO DIFF WBC: CPT

## 2020-03-11 PROCEDURE — 80053 COMPREHEN METABOLIC PANEL: CPT

## 2020-03-11 PROCEDURE — 83883 ASSAY NEPHELOMETRY NOT SPEC: CPT

## 2020-03-11 PROCEDURE — 36591 DRAW BLOOD OFF VENOUS DEVICE: CPT

## 2020-03-11 RX ORDER — SODIUM CHLORIDE 0.9 % (FLUSH) 0.9 %
5-10 SYRINGE (ML) INJECTION AS NEEDED
Status: DISCONTINUED | OUTPATIENT
Start: 2020-03-11 | End: 2020-03-12 | Stop reason: HOSPADM

## 2020-03-11 RX ORDER — SODIUM CHLORIDE 9 MG/ML
10 INJECTION INTRAMUSCULAR; INTRAVENOUS; SUBCUTANEOUS AS NEEDED
Status: DISCONTINUED | OUTPATIENT
Start: 2020-03-11 | End: 2020-03-12 | Stop reason: HOSPADM

## 2020-03-11 RX ORDER — HEPARIN 100 UNIT/ML
500 SYRINGE INTRAVENOUS AS NEEDED
Status: DISCONTINUED | OUTPATIENT
Start: 2020-03-11 | End: 2020-03-12 | Stop reason: HOSPADM

## 2020-03-11 RX ADMIN — Medication 500 UNITS: at 15:42

## 2020-03-11 RX ADMIN — Medication 10 ML: at 15:40

## 2020-03-11 RX ADMIN — SODIUM CHLORIDE 10 ML: 9 INJECTION, SOLUTION INTRAMUSCULAR; INTRAVENOUS; SUBCUTANEOUS at 15:40

## 2020-03-11 NOTE — PROGRESS NOTES
8000 [facility] Note:  Arrived - 1530    Visit Vitals  /86   Pulse 66   Temp 98 °F (36.7 °C)   Resp 16   Wt 94.8 kg (208 lb 14.4 oz)   SpO2 99%   BMI 31.76 kg/m²     Recent Results (from the past 12 hour(s))   CBC WITH AUTOMATED DIFF    Collection Time: 03/11/20  3:44 PM   Result Value Ref Range    WBC 3.4 (L) 4.1 - 11.1 K/uL    RBC 4.14 4.10 - 5.70 M/uL    HGB 11.9 (L) 12.1 - 17.0 g/dL    HCT 37.6 36.6 - 50.3 %    MCV 90.8 80.0 - 99.0 FL    MCH 28.7 26.0 - 34.0 PG    MCHC 31.6 30.0 - 36.5 g/dL    RDW 16.5 (H) 11.5 - 14.5 %    PLATELET 897 658 - 607 K/uL    MPV 10.6 8.9 - 12.9 FL    NRBC 0.0 0  WBC    ABSOLUTE NRBC 0.00 0.00 - 0.01 K/uL    NEUTROPHILS 39 32 - 75 %    LYMPHOCYTES 36 12 - 49 %    MONOCYTES 20 (H) 5 - 13 %    EOSINOPHILS 3 0 - 7 %    BASOPHILS 2 (H) 0 - 1 %    IMMATURE GRANULOCYTES 0 0.0 - 0.5 %    ABS. NEUTROPHILS 1.3 (L) 1.8 - 8.0 K/UL    ABS. LYMPHOCYTES 1.2 0.8 - 3.5 K/UL    ABS. MONOCYTES 0.7 0.0 - 1.0 K/UL    ABS. EOSINOPHILS 0.1 0.0 - 0.4 K/UL    ABS. BASOPHILS 0.1 0.0 - 0.1 K/UL    ABS. IMM. GRANS. 0.0 0.00 - 0.04 K/UL    DF AUTOMATED      RBC COMMENTS ANISOCYTOSIS  1+       METABOLIC PANEL, COMPREHENSIVE    Collection Time: 03/11/20  3:44 PM   Result Value Ref Range    Sodium 139 136 - 145 mmol/L    Potassium 4.0 3.5 - 5.1 mmol/L    Chloride 107 97 - 108 mmol/L    CO2 28 21 - 32 mmol/L    Anion gap 4 (L) 5 - 15 mmol/L    Glucose 105 (H) 65 - 100 mg/dL    BUN 21 (H) 6 - 20 MG/DL    Creatinine 1.58 (H) 0.70 - 1.30 MG/DL    BUN/Creatinine ratio 13 12 - 20      GFR est AA 54 (L) >60 ml/min/1.73m2    GFR est non-AA 45 (L) >60 ml/min/1.73m2    Calcium 9.4 8.5 - 10.1 MG/DL    Bilirubin, total 0.4 0.2 - 1.0 MG/DL    ALT (SGPT) 28 12 - 78 U/L    AST (SGOT) 20 15 - 37 U/L    Alk. phosphatase 89 45 - 117 U/L    Protein, total 7.7 6.4 - 8.2 g/dL    Albumin 3.6 3.5 - 5.0 g/dL    Globulin 4.1 (H) 2.0 - 4.0 g/dL    A-G Ratio 0.9 (L) 1.1 - 2.2         Assessment - unchanged. Port accessed & flushed per protocol w/o difficulty. Labs drawn per orders and sent for processing. Scott needle removed. 1545 - Tolerated well. Pt denies any acute problems/changes. Discharged from [address] [place] [address]. No distress. Next appt: 4/8/20.

## 2020-03-12 ENCOUNTER — OFFICE VISIT (OUTPATIENT)
Dept: ENDOCRINOLOGY | Age: 65
End: 2020-03-12

## 2020-03-12 VITALS
WEIGHT: 206 LBS | SYSTOLIC BLOOD PRESSURE: 108 MMHG | BODY MASS INDEX: 31.22 KG/M2 | DIASTOLIC BLOOD PRESSURE: 74 MMHG | HEART RATE: 77 BPM | HEIGHT: 68 IN

## 2020-03-12 DIAGNOSIS — Z79.4 TYPE 2 DIABETES MELLITUS WITHOUT COMPLICATION, WITH LONG-TERM CURRENT USE OF INSULIN (HCC): Primary | ICD-10-CM

## 2020-03-12 DIAGNOSIS — E11.9 TYPE 2 DIABETES MELLITUS WITHOUT COMPLICATION, WITH LONG-TERM CURRENT USE OF INSULIN (HCC): Primary | ICD-10-CM

## 2020-03-12 DIAGNOSIS — I10 ESSENTIAL HYPERTENSION WITH GOAL BLOOD PRESSURE LESS THAN 130/80: ICD-10-CM

## 2020-03-12 LAB
IGA SERPL-MCNC: 336 MG/DL (ref 61–437)
IGG SERPL-MCNC: 1117 MG/DL (ref 700–1600)
IGM SERPL-MCNC: 34 MG/DL (ref 20–172)
PROT PATTERN SERPL IFE-IMP: NORMAL

## 2020-03-12 RX ORDER — LISINOPRIL 10 MG/1
TABLET ORAL
COMMUNITY
Start: 2020-01-19 | End: 2021-04-30 | Stop reason: ALTCHOICE

## 2020-03-12 RX ORDER — BUPROPION HYDROCHLORIDE 300 MG/1
TABLET ORAL
COMMUNITY
Start: 2020-01-30

## 2020-03-12 NOTE — PROGRESS NOTES
CHIEF COMPLAINT: f/u uncontrolled diabetes    HISTORY OF PRESENT ILLNESS:   Afua Dave Sr. is a 60 y.o. male with a PMHx as noted below who presents for f/u of uncontrolled type 2 diabetes with steroid induced hyperglycemia.     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,   Had been on insulin with steroids but now off,  A1c today is 6.2%,   Has been hungry these days, eating through the day, and up at night to snack,    Current Home Regimen:   metformin 500mg twice daily    Review of home glucose:   No log or meter  Reports AM numbers 120-130's,     Review of most recent diabetes-related labs:  Lab Results   Component Value Date    HBA1C 6.2 08/25/2017    HBA1C 8.3 (H) 12/24/2010    LNI5KHIV 6.6% 04/07/2017    ZWC3GNJE 5.3 11/12/2019    KET5OAWX 5.3 07/09/2019    QCM8UMQW 6.4 02/21/2018    CHOL 140 11/11/2019    LDLC 59 11/11/2019    GFRAA 54 (L) 03/11/2020    GFRNA 45 (L) 03/11/2020    CREATEXT Cr 1.46, GFR 59 11/11/2019    MCACR 3.1 11/11/2019    MALBEXT pending 11/11/2019    TSH 0.94 09/19/2011       Feeling well, no chest pain or SOB,    PAST MEDICAL/SURGICAL HISTORY:   Past Medical History:   Diagnosis Date    Acid reflux     Arrhythmia     pvc's    Chronic pain     neck    Depression     Diabetes ([address]     Femoral hernia 7/11/2012    Hepatitis C     Hypertension     Inguinal hernia 7/11/2012    Liver disease     hepatitis c    Multiple myeloma ([address]     Other ill-defined conditions(799.89)     Hx of PVCs since 2009    Prostatitis, acute     Psychiatric disorder     depression     Past Surgical History:   Procedure Laterality Date    COLONOSCOPY N/A 9/20/2016    COLONOSCOPY [facility]    HX APPENDECTOMY      HX CERVICAL FUSION  2008    x 1 as of 4/30/2014    HX HEENT      foreign body removed from right eye    HX [facility]ERNIA REPAIR  2012    [facility]      HX ORTHOPAEDIC      cervical fusion    HX OTHER SURGICAL  2000    liver bx - chronic hepatitis       ALLERGIES:   Allergies   Allergen Reactions    Mercury (Bulk) Hives     Blisters         MEDICATIONS ON ADMISSION:     Current Outpatient Medications:     buPROPion XL (WELLBUTRIN XL) 300 mg XL tablet, TAKE 1 TABLET BY MOUTH EVERY DAY, Disp: , Rfl:     pomalidomide (POMALYST) 2 mg cap, Take 1 Cap by mouth daily. , Disp: 28 Cap, Rfl: 0    ergocalciferol (ERGOCALCIFEROL) 1,250 mcg (50,000 unit) capsule, TAKE 1 CAPSULE BY MOUTH ONCE WEEKLY, Disp: 12 Cap, Rfl: 0    tenofovir DISOPROXIL FUMARATE (VIREAD) 300 mg tablet, Take 1 Tab by mouth daily. , Disp: 30 Tab, Rfl: 5    metoprolol succinate (TOPROL-XL) 25 mg XL tablet, TAKE 1 TABLET BY MOUTH EVERY DAY IN THE EVENING, Disp: , Rfl: 3    metFORMIN ER (GLUCOPHAGE XR) 500 mg tablet, Take 1 Tab by mouth two (2) times a day., Disp: 180 Tab, Rfl: 3    NUCYNTA 100 mg tablet, TAKE 2 TABLETS 3 TIMES A DAY, Disp: , Rfl: 0    aspirin delayed-release 81 mg tablet, Take  by mouth daily. , Disp: , Rfl:     multivitamin (ONE A DAY) tablet, Take 1 Tab by mouth daily. , Disp: , Rfl:     tamsulosin (FLOMAX) 0.4 mg capsule, Take 1 Cap by mouth daily. , Disp: 30 Cap, Rfl: 1    zolpidem CR (AMBIEN CR) 12.5 mg tablet, Take 12.5 mg by mouth nightly., Disp: , Rfl:     aripiprazole (ABILIFY) 5 mg tablet, Take 2.5 mg by mouth every morning., Disp: , Rfl:     lisinopriL (PRINIVIL, ZESTRIL) 10 mg tablet, , Disp: , Rfl:     naproxen (NAPROSYN) 500 mg tablet, TAKE 1 TABLET BY MOUTH TWICE A DAY, Disp: , Rfl: 5    insulin lispro protamine/insulin lispro (HUMALOG 50-50 MIX) 100 unit/mL (50-50) flexpen, by SubCUTAneous route., Disp: , Rfl:     prochlorperazine (COMPAZINE) 10 mg tablet, Take 5 mg by mouth every six (6) hours as needed. , Disp: , Rfl:     omeprazole (PRILOSEC) 20 mg capsule, Take 20 mg by mouth daily as needed (acid reflux). , Disp: , Rfl:     ondansetron (ZOFRAN ODT) 8 mg disintegrating tablet, Take 8 mg by mouth every eight (8) hours as needed for Nausea. , Disp: , Rfl:     buPROPion XL (WELLBUTRIN XL) 150 mg tablet, Take 300 mg by mouth every morning., Disp: , Rfl:   No current facility-administered medications for this visit.      SOCIAL HISTORY:   Social History     Socioeconomic History    Marital status:      Spouse name: Not on file    Number of children: Not on file    Years of education: Not on file    Highest education level: Not on file   Occupational History    Not on file   Social Needs    Financial resource strain: Not on file    Food insecurity     Worry: Not on file     Inability: Not on file    Transportation needs     Medical: Not on file     Non-medical: Not on file   Tobacco Use    Smoking status: Former Smoker     Packs/day: 1.00     Years: 12.00     Pack years: 12.00     Types: Cigarettes     Last attempt to quit: 1989     Years since quittin.2    Smokeless tobacco: Never Used    Tobacco comment: former cigarette smoker   Substance and Sexual Activity    Alcohol use: No     Comment: former alcoholic-quit [id number]    Drug use: No    Sexual activity: Not Currently   Lifestyle    Physical activity     Days per week: Not on file     Minutes per session: Not on file    Stress: Not on file   Relationships    Social connections     Talks on phone: Not on file     Gets together: Not on file     Attends Orthodox service: Not on file     Active member of club or organization: Not on file     Attends meetings of clubs or organizations: Not on file     Relationship status: Not on file    Intimate partner violence     Fear of current or ex partner: Not on file     Emotionally abused: Not on file     Physically abused: Not on file     Forced sexual activity: Not on file   Other Topics Concern    Not on file   Social History Narrative    Not on file       FAMILY HISTORY:  Family History   Problem Relation Age of Onset    Arthritis-osteo Mother     Arthritis-osteo Father     Diabetes Father     Hypertension Maternal Grandmother  Diabetes Maternal Grandmother     Hypertension Maternal Grandfather     Diabetes Maternal Grandfather        REVIEW OF SYSTEMS: Complete ROS assessed and noted for that which is described above, all else are negative. Eyes: normal  ENT: normal  CVS: normal  Resp: normal  GI: normal  : normal  GYN: normal  Endocrine: normal  Integument: normal  Musculoskeletal: normal  Neuro: normal  Psych: normal    PHYSICAL EXAMINATION:    VITAL SIGNS:  Visit Vitals  /74 (BP 1 Location: Left arm, BP Patient Position: Sitting)   Pulse 77   Ht 5' 8\" (1.727 m)   Wt 206 lb (93.4 kg)   BMI 31.32 kg/m²       GENERAL: NCAT, Sitting comfortably, NAD  EYES: EOMI, non-icteric, no proptosis  Ear/Nose/Throat: NCAT, no inflammation, no masses  LYMPH NODES: No LAD  CARDIOVASCULAR: S1 S2, RRR, No murmur, 2+ radial pulses  RESPIRATORY: CTA b/l, no wheeze/rales  GASTROINTESTINAL: NT, ND  MUSCULOSKELETAL: Normal ROM, no atrophy  SKIN: warm, no edema/rash/ or other skin changes  NEUROLOGIC: 5/5 power all extremities, no tremor, AAOx3  PSYCHIATRIC: Normal affect, Normal insight and judgement     Diabetic foot exam:     Left Foot:   Visual Exam: callous - great toe   Pulse DP: 2+ (normal)   Filament test: normal sensation    Vibratory sensation: Vibratory sensation: normal       Right Foot:   Visual Exam: callous - great toe   Pulse DP: 2+ (normal)   Filament test: normal sensation    Vibratory sensation: Vibratory sensation: normal      REVIEW OF LABORATORY AND RADIOLOGY DATA:   Labs and documentation have been reviewed as described above. ASSESSMENT AND PLAN:   Tiarra Clark Sr. is a 60 y.o. male with a PMHx as noted above who presents for f/u of uncontrolled type 2 diabetes with steroid induced hyperglycemia. Problems:  Type 2 diabetes uncontrolled  Steroid induced hyperglycemia  Hypertension        A1c at goal, and consistent with AM sugars, however with weather getting better, we will likely see his numbers improve. PLAN  Type 2 Diabetes  Medications: Metformin 500mg twice daily      HTN: reviewed, metoprolol for PVC's with cardiology, stable    Lipids: reviewed, stable w/o statin    RTC: 4 month follow up visit    25 minutes spent together with patient today of which >50% of this time was spent in counseling and coordination of care. Cedric President.  [facility]  [address] [place] [address]

## 2020-03-13 ENCOUNTER — OFFICE VISIT (OUTPATIENT)
Dept: ONCOLOGY | Age: 65
End: 2020-03-13

## 2020-03-13 VITALS
OXYGEN SATURATION: 94 % | TEMPERATURE: 98.6 F | HEART RATE: 70 BPM | HEIGHT: 68 IN | DIASTOLIC BLOOD PRESSURE: 86 MMHG | SYSTOLIC BLOOD PRESSURE: 122 MMHG | BODY MASS INDEX: 31.43 KG/M2 | WEIGHT: 207.4 LBS

## 2020-03-13 DIAGNOSIS — C90.01 MULTIPLE MYELOMA IN REMISSION (HCC): Primary | ICD-10-CM

## 2020-03-13 LAB
ALBUMIN SERPL ELPH-MCNC: 3.7 G/DL (ref 2.9–4.4)
ALBUMIN/GLOB SERPL: 1.2 {RATIO} (ref 0.7–1.7)
ALPHA1 GLOB SERPL ELPH-MCNC: 0.1 G/DL (ref 0–0.4)
ALPHA2 GLOB SERPL ELPH-MCNC: 1 G/DL (ref 0.4–1)
B-GLOBULIN SERPL ELPH-MCNC: 0.9 G/DL (ref 0.7–1.3)
GAMMA GLOB SERPL ELPH-MCNC: 1 G/DL (ref 0.4–1.8)
GLOBULIN SER CALC-MCNC: 3 G/DL (ref 2.2–3.9)
HBA1C MFR BLD HPLC: 6.2 %
KAPPA LC FREE SER-MCNC: 32.8 MG/L (ref 3.3–19.4)
KAPPA LC FREE/LAMBDA FREE SER: 0.95 {RATIO} (ref 0.26–1.65)
LAMBDA LC FREE SERPL-MCNC: 34.4 MG/L (ref 5.7–26.3)
M PROTEIN SERPL ELPH-MCNC: NORMAL G/DL
PROT SERPL-MCNC: 6.7 G/DL (ref 6–8.5)

## 2020-03-13 NOTE — PROGRESS NOTES
[facility]  [address], [address] [facility], [facility] Jared Cornelius, [address] [facility]  [phone]          Progress Note        Patient: Wojciech Rangel Sr. MRN: [id number]  SSN: [id number]    YOB: 1955  Age: 60 y.o. Sex: male        Diagnosis:      1. Multiple Myeloma, IgA Kappa   Durie [place] stage IIIB    Cytogenetics/FISH: t(14;16) - high risk    Ttreatment:     1. Maintenance therapy - Pomalyst 2 mg daily - 11/19/2018   2. Maintenance therapy - Ixazomib - started 10/15/2018 - stopped 11/12/2018  3. S/P tandem  Autotransplant   First on 2/28/2018   2nd on 06/13/2018  4. Carfilzomib/Pom/Dex - s/p 5 cycles  5. VD-PACE s/p 1 cycle  6. RVD - s/p 4 cycles    Subjective:      Wojciech Rangel Sr. is a 60 y.o. male with a diagnosis of IgA kappa myeloma. Bone marrow shows 100% aberrant plasma cells. He suffers with DM but it is diet controlled. He has received systemic anti-viral treatment for chronic Hep C with successful eradication of the virus. Mr. Jhon Beltran received 4 cycles of systemic therapy with RVd. He had an initial good response to treatment. However his paraprotein level started rising and the myeloma became refractory to treatment. I then administered one cycle of VD-PACE in the hospital. He then received Carfilzomib/Pom/Dex. He achieved VGPR. He underwent tandem autotransplant at [facility]. According to the patient he achieved complete response with therapy. He has undergone second/tandem transplant in June. He took Pomalyst maintenance until Aug 2019 then had to stop due to recurrent prostatitis. He had a repeat BM biopsy in February 2019 at [facility] which showed complete molecular remission. He is taking Pomalyst and is doing well. He has been exercising and trying to stay active.        Review of Systems:    Constitutional: negative  Eyes: negative  Ears, Nose, Mouth, Throat, and Face: negative  Respiratory: negative  Cardiovascular: negative  Gastrointestinal: negative  Genitourinary:negative  Integument/Breast: negative  Hematologic/Lymphatic: negative  Musculoskeletal: neck pain  Neurological: negative      Past Medical History:   Diagnosis Date    Acid reflux     Arrhythmia     pvc's    Chronic pain     neck    Depression     Diabetes ([address]     Femoral hernia 2012    Hepatitis C     Hypertension     Inguinal hernia 2012    Liver disease     hepatitis c    Multiple myeloma (HCC)     Other ill-defined conditions(799.89)     Hx of PVCs since     Prostatitis, acute     Psychiatric disorder     depression     Past Surgical History:   Procedure Laterality Date    COLONOSCOPY N/A 2016    COLONOSCOPY performed by Xiao Ramirez MD at [facility] ENDOSCOPY    HX APPENDECTOMY      HX CERVICAL FUSION  2008    x 1 as of 2014    HX HEENT      foreign body removed from right eye    HX HERNIA REPAIR      [facility]      HX ORTHOPAEDIC      cervical fusion    HX OTHER SURGICAL  2000    liver bx - chronic hepatitis      Family History   Problem Relation Age of Onset    Arthritis-osteo Mother     Arthritis-osteo Father     Diabetes Father     Hypertension Maternal Grandmother     Diabetes Maternal Grandmother     Hypertension Maternal Grandfather     Diabetes Maternal Grandfather      Social History     Tobacco Use    Smoking status: Former Smoker     Packs/day: 1.00     Years: 12.00     Pack years: 12.00     Types: Cigarettes     Last attempt to quit: 1989     Years since quittin.2    Smokeless tobacco: Never Used    Tobacco comment: former cigarette smoker   Substance Use Topics    Alcohol use: No     Comment: former alcoholic-quit [id number]      Prior to Admission medications    Medication Sig Start Date End Date Taking?  Authorizing Provider   lisinopriL (PRINIVIL, ZESTRIL) 10 mg tablet  20  Yes Provider, Historical   buPROPion XL (WELLBUTRIN XL) 300 mg XL tablet TAKE 1 TABLET BY MOUTH EVERY DAY 1/30/20  Yes Provider, Historical   pomalidomide (POMALYST) 2 mg cap Take 1 Cap by mouth daily. 2/26/20  Yes Angeles Etienne MD   ergocalciferol (ERGOCALCIFEROL) 1,250 mcg (50,000 unit) capsule TAKE 1 CAPSULE BY MOUTH ONCE WEEKLY 2/5/20  Yes Angeles Etienne MD   tenofovir DISOPROXIL FUMARATE (VIREAD) 300 mg tablet Take 1 Tab by mouth daily. 11/20/19  Yes Angeles Etienne MD   metoprolol succinate (TOPROL-XL) 25 mg XL tablet TAKE 1 TABLET BY MOUTH EVERY DAY IN THE EVENING 10/24/19  Yes Provider, Historical   metFORMIN ER (GLUCOPHAGE XR) 500 mg tablet Take 1 Tab by mouth two (2) times a day. 10/28/19  Yes Marques Juarez MD   naproxen (NAPROSYN) 500 mg tablet TAKE 1 TABLET BY MOUTH TWICE A DAY 8/26/19  Yes Provider, Historical   NUCYNTA 100 mg tablet TAKE 2 TABLETS 3 TIMES A DAY 7/27/19  Yes Provider, Historical   aspirin delayed-release 81 mg tablet Take  by mouth daily. Yes Provider, Historical   insulin lispro protamine/insulin lispro (HUMALOG 50-50 MIX) 100 unit/mL (50-50) flexpen by SubCUTAneous route. Yes Other, MD Goldy   prochlorperazine (COMPAZINE) 10 mg tablet Take 5 mg by mouth every six (6) hours as needed. Yes Other, MD Goldy   omeprazole (PRILOSEC) 20 mg capsule Take 20 mg by mouth daily as needed (acid reflux). Yes Other, MD Goldy   ondansetron (ZOFRAN ODT) 8 mg disintegrating tablet Take 8 mg by mouth every eight (8) hours as needed for Nausea. Yes Other, MD Goldy   buPROPion XL (WELLBUTRIN XL) 150 mg tablet Take 300 mg by mouth every morning. Yes Other, MD Goldy   multivitamin (ONE A DAY) tablet Take 1 Tab by mouth daily. Yes Provider, Historical   tamsulosin (FLOMAX) 0.4 mg capsule Take 1 Cap by mouth daily. 4/4/17  Yes Cynthia Long MD   zolpidem CR (AMBIEN CR) 12.5 mg tablet Take 12.5 mg by mouth nightly. Yes Provider, Historical   aripiprazole (ABILIFY) 5 mg tablet Take 2.5 mg by mouth every morning.    Yes Other, Phys, MD          Allergies   Allergen Reactions    Mercury (Bulk) Hives     Blisters             Objective:     Visit Vitals  /86 (BP 1 Location: Left arm, BP Patient Position: Sitting)   Pulse 70   Temp 98.6 °F (37 °C) (Oral)   Ht 5' 8\" (1.727 m)   Wt 207 lb 6.4 oz (94.1 kg)   SpO2 94%   BMI 31.54 kg/m²       Pain Scale: 4/10  Pain Location: Neck        Physical Exam:    GENERAL: alert, cooperative  EYE: negative  LYMPHATIC: Cervical, supraclavicular, and axillary nodes normal.   THROAT & NECK: normal and no erythema or exudates noted. LUNG: clear to auscultation bilaterally  HEART: regular rate and rhythm  ABDOMEN: soft, non-tender  EXTREMITIES: normal  SKIN: Normal.  NEUROLOGIC: negative      Lab Results   Component Value Date/Time    WBC 3.4 (L) 03/11/2020 03:44 PM    HGB 11.9 (L) 03/11/2020 03:44 PM    Hematocrit (POC) 36 (L) 09/18/2019 03:48 PM    HCT 37.6 03/11/2020 03:44 PM    PLATELET 573 48/02/0657 03:44 PM    MCV 90.8 03/11/2020 03:44 PM       Lab Results   Component Value Date/Time    Sodium 139 03/11/2020 03:44 PM    Potassium 4.0 03/11/2020 03:44 PM    Chloride 107 03/11/2020 03:44 PM    CO2 28 03/11/2020 03:44 PM    Anion gap 4 (L) 03/11/2020 03:44 PM    Glucose 105 (H) 03/11/2020 03:44 PM    BUN 21 (H) 03/11/2020 03:44 PM    Creatinine 1.58 (H) 03/11/2020 03:44 PM    BUN/Creatinine ratio 13 03/11/2020 03:44 PM    GFR est AA 54 (L) 03/11/2020 03:44 PM    GFR est non-AA 45 (L) 03/11/2020 03:44 PM    Calcium 9.4 03/11/2020 03:44 PM            Assessment:     1. Multiple Myeloma, IgA Kappa   Durie [place] stage IIIB    Cytogenetics/FISH: t(14;16) - high risk  ECOG PS - 0   Intent of therapy: palliative    Received 3 cycles of RVd   Dose reduced Revlimid and Velcade d/t continued cytopenias. M-spike started rising. He received one cycle of VD-PACE (bortezomib, dexamethasone, thalidomide, cisplatin, adriamycin, cyclophosphamide, and etoposide).   Achieved OH with once cycle    Receivied KPd - s/p 5 cycles    Excellent response with M-protein came down to 0.1    S/p tandem autotransplant    First on 2/28/2018   2nd on 06/13/2018    Achieved CR after the first transplant. Repeat bone marrow @ [facility] - Complete response. Since he is high risk based on cytogenetics, I recommend proteosome inhibitor as maintenance therapy. Started Ixazomib   Developed fever, was in the hospital.   Also developed cytopenias    Patient prefered to switch therapy to Pomalyst.     Pomalyst 2 mg daily - started 11/19/2018  Tolerating treatment very well  Denies any side effects. A detailed system by system evaluation of side effect was performed to assess chemotherapy related toxicity. Blood counts are acceptable. Results reviewed with the patient    In complete remission by serological tests    Repeat bone marrow in  at [facility] shows complete molecular remission  Symptom management form reviewed with patient. 2. Type 2 DM with complication    Managed by Endocrine      3. Chronic Hep C    In sustained virological remission        Plan:       > Continue Pomalyst maintenance  > Myeloma labs monthly  > Port flush monthly  > Continue Zometa every 3 months  > Follow-up in 3 months        Signed by: Ranjana Brunner MD                     March 13, 2020        CC. Rosaline Hylton MD  CC.  Mila Nunez MD

## 2020-03-13 NOTE — PROGRESS NOTES
Enoc Carrillo is a 60 y. o.AA  male here for 3 month follow up for:  Chief Complaint   Patient presents with    Multiple Myeloma   *Pt takes Zometa every 3 months    1. Have you been to the ER, urgent care clinic since your last visit? Hospitalized since your last visit? no    2. Have you seen or consulted any other health care providers outside of the [address] [place] [address] since your last visit? Include any pap smears or colon screening.  no

## 2020-03-16 DIAGNOSIS — Z91.89 AT RISK FOR INFECTION DUE TO CHEMOTHERAPY: ICD-10-CM

## 2020-03-16 DIAGNOSIS — R05.9 COUGH: ICD-10-CM

## 2020-03-16 DIAGNOSIS — Z94.81 H/O BONE MARROW TRANSPLANT (HCC): Primary | ICD-10-CM

## 2020-03-16 RX ORDER — HYDROCODONE POLISTIREX AND CHLORPHENIRAMINE POLISTIREX 10; 8 MG/5ML; MG/5ML
5 SUSPENSION, EXTENDED RELEASE ORAL
Qty: 70 ML | Refills: 0 | Status: SHIPPED | OUTPATIENT
Start: 2020-03-16 | End: 2020-03-21

## 2020-03-16 RX ORDER — LEVOFLOXACIN 500 MG/1
500 TABLET, FILM COATED ORAL DAILY
Qty: 10 TAB | Refills: 0 | Status: SHIPPED | OUTPATIENT
Start: 2020-03-16 | End: 2020-03-26

## 2020-03-16 RX ORDER — OSELTAMIVIR PHOSPHATE 75 MG/1
75 CAPSULE ORAL DAILY
Qty: 7 CAP | Refills: 0 | Status: SHIPPED | OUTPATIENT
Start: 2020-03-16 | End: 2020-03-23

## 2020-03-16 NOTE — TELEPHONE ENCOUNTER
VORB FROM DR Thurman Babinski LEVAQUIN 500MG TAB TAKE 1 TAB BY MOUTH ONCE DAILY X 10 DAYS DISPENSE 10 R 0  VORB FROM DR Thurman Babinski TAMIFLU 75MG CAP TAKE 1 CAP BY MOUTH ONCE DAILY X 7 DAYS.

## 2020-03-19 ENCOUNTER — TELEPHONE (OUTPATIENT)
Dept: ONCOLOGY | Age: 65
End: 2020-03-19

## 2020-03-19 NOTE — TELEPHONE ENCOUNTER
Returned call to pt wife and pt. HIPAA verified by two patient identifiers. Advised pt to self quarantine x 14 days since having a cough. Cough described as \"gurgling\". Nothing coming up. No fever. They asked how he is supposed to take tamiflu and levaquin and they will not have enough. I advised they do not take it like this but will follow up with MD and return a call. They also asked for a refill for cough syrup. He is coughing 3 x in 30 minutes per pt but wife said that is not true. I encouraged them to use OTC cough syrups.

## 2020-03-19 NOTE — TELEPHONE ENCOUNTER
Case d/w MD.  Medications were only for if symptoms arise. Will not refill cough syrup. Encouraged to get OTC coricidin. No answer, left VM.

## 2020-03-19 NOTE — TELEPHONE ENCOUNTER
Wife Claudene Spruce called. Please call re Patient's new script. Want to confirm how to take and to imform that patient has developed a cough.

## 2020-03-20 ENCOUNTER — TELEPHONE (OUTPATIENT)
Dept: ONCOLOGY | Age: 65
End: 2020-03-20

## 2020-03-26 ENCOUNTER — DOCUMENTATION ONLY (OUTPATIENT)
Dept: ONCOLOGY | Age: 65
End: 2020-03-26

## 2020-03-26 DIAGNOSIS — C90.00 MULTIPLE MYELOMA, REMISSION STATUS UNSPECIFIED (HCC): ICD-10-CM

## 2020-03-26 RX ORDER — POMALIDOMIDE 2 MG/1
2 CAPSULE ORAL DAILY
Qty: 56 CAP | Refills: 11 | Status: SHIPPED | OUTPATIENT
Start: 2020-03-26 | End: 2020-03-26 | Stop reason: SDUPTHER

## 2020-03-26 RX ORDER — POMALIDOMIDE 2 MG/1
2 CAPSULE ORAL DAILY
Qty: 56 CAP | Refills: 11 | OUTPATIENT
Start: 2020-03-26 | End: 2020-04-01 | Stop reason: SDUPTHER

## 2020-03-26 NOTE — TELEPHONE ENCOUNTER
GERMAN FROM DR Causey Dear REFILL POMALYST 2MG CAP TAKE 1 CAP BY MOUTH ONCE DAILY.  DISPENSE 28 R 11

## 2020-03-26 NOTE — PROGRESS NOTES
Spoke with meliton. He needs pomalyst rx to get a PA and then will need to go to [facility] [address]. Call number [phone] [id number] ID number or start one in covermymeds. Jaja Santos please let me know once PA is done and approved. He needs this ASAP.

## 2020-04-01 ENCOUNTER — DOCUMENTATION ONLY (OUTPATIENT)
Dept: ONCOLOGY | Age: 65
End: 2020-04-01

## 2020-04-01 DIAGNOSIS — C90.00 MULTIPLE MYELOMA, REMISSION STATUS UNSPECIFIED (HCC): ICD-10-CM

## 2020-04-01 RX ORDER — POMALIDOMIDE 2 MG/1
2 CAPSULE ORAL DAILY
Qty: 56 CAP | Refills: 11 | Status: SHIPPED | OUTPATIENT
Start: 2020-04-01 | End: 2020-04-23 | Stop reason: SDUPTHER

## 2020-04-01 NOTE — TELEPHONE ENCOUNTER
Have to fax the prescription and PA to  [phone]. E-prescription not working for this fax number. Sent to a different location. PA approval faxed also.

## 2020-04-07 DIAGNOSIS — C90.00 MULTIPLE MYELOMA, REMISSION STATUS UNSPECIFIED (HCC): ICD-10-CM

## 2020-04-08 ENCOUNTER — APPOINTMENT (OUTPATIENT)
Dept: INFUSION THERAPY | Age: 65
End: 2020-04-08

## 2020-04-23 RX ORDER — POMALIDOMIDE 2 MG/1
2 CAPSULE ORAL DAILY
Qty: 28 CAP | Refills: 11 | Status: SHIPPED | OUTPATIENT
Start: 2020-04-23 | End: 2020-05-21

## 2020-04-23 RX ORDER — TENOFOVIR DISOPROXIL FUMARATE 300 MG/1
TABLET, FILM COATED ORAL
Qty: 30 TAB | Refills: 5 | Status: SHIPPED | OUTPATIENT
Start: 2020-04-23 | End: 2020-05-22

## 2020-04-23 NOTE — TELEPHONE ENCOUNTER
Refilling tenofovir DISOPROXIL FUMURATE (VIREAD) 300MG TAB TAKE 1 TAB BY MOUTH DAILY DISPENSE 30 R 5. Refilling POMALIDOMIDE (POMALYST) 2MG CAP TAKE 1 CAP BY MOUTH ONCE DAILY DISPENSE 28 R 11    Pharmacy only filled x 1 month when we asked for 2 months.

## 2020-04-28 RX ORDER — ERGOCALCIFEROL 1.25 MG/1
CAPSULE ORAL
Qty: 12 CAP | Refills: 0 | Status: SHIPPED | OUTPATIENT
Start: 2020-04-28 | End: 2021-04-30 | Stop reason: ALTCHOICE

## 2020-04-28 NOTE — TELEPHONE ENCOUNTER
PER GERMAN from Dr. Andrew Robert ERGOCALCIFEROL 50,000IU ONE CAP BY MOUTH WEEKLY QUANTITY 12 REFILL 0. Will recheck at next visit.

## 2020-05-06 ENCOUNTER — APPOINTMENT (OUTPATIENT)
Dept: INFUSION THERAPY | Age: 65
End: 2020-05-06

## 2020-05-15 DIAGNOSIS — C90.00 MULTIPLE MYELOMA, REMISSION STATUS UNSPECIFIED (HCC): ICD-10-CM

## 2020-05-18 DIAGNOSIS — C90.00 MULTIPLE MYELOMA, REMISSION STATUS UNSPECIFIED (HCC): ICD-10-CM

## 2020-05-21 RX ORDER — POMALIDOMIDE 2 MG/1
CAPSULE ORAL
Qty: 28 CAP | Refills: 0 | Status: SHIPPED | OUTPATIENT
Start: 2020-05-21 | End: 2020-06-17

## 2020-05-22 RX ORDER — TENOFOVIR DISOPROXIL FUMARATE 300 MG/1
TABLET, FILM COATED ORAL
Qty: 30 TAB | Refills: 5 | Status: SHIPPED | OUTPATIENT
Start: 2020-05-22 | End: 2020-05-27 | Stop reason: SDUPTHER

## 2020-05-22 NOTE — TELEPHONE ENCOUNTER
Requested Prescriptions     Pending Prescriptions Disp Refills    tenofovir DISOPROXIL FUMARATE (VIREAD) 300 mg tablet [Pharmacy Med Name: TENOFOVIR DISOP  MG TB] 30 Tab 5     Sig: TAKE 1 TABLET BY MOUTH EVERY DAY

## 2020-05-27 ENCOUNTER — TELEPHONE (OUTPATIENT)
Dept: ONCOLOGY | Age: 65
End: 2020-05-27

## 2020-05-27 DIAGNOSIS — C90.00 MULTIPLE MYELOMA, REMISSION STATUS UNSPECIFIED (HCC): ICD-10-CM

## 2020-05-27 RX ORDER — TENOFOVIR DISOPROXIL FUMARATE 300 MG/1
TABLET, FILM COATED ORAL
Qty: 90 TAB | Refills: 1 | Status: SHIPPED | OUTPATIENT
Start: 2020-05-27 | End: 2021-01-06 | Stop reason: SDUPTHER

## 2020-05-27 NOTE — TELEPHONE ENCOUNTER
Changed prescription to 90 day per pt preference per insurance it is cheaper for 90 day. Pt should probably be getting this from his Liver Specialist or PCP.

## 2020-06-10 ENCOUNTER — HOSPITAL ENCOUNTER (OUTPATIENT)
Dept: INFUSION THERAPY | Age: 65
Discharge: HOME | End: 2020-06-10
Payer: COMMERCIAL

## 2020-06-10 VITALS
SYSTOLIC BLOOD PRESSURE: 122 MMHG | BODY MASS INDEX: 31.57 KG/M2 | HEIGHT: 68 IN | RESPIRATION RATE: 18 BRPM | DIASTOLIC BLOOD PRESSURE: 82 MMHG | HEART RATE: 79 BPM | WEIGHT: 208.3 LBS | TEMPERATURE: 97.9 F

## 2020-06-10 DIAGNOSIS — C90.01 MULTIPLE MYELOMA IN REMISSION (HCC): Primary | ICD-10-CM

## 2020-06-10 DIAGNOSIS — C90.00 MULTIPLE MYELOMA, REMISSION STATUS UNSPECIFIED (HCC): ICD-10-CM

## 2020-06-10 LAB
25(OH)D3 SERPL-MCNC: 45 NG/ML (ref 30–100)
ALBUMIN SERPL-MCNC: 3.3 G/DL (ref 3.5–5)
ALBUMIN/GLOB SERPL: 0.9 {RATIO} (ref 1.1–2.2)
ALP SERPL-CCNC: 71 U/L (ref 45–117)
ALT SERPL-CCNC: 31 U/L (ref 12–78)
ANION GAP BLD CALC-SCNC: 18 MMOL/L (ref 10–20)
AST SERPL-CCNC: 22 U/L (ref 15–37)
BASOPHILS # BLD: 0 K/UL (ref 0–0.1)
BASOPHILS NFR BLD: 1 % (ref 0–1)
BILIRUB DIRECT SERPL-MCNC: 0.1 MG/DL (ref 0–0.2)
BILIRUB SERPL-MCNC: 0.5 MG/DL (ref 0.2–1)
BUN BLD-MCNC: 21 MG/DL (ref 9–20)
CA-I BLD-MCNC: 1.21 MMOL/L (ref 1.12–1.32)
CHLORIDE BLD-SCNC: 104 MMOL/L (ref 98–107)
CO2 BLD-SCNC: 25 MMOL/L (ref 21–32)
CREAT BLD-MCNC: 1.6 MG/DL (ref 0.6–1.3)
DIFFERENTIAL METHOD BLD: ABNORMAL
EOSINOPHIL # BLD: 0.1 K/UL (ref 0–0.4)
EOSINOPHIL NFR BLD: 3 % (ref 0–7)
ERYTHROCYTE [DISTWIDTH] IN BLOOD BY AUTOMATED COUNT: 15.7 % (ref 11.5–14.5)
GLOBULIN SER CALC-MCNC: 3.8 G/DL (ref 2–4)
GLUCOSE BLD-MCNC: 122 MG/DL (ref 65–100)
HCT VFR BLD AUTO: 36.5 % (ref 36.6–50.3)
HCT VFR BLD CALC: 38 % (ref 36.6–50.3)
HGB BLD-MCNC: 11.8 G/DL (ref 12.1–17)
IGA SERPL-MCNC: 349 MG/DL (ref 70–400)
IGG SERPL-MCNC: 1190 MG/DL (ref 700–1600)
IGM SERPL-MCNC: 27 MG/DL (ref 40–230)
IMM GRANULOCYTES # BLD AUTO: 0 K/UL (ref 0–0.04)
IMM GRANULOCYTES NFR BLD AUTO: 0 % (ref 0–0.5)
LYMPHOCYTES # BLD: 1.4 K/UL (ref 0.8–3.5)
LYMPHOCYTES NFR BLD: 38 % (ref 12–49)
MCH RBC QN AUTO: 29.4 PG (ref 26–34)
MCHC RBC AUTO-ENTMCNC: 32.3 G/DL (ref 30–36.5)
MCV RBC AUTO: 91 FL (ref 80–99)
MONOCYTES # BLD: 0.8 K/UL (ref 0–1)
MONOCYTES NFR BLD: 22 % (ref 5–13)
NEUTS SEG # BLD: 1.3 K/UL (ref 1.8–8)
NEUTS SEG NFR BLD: 36 % (ref 32–75)
NRBC # BLD: 0 K/UL (ref 0–0.01)
NRBC BLD-RTO: 0 PER 100 WBC
PLATELET # BLD AUTO: 158 K/UL (ref 150–400)
PMV BLD AUTO: 10.7 FL (ref 8.9–12.9)
POTASSIUM BLD-SCNC: 4 MMOL/L (ref 3.5–5.1)
PROT SERPL-MCNC: 7.1 G/DL (ref 6.4–8.2)
RBC # BLD AUTO: 4.01 M/UL (ref 4.1–5.7)
RBC MORPH BLD: ABNORMAL
SERVICE CMNT-IMP: ABNORMAL
SODIUM BLD-SCNC: 141 MMOL/L (ref 136–145)
WBC # BLD AUTO: 3.6 K/UL (ref 4.1–11.1)

## 2020-06-10 PROCEDURE — 74011000250 HC RX REV CODE- 250: Performed by: INTERNAL MEDICINE

## 2020-06-10 PROCEDURE — 80076 HEPATIC FUNCTION PANEL: CPT

## 2020-06-10 PROCEDURE — 82784 ASSAY IGA/IGD/IGG/IGM EACH: CPT

## 2020-06-10 PROCEDURE — 85025 COMPLETE CBC W/AUTO DIFF WBC: CPT

## 2020-06-10 PROCEDURE — 83883 ASSAY NEPHELOMETRY NOT SPEC: CPT

## 2020-06-10 PROCEDURE — 80047 BASIC METABLC PNL IONIZED CA: CPT

## 2020-06-10 PROCEDURE — 96374 THER/PROPH/DIAG INJ IV PUSH: CPT

## 2020-06-10 PROCEDURE — 74011250636 HC RX REV CODE- 250/636: Performed by: INTERNAL MEDICINE

## 2020-06-10 PROCEDURE — 36415 COLL VENOUS BLD VENIPUNCTURE: CPT

## 2020-06-10 PROCEDURE — 82306 VITAMIN D 25 HYDROXY: CPT

## 2020-06-10 PROCEDURE — 84165 PROTEIN E-PHORESIS SERUM: CPT

## 2020-06-10 PROCEDURE — 77030012965 HC NDL HUBR BBMI -A

## 2020-06-10 RX ORDER — SODIUM CHLORIDE 9 MG/ML
10 INJECTION INTRAMUSCULAR; INTRAVENOUS; SUBCUTANEOUS AS NEEDED
Status: ACTIVE | OUTPATIENT
Start: 2020-06-10 | End: 2020-06-10

## 2020-06-10 RX ORDER — SODIUM CHLORIDE 0.9 % (FLUSH) 0.9 %
10 SYRINGE (ML) INJECTION AS NEEDED
Status: DISPENSED | OUTPATIENT
Start: 2020-06-10 | End: 2020-06-10

## 2020-06-10 RX ORDER — HEPARIN 100 UNIT/ML
300-500 SYRINGE INTRAVENOUS AS NEEDED
Status: ACTIVE | OUTPATIENT
Start: 2020-06-10 | End: 2020-06-10

## 2020-06-10 RX ADMIN — Medication 10 ML: at 10:24

## 2020-06-10 RX ADMIN — SODIUM CHLORIDE 10 ML: 9 INJECTION, SOLUTION INTRAMUSCULAR; INTRAVENOUS; SUBCUTANEOUS at 10:24

## 2020-06-10 RX ADMIN — Medication 500 UNITS: at 10:42

## 2020-06-10 RX ADMIN — Medication 20 ML: at 10:42

## 2020-06-10 RX ADMIN — ZOLEDRONIC ACID 4 MG: 4 SOLUTION INTRAVENOUS at 10:24

## 2020-06-10 NOTE — PROGRESS NOTES
Pt arrived to [facility] ambulatory for Zometa/Port flush/Labs in no acute distress at 0930.  Assessment unremarkable. R chest port accessed without issue and positive blood return noted.  Labs obtained- CBC with diff, Chem 8 I-stat, Hepatic Function Panel, SPEP, Serum Free Light Chains, Immunofixation, Immunoglobulins G/A/M, and Vitamin D. Visit Vitals  /82 (BP 1 Location: Left arm, BP Patient Position: Sitting)   Pulse 79   Temp 97.9 °F (36.6 °C)   Resp 18   Ht 5' 8\" (1.727 m)   Wt 94.5 kg (208 lb 4.8 oz)   BMI 31.67 kg/m²     Recent Results (from the past 12 hour(s))   CBC WITH AUTOMATED DIFF    Collection Time: 06/10/20  9:42 AM   Result Value Ref Range    WBC 3.6 (L) 4.1 - 11.1 K/uL    RBC 4.01 (L) 4.10 - 5.70 M/uL    HGB 11.8 (L) 12.1 - 17.0 g/dL    HCT 36.5 (L) 36.6 - 50.3 %    MCV 91.0 80.0 - 99.0 FL    MCH 29.4 26.0 - 34.0 PG    MCHC 32.3 30.0 - 36.5 g/dL    RDW 15.7 (H) 11.5 - 14.5 %    PLATELET 863 438 - 565 K/uL    MPV 10.7 8.9 - 12.9 FL    NRBC 0.0 0  WBC    ABSOLUTE NRBC 0.00 0.00 - 0.01 K/uL    NEUTROPHILS 36 32 - 75 %    LYMPHOCYTES 38 12 - 49 %    MONOCYTES 22 (H) 5 - 13 %    EOSINOPHILS 3 0 - 7 %    BASOPHILS 1 0 - 1 %    IMMATURE GRANULOCYTES 0 0.0 - 0.5 %    ABS. NEUTROPHILS 1.3 (L) 1.8 - 8.0 K/UL    ABS. LYMPHOCYTES 1.4 0.8 - 3.5 K/UL    ABS. MONOCYTES 0.8 0.0 - 1.0 K/UL    ABS. EOSINOPHILS 0.1 0.0 - 0.4 K/UL    ABS. BASOPHILS 0.0 0.0 - 0.1 K/UL    ABS. IMM. GRANS. 0.0 0.00 - 0.04 K/UL    DF AUTOMATED      RBC COMMENTS NORMOCYTIC, NORMOCHROMIC     HEPATIC FUNCTION PANEL    Collection Time: 06/10/20  9:42 AM   Result Value Ref Range    Protein, total 7.1 6.4 - 8.2 g/dL    Albumin 3.3 (L) 3.5 - 5.0 g/dL    Globulin 3.8 2.0 - 4.0 g/dL    A-G Ratio 0.9 (L) 1.1 - 2.2      Bilirubin, total 0.5 0.2 - 1.0 MG/DL    Bilirubin, direct 0.1 0.0 - 0.2 MG/DL    Alk.  phosphatase 71 45 - 117 U/L    AST (SGOT) 22 15 - 37 U/L    ALT (SGPT) 31 12 - 78 U/L   POC CHEM8    Collection Time: 06/10/20 9:46 AM   Result Value Ref Range    Calcium, ionized (POC) 1.21 1.12 - 1.32 mmol/L    Sodium (POC) 141 136 - 145 mmol/L    Potassium (POC) 4.0 3.5 - 5.1 mmol/L    Chloride (POC) 104 98 - 107 mmol/L    CO2 (POC) 25 21 - 32 mmol/L    Anion gap (POC) 18 10 - 20 mmol/L    Glucose (POC) 122 (H) 65 - 100 mg/dL    BUN (POC) 21 (H) 9 - 20 mg/dL    Creatinine (POC) 1.6 (H) 0.6 - 1.3 mg/dL    GFRAA, POC 53 (L) >60 ml/min/1.73m2    GFRNA, POC 44 (L) >60 ml/min/1.73m2    Hematocrit (POC) 38 36.6 - 50.3 %    Comment Notified RN or MD immediately by      Some labs pending at time of note. See [facility] for results. Creatinine Clearance 63.4    The following medications administered:  Zometa 4 mg IV over 15 minutes    Pt tolerated treatment well.  No adverse reaction noted. Port flushed per policy and needle removed, 2x2 and paper tape placed.  Pt discharged ambulatory in no acute distress at 1045, accompanied by spouse. Next appointment 7/8/20 @ 0084.

## 2020-06-11 LAB
ALBUMIN SERPL ELPH-MCNC: 3.4 G/DL (ref 2.9–4.4)
ALBUMIN/GLOB SERPL: 1 {RATIO} (ref 0.7–1.7)
ALPHA1 GLOB SERPL ELPH-MCNC: 0.2 G/DL (ref 0–0.4)
ALPHA2 GLOB SERPL ELPH-MCNC: 0.9 G/DL (ref 0.4–1)
B-GLOBULIN SERPL ELPH-MCNC: 0.9 G/DL (ref 0.7–1.3)
GAMMA GLOB SERPL ELPH-MCNC: 1.3 G/DL (ref 0.4–1.8)
GLOBULIN SER CALC-MCNC: 3.3 G/DL (ref 2.2–3.9)
KAPPA LC FREE SER-MCNC: 36.7 MG/L (ref 3.3–19.4)
KAPPA LC FREE/LAMBDA FREE SER: 1.05 {RATIO} (ref 0.26–1.65)
LAMBDA LC FREE SERPL-MCNC: 35 MG/L (ref 5.7–26.3)
M PROTEIN SERPL ELPH-MCNC: NORMAL G/DL
PROT SERPL-MCNC: 6.7 G/DL (ref 6–8.5)

## 2020-06-12 LAB
IGA SERPL-MCNC: 365 MG/DL (ref 61–437)
IGG SERPL-MCNC: 1133 MG/DL (ref 603–1613)
IGM SERPL-MCNC: 34 MG/DL (ref 20–172)
PROT PATTERN SERPL IFE-IMP: NORMAL

## 2020-06-15 ENCOUNTER — VIRTUAL VISIT (OUTPATIENT)
Dept: ONCOLOGY | Age: 65
End: 2020-06-15

## 2020-06-15 DIAGNOSIS — C90.01 MULTIPLE MYELOMA IN REMISSION (HCC): Primary | ICD-10-CM

## 2020-06-15 DIAGNOSIS — C90.00 MULTIPLE MYELOMA, REMISSION STATUS UNSPECIFIED (HCC): ICD-10-CM

## 2020-06-15 NOTE — PROGRESS NOTES
61 y/o AAM meeting via [facility]. me for a virtual appointment for multiple myeloma. Pt is on pomalyst 2mg daily. Pt says he is doing \"marvelous\". Labs recently done. Zometa every 3 months. He denies any issues with his jaw/teeth. Pt reports when his weight increases his BS does also he says he has been overall doing okay. Denies ER or Urgent care. Pt is changing his PCP, Dr. Re Malhotra will be his new PCP.

## 2020-06-15 NOTE — PROGRESS NOTES
[facility]  [address], [address] [place] [address] Jared Cornelius, [address]  [phone]          Progress Note        Patient: Chase Frye Sr. MRN: [id number]  SSN: [id number]    YOB: 1955  Age: 60 y.o. Sex: male        Reason for Visit:   Jameson Weaver is a 60 y.o. male who is seen by synchronous (real-time) audio-video technology for follow up of multiple myeloma      Diagnosis:      1. Multiple Myeloma, IgA Kappa   Durie Somerset stage IIIB    Cytogenetics/FISH: t(14;16) - high risk    Ttreatment:     1. Maintenance therapy - Pomalyst 2 mg daily - 11/19/2018   2. Maintenance therapy - Ixazomib - started 10/15/2018 - stopped 11/12/2018  3. S/P tandem  Autotransplant   First on 2/28/2018   2nd on 06/13/2018  4. Carfilzomib/Pom/Dex - s/p 5 cycles  5. VD-PACE s/p 1 cycle  6. RVD - s/p 4 cycles    Subjective:      Chase Frye Sr. is a 60 y.o. male with a diagnosis of IgA kappa myeloma. Bone marrow shows 100% aberrant plasma cells. He suffers with DM but it is diet controlled. He has received systemic anti-viral treatment for chronic Hep C with successful eradication of the virus. Mr. Montse Carballo received 4 cycles of systemic therapy with RVd. He had an initial good response to treatment. However his paraprotein level started rising and the myeloma became refractory to treatment. I then administered one cycle of VD-PACE in the hospital. He then received Carfilzomib/Pom/Dex. He achieved VGPR. He underwent tandem autotransplant at [facility]. According to the patient he achieved complete response with therapy. He has undergone second/tandem transplant in June. He took Pomalyst maintenance until Aug 2019 then had to stop due to recurrent prostatitis. He had a repeat BM biopsy in February 2019 at [facility] which showed complete molecular remission. He is taking Pomalyst and is doing well.  He has been exercising and trying to stay active. Review of Systems:    Constitutional: negative  Eyes: negative  Ears, Nose, Mouth, Throat, and Face: negative  Respiratory: negative  Cardiovascular: negative  Gastrointestinal: negative  Genitourinary:negative  Integument/Breast: negative  Hematologic/Lymphatic: negative  Musculoskeletal: neck pain  Neurological: negative      Past Medical History:   Diagnosis Date    Acid reflux     Arrhythmia     pvc's    Chronic pain     neck    Depression     Diabetes ([facility]     Femoral hernia 2012    Hepatitis C     Hypertension     Inguinal hernia 2012    Liver disease     hepatitis c    Multiple myeloma ([facility]     Other ill-defined conditions(799.89)     Hx of PVCs since     Prostatitis, acute     Psychiatric disorder     depression     Past Surgical History:   Procedure Laterality Date    COLONOSCOPY N/A 2016    COLONOSCOPY performed by Rajendra Jerry MD at \[facility]\"" ENDOSCOPY    HX APPENDECTOMY      HX CERVICAL FUSION  2008    x 1 as of 2014    HX HEENT      foreign body removed from right eye    HX HERNIA REPAIR      [facility]'s      HX ORTHOPAEDIC      cervical fusion    HX OTHER SURGICAL  2000    liver bx - chronic hepatitis      Family History   Problem Relation Age of Onset    Arthritis-osteo Mother     Arthritis-osteo Father     Diabetes Father     Hypertension Maternal Grandmother     Diabetes Maternal Grandmother     Hypertension Maternal Grandfather     Diabetes Maternal Grandfather      Social History     Tobacco Use    Smoking status: Former Smoker     Packs/day: 1.00     Years: 12.00     Pack years: 12.00     Types: Cigarettes     Last attempt to quit: 1989     Years since quittin.4    Smokeless tobacco: Never Used    Tobacco comment: former cigarette smoker   Substance Use Topics    Alcohol use: No     Comment: former alcoholic-quit 7605      Prior to Admission medications    Medication Sig Start Date End Date Taking?  Authorizing Provider   tenofovir DISOPROXIL FUMARATE (VIREAD) 300 mg tablet TAKE 1 TABLET BY MOUTH EVERY DAY 5/27/20  Yes Adilene Hood MD   Pomalyst 2 mg cap TAKE 1 CAPSULE BY MOUTH ONCE DAILY 5/21/20  Yes Adilene Hood MD   ergocalciferol (ERGOCALCIFEROL) 1,250 mcg (50,000 unit) capsule TAKE 1 CAPSULE BY MOUTH 1 TIME WEEKLY 4/28/20  Yes Adilene Hood MD   lisinopriL (PRINIVIL, ZESTRIL) 10 mg tablet  1/19/20  Yes Provider, Historical   buPROPion XL (WELLBUTRIN XL) 300 mg XL tablet TAKE 1 TABLET BY MOUTH EVERY DAY 1/30/20  Yes Provider, Historical   metoprolol succinate (TOPROL-XL) 25 mg XL tablet TAKE 1 TABLET BY MOUTH EVERY DAY IN THE EVENING 10/24/19  Yes Provider, Historical   metFORMIN ER (GLUCOPHAGE XR) 500 mg tablet Take 1 Tab by mouth two (2) times a day. 10/28/19  Yes Minal Zeng MD   naproxen (NAPROSYN) 500 mg tablet TAKE 1 TABLET BY MOUTH TWICE A DAY 8/26/19  Yes Provider, Historical   NUCYNTA 100 mg tablet TAKE 2 TABLETS 3 TIMES A DAY 7/27/19  Yes Provider, Historical   aspirin delayed-release 81 mg tablet Take  by mouth daily. Yes Provider, Historical   insulin lispro protamine/insulin lispro (HUMALOG 50-50 MIX) 100 unit/mL (50-50) flexpen by SubCUTAneous route. Yes Other, MD Goldy   prochlorperazine (COMPAZINE) 10 mg tablet Take 5 mg by mouth every six (6) hours as needed. Yes Other, MD Goldy   omeprazole (PRILOSEC) 20 mg capsule Take 20 mg by mouth daily as needed (acid reflux). Yes Other, MD Goldy   ondansetron (ZOFRAN ODT) 8 mg disintegrating tablet Take 8 mg by mouth every eight (8) hours as needed for Nausea. Yes Other, MD Goldy   multivitamin (ONE A DAY) tablet Take 1 Tab by mouth daily. Yes Provider, Historical   tamsulosin (FLOMAX) 0.4 mg capsule Take 1 Cap by mouth daily. 4/4/17  Yes Laura Vigil MD   zolpidem CR (AMBIEN CR) 12.5 mg tablet Take 12.5 mg by mouth nightly. Yes Provider, Historical   aripiprazole (ABILIFY) 5 mg tablet Take 2.5 mg by mouth every morning.    Yes Other, MD Goldy          Allergies   Allergen Reactions    Mercury (Bulk) Hives     Blisters             Objective:     General: alert, cooperative, no distress   Mental  status: normal mood, behavior, speech, dress, motor activity, and thought processes, able to follow commands   HENT: NCAT   Neck: no visualized mass   Resp: no respiratory distress   Neuro: no gross deficits   Skin: no discoloration or lesions of concern on visible areas   Psychiatric: normal affect, consistent with stated mood, no evidence of hallucinations       Due to this being a TeleHealth evaluation (During CGXKC-04 public health emergency), many elements of the physical examination are unable to be assessed. Evaluation of the following organ systems was limited: Vitals/Constitutional/EENT/Resp/CV/GI//MS/Neuro/Skin/Heme-Lymph-Imm. Lab Results   Component Value Date/Time    WBC 3.6 (L) 06/10/2020 09:42 AM    HGB 11.8 (L) 06/10/2020 09:42 AM    Hematocrit (POC) 38 06/10/2020 09:46 AM    HCT 36.5 (L) 06/10/2020 09:42 AM    PLATELET 766 30/94/1163 09:42 AM    MCV 91.0 06/10/2020 09:42 AM       Lab Results   Component Value Date/Time    Sodium 139 03/11/2020 03:44 PM    Potassium 4.0 03/11/2020 03:44 PM    Chloride 107 03/11/2020 03:44 PM    CO2 28 03/11/2020 03:44 PM    Anion gap 4 (L) 03/11/2020 03:44 PM    Glucose 105 (H) 03/11/2020 03:44 PM    BUN 21 (H) 03/11/2020 03:44 PM    Creatinine 1.58 (H) 03/11/2020 03:44 PM    BUN/Creatinine ratio 13 03/11/2020 03:44 PM    GFR est AA 54 (L) 03/11/2020 03:44 PM    GFR est non-AA 45 (L) 03/11/2020 03:44 PM    Calcium 9.4 03/11/2020 03:44 PM            Assessment:     1. Multiple Myeloma, IgA Kappa   Durie Geneva stage IIIB    Cytogenetics/FISH: t(14;16) - high risk  ECOG PS - 0   Intent of therapy: palliative    Received 3 cycles of RVd   Dose reduced Revlimid and Velcade d/t continued cytopenias. M-spike started rising.     He received one cycle of VD-PACE (bortezomib, dexamethasone, thalidomide, cisplatin, adriamycin, cyclophosphamide, and etoposide). Achieved [facility] with once cycle    Receivied KPd - s/p 5 cycles    Excellent response with M-protein came down to 0.1    S/p tandem autotransplant    First on 2/28/2018   2nd on 06/13/2018    Achieved CR after the first transplant. Repeat bone marrow @ [facility] - Complete response. Since he is high risk based on cytogenetics, I recommend proteosome inhibitor as maintenance therapy. Started Ixazomib   Developed fever, was in the hospital.   Also developed cytopenias    Patient prefered to switch therapy to Pomalyst.     Pomalyst 2 mg daily - started 11/19/2018  Tolerating treatment very well  Denies any side effects. A detailed system by system evaluation of side effect was performed to assess chemotherapy related toxicity. Blood counts are acceptable. Results reviewed with the patient    In complete remission by serological tests    Repeat bone marrow in  at [facility] shows complete molecular remission  Symptom management form reviewed with patient. 2. Type 2 DM with complication    Managed by Endocrine      3. Chronic Hep C    In sustained virological remission        Plan:       > Continue Pomalyst maintenance  > Myeloma labs monthly  > Port flush monthly  > Continue Zometa every 3 months  > Follow-up in 3 months        Signed by: Mary Del Castillo MD                     Eva 15, 2020        CC. Judson Donnelly MD  CC. Wilner Le MD      I was in the office while conducting this encounter. The patient was at his home. Consent:  He and/or his healthcare decision maker is aware that this patient-initiated Telehealth encounter is a billable service, with coverage as determined by his insurance carrier.  He is aware that he may receive a bill and has provided verbal consent to proceed: Yes    Pursuant to the emergency declaration under the [address] and the [facility], [address] [place] [address] waiver authority and the [facility] Response Supplemental Appropriations Act, this Virtual  Visit was conducted, with patient's (and/or legal guardian's) consent, to reduce the patient's risk of exposure to COVID-19 and provide necessary medical care. Services were provided through a video synchronous discussion virtually to substitute for in-person visit.

## 2020-06-17 RX ORDER — POMALIDOMIDE 2 MG/1
CAPSULE ORAL
Qty: 28 CAP | Refills: 0 | Status: SHIPPED | OUTPATIENT
Start: 2020-06-17 | End: 2020-07-22

## 2020-06-17 NOTE — TELEPHONE ENCOUNTER
GERMAN FROM DR Beach Chick POMALYST 2MG CAP TAKE 1 CAP BY MOUTH ONCE DAILY D 28 R 0 FOR [employer] PURPOSES.

## 2020-07-01 ENCOUNTER — APPOINTMENT (OUTPATIENT)
Dept: INFUSION THERAPY | Age: 65
End: 2020-07-01
Payer: COMMERCIAL

## 2020-07-07 RX ORDER — SODIUM CHLORIDE 0.9 % (FLUSH) 0.9 %
5-10 SYRINGE (ML) INJECTION AS NEEDED
Status: CANCELLED | OUTPATIENT
Start: 2020-08-05 | End: 2020-08-05

## 2020-07-07 RX ORDER — HEPARIN 100 UNIT/ML
500 SYRINGE INTRAVENOUS AS NEEDED
Status: CANCELLED | OUTPATIENT
Start: 2020-08-05

## 2020-07-07 RX ORDER — SODIUM CHLORIDE 9 MG/ML
10 INJECTION INTRAMUSCULAR; INTRAVENOUS; SUBCUTANEOUS AS NEEDED
Status: CANCELLED | OUTPATIENT
Start: 2020-07-08

## 2020-07-07 RX ORDER — SODIUM CHLORIDE 9 MG/ML
10 INJECTION INTRAMUSCULAR; INTRAVENOUS; SUBCUTANEOUS AS NEEDED
Status: CANCELLED | OUTPATIENT
Start: 2020-08-05

## 2020-07-07 RX ORDER — HEPARIN 100 UNIT/ML
500 SYRINGE INTRAVENOUS AS NEEDED
Status: CANCELLED | OUTPATIENT
Start: 2020-07-08

## 2020-07-07 RX ORDER — SODIUM CHLORIDE 0.9 % (FLUSH) 0.9 %
5-10 SYRINGE (ML) INJECTION AS NEEDED
Status: CANCELLED | OUTPATIENT
Start: 2020-07-08 | End: 2020-07-08

## 2020-07-08 ENCOUNTER — HOSPITAL ENCOUNTER (OUTPATIENT)
Dept: INFUSION THERAPY | Age: 65
Discharge: HOME | End: 2020-07-08
Payer: COMMERCIAL

## 2020-07-08 VITALS
SYSTOLIC BLOOD PRESSURE: 148 MMHG | RESPIRATION RATE: 18 BRPM | OXYGEN SATURATION: 98 % | DIASTOLIC BLOOD PRESSURE: 85 MMHG | HEART RATE: 75 BPM | HEIGHT: 68 IN | WEIGHT: 207.4 LBS | TEMPERATURE: 98 F | BODY MASS INDEX: 31.43 KG/M2

## 2020-07-08 DIAGNOSIS — C90.00 MULTIPLE MYELOMA, REMISSION STATUS UNSPECIFIED (HCC): Primary | ICD-10-CM

## 2020-07-08 LAB
ALBUMIN SERPL-MCNC: 3.6 G/DL (ref 3.5–5)
ALBUMIN/GLOB SERPL: 0.9 {RATIO} (ref 1.1–2.2)
ALP SERPL-CCNC: 70 U/L (ref 45–117)
ALT SERPL-CCNC: 24 U/L (ref 12–78)
ANION GAP SERPL CALC-SCNC: 5 MMOL/L (ref 5–15)
AST SERPL-CCNC: 20 U/L (ref 15–37)
BASOPHILS # BLD: 0 K/UL (ref 0–0.1)
BASOPHILS NFR BLD: 1 % (ref 0–1)
BILIRUB SERPL-MCNC: 0.4 MG/DL (ref 0.2–1)
BUN SERPL-MCNC: 12 MG/DL (ref 6–20)
BUN/CREAT SERPL: 8 (ref 12–20)
CALCIUM SERPL-MCNC: 8.4 MG/DL (ref 8.5–10.1)
CHLORIDE SERPL-SCNC: 108 MMOL/L (ref 97–108)
CO2 SERPL-SCNC: 27 MMOL/L (ref 21–32)
CREAT SERPL-MCNC: 1.45 MG/DL (ref 0.7–1.3)
DIFFERENTIAL METHOD BLD: ABNORMAL
EOSINOPHIL # BLD: 0.1 K/UL (ref 0–0.4)
EOSINOPHIL NFR BLD: 4 % (ref 0–7)
ERYTHROCYTE [DISTWIDTH] IN BLOOD BY AUTOMATED COUNT: 15.7 % (ref 11.5–14.5)
GLOBULIN SER CALC-MCNC: 4.2 G/DL (ref 2–4)
GLUCOSE SERPL-MCNC: 99 MG/DL (ref 65–100)
HCT VFR BLD AUTO: 36.2 % (ref 36.6–50.3)
HGB BLD-MCNC: 11.3 G/DL (ref 12.1–17)
IGA SERPL-MCNC: 318 MG/DL (ref 70–400)
IGG SERPL-MCNC: 1190 MG/DL (ref 700–1600)
IGM SERPL-MCNC: 28 MG/DL (ref 40–230)
IMM GRANULOCYTES # BLD AUTO: 0 K/UL (ref 0–0.04)
IMM GRANULOCYTES NFR BLD AUTO: 0 % (ref 0–0.5)
LYMPHOCYTES # BLD: 1.1 K/UL (ref 0.8–3.5)
LYMPHOCYTES NFR BLD: 36 % (ref 12–49)
MCH RBC QN AUTO: 29.3 PG (ref 26–34)
MCHC RBC AUTO-ENTMCNC: 31.2 G/DL (ref 30–36.5)
MCV RBC AUTO: 93.8 FL (ref 80–99)
MONOCYTES # BLD: 0.6 K/UL (ref 0–1)
MONOCYTES NFR BLD: 20 % (ref 5–13)
NEUTS SEG # BLD: 1.2 K/UL (ref 1.8–8)
NEUTS SEG NFR BLD: 39 % (ref 32–75)
NRBC # BLD: 0 K/UL (ref 0–0.01)
NRBC BLD-RTO: 0 PER 100 WBC
PLATELET # BLD AUTO: 165 K/UL (ref 150–400)
PMV BLD AUTO: 10.1 FL (ref 8.9–12.9)
POTASSIUM SERPL-SCNC: 4.2 MMOL/L (ref 3.5–5.1)
PROT SERPL-MCNC: 7.8 G/DL (ref 6.4–8.2)
RBC # BLD AUTO: 3.86 M/UL (ref 4.1–5.7)
SODIUM SERPL-SCNC: 140 MMOL/L (ref 136–145)
WBC # BLD AUTO: 3 K/UL (ref 4.1–11.1)

## 2020-07-08 PROCEDURE — 36591 DRAW BLOOD OFF VENOUS DEVICE: CPT

## 2020-07-08 PROCEDURE — 82784 ASSAY IGA/IGD/IGG/IGM EACH: CPT

## 2020-07-08 PROCEDURE — 80053 COMPREHEN METABOLIC PANEL: CPT

## 2020-07-08 PROCEDURE — 83883 ASSAY NEPHELOMETRY NOT SPEC: CPT

## 2020-07-08 PROCEDURE — 77030012965 HC NDL HUBR BBMI -A

## 2020-07-08 PROCEDURE — 74011250636 HC RX REV CODE- 250/636: Performed by: INTERNAL MEDICINE

## 2020-07-08 PROCEDURE — 84165 PROTEIN E-PHORESIS SERUM: CPT

## 2020-07-08 PROCEDURE — 85025 COMPLETE CBC W/AUTO DIFF WBC: CPT

## 2020-07-08 RX ORDER — HEPARIN 100 UNIT/ML
500 SYRINGE INTRAVENOUS AS NEEDED
Status: ACTIVE | OUTPATIENT
Start: 2020-07-08 | End: 2020-07-08

## 2020-07-08 RX ORDER — SODIUM CHLORIDE 9 MG/ML
10 INJECTION INTRAMUSCULAR; INTRAVENOUS; SUBCUTANEOUS AS NEEDED
Status: ACTIVE | OUTPATIENT
Start: 2020-07-08 | End: 2020-07-08

## 2020-07-08 RX ORDER — SODIUM CHLORIDE 0.9 % (FLUSH) 0.9 %
5-10 SYRINGE (ML) INJECTION AS NEEDED
Status: DISPENSED | OUTPATIENT
Start: 2020-07-08 | End: 2020-07-08

## 2020-07-08 RX ADMIN — SODIUM CHLORIDE 10 ML: 9 INJECTION INTRAMUSCULAR; INTRAVENOUS; SUBCUTANEOUS at 09:30

## 2020-07-08 RX ADMIN — Medication 500 UNITS: at 09:32

## 2020-07-08 RX ADMIN — Medication 10 ML: at 09:30

## 2020-07-08 RX ADMIN — Medication 10 ML: at 09:32

## 2020-07-08 NOTE — PROGRESS NOTES
[facility] Note:  Arrived - 0920    Visit Vitals  /85 (BP 1 Location: Left arm, BP Patient Position: Sitting)   Pulse 75   Temp 98 °F (36.7 °C)   Resp 18   Ht 5' 8\" (1.727 m)   Wt 94.1 kg (207 lb 6.4 oz)   SpO2 98%   BMI 31.54 kg/m²       Assessment unremarkable. Port accessed, labs drawn & flushed per protocol w/o difficulty. Scott needle removed. Labs pending at time of note. See [facility] for results. 0935 - Tolerated well. Pt denies any acute problems/changes. Discharged from [facility] ambulatory. No distress. Next appt: 8/5/20 @ 5922.

## 2020-07-09 LAB
KAPPA LC FREE SER-MCNC: 49.5 MG/L (ref 3.3–19.4)
KAPPA LC FREE/LAMBDA FREE SER: 1.29 {RATIO} (ref 0.26–1.65)
LAMBDA LC FREE SERPL-MCNC: 38.4 MG/L (ref 5.7–26.3)

## 2020-07-10 LAB
ALBUMIN SERPL ELPH-MCNC: 3.5 G/DL (ref 2.9–4.4)
ALBUMIN/GLOB SERPL: 1.1 {RATIO} (ref 0.7–1.7)
ALPHA1 GLOB SERPL ELPH-MCNC: 0.2 G/DL (ref 0–0.4)
ALPHA2 GLOB SERPL ELPH-MCNC: 1 G/DL (ref 0.4–1)
B-GLOBULIN SERPL ELPH-MCNC: 0.9 G/DL (ref 0.7–1.3)
GAMMA GLOB SERPL ELPH-MCNC: 1.1 G/DL (ref 0.4–1.8)
GLOBULIN SER CALC-MCNC: 3.3 G/DL (ref 2.2–3.9)
IGA SERPL-MCNC: 365 MG/DL (ref 61–437)
IGG SERPL-MCNC: 1139 MG/DL (ref 603–1613)
IGM SERPL-MCNC: 29 MG/DL (ref 20–172)
M PROTEIN SERPL ELPH-MCNC: NORMAL G/DL
PROT PATTERN SERPL IFE-IMP: NORMAL
PROT SERPL-MCNC: 6.8 G/DL (ref 6–8.5)

## 2020-07-14 DIAGNOSIS — C90.00 MULTIPLE MYELOMA, REMISSION STATUS UNSPECIFIED (HCC): ICD-10-CM

## 2020-07-15 ENCOUNTER — VIRTUAL VISIT (OUTPATIENT)
Dept: ENDOCRINOLOGY | Age: 65
End: 2020-07-15

## 2020-07-15 DIAGNOSIS — I10 ESSENTIAL HYPERTENSION WITH GOAL BLOOD PRESSURE LESS THAN 130/80: ICD-10-CM

## 2020-07-15 DIAGNOSIS — Z79.4 TYPE 2 DIABETES MELLITUS WITHOUT COMPLICATION, WITH LONG-TERM CURRENT USE OF INSULIN (HCC): Primary | ICD-10-CM

## 2020-07-15 DIAGNOSIS — E11.9 TYPE 2 DIABETES MELLITUS WITHOUT COMPLICATION, WITH LONG-TERM CURRENT USE OF INSULIN (HCC): Primary | ICD-10-CM

## 2020-07-15 NOTE — PROGRESS NOTES
**DUE TO PANDEMIC AND HEALTH CONCERNS IN THE COMMUNITY, THIS PATIENT WAS EITHER ILL OR FOUND TO BE HIGH RISK FOR IN-PERSON EVALUATION WITHIN THE CLINIC. THE FOLLOWING IS A VIRTUAL TELEMEDICINE VIDEO ENCOUNTER VIA [employer], TO WHICH THE PATIENT AGREED. THE PURPOSE IS TO LIMIT INTERRUPTIONS IN HEALTHCARE AND TO PROVIDE FOR ONGOING URGENT NEEDS UNDER THE CURRENT CONDITIONS. CHIEF COMPLAINT: f/u uncontrolled diabetes    HISTORY OF PRESENT ILLNESS:   Crow Barron Sr. is a 60 y.o. male with a PMHx as noted below who presents for f/u of uncontrolled type 2 diabetes with steroid induced hyperglycemia.     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,   Had been on insulin with steroids but now off,  Prior A1c was 6.2%,   2 weeks ago with stable bone marrow biopsy,    Current Home Regimen:   metformin 500mg twice daily    Review of home glucose:   Reports AM numbers 119, 120,  Reports the Jennifer Xavierte makes him hungry so he has a snack at night,    Will need updated diabetes panel in near future,    Review of most recent diabetes-related labs:  Lab Results   Component Value Date    HBA1C 6.2 08/25/2017    HBA1C 8.3 (H) 12/24/2010    MYT3LGII 6.6% 04/07/2017    SKP5TUFG 6.2 03/12/2020    ALO9EHHW 5.3 11/12/2019    JMR6POVM 5.3 07/09/2019    CHOL 140 11/11/2019    LDLC 59 11/11/2019    GFRAA 60 (L) 07/08/2020    GFRNA 49 (L) 07/08/2020    CREATEXT Cr 1.46, GFR 59 11/11/2019    MCACR 3.1 11/11/2019    MALBEXT pending 11/11/2019    TSH 0.94 09/19/2011    VITD3 45.0 06/10/2020       Feeling well, no chest pain or SOB,    PAST MEDICAL/SURGICAL HISTORY:   Past Medical History:   Diagnosis Date    Acid reflux     Arrhythmia     pvc's    Chronic pain     neck    Depression     Diabetes ([address]     Femoral hernia 7/11/2012    Hepatitis C     Hypertension     Inguinal hernia 7/11/2012    Liver disease     hepatitis c    Multiple myeloma ([address]     Other ill-defined conditions(799.89)     Hx of PVCs since 2009    Prostatitis, acute     Psychiatric disorder     depression     Past Surgical History:   Procedure Laterality Date    COLONOSCOPY N/A 9/20/2016    COLONOSCOPY [facility] by Alonzo [facility]agat [facility] at [facility] ENDOSCOPY    HX APPENDECTOMY      HX CERVICAL FUSION  2008    x 1 as of 4/30/2014    HX HEENT      foreign body removed from right eye    HX HERNIA REPAIR  2012    [facility]'s      HX ORTHOPAEDIC      cervical fusion    HX OTHER SURGICAL  2000    liver bx - chronic hepatitis       ALLERGIES:   Allergies   Allergen Reactions    Mercury (Bulk) Hives     Blisters         MEDICATIONS ON ADMISSION:     Current Outpatient Medications:     ascorbic acid (VITAMIN C PO), Take  by mouth., Disp: , Rfl:     Pomalyst 2 mg cap, TAKE 1 CAPSULE BY MOUTH ONCE DAILY, Disp: 28 Cap, Rfl: 0    tenofovir DISOPROXIL FUMARATE (VIREAD) 300 mg tablet, TAKE 1 TABLET BY MOUTH EVERY DAY, Disp: 90 Tab, Rfl: 1    ergocalciferol (ERGOCALCIFEROL) 1,250 mcg (50,000 unit) capsule, TAKE 1 CAPSULE BY MOUTH 1 TIME WEEKLY, Disp: 12 Cap, Rfl: 0    buPROPion XL (WELLBUTRIN XL) 300 mg XL tablet, TAKE 1 TABLET BY MOUTH EVERY DAY, Disp: , Rfl:     metFORMIN ER (GLUCOPHAGE XR) 500 mg tablet, Take 1 Tab by mouth two (2) times a day., Disp: 180 Tab, Rfl: 3    NUCYNTA 100 mg tablet, TAKE 2 TABLETS 3 TIMES A DAY, Disp: , Rfl: 0    aspirin delayed-release 81 mg tablet, Take  by mouth daily. , Disp: , Rfl:     multivitamin (ONE A DAY) tablet, Take 1 Tab by mouth daily. , Disp: , Rfl:     tamsulosin (FLOMAX) 0.4 mg capsule, Take 1 Cap by mouth daily. , Disp: 30 Cap, Rfl: 1    zolpidem CR (AMBIEN CR) 12.5 mg tablet, Take 12.5 mg by mouth nightly., Disp: , Rfl:     aripiprazole (ABILIFY) 5 mg tablet, Take 2.5 mg by mouth every morning., Disp: , Rfl:     lisinopriL (PRINIVIL, ZESTRIL) 10 mg tablet, , Disp: , Rfl:     metoprolol succinate (TOPROL-XL) 25 mg XL tablet, TAKE 1 TABLET BY MOUTH EVERY DAY IN THE EVENING, Disp: , Rfl: 3    naproxen (NAPROSYN) 500 mg tablet, TAKE 1 TABLET BY MOUTH TWICE A DAY, Disp: , Rfl: 5    insulin lispro protamine/insulin lispro (HUMALOG 50-50 MIX) 100 unit/mL (50-50) flexpen, by SubCUTAneous route., Disp: , Rfl:     prochlorperazine (COMPAZINE) 10 mg tablet, Take 5 mg by mouth every six (6) hours as needed. , Disp: , Rfl:     omeprazole (PRILOSEC) 20 mg capsule, Take 20 mg by mouth daily as needed (acid reflux). , Disp: , Rfl:     ondansetron (ZOFRAN ODT) 8 mg disintegrating tablet, Take 8 mg by mouth every eight (8) hours as needed for Nausea., Disp: , Rfl:     SOCIAL HISTORY:   Social History     Socioeconomic History    Marital status:      Spouse name: Not on file    Number of children: Not on file    Years of education: Not on file    Highest education level: Not on file   Occupational History    Not on file   Social Needs    Financial resource strain: Not on file    Food insecurity     Worry: Not on file     Inability: Not on file    Transportation needs     Medical: Not on file     Non-medical: Not on file   Tobacco Use    Smoking status: Former Smoker     Packs/day: 1.00     Years: 12.00     Pack years: 12.00     Types: Cigarettes     Last attempt to quit: 1989     Years since quittin.5    Smokeless tobacco: Never Used    Tobacco comment: former cigarette smoker   Substance and Sexual Activity    Alcohol use: No     Comment: former alcoholic-quit 1320    Drug use: No    Sexual activity: Not Currently   Lifestyle    Physical activity     Days per week: Not on file     Minutes per session: Not on file    Stress: Not on file   Relationships    Social connections     Talks on phone: Not on file     Gets together: Not on file     Attends Islam service: Not on file     Active member of club or organization: Not on file     Attends meetings of clubs or organizations: Not on file     Relationship status: Not on file    Intimate partner violence     Fear of current or ex partner: Not on file     Emotionally abused: Not on file     Physically abused: Not on file     Forced sexual activity: Not on file   Other Topics Concern    Not on file   Social History Narrative    Not on file       FAMILY HISTORY:  Family History   Problem Relation Age of Onset    Arthritis-osteo Mother     Arthritis-osteo Father     Diabetes Father     Hypertension Maternal Grandmother     Diabetes Maternal Grandmother     Hypertension Maternal Grandfather     Diabetes Maternal Grandfather        REVIEW OF SYSTEMS: Complete ROS assessed and noted for that which is described above, all else are negative. Eyes: normal  ENT: normal  CVS: normal  Resp: normal  GI: normal  : normal  GYN: normal  Endocrine: normal  Integument: normal  Musculoskeletal: normal  Neuro: normal  Psych: normal    PHYSICAL EXAMINATION:  Telemedicine Visit    GENERAL: NCAT, Appears well nourished  EYES: EOMI, non-icteric, no proptosis  Ear/Nose/Throat: NCAT, no visible inflammation or masses  CARDIOVASCULAR: no cyanosis, no visible JVD  RESPIRATORY: comfortable respirations observed, no cyanosis  MUSCULOSKELETAL: Normal ROM of upper extremities observed  SKIN: No edema, rash, or other significant changes observed  NEUROLOGIC:  AAOx3  PSYCHIATRIC: Normal affect, Normal insight and judgement    REVIEW OF LABORATORY AND RADIOLOGY DATA:   Labs and documentation have been reviewed as described above. ASSESSMENT AND PLAN:   Yocasta Myrick Sr. is a 60 y.o. male with a PMHx as noted above who presents for f/u of uncontrolled type 2 diabetes with steroid induced hyperglycemia. Problems:  Type 2 diabetes uncontrolled  Steroid induced hyperglycemia  Hypertension         Patient is tolerating his meds. While his fasting sugars are often affected by his overnight snack, overall he is doing well. It is good news also that his bone marrow biopsy (2 years after transplant) came back reassuring. I encouraged him to continue with diet / exercise / and staying hydrated. PLAN  Type 2 Diabetes  Medications:   Metformin 500mg twice daily,  Diet and Exercise,     HTN: Telemedicine visit    Lipids: reviewed, stable w/o statin, repeating    RTC: 4 month follow up visit, prelabs ordered,    20 minutes spent toward telemedicine visit today of which >50% of this time was spent in counseling and coordination of care. Elías Sanchez.  [facility] End[facility]  [address] [place] [address]

## 2020-07-21 DIAGNOSIS — C90.00 MULTIPLE MYELOMA, REMISSION STATUS UNSPECIFIED (HCC): ICD-10-CM

## 2020-07-22 RX ORDER — METFORMIN HYDROCHLORIDE 500 MG/1
TABLET, EXTENDED RELEASE ORAL
Qty: 180 TAB | Refills: 3 | Status: SHIPPED | OUTPATIENT
Start: 2020-07-22 | End: 2021-08-04 | Stop reason: SDUPTHER

## 2020-07-22 RX ORDER — POMALIDOMIDE 2 MG/1
CAPSULE ORAL
Qty: 28 CAP | Refills: 0 | Status: SHIPPED | OUTPATIENT
Start: 2020-07-22 | End: 2020-08-17

## 2020-08-05 ENCOUNTER — APPOINTMENT (OUTPATIENT)
Dept: INFUSION THERAPY | Age: 65
End: 2020-08-05
Payer: COMMERCIAL

## 2020-08-05 ENCOUNTER — HOSPITAL ENCOUNTER (OUTPATIENT)
Dept: INFUSION THERAPY | Age: 65
Discharge: HOME | End: 2020-08-05
Payer: COMMERCIAL

## 2020-08-05 VITALS
SYSTOLIC BLOOD PRESSURE: 117 MMHG | WEIGHT: 206.1 LBS | BODY MASS INDEX: 31.34 KG/M2 | TEMPERATURE: 97.8 F | DIASTOLIC BLOOD PRESSURE: 82 MMHG | HEART RATE: 81 BPM | RESPIRATION RATE: 18 BRPM

## 2020-08-05 DIAGNOSIS — C90.00 MULTIPLE MYELOMA, REMISSION STATUS UNSPECIFIED (HCC): Primary | ICD-10-CM

## 2020-08-05 LAB
ALBUMIN SERPL-MCNC: 3.4 G/DL (ref 3.5–5)
ALBUMIN/GLOB SERPL: 0.8 {RATIO} (ref 1.1–2.2)
ALP SERPL-CCNC: 79 U/L (ref 45–117)
ALT SERPL-CCNC: 42 U/L (ref 12–78)
ANION GAP SERPL CALC-SCNC: 1 MMOL/L (ref 5–15)
AST SERPL-CCNC: 29 U/L (ref 15–37)
BASOPHILS # BLD: 0.1 K/UL (ref 0–0.1)
BASOPHILS NFR BLD: 4 % (ref 0–1)
BILIRUB SERPL-MCNC: 0.3 MG/DL (ref 0.2–1)
BUN SERPL-MCNC: 14 MG/DL (ref 6–20)
BUN/CREAT SERPL: 10 (ref 12–20)
CALCIUM SERPL-MCNC: 8.5 MG/DL (ref 8.5–10.1)
CHLORIDE SERPL-SCNC: 108 MMOL/L (ref 97–108)
CO2 SERPL-SCNC: 29 MMOL/L (ref 21–32)
CREAT SERPL-MCNC: 1.44 MG/DL (ref 0.7–1.3)
DIFFERENTIAL METHOD BLD: ABNORMAL
EOSINOPHIL # BLD: 0.1 K/UL (ref 0–0.4)
EOSINOPHIL NFR BLD: 3 % (ref 0–7)
ERYTHROCYTE [DISTWIDTH] IN BLOOD BY AUTOMATED COUNT: 16.3 % (ref 11.5–14.5)
GLOBULIN SER CALC-MCNC: 4.1 G/DL (ref 2–4)
GLUCOSE SERPL-MCNC: 150 MG/DL (ref 65–100)
HCT VFR BLD AUTO: 34.7 % (ref 36.6–50.3)
HGB BLD-MCNC: 10.9 G/DL (ref 12.1–17)
IGA SERPL-MCNC: 333 MG/DL (ref 70–400)
IGG SERPL-MCNC: 1150 MG/DL (ref 700–1600)
IGM SERPL-MCNC: 27 MG/DL (ref 40–230)
IMM GRANULOCYTES # BLD AUTO: 0 K/UL (ref 0–0.04)
IMM GRANULOCYTES NFR BLD AUTO: 0 % (ref 0–0.5)
LYMPHOCYTES # BLD: 0.9 K/UL (ref 0.8–3.5)
LYMPHOCYTES NFR BLD: 28 % (ref 12–49)
MCH RBC QN AUTO: 29.2 PG (ref 26–34)
MCHC RBC AUTO-ENTMCNC: 31.4 G/DL (ref 30–36.5)
MCV RBC AUTO: 93 FL (ref 80–99)
MONOCYTES # BLD: 0.7 K/UL (ref 0–1)
MONOCYTES NFR BLD: 24 % (ref 5–13)
NEUTS BAND NFR BLD MANUAL: 1 %
NEUTS SEG # BLD: 1.3 K/UL (ref 1.8–8)
NEUTS SEG NFR BLD: 40 % (ref 32–75)
NRBC # BLD: 0 K/UL (ref 0–0.01)
NRBC BLD-RTO: 0 PER 100 WBC
PLATELET # BLD AUTO: 162 K/UL (ref 150–400)
PMV BLD AUTO: 10.7 FL (ref 8.9–12.9)
POTASSIUM SERPL-SCNC: 3.9 MMOL/L (ref 3.5–5.1)
PROT SERPL-MCNC: 7.5 G/DL (ref 6.4–8.2)
RBC # BLD AUTO: 3.73 M/UL (ref 4.1–5.7)
RBC MORPH BLD: ABNORMAL
SODIUM SERPL-SCNC: 138 MMOL/L (ref 136–145)
WBC # BLD AUTO: 3.1 K/UL (ref 4.1–11.1)

## 2020-08-05 PROCEDURE — 85025 COMPLETE CBC W/AUTO DIFF WBC: CPT

## 2020-08-05 PROCEDURE — 77030012965 HC NDL HUBR BBMI -A

## 2020-08-05 PROCEDURE — 80053 COMPREHEN METABOLIC PANEL: CPT

## 2020-08-05 PROCEDURE — 36591 DRAW BLOOD OFF VENOUS DEVICE: CPT

## 2020-08-05 PROCEDURE — 82784 ASSAY IGA/IGD/IGG/IGM EACH: CPT

## 2020-08-05 PROCEDURE — 83883 ASSAY NEPHELOMETRY NOT SPEC: CPT

## 2020-08-05 PROCEDURE — 74011250636 HC RX REV CODE- 250/636: Performed by: INTERNAL MEDICINE

## 2020-08-05 PROCEDURE — 84165 PROTEIN E-PHORESIS SERUM: CPT

## 2020-08-05 RX ORDER — SODIUM CHLORIDE 0.9 % (FLUSH) 0.9 %
5-10 SYRINGE (ML) INJECTION AS NEEDED
Status: DISPENSED | OUTPATIENT
Start: 2020-08-05 | End: 2020-08-05

## 2020-08-05 RX ORDER — HEPARIN 100 UNIT/ML
500 SYRINGE INTRAVENOUS AS NEEDED
Status: ACTIVE | OUTPATIENT
Start: 2020-08-05 | End: 2020-08-05

## 2020-08-05 RX ORDER — SODIUM CHLORIDE 9 MG/ML
10 INJECTION INTRAMUSCULAR; INTRAVENOUS; SUBCUTANEOUS AS NEEDED
Status: ACTIVE | OUTPATIENT
Start: 2020-08-05 | End: 2020-08-05

## 2020-08-05 RX ADMIN — SODIUM CHLORIDE 10 ML: 9 INJECTION INTRAMUSCULAR; INTRAVENOUS; SUBCUTANEOUS at 09:45

## 2020-08-05 RX ADMIN — Medication 500 UNITS: at 09:45

## 2020-08-05 RX ADMIN — Medication 10 ML: at 09:45

## 2020-08-05 NOTE — PROGRESS NOTES
[facility] Short Visit Note    0940  Arrived for [address] Flush/Labs    Visit Vitals  /82 (BP 1 Location: Left arm, BP Patient Position: Sitting)   Pulse 81   Temp 97.8 °F (36.6 °C)   Resp 18   Wt 93.5 kg (206 lb 1.6 oz)   BMI 31.34 kg/m²       Assessment- unchanged. No complaints voiced. Port accessed with positive blood return noted. Labs drawn per order and sent for processing. Port flushed and heparinized per protocol w/o difficulty. Scott needle removed. Site covered with gauze and paper tape. Pt tolerated well. Pt denies any acute problems/changes. 4710  Discharged from [facility] ambulatory. No distress. Next appt:    Future Appointments   Date Time Provider Dajuan Johnston   9/2/2020  9:30 AM CHAIR 3 [address] REG   9/16/2020 11:30 AM Megan Potts MD [facility]/IRLANDA BS AMB   9/30/2020  9:30 AM CHAIR 3 [address] REG   10/28/2020  9:30 AM CHAIR 3 [facility] REG   11/16/2020  3:10 PM Glenys Garvey MD [facility] BS AMB

## 2020-08-06 LAB
ALBUMIN SERPL ELPH-MCNC: 3.5 G/DL (ref 2.9–4.4)
ALBUMIN/GLOB SERPL: 1.1 {RATIO} (ref 0.7–1.7)
ALPHA1 GLOB SERPL ELPH-MCNC: 0.2 G/DL (ref 0–0.4)
ALPHA2 GLOB SERPL ELPH-MCNC: 0.9 G/DL (ref 0.4–1)
B-GLOBULIN SERPL ELPH-MCNC: 0.9 G/DL (ref 0.7–1.3)
GAMMA GLOB SERPL ELPH-MCNC: 1.1 G/DL (ref 0.4–1.8)
GLOBULIN SER CALC-MCNC: 3.2 G/DL (ref 2.2–3.9)
KAPPA LC FREE SER-MCNC: 49.3 MG/L (ref 3.3–19.4)
KAPPA LC FREE/LAMBDA FREE SER: 1.33 {RATIO} (ref 0.26–1.65)
LAMBDA LC FREE SERPL-MCNC: 37 MG/L (ref 5.7–26.3)
M PROTEIN SERPL ELPH-MCNC: NORMAL G/DL
PROT SERPL-MCNC: 6.7 G/DL (ref 6–8.5)

## 2020-08-07 LAB
IGA SERPL-MCNC: 349 MG/DL (ref 61–437)
IGG SERPL-MCNC: 1158 MG/DL (ref 603–1613)
IGM SERPL-MCNC: 29 MG/DL (ref 20–172)
PROT PATTERN SERPL IFE-IMP: NORMAL

## 2020-08-26 ENCOUNTER — TELEPHONE (OUTPATIENT)
Dept: ONCOLOGY | Age: 65
End: 2020-08-26

## 2020-08-26 RX ORDER — DIPHENHYDRAMINE HYDROCHLORIDE 50 MG/ML
50 INJECTION, SOLUTION INTRAMUSCULAR; INTRAVENOUS AS NEEDED
Status: CANCELLED
Start: 2020-09-02

## 2020-08-26 RX ORDER — SODIUM CHLORIDE 0.9 % (FLUSH) 0.9 %
5-10 SYRINGE (ML) INJECTION AS NEEDED
Status: CANCELLED | OUTPATIENT
Start: 2020-09-02 | End: 2020-09-02

## 2020-08-26 RX ORDER — ONDANSETRON 2 MG/ML
8 INJECTION INTRAMUSCULAR; INTRAVENOUS AS NEEDED
Status: CANCELLED | OUTPATIENT
Start: 2020-09-02

## 2020-08-26 RX ORDER — SODIUM CHLORIDE 9 MG/ML
25 INJECTION, SOLUTION INTRAVENOUS CONTINUOUS
Status: CANCELLED | OUTPATIENT
Start: 2020-09-02 | End: 2020-09-02

## 2020-08-26 RX ORDER — HEPARIN 100 UNIT/ML
500 SYRINGE INTRAVENOUS AS NEEDED
Status: CANCELLED | OUTPATIENT
Start: 2020-09-02

## 2020-08-26 RX ORDER — ALBUTEROL SULFATE 0.83 MG/ML
2.5 SOLUTION RESPIRATORY (INHALATION) AS NEEDED
Status: CANCELLED
Start: 2020-09-02

## 2020-08-26 RX ORDER — SODIUM CHLORIDE 9 MG/ML
10 INJECTION INTRAMUSCULAR; INTRAVENOUS; SUBCUTANEOUS AS NEEDED
Status: CANCELLED | OUTPATIENT
Start: 2020-09-02

## 2020-08-26 RX ORDER — ACETAMINOPHEN 325 MG/1
650 TABLET ORAL AS NEEDED
Status: CANCELLED
Start: 2020-09-02

## 2020-08-26 RX ORDER — HEPARIN 100 UNIT/ML
300-500 SYRINGE INTRAVENOUS AS NEEDED
Status: CANCELLED | OUTPATIENT
Start: 2020-09-02

## 2020-08-26 RX ORDER — SODIUM CHLORIDE 0.9 % (FLUSH) 0.9 %
10 SYRINGE (ML) INJECTION AS NEEDED
Status: CANCELLED | OUTPATIENT
Start: 2020-09-02

## 2020-08-26 RX ORDER — EPINEPHRINE 1 MG/ML
0.3 INJECTION, SOLUTION, CONCENTRATE INTRAVENOUS AS NEEDED
Status: CANCELLED | OUTPATIENT
Start: 2020-09-02

## 2020-08-26 RX ORDER — HYDROCORTISONE SODIUM SUCCINATE 100 MG/2ML
100 INJECTION, POWDER, FOR SOLUTION INTRAMUSCULAR; INTRAVENOUS AS NEEDED
Status: CANCELLED | OUTPATIENT
Start: 2020-09-02

## 2020-08-26 NOTE — TELEPHONE ENCOUNTER
Pt recently had a root canal and is having other issues and would like a return call. Pt also wants to know if he can get a flu shot?

## 2020-08-26 NOTE — TELEPHONE ENCOUNTER
Spoke with pt.after speaking to MD.  HIPAA verified by two patient identifiers. Pt will hold his zometa until October infusion date and then skip the September. He can continue pomalyst.  He can get flu shot whenever. He will have dentist fax us a clearance form saying it is okay for him to get cavities filled. He will hold his ASA 3 days prior.

## 2020-09-02 ENCOUNTER — APPOINTMENT (OUTPATIENT)
Dept: INFUSION THERAPY | Age: 65
End: 2020-09-02
Payer: COMMERCIAL

## 2020-09-02 ENCOUNTER — HOSPITAL ENCOUNTER (OUTPATIENT)
Dept: INFUSION THERAPY | Age: 65
Discharge: HOME | End: 2020-09-02
Payer: COMMERCIAL

## 2020-09-02 VITALS
DIASTOLIC BLOOD PRESSURE: 97 MMHG | HEART RATE: 85 BPM | TEMPERATURE: 98 F | RESPIRATION RATE: 18 BRPM | SYSTOLIC BLOOD PRESSURE: 149 MMHG | OXYGEN SATURATION: 98 %

## 2020-09-02 LAB
ALBUMIN SERPL-MCNC: 3.5 G/DL (ref 3.5–5)
ALBUMIN/GLOB SERPL: 0.9 {RATIO} (ref 1.1–2.2)
ALP SERPL-CCNC: 88 U/L (ref 45–117)
ALT SERPL-CCNC: 28 U/L (ref 12–78)
ANION GAP SERPL CALC-SCNC: 6 MMOL/L (ref 5–15)
AST SERPL-CCNC: 31 U/L (ref 15–37)
BASOPHILS # BLD: 0.1 K/UL (ref 0–0.1)
BASOPHILS NFR BLD: 3 % (ref 0–1)
BILIRUB SERPL-MCNC: 0.4 MG/DL (ref 0.2–1)
BUN SERPL-MCNC: 14 MG/DL (ref 6–20)
BUN/CREAT SERPL: 9 (ref 12–20)
CALCIUM SERPL-MCNC: 8.3 MG/DL (ref 8.5–10.1)
CHLORIDE SERPL-SCNC: 106 MMOL/L (ref 97–108)
CO2 SERPL-SCNC: 27 MMOL/L (ref 21–32)
CREAT SERPL-MCNC: 1.56 MG/DL (ref 0.7–1.3)
DIFFERENTIAL METHOD BLD: ABNORMAL
EOSINOPHIL # BLD: 0 K/UL (ref 0–0.4)
EOSINOPHIL NFR BLD: 1 % (ref 0–7)
ERYTHROCYTE [DISTWIDTH] IN BLOOD BY AUTOMATED COUNT: 16 % (ref 11.5–14.5)
GLOBULIN SER CALC-MCNC: 3.8 G/DL (ref 2–4)
GLUCOSE SERPL-MCNC: 118 MG/DL (ref 65–100)
HCT VFR BLD AUTO: 35.6 % (ref 36.6–50.3)
HGB BLD-MCNC: 11.2 G/DL (ref 12.1–17)
IGA SERPL-MCNC: 366 MG/DL (ref 70–400)
IGG SERPL-MCNC: 1280 MG/DL (ref 700–1600)
IGM SERPL-MCNC: 28 MG/DL (ref 40–230)
IMM GRANULOCYTES # BLD AUTO: 0 K/UL (ref 0–0.04)
IMM GRANULOCYTES NFR BLD AUTO: 0 % (ref 0–0.5)
LYMPHOCYTES # BLD: 1.1 K/UL (ref 0.8–3.5)
LYMPHOCYTES NFR BLD: 37 % (ref 12–49)
MCH RBC QN AUTO: 28.9 PG (ref 26–34)
MCHC RBC AUTO-ENTMCNC: 31.5 G/DL (ref 30–36.5)
MCV RBC AUTO: 91.8 FL (ref 80–99)
MONOCYTES # BLD: 0.5 K/UL (ref 0–1)
MONOCYTES NFR BLD: 17 % (ref 5–13)
NEUTS SEG # BLD: 1.4 K/UL (ref 1.8–8)
NEUTS SEG NFR BLD: 42 % (ref 32–75)
NRBC # BLD: 0 K/UL (ref 0–0.01)
NRBC BLD-RTO: 0 PER 100 WBC
PLATELET # BLD AUTO: 188 K/UL (ref 150–400)
PMV BLD AUTO: 10.4 FL (ref 8.9–12.9)
POTASSIUM SERPL-SCNC: 3.9 MMOL/L (ref 3.5–5.1)
PROT SERPL-MCNC: 7.3 G/DL (ref 6.4–8.2)
RBC # BLD AUTO: 3.88 M/UL (ref 4.1–5.7)
RBC MORPH BLD: ABNORMAL
SODIUM SERPL-SCNC: 139 MMOL/L (ref 136–145)
WBC # BLD AUTO: 3.1 K/UL (ref 4.1–11.1)
WBC MORPH BLD: ABNORMAL

## 2020-09-02 PROCEDURE — 80053 COMPREHEN METABOLIC PANEL: CPT

## 2020-09-02 PROCEDURE — 83883 ASSAY NEPHELOMETRY NOT SPEC: CPT

## 2020-09-02 PROCEDURE — 36415 COLL VENOUS BLD VENIPUNCTURE: CPT

## 2020-09-02 PROCEDURE — 84165 PROTEIN E-PHORESIS SERUM: CPT

## 2020-09-02 PROCEDURE — 82784 ASSAY IGA/IGD/IGG/IGM EACH: CPT

## 2020-09-02 PROCEDURE — 77030012965 HC NDL HUBR BBMI -A

## 2020-09-02 PROCEDURE — 85025 COMPLETE CBC W/AUTO DIFF WBC: CPT

## 2020-09-02 NOTE — PROGRESS NOTES
[facility] Lab Draw Note:  Arrived - [facility]    Visit Vitals  BP (!) (P) 140/92   Pulse 85   Temp 98 °F (36.7 °C)   Resp 18   SpO2 98%       Labs drawn from right chest port, port heparin locked and eddy needle removed  0950- Tolerated well. Pt denies any acute problems/changes. Discharged from [facility] ambulatory. No distress. Next appt: 9/30/2020 at 0930      See [facility] for pending lab results.

## 2020-09-03 LAB
ALBUMIN SERPL ELPH-MCNC: 3.4 G/DL (ref 2.9–4.4)
ALBUMIN/GLOB SERPL: 0.9 {RATIO} (ref 0.7–1.7)
ALPHA1 GLOB SERPL ELPH-MCNC: 0.2 G/DL (ref 0–0.4)
ALPHA2 GLOB SERPL ELPH-MCNC: 1.1 G/DL (ref 0.4–1)
B-GLOBULIN SERPL ELPH-MCNC: 1 G/DL (ref 0.7–1.3)
GAMMA GLOB SERPL ELPH-MCNC: 1.3 G/DL (ref 0.4–1.8)
GLOBULIN SER CALC-MCNC: 3.6 G/DL (ref 2.2–3.9)
KAPPA LC FREE SER-MCNC: 42.8 MG/L (ref 3.3–19.4)
KAPPA LC FREE/LAMBDA FREE SER: 1 {RATIO} (ref 0.26–1.65)
LAMBDA LC FREE SERPL-MCNC: 42.8 MG/L (ref 5.7–26.3)
M PROTEIN SERPL ELPH-MCNC: 0.3 G/DL
PROT SERPL-MCNC: 7 G/DL (ref 6–8.5)

## 2020-09-04 LAB
IGA SERPL-MCNC: 374 MG/DL (ref 61–437)
IGG SERPL-MCNC: 1238 MG/DL (ref 603–1613)
IGM SERPL-MCNC: 34 MG/DL (ref 20–172)
PROT PATTERN SERPL IFE-IMP: NORMAL

## 2020-09-08 DIAGNOSIS — C90.00 MULTIPLE MYELOMA, REMISSION STATUS UNSPECIFIED (HCC): ICD-10-CM

## 2020-09-10 RX ORDER — POMALIDOMIDE 2 MG/1
CAPSULE ORAL
Qty: 28 CAP | Refills: 0 | Status: SHIPPED | OUTPATIENT
Start: 2020-09-10 | End: 2020-10-14

## 2020-09-16 ENCOUNTER — OFFICE VISIT (OUTPATIENT)
Dept: ONCOLOGY | Age: 65
End: 2020-09-16
Payer: MEDICARE

## 2020-09-16 VITALS
WEIGHT: 202.6 LBS | OXYGEN SATURATION: 97 % | SYSTOLIC BLOOD PRESSURE: 117 MMHG | HEIGHT: 68 IN | HEART RATE: 74 BPM | DIASTOLIC BLOOD PRESSURE: 80 MMHG | TEMPERATURE: 97.7 F | BODY MASS INDEX: 30.71 KG/M2

## 2020-09-16 DIAGNOSIS — C90.02 MULTIPLE MYELOMA IN RELAPSE (HCC): Primary | ICD-10-CM

## 2020-09-16 PROCEDURE — 99214 OFFICE O/P EST MOD 30 MIN: CPT | Performed by: INTERNAL MEDICINE

## 2020-09-16 PROCEDURE — G8427 DOCREV CUR MEDS BY ELIG CLIN: HCPCS | Performed by: INTERNAL MEDICINE

## 2020-09-16 PROCEDURE — 3017F COLORECTAL CA SCREEN DOC REV: CPT | Performed by: INTERNAL MEDICINE

## 2020-09-16 PROCEDURE — G9717 DOC PT DX DEP/BP F/U NT REQ: HCPCS | Performed by: INTERNAL MEDICINE

## 2020-09-16 PROCEDURE — G8417 CALC BMI ABV UP PARAM F/U: HCPCS | Performed by: INTERNAL MEDICINE

## 2020-09-16 PROCEDURE — G8754 DIAS BP LESS 90: HCPCS | Performed by: INTERNAL MEDICINE

## 2020-09-16 PROCEDURE — G8752 SYS BP LESS 140: HCPCS | Performed by: INTERNAL MEDICINE

## 2020-09-16 NOTE — PROGRESS NOTES
Ame Ceballos is a 60 y.o. male here for 3 month follow up for:  Chief Complaint   Patient presents with    Multiple Myeloma   Pt on Pomalyst + Zometa (every 3 months)    1. Have you been to the ER, urgent care clinic since your last visit? Hospitalized since your last visit? no    2. Have you seen or consulted any other health care providers outside of the [address] [place] [address] since your last visit? Include any pap smears or colon screening. Dentist early September. Pt states everything has been mauvelous. No complaints.

## 2020-09-16 NOTE — LETTER
9/16/20 Patient: Albaro Romero YOB: 1955 Date of Visit: 9/16/2020 Robles Manning MD 
[address] [place] [address] [address] [address] VIA Facsimile: [phone] Dear Robles Manning MD, Thank you for referring Mr. Keegan Deluna to [address] [place] [address] for evaluation. My notes for this consultation are attached. If you have questions, please do not hesitate to call me. I look forward to following your patient along with you.  
 
 
Sincerely, 
 
Jimmie Alvarez MD

## 2020-09-16 NOTE — PROGRESS NOTES
2001 [facility]  [address], [address] [facility], [facility] Jared Cornelius, [address]  [phone]          Progress Note        Patient: Jan Nazario Sr. MRN: [id number]  SSN: [id number]    YOB: 1955  Age: 60 y.o. Sex: male        Diagnosis:      1. Multiple Myeloma, IgA Kappa   Durie [place] stage IIIB    Cytogenetics/FISH: t(14;16) - high risk    Ttreatment:     1. Maintenance therapy - Pomalyst 2 mg daily - 11/19/2018   2. Maintenance therapy - Ixazomib - started 10/15/2018 - stopped 11/12/2018  3. S/P tandem  Autotransplant   First on 2/28/2018   2nd on 06/13/2018  4. Carfilzomib/Pom/Dex - s/p 5 cycles  5. VD-PACE s/p 1 cycle  6. RVD - s/p 4 cycles    Subjective:      Jan Nazario Sr. is a 60 y.o. male with a diagnosis of IgA kappa myeloma. Bone marrow shows 100% aberrant plasma cells. He suffers with DM but it is diet controlled. He has received systemic anti-viral treatment for chronic Hep C with successful eradication of the virus. Mr. Karoline Nino received 4 cycles of systemic therapy with RVd. He had an initial good response to treatment. However his paraprotein level started rising and the myeloma became refractory to treatment. I then administered one cycle of VD-PACE in the hospital. He then received Carfilzomib/Pom/Dex. He achieved VGPR. He underwent tandem autotransplant at [address] [place] [address]. According to the patient he achieved complete response with therapy. He has undergone second/tandem transplant in June. He took Pomalyst maintenance until Aug 2019 then had to stop due to recurrent prostatitis. He had a repeat BM biopsy in February 2019 at [address] [place] [address] which showed complete molecular remission. He is taking Pomalyst. He feels well today with no new complaints.       Review of Systems:    Constitutional: negative  Eyes: negative  Ears, Nose, Mouth, Throat, and Face: negative  Respiratory: negative  Cardiovascular: negative  Gastrointestinal: negative  Genitourinary:negative  Integument/Breast: negative  Hematologic/Lymphatic: negative  Musculoskeletal: neck pain  Neurological: negative      Past Medical History:   Diagnosis Date    Acid reflux     Arrhythmia     pvc's    Chronic pain     neck    Depression     Diabetes ([address]     Femoral hernia 2012    Hepatitis C     Hypertension     Inguinal hernia 2012    Liver disease     hepatitis c    Multiple myeloma (HCC)     Other ill-defined conditions(799.89)     Hx of PVCs since     Prostatitis, acute     Psychiatric disorder     depression     Past Surgical History:   Procedure Laterality Date    COLONOSCOPY N/A 2016    COLONOSCOPY performed by Polina Pham MD at [facility] ENDOSCOPY    HX APPENDECTOMY      HX CERVICAL FUSION  2008    x 1 as of 2014    HX HEENT      foreign body removed from right eye    HX HERNIA REPAIR      [facility]      HX ORTHOPAEDIC      cervical fusion    HX OTHER SURGICAL  2000    liver bx - chronic hepatitis      Family History   Problem Relation Age of Onset    Arthritis-osteo Mother     Arthritis-osteo Father     Diabetes Father     Hypertension Maternal Grandmother     Diabetes Maternal Grandmother     Hypertension Maternal Grandfather     Diabetes Maternal Grandfather      Social History     Tobacco Use    Smoking status: Former Smoker     Packs/day: 1.00     Years: 12.00     Pack years: 12.00     Types: Cigarettes     Last attempt to quit: 1989     Years since quittin.7    Smokeless tobacco: Never Used    Tobacco comment: former cigarette smoker   Substance Use Topics    Alcohol use: No     Comment: former alcoholic-quit 1017      Prior to Admission medications    Medication Sig Start Date End Date Taking?  Authorizing Provider   Pomalyst 2 mg cap TAKE 1 CAPSULE BY MOUTH ONCE DAILY 9/10/20  Yes Nemo Lopez MD   metFORMIN ER (GLUCOPHAGE XR) 500 mg tablet TAKE 1 TABLET BY MOUTH TWICE DAILY 7/22/20  Yes Ehsan Rogers MD   ascorbic acid (VITAMIN C PO) Take  by mouth. Yes Provider, Historical   tenofovir DISOPROXIL FUMARATE (VIREAD) 300 mg tablet TAKE 1 TABLET BY MOUTH EVERY DAY 5/27/20  Yes Nancy Brown MD   ergocalciferol (ERGOCALCIFEROL) 1,250 mcg (50,000 unit) capsule TAKE 1 CAPSULE BY MOUTH 1 TIME WEEKLY 4/28/20  Yes Nancy Brown MD   lisinopriL (PRINIVIL, ZESTRIL) 10 mg tablet  1/19/20  Yes Provider, Historical   buPROPion XL (WELLBUTRIN XL) 300 mg XL tablet TAKE 1 TABLET BY MOUTH EVERY DAY 1/30/20  Yes Provider, Historical   metoprolol succinate (TOPROL-XL) 25 mg XL tablet TAKE 1 TABLET BY MOUTH EVERY DAY IN THE EVENING 10/24/19  Yes Provider, Historical   naproxen (NAPROSYN) 500 mg tablet TAKE 1 TABLET BY MOUTH TWICE A DAY 8/26/19  Yes Provider, Historical   NUCYNTA 100 mg tablet TAKE 2 TABLETS 3 TIMES A DAY 7/27/19  Yes Provider, Historical   aspirin delayed-release 81 mg tablet Take  by mouth daily. Yes Provider, Historical   insulin lispro protamine/insulin lispro (HUMALOG 50-50 MIX) 100 unit/mL (50-50) flexpen by SubCUTAneous route. Yes Other, MD Goldy   prochlorperazine (COMPAZINE) 10 mg tablet Take 5 mg by mouth every six (6) hours as needed. Yes Other, MD Goldy   omeprazole (PRILOSEC) 20 mg capsule Take 20 mg by mouth daily as needed (acid reflux). Yes Other, MD Goldy   ondansetron (ZOFRAN ODT) 8 mg disintegrating tablet Take 8 mg by mouth every eight (8) hours as needed for Nausea. Yes Other, MD Goldy   multivitamin (ONE A DAY) tablet Take 1 Tab by mouth daily. Yes Provider, Historical   tamsulosin (FLOMAX) 0.4 mg capsule Take 1 Cap by mouth daily. 4/4/17  Yes Maci Kaiser MD   zolpidem CR (AMBIEN CR) 12.5 mg tablet Take 12.5 mg by mouth nightly. Yes Provider, Historical   aripiprazole (ABILIFY) 5 mg tablet Take 2.5 mg by mouth every morning.    Yes Other, MD Goldy          Allergies   Allergen Reactions    Mercury (Bulk) Hives     Blisters Objective:     Visit Vitals  /80 (BP 1 Location: Left arm, BP Patient Position: Sitting)   Pulse 74   Temp 97.7 °F (36.5 °C) (Oral)   Ht 5' 8\" (1.727 m)   Wt 202 lb 9.6 oz (91.9 kg)   SpO2 97%   BMI 30.81 kg/m²       Pain Scale: 0 - No pain/10  Pain Location:       Physical Exam:     GENERAL: alert, cooperative  EYE: negative  LYMPHATIC: Cervical, supraclavicular, and axillary nodes normal.   THROAT & NECK: normal and no erythema or exudates noted. LUNG: clear to auscultation bilaterally  HEART: regular rate and rhythm  ABDOMEN: soft, non-tender  EXTREMITIES: normal  SKIN: Normal.  NEUROLOGIC: negative       Lab Results   Component Value Date/Time    WBC 3.1 (L) 09/02/2020 09:40 AM    HGB 11.2 (L) 09/02/2020 09:40 AM    Hematocrit (POC) 38 06/10/2020 09:46 AM    HCT 35.6 (L) 09/02/2020 09:40 AM    PLATELET 161 09/44/5739 09:40 AM    MCV 91.8 09/02/2020 09:40 AM       Lab Results   Component Value Date/Time    Sodium 139 09/02/2020 09:40 AM    Potassium 3.9 09/02/2020 09:40 AM    Chloride 106 09/02/2020 09:40 AM    CO2 27 09/02/2020 09:40 AM    Anion gap 6 09/02/2020 09:40 AM    Glucose 118 (H) 09/02/2020 09:40 AM    BUN 14 09/02/2020 09:40 AM    Creatinine 1.56 (H) 09/02/2020 09:40 AM    BUN/Creatinine ratio 9 (L) 09/02/2020 09:40 AM    GFR est AA 55 (L) 09/02/2020 09:40 AM    GFR est non-AA 45 (L) 09/02/2020 09:40 AM    Calcium 8.3 (L) 09/02/2020 09:40 AM            Assessment:     1. Multiple Myeloma, IgA Kappa   Durie [place] stage IIIB    Cytogenetics/FISH: t(14;16) - high risk  ECOG PS - 0   Intent of therapy: palliative    Received 3 cycles of RVd   Dose reduced Revlimid and Velcade d/t continued cytopenias. M-spike started rising. He received one cycle of VD-PACE (bortezomib, dexamethasone, thalidomide, cisplatin, adriamycin, cyclophosphamide, and etoposide).   Achieved [place] with once cycle    Receivied KPd - s/p 5 cycles    Excellent response with M-protein came down to 0.1    S/p tandem autotransplant    First on 2/28/2018   2nd on 06/13/2018    Achieved CR after the first transplant. Repeat bone marrow @ [facility] - Complete response. Since he is high risk based on cytogenetics, I recommend proteosome inhibitor as maintenance therapy. Started Ixazomib   Developed fever, was in the hospital.   Also developed cytopenias    Patient prefered to switch therapy to Pomalyst.     Pomalyst 2 mg daily - started 11/19/2018  Tolerating treatment very well  Denies any side effects. A detailed system by system evaluation of side effect was performed to assess chemotherapy related toxicity. Blood counts are acceptable. Results reviewed with the patient    M-spike is slowly rising. Continue to monitor. Consider repeat BM biopsy. 2. Type 2 DM with complication    Managed by Endocrine      3. Chronic Hep C    In sustained virological remission        Plan:       > Continue Pomalyst maintenance  > Myeloma labs monthly   > Port flush monthly  > Continue Zometa every 3 months - on hold for dental work  > Follow-up in 1 month      Signed by: Uriel Li MD                     September 16, 2020        CC. Alix Farris MD  CC.  Ramsey Hayes MD

## 2020-09-30 ENCOUNTER — HOSPITAL ENCOUNTER (OUTPATIENT)
Dept: INFUSION THERAPY | Age: 65
Discharge: HOME | End: 2020-09-30
Payer: COMMERCIAL

## 2020-09-30 VITALS
HEART RATE: 84 BPM | SYSTOLIC BLOOD PRESSURE: 118 MMHG | TEMPERATURE: 97.2 F | RESPIRATION RATE: 18 BRPM | DIASTOLIC BLOOD PRESSURE: 80 MMHG

## 2020-09-30 DIAGNOSIS — C90.00 MULTIPLE MYELOMA, REMISSION STATUS UNSPECIFIED (HCC): ICD-10-CM

## 2020-09-30 LAB
ALBUMIN SERPL-MCNC: 3.4 G/DL (ref 3.5–5)
ALBUMIN/GLOB SERPL: 0.8 {RATIO} (ref 1.1–2.2)
ALP SERPL-CCNC: 88 U/L (ref 45–117)
ALT SERPL-CCNC: 31 U/L (ref 12–78)
ANION GAP SERPL CALC-SCNC: 2 MMOL/L (ref 5–15)
AST SERPL-CCNC: 21 U/L (ref 15–37)
BASOPHILS # BLD: 0 K/UL (ref 0–0.1)
BASOPHILS NFR BLD: 0 % (ref 0–1)
BILIRUB SERPL-MCNC: 0.4 MG/DL (ref 0.2–1)
BUN SERPL-MCNC: 13 MG/DL (ref 6–20)
BUN/CREAT SERPL: 9 (ref 12–20)
CALCIUM SERPL-MCNC: 8.6 MG/DL (ref 8.5–10.1)
CHLORIDE SERPL-SCNC: 108 MMOL/L (ref 97–108)
CO2 SERPL-SCNC: 30 MMOL/L (ref 21–32)
CREAT SERPL-MCNC: 1.5 MG/DL (ref 0.7–1.3)
DIFFERENTIAL METHOD BLD: ABNORMAL
EOSINOPHIL # BLD: 0 K/UL (ref 0–0.4)
EOSINOPHIL NFR BLD: 0 % (ref 0–7)
ERYTHROCYTE [DISTWIDTH] IN BLOOD BY AUTOMATED COUNT: 16.1 % (ref 11.5–14.5)
GLOBULIN SER CALC-MCNC: 4.4 G/DL (ref 2–4)
GLUCOSE SERPL-MCNC: 100 MG/DL (ref 65–100)
HCT VFR BLD AUTO: 35.1 % (ref 36.6–50.3)
HGB BLD-MCNC: 11.1 G/DL (ref 12.1–17)
IMM GRANULOCYTES # BLD AUTO: 0 K/UL (ref 0–0.04)
IMM GRANULOCYTES NFR BLD AUTO: 0 % (ref 0–0.5)
LYMPHOCYTES # BLD: 0.5 K/UL (ref 0.8–3.5)
LYMPHOCYTES NFR BLD: 14 % (ref 12–49)
MCH RBC QN AUTO: 28.9 PG (ref 26–34)
MCHC RBC AUTO-ENTMCNC: 31.6 G/DL (ref 30–36.5)
MCV RBC AUTO: 91.4 FL (ref 80–99)
MONOCYTES # BLD: 0.3 K/UL (ref 0–1)
MONOCYTES NFR BLD: 9 % (ref 5–13)
NEUTS BAND NFR BLD MANUAL: 1 %
NEUTS SEG # BLD: 2.8 K/UL (ref 1.8–8)
NEUTS SEG NFR BLD: 76 % (ref 32–75)
NRBC # BLD: 0 K/UL (ref 0–0.01)
NRBC BLD-RTO: 0 PER 100 WBC
PLATELET # BLD AUTO: 166 K/UL (ref 150–400)
PMV BLD AUTO: 10.1 FL (ref 8.9–12.9)
POTASSIUM SERPL-SCNC: 3.7 MMOL/L (ref 3.5–5.1)
PROT SERPL-MCNC: 7.8 G/DL (ref 6.4–8.2)
RBC # BLD AUTO: 3.84 M/UL (ref 4.1–5.7)
RBC MORPH BLD: ABNORMAL
SODIUM SERPL-SCNC: 140 MMOL/L (ref 136–145)
WBC # BLD AUTO: 3.6 K/UL (ref 4.1–11.1)

## 2020-09-30 PROCEDURE — 80053 COMPREHEN METABOLIC PANEL: CPT

## 2020-09-30 PROCEDURE — 74011250636 HC RX REV CODE- 250/636: Performed by: INTERNAL MEDICINE

## 2020-09-30 PROCEDURE — 82784 ASSAY IGA/IGD/IGG/IGM EACH: CPT

## 2020-09-30 PROCEDURE — 85025 COMPLETE CBC W/AUTO DIFF WBC: CPT

## 2020-09-30 PROCEDURE — 83883 ASSAY NEPHELOMETRY NOT SPEC: CPT

## 2020-09-30 PROCEDURE — 36591 DRAW BLOOD OFF VENOUS DEVICE: CPT

## 2020-09-30 PROCEDURE — 84165 PROTEIN E-PHORESIS SERUM: CPT

## 2020-09-30 PROCEDURE — 77030012965 HC NDL HUBR BBMI -A

## 2020-09-30 RX ORDER — SODIUM CHLORIDE 0.9 % (FLUSH) 0.9 %
10-40 SYRINGE (ML) INJECTION AS NEEDED
Status: DISCONTINUED | OUTPATIENT
Start: 2020-09-30 | End: 2020-10-01 | Stop reason: HOSPADM

## 2020-09-30 RX ORDER — HEPARIN 100 UNIT/ML
500 SYRINGE INTRAVENOUS AS NEEDED
Status: DISCONTINUED | OUTPATIENT
Start: 2020-09-30 | End: 2020-10-01 | Stop reason: HOSPADM

## 2020-09-30 RX ADMIN — Medication 10 ML: at 09:40

## 2020-09-30 RX ADMIN — Medication 500 UNITS: at 09:40

## 2020-09-30 NOTE — PROGRESS NOTES
[facility] Note:  Arrived - 0930  Labs obtained: CBC, CMP, Immunofixation, Immunoglobulins, SFLC, SPEP    Visit Vitals  /80 (BP 1 Location: Left arm, BP Patient Position: Sitting)   Pulse 84   Temp 97.2 °F (36.2 °C)   Resp 18       See [facility] for pending results. Assessment - unchanged. Port accessed & flushed per protocol w/o difficulty. Scott needle removed. 0940 - Tolerated well. Pt denies any acute problems/changes. Discharged from [facility] ambulatory. No distress.  Next appt: 10/28/20 at 0930

## 2020-10-01 LAB
IGA SERPL-MCNC: 363 MG/DL (ref 70–400)
IGG SERPL-MCNC: 1250 MG/DL (ref 700–1600)
IGM SERPL-MCNC: 23 MG/DL (ref 40–230)
KAPPA LC FREE SER-MCNC: 52 MG/L (ref 3.3–19.4)
KAPPA LC FREE/LAMBDA FREE SER: 1.1 {RATIO} (ref 0.26–1.65)
LAMBDA LC FREE SERPL-MCNC: 47.4 MG/L (ref 5.7–26.3)

## 2020-10-02 LAB
ALBUMIN SERPL ELPH-MCNC: 3.5 G/DL (ref 2.9–4.4)
ALBUMIN/GLOB SERPL: 1 {RATIO} (ref 0.7–1.7)
ALPHA1 GLOB SERPL ELPH-MCNC: 0.2 G/DL (ref 0–0.4)
ALPHA2 GLOB SERPL ELPH-MCNC: 1 G/DL (ref 0.4–1)
B-GLOBULIN SERPL ELPH-MCNC: 1 G/DL (ref 0.7–1.3)
GAMMA GLOB SERPL ELPH-MCNC: 1.2 G/DL (ref 0.4–1.8)
GLOBULIN SER CALC-MCNC: 3.4 G/DL (ref 2.2–3.9)
IGA SERPL-MCNC: 379 MG/DL (ref 61–437)
IGG SERPL-MCNC: 1231 MG/DL (ref 603–1613)
IGM SERPL-MCNC: 26 MG/DL (ref 20–172)
M PROTEIN SERPL ELPH-MCNC: NORMAL G/DL
PROT PATTERN SERPL IFE-IMP: NORMAL
PROT SERPL-MCNC: 6.9 G/DL (ref 6–8.5)

## 2020-10-06 DIAGNOSIS — C90.00 MULTIPLE MYELOMA, REMISSION STATUS UNSPECIFIED (HCC): ICD-10-CM

## 2020-10-07 ENCOUNTER — OFFICE VISIT (OUTPATIENT)
Dept: ONCOLOGY | Age: 65
End: 2020-10-07
Payer: MEDICARE

## 2020-10-07 VITALS
DIASTOLIC BLOOD PRESSURE: 88 MMHG | BODY MASS INDEX: 30.25 KG/M2 | SYSTOLIC BLOOD PRESSURE: 122 MMHG | TEMPERATURE: 98.1 F | HEART RATE: 93 BPM | WEIGHT: 199.6 LBS | HEIGHT: 68 IN | OXYGEN SATURATION: 97 %

## 2020-10-07 DIAGNOSIS — N28.9 RENAL INSUFFICIENCY: ICD-10-CM

## 2020-10-07 DIAGNOSIS — T45.1X5A ANEMIA ASSOCIATED WITH CHEMOTHERAPY: ICD-10-CM

## 2020-10-07 DIAGNOSIS — D64.81 ANEMIA ASSOCIATED WITH CHEMOTHERAPY: ICD-10-CM

## 2020-10-07 DIAGNOSIS — C90.01 MULTIPLE MYELOMA IN REMISSION (HCC): Primary | ICD-10-CM

## 2020-10-07 PROCEDURE — G8752 SYS BP LESS 140: HCPCS | Performed by: INTERNAL MEDICINE

## 2020-10-07 PROCEDURE — 99214 OFFICE O/P EST MOD 30 MIN: CPT | Performed by: INTERNAL MEDICINE

## 2020-10-07 PROCEDURE — G8417 CALC BMI ABV UP PARAM F/U: HCPCS | Performed by: INTERNAL MEDICINE

## 2020-10-07 PROCEDURE — G8427 DOCREV CUR MEDS BY ELIG CLIN: HCPCS | Performed by: INTERNAL MEDICINE

## 2020-10-07 PROCEDURE — 3017F COLORECTAL CA SCREEN DOC REV: CPT | Performed by: INTERNAL MEDICINE

## 2020-10-07 PROCEDURE — G8754 DIAS BP LESS 90: HCPCS | Performed by: INTERNAL MEDICINE

## 2020-10-07 PROCEDURE — G9717 DOC PT DX DEP/BP F/U NT REQ: HCPCS | Performed by: INTERNAL MEDICINE

## 2020-10-07 NOTE — LETTER
10/31/20 Patient: Laura Del Rio. YOB: 1955 Date of Visit: 10/7/2020 Nghia Delvalle MD 
[address] [place] [address] [address] [address] VIA Facsimile: [phone] Dear Nghia Delvalle MD, Thank you for referring Mr. Eugenia Jerez to [address] [place] [address] for evaluation. My notes for this consultation are attached. If you have questions, please do not hesitate to call me. I look forward to following your patient along with you.  
 
 
Sincerely, 
 
Karthikeyan Valenzuela MD

## 2020-10-07 NOTE — PROGRESS NOTES
Ishmael Mahoney is a 60 y.o. male here for follow up for:  Chief Complaint   Patient presents with    Follow-up     IgA Kappa Myeloma     1. Have you been to the ER, urgent care clinic since your last visit? Hospitalized since your last visit? no  2. Have you seen or consulted any other health care providers outside of the [address] [place] [address] since your last visit? Include any pap smears or colon screening.  no    Labs done 9/30/20

## 2020-10-09 NOTE — TELEPHONE ENCOUNTER
Requested Prescriptions     Pending Prescriptions Disp Refills    Pomalyst 2 mg cap [Pharmacy Med Name: Ashley Evans 2MG] 28 Cap 0     Sig: TAKE 1 CAPSULE BY MOUTH ONCE DAILY

## 2020-10-13 NOTE — TELEPHONE ENCOUNTER
[facility] calling to get prescription for Pomalyst 28 day supply.   Please return call at [phone] or fax to [phone]

## 2020-10-14 RX ORDER — POMALIDOMIDE 2 MG/1
CAPSULE ORAL
Qty: 28 CAP | Refills: 0 | Status: SHIPPED | OUTPATIENT
Start: 2020-10-14 | End: 2020-11-09

## 2020-10-14 NOTE — TELEPHONE ENCOUNTER
Per Asad Crawford NP a refill for    Requested Prescriptions     Pending Prescriptions Disp Refills    Pomalyst 2 mg cap [Pharmacy Med Name: Alveda Robbie 2MG] 28 Cap 0     Sig: TAKE 1 CAPSULE BY MOUTH ONCE DAILY     was electronically sent to the patient's listed [facility].

## 2020-10-20 RX ORDER — SODIUM CHLORIDE 9 MG/ML
10 INJECTION INTRAMUSCULAR; INTRAVENOUS; SUBCUTANEOUS AS NEEDED
Status: CANCELLED | OUTPATIENT
Start: 2020-10-28

## 2020-10-20 RX ORDER — DIPHENHYDRAMINE HYDROCHLORIDE 50 MG/ML
50 INJECTION, SOLUTION INTRAMUSCULAR; INTRAVENOUS AS NEEDED
Status: CANCELLED
Start: 2020-10-28

## 2020-10-20 RX ORDER — SODIUM CHLORIDE 0.9 % (FLUSH) 0.9 %
10 SYRINGE (ML) INJECTION AS NEEDED
Status: CANCELLED | OUTPATIENT
Start: 2020-10-28

## 2020-10-20 RX ORDER — ALBUTEROL SULFATE 0.83 MG/ML
2.5 SOLUTION RESPIRATORY (INHALATION) AS NEEDED
Status: CANCELLED
Start: 2020-10-28

## 2020-10-20 RX ORDER — ACETAMINOPHEN 325 MG/1
650 TABLET ORAL AS NEEDED
Status: CANCELLED
Start: 2020-10-28

## 2020-10-20 RX ORDER — EPINEPHRINE 1 MG/ML
0.3 INJECTION, SOLUTION, CONCENTRATE INTRAVENOUS AS NEEDED
Status: CANCELLED | OUTPATIENT
Start: 2020-10-28

## 2020-10-20 RX ORDER — ONDANSETRON 2 MG/ML
8 INJECTION INTRAMUSCULAR; INTRAVENOUS AS NEEDED
Status: CANCELLED | OUTPATIENT
Start: 2020-10-28

## 2020-10-20 RX ORDER — HEPARIN 100 UNIT/ML
300-500 SYRINGE INTRAVENOUS AS NEEDED
Status: CANCELLED | OUTPATIENT
Start: 2020-10-28

## 2020-10-20 RX ORDER — SODIUM CHLORIDE 9 MG/ML
25 INJECTION, SOLUTION INTRAVENOUS CONTINUOUS
Status: CANCELLED | OUTPATIENT
Start: 2020-10-28 | End: 2020-10-28

## 2020-10-20 RX ORDER — HYDROCORTISONE SODIUM SUCCINATE 100 MG/2ML
100 INJECTION, POWDER, FOR SOLUTION INTRAMUSCULAR; INTRAVENOUS AS NEEDED
Status: CANCELLED | OUTPATIENT
Start: 2020-10-28

## 2020-10-27 RX ORDER — SODIUM CHLORIDE 0.9 % (FLUSH) 0.9 %
5-10 SYRINGE (ML) INJECTION AS NEEDED
Status: CANCELLED | OUTPATIENT
Start: 2020-10-28 | End: 2020-10-28

## 2020-10-27 RX ORDER — HEPARIN 100 UNIT/ML
500 SYRINGE INTRAVENOUS AS NEEDED
Status: CANCELLED | OUTPATIENT
Start: 2020-10-28

## 2020-10-27 RX ORDER — SODIUM CHLORIDE 9 MG/ML
10 INJECTION INTRAMUSCULAR; INTRAVENOUS; SUBCUTANEOUS AS NEEDED
Status: CANCELLED | OUTPATIENT
Start: 2020-10-28

## 2020-10-28 ENCOUNTER — HOSPITAL ENCOUNTER (OUTPATIENT)
Dept: INFUSION THERAPY | Age: 65
Discharge: HOME | End: 2020-10-28
Payer: COMMERCIAL

## 2020-10-28 VITALS
BODY MASS INDEX: 31.17 KG/M2 | HEART RATE: 76 BPM | WEIGHT: 205 LBS | TEMPERATURE: 97.7 F | SYSTOLIC BLOOD PRESSURE: 122 MMHG | RESPIRATION RATE: 18 BRPM | DIASTOLIC BLOOD PRESSURE: 81 MMHG

## 2020-10-28 DIAGNOSIS — C90.00 MULTIPLE MYELOMA, REMISSION STATUS UNSPECIFIED (HCC): Primary | ICD-10-CM

## 2020-10-28 DIAGNOSIS — C90.01 MULTIPLE MYELOMA IN REMISSION (HCC): ICD-10-CM

## 2020-10-28 LAB
ALBUMIN SERPL-MCNC: 3.5 G/DL (ref 3.5–5)
ALBUMIN/GLOB SERPL: 0.8 {RATIO} (ref 1.1–2.2)
ALP SERPL-CCNC: 77 U/L (ref 45–117)
ALT SERPL-CCNC: 22 U/L (ref 12–78)
ANION GAP BLD CALC-SCNC: 17 MMOL/L (ref 10–20)
AST SERPL-CCNC: 16 U/L (ref 15–37)
BASOPHILS # BLD: 0.1 K/UL (ref 0–0.1)
BASOPHILS NFR BLD: 2 % (ref 0–1)
BILIRUB DIRECT SERPL-MCNC: 0.1 MG/DL (ref 0–0.2)
BILIRUB SERPL-MCNC: 0.3 MG/DL (ref 0.2–1)
BUN BLD-MCNC: 19 MG/DL (ref 9–20)
CA-I BLD-MCNC: 1.19 MMOL/L (ref 1.12–1.32)
CHLORIDE BLD-SCNC: 100 MMOL/L (ref 98–107)
CO2 BLD-SCNC: 28 MMOL/L (ref 21–32)
CREAT BLD-MCNC: 1.6 MG/DL (ref 0.6–1.3)
DIFFERENTIAL METHOD BLD: ABNORMAL
EOSINOPHIL # BLD: 0.1 K/UL (ref 0–0.4)
EOSINOPHIL NFR BLD: 3 % (ref 0–7)
ERYTHROCYTE [DISTWIDTH] IN BLOOD BY AUTOMATED COUNT: 16 % (ref 11.5–14.5)
GLOBULIN SER CALC-MCNC: 4.4 G/DL (ref 2–4)
GLUCOSE BLD-MCNC: 123 MG/DL (ref 65–100)
HCT VFR BLD AUTO: 36.9 % (ref 36.6–50.3)
HCT VFR BLD CALC: 40 % (ref 36.6–50.3)
HGB BLD-MCNC: 11.4 G/DL (ref 12.1–17)
IGA SERPL-MCNC: 399 MG/DL (ref 70–400)
IGG SERPL-MCNC: 1370 MG/DL (ref 700–1600)
IGM SERPL-MCNC: 29 MG/DL (ref 40–230)
IMM GRANULOCYTES # BLD AUTO: 0 K/UL (ref 0–0.04)
IMM GRANULOCYTES NFR BLD AUTO: 0 % (ref 0–0.5)
LYMPHOCYTES # BLD: 1.1 K/UL (ref 0.8–3.5)
LYMPHOCYTES NFR BLD: 31 % (ref 12–49)
MCH RBC QN AUTO: 28 PG (ref 26–34)
MCHC RBC AUTO-ENTMCNC: 30.9 G/DL (ref 30–36.5)
MCV RBC AUTO: 90.7 FL (ref 80–99)
MONOCYTES # BLD: 0.7 K/UL (ref 0–1)
MONOCYTES NFR BLD: 20 % (ref 5–13)
NEUTS SEG # BLD: 1.5 K/UL (ref 1.8–8)
NEUTS SEG NFR BLD: 44 % (ref 32–75)
NRBC # BLD: 0 K/UL (ref 0–0.01)
NRBC BLD-RTO: 0 PER 100 WBC
PLATELET # BLD AUTO: 181 K/UL (ref 150–400)
PMV BLD AUTO: 10.2 FL (ref 8.9–12.9)
POTASSIUM BLD-SCNC: 4.2 MMOL/L (ref 3.5–5.1)
PROT SERPL-MCNC: 7.9 G/DL (ref 6.4–8.2)
RBC # BLD AUTO: 4.07 M/UL (ref 4.1–5.7)
SERVICE CMNT-IMP: ABNORMAL
SODIUM BLD-SCNC: 140 MMOL/L (ref 136–145)
WBC # BLD AUTO: 3.3 K/UL (ref 4.1–11.1)

## 2020-10-28 PROCEDURE — 74011250636 HC RX REV CODE- 250/636: Performed by: INTERNAL MEDICINE

## 2020-10-28 PROCEDURE — 84165 PROTEIN E-PHORESIS SERUM: CPT

## 2020-10-28 PROCEDURE — 36415 COLL VENOUS BLD VENIPUNCTURE: CPT

## 2020-10-28 PROCEDURE — 85025 COMPLETE CBC W/AUTO DIFF WBC: CPT

## 2020-10-28 PROCEDURE — 80047 BASIC METABLC PNL IONIZED CA: CPT

## 2020-10-28 PROCEDURE — 96374 THER/PROPH/DIAG INJ IV PUSH: CPT

## 2020-10-28 PROCEDURE — 82784 ASSAY IGA/IGD/IGG/IGM EACH: CPT

## 2020-10-28 PROCEDURE — 83883 ASSAY NEPHELOMETRY NOT SPEC: CPT

## 2020-10-28 PROCEDURE — 80076 HEPATIC FUNCTION PANEL: CPT

## 2020-10-28 PROCEDURE — 77030012965 HC NDL HUBR BBMI -A

## 2020-10-28 RX ORDER — HEPARIN 100 UNIT/ML
500 SYRINGE INTRAVENOUS AS NEEDED
Status: ACTIVE | OUTPATIENT
Start: 2020-10-28 | End: 2020-10-28

## 2020-10-28 RX ORDER — SODIUM CHLORIDE 9 MG/ML
10 INJECTION INTRAMUSCULAR; INTRAVENOUS; SUBCUTANEOUS AS NEEDED
Status: ACTIVE | OUTPATIENT
Start: 2020-10-28 | End: 2020-10-28

## 2020-10-28 RX ORDER — SODIUM CHLORIDE 0.9 % (FLUSH) 0.9 %
5-10 SYRINGE (ML) INJECTION AS NEEDED
Status: DISPENSED | OUTPATIENT
Start: 2020-10-28 | End: 2020-10-28

## 2020-10-28 RX ADMIN — Medication 500 UNITS: at 10:33

## 2020-10-28 RX ADMIN — Medication 10 ML: at 09:49

## 2020-10-28 RX ADMIN — Medication 10 ML: at 10:33

## 2020-10-28 RX ADMIN — ZOLEDRONIC ACID 4 MG: 4 INJECTION, SOLUTION INTRAVENOUS at 10:16

## 2020-10-28 NOTE — PROGRESS NOTES
Pt arrived to [facility] ambulatory in no acute distress at 0935 for Zometa.  Assessment unremarkable. R chest port accessed without issue and positive blood return noted.  Labs obtained, CBC, Chem8, Hepatic Panel, Immunofixation, Immunoglobulins, SPEP, SFLC. Pt denied recent or upcoming dental work. Visit Vitals  /81 (BP 1 Location: Left arm, BP Patient Position: Sitting)   Pulse 76   Temp 97.7 °F (36.5 °C)   Resp 18   Wt 93 kg (205 lb)   BMI 31.17 kg/m²     Recent Results (from the past 12 hour(s))   CBC WITH AUTOMATED DIFF    Collection Time: 10/28/20  9:49 AM   Result Value Ref Range    WBC 3.3 (L) 4.1 - 11.1 K/uL    RBC 4.07 (L) 4.10 - 5.70 M/uL    HGB 11.4 (L) 12.1 - 17.0 g/dL    HCT 36.9 36.6 - 50.3 %    MCV 90.7 80.0 - 99.0 FL    MCH 28.0 26.0 - 34.0 PG    MCHC 30.9 30.0 - 36.5 g/dL    RDW 16.0 (H) 11.5 - 14.5 %    PLATELET 786 586 - 665 K/uL    MPV 10.2 8.9 - 12.9 FL    NRBC 0.0 0  WBC    ABSOLUTE NRBC 0.00 0.00 - 0.01 K/uL    NEUTROPHILS 44 32 - 75 %    LYMPHOCYTES 31 12 - 49 %    MONOCYTES 20 (H) 5 - 13 %    EOSINOPHILS 3 0 - 7 %    BASOPHILS 2 (H) 0 - 1 %    IMMATURE GRANULOCYTES 0 0.0 - 0.5 %    ABS. NEUTROPHILS 1.5 (L) 1.8 - 8.0 K/UL    ABS. LYMPHOCYTES 1.1 0.8 - 3.5 K/UL    ABS. MONOCYTES 0.7 0.0 - 1.0 K/UL    ABS. EOSINOPHILS 0.1 0.0 - 0.4 K/UL    ABS. BASOPHILS 0.1 0.0 - 0.1 K/UL    ABS. IMM. GRANS. 0.0 0.00 - 0.04 K/UL    DF AUTOMATED     HEPATIC FUNCTION PANEL    Collection Time: 10/28/20  9:49 AM   Result Value Ref Range    Protein, total 7.9 6.4 - 8.2 g/dL    Albumin 3.5 3.5 - 5.0 g/dL    Globulin 4.4 (H) 2.0 - 4.0 g/dL    A-G Ratio 0.8 (L) 1.1 - 2.2      Bilirubin, total 0.3 0.2 - 1.0 MG/DL    Bilirubin, direct 0.1 0.0 - 0.2 MG/DL    Alk.  phosphatase 77 45 - 117 U/L    AST (SGOT) 16 15 - 37 U/L    ALT (SGPT) 22 12 - 78 U/L   POC CHEM8    Collection Time: 10/28/20  9:56 AM   Result Value Ref Range    Calcium, ionized (POC) 1.19 1.12 - 1.32 mmol/L    Sodium (POC) 140 136 - 145 mmol/L    Potassium (POC) 4.2 3.5 - 5.1 mmol/L    Chloride (POC) 100 98 - 107 mmol/L    CO2 (POC) 28 21 - 32 mmol/L    Anion gap (POC) 17 10 - 20 mmol/L    Glucose (POC) 123 (H) 65 - 100 mg/dL    BUN (POC) 19 9 - 20 mg/dL    Creatinine (POC) 1.6 (H) 0.6 - 1.3 mg/dL    GFRAA, POC 53 (L) >60 ml/min/1.73m2    GFRNA, POC 44 (L) >60 ml/min/1.73m2    Hematocrit (POC) 40 36.6 - 50.3 %    Comment Comment Not Indicated. Creatinine Clearance 62    The following medications administered:  Zometa 4mg IV    Pt tolerated treatment well. Port flushed per policy and de-accessed, 2x2 and tape placed.  Pt discharged ambulatory in no acute distress at 1035, accompanied by self. Next appointment 11/25/20 at 0930.

## 2020-10-29 LAB
ALBUMIN SERPL ELPH-MCNC: 3.8 G/DL (ref 2.9–4.4)
ALBUMIN/GLOB SERPL: 1.2 {RATIO} (ref 0.7–1.7)
ALPHA1 GLOB SERPL ELPH-MCNC: 0.2 G/DL (ref 0–0.4)
ALPHA2 GLOB SERPL ELPH-MCNC: 0.9 G/DL (ref 0.4–1)
B-GLOBULIN SERPL ELPH-MCNC: 1 G/DL (ref 0.7–1.3)
GAMMA GLOB SERPL ELPH-MCNC: 1.1 G/DL (ref 0.4–1.8)
GLOBULIN SER CALC-MCNC: 3.2 G/DL (ref 2.2–3.9)
IGA SERPL-MCNC: 428 MG/DL (ref 61–437)
IGG SERPL-MCNC: 1270 MG/DL (ref 603–1613)
IGM SERPL-MCNC: 28 MG/DL (ref 20–172)
KAPPA LC FREE SER-MCNC: 47.6 MG/L (ref 3.3–19.4)
KAPPA LC FREE/LAMBDA FREE SER: 1.12 {RATIO} (ref 0.26–1.65)
LAMBDA LC FREE SERPL-MCNC: 42.4 MG/L (ref 5.7–26.3)
M PROTEIN SERPL ELPH-MCNC: NORMAL G/DL
PROT PATTERN SERPL IFE-IMP: ABNORMAL
PROT SERPL-MCNC: 7 G/DL (ref 6–8.5)

## 2020-10-31 NOTE — PROGRESS NOTES
2001 [facility]  [address], [address] [facility]elius, [address] [place]  [phone]          Progress Note        Patient: Devin Crockett Sr. MRN: [id number]  SSN: [id number]    YOB: 1955  Age: 63 y.o. Sex: male        Diagnosis:      1. Multiple Myeloma, IgA Kappa   Durie [place] stage IIIB    Cytogenetics/FISH: t(14;16) - high risk    Ttreatment:     1. Maintenance therapy - Pomalyst 2 mg daily - 11/19/2018   2. Maintenance therapy - Ixazomib - started 10/15/2018 - stopped 11/12/2018  3. S/P tandem  Autotransplant   First on 2/28/2018   2nd on 06/13/2018  4. Carfilzomib/Pom/Dex - s/p 5 cycles  5. VD-PACE s/p 1 cycle  6. RVD - s/p 4 cycles    Subjective:      Devin Crockett Sr. is a 63 y.o. male with a diagnosis of IgA kappa myeloma. Bone marrow shows 100% aberrant plasma cells. He suffers with DM but it is diet controlled. He has received systemic anti-viral treatment for chronic Hep C with successful eradication of the virus. Mr. Ladonna Barker received 4 cycles of systemic therapy with RVd. He had an initial good response to treatment. However his paraprotein level started rising and the myeloma became refractory to treatment. I then administered one cycle of VD-PACE in the hospital. He then received Carfilzomib/Pom/Dex. He achieved VGPR. He underwent tandem autotransplant at [facility]. According to the patient he achieved complete response with therapy. He has undergone second/tandem transplant in June. He took Pomalyst maintenance until Aug 2019 then had to stop due to recurrent prostatitis. He had a repeat BM biopsy in February 2019 at [facility] which showed complete molecular remission. He is taking Pomalyst. He feels well today with no new complaints.       Review of Systems:    Constitutional: negative  Eyes: negative  Ears, Nose, Mouth, Throat, and Face: negative  Respiratory: negative  Cardiovascular: negative  Gastrointestinal: negative  Genitourinary:negative  Integument/Breast: negative  Hematologic/Lymphatic: negative  Musculoskeletal: neck pain  Neurological: negative      Past Medical History:   Diagnosis Date    Acid reflux     Arrhythmia     pvc's    Chronic pain     neck    Depression     Diabetes ([address]     Femoral hernia 2012    Hepatitis C     Hypertension     Inguinal hernia 2012    Liver disease     hepatitis c    Multiple myeloma (HCC)     Other ill-defined conditions(799.89)     Hx of PVCs since     Prostatitis, acute     Psychiatric disorder     depression     Past Surgical History:   Procedure Laterality Date    COLONOSCOPY N/A 2016    COLONOS[facility] performed by Tessy Brown MD at [facility] ENDOSCOPY    HX APPENDECTOMY      HX CERVICAL FUSION  2008    x 1 as of 2014    HX HEENT      foreign body removed from right eye    HX HERNIA REPAIR      [facility]      HX ORTHOPAEDIC      cervical fusion    HX OTHER SURGICAL  2000    liver bx - chronic hepatitis      Family History   Problem Relation Age of Onset    Arthritis-osteo Mother     Arthritis-osteo Father     Diabetes Father     Hypertension Maternal Grandmother     Diabetes Maternal Grandmother     Hypertension Maternal Grandfather     Diabetes Maternal Grandfather      Social History     Tobacco Use    Smoking status: Former Smoker     Packs/day: 1.00     Years: 12.00     Pack years: 12.00     Types: Cigarettes     Last attempt to quit: 1989     Years since quittin.8    Smokeless tobacco: Never Used    Tobacco comment: former cigarette smoker   Substance Use Topics    Alcohol use: No     Comment: former alcoholic-quit       Prior to Admission medications    Medication Sig Start Date End Date Taking?  Authorizing Provider   metFORMIN ER (GLUCOPHAGE XR) 500 mg tablet TAKE 1 TABLET BY MOUTH TWICE DAILY 20  Yes Dano Veloz MD   ascorbic acid (VITAMIN C PO) Take  by mouth.   Yes Provider, Historical   tenofovir DISOPROXIL FUMARATE (VIREAD) 300 mg tablet TAKE 1 TABLET BY MOUTH EVERY DAY 5/27/20  Yes Alberto Cantu MD   ergocalciferol (ERGOCALCIFEROL) 1,250 mcg (50,000 unit) capsule TAKE 1 CAPSULE BY MOUTH 1 TIME WEEKLY 4/28/20  Yes Alberto Cantu MD   lisinopriL (PRINIVIL, ZESTRIL) 10 mg tablet  1/19/20  Yes Provider, Historical   buPROPion XL (WELLBUTRIN XL) 300 mg XL tablet TAKE 1 TABLET BY MOUTH EVERY DAY 1/30/20  Yes Provider, Historical   metoprolol succinate (TOPROL-XL) 25 mg XL tablet TAKE 1 TABLET BY MOUTH EVERY DAY IN THE EVENING 10/24/19  Yes Provider, Historical   naproxen (NAPROSYN) 500 mg tablet TAKE 1 TABLET BY MOUTH TWICE A DAY 8/26/19  Yes Provider, Historical   NUCYNTA 100 mg tablet TAKE 2 TABLETS 3 TIMES A DAY 7/27/19  Yes Provider, Historical   aspirin delayed-release 81 mg tablet Take  by mouth daily. Yes Provider, Historical   insulin lispro protamine/insulin lispro (HUMALOG 50-50 MIX) 100 unit/mL (50-50) flexpen by SubCUTAneous route. Yes Other, MD Goldy   prochlorperazine (COMPAZINE) 10 mg tablet Take 5 mg by mouth every six (6) hours as needed. Yes Other, MD Goldy   omeprazole (PRILOSEC) 20 mg capsule Take 20 mg by mouth daily as needed (acid reflux). Yes Other, MD Goldy   ondansetron (ZOFRAN ODT) 8 mg disintegrating tablet Take 8 mg by mouth every eight (8) hours as needed for Nausea. Yes Other, MD Goldy   multivitamin (ONE A DAY) tablet Take 1 Tab by mouth daily. Yes Provider, Historical   tamsulosin (FLOMAX) 0.4 mg capsule Take 1 Cap by mouth daily. 4/4/17  Yes Annabel Smith MD   zolpidem CR (AMBIEN CR) 12.5 mg tablet Take 12.5 mg by mouth nightly. Yes Provider, Historical   aripiprazole (ABILIFY) 5 mg tablet Take 2.5 mg by mouth every morning.    Yes Other, MD Goldy   Pomalyst 2 mg cap TAKE 1 CAPSULE BY MOUTH ONCE DAILY 10/14/20   Alberto Cantu MD          Allergies   Allergen Reactions    Mercury (Bulk) Hives     Blisters Objective:     Visit Vitals  /88 (BP 1 Location: Right arm)   Pulse 93   Temp 98.1 °F (36.7 °C)   Ht 5' 8\" (1.727 m)   Wt 199 lb 9.6 oz (90.5 kg)   SpO2 97%   BMI 30.35 kg/m²       Pain Scale: 0 - No pain/10  Pain Location:       Physical Exam:     GENERAL: alert, cooperative  EYE: negative  LYMPHATIC: Cervical, supraclavicular, and axillary nodes normal.   THROAT & NECK: normal and no erythema or exudates noted. LUNG: clear to auscultation bilaterally  HEART: regular rate and rhythm  ABDOMEN: soft, non-tender  EXTREMITIES: normal  SKIN: Normal.  NEUROLOGIC: negative       Lab Results   Component Value Date/Time    WBC 3.3 (L) 10/28/2020 09:49 AM    HGB 11.4 (L) 10/28/2020 09:49 AM    Hematocrit (POC) 40 10/28/2020 09:56 AM    HCT 36.9 10/28/2020 09:49 AM    PLATELET 179 83/84/7656 09:49 AM    MCV 90.7 10/28/2020 09:49 AM       Lab Results   Component Value Date/Time    Sodium 140 09/30/2020 09:34 AM    Potassium 3.7 09/30/2020 09:34 AM    Chloride 108 09/30/2020 09:34 AM    CO2 30 09/30/2020 09:34 AM    Anion gap 2 (L) 09/30/2020 09:34 AM    Glucose 100 09/30/2020 09:34 AM    BUN 13 09/30/2020 09:34 AM    Creatinine 1.50 (H) 09/30/2020 09:34 AM    BUN/Creatinine ratio 9 (L) 09/30/2020 09:34 AM    GFR est AA 57 (L) 09/30/2020 09:34 AM    GFR est non-AA 47 (L) 09/30/2020 09:34 AM    Calcium 8.6 09/30/2020 09:34 AM            Assessment:     1. Multiple Myeloma, IgA Kappa   Durie [place] stage IIIB    Cytogenetics/FISH: t(14;16) - high risk  ECOG PS - 0   Intent of therapy: palliative    Received 3 cycles of RVd   Dose reduced Revlimid and Velcade d/t continued cytopenias. M-spike started rising. He received one cycle of VD-PACE (bortezomib, dexamethasone, thalidomide, cisplatin, adriamycin, cyclophosphamide, and etoposide).   Achieved [place] with once cycle    Receivied KPd - s/p 5 cycles    Excellent response with M-protein came down to 0.1    S/p tandem autotransplant    First on 2/28/2018   2nd on 06/13/2018    Achieved CR after the first transplant. Repeat bone marrow @ [facility] - Complete response. Since he is high risk based on cytogenetics, I recommend proteosome inhibitor as maintenance therapy. Started Ixazomib   Developed fever, was in the hospital.   Also developed cytopenias    Patient prefered to switch therapy to Pomalyst.     Pomalyst 2 mg daily - started 11/19/2018  Tolerating treatment very well  Denies any side effects. A detailed system by system evaluation of side effect was performed to assess chemotherapy related toxicity. Blood counts are acceptable. Results reviewed with the patient    M-spike is stable      2. Type 2 DM with complication    Managed by Endocrine      3. Chronic Hep C    In sustained virological remission        Plan:       > Continue Pomalyst maintenance  > Myeloma labs monthly   > Port flush monthly  > Continue Zometa every 3 months - on hold for dental work  > Follow-up in 3 months      Signed by: Anya Yeager MD                     October 31, 2020        CC. Juan Maldonado MD  CC.  Carrington Atkinson MD

## 2020-11-02 DIAGNOSIS — C90.00 MULTIPLE MYELOMA, REMISSION STATUS UNSPECIFIED (HCC): ICD-10-CM

## 2020-11-04 DIAGNOSIS — C90.00 MULTIPLE MYELOMA, REMISSION STATUS UNSPECIFIED (HCC): ICD-10-CM

## 2020-11-09 RX ORDER — POMALIDOMIDE 2 MG/1
CAPSULE ORAL
Qty: 28 CAP | Refills: 0 | Status: SHIPPED | OUTPATIENT
Start: 2020-11-09 | End: 2020-12-04 | Stop reason: SDUPTHER

## 2020-11-16 ENCOUNTER — VIRTUAL VISIT (OUTPATIENT)
Dept: ENDOCRINOLOGY | Age: 65
End: 2020-11-16
Payer: MEDICARE

## 2020-11-16 DIAGNOSIS — E11.9 TYPE 2 DIABETES MELLITUS WITHOUT COMPLICATION, WITH LONG-TERM CURRENT USE OF INSULIN (HCC): Primary | ICD-10-CM

## 2020-11-16 DIAGNOSIS — Z79.4 TYPE 2 DIABETES MELLITUS WITHOUT COMPLICATION, WITH LONG-TERM CURRENT USE OF INSULIN (HCC): Primary | ICD-10-CM

## 2020-11-16 DIAGNOSIS — I10 ESSENTIAL HYPERTENSION WITH GOAL BLOOD PRESSURE LESS THAN 130/80: ICD-10-CM

## 2020-11-16 PROCEDURE — 99214 OFFICE O/P EST MOD 30 MIN: CPT | Performed by: INTERNAL MEDICINE

## 2020-11-16 PROCEDURE — G8756 NO BP MEASURE DOC: HCPCS | Performed by: INTERNAL MEDICINE

## 2020-11-16 PROCEDURE — 3044F HG A1C LEVEL LT 7.0%: CPT | Performed by: INTERNAL MEDICINE

## 2020-11-16 PROCEDURE — G8427 DOCREV CUR MEDS BY ELIG CLIN: HCPCS | Performed by: INTERNAL MEDICINE

## 2020-11-16 PROCEDURE — 3017F COLORECTAL CA SCREEN DOC REV: CPT | Performed by: INTERNAL MEDICINE

## 2020-11-16 PROCEDURE — G9717 DOC PT DX DEP/BP F/U NT REQ: HCPCS | Performed by: INTERNAL MEDICINE

## 2020-11-16 PROCEDURE — 2022F DILAT RTA XM EVC RTNOPTHY: CPT | Performed by: INTERNAL MEDICINE

## 2020-11-16 RX ORDER — CHOLECALCIFEROL (VITAMIN D3) 125 MCG
CAPSULE ORAL DAILY
COMMUNITY

## 2020-11-16 RX ORDER — SODIUM FLUORIDE 1.1 G/100G
GEL, DENTIFRICE ORAL
COMMUNITY
Start: 2020-08-12

## 2020-11-16 RX ORDER — IBUPROFEN 800 MG/1
TABLET ORAL
COMMUNITY
Start: 2020-10-13

## 2020-11-16 NOTE — PROGRESS NOTES
**DUE TO PANDEMIC AND HEALTH CONCERNS IN THE COMMUNITY, THIS PATIENT WAS EITHER ILL OR FOUND TO BE HIGH RISK FOR IN-PERSON EVALUATION WITHIN THE CLINIC. THE FOLLOWING IS A VIRTUAL TELEMEDICINE VIDEO ENCOUNTER VIA [employer], TO WHICH THE PATIENT AGREED. THE PURPOSE IS TO LIMIT INTERRUPTIONS IN HEALTHCARE AND TO PROVIDE FOR ONGOING URGENT NEEDS UNDER THE CURRENT CONDITIONS. CHIEF COMPLAINT: f/u uncontrolled diabetes    HISTORY OF PRESENT ILLNESS:   Chaya Hyde Sr. is a 63 y.o. male with a PMHx as noted below who presents for f/u of uncontrolled type 2 diabetes with steroid induced hyperglycemia.     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,   Had been on insulin with steroids but now off,  Prior A1c was 6.2%, had not repeated labs for today (prev ordered)    Current Home Regimen:   metformin 500mg twice daily    Review of home glucose:   AM: 133, 132  Dinner: 178, 119  Bedtime: 148  Snacking overnight reporting it is affecting his AM sugars    Review of most recent diabetes-related labs:  Lab Results   Component Value Date    HBA1C 6.2 08/25/2017    HBA1C 8.3 (H) 12/24/2010    XBF2FIUP 6.6% 04/07/2017    JQP0ZZBH 6.2 03/12/2020    EIR9IKKW 5.3 11/12/2019    AZD8QUQK 5.3 07/09/2019    CHOL 140 11/11/2019    LDLC 59 11/11/2019    GFRAA 57 (L) 09/30/2020    GFRNA 47 (L) 09/30/2020    CREATEXT Cr 1.46, GFR 59 11/11/2019    MCACR 3.1 11/11/2019    MALBEXT pending 11/11/2019    TSH 0.94 09/19/2011    VITD3 45.0 06/10/2020       PAST MEDICAL/SURGICAL HISTORY:   Past Medical History:   Diagnosis Date    Acid reflux     Arrhythmia     pvc's    Chronic pain     neck    Depression     Diabetes ([address]     Femoral hernia 7/11/2012    Hepatitis C     Hypertension     Inguinal hernia 7/11/2012    Liver disease     hepatitis c    Multiple myeloma ([address]     Other ill-defined conditions(799.89)     Hx of PVCs since 2009    Prostatitis, acute     Psychiatric disorder     depression     Past Surgical History: Procedure Laterality Date    COLONOSCOPY N/A 9/20/2016    COLONOSCOPY performed by Gary Boswell MD at \[facility]"" ENDOSCOPY    HX APPENDECTOMY      HX CERVICAL FUSION  2008    x 1 as of 4/30/2014    HX HEENT      foreign body removed from right eye    [facility] REPAIR  2012    [facility]      HX ORTHOPAEDIC      cervical fusion    HX OTHER SURGICAL  2000    liver bx - chronic hepatitis       ALLERGIES:   Allergies   Allergen Reactions    Mercury (Bulk) Hives     Blisters         MEDICATIONS ON ADMISSION:     Current Outpatient Medications:     cholecalciferol, vitamin D3, (Vitamin D3) 50 mcg (2,000 unit) tab, Take  by mouth daily. , Disp: , Rfl:     ascorbic acid, vitamin C, (VITAMIN C) 100 mg tab, Take  by mouth., Disp: , Rfl:     Denta 5000 Plus 1.1 % crea, USE AS DIRECTED TWO TIMES DAILY, Disp: , Rfl:     Pomalyst 2 mg cap, TAKE 1 CAPSULE BY MOUTH ONCE DAILY, Disp: 28 Cap, Rfl: 0    metFORMIN ER (GLUCOPHAGE XR) 500 mg tablet, TAKE 1 TABLET BY MOUTH TWICE DAILY, Disp: 180 Tab, Rfl: 3    ascorbic acid (VITAMIN C PO), Take  by mouth., Disp: , Rfl:     tenofovir DISOPROXIL FUMARATE (VIREAD) 300 mg tablet, TAKE 1 TABLET BY MOUTH EVERY DAY, Disp: 90 Tab, Rfl: 1    lisinopriL (PRINIVIL, ZESTRIL) 10 mg tablet, , Disp: , Rfl:     buPROPion XL (WELLBUTRIN XL) 300 mg XL tablet, TAKE 1 TABLET BY MOUTH EVERY DAY, Disp: , Rfl:     NUCYNTA 100 mg tablet, TAKE 2 TABLETS 3 TIMES A DAY, Disp: , Rfl: 0    aspirin delayed-release 81 mg tablet, Take  by mouth daily. , Disp: , Rfl:     multivitamin (ONE A DAY) tablet, Take 1 Tab by mouth daily. , Disp: , Rfl:     tamsulosin (FLOMAX) 0.4 mg capsule, Take 1 Cap by mouth daily. , Disp: 30 Cap, Rfl: 1    zolpidem CR (AMBIEN CR) 12.5 mg tablet, Take 12.5 mg by mouth nightly., Disp: , Rfl:     aripiprazole (ABILIFY) 5 mg tablet, Take 2.5 mg by mouth every morning., Disp: , Rfl:     ibuprofen (MOTRIN) 800 mg tablet, TAKE 1 TABLET BY MOUTH 3 TIMES A DAY AS NEEDED FOR PAIN, Disp: , Rfl:     ergocalciferol (ERGOCALCIFEROL) 1,250 mcg (50,000 unit) capsule, TAKE 1 CAPSULE BY MOUTH 1 TIME WEEKLY, Disp: 12 Cap, Rfl: 0    metoprolol succinate (TOPROL-XL) 25 mg XL tablet, TAKE 1 TABLET BY MOUTH EVERY DAY IN THE EVENING, Disp: , Rfl: 3    naproxen (NAPROSYN) 500 mg tablet, TAKE 1 TABLET BY MOUTH TWICE A DAY, Disp: , Rfl: 5    insulin lispro protamine/insulin lispro (HUMALOG 50-50 MIX) 100 unit/mL (50-50) flexpen, by SubCUTAneous route., Disp: , Rfl:     prochlorperazine (COMPAZINE) 10 mg tablet, Take 5 mg by mouth every six (6) hours as needed. , Disp: , Rfl:     omeprazole (PRILOSEC) 20 mg capsule, Take 20 mg by mouth daily as needed (acid reflux). , Disp: , Rfl:     ondansetron (ZOFRAN ODT) 8 mg disintegrating tablet, Take 8 mg by mouth every eight (8) hours as needed for Nausea., Disp: , Rfl:     SOCIAL HISTORY:   Social History     Socioeconomic History    Marital status:      Spouse name: Not on file    Number of children: Not on file    Years of education: Not on file    Highest education level: Not on file   Occupational History    Not on file   Social Needs    Financial resource strain: Not on file    Food insecurity     Worry: Not on file     Inability: Not on file    Transportation needs     Medical: Not on file     Non-medical: Not on file   Tobacco Use    Smoking status: Former Smoker     Packs/day: 1.00     Years: 12.00     Pack years: 12.00     Types: Cigarettes     Last attempt to quit: 1989     Years since quittin.8    Smokeless tobacco: Never Used    Tobacco comment: former cigarette smoker   Substance and Sexual Activity    Alcohol use: No     Comment: former alcoholic-quit 8112    Drug use: No    Sexual activity: Not Currently   Lifestyle    Physical activity     Days per week: Not on file     Minutes per session: Not on file    Stress: Not on file   Relationships    Social connections     Talks on phone: Not on file     Gets together: Not on file     Attends Worship service: Not on file     Active member of club or organization: Not on file     Attends meetings of clubs or organizations: Not on file     Relationship status: Not on file    Intimate partner violence     Fear of current or ex partner: Not on file     Emotionally abused: Not on file     Physically abused: Not on file     Forced sexual activity: Not on file   Other Topics Concern    Not on file   Social History Narrative    Not on file       FAMILY HISTORY:  Family History   Problem Relation Age of Onset    Arthritis-osteo Mother     Arthritis-osteo Father     Diabetes Father     Hypertension Maternal Grandmother     Diabetes Maternal Grandmother     Hypertension Maternal Grandfather     Diabetes Maternal Grandfather        REVIEW OF SYSTEMS: Complete ROS assessed and noted for that which is described above, all else are negative. Eyes: normal  ENT: normal  CVS: normal  Resp: normal  GI: normal  : normal  GYN: normal  Endocrine: normal  Integument: normal  Musculoskeletal: normal  Neuro: normal  Psych: normal    PHYSICAL EXAMINATION:  Telemedicine Visit    GENERAL: NCAT, Appears well nourished  EYES: EOMI, non-icteric, no proptosis  Ear/Nose/Throat: NCAT, no visible inflammation or masses  CARDIOVASCULAR: no cyanosis, no visible JVD  RESPIRATORY: comfortable respirations observed, no cyanosis  MUSCULOSKELETAL: Normal ROM of upper extremities observed  SKIN: No edema, rash, or other significant changes observed  NEUROLOGIC:  AAOx3  PSYCHIATRIC: Normal affect, Normal insight and judgement    REVIEW OF LABORATORY AND RADIOLOGY DATA:   Labs and documentation have been reviewed as described above. ASSESSMENT AND PLAN:   Lori Levine Sr. is a 63 y.o. male with a PMHx as noted above who presents for f/u of uncontrolled type 2 diabetes with steroid induced hyperglycemia.     Problems:  Type 2 diabetes uncontrolled  Steroid induced hyperglycemia  Hypertension    PLAN  Type 2 Diabetes  Medications: Metformin 500mg twice daily,  Encouraged to plan ahead for the winter months, indoor activity and healthy food options. HTN: Telemedicine visit, I have reviewed his chart and the recorded pressures over the year and they have been stable. Reports pressure has been erratic since his transplant. Lipids: has not completed his labs yet, will reorder    Feet: has a callus per outside exam, reviewed on exam today through video, right foot with callus mid foot between ball of foot and heel. Not open. Seen by podiatry and treated. Cautioned about warning signs that might suggest infection and the notion of \"diabetic foot infections\" in general. He will keep an eye on this. LABS: Lab panel ordered to be completed few days before next visit    RTC: March 29 at 2:50,     20 minutes spent toward telemedicine visit today of which >50% of this time was spent in counseling and coordination of care. Kanu Pang.  [address] Endocrinology  [address] [place] [address]

## 2020-11-17 RX ORDER — SODIUM CHLORIDE 0.9 % (FLUSH) 0.9 %
5-10 SYRINGE (ML) INJECTION AS NEEDED
Status: CANCELLED | OUTPATIENT
Start: 2020-11-25 | End: 2020-11-25

## 2020-11-17 RX ORDER — SODIUM CHLORIDE 9 MG/ML
10 INJECTION INTRAMUSCULAR; INTRAVENOUS; SUBCUTANEOUS AS NEEDED
Status: CANCELLED | OUTPATIENT
Start: 2020-11-25

## 2020-11-17 RX ORDER — HEPARIN 100 UNIT/ML
500 SYRINGE INTRAVENOUS AS NEEDED
Status: CANCELLED | OUTPATIENT
Start: 2020-11-25

## 2020-11-25 ENCOUNTER — HOSPITAL ENCOUNTER (OUTPATIENT)
Dept: INFUSION THERAPY | Age: 65
Discharge: HOME | End: 2020-11-25
Payer: COMMERCIAL

## 2020-11-25 VITALS
SYSTOLIC BLOOD PRESSURE: 128 MMHG | TEMPERATURE: 98.7 F | HEART RATE: 89 BPM | RESPIRATION RATE: 18 BRPM | DIASTOLIC BLOOD PRESSURE: 89 MMHG

## 2020-11-25 DIAGNOSIS — C90.00 MULTIPLE MYELOMA, REMISSION STATUS UNSPECIFIED (HCC): Primary | ICD-10-CM

## 2020-11-25 LAB
ALBUMIN SERPL-MCNC: 3.5 G/DL (ref 3.5–5)
ALBUMIN/GLOB SERPL: 0.8 {RATIO} (ref 1.1–2.2)
ALP SERPL-CCNC: 97 U/L (ref 45–117)
ALT SERPL-CCNC: 30 U/L (ref 12–78)
ANION GAP SERPL CALC-SCNC: 4 MMOL/L (ref 5–15)
AST SERPL-CCNC: 20 U/L (ref 15–37)
BASOPHILS # BLD: 0 K/UL (ref 0–0.1)
BASOPHILS NFR BLD: 1 % (ref 0–1)
BILIRUB SERPL-MCNC: 0.3 MG/DL (ref 0.2–1)
BUN SERPL-MCNC: 14 MG/DL (ref 6–20)
BUN/CREAT SERPL: 10 (ref 12–20)
CALCIUM SERPL-MCNC: 9.1 MG/DL (ref 8.5–10.1)
CHLORIDE SERPL-SCNC: 107 MMOL/L (ref 97–108)
CO2 SERPL-SCNC: 28 MMOL/L (ref 21–32)
CREAT SERPL-MCNC: 1.46 MG/DL (ref 0.7–1.3)
DIFFERENTIAL METHOD BLD: ABNORMAL
EOSINOPHIL # BLD: 0.1 K/UL (ref 0–0.4)
EOSINOPHIL NFR BLD: 2 % (ref 0–7)
ERYTHROCYTE [DISTWIDTH] IN BLOOD BY AUTOMATED COUNT: 16.1 % (ref 11.5–14.5)
GLOBULIN SER CALC-MCNC: 4.3 G/DL (ref 2–4)
GLUCOSE SERPL-MCNC: 147 MG/DL (ref 65–100)
HCT VFR BLD AUTO: 35.8 % (ref 36.6–50.3)
HGB BLD-MCNC: 11.2 G/DL (ref 12.1–17)
IGA SERPL-MCNC: 407 MG/DL (ref 70–400)
IGG SERPL-MCNC: 1370 MG/DL (ref 700–1600)
IGM SERPL-MCNC: 28 MG/DL (ref 40–230)
IMM GRANULOCYTES # BLD AUTO: 0 K/UL (ref 0–0.04)
IMM GRANULOCYTES NFR BLD AUTO: 0 % (ref 0–0.5)
LYMPHOCYTES # BLD: 1 K/UL (ref 0.8–3.5)
LYMPHOCYTES NFR BLD: 28 % (ref 12–49)
MCH RBC QN AUTO: 28.2 PG (ref 26–34)
MCHC RBC AUTO-ENTMCNC: 31.3 G/DL (ref 30–36.5)
MCV RBC AUTO: 90.2 FL (ref 80–99)
MONOCYTES # BLD: 0.8 K/UL (ref 0–1)
MONOCYTES NFR BLD: 22 % (ref 5–13)
NEUTS SEG # BLD: 1.7 K/UL (ref 1.8–8)
NEUTS SEG NFR BLD: 47 % (ref 32–75)
NRBC # BLD: 0 K/UL (ref 0–0.01)
NRBC BLD-RTO: 0 PER 100 WBC
PLATELET # BLD AUTO: 168 K/UL (ref 150–400)
PMV BLD AUTO: 10.4 FL (ref 8.9–12.9)
POTASSIUM SERPL-SCNC: 4.1 MMOL/L (ref 3.5–5.1)
PROT SERPL-MCNC: 7.8 G/DL (ref 6.4–8.2)
RBC # BLD AUTO: 3.97 M/UL (ref 4.1–5.7)
RBC MORPH BLD: ABNORMAL
SODIUM SERPL-SCNC: 139 MMOL/L (ref 136–145)
WBC # BLD AUTO: 3.6 K/UL (ref 4.1–11.1)

## 2020-11-25 PROCEDURE — 85025 COMPLETE CBC W/AUTO DIFF WBC: CPT

## 2020-11-25 PROCEDURE — 74011250636 HC RX REV CODE- 250/636: Performed by: INTERNAL MEDICINE

## 2020-11-25 PROCEDURE — 96523 IRRIG DRUG DELIVERY DEVICE: CPT

## 2020-11-25 PROCEDURE — 82784 ASSAY IGA/IGD/IGG/IGM EACH: CPT

## 2020-11-25 PROCEDURE — 77030012965 HC NDL HUBR BBMI -A

## 2020-11-25 PROCEDURE — 84165 PROTEIN E-PHORESIS SERUM: CPT

## 2020-11-25 PROCEDURE — 36591 DRAW BLOOD OFF VENOUS DEVICE: CPT

## 2020-11-25 PROCEDURE — 80053 COMPREHEN METABOLIC PANEL: CPT

## 2020-11-25 PROCEDURE — 83883 ASSAY NEPHELOMETRY NOT SPEC: CPT

## 2020-11-25 RX ORDER — SODIUM CHLORIDE 9 MG/ML
10 INJECTION INTRAMUSCULAR; INTRAVENOUS; SUBCUTANEOUS AS NEEDED
Status: ACTIVE | OUTPATIENT
Start: 2020-11-25 | End: 2020-11-25

## 2020-11-25 RX ORDER — HEPARIN 100 UNIT/ML
500 SYRINGE INTRAVENOUS AS NEEDED
Status: ACTIVE | OUTPATIENT
Start: 2020-11-25 | End: 2020-11-25

## 2020-11-25 RX ORDER — SODIUM CHLORIDE 0.9 % (FLUSH) 0.9 %
5-10 SYRINGE (ML) INJECTION AS NEEDED
Status: DISPENSED | OUTPATIENT
Start: 2020-11-25 | End: 2020-11-25

## 2020-11-25 RX ADMIN — HEPARIN 500 UNITS: 100 SYRINGE at 09:54

## 2020-11-25 RX ADMIN — Medication 10 ML: at 09:54

## 2020-11-25 NOTE — PROGRESS NOTES
[facility] Note:  Arrived - 0930     Visit Vitals  /89   Pulse 89   Temp 98.7 °F (37.1 °C)   Resp 18       Assessment - unchanged. Labs drawn, results pending in [facility]. Port accessed & flushed per protocol w/o difficulty. Scott needle removed. 1000 - Tolerated well. Pt denies any acute problems/changes. Discharged from [facility] ambulatory. No distress.

## 2020-11-27 LAB
ALBUMIN SERPL ELPH-MCNC: 3.7 G/DL (ref 2.9–4.4)
ALBUMIN/GLOB SERPL: 1 {RATIO} (ref 0.7–1.7)
ALPHA1 GLOB SERPL ELPH-MCNC: 0.2 G/DL (ref 0–0.4)
ALPHA2 GLOB SERPL ELPH-MCNC: 1 G/DL (ref 0.4–1)
B-GLOBULIN SERPL ELPH-MCNC: 1.1 G/DL (ref 0.7–1.3)
GAMMA GLOB SERPL ELPH-MCNC: 1.3 G/DL (ref 0.4–1.8)
GLOBULIN SER CALC-MCNC: 3.6 G/DL (ref 2.2–3.9)
KAPPA LC FREE SER-MCNC: 52.7 MG/L (ref 3.3–19.4)
KAPPA LC FREE/LAMBDA FREE SER: 1.23 {RATIO} (ref 0.26–1.65)
LAMBDA LC FREE SERPL-MCNC: 42.7 MG/L (ref 5.7–26.3)
M PROTEIN SERPL ELPH-MCNC: NORMAL G/DL
PROT SERPL-MCNC: 7.3 G/DL (ref 6–8.5)

## 2020-12-01 LAB
IGA SERPL-MCNC: 437 MG/DL (ref 61–437)
IGG SERPL-MCNC: 1246 MG/DL (ref 603–1613)
IGM SERPL-MCNC: 31 MG/DL (ref 20–172)
PROT PATTERN SERPL IFE-IMP: ABNORMAL

## 2020-12-02 DIAGNOSIS — C90.00 MULTIPLE MYELOMA, REMISSION STATUS UNSPECIFIED (HCC): ICD-10-CM

## 2020-12-04 DIAGNOSIS — C90.00 MULTIPLE MYELOMA, REMISSION STATUS UNSPECIFIED (HCC): ICD-10-CM

## 2020-12-04 RX ORDER — POMALIDOMIDE 2 MG/1
2 CAPSULE ORAL DAILY
Qty: 28 CAP | Refills: 0 | Status: SHIPPED | OUTPATIENT
Start: 2020-12-04 | End: 2021-01-05 | Stop reason: SDUPTHER

## 2020-12-18 ENCOUNTER — TELEPHONE (OUTPATIENT)
Dept: ONCOLOGY | Age: 65
End: 2020-12-18

## 2020-12-18 NOTE — TELEPHONE ENCOUNTER
Patient called and would like a callback to see if he is a candidate to get the COVID vaccine.     [phone]

## 2020-12-18 NOTE — TELEPHONE ENCOUNTER
Returned call to pt. Pt can get the vaccine however it is not open to the general public just yet. Pt stated understanding.

## 2020-12-23 ENCOUNTER — HOSPITAL ENCOUNTER (OUTPATIENT)
Dept: INFUSION THERAPY | Age: 65
Discharge: HOME | End: 2020-12-23
Payer: COMMERCIAL

## 2020-12-23 VITALS
OXYGEN SATURATION: 99 % | HEART RATE: 99 BPM | WEIGHT: 204.3 LBS | DIASTOLIC BLOOD PRESSURE: 83 MMHG | SYSTOLIC BLOOD PRESSURE: 125 MMHG | TEMPERATURE: 97.4 F | BODY MASS INDEX: 31.06 KG/M2 | RESPIRATION RATE: 18 BRPM

## 2020-12-23 LAB
ALBUMIN SERPL-MCNC: 3.7 G/DL (ref 3.5–5)
ALBUMIN/GLOB SERPL: 0.9 {RATIO} (ref 1.1–2.2)
ALP SERPL-CCNC: 88 U/L (ref 45–117)
ALT SERPL-CCNC: 27 U/L (ref 12–78)
ANION GAP SERPL CALC-SCNC: 3 MMOL/L (ref 5–15)
AST SERPL-CCNC: 16 U/L (ref 15–37)
BASOPHILS # BLD: 0 K/UL (ref 0–0.1)
BASOPHILS NFR BLD: 1 % (ref 0–1)
BILIRUB SERPL-MCNC: 0.3 MG/DL (ref 0.2–1)
BUN SERPL-MCNC: 17 MG/DL (ref 6–20)
BUN/CREAT SERPL: 11 (ref 12–20)
CALCIUM SERPL-MCNC: 8.9 MG/DL (ref 8.5–10.1)
CHLORIDE SERPL-SCNC: 104 MMOL/L (ref 97–108)
CO2 SERPL-SCNC: 29 MMOL/L (ref 21–32)
CREAT SERPL-MCNC: 1.59 MG/DL (ref 0.7–1.3)
DIFFERENTIAL METHOD BLD: ABNORMAL
EOSINOPHIL # BLD: 0.1 K/UL (ref 0–0.4)
EOSINOPHIL NFR BLD: 2 % (ref 0–7)
ERYTHROCYTE [DISTWIDTH] IN BLOOD BY AUTOMATED COUNT: 16.4 % (ref 11.5–14.5)
GLOBULIN SER CALC-MCNC: 4.2 G/DL (ref 2–4)
GLUCOSE SERPL-MCNC: 177 MG/DL (ref 65–100)
HCT VFR BLD AUTO: 36.7 % (ref 36.6–50.3)
HGB BLD-MCNC: 11.5 G/DL (ref 12.1–17)
IGA SERPL-MCNC: 419 MG/DL (ref 70–400)
IGG SERPL-MCNC: 1360 MG/DL (ref 700–1600)
IGM SERPL-MCNC: 24 MG/DL (ref 40–230)
IMM GRANULOCYTES # BLD AUTO: 0 K/UL (ref 0–0.04)
IMM GRANULOCYTES NFR BLD AUTO: 1 % (ref 0–0.5)
LYMPHOCYTES # BLD: 0.9 K/UL (ref 0.8–3.5)
LYMPHOCYTES NFR BLD: 30 % (ref 12–49)
MCH RBC QN AUTO: 28.2 PG (ref 26–34)
MCHC RBC AUTO-ENTMCNC: 31.3 G/DL (ref 30–36.5)
MCV RBC AUTO: 90 FL (ref 80–99)
MONOCYTES # BLD: 0.6 K/UL (ref 0–1)
MONOCYTES NFR BLD: 20 % (ref 5–13)
NEUTS SEG # BLD: 1.5 K/UL (ref 1.8–8)
NEUTS SEG NFR BLD: 46 % (ref 32–75)
NRBC # BLD: 0 K/UL (ref 0–0.01)
NRBC BLD-RTO: 0 PER 100 WBC
PLATELET # BLD AUTO: 173 K/UL (ref 150–400)
PMV BLD AUTO: 10.3 FL (ref 8.9–12.9)
POTASSIUM SERPL-SCNC: 4.1 MMOL/L (ref 3.5–5.1)
PROT SERPL-MCNC: 7.9 G/DL (ref 6.4–8.2)
RBC # BLD AUTO: 4.08 M/UL (ref 4.1–5.7)
RBC MORPH BLD: ABNORMAL
SODIUM SERPL-SCNC: 136 MMOL/L (ref 136–145)
WBC # BLD AUTO: 3.1 K/UL (ref 4.1–11.1)

## 2020-12-23 PROCEDURE — 36591 DRAW BLOOD OFF VENOUS DEVICE: CPT

## 2020-12-23 PROCEDURE — 83883 ASSAY NEPHELOMETRY NOT SPEC: CPT

## 2020-12-23 PROCEDURE — 77030012965 HC NDL HUBR BBMI -A

## 2020-12-23 PROCEDURE — 74011250636 HC RX REV CODE- 250/636: Performed by: INTERNAL MEDICINE

## 2020-12-23 PROCEDURE — 80053 COMPREHEN METABOLIC PANEL: CPT

## 2020-12-23 PROCEDURE — 82784 ASSAY IGA/IGD/IGG/IGM EACH: CPT

## 2020-12-23 PROCEDURE — 84165 PROTEIN E-PHORESIS SERUM: CPT

## 2020-12-23 PROCEDURE — 85025 COMPLETE CBC W/AUTO DIFF WBC: CPT

## 2020-12-23 RX ORDER — HEPARIN 100 UNIT/ML
500 SYRINGE INTRAVENOUS AS NEEDED
Status: DISCONTINUED | OUTPATIENT
Start: 2020-12-23 | End: 2020-12-24 | Stop reason: HOSPADM

## 2020-12-23 RX ORDER — SODIUM CHLORIDE 9 MG/ML
10 INJECTION INTRAMUSCULAR; INTRAVENOUS; SUBCUTANEOUS AS NEEDED
Status: DISCONTINUED | OUTPATIENT
Start: 2020-12-23 | End: 2020-12-24 | Stop reason: HOSPADM

## 2020-12-23 RX ADMIN — Medication 500 UNITS: at 09:50

## 2020-12-23 RX ADMIN — SODIUM CHLORIDE 10 ML: 9 INJECTION INTRAMUSCULAR; INTRAVENOUS; SUBCUTANEOUS at 09:50

## 2020-12-23 NOTE — PROGRESS NOTES
[facility] Short Visit Note    0603  Arrived for [facility] Flush/Labs    Visit Vitals  /83 (BP 1 Location: Left arm, BP Patient Position: Sitting)   Pulse 99   Temp 97.4 °F (36.3 °C)   Resp 18   Wt 92.7 kg (204 lb 4.8 oz)   SpO2 99%   BMI 31.06 kg/m²       Patient denied having any symptoms of COVID-19, i.e. SOB, coughing, fever, or generally not feeling well. Also denies having been exposed to COVID-19 recently or having had any recent contact with family/friend that has a pending COVID test.    Port accessed with positive blood return noted. Labs drawn per order and sent for processing. Port flushed and heparinized per protocol w/o difficulty. Scott needle removed. Site covered with gauze and paper tape. Recent Results (from the past 12 hour(s))   METABOLIC PANEL, COMPREHENSIVE    Collection Time: 12/23/20  9:44 AM   Result Value Ref Range    Sodium 136 136 - 145 mmol/L    Potassium 4.1 3.5 - 5.1 mmol/L    Chloride 104 97 - 108 mmol/L    CO2 29 21 - 32 mmol/L    Anion gap 3 (L) 5 - 15 mmol/L    Glucose 177 (H) 65 - 100 mg/dL    BUN 17 6 - 20 MG/DL    Creatinine 1.59 (H) 0.70 - 1.30 MG/DL    BUN/Creatinine ratio 11 (L) 12 - 20      GFR est AA 54 (L) >60 ml/min/1.73m2    GFR est non-AA 44 (L) >60 ml/min/1.73m2    Calcium 8.9 8.5 - 10.1 MG/DL    Bilirubin, total 0.3 0.2 - 1.0 MG/DL    ALT (SGPT) 27 12 - 78 U/L    AST (SGOT) 16 15 - 37 U/L    Alk.  phosphatase 88 45 - 117 U/L    Protein, total 7.9 6.4 - 8.2 g/dL    Albumin 3.7 3.5 - 5.0 g/dL    Globulin 4.2 (H) 2.0 - 4.0 g/dL    A-G Ratio 0.9 (L) 1.1 - 2.2     CBC WITH AUTOMATED DIFF    Collection Time: 12/23/20  9:44 AM   Result Value Ref Range    WBC 3.1 (L) 4.1 - 11.1 K/uL    RBC 4.08 (L) 4.10 - 5.70 M/uL    HGB 11.5 (L) 12.1 - 17.0 g/dL    HCT 36.7 36.6 - 50.3 %    MCV 90.0 80.0 - 99.0 FL    MCH 28.2 26.0 - 34.0 PG    MCHC 31.3 30.0 - 36.5 g/dL    RDW 16.4 (H) 11.5 - 14.5 %    PLATELET 457 140 - 579 K/uL    MPV 10.3 8.9 - 12.9 FL    NRBC 0.0 0  WBC    ABSOLUTE NRBC 0.00 0.00 - 0.01 K/uL    NEUTROPHILS PENDING %    LYMPHOCYTES PENDING %    MONOCYTES PENDING %    EOSINOPHILS PENDING %    BASOPHILS PENDING %    IMMATURE GRANULOCYTES PENDING %    ABS. NEUTROPHILS PENDING K/UL    ABS. LYMPHOCYTES PENDING K/UL    ABS. MONOCYTES PENDING K/UL    ABS. EOSINOPHILS PENDING K/UL    ABS. BASOPHILS PENDING K/UL    ABS. IMM. GRANS. PENDING K/UL    DF PENDING        Pt tolerated well. Pt denies any acute problems/changes. [id number]  Discharged from [facility] ambulatory. No distress. Next appt:    Future Appointments   Date Time Provider Dajuan Vickie   1/8/2021 10:00 AM Wendy Schrader MD [facility] BS AMB   1/20/2021  9:30 AM MARY PHLEBOTOMIST [facility]   2/17/2021  9:30 AM MARY PHLEBOTOMIST [address] REG   3/17/2021  9:30 AM MARY PHLEBOTOMIST [address] REG   3/29/2021  2:50 PM Ronnie Nails MD [facility] [address] BS AMB   4/14/2021  9:30 AM Myra PHLEBOTOMIST [facility]

## 2020-12-24 LAB
KAPPA LC FREE SER-MCNC: 42.9 MG/L (ref 3.3–19.4)
KAPPA LC FREE/LAMBDA FREE SER: 1.13 {RATIO} (ref 0.26–1.65)
LAMBDA LC FREE SERPL-MCNC: 38.1 MG/L (ref 5.7–26.3)

## 2020-12-28 LAB
ALBUMIN SERPL ELPH-MCNC: 4.1 G/DL (ref 2.9–4.4)
ALBUMIN/GLOB SERPL: 1.2 {RATIO} (ref 0.7–1.7)
ALPHA1 GLOB SERPL ELPH-MCNC: 0.2 G/DL (ref 0–0.4)
ALPHA2 GLOB SERPL ELPH-MCNC: 0.9 G/DL (ref 0.4–1)
B-GLOBULIN SERPL ELPH-MCNC: 1.1 G/DL (ref 0.7–1.3)
GAMMA GLOB SERPL ELPH-MCNC: 1.3 G/DL (ref 0.4–1.8)
GLOBULIN SER CALC-MCNC: 3.4 G/DL (ref 2.2–3.9)
M PROTEIN SERPL ELPH-MCNC: NORMAL G/DL
PROT SERPL-MCNC: 7.5 G/DL (ref 6–8.5)

## 2020-12-29 LAB
IGA SERPL-MCNC: 438 MG/DL (ref 61–437)
IGG SERPL-MCNC: 1353 MG/DL (ref 603–1613)
IGM SERPL-MCNC: 29 MG/DL (ref 20–172)
PROT PATTERN SERPL IFE-IMP: ABNORMAL

## 2020-12-30 DIAGNOSIS — C90.00 MULTIPLE MYELOMA, REMISSION STATUS UNSPECIFIED (HCC): ICD-10-CM

## 2021-01-04 RX ORDER — HEPARIN 100 UNIT/ML
500 SYRINGE INTRAVENOUS AS NEEDED
Status: CANCELLED | OUTPATIENT
Start: 2021-01-20

## 2021-01-04 RX ORDER — SODIUM CHLORIDE 9 MG/ML
10 INJECTION INTRAMUSCULAR; INTRAVENOUS; SUBCUTANEOUS AS NEEDED
Status: CANCELLED | OUTPATIENT
Start: 2021-01-20

## 2021-01-04 RX ORDER — SODIUM CHLORIDE 0.9 % (FLUSH) 0.9 %
5-10 SYRINGE (ML) INJECTION AS NEEDED
Status: CANCELLED | OUTPATIENT
Start: 2021-01-20 | End: 2021-01-20

## 2021-01-05 DIAGNOSIS — C90.00 MULTIPLE MYELOMA, REMISSION STATUS UNSPECIFIED (HCC): ICD-10-CM

## 2021-01-05 RX ORDER — POMALIDOMIDE 2 MG/1
2 CAPSULE ORAL DAILY
Qty: 28 CAP | Refills: 0 | Status: SHIPPED | OUTPATIENT
Start: 2021-01-05 | End: 2021-01-28

## 2021-01-06 DIAGNOSIS — C90.00 MULTIPLE MYELOMA, REMISSION STATUS UNSPECIFIED (HCC): ICD-10-CM

## 2021-01-06 RX ORDER — TENOFOVIR DISOPROXIL FUMARATE 300 MG/1
TABLET, FILM COATED ORAL
Qty: 90 TAB | Refills: 1 | Status: SHIPPED | OUTPATIENT
Start: 2021-01-06 | End: 2021-04-27

## 2021-01-06 NOTE — TELEPHONE ENCOUNTER
GERMAN from  tenofovir DISOPROXIL FUMARATE (VIREAD) 300MG TAB TAKE 1 TAB BY MOUTH EVERY DAY D 90 R 1

## 2021-01-06 NOTE — TELEPHONE ENCOUNTER
Requested Prescriptions     Pending Prescriptions Disp Refills    tenofovir DISOPROXIL FUMARATE (VIREAD) 300 mg tablet 90 Tab 1     Sig: TAKE 1 TABLET BY MOUTH EVERY DAY     Call [facility] @ [phone] ext [phone] or if prescription is ready, please fax to [phone].       [place] [phone] [facility] voicemail

## 2021-01-07 NOTE — PROGRESS NOTES
Brandon Villagran is a 63 y.o. male here for 3 month follow up for Multiple Myeloma. He is on Pomalyst.    1. Have you been to the ER, urgent care clinic since your last visit? Hospitalized since your last visit? no    2. Have you seen or consulted any other health care providers outside of the [address] [place] [address] since your last visit? Include any pap smears or colon screening. [facility], Dr Arnaud Hinton,  Dr Sun Murdock states he is doing well. No complaints.

## 2021-01-08 ENCOUNTER — OFFICE VISIT (OUTPATIENT)
Dept: ONCOLOGY | Age: 66
End: 2021-01-08
Payer: MEDICARE

## 2021-01-08 VITALS
SYSTOLIC BLOOD PRESSURE: 129 MMHG | WEIGHT: 209.6 LBS | TEMPERATURE: 98.4 F | HEIGHT: 68 IN | OXYGEN SATURATION: 98 % | DIASTOLIC BLOOD PRESSURE: 87 MMHG | BODY MASS INDEX: 31.77 KG/M2 | HEART RATE: 83 BPM

## 2021-01-08 DIAGNOSIS — C90.00 MULTIPLE MYELOMA, REMISSION STATUS UNSPECIFIED (HCC): ICD-10-CM

## 2021-01-08 DIAGNOSIS — C90.01 MULTIPLE MYELOMA IN REMISSION (HCC): Primary | ICD-10-CM

## 2021-01-08 PROCEDURE — G8417 CALC BMI ABV UP PARAM F/U: HCPCS | Performed by: INTERNAL MEDICINE

## 2021-01-08 PROCEDURE — G8754 DIAS BP LESS 90: HCPCS | Performed by: INTERNAL MEDICINE

## 2021-01-08 PROCEDURE — G8427 DOCREV CUR MEDS BY ELIG CLIN: HCPCS | Performed by: INTERNAL MEDICINE

## 2021-01-08 PROCEDURE — G8752 SYS BP LESS 140: HCPCS | Performed by: INTERNAL MEDICINE

## 2021-01-08 PROCEDURE — 99213 OFFICE O/P EST LOW 20 MIN: CPT | Performed by: INTERNAL MEDICINE

## 2021-01-08 PROCEDURE — G9717 DOC PT DX DEP/BP F/U NT REQ: HCPCS | Performed by: INTERNAL MEDICINE

## 2021-01-08 PROCEDURE — 3017F COLORECTAL CA SCREEN DOC REV: CPT | Performed by: INTERNAL MEDICINE

## 2021-01-08 NOTE — PROGRESS NOTES
[address] [facility]  [address], [address] [facility], [facility] Jared Cornelius, [address]  [phone]          Progress Note        Patient: Aleksandr Beckman Sr. MRN: [id number]  SSN: [id number]    YOB: 1955  Age: 63 y.o. Sex: male        Diagnosis:      1. Multiple Myeloma, IgA Kappa   Durie [place] stage IIIB    Cytogenetics/FISH: t(14;16) - high risk    Ttreatment:     1. Maintenance therapy - Pomalyst 2 mg daily - 11/19/2018   2. Maintenance therapy - Ixazomib - started 10/15/2018 - stopped 11/12/2018  3. S/P tandem  Autotransplant   First on 2/28/2018   2nd on 06/13/2018  4. Carfilzomib/Pom/Dex - s/p 5 cycles  5. VD-PACE s/p 1 cycle  6. RVD - s/p 4 cycles    Subjective:      Aleksandr Beckman Sr. is a 63 y.o. male with a diagnosis of IgA kappa myeloma. Bone marrow shows 100% aberrant plasma cells. He suffers with DM but it is diet controlled. He has received systemic anti-viral treatment for chronic Hep C with successful eradication of the virus. Mr. Julián You received 4 cycles of systemic therapy with RVd. He had an initial good response to treatment. However his paraprotein level started rising and the myeloma became refractory to treatment. I then administered one cycle of VD-PACE in the hospital. He then received Carfilzomib/Pom/Dex. He achieved VGPR. He underwent tandem autotransplant at [facility]. According to the patient he achieved complete response with therapy. He has undergone second/tandem transplant in June. He took Pomalyst maintenance until Aug 2019 then had to stop due to recurrent prostatitis. He had a repeat BM biopsy in February 2019 at [facility] which showed complete molecular remission. He is taking Pomalyst. He is here today with his wife and feels well with no new complaints.       Review of Systems:    Constitutional: negative  Eyes: negative  Ears, Nose, Mouth, Throat, and Face: negative  Respiratory: negative  Cardiovascular: negative  Gastrointestinal: negative  Genitourinary:negative  Integument/Breast: negative  Hematologic/Lymphatic: negative  Musculoskeletal: negative  Neurological: negative      Past Medical History:   Diagnosis Date    Acid reflux     Arrhythmia     pvc's    Chronic pain     neck    Depression     Diabetes ([address]     Femoral hernia 2012    Hepatitis C     Hypertension     Inguinal hernia 2012    Liver disease     hepatitis c    Multiple myeloma (HCC)     Other ill-defined conditions(799.89)     Hx of PVCs since     Prostatitis, acute     Psychiatric disorder     depression     Past Surgical History:   Procedure Laterality Date    COLONOSCOPY N/A 2016    COLON[facility]ir [facility]ald [facility] at [facility] ENDOSCOPY    HX APPENDECTOMY      HX CERVICAL FUSION  2008    x 1 as of 2014    HX HEENT      foreign body removed from right eye    HX HERNIA REPAIR      [facility]      HX ORTHOPAEDIC      cervical fusion    HX OTHER SURGICAL  2000    liver bx - chronic hepatitis      Family History   Problem Relation Age of Onset    Arthritis-osteo Mother     Arthritis-osteo Father     Diabetes Father     Hypertension Maternal Grandmother     Diabetes Maternal Grandmother     Hypertension Maternal Grandfather     Diabetes Maternal Grandfather      Social History     Tobacco Use    Smoking status: Former Smoker     Packs/day: 1.00     Years: 12.00     Pack years: 12.00     Types: Cigarettes     Quit date: 1989     Years since quittin.0    Smokeless tobacco: Never Used    Tobacco comment: former cigarette smoker   Substance Use Topics    Alcohol use: No     Comment: former alcoholic-quit 30      Prior to Admission medications    Medication Sig Start Date End Date Taking?  Authorizing Provider   tenofovir DISOPROXIL FUMARATE (VIREAD) 300 mg tablet TAKE 1 TABLET BY MOUTH EVERY DAY 21  Yes Tammy Monteiro MD   [facility]) 2 mg cap Take 1 Cap by mouth daily. 1/5/21  Yes Barrett Carlton MD   cholecalciferol, vitamin D3, (Vitamin D3) 50 mcg (2,000 unit) tab Take  by mouth daily. Yes Provider, Historical   ascorbic acid, vitamin C, (VITAMIN C) 100 mg tab Take  by mouth. Yes Provider, Historical   Denta 5000 Plus 1.1 % crea USE AS DIRECTED TWO TIMES DAILY 8/12/20  Yes Provider, Historical   ibuprofen (MOTRIN) 800 mg tablet TAKE 1 TABLET BY MOUTH 3 TIMES A DAY AS NEEDED FOR PAIN 10/13/20  Yes Provider, Historical   metFORMIN ER (GLUCOPHAGE XR) 500 mg tablet TAKE 1 TABLET BY MOUTH TWICE DAILY 7/22/20  Yes Sven Marina MD   ascorbic acid (VITAMIN C PO) Take  by mouth. Yes Provider, Historical   ergocalciferol (ERGOCALCIFEROL) 1,250 mcg (50,000 unit) capsule TAKE 1 CAPSULE BY MOUTH 1 TIME WEEKLY 4/28/20  Yes Barrett Carlton MD   lisinopriL (PRINIVIL, ZESTRIL) 10 mg tablet  1/19/20  Yes Provider, Historical   buPROPion XL (WELLBUTRIN XL) 300 mg XL tablet TAKE 1 TABLET BY MOUTH EVERY DAY 1/30/20  Yes Provider, Historical   metoprolol succinate (TOPROL-XL) 25 mg XL tablet TAKE 1 TABLET BY MOUTH EVERY DAY IN THE EVENING 10/24/19  Yes Provider, Historical   naproxen (NAPROSYN) 500 mg tablet TAKE 1 TABLET BY MOUTH TWICE A DAY 8/26/19  Yes Provider, Historical   NUCYNTA 100 mg tablet TAKE 2 TABLETS 3 TIMES A DAY 7/27/19  Yes Provider, Historical   aspirin delayed-release 81 mg tablet Take  by mouth daily. Yes Provider, Historical   insulin lispro protamine/insulin lispro (HUMALOG 50-50 MIX) 100 unit/mL (50-50) flexpen by SubCUTAneous route. Yes Other, MD Goldy   prochlorperazine (COMPAZINE) 10 mg tablet Take 5 mg by mouth every six (6) hours as needed. Yes Other, MD Goldy   omeprazole (PRILOSEC) 20 mg capsule Take 20 mg by mouth daily as needed (acid reflux). Yes Other, MD Goldy   ondansetron (ZOFRAN ODT) 8 mg disintegrating tablet Take 8 mg by mouth every eight (8) hours as needed for Nausea.    Yes Other, MD Goldy multivitamin (ONE A DAY) tablet Take 1 Tab by mouth daily. Yes Provider, Historical   tamsulosin (FLOMAX) 0.4 mg capsule Take 1 Cap by mouth daily. 4/4/17  Yes Rachel Arias MD   zolpidem CR (AMBIEN CR) 12.5 mg tablet Take 12.5 mg by mouth nightly. Yes Provider, Historical   aripiprazole (ABILIFY) 5 mg tablet Take 2.5 mg by mouth every morning. Yes Other, MD Goldy          Allergies   Allergen Reactions    Mercury (Bulk) Hives     Blisters             Objective:     Visit Vitals  /87 (BP 1 Location: Right arm, BP Patient Position: Sitting)   Pulse 83   Temp 98.4 °F (36.9 °C) (Temporal)   Ht 5' 8\" (1.727 m)   Wt 209 lb 9.6 oz (95.1 kg)   SpO2 98%   BMI 31.87 kg/m²       Pain Scale: 0 - No pain/10  Pain Location:       Physical Exam:     GENERAL: alert, cooperative  EYE: negative  LYMPHATIC: Cervical, supraclavicular, and axillary nodes normal.   THROAT & NECK: normal and no erythema or exudates noted. LUNG: clear to auscultation bilaterally  HEART: regular rate and rhythm  ABDOMEN: soft, non-tender  EXTREMITIES: normal  SKIN: Normal.  NEUROLOGIC: negative     Physical exam copied from previous note. Reviewed and no changes noted.        Lab Results   Component Value Date/Time    WBC 3.1 (L) 12/23/2020 09:44 AM    HGB 11.5 (L) 12/23/2020 09:44 AM    Hematocrit (POC) 40 10/28/2020 09:56 AM    HCT 36.7 12/23/2020 09:44 AM    PLATELET 883 04/04/5720 09:44 AM    MCV 90.0 12/23/2020 09:44 AM       Lab Results   Component Value Date/Time    Sodium 136 12/23/2020 09:44 AM    Potassium 4.1 12/23/2020 09:44 AM    Chloride 104 12/23/2020 09:44 AM    CO2 29 12/23/2020 09:44 AM    Anion gap 3 (L) 12/23/2020 09:44 AM    Glucose 177 (H) 12/23/2020 09:44 AM    BUN 17 12/23/2020 09:44 AM    Creatinine 1.59 (H) 12/23/2020 09:44 AM    BUN/Creatinine ratio 11 (L) 12/23/2020 09:44 AM    GFR est AA 54 (L) 12/23/2020 09:44 AM    GFR est non-AA 44 (L) 12/23/2020 09:44 AM    Calcium 8.9 12/23/2020 09:44 AM Assessment:     1. Multiple Myeloma, IgA Kappa   Durie [place] stage IIIB    Cytogenetics/FISH: t(14;16) - high risk  ECOG PS - 0   Intent of therapy: palliative    Received 3 cycles of RVd   Dose reduced Revlimid and Velcade d/t continued cytopenias. M-spike started rising. He received one cycle of VD-PACE (bortezomib, dexamethasone, thalidomide, cisplatin, adriamycin, cyclophosphamide, and etoposide). Achieved DE with once cycle    Receivied KPd - s/p 5 cycles    Excellent response with M-protein came down to 0.1    S/p tandem autotransplant    First on 2/28/2018   2nd on 06/13/2018    Achieved CR after the first transplant. Repeat bone marrow @ [facility] - Complete response. Since he is high risk based on cytogenetics, I recommend proteosome inhibitor as maintenance therapy. Started Ixazomib   Developed fever, was in the hospital.   Also developed cytopenias    Patient prefered to switch therapy to Pomalyst.     Pomalyst 2 mg daily - started 11/19/2018  Tolerating treatment very well  Denies any side effects. A detailed system by system evaluation of side effect was performed to assess chemotherapy related toxicity. Blood counts are acceptable. Results reviewed with the patient    M-spike is stable      2. Type 2 DM with complication    Managed by Endocrine      3. Chronic Hep C    In sustained virological remission      Plan:       > Continue Pomalyst maintenance  > Myeloma labs and port flush monthly   > Zometa every 3 months  > Follow-up in 3 months  > Obtain last bone marrow from [facility]    I performed a history and physical examination of the patient and discussed his management with the NPP. I reviewed the NPP note and agree with the documented findings and plan of care. The patient was seen in conjunction with Ms. Andrei Serrato. Signed by: Isidro Smith MD                     January 8, 2021        CC. Carmen Bansal MD  CC.  Laurie Fregoso MD

## 2021-01-12 RX ORDER — DIPHENHYDRAMINE HYDROCHLORIDE 50 MG/ML
50 INJECTION, SOLUTION INTRAMUSCULAR; INTRAVENOUS AS NEEDED
Status: CANCELLED
Start: 2021-01-20

## 2021-01-12 RX ORDER — HYDROCORTISONE SODIUM SUCCINATE 100 MG/2ML
100 INJECTION, POWDER, FOR SOLUTION INTRAMUSCULAR; INTRAVENOUS AS NEEDED
Status: CANCELLED | OUTPATIENT
Start: 2021-01-20

## 2021-01-12 RX ORDER — EPINEPHRINE 1 MG/ML
0.3 INJECTION, SOLUTION, CONCENTRATE INTRAVENOUS AS NEEDED
Status: CANCELLED | OUTPATIENT
Start: 2021-01-20

## 2021-01-12 RX ORDER — SODIUM CHLORIDE 9 MG/ML
25 INJECTION, SOLUTION INTRAVENOUS CONTINUOUS
Status: CANCELLED | OUTPATIENT
Start: 2021-01-20 | End: 2021-01-20

## 2021-01-12 RX ORDER — SODIUM CHLORIDE 9 MG/ML
10 INJECTION INTRAMUSCULAR; INTRAVENOUS; SUBCUTANEOUS AS NEEDED
Status: CANCELLED | OUTPATIENT
Start: 2021-01-20

## 2021-01-12 RX ORDER — SODIUM CHLORIDE 0.9 % (FLUSH) 0.9 %
10 SYRINGE (ML) INJECTION AS NEEDED
Status: CANCELLED | OUTPATIENT
Start: 2021-01-20

## 2021-01-12 RX ORDER — ONDANSETRON 2 MG/ML
8 INJECTION INTRAMUSCULAR; INTRAVENOUS AS NEEDED
Status: CANCELLED | OUTPATIENT
Start: 2021-01-20

## 2021-01-12 RX ORDER — ACETAMINOPHEN 325 MG/1
650 TABLET ORAL AS NEEDED
Status: CANCELLED
Start: 2021-01-20

## 2021-01-12 RX ORDER — HEPARIN 100 UNIT/ML
300-500 SYRINGE INTRAVENOUS AS NEEDED
Status: CANCELLED | OUTPATIENT
Start: 2021-01-20

## 2021-01-12 RX ORDER — ALBUTEROL SULFATE 0.83 MG/ML
2.5 SOLUTION RESPIRATORY (INHALATION) AS NEEDED
Status: CANCELLED
Start: 2021-01-20

## 2021-01-20 ENCOUNTER — HOSPITAL ENCOUNTER (OUTPATIENT)
Dept: INFUSION THERAPY | Age: 66
Discharge: HOME | End: 2021-01-20
Payer: COMMERCIAL

## 2021-01-20 VITALS
RESPIRATION RATE: 16 BRPM | OXYGEN SATURATION: 100 % | DIASTOLIC BLOOD PRESSURE: 76 MMHG | BODY MASS INDEX: 31.69 KG/M2 | HEART RATE: 69 BPM | SYSTOLIC BLOOD PRESSURE: 119 MMHG | WEIGHT: 209.1 LBS | TEMPERATURE: 97.9 F | HEIGHT: 68 IN

## 2021-01-20 DIAGNOSIS — C90.01 MULTIPLE MYELOMA IN REMISSION (HCC): ICD-10-CM

## 2021-01-20 DIAGNOSIS — C90.00 MULTIPLE MYELOMA, REMISSION STATUS UNSPECIFIED (HCC): Primary | ICD-10-CM

## 2021-01-20 LAB
ALBUMIN SERPL-MCNC: 3.5 G/DL (ref 3.5–5)
ALBUMIN/GLOB SERPL: 0.9 {RATIO} (ref 1.1–2.2)
ALP SERPL-CCNC: 84 U/L (ref 45–117)
ALT SERPL-CCNC: 25 U/L (ref 12–78)
ANION GAP BLD CALC-SCNC: 15 MMOL/L (ref 10–20)
AST SERPL-CCNC: 15 U/L (ref 15–37)
BASOPHILS # BLD: 0 K/UL (ref 0–0.1)
BASOPHILS NFR BLD: 0 % (ref 0–1)
BILIRUB DIRECT SERPL-MCNC: 0.1 MG/DL (ref 0–0.2)
BILIRUB SERPL-MCNC: 0.3 MG/DL (ref 0.2–1)
BUN BLD-MCNC: 16 MG/DL (ref 9–20)
CA-I BLD-MCNC: 1.22 MMOL/L (ref 1.12–1.32)
CHLORIDE BLD-SCNC: 104 MMOL/L (ref 98–107)
CO2 BLD-SCNC: 26 MMOL/L (ref 21–32)
CREAT BLD-MCNC: 1.4 MG/DL (ref 0.6–1.3)
DIFFERENTIAL METHOD BLD: ABNORMAL
EOSINOPHIL # BLD: 0.1 K/UL (ref 0–0.4)
EOSINOPHIL NFR BLD: 2 % (ref 0–7)
ERYTHROCYTE [DISTWIDTH] IN BLOOD BY AUTOMATED COUNT: 16.1 % (ref 11.5–14.5)
GLOBULIN SER CALC-MCNC: 4.1 G/DL (ref 2–4)
GLUCOSE BLD-MCNC: 124 MG/DL (ref 65–100)
HCT VFR BLD AUTO: 35.6 % (ref 36.6–50.3)
HCT VFR BLD CALC: 38 % (ref 36.6–50.3)
HGB BLD-MCNC: 11.1 G/DL (ref 12.1–17)
IGA SERPL-MCNC: 415 MG/DL (ref 70–400)
IGG SERPL-MCNC: 1260 MG/DL (ref 700–1600)
IGM SERPL-MCNC: 22 MG/DL (ref 40–230)
IMM GRANULOCYTES # BLD AUTO: 0 K/UL (ref 0–0.04)
IMM GRANULOCYTES NFR BLD AUTO: 0 % (ref 0–0.5)
LYMPHOCYTES # BLD: 1.2 K/UL (ref 0.8–3.5)
LYMPHOCYTES NFR BLD: 35 % (ref 12–49)
MCH RBC QN AUTO: 28.3 PG (ref 26–34)
MCHC RBC AUTO-ENTMCNC: 31.2 G/DL (ref 30–36.5)
MCV RBC AUTO: 90.8 FL (ref 80–99)
MONOCYTES # BLD: 0.3 K/UL (ref 0–1)
MONOCYTES NFR BLD: 10 % (ref 5–13)
NEUTS SEG # BLD: 1.7 K/UL (ref 1.8–8)
NEUTS SEG NFR BLD: 53 % (ref 32–75)
NRBC # BLD: 0 K/UL (ref 0–0.01)
NRBC BLD-RTO: 0 PER 100 WBC
PLATELET # BLD AUTO: 160 K/UL (ref 150–400)
PMV BLD AUTO: 10.6 FL (ref 8.9–12.9)
POTASSIUM BLD-SCNC: 3.9 MMOL/L (ref 3.5–5.1)
PROT SERPL-MCNC: 7.6 G/DL (ref 6.4–8.2)
RBC # BLD AUTO: 3.92 M/UL (ref 4.1–5.7)
RBC MORPH BLD: ABNORMAL
RBC MORPH BLD: ABNORMAL
SERVICE CMNT-IMP: ABNORMAL
SODIUM BLD-SCNC: 140 MMOL/L (ref 136–145)
WBC # BLD AUTO: 3.3 K/UL (ref 4.1–11.1)
WBC MORPH BLD: ABNORMAL

## 2021-01-20 PROCEDURE — 84165 PROTEIN E-PHORESIS SERUM: CPT

## 2021-01-20 PROCEDURE — 80047 BASIC METABLC PNL IONIZED CA: CPT

## 2021-01-20 PROCEDURE — 85025 COMPLETE CBC W/AUTO DIFF WBC: CPT

## 2021-01-20 PROCEDURE — 74011250636 HC RX REV CODE- 250/636: Performed by: INTERNAL MEDICINE

## 2021-01-20 PROCEDURE — 82784 ASSAY IGA/IGD/IGG/IGM EACH: CPT

## 2021-01-20 PROCEDURE — 80076 HEPATIC FUNCTION PANEL: CPT

## 2021-01-20 PROCEDURE — 83883 ASSAY NEPHELOMETRY NOT SPEC: CPT

## 2021-01-20 PROCEDURE — 77030012965 HC NDL HUBR BBMI -A

## 2021-01-20 PROCEDURE — 96374 THER/PROPH/DIAG INJ IV PUSH: CPT

## 2021-01-20 PROCEDURE — 36415 COLL VENOUS BLD VENIPUNCTURE: CPT

## 2021-01-20 RX ORDER — SODIUM CHLORIDE 0.9 % (FLUSH) 0.9 %
5-10 SYRINGE (ML) INJECTION AS NEEDED
Status: DISPENSED | OUTPATIENT
Start: 2021-01-20 | End: 2021-01-20

## 2021-01-20 RX ORDER — SODIUM CHLORIDE 9 MG/ML
10 INJECTION INTRAMUSCULAR; INTRAVENOUS; SUBCUTANEOUS AS NEEDED
Status: ACTIVE | OUTPATIENT
Start: 2021-01-20 | End: 2021-01-20

## 2021-01-20 RX ORDER — HEPARIN 100 UNIT/ML
500 SYRINGE INTRAVENOUS AS NEEDED
Status: ACTIVE | OUTPATIENT
Start: 2021-01-20 | End: 2021-01-20

## 2021-01-20 RX ADMIN — SODIUM CHLORIDE 10 ML: 9 INJECTION INTRAMUSCULAR; INTRAVENOUS; SUBCUTANEOUS at 10:25

## 2021-01-20 RX ADMIN — Medication 500 UNITS: at 10:25

## 2021-01-20 RX ADMIN — ZOLEDRONIC ACID 4 MG: 0.04 INJECTION, SOLUTION INTRAVENOUS at 10:10

## 2021-01-20 RX ADMIN — SODIUM CHLORIDE 10 ML: 9 INJECTION INTRAMUSCULAR; INTRAVENOUS; SUBCUTANEOUS at 09:40

## 2021-01-20 NOTE — PROGRESS NOTES
8000 [facility] Visit Note    0930 Pt arrived at [facility] ambulatory and in no distress for Zometa and labs. Assessment completed, no new complaints voiced. Port accessed per protocol with positive blood return. Patient Vitals for the past 12 hrs:   Temp Pulse Resp BP SpO2   01/20/21 1028  69  119/76    01/20/21 0933 97.9 °F (36.6 °C) 78 16 124/80 100 %     Recent Results (from the past 12 hour(s))   CBC WITH AUTOMATED DIFF    Collection Time: 01/20/21  9:38 AM   Result Value Ref Range    WBC 3.3 (L) 4.1 - 11.1 K/uL    RBC 3.92 (L) 4.10 - 5.70 M/uL    HGB 11.1 (L) 12.1 - 17.0 g/dL    HCT 35.6 (L) 36.6 - 50.3 %    MCV 90.8 80.0 - 99.0 FL    MCH 28.3 26.0 - 34.0 PG    MCHC 31.2 30.0 - 36.5 g/dL    RDW 16.1 (H) 11.5 - 14.5 %    PLATELET 460 176 - 826 K/uL    MPV 10.6 8.9 - 12.9 FL    NRBC 0.0 0  WBC    ABSOLUTE NRBC 0.00 0.00 - 0.01 K/uL    NEUTROPHILS 53 32 - 75 %    LYMPHOCYTES 35 12 - 49 %    MONOCYTES 10 5 - 13 %    EOSINOPHILS 2 0 - 7 %    BASOPHILS 0 0 - 1 %    IMMATURE GRANULOCYTES 0 0.0 - 0.5 %    ABS. NEUTROPHILS 1.7 (L) 1.8 - 8.0 K/UL    ABS. LYMPHOCYTES 1.2 0.8 - 3.5 K/UL    ABS. MONOCYTES 0.3 0.0 - 1.0 K/UL    ABS. EOSINOPHILS 0.1 0.0 - 0.4 K/UL    ABS. BASOPHILS 0.0 0.0 - 0.1 K/UL    ABS. IMM. GRANS. 0.0 0.00 - 0.04 K/UL    DF MANUAL      RBC COMMENTS ANISOCYTOSIS  PRESENT        RBC COMMENTS POLYCHROMASIA  PRESENT        WBC COMMENTS SMUDGE CELLS SEEN     IMMUNOGLOBULINS, G/A/M, QT.     Collection Time: 01/20/21  9:38 AM   Result Value Ref Range    Immunoglobulin G 1,260 700 - 1,600 mg/dL    Immunoglobulin A 415 (H) 70 - 400 mg/dL    Immunoglobulin M 22 (L) 40 - 230 mg/dL   HEPATIC FUNCTION PANEL    Collection Time: 01/20/21  9:38 AM   Result Value Ref Range    Protein, total 7.6 6.4 - 8.2 g/dL    Albumin 3.5 3.5 - 5.0 g/dL    Globulin 4.1 (H) 2.0 - 4.0 g/dL    A-G Ratio 0.9 (L) 1.1 - 2.2      Bilirubin, total 0.3 0.2 - 1.0 MG/DL    Bilirubin, direct 0.1 0.0 - 0.2 MG/DL Alk. phosphatase 84 45 - 117 U/L    AST (SGOT) 15 15 - 37 U/L    ALT (SGPT) 25 12 - 78 U/L   POC CHEM8    Collection Time: 01/20/21  9:47 AM   Result Value Ref Range    Calcium, ionized (POC) 1.22 1.12 - 1.32 mmol/L    Sodium (POC) 140 136 - 145 mmol/L    Potassium (POC) 3.9 3.5 - 5.1 mmol/L    Chloride (POC) 104 98 - 107 mmol/L    CO2 (POC) 26 21 - 32 mmol/L    Anion gap (POC) 15 10 - 20 mmol/L    Glucose (POC) 124 (H) 65 - 100 mg/dL    BUN (POC) 16 9 - 20 mg/dL    Creatinine (POC) 1.4 (H) 0.6 - 1.3 mg/dL    GFRAA, POC >60 >60 ml/min/1.73m2    GFRNA, POC 51 (L) >60 ml/min/1.73m2    Hematocrit (POC) 38 36.6 - 50.3 %    Comment Notified RN or MD immediately by          Medications received:  Medications Administered     0.9% sodium chloride injection 10 mL     Admin Date  01/20/2021 Action  Given Dose  10 mL Route  IntraVENous Administered By  Max Cary RN           Admin Date  01/20/2021 Action  Given Dose  10 mL Route  IntraVENous Administered By  Max Cary RN          heparin (porcine) pf 500 Units     Admin Date  01/20/2021 Action  Given Dose  500 Units Route  IntraVENous Administered By  Max Cary RN          zoledronic acid (ZOMETA) 4 mg/100mL infusion     Admin Date  01/20/2021 Action  Given Dose  4 mg Rate  400 mL/hr Route  IntraVENous Administered By  Max Cary RN                1030 Tolerated treatment well, no adverse reaction noted. Port flushed and de-accessed. D/Cd from [facility] ambulatory and in no distress accompanied by self. Next appt 2/17.

## 2021-01-20 NOTE — PROGRESS NOTES
Problem: Patient Education:  Go to Education Activity  Goal: Patient/Family Education  Outcome: Progressing Towards Goal     Problem: Infection - Risk of, Central Venous Catheter-Associated Bloodstream Infection  Goal: *Absence of infection signs and symptoms  Outcome: Progressing Towards Goal     Problem: Patient Education:  Go to Education Activity  Goal: Patient/Family Education  Outcome: Progressing Towards Goal

## 2021-01-21 LAB
ALBUMIN SERPL ELPH-MCNC: 3.6 G/DL (ref 2.9–4.4)
ALBUMIN/GLOB SERPL: 1 {RATIO} (ref 0.7–1.7)
ALPHA1 GLOB SERPL ELPH-MCNC: 0.2 G/DL (ref 0–0.4)
ALPHA2 GLOB SERPL ELPH-MCNC: 0.9 G/DL (ref 0.4–1)
B-GLOBULIN SERPL ELPH-MCNC: 1 G/DL (ref 0.7–1.3)
GAMMA GLOB SERPL ELPH-MCNC: 1.4 G/DL (ref 0.4–1.8)
GLOBULIN SER CALC-MCNC: 3.5 G/DL (ref 2.2–3.9)
KAPPA LC FREE SER-MCNC: 56 MG/L (ref 3.3–19.4)
KAPPA LC FREE/LAMBDA FREE SER: 1.14 {RATIO} (ref 0.26–1.65)
LAMBDA LC FREE SERPL-MCNC: 49.3 MG/L (ref 5.7–26.3)
M PROTEIN SERPL ELPH-MCNC: NORMAL G/DL
PROT SERPL-MCNC: 7.1 G/DL (ref 6–8.5)

## 2021-01-25 ENCOUNTER — IMMUNIZATION (OUTPATIENT)
Dept: INTERNAL MEDICINE CLINIC | Age: 66
End: 2021-01-25
Payer: MEDICARE

## 2021-01-25 DIAGNOSIS — Z23 ENCOUNTER FOR IMMUNIZATION: Primary | ICD-10-CM

## 2021-01-25 LAB
IGA SERPL-MCNC: 438 MG/DL (ref 61–437)
IGG SERPL-MCNC: 1249 MG/DL (ref 603–1613)
IGM SERPL-MCNC: 23 MG/DL (ref 20–172)
PROT PATTERN SERPL IFE-IMP: ABNORMAL

## 2021-01-25 PROCEDURE — 0011A PR IMM ADMN SARSCOV2 100 MCG/0.5 ML 1ST DOSE: CPT | Performed by: FAMILY MEDICINE

## 2021-01-25 PROCEDURE — 91301 COVID-19, MRNA, LNP-S, PF, 100MCG/0.5ML DOSE(MODERNA): CPT | Performed by: FAMILY MEDICINE

## 2021-01-27 DIAGNOSIS — C90.00 MULTIPLE MYELOMA, REMISSION STATUS UNSPECIFIED (HCC): ICD-10-CM

## 2021-01-28 RX ORDER — POMALIDOMIDE 2 MG/1
2 CAPSULE ORAL DAILY
Qty: 28 CAP | Refills: 0 | Status: SHIPPED | OUTPATIENT
Start: 2021-01-28 | End: 2021-02-22

## 2021-02-02 RX ORDER — POMALIDOMIDE 2 MG/1
CAPSULE ORAL
Qty: 28 CAP | Refills: 0 | OUTPATIENT
Start: 2021-02-02

## 2021-02-02 RX ORDER — ERGOCALCIFEROL 1.25 MG/1
CAPSULE ORAL
Qty: 12 CAP | Refills: 0 | OUTPATIENT
Start: 2021-02-02

## 2021-02-02 RX ORDER — TENOFOVIR DISOPROXIL FUMARATE 300 MG/1
TABLET, FILM COATED ORAL
Qty: 90 TAB | Refills: 0 | OUTPATIENT
Start: 2021-02-02

## 2021-02-02 RX ORDER — ERGOCALCIFEROL 1.25 MG/1
CAPSULE ORAL
Qty: 8 CAP | OUTPATIENT
Start: 2021-02-02

## 2021-02-16 RX ORDER — SODIUM CHLORIDE 0.9 % (FLUSH) 0.9 %
5-10 SYRINGE (ML) INJECTION AS NEEDED
Status: CANCELLED | OUTPATIENT
Start: 2021-02-17 | End: 2021-02-17

## 2021-02-16 RX ORDER — HEPARIN 100 UNIT/ML
500 SYRINGE INTRAVENOUS AS NEEDED
Status: CANCELLED | OUTPATIENT
Start: 2021-02-17

## 2021-02-16 RX ORDER — SODIUM CHLORIDE 9 MG/ML
10 INJECTION INTRAMUSCULAR; INTRAVENOUS; SUBCUTANEOUS AS NEEDED
Status: CANCELLED | OUTPATIENT
Start: 2021-02-17

## 2021-02-17 ENCOUNTER — HOSPITAL ENCOUNTER (OUTPATIENT)
Dept: INFUSION THERAPY | Age: 66
Discharge: HOME | End: 2021-02-17
Payer: COMMERCIAL

## 2021-02-17 VITALS
HEART RATE: 86 BPM | TEMPERATURE: 97.7 F | DIASTOLIC BLOOD PRESSURE: 80 MMHG | SYSTOLIC BLOOD PRESSURE: 124 MMHG | RESPIRATION RATE: 16 BRPM

## 2021-02-17 DIAGNOSIS — C90.00 MULTIPLE MYELOMA, REMISSION STATUS UNSPECIFIED (HCC): Primary | ICD-10-CM

## 2021-02-17 LAB
ALBUMIN SERPL-MCNC: 3.4 G/DL (ref 3.5–5)
ALBUMIN/GLOB SERPL: 0.8 {RATIO} (ref 1.1–2.2)
ALP SERPL-CCNC: 89 U/L (ref 45–117)
ALT SERPL-CCNC: 28 U/L (ref 12–78)
ANION GAP SERPL CALC-SCNC: 6 MMOL/L (ref 5–15)
AST SERPL-CCNC: 18 U/L (ref 15–37)
BASOPHILS # BLD: 0 K/UL (ref 0–0.1)
BASOPHILS NFR BLD: 1 % (ref 0–1)
BILIRUB SERPL-MCNC: 0.2 MG/DL (ref 0.2–1)
BUN SERPL-MCNC: 17 MG/DL (ref 6–20)
BUN/CREAT SERPL: 11 (ref 12–20)
CALCIUM SERPL-MCNC: 8.6 MG/DL (ref 8.5–10.1)
CHLORIDE SERPL-SCNC: 104 MMOL/L (ref 97–108)
CO2 SERPL-SCNC: 28 MMOL/L (ref 21–32)
CREAT SERPL-MCNC: 1.6 MG/DL (ref 0.7–1.3)
DIFFERENTIAL METHOD BLD: ABNORMAL
EOSINOPHIL # BLD: 0.1 K/UL (ref 0–0.4)
EOSINOPHIL NFR BLD: 3 % (ref 0–7)
ERYTHROCYTE [DISTWIDTH] IN BLOOD BY AUTOMATED COUNT: 15.9 % (ref 11.5–14.5)
GLOBULIN SER CALC-MCNC: 4.3 G/DL (ref 2–4)
GLUCOSE SERPL-MCNC: 198 MG/DL (ref 65–100)
HCT VFR BLD AUTO: 36.6 % (ref 36.6–50.3)
HGB BLD-MCNC: 11.4 G/DL (ref 12.1–17)
IGA SERPL-MCNC: 439 MG/DL (ref 70–400)
IGG SERPL-MCNC: 1280 MG/DL (ref 700–1600)
IGM SERPL-MCNC: 21 MG/DL (ref 40–230)
IMM GRANULOCYTES # BLD AUTO: 0 K/UL (ref 0–0.04)
IMM GRANULOCYTES NFR BLD AUTO: 0 % (ref 0–0.5)
LYMPHOCYTES # BLD: 1.2 K/UL (ref 0.8–3.5)
LYMPHOCYTES NFR BLD: 32 % (ref 12–49)
MCH RBC QN AUTO: 28.3 PG (ref 26–34)
MCHC RBC AUTO-ENTMCNC: 31.1 G/DL (ref 30–36.5)
MCV RBC AUTO: 90.8 FL (ref 80–99)
MONOCYTES # BLD: 0.7 K/UL (ref 0–1)
MONOCYTES NFR BLD: 20 % (ref 5–13)
NEUTS SEG # BLD: 1.6 K/UL (ref 1.8–8)
NEUTS SEG NFR BLD: 44 % (ref 32–75)
NRBC # BLD: 0 K/UL (ref 0–0.01)
NRBC BLD-RTO: 0 PER 100 WBC
PLATELET # BLD AUTO: 174 K/UL (ref 150–400)
PMV BLD AUTO: 10.6 FL (ref 8.9–12.9)
POTASSIUM SERPL-SCNC: 3.9 MMOL/L (ref 3.5–5.1)
PROT SERPL-MCNC: 7.7 G/DL (ref 6.4–8.2)
RBC # BLD AUTO: 4.03 M/UL (ref 4.1–5.7)
SODIUM SERPL-SCNC: 138 MMOL/L (ref 136–145)
WBC # BLD AUTO: 3.7 K/UL (ref 4.1–11.1)

## 2021-02-17 PROCEDURE — 84165 PROTEIN E-PHORESIS SERUM: CPT

## 2021-02-17 PROCEDURE — 36591 DRAW BLOOD OFF VENOUS DEVICE: CPT

## 2021-02-17 PROCEDURE — 82784 ASSAY IGA/IGD/IGG/IGM EACH: CPT

## 2021-02-17 PROCEDURE — 80053 COMPREHEN METABOLIC PANEL: CPT

## 2021-02-17 PROCEDURE — 85025 COMPLETE CBC W/AUTO DIFF WBC: CPT

## 2021-02-17 PROCEDURE — 74011250636 HC RX REV CODE- 250/636: Performed by: INTERNAL MEDICINE

## 2021-02-17 PROCEDURE — 83883 ASSAY NEPHELOMETRY NOT SPEC: CPT

## 2021-02-17 PROCEDURE — 77030012965 HC NDL HUBR BBMI -A

## 2021-02-17 RX ORDER — HEPARIN 100 UNIT/ML
500 SYRINGE INTRAVENOUS AS NEEDED
Status: ACTIVE | OUTPATIENT
Start: 2021-02-17 | End: 2021-02-17

## 2021-02-17 RX ORDER — SODIUM CHLORIDE 9 MG/ML
10 INJECTION INTRAMUSCULAR; INTRAVENOUS; SUBCUTANEOUS AS NEEDED
Status: ACTIVE | OUTPATIENT
Start: 2021-02-17 | End: 2021-02-17

## 2021-02-17 RX ORDER — SODIUM CHLORIDE 0.9 % (FLUSH) 0.9 %
5-10 SYRINGE (ML) INJECTION AS NEEDED
Status: DISPENSED | OUTPATIENT
Start: 2021-02-17 | End: 2021-02-17

## 2021-02-17 RX ADMIN — Medication 10 ML: at 09:48

## 2021-02-17 RX ADMIN — Medication 500 UNITS: at 09:49

## 2021-02-17 RX ADMIN — Medication 10 ML: at 09:49

## 2021-02-17 NOTE — PROGRESS NOTES
8000 [facility] Visit Note    5983 Pt arrived at [facility] ambulatory and in no distress for port flush. No new complaints voiced. Port accessed per protocol with positive blood return. Labs drawn. Port flushed and de-accessed. Patient Vitals for the past 12 hrs:   Temp Pulse Resp BP   02/17/21 0935 97.7 °F (36.5 °C) 86 16 124/80     Recent Results (from the past 12 hour(s))   CBC WITH AUTOMATED DIFF    Collection Time: 02/17/21  9:39 AM   Result Value Ref Range    WBC 3.7 (L) 4.1 - 11.1 K/uL    RBC 4.03 (L) 4.10 - 5.70 M/uL    HGB 11.4 (L) 12.1 - 17.0 g/dL    HCT 36.6 36.6 - 50.3 %    MCV 90.8 80.0 - 99.0 FL    MCH 28.3 26.0 - 34.0 PG    MCHC 31.1 30.0 - 36.5 g/dL    RDW 15.9 (H) 11.5 - 14.5 %    PLATELET 027 429 - 383 K/uL    MPV 10.6 8.9 - 12.9 FL    NRBC 0.0 0  WBC    ABSOLUTE NRBC 0.00 0.00 - 0.01 K/uL    NEUTROPHILS 44 32 - 75 %    LYMPHOCYTES 32 12 - 49 %    MONOCYTES 20 (H) 5 - 13 %    EOSINOPHILS 3 0 - 7 %    BASOPHILS 1 0 - 1 %    IMMATURE GRANULOCYTES 0 0.0 - 0.5 %    ABS. NEUTROPHILS 1.6 (L) 1.8 - 8.0 K/UL    ABS. LYMPHOCYTES 1.2 0.8 - 3.5 K/UL    ABS. MONOCYTES 0.7 0.0 - 1.0 K/UL    ABS. EOSINOPHILS 0.1 0.0 - 0.4 K/UL    ABS. BASOPHILS 0.0 0.0 - 0.1 K/UL    ABS. IMM. GRANS. 0.0 0.00 - 0.04 K/UL    DF AUTOMATED     METABOLIC PANEL, COMPREHENSIVE    Collection Time: 02/17/21  9:39 AM   Result Value Ref Range    Sodium 138 136 - 145 mmol/L    Potassium 3.9 3.5 - 5.1 mmol/L    Chloride 104 97 - 108 mmol/L    CO2 28 21 - 32 mmol/L    Anion gap 6 5 - 15 mmol/L    Glucose 198 (H) 65 - 100 mg/dL    BUN 17 6 - 20 MG/DL    Creatinine 1.60 (H) 0.70 - 1.30 MG/DL    BUN/Creatinine ratio 11 (L) 12 - 20      GFR est AA 53 (L) >60 ml/min/1.73m2    GFR est non-AA 44 (L) >60 ml/min/1.73m2    Calcium 8.6 8.5 - 10.1 MG/DL    Bilirubin, total 0.2 0.2 - 1.0 MG/DL    ALT (SGPT) 28 12 - 78 U/L    AST (SGOT) 18 15 - 37 U/L    Alk.  phosphatase 89 45 - 117 U/L    Protein, total 7.7 6.4 - 8.2 g/dL Albumin 3.4 (L) 3.5 - 5.0 g/dL    Globulin 4.3 (H) 2.0 - 4.0 g/dL    A-G Ratio 0.8 (L) 1.1 - 2.2     IMMUNOGLOBULINS, G/A/M, QT. Collection Time: 02/17/21  9:39 AM   Result Value Ref Range    Immunoglobulin G 1,280 700 - 1,600 mg/dL    Immunoglobulin A 439 (H) 70 - 400 mg/dL    Immunoglobulin M 21 (L) 40 - 230 mg/dL     Medications received:  none    0950  Tolerated treatment well, no adverse reaction noted. D/Cd from [facility] ambulatory and in no distress accompanied by self.   Next appt 3/17

## 2021-02-18 LAB
KAPPA LC FREE SER-MCNC: 42.5 MG/L (ref 3.3–19.4)
KAPPA LC FREE/LAMBDA FREE SER: 0.9 {RATIO} (ref 0.26–1.65)
LAMBDA LC FREE SERPL-MCNC: 47.2 MG/L (ref 5.7–26.3)

## 2021-02-18 RX ORDER — SODIUM CHLORIDE 9 MG/ML
10 INJECTION INTRAMUSCULAR; INTRAVENOUS; SUBCUTANEOUS AS NEEDED
Status: CANCELLED | OUTPATIENT
Start: 2021-03-17

## 2021-02-18 RX ORDER — SODIUM CHLORIDE 0.9 % (FLUSH) 0.9 %
5-10 SYRINGE (ML) INJECTION AS NEEDED
Status: CANCELLED | OUTPATIENT
Start: 2021-03-17 | End: 2021-03-17

## 2021-02-18 RX ORDER — HEPARIN 100 UNIT/ML
500 SYRINGE INTRAVENOUS AS NEEDED
Status: CANCELLED | OUTPATIENT
Start: 2021-03-17

## 2021-02-19 LAB
ALBUMIN SERPL ELPH-MCNC: 3.5 G/DL (ref 2.9–4.4)
ALBUMIN/GLOB SERPL: 1 {RATIO} (ref 0.7–1.7)
ALPHA1 GLOB SERPL ELPH-MCNC: 0.2 G/DL (ref 0–0.4)
ALPHA2 GLOB SERPL ELPH-MCNC: 1 G/DL (ref 0.4–1)
B-GLOBULIN SERPL ELPH-MCNC: 1.1 G/DL (ref 0.7–1.3)
GAMMA GLOB SERPL ELPH-MCNC: 1.4 G/DL (ref 0.4–1.8)
GLOBULIN SER CALC-MCNC: 3.6 G/DL (ref 2.2–3.9)
IGA SERPL-MCNC: 479 MG/DL (ref 61–437)
IGG SERPL-MCNC: 1288 MG/DL (ref 603–1613)
IGM SERPL-MCNC: 28 MG/DL (ref 20–172)
M PROTEIN SERPL ELPH-MCNC: NORMAL G/DL
PROT PATTERN SERPL IFE-IMP: ABNORMAL
PROT SERPL-MCNC: 7.1 G/DL (ref 6–8.5)

## 2021-02-22 ENCOUNTER — IMMUNIZATION (OUTPATIENT)
Dept: INTERNAL MEDICINE CLINIC | Age: 66
End: 2021-02-22
Payer: MEDICARE

## 2021-02-22 DIAGNOSIS — Z23 ENCOUNTER FOR IMMUNIZATION: Primary | ICD-10-CM

## 2021-02-22 PROCEDURE — 0012A COVID-19, MRNA, LNP-S, PF, 100MCG/0.5ML DOSE(MODERNA): CPT | Performed by: FAMILY MEDICINE

## 2021-02-22 PROCEDURE — 91301 COVID-19, MRNA, LNP-S, PF, 100MCG/0.5ML DOSE(MODERNA): CPT | Performed by: FAMILY MEDICINE

## 2021-03-17 ENCOUNTER — HOSPITAL ENCOUNTER (OUTPATIENT)
Dept: INFUSION THERAPY | Age: 66
Discharge: HOME | End: 2021-03-17
Payer: COMMERCIAL

## 2021-03-17 VITALS
TEMPERATURE: 96.6 F | DIASTOLIC BLOOD PRESSURE: 82 MMHG | BODY MASS INDEX: 30.73 KG/M2 | SYSTOLIC BLOOD PRESSURE: 124 MMHG | RESPIRATION RATE: 16 BRPM | OXYGEN SATURATION: 99 % | WEIGHT: 202.1 LBS | HEART RATE: 88 BPM

## 2021-03-17 DIAGNOSIS — C90.00 MULTIPLE MYELOMA, REMISSION STATUS UNSPECIFIED (HCC): Primary | ICD-10-CM

## 2021-03-17 LAB
ALBUMIN SERPL-MCNC: 3.6 G/DL (ref 3.5–5)
ALBUMIN/GLOB SERPL: 0.8 {RATIO} (ref 1.1–2.2)
ALP SERPL-CCNC: 66 U/L (ref 45–117)
ALT SERPL-CCNC: 27 U/L (ref 12–78)
ANION GAP SERPL CALC-SCNC: 3 MMOL/L (ref 5–15)
AST SERPL-CCNC: 18 U/L (ref 15–37)
BASOPHILS # BLD: 0.1 K/UL (ref 0–0.1)
BASOPHILS NFR BLD: 3 % (ref 0–1)
BILIRUB SERPL-MCNC: 0.3 MG/DL (ref 0.2–1)
BUN SERPL-MCNC: 18 MG/DL (ref 6–20)
BUN/CREAT SERPL: 10 (ref 12–20)
CALCIUM SERPL-MCNC: 8.6 MG/DL (ref 8.5–10.1)
CHLORIDE SERPL-SCNC: 105 MMOL/L (ref 97–108)
CO2 SERPL-SCNC: 29 MMOL/L (ref 21–32)
CREAT SERPL-MCNC: 1.77 MG/DL (ref 0.7–1.3)
DIFFERENTIAL METHOD BLD: ABNORMAL
EOSINOPHIL # BLD: 0.2 K/UL (ref 0–0.4)
EOSINOPHIL NFR BLD: 5 % (ref 0–7)
ERYTHROCYTE [DISTWIDTH] IN BLOOD BY AUTOMATED COUNT: 15.7 % (ref 11.5–14.5)
GLOBULIN SER CALC-MCNC: 4.3 G/DL (ref 2–4)
GLUCOSE SERPL-MCNC: 120 MG/DL (ref 65–100)
HCT VFR BLD AUTO: 37.9 % (ref 36.6–50.3)
HGB BLD-MCNC: 11.9 G/DL (ref 12.1–17)
IGA SERPL-MCNC: 436 MG/DL (ref 70–400)
IGG SERPL-MCNC: 1320 MG/DL (ref 700–1600)
IGM SERPL-MCNC: 22 MG/DL (ref 40–230)
IMM GRANULOCYTES # BLD AUTO: 0 K/UL (ref 0–0.04)
IMM GRANULOCYTES NFR BLD AUTO: 0 % (ref 0–0.5)
LYMPHOCYTES # BLD: 0.9 K/UL (ref 0.8–3.5)
LYMPHOCYTES NFR BLD: 27 % (ref 12–49)
MCH RBC QN AUTO: 28.6 PG (ref 26–34)
MCHC RBC AUTO-ENTMCNC: 31.4 G/DL (ref 30–36.5)
MCV RBC AUTO: 91.1 FL (ref 80–99)
MONOCYTES # BLD: 0.6 K/UL (ref 0–1)
MONOCYTES NFR BLD: 18 % (ref 5–13)
NEUTS BAND NFR BLD MANUAL: 1 %
NEUTS SEG # BLD: 1.6 K/UL (ref 1.8–8)
NEUTS SEG NFR BLD: 46 % (ref 32–75)
NRBC # BLD: 0 K/UL (ref 0–0.01)
NRBC BLD-RTO: 0 PER 100 WBC
PLATELET # BLD AUTO: 177 K/UL (ref 150–400)
PMV BLD AUTO: 10.3 FL (ref 8.9–12.9)
POTASSIUM SERPL-SCNC: 4 MMOL/L (ref 3.5–5.1)
PROT SERPL-MCNC: 7.9 G/DL (ref 6.4–8.2)
RBC # BLD AUTO: 4.16 M/UL (ref 4.1–5.7)
RBC MORPH BLD: ABNORMAL
SODIUM SERPL-SCNC: 137 MMOL/L (ref 136–145)
WBC # BLD AUTO: 3.4 K/UL (ref 4.1–11.1)

## 2021-03-17 PROCEDURE — 77030016057 HC NDL HUBR APOL -B

## 2021-03-17 PROCEDURE — 85025 COMPLETE CBC W/AUTO DIFF WBC: CPT

## 2021-03-17 PROCEDURE — 74011250636 HC RX REV CODE- 250/636: Performed by: INTERNAL MEDICINE

## 2021-03-17 PROCEDURE — 83883 ASSAY NEPHELOMETRY NOT SPEC: CPT

## 2021-03-17 PROCEDURE — 82784 ASSAY IGA/IGD/IGG/IGM EACH: CPT

## 2021-03-17 PROCEDURE — 84165 PROTEIN E-PHORESIS SERUM: CPT

## 2021-03-17 PROCEDURE — 80053 COMPREHEN METABOLIC PANEL: CPT

## 2021-03-17 PROCEDURE — 36591 DRAW BLOOD OFF VENOUS DEVICE: CPT

## 2021-03-17 RX ORDER — SODIUM CHLORIDE 0.9 % (FLUSH) 0.9 %
5-10 SYRINGE (ML) INJECTION AS NEEDED
Status: DISPENSED | OUTPATIENT
Start: 2021-03-17 | End: 2021-03-17

## 2021-03-17 RX ORDER — SODIUM CHLORIDE 9 MG/ML
10 INJECTION INTRAMUSCULAR; INTRAVENOUS; SUBCUTANEOUS AS NEEDED
Status: ACTIVE | OUTPATIENT
Start: 2021-03-17 | End: 2021-03-17

## 2021-03-17 RX ORDER — HEPARIN 100 UNIT/ML
500 SYRINGE INTRAVENOUS AS NEEDED
Status: ACTIVE | OUTPATIENT
Start: 2021-03-17 | End: 2021-03-17

## 2021-03-17 RX ADMIN — Medication 10 ML: at 09:37

## 2021-03-17 RX ADMIN — HEPARIN 500 UNITS: 100 SYRINGE at 09:37

## 2021-03-17 RX ADMIN — SODIUM CHLORIDE 10 ML: 9 INJECTION INTRAMUSCULAR; INTRAVENOUS; SUBCUTANEOUS at 09:35

## 2021-03-17 NOTE — PROGRESS NOTES
[facility] Progress Note    Date: 2021    Name: Efrain Guzmán. MRN: [id number]         : 1955    Mr. Jhony Ozuna Arrived ambulatory and in no distress for Port flush and labs Regimen. Right chest wall port accessed without difficulty, labs drawn & sent for processing. Results pending in CC. Covid Questionnaire completed. 1. Do you have any symptoms of covid 19? SOB, coughing, fever, or generally not feeling well? - no  2. Have you been exposed to covid 19 recently? - no  3. Have you had any recent contact with family/friend that has a pending covid test? no      Mr. Hong's vitals were reviewed. Visit Vitals  /82 (BP 1 Location: Left upper arm, BP Patient Position: Sitting)   Pulse 88   Temp (!) 96.6 °F (35.9 °C)   Resp 16   Wt 91.7 kg (202 lb 1.6 oz)   SpO2 99%   BMI 30.73 kg/m²       Medications:  Medications Administered     0.9% sodium chloride injection 10 mL     Admin Date  2021 Action  Given Dose  10 mL Route  IntraVENous Administered By  Padmini Nguyen RN          heparin (porcine) pf 500 Units     Admin Date  2021 Action  Given Dose  500 Units Route  IntraVENous Administered By  Padmini Nguyen RN          sodium chloride (NS) flush 5-10 mL     Admin Date  2021 Action  Given Dose  10 mL Route  IntraVENous Administered By  Padmini Nguyen RN                Mr. Jhony Ozuna tolerated treatment well and was discharged from [place] [address] in stable condition at [address] [place] [address] DrBear Graff de-accessed, flushed & heparinized per protocol.  He is to return on     Future Appointments   Date Time Provider Dajuan Johnston   3/29/2021  2:50 PM Kia Osborne MD RDE [facility] BS AMB   [facility]   2021  9:15 AM Rudy Tellez MD [facility]IRLANDA BS AMB   2021  9:30 AM MARY PHLEBOTOMIST [address] [place] [address]

## 2021-03-18 LAB
KAPPA LC FREE SER-MCNC: 54.6 MG/L (ref 3.3–19.4)
KAPPA LC FREE/LAMBDA FREE SER: 1.1 {RATIO} (ref 0.26–1.65)
LAMBDA LC FREE SERPL-MCNC: 49.8 MG/L (ref 5.7–26.3)

## 2021-03-19 LAB
ALBUMIN SERPL ELPH-MCNC: 3.8 G/DL (ref 2.9–4.4)
ALBUMIN/GLOB SERPL: 1.1 {RATIO} (ref 0.7–1.7)
ALPHA1 GLOB SERPL ELPH-MCNC: 0.2 G/DL (ref 0–0.4)
ALPHA2 GLOB SERPL ELPH-MCNC: 0.9 G/DL (ref 0.4–1)
B-GLOBULIN SERPL ELPH-MCNC: 1.1 G/DL (ref 0.7–1.3)
GAMMA GLOB SERPL ELPH-MCNC: 1.3 G/DL (ref 0.4–1.8)
GLOBULIN SER CALC-MCNC: 3.4 G/DL (ref 2.2–3.9)
M PROTEIN SERPL ELPH-MCNC: NORMAL G/DL
PROT SERPL-MCNC: 7.2 G/DL (ref 6–8.5)

## 2021-03-22 LAB
IGA SERPL-MCNC: 469 MG/DL (ref 61–437)
IGG SERPL-MCNC: 1296 MG/DL (ref 603–1613)
IGM SERPL-MCNC: 29 MG/DL (ref 20–172)
PROT PATTERN SERPL IFE-IMP: ABNORMAL

## 2021-03-25 DIAGNOSIS — C90.00 MULTIPLE MYELOMA, REMISSION STATUS UNSPECIFIED (HCC): ICD-10-CM

## 2021-03-25 RX ORDER — POMALIDOMIDE 2 MG/1
CAPSULE ORAL
Qty: 28 CAP | Refills: 0 | Status: SHIPPED | OUTPATIENT
Start: 2021-03-25 | End: 2021-04-21

## 2021-03-26 NOTE — TELEPHONE ENCOUNTER
Called and left message on [facility] nurse voicemail. Stated that prescription for Pomalyst was received, but there is no [employer] auth.   Please call back at [phone] ext [phone] to provide auth number

## 2021-03-26 NOTE — TELEPHONE ENCOUNTER
Called [facility] and verified the patient's ID x 2 with [facility]. Informed [facility] that the [employer] authorization number is [id number] with a start date of today 3/26/21. [facility] verified the authorization number and stated she has everything needed for the prescription.

## 2021-03-29 ENCOUNTER — VIRTUAL VISIT (OUTPATIENT)
Dept: ENDOCRINOLOGY | Age: 66
End: 2021-03-29

## 2021-03-29 DIAGNOSIS — E11.9 TYPE 2 DIABETES MELLITUS WITHOUT COMPLICATION, WITH LONG-TERM CURRENT USE OF INSULIN (HCC): Primary | ICD-10-CM

## 2021-03-29 DIAGNOSIS — Z79.4 TYPE 2 DIABETES MELLITUS WITHOUT COMPLICATION, WITH LONG-TERM CURRENT USE OF INSULIN (HCC): Primary | ICD-10-CM

## 2021-04-05 RX ORDER — ALBUTEROL SULFATE 0.83 MG/ML
2.5 SOLUTION RESPIRATORY (INHALATION) AS NEEDED
Status: CANCELLED
Start: 2021-04-14

## 2021-04-05 RX ORDER — ACETAMINOPHEN 325 MG/1
650 TABLET ORAL AS NEEDED
Status: CANCELLED
Start: 2021-04-14

## 2021-04-05 RX ORDER — DIPHENHYDRAMINE HYDROCHLORIDE 50 MG/ML
50 INJECTION, SOLUTION INTRAMUSCULAR; INTRAVENOUS AS NEEDED
Status: CANCELLED
Start: 2021-04-14

## 2021-04-05 RX ORDER — ONDANSETRON 2 MG/ML
8 INJECTION INTRAMUSCULAR; INTRAVENOUS AS NEEDED
Status: CANCELLED | OUTPATIENT
Start: 2021-04-14

## 2021-04-05 RX ORDER — HYDROCORTISONE SODIUM SUCCINATE 100 MG/2ML
100 INJECTION, POWDER, FOR SOLUTION INTRAMUSCULAR; INTRAVENOUS AS NEEDED
Status: CANCELLED | OUTPATIENT
Start: 2021-04-14

## 2021-04-05 RX ORDER — SODIUM CHLORIDE 9 MG/ML
25 INJECTION, SOLUTION INTRAVENOUS CONTINUOUS
Status: CANCELLED | OUTPATIENT
Start: 2021-04-14 | End: 2021-04-14

## 2021-04-05 RX ORDER — EPINEPHRINE 1 MG/ML
0.3 INJECTION, SOLUTION, CONCENTRATE INTRAVENOUS AS NEEDED
Status: CANCELLED | OUTPATIENT
Start: 2021-04-14

## 2021-04-14 ENCOUNTER — HOSPITAL ENCOUNTER (OUTPATIENT)
Dept: INFUSION THERAPY | Age: 66
Discharge: HOME | End: 2021-04-14
Payer: COMMERCIAL

## 2021-04-14 VITALS
RESPIRATION RATE: 16 BRPM | HEART RATE: 73 BPM | SYSTOLIC BLOOD PRESSURE: 139 MMHG | DIASTOLIC BLOOD PRESSURE: 85 MMHG | WEIGHT: 209 LBS | TEMPERATURE: 97.5 F | BODY MASS INDEX: 31.78 KG/M2 | OXYGEN SATURATION: 99 %

## 2021-04-14 DIAGNOSIS — C90.00 MULTIPLE MYELOMA, REMISSION STATUS UNSPECIFIED (HCC): ICD-10-CM

## 2021-04-14 DIAGNOSIS — C90.01 MULTIPLE MYELOMA IN REMISSION (HCC): Primary | ICD-10-CM

## 2021-04-14 LAB
ALBUMIN SERPL-MCNC: 3.5 G/DL (ref 3.5–5)
ALBUMIN/GLOB SERPL: 0.9 {RATIO} (ref 1.1–2.2)
ALP SERPL-CCNC: 83 U/L (ref 45–117)
ALT SERPL-CCNC: 25 U/L (ref 12–78)
ANION GAP BLD CALC-SCNC: 16 MMOL/L (ref 10–20)
AST SERPL-CCNC: 20 U/L (ref 15–37)
BASOPHILS # BLD: 0 K/UL (ref 0–0.1)
BASOPHILS NFR BLD: 0 % (ref 0–1)
BILIRUB DIRECT SERPL-MCNC: 0.1 MG/DL (ref 0–0.2)
BILIRUB SERPL-MCNC: 0.3 MG/DL (ref 0.2–1)
BUN BLD-MCNC: 17 MG/DL (ref 9–20)
CA-I BLD-MCNC: 1.26 MMOL/L (ref 1.12–1.32)
CHLORIDE BLD-SCNC: 101 MMOL/L (ref 98–107)
CO2 BLD-SCNC: 27 MMOL/L (ref 21–32)
CREAT BLD-MCNC: 1.6 MG/DL (ref 0.6–1.3)
DIFFERENTIAL METHOD BLD: ABNORMAL
EOSINOPHIL # BLD: 0 K/UL (ref 0–0.4)
EOSINOPHIL NFR BLD: 0 % (ref 0–7)
ERYTHROCYTE [DISTWIDTH] IN BLOOD BY AUTOMATED COUNT: 15.9 % (ref 11.5–14.5)
GLOBULIN SER CALC-MCNC: 3.8 G/DL (ref 2–4)
GLUCOSE BLD-MCNC: 159 MG/DL (ref 65–100)
HCT VFR BLD AUTO: 34.2 % (ref 36.6–50.3)
HCT VFR BLD CALC: 37 % (ref 36.6–50.3)
HGB BLD-MCNC: 10.8 G/DL (ref 12.1–17)
IGA SERPL-MCNC: 410 MG/DL (ref 70–400)
IGG SERPL-MCNC: 1170 MG/DL (ref 700–1600)
IGM SERPL-MCNC: <21 MG/DL (ref 40–230)
IMM GRANULOCYTES # BLD AUTO: 0 K/UL (ref 0–0.04)
IMM GRANULOCYTES NFR BLD AUTO: 0 % (ref 0–0.5)
LYMPHOCYTES # BLD: 0.9 K/UL (ref 0.8–3.5)
LYMPHOCYTES NFR BLD: 27 % (ref 12–49)
MCH RBC QN AUTO: 28.6 PG (ref 26–34)
MCHC RBC AUTO-ENTMCNC: 31.6 G/DL (ref 30–36.5)
MCV RBC AUTO: 90.7 FL (ref 80–99)
MONOCYTES # BLD: 0.4 K/UL (ref 0–1)
MONOCYTES NFR BLD: 11 % (ref 5–13)
NEUTS SEG # BLD: 2 K/UL (ref 1.8–8)
NEUTS SEG NFR BLD: 62 % (ref 32–75)
NRBC # BLD: 0 K/UL (ref 0–0.01)
NRBC BLD-RTO: 0 PER 100 WBC
PLATELET # BLD AUTO: 164 K/UL (ref 150–400)
PMV BLD AUTO: 10.4 FL (ref 8.9–12.9)
POTASSIUM BLD-SCNC: 4 MMOL/L (ref 3.5–5.1)
PROT SERPL-MCNC: 7.3 G/DL (ref 6.4–8.2)
RBC # BLD AUTO: 3.77 M/UL (ref 4.1–5.7)
RBC MORPH BLD: ABNORMAL
SERVICE CMNT-IMP: ABNORMAL
SODIUM BLD-SCNC: 140 MMOL/L (ref 136–145)
WBC # BLD AUTO: 3.3 K/UL (ref 4.1–11.1)

## 2021-04-14 PROCEDURE — 84165 PROTEIN E-PHORESIS SERUM: CPT

## 2021-04-14 PROCEDURE — 74011250636 HC RX REV CODE- 250/636: Performed by: INTERNAL MEDICINE

## 2021-04-14 PROCEDURE — 80047 BASIC METABLC PNL IONIZED CA: CPT

## 2021-04-14 PROCEDURE — 36415 COLL VENOUS BLD VENIPUNCTURE: CPT

## 2021-04-14 PROCEDURE — 96374 THER/PROPH/DIAG INJ IV PUSH: CPT

## 2021-04-14 PROCEDURE — 82784 ASSAY IGA/IGD/IGG/IGM EACH: CPT

## 2021-04-14 PROCEDURE — 74011000250 HC RX REV CODE- 250: Performed by: INTERNAL MEDICINE

## 2021-04-14 PROCEDURE — 85025 COMPLETE CBC W/AUTO DIFF WBC: CPT

## 2021-04-14 PROCEDURE — 83883 ASSAY NEPHELOMETRY NOT SPEC: CPT

## 2021-04-14 PROCEDURE — 80076 HEPATIC FUNCTION PANEL: CPT

## 2021-04-14 PROCEDURE — 77030012965 HC NDL HUBR BBMI -A

## 2021-04-14 RX ORDER — SODIUM CHLORIDE 9 MG/ML
10 INJECTION INTRAMUSCULAR; INTRAVENOUS; SUBCUTANEOUS AS NEEDED
Status: ACTIVE | OUTPATIENT
Start: 2021-04-14 | End: 2021-04-14

## 2021-04-14 RX ORDER — HEPARIN 100 UNIT/ML
300-500 SYRINGE INTRAVENOUS AS NEEDED
Status: ACTIVE | OUTPATIENT
Start: 2021-04-14 | End: 2021-04-14

## 2021-04-14 RX ORDER — HEPARIN 100 UNIT/ML
500 SYRINGE INTRAVENOUS AS NEEDED
Status: CANCELLED | OUTPATIENT
Start: 2021-04-14

## 2021-04-14 RX ORDER — SODIUM CHLORIDE 9 MG/ML
10 INJECTION INTRAMUSCULAR; INTRAVENOUS; SUBCUTANEOUS AS NEEDED
Status: CANCELLED | OUTPATIENT
Start: 2021-04-14

## 2021-04-14 RX ORDER — SODIUM CHLORIDE 0.9 % (FLUSH) 0.9 %
5-10 SYRINGE (ML) INJECTION AS NEEDED
Status: CANCELLED | OUTPATIENT
Start: 2021-04-14 | End: 2021-04-14

## 2021-04-14 RX ORDER — SODIUM CHLORIDE 0.9 % (FLUSH) 0.9 %
10 SYRINGE (ML) INJECTION AS NEEDED
Status: DISPENSED | OUTPATIENT
Start: 2021-04-14 | End: 2021-04-14

## 2021-04-14 RX ADMIN — Medication 10 ML: at 10:15

## 2021-04-14 RX ADMIN — SODIUM CHLORIDE 10 ML: 9 INJECTION, SOLUTION INTRAMUSCULAR; INTRAVENOUS; SUBCUTANEOUS at 09:40

## 2021-04-14 RX ADMIN — ZOLEDRONIC ACID 4 MG: 0.04 INJECTION, SOLUTION INTRAVENOUS at 10:00

## 2021-04-14 RX ADMIN — HEPARIN 500 UNITS: 100 SYRINGE at 10:15

## 2021-04-14 RX ADMIN — Medication 10 ML: at 09:40

## 2021-04-14 NOTE — PROGRESS NOTES
8000 [facility] Visit Note    527- Pt arrived at [facility] ambulatory and in no distress for Zometa/Labs. Assessment completed, no new complaints voiced. Patient denies any recent or upcoming dental work. Patient denied having any symptoms of COVID-19, i.e. SOB, coughing, fever, or generally not feeling well. Also denies having been exposed to COVID-19 recently or having had any recent contact with family/friend that has a pending COVID test.    Labs Obtained - Chem8, CBC w/ diff, Hepatic Function Panel, Free Light Chains, SPEP, Immunoglobulins CAT, Immunofixation    Recent Results (from the past 12 hour(s))   CBC WITH AUTOMATED DIFF    Collection Time: 04/14/21  9:44 AM   Result Value Ref Range    WBC 3.3 (L) 4.1 - 11.1 K/uL    RBC 3.77 (L) 4.10 - 5.70 M/uL    HGB 10.8 (L) 12.1 - 17.0 g/dL    HCT 34.2 (L) 36.6 - 50.3 %    MCV 90.7 80.0 - 99.0 FL    MCH 28.6 26.0 - 34.0 PG    MCHC 31.6 30.0 - 36.5 g/dL    RDW 15.9 (H) 11.5 - 14.5 %    PLATELET 102 158 - 803 K/uL    MPV 10.4 8.9 - 12.9 FL    NRBC 0.0 0  WBC    ABSOLUTE NRBC 0.00 0.00 - 0.01 K/uL    NEUTROPHILS 62 32 - 75 %    LYMPHOCYTES 27 12 - 49 %    MONOCYTES 11 5 - 13 %    EOSINOPHILS 0 0 - 7 %    BASOPHILS 0 0 - 1 %    IMMATURE GRANULOCYTES 0 0.0 - 0.5 %    ABS. NEUTROPHILS 2.0 1.8 - 8.0 K/UL    ABS. LYMPHOCYTES 0.9 0.8 - 3.5 K/UL    ABS. MONOCYTES 0.4 0.0 - 1.0 K/UL    ABS. EOSINOPHILS 0.0 0.0 - 0.4 K/UL    ABS. BASOPHILS 0.0 0.0 - 0.1 K/UL    ABS. IMM. GRANS. 0.0 0.00 - 0.04 K/UL    DF MANUAL      RBC COMMENTS NORMOCYTIC, NORMOCHROMIC     HEPATIC FUNCTION PANEL    Collection Time: 04/14/21  9:44 AM   Result Value Ref Range    Protein, total 7.3 6.4 - 8.2 g/dL    Albumin 3.5 3.5 - 5.0 g/dL    Globulin 3.8 2.0 - 4.0 g/dL    A-G Ratio 0.9 (L) 1.1 - 2.2      Bilirubin, total 0.3 0.2 - 1.0 MG/DL    Bilirubin, direct 0.1 0.0 - 0.2 MG/DL    Alk.  phosphatase 83 45 - 117 U/L    AST (SGOT) 20 15 - 37 U/L    ALT (SGPT) 25 12 - 78 U/L   POC CHEM8    Collection Time: 04/14/21  9:48 AM   Result Value Ref Range    Calcium, ionized (POC) 1.26 1.12 - 1.32 mmol/L    Sodium (POC) 140 136 - 145 mmol/L    Potassium (POC) 4.0 3.5 - 5.1 mmol/L    Chloride (POC) 101 98 - 107 mmol/L    CO2 (POC) 27 21 - 32 mmol/L    Anion gap (POC) 16 10 - 20 mmol/L    Glucose (POC) 159 (H) 65 - 100 mg/dL    BUN (POC) 17 9 - 20 mg/dL    Creatinine (POC) 1.6 (H) 0.6 - 1.3 mg/dL    GFRAA, POC 53 (L) >60 ml/min/1.73m2    GFRNA, POC 44 (L) >60 ml/min/1.73m2    Hematocrit (POC) 37 36.6 - 50.3 %    Comment Notified RN or MD immediately by            Visit Vitals  /85   Pulse 73   Temp 97.5 °F (36.4 °C)   Resp 16   Wt 94.8 kg (209 lb)   SpO2 99%   BMI 31.78 kg/m²       Medications received:  Zometa 4 mg IV over 15 mins    1015- Tolerated treatment well, no adverse reaction noted. D/Cd from [facility] ambulatory and in no distress accompanied by self.   Next appt 5/12/21

## 2021-04-15 LAB
ALBUMIN SERPL ELPH-MCNC: 3.5 G/DL (ref 2.9–4.4)
ALBUMIN/GLOB SERPL: 1.1 {RATIO} (ref 0.7–1.7)
ALPHA1 GLOB SERPL ELPH-MCNC: 0.2 G/DL (ref 0–0.4)
ALPHA2 GLOB SERPL ELPH-MCNC: 0.9 G/DL (ref 0.4–1)
B-GLOBULIN SERPL ELPH-MCNC: 1 G/DL (ref 0.7–1.3)
GAMMA GLOB SERPL ELPH-MCNC: 1.2 G/DL (ref 0.4–1.8)
GLOBULIN SER CALC-MCNC: 3.3 G/DL (ref 2.2–3.9)
KAPPA LC FREE SER-MCNC: 49.8 MG/L (ref 3.3–19.4)
KAPPA LC FREE/LAMBDA FREE SER: 1.06 {RATIO} (ref 0.26–1.65)
LAMBDA LC FREE SERPL-MCNC: 46.8 MG/L (ref 5.7–26.3)
M PROTEIN SERPL ELPH-MCNC: NORMAL G/DL
PROT SERPL-MCNC: 6.8 G/DL (ref 6–8.5)

## 2021-04-16 ENCOUNTER — TELEPHONE (OUTPATIENT)
Dept: ONCOLOGY | Age: 66
End: 2021-04-16

## 2021-04-16 NOTE — TELEPHONE ENCOUNTER
Patient was scheduled to see Dr Bouchra Mccauley this morning, but was a no show. Called patient to see if there was a problem,and check on rescheduling. Patient very apologetic, and overlooked appointment. Patient rescheduled for 5/12 with Dr Bouchra Mccauley, and also gets labs that day. Patient would also like a return call because he got his Zometa, but side effects lasted a whole day longer this time. Aches, chills, fever, were experienced.

## 2021-04-20 DIAGNOSIS — C90.00 MULTIPLE MYELOMA, REMISSION STATUS UNSPECIFIED (HCC): ICD-10-CM

## 2021-04-20 LAB
IGA SERPL-MCNC: 432 MG/DL (ref 61–437)
IGG SERPL-MCNC: 1263 MG/DL (ref 603–1613)
IGM SERPL-MCNC: 22 MG/DL (ref 20–172)
PROT PATTERN SERPL IFE-IMP: ABNORMAL

## 2021-04-21 DIAGNOSIS — C90.00 MULTIPLE MYELOMA, REMISSION STATUS UNSPECIFIED (HCC): ICD-10-CM

## 2021-04-21 RX ORDER — POMALIDOMIDE 2 MG/1
CAPSULE ORAL
Qty: 28 CAP | Refills: 0 | Status: SHIPPED | OUTPATIENT
Start: 2021-04-21 | End: 2021-04-21 | Stop reason: SDUPTHER

## 2021-04-21 RX ORDER — POMALIDOMIDE 2 MG/1
2 CAPSULE ORAL DAILY
Qty: 28 CAP | Refills: 0 | Status: SHIPPED | OUTPATIENT
Start: 2021-04-21 | End: 2021-05-19

## 2021-04-21 NOTE — TELEPHONE ENCOUNTER
PER GERMAN FROM DR Severa Islam. Requested Prescriptions     Pending Prescriptions Disp Refills    pomalidomide (Pomalyst) 2 mg cap 28 Cap 0     Sig: Take 1 Cap by mouth daily.

## 2021-04-26 ENCOUNTER — TELEPHONE (OUTPATIENT)
Dept: ONCOLOGY | Age: 66
End: 2021-04-26

## 2021-04-26 ENCOUNTER — TELEPHONE (OUTPATIENT)
Dept: OTHER | Age: 66
End: 2021-04-26

## 2021-04-26 DIAGNOSIS — C90.00 MULTIPLE MYELOMA, REMISSION STATUS UNSPECIFIED (HCC): ICD-10-CM

## 2021-04-26 NOTE — TELEPHONE ENCOUNTER
Patient called back not to get a prescription as he has one for pain but wants to know if he can take this with the other meds he is already taking.

## 2021-04-26 NOTE — TELEPHONE ENCOUNTER
Returned call to pt. HIPAA verified by two patient identifiers. His pain MD is prescribing the medication for him and he just wanted to advise us of that and to ensure it wasn't contraindicated. Advised we will not object.

## 2021-04-26 NOTE — TELEPHONE ENCOUNTER
We do not prescribe this medication. Called pt and left VM explaining we do not prescribe this as an organization at this time.

## 2021-04-27 RX ORDER — TENOFOVIR DISOPROXIL FUMARATE 300 MG/1
TABLET, FILM COATED ORAL
Qty: 90 TAB | Refills: 0 | Status: SHIPPED | OUTPATIENT
Start: 2021-04-27 | End: 2022-01-05

## 2021-04-27 NOTE — TELEPHONE ENCOUNTER
VORB per provider, refill approved. Requested Prescriptions   Pending Prescriptions Disp Refills    tenofovir DISOPROXIL FUMARATE (VIREAD) 300 mg tablet [Pharmacy Med Name: TENOFOVIR DISOPROXIL FUMARATE 300MG] 90 Tab 0     Sig: TAKE 1 TABLET BY MOUTH 1 TIME A DAY.

## 2021-04-30 ENCOUNTER — VIRTUAL VISIT (OUTPATIENT)
Dept: ENDOCRINOLOGY | Age: 66
End: 2021-04-30
Payer: MEDICARE

## 2021-04-30 ENCOUNTER — TELEPHONE (OUTPATIENT)
Dept: ENDOCRINOLOGY | Age: 66
End: 2021-04-30

## 2021-04-30 DIAGNOSIS — I10 ESSENTIAL HYPERTENSION WITH GOAL BLOOD PRESSURE LESS THAN 130/80: ICD-10-CM

## 2021-04-30 DIAGNOSIS — Z79.4 TYPE 2 DIABETES MELLITUS WITHOUT COMPLICATION, WITH LONG-TERM CURRENT USE OF INSULIN (HCC): Primary | ICD-10-CM

## 2021-04-30 DIAGNOSIS — E11.9 TYPE 2 DIABETES MELLITUS WITHOUT COMPLICATION, WITH LONG-TERM CURRENT USE OF INSULIN (HCC): Primary | ICD-10-CM

## 2021-04-30 PROCEDURE — 99214 OFFICE O/P EST MOD 30 MIN: CPT | Performed by: INTERNAL MEDICINE

## 2021-04-30 PROCEDURE — G8756 NO BP MEASURE DOC: HCPCS | Performed by: INTERNAL MEDICINE

## 2021-04-30 PROCEDURE — 3046F HEMOGLOBIN A1C LEVEL >9.0%: CPT | Performed by: INTERNAL MEDICINE

## 2021-04-30 PROCEDURE — G9717 DOC PT DX DEP/BP F/U NT REQ: HCPCS | Performed by: INTERNAL MEDICINE

## 2021-04-30 PROCEDURE — 2022F DILAT RTA XM EVC RTNOPTHY: CPT | Performed by: INTERNAL MEDICINE

## 2021-04-30 PROCEDURE — 3017F COLORECTAL CA SCREEN DOC REV: CPT | Performed by: INTERNAL MEDICINE

## 2021-04-30 PROCEDURE — G8427 DOCREV CUR MEDS BY ELIG CLIN: HCPCS | Performed by: INTERNAL MEDICINE

## 2021-04-30 RX ORDER — ZOLPIDEM TARTRATE 3.5 MG/1
TABLET SUBLINGUAL
COMMUNITY
Start: 2021-04-10

## 2021-04-30 NOTE — TELEPHONE ENCOUNTER
----- Message from Terence Joshi sent at 4/30/2021  9:19 AM EDT -----  Regarding: Dr. Mika Hodges  Pt is stating that Dr. Marilin Lopez is requesting most recent office notes and labs faxed to [phone] prior to scheduling an appt. Dr. Sheffield [facility] best contact number [phone].

## 2021-04-30 NOTE — TELEPHONE ENCOUNTER
Today's office note was faxed to number provided. No recent lab work done under Dr. Valentin Spears.   [facility]

## 2021-04-30 NOTE — PROGRESS NOTES
**DUE TO PANDEMIC AND HEALTH CONCERNS IN THE COMMUNITY, THIS PATIENT WAS EITHER ILL OR FOUND TO BE HIGH RISK FOR IN-PERSON EVALUATION WITHIN THE CLINIC. THE FOLLOWING IS A VIRTUAL TELEMEDICINE VIDEO ENCOUNTER VIA [employer]Â®, TO WHICH THE PATIENT AGREED. THE PURPOSE IS TO LIMIT INTERRUPTIONS IN HEALTHCARE AND TO PROVIDE FOR ONGOING URGENT NEEDS UNDER THE CURRENT CONDITIONS. CHIEF COMPLAINT: f/u uncontrolled diabetes    HISTORY OF PRESENT ILLNESS:   Lobo Ceron Sr. is a 63 y.o. male with a PMHx as noted below who presents for f/u of uncontrolled type 2 diabetes with steroid induced hyperglycemia.     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,   Had been on insulin with steroids but now off,  Prior A1c was 6.2% in 2020, has not yet repeated labs again for review,  Tolerating current medications at this time,    Current Home Regimen:   metformin 500mg twice daily    Review of home glucose:   AM: reporting 120 average in the AM  Historically snacks overnight and raises am sugars at times    Review of most recent diabetes-related labs:  Lab Results   Component Value Date    HBA1C 6.2 08/25/2017    HBA1C 8.3 (H) 12/24/2010    HLY8UZIA 6.6% 04/07/2017    THX7AIQT 6.2 03/12/2020    QFJ6SLZC 5.3 11/12/2019    XTQ3GLNA 5.3 07/09/2019    CHOL 140 11/11/2019    LDLC 59 11/11/2019    GFRAA 47 (L) 03/17/2021    GFRNA 39 (L) 03/17/2021    CREATEXT Cr 1.46, GFR 59 11/11/2019    MCACR 3.1 11/11/2019    MALBEXT pending 11/11/2019    TSH 0.94 09/19/2011    VITD3 45.0 06/10/2020       PAST MEDICAL/SURGICAL HISTORY:   Past Medical History:   Diagnosis Date    Acid reflux     Arrhythmia     pvc's    Chronic pain     neck    Depression     Diabetes ([address]     Femoral hernia 7/11/2012    Hepatitis C     Hypertension     Inguinal hernia 7/11/2012    Liver disease     hepatitis c    Multiple myeloma ([address]     Other ill-defined conditions(799.89)     Hx of PVCs since 2009    Prostatitis, acute     Psychiatric disorder depression     Past Surgical History:   Procedure Laterality Date    COLONOSCOPY N/A 9/20/2016    COLONOSCOPY performed by [facility]leg[facility] at [facility]COPY    HX APPENDECTOMY      HX CERVICAL FUSION  2008    x 1 as of 4/30/2014    HX HEENT      foreign body removed from right eye    HX HERNIA REPAIR  2012    [facility]      HX ORTHOPAEDIC      cervical fusion    HX OTHER SURGICAL  2000    liver bx - chronic hepatitis       ALLERGIES:   Allergies   Allergen Reactions    Mercury (Bulk) Hives     Blisters         MEDICATIONS ON ADMISSION:     Current Outpatient Medications:     zolpidem 3.5 mg subl, DISSOLVE 1 TABLET UNDER TONGUE AT BEDTIME, Disp: , Rfl:     tenofovir DISOPROXIL FUMARATE (VIREAD) 300 mg tablet, TAKE 1 TABLET BY MOUTH 1 TIME A DAY., Disp: 90 Tab, Rfl: 0    pomalidomide (Pomalyst) 2 mg cap, Take 1 Cap by mouth daily. , Disp: 28 Cap, Rfl: 0    cholecalciferol, vitamin D3, (Vitamin D3) 50 mcg (2,000 unit) tab, Take  by mouth daily. , Disp: , Rfl:     ascorbic acid, vitamin C, (VITAMIN C) 100 mg tab, Take  by mouth., Disp: , Rfl:     Denta 5000 Plus 1.1 % crea, USE AS DIRECTED TWO TIMES DAILY, Disp: , Rfl:     metFORMIN ER (GLUCOPHAGE XR) 500 mg tablet, TAKE 1 TABLET BY MOUTH TWICE DAILY, Disp: 180 Tab, Rfl: 3    buPROPion XL (WELLBUTRIN XL) 300 mg XL tablet, TAKE 1 TABLET BY MOUTH EVERY DAY, Disp: , Rfl:     NUCYNTA 100 mg tablet, TAKE 2 TABLETS 3 TIMES A DAY, Disp: , Rfl: 0    aspirin delayed-release 81 mg tablet, Take  by mouth daily. , Disp: , Rfl:     multivitamin (ONE A DAY) tablet, Take 1 Tab by mouth daily. , Disp: , Rfl:     tamsulosin (FLOMAX) 0.4 mg capsule, Take 1 Cap by mouth daily. , Disp: 30 Cap, Rfl: 1    zolpidem CR (AMBIEN CR) 12.5 mg tablet, Take 12.5 mg by mouth nightly., Disp: , Rfl:     aripiprazole (ABILIFY) 5 mg tablet, Take 2.5 mg by mouth every morning., Disp: , Rfl:     ibuprofen (MOTRIN) 800 mg tablet, TAKE 1 TABLET BY MOUTH 3 TIMES A DAY AS NEEDED FOR PAIN, Disp: , Rfl:     ergocalciferol (ERGOCALCIFEROL) 1,250 mcg (50,000 unit) capsule, TAKE 1 CAPSULE BY MOUTH 1 TIME WEEKLY, Disp: 12 Cap, Rfl: 0    lisinopriL (PRINIVIL, ZESTRIL) 10 mg tablet, , Disp: , Rfl:     metoprolol succinate (TOPROL-XL) 25 mg XL tablet, TAKE 1 TABLET BY MOUTH EVERY DAY IN THE EVENING, Disp: , Rfl: 3    naproxen (NAPROSYN) 500 mg tablet, TAKE 1 TABLET BY MOUTH TWICE A DAY, Disp: , Rfl: 5    insulin lispro protamine/insulin lispro (HUMALOG 50-50 MIX) 100 unit/mL (50-50) flexpen, by SubCUTAneous route., Disp: , Rfl:     prochlorperazine (COMPAZINE) 10 mg tablet, Take 5 mg by mouth every six (6) hours as needed. , Disp: , Rfl:     omeprazole (PRILOSEC) 20 mg capsule, Take 20 mg by mouth daily as needed (acid reflux). , Disp: , Rfl:     ondansetron (ZOFRAN ODT) 8 mg disintegrating tablet, Take 8 mg by mouth every eight (8) hours as needed for Nausea., Disp: , Rfl:     SOCIAL HISTORY:   Social History     Socioeconomic History    Marital status:      Spouse name: Not on file    Number of children: Not on file    Years of education: Not on file    Highest education level: Not on file   Occupational History    Not on file   Social Needs    Financial resource strain: Not on file    Food insecurity     Worry: Not on file     Inability: Not on file    Transportation needs     Medical: Not on file     Non-medical: Not on file   Tobacco Use    Smoking status: Former Smoker     Packs/day: 1.00     Years: 12.00     Pack years: 12.00     Types: Cigarettes     Quit date: 1989     Years since quittin.3    Smokeless tobacco: Never Used    Tobacco comment: former cigarette smoker   Substance and Sexual Activity    Alcohol use: No     Comment: former alcoholic-quit 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    Drug use: No    Sexual activity: Not Currently   Lifestyle    Physical activity     Days per week: Not on file     Minutes per session: Not on file    Stress: Not on file   Relationships    Social connections     Talks on phone: Not on file     Gets together: Not on file     Attends Yazidism service: Not on file     Active member of club or organization: Not on file     Attends meetings of clubs or organizations: Not on file     Relationship status: Not on file    Intimate partner violence     Fear of current or ex partner: Not on file     Emotionally abused: Not on file     Physically abused: Not on file     Forced sexual activity: Not on file   Other Topics Concern    Not on file   Social History Narrative    Not on file       FAMILY HISTORY:  Family History   Problem Relation Age of Onset    Arthritis-osteo Mother     Arthritis-osteo Father     Diabetes Father     Hypertension Maternal Grandmother     Diabetes Maternal Grandmother     Hypertension Maternal Grandfather     Diabetes Maternal Grandfather        REVIEW OF SYSTEMS: Complete ROS assessed and noted for that which is described above, all else are negative. Eyes: normal  ENT: normal  CVS: normal  Resp: normal  GI: normal  : normal  GYN: normal  Endocrine: normal  Integument: normal  Musculoskeletal: normal  Neuro: normal  Psych: normal    PHYSICAL EXAMINATION:  Telemedicine Visit    GENERAL: NCAT, Appears well nourished  EYES: EOMI, non-icteric, no proptosis  Ear/Nose/Throat: NCAT, no visible inflammation or masses  CARDIOVASCULAR: no cyanosis, no visible JVD  RESPIRATORY: comfortable respirations observed, no cyanosis  MUSCULOSKELETAL: Normal ROM of upper extremities observed  SKIN: No edema, rash, or other significant changes observed  NEUROLOGIC:  AAOx3  PSYCHIATRIC: Normal affect, Normal insight and judgement    REVIEW OF LABORATORY AND RADIOLOGY DATA:   Labs and documentation have been reviewed as described above. ASSESSMENT AND PLAN:   Dollie Rinne . is a 63 y.o. male with a PMHx as noted above who presents for f/u of uncontrolled type 2 diabetes with steroid induced hyperglycemia.     Problems:  Type 2 diabetes uncontrolled  Hypertension    PLAN  Type 2 Diabetes  Medications: Metformin 500mg twice daily,    HTN: I have reviewed the chart, blood pressure have been mostly stable in 2021. Has been off blood pressure medications since transplant, June 2018. Lipids: previously ordered again but has not yet completed again, records were reviewed, last levels completed had been normal.     Labs: Prev ordered however he has been concerned about going to the lab due to covid. Advised that I will be happy to review the results if he feels comfortable to obtain them at any time. I did review his metabolic panel and see discussion on this below:    Renal function declining slowly over the past few years. Blood pressure and sugars have been stable. Advised if continues to trend down, then would be good to see a nephrologist. Also to discuss current meds with prescriber's with regard to kidney risk. Upon discussion I did provide him with the contact information for 2 different nephrology clinics. GFR Trend  Lab Results   Component Value Date    GFRNA 39 (L) 03/17/2021    GFRNA 44 (L) 02/17/2021    GFRNA 44 (L) 12/23/2020    GFRNA 49 (L) 11/25/2020    GFRNA 47 (L) 09/30/2020    GFRNA 45 (L) 09/02/2020    GFRNA 50 (L) 08/05/2020     Kimberly MORALES.  [facility]  [address] [place] [address]

## 2021-05-11 NOTE — PROGRESS NOTES
Zac Duran is a 63 y.o. male here for 3 month follow up for Multiple Myeloma. Pt getting port flush and labs today. He is on Pomalyst.    1. Have you been to the ER, urgent care clinic since your last visit? Hospitalized since your last visit? no    2. Have you seen or consulted any other health care providers outside of the [address] [place] [address] since your last visit? Include any pap smears or colon screening. no    Pt states he has been well. No complaints.

## 2021-05-12 ENCOUNTER — HOSPITAL ENCOUNTER (OUTPATIENT)
Dept: INFUSION THERAPY | Age: 66
Discharge: HOME | End: 2021-05-12
Payer: COMMERCIAL

## 2021-05-12 ENCOUNTER — OFFICE VISIT (OUTPATIENT)
Dept: ONCOLOGY | Age: 66
End: 2021-05-12

## 2021-05-12 VITALS
SYSTOLIC BLOOD PRESSURE: 119 MMHG | WEIGHT: 207.2 LBS | HEART RATE: 74 BPM | RESPIRATION RATE: 16 BRPM | TEMPERATURE: 98.7 F | DIASTOLIC BLOOD PRESSURE: 80 MMHG | BODY MASS INDEX: 31.5 KG/M2

## 2021-05-12 VITALS
BODY MASS INDEX: 31.37 KG/M2 | WEIGHT: 207 LBS | DIASTOLIC BLOOD PRESSURE: 81 MMHG | SYSTOLIC BLOOD PRESSURE: 117 MMHG | HEIGHT: 68 IN | HEART RATE: 71 BPM | OXYGEN SATURATION: 96 % | TEMPERATURE: 98.1 F

## 2021-05-12 DIAGNOSIS — C90.00 MULTIPLE MYELOMA, REMISSION STATUS UNSPECIFIED (HCC): Primary | ICD-10-CM

## 2021-05-12 DIAGNOSIS — C90.01 MULTIPLE MYELOMA IN REMISSION (HCC): Primary | ICD-10-CM

## 2021-05-12 LAB
ALBUMIN SERPL-MCNC: 3.5 G/DL (ref 3.5–5)
ALBUMIN/GLOB SERPL: 0.9 {RATIO} (ref 1.1–2.2)
ALP SERPL-CCNC: 85 U/L (ref 45–117)
ALT SERPL-CCNC: 27 U/L (ref 12–78)
ANION GAP SERPL CALC-SCNC: 2 MMOL/L (ref 5–15)
AST SERPL-CCNC: 17 U/L (ref 15–37)
BASOPHILS # BLD: 0.1 K/UL (ref 0–0.1)
BASOPHILS NFR BLD: 2 % (ref 0–1)
BILIRUB SERPL-MCNC: 0.2 MG/DL (ref 0.2–1)
BUN SERPL-MCNC: 16 MG/DL (ref 6–20)
BUN/CREAT SERPL: 10 (ref 12–20)
CALCIUM SERPL-MCNC: 9 MG/DL (ref 8.5–10.1)
CHLORIDE SERPL-SCNC: 107 MMOL/L (ref 97–108)
CO2 SERPL-SCNC: 29 MMOL/L (ref 21–32)
CREAT SERPL-MCNC: 1.53 MG/DL (ref 0.7–1.3)
DIFFERENTIAL METHOD BLD: ABNORMAL
EOSINOPHIL # BLD: 0.1 K/UL (ref 0–0.4)
EOSINOPHIL NFR BLD: 3 % (ref 0–7)
ERYTHROCYTE [DISTWIDTH] IN BLOOD BY AUTOMATED COUNT: 15.9 % (ref 11.5–14.5)
GLOBULIN SER CALC-MCNC: 4 G/DL (ref 2–4)
GLUCOSE SERPL-MCNC: 100 MG/DL (ref 65–100)
HCT VFR BLD AUTO: 35.9 % (ref 36.6–50.3)
HGB BLD-MCNC: 11 G/DL (ref 12.1–17)
IGA SERPL-MCNC: 412 MG/DL (ref 70–400)
IGG SERPL-MCNC: 1190 MG/DL (ref 700–1600)
IGM SERPL-MCNC: 22 MG/DL (ref 40–230)
IMM GRANULOCYTES # BLD AUTO: 0 K/UL (ref 0–0.04)
IMM GRANULOCYTES NFR BLD AUTO: 0 % (ref 0–0.5)
LYMPHOCYTES # BLD: 0.9 K/UL (ref 0.8–3.5)
LYMPHOCYTES NFR BLD: 29 % (ref 12–49)
MCH RBC QN AUTO: 28.4 PG (ref 26–34)
MCHC RBC AUTO-ENTMCNC: 30.6 G/DL (ref 30–36.5)
MCV RBC AUTO: 92.8 FL (ref 80–99)
MONOCYTES # BLD: 0.7 K/UL (ref 0–1)
MONOCYTES NFR BLD: 24 % (ref 5–13)
NEUTS SEG # BLD: 1.2 K/UL (ref 1.8–8)
NEUTS SEG NFR BLD: 42 % (ref 32–75)
NRBC # BLD: 0 K/UL (ref 0–0.01)
NRBC BLD-RTO: 0 PER 100 WBC
PLATELET # BLD AUTO: 162 K/UL (ref 150–400)
PMV BLD AUTO: 10.4 FL (ref 8.9–12.9)
POTASSIUM SERPL-SCNC: 4 MMOL/L (ref 3.5–5.1)
PROT SERPL-MCNC: 7.5 G/DL (ref 6.4–8.2)
RBC # BLD AUTO: 3.87 M/UL (ref 4.1–5.7)
RBC MORPH BLD: ABNORMAL
SODIUM SERPL-SCNC: 138 MMOL/L (ref 136–145)
WBC # BLD AUTO: 3 K/UL (ref 4.1–11.1)

## 2021-05-12 PROCEDURE — G8752 SYS BP LESS 140: HCPCS | Performed by: INTERNAL MEDICINE

## 2021-05-12 PROCEDURE — 82784 ASSAY IGA/IGD/IGG/IGM EACH: CPT

## 2021-05-12 PROCEDURE — 84165 PROTEIN E-PHORESIS SERUM: CPT

## 2021-05-12 PROCEDURE — G9717 DOC PT DX DEP/BP F/U NT REQ: HCPCS | Performed by: INTERNAL MEDICINE

## 2021-05-12 PROCEDURE — 3017F COLORECTAL CA SCREEN DOC REV: CPT | Performed by: INTERNAL MEDICINE

## 2021-05-12 PROCEDURE — 74011250636 HC RX REV CODE- 250/636: Performed by: INTERNAL MEDICINE

## 2021-05-12 PROCEDURE — 99214 OFFICE O/P EST MOD 30 MIN: CPT | Performed by: INTERNAL MEDICINE

## 2021-05-12 PROCEDURE — 36591 DRAW BLOOD OFF VENOUS DEVICE: CPT

## 2021-05-12 PROCEDURE — G8754 DIAS BP LESS 90: HCPCS | Performed by: INTERNAL MEDICINE

## 2021-05-12 PROCEDURE — G8427 DOCREV CUR MEDS BY ELIG CLIN: HCPCS | Performed by: INTERNAL MEDICINE

## 2021-05-12 PROCEDURE — 83883 ASSAY NEPHELOMETRY NOT SPEC: CPT

## 2021-05-12 PROCEDURE — 77030012965 HC NDL HUBR BBMI -A

## 2021-05-12 PROCEDURE — G8417 CALC BMI ABV UP PARAM F/U: HCPCS | Performed by: INTERNAL MEDICINE

## 2021-05-12 PROCEDURE — 80053 COMPREHEN METABOLIC PANEL: CPT

## 2021-05-12 PROCEDURE — 85025 COMPLETE CBC W/AUTO DIFF WBC: CPT

## 2021-05-12 RX ORDER — SODIUM CHLORIDE 9 MG/ML
10 INJECTION INTRAMUSCULAR; INTRAVENOUS; SUBCUTANEOUS AS NEEDED
Status: ACTIVE | OUTPATIENT
Start: 2021-05-12 | End: 2021-05-12

## 2021-05-12 RX ORDER — HEPARIN 100 UNIT/ML
500 SYRINGE INTRAVENOUS AS NEEDED
Status: ACTIVE | OUTPATIENT
Start: 2021-05-12 | End: 2021-05-12

## 2021-05-12 RX ORDER — SODIUM CHLORIDE 0.9 % (FLUSH) 0.9 %
5-10 SYRINGE (ML) INJECTION AS NEEDED
Status: DISPENSED | OUTPATIENT
Start: 2021-05-12 | End: 2021-05-12

## 2021-05-12 RX ADMIN — SODIUM CHLORIDE 10 ML: 9 INJECTION INTRAMUSCULAR; INTRAVENOUS; SUBCUTANEOUS at 09:54

## 2021-05-12 RX ADMIN — HEPARIN 500 UNITS: 100 SYRINGE at 09:54

## 2021-05-12 NOTE — PROGRESS NOTES
8000 [facility] Visit Note    0940 Pt arrived at [facility] ambulatory and in no distress for [facility]. No new complaints voiced. Port accessed per protocol with positive blood return. Labs drawn. Port flushed and de-accessed. Patient Vitals for the past 12 hrs:   Temp Pulse Resp BP   05/12/21 0940 98.7 °F (37.1 °C) 74 16 119/80     Recent Results (from the past 12 hour(s))   CBC WITH AUTOMATED DIFF    Collection Time: 05/12/21  9:47 AM   Result Value Ref Range    WBC 3.0 (L) 4.1 - 11.1 K/uL    RBC 3.87 (L) 4.10 - 5.70 M/uL    HGB 11.0 (L) 12.1 - 17.0 g/dL    HCT 35.9 (L) 36.6 - 50.3 %    MCV 92.8 80.0 - 99.0 FL    MCH 28.4 26.0 - 34.0 PG    MCHC 30.6 30.0 - 36.5 g/dL    RDW 15.9 (H) 11.5 - 14.5 %    PLATELET 902 232 - 984 K/uL    MPV 10.4 8.9 - 12.9 FL    NRBC 0.0 0  WBC    ABSOLUTE NRBC 0.00 0.00 - 0.01 K/uL    NEUTROPHILS PENDING %    LYMPHOCYTES PENDING %    MONOCYTES PENDING %    EOSINOPHILS PENDING %    BASOPHILS PENDING %    IMMATURE GRANULOCYTES PENDING %    ABS. NEUTROPHILS PENDING K/UL    ABS. LYMPHOCYTES PENDING K/UL    ABS. MONOCYTES PENDING K/UL    ABS. EOSINOPHILS PENDING K/UL    ABS. BASOPHILS PENDING K/UL    ABS. IMM. GRANS. PENDING K/UL    DF PENDING    METABOLIC PANEL, COMPREHENSIVE    Collection Time: 05/12/21  9:47 AM   Result Value Ref Range    Sodium 138 136 - 145 mmol/L    Potassium 4.0 3.5 - 5.1 mmol/L    Chloride 107 97 - 108 mmol/L    CO2 29 21 - 32 mmol/L    Anion gap 2 (L) 5 - 15 mmol/L    Glucose 100 65 - 100 mg/dL    BUN 16 6 - 20 MG/DL    Creatinine 1.53 (H) 0.70 - 1.30 MG/DL    BUN/Creatinine ratio 10 (L) 12 - 20      GFR est AA 56 (L) >60 ml/min/1.73m2    GFR est non-AA 46 (L) >60 ml/min/1.73m2    Calcium 9.0 8.5 - 10.1 MG/DL    Bilirubin, total 0.2 0.2 - 1.0 MG/DL    ALT (SGPT) 27 12 - 78 U/L    AST (SGOT) 17 15 - 37 U/L    Alk.  phosphatase 85 45 - 117 U/L    Protein, total 7.5 6.4 - 8.2 g/dL    Albumin 3.5 3.5 - 5.0 g/dL    Globulin 4.0 2.0 - 4.0 g/dL A-G Ratio 0.9 (L) 1.1 - 2.2       See [facility] for pended labs    Medications received:  none    0955 Tolerated treatment well, no adverse reaction noted. D/Cd from [facility] ambulatory and in no distress accompanied by self. Pt to MD office for scheduled appointment.  Next appt 6/9

## 2021-05-12 NOTE — PROGRESS NOTES
[address] [facility] [address], [address] [facility], [address] [facility], [address] [facility]  [phone]            Progress Note        Patient: Hannah Ledesma Sr. MRN: [id number]  SSN: [id number]    YOB: 1955  Age: 63 y.o. Sex: male        Diagnosis:      1. Multiple Myeloma, IgA Kappa   Durie Gardner stage IIIB    Cytogenetics/FISH: t(14;16) - high risk    Ttreatment:     1. Maintenance therapy - Pomalyst 2 mg daily - 11/19/2018   2. Maintenance therapy - Ixazomib - started 10/15/2018 - stopped 11/12/2018  3. S/P tandem  Autotransplant   First on 2/28/2018   2nd on 06/13/2018  4. Carfilzomib/Pom/Dex - s/p 5 cycles  5. VD-PACE s/p 1 cycle  6. RVD - s/p 4 cycles    Subjective:      Hannah Ledesma Sr. is a 63 y.o. male with a diagnosis of IgA kappa myeloma. Bone marrow shows 100% aberrant plasma cells. He suffers with DM but it is diet controlled. He has received systemic anti-viral treatment for chronic Hep C with successful eradication of the virus. Mr. Yariel Henderson received 4 cycles of systemic therapy with RVd. He had an initial good response to treatment. However his paraprotein level started rising and the myeloma became refractory to treatment. I then administered one cycle of VD-PACE in the hospital. He then received Carfilzomib/Pom/Dex. He achieved VGPR. He underwent tandem autotransplant at [address] [place] [address]. According to the patient he achieved complete response with therapy. He has undergone second/tandem transplant in June. He took Pomalyst maintenance until Aug 2019 then had to stop due to recurrent prostatitis. He had a repeat BM biopsy in February 2019 at [address] [place] [address] which showed complete molecular remission. He is taking Pomalyst. He is here today with his wife and feels well with no new complaints.       Review of Systems:    Constitutional: negative  Eyes: negative  Ears, Nose, Mouth, Throat, and Face: negative  Respiratory: negative  Cardiovascular: negative  Gastrointestinal: negative  Genitourinary:negative  Integument/Breast: negative  Hematologic/Lymphatic: negative  Musculoskeletal: negative  Neurological: negative      Past Medical History:   Diagnosis Date    Acid reflux     Arrhythmia     pvc's    Chronic pain     neck    Depression     Diabetes ([facility] [address]     Femoral hernia 2012    Hepatitis C     Hypertension     Inguinal hernia 2012    Liver disease     hepatitis c    Multiple myeloma (HCC)     Other ill-defined conditions(799.89)     Hx of PVCs since     Prostatitis, acute     Psychiatric disorder     depression     Past Surgical History:   Procedure Laterality Date    COLONOSCOPY N/A 2016    COLONOSCOPY performed by Raul Layne MD at [facility] ENDOSCOPY    HX APPENDECTOMY      HX CERVICAL FUSION  2008    x 1 as of 2014    HX HEENT      foreign body removed from right eye    HX HERNIA REPAIR      [facility]      HX ORTHOPAEDIC      cervical fusion    HX OTHER SURGICAL  2000    liver bx - chronic hepatitis      Family History   Problem Relation Age of Onset    Arthritis-osteo Mother     Arthritis-osteo Father     Diabetes Father     Hypertension Maternal Grandmother     Diabetes Maternal Grandmother     Hypertension Maternal Grandfather     Diabetes Maternal Grandfather      Social History     Tobacco Use    Smoking status: Former Smoker     Packs/day: 1.00     Years: 12.00     Pack years: 12.00     Types: Cigarettes     Quit date: 1989     Years since quittin.3    Smokeless tobacco: Never Used    Tobacco comment: former cigarette smoker   Substance Use Topics    Alcohol use: No     Comment: former alcoholic-quit 3220      Prior to Admission medications    Medication Sig Start Date End Date Taking?  Authorizing Provider   zolpidem 3.5 mg subl DISSOLVE 1 TABLET UNDER TONGUE AT BEDTIME 4/10/21  Yes Provider, Historical   tenofovir DISOPROXIL FUMARATE (VIREAD) 300 mg tablet TAKE 1 TABLET BY MOUTH 1 TIME A DAY. 4/27/21  Yes Zofia Nelson MD   pomalidomide (Pomalyst) 2 mg cap Take 1 Cap by mouth daily. 4/21/21  Yes Zofia Nelson MD   cholecalciferol, vitamin D3, (Vitamin D3) 50 mcg (2,000 unit) tab Take  by mouth daily. Yes Provider, Historical   ascorbic acid, vitamin C, (VITAMIN C) 100 mg tab Take  by mouth. Yes Provider, Historical   Denta 5000 Plus 1.1 % crea USE AS DIRECTED TWO TIMES DAILY 8/12/20  Yes Provider, Historical   ibuprofen (MOTRIN) 800 mg tablet TAKE 1 TABLET BY MOUTH 3 TIMES A DAY AS NEEDED FOR PAIN 10/13/20  Yes Provider, Historical   metFORMIN ER (GLUCOPHAGE XR) 500 mg tablet TAKE 1 TABLET BY MOUTH TWICE DAILY 7/22/20  Yes Doron Raymond MD   buPROPion XL (WELLBUTRIN XL) 300 mg XL tablet TAKE 1 TABLET BY MOUTH EVERY DAY 1/30/20  Yes Provider, Historical   naproxen (NAPROSYN) 500 mg tablet TAKE 1 TABLET BY MOUTH TWICE A DAY 8/26/19  Yes Provider, Historical   NUCYNTA 100 mg tablet TAKE 2 TABLETS 3 TIMES A DAY 7/27/19  Yes Provider, Historical   aspirin delayed-release 81 mg tablet Take  by mouth daily. Yes Provider, Historical   prochlorperazine (COMPAZINE) 10 mg tablet Take 5 mg by mouth every six (6) hours as needed. Yes Other, MD Goldy   omeprazole (PRILOSEC) 20 mg capsule Take 20 mg by mouth daily as needed (acid reflux). Yes Other, MD Goldy   ondansetron (ZOFRAN ODT) 8 mg disintegrating tablet Take 8 mg by mouth every eight (8) hours as needed for Nausea. Yes Other, MD Goldy   multivitamin (ONE A DAY) tablet Take 1 Tab by mouth daily. Yes Provider, Historical   tamsulosin (FLOMAX) 0.4 mg capsule Take 1 Cap by mouth daily. 4/4/17  Yes David Chiang MD   zolpidem CR (AMBIEN CR) 12.5 mg tablet Take 12.5 mg by mouth nightly. Yes Provider, Historical   aripiprazole (ABILIFY) 5 mg tablet Take 2.5 mg by mouth every morning.    Yes Other, MD Goldy          Allergies   Allergen Reactions    Mercury (Bulk) Hives     Blisters Objective:     Visit Vitals  /81 (BP 1 Location: Right upper arm, BP Patient Position: Sitting)   Pulse 71   Temp 98.1 °F (36.7 °C) (Temporal)   Ht 5' 8\" (1.727 m)   Wt 207 lb (93.9 kg)   SpO2 96%   BMI 31.47 kg/m²       Pain Scale: 0 - No pain/10  Pain Location:       Physical Exam:     GENERAL: alert, cooperative  EYE: negative  LYMPHATIC: Cervical, supraclavicular, and axillary nodes normal.   THROAT & NECK: normal and no erythema or exudates noted. LUNG: clear to auscultation bilaterally  HEART: regular rate and rhythm  ABDOMEN: soft, non-tender  EXTREMITIES: normal  SKIN: Normal.  NEUROLOGIC: negative     Physical exam copied from previous note. Reviewed and no changes noted. Lab Results   Component Value Date/Time    WBC 3.0 (L) 05/12/2021 09:47 AM    HGB 11.0 (L) 05/12/2021 09:47 AM    Hematocrit (POC) 37 04/14/2021 09:48 AM    HCT 35.9 (L) 05/12/2021 09:47 AM    PLATELET 162 38/22/6859 09:47 AM    MCV 92.8 05/12/2021 09:47 AM       Lab Results   Component Value Date/Time    Sodium 138 05/12/2021 09:47 AM    Potassium 4.0 05/12/2021 09:47 AM    Chloride 107 05/12/2021 09:47 AM    CO2 29 05/12/2021 09:47 AM    Anion gap 2 (L) 05/12/2021 09:47 AM    Glucose 100 05/12/2021 09:47 AM    BUN 16 05/12/2021 09:47 AM    Creatinine 1.53 (H) 05/12/2021 09:47 AM    BUN/Creatinine ratio 10 (L) 05/12/2021 09:47 AM    GFR est AA 56 (L) 05/12/2021 09:47 AM    GFR est non-AA 46 (L) 05/12/2021 09:47 AM    Calcium 9.0 05/12/2021 09:47 AM            Assessment:     1. Multiple Myeloma, IgA Kappa   Durie Hollister stage IIIB    Cytogenetics/FISH: t(14;16) - high risk  ECOG PS - 0   Intent of therapy: palliative    Received 3 cycles of RVd   Dose reduced Revlimid and Velcade d/t continued cytopenias. M-spike started rising. He received one cycle of VD-PACE (bortezomib, dexamethasone, thalidomide, cisplatin, adriamycin, cyclophosphamide, and etoposide).   Achieved KS with once cycle    Receivied KPd - s/p 5 cycles    Excellent response with M-protein came down to 0.1    S/p tandem autotransplant    First on 2/28/2018   2nd on 06/13/2018    Achieved CR after the first transplant. Repeat bone marrow @ [facility] - Complete response. Since he is high risk based on cytogenetics, I recommend proteosome inhibitor as maintenance therapy. Started Ixazomib   Developed fever, was in the hospital.   Also developed cytopenias    Patient prefered to switch therapy to Pomalyst.     Pomalyst 2 mg daily - started 11/19/2018  Tolerating treatment very well  Denies any side effects. A detailed system by system evaluation of side effect was performed to assess chemotherapy related toxicity. Blood counts are acceptable. Results reviewed with the patient    M-spike is invisible      2. Type 2 DM with complication    Managed by Endocrine      3. Chronic Hep C    In sustained virological remission      Plan:       > Continue Pomalyst maintenance  > Myeloma labs and port flush monthly   > Zometa every 3 months  > Follow-up in 3 months    I performed a history and physical examination of the patient and discussed his management with the NPP. I reviewed the NPP note and agree with the documented findings and plan of care. The patient was seen in conjunction with Ms. Morocho. Mr. Juan C Anaya is a gentleman with Multiple Myeloma. He is on maintenance therapy and remains in remission. His exam is normal.       Signed by: Elvin Amado MD                     May 12, 2021      CC. Asha Thompson MD  CC.  Rice Litten, MD

## 2021-05-13 LAB
KAPPA LC FREE SER-MCNC: 51.8 MG/L (ref 3.3–19.4)
KAPPA LC FREE/LAMBDA FREE SER: 1.17 {RATIO} (ref 0.26–1.65)
LAMBDA LC FREE SERPL-MCNC: 44.1 MG/L (ref 5.7–26.3)

## 2021-05-14 LAB
IGA SERPL-MCNC: 421 MG/DL (ref 61–437)
IGG SERPL-MCNC: 1219 MG/DL (ref 603–1613)
IGM SERPL-MCNC: 22 MG/DL (ref 20–172)
PROT PATTERN SERPL IFE-IMP: ABNORMAL

## 2021-05-17 LAB
ALBUMIN SERPL ELPH-MCNC: 3.8 G/DL (ref 2.9–4.4)
ALBUMIN/GLOB SERPL: 1.1 {RATIO} (ref 0.7–1.7)
ALPHA1 GLOB SERPL ELPH-MCNC: 0.2 G/DL (ref 0–0.4)
ALPHA2 GLOB SERPL ELPH-MCNC: 0.9 G/DL (ref 0.4–1)
B-GLOBULIN SERPL ELPH-MCNC: 1.1 G/DL (ref 0.7–1.3)
GAMMA GLOB SERPL ELPH-MCNC: 1.4 G/DL (ref 0.4–1.8)
GLOBULIN SER CALC-MCNC: 3.5 G/DL (ref 2.2–3.9)
M PROTEIN SERPL ELPH-MCNC: NORMAL G/DL
PROT SERPL-MCNC: 7.3 G/DL (ref 6–8.5)

## 2021-05-19 ENCOUNTER — TELEPHONE (OUTPATIENT)
Dept: ONCOLOGY | Age: 66
End: 2021-05-19

## 2021-05-19 RX ORDER — HYDROCORTISONE SODIUM SUCCINATE 100 MG/2ML
100 INJECTION, POWDER, FOR SOLUTION INTRAMUSCULAR; INTRAVENOUS AS NEEDED
Status: CANCELLED | OUTPATIENT
Start: 2021-07-07

## 2021-05-19 RX ORDER — DIPHENHYDRAMINE HYDROCHLORIDE 50 MG/ML
50 INJECTION, SOLUTION INTRAMUSCULAR; INTRAVENOUS AS NEEDED
Status: CANCELLED
Start: 2021-07-07

## 2021-05-19 RX ORDER — ALBUTEROL SULFATE 0.83 MG/ML
2.5 SOLUTION RESPIRATORY (INHALATION) AS NEEDED
Status: CANCELLED
Start: 2021-07-07

## 2021-05-19 RX ORDER — ONDANSETRON 2 MG/ML
8 INJECTION INTRAMUSCULAR; INTRAVENOUS AS NEEDED
Status: CANCELLED | OUTPATIENT
Start: 2021-07-07

## 2021-05-19 RX ORDER — SODIUM CHLORIDE 0.9 % (FLUSH) 0.9 %
10 SYRINGE (ML) INJECTION AS NEEDED
Status: CANCELLED | OUTPATIENT
Start: 2021-07-07

## 2021-05-19 RX ORDER — SODIUM CHLORIDE 9 MG/ML
10 INJECTION INTRAMUSCULAR; INTRAVENOUS; SUBCUTANEOUS AS NEEDED
Status: CANCELLED | OUTPATIENT
Start: 2021-07-07

## 2021-05-19 RX ORDER — HEPARIN 100 UNIT/ML
300-500 SYRINGE INTRAVENOUS AS NEEDED
Status: CANCELLED | OUTPATIENT
Start: 2021-07-07

## 2021-05-19 RX ORDER — EPINEPHRINE 1 MG/ML
0.3 INJECTION, SOLUTION, CONCENTRATE INTRAVENOUS AS NEEDED
Status: CANCELLED | OUTPATIENT
Start: 2021-07-07

## 2021-05-19 RX ORDER — SODIUM CHLORIDE 9 MG/ML
25 INJECTION, SOLUTION INTRAVENOUS CONTINUOUS
Status: CANCELLED | OUTPATIENT
Start: 2021-07-07 | End: 2021-07-07

## 2021-05-19 RX ORDER — ACETAMINOPHEN 325 MG/1
650 TABLET ORAL AS NEEDED
Status: CANCELLED
Start: 2021-07-07

## 2021-05-19 NOTE — TELEPHONE ENCOUNTER
Patient called because he heard this morning that patients that have had bone marrow transplants are less likely to get antibodies from the vaccine.  He would like to know more about this and what his risks and protections will be

## 2021-05-20 NOTE — TELEPHONE ENCOUNTER
VORB FROM DR Erik Kelley POMALIDOMIDE (POMALYST) 2MG CAP TAKE 1 CAP BY MOUTH DAILY D 28 R 0 Otc Regimen: Lashell Sow Detail Level: Zone

## 2021-06-09 ENCOUNTER — HOSPITAL ENCOUNTER (OUTPATIENT)
Dept: INFUSION THERAPY | Age: 66
Discharge: HOME | End: 2021-06-09
Payer: COMMERCIAL

## 2021-06-09 VITALS
TEMPERATURE: 97.1 F | HEART RATE: 58 BPM | DIASTOLIC BLOOD PRESSURE: 68 MMHG | SYSTOLIC BLOOD PRESSURE: 109 MMHG | OXYGEN SATURATION: 100 % | RESPIRATION RATE: 18 BRPM

## 2021-06-09 DIAGNOSIS — C90.00 MULTIPLE MYELOMA, REMISSION STATUS UNSPECIFIED (HCC): Primary | ICD-10-CM

## 2021-06-09 LAB
ALBUMIN SERPL-MCNC: 3.3 G/DL (ref 3.5–5)
ALBUMIN/GLOB SERPL: 0.9 {RATIO} (ref 1.1–2.2)
ALP SERPL-CCNC: 68 U/L (ref 45–117)
ALT SERPL-CCNC: 32 U/L (ref 12–78)
ANION GAP SERPL CALC-SCNC: 5 MMOL/L (ref 5–15)
AST SERPL-CCNC: 38 U/L (ref 15–37)
BASOPHILS # BLD: 0 K/UL (ref 0–0.1)
BASOPHILS NFR BLD: 1 % (ref 0–1)
BILIRUB SERPL-MCNC: 0.4 MG/DL (ref 0.2–1)
BUN SERPL-MCNC: 15 MG/DL (ref 6–20)
BUN/CREAT SERPL: 12 (ref 12–20)
CALCIUM SERPL-MCNC: 8.6 MG/DL (ref 8.5–10.1)
CHLORIDE SERPL-SCNC: 108 MMOL/L (ref 97–108)
CO2 SERPL-SCNC: 27 MMOL/L (ref 21–32)
CREAT SERPL-MCNC: 1.21 MG/DL (ref 0.7–1.3)
DIFFERENTIAL METHOD BLD: ABNORMAL
EOSINOPHIL # BLD: 0.1 K/UL (ref 0–0.4)
EOSINOPHIL NFR BLD: 3 % (ref 0–7)
ERYTHROCYTE [DISTWIDTH] IN BLOOD BY AUTOMATED COUNT: 15.8 % (ref 11.5–14.5)
GLOBULIN SER CALC-MCNC: 3.8 G/DL (ref 2–4)
GLUCOSE SERPL-MCNC: 123 MG/DL (ref 65–100)
HCT VFR BLD AUTO: 33.3 % (ref 36.6–50.3)
HGB BLD-MCNC: 10.1 G/DL (ref 12.1–17)
IMM GRANULOCYTES # BLD AUTO: 0 K/UL (ref 0–0.04)
IMM GRANULOCYTES NFR BLD AUTO: 0 % (ref 0–0.5)
LYMPHOCYTES # BLD: 0.6 K/UL (ref 0.8–3.5)
LYMPHOCYTES NFR BLD: 26 % (ref 12–49)
MCH RBC QN AUTO: 28.5 PG (ref 26–34)
MCHC RBC AUTO-ENTMCNC: 30.3 G/DL (ref 30–36.5)
MCV RBC AUTO: 93.8 FL (ref 80–99)
MONOCYTES # BLD: 0.4 K/UL (ref 0–1)
MONOCYTES NFR BLD: 19 % (ref 5–13)
NEUTS SEG # BLD: 1.1 K/UL (ref 1.8–8)
NEUTS SEG NFR BLD: 51 % (ref 32–75)
NRBC # BLD: 0 K/UL (ref 0–0.01)
NRBC BLD-RTO: 0 PER 100 WBC
PLATELET # BLD AUTO: 147 K/UL (ref 150–400)
PMV BLD AUTO: 10 FL (ref 8.9–12.9)
POTASSIUM SERPL-SCNC: 3.9 MMOL/L (ref 3.5–5.1)
PROT SERPL-MCNC: 7.1 G/DL (ref 6.4–8.2)
RBC # BLD AUTO: 3.55 M/UL (ref 4.1–5.7)
RBC MORPH BLD: ABNORMAL
SODIUM SERPL-SCNC: 140 MMOL/L (ref 136–145)
WBC # BLD AUTO: 2.2 K/UL (ref 4.1–11.1)

## 2021-06-09 PROCEDURE — 80053 COMPREHEN METABOLIC PANEL: CPT

## 2021-06-09 PROCEDURE — 74011250636 HC RX REV CODE- 250/636: Performed by: INTERNAL MEDICINE

## 2021-06-09 PROCEDURE — 82784 ASSAY IGA/IGD/IGG/IGM EACH: CPT

## 2021-06-09 PROCEDURE — 36591 DRAW BLOOD OFF VENOUS DEVICE: CPT

## 2021-06-09 PROCEDURE — 84165 PROTEIN E-PHORESIS SERUM: CPT

## 2021-06-09 PROCEDURE — 85025 COMPLETE CBC W/AUTO DIFF WBC: CPT

## 2021-06-09 PROCEDURE — 83883 ASSAY NEPHELOMETRY NOT SPEC: CPT

## 2021-06-09 RX ORDER — SODIUM CHLORIDE 9 MG/ML
10 INJECTION INTRAMUSCULAR; INTRAVENOUS; SUBCUTANEOUS AS NEEDED
Status: CANCELLED | OUTPATIENT
Start: 2021-06-09

## 2021-06-09 RX ORDER — HEPARIN 100 UNIT/ML
500 SYRINGE INTRAVENOUS AS NEEDED
Status: CANCELLED | OUTPATIENT
Start: 2021-06-09

## 2021-06-09 RX ORDER — HEPARIN 100 UNIT/ML
500 SYRINGE INTRAVENOUS AS NEEDED
Status: ACTIVE | OUTPATIENT
Start: 2021-06-09 | End: 2021-06-09

## 2021-06-09 RX ORDER — SODIUM CHLORIDE 0.9 % (FLUSH) 0.9 %
5-10 SYRINGE (ML) INJECTION AS NEEDED
Status: CANCELLED | OUTPATIENT
Start: 2021-06-09

## 2021-06-09 RX ORDER — SODIUM CHLORIDE 9 MG/ML
10 INJECTION INTRAMUSCULAR; INTRAVENOUS; SUBCUTANEOUS AS NEEDED
Status: ACTIVE | OUTPATIENT
Start: 2021-06-09 | End: 2021-06-09

## 2021-06-09 RX ORDER — SODIUM CHLORIDE 0.9 % (FLUSH) 0.9 %
5-10 SYRINGE (ML) INJECTION AS NEEDED
Status: DISPENSED | OUTPATIENT
Start: 2021-06-09 | End: 2021-06-09

## 2021-06-09 RX ADMIN — Medication 500 UNITS: at 10:04

## 2021-06-09 RX ADMIN — Medication 10 ML: at 10:03

## 2021-06-09 RX ADMIN — SODIUM CHLORIDE 10 ML: 9 INJECTION INTRAMUSCULAR; INTRAVENOUS; SUBCUTANEOUS at 10:00

## 2021-06-09 NOTE — PROGRESS NOTES
[facility]fusion [facility] VISIT NOTE      0930 - Patient arrives for [facility] Flush/labs without acute problems. Covid Screening  Patient denied having any symptoms of COVID-19, i.e. SOB, coughing, fever, or generally not feeling well. Also denies having been exposed to COVID-19 recently or having had any recent contact with family/friend that has a pending COVID test.       Right chest port accessed without difficulty with 0.75 inch eddy needle; + blood return noted; lab drawn and sent for processing. Vitals Signs:  Visit Vitals  /68 (BP 1 Location: Left arm, BP Patient Position: Sitting)   Pulse (!) 58   Temp 97.1 °F (36.2 °C)   Resp 18   SpO2 100%       Lab Results:    Recent Results (from the past 12 hour(s))   CBC WITH AUTOMATED DIFF    Collection Time: 06/09/21  9:54 AM   Result Value Ref Range    WBC 2.2 (L) 4.1 - 11.1 K/uL    RBC 3.55 (L) 4.10 - 5.70 M/uL    HGB 10.1 (L) 12.1 - 17.0 g/dL    HCT 33.3 (L) 36.6 - 50.3 %    MCV 93.8 80.0 - 99.0 FL    MCH 28.5 26.0 - 34.0 PG    MCHC 30.3 30.0 - 36.5 g/dL    RDW 15.8 (H) 11.5 - 14.5 %    PLATELET 484 (L) 495 - 400 K/uL    MPV 10.0 8.9 - 12.9 FL    NRBC 0.0 0  WBC    ABSOLUTE NRBC 0.00 0.00 - 0.01 K/uL    NEUTROPHILS 51 32 - 75 %    LYMPHOCYTES 26 12 - 49 %    MONOCYTES 19 (H) 5 - 13 %    EOSINOPHILS 3 0 - 7 %    BASOPHILS 1 0 - 1 %    IMMATURE GRANULOCYTES 0 0.0 - 0.5 %    ABS. NEUTROPHILS 1.1 (L) 1.8 - 8.0 K/UL    ABS. LYMPHOCYTES 0.6 (L) 0.8 - 3.5 K/UL    ABS. MONOCYTES 0.4 0.0 - 1.0 K/UL    ABS. EOSINOPHILS 0.1 0.0 - 0.4 K/UL    ABS. BASOPHILS 0.0 0.0 - 0.1 K/UL    ABS. IMM.  GRANS. 0.0 0.00 - 0.04 K/UL    DF MANUAL      RBC COMMENTS NORMOCYTIC, NORMOCHROMIC     METABOLIC PANEL, COMPREHENSIVE    Collection Time: 06/09/21  9:54 AM   Result Value Ref Range    Sodium 140 136 - 145 mmol/L    Potassium 3.9 3.5 - 5.1 mmol/L    Chloride 108 97 - 108 mmol/L    CO2 27 21 - 32 mmol/L    Anion gap 5 5 - 15 mmol/L    Glucose 123 (H) 65 - 100 mg/dL BUN 15 6 - 20 MG/DL    Creatinine 1.21 0.70 - 1.30 MG/DL    BUN/Creatinine ratio 12 12 - 20      GFR est AA >60 >60 ml/min/1.73m2    GFR est non-AA >60 >60 ml/min/1.73m2    Calcium 8.6 8.5 - 10.1 MG/DL    Bilirubin, total 0.4 0.2 - 1.0 MG/DL    ALT (SGPT) 32 12 - 78 U/L    AST (SGOT) 38 (H) 15 - 37 U/L    Alk. phosphatase 68 45 - 117 U/L    Protein, total 7.1 6.4 - 8.2 g/dL    Albumin 3.3 (L) 3.5 - 5.0 g/dL    Globulin 3.8 2.0 - 4.0 g/dL    A-G Ratio 0.9 (L) 1.1 - 2.2         Medications:  Medications Administered     0.9% sodium chloride injection 10 mL     Admin Date  06/09/2021 Action  Given Dose  10 mL Route  IntraVENous Administered By  Jayden Millin R          heparin (porcine) pf 500 Units     Admin Date  06/09/2021 Action  Given Dose  500 Units Route  IntraVENous Administered By  Jayden Millin R          sodium chloride (NS) flush 5-10 mL     Admin Date  06/09/2021 Action  Given Dose  10 mL Route  IntraVENous Administered By  Delmis Suarez              Patient's port was flushed and heparinized; de-accessed per protocol and bandage placed over site. 1005 - Vital signs stable throughout and prior to discharge, Patient tolerated treatment well and discharged without incident.  Patient is aware of next [facility] appointment on 7/7/21 at 0930

## 2021-06-10 LAB
ALBUMIN SERPL ELPH-MCNC: 3.1 G/DL (ref 2.9–4.4)
ALBUMIN/GLOB SERPL: 0.9 {RATIO} (ref 0.7–1.7)
ALPHA1 GLOB SERPL ELPH-MCNC: 0.3 G/DL (ref 0–0.4)
ALPHA2 GLOB SERPL ELPH-MCNC: 0.9 G/DL (ref 0.4–1)
B-GLOBULIN SERPL ELPH-MCNC: 1.1 G/DL (ref 0.7–1.3)
GAMMA GLOB SERPL ELPH-MCNC: 1.3 G/DL (ref 0.4–1.8)
GLOBULIN SER CALC-MCNC: 3.5 G/DL (ref 2.2–3.9)
IGA SERPL-MCNC: 349 MG/DL (ref 70–400)
IGG SERPL-MCNC: 1070 MG/DL (ref 700–1600)
IGM SERPL-MCNC: <21 MG/DL (ref 40–230)
KAPPA LC FREE SER-MCNC: 47.5 MG/L (ref 3.3–19.4)
KAPPA LC FREE/LAMBDA FREE SER: 1.14 {RATIO} (ref 0.26–1.65)
LAMBDA LC FREE SERPL-MCNC: 41.7 MG/L (ref 5.7–26.3)
M PROTEIN SERPL ELPH-MCNC: NORMAL G/DL
PROT SERPL-MCNC: 6.6 G/DL (ref 6–8.5)

## 2021-06-14 LAB
IGA SERPL-MCNC: 399 MG/DL (ref 61–437)
IGG SERPL-MCNC: 1129 MG/DL (ref 603–1613)
IGM SERPL-MCNC: 18 MG/DL (ref 20–172)
PROT PATTERN SERPL IFE-IMP: ABNORMAL

## 2021-06-17 DIAGNOSIS — C90.00 MULTIPLE MYELOMA, REMISSION STATUS UNSPECIFIED (HCC): ICD-10-CM

## 2021-06-17 RX ORDER — POMALIDOMIDE 2 MG/1
CAPSULE ORAL
Qty: 28 CAPSULE | Refills: 0 | Status: SHIPPED | OUTPATIENT
Start: 2021-06-17 | End: 2021-06-18 | Stop reason: SDUPTHER

## 2021-06-18 DIAGNOSIS — C90.00 MULTIPLE MYELOMA, REMISSION STATUS UNSPECIFIED (HCC): ICD-10-CM

## 2021-06-18 RX ORDER — POMALIDOMIDE 2 MG/1
2 CAPSULE ORAL DAILY
Qty: 28 CAPSULE | Refills: 0 | Status: SHIPPED | OUTPATIENT
Start: 2021-06-18 | End: 2021-07-20

## 2021-06-18 NOTE — PROGRESS NOTES
Per Dr. Elizabet Wilkins and Brad Burns NP a refill for    Requested Prescriptions     Pending Prescriptions Disp Refills    pomalidomide (Pomalyst) 2 mg cap 28 Capsule 0     Sig: Take 1 Capsule by mouth daily. Nano Raymond #[id number]     was sent electronically to the patient's listed [facility] pharmacy.

## 2021-07-02 NOTE — PROGRESS NOTES
Rohan Smart is a 63 y.o. male here for 3 month follow up for Multiple Myeloma. He is on Pomalyst.    1. Have you been to the ER, urgent care clinic since your last visit? Hospitalized since your last visit? no    2. Have you seen or consulted any other health care providers outside of the [address] [place] [address] since your last visit? Include any pap smears or colon screening. no    Pt states he has been marvelous . No complaints. He is trying cannabis now prescribed by his doctor.

## 2021-07-07 ENCOUNTER — OFFICE VISIT (OUTPATIENT)
Dept: ONCOLOGY | Age: 66
End: 2021-07-07
Payer: MEDICARE

## 2021-07-07 ENCOUNTER — HOSPITAL ENCOUNTER (OUTPATIENT)
Dept: INFUSION THERAPY | Age: 66
Discharge: HOME | End: 2021-07-07
Payer: COMMERCIAL

## 2021-07-07 VITALS
HEIGHT: 68 IN | TEMPERATURE: 97.8 F | DIASTOLIC BLOOD PRESSURE: 78 MMHG | SYSTOLIC BLOOD PRESSURE: 124 MMHG | WEIGHT: 202 LBS | OXYGEN SATURATION: 99 % | RESPIRATION RATE: 18 BRPM | BODY MASS INDEX: 30.62 KG/M2 | HEART RATE: 67 BPM

## 2021-07-07 VITALS
DIASTOLIC BLOOD PRESSURE: 78 MMHG | BODY MASS INDEX: 30.75 KG/M2 | WEIGHT: 202.9 LBS | SYSTOLIC BLOOD PRESSURE: 124 MMHG | TEMPERATURE: 97.8 F | RESPIRATION RATE: 18 BRPM | HEIGHT: 68 IN | HEART RATE: 67 BPM | OXYGEN SATURATION: 99 %

## 2021-07-07 DIAGNOSIS — C90.01 MULTIPLE MYELOMA IN REMISSION (HCC): Primary | ICD-10-CM

## 2021-07-07 DIAGNOSIS — C90.00 MULTIPLE MYELOMA, REMISSION STATUS UNSPECIFIED (HCC): ICD-10-CM

## 2021-07-07 LAB
ALBUMIN SERPL-MCNC: 3.4 G/DL (ref 3.5–5)
ALBUMIN/GLOB SERPL: 0.8 {RATIO} (ref 1.1–2.2)
ALP SERPL-CCNC: 90 U/L (ref 45–117)
ALT SERPL-CCNC: 28 U/L (ref 12–78)
ANION GAP BLD CALC-SCNC: 15 MMOL/L (ref 10–20)
AST SERPL-CCNC: 15 U/L (ref 15–37)
BASOPHILS # BLD: 0 K/UL (ref 0–0.1)
BASOPHILS NFR BLD: 0 % (ref 0–1)
BILIRUB DIRECT SERPL-MCNC: 0.1 MG/DL (ref 0–0.2)
BILIRUB SERPL-MCNC: 0.4 MG/DL (ref 0.2–1)
CA-I BLD-MCNC: 1.16 MMOL/L (ref 1.12–1.32)
CHLORIDE BLD-SCNC: 99 MMOL/L (ref 98–107)
CO2 BLD-SCNC: 26 MMOL/L (ref 21–32)
CREAT BLD-MCNC: 1.37 MG/DL (ref 0.6–1.3)
DIFFERENTIAL METHOD BLD: ABNORMAL
EOSINOPHIL # BLD: 0.1 K/UL (ref 0–0.4)
EOSINOPHIL NFR BLD: 2 % (ref 0–7)
ERYTHROCYTE [DISTWIDTH] IN BLOOD BY AUTOMATED COUNT: 16 % (ref 11.5–14.5)
GLOBULIN SER CALC-MCNC: 4.1 G/DL (ref 2–4)
GLUCOSE BLD-MCNC: 204 MG/DL (ref 65–100)
HCT VFR BLD AUTO: 37.3 % (ref 36.6–50.3)
HGB BLD-MCNC: 11.6 G/DL (ref 12.1–17)
IGA SERPL-MCNC: 381 MG/DL (ref 70–400)
IGG SERPL-MCNC: 1210 MG/DL (ref 700–1600)
IGM SERPL-MCNC: <21 MG/DL (ref 40–230)
IMM GRANULOCYTES # BLD AUTO: 0 K/UL (ref 0–0.04)
IMM GRANULOCYTES NFR BLD AUTO: 0 % (ref 0–0.5)
LYMPHOCYTES # BLD: 1.1 K/UL (ref 0.8–3.5)
LYMPHOCYTES NFR BLD: 21 % (ref 12–49)
MCH RBC QN AUTO: 28.6 PG (ref 26–34)
MCHC RBC AUTO-ENTMCNC: 31.1 G/DL (ref 30–36.5)
MCV RBC AUTO: 92.1 FL (ref 80–99)
MONOCYTES # BLD: 0.9 K/UL (ref 0–1)
MONOCYTES NFR BLD: 17 % (ref 5–13)
NEUTS BAND NFR BLD MANUAL: 1 %
NEUTS SEG # BLD: 2.9 K/UL (ref 1.8–8)
NEUTS SEG NFR BLD: 59 % (ref 32–75)
NRBC # BLD: 0 K/UL (ref 0–0.01)
NRBC BLD-RTO: 0 PER 100 WBC
PLATELET # BLD AUTO: 165 K/UL (ref 150–400)
PMV BLD AUTO: 10.5 FL (ref 8.9–12.9)
POTASSIUM BLD-SCNC: 4 MMOL/L (ref 3.5–5.1)
PROT SERPL-MCNC: 7.5 G/DL (ref 6.4–8.2)
RBC # BLD AUTO: 4.05 M/UL (ref 4.1–5.7)
RBC MORPH BLD: ABNORMAL
SERVICE CMNT-IMP: ABNORMAL
SODIUM BLD-SCNC: 139 MMOL/L (ref 136–145)
WBC # BLD AUTO: 5 K/UL (ref 4.1–11.1)

## 2021-07-07 PROCEDURE — G9717 DOC PT DX DEP/BP F/U NT REQ: HCPCS | Performed by: INTERNAL MEDICINE

## 2021-07-07 PROCEDURE — G8754 DIAS BP LESS 90: HCPCS | Performed by: INTERNAL MEDICINE

## 2021-07-07 PROCEDURE — 74011250636 HC RX REV CODE- 250/636: Performed by: INTERNAL MEDICINE

## 2021-07-07 PROCEDURE — G8417 CALC BMI ABV UP PARAM F/U: HCPCS | Performed by: INTERNAL MEDICINE

## 2021-07-07 PROCEDURE — 99214 OFFICE O/P EST MOD 30 MIN: CPT | Performed by: INTERNAL MEDICINE

## 2021-07-07 PROCEDURE — 82784 ASSAY IGA/IGD/IGG/IGM EACH: CPT

## 2021-07-07 PROCEDURE — G8427 DOCREV CUR MEDS BY ELIG CLIN: HCPCS | Performed by: INTERNAL MEDICINE

## 2021-07-07 PROCEDURE — 96374 THER/PROPH/DIAG INJ IV PUSH: CPT

## 2021-07-07 PROCEDURE — 36415 COLL VENOUS BLD VENIPUNCTURE: CPT

## 2021-07-07 PROCEDURE — 3017F COLORECTAL CA SCREEN DOC REV: CPT | Performed by: INTERNAL MEDICINE

## 2021-07-07 PROCEDURE — 85025 COMPLETE CBC W/AUTO DIFF WBC: CPT

## 2021-07-07 PROCEDURE — 84165 PROTEIN E-PHORESIS SERUM: CPT

## 2021-07-07 PROCEDURE — G8752 SYS BP LESS 140: HCPCS | Performed by: INTERNAL MEDICINE

## 2021-07-07 PROCEDURE — 80047 BASIC METABLC PNL IONIZED CA: CPT

## 2021-07-07 PROCEDURE — 83883 ASSAY NEPHELOMETRY NOT SPEC: CPT

## 2021-07-07 PROCEDURE — 80076 HEPATIC FUNCTION PANEL: CPT

## 2021-07-07 RX ORDER — HEPARIN 100 UNIT/ML
300-500 SYRINGE INTRAVENOUS AS NEEDED
Status: ACTIVE | OUTPATIENT
Start: 2021-07-07 | End: 2021-07-07

## 2021-07-07 RX ORDER — SODIUM CHLORIDE 0.9 % (FLUSH) 0.9 %
5-10 SYRINGE (ML) INJECTION AS NEEDED
Status: CANCELLED | OUTPATIENT
Start: 2021-08-04 | End: 2021-08-04

## 2021-07-07 RX ORDER — HEPARIN 100 UNIT/ML
500 SYRINGE INTRAVENOUS AS NEEDED
Status: CANCELLED | OUTPATIENT
Start: 2021-07-07

## 2021-07-07 RX ORDER — SODIUM CHLORIDE 9 MG/ML
10 INJECTION INTRAMUSCULAR; INTRAVENOUS; SUBCUTANEOUS AS NEEDED
Status: CANCELLED | OUTPATIENT
Start: 2021-08-04

## 2021-07-07 RX ORDER — SODIUM CHLORIDE 0.9 % (FLUSH) 0.9 %
5-10 SYRINGE (ML) INJECTION AS NEEDED
Status: CANCELLED | OUTPATIENT
Start: 2021-07-07

## 2021-07-07 RX ORDER — SODIUM CHLORIDE 0.9 % (FLUSH) 0.9 %
10 SYRINGE (ML) INJECTION AS NEEDED
Status: DISPENSED | OUTPATIENT
Start: 2021-07-07 | End: 2021-07-07

## 2021-07-07 RX ORDER — SODIUM CHLORIDE 9 MG/ML
10 INJECTION INTRAMUSCULAR; INTRAVENOUS; SUBCUTANEOUS AS NEEDED
Status: ACTIVE | OUTPATIENT
Start: 2021-07-07 | End: 2021-07-07

## 2021-07-07 RX ORDER — HEPARIN 100 UNIT/ML
500 SYRINGE INTRAVENOUS AS NEEDED
Status: CANCELLED | OUTPATIENT
Start: 2021-08-04

## 2021-07-07 RX ORDER — SODIUM CHLORIDE 9 MG/ML
10 INJECTION INTRAMUSCULAR; INTRAVENOUS; SUBCUTANEOUS AS NEEDED
Status: CANCELLED | OUTPATIENT
Start: 2021-07-07

## 2021-07-07 RX ADMIN — HEPARIN 500 UNITS: 100 SYRINGE at 09:56

## 2021-07-07 RX ADMIN — Medication 10 ML: at 09:55

## 2021-07-07 RX ADMIN — Medication 20 ML: at 09:32

## 2021-07-07 RX ADMIN — ZOLEDRONIC ACID 4 MG: 4 SOLUTION INTRAVENOUS at 09:41

## 2021-07-07 NOTE — PROGRESS NOTES
Pt arrived to [facility] ambulatory in no acute distress at 0915 for [address] flush/labs/Zometa. Assessment unremarkable except c/o neck pain 4/10. R chest port accessed without issue and positive blood return noted. Labs obtained- CBC with diff, Hepatic Function Panel, Myeloma Panel, and Chem 8 I-stat. Patient denied having any symptoms of COVID-19, i.e. SOB, coughing, fever, or generally not feeling well. Also denies having been exposed to COVID-19 recently or having had any recent contact with family/friend that has a pending COVID test.    Visit Vitals  /78 (BP 1 Location: Left arm, BP Patient Position: Sitting)   Pulse 67   Temp 97.8 °F (36.6 °C)   Resp 18   Ht 5' 8\" (1.727 m)   Wt 92 kg (202 lb 14.4 oz)   SpO2 99%   BMI 30.85 kg/m²     Recent Results (from the past 12 hour(s))   CBC WITH AUTOMATED DIFF    Collection Time: 07/07/21  9:30 AM   Result Value Ref Range    WBC 5.0 4.1 - 11.1 K/uL    RBC 4.05 (L) 4.10 - 5.70 M/uL    HGB 11.6 (L) 12.1 - 17.0 g/dL    HCT 37.3 36.6 - 50.3 %    MCV 92.1 80.0 - 99.0 FL    MCH 28.6 26.0 - 34.0 PG    MCHC 31.1 30.0 - 36.5 g/dL    RDW 16.0 (H) 11.5 - 14.5 %    PLATELET 826 105 - 556 K/uL    MPV 10.5 8.9 - 12.9 FL    NRBC 0.0 0  WBC    ABSOLUTE NRBC 0.00 0.00 - 0.01 K/uL    NEUTROPHILS PENDING %    LYMPHOCYTES PENDING %    MONOCYTES PENDING %    EOSINOPHILS PENDING %    BASOPHILS PENDING %    IMMATURE GRANULOCYTES PENDING %    ABS. NEUTROPHILS PENDING K/UL    ABS. LYMPHOCYTES PENDING K/UL    ABS. MONOCYTES PENDING K/UL    ABS. EOSINOPHILS PENDING K/UL    ABS. BASOPHILS PENDING K/UL    ABS. IMM. GRANS. PENDING K/UL    DF PENDING    HEPATIC FUNCTION PANEL    Collection Time: 07/07/21  9:30 AM   Result Value Ref Range    Protein, total 7.5 6.4 - 8.2 g/dL    Albumin 3.4 (L) 3.5 - 5.0 g/dL    Globulin 4.1 (H) 2.0 - 4.0 g/dL    A-G Ratio 0.8 (L) 1.1 - 2.2      Bilirubin, total 0.4 0.2 - 1.0 MG/DL    Bilirubin, direct 0.1 0.0 - 0.2 MG/DL    Alk.  phosphatase 90 45 - 117 U/L    AST (SGOT) 15 15 - 37 U/L    ALT (SGPT) 28 12 - 78 U/L   POC CHEM8    Collection Time: 07/07/21  9:33 AM   Result Value Ref Range    Calcium, ionized (POC) 1.16 1.12 - 1.32 mmol/L    Sodium (POC) 139 136 - 145 mmol/L    Potassium (POC) 4.0 3.5 - 5.1 mmol/L    Chloride (POC) 99 98 - 107 mmol/L    CO2 (POC) 26.0 21 - 32 mmol/L    Anion gap (POC) 15 10 - 20 mmol/L    Glucose (POC) 204 (H) 65 - 100 mg/dL    Creatinine (POC) 1.37 (H) 0.6 - 1.3 mg/dL    GFRAA, POC >60 >60 ml/min/1.73m2    GFRNA, POC 52 (L) >60 ml/min/1.73m2    Comment Comment Not Indicated. Several labs pending at time of note. See [facility] for results. CrCl 71.1    The following medications administered:  Zometa 4 mg IV over 15 minutes    Pt tolerated treatment well. No adverse reaction noted. Port flushed per policy and needle removed, 2x2 and paper tape placed. Pt discharged ambulatory in no acute distress at 1000, accompanied by spouse. Next appointment 8/4/21 @ 5727.

## 2021-07-07 NOTE — PROGRESS NOTES
[address] [facility] [address], [address] [facility], [address] [place] [address], [address] [facility]  [phone]            Progress Note        Patient: Dallin Rodríguez Sr. MRN: [id number]  SSN: [id number]    YOB: 1955  Age: 63 y.o. Sex: male        Diagnosis:      1. Multiple Myeloma, IgA Kappa   [address] stage IIIB    Cytogenetics/FISH: t(14;16) - high risk    Ttreatment:     1. Maintenance therapy - Pomalyst 2 mg daily - 11/19/2018   2. Maintenance therapy - Ixazomib - started 10/15/2018 - stopped 11/12/2018  3. S/P tandem  Autotransplant   First on 2/28/2018   2nd on 06/13/2018  4. Carfilzomib/Pom/Dex - s/p 5 cycles  5. VD-PACE s/p 1 cycle  6. RVD - s/p 4 cycles    Subjective:      Dallin Rodríguez Sr. is a 63 y.o. male with a diagnosis of IgA kappa myeloma. Bone marrow shows 100% aberrant plasma cells. He suffers with DM but it is diet controlled. He has received systemic anti-viral treatment for chronic Hep C with successful eradication of the virus. Mr. Laura Ruff received 4 cycles of systemic therapy with RVd. He had an initial good response to treatment. However his paraprotein level started rising and the myeloma became refractory to treatment. I then administered one cycle of VD-PACE in the hospital. He then received Carfilzomib/Pom/Dex. He achieved VGPR. He underwent tandem autotransplant at [facility]. According to the patient he achieved complete response with therapy. He has undergone second/tandem transplant in June. He took Pomalyst maintenance until Aug 2019 then had to stop due to recurrent prostatitis. He had a repeat BM biopsy in February 2019 at [facility] which showed complete molecular remission. He is taking Pomalyst. He is here today with his wife and feels well with no new complaints.       Review of Systems:    Constitutional: negative  Eyes: negative  Ears, Nose, Mouth, Throat, and Face: negative  Respiratory: negative  Cardiovascular: negative  Gastrointestinal: negative  Genitourinary:negative  Integument/Breast: negative  Hematologic/Lymphatic: negative  Musculoskeletal: negative  Neurological: negative      Past Medical History:   Diagnosis Date    Acid reflux     Arrhythmia     pvc's    Chronic pain     neck    Depression     Diabetes ([address]     Femoral hernia 2012    Hepatitis C     Hypertension     Inguinal hernia 2012    Liver disease     hepatitis c    Multiple myeloma (HCC)     Other ill-defined conditions(799.89)     Hx of PVCs since     Prostatitis, acute     Psychiatric disorder     depression     Past Surgical History:   Procedure Laterality Date    COLONOSCOPY N/A 2016    COLONOSCOPY performed by Satinder Hernandez MD at hospitals ENDOSCOPY    HX APPENDECTOMY      HX CERVICAL FUSION  2008    x 1 as of 2014    HX HEENT      foreign body removed from right eye    HX HERNIA REPAIR      [facility]      HX ORTHOPAEDIC      cervical fusion    HX OTHER SURGICAL  2000    liver bx - chronic hepatitis      Family History   Problem Relation Age of Onset    Arthritis-osteo Mother     Arthritis-osteo Father     Diabetes Father     Hypertension Maternal Grandmother     Diabetes Maternal Grandmother     Hypertension Maternal Grandfather     Diabetes Maternal Grandfather      Social History     Tobacco Use    Smoking status: Former Smoker     Packs/day: 1.00     Years: 12.00     Pack years: 12.00     Types: Cigarettes     Quit date: 1989     Years since quittin.5    Smokeless tobacco: Never Used    Tobacco comment: former cigarette smoker   Substance Use Topics    Alcohol use: No     Comment: former alcoholic-quit [id number]      Prior to Admission medications    Medication Sig Start Date End Date Taking? Authorizing Provider   OTHER Cannabis prescription   Yes Provider, Historical   pomalidomide (Pomalyst) 2 mg cap Take 1 Capsule by mouth daily.  Julio Ahn #[id number] 6/18/21  Yes Valentina CHÁVEZ NP   zolpidem 3.5 mg subl DISSOLVE 1 TABLET UNDER TONGUE AT BEDTIME 4/10/21  Yes Provider, Historical   tenofovir DISOPROXIL FUMARATE (VIREAD) 300 mg tablet TAKE 1 TABLET BY MOUTH 1 TIME A DAY. 4/27/21  Yes Obdulio Hinojosa MD   cholecalciferol, vitamin D3, (Vitamin D3) 50 mcg (2,000 unit) tab Take  by mouth daily. Yes Provider, Historical   ascorbic acid, vitamin C, (VITAMIN C) 100 mg tab Take  by mouth. Yes Provider, Historical   Denta 5000 Plus 1.1 % crea USE AS DIRECTED TWO TIMES DAILY 8/12/20  Yes Provider, Historical   ibuprofen (MOTRIN) 800 mg tablet TAKE 1 TABLET BY MOUTH 3 TIMES A DAY AS NEEDED FOR PAIN 10/13/20  Yes Provider, Historical   metFORMIN ER (GLUCOPHAGE XR) 500 mg tablet TAKE 1 TABLET BY MOUTH TWICE DAILY 7/22/20  Yes Alena Dalton MD   buPROPion XL (WELLBUTRIN XL) 300 mg XL tablet TAKE 1 TABLET BY MOUTH EVERY DAY 1/30/20  Yes Provider, Historical   naproxen (NAPROSYN) 500 mg tablet TAKE 1 TABLET BY MOUTH TWICE A DAY 8/26/19  Yes Provider, Historical   NUCYNTA 100 mg tablet TAKE 2 TABLETS 3 TIMES A DAY 7/27/19  Yes Provider, Historical   aspirin delayed-release 81 mg tablet Take  by mouth daily. Yes Provider, Historical   prochlorperazine (COMPAZINE) 10 mg tablet Take 5 mg by mouth every six (6) hours as needed. Yes Other, MD Goldy   omeprazole (PRILOSEC) 20 mg capsule Take 20 mg by mouth daily as needed (acid reflux). Yes Other, MD Goldy   ondansetron (ZOFRAN ODT) 8 mg disintegrating tablet Take 8 mg by mouth every eight (8) hours as needed for Nausea. Yes Other, MD Goldy   multivitamin (ONE A DAY) tablet Take 1 Tab by mouth daily. Yes Provider, Historical   tamsulosin (FLOMAX) 0.4 mg capsule Take 1 Cap by mouth daily. 4/4/17  Yes Cely Agauyo MD   zolpidem CR (AMBIEN CR) 12.5 mg tablet Take 12.5 mg by mouth nightly. Yes Provider, Historical   aripiprazole (ABILIFY) 5 mg tablet Take 2.5 mg by mouth every morning.    Yes Other, MD Goldy Allergies   Allergen Reactions    Mercury (Bulk) Hives     Blisters             Objective:     Visit Vitals  /78   Pulse 67   Temp 97.8 °F (36.6 °C)   Resp 18   Ht 5' 8\" (1.727 m)   Wt 202 lb (91.6 kg)   SpO2 99%   BMI 30.71 kg/m²       Pain Scale: 0 - No pain/10  Pain Location:       Physical Exam:     GENERAL: alert, cooperative  EYE: negative  LYMPHATIC: Cervical, supraclavicular, and axillary nodes normal.   THROAT & NECK: normal and no erythema or exudates noted. LUNG: clear to auscultation bilaterally  HEART: regular rate and rhythm  ABDOMEN: soft, non-tender  EXTREMITIES: normal  SKIN: Normal.  NEUROLOGIC: negative     Physical exam copied from previous note. Reviewed and no changes noted. Lab Results   Component Value Date/Time    WBC 5.0 07/07/2021 09:30 AM    HGB 11.6 (L) 07/07/2021 09:30 AM    Hematocrit (POC) 37 04/14/2021 09:48 AM    HCT 37.3 07/07/2021 09:30 AM    PLATELET 158 85/50/0142 09:30 AM    MCV 92.1 07/07/2021 09:30 AM       Lab Results   Component Value Date/Time    Sodium 140 06/09/2021 09:54 AM    Potassium 3.9 06/09/2021 09:54 AM    Chloride 108 06/09/2021 09:54 AM    CO2 27 06/09/2021 09:54 AM    Anion gap 5 06/09/2021 09:54 AM    Glucose 123 (H) 06/09/2021 09:54 AM    BUN 15 06/09/2021 09:54 AM    Creatinine 1.21 06/09/2021 09:54 AM    BUN/Creatinine ratio 12 06/09/2021 09:54 AM    GFR est AA >60 06/09/2021 09:54 AM    GFR est non-AA >60 06/09/2021 09:54 AM    Calcium 8.6 06/09/2021 09:54 AM            Assessment:     1. Multiple Myeloma, IgA Kappa   Durie [place] stage IIIB    Cytogenetics/FISH: t(14;16) - high risk  ECOG PS - 0   Intent of therapy: palliative    Received 3 cycles of RVd   Dose reduced Revlimid and Velcade d/t continued cytopenias. M-spike started rising. He received one cycle of VD-PACE (bortezomib, dexamethasone, thalidomide, cisplatin, adriamycin, cyclophosphamide, and etoposide).   Achieved OR with once cycle    Receivied KPd - s/p 5 cycles    Excellent response with M-protein came down to 0.1    S/p tandem autotransplant    First on 2/28/2018   2nd on 06/13/2018    Achieved CR after the first transplant. Repeat bone marrow @ [facility] - Complete response. Since he is high risk based on cytogenetics, I recommend proteosome inhibitor as maintenance therapy. Took Ixazomib briefly  Developed fever, was in the hospital.   Also developed cytopenias    Patient prefered to switch therapy to Pomalyst.     Pomalyst 2 mg daily - started 11/19/2018  Tolerating treatment very well  Denies any side effects. A detailed system by system evaluation of side effect was performed to assess chemotherapy related toxicity. Blood counts are acceptable. Results reviewed with the patient    M-spike is invisible  Bone marrow 04/2021 - in CR. 2. Type 2 DM with complication    Managed by Endocrine      3. Chronic Hep C    In sustained virological remission      Plan:       > Continue Pomalyst maintenance  > Myeloma labs and port flush monthly   > Zometa every 3 months  > Follow-up in 3 months      Signed by: Donnell Vidal MD                     July 7, 2021      CC. Jim Martinez MD  CC.  Mau Morrissey MD

## 2021-07-08 LAB
ALBUMIN SERPL ELPH-MCNC: 3.5 G/DL (ref 2.9–4.4)
ALBUMIN/GLOB SERPL: 1.1 {RATIO} (ref 0.7–1.7)
ALPHA1 GLOB SERPL ELPH-MCNC: 0.2 G/DL (ref 0–0.4)
ALPHA2 GLOB SERPL ELPH-MCNC: 0.9 G/DL (ref 0.4–1)
B-GLOBULIN SERPL ELPH-MCNC: 1 G/DL (ref 0.7–1.3)
GAMMA GLOB SERPL ELPH-MCNC: 1.3 G/DL (ref 0.4–1.8)
GLOBULIN SER CALC-MCNC: 3.3 G/DL (ref 2.2–3.9)
KAPPA LC FREE SER-MCNC: 23.9 MG/L (ref 3.3–19.4)
KAPPA LC FREE/LAMBDA FREE SER: 1.07 {RATIO} (ref 0.26–1.65)
LAMBDA LC FREE SERPL-MCNC: 22.4 MG/L (ref 5.7–26.3)
M PROTEIN SERPL ELPH-MCNC: NORMAL G/DL
PROT SERPL-MCNC: 6.8 G/DL (ref 6–8.5)

## 2021-07-10 LAB
IGA SERPL-MCNC: 390 MG/DL (ref 61–437)
IGG SERPL-MCNC: 1191 MG/DL (ref 603–1613)
IGM SERPL-MCNC: 26 MG/DL (ref 20–172)
PROT PATTERN SERPL IFE-IMP: ABNORMAL

## 2021-07-15 DIAGNOSIS — C90.00 MULTIPLE MYELOMA, REMISSION STATUS UNSPECIFIED (HCC): ICD-10-CM

## 2021-07-15 NOTE — LETTER
3/13/20 Patient: Wendy Cook. YOB: 1955 Date of Visit: 3/13/2020 Reyes Griffin MD 
[address] [place] [address] [id number] VIA In Basket Dear Reyes Griffin MD, Thank you for referring Mr. Dick Steve to [address] [place] [address] for evaluation. My notes for this consultation are attached. If you have questions, please do not hesitate to call me. I look forward to following your patient along with you.  
 
 
Sincerely, 
 
Lalita Interiano MD 
 

I was present for the entire evaluation

## 2021-07-20 RX ORDER — POMALIDOMIDE 2 MG/1
CAPSULE ORAL
Qty: 28 CAPSULE | Refills: 0 | Status: SHIPPED | OUTPATIENT
Start: 2021-07-20 | End: 2021-08-13

## 2021-07-20 NOTE — TELEPHONE ENCOUNTER
Requested Prescriptions     Pending Prescriptions Disp Refills    Pomalyst 2 mg cap [Pharmacy Med Name: Judson Na 2MG] 28 Capsule 0     Sig: TAKE 1 CAPSULE BY MOUTH ONCE DAILY     Approved per Judith Sparrow from [address] [facility]

## 2021-08-04 ENCOUNTER — HOSPITAL ENCOUNTER (OUTPATIENT)
Dept: INFUSION THERAPY | Age: 66
Discharge: HOME | End: 2021-08-04
Payer: COMMERCIAL

## 2021-08-04 VITALS
WEIGHT: 201.4 LBS | HEART RATE: 67 BPM | DIASTOLIC BLOOD PRESSURE: 78 MMHG | OXYGEN SATURATION: 98 % | TEMPERATURE: 97.9 F | RESPIRATION RATE: 16 BRPM | HEIGHT: 68 IN | SYSTOLIC BLOOD PRESSURE: 121 MMHG | BODY MASS INDEX: 30.52 KG/M2

## 2021-08-04 DIAGNOSIS — C90.00 MULTIPLE MYELOMA, REMISSION STATUS UNSPECIFIED (HCC): Primary | ICD-10-CM

## 2021-08-04 LAB
ALBUMIN SERPL-MCNC: 3.2 G/DL (ref 3.5–5)
ALBUMIN/GLOB SERPL: 0.9 {RATIO} (ref 1.1–2.2)
ALP SERPL-CCNC: 82 U/L (ref 45–117)
ALT SERPL-CCNC: 19 U/L (ref 12–78)
ANION GAP SERPL CALC-SCNC: 4 MMOL/L (ref 5–15)
AST SERPL-CCNC: 16 U/L (ref 15–37)
BASOPHILS # BLD: 0 K/UL (ref 0–0.1)
BASOPHILS NFR BLD: 1 % (ref 0–1)
BILIRUB SERPL-MCNC: 0.2 MG/DL (ref 0.2–1)
BUN SERPL-MCNC: 17 MG/DL (ref 6–20)
BUN/CREAT SERPL: 11 (ref 12–20)
CALCIUM SERPL-MCNC: 8 MG/DL (ref 8.5–10.1)
CHLORIDE SERPL-SCNC: 106 MMOL/L (ref 97–108)
CO2 SERPL-SCNC: 29 MMOL/L (ref 21–32)
CREAT SERPL-MCNC: 1.53 MG/DL (ref 0.7–1.3)
DIFFERENTIAL METHOD BLD: ABNORMAL
EOSINOPHIL # BLD: 0.1 K/UL (ref 0–0.4)
EOSINOPHIL NFR BLD: 3 % (ref 0–7)
ERYTHROCYTE [DISTWIDTH] IN BLOOD BY AUTOMATED COUNT: 16.3 % (ref 11.5–14.5)
GLOBULIN SER CALC-MCNC: 3.7 G/DL (ref 2–4)
GLUCOSE SERPL-MCNC: 124 MG/DL (ref 65–100)
HCT VFR BLD AUTO: 34.8 % (ref 36.6–50.3)
HGB BLD-MCNC: 10.9 G/DL (ref 12.1–17)
IGA SERPL-MCNC: 309 MG/DL (ref 70–400)
IGG SERPL-MCNC: 962 MG/DL (ref 700–1600)
IGM SERPL-MCNC: 22 MG/DL (ref 40–230)
IMM GRANULOCYTES # BLD AUTO: 0 K/UL (ref 0–0.04)
IMM GRANULOCYTES NFR BLD AUTO: 0 % (ref 0–0.5)
LYMPHOCYTES # BLD: 0.9 K/UL (ref 0.8–3.5)
LYMPHOCYTES NFR BLD: 28 % (ref 12–49)
MCH RBC QN AUTO: 28.5 PG (ref 26–34)
MCHC RBC AUTO-ENTMCNC: 31.3 G/DL (ref 30–36.5)
MCV RBC AUTO: 90.9 FL (ref 80–99)
MONOCYTES # BLD: 0.7 K/UL (ref 0–1)
MONOCYTES NFR BLD: 22 % (ref 5–13)
NEUTS SEG # BLD: 1.4 K/UL (ref 1.8–8)
NEUTS SEG NFR BLD: 46 % (ref 32–75)
NRBC # BLD: 0 K/UL (ref 0–0.01)
NRBC BLD-RTO: 0 PER 100 WBC
PLATELET # BLD AUTO: 129 K/UL (ref 150–400)
PMV BLD AUTO: 10.8 FL (ref 8.9–12.9)
POTASSIUM SERPL-SCNC: 3.9 MMOL/L (ref 3.5–5.1)
PROT SERPL-MCNC: 6.9 G/DL (ref 6.4–8.2)
RBC # BLD AUTO: 3.83 M/UL (ref 4.1–5.7)
RBC MORPH BLD: ABNORMAL
SODIUM SERPL-SCNC: 139 MMOL/L (ref 136–145)
WBC # BLD AUTO: 3.1 K/UL (ref 4.1–11.1)

## 2021-08-04 PROCEDURE — 77030012965 HC NDL HUBR BBMI -A

## 2021-08-04 PROCEDURE — 80053 COMPREHEN METABOLIC PANEL: CPT

## 2021-08-04 PROCEDURE — 82784 ASSAY IGA/IGD/IGG/IGM EACH: CPT

## 2021-08-04 PROCEDURE — 84165 PROTEIN E-PHORESIS SERUM: CPT

## 2021-08-04 PROCEDURE — 85025 COMPLETE CBC W/AUTO DIFF WBC: CPT

## 2021-08-04 PROCEDURE — 83883 ASSAY NEPHELOMETRY NOT SPEC: CPT

## 2021-08-04 PROCEDURE — 74011250636 HC RX REV CODE- 250/636: Performed by: INTERNAL MEDICINE

## 2021-08-04 PROCEDURE — 36591 DRAW BLOOD OFF VENOUS DEVICE: CPT

## 2021-08-04 RX ORDER — HEPARIN 100 UNIT/ML
500 SYRINGE INTRAVENOUS AS NEEDED
Status: ACTIVE | OUTPATIENT
Start: 2021-08-04 | End: 2021-08-04

## 2021-08-04 RX ORDER — SODIUM CHLORIDE 9 MG/ML
10 INJECTION INTRAMUSCULAR; INTRAVENOUS; SUBCUTANEOUS AS NEEDED
Status: ACTIVE | OUTPATIENT
Start: 2021-08-04 | End: 2021-08-04

## 2021-08-04 RX ORDER — METFORMIN HYDROCHLORIDE 500 MG/1
500 TABLET, EXTENDED RELEASE ORAL 2 TIMES DAILY
Qty: 180 TABLET | Refills: 1 | Status: SHIPPED | OUTPATIENT
Start: 2021-08-04 | End: 2021-08-06 | Stop reason: SDUPTHER

## 2021-08-04 RX ORDER — SODIUM CHLORIDE 0.9 % (FLUSH) 0.9 %
5-10 SYRINGE (ML) INJECTION AS NEEDED
Status: DISPENSED | OUTPATIENT
Start: 2021-08-04 | End: 2021-08-04

## 2021-08-04 RX ADMIN — Medication 10 ML: at 09:35

## 2021-08-04 RX ADMIN — SODIUM CHLORIDE 10 ML: 9 INJECTION INTRAMUSCULAR; INTRAVENOUS; SUBCUTANEOUS at 09:28

## 2021-08-04 RX ADMIN — Medication 500 UNITS: at 09:35

## 2021-08-04 NOTE — PROGRESS NOTES
[address] [facility] Lab Draw Note:  Arrived - 4804    Patient denied having any symptoms of COVID-19, i.e. SOB, coughing, fever, or generally not feeling well. Also denies having been exposed to COVID-19 recently or having had any recent contact with family/friend that has a pending COVID test.    Patient Vitals for the past 12 hrs:   Temp Pulse Resp BP SpO2   08/04/21 0928 97.9 °F (36.6 °C) 67 16 121/78 98 %           R chest port accessed with 1\" needle and labs drawn-CBC, CMP, Immunofixation, Immunoglobulins G/A/M, Serum free light chains, and SPEP.     0935- Tolerated well. Pt denies any acute problems/changes. Port flushed per policy and de-accessed. Site covered with 2x2 and bandaid. Discharged from [facility] ambulatory. No distress. Next appt: 9/1/2021. See connect care for pending lab results.

## 2021-08-04 NOTE — LETTER
8/4/2021 11:03 AM    Mr. Bucky Cerda [phone]      This is Dr. Shahram New, covering for Dr. Reubin Burkitt, who has left his practice officially on 5/23/21. I took care of your refills and gave you 6 months of medication which will give you ample time to establish with a different physician for your Endocrine needs. Our practice is actively trying to recruit a replacement for Dr. Reubin Burkitt so you are welcome to try contacting our office in the coming months to find out if we have hired a replacement but until that happens, the providers in our practice will not be able to absorb hardly any of Dr. Reubin Burkitt' patients. It therefore may be more feasible to try and establish with one of the practices listed below if they have better availability or return to your primary care provider.      Alternate locations available to you:     [facility]   Two locations: [address]   Phone: [address] [facility]   Two locations: [address] Nyla Boss   Phone: [phone]

## 2021-08-05 LAB
ALBUMIN SERPL ELPH-MCNC: 3.4 G/DL (ref 2.9–4.4)
ALBUMIN/GLOB SERPL: 1.1 {RATIO} (ref 0.7–1.7)
ALPHA1 GLOB SERPL ELPH-MCNC: 0.2 G/DL (ref 0–0.4)
ALPHA2 GLOB SERPL ELPH-MCNC: 0.9 G/DL (ref 0.4–1)
B-GLOBULIN SERPL ELPH-MCNC: 1 G/DL (ref 0.7–1.3)
GAMMA GLOB SERPL ELPH-MCNC: 1 G/DL (ref 0.4–1.8)
GLOBULIN SER CALC-MCNC: 3.1 G/DL (ref 2.2–3.9)
KAPPA LC FREE SER-MCNC: 35.7 MG/L (ref 3.3–19.4)
KAPPA LC FREE/LAMBDA FREE SER: 1.16 {RATIO} (ref 0.26–1.65)
LAMBDA LC FREE SERPL-MCNC: 30.9 MG/L (ref 5.7–26.3)
M PROTEIN SERPL ELPH-MCNC: NORMAL G/DL
PROT SERPL-MCNC: 6.5 G/DL (ref 6–8.5)

## 2021-08-06 ENCOUNTER — TELEPHONE (OUTPATIENT)
Dept: ENDOCRINOLOGY | Age: 66
End: 2021-08-06

## 2021-08-06 LAB
IGA SERPL-MCNC: 333 MG/DL (ref 61–437)
IGG SERPL-MCNC: 959 MG/DL (ref 603–1613)
IGM SERPL-MCNC: 22 MG/DL (ref 20–172)
PROT PATTERN SERPL IFE-IMP: ABNORMAL

## 2021-08-06 RX ORDER — METFORMIN HYDROCHLORIDE 500 MG/1
500 TABLET, EXTENDED RELEASE ORAL 2 TIMES DAILY
Qty: 180 TABLET | Refills: 1 | Status: SHIPPED | OUTPATIENT
Start: 2021-08-06 | End: 2022-06-25 | Stop reason: SDUPTHER

## 2021-08-06 NOTE — TELEPHONE ENCOUNTER
----- Message from Anahi Wheeler sent at 8/6/2021  3:01 PM EDT -----  Regarding: Dr. Raven Mckay first and last name: N/A  Reason for call: Medication   Best contact number(s): [phone]  Details to clarify the request: Pt stated that he was seen by Dr. Pedro Garcia and would like to be seen in September. Pt stated that he is out of his \"Metformin 500 mg\" medication and wants to know if he is able to get a refill? Pt stated that he needs a call back right away.

## 2021-08-06 NOTE — TELEPHONE ENCOUNTER
I returned this call and let Mr. Fabiana Woody know that we had sent in a script on 8/4/21 but when I looked at it I saw that it had been sent to the wrong pharmacy. I redid the script and sent it to  to sign and it will go to the local [facility]. I also explained that the remaining doctors in the practice all have full panels and can not see any new patients. He said that he will call back in the fall to see if we have gotten a new doctor in the practice yet.   Kendall Lat

## 2021-09-01 ENCOUNTER — APPOINTMENT (OUTPATIENT)
Dept: INFUSION THERAPY | Age: 66
End: 2021-09-01

## 2021-09-01 ENCOUNTER — HOSPITAL ENCOUNTER (OUTPATIENT)
Dept: INFUSION THERAPY | Age: 66
Discharge: HOME | End: 2021-09-01
Payer: COMMERCIAL

## 2021-09-01 VITALS
RESPIRATION RATE: 18 BRPM | BODY MASS INDEX: 29.87 KG/M2 | TEMPERATURE: 98.3 F | WEIGHT: 197.1 LBS | HEART RATE: 67 BPM | SYSTOLIC BLOOD PRESSURE: 122 MMHG | HEIGHT: 68 IN | DIASTOLIC BLOOD PRESSURE: 73 MMHG | OXYGEN SATURATION: 100 %

## 2021-09-01 DIAGNOSIS — C90.00 MULTIPLE MYELOMA, REMISSION STATUS UNSPECIFIED (HCC): Primary | ICD-10-CM

## 2021-09-01 LAB
ALBUMIN SERPL-MCNC: 3.3 G/DL (ref 3.5–5)
ALBUMIN/GLOB SERPL: 0.8 {RATIO} (ref 1.1–2.2)
ALP SERPL-CCNC: 65 U/L (ref 45–117)
ALT SERPL-CCNC: 57 U/L (ref 12–78)
ANION GAP SERPL CALC-SCNC: 3 MMOL/L (ref 5–15)
AST SERPL-CCNC: 62 U/L (ref 15–37)
BASOPHILS # BLD: 0 K/UL (ref 0–0.1)
BASOPHILS NFR BLD: 1 % (ref 0–1)
BILIRUB SERPL-MCNC: 0.7 MG/DL (ref 0.2–1)
BUN SERPL-MCNC: 14 MG/DL (ref 6–20)
BUN/CREAT SERPL: 9 (ref 12–20)
CALCIUM SERPL-MCNC: 9 MG/DL (ref 8.5–10.1)
CHLORIDE SERPL-SCNC: 104 MMOL/L (ref 97–108)
CO2 SERPL-SCNC: 30 MMOL/L (ref 21–32)
CREAT SERPL-MCNC: 1.49 MG/DL (ref 0.7–1.3)
DIFFERENTIAL METHOD BLD: ABNORMAL
EOSINOPHIL # BLD: 0.1 K/UL (ref 0–0.4)
EOSINOPHIL NFR BLD: 2 % (ref 0–7)
ERYTHROCYTE [DISTWIDTH] IN BLOOD BY AUTOMATED COUNT: 16.6 % (ref 11.5–14.5)
GLOBULIN SER CALC-MCNC: 4.1 G/DL (ref 2–4)
GLUCOSE SERPL-MCNC: 114 MG/DL (ref 65–100)
HCT VFR BLD AUTO: 36.4 % (ref 36.6–50.3)
HGB BLD-MCNC: 11.4 G/DL (ref 12.1–17)
IGA SERPL-MCNC: 327 MG/DL (ref 70–400)
IGG SERPL-MCNC: 1020 MG/DL (ref 700–1600)
IGM SERPL-MCNC: <21 MG/DL (ref 40–230)
IMM GRANULOCYTES # BLD AUTO: 0 K/UL (ref 0–0.04)
IMM GRANULOCYTES NFR BLD AUTO: 0 % (ref 0–0.5)
LYMPHOCYTES # BLD: 0.8 K/UL (ref 0.8–3.5)
LYMPHOCYTES NFR BLD: 25 % (ref 12–49)
MCH RBC QN AUTO: 28.5 PG (ref 26–34)
MCHC RBC AUTO-ENTMCNC: 31.3 G/DL (ref 30–36.5)
MCV RBC AUTO: 91 FL (ref 80–99)
MONOCYTES # BLD: 0.7 K/UL (ref 0–1)
MONOCYTES NFR BLD: 22 % (ref 5–13)
NEUTS SEG # BLD: 1.7 K/UL (ref 1.8–8)
NEUTS SEG NFR BLD: 50 % (ref 32–75)
NRBC # BLD: 0 K/UL (ref 0–0.01)
NRBC BLD-RTO: 0 PER 100 WBC
PLATELET # BLD AUTO: 160 K/UL (ref 150–400)
PMV BLD AUTO: 10.7 FL (ref 8.9–12.9)
POTASSIUM SERPL-SCNC: 3.7 MMOL/L (ref 3.5–5.1)
PROT SERPL-MCNC: 7.4 G/DL (ref 6.4–8.2)
RBC # BLD AUTO: 4 M/UL (ref 4.1–5.7)
RBC MORPH BLD: ABNORMAL
SODIUM SERPL-SCNC: 137 MMOL/L (ref 136–145)
WBC # BLD AUTO: 3.3 K/UL (ref 4.1–11.1)

## 2021-09-01 PROCEDURE — 83883 ASSAY NEPHELOMETRY NOT SPEC: CPT

## 2021-09-01 PROCEDURE — 82784 ASSAY IGA/IGD/IGG/IGM EACH: CPT

## 2021-09-01 PROCEDURE — 77030012965 HC NDL HUBR BBMI -A

## 2021-09-01 PROCEDURE — 84165 PROTEIN E-PHORESIS SERUM: CPT

## 2021-09-01 PROCEDURE — 74011250636 HC RX REV CODE- 250/636: Performed by: INTERNAL MEDICINE

## 2021-09-01 PROCEDURE — 85025 COMPLETE CBC W/AUTO DIFF WBC: CPT

## 2021-09-01 PROCEDURE — 80053 COMPREHEN METABOLIC PANEL: CPT

## 2021-09-01 PROCEDURE — 36591 DRAW BLOOD OFF VENOUS DEVICE: CPT

## 2021-09-01 RX ORDER — HEPARIN 100 UNIT/ML
500 SYRINGE INTRAVENOUS AS NEEDED
Status: ACTIVE | OUTPATIENT
Start: 2021-09-01 | End: 2021-09-01

## 2021-09-01 RX ORDER — SODIUM CHLORIDE 0.9 % (FLUSH) 0.9 %
5-10 SYRINGE (ML) INJECTION AS NEEDED
Status: DISPENSED | OUTPATIENT
Start: 2021-09-01 | End: 2021-09-01

## 2021-09-01 RX ORDER — SODIUM CHLORIDE 9 MG/ML
10 INJECTION INTRAMUSCULAR; INTRAVENOUS; SUBCUTANEOUS AS NEEDED
Status: ACTIVE | OUTPATIENT
Start: 2021-09-01 | End: 2021-09-01

## 2021-09-01 RX ADMIN — SODIUM CHLORIDE 20 ML: 9 INJECTION INTRAMUSCULAR; INTRAVENOUS; SUBCUTANEOUS at 09:20

## 2021-09-01 RX ADMIN — HEPARIN 500 UNITS: 100 SYRINGE at 09:20

## 2021-09-01 NOTE — PROGRESS NOTES
Outpatient Infusion Center Note    Pt admit to [facility] for labs and port flush in stable condition. Assessment completed. Patient denied having any symptoms of COVID-19, i.e. SOB, coughing, fever, or generally not feeling well. Also denies having been exposed to COVID-19 recently or having had any recent contact with family/friend that has a pending COVID test.       R chest port accessed with 1\" [place] w/o issue, with positive blood return. Labs drawn per order and sent. Line flushed and [place]ist removed. See [facility] for pending results. Patient Vitals for the past 12 hrs:   Temp Pulse Resp BP SpO2   09/01/21 0911 98.3 °F (36.8 °C) 67 18 122/73 100 %        Medications:  Medications Administered     0.9% sodium chloride injection 10 mL     Admin Date  09/01/2021 Action  Given Dose  20 mL Route  IntraVENous Administered By  Jermain Cove          heparin (porcine) pf 500 Units     Admin Date  09/01/2021 Action  Given Dose  500 Units Route  IntraVENous Administered By  Jermain Redfox                 Pt tolerated treatment well. D/c home in no distress. Pt aware of next [facility] appointment scheduled for: 9/29/21 at 0930.        Future Appointments   Date Time Provider Dajuan Johnston   9/29/2021  9:30 AM BLAKE TAYLOR 1 [address] REG   10/8/2021 10:30 AM Titus Smith MD [facility]/IRLANDA FREED AMB   10/27/2021  9:30 AM BLAKE MEDCorey [place] [address] REG   12/22/2021  9:30 AM BLAKE TAYLOR 1 [facility] REG

## 2021-09-02 LAB
ALBUMIN SERPL ELPH-MCNC: 3.5 G/DL (ref 2.9–4.4)
ALBUMIN/GLOB SERPL: 1.1 {RATIO} (ref 0.7–1.7)
ALPHA1 GLOB SERPL ELPH-MCNC: 0.2 G/DL (ref 0–0.4)
ALPHA2 GLOB SERPL ELPH-MCNC: 1 G/DL (ref 0.4–1)
B-GLOBULIN SERPL ELPH-MCNC: 1 G/DL (ref 0.7–1.3)
GAMMA GLOB SERPL ELPH-MCNC: 1.1 G/DL (ref 0.4–1.8)
GLOBULIN SER CALC-MCNC: 3.3 G/DL (ref 2.2–3.9)
IGA SERPL-MCNC: 377 MG/DL (ref 61–437)
IGG SERPL-MCNC: 998 MG/DL (ref 603–1613)
IGM SERPL-MCNC: 19 MG/DL (ref 20–172)
KAPPA LC FREE SER-MCNC: 42.5 MG/L (ref 3.3–19.4)
KAPPA LC FREE/LAMBDA FREE SER: 1.24 {RATIO} (ref 0.26–1.65)
LAMBDA LC FREE SERPL-MCNC: 34.2 MG/L (ref 5.7–26.3)
M PROTEIN SERPL ELPH-MCNC: NORMAL G/DL
PROT PATTERN SERPL IFE-IMP: ABNORMAL
PROT SERPL-MCNC: 6.8 G/DL (ref 6–8.5)

## 2021-09-10 DIAGNOSIS — C90.00 MULTIPLE MYELOMA, REMISSION STATUS UNSPECIFIED (HCC): ICD-10-CM

## 2021-09-10 RX ORDER — POMALIDOMIDE 2 MG/1
CAPSULE ORAL
Qty: 28 CAPSULE | Refills: 0 | Status: SHIPPED | OUTPATIENT
Start: 2021-09-10 | End: 2021-10-08

## 2021-09-10 NOTE — TELEPHONE ENCOUNTER
Requested Prescriptions     Pending Prescriptions Disp Refills    Pomalyst 2 mg cap [Pharmacy Med Name: Natasha Stephenson 2MG] 28 Capsule 0     Sig: TAKE 1 CAPSULE BY MOUTH ONCE DAILY     Approved per Benny Wray

## 2021-09-22 RX ORDER — HYDROCORTISONE SODIUM SUCCINATE 100 MG/2ML
100 INJECTION, POWDER, FOR SOLUTION INTRAMUSCULAR; INTRAVENOUS AS NEEDED
Status: CANCELLED | OUTPATIENT
Start: 2021-09-29

## 2021-09-22 RX ORDER — DIPHENHYDRAMINE HYDROCHLORIDE 50 MG/ML
50 INJECTION, SOLUTION INTRAMUSCULAR; INTRAVENOUS AS NEEDED
Status: CANCELLED
Start: 2021-09-29

## 2021-09-22 RX ORDER — EPINEPHRINE 1 MG/ML
0.3 INJECTION, SOLUTION, CONCENTRATE INTRAVENOUS AS NEEDED
Status: CANCELLED | OUTPATIENT
Start: 2021-09-29

## 2021-09-22 RX ORDER — ALBUTEROL SULFATE 0.83 MG/ML
2.5 SOLUTION RESPIRATORY (INHALATION) AS NEEDED
Status: CANCELLED
Start: 2021-09-29

## 2021-09-22 RX ORDER — ONDANSETRON 2 MG/ML
8 INJECTION INTRAMUSCULAR; INTRAVENOUS AS NEEDED
Status: CANCELLED | OUTPATIENT
Start: 2021-09-29

## 2021-09-22 RX ORDER — ACETAMINOPHEN 325 MG/1
650 TABLET ORAL AS NEEDED
Status: CANCELLED
Start: 2021-09-29

## 2021-09-24 NOTE — PROGRESS NOTES
Zohra Elizabeth is a 63 y.o. male here for 3 month follow up for Multiple Myeloma. He is on Pomalyst.  He is receiving Zometa today. 1. Have you been to the ER, urgent care clinic since your last visit? Hospitalized since your last visit? no    2. Have you seen or consulted any other health care providers outside of the [address] [place] [address] since your last visit? Include any pap smears or colon screening. no    Pt states he has been doing marvelous. No concerns brought up.

## 2021-09-29 ENCOUNTER — OFFICE VISIT (OUTPATIENT)
Dept: ONCOLOGY | Age: 66
End: 2021-09-29
Payer: MEDICARE

## 2021-09-29 ENCOUNTER — HOSPITAL ENCOUNTER (OUTPATIENT)
Dept: INFUSION THERAPY | Age: 66
Discharge: HOME | End: 2021-09-29
Payer: COMMERCIAL

## 2021-09-29 ENCOUNTER — APPOINTMENT (OUTPATIENT)
Dept: INFUSION THERAPY | Age: 66
End: 2021-09-29

## 2021-09-29 VITALS
WEIGHT: 194.4 LBS | SYSTOLIC BLOOD PRESSURE: 132 MMHG | RESPIRATION RATE: 16 BRPM | HEART RATE: 63 BPM | DIASTOLIC BLOOD PRESSURE: 77 MMHG | HEIGHT: 68 IN | BODY MASS INDEX: 29.46 KG/M2 | OXYGEN SATURATION: 100 % | TEMPERATURE: 97.6 F

## 2021-09-29 VITALS
HEIGHT: 68 IN | WEIGHT: 194 LBS | BODY MASS INDEX: 29.4 KG/M2 | HEART RATE: 83 BPM | TEMPERATURE: 97.6 F | OXYGEN SATURATION: 100 % | DIASTOLIC BLOOD PRESSURE: 76 MMHG | SYSTOLIC BLOOD PRESSURE: 127 MMHG | RESPIRATION RATE: 18 BRPM

## 2021-09-29 DIAGNOSIS — C90.01 MULTIPLE MYELOMA IN REMISSION (HCC): Primary | ICD-10-CM

## 2021-09-29 DIAGNOSIS — C90.00 MULTIPLE MYELOMA, REMISSION STATUS UNSPECIFIED (HCC): ICD-10-CM

## 2021-09-29 DIAGNOSIS — G62.9 PERIPHERAL POLYNEUROPATHY: ICD-10-CM

## 2021-09-29 LAB
ALBUMIN SERPL-MCNC: 3.1 G/DL (ref 3.5–5)
ALBUMIN/GLOB SERPL: 0.8 {RATIO} (ref 1.1–2.2)
ALP SERPL-CCNC: 82 U/L (ref 45–117)
ALT SERPL-CCNC: 26 U/L (ref 12–78)
ANION GAP SERPL CALC-SCNC: 4 MMOL/L (ref 5–15)
AST SERPL-CCNC: 18 U/L (ref 15–37)
BASOPHILS # BLD: 0 K/UL (ref 0–0.1)
BASOPHILS NFR BLD: 1 % (ref 0–1)
BILIRUB SERPL-MCNC: 0.4 MG/DL (ref 0.2–1)
BUN SERPL-MCNC: 17 MG/DL (ref 6–20)
BUN/CREAT SERPL: 10 (ref 12–20)
CALCIUM SERPL-MCNC: 9.1 MG/DL (ref 8.5–10.1)
CHLORIDE SERPL-SCNC: 109 MMOL/L (ref 97–108)
CO2 SERPL-SCNC: 27 MMOL/L (ref 21–32)
CREAT SERPL-MCNC: 1.66 MG/DL (ref 0.7–1.3)
DIFFERENTIAL METHOD BLD: ABNORMAL
EOSINOPHIL # BLD: 0.1 K/UL (ref 0–0.4)
EOSINOPHIL NFR BLD: 3 % (ref 0–7)
ERYTHROCYTE [DISTWIDTH] IN BLOOD BY AUTOMATED COUNT: 16.5 % (ref 11.5–14.5)
GLOBULIN SER CALC-MCNC: 4.1 G/DL (ref 2–4)
GLUCOSE SERPL-MCNC: 144 MG/DL (ref 65–100)
HCT VFR BLD AUTO: 36.5 % (ref 36.6–50.3)
HGB BLD-MCNC: 11.3 G/DL (ref 12.1–17)
IGA SERPL-MCNC: 394 MG/DL (ref 70–400)
IGG SERPL-MCNC: 1180 MG/DL (ref 700–1600)
IGM SERPL-MCNC: <21 MG/DL (ref 40–230)
IMM GRANULOCYTES # BLD AUTO: 0 K/UL (ref 0–0.04)
IMM GRANULOCYTES NFR BLD AUTO: 0 % (ref 0–0.5)
LYMPHOCYTES # BLD: 1 K/UL (ref 0.8–3.5)
LYMPHOCYTES NFR BLD: 31 % (ref 12–49)
MCH RBC QN AUTO: 28.8 PG (ref 26–34)
MCHC RBC AUTO-ENTMCNC: 31 G/DL (ref 30–36.5)
MCV RBC AUTO: 93.1 FL (ref 80–99)
MONOCYTES # BLD: 0.6 K/UL (ref 0–1)
MONOCYTES NFR BLD: 20 % (ref 5–13)
NEUTS SEG # BLD: 1.5 K/UL (ref 1.8–8)
NEUTS SEG NFR BLD: 45 % (ref 32–75)
NRBC # BLD: 0 K/UL (ref 0–0.01)
NRBC BLD-RTO: 0 PER 100 WBC
PLATELET # BLD AUTO: 173 K/UL (ref 150–400)
PMV BLD AUTO: 10 FL (ref 8.9–12.9)
POTASSIUM SERPL-SCNC: 3.9 MMOL/L (ref 3.5–5.1)
PROT SERPL-MCNC: 7.2 G/DL (ref 6.4–8.2)
RBC # BLD AUTO: 3.92 M/UL (ref 4.1–5.7)
RBC MORPH BLD: ABNORMAL
SODIUM SERPL-SCNC: 140 MMOL/L (ref 136–145)
WBC # BLD AUTO: 3.2 K/UL (ref 4.1–11.1)

## 2021-09-29 PROCEDURE — 80053 COMPREHEN METABOLIC PANEL: CPT

## 2021-09-29 PROCEDURE — 82784 ASSAY IGA/IGD/IGG/IGM EACH: CPT

## 2021-09-29 PROCEDURE — G9717 DOC PT DX DEP/BP F/U NT REQ: HCPCS | Performed by: NURSE PRACTITIONER

## 2021-09-29 PROCEDURE — 74011250636 HC RX REV CODE- 250/636: Performed by: INTERNAL MEDICINE

## 2021-09-29 PROCEDURE — G8752 SYS BP LESS 140: HCPCS | Performed by: NURSE PRACTITIONER

## 2021-09-29 PROCEDURE — 96365 THER/PROPH/DIAG IV INF INIT: CPT

## 2021-09-29 PROCEDURE — 99215 OFFICE O/P EST HI 40 MIN: CPT | Performed by: NURSE PRACTITIONER

## 2021-09-29 PROCEDURE — 36415 COLL VENOUS BLD VENIPUNCTURE: CPT

## 2021-09-29 PROCEDURE — G8417 CALC BMI ABV UP PARAM F/U: HCPCS | Performed by: NURSE PRACTITIONER

## 2021-09-29 PROCEDURE — G8754 DIAS BP LESS 90: HCPCS | Performed by: NURSE PRACTITIONER

## 2021-09-29 PROCEDURE — 84165 PROTEIN E-PHORESIS SERUM: CPT

## 2021-09-29 PROCEDURE — 74011000258 HC RX REV CODE- 258: Performed by: INTERNAL MEDICINE

## 2021-09-29 PROCEDURE — G8427 DOCREV CUR MEDS BY ELIG CLIN: HCPCS | Performed by: NURSE PRACTITIONER

## 2021-09-29 PROCEDURE — 3017F COLORECTAL CA SCREEN DOC REV: CPT | Performed by: NURSE PRACTITIONER

## 2021-09-29 PROCEDURE — 83883 ASSAY NEPHELOMETRY NOT SPEC: CPT

## 2021-09-29 PROCEDURE — 85025 COMPLETE CBC W/AUTO DIFF WBC: CPT

## 2021-09-29 PROCEDURE — 77030012965 HC NDL HUBR BBMI -A

## 2021-09-29 RX ORDER — SODIUM CHLORIDE 9 MG/ML
25 INJECTION, SOLUTION INTRAVENOUS CONTINUOUS
Status: DISPENSED | OUTPATIENT
Start: 2021-09-29 | End: 2021-09-29

## 2021-09-29 RX ORDER — HEPARIN 100 UNIT/ML
500 SYRINGE INTRAVENOUS AS NEEDED
Status: DISCONTINUED | OUTPATIENT
Start: 2021-09-29 | End: 2021-09-30 | Stop reason: HOSPADM

## 2021-09-29 RX ORDER — GABAPENTIN 100 MG/1
100 CAPSULE ORAL 2 TIMES DAILY
Qty: 60 CAPSULE | Refills: 2 | Status: SHIPPED | OUTPATIENT
Start: 2021-09-29 | End: 2021-10-29

## 2021-09-29 RX ORDER — HEPARIN 100 UNIT/ML
300-500 SYRINGE INTRAVENOUS AS NEEDED
Status: ACTIVE | OUTPATIENT
Start: 2021-09-29 | End: 2021-09-29

## 2021-09-29 RX ORDER — SODIUM CHLORIDE 0.9 % (FLUSH) 0.9 %
10 SYRINGE (ML) INJECTION AS NEEDED
Status: DISPENSED | OUTPATIENT
Start: 2021-09-29 | End: 2021-09-29

## 2021-09-29 RX ORDER — SODIUM CHLORIDE 9 MG/ML
10 INJECTION INTRAMUSCULAR; INTRAVENOUS; SUBCUTANEOUS AS NEEDED
Status: DISCONTINUED | OUTPATIENT
Start: 2021-09-29 | End: 2021-09-30 | Stop reason: HOSPADM

## 2021-09-29 RX ORDER — SODIUM CHLORIDE 9 MG/ML
10 INJECTION INTRAMUSCULAR; INTRAVENOUS; SUBCUTANEOUS AS NEEDED
Status: ACTIVE | OUTPATIENT
Start: 2021-09-29 | End: 2021-09-29

## 2021-09-29 RX ORDER — SODIUM CHLORIDE 0.9 % (FLUSH) 0.9 %
5-10 SYRINGE (ML) INJECTION AS NEEDED
Status: DISCONTINUED | OUTPATIENT
Start: 2021-09-29 | End: 2021-09-30 | Stop reason: HOSPADM

## 2021-09-29 RX ADMIN — SODIUM CHLORIDE 10 ML: 9 INJECTION INTRAMUSCULAR; INTRAVENOUS; SUBCUTANEOUS at 09:40

## 2021-09-29 RX ADMIN — Medication 500 UNITS: at 11:45

## 2021-09-29 RX ADMIN — ZOLEDRONIC ACID 3.5 MG: 4 INJECTION INTRAVENOUS at 11:24

## 2021-09-29 RX ADMIN — Medication 10 ML: at 11:45

## 2021-09-29 NOTE — PROGRESS NOTES
Pt arrived to [facility] ambulatory in no acute distress at 0940 for Zometa/labs. Assessment unremarkable. R chest port accessed without issue and positive blood return noted. Labs obtained, CBC, CMP, Immunofixation, Immunoglobulins, Light Chains, and SPEP. Patient to MD for appointment. Patient denied having any symptoms of COVID-19, i.e. SOB, coughing, fever, or generally not feeling well. Also denies having been exposed to COVID-19 recently or having had any recent contact with family/friend that has a pending COVID test.    Patient Vitals for the past 12 hrs:   Temp Pulse Resp BP SpO2   09/29/21 1145  63 16 132/77    09/29/21 0939 97.6 °F (36.4 °C) 83 18 127/76 100 %     Recent Results (from the past 12 hour(s))   CBC WITH AUTOMATED DIFF    Collection Time: 09/29/21  9:38 AM   Result Value Ref Range    WBC 3.2 (L) 4.1 - 11.1 K/uL    RBC 3.92 (L) 4.10 - 5.70 M/uL    HGB 11.3 (L) 12.1 - 17.0 g/dL    HCT 36.5 (L) 36.6 - 50.3 %    MCV 93.1 80.0 - 99.0 FL    MCH 28.8 26.0 - 34.0 PG    MCHC 31.0 30.0 - 36.5 g/dL    RDW 16.5 (H) 11.5 - 14.5 %    PLATELET 414 565 - 558 K/uL    MPV 10.0 8.9 - 12.9 FL    NRBC 0.0 0  WBC    ABSOLUTE NRBC 0.00 0.00 - 0.01 K/uL    NEUTROPHILS 45 32 - 75 %    LYMPHOCYTES 31 12 - 49 %    MONOCYTES 20 (H) 5 - 13 %    EOSINOPHILS 3 0 - 7 %    BASOPHILS 1 0 - 1 %    IMMATURE GRANULOCYTES 0 0.0 - 0.5 %    ABS. NEUTROPHILS 1.5 (L) 1.8 - 8.0 K/UL    ABS. LYMPHOCYTES 1.0 0.8 - 3.5 K/UL    ABS. MONOCYTES 0.6 0.0 - 1.0 K/UL    ABS. EOSINOPHILS 0.1 0.0 - 0.4 K/UL    ABS. BASOPHILS 0.0 0.0 - 0.1 K/UL    ABS. IMM.  GRANS. 0.0 0.00 - 0.04 K/UL    DF SMEAR SCANNED      RBC COMMENTS ANISOCYTOSIS  1+       METABOLIC PANEL, COMPREHENSIVE    Collection Time: 09/29/21  9:38 AM   Result Value Ref Range    Sodium 140 136 - 145 mmol/L    Potassium 3.9 3.5 - 5.1 mmol/L    Chloride 109 (H) 97 - 108 mmol/L    CO2 27 21 - 32 mmol/L    Anion gap 4 (L) 5 - 15 mmol/L    Glucose 144 (H) 65 - 100 mg/dL BUN 17 6 - 20 MG/DL    Creatinine 1.66 (H) 0.70 - 1.30 MG/DL    BUN/Creatinine ratio 10 (L) 12 - 20      GFR est AA 51 (L) >60 ml/min/1.73m2    GFR est non-AA 42 (L) >60 ml/min/1.73m2    Calcium 9.1 8.5 - 10.1 MG/DL    Bilirubin, total 0.4 0.2 - 1.0 MG/DL    ALT (SGPT) 26 12 - 78 U/L    AST (SGOT) 18 15 - 37 U/L    Alk. phosphatase 82 45 - 117 U/L    Protein, total 7.2 6.4 - 8.2 g/dL    Albumin 3.1 (L) 3.5 - 5.0 g/dL    Globulin 4.1 (H) 2.0 - 4.0 g/dL    A-G Ratio 0.8 (L) 1.1 - 2.2     IMMUNOGLOBULINS, G/A/M, QT. Collection Time: 09/29/21  9:38 AM   Result Value Ref Range    Immunoglobulin G 1,180 700 - 1,600 mg/dL    Immunoglobulin A 394 70 - 400 mg/dL    Immunoglobulin M <21 (L) 40 - 230 mg/dL     The following medications administered:  Medications Administered     0.9% sodium chloride injection 10 mL     Admin Date  09/29/2021 Action  Given Dose  10 mL Route  IntraVENous Administered By  Reji Neumann RN          heparin (porcine) pf 300-500 Units     Admin Date  09/29/2021 Action  Given Dose  500 Units Route  InterCATHeter Administered By  Reji Neumann RN          saline peripheral flush soln 10 mL     Admin Date  09/29/2021 Action  Given Dose  10 mL Route  InterCATHeter Administered By  Reji Neumann RN          zoledronic acid (ZOMETA) 3.5 mg in 0.9% sodium chloride 100 mL IVPB     Admin Date  09/29/2021 Action  New Bag Dose  3.5 mg Route  IntraVENous Administered By  Reji Neumann RN                Zometa 3.5 mg given due to CrCl 56.091. Pt tolerated treatment well. Port flushed and heparinized per policy, 2x2 and soft tape placed. Pt discharged ambulatory in no acute distress at 1145, accompanied by spouse. Next appointment 10/27/2021.

## 2021-09-29 NOTE — PROGRESS NOTES
[address] [facility] [address], [address] [facility], [address] [facility], [address] [place] [phone]            Progress Note        Patient: Olvin Red Sr. MRN: [id number]  SSN: [id number]    YOB: 1955  Age: 63 y.o. Sex: male        Diagnosis:      1. Multiple Myeloma, IgA Kappa   Durie [place] stage IIIB    Cytogenetics/FISH: t(14;16) - high risk    Ttreatment:     1. Maintenance therapy - Pomalyst 2 mg daily - 11/19/2018   2. Maintenance therapy - Ixazomib - started 10/15/2018 - stopped 11/12/2018  3. S/P tandem  Autotransplant   First on 2/28/2018   2nd on 06/13/2018  4. Carfilzomib/Pom/Dex - s/p 5 cycles  5. VD-PACE s/p 1 cycle  6. RVD - s/p 4 cycles    Subjective:      Olvin Red Sr. is a 63 y.o. male with a diagnosis of IgA kappa myeloma. Bone marrow shows 100% aberrant plasma cells. He suffers with DM but it is diet controlled. He has received systemic anti-viral treatment for chronic Hep C with successful eradication of the virus. Mr. Benny Fox received 4 cycles of systemic therapy with RVd. He had an initial good response to treatment. However his paraprotein level started rising and the myeloma became refractory to treatment. I then administered one cycle of VD-PACE in the hospital. He then received Carfilzomib/Pom/Dex. He achieved VGPR. He underwent tandem autotransplant at [address] [place] [address]. According to the patient he achieved complete response with therapy. He has undergone second/tandem transplant in June. He took Pomalyst maintenance until Aug 2019 then had to stop due to recurrent prostatitis. He had a repeat BM biopsy in February 2019 at [address] [place] [address] which showed complete molecular remission. He is taking Pomalyst. He is here today with his wife and feels well. He has occasional cramps  In the leg.            Review of Systems:    Constitutional: negative  Eyes: negative  Ears, Nose, Mouth, Throat, and Face: negative  Respiratory: negative  Cardiovascular: negative  Gastrointestinal: negative  Genitourinary:negative  Integument/Breast: negative  Hematologic/Lymphatic: negative  Musculoskeletal: negative  Neurological: negative      Past Medical History:   Diagnosis Date    Acid reflux     Arrhythmia     pvc's    Chronic pain     neck    Depression     Diabetes ([facility] [address]     Femoral hernia 2012    Hepatitis C     Hypertension     Inguinal hernia 2012    Liver disease     hepatitis c    Multiple myeloma (HCC)     Other ill-defined conditions(799.89)     Hx of PVCs since     Prostatitis, acute     Psychiatric disorder     depression     Past Surgical History:   Procedure Laterality Date    COLONOSCOPY N/A 2016    COLONOSCOPY performed by Satinder Hernandez MD at [facility] ENDOSCOPY    HX APPENDECTOMY      HX CERVICAL FUSION  2008    x 1 as of 2014    HX HEENT      foreign body removed from right eye    HX HERNIA REPAIR      [facility]      HX ORTHOPAEDIC      cervical fusion    HX OTHER SURGICAL  2000    liver bx - chronic hepatitis      Family History   Problem Relation Age of Onset    Arthritis-osteo Mother     Arthritis-osteo Father     Diabetes Father     Hypertension Maternal Grandmother     Diabetes Maternal Grandmother     Hypertension Maternal Grandfather     Diabetes Maternal Grandfather      Social History     Tobacco Use    Smoking status: Former Smoker     Packs/day: 1.00     Years: 12.00     Pack years: 12.00     Types: Cigarettes     Quit date: 1989     Years since quittin.7    Smokeless tobacco: Never Used    Tobacco comment: former cigarette smoker   Substance Use Topics    Alcohol use: No     Comment: former alcoholic-quit [id number]      Prior to Admission medications    Medication Sig Start Date End Date Taking? Authorizing Provider   POTASSIUM PO Take  by mouth.    Yes Provider, Historical   Pomalyst 2 mg cap TAKE 1 CAPSULE BY MOUTH ONCE DAILY 9/10/21  Yes Lana Eaton NP metFORMIN ER (GLUCOPHAGE XR) 500 mg tablet Take 1 Tablet by mouth two (2) times a day. SEND FUTURE REFILL REQUESTS TO PCP. DR Heather Gerard NO LONGER AT THIS PRACTICE 8/6/21  Yes Shalini Barboza MD   OTHER Cannabis prescription   Yes Provider, Historical   zolpidem 3.5 mg subl DISSOLVE 1 TABLET UNDER TONGUE AT BEDTIME 4/10/21  Yes Provider, Historical   tenofovir DISOPROXIL FUMARATE (VIREAD) 300 mg tablet TAKE 1 TABLET BY MOUTH 1 TIME A DAY. 4/27/21  Yes Jonna Field MD   cholecalciferol, vitamin D3, (Vitamin D3) 50 mcg (2,000 unit) tab Take  by mouth daily. Yes Provider, Historical   ascorbic acid, vitamin C, (VITAMIN C) 100 mg tab Take  by mouth. Yes Provider, Historical   Denta 5000 Plus 1.1 % crea USE AS DIRECTED TWO TIMES DAILY 8/12/20  Yes Provider, Historical   ibuprofen (MOTRIN) 800 mg tablet TAKE 1 TABLET BY MOUTH 3 TIMES A DAY AS NEEDED FOR PAIN 10/13/20  Yes Provider, Historical   buPROPion XL (WELLBUTRIN XL) 300 mg XL tablet TAKE 1 TABLET BY MOUTH EVERY DAY 1/30/20  Yes Provider, Historical   naproxen (NAPROSYN) 500 mg tablet TAKE 1 TABLET BY MOUTH TWICE A DAY 8/26/19  Yes Provider, Historical   NUCYNTA 100 mg tablet TAKE 2 TABLETS 3 TIMES A DAY 7/27/19  Yes Provider, Historical   aspirin delayed-release 81 mg tablet Take  by mouth daily. Yes Provider, Historical   prochlorperazine (COMPAZINE) 10 mg tablet Take 5 mg by mouth every six (6) hours as needed. Yes Other, MD Goldy   omeprazole (PRILOSEC) 20 mg capsule Take 20 mg by mouth daily as needed (acid reflux). Yes Other, MD Goldy   ondansetron (ZOFRAN ODT) 8 mg disintegrating tablet Take 8 mg by mouth every eight (8) hours as needed for Nausea. Yes Other, MD Goldy   multivitamin (ONE A DAY) tablet Take 1 Tab by mouth daily. Yes Provider, Historical   tamsulosin (FLOMAX) 0.4 mg capsule Take 1 Cap by mouth daily. 4/4/17  Yes Usman Plascencia MD   zolpidem CR (AMBIEN CR) 12.5 mg tablet Take 12.5 mg by mouth nightly.    Yes Provider, Historical   aripiprazole (ABILIFY) 5 mg tablet Take 2.5 mg by mouth every morning. Yes Other, MD Goldy          Allergies   Allergen Reactions    Mercury (Bulk) Hives     Blisters             Objective:     Visit Vitals  /76   Pulse 83   Temp 97.6 °F (36.4 °C)   Resp 18   Ht 5' 8\" (1.727 m)   Wt 194 lb (88 kg)   SpO2 100%   BMI 29.50 kg/m²       Pain Scale: /10  Pain Location:       Physical Exam:     GENERAL: alert, cooperative  EYE: negative  LYMPHATIC: Cervical, supraclavicular, and axillary nodes normal.   THROAT & NECK: normal and no erythema or exudates noted. LUNG: clear to auscultation bilaterally  HEART: regular rate and rhythm  ABDOMEN: soft, non-tender  EXTREMITIES: normal  SKIN: Normal.  NEUROLOGIC: negative     Physical exam copied from previous note. Reviewed and no changes noted. Lab Results   Component Value Date/Time    WBC 3.2 (L) 09/29/2021 09:38 AM    HGB 11.3 (L) 09/29/2021 09:38 AM    Hematocrit (POC) 37 04/14/2021 09:48 AM    HCT 36.5 (L) 09/29/2021 09:38 AM    PLATELET 093 94/95/4177 09:38 AM    MCV 93.1 09/29/2021 09:38 AM       Lab Results   Component Value Date/Time    Sodium 137 09/01/2021 09:22 AM    Potassium 3.7 09/01/2021 09:22 AM    Chloride 104 09/01/2021 09:22 AM    CO2 30 09/01/2021 09:22 AM    Anion gap 3 (L) 09/01/2021 09:22 AM    Glucose 114 (H) 09/01/2021 09:22 AM    BUN 14 09/01/2021 09:22 AM    Creatinine 1.49 (H) 09/01/2021 09:22 AM    BUN/Creatinine ratio 9 (L) 09/01/2021 09:22 AM    GFR est AA 58 (L) 09/01/2021 09:22 AM    GFR est non-AA 48 (L) 09/01/2021 09:22 AM    Calcium 9.0 09/01/2021 09:22 AM            Assessment:     1. Multiple Myeloma, IgA Kappa   Durie [place] stage IIIB    Cytogenetics/FISH: t(14;16) - high risk  ECOG PS - 0   Intent of therapy: palliative    Received 3 cycles of RVd   Dose reduced Revlimid and Velcade d/t continued cytopenias. M-spike started rising.     He received one cycle of VD-PACE (bortezomib, dexamethasone, thalidomide, cisplatin, adriamycin, cyclophosphamide, and etoposide). Achieved AK with once cycle    Receivied KPd - s/p 5 cycles    Excellent response with M-protein came down to 0.1    S/p tandem autotransplant    First on 2/28/2018   2nd on 06/13/2018    Achieved CR after the first transplant. Repeat bone marrow @ [facility] - Complete response. Since he is high risk based on cytogenetics, I recommend proteosome inhibitor as maintenance therapy. Took Ixazomib briefly  Developed fever, was in the hospital.   Also developed cytopenias    Patient prefered to switch therapy to Pomalyst.     Pomalyst 2 mg daily - started 11/19/2018  Tolerating treatment very well  Denies any side effects. A detailed system by system evaluation of side effect was performed to assess chemotherapy related toxicity. Blood counts are acceptable. Results reviewed with the patient    M-spike is invisible  Bone marrow 04/2021 - in CR. 2. Type 2 DM with complication    Managed by Endocrine      3. Chronic Hep C    In sustained virological remission      Gabapentin for possible neuropathy      Plan:       > Continue Pomalyst maintenance  > Myeloma labs and port flush monthly   > Gabapentin for muscle cramps.   > Zometa every 3 months  > Follow-up in 3 months      Signed by: Donnell Vidal MD                     September 29, 2021      CC. Jim Martinez MD  CC.  Mau Morrissey MD

## 2021-09-30 ENCOUNTER — TELEPHONE (OUTPATIENT)
Dept: ONCOLOGY | Age: 66
End: 2021-09-30

## 2021-09-30 LAB
ALBUMIN SERPL ELPH-MCNC: 3.6 G/DL (ref 2.9–4.4)
ALBUMIN/GLOB SERPL: 1.2 {RATIO} (ref 0.7–1.7)
ALPHA1 GLOB SERPL ELPH-MCNC: 0.1 G/DL (ref 0–0.4)
ALPHA2 GLOB SERPL ELPH-MCNC: 0.9 G/DL (ref 0.4–1)
B-GLOBULIN SERPL ELPH-MCNC: 0.9 G/DL (ref 0.7–1.3)
GAMMA GLOB SERPL ELPH-MCNC: 1.2 G/DL (ref 0.4–1.8)
GLOBULIN SER CALC-MCNC: 3.1 G/DL (ref 2.2–3.9)
KAPPA LC FREE SER-MCNC: 46 MG/L (ref 3.3–19.4)
KAPPA LC FREE/LAMBDA FREE SER: 1.16 {RATIO} (ref 0.26–1.65)
LAMBDA LC FREE SERPL-MCNC: 39.7 MG/L (ref 5.7–26.3)
M PROTEIN SERPL ELPH-MCNC: NORMAL G/DL
PROT SERPL-MCNC: 6.7 G/DL (ref 6–8.5)

## 2021-09-30 NOTE — TELEPHONE ENCOUNTER
Patient called to speak w/nurse regarding Testosterone Treatment. Patient states, Diony Lovelace was at his doctors office and his testosterone levels were in the 100 range and his doctor wanted to know if there would be a treatment conflict. Reached out to nurse [facility]) who reviewed the patient records and communicated that the treatment would not conflict with treatment that he is receiving under Dr. Hilda Vallecillo. Patient was updated.

## 2021-10-01 LAB
IGA SERPL-MCNC: 434 MG/DL (ref 61–437)
IGG SERPL-MCNC: 1102 MG/DL (ref 603–1613)
IGM SERPL-MCNC: 18 MG/DL (ref 20–172)
PROT PATTERN SERPL IFE-IMP: ABNORMAL

## 2021-10-12 ENCOUNTER — TELEPHONE (OUTPATIENT)
Dept: ONCOLOGY | Age: 66
End: 2021-10-12

## 2021-10-12 NOTE — TELEPHONE ENCOUNTER
Patient called and would like a call back regarding his \"Port\". The shape of the port has changed from Flat and is now rising into an oval shape. He has had the port for 2 1/2 years and would like a call back regarding if the port needs to be replaced.

## 2021-10-12 NOTE — TELEPHONE ENCOUNTER
Returned call to pt. HIPAA verified by two patient identifiers. The port is not bothering him. Still working appropriately. Advised him to continue to monitor, sometimes body habitus can change but if not causing harm then no need to intervene at this time, we can order a portogram at any moment if needed. He then asked about his tesosterone replacement as he was told his level is of a [de-identified]year old man. I asked he fax the results over to us and we can review this.

## 2021-10-25 NOTE — ADDENDUM NOTE
Encounter addended by: Jose Valentin RN on: 10/25/2021 9:13 AM   Actions taken: Delete clinical note

## 2021-10-26 RX ORDER — HEPARIN 100 UNIT/ML
500 SYRINGE INTRAVENOUS AS NEEDED
Status: CANCELLED | OUTPATIENT
Start: 2021-10-27

## 2021-10-26 RX ORDER — SODIUM CHLORIDE 0.9 % (FLUSH) 0.9 %
5-10 SYRINGE (ML) INJECTION AS NEEDED
Status: CANCELLED | OUTPATIENT
Start: 2021-10-27

## 2021-10-26 RX ORDER — SODIUM CHLORIDE 9 MG/ML
10 INJECTION INTRAMUSCULAR; INTRAVENOUS; SUBCUTANEOUS AS NEEDED
Status: CANCELLED | OUTPATIENT
Start: 2021-10-27

## 2021-10-27 ENCOUNTER — APPOINTMENT (OUTPATIENT)
Dept: INFUSION THERAPY | Age: 66
End: 2021-10-27

## 2021-10-27 ENCOUNTER — HOSPITAL ENCOUNTER (OUTPATIENT)
Dept: INFUSION THERAPY | Age: 66
Discharge: HOME | End: 2021-10-27
Payer: COMMERCIAL

## 2021-10-27 VITALS
HEIGHT: 68 IN | SYSTOLIC BLOOD PRESSURE: 112 MMHG | DIASTOLIC BLOOD PRESSURE: 70 MMHG | RESPIRATION RATE: 18 BRPM | HEART RATE: 58 BPM | OXYGEN SATURATION: 100 % | WEIGHT: 192.5 LBS | TEMPERATURE: 97.9 F | BODY MASS INDEX: 29.18 KG/M2

## 2021-10-27 DIAGNOSIS — C90.00 MULTIPLE MYELOMA, REMISSION STATUS UNSPECIFIED (HCC): Primary | ICD-10-CM

## 2021-10-27 LAB
ALBUMIN SERPL-MCNC: 3 G/DL (ref 3.5–5)
ALBUMIN/GLOB SERPL: 0.7 {RATIO} (ref 1.1–2.2)
ALP SERPL-CCNC: 71 U/L (ref 45–117)
ALT SERPL-CCNC: 18 U/L (ref 12–78)
ANION GAP SERPL CALC-SCNC: 2 MMOL/L (ref 5–15)
AST SERPL-CCNC: 14 U/L (ref 15–37)
BASOPHILS # BLD: 0 K/UL (ref 0–0.1)
BASOPHILS NFR BLD: 1 % (ref 0–1)
BILIRUB SERPL-MCNC: 0.4 MG/DL (ref 0.2–1)
BUN SERPL-MCNC: 10 MG/DL (ref 6–20)
BUN/CREAT SERPL: 7 (ref 12–20)
CALCIUM SERPL-MCNC: 8.9 MG/DL (ref 8.5–10.1)
CHLORIDE SERPL-SCNC: 107 MMOL/L (ref 97–108)
CO2 SERPL-SCNC: 29 MMOL/L (ref 21–32)
CREAT SERPL-MCNC: 1.36 MG/DL (ref 0.7–1.3)
DIFFERENTIAL METHOD BLD: ABNORMAL
EOSINOPHIL # BLD: 0.1 K/UL (ref 0–0.4)
EOSINOPHIL NFR BLD: 2 % (ref 0–7)
ERYTHROCYTE [DISTWIDTH] IN BLOOD BY AUTOMATED COUNT: 17.3 % (ref 11.5–14.5)
GLOBULIN SER CALC-MCNC: 4.2 G/DL (ref 2–4)
GLUCOSE SERPL-MCNC: 123 MG/DL (ref 65–100)
HCT VFR BLD AUTO: 34.7 % (ref 36.6–50.3)
HGB BLD-MCNC: 10.9 G/DL (ref 12.1–17)
IGA SERPL-MCNC: 443 MG/DL (ref 70–400)
IGG SERPL-MCNC: 1120 MG/DL (ref 700–1600)
IGM SERPL-MCNC: <21 MG/DL (ref 40–230)
IMM GRANULOCYTES # BLD AUTO: 0 K/UL (ref 0–0.04)
IMM GRANULOCYTES NFR BLD AUTO: 0 % (ref 0–0.5)
LYMPHOCYTES # BLD: 0.8 K/UL (ref 0.8–3.5)
LYMPHOCYTES NFR BLD: 29 % (ref 12–49)
MCH RBC QN AUTO: 28.8 PG (ref 26–34)
MCHC RBC AUTO-ENTMCNC: 31.4 G/DL (ref 30–36.5)
MCV RBC AUTO: 91.6 FL (ref 80–99)
MONOCYTES # BLD: 0.6 K/UL (ref 0–1)
MONOCYTES NFR BLD: 21 % (ref 5–13)
NEUTS SEG # BLD: 1.4 K/UL (ref 1.8–8)
NEUTS SEG NFR BLD: 47 % (ref 32–75)
NRBC # BLD: 0 K/UL (ref 0–0.01)
NRBC BLD-RTO: 0 PER 100 WBC
PLATELET # BLD AUTO: 181 K/UL (ref 150–400)
PMV BLD AUTO: 10.2 FL (ref 8.9–12.9)
POTASSIUM SERPL-SCNC: 3.7 MMOL/L (ref 3.5–5.1)
PROT SERPL-MCNC: 7.2 G/DL (ref 6.4–8.2)
RBC # BLD AUTO: 3.79 M/UL (ref 4.1–5.7)
RBC MORPH BLD: ABNORMAL
SODIUM SERPL-SCNC: 138 MMOL/L (ref 136–145)
WBC # BLD AUTO: 2.9 K/UL (ref 4.1–11.1)

## 2021-10-27 PROCEDURE — 82784 ASSAY IGA/IGD/IGG/IGM EACH: CPT

## 2021-10-27 PROCEDURE — 85025 COMPLETE CBC W/AUTO DIFF WBC: CPT

## 2021-10-27 PROCEDURE — 83883 ASSAY NEPHELOMETRY NOT SPEC: CPT

## 2021-10-27 PROCEDURE — 80053 COMPREHEN METABOLIC PANEL: CPT

## 2021-10-27 PROCEDURE — 74011250636 HC RX REV CODE- 250/636: Performed by: INTERNAL MEDICINE

## 2021-10-27 PROCEDURE — 77030012965 HC NDL HUBR BBMI -A

## 2021-10-27 PROCEDURE — 84165 PROTEIN E-PHORESIS SERUM: CPT

## 2021-10-27 PROCEDURE — 36591 DRAW BLOOD OFF VENOUS DEVICE: CPT

## 2021-10-27 RX ORDER — SODIUM CHLORIDE 9 MG/ML
10 INJECTION INTRAMUSCULAR; INTRAVENOUS; SUBCUTANEOUS AS NEEDED
Status: ACTIVE | OUTPATIENT
Start: 2021-10-27 | End: 2021-10-27

## 2021-10-27 RX ORDER — HEPARIN 100 UNIT/ML
500 SYRINGE INTRAVENOUS AS NEEDED
Status: ACTIVE | OUTPATIENT
Start: 2021-10-27 | End: 2021-10-27

## 2021-10-27 RX ORDER — SODIUM CHLORIDE 0.9 % (FLUSH) 0.9 %
5-10 SYRINGE (ML) INJECTION AS NEEDED
Status: DISPENSED | OUTPATIENT
Start: 2021-10-27 | End: 2021-10-27

## 2021-10-27 RX ADMIN — Medication 10 ML: at 09:25

## 2021-10-27 RX ADMIN — Medication 500 UNITS: at 09:26

## 2021-10-27 RX ADMIN — SODIUM CHLORIDE 10 ML: 9 INJECTION INTRAMUSCULAR; INTRAVENOUS; SUBCUTANEOUS at 09:24

## 2021-10-27 NOTE — PROGRESS NOTES
[facility] Progress Note    Date: 2021    Name: Laura Hubbard. MRN: [id number]         : 1955    Mr. Bruno Dumont arrived (ambulation) at 5724 and in no distress for port flush/labs. No new complaints voiced. Covid Screening    Patient denied having any symptoms of COVID-19, i.e. SOB, coughing, fever, or generally not feeling well. Also denies having been exposed to COVID-19 recently or having had any recent contact with family/friend that has a pending COVID test.      Right chest port accessed without difficulty with 0.75 inch eddy needle; + blood return noted, labs drawn and sent. Mr. Shalini Jimenez vitals were reviewed. Patient Vitals for the past 12 hrs:   Temp Pulse Resp BP SpO2   10/27/21 0911 97.9 °F (36.6 °C) (!) 58 18 112/70 100 %       Lab results were obtained and reviewed. Recent Results (from the past 12 hour(s))   CBC WITH AUTOMATED DIFF    Collection Time: 10/27/21  9:19 AM   Result Value Ref Range    WBC 2.9 (L) 4.1 - 11.1 K/uL    RBC 3.79 (L) 4.10 - 5.70 M/uL    HGB 10.9 (L) 12.1 - 17.0 g/dL    HCT 34.7 (L) 36.6 - 50.3 %    MCV 91.6 80.0 - 99.0 FL    MCH 28.8 26.0 - 34.0 PG    MCHC 31.4 30.0 - 36.5 g/dL    RDW 17.3 (H) 11.5 - 14.5 %    PLATELET 948 744 - 414 K/uL    MPV 10.2 8.9 - 12.9 FL    NRBC 0.0 0  WBC    ABSOLUTE NRBC 0.00 0.00 - 0.01 K/uL    NEUTROPHILS 47 32 - 75 %    LYMPHOCYTES 29 12 - 49 %    MONOCYTES 21 (H) 5 - 13 %    EOSINOPHILS 2 0 - 7 %    BASOPHILS 1 0 - 1 %    IMMATURE GRANULOCYTES 0 0.0 - 0.5 %    ABS. NEUTROPHILS 1.4 (L) 1.8 - 8.0 K/UL    ABS. LYMPHOCYTES 0.8 0.8 - 3.5 K/UL    ABS. MONOCYTES 0.6 0.0 - 1.0 K/UL    ABS. EOSINOPHILS 0.1 0.0 - 0.4 K/UL    ABS. BASOPHILS 0.0 0.0 - 0.1 K/UL    ABS. IMM.  GRANS. 0.0 0.00 - 0.04 K/UL    DF SMEAR SCANNED      RBC COMMENTS ANISOCYTOSIS  1+       METABOLIC PANEL, COMPREHENSIVE    Collection Time: 10/27/21  9:19 AM   Result Value Ref Range    Sodium 138 136 - 145 mmol/L    Potassium 3.7 3.5 - 5.1 mmol/L Chloride 107 97 - 108 mmol/L    CO2 29 21 - 32 mmol/L    Anion gap 2 (L) 5 - 15 mmol/L    Glucose 123 (H) 65 - 100 mg/dL    BUN 10 6 - 20 MG/DL    Creatinine 1.36 (H) 0.70 - 1.30 MG/DL    BUN/Creatinine ratio 7 (L) 12 - 20      GFR est AA >60 >60 ml/min/1.73m2    GFR est non-AA 53 (L) >60 ml/min/1.73m2    Calcium 8.9 8.5 - 10.1 MG/DL    Bilirubin, total 0.4 0.2 - 1.0 MG/DL    ALT (SGPT) 18 12 - 78 U/L    AST (SGOT) 14 (L) 15 - 37 U/L    Alk. phosphatase 71 45 - 117 U/L    Protein, total 7.2 6.4 - 8.2 g/dL    Albumin 3.0 (L) 3.5 - 5.0 g/dL    Globulin 4.2 (H) 2.0 - 4.0 g/dL    A-G Ratio 0.7 (L) 1.1 - 2.2       See [facility] for additional pending labs. Medication:  Medications Administered     0.9% sodium chloride injection 10 mL     Admin Date  10/27/2021 Action  Given Dose  10 mL Route  IntraVENous Administered By  Elvi Rajani R          heparin (porcine) pf 500 Units     Admin Date  10/27/2021 Action  Given Dose  500 Units Route  IntraVENous Administered By  Elvi Rajani R          sodium chloride (NS) flush 5-10 mL     Admin Date  10/27/2021 Action  Given Dose  10 mL Route  IntraVENous Administered By  Elvi Rajani R                Patient port flushed; heparinized; and de-accessed per protocol. Mr. Federico Cornelius tolerated treatment well and was discharged from [place] [id number] in stable condition at 0930. He is aware of when to return for his next appointment.     Future Appointments   Date Time Provider Dajuan Johnston   11/24/2021  9:30 AM Rodger Anthony Dar REG   12/22/2021  9:30 AM BLAKE TAYLOR [address] [place] [address] REG   12/29/2021  9:00 AM Jude Mcgowan NP [facility]/IRLANDA Mcfadden  October 27, 2021

## 2021-10-28 LAB
KAPPA LC FREE SER-MCNC: 45 MG/L (ref 3.3–19.4)
KAPPA LC FREE/LAMBDA FREE SER: 1.11 {RATIO} (ref 0.26–1.65)
LAMBDA LC FREE SERPL-MCNC: 40.5 MG/L (ref 5.7–26.3)

## 2021-10-29 LAB
ALBUMIN SERPL ELPH-MCNC: 3.2 G/DL (ref 2.9–4.4)
ALBUMIN/GLOB SERPL: 1 {RATIO} (ref 0.7–1.7)
ALPHA1 GLOB SERPL ELPH-MCNC: 0.2 G/DL (ref 0–0.4)
ALPHA2 GLOB SERPL ELPH-MCNC: 1 G/DL (ref 0.4–1)
B-GLOBULIN SERPL ELPH-MCNC: 0.9 G/DL (ref 0.7–1.3)
GAMMA GLOB SERPL ELPH-MCNC: 1.1 G/DL (ref 0.4–1.8)
GLOBULIN SER CALC-MCNC: 3.3 G/DL (ref 2.2–3.9)
M PROTEIN SERPL ELPH-MCNC: 0.3 G/DL
PROT SERPL-MCNC: 6.5 G/DL (ref 6–8.5)

## 2021-11-01 LAB
IGA SERPL-MCNC: 457 MG/DL (ref 61–437)
IGG SERPL-MCNC: 1127 MG/DL (ref 603–1613)
IGM SERPL-MCNC: 20 MG/DL (ref 20–172)
PROT PATTERN SERPL IFE-IMP: ABNORMAL

## 2021-11-23 ENCOUNTER — TELEPHONE (OUTPATIENT)
Dept: ONCOLOGY | Age: 66
End: 2021-11-23

## 2021-11-23 RX ORDER — SODIUM CHLORIDE 9 MG/ML
10 INJECTION INTRAMUSCULAR; INTRAVENOUS; SUBCUTANEOUS AS NEEDED
Status: CANCELLED | OUTPATIENT
Start: 2021-12-01

## 2021-11-23 RX ORDER — HYDROCORTISONE SODIUM SUCCINATE 100 MG/2ML
100 INJECTION, POWDER, FOR SOLUTION INTRAMUSCULAR; INTRAVENOUS AS NEEDED
Status: CANCELLED | OUTPATIENT
Start: 2021-12-22

## 2021-11-23 RX ORDER — ALBUTEROL SULFATE 0.83 MG/ML
2.5 SOLUTION RESPIRATORY (INHALATION) AS NEEDED
Status: CANCELLED
Start: 2021-12-22

## 2021-11-23 RX ORDER — HEPARIN 100 UNIT/ML
300-500 SYRINGE INTRAVENOUS AS NEEDED
Status: CANCELLED | OUTPATIENT
Start: 2021-12-22

## 2021-11-23 RX ORDER — SODIUM CHLORIDE 9 MG/ML
10 INJECTION INTRAMUSCULAR; INTRAVENOUS; SUBCUTANEOUS AS NEEDED
Status: CANCELLED | OUTPATIENT
Start: 2021-12-22

## 2021-11-23 RX ORDER — DIPHENHYDRAMINE HYDROCHLORIDE 50 MG/ML
50 INJECTION, SOLUTION INTRAMUSCULAR; INTRAVENOUS AS NEEDED
Status: CANCELLED
Start: 2021-12-22

## 2021-11-23 RX ORDER — EPINEPHRINE 1 MG/ML
0.3 INJECTION, SOLUTION, CONCENTRATE INTRAVENOUS AS NEEDED
Status: CANCELLED | OUTPATIENT
Start: 2021-12-22

## 2021-11-23 RX ORDER — ACETAMINOPHEN 325 MG/1
650 TABLET ORAL AS NEEDED
Status: CANCELLED
Start: 2021-12-22

## 2021-11-23 RX ORDER — HEPARIN 100 UNIT/ML
500 SYRINGE INTRAVENOUS AS NEEDED
Status: CANCELLED | OUTPATIENT
Start: 2021-12-01

## 2021-11-23 RX ORDER — ONDANSETRON 2 MG/ML
8 INJECTION INTRAMUSCULAR; INTRAVENOUS AS NEEDED
Status: CANCELLED | OUTPATIENT
Start: 2021-12-22

## 2021-11-23 RX ORDER — SODIUM CHLORIDE 0.9 % (FLUSH) 0.9 %
10 SYRINGE (ML) INJECTION AS NEEDED
Status: CANCELLED | OUTPATIENT
Start: 2021-12-22

## 2021-11-23 RX ORDER — SODIUM CHLORIDE 9 MG/ML
25 INJECTION, SOLUTION INTRAVENOUS CONTINUOUS
Status: CANCELLED | OUTPATIENT
Start: 2021-12-22 | End: 2021-12-22

## 2021-11-23 RX ORDER — SODIUM CHLORIDE 0.9 % (FLUSH) 0.9 %
5-10 SYRINGE (ML) INJECTION AS NEEDED
Status: CANCELLED | OUTPATIENT
Start: 2021-12-01 | End: 2021-12-01

## 2021-11-23 RX ORDER — SODIUM CHLORIDE 0.9 % (FLUSH) 0.9 %
5-10 SYRINGE (ML) INJECTION AS NEEDED
Status: CANCELLED | OUTPATIENT
Start: 2021-12-22 | End: 2021-12-22

## 2021-11-23 RX ORDER — HEPARIN 100 UNIT/ML
500 SYRINGE INTRAVENOUS AS NEEDED
Status: CANCELLED | OUTPATIENT
Start: 2021-12-22

## 2021-11-23 NOTE — TELEPHONE ENCOUNTER
I asked that he hold the Pomalyst from today and call us on Monday regarding restarting. He was started on antibiotics by the the ED.      Signed By: Margert Kehr, NP     November 23, 2021

## 2021-11-24 ENCOUNTER — HOSPITAL ENCOUNTER (OUTPATIENT)
Dept: INFUSION THERAPY | Age: 66
End: 2021-11-24

## 2021-12-01 ENCOUNTER — HOSPITAL ENCOUNTER (OUTPATIENT)
Dept: INFUSION THERAPY | Age: 66
End: 2021-12-01

## 2021-12-22 ENCOUNTER — HOSPITAL ENCOUNTER (OUTPATIENT)
Dept: INFUSION THERAPY | Age: 66
Discharge: HOME | End: 2021-12-22
Payer: MEDICARE

## 2021-12-22 VITALS
WEIGHT: 196.9 LBS | HEART RATE: 71 BPM | DIASTOLIC BLOOD PRESSURE: 78 MMHG | RESPIRATION RATE: 16 BRPM | SYSTOLIC BLOOD PRESSURE: 124 MMHG | BODY MASS INDEX: 29.84 KG/M2 | OXYGEN SATURATION: 100 % | HEIGHT: 68 IN

## 2021-12-22 DIAGNOSIS — C90.00 MULTIPLE MYELOMA, REMISSION STATUS UNSPECIFIED (HCC): Primary | ICD-10-CM

## 2021-12-22 DIAGNOSIS — C90.01 MULTIPLE MYELOMA IN REMISSION (HCC): ICD-10-CM

## 2021-12-22 LAB
ALBUMIN SERPL-MCNC: 3 G/DL (ref 3.5–5)
ALBUMIN/GLOB SERPL: 0.8 {RATIO} (ref 1.1–2.2)
ALP SERPL-CCNC: 80 U/L (ref 45–117)
ALT SERPL-CCNC: 18 U/L (ref 12–78)
ANION GAP SERPL CALC-SCNC: 3 MMOL/L (ref 5–15)
AST SERPL-CCNC: 13 U/L (ref 15–37)
BASOPHILS # BLD: 0 K/UL (ref 0–0.1)
BASOPHILS NFR BLD: 1 % (ref 0–1)
BILIRUB SERPL-MCNC: 0.3 MG/DL (ref 0.2–1)
BUN SERPL-MCNC: 13 MG/DL (ref 6–20)
BUN/CREAT SERPL: 9 (ref 12–20)
CALCIUM SERPL-MCNC: 9.1 MG/DL (ref 8.5–10.1)
CHLORIDE SERPL-SCNC: 106 MMOL/L (ref 97–108)
CO2 SERPL-SCNC: 30 MMOL/L (ref 21–32)
CREAT SERPL-MCNC: 1.38 MG/DL (ref 0.7–1.3)
DIFFERENTIAL METHOD BLD: ABNORMAL
EOSINOPHIL # BLD: 0 K/UL (ref 0–0.4)
EOSINOPHIL NFR BLD: 1 % (ref 0–7)
ERYTHROCYTE [DISTWIDTH] IN BLOOD BY AUTOMATED COUNT: 17.3 % (ref 11.5–14.5)
GLOBULIN SER CALC-MCNC: 4 G/DL (ref 2–4)
GLUCOSE SERPL-MCNC: 136 MG/DL (ref 65–100)
HCT VFR BLD AUTO: 35.8 % (ref 36.6–50.3)
HGB BLD-MCNC: 11 G/DL (ref 12.1–17)
IGA SERPL-MCNC: 436 MG/DL (ref 70–400)
IGG SERPL-MCNC: 1150 MG/DL (ref 700–1600)
IGM SERPL-MCNC: 21 MG/DL (ref 40–230)
IMM GRANULOCYTES # BLD AUTO: 0 K/UL (ref 0–0.04)
IMM GRANULOCYTES NFR BLD AUTO: 0 % (ref 0–0.5)
LYMPHOCYTES # BLD: 0.8 K/UL (ref 0.8–3.5)
LYMPHOCYTES NFR BLD: 26 % (ref 12–49)
MCH RBC QN AUTO: 27.8 PG (ref 26–34)
MCHC RBC AUTO-ENTMCNC: 30.7 G/DL (ref 30–36.5)
MCV RBC AUTO: 90.4 FL (ref 80–99)
MONOCYTES # BLD: 0.8 K/UL (ref 0–1)
MONOCYTES NFR BLD: 26 % (ref 5–13)
NEUTS SEG # BLD: 1.5 K/UL (ref 1.8–8)
NEUTS SEG NFR BLD: 46 % (ref 32–75)
NRBC # BLD: 0 K/UL (ref 0–0.01)
NRBC BLD-RTO: 0 PER 100 WBC
PLATELET # BLD AUTO: 148 K/UL (ref 150–400)
PMV BLD AUTO: 10.1 FL (ref 8.9–12.9)
POTASSIUM SERPL-SCNC: 3.7 MMOL/L (ref 3.5–5.1)
PROT SERPL-MCNC: 7 G/DL (ref 6.4–8.2)
RBC # BLD AUTO: 3.96 M/UL (ref 4.1–5.7)
RBC MORPH BLD: ABNORMAL
SODIUM SERPL-SCNC: 139 MMOL/L (ref 136–145)
WBC # BLD AUTO: 3.1 K/UL (ref 4.1–11.1)

## 2021-12-22 PROCEDURE — 80053 COMPREHEN METABOLIC PANEL: CPT

## 2021-12-22 PROCEDURE — 77030012965 HC NDL HUBR BBMI -A

## 2021-12-22 PROCEDURE — 85025 COMPLETE CBC W/AUTO DIFF WBC: CPT

## 2021-12-22 PROCEDURE — 96374 THER/PROPH/DIAG INJ IV PUSH: CPT

## 2021-12-22 PROCEDURE — 82784 ASSAY IGA/IGD/IGG/IGM EACH: CPT

## 2021-12-22 PROCEDURE — 74011250636 HC RX REV CODE- 250/636: Performed by: INTERNAL MEDICINE

## 2021-12-22 PROCEDURE — 36415 COLL VENOUS BLD VENIPUNCTURE: CPT

## 2021-12-22 PROCEDURE — 84165 PROTEIN E-PHORESIS SERUM: CPT

## 2021-12-22 RX ORDER — HEPARIN 100 UNIT/ML
500 SYRINGE INTRAVENOUS AS NEEDED
Status: ACTIVE | OUTPATIENT
Start: 2021-12-22 | End: 2021-12-22

## 2021-12-22 RX ORDER — SODIUM CHLORIDE 0.9 % (FLUSH) 0.9 %
10 SYRINGE (ML) INJECTION AS NEEDED
Status: DISPENSED | OUTPATIENT
Start: 2021-12-22 | End: 2021-12-22

## 2021-12-22 RX ORDER — SODIUM CHLORIDE 0.9 % (FLUSH) 0.9 %
5-10 SYRINGE (ML) INJECTION AS NEEDED
Status: DISPENSED | OUTPATIENT
Start: 2021-12-22 | End: 2021-12-22

## 2021-12-22 RX ORDER — SODIUM CHLORIDE 9 MG/ML
10 INJECTION INTRAMUSCULAR; INTRAVENOUS; SUBCUTANEOUS AS NEEDED
Status: ACTIVE | OUTPATIENT
Start: 2021-12-22 | End: 2021-12-22

## 2021-12-22 RX ORDER — SODIUM CHLORIDE 9 MG/ML
25 INJECTION, SOLUTION INTRAVENOUS CONTINUOUS
Status: DISPENSED | OUTPATIENT
Start: 2021-12-22 | End: 2021-12-22

## 2021-12-22 RX ORDER — HEPARIN 100 UNIT/ML
300-500 SYRINGE INTRAVENOUS AS NEEDED
Status: ACTIVE | OUTPATIENT
Start: 2021-12-22 | End: 2021-12-22

## 2021-12-22 RX ADMIN — SODIUM CHLORIDE 10 ML: 9 INJECTION INTRAMUSCULAR; INTRAVENOUS; SUBCUTANEOUS at 11:39

## 2021-12-22 RX ADMIN — ZOLEDRONIC ACID 4 MG: 0.04 INJECTION, SOLUTION INTRAVENOUS at 11:19

## 2021-12-22 RX ADMIN — HEPARIN 500 UNITS: 100 SYRINGE at 11:39

## 2021-12-22 RX ADMIN — SODIUM CHLORIDE 10 ML: 9 INJECTION INTRAMUSCULAR; INTRAVENOUS; SUBCUTANEOUS at 09:40

## 2021-12-22 RX ADMIN — SODIUM CHLORIDE 25 ML/HR: 9 INJECTION, SOLUTION INTRAVENOUS at 11:19

## 2021-12-22 NOTE — PROGRESS NOTES
Pt arrived to [facility] ambulatory in no acute distress at 0930 for Zometa/labs. Assessment unremarkable except neuropathy and chronic pain. R chest port accessed without issue and positive blood return noted. Labs obtained, CBC, CMP, and Myeloma labs. Patient denied having any symptoms of COVID-19, i.e. SOB, coughing, fever, or generally not feeling well. Also denies having been exposed to COVID-19 recently or having had any recent contact with family/friend that has a pending COVID test.    Patient Vitals for the past 12 hrs:   Pulse Resp BP SpO2   12/22/21 1138 71 16 124/78    12/22/21 0931 76 18 122/81 100 %       Recent Results (from the past 12 hour(s))   CBC WITH AUTOMATED DIFF    Collection Time: 12/22/21  9:35 AM   Result Value Ref Range    WBC 3.1 (L) 4.1 - 11.1 K/uL    RBC 3.96 (L) 4.10 - 5.70 M/uL    HGB 11.0 (L) 12.1 - 17.0 g/dL    HCT 35.8 (L) 36.6 - 50.3 %    MCV 90.4 80.0 - 99.0 FL    MCH 27.8 26.0 - 34.0 PG    MCHC 30.7 30.0 - 36.5 g/dL    RDW 17.3 (H) 11.5 - 14.5 %    PLATELET 207 (L) 310 - 400 K/uL    MPV 10.1 8.9 - 12.9 FL    NRBC 0.0 0  WBC    ABSOLUTE NRBC 0.00 0.00 - 0.01 K/uL    NEUTROPHILS 46 32 - 75 %    LYMPHOCYTES 26 12 - 49 %    MONOCYTES 26 (H) 5 - 13 %    EOSINOPHILS 1 0 - 7 %    BASOPHILS 1 0 - 1 %    IMMATURE GRANULOCYTES 0 0.0 - 0.5 %    ABS. NEUTROPHILS 1.5 (L) 1.8 - 8.0 K/UL    ABS. LYMPHOCYTES 0.8 0.8 - 3.5 K/UL    ABS. MONOCYTES 0.8 0.0 - 1.0 K/UL    ABS. EOSINOPHILS 0.0 0.0 - 0.4 K/UL    ABS. BASOPHILS 0.0 0.0 - 0.1 K/UL    ABS. IMM.  GRANS. 0.0 0.00 - 0.04 K/UL    DF SMEAR SCANNED      RBC COMMENTS ANISOCYTOSIS  1+       METABOLIC PANEL, COMPREHENSIVE    Collection Time: 12/22/21  9:35 AM   Result Value Ref Range    Sodium 139 136 - 145 mmol/L    Potassium 3.7 3.5 - 5.1 mmol/L    Chloride 106 97 - 108 mmol/L    CO2 30 21 - 32 mmol/L    Anion gap 3 (L) 5 - 15 mmol/L    Glucose 136 (H) 65 - 100 mg/dL    BUN 13 6 - 20 MG/DL    Creatinine 1.38 (H) 0.70 - 1.30 MG/DL    BUN/Creatinine ratio 9 (L) 12 - 20      GFR est AA >60 >60 ml/min/1.73m2    GFR est non-AA 52 (L) >60 ml/min/1.73m2    Calcium 9.1 8.5 - 10.1 MG/DL    Bilirubin, total 0.3 0.2 - 1.0 MG/DL    ALT (SGPT) 18 12 - 78 U/L    AST (SGOT) 13 (L) 15 - 37 U/L    Alk. phosphatase 80 45 - 117 U/L    Protein, total 7.0 6.4 - 8.2 g/dL    Albumin 3.0 (L) 3.5 - 5.0 g/dL    Globulin 4.0 2.0 - 4.0 g/dL    A-G Ratio 0.8 (L) 1.1 - 2.2       The following medications administered:  Medications Administered     0.9% sodium chloride infusion     Admin Date  12/22/2021 Action  New Bag Dose  25 mL/hr Rate  25 mL/hr Route  IntraVENous Administered By  Vanessa Morris RN          0.9% sodium chloride injection 10 mL     Admin Date  12/22/2021 Action  Given Dose  10 mL Route  IntraVENous Administered By  Vanessa Morris RN           Admin Date  12/22/2021 Action  Given Dose  10 mL Route  IntraVENous Administered By  Vanessa Morris RN          heparin (porcine) pf 500 Units     Admin Date  12/22/2021 Action  Given Dose  500 Units Route  IntraVENous Administered By  Vanessa Morris RN          zoledronic acid (ZOMETA) 4 mg/100mL infusion     Admin Date  12/22/2021 Action  New Bag Dose  4 mg Rate  400 mL/hr Route  IntraVENous Administered By  Vanessa Morris RN                Pt tolerated treatment well. Port flushed per policy and de-accessed, 2x2 and soft tape placed. Pt discharged ambulatory in no acute distress at 1140, accompanied by self. Next appointment 1/19/2022.

## 2021-12-23 LAB
ALBUMIN SERPL ELPH-MCNC: 3.3 G/DL (ref 2.9–4.4)
ALBUMIN/GLOB SERPL: 1 {RATIO} (ref 0.7–1.7)
ALPHA1 GLOB SERPL ELPH-MCNC: 0.2 G/DL (ref 0–0.4)
ALPHA2 GLOB SERPL ELPH-MCNC: 1 G/DL (ref 0.4–1)
B-GLOBULIN SERPL ELPH-MCNC: 0.9 G/DL (ref 0.7–1.3)
GAMMA GLOB SERPL ELPH-MCNC: 1.2 G/DL (ref 0.4–1.8)
GLOBULIN SER CALC-MCNC: 3.3 G/DL (ref 2.2–3.9)
IFE, SERUM, IFDST: ABNORMAL
IGA SERPL-MCNC: 469 MG/DL (ref 61–437)
IGG SERPL-MCNC: 1211 MG/DL (ref 603–1613)
IGM SERPL-MCNC: 20 MG/DL (ref 20–172)
M PROTEIN SERPL ELPH-MCNC: NORMAL G/DL
PROT SERPL-MCNC: 6.6 G/DL (ref 6–8.5)

## 2021-12-26 LAB
IGA SERPL-MCNC: 455 MG/DL (ref 61–437)
IGG SERPL-MCNC: 1117 MG/DL (ref 603–1613)
IGM SERPL-MCNC: 19 MG/DL (ref 20–172)
PROT PATTERN SERPL IFE-IMP: ABNORMAL

## 2021-12-27 NOTE — PROGRESS NOTES
Dimas Alves is a 61 y.o. male here for 3 month follow up for Multiple Myeloma. He is on Pomalyst.  Pt states he has be marvelous. No concerns brought up. 1. Have you been to the ER, urgent care clinic since your last visit? Hospitalized since your last visit? ER on [address]  For pneumonia in December. 2. Have you seen or consulted any other health care providers outside of the [address] [place] [address] since your last visit? Include any pap smears or colon screening.  Pain doctor

## 2021-12-29 ENCOUNTER — OFFICE VISIT (OUTPATIENT)
Dept: ONCOLOGY | Age: 66
End: 2021-12-29
Payer: COMMERCIAL

## 2021-12-29 VITALS
BODY MASS INDEX: 30.25 KG/M2 | TEMPERATURE: 98.1 F | DIASTOLIC BLOOD PRESSURE: 72 MMHG | WEIGHT: 199.6 LBS | OXYGEN SATURATION: 97 % | HEART RATE: 77 BPM | SYSTOLIC BLOOD PRESSURE: 114 MMHG | HEIGHT: 68 IN

## 2021-12-29 DIAGNOSIS — N28.9 RENAL INSUFFICIENCY: ICD-10-CM

## 2021-12-29 DIAGNOSIS — C90.01 MULTIPLE MYELOMA IN REMISSION (HCC): Primary | ICD-10-CM

## 2021-12-29 DIAGNOSIS — R05.9 COUGH: ICD-10-CM

## 2021-12-29 PROCEDURE — G8427 DOCREV CUR MEDS BY ELIG CLIN: HCPCS | Performed by: INTERNAL MEDICINE

## 2021-12-29 PROCEDURE — G8754 DIAS BP LESS 90: HCPCS | Performed by: INTERNAL MEDICINE

## 2021-12-29 PROCEDURE — G8417 CALC BMI ABV UP PARAM F/U: HCPCS | Performed by: INTERNAL MEDICINE

## 2021-12-29 PROCEDURE — 99214 OFFICE O/P EST MOD 30 MIN: CPT | Performed by: INTERNAL MEDICINE

## 2021-12-29 PROCEDURE — 3017F COLORECTAL CA SCREEN DOC REV: CPT | Performed by: INTERNAL MEDICINE

## 2021-12-29 PROCEDURE — G8752 SYS BP LESS 140: HCPCS | Performed by: INTERNAL MEDICINE

## 2021-12-29 PROCEDURE — G9717 DOC PT DX DEP/BP F/U NT REQ: HCPCS | Performed by: INTERNAL MEDICINE

## 2021-12-29 NOTE — PROGRESS NOTES
[address] [facility] [address], [address] [facility], [address] [place] [address], 200 [facility]  [phone]            Progress Note        Patient: Tiara Tesfaye Sr. MRN: [id number]  SSN: [id number]    YOB: 1955  Age: 61 y.o. Sex: male        Diagnosis:      1. Multiple Myeloma, IgA Kappa   Durie [place] stage IIIB    Cytogenetics/FISH: t(14;16) - high risk    Ttreatment:     1. Maintenance therapy - Pomalyst 2 mg daily - 11/19/2018   2. Maintenance therapy - Ixazomib - started 10/15/2018 - stopped 11/12/2018  3. S/P tandem  Autotransplant   First on 2/28/2018   2nd on 06/13/2018  4. Carfilzomib/Pom/Dex - s/p 5 cycles  5. VD-PACE s/p 1 cycle  6. RVD - s/p 4 cycles    Subjective:      Tiara Tesfaye Sr. is a 61 y.o. male with a diagnosis of IgA kappa myeloma. Bone marrow shows 100% aberrant plasma cells. He suffers with DM but it is diet controlled. He has received systemic anti-viral treatment for chronic Hep C with successful eradication of the virus. Mr. Jhony Ozuna received 4 cycles of systemic therapy with RVd. He had an initial good response to treatment. However his paraprotein level started rising and the myeloma became refractory to treatment. I then administered one cycle of VD-PACE in the hospital. He then received Carfilzomib/Pom/Dex. He achieved VGPR. He underwent tandem autotransplant at [facility]. According to the patient he achieved complete response with therapy. He has undergone second/tandem transplant in June. He took Pomalyst maintenance until Aug 2019 then had to stop due to recurrent prostatitis. He had a repeat BM biopsy in February 2019 at [facility] which showed complete molecular remission. He is taking Pomalyst. He had PNA in Nov 2021. He took Abx for 8 days. Cough has improved but has not resolved.          Review of Systems:    Constitutional: negative  Eyes: negative  Ears, Nose, Mouth, Throat, and Face: negative  Respiratory: cough  Cardiovascular: negative  Gastrointestinal: negative  Genitourinary:negative  Integument/Breast: negative  Hematologic/Lymphatic: negative  Musculoskeletal: negative  Neurological: negative      Past Medical History:   Diagnosis Date    Acid reflux     Arrhythmia     pvc's    Chronic pain     neck    Depression     Diabetes ([address]     Femoral hernia 2012    Hepatitis C     Hypertension     Inguinal hernia 2012    Liver disease     hepatitis c    Multiple myeloma (HCC)     Other ill-defined conditions(799.89)     Hx of PVCs since     Prostatitis, acute     Psychiatric disorder     depression     Past Surgical History:   Procedure Laterality Date    COLONOSCOPY N/A 2016    COLONOSCOPY performed by Maye Rothman MD at [facility] ENDOSCOPY    HX APPENDECTOMY      HX CERVICAL FUSION  2008    x 1 as of 2014    HX HEENT      foreign body removed from right eye    HX HERNIA REPAIR      [facility]      HX ORTHOPAEDIC      cervical fusion    HX OTHER SURGICAL  2000    liver bx - chronic hepatitis      Family History   Problem Relation Age of Onset    OSTEOARTHRITIS Mother     OSTEOARTHRITIS Father     Diabetes Father     Hypertension Maternal Grandmother     Diabetes Maternal Grandmother     Hypertension Maternal Grandfather     Diabetes Maternal Grandfather      Social History     Tobacco Use    Smoking status: Former Smoker     Packs/day: 1.00     Years: 12.00     Pack years: 12.00     Types: Cigarettes     Quit date: 1989     Years since quittin.0    Smokeless tobacco: Never Used    Tobacco comment: former cigarette smoker   Substance Use Topics    Alcohol use: No     Comment: former alcoholic-quit       Prior to Admission medications    Medication Sig Start Date End Date Taking?  Authorizing Provider   Pomalyst 2 mg cap TAKE 1 CAPSULE BY MOUTH ONCE DAILY 21  Yes Jeny Morocho NP   TESTOSTERONE  mg by IntraMUSCular route Once every 2 weeks. Yes Provider, Historical   POTASSIUM PO Take  by mouth. Yes Provider, Historical   metFORMIN ER (GLUCOPHAGE XR) 500 mg tablet Take 1 Tablet by mouth two (2) times a day. SEND FUTURE REFILL REQUESTS TO PCP. DR Angelika Jennings NO LONGER AT THIS PRACTICE 8/6/21  Yes Pham Randle MD   OTHER Cannabis prescription   Yes Provider, Historical   zolpidem 3.5 mg subl DISSOLVE 1 TABLET UNDER TONGUE AT BEDTIME 4/10/21  Yes Provider, Historical   tenofovir DISOPROXIL FUMARATE (VIREAD) 300 mg tablet TAKE 1 TABLET BY MOUTH 1 TIME A DAY. 4/27/21  Yes Shima Mirza MD   cholecalciferol, vitamin D3, (Vitamin D3) 50 mcg (2,000 unit) tab Take  by mouth daily. Yes Provider, Historical   ascorbic acid, vitamin C, (VITAMIN C) 100 mg tab Take  by mouth. Yes Provider, Historical   Denta 5000 Plus 1.1 % crea USE AS DIRECTED TWO TIMES DAILY 8/12/20  Yes Provider, Historical   ibuprofen (MOTRIN) 800 mg tablet TAKE 1 TABLET BY MOUTH 3 TIMES A DAY AS NEEDED FOR PAIN 10/13/20  Yes Provider, Historical   buPROPion XL (WELLBUTRIN XL) 300 mg XL tablet TAKE 1 TABLET BY MOUTH EVERY DAY 1/30/20  Yes Provider, Historical   naproxen (NAPROSYN) 500 mg tablet TAKE 1 TABLET BY MOUTH TWICE A DAY 8/26/19  Yes Provider, Historical   NUCYNTA 100 mg tablet TAKE 2 TABLETS 3 TIMES A DAY 7/27/19  Yes Provider, Historical   aspirin delayed-release 81 mg tablet Take  by mouth daily. Yes Provider, Historical   prochlorperazine (COMPAZINE) 10 mg tablet Take 5 mg by mouth every six (6) hours as needed. Yes Other, MD Goldy   omeprazole (PRILOSEC) 20 mg capsule Take 20 mg by mouth daily as needed (acid reflux). Yes Other, MD Goldy   ondansetron (ZOFRAN ODT) 8 mg disintegrating tablet Take 8 mg by mouth every eight (8) hours as needed for Nausea. Yes Other, MD Goldy   multivitamin (ONE A DAY) tablet Take 1 Tab by mouth daily. Yes Provider, Historical   tamsulosin (FLOMAX) 0.4 mg capsule Take 1 Cap by mouth daily.  4/4/17  Yes Pam Cochran MD zolpidem CR (AMBIEN CR) 12.5 mg tablet Take 12.5 mg by mouth nightly. Yes Provider, Historical   aripiprazole (ABILIFY) 5 mg tablet Take 2.5 mg by mouth every morning. Yes Other, MD Goldy          Allergies   Allergen Reactions    Mercury (Bulk) Hives     Blisters             Objective:     Visit Vitals  /72 (BP 1 Location: Left upper arm, BP Patient Position: Sitting)   Pulse 77   Temp 98.1 °F (36.7 °C) (Temporal)   Ht 5' 8\" (1.727 m)   Wt 199 lb 9.6 oz (90.5 kg)   SpO2 97%   BMI 30.35 kg/m²       Pain Scale: /10  Pain Location:       Physical Exam:     GENERAL: alert, cooperative  EYE: negative  LYMPHATIC: Cervical, supraclavicular, and axillary nodes normal.   THROAT & NECK: normal and no erythema or exudates noted. LUNG: clear to auscultation bilaterally  HEART: regular rate and rhythm  ABDOMEN: soft, non-tender  EXTREMITIES: normal  SKIN: Normal.  NEUROLOGIC: negative     Physical exam copied from previous note. Reviewed and no changes noted. Lab Results   Component Value Date/Time    WBC 3.1 (L) 12/22/2021 09:35 AM    HGB 11.0 (L) 12/22/2021 09:35 AM    Hematocrit (POC) 37 04/14/2021 09:48 AM    HCT 35.8 (L) 12/22/2021 09:35 AM    PLATELET 876 (L) 72/43/4870 09:35 AM    MCV 90.4 12/22/2021 09:35 AM       Lab Results   Component Value Date/Time    Sodium 139 12/22/2021 09:35 AM    Potassium 3.7 12/22/2021 09:35 AM    Chloride 106 12/22/2021 09:35 AM    CO2 30 12/22/2021 09:35 AM    Anion gap 3 (L) 12/22/2021 09:35 AM    Glucose 136 (H) 12/22/2021 09:35 AM    BUN 13 12/22/2021 09:35 AM    Creatinine 1.38 (H) 12/22/2021 09:35 AM    BUN/Creatinine ratio 9 (L) 12/22/2021 09:35 AM    GFR est AA >60 12/22/2021 09:35 AM    GFR est non-AA 52 (L) 12/22/2021 09:35 AM    Calcium 9.1 12/22/2021 09:35 AM            Assessment:     1.  Multiple Myeloma, IgA Kappa   Durie [place] stage IIIB    Cytogenetics/FISH: t(14;16) - high risk  ECOG PS - 0   Intent of therapy: palliative    Received 3 cycles of RVd Dose reduced Revlimid and Velcade d/t continued cytopenias. M-spike started rising. He received one cycle of VD-PACE (bortezomib, dexamethasone, thalidomide, cisplatin, adriamycin, cyclophosphamide, and etoposide). Achieved NC with once cycle    Receivied KPd - s/p 5 cycles    Excellent response with M-protein came down to 0.1    S/p tandem autotransplant    First on 2/28/2018   2nd on 06/13/2018    Achieved CR after the first transplant. Repeat bone marrow @ [facility] - Complete response. Since he is high risk based on cytogenetics, I recommend proteosome inhibitor as maintenance therapy. Took Ixazomib briefly  Developed fever, was in the hospital.   Also developed cytopenias    Patient prefered to switch therapy to Pomalyst.     Pomalyst 2 mg daily - started 11/19/2018  Tolerating treatment very well  Denies any side effects. A detailed system by system evaluation of side effect was performed to assess chemotherapy related toxicity. Blood counts are acceptable. Results reviewed with the patient    M-spike is invisible  Bone marrow 04/2021 - in CR. 2. Type 2 DM with complication    Managed by Endocrine      3. Chronic Hep C    In sustained virological remission      Gabapentin for possible neuropathy        4. Cough    Recent PNA  Repeat CT chest in Jan 2022      Plan:       > Continue Pomalyst maintenance  > CT chest in 2 weeks  > Myeloma labs and port flush monthly   > Gabapentin for muscle cramps.   > Zometa every 3 months  > Follow-up in 3 months      Signed by: Tameka Prescott MD                     December 29, 2021      CC. Nery Price MD  CC.  Chaya Tabor MD

## 2022-01-04 DIAGNOSIS — C90.00 MULTIPLE MYELOMA, REMISSION STATUS UNSPECIFIED (HCC): ICD-10-CM

## 2022-01-05 RX ORDER — TENOFOVIR DISOPROXIL FUMARATE 300 MG/1
TABLET, FILM COATED ORAL
Qty: 90 TABLET | Refills: 0 | Status: SHIPPED | OUTPATIENT
Start: 2022-01-05 | End: 2022-08-03

## 2022-01-05 NOTE — TELEPHONE ENCOUNTER
JOSESITOB per provider, refill approved. Requested Prescriptions   Pending Prescriptions Disp Refills    tenofovir DISOPROXIL FUMARATE (VIREAD) 300 mg tablet [Pharmacy Med Name: TENOFOVIR DISOPROXIL FUMARATE 300MG] 90 Tablet 0     Sig: TAKE 1 TABLET BY MOUTH 1 TIME A DAY.

## 2022-01-11 ENCOUNTER — HOSPITAL ENCOUNTER (OUTPATIENT)
Dept: CT IMAGING | Age: 67
Discharge: HOME | End: 2022-01-11
Attending: INTERNAL MEDICINE
Payer: MEDICARE

## 2022-01-11 DIAGNOSIS — C90.01 MULTIPLE MYELOMA IN REMISSION (HCC): ICD-10-CM

## 2022-01-11 DIAGNOSIS — R05.9 COUGH: ICD-10-CM

## 2022-01-11 LAB — CREAT BLD-MCNC: 1.4 MG/DL (ref 0.6–1.3)

## 2022-01-11 PROCEDURE — 82565 ASSAY OF CREATININE: CPT

## 2022-01-11 PROCEDURE — 74011000636 HC RX REV CODE- 636: Performed by: INTERNAL MEDICINE

## 2022-01-11 PROCEDURE — 71260 CT THORAX DX C+: CPT

## 2022-01-11 RX ADMIN — IOPAMIDOL 80 ML: 755 INJECTION, SOLUTION INTRAVENOUS at 12:35

## 2022-01-12 NOTE — PROGRESS NOTES
PNA has resolved. If he has not seen hepatology in recent times, consider re-establishing care with Hepatology.

## 2022-01-14 NOTE — PROGRESS NOTES
Called pt. HIPAA verified by two patient identifiers. Pt aware to contact a liver specialist as it has been some time.

## 2022-01-19 ENCOUNTER — HOSPITAL ENCOUNTER (OUTPATIENT)
Dept: INFUSION THERAPY | Age: 67
Discharge: HOME | End: 2022-01-19
Payer: MEDICARE

## 2022-01-19 ENCOUNTER — APPOINTMENT (OUTPATIENT)
Dept: INFUSION THERAPY | Age: 67
End: 2022-01-19

## 2022-01-19 VITALS
RESPIRATION RATE: 18 BRPM | HEIGHT: 68 IN | HEART RATE: 77 BPM | OXYGEN SATURATION: 99 % | DIASTOLIC BLOOD PRESSURE: 88 MMHG | SYSTOLIC BLOOD PRESSURE: 156 MMHG | BODY MASS INDEX: 29.86 KG/M2 | WEIGHT: 197 LBS

## 2022-01-19 DIAGNOSIS — C90.00 MULTIPLE MYELOMA, REMISSION STATUS UNSPECIFIED (HCC): Primary | ICD-10-CM

## 2022-01-19 LAB
ALBUMIN SERPL-MCNC: 3.1 G/DL (ref 3.5–5)
ALBUMIN/GLOB SERPL: 0.8 {RATIO} (ref 1.1–2.2)
ALP SERPL-CCNC: 64 U/L (ref 45–117)
ALT SERPL-CCNC: 18 U/L (ref 12–78)
ANION GAP SERPL CALC-SCNC: 6 MMOL/L (ref 5–15)
AST SERPL-CCNC: 11 U/L (ref 15–37)
BASOPHILS # BLD: 0 K/UL (ref 0–0.1)
BASOPHILS NFR BLD: 1 % (ref 0–1)
BILIRUB SERPL-MCNC: 0.4 MG/DL (ref 0.2–1)
BUN SERPL-MCNC: 12 MG/DL (ref 6–20)
BUN/CREAT SERPL: 9 (ref 12–20)
CALCIUM SERPL-MCNC: 9.1 MG/DL (ref 8.5–10.1)
CHLORIDE SERPL-SCNC: 104 MMOL/L (ref 97–108)
CO2 SERPL-SCNC: 29 MMOL/L (ref 21–32)
CREAT SERPL-MCNC: 1.32 MG/DL (ref 0.7–1.3)
DIFFERENTIAL METHOD BLD: ABNORMAL
EOSINOPHIL # BLD: 0.1 K/UL (ref 0–0.4)
EOSINOPHIL NFR BLD: 2 % (ref 0–7)
ERYTHROCYTE [DISTWIDTH] IN BLOOD BY AUTOMATED COUNT: 17.6 % (ref 11.5–14.5)
GLOBULIN SER CALC-MCNC: 4.1 G/DL (ref 2–4)
GLUCOSE SERPL-MCNC: 135 MG/DL (ref 65–100)
HCT VFR BLD AUTO: 34.5 % (ref 36.6–50.3)
HGB BLD-MCNC: 10.8 G/DL (ref 12.1–17)
IGA SERPL-MCNC: 396 MG/DL (ref 70–400)
IGG SERPL-MCNC: 1070 MG/DL (ref 700–1600)
IGM SERPL-MCNC: <21 MG/DL (ref 40–230)
IMM GRANULOCYTES # BLD AUTO: 0 K/UL (ref 0–0.04)
IMM GRANULOCYTES NFR BLD AUTO: 0 % (ref 0–0.5)
LYMPHOCYTES # BLD: 0.6 K/UL (ref 0.8–3.5)
LYMPHOCYTES NFR BLD: 21 % (ref 12–49)
MCH RBC QN AUTO: 26.5 PG (ref 26–34)
MCHC RBC AUTO-ENTMCNC: 31.3 G/DL (ref 30–36.5)
MCV RBC AUTO: 84.8 FL (ref 80–99)
MONOCYTES # BLD: 0.7 K/UL (ref 0–1)
MONOCYTES NFR BLD: 23 % (ref 5–13)
NEUTS SEG # BLD: 1.5 K/UL (ref 1.8–8)
NEUTS SEG NFR BLD: 53 % (ref 32–75)
NRBC # BLD: 0 K/UL (ref 0–0.01)
NRBC BLD-RTO: 0 PER 100 WBC
PLATELET # BLD AUTO: 195 K/UL (ref 150–400)
PMV BLD AUTO: 10.1 FL (ref 8.9–12.9)
POTASSIUM SERPL-SCNC: 3.6 MMOL/L (ref 3.5–5.1)
PROT SERPL-MCNC: 7.2 G/DL (ref 6.4–8.2)
RBC # BLD AUTO: 4.07 M/UL (ref 4.1–5.7)
RBC MORPH BLD: ABNORMAL
RBC MORPH BLD: ABNORMAL
SODIUM SERPL-SCNC: 139 MMOL/L (ref 136–145)
WBC # BLD AUTO: 2.9 K/UL (ref 4.1–11.1)

## 2022-01-19 PROCEDURE — 77030012965 HC NDL HUBR BBMI -A

## 2022-01-19 PROCEDURE — 82784 ASSAY IGA/IGD/IGG/IGM EACH: CPT

## 2022-01-19 PROCEDURE — 83521 IG LIGHT CHAINS FREE EACH: CPT

## 2022-01-19 PROCEDURE — 74011250636 HC RX REV CODE- 250/636: Performed by: INTERNAL MEDICINE

## 2022-01-19 PROCEDURE — 74011000250 HC RX REV CODE- 250: Performed by: INTERNAL MEDICINE

## 2022-01-19 PROCEDURE — 36591 DRAW BLOOD OFF VENOUS DEVICE: CPT

## 2022-01-19 PROCEDURE — 80053 COMPREHEN METABOLIC PANEL: CPT

## 2022-01-19 PROCEDURE — 85025 COMPLETE CBC W/AUTO DIFF WBC: CPT

## 2022-01-19 PROCEDURE — 84165 PROTEIN E-PHORESIS SERUM: CPT

## 2022-01-19 RX ORDER — SODIUM CHLORIDE 0.9 % (FLUSH) 0.9 %
5-10 SYRINGE (ML) INJECTION AS NEEDED
Status: DISPENSED | OUTPATIENT
Start: 2022-01-19 | End: 2022-01-19

## 2022-01-19 RX ORDER — HEPARIN 100 UNIT/ML
500 SYRINGE INTRAVENOUS AS NEEDED
Status: ACTIVE | OUTPATIENT
Start: 2022-01-19 | End: 2022-01-19

## 2022-01-19 RX ORDER — SODIUM CHLORIDE 9 MG/ML
10 INJECTION INTRAMUSCULAR; INTRAVENOUS; SUBCUTANEOUS AS NEEDED
Status: ACTIVE | OUTPATIENT
Start: 2022-01-19 | End: 2022-01-19

## 2022-01-19 RX ADMIN — SODIUM CHLORIDE, PRESERVATIVE FREE 10 ML: 5 INJECTION INTRAVENOUS at 09:48

## 2022-01-19 RX ADMIN — SODIUM CHLORIDE, PRESERVATIVE FREE 10 ML: 5 INJECTION INTRAVENOUS at 09:46

## 2022-01-19 RX ADMIN — HEPARIN 500 UNITS: 100 SYRINGE at 09:48

## 2022-01-19 NOTE — PROGRESS NOTES
[facility] Lab Draw Note:  Arrived - 46    Patient denied having any symptoms of COVID-19, i.e. SOB, coughing, fever, or generally not feeling well. Also denies having been exposed to COVID-19 recently or having had any recent contact with family/friend that has a pending COVID test.    Patient Vitals for the past 12 hrs:   Pulse Resp BP SpO2   01/19/22 0944 77 18 (!) 156/88 99 %           Labs drawn from R chest port-CBC, CMP, Immunofixation, Immunoglobulins G/A/M, Serum free light chains, and SPEP. Port flushed per policy and de-accessed, 2x2 and soft tape placed. 7522 - Tolerated well. Pt denies any acute problems/changes. Discharged from [facility] ambulatory. No distress. Next appt: 2/16/2022    Recent Results (from the past 12 hour(s))   CBC WITH AUTOMATED DIFF    Collection Time: 01/19/22  9:45 AM   Result Value Ref Range    WBC 2.9 (L) 4.1 - 11.1 K/uL    RBC 4.07 (L) 4.10 - 5.70 M/uL    HGB 10.8 (L) 12.1 - 17.0 g/dL    HCT 34.5 (L) 36.6 - 50.3 %    MCV 84.8 80.0 - 99.0 FL    MCH 26.5 26.0 - 34.0 PG    MCHC 31.3 30.0 - 36.5 g/dL    RDW 17.6 (H) 11.5 - 14.5 %    PLATELET 187 402 - 859 K/uL    MPV 10.1 8.9 - 12.9 FL    NRBC 0.0 0  WBC    ABSOLUTE NRBC 0.00 0.00 - 0.01 K/uL    NEUTROPHILS 53 32 - 75 %    LYMPHOCYTES 21 12 - 49 %    MONOCYTES 23 (H) 5 - 13 %    EOSINOPHILS 2 0 - 7 %    BASOPHILS 1 0 - 1 %    IMMATURE GRANULOCYTES 0 0.0 - 0.5 %    ABS. NEUTROPHILS 1.5 (L) 1.8 - 8.0 K/UL    ABS. LYMPHOCYTES 0.6 (L) 0.8 - 3.5 K/UL    ABS. MONOCYTES 0.7 0.0 - 1.0 K/UL    ABS. EOSINOPHILS 0.1 0.0 - 0.4 K/UL    ABS. BASOPHILS 0.0 0.0 - 0.1 K/UL    ABS. IMM.  GRANS. 0.0 0.00 - 0.04 K/UL    DF SMEAR SCANNED      RBC COMMENTS HYPOCHROMIA  1+        RBC COMMENTS ANISOCYTOSIS  1+       METABOLIC PANEL, COMPREHENSIVE    Collection Time: 01/19/22  9:45 AM   Result Value Ref Range    Sodium 139 136 - 145 mmol/L    Potassium 3.6 3.5 - 5.1 mmol/L    Chloride 104 97 - 108 mmol/L    CO2 29 21 - 32 mmol/L    Anion gap 6 5 - 15 mmol/L    Glucose 135 (H) 65 - 100 mg/dL    BUN 12 6 - 20 MG/DL    Creatinine 1.32 (H) 0.70 - 1.30 MG/DL    BUN/Creatinine ratio 9 (L) 12 - 20      GFR est AA >60 >60 ml/min/1.73m2    GFR est non-AA 55 (L) >60 ml/min/1.73m2    Calcium 9.1 8.5 - 10.1 MG/DL    Bilirubin, total 0.4 0.2 - 1.0 MG/DL    ALT (SGPT) 18 12 - 78 U/L    AST (SGOT) 11 (L) 15 - 37 U/L    Alk. phosphatase 64 45 - 117 U/L    Protein, total 7.2 6.4 - 8.2 g/dL    Albumin 3.1 (L) 3.5 - 5.0 g/dL    Globulin 4.1 (H) 2.0 - 4.0 g/dL    A-G Ratio 0.8 (L) 1.1 - 2.2       See [facility] for pending lab results.

## 2022-01-20 LAB
ALBUMIN SERPL ELPH-MCNC: 3.4 G/DL (ref 2.9–4.4)
ALBUMIN/GLOB SERPL: 1 {RATIO} (ref 0.7–1.7)
ALPHA1 GLOB SERPL ELPH-MCNC: 0.2 G/DL (ref 0–0.4)
ALPHA2 GLOB SERPL ELPH-MCNC: 0.9 G/DL (ref 0.4–1)
B-GLOBULIN SERPL ELPH-MCNC: 1.1 G/DL (ref 0.7–1.3)
GAMMA GLOB SERPL ELPH-MCNC: 1.2 G/DL (ref 0.4–1.8)
GLOBULIN SER CALC-MCNC: 3.4 G/DL (ref 2.2–3.9)
M PROTEIN SERPL ELPH-MCNC: NORMAL G/DL
PROT SERPL-MCNC: 6.8 G/DL (ref 6–8.5)

## 2022-01-21 LAB
IGA SERPL-MCNC: 448 MG/DL (ref 61–437)
IGG SERPL-MCNC: 1142 MG/DL (ref 603–1613)
IGM SERPL-MCNC: 19 MG/DL (ref 20–172)
PROT PATTERN SERPL IFE-IMP: ABNORMAL

## 2022-01-24 LAB
KAPPA LC FREE SER-MCNC: 36.3 MG/L (ref 3.3–19.4)
KAPPA LC FREE/LAMBDA FREE SER: 0.93 {RATIO} (ref 0.26–1.65)
LAMBDA LC FREE SERPL-MCNC: 38.9 MG/L (ref 5.7–26.3)

## 2022-01-27 ENCOUNTER — TELEPHONE (OUTPATIENT)
Dept: ONCOLOGY | Age: 67
End: 2022-01-27

## 2022-01-27 DIAGNOSIS — C90.00 MULTIPLE MYELOMA, REMISSION STATUS UNSPECIFIED (HCC): Primary | ICD-10-CM

## 2022-01-27 NOTE — TELEPHONE ENCOUNTER
Received call from pt regarding his liver doctor appointment. He was seeing Micki Campo but the appt for him is far out so he is seeing  who is at [facility]. They need a referral and imaging report/results. Fax to [phone]. This RN completed at this time but will forward to [facility] if [employer] are needed.         VORB FROM DR Cavazos Link REFERRAL TO [facility]

## 2022-02-16 ENCOUNTER — HOSPITAL ENCOUNTER (OUTPATIENT)
Dept: INFUSION THERAPY | Age: 67
Discharge: HOME | End: 2022-02-16
Payer: MEDICARE

## 2022-02-16 VITALS
DIASTOLIC BLOOD PRESSURE: 77 MMHG | WEIGHT: 194.1 LBS | TEMPERATURE: 98.3 F | RESPIRATION RATE: 18 BRPM | HEART RATE: 69 BPM | BODY MASS INDEX: 29.42 KG/M2 | HEIGHT: 68 IN | OXYGEN SATURATION: 98 % | SYSTOLIC BLOOD PRESSURE: 131 MMHG

## 2022-02-16 DIAGNOSIS — C90.00 MULTIPLE MYELOMA, REMISSION STATUS UNSPECIFIED (HCC): Primary | ICD-10-CM

## 2022-02-16 LAB
ALBUMIN SERPL-MCNC: 3.3 G/DL (ref 3.5–5)
ALBUMIN/GLOB SERPL: 0.8 {RATIO} (ref 1.1–2.2)
ALP SERPL-CCNC: 60 U/L (ref 45–117)
ALT SERPL-CCNC: 23 U/L (ref 12–78)
ANION GAP SERPL CALC-SCNC: 4 MMOL/L (ref 5–15)
AST SERPL-CCNC: 12 U/L (ref 15–37)
BASOPHILS # BLD: 0 K/UL (ref 0–0.1)
BASOPHILS NFR BLD: 1 % (ref 0–1)
BILIRUB SERPL-MCNC: 0.5 MG/DL (ref 0.2–1)
BUN SERPL-MCNC: 11 MG/DL (ref 6–20)
BUN/CREAT SERPL: 8 (ref 12–20)
CALCIUM SERPL-MCNC: 8.4 MG/DL (ref 8.5–10.1)
CHLORIDE SERPL-SCNC: 106 MMOL/L (ref 97–108)
CO2 SERPL-SCNC: 28 MMOL/L (ref 21–32)
CREAT SERPL-MCNC: 1.31 MG/DL (ref 0.7–1.3)
DIFFERENTIAL METHOD BLD: ABNORMAL
EOSINOPHIL # BLD: 0 K/UL (ref 0–0.4)
EOSINOPHIL NFR BLD: 1 % (ref 0–7)
ERYTHROCYTE [DISTWIDTH] IN BLOOD BY AUTOMATED COUNT: 18 % (ref 11.5–14.5)
GLOBULIN SER CALC-MCNC: 3.9 G/DL (ref 2–4)
GLUCOSE SERPL-MCNC: 88 MG/DL (ref 65–100)
HCT VFR BLD AUTO: 34.7 % (ref 36.6–50.3)
HGB BLD-MCNC: 10.9 G/DL (ref 12.1–17)
IMM GRANULOCYTES # BLD AUTO: 0 K/UL (ref 0–0.04)
IMM GRANULOCYTES NFR BLD AUTO: 1 % (ref 0–0.5)
LYMPHOCYTES # BLD: 0.9 K/UL (ref 0.8–3.5)
LYMPHOCYTES NFR BLD: 21 % (ref 12–49)
MCH RBC QN AUTO: 25.6 PG (ref 26–34)
MCHC RBC AUTO-ENTMCNC: 31.4 G/DL (ref 30–36.5)
MCV RBC AUTO: 81.6 FL (ref 80–99)
MONOCYTES # BLD: 1 K/UL (ref 0–1)
MONOCYTES NFR BLD: 23 % (ref 5–13)
NEUTS SEG # BLD: 2.3 K/UL (ref 1.8–8)
NEUTS SEG NFR BLD: 53 % (ref 32–75)
NRBC # BLD: 0 K/UL (ref 0–0.01)
NRBC BLD-RTO: 0 PER 100 WBC
PLATELET # BLD AUTO: 214 K/UL (ref 150–400)
PMV BLD AUTO: 10.5 FL (ref 8.9–12.9)
POTASSIUM SERPL-SCNC: 3.8 MMOL/L (ref 3.5–5.1)
PROT SERPL-MCNC: 7.2 G/DL (ref 6.4–8.2)
RBC # BLD AUTO: 4.25 M/UL (ref 4.1–5.7)
RBC MORPH BLD: ABNORMAL
SODIUM SERPL-SCNC: 138 MMOL/L (ref 136–145)
WBC # BLD AUTO: 4.2 K/UL (ref 4.1–11.1)

## 2022-02-16 PROCEDURE — 84165 PROTEIN E-PHORESIS SERUM: CPT

## 2022-02-16 PROCEDURE — 36591 DRAW BLOOD OFF VENOUS DEVICE: CPT

## 2022-02-16 PROCEDURE — 83521 IG LIGHT CHAINS FREE EACH: CPT

## 2022-02-16 PROCEDURE — 77030012965 HC NDL HUBR BBMI -A

## 2022-02-16 PROCEDURE — 85025 COMPLETE CBC W/AUTO DIFF WBC: CPT

## 2022-02-16 PROCEDURE — 82784 ASSAY IGA/IGD/IGG/IGM EACH: CPT

## 2022-02-16 PROCEDURE — 74011000250 HC RX REV CODE- 250: Performed by: INTERNAL MEDICINE

## 2022-02-16 PROCEDURE — 80053 COMPREHEN METABOLIC PANEL: CPT

## 2022-02-16 PROCEDURE — 74011250636 HC RX REV CODE- 250/636: Performed by: INTERNAL MEDICINE

## 2022-02-16 RX ORDER — SODIUM CHLORIDE 9 MG/ML
10 INJECTION INTRAMUSCULAR; INTRAVENOUS; SUBCUTANEOUS AS NEEDED
Status: ACTIVE | OUTPATIENT
Start: 2022-02-16 | End: 2022-02-16

## 2022-02-16 RX ORDER — SODIUM CHLORIDE 0.9 % (FLUSH) 0.9 %
5-10 SYRINGE (ML) INJECTION AS NEEDED
Status: DISPENSED | OUTPATIENT
Start: 2022-02-16 | End: 2022-02-16

## 2022-02-16 RX ORDER — HEPARIN 100 UNIT/ML
500 SYRINGE INTRAVENOUS AS NEEDED
Status: ACTIVE | OUTPATIENT
Start: 2022-02-16 | End: 2022-02-16

## 2022-02-16 RX ADMIN — Medication 10 ML: at 10:45

## 2022-02-16 RX ADMIN — HEPARIN 500 UNITS: 100 SYRINGE at 10:47

## 2022-02-16 RX ADMIN — SODIUM CHLORIDE 10 ML: 9 INJECTION INTRAMUSCULAR; INTRAVENOUS; SUBCUTANEOUS at 10:44

## 2022-02-16 NOTE — PROGRESS NOTES
Outpatient Infusion Center VISIT NOTE      5707 - Patient arrives for [facility] Flush/labs without acute problems. Covid Screening  Patient denied having any symptoms of COVID-19, i.e. SOB, coughing, fever, or generally not feeling well. Also denies having been exposed to COVID-19 recently or having had any recent contact with family/friend that has a pending COVID test.    Right chest port accessed without difficulty with 0.75 inch eddy needle; + blood return noted; lab drawn and sent for processing. Vitals Signs:  Visit Vitals  /77 (BP 1 Location: Left arm, BP Patient Position: Sitting)   Pulse 69   Temp 98.3 °F (36.8 °C)   Resp 18   Ht 5' 8\" (1.727 m)   Wt 88 kg (194 lb 1.6 oz)   SpO2 98%   BMI 29.51 kg/m²         Lab Results:   Recent Results (from the past 12 hour(s))   CBC WITH AUTOMATED DIFF    Collection Time: 02/16/22 10:37 AM   Result Value Ref Range    WBC 4.2 4.1 - 11.1 K/uL    RBC 4.25 4. 10 - 5.70 M/uL    HGB 10.9 (L) 12.1 - 17.0 g/dL    HCT 34.7 (L) 36.6 - 50.3 %    MCV 81.6 80.0 - 99.0 FL    MCH 25.6 (L) 26.0 - 34.0 PG    MCHC 31.4 30.0 - 36.5 g/dL    RDW 18.0 (H) 11.5 - 14.5 %    PLATELET 652 802 - 071 K/uL    MPV 10.5 8.9 - 12.9 FL    NRBC 0.0 0  WBC    ABSOLUTE NRBC 0.00 0.00 - 0.01 K/uL    NEUTROPHILS 53 32 - 75 %    LYMPHOCYTES 21 12 - 49 %    MONOCYTES 23 (H) 5 - 13 %    EOSINOPHILS 1 0 - 7 %    BASOPHILS 1 0 - 1 %    IMMATURE GRANULOCYTES 1 (H) 0.0 - 0.5 %    ABS. NEUTROPHILS 2.3 1.8 - 8.0 K/UL    ABS. LYMPHOCYTES 0.9 0.8 - 3.5 K/UL    ABS. MONOCYTES 1.0 0.0 - 1.0 K/UL    ABS. EOSINOPHILS 0.0 0.0 - 0.4 K/UL    ABS. BASOPHILS 0.0 0.0 - 0.1 K/UL    ABS. IMM.  GRANS. 0.0 0.00 - 0.04 K/UL    DF SMEAR SCANNED      RBC COMMENTS ANISOCYTOSIS  1+       METABOLIC PANEL, COMPREHENSIVE    Collection Time: 02/16/22 10:37 AM   Result Value Ref Range    Sodium 138 136 - 145 mmol/L    Potassium 3.8 3.5 - 5.1 mmol/L    Chloride 106 97 - 108 mmol/L    CO2 28 21 - 32 mmol/L    Anion gap 4 (L) 5 - 15 mmol/L    Glucose 88 65 - 100 mg/dL    BUN 11 6 - 20 MG/DL    Creatinine 1.31 (H) 0.70 - 1.30 MG/DL    BUN/Creatinine ratio 8 (L) 12 - 20      GFR est AA >60 >60 ml/min/1.73m2    GFR est non-AA 55 (L) >60 ml/min/1.73m2    Calcium 8.4 (L) 8.5 - 10.1 MG/DL    Bilirubin, total 0.5 0.2 - 1.0 MG/DL    ALT (SGPT) 23 12 - 78 U/L    AST (SGOT) 12 (L) 15 - 37 U/L    Alk. phosphatase 60 45 - 117 U/L    Protein, total 7.2 6.4 - 8.2 g/dL    Albumin 3.3 (L) 3.5 - 5.0 g/dL    Globulin 3.9 2.0 - 4.0 g/dL    A-G Ratio 0.8 (L) 1.1 - 2.2       See [facility] for additional pending labs    Medications:  Medications Administered     0.9% sodium chloride injection 10 mL     Admin Date  02/16/2022 Action  Given Dose  10 mL Route  IntraVENous Administered By  Random [place] Farmerons R          heparin (porcine) pf 500 Units     Admin Date  02/16/2022 Action  Given Dose  500 Units Route  IntraVENous Administered By  Deborah Benes R          sodium chloride (NS) flush 5-10 mL     Admin Date  02/16/2022 Action  Given Dose  10 mL Route  IntraVENous Administered By  Jude White              Patient's port was flushed and heparinized; de-accessed per protocol and bandage placed over site. Patient tolerated port flush and labs well and discharged without incident.  Patient/parent is aware of next [facility] appointment on 3/16/22 at 1000

## 2022-02-17 LAB
KAPPA LC FREE SER-MCNC: 34.3 MG/L (ref 3.3–19.4)
KAPPA LC FREE/LAMBDA FREE SER: 0.94 {RATIO} (ref 0.26–1.65)
LAMBDA LC FREE SERPL-MCNC: 36.4 MG/L (ref 5.7–26.3)

## 2022-02-18 LAB
ALBUMIN SERPL ELPH-MCNC: 3.4 G/DL (ref 2.9–4.4)
ALBUMIN/GLOB SERPL: 1 {RATIO} (ref 0.7–1.7)
ALPHA1 GLOB SERPL ELPH-MCNC: 0.2 G/DL (ref 0–0.4)
ALPHA2 GLOB SERPL ELPH-MCNC: 0.8 G/DL (ref 0.4–1)
B-GLOBULIN SERPL ELPH-MCNC: 1 G/DL (ref 0.7–1.3)
GAMMA GLOB SERPL ELPH-MCNC: 1.2 G/DL (ref 0.4–1.8)
GLOBULIN SER CALC-MCNC: 3.3 G/DL (ref 2.2–3.9)
IGA SERPL-MCNC: 452 MG/DL (ref 70–400)
IGG SERPL-MCNC: 1170 MG/DL (ref 700–1600)
IGM SERPL-MCNC: <21 MG/DL (ref 40–230)
M PROTEIN SERPL ELPH-MCNC: NORMAL G/DL
PROT SERPL-MCNC: 6.7 G/DL (ref 6–8.5)

## 2022-02-19 LAB
IGA SERPL-MCNC: 463 MG/DL (ref 61–437)
IGG SERPL-MCNC: 1117 MG/DL (ref 603–1613)
IGM SERPL-MCNC: 16 MG/DL (ref 20–172)
PROT PATTERN SERPL IFE-IMP: ABNORMAL

## 2022-03-08 RX ORDER — ACETAMINOPHEN 325 MG/1
650 TABLET ORAL AS NEEDED
Status: CANCELLED
Start: 2022-03-16

## 2022-03-08 RX ORDER — ALBUTEROL SULFATE 0.83 MG/ML
2.5 SOLUTION RESPIRATORY (INHALATION) AS NEEDED
Status: CANCELLED
Start: 2022-03-16

## 2022-03-08 RX ORDER — EPINEPHRINE 1 MG/ML
0.3 INJECTION, SOLUTION, CONCENTRATE INTRAVENOUS AS NEEDED
Status: CANCELLED | OUTPATIENT
Start: 2022-03-16

## 2022-03-08 RX ORDER — HYDROCORTISONE SODIUM SUCCINATE 100 MG/2ML
100 INJECTION, POWDER, FOR SOLUTION INTRAMUSCULAR; INTRAVENOUS AS NEEDED
Status: CANCELLED | OUTPATIENT
Start: 2022-03-16

## 2022-03-08 RX ORDER — DIPHENHYDRAMINE HYDROCHLORIDE 50 MG/ML
50 INJECTION, SOLUTION INTRAMUSCULAR; INTRAVENOUS AS NEEDED
Status: CANCELLED
Start: 2022-03-16

## 2022-03-08 RX ORDER — SODIUM CHLORIDE 9 MG/ML
25 INJECTION, SOLUTION INTRAVENOUS CONTINUOUS
Status: CANCELLED | OUTPATIENT
Start: 2022-03-16

## 2022-03-08 RX ORDER — ONDANSETRON 2 MG/ML
8 INJECTION INTRAMUSCULAR; INTRAVENOUS AS NEEDED
Status: CANCELLED | OUTPATIENT
Start: 2022-03-16

## 2022-03-15 NOTE — PROGRESS NOTES
Kyle Anderson is a 61 y.o. male here for 3 month follow up for Multiple Myeloma. He is on Pomalyst.  Pt states he has been well. No concerns brought up. 1. Have you been to the ER, urgent care clinic since your last visit? Hospitalized since your last visit? no  2. Have you seen or consulted any other health care providers outside of the [address] [place] [address] since your last visit? Include any pap smears or colon screening.  no

## 2022-03-16 ENCOUNTER — HOSPITAL ENCOUNTER (OUTPATIENT)
Dept: INFUSION THERAPY | Age: 67
Discharge: HOME | End: 2022-03-16
Payer: MEDICARE

## 2022-03-16 ENCOUNTER — OFFICE VISIT (OUTPATIENT)
Dept: ONCOLOGY | Age: 67
End: 2022-03-16

## 2022-03-16 VITALS
SYSTOLIC BLOOD PRESSURE: 121 MMHG | WEIGHT: 191 LBS | OXYGEN SATURATION: 100 % | DIASTOLIC BLOOD PRESSURE: 81 MMHG | TEMPERATURE: 97.9 F | HEART RATE: 66 BPM | RESPIRATION RATE: 18 BRPM | HEIGHT: 68 IN | BODY MASS INDEX: 28.95 KG/M2

## 2022-03-16 VITALS
RESPIRATION RATE: 18 BRPM | OXYGEN SATURATION: 100 % | WEIGHT: 191.9 LBS | TEMPERATURE: 97.9 F | HEART RATE: 66 BPM | SYSTOLIC BLOOD PRESSURE: 121 MMHG | HEIGHT: 68 IN | DIASTOLIC BLOOD PRESSURE: 81 MMHG | BODY MASS INDEX: 29.08 KG/M2

## 2022-03-16 DIAGNOSIS — N28.9 RENAL INSUFFICIENCY: ICD-10-CM

## 2022-03-16 DIAGNOSIS — C90.01 MULTIPLE MYELOMA IN REMISSION (HCC): Primary | ICD-10-CM

## 2022-03-16 DIAGNOSIS — C90.01 MULTIPLE MYELOMA IN REMISSION (HCC): ICD-10-CM

## 2022-03-16 DIAGNOSIS — Z51.11 CHEMOTHERAPY MANAGEMENT, ENCOUNTER FOR: ICD-10-CM

## 2022-03-16 DIAGNOSIS — C90.00 MULTIPLE MYELOMA, REMISSION STATUS UNSPECIFIED (HCC): Primary | ICD-10-CM

## 2022-03-16 LAB
ALBUMIN SERPL-MCNC: 3.5 G/DL (ref 3.5–5)
ALBUMIN/GLOB SERPL: 0.9 {RATIO} (ref 1.1–2.2)
ALP SERPL-CCNC: 62 U/L (ref 45–117)
ALT SERPL-CCNC: 18 U/L (ref 12–78)
ANION GAP SERPL CALC-SCNC: 5 MMOL/L (ref 5–15)
AST SERPL-CCNC: 15 U/L (ref 15–37)
BASOPHILS # BLD: 0 K/UL (ref 0–0.1)
BASOPHILS NFR BLD: 1 % (ref 0–1)
BILIRUB SERPL-MCNC: 0.3 MG/DL (ref 0.2–1)
BUN SERPL-MCNC: 12 MG/DL (ref 6–20)
BUN/CREAT SERPL: 9 (ref 12–20)
CALCIUM SERPL-MCNC: 9.3 MG/DL (ref 8.5–10.1)
CHLORIDE SERPL-SCNC: 103 MMOL/L (ref 97–108)
CO2 SERPL-SCNC: 29 MMOL/L (ref 21–32)
CREAT SERPL-MCNC: 1.37 MG/DL (ref 0.7–1.3)
DIFFERENTIAL METHOD BLD: ABNORMAL
EOSINOPHIL # BLD: 0 K/UL (ref 0–0.4)
EOSINOPHIL NFR BLD: 1 % (ref 0–7)
ERYTHROCYTE [DISTWIDTH] IN BLOOD BY AUTOMATED COUNT: 18.4 % (ref 11.5–14.5)
GLOBULIN SER CALC-MCNC: 3.9 G/DL (ref 2–4)
GLUCOSE SERPL-MCNC: 89 MG/DL (ref 65–100)
HCT VFR BLD AUTO: 37.5 % (ref 36.6–50.3)
HGB BLD-MCNC: 11.5 G/DL (ref 12.1–17)
IGA SERPL-MCNC: 415 MG/DL (ref 70–400)
IGG SERPL-MCNC: 1210 MG/DL (ref 700–1600)
IGM SERPL-MCNC: <21 MG/DL (ref 40–230)
IMM GRANULOCYTES # BLD AUTO: 0 K/UL (ref 0–0.04)
IMM GRANULOCYTES NFR BLD AUTO: 1 % (ref 0–0.5)
LYMPHOCYTES # BLD: 0.9 K/UL (ref 0.8–3.5)
LYMPHOCYTES NFR BLD: 25 % (ref 12–49)
MCH RBC QN AUTO: 24.8 PG (ref 26–34)
MCHC RBC AUTO-ENTMCNC: 30.7 G/DL (ref 30–36.5)
MCV RBC AUTO: 80.8 FL (ref 80–99)
MONOCYTES # BLD: 0.7 K/UL (ref 0–1)
MONOCYTES NFR BLD: 21 % (ref 5–13)
NEUTS SEG # BLD: 1.9 K/UL (ref 1.8–8)
NEUTS SEG NFR BLD: 51 % (ref 32–75)
NRBC # BLD: 0 K/UL (ref 0–0.01)
NRBC BLD-RTO: 0 PER 100 WBC
PLATELET # BLD AUTO: 199 K/UL (ref 150–400)
PMV BLD AUTO: 10.3 FL (ref 8.9–12.9)
POTASSIUM SERPL-SCNC: 3.7 MMOL/L (ref 3.5–5.1)
PROT SERPL-MCNC: 7.4 G/DL (ref 6.4–8.2)
RBC # BLD AUTO: 4.64 M/UL (ref 4.1–5.7)
RBC MORPH BLD: ABNORMAL
SODIUM SERPL-SCNC: 137 MMOL/L (ref 136–145)
WBC # BLD AUTO: 3.5 K/UL (ref 4.1–11.1)

## 2022-03-16 PROCEDURE — 77030012965 HC NDL HUBR BBMI -A

## 2022-03-16 PROCEDURE — G8427 DOCREV CUR MEDS BY ELIG CLIN: HCPCS | Performed by: INTERNAL MEDICINE

## 2022-03-16 PROCEDURE — G8417 CALC BMI ABV UP PARAM F/U: HCPCS | Performed by: INTERNAL MEDICINE

## 2022-03-16 PROCEDURE — G9717 DOC PT DX DEP/BP F/U NT REQ: HCPCS | Performed by: INTERNAL MEDICINE

## 2022-03-16 PROCEDURE — 82784 ASSAY IGA/IGD/IGG/IGM EACH: CPT

## 2022-03-16 PROCEDURE — 96374 THER/PROPH/DIAG INJ IV PUSH: CPT

## 2022-03-16 PROCEDURE — 84165 PROTEIN E-PHORESIS SERUM: CPT

## 2022-03-16 PROCEDURE — 36415 COLL VENOUS BLD VENIPUNCTURE: CPT

## 2022-03-16 PROCEDURE — G8752 SYS BP LESS 140: HCPCS | Performed by: INTERNAL MEDICINE

## 2022-03-16 PROCEDURE — 85025 COMPLETE CBC W/AUTO DIFF WBC: CPT

## 2022-03-16 PROCEDURE — 74011250636 HC RX REV CODE- 250/636: Performed by: INTERNAL MEDICINE

## 2022-03-16 PROCEDURE — 99215 OFFICE O/P EST HI 40 MIN: CPT | Performed by: INTERNAL MEDICINE

## 2022-03-16 PROCEDURE — 80053 COMPREHEN METABOLIC PANEL: CPT

## 2022-03-16 PROCEDURE — 3017F COLORECTAL CA SCREEN DOC REV: CPT | Performed by: INTERNAL MEDICINE

## 2022-03-16 PROCEDURE — G8754 DIAS BP LESS 90: HCPCS | Performed by: INTERNAL MEDICINE

## 2022-03-16 PROCEDURE — 83521 IG LIGHT CHAINS FREE EACH: CPT

## 2022-03-16 RX ORDER — SODIUM CHLORIDE 0.9 % (FLUSH) 0.9 %
10 SYRINGE (ML) INJECTION AS NEEDED
Status: DISPENSED | OUTPATIENT
Start: 2022-03-16 | End: 2022-03-16

## 2022-03-16 RX ORDER — HEPARIN 100 UNIT/ML
300-500 SYRINGE INTRAVENOUS AS NEEDED
Status: ACTIVE | OUTPATIENT
Start: 2022-03-16 | End: 2022-03-16

## 2022-03-16 RX ORDER — SODIUM CHLORIDE 9 MG/ML
10 INJECTION INTRAMUSCULAR; INTRAVENOUS; SUBCUTANEOUS AS NEEDED
Status: DISCONTINUED | OUTPATIENT
Start: 2022-03-16 | End: 2022-03-17 | Stop reason: HOSPADM

## 2022-03-16 RX ORDER — SODIUM CHLORIDE 9 MG/ML
10 INJECTION INTRAMUSCULAR; INTRAVENOUS; SUBCUTANEOUS AS NEEDED
Status: ACTIVE | OUTPATIENT
Start: 2022-03-16 | End: 2022-03-16

## 2022-03-16 RX ORDER — HEPARIN 100 UNIT/ML
500 SYRINGE INTRAVENOUS AS NEEDED
Status: DISCONTINUED | OUTPATIENT
Start: 2022-03-16 | End: 2022-03-17 | Stop reason: HOSPADM

## 2022-03-16 RX ORDER — SODIUM CHLORIDE 0.9 % (FLUSH) 0.9 %
5-10 SYRINGE (ML) INJECTION AS NEEDED
Status: DISCONTINUED | OUTPATIENT
Start: 2022-03-16 | End: 2022-03-17 | Stop reason: HOSPADM

## 2022-03-16 RX ADMIN — Medication 10 ML: at 12:10

## 2022-03-16 RX ADMIN — Medication 500 UNITS: at 12:10

## 2022-03-16 RX ADMIN — Medication 10 ML: at 10:45

## 2022-03-16 RX ADMIN — ZOLEDRONIC ACID 4 MG: 0.04 INJECTION, SOLUTION INTRAVENOUS at 11:55

## 2022-03-16 NOTE — PROGRESS NOTES
[facility] Visit Note    4978- Pt arrived to [facility] ambulatory in no acute distress for Zometa. Assessment unremarkable except tingling to feet. R chest port accessed without issue and positive blood return noted. Patient denies any recent or upcoming dental work. Labs Obtained - CBC w/ diff, CMP, Myeloma labs  Recent Results (from the past 12 hour(s))   CBC WITH AUTOMATED DIFF    Collection Time: 03/16/22 10:41 AM   Result Value Ref Range    WBC 3.5 (L) 4.1 - 11.1 K/uL    RBC 4.64 4.10 - 5.70 M/uL    HGB 11.5 (L) 12.1 - 17.0 g/dL    HCT 37.5 36.6 - 50.3 %    MCV 80.8 80.0 - 99.0 FL    MCH 24.8 (L) 26.0 - 34.0 PG    MCHC 30.7 30.0 - 36.5 g/dL    RDW 18.4 (H) 11.5 - 14.5 %    PLATELET 794 673 - 591 K/uL    MPV 10.3 8.9 - 12.9 FL    NRBC 0.0 0  WBC    ABSOLUTE NRBC 0.00 0.00 - 0.01 K/uL    NEUTROPHILS 51 32 - 75 %    LYMPHOCYTES 25 12 - 49 %    MONOCYTES 21 (H) 5 - 13 %    EOSINOPHILS 1 0 - 7 %    BASOPHILS 1 0 - 1 %    IMMATURE GRANULOCYTES 1 (H) 0.0 - 0.5 %    ABS. NEUTROPHILS 1.9 1.8 - 8.0 K/UL    ABS. LYMPHOCYTES 0.9 0.8 - 3.5 K/UL    ABS. MONOCYTES 0.7 0.0 - 1.0 K/UL    ABS. EOSINOPHILS 0.0 0.0 - 0.4 K/UL    ABS. BASOPHILS 0.0 0.0 - 0.1 K/UL    ABS. IMM.  GRANS. 0.0 0.00 - 0.04 K/UL    DF SMEAR SCANNED      RBC COMMENTS ANISOCYTOSIS  1+        RBC COMMENTS HYPOCHROMIA  2+        RBC COMMENTS TARGET CELLS  PRESENT       METABOLIC PANEL, COMPREHENSIVE    Collection Time: 03/16/22 10:41 AM   Result Value Ref Range    Sodium 137 136 - 145 mmol/L    Potassium 3.7 3.5 - 5.1 mmol/L    Chloride 103 97 - 108 mmol/L    CO2 29 21 - 32 mmol/L    Anion gap 5 5 - 15 mmol/L    Glucose 89 65 - 100 mg/dL    BUN 12 6 - 20 MG/DL    Creatinine 1.37 (H) 0.70 - 1.30 MG/DL    BUN/Creatinine ratio 9 (L) 12 - 20      GFR est AA >60 >60 ml/min/1.73m2    GFR est non-AA 52 (L) >60 ml/min/1.73m2    Calcium 9.3 8.5 - 10.1 MG/DL    Bilirubin, total 0.3 0.2 - 1.0 MG/DL    ALT (SGPT) 18 12 - 78 U/L    AST (SGOT) 15 15 - 37 U/L    Alk. phosphatase 62 45 - 117 U/L    Protein, total 7.4 6.4 - 8.2 g/dL    Albumin 3.5 3.5 - 5.0 g/dL    Globulin 3.9 2.0 - 4.0 g/dL    A-G Ratio 0.9 (L) 1.1 - 2.2         Visit Vitals  /81   Pulse 66   Temp 97.9 °F (36.6 °C)   Resp 18   Ht 5' 8\" (1.727 m)   Wt 87 kg (191 lb 14.4 oz)   SpO2 100%   BMI 29.18 kg/m²     Patient denied having any symptoms of COVID-19, i.e. SOB, coughing, fever, or generally not feeling well. Also denies having been exposed to COVID-19 recently or having had any recent contact with family/friend that has a pending COVID test.     The following medications administered:  Medications Administered     heparin (porcine) pf 300-500 Units     Admin Date  03/16/2022 Action  Given Dose  500 Units Route  InterCATHeter Administered By  Alla Anaya RN          saline peripheral flush soln 10 mL     Admin Date  03/16/2022 Action  Given Dose  10 mL Route  InterCATHeter Administered By  Alla Anaya RN           Admin Date  03/16/2022 Action  Given Dose  10 mL Route  InterCATHeter Administered By  Alla Anaya RN          zoledronic acid (ZOMETA) 4 mg/100mL infusion     Admin Date  03/16/2022 Action  New Bag Dose  4 mg Rate  400 mL/hr Route  IntraVENous Administered By  Alla Anaya RN              1210- Pt tolerated treatment well. Port flushed per policy and de-accessed, 2x2 and tape placed. Pt discharged ambulatory in no acute distress, accompanied by spouse. Next appointment 4/13/22.

## 2022-03-18 PROBLEM — T45.1X5A CHEMOTHERAPY-INDUCED THROMBOCYTOPENIA: Status: ACTIVE | Noted: 2018-11-05

## 2022-03-18 PROBLEM — D69.59 CHEMOTHERAPY-INDUCED THROMBOCYTOPENIA: Status: ACTIVE | Noted: 2018-11-05

## 2022-03-18 PROBLEM — D64.9 ANEMIA: Status: ACTIVE | Noted: 2017-04-03

## 2022-03-18 LAB
ALBUMIN SERPL ELPH-MCNC: 3.7 G/DL (ref 2.9–4.4)
ALBUMIN/GLOB SERPL: 1.1 {RATIO} (ref 0.7–1.7)
ALPHA1 GLOB SERPL ELPH-MCNC: 0.2 G/DL (ref 0–0.4)
ALPHA2 GLOB SERPL ELPH-MCNC: 0.9 G/DL (ref 0.4–1)
B-GLOBULIN SERPL ELPH-MCNC: 1.1 G/DL (ref 0.7–1.3)
GAMMA GLOB SERPL ELPH-MCNC: 1.2 G/DL (ref 0.4–1.8)
GLOBULIN SER CALC-MCNC: 3.4 G/DL (ref 2.2–3.9)
KAPPA LC FREE SER-MCNC: 35.8 MG/L (ref 3.3–19.4)
KAPPA LC FREE/LAMBDA FREE SER: 1.09 {RATIO} (ref 0.26–1.65)
LAMBDA LC FREE SERPL-MCNC: 32.9 MG/L (ref 5.7–26.3)
M PROTEIN SERPL ELPH-MCNC: NORMAL G/DL
PROT SERPL-MCNC: 7.1 G/DL (ref 6–8.5)

## 2022-03-19 PROBLEM — E11.21 TYPE 2 DIABETES WITH NEPHROPATHY (HCC): Status: ACTIVE | Noted: 2018-10-07

## 2022-03-19 PROBLEM — C90.00 MULTIPLE MYELOMA (HCC): Status: ACTIVE | Noted: 2017-05-19

## 2022-03-19 PROBLEM — D64.81 ANEMIA ASSOCIATED WITH CHEMOTHERAPY: Status: ACTIVE | Noted: 2017-08-23

## 2022-03-19 PROBLEM — T45.1X5A ANEMIA ASSOCIATED WITH CHEMOTHERAPY: Status: ACTIVE | Noted: 2017-08-23

## 2022-03-20 PROBLEM — N28.9 RENAL INSUFFICIENCY: Status: ACTIVE | Noted: 2017-08-23

## 2022-03-20 PROBLEM — Z94.84 H/O AUTOLOGOUS STEM CELL TRANSPLANT (HCC): Status: ACTIVE | Noted: 2018-08-10

## 2022-03-21 LAB
IGA SERPL-MCNC: 433 MG/DL (ref 61–437)
IGG SERPL-MCNC: 1204 MG/DL (ref 603–1613)
IGM SERPL-MCNC: 18 MG/DL (ref 20–172)
PROT PATTERN SERPL IFE-IMP: ABNORMAL

## 2022-03-28 ENCOUNTER — TELEPHONE (OUTPATIENT)
Dept: ONCOLOGY | Age: 67
End: 2022-03-28

## 2022-03-28 NOTE — TELEPHONE ENCOUNTER
3/28/22- PA renewal request for this pt's medication, Pomalyst, has been faxed to [facility] PA Dept and is in process. PA approved.

## 2022-04-08 RX ORDER — HEPARIN 100 UNIT/ML
500 SYRINGE INTRAVENOUS AS NEEDED
Status: CANCELLED | OUTPATIENT
Start: 2022-04-14

## 2022-04-08 RX ORDER — SODIUM CHLORIDE 0.9 % (FLUSH) 0.9 %
5-10 SYRINGE (ML) INJECTION AS NEEDED
Status: CANCELLED | OUTPATIENT
Start: 2022-04-14

## 2022-04-08 RX ORDER — SODIUM CHLORIDE 9 MG/ML
10 INJECTION INTRAMUSCULAR; INTRAVENOUS; SUBCUTANEOUS AS NEEDED
Status: CANCELLED | OUTPATIENT
Start: 2022-04-14

## 2022-04-13 ENCOUNTER — HOSPITAL ENCOUNTER (OUTPATIENT)
Dept: INFUSION THERAPY | Age: 67
End: 2022-04-13

## 2022-04-14 ENCOUNTER — HOSPITAL ENCOUNTER (OUTPATIENT)
Dept: INFUSION THERAPY | Age: 67
Discharge: HOME | End: 2022-04-14
Payer: COMMERCIAL

## 2022-04-14 VITALS
SYSTOLIC BLOOD PRESSURE: 118 MMHG | OXYGEN SATURATION: 97 % | TEMPERATURE: 97.2 F | BODY MASS INDEX: 29.5 KG/M2 | HEART RATE: 72 BPM | DIASTOLIC BLOOD PRESSURE: 77 MMHG | RESPIRATION RATE: 16 BRPM | WEIGHT: 194 LBS

## 2022-04-14 DIAGNOSIS — C90.00 MULTIPLE MYELOMA, REMISSION STATUS UNSPECIFIED (HCC): Primary | ICD-10-CM

## 2022-04-14 LAB
ALBUMIN SERPL-MCNC: 3.3 G/DL (ref 3.5–5)
ALBUMIN/GLOB SERPL: 0.9 {RATIO} (ref 1.1–2.2)
ALP SERPL-CCNC: 64 U/L (ref 45–117)
ALT SERPL-CCNC: 21 U/L (ref 12–78)
ANION GAP SERPL CALC-SCNC: 4 MMOL/L (ref 5–15)
AST SERPL-CCNC: 15 U/L (ref 15–37)
BASOPHILS # BLD: 0 K/UL (ref 0–0.1)
BASOPHILS NFR BLD: 1 % (ref 0–1)
BILIRUB SERPL-MCNC: 0.4 MG/DL (ref 0.2–1)
BUN SERPL-MCNC: 12 MG/DL (ref 6–20)
BUN/CREAT SERPL: 8 (ref 12–20)
CALCIUM SERPL-MCNC: 8.6 MG/DL (ref 8.5–10.1)
CHLORIDE SERPL-SCNC: 106 MMOL/L (ref 97–108)
CO2 SERPL-SCNC: 28 MMOL/L (ref 21–32)
CREAT SERPL-MCNC: 1.52 MG/DL (ref 0.7–1.3)
DIFFERENTIAL METHOD BLD: ABNORMAL
EOSINOPHIL # BLD: 0 K/UL (ref 0–0.4)
EOSINOPHIL NFR BLD: 0 % (ref 0–7)
ERYTHROCYTE [DISTWIDTH] IN BLOOD BY AUTOMATED COUNT: 19.9 % (ref 11.5–14.5)
GLOBULIN SER CALC-MCNC: 3.6 G/DL (ref 2–4)
GLUCOSE SERPL-MCNC: 142 MG/DL (ref 65–100)
HCT VFR BLD AUTO: 35.4 % (ref 36.6–50.3)
HGB BLD-MCNC: 10.6 G/DL (ref 12.1–17)
IGA SERPL-MCNC: 440 MG/DL (ref 70–400)
IGG SERPL-MCNC: 1150 MG/DL (ref 700–1600)
IGM SERPL-MCNC: <21 MG/DL (ref 40–230)
IMM GRANULOCYTES # BLD AUTO: 0 K/UL (ref 0–0.04)
IMM GRANULOCYTES NFR BLD AUTO: 0 % (ref 0–0.5)
LYMPHOCYTES # BLD: 0.8 K/UL (ref 0.8–3.5)
LYMPHOCYTES NFR BLD: 23 % (ref 12–49)
MCH RBC QN AUTO: 23.9 PG (ref 26–34)
MCHC RBC AUTO-ENTMCNC: 29.9 G/DL (ref 30–36.5)
MCV RBC AUTO: 79.9 FL (ref 80–99)
MONOCYTES # BLD: 0.5 K/UL (ref 0–1)
MONOCYTES NFR BLD: 15 % (ref 5–13)
NEUTS BAND NFR BLD MANUAL: 1 %
NEUTS SEG # BLD: 2.1 K/UL (ref 1.8–8)
NEUTS SEG NFR BLD: 60 % (ref 32–75)
NRBC # BLD: 0 K/UL (ref 0–0.01)
NRBC BLD-RTO: 0 PER 100 WBC
PLATELET # BLD AUTO: 184 K/UL (ref 150–400)
PMV BLD AUTO: 9.4 FL (ref 8.9–12.9)
POTASSIUM SERPL-SCNC: 3.8 MMOL/L (ref 3.5–5.1)
PROT SERPL-MCNC: 6.9 G/DL (ref 6.4–8.2)
RBC # BLD AUTO: 4.43 M/UL (ref 4.1–5.7)
RBC MORPH BLD: ABNORMAL
RBC MORPH BLD: ABNORMAL
SODIUM SERPL-SCNC: 138 MMOL/L (ref 136–145)
WBC # BLD AUTO: 3.4 K/UL (ref 4.1–11.1)

## 2022-04-14 PROCEDURE — 82784 ASSAY IGA/IGD/IGG/IGM EACH: CPT

## 2022-04-14 PROCEDURE — 74011000250 HC RX REV CODE- 250: Performed by: INTERNAL MEDICINE

## 2022-04-14 PROCEDURE — 83521 IG LIGHT CHAINS FREE EACH: CPT

## 2022-04-14 PROCEDURE — 80053 COMPREHEN METABOLIC PANEL: CPT

## 2022-04-14 PROCEDURE — 74011250636 HC RX REV CODE- 250/636: Performed by: INTERNAL MEDICINE

## 2022-04-14 PROCEDURE — 85025 COMPLETE CBC W/AUTO DIFF WBC: CPT

## 2022-04-14 PROCEDURE — 36591 DRAW BLOOD OFF VENOUS DEVICE: CPT

## 2022-04-14 PROCEDURE — 77030012965 HC NDL HUBR BBMI -A

## 2022-04-14 PROCEDURE — 84165 PROTEIN E-PHORESIS SERUM: CPT

## 2022-04-14 RX ORDER — SODIUM CHLORIDE 9 MG/ML
10 INJECTION INTRAMUSCULAR; INTRAVENOUS; SUBCUTANEOUS AS NEEDED
Status: DISCONTINUED | OUTPATIENT
Start: 2022-04-14 | End: 2022-04-15 | Stop reason: HOSPADM

## 2022-04-14 RX ORDER — SODIUM CHLORIDE 0.9 % (FLUSH) 0.9 %
5-10 SYRINGE (ML) INJECTION AS NEEDED
Status: DISCONTINUED | OUTPATIENT
Start: 2022-04-14 | End: 2022-04-15 | Stop reason: HOSPADM

## 2022-04-14 RX ORDER — HEPARIN 100 UNIT/ML
500 SYRINGE INTRAVENOUS AS NEEDED
Status: DISCONTINUED | OUTPATIENT
Start: 2022-04-14 | End: 2022-04-15 | Stop reason: HOSPADM

## 2022-04-14 RX ADMIN — Medication 10 ML: at 13:15

## 2022-04-14 RX ADMIN — Medication 500 UNITS: at 13:15

## 2022-04-14 NOTE — PROGRESS NOTES
Outpatient Infusion Center Short Visit Note    8474  Arrived for [facility] Flush/Labs    Visit Vitals  /77   Pulse 72   Temp 97.2 °F (36.2 °C)   Resp 16   Wt 88 kg (194 lb)   SpO2 97%   BMI 29.50 kg/m²     Patient denied having any symptoms of COVID-19, i.e. SOB, coughing, fever, or generally not feeling well. Also denies having been exposed to COVID-19 recently or having had any recent contact with family/friend that has a pending COVID test.    Port accessed with positive blood return noted. Labs drawn per order and sent for processing. Port flushed and heparinized per protocol w/o difficulty. Scott needle removed. Site covered with gauze and paper tape. Results still pending at time of note. Please follow up in [facility]. Pt tolerated well. Pt denies any acute problems/changes. 1320  Discharged from [facility] ambulatory. No distress. Next appt:    Future Appointments   Date Time Provider Dajuan Johnston   5/11/2022 10:30 AM LOZANO MED5 [address] [facility] REG   6/8/2022 11:30 AM LOZANO FASTRACK 6 [address] REG   6/8/2022  1:30 PM Addie Mar MD ONCMR BS AMB   7/6/2022 10:30 AM LOZANO MED5 300 [facility] REG   8/3/2022 10:30 AM LOZANO MED5 [facility] REG   8/31/2022 10:30 AM LOZANO FASTRACK 6 [facility] REG   11/23/2022 10:30 AM LOZANO FASTRACK 6 [facility] REG

## 2022-04-15 LAB
ALBUMIN SERPL ELPH-MCNC: 3.2 G/DL (ref 2.9–4.4)
ALBUMIN/GLOB SERPL: 1 {RATIO} (ref 0.7–1.7)
ALPHA1 GLOB SERPL ELPH-MCNC: 0.2 G/DL (ref 0–0.4)
ALPHA2 GLOB SERPL ELPH-MCNC: 0.9 G/DL (ref 0.4–1)
B-GLOBULIN SERPL ELPH-MCNC: 1 G/DL (ref 0.7–1.3)
GAMMA GLOB SERPL ELPH-MCNC: 1.2 G/DL (ref 0.4–1.8)
GLOBULIN SER CALC-MCNC: 3.3 G/DL (ref 2.2–3.9)
KAPPA LC FREE SER-MCNC: 36.2 MG/L (ref 3.3–19.4)
KAPPA LC FREE/LAMBDA FREE SER: 1.1 {RATIO} (ref 0.26–1.65)
LAMBDA LC FREE SERPL-MCNC: 32.8 MG/L (ref 5.7–26.3)
M PROTEIN SERPL ELPH-MCNC: NORMAL G/DL
PROT SERPL-MCNC: 6.5 G/DL (ref 6–8.5)

## 2022-04-16 ENCOUNTER — APPOINTMENT (OUTPATIENT)
Dept: CT IMAGING | Age: 67
End: 2022-04-16
Attending: EMERGENCY MEDICINE
Payer: MEDICARE

## 2022-04-16 ENCOUNTER — HOSPITAL ENCOUNTER (EMERGENCY)
Age: 67
Discharge: HOME | End: 2022-04-16
Attending: EMERGENCY MEDICINE
Payer: MEDICARE

## 2022-04-16 VITALS
RESPIRATION RATE: 22 BRPM | HEART RATE: 81 BPM | SYSTOLIC BLOOD PRESSURE: 132 MMHG | OXYGEN SATURATION: 98 % | WEIGHT: 194 LBS | TEMPERATURE: 98.5 F | BODY MASS INDEX: 29.4 KG/M2 | DIASTOLIC BLOOD PRESSURE: 86 MMHG | HEIGHT: 68 IN

## 2022-04-16 DIAGNOSIS — R07.9 ACUTE CHEST PAIN: Primary | ICD-10-CM

## 2022-04-16 LAB
ALBUMIN SERPL-MCNC: 3.3 G/DL (ref 3.5–5)
ALBUMIN/GLOB SERPL: 0.9 {RATIO} (ref 1.1–2.2)
ALP SERPL-CCNC: 69 U/L (ref 45–117)
ALT SERPL-CCNC: 20 U/L (ref 12–78)
ANION GAP SERPL CALC-SCNC: 4 MMOL/L (ref 5–15)
AST SERPL-CCNC: 15 U/L (ref 15–37)
BASOPHILS # BLD: 0.1 K/UL (ref 0–0.1)
BASOPHILS NFR BLD: 1 % (ref 0–1)
BILIRUB SERPL-MCNC: 0.3 MG/DL (ref 0.2–1)
BNP SERPL-MCNC: 38 PG/ML
BUN SERPL-MCNC: 17 MG/DL (ref 6–20)
BUN/CREAT SERPL: 11 (ref 12–20)
CALCIUM SERPL-MCNC: 8.7 MG/DL (ref 8.5–10.1)
CHLORIDE SERPL-SCNC: 105 MMOL/L (ref 97–108)
CO2 SERPL-SCNC: 28 MMOL/L (ref 21–32)
CREAT SERPL-MCNC: 1.52 MG/DL (ref 0.7–1.3)
DIFFERENTIAL METHOD BLD: ABNORMAL
EOSINOPHIL # BLD: 0.1 K/UL (ref 0–0.4)
EOSINOPHIL NFR BLD: 1 % (ref 0–7)
ERYTHROCYTE [DISTWIDTH] IN BLOOD BY AUTOMATED COUNT: 19.8 % (ref 11.5–14.5)
GLOBULIN SER CALC-MCNC: 3.7 G/DL (ref 2–4)
GLUCOSE SERPL-MCNC: 114 MG/DL (ref 65–100)
HCT VFR BLD AUTO: 36.1 % (ref 36.6–50.3)
HGB BLD-MCNC: 11 G/DL (ref 12.1–17)
IMM GRANULOCYTES # BLD AUTO: 0 K/UL (ref 0–0.04)
IMM GRANULOCYTES NFR BLD AUTO: 0 % (ref 0–0.5)
LYMPHOCYTES # BLD: 0.7 K/UL (ref 0.8–3.5)
LYMPHOCYTES NFR BLD: 11 % (ref 12–49)
MCH RBC QN AUTO: 24.3 PG (ref 26–34)
MCHC RBC AUTO-ENTMCNC: 30.5 G/DL (ref 30–36.5)
MCV RBC AUTO: 79.9 FL (ref 80–99)
MONOCYTES # BLD: 1.2 K/UL (ref 0–1)
MONOCYTES NFR BLD: 19 % (ref 5–13)
NEUTS SEG # BLD: 4 K/UL (ref 1.8–8)
NEUTS SEG NFR BLD: 68 % (ref 32–75)
NRBC # BLD: 0 K/UL (ref 0–0.01)
NRBC BLD-RTO: 0 PER 100 WBC
PLATELET # BLD AUTO: 194 K/UL (ref 150–400)
PMV BLD AUTO: 9.4 FL (ref 8.9–12.9)
POTASSIUM SERPL-SCNC: 3.8 MMOL/L (ref 3.5–5.1)
PROT SERPL-MCNC: 7 G/DL (ref 6.4–8.2)
RBC # BLD AUTO: 4.52 M/UL (ref 4.1–5.7)
RBC MORPH BLD: ABNORMAL
SODIUM SERPL-SCNC: 137 MMOL/L (ref 136–145)
TROPONIN-HIGH SENSITIVITY: 10 NG/L (ref 0–76)
TROPONIN-HIGH SENSITIVITY: 9 NG/L (ref 0–76)
WBC # BLD AUTO: 6.1 K/UL (ref 4.1–11.1)

## 2022-04-16 PROCEDURE — 71275 CT ANGIOGRAPHY CHEST: CPT

## 2022-04-16 PROCEDURE — 83880 ASSAY OF NATRIURETIC PEPTIDE: CPT

## 2022-04-16 PROCEDURE — 74011000636 HC RX REV CODE- 636: Performed by: EMERGENCY MEDICINE

## 2022-04-16 PROCEDURE — 84484 ASSAY OF TROPONIN QUANT: CPT

## 2022-04-16 PROCEDURE — 93005 ELECTROCARDIOGRAM TRACING: CPT

## 2022-04-16 PROCEDURE — 85025 COMPLETE CBC W/AUTO DIFF WBC: CPT

## 2022-04-16 PROCEDURE — 36415 COLL VENOUS BLD VENIPUNCTURE: CPT

## 2022-04-16 PROCEDURE — 80053 COMPREHEN METABOLIC PANEL: CPT

## 2022-04-16 PROCEDURE — 99285 EMERGENCY DEPT VISIT HI MDM: CPT

## 2022-04-16 RX ADMIN — IOPAMIDOL 70 ML: 755 INJECTION, SOLUTION INTRAVENOUS at 11:32

## 2022-04-16 NOTE — ED PROVIDER NOTES
EMERGENCY DEPARTMENT HISTORY AND PHYSICAL EXAM      Date: 4/16/2022  Patient Name: Emmett Demarco. History of Presenting Illness     Chief Complaint   Patient presents with    Chest Pain     on chem for cancer, today with chest pain       History Provided By: Patient    HPI: Emmett Demarco., 61 y.o. male with PMHx significant for diabetes, hepatitis C, hypertension, multiple myeloma on oral chemotherapy, neuropathy who presents with a chief complaint of chest pain that is worse with deep breathing. This began 2 hours prior to arrival.  Patient states that it feels like a \"sensation\" but not pain. He denies shortness of breath, nausea, vomiting, diaphoresis. Did take 324 mg of aspirin prior to arrival.  No abdominal pain or diarrhea. PCP: Lay Horvath MD    There are no other complaints, changes, or physical findings at this time. Current Outpatient Medications   Medication Sig Dispense Refill    Pomalyst 2 mg cap TAKE 1 CAPSULE BY MOUTH ONCE DAILY 28 Capsule 0    tenofovir DISOPROXIL FUMARATE (VIREAD) 300 mg tablet TAKE 1 TABLET BY MOUTH 1 TIME A DAY. 90 Tablet 0    TESTOSTERONE  mg by IntraMUSCular route Once every 2 weeks.  POTASSIUM PO Take  by mouth.  metFORMIN ER (GLUCOPHAGE XR) 500 mg tablet Take 1 Tablet by mouth two (2) times a day. SEND FUTURE REFILL REQUESTS TO PCP. DR WOODS NO LONGER AT THIS PRACTICE 180 Tablet 1    OTHER Cannabis prescription      zolpidem 3.5 mg subl DISSOLVE 1 TABLET UNDER TONGUE AT BEDTIME      cholecalciferol, vitamin D3, (Vitamin D3) 50 mcg (2,000 unit) tab Take  by mouth daily.  ascorbic acid, vitamin C, (VITAMIN C) 100 mg tab Take  by mouth.       Denta 5000 Plus 1.1 % crea USE AS DIRECTED TWO TIMES DAILY      ibuprofen (MOTRIN) 800 mg tablet TAKE 1 TABLET BY MOUTH 3 TIMES A DAY AS NEEDED FOR PAIN      buPROPion XL (WELLBUTRIN XL) 300 mg XL tablet TAKE 1 TABLET BY MOUTH EVERY DAY      naproxen (NAPROSYN) 500 mg tablet TAKE 1 TABLET BY MOUTH TWICE A DAY  5    NUCYNTA 100 mg tablet TAKE 2 TABLETS 3 TIMES A DAY  0    aspirin delayed-release 81 mg tablet Take  by mouth daily.  prochlorperazine (COMPAZINE) 10 mg tablet Take 5 mg by mouth every six (6) hours as needed.  omeprazole (PRILOSEC) 20 mg capsule Take 20 mg by mouth daily as needed (acid reflux).  ondansetron (ZOFRAN ODT) 8 mg disintegrating tablet Take 8 mg by mouth every eight (8) hours as needed for Nausea.  multivitamin (ONE A DAY) tablet Take 1 Tab by mouth daily.  tamsulosin (FLOMAX) 0.4 mg capsule Take 1 Cap by mouth daily. 30 Cap 1    zolpidem CR (AMBIEN CR) 12.5 mg tablet Take 12.5 mg by mouth nightly.  aripiprazole (ABILIFY) 5 mg tablet Take 2.5 mg by mouth every morning.        Past History     Past Medical History:  Past Medical History:   Diagnosis Date    Acid reflux     Arrhythmia     pvc's    Chronic pain     neck    Depression     Diabetes [facility] [address]     Femoral hernia 7/11/2012    Hepatitis C     Hypertension     Inguinal hernia 7/11/2012    Liver disease     hepatitis c    Multiple myeloma ([facility] [address] [facility]     Other ill-defined conditions(799.89)     Hx of PVCs since 2009    Prostatitis, acute     Psychiatric disorder     depression     Past Surgical History:  Past Surgical History:   Procedure Laterality Date    COLONOSCOPY N/A 9/20/2016    COLONOSCOPY performed by Dima Antoine MD at \[facility]"" ENDOSCOPY    HX APPENDECTOMY      HX CERVICAL FUSION  2008    x 1 as of 4/30/2014    HX HEENT      foreign body removed from right eye    HX HERNIA REPAIR  2012    [facility]      HX ORTHOPAEDIC      cervical fusion    HX OTHER SURGICAL  2000    liver bx - chronic hepatitis     Family History:  Family History   Problem Relation Age of Onset    OSTEOARTHRITIS Mother     OSTEOARTHRITIS Father     Diabetes Father     Hypertension Maternal Grandmother     Diabetes Maternal Grandmother     Hypertension Maternal Grandfather     Diabetes Maternal Grandfather      Social History:  Social History     Tobacco Use    Smoking status: Former Smoker     Packs/day: 1.00     Years: 12.00     Pack years: 12.00     Types: Cigarettes     Quit date: 1989     Years since quittin.3    Smokeless tobacco: Never Used    Tobacco comment: former cigarette smoker   Substance Use Topics    Alcohol use: No     Comment: former alcoholic-quit 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 HENT: Negative for congestion, rhinorrhea and sore throat. Respiratory: Negative for cough and shortness of breath. Cardiovascular: Positive for chest pain. Gastrointestinal: Negative for abdominal pain, nausea and vomiting. Genitourinary: Negative for dysuria and urgency. Skin: Negative for rash. Neurological: Negative for dizziness, light-headedness and headaches. All other systems reviewed and are negative. Physical Exam   Physical Exam  Vitals and nursing note reviewed. Constitutional:       General: He is not in acute distress. Appearance: He is well-developed. HENT:      Head: Normocephalic and atraumatic. Eyes:      Conjunctiva/sclera: Conjunctivae normal.      Pupils: Pupils are equal, round, and reactive to light. Cardiovascular:      Rate and Rhythm: Normal rate and regular rhythm. Pulmonary:      Effort: Pulmonary effort is normal. No respiratory distress. Breath sounds: Normal breath sounds. No stridor. Chest:      Comments: Port in chest wall  Abdominal:      General: There is no distension. Palpations: Abdomen is soft. Tenderness: There is no abdominal tenderness. Musculoskeletal:         General: Normal range of motion. Cervical back: Normal range of motion. Skin:     General: Skin is warm and dry.    Neurological:      Mental Status: He is alert and oriented to person, place, and time. Diagnostic Study Results   Labs -     Recent Results (from the past 12 hour(s))   CBC WITH AUTOMATED DIFF    Collection Time: 04/16/22 10:28 AM   Result Value Ref Range    WBC 6.1 4.1 - 11.1 K/uL    RBC 4.52 4.10 - 5.70 M/uL    HGB 11.0 (L) 12.1 - 17.0 g/dL    HCT 36.1 (L) 36.6 - 50.3 %    MCV 79.9 (L) 80.0 - 99.0 FL    MCH 24.3 (L) 26.0 - 34.0 PG    MCHC 30.5 30.0 - 36.5 g/dL    RDW 19.8 (H) 11.5 - 14.5 %    PLATELET 159 782 - 432 K/uL    MPV 9.4 8.9 - 12.9 FL    NRBC 0.0 0  WBC    ABSOLUTE NRBC 0.00 0.00 - 0.01 K/uL    NEUTROPHILS 68 32 - 75 %    LYMPHOCYTES 11 (L) 12 - 49 %    MONOCYTES 19 (H) 5 - 13 %    EOSINOPHILS 1 0 - 7 %    BASOPHILS 1 0 - 1 %    IMMATURE GRANULOCYTES 0 0.0 - 0.5 %    ABS. NEUTROPHILS 4.0 1.8 - 8.0 K/UL    ABS. LYMPHOCYTES 0.7 (L) 0.8 - 3.5 K/UL    ABS. MONOCYTES 1.2 (H) 0.0 - 1.0 K/UL    ABS. EOSINOPHILS 0.1 0.0 - 0.4 K/UL    ABS. BASOPHILS 0.1 0.0 - 0.1 K/UL    ABS. IMM. GRANS. 0.0 0.00 - 0.04 K/UL    DF SMEAR SCANNED      RBC COMMENTS ANISOCYTOSIS  1+        RBC COMMENTS HYPOCHROMIA  1+        RBC COMMENTS OVALOCYTES  1+       METABOLIC PANEL, COMPREHENSIVE    Collection Time: 04/16/22 10:28 AM   Result Value Ref Range    Sodium 137 136 - 145 mmol/L    Potassium 3.8 3.5 - 5.1 mmol/L    Chloride 105 97 - 108 mmol/L    CO2 28 21 - 32 mmol/L    Anion gap 4 (L) 5 - 15 mmol/L    Glucose 114 (H) 65 - 100 mg/dL    BUN 17 6 - 20 MG/DL    Creatinine 1.52 (H) 0.70 - 1.30 MG/DL    BUN/Creatinine ratio 11 (L) 12 - 20      GFR est AA 56 (L) >60 ml/min/1.73m2    GFR est non-AA 46 (L) >60 ml/min/1.73m2    Calcium 8.7 8.5 - 10.1 MG/DL    Bilirubin, total 0.3 0.2 - 1.0 MG/DL    ALT (SGPT) 20 12 - 78 U/L    AST (SGOT) 15 15 - 37 U/L    Alk.  phosphatase 69 45 - 117 U/L    Protein, total 7.0 6.4 - 8.2 g/dL    Albumin 3.3 (L) 3.5 - 5.0 g/dL    Globulin 3.7 2.0 - 4.0 g/dL    A-G Ratio 0.9 (L) 1.1 - 2.2     TROPONIN-HIGH SENSITIVITY    Collection Time: 04/16/22 10:28 AM   Result Value Ref Range    Troponin-High Sensitivity 10 0 - 76 ng/L   NT-PRO BNP    Collection Time: 04/16/22 10:28 AM   Result Value Ref Range    NT pro-BNP 38 <125 PG/ML   TROPONIN-HIGH SENSITIVITY    Collection Time: 04/16/22 12:48 PM   Result Value Ref Range    Troponin-High Sensitivity 9 0 - 76 ng/L       Radiologic Studies -   CTA CHEST W OR W WO CONT   Final Result   1. No CT evidence for central pulmonary embolus at this time . 2. Upper normal to mildly enlarged main pulmonary outflow tract. Correlate for   pulmonary arterial hypertension. 3. 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 4. Minimal bibasilar atelectasis. CTA CHEST W OR W WO CONT    Result Date: 4/16/2022  1. No CT evidence for central pulmonary embolus at this time . 2. Upper normal to mildly enlarged main pulmonary outflow tract. Correlate for pulmonary arterial hypertension. 3. 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 4. Minimal bibasilar atelectasis. Medical Decision Making   I am the first provider for this patient. I reviewed the vital signs, available nursing notes, past medical history, past surgical history, family history and social history. Vital Signs-Reviewed the patient's vital signs. Patient Vitals for the past 12 hrs:   Temp Pulse Resp BP SpO2   04/16/22 1111  81 22 132/86 98 %   04/16/22 1041  83 21 129/80 99 %   04/16/22 1011  87 24 139/81 99 %   04/16/22 0956 98.5 °F (36.9 °C) 88 16 (!) 140/84 100 %       Pulse Oximetry Analysis - 98% on ra    Cardiac Monitor:   Rate: 81 bpm  Rhythm: Normal Sinus Rhythm      ED EKG interpretation:  Rhythm: normal sinus rhythm; and regular . Rate (approx.): 86; Axis: left axis deviation; P wave: normal; QRS interval: normal ; ST/T wave: T wave inverted; Other findings: left ventricular hypertrophy. This EKG was interpreted by BHARAT Monson MD,ED Provider. Records Reviewed: Nursing Notes and Old Medical Records    Provider Notes (Medical Decision Making):   Patient presents with chief complaint of chest pain that began approximately 2 hours prior to arrival.  Nontoxic-appearing with stable vital signs on presentation. Differential includes ACS, GERD, PE given history of malignancy. Less likely pneumonia given no infectious symptoms. Will check basic lab work, troponin x2, EKG, CT of the chest.    ED Course:   Initial assessment performed. The patients presenting problems have been discussed, and they are in agreement with the care plan formulated and outlined with them. I have encouraged them to ask questions as they arise throughout their visit. Lab work and imaging unremarkable. Patient reports he is feeling improved. Stable for discharge with outpatient follow-up with cardiology. Procedures:  Procedures    Critical Care:  none    Disposition:  Discharge Note:  The patient has been re-evaluated and is ready for discharge. Reviewed available results with patient. Counseled patient on diagnosis and care plan. Patient has expressed understanding, and all questions have been answered. Patient agrees with plan and agrees to follow up as recommended, or to return to the ED if their symptoms worsen. Discharge instructions have been provided and explained to the patient, along with reasons to return to the ED. PLAN:  1. Discharge Medication List as of 4/16/2022  1:54 PM        2.    Follow-up Information     Follow up With Specialties Details Why Contact Info    Audrey Hester MD Hematology and Oncology, Internal Medicine, Hematology, Oncology Schedule an appointment as soon as possible for a visit   [address]  [address] [phone]      Fadi Sal MD Cardio Vascular Surgery, Cardiology Schedule an appointment as soon as possible for a visit  for additional testing for risk stratification [address]  [id number]  [facility] [address]  [phone]          Return to ED if worse     Diagnosis     Clinical Impression:   1. Acute chest pain            Please note that this dictation was completed with [email or web address], the computer voice recognition software. Quite often unanticipated grammatical, syntax, homophones, and other interpretive errors are inadvertently transcribed by the computer software. Please disregard these errors.   Please excuse any errors that have escaped final proofreading

## 2022-04-16 NOTE — ED NOTES
Pt. Presents to ED today for complaints of CP that was present when he woke up this AM at 0815. Pt. With a history of CA and wife reports that he has been doing a lot of heavy lifting this week. Pt. Alert and oriented x4. Pt. Placed in position of comfort with call bell in reach.

## 2022-04-17 LAB
ATRIAL RATE: 86 BPM
CALCULATED P AXIS, ECG09: 42 DEGREES
CALCULATED R AXIS, ECG10: -33 DEGREES
CALCULATED T AXIS, ECG11: -12 DEGREES
DIAGNOSIS, 93000: NORMAL
P-R INTERVAL, ECG05: 140 MS
Q-T INTERVAL, ECG07: 340 MS
QRS DURATION, ECG06: 102 MS
QTC CALCULATION (BEZET), ECG08: 406 MS
VENTRICULAR RATE, ECG03: 86 BPM

## 2022-04-18 LAB
IGA SERPL-MCNC: 455 MG/DL (ref 61–437)
IGG SERPL-MCNC: 1178 MG/DL (ref 603–1613)
IGM SERPL-MCNC: 15 MG/DL (ref 20–172)
PROT PATTERN SERPL IFE-IMP: ABNORMAL

## 2022-05-10 RX ORDER — SODIUM CHLORIDE 0.9 % (FLUSH) 0.9 %
5-10 SYRINGE (ML) INJECTION AS NEEDED
Status: CANCELLED | OUTPATIENT
Start: 2022-05-11 | End: 2022-05-11

## 2022-05-10 RX ORDER — SODIUM CHLORIDE 9 MG/ML
10 INJECTION INTRAMUSCULAR; INTRAVENOUS; SUBCUTANEOUS AS NEEDED
Status: CANCELLED | OUTPATIENT
Start: 2022-05-11

## 2022-05-10 RX ORDER — HEPARIN 100 UNIT/ML
500 SYRINGE INTRAVENOUS AS NEEDED
Status: CANCELLED | OUTPATIENT
Start: 2022-05-11

## 2022-05-11 ENCOUNTER — HOSPITAL ENCOUNTER (OUTPATIENT)
Dept: INFUSION THERAPY | Age: 67
Discharge: HOME | End: 2022-05-11
Payer: MEDICARE

## 2022-05-11 VITALS
HEART RATE: 72 BPM | WEIGHT: 189.3 LBS | OXYGEN SATURATION: 99 % | SYSTOLIC BLOOD PRESSURE: 138 MMHG | TEMPERATURE: 98 F | BODY MASS INDEX: 28.78 KG/M2 | DIASTOLIC BLOOD PRESSURE: 76 MMHG | RESPIRATION RATE: 16 BRPM

## 2022-05-11 DIAGNOSIS — C90.00 MULTIPLE MYELOMA, REMISSION STATUS UNSPECIFIED (HCC): Primary | ICD-10-CM

## 2022-05-11 LAB
ALBUMIN SERPL-MCNC: 3.3 G/DL (ref 3.5–5)
ALBUMIN/GLOB SERPL: 0.9 {RATIO} (ref 1.1–2.2)
ALP SERPL-CCNC: 99 U/L (ref 45–117)
ALT SERPL-CCNC: 22 U/L (ref 12–78)
ANION GAP SERPL CALC-SCNC: 1 MMOL/L (ref 5–15)
AST SERPL-CCNC: 16 U/L (ref 15–37)
BASOPHILS # BLD: 0 K/UL (ref 0–0.1)
BASOPHILS NFR BLD: 1 % (ref 0–1)
BILIRUB SERPL-MCNC: 0.3 MG/DL (ref 0.2–1)
BUN SERPL-MCNC: 16 MG/DL (ref 6–20)
BUN/CREAT SERPL: 12 (ref 12–20)
CALCIUM SERPL-MCNC: 8.7 MG/DL (ref 8.5–10.1)
CHLORIDE SERPL-SCNC: 107 MMOL/L (ref 97–108)
CO2 SERPL-SCNC: 29 MMOL/L (ref 21–32)
CREAT SERPL-MCNC: 1.39 MG/DL (ref 0.7–1.3)
DIFFERENTIAL METHOD BLD: ABNORMAL
EOSINOPHIL # BLD: 0 K/UL (ref 0–0.4)
EOSINOPHIL NFR BLD: 1 % (ref 0–7)
ERYTHROCYTE [DISTWIDTH] IN BLOOD BY AUTOMATED COUNT: 20.2 % (ref 11.5–14.5)
GLOBULIN SER CALC-MCNC: 3.8 G/DL (ref 2–4)
GLUCOSE SERPL-MCNC: 95 MG/DL (ref 65–100)
HCT VFR BLD AUTO: 35.7 % (ref 36.6–50.3)
HGB BLD-MCNC: 10.7 G/DL (ref 12.1–17)
IGA SERPL-MCNC: 414 MG/DL (ref 70–400)
IGG SERPL-MCNC: 1260 MG/DL (ref 700–1600)
IGM SERPL-MCNC: <21 MG/DL (ref 40–230)
IMM GRANULOCYTES # BLD AUTO: 0 K/UL (ref 0–0.04)
IMM GRANULOCYTES NFR BLD AUTO: 0 % (ref 0–0.5)
LYMPHOCYTES # BLD: 0.7 K/UL (ref 0.8–3.5)
LYMPHOCYTES NFR BLD: 23 % (ref 12–49)
MCH RBC QN AUTO: 23.9 PG (ref 26–34)
MCHC RBC AUTO-ENTMCNC: 30 G/DL (ref 30–36.5)
MCV RBC AUTO: 79.9 FL (ref 80–99)
MONOCYTES # BLD: 0.8 K/UL (ref 0–1)
MONOCYTES NFR BLD: 24 % (ref 5–13)
NEUTS SEG # BLD: 1.7 K/UL (ref 1.8–8)
NEUTS SEG NFR BLD: 51 % (ref 32–75)
NRBC # BLD: 0 K/UL (ref 0–0.01)
NRBC BLD-RTO: 0 PER 100 WBC
PLATELET # BLD AUTO: 200 K/UL (ref 150–400)
PMV BLD AUTO: 10.2 FL (ref 8.9–12.9)
POTASSIUM SERPL-SCNC: 4.1 MMOL/L (ref 3.5–5.1)
PROT SERPL-MCNC: 7.1 G/DL (ref 6.4–8.2)
RBC # BLD AUTO: 4.47 M/UL (ref 4.1–5.7)
RBC MORPH BLD: ABNORMAL
RBC MORPH BLD: ABNORMAL
SODIUM SERPL-SCNC: 137 MMOL/L (ref 136–145)
WBC # BLD AUTO: 3.2 K/UL (ref 4.1–11.1)

## 2022-05-11 PROCEDURE — 74011250636 HC RX REV CODE- 250/636: Performed by: INTERNAL MEDICINE

## 2022-05-11 PROCEDURE — 74011000250 HC RX REV CODE- 250: Performed by: INTERNAL MEDICINE

## 2022-05-11 PROCEDURE — 82784 ASSAY IGA/IGD/IGG/IGM EACH: CPT

## 2022-05-11 PROCEDURE — 83521 IG LIGHT CHAINS FREE EACH: CPT

## 2022-05-11 PROCEDURE — 85025 COMPLETE CBC W/AUTO DIFF WBC: CPT

## 2022-05-11 PROCEDURE — 77030012965 HC NDL HUBR BBMI -A

## 2022-05-11 PROCEDURE — 80053 COMPREHEN METABOLIC PANEL: CPT

## 2022-05-11 PROCEDURE — 36591 DRAW BLOOD OFF VENOUS DEVICE: CPT

## 2022-05-11 PROCEDURE — 84165 PROTEIN E-PHORESIS SERUM: CPT

## 2022-05-11 RX ORDER — SODIUM CHLORIDE 9 MG/ML
10 INJECTION INTRAMUSCULAR; INTRAVENOUS; SUBCUTANEOUS AS NEEDED
Status: DISCONTINUED | OUTPATIENT
Start: 2022-05-11 | End: 2022-05-12 | Stop reason: HOSPADM

## 2022-05-11 RX ORDER — HEPARIN 100 UNIT/ML
500 SYRINGE INTRAVENOUS AS NEEDED
Status: DISCONTINUED | OUTPATIENT
Start: 2022-05-11 | End: 2022-05-12 | Stop reason: HOSPADM

## 2022-05-11 RX ORDER — SODIUM CHLORIDE 0.9 % (FLUSH) 0.9 %
5-10 SYRINGE (ML) INJECTION AS NEEDED
Status: DISCONTINUED | OUTPATIENT
Start: 2022-05-11 | End: 2022-05-12 | Stop reason: HOSPADM

## 2022-05-11 RX ADMIN — SODIUM CHLORIDE, PRESERVATIVE FREE 40 ML: 5 INJECTION INTRAVENOUS at 16:25

## 2022-05-11 RX ADMIN — HEPARIN 500 UNITS: 100 SYRINGE at 16:25

## 2022-05-11 NOTE — PROGRESS NOTES
[facility] Note:  Arrived - 1398    Patient denied having any symptoms of COVID-19, i.e. SOB, coughing, fever, or generally not feeling well. Also denies having been exposed to COVID-19 recently or having had any recent contact with family/friend that has a pending COVID test.     Visit Vitals  /76 (BP 1 Location: Left upper arm, BP Patient Position: Sitting)   Pulse 72   Temp 98 °F (36.7 °C)   Resp 16   Wt 85.9 kg (189 lb 4.8 oz)   SpO2 99%   BMI 28.78 kg/m²     Port accessed, labs drawn (CBC with diff, CMP, and Myeloma labs) & flushed per protocol w/o difficulty. Scott needle removed. 1625 - Tolerated well. Pt denies any acute problems/changes. Discharged from [facility] ambulatory. No distress. Next appt: 6/8/22 @ 9771. See [facility] for pending labs.

## 2022-05-13 LAB
KAPPA LC FREE SER-MCNC: 33.7 MG/L (ref 3.3–19.4)
KAPPA LC FREE/LAMBDA FREE SER: 1.01 {RATIO} (ref 0.26–1.65)
LAMBDA LC FREE SERPL-MCNC: 33.3 MG/L (ref 5.7–26.3)

## 2022-05-16 ENCOUNTER — TELEPHONE (OUTPATIENT)
Dept: ONCOLOGY | Age: 67
End: 2022-05-16

## 2022-05-16 NOTE — TELEPHONE ENCOUNTER
Returned call regarding hip pain. Pain has been present x 1 week. He feels the pain has moved. I offered to do a xray, however he has elected to wait to see his pain management specialist and will ask him to order it if the pain is still present.     Signed By: Khalif De Leon NP     May 16, 2022

## 2022-05-16 NOTE — TELEPHONE ENCOUNTER
Patient called and is having pretty severe pain in his hip. Thins the cancer may be the cause but would like to discuss. Is taking Aleve and icing.

## 2022-05-17 DIAGNOSIS — C90.00 MULTIPLE MYELOMA, REMISSION STATUS UNSPECIFIED (HCC): ICD-10-CM

## 2022-05-17 RX ORDER — POMALIDOMIDE 2 MG/1
CAPSULE ORAL
Qty: 28 CAPSULE | Refills: 0 | Status: SHIPPED | OUTPATIENT
Start: 2022-05-17 | End: 2022-05-18 | Stop reason: SDUPTHER

## 2022-05-18 DIAGNOSIS — C90.00 MULTIPLE MYELOMA, REMISSION STATUS UNSPECIFIED (HCC): ICD-10-CM

## 2022-05-18 RX ORDER — POMALIDOMIDE 2 MG/1
1 CAPSULE ORAL DAILY
Qty: 28 CAPSULE | Refills: 0 | Status: SHIPPED | OUTPATIENT
Start: 2022-05-18 | End: 2022-06-20

## 2022-05-18 NOTE — TELEPHONE ENCOUNTER
Requested Prescriptions     Pending Prescriptions Disp Refills    pomalidomide (Pomalyst) 2 mg cap 28 Capsule 0     Sig: Take 1 Capsule by mouth daily.      Approved per Jabari Cornejo from [facility]

## 2022-05-19 LAB
IGA SERPL-MCNC: 468 MG/DL (ref 61–437)
IGG SERPL-MCNC: 1198 MG/DL (ref 603–1613)
IGM SERPL-MCNC: 17 MG/DL (ref 20–172)
PROT PATTERN SERPL IFE-IMP: ABNORMAL

## 2022-05-23 LAB
ALBUMIN SERPL ELPH-MCNC: 3.6 G/DL (ref 2.9–4.4)
ALBUMIN/GLOB SERPL: 1.1 {RATIO} (ref 0.7–1.7)
ALPHA1 GLOB SERPL ELPH-MCNC: 0.2 G/DL (ref 0–0.4)
ALPHA2 GLOB SERPL ELPH-MCNC: 1 G/DL (ref 0.4–1)
B-GLOBULIN SERPL ELPH-MCNC: 1 G/DL (ref 0.7–1.3)
GAMMA GLOB SERPL ELPH-MCNC: 1.2 G/DL (ref 0.4–1.8)
GLOBULIN SER CALC-MCNC: 3.4 G/DL (ref 2.2–3.9)
M PROTEIN SERPL ELPH-MCNC: NORMAL G/DL
PROT SERPL-MCNC: 7 G/DL (ref 6–8.5)

## 2022-05-31 RX ORDER — SODIUM CHLORIDE 0.9 % (FLUSH) 0.9 %
5-10 SYRINGE (ML) INJECTION AS NEEDED
Status: CANCELLED | OUTPATIENT
Start: 2022-06-08 | End: 2022-06-08

## 2022-05-31 RX ORDER — ACETAMINOPHEN 325 MG/1
650 TABLET ORAL AS NEEDED
Status: CANCELLED
Start: 2022-06-08

## 2022-05-31 RX ORDER — ALBUTEROL SULFATE 0.83 MG/ML
2.5 SOLUTION RESPIRATORY (INHALATION) AS NEEDED
Status: CANCELLED
Start: 2022-06-08

## 2022-05-31 RX ORDER — HYDROCORTISONE SODIUM SUCCINATE 100 MG/2ML
100 INJECTION, POWDER, FOR SOLUTION INTRAMUSCULAR; INTRAVENOUS AS NEEDED
Status: CANCELLED | OUTPATIENT
Start: 2022-06-08

## 2022-05-31 RX ORDER — DIPHENHYDRAMINE HYDROCHLORIDE 50 MG/ML
50 INJECTION, SOLUTION INTRAMUSCULAR; INTRAVENOUS AS NEEDED
Status: CANCELLED
Start: 2022-06-08

## 2022-05-31 RX ORDER — SODIUM CHLORIDE 9 MG/ML
10 INJECTION INTRAMUSCULAR; INTRAVENOUS; SUBCUTANEOUS AS NEEDED
Status: CANCELLED | OUTPATIENT
Start: 2022-06-08

## 2022-05-31 RX ORDER — ONDANSETRON 2 MG/ML
8 INJECTION INTRAMUSCULAR; INTRAVENOUS AS NEEDED
Status: CANCELLED | OUTPATIENT
Start: 2022-06-08

## 2022-05-31 RX ORDER — SODIUM CHLORIDE 9 MG/ML
25 INJECTION, SOLUTION INTRAVENOUS CONTINUOUS
Status: CANCELLED | OUTPATIENT
Start: 2022-06-08 | End: 2022-06-08

## 2022-05-31 RX ORDER — EPINEPHRINE 1 MG/ML
0.3 INJECTION, SOLUTION, CONCENTRATE INTRAVENOUS AS NEEDED
Status: CANCELLED | OUTPATIENT
Start: 2022-06-08

## 2022-05-31 RX ORDER — HEPARIN 100 UNIT/ML
500 SYRINGE INTRAVENOUS AS NEEDED
Status: CANCELLED | OUTPATIENT
Start: 2022-06-08

## 2022-06-07 RX ORDER — ONDANSETRON 2 MG/ML
8 INJECTION INTRAMUSCULAR; INTRAVENOUS AS NEEDED
Status: CANCELLED | OUTPATIENT
Start: 2022-09-01

## 2022-06-07 RX ORDER — SODIUM CHLORIDE 0.9 % (FLUSH) 0.9 %
10 SYRINGE (ML) INJECTION AS NEEDED
Status: CANCELLED | OUTPATIENT
Start: 2022-09-01

## 2022-06-07 RX ORDER — SODIUM CHLORIDE 9 MG/ML
10 INJECTION INTRAMUSCULAR; INTRAVENOUS; SUBCUTANEOUS AS NEEDED
Status: CANCELLED | OUTPATIENT
Start: 2022-09-01

## 2022-06-07 RX ORDER — DIPHENHYDRAMINE HYDROCHLORIDE 50 MG/ML
50 INJECTION, SOLUTION INTRAMUSCULAR; INTRAVENOUS AS NEEDED
Status: CANCELLED
Start: 2022-09-01

## 2022-06-07 RX ORDER — HEPARIN 100 UNIT/ML
300-500 SYRINGE INTRAVENOUS AS NEEDED
Status: CANCELLED | OUTPATIENT
Start: 2022-09-01

## 2022-06-07 RX ORDER — HYDROCORTISONE SODIUM SUCCINATE 100 MG/2ML
100 INJECTION, POWDER, FOR SOLUTION INTRAMUSCULAR; INTRAVENOUS AS NEEDED
Status: CANCELLED | OUTPATIENT
Start: 2022-09-01

## 2022-06-07 RX ORDER — EPINEPHRINE 1 MG/ML
0.3 INJECTION, SOLUTION, CONCENTRATE INTRAVENOUS AS NEEDED
Status: CANCELLED | OUTPATIENT
Start: 2022-09-01

## 2022-06-07 RX ORDER — ALBUTEROL SULFATE 0.83 MG/ML
2.5 SOLUTION RESPIRATORY (INHALATION) AS NEEDED
Status: CANCELLED
Start: 2022-09-01

## 2022-06-07 RX ORDER — ACETAMINOPHEN 325 MG/1
650 TABLET ORAL AS NEEDED
Status: CANCELLED
Start: 2022-09-01

## 2022-06-07 RX ORDER — SODIUM CHLORIDE 9 MG/ML
25 INJECTION, SOLUTION INTRAVENOUS CONTINUOUS
Status: CANCELLED | OUTPATIENT
Start: 2022-09-01

## 2022-06-07 NOTE — PROGRESS NOTES
Kalyani Owens is a 61 y.o. male here for 3 month follow up for Multiple Myeloma. Pt is on Pomalyst.  Pt states he is doing marvelous. No concerns brought up. 1. Have you been to the ER, urgent care clinic since your last visit? Hospitalized since your last visit? 4/16/22 chest pain    2. Have you seen or consulted any other health care providers outside of the [address] [place] [address] since your last visit? Include any pap smears or colon screening.  Pain doctor, podiatrist, therapist for neck

## 2022-06-08 ENCOUNTER — OFFICE VISIT (OUTPATIENT)
Dept: ONCOLOGY | Age: 67
End: 2022-06-08
Payer: MEDICARE

## 2022-06-08 ENCOUNTER — HOSPITAL ENCOUNTER (OUTPATIENT)
Dept: INFUSION THERAPY | Age: 67
Discharge: HOME | End: 2022-06-08
Payer: COMMERCIAL

## 2022-06-08 VITALS
RESPIRATION RATE: 16 BRPM | WEIGHT: 192 LBS | HEIGHT: 68 IN | DIASTOLIC BLOOD PRESSURE: 80 MMHG | TEMPERATURE: 98.2 F | HEART RATE: 86 BPM | SYSTOLIC BLOOD PRESSURE: 127 MMHG | OXYGEN SATURATION: 99 % | BODY MASS INDEX: 29.1 KG/M2

## 2022-06-08 VITALS
HEART RATE: 86 BPM | BODY MASS INDEX: 29.13 KG/M2 | WEIGHT: 192.2 LBS | TEMPERATURE: 98.2 F | OXYGEN SATURATION: 99 % | HEIGHT: 68 IN | SYSTOLIC BLOOD PRESSURE: 127 MMHG | DIASTOLIC BLOOD PRESSURE: 80 MMHG | RESPIRATION RATE: 16 BRPM

## 2022-06-08 DIAGNOSIS — C90.01 MULTIPLE MYELOMA IN REMISSION (HCC): Primary | ICD-10-CM

## 2022-06-08 DIAGNOSIS — Z51.11 CHEMOTHERAPY MANAGEMENT, ENCOUNTER FOR: ICD-10-CM

## 2022-06-08 LAB
ALBUMIN SERPL-MCNC: 3.4 G/DL (ref 3.5–5)
ALBUMIN/GLOB SERPL: 0.9 {RATIO} (ref 1.1–2.2)
ALP SERPL-CCNC: 101 U/L (ref 45–117)
ALT SERPL-CCNC: 21 U/L (ref 12–78)
ANION GAP SERPL CALC-SCNC: 4 MMOL/L (ref 5–15)
AST SERPL-CCNC: 20 U/L (ref 15–37)
BASOPHILS # BLD: 0 K/UL (ref 0–0.1)
BASOPHILS NFR BLD: 1 % (ref 0–1)
BILIRUB SERPL-MCNC: 0.3 MG/DL (ref 0.2–1)
BUN SERPL-MCNC: 12 MG/DL (ref 6–20)
BUN/CREAT SERPL: 8 (ref 12–20)
CALCIUM SERPL-MCNC: 8.6 MG/DL (ref 8.5–10.1)
CHLORIDE SERPL-SCNC: 107 MMOL/L (ref 97–108)
CO2 SERPL-SCNC: 29 MMOL/L (ref 21–32)
CREAT SERPL-MCNC: 1.49 MG/DL (ref 0.7–1.3)
DIFFERENTIAL METHOD BLD: ABNORMAL
EOSINOPHIL # BLD: 0 K/UL (ref 0–0.4)
EOSINOPHIL NFR BLD: 1 % (ref 0–7)
ERYTHROCYTE [DISTWIDTH] IN BLOOD BY AUTOMATED COUNT: 20.5 % (ref 11.5–14.5)
GLOBULIN SER CALC-MCNC: 3.6 G/DL (ref 2–4)
GLUCOSE SERPL-MCNC: 116 MG/DL (ref 65–100)
HCT VFR BLD AUTO: 37.5 % (ref 36.6–50.3)
HGB BLD-MCNC: 11.3 G/DL (ref 12.1–17)
IGA SERPL-MCNC: 464 MG/DL (ref 70–400)
IGG SERPL-MCNC: 1210 MG/DL (ref 700–1600)
IGM SERPL-MCNC: 21 MG/DL (ref 40–230)
IMM GRANULOCYTES # BLD AUTO: 0 K/UL (ref 0–0.04)
IMM GRANULOCYTES NFR BLD AUTO: 0 % (ref 0–0.5)
LYMPHOCYTES # BLD: 0.9 K/UL (ref 0.8–3.5)
LYMPHOCYTES NFR BLD: 27 % (ref 12–49)
MCH RBC QN AUTO: 23.9 PG (ref 26–34)
MCHC RBC AUTO-ENTMCNC: 30.1 G/DL (ref 30–36.5)
MCV RBC AUTO: 79.3 FL (ref 80–99)
MONOCYTES # BLD: 0.6 K/UL (ref 0–1)
MONOCYTES NFR BLD: 18 % (ref 5–13)
NEUTS SEG # BLD: 1.7 K/UL (ref 1.8–8)
NEUTS SEG NFR BLD: 53 % (ref 32–75)
NRBC # BLD: 0 K/UL (ref 0–0.01)
NRBC BLD-RTO: 0 PER 100 WBC
PLATELET # BLD AUTO: 225 K/UL (ref 150–400)
PMV BLD AUTO: 9.8 FL (ref 8.9–12.9)
POTASSIUM SERPL-SCNC: 4.4 MMOL/L (ref 3.5–5.1)
PROT SERPL-MCNC: 7 G/DL (ref 6.4–8.2)
RBC # BLD AUTO: 4.73 M/UL (ref 4.1–5.7)
RBC MORPH BLD: ABNORMAL
SODIUM SERPL-SCNC: 140 MMOL/L (ref 136–145)
WBC # BLD AUTO: 3.2 K/UL (ref 4.1–11.1)

## 2022-06-08 PROCEDURE — 84165 PROTEIN E-PHORESIS SERUM: CPT

## 2022-06-08 PROCEDURE — G8752 SYS BP LESS 140: HCPCS | Performed by: INTERNAL MEDICINE

## 2022-06-08 PROCEDURE — 3017F COLORECTAL CA SCREEN DOC REV: CPT | Performed by: INTERNAL MEDICINE

## 2022-06-08 PROCEDURE — G9717 DOC PT DX DEP/BP F/U NT REQ: HCPCS | Performed by: INTERNAL MEDICINE

## 2022-06-08 PROCEDURE — G8417 CALC BMI ABV UP PARAM F/U: HCPCS | Performed by: INTERNAL MEDICINE

## 2022-06-08 PROCEDURE — 85025 COMPLETE CBC W/AUTO DIFF WBC: CPT

## 2022-06-08 PROCEDURE — 82784 ASSAY IGA/IGD/IGG/IGM EACH: CPT

## 2022-06-08 PROCEDURE — G8427 DOCREV CUR MEDS BY ELIG CLIN: HCPCS | Performed by: INTERNAL MEDICINE

## 2022-06-08 PROCEDURE — 74011250636 HC RX REV CODE- 250/636: Performed by: INTERNAL MEDICINE

## 2022-06-08 PROCEDURE — 80053 COMPREHEN METABOLIC PANEL: CPT

## 2022-06-08 PROCEDURE — 36415 COLL VENOUS BLD VENIPUNCTURE: CPT

## 2022-06-08 PROCEDURE — 96365 THER/PROPH/DIAG IV INF INIT: CPT

## 2022-06-08 PROCEDURE — 74011000250 HC RX REV CODE- 250: Performed by: INTERNAL MEDICINE

## 2022-06-08 PROCEDURE — 99215 OFFICE O/P EST HI 40 MIN: CPT | Performed by: INTERNAL MEDICINE

## 2022-06-08 PROCEDURE — G8754 DIAS BP LESS 90: HCPCS | Performed by: INTERNAL MEDICINE

## 2022-06-08 PROCEDURE — 77030012965 HC NDL HUBR BBMI -A

## 2022-06-08 PROCEDURE — 83521 IG LIGHT CHAINS FREE EACH: CPT

## 2022-06-08 RX ORDER — SODIUM CHLORIDE 9 MG/ML
10 INJECTION INTRAMUSCULAR; INTRAVENOUS; SUBCUTANEOUS AS NEEDED
Status: ACTIVE | OUTPATIENT
Start: 2022-06-08 | End: 2022-06-08

## 2022-06-08 RX ORDER — HEPARIN 100 UNIT/ML
300-500 SYRINGE INTRAVENOUS AS NEEDED
Status: ACTIVE | OUTPATIENT
Start: 2022-06-08 | End: 2022-06-08

## 2022-06-08 RX ORDER — SODIUM CHLORIDE 0.9 % (FLUSH) 0.9 %
10 SYRINGE (ML) INJECTION AS NEEDED
Status: DISPENSED | OUTPATIENT
Start: 2022-06-08 | End: 2022-06-08

## 2022-06-08 RX ADMIN — SODIUM CHLORIDE, PRESERVATIVE FREE 10 ML: 5 INJECTION INTRAVENOUS at 13:25

## 2022-06-08 RX ADMIN — ZOLEDRONIC ACID 4 MG: 0.04 INJECTION, SOLUTION INTRAVENOUS at 13:08

## 2022-06-08 RX ADMIN — SODIUM CHLORIDE, PRESERVATIVE FREE 10 ML: 5 INJECTION INTRAVENOUS at 11:35

## 2022-06-08 RX ADMIN — Medication 500 UNITS: at 13:25

## 2022-06-08 NOTE — PROGRESS NOTES
[facility] Progress Note    4173  Pt arrived in stable condition for Zometa    Assessment completed. Patient denied having any symptoms of COVID-19, i.e. SOB, coughing, fever, or generally not feeling well. Also denies having been exposed to COVID-19 recently or having had any recent contact with family/friend that has a pending COVID test.     R chest port accessed without issue and positive blood return noted. Labs obtained per order and sent for processing. Patient Vitals for the past 12 hrs:   Temp Pulse Resp BP SpO2   06/08/22 1136 98.2 °F (36.8 °C) 86 16 127/80 99 %        Recent Results (from the past 12 hour(s))   CBC WITH AUTOMATED DIFF    Collection Time: 06/08/22 11:40 AM   Result Value Ref Range    WBC 3.2 (L) 4.1 - 11.1 K/uL    RBC 4.73 4.10 - 5.70 M/uL    HGB 11.3 (L) 12.1 - 17.0 g/dL    HCT 37.5 36.6 - 50.3 %    MCV 79.3 (L) 80.0 - 99.0 FL    MCH 23.9 (L) 26.0 - 34.0 PG    MCHC 30.1 30.0 - 36.5 g/dL    RDW 20.5 (H) 11.5 - 14.5 %    PLATELET 823 257 - 008 K/uL    MPV 9.8 8.9 - 12.9 FL    NRBC 0.0 0  WBC    ABSOLUTE NRBC 0.00 0.00 - 0.01 K/uL    NEUTROPHILS 53 32 - 75 %    LYMPHOCYTES 27 12 - 49 %    MONOCYTES 18 (H) 5 - 13 %    EOSINOPHILS 1 0 - 7 %    BASOPHILS 1 0 - 1 %    IMMATURE GRANULOCYTES 0 0.0 - 0.5 %    ABS. NEUTROPHILS 1.7 (L) 1.8 - 8.0 K/UL    ABS. LYMPHOCYTES 0.9 0.8 - 3.5 K/UL    ABS. MONOCYTES 0.6 0.0 - 1.0 K/UL    ABS. EOSINOPHILS 0.0 0.0 - 0.4 K/UL    ABS. BASOPHILS 0.0 0.0 - 0.1 K/UL    ABS. IMM.  GRANS. 0.0 0.00 - 0.04 K/UL    DF SMEAR SCANNED      RBC COMMENTS ANISOCYTOSIS  1+       METABOLIC PANEL, COMPREHENSIVE    Collection Time: 06/08/22 11:40 AM   Result Value Ref Range    Sodium 140 136 - 145 mmol/L    Potassium 4.4 3.5 - 5.1 mmol/L    Chloride 107 97 - 108 mmol/L    CO2 29 21 - 32 mmol/L    Anion gap 4 (L) 5 - 15 mmol/L    Glucose 116 (H) 65 - 100 mg/dL    BUN 12 6 - 20 MG/DL    Creatinine 1.49 (H) 0.70 - 1.30 MG/DL    BUN/Creatinine ratio 8 (L) 12 - 20      GFR est AA 58 (L) >60 ml/min/1.73m2    GFR est non-AA 47 (L) >60 ml/min/1.73m2    Calcium 8.6 8.5 - 10.1 MG/DL    Bilirubin, total 0.3 0.2 - 1.0 MG/DL    ALT (SGPT) 21 12 - 78 U/L    AST (SGOT) 20 15 - 37 U/L    Alk. phosphatase 101 45 - 117 U/L    Protein, total 7.0 6.4 - 8.2 g/dL    Albumin 3.4 (L) 3.5 - 5.0 g/dL    Globulin 3.6 2.0 - 4.0 g/dL    A-G Ratio 0.9 (L) 1.1 - 2.2          The following medications administered:  Medications Administered     0.9% sodium chloride injection 10 mL     Admin Date  06/08/2022 Action  Given Dose  10 mL Route  IntraVENous Administered By  Chris Cohen RN          heparin (porcine) pf 300-500 Units     Admin Date  06/08/2022 Action  Given Dose  500 Units Route  InterCATHeter Administered By  Chris Cohen RN          saline peripheral flush soln 10 mL     Admin Date  06/08/2022 Action  Given Dose  10 mL Route  InterCATHeter Administered By  Chris Cohen RN           Admin Date  06/08/2022 Action  Given Dose  10 mL Route  InterCATHeter Administered By  Chris Cohen RN          zoledronic acid (ZOMETA) 4 mg/100mL infusion     Admin Date  06/08/2022 Action  New Bag Dose  4 mg Rate  400 mL/hr Route  IntraVENous Administered By  Dominick Robison RN                Pt tolerated treatment well. Port flushed, heparinized, and de-accessed. Pt provided with education on possible side effects of medication along with discharge instructions. Pt verbalized understanding. 1325  Pt discharged in no acute distress.  Next appointment:    Future Appointments   Date Time Provider Dajuan Johnston   7/6/2022 10:30 AM [address] [place] [address] REG   8/3/2022 10:30 AM [place] [address] [facility] REG   8/31/2022 10:30 AM [address] REG   11/23/2022 10:30 AM [address] REG

## 2022-06-08 NOTE — PROGRESS NOTES
[address] [facility] [address], [address] [facility], [address] [place] [address], 200 [facility]  [phone]            Progress Note        Patient: Negro Wooten Sr. MRN: [id number]  SSN: [id number]    YOB: 1955  Age: 61 y.o. Sex: male        Diagnosis:      1. Multiple Myeloma, IgA Kappa   Durie Natalia stage IIIB    Cytogenetics/FISH: t(14;16) - high risk    Ttreatment:     1. Maintenance therapy - Pomalyst 2 mg daily - 11/19/2018   2. Maintenance therapy - Ixazomib - started 10/15/2018 - stopped 11/12/2018  3. S/P tandem  Autotransplant   First on 2/28/2018   2nd on 06/13/2018  4. Carfilzomib/Pom/Dex - s/p 5 cycles  5. VD-PACE s/p 1 cycle  6. RVD - s/p 4 cycles    Subjective:      Negro Wooten Sr. is a 61 y.o. male with a diagnosis of IgA kappa myeloma. Bone marrow shows 100% aberrant plasma cells. He suffers with DM but it is diet controlled. He has received systemic anti-viral treatment for chronic Hep C with successful eradication of the virus. Mr. Benny Rivera received 4 cycles of systemic therapy with RVd. He had an initial good response to treatment. However his paraprotein level started rising and the myeloma became refractory to treatment. I then administered one cycle of VD-PACE in the hospital. He then received Carfilzomib/Pom/Dex. He achieved VGPR. He underwent tandem autotransplant at [facility]. According to the patient he achieved complete response with therapy. He has undergone second/tandem transplant in June. He took Pomalyst maintenance until Aug 2019 then had to stop due to recurrent prostatitis. He had a repeat BM biopsy in February 2019 at [facility] which showed complete molecular remission.  He is taking Pomalyst.       Review of Systems:    Constitutional: negative  Eyes: negative  Ears, Nose, Mouth, Throat, and Face: negative  Respiratory: cough  Cardiovascular: negative  Gastrointestinal: negative  Genitourinary:negative  Integument/Breast: negative  Hematologic/Lymphatic: negative  Musculoskeletal: negative  Neurological: negative      Past Medical History:   Diagnosis Date    Acid reflux     Arrhythmia     pvc's    Chronic pain     neck    Depression     Diabetes ([address]     Femoral hernia 2012    Hepatitis C     Hypertension     Inguinal hernia 2012    Liver disease     hepatitis c    Multiple myeloma (HCC)     Other ill-defined conditions(799.89)     Hx of PVCs since     Prostatitis, acute     Psychiatric disorder     depression     Past Surgical History:   Procedure Laterality Date    COLONOSCOPY N/A 2016    COLONOSCOPY performed by [facility]les [facility] at [facility] ENDOSCOPY    HX APPENDECTOMY      HX CERVICAL FUSION  2008    x 1 as of 2014    HX HEENT      foreign body removed from right eye    HX HERNIA REPAIR      [facility]      HX ORTHOPAEDIC      cervical fusion    HX OTHER SURGICAL  2000    liver bx - chronic hepatitis      Family History   Problem Relation Age of Onset    OSTEOARTHRITIS Mother     OSTEOARTHRITIS Father     Diabetes Father     Hypertension Maternal Grandmother     Diabetes Maternal Grandmother     Hypertension Maternal Grandfather     Diabetes Maternal Grandfather      Social History     Tobacco Use    Smoking status: Former Smoker     Packs/day: 1.00     Years: 12.00     Pack years: 12.00     Types: Cigarettes     Quit date: 1989     Years since quittin.4    Smokeless tobacco: Never Used    Tobacco comment: former cigarette smoker   Substance Use Topics    Alcohol use: No     Comment: former alcoholic-quit [id number]      Prior to Admission medications    Medication Sig Start Date End Date Taking? Authorizing Provider   pomalidomide (Pomalyst) 2 mg cap Take 1 Capsule by mouth daily.  22  Yes Emma Iglesias MD   tenofovir DISOPROXIL FUMARATE (VIREAD) 300 mg tablet TAKE 1 TABLET BY MOUTH 1 TIME A DAY. 22 Yes Michelle Bee MD   TESTOSTERONE  mg by IntraMUSCular route Once every 2 weeks. Yes Provider, Historical   POTASSIUM PO Take  by mouth. Yes Provider, Historical   metFORMIN ER (GLUCOPHAGE XR) 500 mg tablet Take 1 Tablet by mouth two (2) times a day. SEND FUTURE REFILL REQUESTS TO PCP. DR Evon Herbert NO LONGER AT THIS PRACTICE 8/6/21  Yes Sharifa Blanchard MD   OTHER Cannabis prescription   Yes Provider, Historical   zolpidem 3.5 mg subl DISSOLVE 1 TABLET UNDER TONGUE AT BEDTIME 4/10/21  Yes Provider, Historical   cholecalciferol, vitamin D3, (Vitamin D3) 50 mcg (2,000 unit) tab Take  by mouth daily. Yes Provider, Historical   ascorbic acid, vitamin C, (VITAMIN C) 100 mg tab Take  by mouth. Yes Provider, Historical   Denta 5000 Plus 1.1 % crea USE AS DIRECTED TWO TIMES DAILY 8/12/20  Yes Provider, Historical   ibuprofen (MOTRIN) 800 mg tablet TAKE 1 TABLET BY MOUTH 3 TIMES A DAY AS NEEDED FOR PAIN 10/13/20  Yes Provider, Historical   buPROPion XL (WELLBUTRIN XL) 300 mg XL tablet TAKE 1 TABLET BY MOUTH EVERY DAY 1/30/20  Yes Provider, Historical   naproxen (NAPROSYN) 500 mg tablet TAKE 1 TABLET BY MOUTH TWICE A DAY 8/26/19  Yes Provider, Historical   NUCYNTA 100 mg tablet TAKE 2 TABLETS 3 TIMES A DAY 7/27/19  Yes Provider, Historical   aspirin delayed-release 81 mg tablet Take  by mouth daily. Yes Provider, Historical   prochlorperazine (COMPAZINE) 10 mg tablet Take 5 mg by mouth every six (6) hours as needed. Yes Other, MD Goldy   omeprazole (PRILOSEC) 20 mg capsule Take 20 mg by mouth daily as needed (acid reflux). Yes Other, MD Goldy   ondansetron (ZOFRAN ODT) 8 mg disintegrating tablet Take 8 mg by mouth every eight (8) hours as needed for Nausea. Yes Other, MD Goldy   multivitamin (ONE A DAY) tablet Take 1 Tab by mouth daily. Yes Provider, Historical   tamsulosin (FLOMAX) 0.4 mg capsule Take 1 Cap by mouth daily.  4/4/17  Yes Daniela Denny MD   zolpidem CR (AMBIEN CR) 12.5 mg tablet Take 12.5 mg by mouth nightly. Yes Provider, Historical   aripiprazole (ABILIFY) 5 mg tablet Take 2.5 mg by mouth every morning. Yes Other, MD Goldy          Allergies   Allergen Reactions    Mercury (Bulk) Hives     Blisters             Objective:     Visit Vitals  /80   Pulse 86   Temp 98.2 °F (36.8 °C)   Resp 16   Ht 5' 8\" (1.727 m)   Wt 192 lb (87.1 kg)   SpO2 99%   BMI 29.19 kg/m²       Pain Scale: /10  Pain Location:       Physical Exam:     GENERAL: alert, cooperative  EYE: negative  LYMPHATIC: Cervical, supraclavicular, and axillary nodes normal.   THROAT & NECK: normal and no erythema or exudates noted. LUNG: clear to auscultation bilaterally  HEART: regular rate and rhythm  ABDOMEN: soft, non-tender  EXTREMITIES: normal  SKIN: Normal.  NEUROLOGIC: negative     Physical exam copied from previous note. Reviewed and no changes noted. Lab Results   Component Value Date/Time    WBC 3.2 (L) 06/08/2022 11:40 AM    HGB 11.3 (L) 06/08/2022 11:40 AM    Hematocrit (POC) 37 04/14/2021 09:48 AM    HCT 37.5 06/08/2022 11:40 AM    PLATELET 805 45/34/5858 11:40 AM    MCV 79.3 (L) 06/08/2022 11:40 AM       Lab Results   Component Value Date/Time    Sodium 140 06/08/2022 11:40 AM    Potassium 4.4 06/08/2022 11:40 AM    Chloride 107 06/08/2022 11:40 AM    CO2 29 06/08/2022 11:40 AM    Anion gap 4 (L) 06/08/2022 11:40 AM    Glucose 116 (H) 06/08/2022 11:40 AM    BUN 12 06/08/2022 11:40 AM    Creatinine 1.49 (H) 06/08/2022 11:40 AM    BUN/Creatinine ratio 8 (L) 06/08/2022 11:40 AM    GFR est AA 58 (L) 06/08/2022 11:40 AM    GFR est non-AA 47 (L) 06/08/2022 11:40 AM    Calcium 8.6 06/08/2022 11:40 AM            Assessment:     1. Multiple Myeloma, IgA Kappa   Durie [place] stage IIIB    Cytogenetics/FISH: t(14;16) - high risk  ECOG PS - 0   Intent of therapy: palliative    Received 3 cycles of RVd   Dose reduced Revlimid and Velcade d/t continued cytopenias. M-spike started rising.     He received one cycle of VD-PACE (bortezomib, dexamethasone, thalidomide, cisplatin, adriamycin, cyclophosphamide, and etoposide). Achieved WI with once cycle    Receivied KPd - s/p 5 cycles    Excellent response with M-protein came down to 0.1    S/p tandem autotransplant    First on 2/28/2018   2nd on 06/13/2018    Achieved CR after the first transplant. Repeat bone marrow @ [facility] - Complete response. Since he is high risk based on cytogenetics, I recommend proteosome inhibitor as maintenance therapy. Took Ixazomib briefly  Developed fever, was in the hospital.   Also developed cytopenias    Patient prefered to switch therapy to Pomalyst.     Pomalyst 2 mg daily - started 11/19/2018  Tolerating treatment very well  Denies any side effects. A detailed system by system evaluation of side effect was performed to assess chemotherapy related toxicity. Blood counts are acceptable. Results reviewed with the patient    M-spike is not deteted  Bone marrow 04/2021 - in CR. The disease has a high risk of recurrence and the treatment also carries a substantial risk of side effects. All of this was assessed during this visit. 2. Type 2 DM with complication    Managed by Endocrine      3. Chronic Hep C    In sustained virological remission    Gabapentin for possible neuropathy      Plan:       > Continue Pomalyst maintenance  > Myeloma labs and port flush monthly   > Gabapentin for muscle cramps.   > Zometa every 3 months  > Follow-up in 3 months      Signed by: Mertha Halsted, NP                     June 8, 2022       I personally saw and evaluated the patient and performed the key components of medical decision making. The history, physical exam, and documentation were performed by Jason Love NP. I reviewed and verified the above documentation and modified it as needed. Mr. Sarah Ayala is a gentleman with a diagnosis of high risk multiple myeloma. He is receiving maintenance Pomalyst. The disease is in remission.  I obtained history, performed physical exam, reviewed labs and imaging and communicated the treatment plan to the patient. Signed by: Vidal Fuentes MD                     June 8, 2022            Fariba Colon MD  CC.  Reanna Jo MD

## 2022-06-09 LAB
ALBUMIN SERPL ELPH-MCNC: 3.3 G/DL (ref 2.9–4.4)
ALBUMIN/GLOB SERPL: 1 {RATIO} (ref 0.7–1.7)
ALPHA1 GLOB SERPL ELPH-MCNC: 0.2 G/DL (ref 0–0.4)
ALPHA2 GLOB SERPL ELPH-MCNC: 1 G/DL (ref 0.4–1)
B-GLOBULIN SERPL ELPH-MCNC: 1 G/DL (ref 0.7–1.3)
GAMMA GLOB SERPL ELPH-MCNC: 1.3 G/DL (ref 0.4–1.8)
GLOBULIN SER CALC-MCNC: 3.4 G/DL (ref 2.2–3.9)
KAPPA LC FREE SER-MCNC: 36.8 MG/L (ref 3.3–19.4)
KAPPA LC FREE/LAMBDA FREE SER: 1.01 {RATIO} (ref 0.26–1.65)
LAMBDA LC FREE SERPL-MCNC: 36.6 MG/L (ref 5.7–26.3)
M PROTEIN SERPL ELPH-MCNC: NORMAL G/DL
PROT SERPL-MCNC: 6.7 G/DL (ref 6–8.5)

## 2022-06-13 LAB
IGA SERPL-MCNC: 519 MG/DL (ref 61–437)
IGG SERPL-MCNC: 1178 MG/DL (ref 603–1613)
IGM SERPL-MCNC: 21 MG/DL (ref 20–172)
PROT PATTERN SERPL IFE-IMP: ABNORMAL

## 2022-06-16 DIAGNOSIS — C90.00 MULTIPLE MYELOMA, REMISSION STATUS UNSPECIFIED (HCC): ICD-10-CM

## 2022-06-20 ENCOUNTER — TELEPHONE (OUTPATIENT)
Dept: ONCOLOGY | Age: 67
End: 2022-06-20

## 2022-06-20 DIAGNOSIS — R25.9 INVOLUNTARY MOVEMENTS: Primary | ICD-10-CM

## 2022-06-20 RX ORDER — POMALIDOMIDE 2 MG/1
1 CAPSULE ORAL DAILY
Qty: 28 CAPSULE | Refills: 0 | Status: SHIPPED | OUTPATIENT
Start: 2022-06-20 | End: 2022-06-21 | Stop reason: SDUPTHER

## 2022-06-20 NOTE — TELEPHONE ENCOUNTER
Patient is calling about a referral to possibly neurology for involuntary facial movement. Do not see any referral in the system - please confirm and put in order.

## 2022-06-21 DIAGNOSIS — C90.00 MULTIPLE MYELOMA, REMISSION STATUS UNSPECIFIED (HCC): ICD-10-CM

## 2022-06-21 RX ORDER — POMALIDOMIDE 2 MG/1
1 CAPSULE ORAL DAILY
Qty: 28 CAPSULE | Refills: 0 | Status: SHIPPED | OUTPATIENT
Start: 2022-06-21 | End: 2022-07-18

## 2022-06-21 NOTE — TELEPHONE ENCOUNTER
Requested Prescriptions     Pending Prescriptions Disp Refills    pomalidomide (Pomalyst) 2 mg cap 28 Capsule 0     Sig: Take 1 Capsule by mouth daily.      Approved per Emperatriz Yin from [facility]

## 2022-06-25 RX ORDER — METFORMIN HYDROCHLORIDE 500 MG/1
TABLET, EXTENDED RELEASE ORAL
Qty: 180 TABLET | Refills: 1 | Status: SHIPPED | OUTPATIENT
Start: 2022-06-25

## 2022-06-30 NOTE — PROGRESS NOTES
Neurology Note    Patient ID:  Mahad Méndez  [id number]  61 y.o.  1955      Date of Consultation:  July 1, 2022    Referring Physician: Dr. Kailyn Banerjee  Reason for Consultation:  Facial movements. Assessment and Plan:    Patient is a pleasant 44-year-old gentleman with multiple medical conditions who presents with involuntary, irregular movements of his face involving his lips, mouth, and eyes. His examination does reveal a regular muscle movements and spasms as well as a length dependent peripheral neuropathy. Involuntary facial movements: The differential for this does include idiopathic blepharospasm/hemifacial spasm, metabolic derangements, medication induced, CNS demyelinating disease/stroke. I did discuss the differential at length with the patient and his wife today. He is on multiple medications that can induce abnormal movements. I will obtain serology looking for possible metabolic etiologies. I will check a thyroid panel, vitamin B12, and vitamin D level today. I will obtain a brain MRI focusing on the brainstem and basal ganglia for possible vascular or demyelinating etiology for the symptoms. We did discuss that there are multiple medications that he is taking that can contribute to the facial spasms. This is a difficult situation as he has multiple ongoing medical conditions and needs these medications to help with his symptom control. May need to start additional medication. This can include muscle relaxants, clonazepam    Peripheral neuropathy:  This is most likely related to his history of diabetes. Continue aggressive control of his glucose and exercise  This is something that can be followed clinically. If this does worsen, can consider an EMG/nerve conduction study.     Muscle cramping:  He will look to begin magnesium 400  mg once to twice a day    Concern over cognition:  As patient was leaving today, he asked questions about cognition and his potential for developing chemo brain. I did ask and discussed this with his hematologist/oncologist.  The brain MRI has been ordered  This can be discussed further at follow-up visits. May need to consider neuropsychologist.           Subjective: my face clenches        History of Present Illness:   Allen Mehta Sr. is a 61 y.o. male who presents to the neurology clinic at [facility] for an evaluation. He does have a history of of multiple myeloma and chronic hepatitis. The patient does present with his wife today who provides additional information. The patient began to notice intermittent clenching of his mouth that began just over a year ago. It happens typically first thing in the morning but then can occur throughout the day. He has now is using a toothpick regularly for him to have something to bite down on as he continues to have facial movements. It gets time for him. It is not painful. He is also noticing that he is blinking his eyes intermittently. It has not begun to affect his vision. He does not notice any triggers during the day that do bring it on. He denies any difficulty with vision, hearing, speech or swallowing. He did have multiple medications adjusted just over a year ago. This does include medications for his anxiety/depression, myeloma, and hepatitis. He also does have a sensory disturbance in his distal lower extremity and does get intermittent cramps in his legs. When he gets the facial movements, he does take a Motrin and 2 Aleve and this seems to help him. It occurs at least 5 times per week.       Past Medical History:   Diagnosis Date    Acid reflux     Arrhythmia     pvc's    Chronic pain     neck    Depression     [address] [address]     Femoral hernia 7/11/2012    Hepatitis C     Hypertension     Inguinal hernia 7/11/2012    Liver disease     hepatitis c    Multiple myeloma ([address]     Other ill-defined conditions(799.89)     Hx of PVCs since 2009  Prostatitis, acute     Psychiatric disorder     depression        Past Surgical History:   Procedure Laterality Date    COLONOSCOPY N/A 2016    COLONOSCOPY performed by Rajendra Jerry MD at [facility] ENDOSCOPY    HX APPENDECTOMY      HX CERVICAL FUSION  2008    x 1 as of 2014    HX HEENT      foreign body removed from right eye    HX H[facility]IA REPAIR      [facility]      HX ORTHOPAEDIC      cervical fusion    HX OTHER SURGICAL  2000    liver bx - chronic hepatitis        Family History   Problem Relation Age of Onset    OSTEOARTHRITIS Mother     OSTEOARTHRITIS Father     Diabetes Father     Hypertension Maternal Grandmother     Diabetes Maternal Grandmother     Hypertension Maternal Grandfather     Diabetes Maternal Grandfather         Social History     Tobacco Use    Smoking status: Former Smoker     Packs/day: 1.00     Years: 12.00     Pack years: 12.00     Types: Cigarettes     Quit date: 1989     Years since quittin.5    Smokeless tobacco: Never Used    Tobacco comment: former cigarette smoker   Substance Use Topics    Alcohol use: No     Comment: former alcoholic-quit [id number]        Allergies   Allergen Reactions    Mercury (Bulk) Hives     Blisters          Prior to Admission medications    Medication Sig Start Date End Date Taking? Authorizing Provider   Belsomra 15 mg tablet Take 15 mg by mouth nightly. 22  Yes Provider, Historical   lisinopriL (PRINIVIL, ZESTRIL) 10 mg tablet Take 10 mg by mouth daily as needed. Yes Provider, Historical   metFORMIN ER (GLUCOPHAGE XR) 500 mg tablet TAKE 1 TABLET BY MOUTH TWO (2) TIMES A DAY. 22  Yes Kristal Villasenor MD   pomalidomide (Pomalyst) 2 mg cap Take 1 Capsule by mouth daily. 22  Yes Mitchel Quesada MD   tenofovir DISOPROXIL FUMARATE (VIREAD) 300 mg tablet TAKE 1 TABLET BY MOUTH 1 TIME A DAY. 22  Yes Mitchel Quesada MD   TESTOSTERONE  mg by IntraMUSCular route every seven (7) days.    Yes Provider, Historical   POTASSIUM PO Take  by mouth as needed. Yes Provider, Historical   OTHER Cannabis prescription   Yes Provider, Historical   cholecalciferol, vitamin D3, (Vitamin D3) 50 mcg (2,000 unit) tab Take  by mouth daily. Yes Provider, Historical   ascorbic acid, vitamin C, (VITAMIN C) 100 mg tab Take 100 mg by mouth daily. Yes Provider, Historical   Denta 5000 Plus 1.1 % crea USE AS DIRECTED TWO TIMES DAILY 8/12/20  Yes Provider, Historical   ibuprofen (MOTRIN) 800 mg tablet TAKE 1 TABLET BY MOUTH 3 TIMES A DAY AS NEEDED FOR PAIN 10/13/20  Yes Provider, Historical   buPROPion XL (WELLBUTRIN XL) 300 mg XL tablet TAKE 1 TABLET BY MOUTH EVERY DAY 1/30/20  Yes Provider, Historical   NUCYNTA 100 mg tablet TAKE 2 TABLETS 3 TIMES A DAY 7/27/19  Yes Provider, Historical   aspirin delayed-release 81 mg tablet Take  by mouth daily. Yes Provider, Historical   prochlorperazine (COMPAZINE) 10 mg tablet Take 5 mg by mouth every six (6) hours as needed. Yes Other, MD Goldy   omeprazole (PRILOSEC) 20 mg capsule Take 20 mg by mouth daily as needed (acid reflux). Yes Other, MD Goldy   ondansetron (ZOFRAN ODT) 8 mg disintegrating tablet Take 8 mg by mouth every eight (8) hours as needed for Nausea. Yes Other, MD Goldy   multivitamin (ONE A DAY) tablet Take 1 Tab by mouth daily. Yes Provider, Historical   tamsulosin (FLOMAX) 0.4 mg capsule Take 1 Cap by mouth daily. 4/4/17  Yes Yennifer Mercado MD   aripiprazole (ABILIFY) 5 mg tablet Take 2.5 mg by mouth every morning. Yes Other, MD Goldy   zolpidem 3.5 mg subl DISSOLVE 1 TABLET UNDER TONGUE AT BEDTIME  Patient not taking: Reported on 7/1/2022 4/10/21   Provider, Historical   naproxen (NAPROSYN) 500 mg tablet TAKE 1 TABLET BY MOUTH TWICE A DAY  Patient not taking: Reported on 7/1/2022 8/26/19   Provider, Historical   zolpidem CR (AMBIEN CR) 12.5 mg tablet Take 12.5 mg by mouth nightly.   Patient not taking: Reported on 7/1/2022    Provider, Historical       Review of Systems:    General, constitutional: negative  Eyes, vision: negative  Ears, nose, throat: negative  Cardiovascular, heart: negative  Respiratory: negative  Gastrointestinal: negative  Genitourinary: negative  Musculoskeletal: chronic neck pain. Skin and integumentary: negative  Psychiatric: negative  Endocrine: negative  Neurological: negative, except for HPI  Hematologic/lymphatic: negative  Allergy/immunology: negative      Objective:     Visit Vitals  /80 (BP 1 Location: Left upper arm, BP Patient Position: Sitting, BP Cuff Size: Large adult)   Pulse 66   Temp 97.5 °F (36.4 °C) (Temporal)   Resp 16   Ht 5' 8\" (1.727 m)   Wt 186 lb 3.2 oz (84.5 kg)   SpO2 98%   BMI 28.31 kg/m²       Physical Exam:      General:  appears well nourished in no acute distress  Neck: no carotid bruits  Lungs: clear to auscultation  Heart:  no murmurs, regular rate  Lower extremity: peripheral pulses palpable and no edema  Skin: intact    Neurological exam:    Awake, alert, oriented to person, place and time  Recent and remote memory were normal  Attention and concentration were intact  Language was intact. There was no aphasia  Speech: no dysarthria  Fund of knowledge was preserved    Cranial nerves:   II-XII were tested    Perrrla  Fundoscopic examination revealed venous pulsations and no clear abnormalities  Visual fields were full  Eomi, no evidence of nystagmus  Facial sensation:  normal and symmetric  Facial motor: He does have occasional blepharospasm. There is also irregular or clenching and movements of his jaw and mouth. He does appear spastic, dyskinetic, intermittent  Hearing intact  SCM strength intact  Tongue: midline without fasciculations    Motor: Tone normal  Pronator drift was absent  No evidence of fasciculations    Strength testing:   deltoid triceps biceps Wrist ext. Wrist flex. intrinsics Hip flex. Hip ext. Knee ext.   Knee flex Dorsi flex Plantar flex   Right 5 5 5 5 5 5 5 5 5 5 5 5   Left 5 5 5 5 5 5 5 5 5 5 5 5         Sensory:  Upper extremity: intact to pp, light touch, and vibration > 10 seconds  Lower extremity: intact to pp. Vibration was  5 seconds at his toes. 8 seconds at ankles    Reflexes:    Right Left  Biceps  2 2  Triceps 2 2  Brachiorad. 2 2  Patella  2 2  Achilles 1 1    Plantar response:  flexor bilaterally    Cerebellar testing:  no tremor apparent, finger/nose and wilda were intact    Romberg: absent    Gait: steady. Labs:     Lab Results   Component Value Date/Time    Hemoglobin A1c 6.2 08/25/2017 06:44 AM    Sodium 140 06/08/2022 11:40 AM    Potassium 4.4 06/08/2022 11:40 AM    Chloride 107 06/08/2022 11:40 AM    Glucose 116 (H) 06/08/2022 11:40 AM    BUN 12 06/08/2022 11:40 AM    Creatinine 1.49 (H) 06/08/2022 11:40 AM    Calcium 8.6 06/08/2022 11:40 AM    WBC 3.2 (L) 06/08/2022 11:40 AM    HCT 37.5 06/08/2022 11:40 AM    HGB 11.3 (L) 06/08/2022 11:40 AM    PLATELET 790 84/51/7948 11:40 AM    Hemoglobin A1c, External 6.6% 04/07/2017 12:00 AM       Imaging:    No results found for this or any previous visit. Results from [place] encounter on 04/16/22    CTA CHEST W OR W WO CONT    Narrative  EXAM: CT ANGIOGRAPHY CHEST    INDICATION:  CP, SOB, hx cancer    COMPARISON: None. Rigoberto Jay CONTRAST: 70 mL of Isovue-370. TECHNIQUE:  Precontrast  images were obtained to localize the volume for acquisition. Multislice helical CT arteriography was performed from the diaphragm to the  thoracic inlet during uneventful rapid bolus intravenous contrast  administration. Lung and soft tissue windows were generated. Coronal and  sagittal  reformatted images were also generated. 3D post processing consisting  of coronal maximum intensity projection images was performed. CT dose reduction  was achieved through use of a standardized protocol tailored for this  examination and automatic exposure control for dose modulation. FINDINGS:  Minimal bibasilar atelectasis. .    The pulmonary arteries are well enhanced and no pulmonary emboli are identified. Main pulmonary outflow tract 3.2 cm, upper normal to mildly enlarged. There is no mediastinal or hilar adenopathy or mass. The aorta enhances normally  without evidence of aneurysm or dissection. The visualized portions of the upper abdominal organs are normal.    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 2    Impression  1. No CT evidence for central pulmonary embolus at this time . 2. Upper normal to mildly enlarged main pulmonary outflow tract. Correlate for  pulmonary arterial hypertension. 3. 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 4. Minimal bibasilar atelectasis. Patient Active Problem List   Diagnosis Code    Symptomatic PVCs I49.3    Hypertension I10    Diabetes ([facility] E11.9    Depression F32. A    Chronic hepatitis C (HCC) B18.2    Anemia D64.9    Multiple myeloma (HCC) C90.00    Anemia associated with chemotherapy D64.81, T45.1X5A    Renal insufficiency N28.9    H/O autologous stem cell transplant ([facility] Z94.84    Type 2 diabetes with nephropathy ([facility] E11.21    Chemotherapy-induced thrombocytopenia D69.59, T45.1X5A      The patient should return to clinic in 6-8 weeks    Renewed medication; none    I spent  65  minutes on the day of the encounter preparing the office visit by reviewing medical records, obtaining a history, performing examination, counseling and educating the patient and family members on diagnosis, ordering medications and tests, documenting in the clinical medical record, and coordinating the care for the patient. The patient had the ability to ask questions and all questions were answered.              Dr. Tracey [facility]    Signed By:  Estela Zuniga DO FAAN    July 1, 2022

## 2022-07-01 ENCOUNTER — OFFICE VISIT (OUTPATIENT)
Dept: NEUROLOGY | Age: 67
End: 2022-07-01
Payer: MEDICARE

## 2022-07-01 VITALS
TEMPERATURE: 97.5 F | HEART RATE: 66 BPM | WEIGHT: 186.2 LBS | BODY MASS INDEX: 28.22 KG/M2 | HEIGHT: 68 IN | DIASTOLIC BLOOD PRESSURE: 80 MMHG | SYSTOLIC BLOOD PRESSURE: 120 MMHG | OXYGEN SATURATION: 98 % | RESPIRATION RATE: 16 BRPM

## 2022-07-01 DIAGNOSIS — G24.5 BLEPHAROSPASM: ICD-10-CM

## 2022-07-01 DIAGNOSIS — G60.9 IDIOPATHIC PERIPHERAL NEUROPATHY: ICD-10-CM

## 2022-07-01 DIAGNOSIS — G62.9 NEUROPATHY: ICD-10-CM

## 2022-07-01 DIAGNOSIS — R25.9 INVOLUNTARY MOVEMENTS: Primary | ICD-10-CM

## 2022-07-01 DIAGNOSIS — G51.31 HEMIFACIAL SPASM OF RIGHT SIDE OF FACE: ICD-10-CM

## 2022-07-01 DIAGNOSIS — E55.9 VITAMIN D DEFICIENCY: ICD-10-CM

## 2022-07-01 PROCEDURE — G8754 DIAS BP LESS 90: HCPCS | Performed by: PSYCHIATRY & NEUROLOGY

## 2022-07-01 PROCEDURE — G8417 CALC BMI ABV UP PARAM F/U: HCPCS | Performed by: PSYCHIATRY & NEUROLOGY

## 2022-07-01 PROCEDURE — G8752 SYS BP LESS 140: HCPCS | Performed by: PSYCHIATRY & NEUROLOGY

## 2022-07-01 PROCEDURE — G9717 DOC PT DX DEP/BP F/U NT REQ: HCPCS | Performed by: PSYCHIATRY & NEUROLOGY

## 2022-07-01 PROCEDURE — G8427 DOCREV CUR MEDS BY ELIG CLIN: HCPCS | Performed by: PSYCHIATRY & NEUROLOGY

## 2022-07-01 PROCEDURE — 99205 OFFICE O/P NEW HI 60 MIN: CPT | Performed by: PSYCHIATRY & NEUROLOGY

## 2022-07-01 PROCEDURE — 3017F COLORECTAL CA SCREEN DOC REV: CPT | Performed by: PSYCHIATRY & NEUROLOGY

## 2022-07-01 RX ORDER — SUVOREXANT 15 MG/1
15 TABLET, FILM COATED ORAL
COMMUNITY
Start: 2022-05-05

## 2022-07-01 RX ORDER — LISINOPRIL 10 MG/1
10 TABLET ORAL
COMMUNITY

## 2022-07-01 NOTE — LETTER
7/1/2022    Patient: Andrzej Taylor. YOB: 1955   Date of Visit: 7/1/2022     Leanne Diaz MD  [address]  [address]  V[address] [facility] [address]    Dear Leanne Diaz MD,      Thank you for referring Mr. Darrion Dumas to [address] [place] [address] for evaluation. My notes for this consultation are attached. If you have questions, please do not hesitate to call me. I look forward to following your patient along with you.       Sincerely,    Messi Ruiz, DO

## 2022-07-01 NOTE — PROGRESS NOTES
Chief Complaint   Patient presents with    New Patient     Complaint of involuntary facial movement

## 2022-07-07 ENCOUNTER — HOSPITAL ENCOUNTER (OUTPATIENT)
Dept: INFUSION THERAPY | Age: 67
Discharge: HOME | End: 2022-07-07
Payer: COMMERCIAL

## 2022-07-07 VITALS
BODY MASS INDEX: 28.16 KG/M2 | WEIGHT: 185.8 LBS | HEIGHT: 68 IN | TEMPERATURE: 98.8 F | HEART RATE: 87 BPM | RESPIRATION RATE: 17 BRPM | SYSTOLIC BLOOD PRESSURE: 129 MMHG | DIASTOLIC BLOOD PRESSURE: 80 MMHG | OXYGEN SATURATION: 97 %

## 2022-07-07 DIAGNOSIS — C90.00 MULTIPLE MYELOMA, REMISSION STATUS UNSPECIFIED (HCC): Primary | ICD-10-CM

## 2022-07-07 LAB
ALBUMIN SERPL-MCNC: 3.4 G/DL (ref 3.5–5)
ALBUMIN/GLOB SERPL: 1 {RATIO} (ref 1.1–2.2)
ALP SERPL-CCNC: 88 U/L (ref 45–117)
ALT SERPL-CCNC: 26 U/L (ref 12–78)
ANION GAP SERPL CALC-SCNC: 3 MMOL/L (ref 5–15)
AST SERPL-CCNC: 23 U/L (ref 15–37)
BASOPHILS # BLD: 0 K/UL (ref 0–0.1)
BASOPHILS NFR BLD: 1 % (ref 0–1)
BILIRUB SERPL-MCNC: 0.7 MG/DL (ref 0.2–1)
BUN SERPL-MCNC: 15 MG/DL (ref 6–20)
BUN/CREAT SERPL: 9 (ref 12–20)
CALCIUM SERPL-MCNC: 8.3 MG/DL (ref 8.5–10.1)
CHLORIDE SERPL-SCNC: 105 MMOL/L (ref 97–108)
CO2 SERPL-SCNC: 29 MMOL/L (ref 21–32)
CREAT SERPL-MCNC: 1.61 MG/DL (ref 0.7–1.3)
DIFFERENTIAL METHOD BLD: ABNORMAL
EOSINOPHIL # BLD: 0 K/UL (ref 0–0.4)
EOSINOPHIL NFR BLD: 1 % (ref 0–7)
ERYTHROCYTE [DISTWIDTH] IN BLOOD BY AUTOMATED COUNT: 21.2 % (ref 11.5–14.5)
GLOBULIN SER CALC-MCNC: 3.5 G/DL (ref 2–4)
GLUCOSE SERPL-MCNC: 106 MG/DL (ref 65–100)
HCT VFR BLD AUTO: 35.7 % (ref 36.6–50.3)
HGB BLD-MCNC: 10.7 G/DL (ref 12.1–17)
IGA SERPL-MCNC: 457 MG/DL (ref 70–400)
IGG SERPL-MCNC: 1190 MG/DL (ref 700–1600)
IGM SERPL-MCNC: <21 MG/DL (ref 40–230)
IMM GRANULOCYTES # BLD AUTO: 0 K/UL (ref 0–0.04)
IMM GRANULOCYTES NFR BLD AUTO: 0 % (ref 0–0.5)
LYMPHOCYTES # BLD: 0.9 K/UL (ref 0.8–3.5)
LYMPHOCYTES NFR BLD: 24 % (ref 12–49)
MCH RBC QN AUTO: 23.6 PG (ref 26–34)
MCHC RBC AUTO-ENTMCNC: 30 G/DL (ref 30–36.5)
MCV RBC AUTO: 78.8 FL (ref 80–99)
MONOCYTES # BLD: 1 K/UL (ref 0–1)
MONOCYTES NFR BLD: 28 % (ref 5–13)
NEUTS SEG # BLD: 1.8 K/UL (ref 1.8–8)
NEUTS SEG NFR BLD: 46 % (ref 32–75)
NRBC # BLD: 0 K/UL (ref 0–0.01)
NRBC BLD-RTO: 0 PER 100 WBC
PLATELET # BLD AUTO: 173 K/UL (ref 150–400)
PLATELET COMMENTS,PCOM: ABNORMAL
PMV BLD AUTO: 9.4 FL (ref 8.9–12.9)
POTASSIUM SERPL-SCNC: 4 MMOL/L (ref 3.5–5.1)
PROT SERPL-MCNC: 6.9 G/DL (ref 6.4–8.2)
RBC # BLD AUTO: 4.53 M/UL (ref 4.1–5.7)
RBC MORPH BLD: ABNORMAL
RBC MORPH BLD: ABNORMAL
SODIUM SERPL-SCNC: 137 MMOL/L (ref 136–145)
WBC # BLD AUTO: 3.7 K/UL (ref 4.1–11.1)
WBC MORPH BLD: ABNORMAL

## 2022-07-07 PROCEDURE — 84165 PROTEIN E-PHORESIS SERUM: CPT

## 2022-07-07 PROCEDURE — 74011000250 HC RX REV CODE- 250: Performed by: INTERNAL MEDICINE

## 2022-07-07 PROCEDURE — 82784 ASSAY IGA/IGD/IGG/IGM EACH: CPT

## 2022-07-07 PROCEDURE — 85025 COMPLETE CBC W/AUTO DIFF WBC: CPT

## 2022-07-07 PROCEDURE — 36591 DRAW BLOOD OFF VENOUS DEVICE: CPT

## 2022-07-07 PROCEDURE — 80053 COMPREHEN METABOLIC PANEL: CPT

## 2022-07-07 PROCEDURE — 83521 IG LIGHT CHAINS FREE EACH: CPT

## 2022-07-07 PROCEDURE — 77030012965 HC NDL HUBR BBMI -A

## 2022-07-07 PROCEDURE — 74011250636 HC RX REV CODE- 250/636: Performed by: INTERNAL MEDICINE

## 2022-07-07 RX ORDER — SODIUM CHLORIDE 9 MG/ML
10 INJECTION INTRAMUSCULAR; INTRAVENOUS; SUBCUTANEOUS AS NEEDED
Status: DISCONTINUED | OUTPATIENT
Start: 2022-07-07 | End: 2022-07-08 | Stop reason: HOSPADM

## 2022-07-07 RX ORDER — SODIUM CHLORIDE 0.9 % (FLUSH) 0.9 %
5-10 SYRINGE (ML) INJECTION AS NEEDED
Status: DISCONTINUED | OUTPATIENT
Start: 2022-07-07 | End: 2022-07-08 | Stop reason: HOSPADM

## 2022-07-07 RX ORDER — HEPARIN 100 UNIT/ML
500 SYRINGE INTRAVENOUS AS NEEDED
Status: DISCONTINUED | OUTPATIENT
Start: 2022-07-07 | End: 2022-07-08 | Stop reason: HOSPADM

## 2022-07-07 RX ADMIN — HEPARIN 500 UNITS: 100 SYRINGE at 16:18

## 2022-07-07 RX ADMIN — SODIUM CHLORIDE, PRESERVATIVE FREE 10 ML: 5 INJECTION INTRAVENOUS at 16:15

## 2022-07-07 NOTE — PROGRESS NOTES
[facility] Note:  Arrived - 4771    Patient denied having any symptoms of COVID-19, i.e. SOB, coughing, fever, or generally not feeling well. Also denies having been exposed to COVID-19 recently or having had any recent contact with family/friend that has a pending COVID test.     Visit Vitals  /80 (BP 1 Location: Left upper arm, BP Patient Position: At rest;Sitting)   Pulse 87   Temp 98.8 °F (37.1 °C)   Resp 17   Ht 5' 8\" (1.727 m)   Wt 84.3 kg (185 lb 12.8 oz)   SpO2 97%   BMI 28.25 kg/m²       Assessment - unchanged. Port accessed, labs drawn & flushed per protocol w/o difficulty. Scott needle removed. 1615 - Tolerated well. Pt denies any acute problems/changes. Discharged from [facility] ambulatory. No distress. Next appt:    Future Appointments   Date Time Provider Dajuan Johnston   7/20/2022  2:00 PM RUBY MRI 2 [facility] LAURI CASTELLON BETHANY   8/3/2022 10:30 AM LOZANO MED5 300 [facility] REG   8/31/2022 10:30 AM LOZANO FASTRACK 6 [facility] REG   8/31/2022 11:00 AM [facility] Slade [facility] AM[facility]   9/1/2022  3:00 PM Shante Berkowitz NP NEUM BS AMB   9/28/2022 10:30 AM LOZANO MED5 300 [facility] REG   10/26/2022 10:30 AM LOZANO MED5 300 [facility] REG   11/23/2022 10:30 AM LOZANO FASTRACK 6 [facility] REG

## 2022-07-11 LAB
ALBUMIN SERPL ELPH-MCNC: 3.7 G/DL (ref 2.9–4.4)
ALBUMIN/GLOB SERPL: 1.1 {RATIO} (ref 0.7–1.7)
ALPHA1 GLOB SERPL ELPH-MCNC: 0.2 G/DL (ref 0–0.4)
ALPHA2 GLOB SERPL ELPH-MCNC: 0.9 G/DL (ref 0.4–1)
B-GLOBULIN SERPL ELPH-MCNC: 0.9 G/DL (ref 0.7–1.3)
GAMMA GLOB SERPL ELPH-MCNC: 1.3 G/DL (ref 0.4–1.8)
GLOBULIN SER CALC-MCNC: 3.3 G/DL (ref 2.2–3.9)
IGA SERPL-MCNC: 487 MG/DL (ref 61–437)
IGG SERPL-MCNC: 1174 MG/DL (ref 603–1613)
IGM SERPL-MCNC: 19 MG/DL (ref 20–172)
M PROTEIN SERPL ELPH-MCNC: NORMAL G/DL
PROT PATTERN SERPL IFE-IMP: ABNORMAL
PROT SERPL-MCNC: 7 G/DL (ref 6–8.5)

## 2022-07-14 LAB
KAPPA LC FREE SER-MCNC: 32.4 MG/L (ref 3.3–19.4)
KAPPA LC FREE/LAMBDA FREE SER: 1 {RATIO} (ref 0.26–1.65)
LAMBDA LC FREE SERPL-MCNC: 32.5 MG/L (ref 5.7–26.3)

## 2022-07-20 ENCOUNTER — HOSPITAL ENCOUNTER (OUTPATIENT)
Dept: MRI IMAGING | Age: 67
Discharge: HOME | End: 2022-07-20
Attending: PSYCHIATRY & NEUROLOGY
Payer: COMMERCIAL

## 2022-07-20 DIAGNOSIS — G51.31 HEMIFACIAL SPASM OF RIGHT SIDE OF FACE: ICD-10-CM

## 2022-07-20 PROCEDURE — 70551 MRI BRAIN STEM W/O DYE: CPT

## 2022-07-22 ENCOUNTER — TELEPHONE (OUTPATIENT)
Dept: NEUROLOGY | Age: 67
End: 2022-07-22

## 2022-07-22 NOTE — TELEPHONE ENCOUNTER
Spoke with patient. Verified name/. Notified patient that Dr. Jerry Wyatt is out of the office this week. Stated once he reviews results I will give him a call back. Patient verbalized understanding.

## 2022-07-26 ENCOUNTER — TELEPHONE (OUTPATIENT)
Dept: NEUROLOGY | Age: 67
End: 2022-07-26

## 2022-07-26 NOTE — TELEPHONE ENCOUNTER
Spoke with patient. Verified name/. Notified of MRI results. Attempted to find lab results but did not see any comments. Forwarded to Dr. Andrews Samayoa to verify those.

## 2022-07-27 NOTE — TELEPHONE ENCOUNTER
Spoke with patient. Verified name/. Relayed test results. Patient verbalized understanding. Notified for him to follow up with PCP regarding kidney numbers.

## 2022-07-27 NOTE — TELEPHONE ENCOUNTER
Pt's wife called asking for records to be sent to [facility] today. Explained that it was after 4 & that the nurses have gone for the day. That we do not send out records from our office, that we have another company that handles that. If she would like she could request the records today at [employer] under the pt resources tab. Natalie Hitesh was not happy with that answer as she feels that requesting records online is not secure and that as long as anyone had the correct answers, they would be able to answer the security questions and obtain their records. She is requesting a call first thing in the morning to speak to someone about getting records to [facility].  Please call [phone] or [phone]

## 2022-07-27 NOTE — TELEPHONE ENCOUNTER
Tried [employer] back. Left voicemail to give the office a call back.  If Renu Hopdesean calls back, please give her the number to [employer] so they can handle the medical records request.

## 2022-07-28 ENCOUNTER — TELEPHONE (OUTPATIENT)
Dept: NEUROLOGY | Age: 67
End: 2022-07-28

## 2022-07-28 NOTE — TELEPHONE ENCOUNTER
VOICEMAIL    Pt's wife called back. Was provided the number to [employer] [phone] by Tanesha Baeza called back and left message very upset at being given the wrong information. She wants a call back from Dr. Paul Stapleton nurse to get the correct information. Does not like that we are giving her misinformation.  [phone]

## 2022-07-28 NOTE — TELEPHONE ENCOUNTER
Spoke with patient and wife. Stated Dr. Jackie Ayala message. Wife/patient with concerns that patient has Tardive Dyskinesia due to his medications he is taking. I went into patient chart and made sure to explain to them Dr. Cecilia Spears was not overlooking and of their concerns and that it is noted in the office note. I told them they would be able to see this in their mychart but they do not have access due to being locked out. I gave them mychart help desk number so they can get back in. They both thanked me for my help and would like Dr. Cecilia Spears to send in Clonazepam. Patient wants to make sure that this medication will not interfere with his chemo or other medications he is taking. I stated that Dr. Cecilia Spears would not send in any medication that would interfere with his other meds. I explained this portion of the note below from Dr. Cecilia Spears: Involuntary facial movements: The differential for this does include idiopathic blepharospasm/hemifacial spasm, metabolic derangements, medication induced, CNS demyelinating disease/stroke. I did discuss the differential at length with the patient and his wife today. He is on multiple medications that can induce abnormal movements. I will obtain serology looking for possible metabolic etiologies. I will check a thyroid panel, vitamin B12, and vitamin D level today. I will obtain a brain MRI focusing on the brainstem and basal ganglia for possible vascular or demyelinating etiology for the symptoms. We did discuss that there are multiple medications that he is taking that can contribute to the facial spasms. This is a difficult situation as he has multiple ongoing medical conditions and needs these medications to help with his symptom control. May need to start additional medication.   This can include muscle relaxants, clonazepam

## 2022-07-28 NOTE — TELEPHONE ENCOUNTER
Called and spoke with patient and wife. They are not happy as perry stated they did not come to our clinic to have an MRI done to tell them about Tumors or strokes. They came to the clinic for the movements in his mouth. She stated that waiting two months with no answers and patient having to deal with this, they want to see another provider. Notified I need them to come to our office to sign records release before I can fax their records to [facility]. They will do this today. Once form is signed, the fax number is [phone]. Sent to Dr. Scarlet Rogers to write referral for [facility].

## 2022-07-29 DIAGNOSIS — G51.31 HEMIFACIAL SPASM OF RIGHT SIDE OF FACE: Primary | ICD-10-CM

## 2022-07-29 RX ORDER — CLONAZEPAM 0.5 MG/1
0.5 TABLET ORAL 2 TIMES DAILY
Qty: 60 TABLET | Refills: 1 | Status: SHIPPED | OUTPATIENT
Start: 2022-07-29

## 2022-08-01 ENCOUNTER — TELEPHONE (OUTPATIENT)
Dept: NEUROLOGY | Age: 67
End: 2022-08-01

## 2022-08-01 RX ORDER — SODIUM CHLORIDE 9 MG/ML
10 INJECTION INTRAMUSCULAR; INTRAVENOUS; SUBCUTANEOUS AS NEEDED
Status: CANCELLED | OUTPATIENT
Start: 2022-08-03

## 2022-08-01 NOTE — TELEPHONE ENCOUNTER
Taryn Masters wants a call back from [facility] about scripts Dr. Rachana Zhu was supposed to call in for pt. Please call.  [phone]

## 2022-08-02 NOTE — TELEPHONE ENCOUNTER
Verified patient with 2 identifiers   Patient was questioning why the clonazepam was  e scribed to the [facility] when is should have gone to the [facility] pharmacy in [place]. Advised I do not know why  advised that per chart [facility] [place] is the primary [facility]. Patient states the only med that should go to the specialty pharmacy is the \" cancer medication\". Patient wants to make sure that going forward all meds except for the cancer med [facility] local [facility]. Advised that [facility] is primary, therefor it would need to be deliberately change to go to the specialty pharmacy  patient verified understanding.

## 2022-08-03 ENCOUNTER — HOSPITAL ENCOUNTER (OUTPATIENT)
Dept: INFUSION THERAPY | Age: 67
Discharge: HOME | End: 2022-08-03
Payer: COMMERCIAL

## 2022-08-03 VITALS
WEIGHT: 185 LBS | TEMPERATURE: 97.7 F | SYSTOLIC BLOOD PRESSURE: 146 MMHG | DIASTOLIC BLOOD PRESSURE: 79 MMHG | OXYGEN SATURATION: 100 % | RESPIRATION RATE: 18 BRPM | BODY MASS INDEX: 28.13 KG/M2 | HEART RATE: 70 BPM

## 2022-08-03 DIAGNOSIS — C90.00 MULTIPLE MYELOMA, REMISSION STATUS UNSPECIFIED (HCC): ICD-10-CM

## 2022-08-03 DIAGNOSIS — C90.00 MULTIPLE MYELOMA, REMISSION STATUS UNSPECIFIED (HCC): Primary | ICD-10-CM

## 2022-08-03 LAB
ALBUMIN SERPL-MCNC: 3.5 G/DL (ref 3.5–5)
ALBUMIN/GLOB SERPL: 0.9 {RATIO} (ref 1.1–2.2)
ALP SERPL-CCNC: 70 U/L (ref 45–117)
ALT SERPL-CCNC: 18 U/L (ref 12–78)
ANION GAP SERPL CALC-SCNC: 6 MMOL/L (ref 5–15)
AST SERPL-CCNC: 16 U/L (ref 15–37)
BASOPHILS # BLD: 0 K/UL (ref 0–0.1)
BASOPHILS NFR BLD: 1 % (ref 0–1)
BILIRUB SERPL-MCNC: 0.5 MG/DL (ref 0.2–1)
BUN SERPL-MCNC: 17 MG/DL (ref 6–20)
BUN/CREAT SERPL: 11 (ref 12–20)
CALCIUM SERPL-MCNC: 8.4 MG/DL (ref 8.5–10.1)
CHLORIDE SERPL-SCNC: 105 MMOL/L (ref 97–108)
CO2 SERPL-SCNC: 27 MMOL/L (ref 21–32)
CREAT SERPL-MCNC: 1.52 MG/DL (ref 0.7–1.3)
DIFFERENTIAL METHOD BLD: ABNORMAL
EOSINOPHIL # BLD: 0 K/UL (ref 0–0.4)
EOSINOPHIL NFR BLD: 1 % (ref 0–7)
ERYTHROCYTE [DISTWIDTH] IN BLOOD BY AUTOMATED COUNT: 20.4 % (ref 11.5–14.5)
GLOBULIN SER CALC-MCNC: 3.8 G/DL (ref 2–4)
GLUCOSE SERPL-MCNC: 98 MG/DL (ref 65–100)
HCT VFR BLD AUTO: 37.2 % (ref 36.6–50.3)
HGB BLD-MCNC: 11.5 G/DL (ref 12.1–17)
IGA SERPL-MCNC: 467 MG/DL (ref 70–400)
IGG SERPL-MCNC: 1210 MG/DL (ref 700–1600)
IGM SERPL-MCNC: <21 MG/DL (ref 40–230)
IMM GRANULOCYTES # BLD AUTO: 0 K/UL (ref 0–0.04)
IMM GRANULOCYTES NFR BLD AUTO: 1 % (ref 0–0.5)
LYMPHOCYTES # BLD: 0.7 K/UL (ref 0.8–3.5)
LYMPHOCYTES NFR BLD: 19 % (ref 12–49)
MCH RBC QN AUTO: 24.2 PG (ref 26–34)
MCHC RBC AUTO-ENTMCNC: 30.9 G/DL (ref 30–36.5)
MCV RBC AUTO: 78.2 FL (ref 80–99)
MONOCYTES # BLD: 0.9 K/UL (ref 0–1)
MONOCYTES NFR BLD: 25 % (ref 5–13)
NEUTS SEG # BLD: 2.1 K/UL (ref 1.8–8)
NEUTS SEG NFR BLD: 53 % (ref 32–75)
NRBC # BLD: 0 K/UL (ref 0–0.01)
NRBC BLD-RTO: 0 PER 100 WBC
PLATELET # BLD AUTO: 162 K/UL (ref 150–400)
PMV BLD AUTO: 9.2 FL (ref 8.9–12.9)
POTASSIUM SERPL-SCNC: 3.7 MMOL/L (ref 3.5–5.1)
PROT SERPL-MCNC: 7.3 G/DL (ref 6.4–8.2)
RBC # BLD AUTO: 4.76 M/UL (ref 4.1–5.7)
RBC MORPH BLD: ABNORMAL
RBC MORPH BLD: ABNORMAL
SODIUM SERPL-SCNC: 138 MMOL/L (ref 136–145)
WBC # BLD AUTO: 3.7 K/UL (ref 4.1–11.1)

## 2022-08-03 PROCEDURE — 36591 DRAW BLOOD OFF VENOUS DEVICE: CPT

## 2022-08-03 PROCEDURE — 80053 COMPREHEN METABOLIC PANEL: CPT

## 2022-08-03 PROCEDURE — 74011000250 HC RX REV CODE- 250: Performed by: INTERNAL MEDICINE

## 2022-08-03 PROCEDURE — 84165 PROTEIN E-PHORESIS SERUM: CPT

## 2022-08-03 PROCEDURE — 85025 COMPLETE CBC W/AUTO DIFF WBC: CPT

## 2022-08-03 PROCEDURE — 83521 IG LIGHT CHAINS FREE EACH: CPT

## 2022-08-03 PROCEDURE — 82784 ASSAY IGA/IGD/IGG/IGM EACH: CPT

## 2022-08-03 PROCEDURE — 77030012965 HC NDL HUBR BBMI -A

## 2022-08-03 PROCEDURE — 74011250636 HC RX REV CODE- 250/636: Performed by: INTERNAL MEDICINE

## 2022-08-03 RX ORDER — TENOFOVIR DISOPROXIL FUMARATE 300 MG/1
TABLET, FILM COATED ORAL
Qty: 90 TABLET | Refills: 0 | Status: SHIPPED | OUTPATIENT
Start: 2022-08-03

## 2022-08-03 RX ORDER — HEPARIN 100 UNIT/ML
500 SYRINGE INTRAVENOUS AS NEEDED
Status: ACTIVE | OUTPATIENT
Start: 2022-08-03 | End: 2022-08-03

## 2022-08-03 RX ORDER — SODIUM CHLORIDE 0.9 % (FLUSH) 0.9 %
5-10 SYRINGE (ML) INJECTION AS NEEDED
Status: DISPENSED | OUTPATIENT
Start: 2022-08-03 | End: 2022-08-03

## 2022-08-03 RX ADMIN — SODIUM CHLORIDE, PRESERVATIVE FREE 10 ML: 5 INJECTION INTRAVENOUS at 10:15

## 2022-08-03 RX ADMIN — HEPARIN 500 UNITS: 100 SYRINGE at 10:15

## 2022-08-03 NOTE — PROGRESS NOTES
[facility] Note:  Arrived - 1000       Visit Vitals  BP (!) 146/79   Pulse 70   Temp 97.7 °F (36.5 °C)   Resp 18   Wt 83.9 kg (185 lb)   SpO2 100%   BMI 28.13 kg/m²       Port accessed & flushed per protocol w/o difficulty. Labs drawn. Scott needle removed. 1020 - Tolerated well. Pt denies any acute problems/changes. Discharged from [facility] ambulatory. No distress.

## 2022-08-04 LAB
KAPPA LC FREE SER-MCNC: 38 MG/L (ref 3.3–19.4)
KAPPA LC FREE/LAMBDA FREE SER: 1.06 {RATIO} (ref 0.26–1.65)
LAMBDA LC FREE SERPL-MCNC: 35.9 MG/L (ref 5.7–26.3)

## 2022-08-05 LAB
ALBUMIN SERPL ELPH-MCNC: 3.7 G/DL (ref 2.9–4.4)
ALBUMIN/GLOB SERPL: 1.2 {RATIO} (ref 0.7–1.7)
ALPHA1 GLOB SERPL ELPH-MCNC: 0.1 G/DL (ref 0–0.4)
ALPHA2 GLOB SERPL ELPH-MCNC: 0.8 G/DL (ref 0.4–1)
B-GLOBULIN SERPL ELPH-MCNC: 1 G/DL (ref 0.7–1.3)
GAMMA GLOB SERPL ELPH-MCNC: 1.3 G/DL (ref 0.4–1.8)
GLOBULIN SER CALC-MCNC: 3.2 G/DL (ref 2.2–3.9)
IGA SERPL-MCNC: 504 MG/DL (ref 61–437)
IGG SERPL-MCNC: 1194 MG/DL (ref 603–1613)
IGM SERPL-MCNC: 18 MG/DL (ref 20–172)
M PROTEIN SERPL ELPH-MCNC: NORMAL G/DL
PROT PATTERN SERPL IFE-IMP: ABNORMAL
PROT SERPL-MCNC: 6.9 G/DL (ref 6–8.5)

## 2022-08-22 ENCOUNTER — TELEPHONE (OUTPATIENT)
Dept: ONCOLOGY | Age: 67
End: 2022-08-22

## 2022-08-22 ENCOUNTER — HOSPITAL ENCOUNTER (EMERGENCY)
Age: 67
Discharge: HOME | End: 2022-08-22
Attending: STUDENT IN AN ORGANIZED HEALTH CARE EDUCATION/TRAINING PROGRAM
Payer: COMMERCIAL

## 2022-08-22 VITALS
OXYGEN SATURATION: 99 % | HEIGHT: 68 IN | RESPIRATION RATE: 18 BRPM | DIASTOLIC BLOOD PRESSURE: 86 MMHG | TEMPERATURE: 98.4 F | SYSTOLIC BLOOD PRESSURE: 125 MMHG | BODY MASS INDEX: 28.19 KG/M2 | HEART RATE: 67 BPM | WEIGHT: 186 LBS

## 2022-08-22 DIAGNOSIS — R06.6 HICCUPS: Primary | ICD-10-CM

## 2022-08-22 LAB
ANION GAP SERPL CALC-SCNC: 5 MMOL/L (ref 5–15)
ATRIAL RATE: 66 BPM
BASOPHILS # BLD: 0 K/UL (ref 0–0.1)
BASOPHILS NFR BLD: 1 % (ref 0–1)
BNP SERPL-MCNC: 104 PG/ML
BUN SERPL-MCNC: 17 MG/DL (ref 6–20)
BUN/CREAT SERPL: 13 (ref 12–20)
CALCIUM SERPL-MCNC: 8.9 MG/DL (ref 8.5–10.1)
CALCULATED P AXIS, ECG09: 45 DEGREES
CALCULATED R AXIS, ECG10: -30 DEGREES
CALCULATED T AXIS, ECG11: -40 DEGREES
CHLORIDE SERPL-SCNC: 105 MMOL/L (ref 97–108)
CO2 SERPL-SCNC: 27 MMOL/L (ref 21–32)
CREAT SERPL-MCNC: 1.32 MG/DL (ref 0.7–1.3)
DIAGNOSIS, 93000: NORMAL
DIFFERENTIAL METHOD BLD: ABNORMAL
EOSINOPHIL # BLD: 0 K/UL (ref 0–0.4)
EOSINOPHIL NFR BLD: 1 % (ref 0–7)
ERYTHROCYTE [DISTWIDTH] IN BLOOD BY AUTOMATED COUNT: 21.2 % (ref 11.5–14.5)
GLUCOSE SERPL-MCNC: 110 MG/DL (ref 65–100)
HCT VFR BLD AUTO: 36.5 % (ref 36.6–50.3)
HGB BLD-MCNC: 11.3 G/DL (ref 12.1–17)
IMM GRANULOCYTES # BLD AUTO: 0 K/UL (ref 0–0.04)
IMM GRANULOCYTES NFR BLD AUTO: 0 % (ref 0–0.5)
LYMPHOCYTES # BLD: 0.8 K/UL (ref 0.8–3.5)
LYMPHOCYTES NFR BLD: 23 % (ref 12–49)
MCH RBC QN AUTO: 24 PG (ref 26–34)
MCHC RBC AUTO-ENTMCNC: 31 G/DL (ref 30–36.5)
MCV RBC AUTO: 77.5 FL (ref 80–99)
MONOCYTES # BLD: 0.8 K/UL (ref 0–1)
MONOCYTES NFR BLD: 23 % (ref 5–13)
NEUTS SEG # BLD: 1.7 K/UL (ref 1.8–8)
NEUTS SEG NFR BLD: 52 % (ref 32–75)
NRBC # BLD: 0 K/UL (ref 0–0.01)
NRBC BLD-RTO: 0 PER 100 WBC
P-R INTERVAL, ECG05: 142 MS
PLATELET # BLD AUTO: 207 K/UL (ref 150–400)
PMV BLD AUTO: 9.5 FL (ref 8.9–12.9)
POTASSIUM SERPL-SCNC: 4 MMOL/L (ref 3.5–5.1)
Q-T INTERVAL, ECG07: 374 MS
QRS DURATION, ECG06: 88 MS
QTC CALCULATION (BEZET), ECG08: 392 MS
RBC # BLD AUTO: 4.71 M/UL (ref 4.1–5.7)
RBC MORPH BLD: ABNORMAL
SODIUM SERPL-SCNC: 137 MMOL/L (ref 136–145)
TROPONIN-HIGH SENSITIVITY: 12 NG/L (ref 0–76)
VENTRICULAR RATE, ECG03: 66 BPM
WBC # BLD AUTO: 3.3 K/UL (ref 4.1–11.1)

## 2022-08-22 PROCEDURE — 83880 ASSAY OF NATRIURETIC PEPTIDE: CPT

## 2022-08-22 PROCEDURE — 85025 COMPLETE CBC W/AUTO DIFF WBC: CPT

## 2022-08-22 PROCEDURE — 96372 THER/PROPH/DIAG INJ SC/IM: CPT

## 2022-08-22 PROCEDURE — 93005 ELECTROCARDIOGRAM TRACING: CPT

## 2022-08-22 PROCEDURE — 36415 COLL VENOUS BLD VENIPUNCTURE: CPT

## 2022-08-22 PROCEDURE — 84484 ASSAY OF TROPONIN QUANT: CPT

## 2022-08-22 PROCEDURE — 99284 EMERGENCY DEPT VISIT MOD MDM: CPT

## 2022-08-22 PROCEDURE — 80048 BASIC METABOLIC PNL TOTAL CA: CPT

## 2022-08-22 PROCEDURE — 74011250636 HC RX REV CODE- 250/636: Performed by: STUDENT IN AN ORGANIZED HEALTH CARE EDUCATION/TRAINING PROGRAM

## 2022-08-22 RX ORDER — CHLORPROMAZINE HYDROCHLORIDE 25 MG/1
25 TABLET, FILM COATED ORAL
Qty: 9 TABLET | Refills: 0 | Status: SHIPPED | OUTPATIENT
Start: 2022-08-22 | End: 2022-08-22

## 2022-08-22 RX ORDER — CHLORPROMAZINE HYDROCHLORIDE 25 MG/ML
25 INJECTION INTRAMUSCULAR
Status: COMPLETED | OUTPATIENT
Start: 2022-08-22 | End: 2022-08-22

## 2022-08-22 RX ORDER — CHLORPROMAZINE HYDROCHLORIDE 25 MG/1
25 TABLET, FILM COATED ORAL
Qty: 9 TABLET | Refills: 0 | Status: SHIPPED | OUTPATIENT
Start: 2022-08-22

## 2022-08-22 RX ADMIN — CHLORPROMAZINE HYDROCHLORIDE 25 MG: 25 INJECTION INTRAMUSCULAR at 12:56

## 2022-08-22 NOTE — TELEPHONE ENCOUNTER
4619 Pt IDx2  Spoke with patient whom is on his way to the ER which he referred to as \"ground zero\". Patient explained the hiccup episodes as rapid in succession to the point of shortness of breath. Patient stated \" I can try to cough or breathe deep to stop it\". Discussed the possibility of not being able to stop the spasm and the danger in that. He agreed and stated he will bring his [employer] card with him to his next visit. Thanked us for the call.

## 2022-08-22 NOTE — TELEPHONE ENCOUNTER
Patient called because he has been having hiccups like he did when he had his BM transplant. - said it has been for 2.5 days now and they are getting to be very strong. Can he please get a script called in for this. Stated he was able to take something the last time this happened.

## 2022-08-22 NOTE — TELEPHONE ENCOUNTER
Patient's wife called back and stated  was having trouble breathing. This PSR spoke with the NP who said to tell patient to go to the ER if he is unable to breathe. Spoke with patient and he said he would do this.

## 2022-08-22 NOTE — ED PROVIDER NOTES
EMERGENCY DEPARTMENT HISTORY AND PHYSICAL EXAM      Date: 8/22/2022  Patient Name: Tiff Campbell. History of Presenting Illness     Chief Complaint   Patient presents with    Hiccups     Hiccups for 2 days, now with PVC's and SOB. Currently in treatment for multiple myeloma         HPI: Homa Dumont Sr., 61 y.o. male presents to the ED with cc of hiccups. This has been going on for the past 2 days. It has happened in the past.  He says that sometimes the hiccups come in a fit that gets so bad that he has trouble breathing in between them. Otherwise currently denies pain or complaints. No chest pain, shortness of breath, vomiting diarrhea or abdominal pain otherwise. This morning thought he was having some PVCs, which he has had in the past, however not currently. There are no other complaints, changes, or physical findings at this time. PCP: Breezy Lance MD    No current facility-administered medications on file prior to encounter. Current Outpatient Medications on File Prior to Encounter   Medication Sig Dispense Refill    Pomalyst 2 mg cap TAKE 1 CAPSULE BY MOUTH ONCE DAILY 28 Capsule 0    tenofovir DISOPROXIL FUMARATE (VIREAD) 300 mg tablet TAKE 1 TABLET BY MOUTH 1 TIME A DAY. 90 Tablet 0    clonazePAM (KlonoPIN) 0.5 mg tablet Take 1 Tablet by mouth two (2) times a day. Max Daily Amount: 1 mg. 60 Tablet 1    Belsomra 15 mg tablet Take 15 mg by mouth nightly. lisinopriL (PRINIVIL, ZESTRIL) 10 mg tablet Take 10 mg by mouth daily as needed. metFORMIN ER (GLUCOPHAGE XR) 500 mg tablet TAKE 1 TABLET BY MOUTH TWO (2) TIMES A DAY. 180 Tablet 1    TESTOSTERONE  mg by IntraMUSCular route every seven (7) days. POTASSIUM PO Take  by mouth as needed.       OTHER Cannabis prescription      zolpidem 3.5 mg subl DISSOLVE 1 TABLET UNDER TONGUE AT BEDTIME (Patient not taking: Reported on 7/1/2022)      cholecalciferol, vitamin D3, (Vitamin D3) 50 mcg (2,000 unit) tab Take  by mouth daily. ascorbic acid, vitamin C, (VITAMIN C) 100 mg tab Take 100 mg by mouth daily. Denta 5000 Plus 1.1 % crea USE AS DIRECTED TWO TIMES DAILY      ibuprofen (MOTRIN) 800 mg tablet TAKE 1 TABLET BY MOUTH 3 TIMES A DAY AS NEEDED FOR PAIN      buPROPion XL (WELLBUTRIN XL) 300 mg XL tablet TAKE 1 TABLET BY MOUTH EVERY DAY      naproxen (NAPROSYN) 500 mg tablet TAKE 1 TABLET BY MOUTH TWICE A DAY (Patient not taking: Reported on 7/1/2022)  5    NUCYNTA 100 mg tablet TAKE 2 TABLETS 3 TIMES A DAY  0    aspirin delayed-release 81 mg tablet Take  by mouth daily. prochlorperazine (COMPAZINE) 10 mg tablet Take 5 mg by mouth every six (6) hours as needed. omeprazole (PRILOSEC) 20 mg capsule Take 20 mg by mouth daily as needed (acid reflux). ondansetron (ZOFRAN ODT) 8 mg disintegrating tablet Take 8 mg by mouth every eight (8) hours as needed for Nausea. multivitamin (ONE A DAY) tablet Take 1 Tab by mouth daily. tamsulosin (FLOMAX) 0.4 mg capsule Take 1 Cap by mouth daily. 30 Cap 1    zolpidem CR (AMBIEN CR) 12.5 mg tablet Take 12.5 mg by mouth nightly. (Patient not taking: Reported on 7/1/2022)      aripiprazole (ABILIFY) 5 mg tablet Take 2.5 mg by mouth every morning.          Past History     Past Medical History:  Past Medical History:   Diagnosis Date    Acid reflux     Arrhythmia     pvc's    Chronic pain     neck    Depression     Diabetes ([facility] [address]     Femoral hernia 7/11/2012    Hepatitis C     Hypertension     Inguinal hernia 7/11/2012    Liver disease     hepatitis c    Multiple myeloma ([facility] [address]     Other ill-defined conditions(799.89)     Hx of PVCs since 2009    Prostatitis, acute     Psychiatric disorder     depression       Past Surgical History:  Past Surgical History:   Procedure Laterality Date    COLONOSCOPY N/A 9/20/2016    COLONOSCOPY performed by Leon Louie MD at [facility] ENDOSCOPY    HX APPENDECTOMY      HX CERVICAL FUSION  2008    x 1 as of 4/30/2014    HX HEENT      foreign body removed from right eye    HX HERNIA REPAIR  2012    [facility]      HX ORTHOPAEDIC      cervical fusion    HX OTHER SURGICAL  2000    liver bx - chronic hepatitis       Family History:  Family History   Problem Relation Age of Onset    OSTEOARTHRITIS Mother     OSTEOARTHRITIS Father     Diabetes Father     Hypertension Maternal Grandmother     Diabetes Maternal Grandmother     Hypertension Maternal Grandfather     Diabetes Maternal Grandfather        Social History:  Social History     Tobacco Use    Smoking status: Former     Packs/day: 1.00     Years: 12.00     Pack years: 12.00     Types: Cigarettes     Quit date: 1989     Years since quittin.6    Smokeless tobacco: Never    Tobacco comments:     former cigarette smoker   Vaping Use    Vaping Use: Never used   Substance Use Topics    Alcohol use: No     Comment: former alcoholic-quit 7426    Drug use: No       Allergies: Allergies   Allergen Reactions    Mercury (Bulk) Hives     Blisters           Review of Systems   no fever  No ear pain  No eye pain  No cough  no chest pain  no abdominal pain  no dysuria  no leg pain  No rash  No lymphadenopathy  No weight loss    Physical Exam   Physical Exam  Constitutional:       General: He is not in acute distress. Appearance: He is not toxic-appearing. HENT:      Head: Normocephalic and atraumatic. Eyes:      Extraocular Movements: Extraocular movements intact. Cardiovascular:      Rate and Rhythm: Normal rate and regular rhythm. Pulmonary:      Effort: Pulmonary effort is normal.      Breath sounds: Normal breath sounds. Abdominal:      Palpations: Abdomen is soft. Tenderness: There is no abdominal tenderness. Musculoskeletal:         General: No deformity. Cervical back: Neck supple. Skin:     General: Skin is warm and dry. Neurological:      General: No focal deficit present. Mental Status: He is alert and oriented to person, place, and time. MD made aware px reported no pain relief. Last Dilaudid 0.5 mg IV was administered at 2210. His next due pain med is Oxicodone PO at 2336. Px is also c/o nausea. Zofran is ordered Q 8 hrs PRN. Psychiatric:         Mood and Affect: Mood normal.       Diagnostic Study Results     Labs -     Recent Results (from the past 24 hour(s))   EKG, 12 LEAD, INITIAL    Collection Time: 08/22/22 11:10 AM   Result Value Ref Range    Ventricular Rate 66 BPM    Atrial Rate 66 BPM    P-R Interval 142 ms    QRS Duration 88 ms    Q-T Interval 374 ms    QTC Calculation (Bezet) 392 ms    Calculated P Axis 45 degrees    Calculated R Axis -30 degrees    Calculated T Axis -40 degrees    Diagnosis       ** Poor data quality, interpretation may be adversely affected  Normal sinus rhythm  Possible Left atrial enlargement  Left axis deviation  Left ventricular hypertrophy  ST & T wave abnormality, consider inferior ischemia  When compared with ECG of 16-APR-2022 10:07,  No significant change was found     METABOLIC PANEL, BASIC    Collection Time: 08/22/22 11:28 AM   Result Value Ref Range    Sodium 137 136 - 145 mmol/L    Potassium 4.0 3.5 - 5.1 mmol/L    Chloride 105 97 - 108 mmol/L    CO2 27 21 - 32 mmol/L    Anion gap 5 5 - 15 mmol/L    Glucose 110 (H) 65 - 100 mg/dL    BUN 17 6 - 20 MG/DL    Creatinine 1.32 (H) 0.70 - 1.30 MG/DL    BUN/Creatinine ratio 13 12 - 20      GFR est AA >60 >60 ml/min/1.73m2    GFR est non-AA 55 (L) >60 ml/min/1.73m2    Calcium 8.9 8.5 - 10.1 MG/DL   NT-PRO BNP    Collection Time: 08/22/22 11:28 AM   Result Value Ref Range    NT pro- <125 PG/ML   TROPONIN-HIGH SENSITIVITY    Collection Time: 08/22/22 11:28 AM   Result Value Ref Range    Troponin-High Sensitivity 12 0 - 76 ng/L   CBC WITH AUTOMATED DIFF    Collection Time: 08/22/22 11:28 AM   Result Value Ref Range    WBC 3.3 (L) 4.1 - 11.1 K/uL    RBC 4.71 4.10 - 5.70 M/uL    HGB 11.3 (L) 12.1 - 17.0 g/dL    HCT 36.5 (L) 36.6 - 50.3 %    MCV 77.5 (L) 80.0 - 99.0 FL    MCH 24.0 (L) 26.0 - 34.0 PG    MCHC 31.0 30.0 - 36.5 g/dL    RDW 21.2 (H) 11.5 - 14.5 %    PLATELET 967 225 - 792 K/uL    MPV 9.5 8.9 - 12.9 FL    NRBC 0.0 0  WBC ABSOLUTE NRBC 0.00 0.00 - 0.01 K/uL    NEUTROPHILS 52 32 - 75 %    LYMPHOCYTES 23 12 - 49 %    MONOCYTES 23 (H) 5 - 13 %    EOSINOPHILS 1 0 - 7 %    BASOPHILS 1 0 - 1 %    IMMATURE GRANULOCYTES 0 0.0 - 0.5 %    ABS. NEUTROPHILS 1.7 (L) 1.8 - 8.0 K/UL    ABS. LYMPHOCYTES 0.8 0.8 - 3.5 K/UL    ABS. MONOCYTES 0.8 0.0 - 1.0 K/UL    ABS. EOSINOPHILS 0.0 0.0 - 0.4 K/UL    ABS. BASOPHILS 0.0 0.0 - 0.1 K/UL    ABS. IMM. GRANS. 0.0 0.00 - 0.04 K/UL    DF SMEAR SCANNED      RBC COMMENTS MICROCYTOSIS  1+        RBC COMMENTS HYPOCHROMIA  1+        RBC COMMENTS ANISOCYTOSIS  2+        RBC COMMENTS OVALOCYTES  1+        RBC COMMENTS POLYCHROMASIA  1+           Radiologic Studies -   No orders to display     CT Results  (Last 48 hours)      None          CXR Results  (Last 48 hours)      None              Medical Decision Making   I am the first provider for this patient. I reviewed the vital signs, available nursing notes, past medical history, past surgical history, family history and social history. Vital Signs-Reviewed the patient's vital signs. Patient Vitals for the past 24 hrs:   Temp Pulse Resp BP SpO2   08/22/22 1245 -- -- -- 125/86 99 %   08/22/22 1234 -- -- -- -- 99 %   08/22/22 1233 -- -- -- 123/80 --   08/22/22 1231 -- -- -- (!) 132/106 --   08/22/22 1101 98.4 °F (36.9 °C) 67 18 (!) 145/89 99 %         Provider Notes (Medical Decision Making):   62-year-old presenting with hiccups. Differential includes diaphragm spasm, medication side effect, gastritis or GERD. Will assess for atypical ACS however this is less likely without any associated chest pain. Vitals are unremarkable, he is saturating 99% on room air, denies any shortness of breath other than during a fit of hiccups, unlikely any other acute cardiopulmonary emergency. ED Course:     Initial assessment performed. The patients presenting problems have been discussed, and they are in agreement with the care plan formulated and outlined with them.   I have encouraged them to ask questions as they arise throughout their visit. ED Course as of 08/22/22 1415   Mon Aug 22, 2022   1223 EKG is performed at 11: 10, shows sinus rhythm at a rate of 66, , QRS 88, QTc 392, axis left deviated, no ST segment elevation or depression concerning for ACS, T wave inversion in lead II, III, aVF, V5 and V6. This is interpreted as sinus rhythm with T wave inversion on left axis deviation. On prior EKGs, T wave inversion seen in 3, aVF and V6 [CM]      ED Course User Index  [CM] Zeenat Mo MD      CBC with leukopenia 3.3, unchanged from recent baseline per chart review, baseline anemia hemoglobin 81.9, basic metabolic panel with creatinine of 1.32. Per chart review has history of CKD and this is not elevated from baseline, otherwise no worrisome electrolyte abnormalities. Patient is given Thorazine. On reevaluation, patient is resting comfortably and states that they feel improved. Patient is counseled on supportive care and return precautions. Will return to the ED for any worsening intractable hiccups, pain, or any new or worrisome symptoms. Will followup with primary care doctor, oncologist within 3 days. Critical Care Time:         Disposition:  Home    PLAN:  1. Current Discharge Medication List        START taking these medications    Details   chlorproMAZINE (THORAZINE) 25 mg tablet Take 1 Tablet by mouth three (3) times daily as needed (hiccups). Qty: 9 Tablet, Refills: 0  Start date: 8/22/2022           2.    Follow-up Information       Follow up With Specialties Details Why Contact Info    Lucina Arciniega MD Hematology and Oncology, Hematology Physician, Oncology, Internal Medicine Physician Schedule an appointment as soon as possible for a visit in 3 days  [address]  [address]B [address]  [address] [phone]      Your primary care doctor  Schedule an appointment as soon as possible for a visit in 3 days      [facility] EMERGENCY DEPT Emergency Medicine  As needed, If symptoms worsen [address]  [address] [facility]  [phone]          Return to ED if worse     Diagnosis     Clinical Impression: Acute hiccups

## 2022-08-24 RX ORDER — DIPHENHYDRAMINE HYDROCHLORIDE 50 MG/ML
50 INJECTION, SOLUTION INTRAMUSCULAR; INTRAVENOUS AS NEEDED
Start: 2022-11-24

## 2022-08-24 RX ORDER — HEPARIN 100 UNIT/ML
500 SYRINGE INTRAVENOUS AS NEEDED
Status: CANCELLED | OUTPATIENT
Start: 2022-09-01

## 2022-08-24 RX ORDER — HYDROCORTISONE SODIUM SUCCINATE 100 MG/2ML
100 INJECTION, POWDER, FOR SOLUTION INTRAMUSCULAR; INTRAVENOUS AS NEEDED
OUTPATIENT
Start: 2022-11-24

## 2022-08-24 RX ORDER — SODIUM CHLORIDE 0.9 % (FLUSH) 0.9 %
5-10 SYRINGE (ML) INJECTION AS NEEDED
Status: CANCELLED | OUTPATIENT
Start: 2022-09-01

## 2022-08-24 RX ORDER — SODIUM CHLORIDE 0.9 % (FLUSH) 0.9 %
10 SYRINGE (ML) INJECTION AS NEEDED
OUTPATIENT
Start: 2022-11-24

## 2022-08-24 RX ORDER — SODIUM CHLORIDE 9 MG/ML
10 INJECTION INTRAMUSCULAR; INTRAVENOUS; SUBCUTANEOUS AS NEEDED
Status: CANCELLED | OUTPATIENT
Start: 2022-09-01

## 2022-08-24 RX ORDER — ACETAMINOPHEN 325 MG/1
650 TABLET ORAL AS NEEDED
Start: 2022-11-24

## 2022-08-24 RX ORDER — ONDANSETRON 2 MG/ML
8 INJECTION INTRAMUSCULAR; INTRAVENOUS AS NEEDED
OUTPATIENT
Start: 2022-11-24

## 2022-08-24 RX ORDER — SODIUM CHLORIDE 9 MG/ML
25 INJECTION, SOLUTION INTRAVENOUS CONTINUOUS
OUTPATIENT
Start: 2022-11-24

## 2022-08-24 RX ORDER — SODIUM CHLORIDE 9 MG/ML
10 INJECTION INTRAMUSCULAR; INTRAVENOUS; SUBCUTANEOUS AS NEEDED
OUTPATIENT
Start: 2022-11-24

## 2022-08-24 RX ORDER — EPINEPHRINE 1 MG/ML
0.3 INJECTION, SOLUTION, CONCENTRATE INTRAVENOUS AS NEEDED
OUTPATIENT
Start: 2022-11-24

## 2022-08-24 RX ORDER — ALBUTEROL SULFATE 0.83 MG/ML
2.5 SOLUTION RESPIRATORY (INHALATION) AS NEEDED
Start: 2022-11-24

## 2022-08-24 RX ORDER — HEPARIN 100 UNIT/ML
300-500 SYRINGE INTRAVENOUS AS NEEDED
OUTPATIENT
Start: 2022-11-24

## 2022-08-31 ENCOUNTER — HOSPITAL ENCOUNTER (OUTPATIENT)
Dept: INFUSION THERAPY | Age: 67
Discharge: HOME | End: 2022-08-31

## 2022-09-01 ENCOUNTER — HOSPITAL ENCOUNTER (OUTPATIENT)
Dept: INFUSION THERAPY | Age: 67
Discharge: HOME | End: 2022-09-01
Payer: COMMERCIAL

## 2022-09-01 ENCOUNTER — OFFICE VISIT (OUTPATIENT)
Dept: ONCOLOGY | Age: 67
End: 2022-09-01

## 2022-09-01 VITALS
DIASTOLIC BLOOD PRESSURE: 82 MMHG | RESPIRATION RATE: 18 BRPM | SYSTOLIC BLOOD PRESSURE: 129 MMHG | HEIGHT: 68 IN | BODY MASS INDEX: 28.49 KG/M2 | WEIGHT: 188 LBS | HEART RATE: 82 BPM | OXYGEN SATURATION: 100 % | TEMPERATURE: 98.3 F

## 2022-09-01 VITALS
HEIGHT: 68 IN | SYSTOLIC BLOOD PRESSURE: 138 MMHG | WEIGHT: 188.2 LBS | DIASTOLIC BLOOD PRESSURE: 88 MMHG | TEMPERATURE: 98.3 F | HEART RATE: 81 BPM | RESPIRATION RATE: 18 BRPM | BODY MASS INDEX: 28.52 KG/M2 | OXYGEN SATURATION: 99 %

## 2022-09-01 DIAGNOSIS — C90.01 MULTIPLE MYELOMA IN REMISSION (HCC): Primary | ICD-10-CM

## 2022-09-01 DIAGNOSIS — Z51.11 CHEMOTHERAPY MANAGEMENT, ENCOUNTER FOR: ICD-10-CM

## 2022-09-01 LAB
ALBUMIN SERPL-MCNC: 3.3 G/DL (ref 3.5–5)
ALBUMIN SERPL-MCNC: 3.4 G/DL (ref 3.5–5)
ALBUMIN/GLOB SERPL: 0.8 {RATIO} (ref 1.1–2.2)
ALBUMIN/GLOB SERPL: 0.9 {RATIO} (ref 1.1–2.2)
ALP SERPL-CCNC: 73 U/L (ref 45–117)
ALP SERPL-CCNC: 74 U/L (ref 45–117)
ALT SERPL-CCNC: 28 U/L (ref 12–78)
ALT SERPL-CCNC: 29 U/L (ref 12–78)
ANION GAP SERPL CALC-SCNC: 4 MMOL/L (ref 5–15)
AST SERPL-CCNC: 28 U/L (ref 15–37)
AST SERPL-CCNC: 29 U/L (ref 15–37)
BASOPHILS # BLD: 0.1 K/UL (ref 0–0.1)
BASOPHILS NFR BLD: 2 % (ref 0–1)
BILIRUB DIRECT SERPL-MCNC: 0.1 MG/DL (ref 0–0.2)
BILIRUB SERPL-MCNC: 0.3 MG/DL (ref 0.2–1)
BILIRUB SERPL-MCNC: 0.4 MG/DL (ref 0.2–1)
BUN SERPL-MCNC: 18 MG/DL (ref 6–20)
BUN/CREAT SERPL: 12 (ref 12–20)
CALCIUM SERPL-MCNC: 8.5 MG/DL (ref 8.5–10.1)
CHLORIDE SERPL-SCNC: 106 MMOL/L (ref 97–108)
CO2 SERPL-SCNC: 29 MMOL/L (ref 21–32)
CREAT SERPL-MCNC: 1.53 MG/DL (ref 0.7–1.3)
DIFFERENTIAL METHOD BLD: ABNORMAL
EOSINOPHIL # BLD: 0 K/UL (ref 0–0.4)
EOSINOPHIL NFR BLD: 1 % (ref 0–7)
ERYTHROCYTE [DISTWIDTH] IN BLOOD BY AUTOMATED COUNT: 21 % (ref 11.5–14.5)
GLOBULIN SER CALC-MCNC: 3.7 G/DL (ref 2–4)
GLOBULIN SER CALC-MCNC: 4 G/DL (ref 2–4)
GLUCOSE SERPL-MCNC: 90 MG/DL (ref 65–100)
HCT VFR BLD AUTO: 36.3 % (ref 36.6–50.3)
HGB BLD-MCNC: 11.1 G/DL (ref 12.1–17)
IGA SERPL-MCNC: 430 MG/DL (ref 70–400)
IGG SERPL-MCNC: 1180 MG/DL (ref 700–1600)
IGM SERPL-MCNC: <21 MG/DL (ref 40–230)
IMM GRANULOCYTES # BLD AUTO: 0 K/UL (ref 0–0.04)
IMM GRANULOCYTES NFR BLD AUTO: 0 % (ref 0–0.5)
LYMPHOCYTES # BLD: 0.8 K/UL (ref 0.8–3.5)
LYMPHOCYTES NFR BLD: 24 % (ref 12–49)
MCH RBC QN AUTO: 24.3 PG (ref 26–34)
MCHC RBC AUTO-ENTMCNC: 30.6 G/DL (ref 30–36.5)
MCV RBC AUTO: 79.4 FL (ref 80–99)
MONOCYTES # BLD: 0.6 K/UL (ref 0–1)
MONOCYTES NFR BLD: 19 % (ref 5–13)
NEUTS SEG # BLD: 1.7 K/UL (ref 1.8–8)
NEUTS SEG NFR BLD: 54 % (ref 32–75)
NRBC # BLD: 0 K/UL (ref 0–0.01)
NRBC BLD-RTO: 0 PER 100 WBC
PLATELET # BLD AUTO: 197 K/UL (ref 150–400)
PMV BLD AUTO: 10.3 FL (ref 8.9–12.9)
POTASSIUM SERPL-SCNC: 3.9 MMOL/L (ref 3.5–5.1)
PROT SERPL-MCNC: 7.1 G/DL (ref 6.4–8.2)
PROT SERPL-MCNC: 7.3 G/DL (ref 6.4–8.2)
RBC # BLD AUTO: 4.57 M/UL (ref 4.1–5.7)
RBC MORPH BLD: ABNORMAL
RBC MORPH BLD: ABNORMAL
SODIUM SERPL-SCNC: 139 MMOL/L (ref 136–145)
WBC # BLD AUTO: 3.2 K/UL (ref 4.1–11.1)

## 2022-09-01 PROCEDURE — 3017F COLORECTAL CA SCREEN DOC REV: CPT | Performed by: INTERNAL MEDICINE

## 2022-09-01 PROCEDURE — 85025 COMPLETE CBC W/AUTO DIFF WBC: CPT

## 2022-09-01 PROCEDURE — 82784 ASSAY IGA/IGD/IGG/IGM EACH: CPT

## 2022-09-01 PROCEDURE — 83521 IG LIGHT CHAINS FREE EACH: CPT

## 2022-09-01 PROCEDURE — 99215 OFFICE O/P EST HI 40 MIN: CPT | Performed by: INTERNAL MEDICINE

## 2022-09-01 PROCEDURE — 77030012965 HC NDL HUBR BBMI -A

## 2022-09-01 PROCEDURE — G9717 DOC PT DX DEP/BP F/U NT REQ: HCPCS | Performed by: INTERNAL MEDICINE

## 2022-09-01 PROCEDURE — 36415 COLL VENOUS BLD VENIPUNCTURE: CPT

## 2022-09-01 PROCEDURE — 74011250636 HC RX REV CODE- 250/636: Performed by: INTERNAL MEDICINE

## 2022-09-01 PROCEDURE — G8417 CALC BMI ABV UP PARAM F/U: HCPCS | Performed by: INTERNAL MEDICINE

## 2022-09-01 PROCEDURE — 80053 COMPREHEN METABOLIC PANEL: CPT

## 2022-09-01 PROCEDURE — G8752 SYS BP LESS 140: HCPCS | Performed by: INTERNAL MEDICINE

## 2022-09-01 PROCEDURE — G8754 DIAS BP LESS 90: HCPCS | Performed by: INTERNAL MEDICINE

## 2022-09-01 PROCEDURE — 80076 HEPATIC FUNCTION PANEL: CPT

## 2022-09-01 PROCEDURE — 74011000258 HC RX REV CODE- 258: Performed by: INTERNAL MEDICINE

## 2022-09-01 PROCEDURE — 84165 PROTEIN E-PHORESIS SERUM: CPT

## 2022-09-01 PROCEDURE — 74011000250 HC RX REV CODE- 250: Performed by: INTERNAL MEDICINE

## 2022-09-01 PROCEDURE — G8427 DOCREV CUR MEDS BY ELIG CLIN: HCPCS | Performed by: INTERNAL MEDICINE

## 2022-09-01 PROCEDURE — 96365 THER/PROPH/DIAG IV INF INIT: CPT

## 2022-09-01 RX ORDER — HEPARIN 100 UNIT/ML
300-500 SYRINGE INTRAVENOUS AS NEEDED
Status: ACTIVE | OUTPATIENT
Start: 2022-09-01 | End: 2022-09-01

## 2022-09-01 RX ORDER — SODIUM CHLORIDE 0.9 % (FLUSH) 0.9 %
10 SYRINGE (ML) INJECTION AS NEEDED
Status: DISPENSED | OUTPATIENT
Start: 2022-09-01 | End: 2022-09-01

## 2022-09-01 RX ORDER — SODIUM CHLORIDE 9 MG/ML
10 INJECTION INTRAMUSCULAR; INTRAVENOUS; SUBCUTANEOUS AS NEEDED
Status: ACTIVE | OUTPATIENT
Start: 2022-09-01 | End: 2022-09-01

## 2022-09-01 RX ADMIN — HEPARIN SODIUM (PORCINE) LOCK FLUSH IV SOLN 100 UNIT/ML 500 UNITS: 100 SOLUTION at 16:25

## 2022-09-01 RX ADMIN — SODIUM CHLORIDE, PRESERVATIVE FREE 10 ML: 5 INJECTION INTRAVENOUS at 11:06

## 2022-09-01 RX ADMIN — SODIUM CHLORIDE 10 ML: 9 INJECTION, SOLUTION INTRAMUSCULAR; INTRAVENOUS; SUBCUTANEOUS at 11:05

## 2022-09-01 RX ADMIN — ZOLEDRONIC ACID 3.5 MG: 4 INJECTION INTRAVENOUS at 16:01

## 2022-09-01 RX ADMIN — SODIUM CHLORIDE, PRESERVATIVE FREE 10 ML: 5 INJECTION INTRAVENOUS at 16:24

## 2022-09-01 NOTE — PROGRESS NOTES
[facility] Progress Note    Date: 2022    Name: Randi Beckwith. MRN: [id number]         : 1955    Mr. Sharifa Jordan arrived (ambulation) at 67 and in no distress for Harbert, Labs, and Zometa. Assessment was completed, no acute issues at this time, no new complaints voiced. Right chest port accessed without difficulty with 0.75 inch eddy needle; + blood return noted, labs drawn and sent. 1120:  Proceeded to appt with Dr. Pooja Montejo. Mr. Dorothy Quick vitals were reviewed. Patient Vitals for the past 12 hrs:   Temp Pulse Resp BP SpO2   22 1624 -- 81 18 138/88 99 %   22 1058 98.3 °F (36.8 °C) 82 18 129/82 100 %       Lab results were obtained and reviewed. Recent Results (from the past 12 hour(s))   IMMUNOGLOBULINS, G/A/M, QT. Collection Time: 22 11:00 AM   Result Value Ref Range    Immunoglobulin G 1,180 700 - 1,600 mg/dL    Immunoglobulin A 430 (H) 70 - 400 mg/dL    Immunoglobulin M <21 (L) 40 - 230 mg/dL   CBC WITH AUTOMATED DIFF    Collection Time: 22 11:00 AM   Result Value Ref Range    WBC 3.2 (L) 4.1 - 11.1 K/uL    RBC 4.57 4.10 - 5.70 M/uL    HGB 11.1 (L) 12.1 - 17.0 g/dL    HCT 36.3 (L) 36.6 - 50.3 %    MCV 79.4 (L) 80.0 - 99.0 FL    MCH 24.3 (L) 26.0 - 34.0 PG    MCHC 30.6 30.0 - 36.5 g/dL    RDW 21.0 (H) 11.5 - 14.5 %    PLATELET 190 567 - 663 K/uL    MPV 10.3 8.9 - 12.9 FL    NRBC 0.0 0  WBC    ABSOLUTE NRBC 0.00 0.00 - 0.01 K/uL    NEUTROPHILS 54 32 - 75 %    LYMPHOCYTES 24 12 - 49 %    MONOCYTES 19 (H) 5 - 13 %    EOSINOPHILS 1 0 - 7 %    BASOPHILS 2 (H) 0 - 1 %    IMMATURE GRANULOCYTES 0 0.0 - 0.5 %    ABS. NEUTROPHILS 1.7 (L) 1.8 - 8.0 K/UL    ABS. LYMPHOCYTES 0.8 0.8 - 3.5 K/UL    ABS. MONOCYTES 0.6 0.0 - 1.0 K/UL    ABS. EOSINOPHILS 0.0 0.0 - 0.4 K/UL    ABS. BASOPHILS 0.1 0.0 - 0.1 K/UL    ABS. IMM.  GRANS. 0.0 0.00 - 0.04 K/UL    DF MANUAL      RBC COMMENTS ANISOCYTOSIS  1+        RBC COMMENTS MICROCYTOSIS  1+       HEPATIC FUNCTION PANEL    Collection Time: 09/01/22 11:00 AM   Result Value Ref Range    Protein, total 7.3 6.4 - 8.2 g/dL    Albumin 3.3 (L) 3.5 - 5.0 g/dL    Globulin 4.0 2.0 - 4.0 g/dL    A-G Ratio 0.8 (L) 1.1 - 2.2      Bilirubin, total 0.4 0.2 - 1.0 MG/DL    Bilirubin, direct 0.1 0.0 - 0.2 MG/DL    Alk. phosphatase 74 45 - 117 U/L    AST (SGOT) 28 15 - 37 U/L    ALT (SGPT) 28 12 - 78 U/L   METABOLIC PANEL, COMPREHENSIVE    Collection Time: 09/01/22 12:15 PM   Result Value Ref Range    Sodium 139 136 - 145 mmol/L    Potassium 3.9 3.5 - 5.1 mmol/L    Chloride 106 97 - 108 mmol/L    CO2 29 21 - 32 mmol/L    Anion gap 4 (L) 5 - 15 mmol/L    Glucose 90 65 - 100 mg/dL    BUN 18 6 - 20 MG/DL    Creatinine 1.53 (H) 0.70 - 1.30 MG/DL    BUN/Creatinine ratio 12 12 - 20      GFR est AA 56 (L) >60 ml/min/1.73m2    GFR est non-AA 46 (L) >60 ml/min/1.73m2    Calcium 8.5 8.5 - 10.1 MG/DL    Bilirubin, total 0.3 0.2 - 1.0 MG/DL    ALT (SGPT) 29 12 - 78 U/L    AST (SGOT) 29 15 - 37 U/L    Alk. phosphatase 73 45 - 117 U/L    Protein, total 7.1 6.4 - 8.2 g/dL    Albumin 3.4 (L) 3.5 - 5.0 g/dL    Globulin 3.7 2.0 - 4.0 g/dL    A-G Ratio 0.9 (L) 1.1 - 2.2       See [facility] for additional pending labs    CrCl 58, Zometa adjusted to 3.5 mg    Pt did not report any recent or upcoming invasive dental work or dental pain.     Medication:  Medications Administered       0.9% sodium chloride injection 10 mL       Admin Date  09/01/2022 Action  Given Dose  10 mL Route  IntraVENous Administered By  [facility]               heparin (porcine) pf 300-500 Units       Admin Date  09/01/2022 Action  Given Dose  500 Units Route  InterCATHeter Administered By  [facility]              saline peripheral flush soln 10 mL       Admin Date  09/01/2022 Action  Given Dose  10 mL Route  InterCATHeter Administered By  Maris Guadalupe Date  09/01/2022 Action  Given Dose  10 mL Route  InterCATHeter Administered By  INRFOOD, Stacey WILKINSON              zoledronic acid (ZOMETA) 3.5 mg in 0.9% sodium chloride 100 mL IVPB       Admin Date  09/01/2022 Action  New Bag Dose  3.5 mg Route  IntraVENous Administered By  Vivi WILKINSON             Patient port flushed; heparinized; and de-accessed per protocol. Mr. Edmundo Woodall tolerated treatment well and was discharged from [place] [address] in stable condition at 1630. He is aware of when to return for his next appointment.     Future Appointments   Date Time Provider Dajuan Johnston   9/28/2022 10:30 AM [address] REG   10/26/2022 10:30 AM [address] REG   11/23/2022 10:30 AM [facility] [address] REG   11/23/2022 10:45 AM Adilene Hood MD [facility]/IRLANDA Funes  September 1, 2022

## 2022-09-01 NOTE — PROGRESS NOTES
Jaskaran Miramontes. is a 61 y.o. male here for follow up for Multiple Myeloma. Treatment today:  Cycle 16 Day 1 Zometa  Pt is taking Pomalyst.  Pt has chronic level 5 neck pain. Otherwise doing well. 1. Have you been to the ER, urgent care clinic since your last visit? Hospitalized since your last visit? no    2. Have you seen or consulted any other health care providers outside of the [address] [place] [address] since your last visit? Include any pap smears or colon screening.   no

## 2022-09-01 NOTE — PROGRESS NOTES
[address] [facility] [address], [address] [place] [address], [address] [facility]  [phone]            Progress Note        Patient: Refugio Evans Sr. MRN: [id number]  SSN: [id number]    YOB: 1955  Age: 61 y.o. Sex: male        Diagnosis:      1. Multiple Myeloma, IgA Kappa   Durie Spring stage IIIB    Cytogenetics/FISH: t(14;16) - high risk    Ttreatment:     1. Maintenance therapy - Pomalyst 2 mg daily - 11/19/2018   2. Maintenance therapy - Ixazomib - started 10/15/2018 - stopped 11/12/2018  3. S/P tandem  Autotransplant   First on 2/28/2018   2nd on 06/13/2018  4. Carfilzomib/Pom/Dex - s/p 5 cycles  5. VD-PACE s/p 1 cycle  6. RVD - s/p 4 cycles    Subjective:      Refugio Evans Sr. is a 61 y.o. male with a diagnosis of IgA kappa myeloma. Bone marrow shows 100% aberrant plasma cells. He suffers with DM but it is diet controlled. He has received systemic anti-viral treatment for chronic Hep C with successful eradication of the virus. Mr. Lela Agrawal received 4 cycles of systemic therapy with RVd. He had an initial good response to treatment. However his paraprotein level started rising and the myeloma became refractory to treatment. I then administered one cycle of VD-PACE in the hospital. He then received Carfilzomib/Pom/Dex. He achieved VGPR. He underwent tandem autotransplant at [facility]. According to the patient he achieved complete response with therapy. He has undergone second/tandem transplant in June. He took Pomalyst maintenance until Aug 2019 then had to stop due to recurrent prostatitis. He had a repeat BM biopsy in February 2019 at [facility] which showed complete molecular remission. He is taking Pomalyst. He has seen Dr. Swapna Raman for movement disorder. Clonazepam helps. He had bout of hiccups. He was seen in the ED. He took chlorpromazine as prescribed by the ED. Mr. Lela Agrawal reports short term memory impairment.          Review of Systems:    Constitutional: negative  Eyes: negative  Ears, Nose, Mouth, Throat, and Face: negative  Respiratory: cough  Cardiovascular: negative  Gastrointestinal: negative  Genitourinary:negative  Integument/Breast: negative  Hematologic/Lymphatic: negative  Musculoskeletal: negative  Neurological: negative      Past Medical History:   Diagnosis Date    Acid reflux     Arrhythmia     pvc's    Chronic pain     neck    Depression     Diabetes ([facility] [address]     Femoral hernia 2012    Hepatitis C     Hypertension     Inguinal hernia 2012    Liver disease     hepatitis c    Multiple myeloma ([facility] [address]     Other ill-defined conditions(799.89)     Hx of PVCs since     Prostatitis, acute     Psychiatric disorder     depression     Past Surgical History:   Procedure Laterality Date    COLONOSCOPY N/A 2016    COLONOSCOPY performed by Dagoberto Bassett MD at [facility] ENDOSCOPY    HX APPENDECTOMY      HX CERVICAL FUSION  2008    x 1 as of 2014    HX HEENT      foreign body removed from right eye    HX HERNIA REPAIR      [facility]      HX ORTHOPAEDIC      cervical fusion    HX OTHER SURGICAL  2000    liver bx - chronic hepatitis      Family History   Problem Relation Age of Onset    OSTEOARTHRITIS Mother     OSTEOARTHRITIS Father     Diabetes Father     Hypertension Maternal Grandmother     Diabetes Maternal Grandmother     Hypertension Maternal Grandfather     Diabetes Maternal Grandfather      Social History     Tobacco Use    Smoking status: Former     Packs/day: 1.00     Years: 12.00     Pack years: 12.00     Types: Cigarettes     Quit date: 1989     Years since quittin.6    Smokeless tobacco: Never    Tobacco comments:     former cigarette smoker   Substance Use Topics    Alcohol use: No     Comment: former alcoholic-quit 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  Authorizing Provider   chlorproMAZINE (THORAZINE) 25 mg tablet Take 1 Tablet by mouth three (3) times daily as needed (hiccups). 8/22/22  Yes Jackie Mo MD   Pomalyst 2 mg cap TAKE 1 CAPSULE BY MOUTH ONCE DAILY 8/19/22  Yes Jose Guadalupe Velásquez MD   tenofovir DISOPROXIL FUMARATE (VIREAD) 300 mg tablet TAKE 1 TABLET BY MOUTH 1 TIME A DAY. 8/3/22  Yes Jose Guadalupe Velásquez MD   clonazePAM (KlonoPIN) 0.5 mg tablet Take 1 Tablet by mouth two (2) times a day. Max Daily Amount: 1 mg. 7/29/22  Yes Messi Ruiz DO   Belsomra 15 mg tablet Take 15 mg by mouth nightly. 5/5/22  Yes Provider, Historical   lisinopriL (PRINIVIL, ZESTRIL) 10 mg tablet Take 10 mg by mouth daily as needed. Yes Provider, Historical   metFORMIN ER (GLUCOPHAGE XR) 500 mg tablet TAKE 1 TABLET BY MOUTH TWO (2) TIMES A DAY. 6/25/22  Yes Deidre Corbin MD   TESTOSTERONE  mg by IntraMUSCular route every seven (7) days. Yes Provider, Historical   POTASSIUM PO Take  by mouth as needed. Yes Provider, Historical   OTHER Cannabis prescription   Yes Provider, Historical   cholecalciferol, vitamin D3, 50 mcg (2,000 unit) tab Take  by mouth daily. Yes Provider, Historical   ascorbic acid, vitamin C, (VITAMIN C) 100 mg tab Take 100 mg by mouth daily. Yes Provider, Historical   Denta 5000 Plus 1.1 % crea USE AS DIRECTED TWO TIMES DAILY 8/12/20  Yes Provider, Historical   ibuprofen (MOTRIN) 800 mg tablet TAKE 1 TABLET BY MOUTH 3 TIMES A DAY AS NEEDED FOR PAIN 10/13/20  Yes Provider, Historical   buPROPion XL (WELLBUTRIN XL) 300 mg XL tablet TAKE 1 TABLET BY MOUTH EVERY DAY 1/30/20  Yes Provider, Historical   NUCYNTA 100 mg tablet TAKE 2 TABLETS 3 TIMES A DAY 7/27/19  Yes Provider, Historical   aspirin delayed-release 81 mg tablet Take  by mouth daily. Yes Provider, Historical   prochlorperazine (COMPAZINE) 10 mg tablet Take 5 mg by mouth every six (6) hours as needed. Yes Other, MD Goldy   omeprazole (PRILOSEC) 20 mg capsule Take 20 mg by mouth daily as needed (acid reflux).    Yes Other, MD Goldy   ondansetron (ZOFRAN ODT) 8 mg disintegrating tablet Take 8 mg by mouth every eight (8) hours as needed for Nausea. Yes Joe, MD Goldy   multivitamin (ONE A DAY) tablet Take 1 Tab by mouth daily. Yes Provider, Historical   tamsulosin (FLOMAX) 0.4 mg capsule Take 1 Cap by mouth daily. 4/4/17  Yes Jackson Holcomb MD   ARIPiprazole (ABILIFY) 5 mg tablet Take 2.5 mg by mouth every morning. Yes Other, MD Goldy   zolpidem 3.5 mg subl DISSOLVE 1 TABLET UNDER TONGUE AT BEDTIME  Patient not taking: No sig reported 4/10/21   Provider, Historical   naproxen (NAPROSYN) 500 mg tablet TAKE 1 TABLET BY MOUTH TWICE A DAY  Patient not taking: No sig reported 8/26/19   Provider, Historical   zolpidem CR (AMBIEN CR) 12.5 mg tablet Take 12.5 mg by mouth nightly. Patient not taking: No sig reported    Provider, Historical          Allergies   Allergen Reactions    Mercury (Bulk) Hives     Blisters             Objective:     Visit Vitals  /82   Pulse 82   Temp 98.3 °F (36.8 °C)   Resp 18   Ht 5' 8\" (1.727 m)   Wt 188 lb (85.3 kg)   SpO2 100%   BMI 28.59 kg/m²       Pain Scale: /10  Pain Location:       Physical Exam:     GENERAL: alert, cooperative  EYE: negative  LYMPHATIC: Cervical, supraclavicular, and axillary nodes normal.   THROAT & NECK: normal and no erythema or exudates noted. LUNG: clear to auscultation bilaterally  HEART: regular rate and rhythm  ABDOMEN: soft, non-tender  EXTREMITIES: normal  SKIN: Normal.  NEUROLOGIC: negative     Physical exam copied from previous note. Reviewed and no changes noted.        Lab Results   Component Value Date/Time    WBC 3.2 (L) 09/01/2022 11:00 AM    HGB 11.1 (L) 09/01/2022 11:00 AM    Hematocrit (POC) 37 04/14/2021 09:48 AM    HCT 36.3 (L) 09/01/2022 11:00 AM    PLATELET 037 29/79/1509 11:00 AM    MCV 79.4 (L) 09/01/2022 11:00 AM       Lab Results   Component Value Date/Time    Sodium 137 08/22/2022 11:28 AM    Potassium 4.0 08/22/2022 11:28 AM    Chloride 105 08/22/2022 11:28 AM    CO2 27 08/22/2022 11:28 AM    Anion gap 5 08/22/2022 11:28 AM    Glucose 110 (H) 08/22/2022 11:28 AM    BUN 17 08/22/2022 11:28 AM    Creatinine 1.32 (H) 08/22/2022 11:28 AM    BUN/Creatinine ratio 13 08/22/2022 11:28 AM    GFR est AA >60 08/22/2022 11:28 AM    GFR est non-AA 55 (L) 08/22/2022 11:28 AM    Calcium 8.9 08/22/2022 11:28 AM            Assessment:     1. Multiple Myeloma, IgA Kappa   Durie Ursa stage IIIB    Cytogenetics/FISH: t(14;16) - high risk  ECOG PS - 0   Intent of therapy: palliative    Received 3 cycles of RVd   Dose reduced Revlimid and Velcade d/t continued cytopenias. M-spike started rising. He received one cycle of VD-PACE (bortezomib, dexamethasone, thalidomide, cisplatin, adriamycin, cyclophosphamide, and etoposide). Achieved NV with once cycle    Receivied KPd - s/p 5 cycles    Excellent response with M-protein came down to 0.1    S/p tandem autotransplant    First on 2/28/2018   2nd on 06/13/2018    Achieved CR after the first transplant. Repeat bone marrow @ [facility] - Complete response. Since he is high risk based on cytogenetics, I recommend proteosome inhibitor as maintenance therapy. Took Ixazomib briefly  Developed fever, was in the hospital.   Also developed cytopenias    Patient prefered to switch therapy to Pomalyst.     Pomalyst 2 mg daily - started 11/19/2018  Tolerating treatment very well  Denies any side effects. A detailed system by system evaluation of side effect was performed to assess chemotherapy related toxicity. Blood counts are acceptable. Results reviewed with the patient    M-spike is not deteted  Bone marrow 04/2021 - in CR. The disease has a high risk of recurrence and the treatment also carries a substantial risk of side effects. All of this was assessed during this visit. 2. Type 2 DM with complication    Managed by Endocrine      3.  Chronic Hep C    In sustained virological remission      Plan:       > Continue Pomalyst maintenance  > Myeloma labs and port flush monthly   > Zometa every 3 months  > Follow-up in 3 months      Signed by: Cesar Vega NP                     September 1, 2022       I personally saw and evaluated the patient and performed the key components of medical decision making. The history, physical exam, and documentation were performed by Flori Buitrago NP. I reviewed and verified the above documentation and modified it as needed. Mr. America Thompson is a gentleman with a diagnosis of high risk multiple myeloma. He is receiving maintenance Pomalyst. The disease is in remission. I obtained history, performed physical exam, reviewed labs and imaging and communicated the treatment plan to the patient. Signed by: Tameka Prescott MD                     September 1, 2022      CC. Nery Price MD  CC.  Chaya Tabor MD

## 2022-09-06 LAB
IGA SERPL-MCNC: 477 MG/DL (ref 61–437)
IGG SERPL-MCNC: 1151 MG/DL (ref 603–1613)
IGM SERPL-MCNC: 16 MG/DL (ref 20–172)
KAPPA LC FREE SER-MCNC: 41.3 MG/L (ref 3.3–19.4)
KAPPA LC FREE/LAMBDA FREE SER: 0.94 {RATIO} (ref 0.26–1.65)
LAMBDA LC FREE SERPL-MCNC: 43.8 MG/L (ref 5.7–26.3)
PROT PATTERN SERPL IFE-IMP: ABNORMAL

## 2022-09-07 LAB
ALBUMIN SERPL ELPH-MCNC: 3.5 G/DL (ref 2.9–4.4)
ALBUMIN/GLOB SERPL: 1.1 {RATIO} (ref 0.7–1.7)
ALPHA1 GLOB SERPL ELPH-MCNC: 0.1 G/DL (ref 0–0.4)
ALPHA2 GLOB SERPL ELPH-MCNC: 0.8 G/DL (ref 0.4–1)
B-GLOBULIN SERPL ELPH-MCNC: 0.9 G/DL (ref 0.7–1.3)
GAMMA GLOB SERPL ELPH-MCNC: 1.2 G/DL (ref 0.4–1.8)
GLOBULIN SER CALC-MCNC: 3.1 G/DL (ref 2.2–3.9)
M PROTEIN SERPL ELPH-MCNC: NORMAL G/DL
PROT SERPL-MCNC: 6.6 G/DL (ref 6–8.5)

## 2022-09-13 ENCOUNTER — APPOINTMENT (OUTPATIENT)
Dept: GENERAL RADIOLOGY | Age: 67
End: 2022-09-13
Attending: EMERGENCY MEDICINE
Payer: MEDICARE

## 2022-09-13 ENCOUNTER — HOSPITAL ENCOUNTER (EMERGENCY)
Age: 67
Discharge: HOME | End: 2022-09-14
Attending: EMERGENCY MEDICINE
Payer: MEDICARE

## 2022-09-13 VITALS
HEART RATE: 89 BPM | DIASTOLIC BLOOD PRESSURE: 86 MMHG | RESPIRATION RATE: 20 BRPM | OXYGEN SATURATION: 100 % | WEIGHT: 186.95 LBS | HEIGHT: 68 IN | BODY MASS INDEX: 28.33 KG/M2 | TEMPERATURE: 98 F | SYSTOLIC BLOOD PRESSURE: 153 MMHG

## 2022-09-13 DIAGNOSIS — R06.6 CHRONIC HICCUPS: Primary | ICD-10-CM

## 2022-09-13 LAB
ALBUMIN SERPL-MCNC: 3.4 G/DL (ref 3.5–5)
ALBUMIN/GLOB SERPL: 0.9 {RATIO} (ref 1.1–2.2)
ALP SERPL-CCNC: 68 U/L (ref 45–117)
ALT SERPL-CCNC: 22 U/L (ref 12–78)
ANION GAP SERPL CALC-SCNC: 5 MMOL/L (ref 5–15)
AST SERPL-CCNC: 22 U/L (ref 15–37)
BASOPHILS # BLD: 0 K/UL (ref 0–0.1)
BASOPHILS NFR BLD: 1 % (ref 0–1)
BILIRUB SERPL-MCNC: 0.4 MG/DL (ref 0.2–1)
BNP SERPL-MCNC: 173 PG/ML
BUN SERPL-MCNC: 12 MG/DL (ref 6–20)
BUN/CREAT SERPL: 8 (ref 12–20)
CALCIUM SERPL-MCNC: 8.6 MG/DL (ref 8.5–10.1)
CHLORIDE SERPL-SCNC: 106 MMOL/L (ref 97–108)
CO2 SERPL-SCNC: 27 MMOL/L (ref 21–32)
CREAT SERPL-MCNC: 1.5 MG/DL (ref 0.7–1.3)
DIFFERENTIAL METHOD BLD: ABNORMAL
EOSINOPHIL # BLD: 0.1 K/UL (ref 0–0.4)
EOSINOPHIL NFR BLD: 1 % (ref 0–7)
ERYTHROCYTE [DISTWIDTH] IN BLOOD BY AUTOMATED COUNT: 20.7 % (ref 11.5–14.5)
GLOBULIN SER CALC-MCNC: 3.9 G/DL (ref 2–4)
GLUCOSE SERPL-MCNC: 106 MG/DL (ref 65–100)
HCT VFR BLD AUTO: 35.5 % (ref 36.6–50.3)
HGB BLD-MCNC: 11.1 G/DL (ref 12.1–17)
IMM GRANULOCYTES # BLD AUTO: 0 K/UL (ref 0–0.04)
IMM GRANULOCYTES NFR BLD AUTO: 0 % (ref 0–0.5)
LYMPHOCYTES # BLD: 1 K/UL (ref 0.8–3.5)
LYMPHOCYTES NFR BLD: 21 % (ref 12–49)
MCH RBC QN AUTO: 24.1 PG (ref 26–34)
MCHC RBC AUTO-ENTMCNC: 31.3 G/DL (ref 30–36.5)
MCV RBC AUTO: 77.2 FL (ref 80–99)
MONOCYTES # BLD: 0.8 K/UL (ref 0–1)
MONOCYTES NFR BLD: 18 % (ref 5–13)
NEUTS SEG # BLD: 2.6 K/UL (ref 1.8–8)
NEUTS SEG NFR BLD: 58 % (ref 32–75)
NRBC # BLD: 0 K/UL (ref 0–0.01)
NRBC BLD-RTO: 0 PER 100 WBC
PLATELET # BLD AUTO: 213 K/UL (ref 150–400)
PMV BLD AUTO: 9.5 FL (ref 8.9–12.9)
POTASSIUM SERPL-SCNC: 3.6 MMOL/L (ref 3.5–5.1)
PROT SERPL-MCNC: 7.3 G/DL (ref 6.4–8.2)
RBC # BLD AUTO: 4.6 M/UL (ref 4.1–5.7)
SODIUM SERPL-SCNC: 138 MMOL/L (ref 136–145)
TROPONIN-HIGH SENSITIVITY: 17 NG/L (ref 0–76)
WBC # BLD AUTO: 4.5 K/UL (ref 4.1–11.1)

## 2022-09-13 PROCEDURE — 80053 COMPREHEN METABOLIC PANEL: CPT

## 2022-09-13 PROCEDURE — 85025 COMPLETE CBC W/AUTO DIFF WBC: CPT

## 2022-09-13 PROCEDURE — 71045 X-RAY EXAM CHEST 1 VIEW: CPT

## 2022-09-13 PROCEDURE — 93005 ELECTROCARDIOGRAM TRACING: CPT

## 2022-09-13 PROCEDURE — 84484 ASSAY OF TROPONIN QUANT: CPT

## 2022-09-13 PROCEDURE — 99285 EMERGENCY DEPT VISIT HI MDM: CPT

## 2022-09-13 PROCEDURE — 36415 COLL VENOUS BLD VENIPUNCTURE: CPT

## 2022-09-13 PROCEDURE — 83880 ASSAY OF NATRIURETIC PEPTIDE: CPT

## 2022-09-14 LAB
ATRIAL RATE: 84 BPM
CALCULATED P AXIS, ECG09: 49 DEGREES
CALCULATED R AXIS, ECG10: -29 DEGREES
CALCULATED T AXIS, ECG11: -26 DEGREES
DIAGNOSIS, 93000: NORMAL
P-R INTERVAL, ECG05: 146 MS
Q-T INTERVAL, ECG07: 352 MS
QRS DURATION, ECG06: 98 MS
QTC CALCULATION (BEZET), ECG08: 415 MS
VENTRICULAR RATE, ECG03: 84 BPM

## 2022-09-14 RX ORDER — CHLORPROMAZINE HYDROCHLORIDE 25 MG/1
25 TABLET, FILM COATED ORAL 3 TIMES DAILY
Qty: 15 TABLET | Refills: 0 | Status: SHIPPED | OUTPATIENT
Start: 2022-09-14 | End: 2022-09-19

## 2022-09-14 NOTE — ED NOTES
DC info reviewed with Patient, all questions answered. Patient well-appearing at time of discharge, vital signs stable, no acute distress. Ambulatory out of ED with wife at this time.

## 2022-09-14 NOTE — ED PROVIDER NOTES
EMERGENCY DEPARTMENT HISTORY AND PHYSICAL EXAM     ------------------------------------------------------------------------------------------------------  Please note that this dictation was completed with [employer], the [employer] voice recognition software. Quite often unanticipated grammatical, syntax, homophones, and other interpretive errors are inadvertently transcribed by the computer software. Please disregard these errors. Please excuse any errors that have escaped final proofreading.  -----------------------------------------------------------------------------------------------------------------    Date: 9/13/2022  Patient Name: Alcira Swann. History of Presenting Illness     Chief Complaint   Patient presents with    Shortness of Breath     Patient stated that he has uncontrollable hiccups which is making it hard for him to breathe. He was seen here recently for the same thing and given medications which worked temporarily but they returned and nothing seems to keep them at [place] [address]. The hiccups not only cause shortness of breath but also chest discomfort. Took his chronic pain medications around 1730 but it hasn't with the chest discomfort. History Provided By: Patient and wife    HPI: Alcira Swann. is a 61 y.o. male, with significant pmhx of diabetes, hypertension, hepatitis C, anxiety and depression, multiple myeloma currently on chemotherapy and followed by Dr. Oriana Hoff, who presents via private vehicle to the ED with c/o recurrent hiccups. Patient was recently seen by his department for intractable hiccups that was improved with Thorazine. Patient reports has been having ongoing symptoms with stuttering hiccups throughout the day. Family also reports patient with diagnosis of tardive dyskinesia which she is currently taking prescribed medication for as he follows with neurology. Recently had clonazepam added to be taken 2 times a day.   Patient reports that when he gets to coughing he has pain in his epigastric area and sometimes feels short of breath is \"cutting off my ear. \"  Patient resting comfortably on the bed with THE time of my evaluation. Notes that his hiccups come and go. Notes that the Thorazine was previously prescribed does improve his symptoms but is currently out. Denies associated nausea. No abdominal distention or pain. Pt also specifically denies any recent fevers, chills, CP, SOB, nausea, vomiting, diarrhea, abd pain, changes in BM, urinary sxs, or headache. PCP: Jose Guadalupe Velásquez MD    Social Hx: denies tobacco, denies EtOH, denies recreational/ Illicit Drugs     There are no other complaints, changes, or physical findings at this time. Allergies   Allergen Reactions    Mercury (Bulk) Hives     Blisters           Current Outpatient Medications   Medication Sig Dispense Refill    chlorproMAZINE (THORAZINE) 25 mg tablet Take 1 Tablet by mouth three (3) times daily for 5 days. 15 Tablet 0    chlorproMAZINE (THORAZINE) 25 mg tablet Take 1 Tablet by mouth three (3) times daily as needed (hiccups). 9 Tablet 0    Pomalyst 2 mg cap TAKE 1 CAPSULE BY MOUTH ONCE DAILY 28 Capsule 0    tenofovir DISOPROXIL FUMARATE (VIREAD) 300 mg tablet TAKE 1 TABLET BY MOUTH 1 TIME A DAY. 90 Tablet 0    clonazePAM (KlonoPIN) 0.5 mg tablet Take 1 Tablet by mouth two (2) times a day. Max Daily Amount: 1 mg. 60 Tablet 1    Belsomra 15 mg tablet Take 15 mg by mouth nightly. lisinopriL (PRINIVIL, ZESTRIL) 10 mg tablet Take 10 mg by mouth daily as needed. metFORMIN ER (GLUCOPHAGE XR) 500 mg tablet TAKE 1 TABLET BY MOUTH TWO (2) TIMES A DAY. 180 Tablet 1    TESTOSTERONE  mg by IntraMUSCular route every seven (7) days. POTASSIUM PO Take  by mouth as needed.       OTHER Cannabis prescription      zolpidem 3.5 mg subl DISSOLVE 1 TABLET UNDER TONGUE AT BEDTIME (Patient not taking: No sig reported)      cholecalciferol, vitamin D3, 50 mcg (2,000 unit) tab Take  by mouth daily.      ascorbic acid, vitamin C, (VITAMIN C) 100 mg tab Take 100 mg by mouth daily. Denta 5000 Plus 1.1 % crea USE AS DIRECTED TWO TIMES DAILY      ibuprofen (MOTRIN) 800 mg tablet TAKE 1 TABLET BY MOUTH 3 TIMES A DAY AS NEEDED FOR PAIN      buPROPion XL (WELLBUTRIN XL) 300 mg XL tablet TAKE 1 TABLET BY MOUTH EVERY DAY      naproxen (NAPROSYN) 500 mg tablet TAKE 1 TABLET BY MOUTH TWICE A DAY (Patient not taking: No sig reported)  5    NUCYNTA 100 mg tablet TAKE 2 TABLETS 3 TIMES A DAY  0    aspirin delayed-release 81 mg tablet Take  by mouth daily. prochlorperazine (COMPAZINE) 10 mg tablet Take 5 mg by mouth every six (6) hours as needed. omeprazole (PRILOSEC) 20 mg capsule Take 20 mg by mouth daily as needed (acid reflux). ondansetron (ZOFRAN ODT) 8 mg disintegrating tablet Take 8 mg by mouth every eight (8) hours as needed for Nausea. multivitamin (ONE A DAY) tablet Take 1 Tab by mouth daily. tamsulosin (FLOMAX) 0.4 mg capsule Take 1 Cap by mouth daily. 30 Cap 1    zolpidem CR (AMBIEN CR) 12.5 mg tablet Take 12.5 mg by mouth nightly. (Patient not taking: No sig reported)      ARIPiprazole (ABILIFY) 5 mg tablet Take 2.5 mg by mouth every morning.          Past History     Past Medical History:  Past Medical History:   Diagnosis Date    Acid reflux     Arrhythmia     pvc's    Chronic pain     neck    Depression     Diabetes ([facility]     Femoral hernia 7/11/2012    Hepatitis C     Hypertension     Inguinal hernia 7/11/2012    Liver disease     hepatitis c    Multiple myeloma ([facility]     Other ill-defined conditions(799.89)     Hx of PVCs since 2009    Prostatitis, acute     Psychiatric disorder     depression       Past Surgical History:  Past Surgical History:   Procedure Laterality Date    COLONOSCOPY N/A 9/20/2016    COLONOSCOPY performed by Artie Shen MD at [facility] ENDOSCOPY    HX APPENDECTOMY      HX CERVICAL FUSION  2008    x 1 as of 4/30/2014    HX HEENT      foreign body removed from right eye    HX HERNIA REPAIR      [facility]      HX ORTHOPAEDIC      cervical fusion    HX OTHER SURGICAL  2000    liver bx - chronic hepatitis       Family History:  Family History   Problem Relation Age of Onset    OSTEOARTHRITIS Mother     OSTEOARTHRITIS Father     Diabetes Father     Hypertension Maternal Grandmother     Diabetes Maternal Grandmother     Hypertension Maternal Grandfather     Diabetes Maternal Grandfather        Social History:  Social History     Tobacco Use    Smoking status: Former     Packs/day: 1.00     Years: 12.00     Pack years: 12.00     Types: Cigarettes     Quit date: 1989     Years since quittin.7    Smokeless tobacco: Never    Tobacco comments:     former cigarette smoker   Vaping Use    Vaping Use: Never used   Substance Use Topics    Alcohol use: No     Comment: former alcoholic-quit 2    Drug use: No       Allergies: Allergies   Allergen Reactions    Mercury (Bulk) Hives     Blisters           Review of Systems   Review of Systems   Constitutional:  Negative for chills and fever. HENT: Negative. Eyes: Negative. Respiratory:  Negative for cough, chest tightness and shortness of breath (while hiccuping). Cardiovascular:  Negative for chest pain and leg swelling. Gastrointestinal:  Positive for abdominal pain (with hiccups). Negative for diarrhea, nausea and vomiting. Endocrine: Negative. Genitourinary:  Negative for difficulty urinating and dysuria. Musculoskeletal:  Negative for myalgias. Skin: Negative. Neurological: Negative. Psychiatric/Behavioral: Negative. All other systems reviewed and are negative. Physical Exam   Physical Exam  Vitals and nursing note reviewed. HENT:      Head: Normocephalic and atraumatic. Nose: No nasal deformity. Mouth/Throat:      Lips: No lesions. Eyes:      Conjunctiva/sclera: Conjunctivae normal.   Cardiovascular:      Rate and Rhythm: Normal rate.    Pulmonary: Effort: Pulmonary effort is normal.   Musculoskeletal:         General: Normal range of motion. Cervical back: Normal range of motion. No pain with movement. Right lower leg: No edema. Left lower leg: No edema. Skin:     General: Skin is dry. Findings: No rash. Neurological:      General: No focal deficit present. Mental Status: He is alert. Psychiatric:         Mood and Affect: Mood normal.         Diagnostic Study Results     Labs -     Recent Results (from the past 12 hour(s))   CBC WITH AUTOMATED DIFF    Collection Time: 09/13/22 10:22 PM   Result Value Ref Range    WBC 4.5 4.1 - 11.1 K/uL    RBC 4.60 4.10 - 5.70 M/uL    HGB 11.1 (L) 12.1 - 17.0 g/dL    HCT 35.5 (L) 36.6 - 50.3 %    MCV 77.2 (L) 80.0 - 99.0 FL    MCH 24.1 (L) 26.0 - 34.0 PG    MCHC 31.3 30.0 - 36.5 g/dL    RDW 20.7 (H) 11.5 - 14.5 %    PLATELET 623 941 - 020 K/uL    MPV 9.5 8.9 - 12.9 FL    NRBC 0.0 0  WBC    ABSOLUTE NRBC 0.00 0.00 - 0.01 K/uL    NEUTROPHILS 58 32 - 75 %    LYMPHOCYTES 21 12 - 49 %    MONOCYTES 18 (H) 5 - 13 %    EOSINOPHILS 1 0 - 7 %    BASOPHILS 1 0 - 1 %    IMMATURE GRANULOCYTES 0 0.0 - 0.5 %    ABS. NEUTROPHILS 2.6 1.8 - 8.0 K/UL    ABS. LYMPHOCYTES 1.0 0.8 - 3.5 K/UL    ABS. MONOCYTES 0.8 0.0 - 1.0 K/UL    ABS. EOSINOPHILS 0.1 0.0 - 0.4 K/UL    ABS. BASOPHILS 0.0 0.0 - 0.1 K/UL    ABS. IMM.  GRANS. 0.0 0.00 - 0.04 K/UL    DF AUTOMATED     METABOLIC PANEL, COMPREHENSIVE    Collection Time: 09/13/22 10:22 PM   Result Value Ref Range    Sodium 138 136 - 145 mmol/L    Potassium 3.6 3.5 - 5.1 mmol/L    Chloride 106 97 - 108 mmol/L    CO2 27 21 - 32 mmol/L    Anion gap 5 5 - 15 mmol/L    Glucose 106 (H) 65 - 100 mg/dL    BUN 12 6 - 20 MG/DL    Creatinine 1.50 (H) 0.70 - 1.30 MG/DL    BUN/Creatinine ratio 8 (L) 12 - 20      GFR est AA 57 (L) >60 ml/min/1.73m2    GFR est non-AA 47 (L) >60 ml/min/1.73m2    Calcium 8.6 8.5 - 10.1 MG/DL    Bilirubin, total 0.4 0.2 - 1.0 MG/DL    ALT (SGPT) 22 12 - 78 U/L    AST (SGOT) 22 15 - 37 U/L    Alk. phosphatase 68 45 - 117 U/L    Protein, total 7.3 6.4 - 8.2 g/dL    Albumin 3.4 (L) 3.5 - 5.0 g/dL    Globulin 3.9 2.0 - 4.0 g/dL    A-G Ratio 0.9 (L) 1.1 - 2.2     TROPONIN-HIGH SENSITIVITY    Collection Time: 09/13/22 10:22 PM   Result Value Ref Range    Troponin-High Sensitivity 17 0 - 76 ng/L   NT-PRO BNP    Collection Time: 09/13/22 10:22 PM   Result Value Ref Range    NT pro- (H) <125 PG/ML   EKG, 12 LEAD, INITIAL    Collection Time: 09/13/22 10:23 PM   Result Value Ref Range    Ventricular Rate 84 BPM    Atrial Rate 84 BPM    P-R Interval 146 ms    QRS Duration 98 ms    Q-T Interval 352 ms    QTC Calculation (Bezet) 415 ms    Calculated P Axis 49 degrees    Calculated R Axis -29 degrees    Calculated T Axis -26 degrees    Diagnosis       Normal sinus rhythm  Voltage criteria for left ventricular hypertrophy  Nonspecific T wave abnormality  When compared with ECG of 22-AUG-2022 11:10,  No significant change was found         Radiologic Studies -   XR CHEST PORT   Final Result   No evidence of acute cardiopulmonary process. CT Results  (Last 48 hours)      None          CXR Results  (Last 48 hours)                 09/13/22 2249  XR CHEST PORT Final result    Impression:  No evidence of acute cardiopulmonary process. Narrative:  INDICATION:  Shortness of breath        COMPARISON: April 2022 and October 2019       FINDINGS: Single AP portable view of the chest obtained at 2249 demonstrates a   stable cardiomediastinal silhouette. There is a right chest portacatheter. The   lungs are clear bilaterally. No osseous abnormalities are seen. Medical Decision Making   I am the first provider for this patient. I reviewed the vital signs, available nursing notes, past medical history, past surgical history, family history and social history. Vital Signs-Reviewed the patient's vital signs.   Patient Vitals for the past 12 hrs: Temp Pulse Resp BP SpO2   09/13/22 2231 -- -- -- -- 100 %   09/13/22 2216 98 °F (36.7 °C) 89 20 (!) 153/86 99 %       Pulse Oximetry Analysis - 100% on RA Normal    Records Reviewed/Interpretted: Nursing Notes from triage and Old Medical Records, previous ER visit for hiccups    Provider Notes (Medical Decision Making):     DDX:  Intractable hiccups, electrolyte derangement, medication side effect    Plan:  Labs    Impression:  Recurrent hiccups    ED Course:   Initial assessment performed. The patients presenting problems have been discussed, and they are in agreement with the care plan formulated and outlined with them. I have encouraged them to ask questions as they arise throughout their visit. I reviewed our electronic medical record system for any past medical records that were available that may contribute to the patients current condition, the nursing notes and and vital signs from today's visit  Nursing notes will be reviewed as they become available in realtime while the pt has been in the ED. Claudene Nanny, MD      12:13 AM  Progress note:  Pt noted to be feeling better as he has not had any recurrence of his hiccups since arriving to the emergency department. , ready for discharge. Discussed lab and imaging findings with pt, specifically noting no markedly elevated diaphragm or fluid collection. Pt will follow up with heme-onc, GI as instructed. All questions have been answered, pt voiced understanding and agreement with plan. Specific return precautions provided in addition to instructions for pt to return to the ED immediately should sx worsen at any time. Claudene Nanny, MD             Critical Care Time:     none      Diagnosis     Clinical Impression:   1. Chronic hiccups        PLAN:  1. Current Discharge Medication List        START taking these medications    Details   !! chlorproMAZINE (THORAZINE) 25 mg tablet Take 1 Tablet by mouth three (3) times daily for 5 days.   Qty: 15 Tablet, Refills: 0  Start date: 9/14/2022, End date: 9/19/2022       !! - Potential duplicate medications found. Please discuss with provider. CONTINUE these medications which have NOT CHANGED    Details   !! chlorproMAZINE (THORAZINE) 25 mg tablet Take 1 Tablet by mouth three (3) times daily as needed (hiccups). Qty: 9 Tablet, Refills: 0       !! - Potential duplicate medications found. Please discuss with provider. 2.   Follow-up Information       Follow up With Specialties Details Why Contact Info    Preston Prado MD Hematology and Oncology, Hematology Physician, Oncology, Internal Medicine Physician Schedule an appointment as soon as possible for a visit in 2 days  [address] [facility] [address] [facility] [phone]      Eleuterio Jo MD Gastroenterology Schedule an appointment as soon as possible for a visit in 2 days  [address] [facility] [address] [phone]      [facility] EMERGENCY DEPT Emergency Medicine  As needed [facility]  [address] [facility]  [phone]          Return to ED if worse     Disposition:    12:14 AM   The patient's results have been reviewed with family and/or caregiver. They verbally convey their understanding and agreement of the patient's signs, symptoms, diagnosis, treatment and prognosis and additionally agree to follow up as recommended in the discharge instructions or to return to the Emergency Room should the patient's condition change prior to their follow-up appointment. The family and/or caregiver verbally agrees with the care-plan and all of their questions have been answered. The discharge instructions have also been provided to the them with educational information regarding the patient's diagnosis as well a list of reasons why the patient would want to return to the ER prior to their follow-up appointment should their condition change.   Richardson Collazo MD

## 2022-09-14 NOTE — DISCHARGE INSTRUCTIONS
It was a pleasure taking care of you in our Emergency Department today. We know that when you come to [facility] you are entrusting us with your health, comfort, and safety. Our physicians and nurses honor that trust, and truly appreciate the opportunity to care for you and your loved ones. We also value your feedback. If you receive a survey about your Emergency Department experience today, please fill it out. We care about our patients' feedback, and we listen to what you have to say. Thank you!       Dr. Roseanne Gillette MD.

## 2022-09-20 DIAGNOSIS — C90.00 MULTIPLE MYELOMA, REMISSION STATUS UNSPECIFIED (HCC): ICD-10-CM

## 2022-09-20 RX ORDER — POMALIDOMIDE 2 MG/1
CAPSULE ORAL
Qty: 28 CAPSULE | Refills: 0 | Status: SHIPPED | OUTPATIENT
Start: 2022-09-20 | End: 2022-09-20

## 2022-09-20 RX ORDER — POMALIDOMIDE 2 MG/1
1 CAPSULE ORAL DAILY
Qty: 28 CAPSULE | Refills: 0 | Status: SHIPPED | OUTPATIENT
Start: 2022-09-20 | End: 2022-10-25

## 2022-09-28 ENCOUNTER — HOSPITAL ENCOUNTER (OUTPATIENT)
Dept: INFUSION THERAPY | Age: 67
Discharge: HOME | End: 2022-09-28
Payer: COMMERCIAL

## 2022-09-28 VITALS
HEART RATE: 87 BPM | RESPIRATION RATE: 16 BRPM | SYSTOLIC BLOOD PRESSURE: 145 MMHG | DIASTOLIC BLOOD PRESSURE: 85 MMHG | OXYGEN SATURATION: 100 % | TEMPERATURE: 98.1 F

## 2022-09-28 DIAGNOSIS — C90.00 MULTIPLE MYELOMA, REMISSION STATUS UNSPECIFIED (HCC): Primary | ICD-10-CM

## 2022-09-28 LAB
ALBUMIN SERPL-MCNC: 3.3 G/DL (ref 3.5–5)
ALBUMIN/GLOB SERPL: 0.9 {RATIO} (ref 1.1–2.2)
ALP SERPL-CCNC: 101 U/L (ref 45–117)
ALT SERPL-CCNC: 24 U/L (ref 12–78)
ANION GAP SERPL CALC-SCNC: 6 MMOL/L (ref 5–15)
AST SERPL-CCNC: 20 U/L (ref 15–37)
BASOPHILS # BLD: 0 K/UL (ref 0–0.1)
BASOPHILS NFR BLD: 1 % (ref 0–1)
BILIRUB SERPL-MCNC: 0.2 MG/DL (ref 0.2–1)
BUN SERPL-MCNC: 10 MG/DL (ref 6–20)
BUN/CREAT SERPL: 6 (ref 12–20)
CALCIUM SERPL-MCNC: 8 MG/DL (ref 8.5–10.1)
CHLORIDE SERPL-SCNC: 104 MMOL/L (ref 97–108)
CO2 SERPL-SCNC: 28 MMOL/L (ref 21–32)
CREAT SERPL-MCNC: 1.54 MG/DL (ref 0.7–1.3)
DIFFERENTIAL METHOD BLD: ABNORMAL
EOSINOPHIL # BLD: 0.1 K/UL (ref 0–0.4)
EOSINOPHIL NFR BLD: 3 % (ref 0–7)
ERYTHROCYTE [DISTWIDTH] IN BLOOD BY AUTOMATED COUNT: 21.2 % (ref 11.5–14.5)
GLOBULIN SER CALC-MCNC: 3.8 G/DL (ref 2–4)
GLUCOSE SERPL-MCNC: 133 MG/DL (ref 65–100)
HCT VFR BLD AUTO: 36.6 % (ref 36.6–50.3)
HGB BLD-MCNC: 11 G/DL (ref 12.1–17)
IGA SERPL-MCNC: 474 MG/DL (ref 70–400)
IGG SERPL-MCNC: 1180 MG/DL (ref 700–1600)
IGM SERPL-MCNC: <21 MG/DL (ref 40–230)
IMM GRANULOCYTES # BLD AUTO: 0 K/UL (ref 0–0.04)
IMM GRANULOCYTES NFR BLD AUTO: 0 % (ref 0–0.5)
LYMPHOCYTES # BLD: 1 K/UL (ref 0.8–3.5)
LYMPHOCYTES NFR BLD: 31 % (ref 12–49)
MCH RBC QN AUTO: 24.1 PG (ref 26–34)
MCHC RBC AUTO-ENTMCNC: 30.1 G/DL (ref 30–36.5)
MCV RBC AUTO: 80.1 FL (ref 80–99)
MONOCYTES # BLD: 0.8 K/UL (ref 0–1)
MONOCYTES NFR BLD: 25 % (ref 5–13)
NEUTS SEG # BLD: 1.3 K/UL (ref 1.8–8)
NEUTS SEG NFR BLD: 40 % (ref 32–75)
NRBC # BLD: 0 K/UL (ref 0–0.01)
NRBC BLD-RTO: 0 PER 100 WBC
PLATELET # BLD AUTO: 183 K/UL (ref 150–400)
PMV BLD AUTO: 9.9 FL (ref 8.9–12.9)
POTASSIUM SERPL-SCNC: 3.7 MMOL/L (ref 3.5–5.1)
PROT SERPL-MCNC: 7.1 G/DL (ref 6.4–8.2)
RBC # BLD AUTO: 4.57 M/UL (ref 4.1–5.7)
RBC MORPH BLD: ABNORMAL
SODIUM SERPL-SCNC: 138 MMOL/L (ref 136–145)
WBC # BLD AUTO: 3.2 K/UL (ref 4.1–11.1)

## 2022-09-28 PROCEDURE — 82784 ASSAY IGA/IGD/IGG/IGM EACH: CPT

## 2022-09-28 PROCEDURE — 36591 DRAW BLOOD OFF VENOUS DEVICE: CPT

## 2022-09-28 PROCEDURE — 83521 IG LIGHT CHAINS FREE EACH: CPT

## 2022-09-28 PROCEDURE — 77030012965 HC NDL HUBR BBMI -A

## 2022-09-28 PROCEDURE — 74011000250 HC RX REV CODE- 250: Performed by: INTERNAL MEDICINE

## 2022-09-28 PROCEDURE — 85025 COMPLETE CBC W/AUTO DIFF WBC: CPT

## 2022-09-28 PROCEDURE — 74011250636 HC RX REV CODE- 250/636: Performed by: INTERNAL MEDICINE

## 2022-09-28 PROCEDURE — 80053 COMPREHEN METABOLIC PANEL: CPT

## 2022-09-28 RX ORDER — SODIUM CHLORIDE 0.9 % (FLUSH) 0.9 %
5-10 SYRINGE (ML) INJECTION AS NEEDED
Status: DISPENSED | OUTPATIENT
Start: 2022-09-28 | End: 2022-09-28

## 2022-09-28 RX ORDER — SODIUM CHLORIDE 9 MG/ML
10 INJECTION INTRAMUSCULAR; INTRAVENOUS; SUBCUTANEOUS AS NEEDED
Status: ACTIVE | OUTPATIENT
Start: 2022-09-28 | End: 2022-09-28

## 2022-09-28 RX ORDER — HEPARIN 100 UNIT/ML
500 SYRINGE INTRAVENOUS AS NEEDED
Status: ACTIVE | OUTPATIENT
Start: 2022-09-28 | End: 2022-09-28

## 2022-09-28 RX ADMIN — HEPARIN SODIUM (PORCINE) LOCK FLUSH IV SOLN 100 UNIT/ML 500 UNITS: 100 SOLUTION at 10:48

## 2022-09-28 RX ADMIN — SODIUM CHLORIDE, PRESERVATIVE FREE 10 ML: 5 INJECTION INTRAVENOUS at 10:48

## 2022-09-28 NOTE — PROGRESS NOTES
Outpatient Infusion Center Short Visit Progress Note    Mr. Harshal Banda admitted to [address] [place] [address] Flush/labs ambulatory in stable condition. Assessment completed. No new concerns voiced. Covid Screening      1. Do you have any symptoms of COVID-19? SOB, coughing, fever, or generally not feeling well ? NO  2. Have you been exposed to COVID-19 recently? NO  3. Have you had any recent contact with family/friend that has a pending COVID test? YES    Vital Signs:  Visit Vitals  BP (!) 145/85 (BP 1 Location: Left arm, BP Patient Position: Sitting)   Pulse 87   Temp 98.1 °F (36.7 °C)   Resp 16   SpO2 100%         Venous Access Device Upper chest (subclavicular area, right (Active)   Central Line Being Utilized No 09/28/22 1045   Criteria for Appropriate Use Other (comment) 09/28/22 1045   Site Assessment Clean, dry, & intact 09/28/22 1045   Date of Last Dressing Change 09/28/22 09/28/22 1045   Dressing Status New 09/28/22 1045   Dressing Type Bandaid;Gauze 09/28/22 1045   Action Taken Blood drawn 09/28/22 1045   Date Accessed (Medial Site) 09/28/22 09/28/22 1045   Access Time (Medial Site) 1050 09/28/22 1045   Access Needle Size (Site #1) 20 G 09/28/22 1045   Access Needle Length (Medial Site) 0.75 inches 09/28/22 1045   Positive Blood Return (Medial Site) Yes 09/28/22 1045   Action Taken (Medial Site) Blood drawn;Flushed; De-accessed 09/28/22 1045   Alcohol Cap Used Yes 09/01/22 1100       Lab Results:  Recent Results (from the past 12 hour(s))   CBC WITH AUTOMATED DIFF    Collection Time: 09/28/22 10:49 AM   Result Value Ref Range    WBC 3.2 (L) 4.1 - 11.1 K/uL    RBC 4.57 4.10 - 5.70 M/uL    HGB 11.0 (L) 12.1 - 17.0 g/dL    HCT 36.6 36.6 - 50.3 %    MCV 80.1 80.0 - 99.0 FL    MCH 24.1 (L) 26.0 - 34.0 PG    MCHC 30.1 30.0 - 36.5 g/dL    RDW 21.2 (H) 11.5 - 14.5 %    PLATELET 454 933 - 907 K/uL    MPV 9.9 8.9 - 12.9 FL    NRBC 0.0 0  WBC    ABSOLUTE NRBC 0.00 0.00 - 0.01 K/uL    NEUTROPHILS 40 32 - 75 % LYMPHOCYTES 31 12 - 49 %    MONOCYTES 25 (H) 5 - 13 %    EOSINOPHILS 3 0 - 7 %    BASOPHILS 1 0 - 1 %    IMMATURE GRANULOCYTES 0 0.0 - 0.5 %    ABS. NEUTROPHILS 1.3 (L) 1.8 - 8.0 K/UL    ABS. LYMPHOCYTES 1.0 0.8 - 3.5 K/UL    ABS. MONOCYTES 0.8 0.0 - 1.0 K/UL    ABS. EOSINOPHILS 0.1 0.0 - 0.4 K/UL    ABS. BASOPHILS 0.0 0.0 - 0.1 K/UL    ABS. IMM. GRANS. 0.0 0.00 - 0.04 K/UL    DF SMEAR SCANNED      RBC COMMENTS ANISOCYTOSIS  2+        RBC COMMENTS TARGET CELLS  PRESENT        RBC COMMENTS MICROCYTOSIS  1+        RBC COMMENTS MACROCYTOSIS  1+       METABOLIC PANEL, COMPREHENSIVE    Collection Time: 09/28/22 10:49 AM   Result Value Ref Range    Sodium 138 136 - 145 mmol/L    Potassium 3.7 3.5 - 5.1 mmol/L    Chloride 104 97 - 108 mmol/L    CO2 28 21 - 32 mmol/L    Anion gap 6 5 - 15 mmol/L    Glucose 133 (H) 65 - 100 mg/dL    BUN 10 6 - 20 MG/DL    Creatinine 1.54 (H) 0.70 - 1.30 MG/DL    BUN/Creatinine ratio 6 (L) 12 - 20      GFR est AA 55 (L) >60 ml/min/1.73m2    GFR est non-AA 46 (L) >60 ml/min/1.73m2    Calcium 8.0 (L) 8.5 - 10.1 MG/DL    Bilirubin, total 0.2 0.2 - 1.0 MG/DL    ALT (SGPT) 24 12 - 78 U/L    AST (SGOT) 20 15 - 37 U/L    Alk. phosphatase 101 45 - 117 U/L    Protein, total 7.1 6.4 - 8.2 g/dL    Albumin 3.3 (L) 3.5 - 5.0 g/dL    Globulin 3.8 2.0 - 4.0 g/dL    A-G Ratio 0.9 (L) 1.1 - 2.2             Medications:  Medications Administered       0.9% sodium chloride injection 10 mL       Admin Date  09/28/2022 Action  Given Dose  10 mL Route  IntraVENous Administered By  Yoana Pale A              heparin (porcine) pf 500 Units       Admin Date  09/28/2022 Action  Given Dose  500 Units Route  IntraVENous Administered By  Yoana Pale A              sodium chloride (NS) flush 5-10 mL       Admin Date  09/28/2022 Action  Given Dose  10 mL Route  IntraVENous Administered By  Yoana Pale A                      Patient port flushed and heparinized; de-accessed per protocol.     Patient tolerated treatment well. Patient discharged from [facility] ambulatory in no distress at 1055. Patient aware of next appointment.     Ella Hanna  September 28, 2022    Future Appointments   Date Time Provider Dajuan Johnston   10/26/2022 10:30 AM LOZANO MED5 [address] REG   11/23/2022 10:30 AM LOZANO FASTRACK 6 [address] REG   11/23/2022 10:45 AM Felisa Brush MD ONCMR BS AMB   12/21/2022 10:30 AM LOZANO FASTRACK 6 [facility] REG   2/15/2023 10:00 AM LOZANO FASTRACK 2 [facility] REG   5/10/2023 10:00 AM LOZANO FASTRACK 2 [facility] REG

## 2022-09-29 LAB
KAPPA LC FREE SER-MCNC: 59.3 MG/L (ref 3.3–19.4)
KAPPA LC FREE/LAMBDA FREE SER: 1.48 {RATIO} (ref 0.26–1.65)
LAMBDA LC FREE SERPL-MCNC: 40.1 MG/L (ref 5.7–26.3)

## 2022-10-03 LAB
IGA SERPL-MCNC: 503 MG/DL (ref 61–437)
IGG SERPL-MCNC: 1206 MG/DL (ref 603–1613)
IGM SERPL-MCNC: 25 MG/DL (ref 20–172)
PROT PATTERN SERPL IFE-IMP: ABNORMAL

## 2022-10-06 ENCOUNTER — TELEPHONE (OUTPATIENT)
Dept: NEUROLOGY | Age: 67
End: 2022-10-06

## 2022-10-20 DIAGNOSIS — C90.00 MULTIPLE MYELOMA, REMISSION STATUS UNSPECIFIED (HCC): ICD-10-CM

## 2022-10-25 RX ORDER — POMALIDOMIDE 2 MG/1
CAPSULE ORAL
Qty: 28 CAPSULE | Refills: 0 | Status: SHIPPED | OUTPATIENT
Start: 2022-10-25 | End: 2022-10-27 | Stop reason: SDUPTHER

## 2022-10-26 ENCOUNTER — HOSPITAL ENCOUNTER (OUTPATIENT)
Dept: INFUSION THERAPY | Age: 67
Discharge: HOME | End: 2022-10-26
Payer: COMMERCIAL

## 2022-10-26 VITALS
TEMPERATURE: 98.1 F | WEIGHT: 188.4 LBS | BODY MASS INDEX: 28.65 KG/M2 | OXYGEN SATURATION: 100 % | HEART RATE: 74 BPM | SYSTOLIC BLOOD PRESSURE: 125 MMHG | RESPIRATION RATE: 16 BRPM | DIASTOLIC BLOOD PRESSURE: 76 MMHG

## 2022-10-26 DIAGNOSIS — C90.00 MULTIPLE MYELOMA, REMISSION STATUS UNSPECIFIED (HCC): Primary | ICD-10-CM

## 2022-10-26 LAB
ALBUMIN SERPL-MCNC: 3.5 G/DL (ref 3.5–5)
ALBUMIN/GLOB SERPL: 0.9 {RATIO} (ref 1.1–2.2)
ALP SERPL-CCNC: 71 U/L (ref 45–117)
ALT SERPL-CCNC: 34 U/L (ref 12–78)
ANION GAP SERPL CALC-SCNC: 5 MMOL/L (ref 5–15)
AST SERPL-CCNC: 30 U/L (ref 15–37)
BASOPHILS # BLD: 0 K/UL (ref 0–0.1)
BASOPHILS NFR BLD: 1 % (ref 0–1)
BILIRUB SERPL-MCNC: 0.5 MG/DL (ref 0.2–1)
BUN SERPL-MCNC: 16 MG/DL (ref 6–20)
BUN/CREAT SERPL: 10 (ref 12–20)
CALCIUM SERPL-MCNC: 8.9 MG/DL (ref 8.5–10.1)
CHLORIDE SERPL-SCNC: 106 MMOL/L (ref 97–108)
CO2 SERPL-SCNC: 27 MMOL/L (ref 21–32)
CREAT SERPL-MCNC: 1.56 MG/DL (ref 0.7–1.3)
DIFFERENTIAL METHOD BLD: ABNORMAL
EOSINOPHIL # BLD: 0 K/UL (ref 0–0.4)
EOSINOPHIL NFR BLD: 1 % (ref 0–7)
ERYTHROCYTE [DISTWIDTH] IN BLOOD BY AUTOMATED COUNT: 20.8 % (ref 11.5–14.5)
GLOBULIN SER CALC-MCNC: 3.8 G/DL (ref 2–4)
GLUCOSE SERPL-MCNC: 121 MG/DL (ref 65–100)
HCT VFR BLD AUTO: 38.7 % (ref 36.6–50.3)
HGB BLD-MCNC: 11.7 G/DL (ref 12.1–17)
IGA SERPL-MCNC: 533 MG/DL (ref 70–400)
IGG SERPL-MCNC: 1340 MG/DL (ref 700–1600)
IGM SERPL-MCNC: <21 MG/DL (ref 40–230)
IMM GRANULOCYTES # BLD AUTO: 0 K/UL (ref 0–0.04)
IMM GRANULOCYTES NFR BLD AUTO: 0 % (ref 0–0.5)
LYMPHOCYTES # BLD: 1 K/UL (ref 0.8–3.5)
LYMPHOCYTES NFR BLD: 29 % (ref 12–49)
MCH RBC QN AUTO: 23.6 PG (ref 26–34)
MCHC RBC AUTO-ENTMCNC: 30.2 G/DL (ref 30–36.5)
MCV RBC AUTO: 78.2 FL (ref 80–99)
MONOCYTES # BLD: 0.8 K/UL (ref 0–1)
MONOCYTES NFR BLD: 22 % (ref 5–13)
NEUTS SEG # BLD: 1.7 K/UL (ref 1.8–8)
NEUTS SEG NFR BLD: 47 % (ref 32–75)
NRBC # BLD: 0 K/UL (ref 0–0.01)
NRBC BLD-RTO: 0 PER 100 WBC
PLATELET # BLD AUTO: 218 K/UL (ref 150–400)
PMV BLD AUTO: 9.6 FL (ref 8.9–12.9)
POTASSIUM SERPL-SCNC: 4.2 MMOL/L (ref 3.5–5.1)
PROT SERPL-MCNC: 7.3 G/DL (ref 6.4–8.2)
RBC # BLD AUTO: 4.95 M/UL (ref 4.1–5.7)
RBC MORPH BLD: ABNORMAL
SODIUM SERPL-SCNC: 138 MMOL/L (ref 136–145)
WBC # BLD AUTO: 3.5 K/UL (ref 4.1–11.1)

## 2022-10-26 PROCEDURE — 82784 ASSAY IGA/IGD/IGG/IGM EACH: CPT

## 2022-10-26 PROCEDURE — 85025 COMPLETE CBC W/AUTO DIFF WBC: CPT

## 2022-10-26 PROCEDURE — 36591 DRAW BLOOD OFF VENOUS DEVICE: CPT

## 2022-10-26 PROCEDURE — 83521 IG LIGHT CHAINS FREE EACH: CPT

## 2022-10-26 PROCEDURE — 74011000250 HC RX REV CODE- 250: Performed by: INTERNAL MEDICINE

## 2022-10-26 PROCEDURE — 77030012965 HC NDL HUBR BBMI -A

## 2022-10-26 PROCEDURE — 84165 PROTEIN E-PHORESIS SERUM: CPT

## 2022-10-26 PROCEDURE — 80053 COMPREHEN METABOLIC PANEL: CPT

## 2022-10-26 PROCEDURE — 74011250636 HC RX REV CODE- 250/636: Performed by: INTERNAL MEDICINE

## 2022-10-26 RX ORDER — HEPARIN 100 UNIT/ML
500 SYRINGE INTRAVENOUS AS NEEDED
Status: ACTIVE | OUTPATIENT
Start: 2022-10-26 | End: 2022-10-26

## 2022-10-26 RX ORDER — SODIUM CHLORIDE 9 MG/ML
10 INJECTION INTRAMUSCULAR; INTRAVENOUS; SUBCUTANEOUS AS NEEDED
Status: ACTIVE | OUTPATIENT
Start: 2022-10-26 | End: 2022-10-26

## 2022-10-26 RX ORDER — SODIUM CHLORIDE 0.9 % (FLUSH) 0.9 %
5-10 SYRINGE (ML) INJECTION AS NEEDED
Status: DISPENSED | OUTPATIENT
Start: 2022-10-26 | End: 2022-10-26

## 2022-10-26 RX ADMIN — SODIUM CHLORIDE, PRESERVATIVE FREE 10 ML: 5 INJECTION INTRAVENOUS at 10:10

## 2022-10-26 RX ADMIN — HEPARIN 500 UNITS: 100 SYRINGE at 10:10

## 2022-10-26 NOTE — PROGRESS NOTES
[address] [facility] Note:  Arrived - 1005     Labs Obtained - CBC w/ diff, CMP, Myeloma labs      Visit Vitals  /76   Pulse 74   Temp 98.1 °F (36.7 °C)   Resp 16   Wt 85.5 kg (188 lb 6.4 oz)   SpO2 100%   BMI 28.65 kg/m²       Port accessed & flushed per protocol w/o difficulty, labs obtained. Scott needle removed. 1015 - Tolerated well. Pt denies any acute problems/changes. Discharged from [facility] ambulatory. No distress.  Next appt: 11/23/22

## 2022-10-27 DIAGNOSIS — C90.00 MULTIPLE MYELOMA, REMISSION STATUS UNSPECIFIED (HCC): ICD-10-CM

## 2022-10-27 LAB
KAPPA LC FREE SER-MCNC: 41.2 MG/L (ref 3.3–19.4)
KAPPA LC FREE/LAMBDA FREE SER: 1.14 {RATIO} (ref 0.26–1.65)
LAMBDA LC FREE SERPL-MCNC: 36.2 MG/L (ref 5.7–26.3)

## 2022-10-27 RX ORDER — POMALIDOMIDE 2 MG/1
1 CAPSULE ORAL DAILY
Qty: 28 CAPSULE | Refills: 0 | Status: SHIPPED | OUTPATIENT
Start: 2022-10-27 | End: 2022-11-21

## 2022-10-31 LAB
ALBUMIN SERPL ELPH-MCNC: 3.6 G/DL (ref 2.9–4.4)
ALBUMIN/GLOB SERPL: 1 {RATIO} (ref 0.7–1.7)
ALPHA1 GLOB SERPL ELPH-MCNC: 0.2 G/DL (ref 0–0.4)
ALPHA2 GLOB SERPL ELPH-MCNC: 0.9 G/DL (ref 0.4–1)
B-GLOBULIN SERPL ELPH-MCNC: 1 G/DL (ref 0.7–1.3)
GAMMA GLOB SERPL ELPH-MCNC: 1.4 G/DL (ref 0.4–1.8)
GLOBULIN SER CALC-MCNC: 3.6 G/DL (ref 2.2–3.9)
M PROTEIN SERPL ELPH-MCNC: NORMAL G/DL
PROT SERPL-MCNC: 7.2 G/DL (ref 6–8.5)

## 2022-11-01 LAB
IGA SERPL-MCNC: 548 MG/DL (ref 61–437)
IGG SERPL-MCNC: 1252 MG/DL (ref 603–1613)
IGM SERPL-MCNC: 20 MG/DL (ref 20–172)
PROT PATTERN SERPL IFE-IMP: ABNORMAL

## 2022-11-03 ENCOUNTER — TELEPHONE (OUTPATIENT)
Dept: NEUROLOGY | Age: 67
End: 2022-11-03

## 2022-11-03 NOTE — TELEPHONE ENCOUNTER
Called and spoke with Jodi Wheeler and patient. Verified patient name/. Relayed Dr. Sparks Found message. Patient stated he does not want to try a medication and wants to talk with Dr. Diana Boucher regarding this during his office visit. Jodi Wheeler stated Dr. Diana Boucher hasn't diagnosed him and would like a proper diagnosis. Patient stated he will stay on Clonazepam until his next appointment.

## 2022-11-03 NOTE — TELEPHONE ENCOUNTER
Voicemail:    Patient's wife, slim Gipson requesting to speak with Nurse ASAP regarding Pt. She stated that it was urgent.  Please call [phone]

## 2022-11-03 NOTE — TELEPHONE ENCOUNTER
Called and spoke with Amira Hunter and patient. Verified patient name/. Firstly, perry stated form the phone call on 10/6/2022, the reason he was having issues breathing was having the hiccups. Patient went to ED twice due to this. They placed him on Thorazine and hiccups have subsided. Amira Hunter stated when they came to see Dr. Leonora Das on 2022 they were seeing him for uncontrollable mouth movements that she believed was Tardive Dyskinesia. Amira Hunter stated Dr. Leonora Das ordered all these tests but never Dx or doesn't know if he was given a Dx. Read this paragraph from Dr. Delmi Carlisle on 2022: We did discuss that there are multiple medications that he is taking that can contribute to the facial spasms. This is a difficult situation as he has multiple ongoing medical conditions and needs these medications to help with his symptom control. May need to start additional medication. This can include muscle relaxants, clonazepam    Amira Hunter and patient stated the clonazepam does not work and is a sedative making him sleep. Tiffanie have tried motrin w/ aleve that was recommended by a pharmacist and does not help. They believe he has Tardive Dyskinesia due to him taking Abilify and Wellbutrin after taking those medications for a period of time causes uncontrollable muscle movement that they read up on. She suggested this to Dr. Leonora Das when they saw him, they thought then that's what it was, she stated Dr. Leonora Das did the testing and discounted TD that he didn't find anything that suggested this. Patient with a follow up on 2022 with Dr. Leonora Das. Relayed I will forward to Dr. Leonora Das to review as medication is not working or helping patient.

## 2022-11-21 NOTE — PROGRESS NOTES
Neurology Note    Patient ID:  Osvaldo Driscoll.  [id number]  74 y.o.  1955      Date of Consultation:  November 23, 2022      Assessment and Plan:    Patient is a pleasant 81-year-old gentleman with multiple medical conditions who presents with involuntary, irregular movements of his face involving his lips, mouth, and eyes. His examination does reveal a regular muscle movements and spasms as well as a length dependent peripheral neuropathy. Involuntary facial movements: The differential for this does include idiopathic blepharospasm/hemifacial spasm +/- medication induced. Is most likely medication induced due to his longstanding history of medications for depression and antipsychotic medication  He does have a multitude of movements including tics, myoclonus, dystonia, dyskinesia  Brain MRI was without an etiology  Vitamin B12 and thyroid testing was normal.  He is on multiple medications that can induce abnormal movements. I will wean the patient off of clonazepam.  He has not seen much in the way of benefit and it is increasing sedation. He will wean by 1 pill every 2 weeks. Start austedo 6mg twice a day. Side effects and toxicity were reviewed with the patient. He does have a history of depression I did discuss with him and his wife about monitoring his depression closely. He does receive cognitive/behavioral therapy. He is unable to wean off of his current medications including Abilify as he needs those for his disease and it took him a long time to get those doses. He is quite resistant to coming off of those medications but will discuss with his primary care doctor. I did answer all of the questions that the patient and his wife had. We discussed the different types of movement disorders in detail. Peripheral neuropathy:  This is most likely related to his history of diabetes.    Continue aggressive control of his glucose and exercise  This is something that can be followed clinically. If this does worsen, can consider an EMG/nerve conduction study. Concern over cognition: The brain MRI was completed  Patient is receiving neuropsychology therapy through [address] [place] [address] for his depression and anxiety. The patient is very concerned about chemo brain and will place referral to Dr. Laura Araiza for formal neuropsychological testing. Subjective: my face still clenches       History of Present Illness:   Pearline Halsted Sr. is a 74 y.o. male who the neurology clinic at [facility] for an evaluation. The patient was initially seen in the clinic on July 1, 2022. Please see my history of present illness, examination, and treatment plan from that day. The patient does have a history of multiple myeloma and chronic hepatitis C who began to develop abnormal facial movements that can be  painful. After that visit, he did have a a brain MRI which was unremarkable. At that time, the patient has been seen by hematology/oncology and noted that the clonazepam was helping at that time. The patient reports is that it did help initially but he does not notice any significant benefit from the medication and it is causing increasing sedation. The patient did have a bout of hiccups and was prescribed Thorazine. He is not taking this medication anymore. The patient has also been seen by neuropsychology at [address] [place] [address]. The patient was diagnosed with major depressive disorder and generalized anxiety disorder. He does feel that the therapy is helping. He does continue to need to use a toothpick regularly to help him with his facial movements. He is able to control the movements for short periods of time and then it does worsen. He continues to have irregular involuntary blinking movements. He also does make a guttural sound occasionally. He denies any difficulty with vision, hearing, speech or swallowing.     He did have multiple medications adjusted just over a year ago. This does include medications for his anxiety/depression, myeloma, and hepatitis. His wife has multiple questions about different types of movement disorders and all of her questions were answered      Past Medical History:   Diagnosis Date    Acid reflux     Arrhythmia     pvc's    Chronic pain     neck    Depression     Diabetes ([address]     Femoral hernia 2012    Hepatitis C     Hypertension     Inguinal hernia 2012    Liver disease     hepatitis c    Multiple myeloma ([address]     Other ill-defined conditions(799.89)     Hx of PVCs since     Prostatitis, acute     Psychiatric disorder     depression        Past Surgical History:   Procedure Laterality Date    COLONOSCOPY N/A 2016    COLONOSCOPY performed by Francisco Javier Torres MD at \[facility]\"" ENDOSCOPY    HX APPENDECTOMY      HX CERVICAL FUSION  2008    x 1 as of 2014    HX HEENT      foreign body removed from right eye    HX HERNIA REPAIR      [facility]      HX ORTHOPAEDIC      cervical fusion    HX OTHER SURGICAL  2000    liver bx - chronic hepatitis        Family History   Problem Relation Age of Onset    OSTEOARTHRITIS Mother     OSTEOARTHRITIS Father     Diabetes Father     Hypertension Maternal Grandmother     Diabetes Maternal Grandmother     Hypertension Maternal Grandfather     Diabetes Maternal Grandfather         Social History     Tobacco Use    Smoking status: Former     Packs/day: 1.00     Years: 12.00     Pack years: 12.00     Types: Cigarettes     Quit date: 1989     Years since quittin.9    Smokeless tobacco: Never    Tobacco comments:     former cigarette smoker   Substance Use Topics    Alcohol use: No     Comment: former alcoholic-quit 0570        Allergies   Allergen Reactions    Mercury (Bulk) Hives     Blisters          Prior to Admission medications    Medication Sig Start Date End Date Taking?  Authorizing Provider   deutetrabenazine (Austedo) 6 mg tab Take 6 mg by mouth two (2) times a day. Indications: tardive dyskinesia, a disorder characterized by involuntary movements of the face, mouth and tongue 11/23/22  Yes Messi Ruiz DO   Pomalyst 2 mg cap TAKE 1 CAPSULE BY MOUTH ONCE DAILY 11/21/22  Yes Brayan Austin MD   chlorproMAZINE (THORAZINE) 25 mg tablet Take 1 Tablet by mouth three (3) times daily as needed (hiccups). 8/22/22  Yes David Mo MD   tenofovir DISOPROXIL FUMARATE (VIREAD) 300 mg tablet TAKE 1 TABLET BY MOUTH 1 TIME A DAY. 8/3/22  Yes Brayan Austin MD   metFORMIN ER (GLUCOPHAGE XR) 500 mg tablet TAKE 1 TABLET BY MOUTH TWO (2) TIMES A DAY. 6/25/22  Yes Truong Montesinos MD   TESTOSTERONE  mg by IntraMUSCular route every fourteen (14) days. Yes Provider, Historical   OTHER Cannabis prescription   Yes Provider, Historical   cholecalciferol, vitamin D3, 50 mcg (2,000 unit) tab Take  by mouth daily. Yes Provider, Historical   ascorbic acid, vitamin C, (VITAMIN C) 100 mg tab Take 100 mg by mouth daily. Yes Provider, Historical   Denta 5000 Plus 1.1 % crea USE AS DIRECTED TWO TIMES DAILY 8/12/20  Yes Provider, Historical   ibuprofen (MOTRIN) 800 mg tablet TAKE 1 TABLET BY MOUTH 3 TIMES A DAY AS NEEDED FOR PAIN 10/13/20  Yes Provider, Historical   buPROPion XL (WELLBUTRIN XL) 300 mg XL tablet TAKE 1 TABLET BY MOUTH EVERY DAY 1/30/20  Yes Provider, Historical   NUCYNTA 100 mg tablet TAKE 2 TABLETS 3 TIMES A DAY 7/27/19  Yes Provider, Historical   aspirin delayed-release 81 mg tablet Take  by mouth daily. Yes Provider, Historical   ondansetron (ZOFRAN ODT) 8 mg disintegrating tablet Take 8 mg by mouth every eight (8) hours as needed for Nausea. Yes Other, MD Goldy   multivitamin (ONE A DAY) tablet Take 1 Tab by mouth daily. Yes Provider, Historical   tamsulosin (FLOMAX) 0.4 mg capsule Take 1 Cap by mouth daily. 4/4/17  Yes Cary Ayala MD   ARIPiprazole (ABILIFY) 5 mg tablet Take 2.5 mg by mouth every morning.    Yes Other, MD Goldy   Belsomra 15 mg tablet Take 15 mg by mouth nightly. Patient not taking: Reported on 11/23/2022 5/5/22   Provider, Historical   lisinopriL (PRINIVIL, ZESTRIL) 10 mg tablet Take 10 mg by mouth daily as needed. Patient not taking: Reported on 11/23/2022    Provider, Historical   POTASSIUM PO Take  by mouth as needed. Patient not taking: Reported on 11/23/2022    Provider, Historical   zolpidem 3.5 mg subl DISSOLVE 1 TABLET UNDER TONGUE AT BEDTIME  Patient not taking: No sig reported 4/10/21   Provider, Historical   naproxen (NAPROSYN) 500 mg tablet TAKE 1 TABLET BY MOUTH TWICE A DAY  Patient not taking: No sig reported 8/26/19   Provider, Historical   prochlorperazine (COMPAZINE) 10 mg tablet Take 5 mg by mouth every six (6) hours as needed. Patient not taking: Reported on 11/23/2022    Other, MD Goldy   omeprazole (PRILOSEC) 20 mg capsule Take 20 mg by mouth daily as needed (acid reflux). Patient not taking: Reported on 11/23/2022    Other, MD Goldy   zolpidem CR (AMBIEN CR) 12.5 mg tablet Take 12.5 mg by mouth nightly. Patient not taking: No sig reported    Provider, Historical       Review of Systems:    General, constitutional: negative  Eyes, vision: negative  Ears, nose, throat: negative  Cardiovascular, heart: negative  Respiratory: negative  Gastrointestinal: negative  Genitourinary: negative  Musculoskeletal: chronic neck pain.    Skin and integumentary: negative  Psychiatric: negative  Endocrine: negative  Neurological: negative, except for HPI  Hematologic/lymphatic: negative  Allergy/immunology: negative      Objective:     Visit Vitals  /84 (BP 1 Location: Left upper arm, BP Patient Position: Sitting, BP Cuff Size: Large adult)   Pulse 92   Resp 16   Ht 5' 8\" (1.727 m)   Wt 194 lb (88 kg)   SpO2 100%   BMI 29.50 kg/m²         Physical Exam:      General:  appears well nourished in no acute distress  Neck: no carotid bruits  Lungs: clear to auscultation  Heart:  no murmurs, regular rate  Lower extremity: peripheral pulses palpable and no edema  Skin: intact    Neurological exam:    Awake, alert, oriented to person, place and time  Recent and remote memory were normal  Attention and concentration were intact  Language was intact. There was no aphasia  Speech: no dysarthria  Fund of knowledge was preserved    Cranial nerves:   II-XII were tested    Perrrla  Visual fields were full  Eomi, no evidence of nystagmus  Facial sensation:  normal and symmetric  Facial motor: He does have occasional blepharospasm. There is also irregular or clenching and movements of his jaw and mouth. He does appear spastic, dyskinetic, intermittent. Also with intermittent gutteral sounds  Hearing intact  SCM strength intact  Tongue: midline without fasciculations    Motor: Tone normal  Pronator drift was absent  No evidence of fasciculations    Strength testing:   deltoid triceps biceps Wrist ext. Wrist flex. intrinsics Hip flex. Hip ext. Knee ext. Knee flex Dorsi flex Plantar flex   Right 5 5 5 5 5 5 5 5 5 5 5 5   Left 5 5 5 5 5 5 5 5 5 5 5 5         Sensory:  Upper extremity: intact to pp,   Lower extremity: intact to pp. Decreased vibration distally at 5 seconds. Reflexes:    Right Left  Biceps  2 2  Triceps 2 2  Brachiorad. 2 2  Patella  2 2  Achilles 1 1    Plantar response:  flexor bilaterally    Cerebellar testing:  no tremor apparent, finger/nose and wilda were intact    Romberg: absent    Gait: steady.      Labs:     Lab Results   Component Value Date/Time    Hemoglobin A1c 6.2 08/25/2017 06:44 AM    Sodium 138 10/26/2022 10:06 AM    Potassium 4.2 10/26/2022 10:06 AM    Chloride 106 10/26/2022 10:06 AM    Glucose 121 (H) 10/26/2022 10:06 AM    BUN 16 10/26/2022 10:06 AM    Creatinine 1.56 (H) 10/26/2022 10:06 AM    Calcium 8.9 10/26/2022 10:06 AM    WBC 3.5 (L) 10/26/2022 10:06 AM    HCT 38.7 10/26/2022 10:06 AM    HGB 11.7 (L) 10/26/2022 10:06 AM    PLATELET 739 94/67/5358 10:06 AM    Hemoglobin A1c, External 6.6% 04/07/2017 12:00 AM       Imaging:    Results from Hospital Encounter encounter on 07/20/22    MRI BRAIN WO CONT    Narrative  INDICATION: Dx: Hemifacial spasm of right side of face [G51.31 (ICD-10-CM)]    EXAMINATION:  MRI BRAIN WO CONTRAST    COMPARISON:  CT head September 19, 2011    TECHNIQUE:  Multiplanar multisequence acquisition without contrast of the brain. FINDINGS:    Ventricles:  Midline, no hydrocephalus. Brain Parenchyma/Brainstem:  Normal for age. No acute infarction. Intracranial Hemorrhage:  None. Basal Cisterns:  Normal.  Flow Voids:  Normal.  Additional Comments:  N/A. Impression  No significant abnormality or acute process. Results from [place] encounter on 04/16/22    CTA CHEST W OR W WO CONT    Narrative  EXAM: CT ANGIOGRAPHY CHEST    INDICATION:  CP, SOB, hx cancer    COMPARISON: None. Gary Power CONTRAST: 70 mL of Isovue-370. TECHNIQUE:  Precontrast  images were obtained to localize the volume for acquisition. Multislice helical CT arteriography was performed from the diaphragm to the  thoracic inlet during uneventful rapid bolus intravenous contrast  administration. Lung and soft tissue windows were generated. Coronal and  sagittal  reformatted images were also generated. 3D post processing consisting  of coronal maximum intensity projection images was performed. CT dose reduction  was achieved through use of a standardized protocol tailored for this  examination and automatic exposure control for dose modulation. FINDINGS:  Minimal bibasilar atelectasis. .    The pulmonary arteries are well enhanced and no pulmonary emboli are identified. Main pulmonary outflow tract 3.2 cm, upper normal to mildly enlarged. There is no mediastinal or hilar adenopathy or mass. The aorta enhances normally  without evidence of aneurysm or dissection.     The visualized portions of the upper abdominal organs are normal.    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 2    Impression  1. No CT evidence for central pulmonary embolus at this time . 2. Upper normal to mildly enlarged main pulmonary outflow tract. Correlate for  pulmonary arterial hypertension. 3. 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 4. Minimal bibasilar atelectasis. Patient Active Problem List   Diagnosis Code    Symptomatic PVCs I49.3    Hypertension I10    Diabetes ([facility] E11.9    Depression F32. A    Chronic hepatitis C (HCC) B18.2    Anemia D64.9    Multiple myeloma (HCC) C90.00    Anemia associated with chemotherapy D64.81, T45.1X5A    Renal insufficiency N28.9    H/O autologous stem cell transplant ([facility] Z94.84    Type 2 diabetes with nephropathy (HCC) E11.21    Chemotherapy-induced thrombocytopenia D69.59, T45.1X5A      The patient should return to clinic in 2-3 months    Renewed medication; none    I spent  34  minutes on the day of the encounter preparing the office visit by reviewing medical records, obtaining a history, performing examination, counseling and educating the patient and family members on diagnosis, ordering medications, documenting in the clinical medical record, and coordinating the care for the patient. The patient and his wife had the ability to ask questions and all questions were answered.                Signed By:  Jeni Mendez DO FAAN    November 23, 2022

## 2022-11-23 ENCOUNTER — OFFICE VISIT (OUTPATIENT)
Dept: NEUROLOGY | Age: 67
End: 2022-11-23
Payer: MEDICARE

## 2022-11-23 ENCOUNTER — TELEPHONE (OUTPATIENT)
Dept: NEUROLOGY | Age: 67
End: 2022-11-23

## 2022-11-23 ENCOUNTER — HOSPITAL ENCOUNTER (OUTPATIENT)
Dept: INFUSION THERAPY | Age: 67
Discharge: HOME | End: 2022-11-23
Payer: MEDICARE

## 2022-11-23 VITALS
HEART RATE: 77 BPM | OXYGEN SATURATION: 99 % | DIASTOLIC BLOOD PRESSURE: 84 MMHG | SYSTOLIC BLOOD PRESSURE: 141 MMHG | BODY MASS INDEX: 29.48 KG/M2 | WEIGHT: 193.9 LBS | TEMPERATURE: 97.7 F | RESPIRATION RATE: 16 BRPM

## 2022-11-23 VITALS
WEIGHT: 194 LBS | HEART RATE: 92 BPM | OXYGEN SATURATION: 100 % | HEIGHT: 68 IN | SYSTOLIC BLOOD PRESSURE: 124 MMHG | RESPIRATION RATE: 16 BRPM | DIASTOLIC BLOOD PRESSURE: 84 MMHG | BODY MASS INDEX: 29.4 KG/M2

## 2022-11-23 DIAGNOSIS — G31.84 MCI (MILD COGNITIVE IMPAIRMENT): Primary | ICD-10-CM

## 2022-11-23 DIAGNOSIS — G24.01 TARDIVE DYSKINESIA: ICD-10-CM

## 2022-11-23 DIAGNOSIS — G51.31 HEMIFACIAL SPASM OF RIGHT SIDE OF FACE: ICD-10-CM

## 2022-11-23 DIAGNOSIS — C90.01 MULTIPLE MYELOMA IN REMISSION (HCC): Primary | ICD-10-CM

## 2022-11-23 DIAGNOSIS — G24.5 BLEPHAROSPASM: ICD-10-CM

## 2022-11-23 DIAGNOSIS — C90.00 MULTIPLE MYELOMA, REMISSION STATUS UNSPECIFIED (HCC): ICD-10-CM

## 2022-11-23 DIAGNOSIS — R25.9 INVOLUNTARY MOVEMENTS: ICD-10-CM

## 2022-11-23 LAB
ALBUMIN SERPL-MCNC: 3.3 G/DL (ref 3.5–5)
ALBUMIN/GLOB SERPL: 0.9 {RATIO} (ref 1.1–2.2)
ALP SERPL-CCNC: 83 U/L (ref 45–117)
ALT SERPL-CCNC: 25 U/L (ref 12–78)
ANION GAP BLD CALC-SCNC: 11 MMOL/L (ref 10–20)
AST SERPL-CCNC: 16 U/L (ref 15–37)
BASOPHILS # BLD: 0 K/UL (ref 0–0.1)
BASOPHILS NFR BLD: 1 % (ref 0–1)
BILIRUB DIRECT SERPL-MCNC: 0.1 MG/DL (ref 0–0.2)
BILIRUB SERPL-MCNC: 0.4 MG/DL (ref 0.2–1)
CA-I BLD-MCNC: 1.17 MMOL/L (ref 1.12–1.32)
CHLORIDE BLD-SCNC: 102 MMOL/L (ref 98–107)
CO2 BLD-SCNC: 27.5 MMOL/L (ref 21–32)
CREAT BLD-MCNC: 1.43 MG/DL (ref 0.6–1.3)
DIFFERENTIAL METHOD BLD: ABNORMAL
EOSINOPHIL # BLD: 0.1 K/UL (ref 0–0.4)
EOSINOPHIL NFR BLD: 2 % (ref 0–7)
ERYTHROCYTE [DISTWIDTH] IN BLOOD BY AUTOMATED COUNT: 21.2 % (ref 11.5–14.5)
GLOBULIN SER CALC-MCNC: 3.7 G/DL (ref 2–4)
GLUCOSE BLD-MCNC: 138 MG/DL (ref 65–100)
HCT VFR BLD AUTO: 37.3 % (ref 36.6–50.3)
HGB BLD-MCNC: 11.3 G/DL (ref 12.1–17)
IGA SERPL-MCNC: 482 MG/DL (ref 70–400)
IGG SERPL-MCNC: 1250 MG/DL (ref 700–1600)
IGM SERPL-MCNC: 21 MG/DL (ref 40–230)
IMM GRANULOCYTES # BLD AUTO: 0 K/UL (ref 0–0.04)
IMM GRANULOCYTES NFR BLD AUTO: 0 % (ref 0–0.5)
LYMPHOCYTES # BLD: 0.7 K/UL (ref 0.8–3.5)
LYMPHOCYTES NFR BLD: 22 % (ref 12–49)
MCH RBC QN AUTO: 23.9 PG (ref 26–34)
MCHC RBC AUTO-ENTMCNC: 30.3 G/DL (ref 30–36.5)
MCV RBC AUTO: 78.9 FL (ref 80–99)
MONOCYTES # BLD: 0.8 K/UL (ref 0–1)
MONOCYTES NFR BLD: 24 % (ref 5–13)
NEUTS SEG # BLD: 1.7 K/UL (ref 1.8–8)
NEUTS SEG NFR BLD: 51 % (ref 32–75)
NRBC # BLD: 0 K/UL (ref 0–0.01)
NRBC BLD-RTO: 0 PER 100 WBC
PLATELET # BLD AUTO: 166 K/UL (ref 150–400)
PMV BLD AUTO: 9.2 FL (ref 8.9–12.9)
POTASSIUM BLD-SCNC: 4.3 MMOL/L (ref 3.5–5.1)
PROT SERPL-MCNC: 7 G/DL (ref 6.4–8.2)
RBC # BLD AUTO: 4.73 M/UL (ref 4.1–5.7)
RBC MORPH BLD: ABNORMAL
SERVICE CMNT-IMP: ABNORMAL
SODIUM BLD-SCNC: 139 MMOL/L (ref 136–145)
WBC # BLD AUTO: 3.3 K/UL (ref 4.1–11.1)

## 2022-11-23 PROCEDURE — 36415 COLL VENOUS BLD VENIPUNCTURE: CPT

## 2022-11-23 PROCEDURE — 99214 OFFICE O/P EST MOD 30 MIN: CPT | Performed by: PSYCHIATRY & NEUROLOGY

## 2022-11-23 PROCEDURE — 83521 IG LIGHT CHAINS FREE EACH: CPT

## 2022-11-23 PROCEDURE — 80076 HEPATIC FUNCTION PANEL: CPT

## 2022-11-23 PROCEDURE — 74011250636 HC RX REV CODE- 250/636: Performed by: INTERNAL MEDICINE

## 2022-11-23 PROCEDURE — 1123F ACP DISCUSS/DSCN MKR DOCD: CPT | Performed by: PSYCHIATRY & NEUROLOGY

## 2022-11-23 PROCEDURE — G8417 CALC BMI ABV UP PARAM F/U: HCPCS | Performed by: PSYCHIATRY & NEUROLOGY

## 2022-11-23 PROCEDURE — 74011000250 HC RX REV CODE- 250: Performed by: INTERNAL MEDICINE

## 2022-11-23 PROCEDURE — 84165 PROTEIN E-PHORESIS SERUM: CPT

## 2022-11-23 PROCEDURE — G9717 DOC PT DX DEP/BP F/U NT REQ: HCPCS | Performed by: PSYCHIATRY & NEUROLOGY

## 2022-11-23 PROCEDURE — 3074F SYST BP LT 130 MM HG: CPT | Performed by: PSYCHIATRY & NEUROLOGY

## 2022-11-23 PROCEDURE — 96374 THER/PROPH/DIAG INJ IV PUSH: CPT

## 2022-11-23 PROCEDURE — 85025 COMPLETE CBC W/AUTO DIFF WBC: CPT

## 2022-11-23 PROCEDURE — G8427 DOCREV CUR MEDS BY ELIG CLIN: HCPCS | Performed by: PSYCHIATRY & NEUROLOGY

## 2022-11-23 PROCEDURE — G8754 DIAS BP LESS 90: HCPCS | Performed by: PSYCHIATRY & NEUROLOGY

## 2022-11-23 PROCEDURE — 77030012965 HC NDL HUBR BBMI -A

## 2022-11-23 PROCEDURE — 3078F DIAST BP <80 MM HG: CPT | Performed by: PSYCHIATRY & NEUROLOGY

## 2022-11-23 PROCEDURE — 82784 ASSAY IGA/IGD/IGG/IGM EACH: CPT

## 2022-11-23 PROCEDURE — G8752 SYS BP LESS 140: HCPCS | Performed by: PSYCHIATRY & NEUROLOGY

## 2022-11-23 PROCEDURE — 80047 BASIC METABLC PNL IONIZED CA: CPT

## 2022-11-23 PROCEDURE — G8536 NO DOC ELDER MAL SCRN: HCPCS | Performed by: PSYCHIATRY & NEUROLOGY

## 2022-11-23 PROCEDURE — 1101F PT FALLS ASSESS-DOCD LE1/YR: CPT | Performed by: PSYCHIATRY & NEUROLOGY

## 2022-11-23 PROCEDURE — 3017F COLORECTAL CA SCREEN DOC REV: CPT | Performed by: PSYCHIATRY & NEUROLOGY

## 2022-11-23 RX ORDER — DEUTETRABENAZINE 6 MG/1
6 TABLET, COATED ORAL 2 TIMES DAILY
Qty: 60 TABLET | Refills: 4 | Status: SHIPPED | OUTPATIENT
Start: 2022-11-23 | End: 2022-11-23 | Stop reason: SDUPTHER

## 2022-11-23 RX ORDER — HEPARIN 100 UNIT/ML
300-500 SYRINGE INTRAVENOUS AS NEEDED
Status: ACTIVE | OUTPATIENT
Start: 2022-11-23 | End: 2022-11-23

## 2022-11-23 RX ORDER — SODIUM CHLORIDE 0.9 % (FLUSH) 0.9 %
10 SYRINGE (ML) INJECTION AS NEEDED
Status: DISPENSED | OUTPATIENT
Start: 2022-11-23 | End: 2022-11-23

## 2022-11-23 RX ORDER — SODIUM CHLORIDE 9 MG/ML
10 INJECTION INTRAMUSCULAR; INTRAVENOUS; SUBCUTANEOUS AS NEEDED
Status: ACTIVE | OUTPATIENT
Start: 2022-11-23 | End: 2022-11-23

## 2022-11-23 RX ORDER — DEUTETRABENAZINE 6 MG/1
6 TABLET, COATED ORAL 2 TIMES DAILY
Qty: 60 TABLET | Refills: 4 | Status: SHIPPED | OUTPATIENT
Start: 2022-11-23 | End: 2022-11-28 | Stop reason: SDUPTHER

## 2022-11-23 RX ADMIN — ZOLEDRONIC ACID 4 MG: 0.04 INJECTION, SOLUTION INTRAVENOUS at 10:20

## 2022-11-23 RX ADMIN — SODIUM CHLORIDE, PRESERVATIVE FREE 10 ML: 5 INJECTION INTRAVENOUS at 10:35

## 2022-11-23 RX ADMIN — SODIUM CHLORIDE, PRESERVATIVE FREE 10 ML: 5 INJECTION INTRAVENOUS at 10:10

## 2022-11-23 RX ADMIN — HEPARIN SODIUM (PORCINE) LOCK FLUSH IV SOLN 100 UNIT/ML 500 UNITS: 100 SOLUTION at 10:35

## 2022-11-23 NOTE — PROGRESS NOTES
[facility] Visit Note    1000- Pt arrived to [facility] ambulatory in no acute distress for Zometa. Assessment unremarkable except tingling to feet. R chest port accessed without issue and positive blood return noted. Patient denies any recent or upcoming dental work. Visit Vitals  BP (!) 141/84   Pulse 77   Temp 97.7 °F (36.5 °C)   Resp 16   Wt 88 kg (193 lb 14.4 oz)   SpO2 99%   BMI 29.48 kg/m²     Labs obtained - CBC w/ diff, Hepatic Function Panel, Chem8, Myeloma labs  Recent Results (from the past 12 hour(s))   CBC WITH AUTOMATED DIFF    Collection Time: 11/23/22 10:02 AM   Result Value Ref Range    WBC 3.3 (L) 4.1 - 11.1 K/uL    RBC 4.73 4.10 - 5.70 M/uL    HGB 11.3 (L) 12.1 - 17.0 g/dL    HCT 37.3 36.6 - 50.3 %    MCV 78.9 (L) 80.0 - 99.0 FL    MCH 23.9 (L) 26.0 - 34.0 PG    MCHC 30.3 30.0 - 36.5 g/dL    RDW 21.2 (H) 11.5 - 14.5 %    PLATELET 922 456 - 175 K/uL    MPV 9.2 8.9 - 12.9 FL    NRBC 0.0 0  WBC    ABSOLUTE NRBC 0.00 0.00 - 0.01 K/uL    NEUTROPHILS 51 32 - 75 %    LYMPHOCYTES 22 12 - 49 %    MONOCYTES 24 (H) 5 - 13 %    EOSINOPHILS 2 0 - 7 %    BASOPHILS 1 0 - 1 %    IMMATURE GRANULOCYTES 0 0.0 - 0.5 %    ABS. NEUTROPHILS 1.7 (L) 1.8 - 8.0 K/UL    ABS. LYMPHOCYTES 0.7 (L) 0.8 - 3.5 K/UL    ABS. MONOCYTES 0.8 0.0 - 1.0 K/UL    ABS. EOSINOPHILS 0.1 0.0 - 0.4 K/UL    ABS. BASOPHILS 0.0 0.0 - 0.1 K/UL    ABS. IMM. GRANS. 0.0 0.00 - 0.04 K/UL    DF SMEAR SCANNED      RBC COMMENTS ANISOCYTOSIS  1+       HEPATIC FUNCTION PANEL    Collection Time: 11/23/22 10:02 AM   Result Value Ref Range    Protein, total 7.0 6.4 - 8.2 g/dL    Albumin 3.3 (L) 3.5 - 5.0 g/dL    Globulin 3.7 2.0 - 4.0 g/dL    A-G Ratio 0.9 (L) 1.1 - 2.2      Bilirubin, total 0.4 0.2 - 1.0 MG/DL    Bilirubin, direct 0.1 0.0 - 0.2 MG/DL    Alk.  phosphatase 83 45 - 117 U/L    AST (SGOT) 16 15 - 37 U/L    ALT (SGPT) 25 12 - 78 U/L   POC CHEM8    Collection Time: 11/23/22 10:09 AM   Result Value Ref Range Calcium, ionized (POC) 1.17 1.12 - 1.32 mmol/L    Sodium (POC) 139 136 - 145 mmol/L    Potassium (POC) 4.3 3.5 - 5.1 mmol/L    Chloride (POC) 102 98 - 107 mmol/L    CO2 (POC) 27.5 21 - 32 mmol/L    Anion gap (POC) 11 10 - 20 mmol/L    Glucose (POC) 138 (H) 65 - 100 mg/dL    Creatinine (POC) 1.43 (H) 0.6 - 1.3 mg/dL    eGFR (POC) 54 (L) >60 ml/min/1.73m2    Comment Comment Not Indicated. The following medications administered:  Medications Administered       heparin (porcine) pf 300-500 Units       Admin Date  11/23/2022 Action  Given Dose  500 Units Route  InterCATHeter Administered By  Leida Neely RN              saline peripheral flush soln 10 mL       Admin Date  11/23/2022 Action  Given Dose  10 mL Route  InterCATHeter Administered By  Leida Neely RN               Admin Date  11/23/2022 Action  Given Dose  10 mL Route  InterCATHeter Administered By  Leida Neely RN              zoledronic acid (ZOMETA) 4 mg/100mL infusion       Admin Date  11/23/2022 Action  New Bag Dose  4 mg Rate  400 mL/hr Route  IntraVENous Administered By  Leida Neely RN                  0497- Pt tolerated treatment well. Port flushed per policy and de-accessed, 2x2 and tape placed. Pt discharged ambulatory in no acute distress, accompanied by spouse. Next appointment 12/21/22.

## 2022-11-23 NOTE — TELEPHONE ENCOUNTER
Pt's wife called. We sent his script to the wrong pharmacy. Please re-route to [facility] on [address]. They will not get it for a week if they have to wait for the specialty pharmacy.      Please call with any questions [phone]

## 2022-11-23 NOTE — LETTER
11/23/2022    Patient: Vaughn Madera. YOB: 1955   Date of Visit: 11/23/2022     Levy Brooks MD  [address] [place] [address]  V[address] [facility] [address]    Dear Levy Brooks MD,      Thank you for referring Mr. Emma Mckeon to [address] [place] [address] for evaluation. My notes for this consultation are attached. If you have questions, please do not hesitate to call me. I look forward to following your patient along with you.       Sincerely,    Messi Ruiz, DO

## 2022-11-23 NOTE — PROGRESS NOTES
1. Have you been to the ER, urgent care clinic since your last visit? Hospitalized since your last visit? Seen in ER for Hiccups. 2. Have you seen or consulted any other health care providers outside of the [address] [place] [address] since your last visit? Include any pap smears or colon screening.    No.        Chief Complaint   Patient presents with    Neurologic Problem     Mouth will not stay still, making noise such as throat clearing

## 2022-11-23 NOTE — TELEPHONE ENCOUNTER
Noted will send to  on call since Boni Smiley is out of office now. Sending message to send the Austedo 6 mg tab bid to local pharmacy.

## 2022-11-25 ENCOUNTER — TELEPHONE (OUTPATIENT)
Dept: NEUROLOGY | Age: 67
End: 2022-11-25

## 2022-11-25 LAB
KAPPA LC FREE SER-MCNC: 54.5 MG/L (ref 3.3–19.4)
KAPPA LC FREE/LAMBDA FREE SER: 1.22 {RATIO} (ref 0.26–1.65)
LAMBDA LC FREE SERPL-MCNC: 44.7 MG/L (ref 5.7–26.3)

## 2022-11-25 NOTE — TELEPHONE ENCOUNTER
Voicemail:    Patient's wife, Daniel Atwood, called stating that we need to call the specialty pharmacy to give them approval to fill Pt's Austedo. The [facility] [address]  does not keep this medication in stock.

## 2022-11-28 LAB
IGA SERPL-MCNC: 544 MG/DL (ref 61–437)
IGG SERPL-MCNC: 1269 MG/DL (ref 603–1613)
IGM SERPL-MCNC: 22 MG/DL (ref 20–172)
PROT PATTERN SERPL IFE-IMP: ABNORMAL

## 2022-11-28 RX ORDER — DEUTETRABENAZINE 6 MG/1
6 TABLET, COATED ORAL 2 TIMES DAILY
Qty: 180 TABLET | Refills: 2 | Status: SHIPPED | COMMUNITY
Start: 2022-11-28

## 2022-11-28 NOTE — TELEPHONE ENCOUNTER
Returned wife Roly Zavala on [place], she advised they need the Austedo sent to mail order-[facility] specialty pharmacy instead of local pharmacy. Local pharmacy does not stock this medication. The specialty called them stating it needs a PA. I advised I will have the dr send in the script for Austedo to mail order and I will also send message to PA specialist here, Zahra Johnson to work on. She ask I put urgent on the matter as pt needs his medication. Advised I will do that. Roly Zavala verbalized understanding.

## 2022-11-29 LAB
ALBUMIN SERPL ELPH-MCNC: 3.4 G/DL (ref 2.9–4.4)
ALBUMIN/GLOB SERPL: 1.1 {RATIO} (ref 0.7–1.7)
ALPHA1 GLOB SERPL ELPH-MCNC: 0.2 G/DL (ref 0–0.4)
ALPHA2 GLOB SERPL ELPH-MCNC: 0.8 G/DL (ref 0.4–1)
B-GLOBULIN SERPL ELPH-MCNC: 0.9 G/DL (ref 0.7–1.3)
GAMMA GLOB SERPL ELPH-MCNC: 1.3 G/DL (ref 0.4–1.8)
GLOBULIN SER CALC-MCNC: 3.1 G/DL (ref 2.2–3.9)
M PROTEIN SERPL ELPH-MCNC: NORMAL G/DL
PROT SERPL-MCNC: 6.5 G/DL (ref 6–8.5)

## 2022-11-30 ENCOUNTER — TELEPHONE (OUTPATIENT)
Dept: NEUROLOGY | Age: 67
End: 2022-11-30

## 2022-11-30 NOTE — TELEPHONE ENCOUNTER
Called wife, Ashleigh Mccormack on [place], advised our PA Stephen Martin is stating pt's Daysi Leung is approved from today until end of next year. [facility] advised of [facility]. She spoke to artie TOPETE at [phone] and advised the pharmacy will call pt at 1 pm to set up delivery. She can call [facility] if she wants. Ashleigh Easton verbalized understanding and thanked me for our help.

## 2022-11-30 NOTE — TELEPHONE ENCOUNTER
Bethany Morel  You 10 minutes ago (10:30 AM)     BB  Austedo approved - please notify patient's wife of approval and feel free to give her the [facility] number if she wants to follow up as well. But I called in the order - and they are processing it as documented. Thank you! Bethany Morel 18 minutes ago (10:22 AM)     BB  Austedo PA request - unable to process on CMM.       Gave clinicals over phone to [facility] -      Approved/Case [id number]  Date range 11/30/22 - 12/31/2023

## 2022-11-30 NOTE — TELEPHONE ENCOUNTER
Pt's wife called to talk more about PA status. Got on same page about where we  PA process. At end of call Pt came on asking what he is supposed to do in the meantime? States he's in pain and doesn't know what to do until we can get this medication in his hands. Please call today to give some suggestions.  [phone]

## 2022-11-30 NOTE — TELEPHONE ENCOUNTER
[facility] b[facility]c pt's wife called them being insistent that we were not doing the PA for pt. Pt's wife has called multiple times this morning but will not leave a voicemail. Please make sure PA is being processed.

## 2022-11-30 NOTE — TELEPHONE ENCOUNTER
Call placed to patient. 2 identifiers received. Advised patient that PA for Sixto Pang is being worked on and to continue with his current medication regime. Patient indicated understanding.

## 2022-11-30 NOTE — TELEPHONE ENCOUNTER
Austedo PA request - unable to process on CMM. Gave clinicals over phone to [facility] -     Approved/Case [id number]  Date range 11/30/22 - 12/31/2023       [facility] [employer] mail order - I called and gave Parminder Aldana details over phone, s/w [address] At [phone] - they will set up to call patient at 1 pm today to schedule delivery. Pharmacy Contact    Telephone Fax   [phone]     Total time on phone: 55 minutes.

## 2022-11-30 NOTE — TELEPHONE ENCOUNTER
Austedo -     Rec'd call from [facility] re: Authorization - the one I obtained was a medical benefit approval through [facility]. They requested for a pharmacy approval through the [employer] policy - I submitted the PA via [facility]  Key: [id number]) - CASE # [id number]    BIN [id number]  PCN ADV  [employer] [id number]        The [facility] Rep on the phone can see the case that I started and we both confirmed it is in an Escalated status and he will push it through to the clinician.

## 2022-11-30 NOTE — TELEPHONE ENCOUNTER
Voicemail:    Patient's wife, Cary Alvarez, called stating that they are still waiting for PA for the Austedo to be completed. Cary Alvarez would like an update and to know how long this is going to take as Pt really needs his medication.    Please call [phone]

## 2022-12-01 ENCOUNTER — TELEPHONE (OUTPATIENT)
Dept: NEUROLOGY | Age: 67
End: 2022-12-01

## 2022-12-02 ENCOUNTER — TELEPHONE (OUTPATIENT)
Dept: NEUROLOGY | Age: 67
End: 2022-12-02

## 2022-12-02 ENCOUNTER — OFFICE VISIT (OUTPATIENT)
Dept: ONCOLOGY | Age: 67
End: 2022-12-02
Payer: MEDICARE

## 2022-12-02 VITALS
TEMPERATURE: 97.5 F | SYSTOLIC BLOOD PRESSURE: 151 MMHG | HEIGHT: 68 IN | HEART RATE: 65 BPM | RESPIRATION RATE: 17 BRPM | BODY MASS INDEX: 29.48 KG/M2 | DIASTOLIC BLOOD PRESSURE: 81 MMHG | OXYGEN SATURATION: 97 %

## 2022-12-02 DIAGNOSIS — C90.01 MULTIPLE MYELOMA IN REMISSION (HCC): Primary | ICD-10-CM

## 2022-12-02 DIAGNOSIS — Z51.11 CHEMOTHERAPY MANAGEMENT, ENCOUNTER FOR: ICD-10-CM

## 2022-12-02 RX ORDER — TESTOSTERONE CYPIONATE 200 MG/ML
INJECTION INTRAMUSCULAR
COMMUNITY
Start: 2022-11-20

## 2022-12-02 NOTE — TELEPHONE ENCOUNTER
Rec'd [place] Right Fax from [facility] - letter now reflects the correct provider, Dr. Tammie Solis. Uploaded to Media.

## 2022-12-02 NOTE — TELEPHONE ENCOUNTER
Andrew    Rec'd approval letter today at 2:53 pm through Right Fax  Ehsan Dominguezcoy [phone] [address] [place] [address]  Date range 12/1/22 - 12/1/23     Upon review - all documents that I submitted the provider is Dr. Pauline Livingston - which is correct. For some unknown reason, they show Dr. Joanie Kilpatrick as the provider. Faxed request to revise the provider to Dr. Pauline Livingston.

## 2022-12-02 NOTE — TELEPHONE ENCOUNTER
Called [facility] and spoke to  and then Froylan regarding the [address] Froylan advised the medication has been approved and is scheduled for delivery tomorrow at pt's residence. I will close out this encounter as medication was approved and delivery set up with pt.

## 2022-12-02 NOTE — PROGRESS NOTES
Gricelda Michaels is a 74 y.o. male who presents for follow up of   Chief Complaint   Patient presents with    Follow-up     Mutiplile myeloma       The patient reports no new clinical symptoms or new complaints since last clinic evaluation. No interval hospitalizations reported    No interval surgery or procedures reported    No reported new medication changes reported       Medications reviewed with the patient, and chart updated to reflect changes.

## 2022-12-02 NOTE — PROGRESS NOTES
[address] [facility] [address], [address] [facility], [address] [facility], [address] [facility]  [phone]            Progress Note        Patient: Angelica Hall Sr. MRN: [id number]  SSN: [id number]    YOB: 1955  Age: 74 y.o. Sex: male        Diagnosis:      1. Multiple Myeloma, IgA Kappa   Durie [place] stage IIIB    Cytogenetics/FISH: t(14;16) - high risk    Ttreatment:     1. Maintenance therapy - Pomalyst 2 mg daily - 11/19/2018   2. Maintenance therapy - Ixazomib - started 10/15/2018 - stopped 11/12/2018  3. S/P tandem  Autotransplant   First on 2/28/2018   2nd on 06/13/2018  4. Carfilzomib/Pom/Dex - s/p 5 cycles  5. VD-PACE s/p 1 cycle  6. RVD - s/p 4 cycles    Subjective:      Angelica Hall Sr. is a 74 y.o. male with a diagnosis of IgA kappa myeloma. Bone marrow shows 100% aberrant plasma cells. He suffers with DM but it is diet controlled. He has received systemic anti-viral treatment for chronic Hep C with successful eradication of the virus. Mr. Oliverio Parra received 4 cycles of systemic therapy with RVd. He had an initial good response to treatment. However his paraprotein level started rising and the myeloma became refractory to treatment. I then administered one cycle of VD-PACE in the hospital. He then received Carfilzomib/Pom/Dex. He achieved VGPR. He underwent tandem autotransplant at [address] [place] [address]. According to the patient he achieved complete response with therapy. He has undergone second/tandem transplant in June. He took Pomalyst maintenance until Aug 2019 then had to stop due to recurrent prostatitis. He had a repeat BM biopsy in February 2019 at [address] [place] [address] which showed complete molecular remission. He is taking Pomalyst. He has seen Dr. Ct Kidd for movement disorder. He has started taking a new medication for tardive dyskinesia.          Review of Systems:    Constitutional: negative  Eyes: negative  Ears, Nose, Mouth, Throat, and Face: negative  Respiratory: cough  Cardiovascular: negative  Gastrointestinal: negative  Genitourinary:negative  Integument/Breast: negative  Hematologic/Lymphatic: negative  Musculoskeletal: negative  Neurological: negative      Past Medical History:   Diagnosis Date    Acid reflux     Arrhythmia     pvc's    Chronic pain     neck    Depression     Diabetes ([facility] [address]     Femoral hernia 2012    Hepatitis C     Hypertension     Inguinal hernia 2012    Liver disease     hepatitis c    Multiple myeloma ([facility]     Other ill-defined conditions(799.89)     Hx of PVCs since     Prostatitis, acute     Psychiatric disorder     depression     Past Surgical History:   Procedure Laterality Date    COLONOSCOPY N/A 2016    COLONOSCOPY performed by Daniel Irvin MD at [facility] ENDOSCOPY    HX APPENDECTOMY      HX CERVICAL FUSION  2008    x 1 as of 2014    HX HEENT      foreign body removed from right eye    HX HERNIA REPAIR      [facility]      HX ORTHOPAEDIC      cervical fusion    HX OTHER SURGICAL  2000    liver bx - chronic hepatitis      Family History   Problem Relation Age of Onset    OSTEOARTHRITIS Mother     OSTEOARTHRITIS Father     Diabetes Father     Hypertension Maternal Grandmother     Diabetes Maternal Grandmother     Hypertension Maternal Grandfather     Diabetes Maternal Grandfather      Social History     Tobacco Use    Smoking status: Former     Packs/day: 1.00     Years: 12.00     Pack years: 12.00     Types: Cigarettes     Quit date: 1989     Years since quittin.9    Smokeless tobacco: Never    Tobacco comments:     former cigarette smoker   Substance Use Topics    Alcohol use: No     Comment: former alcoholic-quit [id number]      Prior to Admission medications    Medication Sig Start Date End Date Taking? Authorizing Provider   testosterone cypionate (DEPOTESTOTERONE CYPIONATE) 200 mg/mL injection INJECT 2 ML INTRAMUSCULARLY ONCE WEEKLY. . 22  Yes Provider, Historical deutetrabenazine (Austedo) 6 mg tab Take 6 mg by mouth two (2) times a day. Indications: tardive dyskinesia, a disorder characterized by involuntary movements of the face, mouth and tongue 11/28/22  Yes Messi Ruiz DO   Pomalyst 2 mg cap TAKE 1 CAPSULE BY MOUTH ONCE DAILY 11/21/22  Yes Gayla Nolasco MD   tenofovir DISOPROXIL FUMARATE (VIREAD) 300 mg tablet TAKE 1 TABLET BY MOUTH 1 TIME A DAY. 8/3/22  Yes Gayla Nolasco MD   metFORMIN ER (GLUCOPHAGE XR) 500 mg tablet TAKE 1 TABLET BY MOUTH TWO (2) TIMES A DAY. 6/25/22  Yes Monica Crockett MD   TESTOSTERONE  mg by IntraMUSCular route every fourteen (14) days. Yes Provider, Historical   OTHER Cannabis prescription   Yes Provider, Historical   cholecalciferol, vitamin D3, 50 mcg (2,000 unit) tab Take  by mouth daily. Yes Provider, Historical   ascorbic acid, vitamin C, (VITAMIN C) 100 mg tab Take 100 mg by mouth daily. Yes Provider, Historical   Denta 5000 Plus 1.1 % crea USE AS DIRECTED TWO TIMES DAILY 8/12/20  Yes Provider, Historical   ibuprofen (MOTRIN) 800 mg tablet TAKE 1 TABLET BY MOUTH 3 TIMES A DAY AS NEEDED FOR PAIN 10/13/20  Yes Provider, Historical   buPROPion XL (WELLBUTRIN XL) 300 mg XL tablet TAKE 1 TABLET BY MOUTH EVERY DAY 1/30/20  Yes Provider, Historical   NUCYNTA 100 mg tablet TAKE 2 TABLETS 3 TIMES A DAY 7/27/19  Yes Provider, Historical   aspirin delayed-release 81 mg tablet Take  by mouth daily. Yes Provider, Historical   ondansetron (ZOFRAN ODT) 8 mg disintegrating tablet Take 8 mg by mouth every eight (8) hours as needed for Nausea. Yes Other, MD Goldy   multivitamin (ONE A DAY) tablet Take 1 Tab by mouth daily. Yes Provider, Historical   tamsulosin (FLOMAX) 0.4 mg capsule Take 1 Cap by mouth daily. 4/4/17  Yes Corey Diaz MD   ARIPiprazole (ABILIFY) 5 mg tablet Take 2.5 mg by mouth every morning.    Yes Other, MD Goldy   chlorproMAZINE (THORAZINE) 25 mg tablet Take 1 Tablet by mouth three (3) times daily as needed (hiccups). Patient not taking: Reported on 12/2/2022 8/22/22   Matthias Mo MD   Belsomra 15 mg tablet Take 15 mg by mouth nightly. Patient not taking: No sig reported 5/5/22   Provider, Historical   lisinopriL (PRINIVIL, ZESTRIL) 10 mg tablet Take 10 mg by mouth daily as needed. Patient not taking: Reported on 11/23/2022    Provider, Historical   POTASSIUM PO Take  by mouth as needed. Patient not taking: No sig reported    Provider, Historical   zolpidem 3.5 mg subl DISSOLVE 1 TABLET UNDER TONGUE AT BEDTIME  Patient not taking: No sig reported 4/10/21   Provider, Historical   naproxen (NAPROSYN) 500 mg tablet TAKE 1 TABLET BY MOUTH TWICE A DAY  Patient not taking: No sig reported 8/26/19   Provider, Historical   prochlorperazine (COMPAZINE) 10 mg tablet Take 5 mg by mouth every six (6) hours as needed. Patient not taking: No sig reported    Goldy Diaz MD   omeprazole (PRILOSEC) 20 mg capsule Take 20 mg by mouth daily as needed (acid reflux). Patient not taking: Reported on 11/23/2022    OtherGoldy MD   zolpidem CR (AMBIEN CR) 12.5 mg tablet Take 12.5 mg by mouth nightly. Patient not taking: No sig reported    Provider, Historical          Allergies   Allergen Reactions    Mercury (Bulk) Hives     Blisters             Objective:     Visit Vitals  BP (!) 151/81 (BP 1 Location: Left upper arm, BP Patient Position: Sitting)   Pulse 65   Temp 97.5 °F (36.4 °C) (Oral)   Resp 17   Ht 5' 8\" (1.727 m)   SpO2 97%   BMI 29.48 kg/m²       Pain Scale: /10  Pain Location:       Physical Exam:     GENERAL: alert, cooperative  EYE: negative  LYMPHATIC: Cervical, supraclavicular, and axillary nodes normal.   THROAT & NECK: normal and no erythema or exudates noted. LUNG: clear to auscultation bilaterally  HEART: regular rate and rhythm  ABDOMEN: soft, non-tender  EXTREMITIES: normal  SKIN: Normal.  NEUROLOGIC: negative     Physical exam copied from previous note. Reviewed and no changes noted. Lab Results   Component Value Date/Time    WBC 3.3 (L) 11/23/2022 10:02 AM    HGB 11.3 (L) 11/23/2022 10:02 AM    Hematocrit (POC) 37 04/14/2021 09:48 AM    HCT 37.3 11/23/2022 10:02 AM    PLATELET 068 72/48/6242 10:02 AM    MCV 78.9 (L) 11/23/2022 10:02 AM       Lab Results   Component Value Date/Time    Sodium 138 10/26/2022 10:06 AM    Potassium 4.2 10/26/2022 10:06 AM    Chloride 106 10/26/2022 10:06 AM    CO2 27 10/26/2022 10:06 AM    Anion gap 5 10/26/2022 10:06 AM    Glucose 121 (H) 10/26/2022 10:06 AM    BUN 16 10/26/2022 10:06 AM    Creatinine 1.56 (H) 10/26/2022 10:06 AM    BUN/Creatinine ratio 10 (L) 10/26/2022 10:06 AM    GFR est AA 55 (L) 09/28/2022 10:49 AM    GFR est non-AA 46 (L) 09/28/2022 10:49 AM    Calcium 8.9 10/26/2022 10:06 AM            Assessment:     1. Multiple Myeloma, IgA Kappa   Durie [place] stage IIIB    Cytogenetics/FISH: t(14;16) - high risk  ECOG PS - 0   Intent of therapy: palliative    This is a life threatening illness    Received 3 cycles of RVd   Dose reduced Revlimid and Velcade d/t continued cytopenias. M-spike started rising. He received one cycle of VD-PACE (bortezomib, dexamethasone, thalidomide, cisplatin, adriamycin, cyclophosphamide, and etoposide). Achieved TN with once cycle    Receivied KPd - s/p 5 cycles    Excellent response with M-protein came down to 0.1    S/p tandem autotransplant    First on 2/28/2018   2nd on 06/13/2018    Achieved CR after the first transplant. Repeat bone marrow @ [facility] - Complete response. Since he is high risk based on cytogenetics, I recommend proteosome inhibitor as maintenance therapy. Took Ixazomib briefly  Developed fever, was in the hospital.   Also developed cytopenias    Patient prefered to switch therapy to Pomalyst.     Pomalyst 2 mg daily - started 11/19/2018  Tolerating treatment very well  Denies any side effects.    A detailed system by system evaluation of side effect was performed to assess chemotherapy related toxicity. Blood counts are acceptable. Results reviewed with the patient    M-spike is not deteted  Bone marrow 04/2021 - in CR. The disease has a high risk of recurrence and the treatment also carries a substantial risk of side effects. All of this was assessed during this visit. 2. Type 2 DM with complication    Managed by Endocrine      3. Chronic Hep C    In sustained virological remission      Plan:       > Continue Pomalyst maintenance  > Myeloma labs and port flush monthly   > Zometa every 3 months  > Follow-up in 3 months      Signed by: Billy Blanchard MD                     December 2, 2022      CC. Eveleen Jeans, MD  CC.  Steffi Bhagat MD

## 2022-12-07 ENCOUNTER — TELEPHONE (OUTPATIENT)
Dept: ONCOLOGY | Age: 67
End: 2022-12-07

## 2022-12-07 NOTE — TELEPHONE ENCOUNTER
[facility] at [address], [place], [address] [facility]  W: [phone] F: [phone]    Returned call regarding COVID positive status. Recommended patient follow up with PCP for paxlovid. He does not have a PCP. Will not prescribe paxlovid at this time and encouraged him to go to urgent care/walk in clinic. Encouraged him to establish PCP.

## 2022-12-12 ENCOUNTER — TELEPHONE (OUTPATIENT)
Dept: ONCOLOGY | Age: 67
End: 2022-12-12

## 2022-12-12 NOTE — TELEPHONE ENCOUNTER
Patient wants to know what to do with his care because he has covid. Wants to know when to start back on chemo. Please call.

## 2022-12-12 NOTE — TELEPHONE ENCOUNTER
Returned call to pt. HIPAA verified by two patient identifiers. Pt has not had a fever since dx'd. He feels fine. He said his wife ended up having COVID and ended up in the ER but they kept her due to heart issues, not COVID issues. Case d/w NP Bailee, he can restart on Wednesday. He asked if being off of it for that long will impact his outcome, I reassured him no. Pt thanked me for the call.

## 2022-12-14 ENCOUNTER — TELEPHONE (OUTPATIENT)
Dept: NEUROLOGY | Age: 67
End: 2022-12-14

## 2022-12-14 NOTE — TELEPHONE ENCOUNTER
Pt asked to speak to Dr. Orlin Martinez nurse. When asked if there is any info he would like relayed, he stated it's urgent. No other info given. Please call.  [phone]

## 2022-12-15 NOTE — TELEPHONE ENCOUNTER
Flor Morocho called asking to speak to Es Alvarez. She was looking for a response from Dr. Berenice Montes. Explained that Anahi was with a patient but that we had not gotten a response from Dr Berenice Montes yet. That Anahi would call as soon as we had an answer. Flor Morocho stated that this is unacceptable. That pt had called stating he needed to speak to the Dr urgently and did not get a call back until today. That Dr. Berenice Montes did not know about TD as he did not give the correct directions for the medication given and that they had to contact a nurse for the  to get the proper titration directions. Flor Morocho wants records and a Referral sent ASAP to:    Dr. Shanika Matos [id number], [address]  P: [phone]  F: [phone]    They will be scheduling an appt for Dr. Ayleen Hernández to take over pt's care. They want referral and records sent ASAP and she was upset when I said that I cannot set up a referral and send records for her. Explained that referrals have to be written by the doctor.

## 2022-12-15 NOTE — TELEPHONE ENCOUNTER
Voicemail:    Patient called again stating that he really needs to discuss his medications ASA.    [phone]

## 2022-12-16 ENCOUNTER — TELEPHONE (OUTPATIENT)
Dept: NEUROLOGY | Age: 67
End: 2022-12-16

## 2022-12-16 ENCOUNTER — TELEPHONE (OUTPATIENT)
Dept: ONCOLOGY | Age: 67
End: 2022-12-16

## 2022-12-16 DIAGNOSIS — R25.9 INVOLUNTARY MOVEMENTS: Primary | ICD-10-CM

## 2022-12-16 NOTE — TELEPHONE ENCOUNTER
GERMAN FROM DR Wiliam Alberts REFERRAL TO NEURO, PT REQUESTED DIFFERENT PROVIDER THAN WHOM HE RECENTLY SAW.

## 2022-12-16 NOTE — TELEPHONE ENCOUNTER
Patient's wife, Vania Roberts, called to let Nurse know that the release form for medical records has been faxed back to our office.

## 2022-12-16 NOTE — TELEPHONE ENCOUNTER
Patient's wife, [address] [place] [address], called requesting that Dr Johnny Godwin refill pt's Clonazepam. Stated it really helped Pt to calm down. [address] [place] [address] requested a call back from Nurse.  [phone]

## 2022-12-16 NOTE — TELEPHONE ENCOUNTER
Message  Received: Eric Sheomaker DO sent to Debbie Shea LPN  Caller: Unspecified (2 days ago, 11:38 AM)  I am sorry to hear that the patient was not pleased with the care that we provided. He would not need a referral from our office to schedule a visit with Dr. Yael Villagran. He can coordinate this through his primary care doctor. He was started on the lowest dose of the medicine and needs to stay on a low dose before titrating up. Thanks           Called pt, verified pt with two pt identifiers, relayed message above to pt. Wife Amira Hunter got on phone and advised that Polly Mancuso will be emailing them a form to fax over the records to the new office. I advised yes once we receive the form I can fax over the last office note, labs to the new office so he can schedule his new appt but if they are requesting the entire record then it will need to go thru [employer]. Amira Marlonkhanh verbalized understanding and thanked me for my help. Put auth to disclose info in mail to pt.

## 2022-12-16 NOTE — TELEPHONE ENCOUNTER
Faxed last 2 office notes, labs from us to [facility] at [phone] and received fax confirmation.

## 2022-12-16 NOTE — TELEPHONE ENCOUNTER
We do not have this medication on his list. It would come from his PCP or therapist office. Already spoke to pt in regards to seeking care from another office. Will close out this encounter.

## 2022-12-20 ENCOUNTER — APPOINTMENT (OUTPATIENT)
Dept: GENERAL RADIOLOGY | Age: 67
End: 2022-12-20
Attending: EMERGENCY MEDICINE
Payer: MEDICARE

## 2022-12-20 ENCOUNTER — HOSPITAL ENCOUNTER (EMERGENCY)
Age: 67
Discharge: HOME | End: 2022-12-20
Attending: EMERGENCY MEDICINE
Payer: MEDICARE

## 2022-12-20 VITALS
WEIGHT: 189.6 LBS | TEMPERATURE: 97.5 F | SYSTOLIC BLOOD PRESSURE: 139 MMHG | HEIGHT: 68 IN | RESPIRATION RATE: 11 BRPM | OXYGEN SATURATION: 99 % | BODY MASS INDEX: 28.73 KG/M2 | HEART RATE: 63 BPM | DIASTOLIC BLOOD PRESSURE: 96 MMHG

## 2022-12-20 DIAGNOSIS — R07.9 ACUTE CHEST PAIN: ICD-10-CM

## 2022-12-20 DIAGNOSIS — R00.2 PALPITATIONS: Primary | ICD-10-CM

## 2022-12-20 LAB
ALBUMIN SERPL-MCNC: 3.4 G/DL (ref 3.5–5)
ALBUMIN/GLOB SERPL: 0.8 {RATIO} (ref 1.1–2.2)
ALP SERPL-CCNC: 121 U/L (ref 45–117)
ALT SERPL-CCNC: 32 U/L (ref 12–78)
ANION GAP SERPL CALC-SCNC: 3 MMOL/L (ref 5–15)
AST SERPL-CCNC: 21 U/L (ref 15–37)
ATRIAL RATE: 50 BPM
BASOPHILS # BLD: 0 K/UL (ref 0–0.1)
BASOPHILS NFR BLD: 1 % (ref 0–1)
BILIRUB SERPL-MCNC: 0.3 MG/DL (ref 0.2–1)
BNP SERPL-MCNC: 165 PG/ML
BUN SERPL-MCNC: 25 MG/DL (ref 6–20)
BUN/CREAT SERPL: 16 (ref 12–20)
CALCIUM SERPL-MCNC: 8.5 MG/DL (ref 8.5–10.1)
CALCULATED P AXIS, ECG09: 17 DEGREES
CALCULATED R AXIS, ECG10: -23 DEGREES
CALCULATED T AXIS, ECG11: -45 DEGREES
CHLORIDE SERPL-SCNC: 106 MMOL/L (ref 97–108)
CO2 SERPL-SCNC: 26 MMOL/L (ref 21–32)
CREAT SERPL-MCNC: 1.6 MG/DL (ref 0.7–1.3)
DIAGNOSIS, 93000: NORMAL
DIFFERENTIAL METHOD BLD: ABNORMAL
EOSINOPHIL # BLD: 0.1 K/UL (ref 0–0.4)
EOSINOPHIL NFR BLD: 2 % (ref 0–7)
ERYTHROCYTE [DISTWIDTH] IN BLOOD BY AUTOMATED COUNT: 21.8 % (ref 11.5–14.5)
GLOBULIN SER CALC-MCNC: 4.4 G/DL (ref 2–4)
GLUCOSE BLD STRIP.AUTO-MCNC: 108 MG/DL (ref 65–117)
GLUCOSE SERPL-MCNC: 106 MG/DL (ref 65–100)
HCT VFR BLD AUTO: 38.3 % (ref 36.6–50.3)
HGB BLD-MCNC: 11.9 G/DL (ref 12.1–17)
IMM GRANULOCYTES # BLD AUTO: 0 K/UL (ref 0–0.04)
IMM GRANULOCYTES NFR BLD AUTO: 0 % (ref 0–0.5)
LYMPHOCYTES # BLD: 1 K/UL (ref 0.8–3.5)
LYMPHOCYTES NFR BLD: 31 % (ref 12–49)
MAGNESIUM SERPL-MCNC: 2.2 MG/DL (ref 1.6–2.4)
MCH RBC QN AUTO: 24.4 PG (ref 26–34)
MCHC RBC AUTO-ENTMCNC: 31.1 G/DL (ref 30–36.5)
MCV RBC AUTO: 78.5 FL (ref 80–99)
MONOCYTES # BLD: 0.6 K/UL (ref 0–1)
MONOCYTES NFR BLD: 19 % (ref 5–13)
NEUTS SEG # BLD: 1.6 K/UL (ref 1.8–8)
NEUTS SEG NFR BLD: 47 % (ref 32–75)
NRBC # BLD: 0 K/UL (ref 0–0.01)
NRBC BLD-RTO: 0 PER 100 WBC
P-R INTERVAL, ECG05: 140 MS
PLATELET # BLD AUTO: 178 K/UL (ref 150–400)
PMV BLD AUTO: 10.2 FL (ref 8.9–12.9)
POTASSIUM SERPL-SCNC: 4.2 MMOL/L (ref 3.5–5.1)
PROT SERPL-MCNC: 7.8 G/DL (ref 6.4–8.2)
Q-T INTERVAL, ECG07: 448 MS
QRS DURATION, ECG06: 98 MS
QTC CALCULATION (BEZET), ECG08: 408 MS
RBC # BLD AUTO: 4.88 M/UL (ref 4.1–5.7)
RBC MORPH BLD: ABNORMAL
SERVICE CMNT-IMP: NORMAL
SODIUM SERPL-SCNC: 135 MMOL/L (ref 136–145)
TROPONIN-HIGH SENSITIVITY: 12 NG/L (ref 0–76)
TROPONIN-HIGH SENSITIVITY: 14 NG/L (ref 0–76)
TSH SERPL DL<=0.05 MIU/L-ACNC: 2.94 UIU/ML (ref 0.36–3.74)
VENTRICULAR RATE, ECG03: 50 BPM
WBC # BLD AUTO: 3.3 K/UL (ref 4.1–11.1)

## 2022-12-20 PROCEDURE — 99285 EMERGENCY DEPT VISIT HI MDM: CPT

## 2022-12-20 PROCEDURE — 93005 ELECTROCARDIOGRAM TRACING: CPT

## 2022-12-20 PROCEDURE — 84443 ASSAY THYROID STIM HORMONE: CPT

## 2022-12-20 PROCEDURE — 80053 COMPREHEN METABOLIC PANEL: CPT

## 2022-12-20 PROCEDURE — 84484 ASSAY OF TROPONIN QUANT: CPT

## 2022-12-20 PROCEDURE — 83735 ASSAY OF MAGNESIUM: CPT

## 2022-12-20 PROCEDURE — 85025 COMPLETE CBC W/AUTO DIFF WBC: CPT

## 2022-12-20 PROCEDURE — 82962 GLUCOSE BLOOD TEST: CPT

## 2022-12-20 PROCEDURE — 36415 COLL VENOUS BLD VENIPUNCTURE: CPT

## 2022-12-20 PROCEDURE — 83880 ASSAY OF NATRIURETIC PEPTIDE: CPT

## 2022-12-20 PROCEDURE — 71046 X-RAY EXAM CHEST 2 VIEWS: CPT

## 2022-12-20 NOTE — ED PROVIDER NOTES
EMERGENCY DEPARTMENT HISTORY AND PHYSICAL EXAM      Date: 12/20/2022  Patient Name: Trinh Segura. History of Presenting Illness     Chief Complaint   Patient presents with    Hypertension       History Provided By: Patient    HPI: Trinh Segura., 74 y.o. male with PMHx significant for multiple myeloma (followed by Dr. Argelia Farris), tardive dyskinesia, GERD, diabetes, hepatitis C, hypertension, anxiety/depression presents to the ED with chest pain and palpitations. Patient had COVID 2 weeks ago and is feeling better. His cough is improving. Tonight he had palpitations that felt like his heart was skipping a beat and he had some chest discomfort associated with them. His wife checked his temperature and it was 95. His blood pressure was lower than normal at 100/66 and his heart rate was also lower than normal at 48 on her home monitor. Patient has tardive dyskinesia with uncontrolled mouth movements and his wife reports those movements seemed exacerbated tonight and worse than usual.  She decided to bring him in for evaluation. Patient reports he is still having some palpitations. Wife states that the involuntary mouth movements are improved from how they seemed at home tonight. .  Patient reports chronic neck pain and is having some tingling in the left side of his neck that radiates to his shoulder. PCP: Valarie Ferrari MD    No current facility-administered medications on file prior to encounter. Current Outpatient Medications on File Prior to Encounter   Medication Sig Dispense Refill    Pomalyst 2 mg cap TAKE 1 CAPSULE BY MOUTH ONCE DAILY 28 Capsule 0    testosterone cypionate (DEPOTESTOTERONE CYPIONATE) 200 mg/mL injection INJECT 2 ML INTRAMUSCULARLY ONCE WEEKLY. Julissa Handy deutetrabenazine (Austedo) 6 mg tab Take 6 mg by mouth two (2) times a day.  Indications: tardive dyskinesia, a disorder characterized by involuntary movements of the face, mouth and tongue 180 Tablet 2 chlorproMAZINE (THORAZINE) 25 mg tablet Take 1 Tablet by mouth three (3) times daily as needed (hiccups). (Patient not taking: Reported on 12/2/2022) 9 Tablet 0    tenofovir DISOPROXIL FUMARATE (VIREAD) 300 mg tablet TAKE 1 TABLET BY MOUTH 1 TIME A DAY. 90 Tablet 0    Belsomra 15 mg tablet Take 15 mg by mouth nightly. (Patient not taking: No sig reported)      lisinopriL (PRINIVIL, ZESTRIL) 10 mg tablet Take 10 mg by mouth daily as needed. (Patient not taking: Reported on 11/23/2022)      metFORMIN ER (GLUCOPHAGE XR) 500 mg tablet TAKE 1 TABLET BY MOUTH TWO (2) TIMES A DAY. 180 Tablet 1    TESTOSTERONE  mg by IntraMUSCular route every fourteen (14) days. POTASSIUM PO Take  by mouth as needed. (Patient not taking: No sig reported)      OTHER Cannabis prescription      zolpidem 3.5 mg subl DISSOLVE 1 TABLET UNDER TONGUE AT BEDTIME (Patient not taking: No sig reported)      cholecalciferol, vitamin D3, 50 mcg (2,000 unit) tab Take  by mouth daily. ascorbic acid, vitamin C, (VITAMIN C) 100 mg tab Take 100 mg by mouth daily. Denta 5000 Plus 1.1 % crea USE AS DIRECTED TWO TIMES DAILY      ibuprofen (MOTRIN) 800 mg tablet TAKE 1 TABLET BY MOUTH 3 TIMES A DAY AS NEEDED FOR PAIN      buPROPion XL (WELLBUTRIN XL) 300 mg XL tablet TAKE 1 TABLET BY MOUTH EVERY DAY      naproxen (NAPROSYN) 500 mg tablet TAKE 1 TABLET BY MOUTH TWICE A DAY (Patient not taking: No sig reported)  5    NUCYNTA 100 mg tablet TAKE 2 TABLETS 3 TIMES A DAY  0    aspirin delayed-release 81 mg tablet Take  by mouth daily. prochlorperazine (COMPAZINE) 10 mg tablet Take 5 mg by mouth every six (6) hours as needed. (Patient not taking: No sig reported)      omeprazole (PRILOSEC) 20 mg capsule Take 20 mg by mouth daily as needed (acid reflux). (Patient not taking: Reported on 11/23/2022)      ondansetron (ZOFRAN ODT) 8 mg disintegrating tablet Take 8 mg by mouth every eight (8) hours as needed for Nausea.       multivitamin (ONE A DAY) tablet Take 1 Tab by mouth daily. tamsulosin (FLOMAX) 0.4 mg capsule Take 1 Cap by mouth daily. 30 Cap 1    zolpidem CR (AMBIEN CR) 12.5 mg tablet Take 12.5 mg by mouth nightly. (Patient not taking: No sig reported)      ARIPiprazole (ABILIFY) 5 mg tablet Take 2.5 mg by mouth every morning. Past History     Past Medical History:  Past Medical History:   Diagnosis Date    Acid reflux     Arrhythmia     pvc's    Chronic pain     neck    Depression     Diabetes ([facility] [address]     Femoral hernia 2012    Hepatitis C     Hypertension     Inguinal hernia 2012    Liver disease     hepatitis c    Multiple myeloma ([facility] [address]     Other ill-defined conditions(799.89)     Hx of PVCs since     Prostatitis, acute     Psychiatric disorder     depression       Past Surgical History:  Past Surgical History:   Procedure Laterality Date    COLONOSCOPY N/A 2016    COLONOSCOPY performed by Regan Maciel MD at [facility] ENDOSCOPY    HX APPENDECTOMY      HX CERVICAL FUSION  2008    x 1 as of 2014    HX HEENT      foreign body removed from right eye    HX HERNIA REPAIR      [facility]      HX ORTHOPAEDIC      cervical fusion    HX OTHER SURGICAL  2000    liver bx - chronic hepatitis       Family History:  Family History   Problem Relation Age of Onset    OSTEOARTHRITIS Mother     OSTEOARTHRITIS Father     Diabetes Father     Hypertension Maternal Grandmother     Diabetes Maternal Grandmother     Hypertension Maternal Grandfather     Diabetes Maternal Grandfather        Social History:  Social History     Tobacco Use    Smoking status: Former     Packs/day: 1.00     Years: 12.00     Pack years: 12.00     Types: Cigarettes     Quit date: 1989     Years since quittin.9    Smokeless tobacco: Never    Tobacco comments:     former cigarette smoker   Vaping Use    Vaping Use: Never used   Substance Use Topics    Alcohol use: No     Comment: former alcoholic-quit [id number]    Drug use: No       Allergies:   Allergies Allergen Reactions    Mercury (Bulk) Hives     Blisters           Review of Systems   Review of Systems   Constitutional:  Negative for chills and fever. HENT: Negative. Negative for drooling, facial swelling and sore throat. Eyes:  Negative for visual disturbance. Respiratory:  Positive for chest tightness. Negative for shortness of breath. Cardiovascular:  Positive for chest pain and palpitations. Gastrointestinal:  Negative for abdominal pain, nausea and vomiting. Genitourinary: Negative. Musculoskeletal:  Negative for back pain, myalgias and neck pain. Skin: Negative. Neurological:  Negative for syncope, light-headedness and headaches. Psychiatric/Behavioral:  Positive for dysphoric mood. Negative for confusion. The patient is nervous/anxious. All other systems reviewed and are negative.       Physical Exam   General appearance - well nourished, well appearing, and in no distress  Eyes - pupils equal and reactive, extraocular eye movements intact  ENT - mucous membranes moist, pharynx normal without lesions  Neck - supple, no significant adenopathy; non-tender to palpation  Chest - clear to auscultation, no wheezes, rales or rhonchi; non-tender to palpation, port right upper chest  Heart - normal rate and regular rhythm, S1 and S2 normal, no murmurs noted  Abdomen - soft, nontender, nondistended, no masses or organomegaly  Musculoskeletal - no joint tenderness, deformity or swelling; normal ROM  Extremities - peripheral pulses normal, no pedal edema  Skin - normal coloration and turgor, no rashes  Neurological - alert, oriented x3, normal speech, no focal findings or movement disorder noted    Diagnostic Study Results     Labs -     Recent Results (from the past 24 hour(s))   EKG, 12 LEAD, INITIAL    Collection Time: 12/20/22 12:17 AM   Result Value Ref Range    Ventricular Rate 50 BPM    Atrial Rate 50 BPM    P-R Interval 140 ms    QRS Duration 98 ms    Q-T Interval 448 ms    QTC Calculation (Bezet) 408 ms    Calculated P Axis 17 degrees    Calculated R Axis -23 degrees    Calculated T Axis -45 degrees    Diagnosis       Sinus bradycardia  Voltage criteria for left ventricular hypertrophy  T wave abnormality, consider inferolateral ischemia  When compared with ECG of 13-SEP-2022 22:23,  Vent. rate has decreased BY  34 BPM  T wave inversion more evident in Inferior leads  Confirmed by TEODORO Mendiola ([id number]) on 12/20/2022 11:58:44 AM     CBC WITH AUTOMATED DIFF    Collection Time: 12/20/22 12:25 AM   Result Value Ref Range    WBC 3.3 (L) 4.1 - 11.1 K/uL    RBC 4.88 4.10 - 5.70 M/uL    HGB 11.9 (L) 12.1 - 17.0 g/dL    HCT 38.3 36.6 - 50.3 %    MCV 78.5 (L) 80.0 - 99.0 FL    MCH 24.4 (L) 26.0 - 34.0 PG    MCHC 31.1 30.0 - 36.5 g/dL    RDW 21.8 (H) 11.5 - 14.5 %    PLATELET 322 592 - 591 K/uL    MPV 10.2 8.9 - 12.9 FL    NRBC 0.0 0  WBC    ABSOLUTE NRBC 0.00 0.00 - 0.01 K/uL    NEUTROPHILS 47 32 - 75 %    LYMPHOCYTES 31 12 - 49 %    MONOCYTES 19 (H) 5 - 13 %    EOSINOPHILS 2 0 - 7 %    BASOPHILS 1 0 - 1 %    IMMATURE GRANULOCYTES 0 0.0 - 0.5 %    ABS. NEUTROPHILS 1.6 (L) 1.8 - 8.0 K/UL    ABS. LYMPHOCYTES 1.0 0.8 - 3.5 K/UL    ABS. MONOCYTES 0.6 0.0 - 1.0 K/UL    ABS. EOSINOPHILS 0.1 0.0 - 0.4 K/UL    ABS. BASOPHILS 0.0 0.0 - 0.1 K/UL    ABS. IMM. GRANS. 0.0 0.00 - 0.04 K/UL    DF SMEAR SCANNED      RBC COMMENTS ANISOCYTOSIS  2+       METABOLIC PANEL, COMPREHENSIVE    Collection Time: 12/20/22 12:25 AM   Result Value Ref Range    Sodium 135 (L) 136 - 145 mmol/L    Potassium 4.2 3.5 - 5.1 mmol/L    Chloride 106 97 - 108 mmol/L    CO2 26 21 - 32 mmol/L    Anion gap 3 (L) 5 - 15 mmol/L    Glucose 106 (H) 65 - 100 mg/dL    BUN 25 (H) 6 - 20 MG/DL    Creatinine 1.60 (H) 0.70 - 1.30 MG/DL    BUN/Creatinine ratio 16 12 - 20      eGFR 48 (L) >60 ml/min/1.73m2    Calcium 8.5 8.5 - 10.1 MG/DL    Bilirubin, total 0.3 0.2 - 1.0 MG/DL    ALT (SGPT) 32 12 - 78 U/L    AST (SGOT) 21 15 - 37 U/L    Alk. phosphatase 121 (H) 45 - 117 U/L    Protein, total 7.8 6.4 - 8.2 g/dL    Albumin 3.4 (L) 3.5 - 5.0 g/dL    Globulin 4.4 (H) 2.0 - 4.0 g/dL    A-G Ratio 0.8 (L) 1.1 - 2.2     NT-PRO BNP    Collection Time: 12/20/22 12:25 AM   Result Value Ref Range    NT pro- (H) <125 PG/ML   TROPONIN-HIGH SENSITIVITY    Collection Time: 12/20/22 12:25 AM   Result Value Ref Range    Troponin-High Sensitivity 14 0 - 76 ng/L   TSH 3RD GENERATION    Collection Time: 12/20/22 12:25 AM   Result Value Ref Range    TSH 2.94 0.36 - 3.74 uIU/mL   MAGNESIUM    Collection Time: 12/20/22 12:25 AM   Result Value Ref Range    Magnesium 2.2 1.6 - 2.4 mg/dL   GLUCOSE, POC    Collection Time: 12/20/22  3:34 AM   Result Value Ref Range    Glucose (POC) 108 65 - 117 mg/dL    Performed by Gisel Lopez LPN    TROPONIN-HIGH SENSITIVITY    Collection Time: 12/20/22  4:37 AM   Result Value Ref Range    Troponin-High Sensitivity 12 0 - 76 ng/L       Radiologic Studies -   XR CHEST PA LAT   Final Result      No acute process seen           CT Results  (Last 48 hours)      None          CXR Results  (Last 48 hours)                 12/20/22 0114  XR CHEST PA LAT Final result    Impression:      No acute process seen           Narrative:  EXAM: XR CHEST PA LAT       INDICATION: Chest pain       COMPARISON: 9/13/2022       TECHNIQUE: PA and lateral chest views       FINDINGS: The cardiac size is within normal limits. The pulmonary vasculature is   within normal limits. The Port-A-Cath terminates at the caval atrial junction. There is anterior cervical plate. The lungs and pleural spaces are clear. The visualized bones and upper abdomen   are age-appropriate. Medical Decision Making   I am the first provider for this patient. I reviewed the vital signs, available nursing notes, past medical history, past surgical history, family history and social history. Vital Signs-Reviewed the patient's vital signs.   Patient Vitals for the past 12 hrs:   Pulse Resp BP SpO2   12/20/22 0400 63 11 (!) 139/96 99 %       EKG: Sinus bradycardia, 50 bpm, normal axis, normal MS, QRS, QTc intervals, T wave inversion in inferior and lateral leads were present on EKG from 9/13/2022    Records Reviewed: Nursing Notes and Old Medical Records    Provider Notes (Medical Decision Making):   Patient presents to the ED with palpitations and concern for abnormal vital signs at home. He also reports some left shoulder tingling and chest discomfort. Differential diagnosis includes arrhythmia, electrolyte abnormality, acute coronary syndrome, cervical radiculopathy. We will check CBC, CMP, serial troponins, EKG, chest x-ray. Will check magnesium and TSH as well. ED Course:   Initial assessment performed. The patients presenting problems have been discussed, and they are in agreement with the care plan formulated and outlined with them. I have encouraged them to ask questions as they arise throughout their visit. Progress Notes:     Labs and imaging reviewed. CBC shows chronic leukopenia with white count of 3.3. Hemoglobin slightly low at 11.9 but this is baseline. Platelets are normal.  CMP shows chronic kidney disease with creatinine of 1.6 which is at baseline. Otherwise no significant abnormalities. Serial high-sensitivity troponins were 14 and 12 which is reassuring. proBNP slightly elevated at 165. Chest x-ray is clear. Patient reports feeling much better in the ED. He has had episodes of bradycardia, but blood pressures have been normal to elevated in the ED. Most recent heart rate is 60 and regular. Disposition:  Discharge home    PLAN:  1. Discharge Medication List as of 12/20/2022  6:24 AM        2.    Follow-up Information       Follow up With Specialties Details Why Gamaliel Graves MD Hematology and Oncology, Hematology Physician, Oncology, Internal Medicine Physician Schedule an appointment as soon as possible for a visit   [address] [address] [facility]  [phone]      [facility] EMERGENCY DEPT Emergency Medicine Go to  If symptoms worsen [address] [place] [address]  [phone]          Return to ED if worse     Diagnosis     Clinical Impression:   1. Palpitations    2.  Acute chest pain

## 2022-12-20 NOTE — ED NOTES
Patient presents to triage ambulatory complaining of HTN & and having \"PVCs\" Patient reports a low temperature at home and elevated blood pressure. Patient's spouse reports they are both recovering from COVID-19. Patient denies any chest pain, nausea, vomiting, fever or chills at this time.

## 2022-12-21 ENCOUNTER — HOSPITAL ENCOUNTER (OUTPATIENT)
Dept: INFUSION THERAPY | Age: 67
Discharge: HOME | End: 2022-12-21
Payer: MEDICARE

## 2022-12-21 VITALS
HEART RATE: 80 BPM | TEMPERATURE: 98 F | DIASTOLIC BLOOD PRESSURE: 84 MMHG | BODY MASS INDEX: 27.93 KG/M2 | OXYGEN SATURATION: 100 % | HEIGHT: 68 IN | RESPIRATION RATE: 18 BRPM | SYSTOLIC BLOOD PRESSURE: 152 MMHG | WEIGHT: 184.3 LBS

## 2022-12-21 DIAGNOSIS — C90.00 MULTIPLE MYELOMA, REMISSION STATUS UNSPECIFIED (HCC): Primary | ICD-10-CM

## 2022-12-21 LAB
ALBUMIN SERPL-MCNC: 3.5 G/DL (ref 3.5–5)
ALBUMIN/GLOB SERPL: 0.8 {RATIO} (ref 1.1–2.2)
ALP SERPL-CCNC: 80 U/L (ref 45–117)
ALT SERPL-CCNC: 31 U/L (ref 12–78)
ANION GAP SERPL CALC-SCNC: 7 MMOL/L (ref 5–15)
AST SERPL-CCNC: 24 U/L (ref 15–37)
BASOPHILS # BLD: 0 K/UL (ref 0–0.1)
BASOPHILS NFR BLD: 1 % (ref 0–1)
BILIRUB SERPL-MCNC: 1 MG/DL (ref 0.2–1)
BUN SERPL-MCNC: 22 MG/DL (ref 6–20)
BUN/CREAT SERPL: 14 (ref 12–20)
CALCIUM SERPL-MCNC: 9 MG/DL (ref 8.5–10.1)
CHLORIDE SERPL-SCNC: 106 MMOL/L (ref 97–108)
CO2 SERPL-SCNC: 25 MMOL/L (ref 21–32)
CREAT SERPL-MCNC: 1.53 MG/DL (ref 0.7–1.3)
DIFFERENTIAL METHOD BLD: ABNORMAL
EOSINOPHIL # BLD: 0 K/UL (ref 0–0.4)
EOSINOPHIL NFR BLD: 0 % (ref 0–7)
ERYTHROCYTE [DISTWIDTH] IN BLOOD BY AUTOMATED COUNT: 21.8 % (ref 11.5–14.5)
GLOBULIN SER CALC-MCNC: 4.4 G/DL (ref 2–4)
GLUCOSE SERPL-MCNC: 113 MG/DL (ref 65–100)
HCT VFR BLD AUTO: 40 % (ref 36.6–50.3)
HGB BLD-MCNC: 12.3 G/DL (ref 12.1–17)
IGA SERPL-MCNC: 577 MG/DL (ref 70–400)
IGG SERPL-MCNC: 1490 MG/DL (ref 700–1600)
IGM SERPL-MCNC: 22 MG/DL (ref 40–230)
IMM GRANULOCYTES # BLD AUTO: 0 K/UL (ref 0–0.04)
IMM GRANULOCYTES NFR BLD AUTO: 0 % (ref 0–0.5)
LYMPHOCYTES # BLD: 0.8 K/UL (ref 0.8–3.5)
LYMPHOCYTES NFR BLD: 25 % (ref 12–49)
MCH RBC QN AUTO: 24.2 PG (ref 26–34)
MCHC RBC AUTO-ENTMCNC: 30.8 G/DL (ref 30–36.5)
MCV RBC AUTO: 78.7 FL (ref 80–99)
MONOCYTES # BLD: 0.4 K/UL (ref 0–1)
MONOCYTES NFR BLD: 11 % (ref 5–13)
NEUTS SEG # BLD: 2 K/UL (ref 1.8–8)
NEUTS SEG NFR BLD: 63 % (ref 32–75)
NRBC # BLD: 0 K/UL (ref 0–0.01)
NRBC BLD-RTO: 0 PER 100 WBC
PLATELET # BLD AUTO: 149 K/UL (ref 150–400)
PMV BLD AUTO: 10.6 FL (ref 8.9–12.9)
POTASSIUM SERPL-SCNC: 4.3 MMOL/L (ref 3.5–5.1)
PROT SERPL-MCNC: 7.9 G/DL (ref 6.4–8.2)
RBC # BLD AUTO: 5.08 M/UL (ref 4.1–5.7)
RBC MORPH BLD: ABNORMAL
SODIUM SERPL-SCNC: 138 MMOL/L (ref 136–145)
WBC # BLD AUTO: 3.2 K/UL (ref 4.1–11.1)

## 2022-12-21 PROCEDURE — 85025 COMPLETE CBC W/AUTO DIFF WBC: CPT

## 2022-12-21 PROCEDURE — 36591 DRAW BLOOD OFF VENOUS DEVICE: CPT

## 2022-12-21 PROCEDURE — 83521 IG LIGHT CHAINS FREE EACH: CPT

## 2022-12-21 PROCEDURE — 82784 ASSAY IGA/IGD/IGG/IGM EACH: CPT

## 2022-12-21 PROCEDURE — 84165 PROTEIN E-PHORESIS SERUM: CPT

## 2022-12-21 PROCEDURE — 74011000250 HC RX REV CODE- 250: Performed by: INTERNAL MEDICINE

## 2022-12-21 PROCEDURE — 74011250636 HC RX REV CODE- 250/636: Performed by: INTERNAL MEDICINE

## 2022-12-21 PROCEDURE — 77030012965 HC NDL HUBR BBMI -A

## 2022-12-21 PROCEDURE — 80053 COMPREHEN METABOLIC PANEL: CPT

## 2022-12-21 RX ORDER — HEPARIN 100 UNIT/ML
500 SYRINGE INTRAVENOUS AS NEEDED
Status: ACTIVE | OUTPATIENT
Start: 2022-12-21 | End: 2022-12-21

## 2022-12-21 RX ORDER — SODIUM CHLORIDE 0.9 % (FLUSH) 0.9 %
5-10 SYRINGE (ML) INJECTION AS NEEDED
Status: DISPENSED | OUTPATIENT
Start: 2022-12-21 | End: 2022-12-21

## 2022-12-21 RX ORDER — SODIUM CHLORIDE 9 MG/ML
10 INJECTION INTRAMUSCULAR; INTRAVENOUS; SUBCUTANEOUS AS NEEDED
Status: ACTIVE | OUTPATIENT
Start: 2022-12-21 | End: 2022-12-21

## 2022-12-21 RX ADMIN — SODIUM CHLORIDE, PRESERVATIVE FREE 10 ML: 5 INJECTION INTRAVENOUS at 11:07

## 2022-12-21 RX ADMIN — HEPARIN SODIUM (PORCINE) LOCK FLUSH IV SOLN 100 UNIT/ML 500 UNITS: 100 SOLUTION at 11:08

## 2022-12-21 NOTE — PROGRESS NOTES
[facility] Note:  Arrived - 1    Patient denied having any symptoms of COVID-19, i.e. SOB, coughing, fever, or generally not feeling well. Also denies having been exposed to COVID-19 recently or having had any recent contact with family/friend that has a pending COVID test.     Patient Vitals for the past 12 hrs:   Temp Pulse Resp BP SpO2   12/21/22 1103 98 °F (36.7 °C) 80 18 (!) 152/84 100 %      Recent Results (from the past 12 hour(s))   CBC WITH AUTOMATED DIFF    Collection Time: 12/21/22 11:08 AM   Result Value Ref Range    WBC 3.2 (L) 4.1 - 11.1 K/uL    RBC 5.08 4.10 - 5.70 M/uL    HGB 12.3 12.1 - 17.0 g/dL    HCT 40.0 36.6 - 50.3 %    MCV 78.7 (L) 80.0 - 99.0 FL    MCH 24.2 (L) 26.0 - 34.0 PG    MCHC 30.8 30.0 - 36.5 g/dL    RDW 21.8 (H) 11.5 - 14.5 %    PLATELET 108 (L) 946 - 400 K/uL    MPV 10.6 8.9 - 12.9 FL    NRBC 0.0 0  WBC    ABSOLUTE NRBC 0.00 0.00 - 0.01 K/uL    NEUTROPHILS 63 32 - 75 %    LYMPHOCYTES 25 12 - 49 %    MONOCYTES 11 5 - 13 %    EOSINOPHILS 0 0 - 7 %    BASOPHILS 1 0 - 1 %    IMMATURE GRANULOCYTES 0 0.0 - 0.5 %    ABS. NEUTROPHILS 2.0 1.8 - 8.0 K/UL    ABS. LYMPHOCYTES 0.8 0.8 - 3.5 K/UL    ABS. MONOCYTES 0.4 0.0 - 1.0 K/UL    ABS. EOSINOPHILS 0.0 0.0 - 0.4 K/UL    ABS. BASOPHILS 0.0 0.0 - 0.1 K/UL    ABS. IMM. GRANS. 0.0 0.00 - 0.04 K/UL    DF AUTOMATED      RBC COMMENTS ANISOCYTOSIS  2+       METABOLIC PANEL, COMPREHENSIVE    Collection Time: 12/21/22 11:08 AM   Result Value Ref Range    Sodium 138 136 - 145 mmol/L    Potassium 4.3 3.5 - 5.1 mmol/L    Chloride 106 97 - 108 mmol/L    CO2 25 21 - 32 mmol/L    Anion gap 7 5 - 15 mmol/L    Glucose 113 (H) 65 - 100 mg/dL    BUN 22 (H) 6 - 20 MG/DL    Creatinine 1.53 (H) 0.70 - 1.30 MG/DL    BUN/Creatinine ratio 14 12 - 20      eGFR 50 (L) >60 ml/min/1.73m2    Calcium 9.0 8.5 - 10.1 MG/DL    Bilirubin, total 1.0 0.2 - 1.0 MG/DL    ALT (SGPT) 31 12 - 78 U/L    AST (SGOT) 24 15 - 37 U/L    Alk.  phosphatase 80 45 - 117 U/L    Protein, total 7.9 6.4 - 8.2 g/dL    Albumin 3.5 3.5 - 5.0 g/dL    Globulin 4.4 (H) 2.0 - 4.0 g/dL    A-G Ratio 0.8 (L) 1.1 - 2.2     IMMUNOGLOBULINS, G/A/M, QT. Collection Time: 12/21/22 11:08 AM   Result Value Ref Range    Immunoglobulin G 1,490 700 - 1,600 mg/dL    Immunoglobulin A 577 (H) 70 - 400 mg/dL    Immunoglobulin M 22 (L) 40 - 230 mg/dL           Assessment - patient has significant neck pain and tingling in his neck and left shoulder. Patient has a referral to see neurology. Port accessed & flushed per protocol w/o difficulty. Labs drawn- CBC, CMP, and myeloma labs drawn. Scott needle removed. 1115 - Tolerated well. Pt denies any acute problems/changes. Discharged from [facility] ambulatory. No distress.  Next appt: 1/18/23

## 2022-12-22 LAB
IGA SERPL-MCNC: 629 MG/DL (ref 61–437)
IGG SERPL-MCNC: 1440 MG/DL (ref 603–1613)
IGM SERPL-MCNC: 26 MG/DL (ref 20–172)
KAPPA LC FREE SER-MCNC: 56.4 MG/L (ref 3.3–19.4)
KAPPA LC FREE/LAMBDA FREE SER: 1.51 {RATIO} (ref 0.26–1.65)
LAMBDA LC FREE SERPL-MCNC: 37.3 MG/L (ref 5.7–26.3)
PROT PATTERN SERPL IFE-IMP: ABNORMAL

## 2022-12-27 LAB
ALBUMIN SERPL ELPH-MCNC: 3.7 G/DL (ref 2.9–4.4)
ALBUMIN/GLOB SERPL: 0.9 {RATIO} (ref 0.7–1.7)
ALPHA1 GLOB SERPL ELPH-MCNC: 0.2 G/DL (ref 0–0.4)
ALPHA2 GLOB SERPL ELPH-MCNC: 1 G/DL (ref 0.4–1)
B-GLOBULIN SERPL ELPH-MCNC: 1.1 G/DL (ref 0.7–1.3)
GAMMA GLOB SERPL ELPH-MCNC: 1.6 G/DL (ref 0.4–1.8)
GLOBULIN SER CALC-MCNC: 4 G/DL (ref 2.2–3.9)
IGA SERPL-MCNC: 629 MG/DL (ref 61–437)
IGG SERPL-MCNC: 1440 MG/DL (ref 603–1613)
IGM SERPL-MCNC: 26 MG/DL (ref 20–172)
M PROTEIN SERPL ELPH-MCNC: ABNORMAL G/DL
PROT PATTERN SERPL IFE-IMP: ABNORMAL
PROT SERPL-MCNC: 7.7 G/DL (ref 6–8.5)

## 2023-01-06 RX ORDER — METFORMIN HYDROCHLORIDE 500 MG/1
TABLET, EXTENDED RELEASE ORAL
Qty: 180 TABLET | Refills: 1 | Status: SHIPPED | OUTPATIENT
Start: 2023-01-06

## 2023-01-16 RX ORDER — SODIUM CHLORIDE 0.9 % (FLUSH) 0.9 %
5-10 SYRINGE (ML) INJECTION AS NEEDED
Status: CANCELLED | OUTPATIENT
Start: 2023-01-23

## 2023-01-16 RX ORDER — SODIUM CHLORIDE 9 MG/ML
10 INJECTION INTRAVENOUS AS NEEDED
Status: CANCELLED | OUTPATIENT
Start: 2023-01-23

## 2023-01-16 RX ORDER — HEPARIN 100 UNIT/ML
500 SYRINGE INTRAVENOUS AS NEEDED
Status: CANCELLED | OUTPATIENT
Start: 2023-01-23

## 2023-01-18 ENCOUNTER — HOSPITAL ENCOUNTER (OUTPATIENT)
Dept: INFUSION THERAPY | Age: 68
End: 2023-01-18

## 2023-01-23 ENCOUNTER — HOSPITAL ENCOUNTER (OUTPATIENT)
Dept: INFUSION THERAPY | Age: 68
Discharge: HOME | End: 2023-01-23
Payer: MEDICARE

## 2023-01-23 ENCOUNTER — DOCUMENTATION ONLY (OUTPATIENT)
Dept: ONCOLOGY | Age: 68
End: 2023-01-23

## 2023-01-23 VITALS
OXYGEN SATURATION: 98 % | TEMPERATURE: 98.3 F | DIASTOLIC BLOOD PRESSURE: 76 MMHG | RESPIRATION RATE: 18 BRPM | HEART RATE: 70 BPM | WEIGHT: 195.7 LBS | BODY MASS INDEX: 29.76 KG/M2 | SYSTOLIC BLOOD PRESSURE: 124 MMHG

## 2023-01-23 DIAGNOSIS — C90.00 MULTIPLE MYELOMA, REMISSION STATUS UNSPECIFIED (HCC): Primary | ICD-10-CM

## 2023-01-23 LAB
ALBUMIN SERPL-MCNC: 3.2 G/DL (ref 3.5–5)
ALBUMIN/GLOB SERPL: 0.9 (ref 1.1–2.2)
ALP SERPL-CCNC: 65 U/L (ref 45–117)
ALT SERPL-CCNC: 19 U/L (ref 12–78)
ANION GAP SERPL CALC-SCNC: 6 MMOL/L (ref 5–15)
AST SERPL-CCNC: 15 U/L (ref 15–37)
BASOPHILS # BLD: 0 K/UL (ref 0–0.1)
BASOPHILS NFR BLD: 1 % (ref 0–1)
BILIRUB SERPL-MCNC: 0.3 MG/DL (ref 0.2–1)
BUN SERPL-MCNC: 13 MG/DL (ref 6–20)
BUN/CREAT SERPL: 9 (ref 12–20)
CALCIUM SERPL-MCNC: 8.5 MG/DL (ref 8.5–10.1)
CHLORIDE SERPL-SCNC: 106 MMOL/L (ref 97–108)
CO2 SERPL-SCNC: 28 MMOL/L (ref 21–32)
CREAT SERPL-MCNC: 1.52 MG/DL (ref 0.7–1.3)
DIFFERENTIAL METHOD BLD: ABNORMAL
EOSINOPHIL # BLD: 0 K/UL (ref 0–0.4)
EOSINOPHIL NFR BLD: 1 % (ref 0–7)
ERYTHROCYTE [DISTWIDTH] IN BLOOD BY AUTOMATED COUNT: 25.1 % (ref 11.5–14.5)
GLOBULIN SER CALC-MCNC: 3.7 G/DL (ref 2–4)
GLUCOSE SERPL-MCNC: 122 MG/DL (ref 65–100)
HCT VFR BLD AUTO: 34.7 % (ref 36.6–50.3)
HGB BLD-MCNC: 10.7 G/DL (ref 12.1–17)
IGA SERPL-MCNC: 475 MG/DL (ref 70–400)
IGG SERPL-MCNC: 1200 MG/DL (ref 700–1600)
IGM SERPL-MCNC: <21 MG/DL (ref 40–230)
IMM GRANULOCYTES # BLD AUTO: 0 K/UL (ref 0–0.04)
IMM GRANULOCYTES NFR BLD AUTO: 0 % (ref 0–0.5)
LYMPHOCYTES # BLD: 0.8 K/UL (ref 0.8–3.5)
LYMPHOCYTES NFR BLD: 23 % (ref 12–49)
MCH RBC QN AUTO: 25.2 PG (ref 26–34)
MCHC RBC AUTO-ENTMCNC: 30.8 G/DL (ref 30–36.5)
MCV RBC AUTO: 81.8 FL (ref 80–99)
MONOCYTES # BLD: 0.8 K/UL (ref 0–1)
MONOCYTES NFR BLD: 23 % (ref 5–13)
NEUTS SEG # BLD: 1.7 K/UL (ref 1.8–8)
NEUTS SEG NFR BLD: 52 % (ref 32–75)
NRBC # BLD: 0 K/UL (ref 0–0.01)
NRBC BLD-RTO: 0 PER 100 WBC
PLATELET # BLD AUTO: 159 K/UL (ref 150–400)
PMV BLD AUTO: 9.6 FL (ref 8.9–12.9)
POTASSIUM SERPL-SCNC: 3.8 MMOL/L (ref 3.5–5.1)
PROT SERPL-MCNC: 6.9 G/DL (ref 6.4–8.2)
RBC # BLD AUTO: 4.24 M/UL (ref 4.1–5.7)
RBC MORPH BLD: ABNORMAL
RBC MORPH BLD: ABNORMAL
SODIUM SERPL-SCNC: 140 MMOL/L (ref 136–145)
WBC # BLD AUTO: 3.3 K/UL (ref 4.1–11.1)

## 2023-01-23 PROCEDURE — 80053 COMPREHEN METABOLIC PANEL: CPT

## 2023-01-23 PROCEDURE — 82784 ASSAY IGA/IGD/IGG/IGM EACH: CPT

## 2023-01-23 PROCEDURE — 77030012965 HC NDL HUBR BBMI -A

## 2023-01-23 PROCEDURE — 83521 IG LIGHT CHAINS FREE EACH: CPT

## 2023-01-23 PROCEDURE — 85025 COMPLETE CBC W/AUTO DIFF WBC: CPT

## 2023-01-23 PROCEDURE — 36591 DRAW BLOOD OFF VENOUS DEVICE: CPT

## 2023-01-23 PROCEDURE — 74011250636 HC RX REV CODE- 250/636: Performed by: INTERNAL MEDICINE

## 2023-01-23 PROCEDURE — 74011000250 HC RX REV CODE- 250: Performed by: INTERNAL MEDICINE

## 2023-01-23 PROCEDURE — 84165 PROTEIN E-PHORESIS SERUM: CPT

## 2023-01-23 RX ORDER — SODIUM CHLORIDE 9 MG/ML
10 INJECTION INTRAVENOUS AS NEEDED
Status: DISCONTINUED | OUTPATIENT
Start: 2023-01-23 | End: 2023-01-24 | Stop reason: HOSPADM

## 2023-01-23 RX ORDER — HEPARIN 100 UNIT/ML
500 SYRINGE INTRAVENOUS AS NEEDED
Status: DISCONTINUED | OUTPATIENT
Start: 2023-01-23 | End: 2023-01-24 | Stop reason: HOSPADM

## 2023-01-23 RX ADMIN — HEPARIN SODIUM (PORCINE) LOCK FLUSH IV SOLN 100 UNIT/ML 500 UNITS: 100 SOLUTION at 15:49

## 2023-01-23 RX ADMIN — SODIUM CHLORIDE, PRESERVATIVE FREE 10 ML: 5 INJECTION INTRAVENOUS at 15:48

## 2023-01-23 RX ADMIN — SODIUM CHLORIDE, PRESERVATIVE FREE 10 ML: 5 INJECTION INTRAVENOUS at 15:49

## 2023-01-23 NOTE — PROGRESS NOTES
Pomalyst 2mg Once Daily #28 0 Refills    3 Month Follow Up from Dec 2022 Schedule 3/24/23    For Pharmacy Admin Tracking Only    Program: Medication Refill  CPA in place: Yes  Recommendation Provided To: Pharmacy: 1  Intervention Detail: Refill(s) Provided  Intervention Accepted By: Pharmacy: 1  Time Spent (min): 5

## 2023-01-23 NOTE — PROGRESS NOTES
[facility] Progress Note    Date: 2023    Name: Andrzej Mac MRN: [id number]         : 1955    Mr. Efrain Van Arrived ambulatory and in no distress for PF/Labs. Right chest wall port accessed without difficulty, labs drawn & sent for processing. Mr. Amalia Cowden vitals were reviewed. Visit Vitals  /76   Pulse 70   Temp 98.3 °F (36.8 °C)   Resp 18   Wt 88.8 kg (195 lb 11.2 oz)   SpO2 98%   BMI 29.76 kg/m²       Lab results were obtained and reviewed. Recent Results (from the past 12 hour(s))   CBC WITH AUTOMATED DIFF    Collection Time: 23  3:43 PM   Result Value Ref Range    WBC 3.3 (L) 4.1 - 11.1 K/uL    RBC 4.24 4.10 - 5.70 M/uL    HGB 10.7 (L) 12.1 - 17.0 g/dL    HCT 34.7 (L) 36.6 - 50.3 %    MCV 81.8 80.0 - 99.0 FL    MCH 25.2 (L) 26.0 - 34.0 PG    MCHC 30.8 30.0 - 36.5 g/dL    RDW 25.1 (H) 11.5 - 14.5 %    PLATELET 473 452 - 593 K/uL    MPV 9.6 8.9 - 12.9 FL    NRBC 0.0 0  WBC    ABSOLUTE NRBC 0.00 0.00 - 0.01 K/uL    NEUTROPHILS PENDING %    LYMPHOCYTES PENDING %    MONOCYTES PENDING %    EOSINOPHILS PENDING %    BASOPHILS PENDING %    IMMATURE GRANULOCYTES PENDING %    ABS. NEUTROPHILS PENDING K/UL    ABS. LYMPHOCYTES PENDING K/UL    ABS. MONOCYTES PENDING K/UL    ABS. EOSINOPHILS PENDING K/UL    ABS. BASOPHILS PENDING K/UL    ABS. IMM. GRANS.  PENDING K/UL    DF PENDING        Medications:  Medications Administered       0.9% sodium chloride injection 10 mL       Admin Date  2023 Action  Given Dose  10 mL Route  IntraVENous Administered By  Brisa Chance RN               Admin Date  2023 Action  Given Dose  10 mL Route  IntraVENous Administered By  Brisa Chance RN              heparin (porcine) pf 500 Units       Admin Date  2023 Action  Given Dose  500 Units Route  IntraVENous Administered By  Brisa Chance RN                      Mr. Efrain Van tolerated treatment well and was discharged from [place] [address] in stable condition. Port de-accessed, flushed & heparinized per protocol. He is to return on 02/15/2023 for his next appointment.     Gabino Flores RN  January 23, 2023

## 2023-01-25 ENCOUNTER — TRANSCRIBE ORDER (OUTPATIENT)
Dept: SCHEDULING | Age: 68
End: 2023-01-25

## 2023-01-25 DIAGNOSIS — M54.12 RADICULOPATHY, CERVICAL REGION: Primary | ICD-10-CM

## 2023-01-25 LAB
KAPPA LC FREE SER-MCNC: 59.8 MG/L (ref 3.3–19.4)
KAPPA LC FREE/LAMBDA FREE SER: 1.81 (ref 0.26–1.65)
LAMBDA LC FREE SERPL-MCNC: 33.1 MG/L (ref 5.7–26.3)

## 2023-01-26 LAB
ALBUMIN SERPL ELPH-MCNC: 3.1 G/DL (ref 2.9–4.4)
ALBUMIN/GLOB SERPL: 0.9 (ref 0.7–1.7)
ALPHA1 GLOB SERPL ELPH-MCNC: 0.2 G/DL (ref 0–0.4)
ALPHA2 GLOB SERPL ELPH-MCNC: 0.8 G/DL (ref 0.4–1)
B-GLOBULIN SERPL ELPH-MCNC: 1 G/DL (ref 0.7–1.3)
GAMMA GLOB SERPL ELPH-MCNC: 1.4 G/DL (ref 0.4–1.8)
GLOBULIN SER CALC-MCNC: 3.4 G/DL (ref 2.2–3.9)
IGA SERPL-MCNC: 507 MG/DL (ref 61–437)
IGG SERPL-MCNC: 1173 MG/DL (ref 603–1613)
IGM SERPL-MCNC: 19 MG/DL (ref 20–172)
M PROTEIN SERPL ELPH-MCNC: 0.4 G/DL
PROT PATTERN SERPL IFE-IMP: ABNORMAL
PROT SERPL-MCNC: 6.5 G/DL (ref 6–8.5)

## 2023-01-27 ENCOUNTER — HOSPITAL ENCOUNTER (OUTPATIENT)
Dept: MRI IMAGING | Age: 68
End: 2023-01-27
Attending: INTERNAL MEDICINE
Payer: OTHER MISCELLANEOUS

## 2023-01-27 ENCOUNTER — HOSPITAL ENCOUNTER (EMERGENCY)
Age: 68
Discharge: STILL ADMITTED OR ADMINISTRATIVE | End: 2023-01-27

## 2023-01-27 DIAGNOSIS — M54.12 RADICULOPATHY, CERVICAL REGION: ICD-10-CM

## 2023-01-27 PROCEDURE — 72141 MRI NECK SPINE W/O DYE: CPT

## 2023-02-07 RX ORDER — ONDANSETRON 2 MG/ML
8 INJECTION INTRAMUSCULAR; INTRAVENOUS AS NEEDED
Status: CANCELLED | OUTPATIENT
Start: 2023-02-15

## 2023-02-07 RX ORDER — SODIUM CHLORIDE 9 MG/ML
10 INJECTION INTRAVENOUS AS NEEDED
Status: CANCELLED | OUTPATIENT
Start: 2023-02-15

## 2023-02-07 RX ORDER — ALBUTEROL SULFATE 0.83 MG/ML
2.5 SOLUTION RESPIRATORY (INHALATION) AS NEEDED
Status: CANCELLED
Start: 2023-02-15

## 2023-02-07 RX ORDER — EPINEPHRINE 1 MG/ML
0.3 INJECTION, SOLUTION, CONCENTRATE INTRAVENOUS AS NEEDED
Status: CANCELLED | OUTPATIENT
Start: 2023-02-15

## 2023-02-07 RX ORDER — ACETAMINOPHEN 325 MG/1
650 TABLET ORAL AS NEEDED
Status: CANCELLED
Start: 2023-02-15

## 2023-02-07 RX ORDER — SODIUM CHLORIDE 0.9 % (FLUSH) 0.9 %
5-10 SYRINGE (ML) INJECTION AS NEEDED
Status: CANCELLED | OUTPATIENT
Start: 2023-02-15

## 2023-02-07 RX ORDER — DIPHENHYDRAMINE HYDROCHLORIDE 50 MG/ML
50 INJECTION, SOLUTION INTRAMUSCULAR; INTRAVENOUS AS NEEDED
Status: CANCELLED
Start: 2023-02-15

## 2023-02-07 RX ORDER — HYDROCORTISONE SODIUM SUCCINATE 100 MG/2ML
100 INJECTION, POWDER, FOR SOLUTION INTRAMUSCULAR; INTRAVENOUS AS NEEDED
Status: CANCELLED | OUTPATIENT
Start: 2023-02-15

## 2023-02-07 RX ORDER — HEPARIN 100 UNIT/ML
500 SYRINGE INTRAVENOUS AS NEEDED
Status: CANCELLED | OUTPATIENT
Start: 2023-02-15

## 2023-02-15 ENCOUNTER — HOSPITAL ENCOUNTER (OUTPATIENT)
Dept: INFUSION THERAPY | Age: 68
Discharge: HOME | End: 2023-02-15
Payer: MEDICARE

## 2023-02-15 ENCOUNTER — TELEPHONE (OUTPATIENT)
Dept: ONCOLOGY | Age: 68
End: 2023-02-15

## 2023-02-15 VITALS
DIASTOLIC BLOOD PRESSURE: 86 MMHG | HEART RATE: 72 BPM | OXYGEN SATURATION: 100 % | SYSTOLIC BLOOD PRESSURE: 157 MMHG | RESPIRATION RATE: 16 BRPM | TEMPERATURE: 97.8 F

## 2023-02-15 DIAGNOSIS — C90.00 MULTIPLE MYELOMA, REMISSION STATUS UNSPECIFIED (HCC): ICD-10-CM

## 2023-02-15 DIAGNOSIS — C90.01 MULTIPLE MYELOMA IN REMISSION (HCC): Primary | ICD-10-CM

## 2023-02-15 LAB
ALBUMIN SERPL-MCNC: 3.5 G/DL (ref 3.5–5)
ALBUMIN/GLOB SERPL: 0.9 (ref 1.1–2.2)
ALP SERPL-CCNC: 57 U/L (ref 45–117)
ALT SERPL-CCNC: 21 U/L (ref 12–78)
AST SERPL-CCNC: 13 U/L (ref 15–37)
BASOPHILS # BLD: 0 K/UL (ref 0–0.1)
BASOPHILS NFR BLD: 1 % (ref 0–1)
BILIRUB DIRECT SERPL-MCNC: 0.2 MG/DL (ref 0–0.2)
BILIRUB SERPL-MCNC: 0.5 MG/DL (ref 0.2–1)
DIFFERENTIAL METHOD BLD: ABNORMAL
EOSINOPHIL # BLD: 0.1 K/UL (ref 0–0.4)
EOSINOPHIL NFR BLD: 2 % (ref 0–7)
ERYTHROCYTE [DISTWIDTH] IN BLOOD BY AUTOMATED COUNT: 23.4 % (ref 11.5–14.5)
GLOBULIN SER CALC-MCNC: 3.9 G/DL (ref 2–4)
HCT VFR BLD AUTO: 37.9 % (ref 36.6–50.3)
HGB BLD-MCNC: 11.7 G/DL (ref 12.1–17)
IGA SERPL-MCNC: 508 MG/DL (ref 70–400)
IGG SERPL-MCNC: 1290 MG/DL (ref 700–1600)
IGM SERPL-MCNC: <21 MG/DL (ref 40–230)
IMM GRANULOCYTES # BLD AUTO: 0 K/UL (ref 0–0.04)
IMM GRANULOCYTES NFR BLD AUTO: 0 % (ref 0–0.5)
LYMPHOCYTES # BLD: 1 K/UL (ref 0.8–3.5)
LYMPHOCYTES NFR BLD: 34 % (ref 12–49)
MCH RBC QN AUTO: 25.9 PG (ref 26–34)
MCHC RBC AUTO-ENTMCNC: 30.9 G/DL (ref 30–36.5)
MCV RBC AUTO: 84 FL (ref 80–99)
MONOCYTES # BLD: 0.6 K/UL (ref 0–1)
MONOCYTES NFR BLD: 20 % (ref 5–13)
NEUTS SEG # BLD: 1.3 K/UL (ref 1.8–8)
NEUTS SEG NFR BLD: 43 % (ref 32–75)
NRBC # BLD: 0 K/UL (ref 0–0.01)
NRBC BLD-RTO: 0 PER 100 WBC
PLATELET # BLD AUTO: 169 K/UL (ref 150–400)
PMV BLD AUTO: 9.2 FL (ref 8.9–12.9)
PROT SERPL-MCNC: 7.4 G/DL (ref 6.4–8.2)
RBC # BLD AUTO: 4.51 M/UL (ref 4.1–5.7)
RBC MORPH BLD: ABNORMAL
WBC # BLD AUTO: 3 K/UL (ref 4.1–11.1)

## 2023-02-15 PROCEDURE — 36591 DRAW BLOOD OFF VENOUS DEVICE: CPT

## 2023-02-15 PROCEDURE — 86334 IMMUNOFIX E-PHORESIS SERUM: CPT

## 2023-02-15 PROCEDURE — 80076 HEPATIC FUNCTION PANEL: CPT

## 2023-02-15 PROCEDURE — 85025 COMPLETE CBC W/AUTO DIFF WBC: CPT

## 2023-02-15 PROCEDURE — 74011000250 HC RX REV CODE- 250: Performed by: INTERNAL MEDICINE

## 2023-02-15 PROCEDURE — 77030012965 HC NDL HUBR BBMI -A

## 2023-02-15 PROCEDURE — 82784 ASSAY IGA/IGD/IGG/IGM EACH: CPT

## 2023-02-15 PROCEDURE — 74011250636 HC RX REV CODE- 250/636: Performed by: INTERNAL MEDICINE

## 2023-02-15 PROCEDURE — 83521 IG LIGHT CHAINS FREE EACH: CPT

## 2023-02-15 RX ORDER — HEPARIN 100 UNIT/ML
300-500 SYRINGE INTRAVENOUS AS NEEDED
Status: ACTIVE | OUTPATIENT
Start: 2023-02-15 | End: 2023-02-15

## 2023-02-15 RX ORDER — SODIUM CHLORIDE 9 MG/ML
25 INJECTION, SOLUTION INTRAVENOUS CONTINUOUS
Status: DISPENSED | OUTPATIENT
Start: 2023-02-15 | End: 2023-02-15

## 2023-02-15 RX ORDER — SODIUM CHLORIDE 9 MG/ML
10 INJECTION INTRAVENOUS AS NEEDED
Status: ACTIVE | OUTPATIENT
Start: 2023-02-15 | End: 2023-02-15

## 2023-02-15 RX ORDER — SODIUM CHLORIDE 0.9 % (FLUSH) 0.9 %
10 SYRINGE (ML) INJECTION AS NEEDED
Status: DISPENSED | OUTPATIENT
Start: 2023-02-15 | End: 2023-02-15

## 2023-02-15 RX ADMIN — SODIUM CHLORIDE, PRESERVATIVE FREE 10 ML: 5 INJECTION INTRAVENOUS at 10:20

## 2023-02-15 RX ADMIN — HEPARIN 500 UNITS: 100 SYRINGE at 10:21

## 2023-02-15 NOTE — TELEPHONE ENCOUNTER
Patient came into the office re. Had a root canal and was not able to get his Zometa today, has another appt coming up for this and needs to confirm that he can keep this. Also has a possible neck surgery, which is not yet confirmed, would like a call from the provider to discuss how to coordinate with the surgeon on this.

## 2023-02-15 NOTE — PROGRESS NOTES
[facility] Short Visit Progress Note    1010 Mr. Hong admitted to [facility] for Zometa/Port Flush/Labs ambulatory in stable condition. Assessment completed. No new concerns voiced. Vital Signs:  Visit Vitals  BP (!) 157/86 (BP 1 Location: Left arm, BP Patient Position: Sitting)   Pulse 72   Temp 97.8 °F (36.6 °C)   Resp 16   SpO2 100%         Venous Access Device Port 12/21/22 (Active)   Central Line Being Utilized No 02/15/23 1010   Criteria for Appropriate Use Other (comment) 02/15/23 1010   Site Assessment Clean, dry, & intact 02/15/23 1010   Date of Last Dressing Change 02/15/23 02/15/23 1010   Dressing Status New 02/15/23 1010   Dressing Type Bandaid;Gauze 02/15/23 1010   Action Taken Blood drawn 02/15/23 1010   Date Accessed (Medial Site) 02/15/23 02/15/23 1010   Access Time (Medial Site) 1020 02/15/23 1010   Access Needle Size (Site #1) 20 G 02/15/23 1010   Access Needle Length (Medial Site) 0.75 inches 02/15/23 1010   Positive Blood Return (Medial Site) Yes 02/15/23 1010   Action Taken (Medial Site) Blood drawn;Flushed; De-accessed 02/15/23 1010       Lab Results:  Labs remain in processing at time of this note. Medications:  Medications Administered       0.9% sodium chloride injection 10 mL       Admin Date  02/15/2023 Action  Given Dose  10 mL Route  IntraVENous Administered By  Merary BELLA              heparin (porcine) pf 300-500 Units       Admin Date  02/15/2023 Action  Given Dose  500 Units Route  InterCATHeter Administered By  Merary Culver A              saline peripheral flush soln 10 mL       Admin Date  02/15/2023 Action  Given Dose  10 mL Route  InterCATHeter Administered By  Eulene Palomo A                  Patient unable to get Zometa today had root canal and teeth shaved down 2 weeks ago. Patient states he has more dental work scheduled for 2/24/23. MD notified and will reassess at next appointment.       Patient port flushed and heparinized; de-accessed per protocol. Patient tolerated treatment well. Patient discharged from [facility] ambulatory in no distress at 1030. Patient aware of next appointment.     Ann-Marie Slade  February 15, 2023    Future Appointments   Date Time Provider Dajuan Vickie   2/24/2023  1:45 PM Hay Milian MD [facility] BS AMB   3/1/2023 10:30 AM ANGLE Grier BS AMB   3/15/2023 11:30 AM LOZANO FASTRACK 6 [facility] REG   3/24/2023 10:30 AM [facility] AMB   4/12/2023 10:30 AM LOZANO FASTRACK 6 [facility] REG   5/10/2023 10:00 AM LOZANO FASTRACK 2 [facility] REG

## 2023-02-16 LAB
IGA SERPL-MCNC: 568 MG/DL (ref 61–437)
IGG SERPL-MCNC: 1204 MG/DL (ref 603–1613)
IGM SERPL-MCNC: 18 MG/DL (ref 20–172)
KAPPA LC FREE SER-MCNC: 79.6 MG/L (ref 3.3–19.4)
KAPPA LC FREE/LAMBDA FREE SER: 2.36 (ref 0.26–1.65)
LAMBDA LC FREE SERPL-MCNC: 33.7 MG/L (ref 5.7–26.3)
PROT PATTERN SERPL IFE-IMP: ABNORMAL

## 2023-02-16 NOTE — TELEPHONE ENCOUNTER
Case d/w MD. Pt needs to wait atleast 8 weeks from root canal to get zometa. Called pt. HIPAA verified by two patient identifiers. After I told pt about zometa I asked him for more information about the second part of his question regarding neck surgery. He said \"I am so tired of going back and forth about everything and he said she said to tell me this and that\" and he went on to talk about his neck and \"not having a  lot of chances left\"  and how they want to start off to  give him steroids and a shot. I asked him to elaborate on what he means by going back and forth and he said \" you telling me what he said\" I educated him that that is my job to do and is why [facility]ch[facility] has an RN and NP and [facility] doesn't return phone calls for this and pt said \" well he can\". He then told me he made an appointment for next week to talk to Era Cochran about his neck surgery and his multiple myeloma.  aware of the above.

## 2023-02-17 ENCOUNTER — DOCUMENTATION ONLY (OUTPATIENT)
Dept: ONCOLOGY | Age: 68
End: 2023-02-17

## 2023-02-17 DIAGNOSIS — C90.00 MULTIPLE MYELOMA, REMISSION STATUS UNSPECIFIED (HCC): ICD-10-CM

## 2023-02-17 LAB
ALBUMIN SERPL ELPH-MCNC: 3.4 G/DL (ref 2.9–4.4)
ALBUMIN/GLOB SERPL: 1.1 (ref 0.7–1.7)
ALPHA1 GLOB SERPL ELPH-MCNC: 0.2 G/DL (ref 0–0.4)
ALPHA2 GLOB SERPL ELPH-MCNC: 0.9 G/DL (ref 0.4–1)
B-GLOBULIN SERPL ELPH-MCNC: 0.9 G/DL (ref 0.7–1.3)
GAMMA GLOB SERPL ELPH-MCNC: 1.4 G/DL (ref 0.4–1.8)
GLOBULIN SER-MCNC: 3.4 G/DL (ref 2.2–3.9)
IGA SERPL-MCNC: 582 MG/DL (ref 61–437)
IGG SERPL-MCNC: 1248 MG/DL (ref 603–1613)
IGM SERPL-MCNC: 20 MG/DL (ref 20–172)
INTERPRETATION SERPL IEP-IMP: ABNORMAL
KAPPA LC FREE SER-MCNC: 79.3 MG/L (ref 3.3–19.4)
KAPPA LC FREE/LAMBDA FREE SER: 2.31 (ref 0.26–1.65)
LAMBDA LC FREE SERPL-MCNC: 34.4 MG/L (ref 5.7–26.3)
M PROTEIN SERPL ELPH-MCNC: 0.4 G/DL
PROT SERPL-MCNC: 6.8 G/DL (ref 6–8.5)

## 2023-02-17 RX ORDER — POMALIDOMIDE 2 MG/1
1 CAPSULE ORAL DAILY
Qty: 28 CAPSULE | Refills: 0 | Status: ACTIVE | OUTPATIENT
Start: 2023-02-17

## 2023-02-17 NOTE — PROGRESS NOTES
Pomalyst 2mg Once Daily #28 - 0 Refills    Follow Up Scheduled 2/24/23    For Pharmacy Admin Tracking Only    Program: Medication Refill  CPA in place: Yes  Recommendation Provided To: Pharmacy: 1  Intervention Detail: Refill(s) Provided  Intervention Accepted By: Pharmacy: 1  Time Spent (min): 5

## 2023-02-20 LAB
IGA SERPL-MCNC: 568 MG/DL (ref 61–437)
IGG SERPL-MCNC: 1204 MG/DL (ref 603–1613)
IGM SERPL-MCNC: 18 MG/DL (ref 20–172)
PROT PATTERN SERPL IFE-IMP: ABNORMAL

## 2023-02-23 NOTE — PROGRESS NOTES
Shaheen West. is a 74 y.o. male here for follow up for Multiple Myeloma. He takes Pomalyst.  Pt here to discuss possible neck surgery. 1. Have you been to the ER, urgent care clinic since your last visit? Hospitalized since your last visit?  12/20/22 chest pain/palpitations    2. Have you seen or consulted any other health care providers outside of the [address] [place] [address] since your last visit? Include any pap smears or colon screening.  [facility]

## 2023-02-24 ENCOUNTER — OFFICE VISIT (OUTPATIENT)
Dept: ONCOLOGY | Age: 68
End: 2023-02-24
Payer: MEDICARE

## 2023-02-24 VITALS
OXYGEN SATURATION: 95 % | SYSTOLIC BLOOD PRESSURE: 126 MMHG | TEMPERATURE: 98.2 F | BODY MASS INDEX: 28.16 KG/M2 | HEIGHT: 68 IN | DIASTOLIC BLOOD PRESSURE: 94 MMHG | WEIGHT: 185.8 LBS | HEART RATE: 79 BPM

## 2023-02-24 DIAGNOSIS — C90.00 MULTIPLE MYELOMA, REMISSION STATUS UNSPECIFIED (HCC): Primary | ICD-10-CM

## 2023-02-24 DIAGNOSIS — Z51.11 CHEMOTHERAPY MANAGEMENT, ENCOUNTER FOR: ICD-10-CM

## 2023-02-24 NOTE — PROGRESS NOTES
[address] [facility] [address], 210 [facility], [address] [place] [address], 200 [facility]  [phone]            Progress Note        Patient: Antoni Ruiz Sr. MRN: [id number]  SSN: [id number]    YOB: 1955  Age: 74 y.o. Sex: male        Diagnosis:      1. Multiple Myeloma, IgA Kappa   Durie Husser stage IIIB    Cytogenetics/FISH: t(14;16) - high risk    Ttreatment:     1. Maintenance therapy - Pomalyst 2 mg daily - 11/19/2018   2. Maintenance therapy - Ixazomib - started 10/15/2018 - stopped 11/12/2018  3. S/P tandem  Autotransplant   First on 2/28/2018   2nd on 06/13/2018  4. Carfilzomib/Pom/Dex - s/p 5 cycles  5. VD-PACE s/p 1 cycle  6. RVD - s/p 4 cycles    Subjective:      Antoni Ruiz Sr. is a 74 y.o. male with a diagnosis of IgA kappa myeloma. Bone marrow shows 100% aberrant plasma cells. He suffers with DM but it is diet controlled. He has received systemic anti-viral treatment for chronic Hep C with successful eradication of the virus. Mr. Dell Iverson received 4 cycles of systemic therapy with RVd. He had an initial good response to treatment. However his paraprotein level started rising and the myeloma became refractory to treatment. I then administered one cycle of VD-PACE in the hospital. He then received Carfilzomib/Pom/Dex. He achieved VGPR. He underwent tandem autotransplant at [facility]. According to the patient he achieved complete response with therapy. He has undergone second/tandem transplant in June. He took Pomalyst maintenance until Aug 2019 then had to stop due to recurrent prostatitis. He had a repeat BM biopsy in February 2019 at [facility] which showed complete molecular remission. He is taking Pomalyst. He has seen Dr. Chris Forde for movement disorder. He has started taking a new medication for tardive dyskinesia. Interval history:    He has been experiencing pain in the neck and arms.  MRI of the spine shows degenerative changes and he thinking about local steroid injection or facet block. Review of Systems:    Constitutional: negative  Eyes: negative  Ears, Nose, Mouth, Throat, and Face: negative  Respiratory: cough  Cardiovascular: negative  Gastrointestinal: negative  Genitourinary:negative  Integument/Breast: negative  Hematologic/Lymphatic: negative  Musculoskeletal: negative  Neurological: negative      Past Medical History:   Diagnosis Date    Acid reflux     Arrhythmia     pvc's    Chronic pain     neck    Depression     Diabetes ([facility] [address]     Femoral hernia 2012    Hepatitis C     Hypertension     Inguinal hernia 2012    Liver disease     hepatitis c    Multiple myeloma ([facility] [address] [facility]     Other ill-defined conditions(799.89)     Hx of PVCs since     Prostatitis, acute     Psychiatric disorder     depression     Past Surgical History:   Procedure Laterality Date    COLONOSCOPY N/A 2016    COLONOSCOPY performed by Carlo Torres MD at \[facility]\"" ENDOSCOPY    HX APPENDECTOMY      HX CERVICAL FUSION  2008    x 1 as of 2014    HX HEENT      foreign body removed from right eye    HX HERNIA REPAIR      [facility]      HX ORTHOPAEDIC      cervical fusion    HX OTHER SURGICAL  2000    liver bx - chronic hepatitis      Family History   Problem Relation Age of Onset    OSTEOARTHRITIS Mother     OSTEOARTHRITIS Father     Diabetes Father     Hypertension Maternal Grandmother     Diabetes Maternal Grandmother     Hypertension Maternal Grandfather     Diabetes Maternal Grandfather      Social History     Tobacco Use    Smoking status: Former     Packs/day: 1.00     Years: 12.00     Pack years: 12.00     Types: Cigarettes     Quit date: 1989     Years since quittin.1    Smokeless tobacco: Never    Tobacco comments:     former cigarette smoker   Substance Use Topics    Alcohol use: No     Comment: former alcoholic-quit [id number]      Prior to Admission medications    Medication Sig Start Date End Date Taking? Authorizing Provider   tenofovir DISOPROXIL FUMARATE (VIREAD) 300 mg tablet TAKE 1 TABLET BY MOUTH 1 TIME A DAY 2/24/23  Yes Gigi Sam MD   pomalidomide (Pomalyst) 2 mg cap Take 1 Capsule by mouth daily. 2/17/23  Yes Gigi Sam MD   metFORMIN ER (GLUCOPHAGE XR) 500 mg tablet TAKE 1 TABLET BY MOUTH TWO TIMES A DAY. 1/6/23  Yes Juan Smallwood MD   testosterone cypionate (DEPOTESTOTERONE CYPIONATE) 200 mg/mL injection INJECT 2 ML INTRAMUSCULARLY ONCE WEEKLY. . 11/20/22  Yes Provider, Historical   deutetrabenazine (Austedo) 6 mg tab Take 6 mg by mouth two (2) times a day. Indications: tardive dyskinesia, a disorder characterized by involuntary movements of the face, mouth and tongue 11/28/22  Yes Messi Ruiz,    TESTOSTERONE  mg by IntraMUSCular route every fourteen (14) days. Yes Provider, Historical   OTHER Cannabis prescription   Yes Provider, Historical   cholecalciferol, vitamin D3, 50 mcg (2,000 unit) tab Take  by mouth daily. Yes Provider, Historical   ascorbic acid, vitamin C, (VITAMIN C) 100 mg tab Take 100 mg by mouth daily. Yes Provider, Historical   Denta 5000 Plus 1.1 % crea USE AS DIRECTED TWO TIMES DAILY 8/12/20  Yes Provider, Historical   ibuprofen (MOTRIN) 800 mg tablet TAKE 1 TABLET BY MOUTH 3 TIMES A DAY AS NEEDED FOR PAIN 10/13/20  Yes Provider, Historical   buPROPion XL (WELLBUTRIN XL) 300 mg XL tablet TAKE 1 TABLET BY MOUTH EVERY DAY 1/30/20  Yes Provider, Historical   NUCYNTA 100 mg tablet TAKE 2 TABLETS 3 TIMES A DAY 7/27/19  Yes Provider, Historical   aspirin delayed-release 81 mg tablet Take  by mouth daily. Yes Provider, Historical   ondansetron (ZOFRAN ODT) 8 mg disintegrating tablet Take 8 mg by mouth every eight (8) hours as needed for Nausea. Yes Other, MD Goldy   multivitamin (ONE A DAY) tablet Take 1 Tab by mouth daily. Yes Provider, Historical   tamsulosin (FLOMAX) 0.4 mg capsule Take 1 Cap by mouth daily.  4/4/17  Yes Faviola Mendoza MD ARIPiprazole (ABILIFY) 5 mg tablet Take 2.5 mg by mouth every morning. Yes Other, MD Goldy   chlorproMAZINE (THORAZINE) 25 mg tablet Take 1 Tablet by mouth three (3) times daily as needed (hiccups). Patient not taking: No sig reported 8/22/22   Emanuel Mo MD   Belsomra 15 mg tablet Take 15 mg by mouth nightly. Patient not taking: No sig reported 5/5/22   Provider, Historical   lisinopriL (PRINIVIL, ZESTRIL) 10 mg tablet Take 10 mg by mouth daily as needed. Patient not taking: No sig reported    Provider, Historical   POTASSIUM PO Take  by mouth as needed. Patient not taking: No sig reported    Provider, Historical   zolpidem 3.5 mg subl DISSOLVE 1 TABLET UNDER TONGUE AT BEDTIME  Patient not taking: No sig reported 4/10/21   Provider, Historical   naproxen (NAPROSYN) 500 mg tablet TAKE 1 TABLET BY MOUTH TWICE A DAY  Patient not taking: No sig reported 8/26/19   Provider, Historical   prochlorperazine (COMPAZINE) 10 mg tablet Take 5 mg by mouth every six (6) hours as needed. Patient not taking: No sig reported    Other, MD Goldy   omeprazole (PRILOSEC) 20 mg capsule Take 20 mg by mouth daily as needed (acid reflux). Patient not taking: No sig reported    Other, MD Goldy   zolpidem CR (AMBIEN CR) 12.5 mg tablet Take 12.5 mg by mouth nightly. Patient not taking: No sig reported    Provider, Historical          Allergies   Allergen Reactions    Mercury (Bulk) Hives     Blisters             Objective:     Visit Vitals  BP (!) 126/94 (BP 1 Location: Left upper arm, BP Patient Position: Sitting)   Pulse 79   Temp 98.2 °F (36.8 °C) (Temporal)   Ht 5' 8\" (1.727 m)   Wt 185 lb 12.8 oz (84.3 kg)   SpO2 95%   BMI 28.25 kg/m²       Pain Scale: /10  Pain Location:     Physical Exam:     GENERAL: alert, cooperative  EYE: negative  LYMPHATIC: Cervical, supraclavicular, and axillary nodes normal.   THROAT & NECK: normal and no erythema or exudates noted.    LUNG: clear to auscultation bilaterally  HEART: regular rate and rhythm  ABDOMEN: soft, non-tender  EXTREMITIES: normal  SKIN: Normal.  NEUROLOGIC: negative     Physical exam copied from previous note. Reviewed and no changes noted. Lab Results   Component Value Date/Time    WBC 3.0 (L) 02/15/2023 10:21 AM    HGB 11.7 (L) 02/15/2023 10:21 AM    Hematocrit (POC) 37 04/14/2021 09:48 AM    HCT 37.9 02/15/2023 10:21 AM    PLATELET 785 76/11/2286 10:21 AM    MCV 84.0 02/15/2023 10:21 AM       Lab Results   Component Value Date/Time    Sodium 140 01/23/2023 03:43 PM    Potassium 3.8 01/23/2023 03:43 PM    Chloride 106 01/23/2023 03:43 PM    CO2 28 01/23/2023 03:43 PM    Anion gap 6 01/23/2023 03:43 PM    Glucose 122 (H) 01/23/2023 03:43 PM    BUN 13 01/23/2023 03:43 PM    Creatinine 1.52 (H) 01/23/2023 03:43 PM    BUN/Creatinine ratio 9 (L) 01/23/2023 03:43 PM    GFR est AA 55 (L) 09/28/2022 10:49 AM    GFR est non-AA 46 (L) 09/28/2022 10:49 AM    Calcium 8.5 01/23/2023 03:43 PM            Assessment:     1. Multiple Myeloma, IgA Kappa   Durie [place] stage IIIB    Cytogenetics/FISH: t(14;16) - high risk  ECOG PS - 0   Intent of therapy: palliative    This is a life threatening illness    Received 3 cycles of RVd   Dose reduced Revlimid and Velcade d/t continued cytopenias. M-spike started rising. He received one cycle of VD-PACE (bortezomib, dexamethasone, thalidomide, cisplatin, adriamycin, cyclophosphamide, and etoposide). Achieved [place] with once cycle    Receivied KPd - s/p 5 cycles    Excellent response with M-protein came down to 0.1    S/p tandem autotransplant    First on 2/28/2018   2nd on 06/13/2018    Achieved CR after the first transplant. Repeat bone marrow @ [facility] - Complete response. Since he is high risk based on cytogenetics, I recommend proteosome inhibitor as maintenance therapy.      Took Ixazomib briefly  Developed fever, was in the hospital.   Also developed cytopenias    Patient prefered to switch therapy to Pomalyst.     Pomalyst 2 mg daily - started 11/19/2018    M protein has come up to 0.4  I shall continue to follow it closely. Tolerating treatment very well  Denies any side effects. A detailed system by system evaluation of side effect was performed to assess chemotherapy related toxicity. Blood counts are acceptable. Results reviewed with the patient    Bone marrow 04/2021 - in CR. The disease has a high risk of recurrence and the treatment also carries a substantial risk of side effects. All of this was assessed during this visit. 2. Type 2 DM with complication    Managed by Endocrine      3. Chronic Hep C    In sustained virological remission      Plan:       > Continue Pomalyst maintenance  > Myeloma labs and port flush monthly   > Zometa on hold for root canal  > Follow-up in 1 month      Signed by: Mango Remy MD                     February 24, 2023      CC.  Stella Hamilton MD

## 2023-02-24 NOTE — LETTER
2/24/2023    Patient: Montserrat Sesay. YOB: 1955   Date of Visit: 2/24/2023     Imelda Santana MD  [address] [place] [address]  Via Fax: [phone]    Dear Imelda Santana MD,      Thank you for referring Mr. Lilo Christian to [address] [place] [address] for evaluation. My notes for this consultation are attached. If you have questions, please do not hesitate to call me. I look forward to following your patient along with you.       Sincerely,    Kizzy Dumont MD

## 2023-02-28 DIAGNOSIS — M47.819 DEGENERATIVE JOINT DISEASE OF SPINAL FACET JOINT: Primary | ICD-10-CM

## 2023-03-06 ENCOUNTER — TELEPHONE (OUTPATIENT)
Dept: ONCOLOGY | Age: 68
End: 2023-03-06

## 2023-03-06 NOTE — TELEPHONE ENCOUNTER
3/6/23- Per Jacqualine  with [facility] patient is now approved for Pomalyst with the cost of care $0 copay and patient received the medication on 3/1/23. No further financial assistance required at this time.

## 2023-03-10 NOTE — PROGRESS NOTES
[address] [facility] [address], [address] [facility], [address] [place] [address], 200 [facility]  [phone]            Progress Note        Patient: Jcak Maki Sr. MRN: [id number]  SSN: [id number]    YOB: 1955  Age: 61 y.o. Sex: male        Diagnosis:      1. Multiple Myeloma, IgA Kappa   Durie [place]ud[place] stage IIIB    Cytogenetics/FISH: t(14;16) - high risk    Ttreatment:     1. Maintenance therapy - Pomalyst 2 mg daily - 11/19/2018   2. Maintenance therapy - Ixazomib - started 10/15/2018 - stopped 11/12/2018  3. S/P tandem  Autotransplant   First on 2/28/2018   2nd on 06/13/2018  4. Carfilzomib/Pom/Dex - s/p 5 cycles  5. VD-PACE s/p 1 cycle  6. RVD - s/p 4 cycles    Subjective:      Jack Maki Sr. is a 61 y.o. male with a diagnosis of IgA kappa myeloma. Bone marrow shows 100% aberrant plasma cells. He suffers with DM but it is diet controlled. He has received systemic anti-viral treatment for chronic Hep C with successful eradication of the virus. Mr. Harshal Banda received 4 cycles of systemic therapy with RVd. He had an initial good response to treatment. However his paraprotein level started rising and the myeloma became refractory to treatment. I then administered one cycle of VD-PACE in the hospital. He then received Carfilzomib/Pom/Dex. He achieved VGPR. He underwent tandem autotransplant at [facility]. According to the patient he achieved complete response with therapy. He has undergone second/tandem transplant in June. He took Pomalyst maintenance until Aug 2019 then had to stop due to recurrent prostatitis. He had a repeat BM biopsy in February 2019 at [facility] which showed complete molecular remission. He is taking Pomalyst. He had PNA in Nov 2021. He took Abx for 8 days. He feels well.          Review of Systems:    Constitutional: negative  Eyes: negative  Ears, Nose, Mouth, Throat, and Face: negative  Respiratory: cough  Cardiovascular: negative  Gastrointestinal: negative  Genitourinary:negative  Integument/Breast: negative  Hematologic/Lymphatic: negative  Musculoskeletal: negative  Neurological: negative      Past Medical History:   Diagnosis Date    Acid reflux     Arrhythmia     pvc's    Chronic pain     neck    Depression     Diabetes ([address]     Femoral hernia 2012    Hepatitis C     Hypertension     Inguinal hernia 2012    Liver disease     hepatitis c    Multiple myeloma (HCC)     Other ill-defined conditions(799.89)     Hx of PVCs since     Prostatitis, acute     Psychiatric disorder     depression     Past Surgical History:   Procedure Laterality Date    COLONOSCOPY N/A 2016    COLONOSCOPY performed by Alicja Schofield MD at [facility] ENDOSCOPY    HX APPENDECTOMY      HX CERVICAL FUSION  2008    x 1 as of 2014    HX HEENT      foreign body removed from right eye    HX HERNIA REPAIR      [facility]      HX ORTHOPAEDIC      cervical fusion    HX OTHER SURGICAL  2000    liver bx - chronic hepatitis      Family History   Problem Relation Age of Onset    OSTEOARTHRITIS Mother     OSTEOARTHRITIS Father     Diabetes Father     Hypertension Maternal Grandmother     Diabetes Maternal Grandmother     Hypertension Maternal Grandfather     Diabetes Maternal Grandfather      Social History     Tobacco Use    Smoking status: Former Smoker     Packs/day: 1.00     Years: 12.00     Pack years: 12.00     Types: Cigarettes     Quit date: 1989     Years since quittin.2    Smokeless tobacco: Never Used    Tobacco comment: former cigarette smoker   Substance Use Topics    Alcohol use: No     Comment: former alcoholic-quit [id number]      Prior to Admission medications    Medication Sig Start Date End Date Taking?  Authorizing Provider   Pomalyst 2 mg cap TAKE 1 CAPSULE BY MOUTH ONCE DAILY 3/10/22  Yes Jeremiah Valdez MD   tenofovir DISOPROXIL FUMARATE (VIREAD) 300 mg tablet TAKE 1 TABLET BY MOUTH 1 TIME A DAY. 1/5/22  Yes Ashley Matt MD   TESTOSTERONE  mg by IntraMUSCular route Once every 2 weeks. Yes Provider, Historical   POTASSIUM PO Take  by mouth. Yes Provider, Historical   metFORMIN ER (GLUCOPHAGE XR) 500 mg tablet Take 1 Tablet by mouth two (2) times a day. SEND FUTURE REFILL REQUESTS TO PCP. DR Larissa Fallon NO LONGER AT THIS PRACTICE 8/6/21  Yes Marcel Angel MD   OTHER Cannabis prescription   Yes Provider, Historical   zolpidem 3.5 mg subl DISSOLVE 1 TABLET UNDER TONGUE AT BEDTIME 4/10/21  Yes Provider, Historical   cholecalciferol, vitamin D3, (Vitamin D3) 50 mcg (2,000 unit) tab Take  by mouth daily. Yes Provider, Historical   ascorbic acid, vitamin C, (VITAMIN C) 100 mg tab Take  by mouth. Yes Provider, Historical   Denta 5000 Plus 1.1 % crea USE AS DIRECTED TWO TIMES DAILY 8/12/20  Yes Provider, Historical   ibuprofen (MOTRIN) 800 mg tablet TAKE 1 TABLET BY MOUTH 3 TIMES A DAY AS NEEDED FOR PAIN 10/13/20  Yes Provider, Historical   buPROPion XL (WELLBUTRIN XL) 300 mg XL tablet TAKE 1 TABLET BY MOUTH EVERY DAY 1/30/20  Yes Provider, Historical   naproxen (NAPROSYN) 500 mg tablet TAKE 1 TABLET BY MOUTH TWICE A DAY 8/26/19  Yes Provider, Historical   NUCYNTA 100 mg tablet TAKE 2 TABLETS 3 TIMES A DAY 7/27/19  Yes Provider, Historical   aspirin delayed-release 81 mg tablet Take  by mouth daily. Yes Provider, Historical   prochlorperazine (COMPAZINE) 10 mg tablet Take 5 mg by mouth every six (6) hours as needed. Yes Other, MD Goldy   omeprazole (PRILOSEC) 20 mg capsule Take 20 mg by mouth daily as needed (acid reflux). Yes Other, MD Goldy   ondansetron (ZOFRAN ODT) 8 mg disintegrating tablet Take 8 mg by mouth every eight (8) hours as needed for Nausea. Yes Other, MD Goldy   multivitamin (ONE A DAY) tablet Take 1 Tab by mouth daily. Yes Provider, Historical   tamsulosin (FLOMAX) 0.4 mg capsule Take 1 Cap by mouth daily.  4/4/17  Yes Anton Goss MD   zolpidem CR (AMBIEN CR) 12.5 mg tablet Take 12.5 mg by mouth nightly. Yes Provider, Historical   aripiprazole (ABILIFY) 5 mg tablet Take 2.5 mg by mouth every morning. Yes Other, MD Goldy          Allergies   Allergen Reactions    Mercury (Bulk) Hives     Blisters             Objective:     Visit Vitals  /81   Pulse 66   Temp 97.9 °F (36.6 °C)   Resp 18   Ht 5' 8\" (1.727 m)   Wt 191 lb (86.6 kg)   SpO2 100%   BMI 29.04 kg/m²       Pain Scale: /10  Pain Location:       Physical Exam:     GENERAL: alert, cooperative  EYE: negative  LYMPHATIC: Cervical, supraclavicular, and axillary nodes normal.   THROAT & NECK: normal and no erythema or exudates noted. LUNG: clear to auscultation bilaterally  HEART: regular rate and rhythm  ABDOMEN: soft, non-tender  EXTREMITIES: normal  SKIN: Normal.  NEUROLOGIC: negative     Physical exam copied from previous note. Reviewed and no changes noted. Lab Results   Component Value Date/Time    WBC 4.2 02/16/2022 10:37 AM    HGB 10.9 (L) 02/16/2022 10:37 AM    Hematocrit (POC) 37 04/14/2021 09:48 AM    HCT 34.7 (L) 02/16/2022 10:37 AM    PLATELET 236 73/84/0390 10:37 AM    MCV 81.6 02/16/2022 10:37 AM       Lab Results   Component Value Date/Time    Sodium 138 02/16/2022 10:37 AM    Potassium 3.8 02/16/2022 10:37 AM    Chloride 106 02/16/2022 10:37 AM    CO2 28 02/16/2022 10:37 AM    Anion gap 4 (L) 02/16/2022 10:37 AM    Glucose 88 02/16/2022 10:37 AM    BUN 11 02/16/2022 10:37 AM    Creatinine 1.31 (H) 02/16/2022 10:37 AM    BUN/Creatinine ratio 8 (L) 02/16/2022 10:37 AM    GFR est AA >60 02/16/2022 10:37 AM    GFR est non-AA 55 (L) 02/16/2022 10:37 AM    Calcium 8.4 (L) 02/16/2022 10:37 AM            Assessment:     1. Multiple Myeloma, IgA Kappa   Durie [place] stage IIIB    Cytogenetics/FISH: t(14;16) - high risk  ECOG PS - 0   Intent of therapy: palliative    Received 3 cycles of RVd   Dose reduced Revlimid and Velcade d/t continued cytopenias. M-spike started rising.     He received one cycle of VD-PACE (bortezomib, dexamethasone, thalidomide, cisplatin, adriamycin, cyclophosphamide, and etoposide). Achieved [place] with once cycle    Receivied KPd - s/p 5 cycles    Excellent response with M-protein came down to 0.1    S/p tandem autotransplant    First on 2/28/2018   2nd on 06/13/2018    Achieved CR after the first transplant. Repeat bone marrow @ [facility] - Complete response. Since he is high risk based on cytogenetics, I recommend proteosome inhibitor as maintenance therapy. Took Ixazomib briefly  Developed fever, was in the hospital.   Also developed cytopenias    Patient prefered to switch therapy to Pomalyst.     Pomalyst 2 mg daily - started 11/19/2018  Tolerating treatment very well  Denies any side effects. A detailed system by system evaluation of side effect was performed to assess chemotherapy related toxicity. Blood counts are acceptable. Results reviewed with the patient    M-spike is not deteted  Bone marrow 04/2021 - in CR. The disease has a high risk of recurrence and the treatment also carries a substantial risk of side effects. All of this was assessed during this visit. 2. Type 2 DM with complication    Managed by Endocrine      3. Chronic Hep C    In sustained virological remission      Gabapentin for possible neuropathy      4. Cough    Recent PNA  Repeat CT chest in Jan 2022      Plan:       > Continue Pomalyst maintenance  > Myeloma labs and port flush monthly   > Gabapentin for muscle cramps.   > Zometa every 3 months  > Follow-up in 3 months      Signed by: Gil Brown NP      I personally saw and evaluated the patient and performed the key components of medical decision making. The history, physical exam, and documentation were performed by Gil Brown NP. I reviewed and verified the above documentation and modified it as needed. Signed by: Kenia Keen MD                     March 16, 2022        CC. Jojo Betancourt MD  CC.  Tyra Villar MD 0

## 2023-03-12 RX ORDER — SODIUM CHLORIDE 0.9 % (FLUSH) 0.9 %
5-10 SYRINGE (ML) INJECTION AS NEEDED
OUTPATIENT
Start: 2023-03-15

## 2023-03-12 RX ORDER — HEPARIN 100 UNIT/ML
500 SYRINGE INTRAVENOUS AS NEEDED
OUTPATIENT
Start: 2023-03-15

## 2023-03-12 RX ORDER — SODIUM CHLORIDE 9 MG/ML
10 INJECTION INTRAVENOUS AS NEEDED
OUTPATIENT
Start: 2023-03-15

## 2023-03-15 ENCOUNTER — HOSPITAL ENCOUNTER (OUTPATIENT)
Dept: INFUSION THERAPY | Age: 68
Discharge: HOME | End: 2023-03-15
Payer: MEDICARE

## 2023-03-15 VITALS
BODY MASS INDEX: 27.69 KG/M2 | OXYGEN SATURATION: 98 % | HEART RATE: 75 BPM | WEIGHT: 182.1 LBS | SYSTOLIC BLOOD PRESSURE: 131 MMHG | TEMPERATURE: 98.7 F | DIASTOLIC BLOOD PRESSURE: 86 MMHG

## 2023-03-15 DIAGNOSIS — C90.00 MULTIPLE MYELOMA, REMISSION STATUS UNSPECIFIED (HCC): Primary | ICD-10-CM

## 2023-03-15 LAB
ALBUMIN SERPL-MCNC: 3.4 G/DL (ref 3.5–5)
ALBUMIN/GLOB SERPL: 0.8 (ref 1.1–2.2)
ALP SERPL-CCNC: 102 U/L (ref 45–117)
ALT SERPL-CCNC: 36 U/L (ref 12–78)
ANION GAP SERPL CALC-SCNC: 2 MMOL/L (ref 5–15)
AST SERPL-CCNC: 29 U/L (ref 15–37)
BASOPHILS # BLD: 0 K/UL (ref 0–0.1)
BASOPHILS NFR BLD: 1 % (ref 0–1)
BILIRUB SERPL-MCNC: 0.3 MG/DL (ref 0.2–1)
BUN SERPL-MCNC: 14 MG/DL (ref 6–20)
BUN/CREAT SERPL: 10 (ref 12–20)
CALCIUM SERPL-MCNC: 9.2 MG/DL (ref 8.5–10.1)
CHLORIDE SERPL-SCNC: 108 MMOL/L (ref 97–108)
CO2 SERPL-SCNC: 27 MMOL/L (ref 21–32)
CREAT SERPL-MCNC: 1.4 MG/DL (ref 0.7–1.3)
DIFFERENTIAL METHOD BLD: ABNORMAL
EOSINOPHIL # BLD: 0 K/UL (ref 0–0.4)
EOSINOPHIL NFR BLD: 1 % (ref 0–7)
ERYTHROCYTE [DISTWIDTH] IN BLOOD BY AUTOMATED COUNT: 19.7 % (ref 11.5–14.5)
GLOBULIN SER CALC-MCNC: 4.1 G/DL (ref 2–4)
GLUCOSE SERPL-MCNC: 123 MG/DL (ref 65–100)
HCT VFR BLD AUTO: 36.8 % (ref 36.6–50.3)
HGB BLD-MCNC: 11.5 G/DL (ref 12.1–17)
IGA SERPL-MCNC: 575 MG/DL (ref 70–400)
IGG SERPL-MCNC: 1200 MG/DL (ref 700–1600)
IGM SERPL-MCNC: <21 MG/DL (ref 40–230)
IMM GRANULOCYTES # BLD AUTO: 0 K/UL (ref 0–0.04)
IMM GRANULOCYTES NFR BLD AUTO: 0 % (ref 0–0.5)
LYMPHOCYTES # BLD: 0.8 K/UL (ref 0.8–3.5)
LYMPHOCYTES NFR BLD: 32 % (ref 12–49)
MCH RBC QN AUTO: 26.6 PG (ref 26–34)
MCHC RBC AUTO-ENTMCNC: 31.3 G/DL (ref 30–36.5)
MCV RBC AUTO: 85 FL (ref 80–99)
MONOCYTES # BLD: 0.5 K/UL (ref 0–1)
MONOCYTES NFR BLD: 20 % (ref 5–13)
NEUTS SEG # BLD: 1.2 K/UL (ref 1.8–8)
NEUTS SEG NFR BLD: 46 % (ref 32–75)
NRBC # BLD: 0 K/UL (ref 0–0.01)
NRBC BLD-RTO: 0 PER 100 WBC
PLATELET # BLD AUTO: 120 K/UL (ref 150–400)
PMV BLD AUTO: 10.4 FL (ref 8.9–12.9)
POTASSIUM SERPL-SCNC: 4.1 MMOL/L (ref 3.5–5.1)
PROT SERPL-MCNC: 7.5 G/DL (ref 6.4–8.2)
RBC # BLD AUTO: 4.33 M/UL (ref 4.1–5.7)
SODIUM SERPL-SCNC: 137 MMOL/L (ref 136–145)
WBC # BLD AUTO: 2.6 K/UL (ref 4.1–11.1)

## 2023-03-15 PROCEDURE — 77030012965 HC NDL HUBR BBMI -A

## 2023-03-15 PROCEDURE — 83521 IG LIGHT CHAINS FREE EACH: CPT

## 2023-03-15 PROCEDURE — 80053 COMPREHEN METABOLIC PANEL: CPT

## 2023-03-15 PROCEDURE — 36591 DRAW BLOOD OFF VENOUS DEVICE: CPT

## 2023-03-15 PROCEDURE — 85025 COMPLETE CBC W/AUTO DIFF WBC: CPT

## 2023-03-15 PROCEDURE — 86334 IMMUNOFIX E-PHORESIS SERUM: CPT

## 2023-03-15 PROCEDURE — 82784 ASSAY IGA/IGD/IGG/IGM EACH: CPT

## 2023-03-15 PROCEDURE — 74011000250 HC RX REV CODE- 250: Performed by: INTERNAL MEDICINE

## 2023-03-15 PROCEDURE — 74011250636 HC RX REV CODE- 250/636: Performed by: INTERNAL MEDICINE

## 2023-03-15 RX ORDER — SODIUM CHLORIDE 9 MG/ML
10 INJECTION INTRAVENOUS AS NEEDED
Status: ACTIVE | OUTPATIENT
Start: 2023-03-15 | End: 2023-03-15

## 2023-03-15 RX ORDER — SODIUM CHLORIDE 0.9 % (FLUSH) 0.9 %
5-10 SYRINGE (ML) INJECTION AS NEEDED
Status: DISPENSED | OUTPATIENT
Start: 2023-03-15 | End: 2023-03-15

## 2023-03-15 RX ORDER — HEPARIN 100 UNIT/ML
500 SYRINGE INTRAVENOUS AS NEEDED
Status: ACTIVE | OUTPATIENT
Start: 2023-03-15 | End: 2023-03-15

## 2023-03-15 RX ADMIN — SODIUM CHLORIDE, PRESERVATIVE FREE 10 ML: 5 INJECTION INTRAVENOUS at 10:52

## 2023-03-15 RX ADMIN — HEPARIN 500 UNITS: 100 SYRINGE at 10:54

## 2023-03-15 RX ADMIN — SODIUM CHLORIDE, PRESERVATIVE FREE 10 ML: 5 INJECTION INTRAVENOUS at 10:54

## 2023-03-15 NOTE — PROGRESS NOTES
[facility] Visit Note    3673 Pt arrived at [facility] ambulatory and in no distress for port flush and labs. Pt reports weight loss. Port accessed per protocol. Labs drawn. Port flushed and de-accessed. Patient denied having any symptoms of COVID-19, i.e. SOB, coughing, fever, or generally not feeling well. Also denies having been exposed to COVID-19 recently or having had any recent contact with family/friend that has a pending COVID test.     Visit Vitals  /86   Pulse 75   Temp 98.7 °F (37.1 °C)   Wt 82.6 kg (182 lb 1.6 oz)   SpO2 98%   BMI 27.69 kg/m²       Medications received:  Medications Administered       0.9% sodium chloride injection 10 mL       Admin Date  03/15/2023 Action  Given Dose  10 mL Route  IntraVENous Administered By  Mariah Guadalupe RN               Admin Date  03/15/2023 Action  Given Dose  10 mL Route  IntraVENous Administered By  Mariah Guadalupe, [place]              heparin (porcine) pf 500 Units       Admin Date  03/15/2023 Action  Given Dose  500 Units Route  IntraVENous Administered By  Mariah Guadalupe, RN                        1051 Tolerated treatment well, no adverse reaction noted. D/Cd from [facility] ambulatory and in no distress accompanied by spouse. Next appt 4/12. MD office appointment 3/24. Pt requesting sooner to get lab results. Explained that some of the labs are sent out and make take several days.

## 2023-03-16 LAB
KAPPA LC FREE SER-MCNC: 109.6 MG/L (ref 3.3–19.4)
KAPPA LC FREE/LAMBDA FREE SER: 3.29 (ref 0.26–1.65)
LAMBDA LC FREE SERPL-MCNC: 33.3 MG/L (ref 5.7–26.3)

## 2023-03-17 LAB
ALBUMIN SERPL ELPH-MCNC: 3.4 G/DL (ref 2.9–4.4)
ALBUMIN/GLOB SERPL: 1 (ref 0.7–1.7)
ALPHA1 GLOB SERPL ELPH-MCNC: 0.2 G/DL (ref 0–0.4)
ALPHA2 GLOB SERPL ELPH-MCNC: 0.9 G/DL (ref 0.4–1)
B-GLOBULIN SERPL ELPH-MCNC: 1 G/DL (ref 0.7–1.3)
GAMMA GLOB SERPL ELPH-MCNC: 1.5 G/DL (ref 0.4–1.8)
GLOBULIN SER-MCNC: 3.5 G/DL (ref 2.2–3.9)
IGA SERPL-MCNC: 667 MG/DL (ref 61–437)
IGA SERPL-MCNC: 671 MG/DL (ref 61–437)
IGG SERPL-MCNC: 1185 MG/DL (ref 603–1613)
IGG SERPL-MCNC: 1190 MG/DL (ref 603–1613)
IGM SERPL-MCNC: 16 MG/DL (ref 20–172)
IGM SERPL-MCNC: 18 MG/DL (ref 20–172)
INTERPRETATION SERPL IEP-IMP: ABNORMAL
KAPPA LC FREE SER-MCNC: 100.6 MG/L (ref 3.3–19.4)
KAPPA LC FREE/LAMBDA FREE SER: 2.98 (ref 0.26–1.65)
LAMBDA LC FREE SERPL-MCNC: 33.8 MG/L (ref 5.7–26.3)
M PROTEIN SERPL ELPH-MCNC: 0.5 G/DL
PROT PATTERN SERPL IFE-IMP: ABNORMAL
PROT SERPL-MCNC: 6.9 G/DL (ref 6–8.5)

## 2023-03-21 ENCOUNTER — DOCUMENTATION ONLY (OUTPATIENT)
Dept: ONCOLOGY | Age: 68
End: 2023-03-21

## 2023-03-21 NOTE — PROGRESS NOTES
Pomalyst 2mg Once Daily #28 - 0 Refills  REMS [id number]    Office Follow Up Scheduled 3/24/23    For Pharmacy Admin Tracking Only    Program: Medication Refill  CPA in place: Yes  Time Spent (min): 5

## 2023-03-22 NOTE — PROGRESS NOTES
Dearcayla Prajapati. is a 74 y.o. male here for follow up for Multiple Myeloma. He takes Pomalyst.  Pt is anxious for today's visit. 1. Have you been to the ER, urgent care clinic since your last visit? Hospitalized since your last visit?   no    2. Have you seen or consulted any other health care providers outside of the [address] [place] [address] since your last visit? Include any pap smears or colon screening.  no

## 2023-03-24 ENCOUNTER — PATIENT MESSAGE (OUTPATIENT)
Dept: ONCOLOGY | Age: 68
End: 2023-03-24

## 2023-03-24 ENCOUNTER — OFFICE VISIT (OUTPATIENT)
Dept: ONCOLOGY | Age: 68
End: 2023-03-24

## 2023-03-24 VITALS
HEART RATE: 92 BPM | OXYGEN SATURATION: 93 % | BODY MASS INDEX: 27.68 KG/M2 | SYSTOLIC BLOOD PRESSURE: 119 MMHG | DIASTOLIC BLOOD PRESSURE: 75 MMHG | TEMPERATURE: 98.2 F | WEIGHT: 182.6 LBS | HEIGHT: 68 IN

## 2023-03-24 DIAGNOSIS — C90.02 MULTIPLE MYELOMA IN RELAPSE (HCC): Primary | ICD-10-CM

## 2023-03-24 DIAGNOSIS — Z51.11 CHEMOTHERAPY MANAGEMENT, ENCOUNTER FOR: ICD-10-CM

## 2023-03-24 NOTE — PROGRESS NOTES
2001 [facility] [address], 210 [facility] [place], [address] [place] [address], 200 [facility]  [phone]            Progress Note        Patient: Elda Cheng Sr. MRN: [id number]  SSN: [id number]    YOB: 1955  Age: 74 y.o. Sex: male        Diagnosis:      1. Multiple Myeloma, IgA Kappa   Durie [place] stage IIIB    Cytogenetics/FISH: t(14;16) - high risk    Ttreatment:     1. Maintenance therapy - Pomalyst 2 mg daily - 11/19/2018   2. Maintenance therapy - Ixazomib - started 10/15/2018 - stopped 11/12/2018  3. S/P tandem  Autotransplant   First on 2/28/2018   2nd on 06/13/2018  4. Carfilzomib/Pom/Dex - s/p 5 cycles  5. VD-PACE s/p 1 cycle  6. RVD - s/p 4 cycles    Subjective:      Elda Cheng Sr. is a 74 y.o. male with a diagnosis of IgA kappa myeloma. Bone marrow shows 100% aberrant plasma cells. He suffers with DM but it is diet controlled. He has received systemic anti-viral treatment for chronic Hep C with successful eradication of the virus. Mr. Elisabet Mora received 4 cycles of systemic therapy with RVd. He had an initial good response to treatment. However his paraprotein level started rising and the myeloma became refractory to treatment. I then administered one cycle of VD-PACE in the hospital. He then received Carfilzomib/Pom/Dex. He achieved VGPR. He underwent tandem autotransplant at [facility]. According to the patient he achieved complete response with therapy. He has undergone second/tandem transplant in June. He took Pomalyst maintenance until Aug 2019 then had to stop due to recurrent prostatitis. He had a repeat BM biopsy in February 2019 at [facility] which showed complete molecular remission. He is taking Pomalyst. He has seen Dr. Ishaan Tee for movement disorder. He has started taking a new medication for tardive dyskinesia. Interval history:    He has been experiencing pain in the neck and arms.  MRI of the spine shows degenerative changes and he thinking about local steroid injection or facet block. He has an appointment with Neurosurgery.         Review of Systems:    Constitutional: negative  Eyes: negative  Ears, Nose, Mouth, Throat, and Face: negative  Respiratory: cough  Cardiovascular: negative  Gastrointestinal: negative  Genitourinary:negative  Integument/Breast: negative  Hematologic/Lymphatic: negative  Musculoskeletal: negative  Neurological: negative      Past Medical History:   Diagnosis Date    Acid reflux     Arrhythmia     pvc's    Chronic pain     neck    Depression     Diabetes ([facility] [address]     Femoral hernia 2012    Hepatitis C     Hypertension     Inguinal hernia 2012    Liver disease     hepatitis c    Multiple myeloma ([facility] [address]     Other ill-defined conditions(799.89)     Hx of PVCs since     Prostatitis, acute     Psychiatric disorder     depression     Past Surgical History:   Procedure Laterality Date    COLONOSCOPY N/A 2016    COLONOSCOPY performed by Yajaira Pablo MD at \[facility]\"" ENDOSCOPY    HX APPENDECTOMY      HX CERVICAL FUSION  2008    x 1 as of 2014    HX HEENT      foreign body removed from right eye    HX HERNIA REPAIR      [facility]      HX ORTHOPAEDIC      cervical fusion    HX OTHER SURGICAL  2000    liver bx - chronic hepatitis      Family History   Problem Relation Age of Onset    OSTEOARTHRITIS Mother     OSTEOARTHRITIS Father     Diabetes Father     Hypertension Maternal Grandmother     Diabetes Maternal Grandmother     Hypertension Maternal Grandfather     Diabetes Maternal Grandfather      Social History     Tobacco Use    Smoking status: Former     Packs/day: 1.00     Years: 12.00     Pack years: 12.00     Types: Cigarettes     Quit date: 1989     Years since quittin.2    Smokeless tobacco: Never    Tobacco comments:     former cigarette smoker   Substance Use Topics    Alcohol use: No     Comment: former alcoholic-quit [id number]      Prior to Admission medications    Medication Sig Start Date End Date Taking? Authorizing Provider   Pomalyst 2 mg cap TAKE 1 CAPSULE BY MOUTH ONCE DAILY 3/21/23  Yes Bianka Duke MD   tenofovir DISOPROXIL FUMARATE (VIREAD) 300 mg tablet TAKE 1 TABLET BY MOUTH 1 TIME A DAY 2/24/23  Yes Bianka Duke MD   metFORMIN ER (GLUCOPHAGE XR) 500 mg tablet TAKE 1 TABLET BY MOUTH TWO TIMES A DAY. 1/6/23  Yes Dejon Rios MD   testosterone cypionate (DEPOTESTOTERONE CYPIONATE) 200 mg/mL injection INJECT 2 ML INTRAMUSCULARLY ONCE WEEKLY. . 11/20/22  Yes Provider, Historical   deutetrabenazine (Austedo) 6 mg tab Take 6 mg by mouth two (2) times a day. Indications: tardive dyskinesia, a disorder characterized by involuntary movements of the face, mouth and tongue 11/28/22  Yes Messi Ruiz,    chlorproMAZINE (THORAZINE) 25 mg tablet Take 1 Tablet by mouth three (3) times daily as needed (hiccups). 8/22/22  Yes Lilia Mo MD   OTHER Cannabis prescription   Yes Provider, Historical   cholecalciferol, vitamin D3, 50 mcg (2,000 unit) tab Take  by mouth daily. Yes Provider, Historical   ascorbic acid, vitamin C, (VITAMIN C) 100 mg tab Take 100 mg by mouth daily. Yes Provider, Historical   Denta 5000 Plus 1.1 % crea USE AS DIRECTED TWO TIMES DAILY 8/12/20  Yes Provider, Historical   ibuprofen (MOTRIN) 800 mg tablet TAKE 1 TABLET BY MOUTH 3 TIMES A DAY AS NEEDED FOR PAIN 10/13/20  Yes Provider, Historical   buPROPion XL (WELLBUTRIN XL) 300 mg XL tablet TAKE 1 TABLET BY MOUTH EVERY DAY 1/30/20  Yes Provider, Historical   NUCYNTA 100 mg tablet TAKE 2 TABLETS 3 TIMES A DAY 7/27/19  Yes Provider, Historical   aspirin delayed-release 81 mg tablet Take  by mouth daily. Yes Provider, Historical   ondansetron (ZOFRAN ODT) 8 mg disintegrating tablet Take 8 mg by mouth every eight (8) hours as needed for Nausea. Yes Other, MD Goldy   multivitamin (ONE A DAY) tablet Take 1 Tab by mouth daily.    Yes Provider, Historical   tamsulosin (FLOMAX) 0.4 mg capsule Take 1 Cap by mouth daily. 4/4/17  Yes Nasreen Casey MD   ARIPiprazole (ABILIFY) 5 mg tablet Take 2.5 mg by mouth every morning. Yes Joe, MD Goldy   Belsomra 15 mg tablet Take 15 mg by mouth nightly. Patient not taking: No sig reported 5/5/22   Provider, Historical   lisinopriL (PRINIVIL, ZESTRIL) 10 mg tablet Take 10 mg by mouth daily as needed. Patient not taking: No sig reported    Provider, Historical   TESTOSTERONE  mg by IntraMUSCular route every fourteen (14) days. Patient not taking: Reported on 3/24/2023    Provider, Historical   POTASSIUM PO Take  by mouth as needed. Patient not taking: No sig reported    Provider, Historical   zolpidem 3.5 mg subl DISSOLVE 1 TABLET UNDER TONGUE AT BEDTIME  Patient not taking: No sig reported 4/10/21   Provider, Historical   naproxen (NAPROSYN) 500 mg tablet TAKE 1 TABLET BY MOUTH TWICE A DAY  Patient not taking: No sig reported 8/26/19   Provider, Historical   prochlorperazine (COMPAZINE) 10 mg tablet Take 5 mg by mouth every six (6) hours as needed. Patient not taking: No sig reported    Other, MD Goldy   omeprazole (PRILOSEC) 20 mg capsule Take 20 mg by mouth daily as needed (acid reflux). Patient not taking: No sig reported    Joe, MD Goldy   zolpidem CR (AMBIEN CR) 12.5 mg tablet Take 12.5 mg by mouth nightly. Patient not taking: No sig reported    Provider, Historical          Allergies   Allergen Reactions    Mercury (Bulk) Hives     Blisters             Objective:     Visit Vitals  /75 (BP 1 Location: Left upper arm, BP Patient Position: Sitting)   Pulse 92   Temp 98.2 °F (36.8 °C) (Temporal)   Ht 5' 8\" (1.727 m)   Wt 182 lb 9.6 oz (82.8 kg)   SpO2 93%   BMI 27.76 kg/m²       Pain Scale: /10  Pain Location:     Physical Exam:     GENERAL: alert, cooperative  EYE: negative  LYMPHATIC: Cervical, supraclavicular, and axillary nodes normal.   THROAT & NECK: normal and no erythema or exudates noted.    LUNG: clear to auscultation bilaterally  HEART: regular rate and rhythm  ABDOMEN: soft, non-tender  EXTREMITIES: normal  SKIN: Normal.  NEUROLOGIC: negative     Physical exam copied from previous note. Reviewed and no changes noted. Lab Results   Component Value Date/Time    WBC 2.6 (L) 03/15/2023 10:40 AM    HGB 11.5 (L) 03/15/2023 10:40 AM    Hematocrit (POC) 37 04/14/2021 09:48 AM    HCT 36.8 03/15/2023 10:40 AM    PLATELET 357 (L) 81/56/8775 10:40 AM    MCV 85.0 03/15/2023 10:40 AM       Lab Results   Component Value Date/Time    Sodium 137 03/15/2023 10:40 AM    Potassium 4.1 03/15/2023 10:40 AM    Chloride 108 03/15/2023 10:40 AM    CO2 27 03/15/2023 10:40 AM    Anion gap 2 (L) 03/15/2023 10:40 AM    Glucose 123 (H) 03/15/2023 10:40 AM    BUN 14 03/15/2023 10:40 AM    Creatinine 1.40 (H) 03/15/2023 10:40 AM    BUN/Creatinine ratio 10 (L) 03/15/2023 10:40 AM    GFR est AA 55 (L) 09/28/2022 10:49 AM    GFR est non-AA 46 (L) 09/28/2022 10:49 AM    Calcium 9.2 03/15/2023 10:40 AM            Assessment:     1. Multiple Myeloma, IgA Kappa   Durie [place] stage IIIB    Cytogenetics/FISH: t(14;16) - high risk  ECOG PS - 0   Intent of therapy: palliative    This is a life threatening illness    Received 3 cycles of RVd   Dose reduced Revlimid and Velcade d/t continued cytopenias. M-spike started rising. He received one cycle of VD-PACE (bortezomib, dexamethasone, thalidomide, cisplatin, adriamycin, cyclophosphamide, and etoposide). Achieved IN with once cycle    Receivied KPd - s/p 5 cycles    Excellent response with M-protein came down to 0.1    S/p tandem autotransplant    First on 2/28/2018   2nd on 06/13/2018    Achieved CR after the first transplant. Repeat bone marrow @ [facility] - Complete response. Since he is high risk based on cytogenetics, I recommend proteosome inhibitor as maintenance therapy.      Took Ixazomib briefly  Developed fever, was in the hospital.   Also developed cytopenias    Patient prefered to switch therapy to Pomalyst.     Pomalyst 2 mg daily - started 11/19/2018    M protein has come up to 0.7  Light chain ratio is also rising. No anemia. Creatinine stable. I shall continue to follow it closely. Tolerating treatment very well  Denies any side effects. A detailed system by system evaluation of side effect was performed to assess chemotherapy related toxicity. Blood counts are acceptable. Results reviewed with the patient    Bone marrow 04/2021 - in CR. The disease has a high risk of recurrence and the treatment also carries a substantial risk of side effects. All of this was assessed during this visit. 2. Type 2 DM with complication    Managed by Endocrine      3. Chronic Hep C    In sustained virological remission      Plan:       > Continue Pomalyst maintenance  > Myeloma labs and port flush monthly   > Zometa on hold for root canal  > Follow-up in 1 month  > After his appointment with Dr. Juan Mosley, we will decide on the next course of action. I have deferred bone marrow biopsy and further imaging including MRI T-L-S spine and/or PET CT at this time. Signed by: Shani Gutierrez MD                     March 24, 2023        CC.  Jesus Coelho MD

## 2023-03-24 NOTE — LETTER
3/24/2023    Patient: Anu Rivers. YOB: 1955   Date of Visit: 3/24/2023     Ines Almendarez MD  [address] [place] [address]  Via Fax: [phone]    Dear Ines Almendarez MD,      Thank you for referring Mr. Shazia Bravo to [address] [place] [address] for evaluation. My notes for this consultation are attached. If you have questions, please do not hesitate to call me. I look forward to following your patient along with you.       Sincerely,    Brigitte Shore MD

## 2023-04-05 DIAGNOSIS — C90.00 MULTIPLE MYELOMA, REMISSION STATUS UNSPECIFIED (HCC): Primary | ICD-10-CM

## 2023-04-05 RX ORDER — SODIUM CHLORIDE 9 MG/ML
5-250 INJECTION, SOLUTION INTRAVENOUS PRN
Status: CANCELLED | OUTPATIENT
Start: 2023-05-10

## 2023-04-05 RX ORDER — ZOLEDRONIC ACID 0.04 MG/ML
4 INJECTION, SOLUTION INTRAVENOUS ONCE
OUTPATIENT
Start: 2023-05-10 | End: 2023-05-10

## 2023-04-05 RX ORDER — ONDANSETRON 2 MG/ML
8 INJECTION INTRAMUSCULAR; INTRAVENOUS
OUTPATIENT
Start: 2023-05-10

## 2023-04-05 RX ORDER — SODIUM CHLORIDE 9 MG/ML
INJECTION, SOLUTION INTRAVENOUS CONTINUOUS
OUTPATIENT
Start: 2023-05-10

## 2023-04-05 RX ORDER — SODIUM CHLORIDE 9 MG/ML
5-250 INJECTION, SOLUTION INTRAVENOUS PRN
OUTPATIENT
Start: 2023-05-10

## 2023-04-05 RX ORDER — ALBUTEROL SULFATE 90 UG/1
4 AEROSOL, METERED RESPIRATORY (INHALATION) PRN
OUTPATIENT
Start: 2023-05-10

## 2023-04-05 RX ORDER — ZOLEDRONIC ACID 0.04 MG/ML
4 INJECTION, SOLUTION INTRAVENOUS ONCE
Status: CANCELLED | OUTPATIENT
Start: 2023-05-10 | End: 2023-05-10

## 2023-04-05 RX ORDER — HEPARIN SODIUM (PORCINE) LOCK FLUSH IV SOLN 100 UNIT/ML 100 UNIT/ML
500 SOLUTION INTRAVENOUS PRN
Status: CANCELLED | OUTPATIENT
Start: 2023-05-10

## 2023-04-05 RX ORDER — SODIUM CHLORIDE 0.9 % (FLUSH) 0.9 %
5-40 SYRINGE (ML) INJECTION PRN
OUTPATIENT
Start: 2023-05-10

## 2023-04-05 RX ORDER — ACETAMINOPHEN 325 MG/1
650 TABLET ORAL
OUTPATIENT
Start: 2023-05-10

## 2023-04-05 RX ORDER — DIPHENHYDRAMINE HYDROCHLORIDE 50 MG/ML
50 INJECTION INTRAMUSCULAR; INTRAVENOUS
OUTPATIENT
Start: 2023-05-10

## 2023-04-05 RX ORDER — EPINEPHRINE 1 MG/ML
0.3 INJECTION, SOLUTION, CONCENTRATE INTRAVENOUS PRN
OUTPATIENT
Start: 2023-05-10

## 2023-04-12 ENCOUNTER — HOSPITAL ENCOUNTER (OUTPATIENT)
Dept: INFUSION THERAPY | Age: 68
Discharge: HOME | End: 2023-04-12
Payer: MEDICARE

## 2023-04-12 VITALS
SYSTOLIC BLOOD PRESSURE: 144 MMHG | WEIGHT: 183.6 LBS | BODY MASS INDEX: 27.92 KG/M2 | HEART RATE: 90 BPM | RESPIRATION RATE: 16 BRPM | DIASTOLIC BLOOD PRESSURE: 93 MMHG | TEMPERATURE: 98.1 F | OXYGEN SATURATION: 98 %

## 2023-04-12 DIAGNOSIS — C90.00 MULTIPLE MYELOMA, REMISSION STATUS UNSPECIFIED (HCC): Primary | ICD-10-CM

## 2023-04-12 LAB
ALBUMIN SERPL-MCNC: 3.6 G/DL (ref 3.5–5)
ALBUMIN/GLOB SERPL: 0.8 (ref 1.1–2.2)
ALP SERPL-CCNC: 64 U/L (ref 45–117)
ALT SERPL-CCNC: 23 U/L (ref 12–78)
ANION GAP SERPL CALC-SCNC: 5 MMOL/L (ref 5–15)
AST SERPL-CCNC: 16 U/L (ref 15–37)
BASOPHILS # BLD: 0 K/UL (ref 0–0.1)
BASOPHILS NFR BLD: 0 % (ref 0–1)
BILIRUB SERPL-MCNC: 0.5 MG/DL (ref 0.2–1)
BUN SERPL-MCNC: 18 MG/DL (ref 6–20)
BUN/CREAT SERPL: 11 (ref 12–20)
CALCIUM SERPL-MCNC: 9.3 MG/DL (ref 8.5–10.1)
CHLORIDE SERPL-SCNC: 105 MMOL/L (ref 97–108)
CO2 SERPL-SCNC: 25 MMOL/L (ref 21–32)
CREAT SERPL-MCNC: 1.6 MG/DL (ref 0.7–1.3)
DIFFERENTIAL METHOD BLD: ABNORMAL
EOSINOPHIL # BLD: 0 K/UL (ref 0–0.4)
EOSINOPHIL NFR BLD: 1 % (ref 0–7)
ERYTHROCYTE [DISTWIDTH] IN BLOOD BY AUTOMATED COUNT: 20.2 % (ref 11.5–14.5)
GLOBULIN SER CALC-MCNC: 4.7 G/DL (ref 2–4)
GLUCOSE SERPL-MCNC: 122 MG/DL (ref 65–100)
HCT VFR BLD AUTO: 39.4 % (ref 36.6–50.3)
HGB BLD-MCNC: 11.9 G/DL (ref 12.1–17)
IGA SERPL-MCNC: 605 MG/DL (ref 70–400)
IGG SERPL-MCNC: 1130 MG/DL (ref 700–1600)
IGM SERPL-MCNC: <21 MG/DL (ref 40–230)
IMM GRANULOCYTES # BLD AUTO: 0 K/UL (ref 0–0.04)
IMM GRANULOCYTES NFR BLD AUTO: 1 % (ref 0–0.5)
LYMPHOCYTES # BLD: 0.9 K/UL (ref 0.8–3.5)
LYMPHOCYTES NFR BLD: 39 % (ref 12–49)
MCH RBC QN AUTO: 27 PG (ref 26–34)
MCHC RBC AUTO-ENTMCNC: 30.2 G/DL (ref 30–36.5)
MCV RBC AUTO: 89.3 FL (ref 80–99)
MONOCYTES # BLD: 0.4 K/UL (ref 0–1)
MONOCYTES NFR BLD: 19 % (ref 5–13)
NEUTS SEG # BLD: 1 K/UL (ref 1.8–8)
NEUTS SEG NFR BLD: 40 % (ref 32–75)
NRBC # BLD: 0 K/UL (ref 0–0.01)
NRBC BLD-RTO: 0 PER 100 WBC
PLATELET # BLD AUTO: 127 K/UL (ref 150–400)
PMV BLD AUTO: 10.1 FL (ref 8.9–12.9)
POTASSIUM SERPL-SCNC: 4 MMOL/L (ref 3.5–5.1)
PROT SERPL-MCNC: 8.3 G/DL (ref 6.4–8.2)
RBC # BLD AUTO: 4.41 M/UL (ref 4.1–5.7)
RBC MORPH BLD: ABNORMAL
SODIUM SERPL-SCNC: 135 MMOL/L (ref 136–145)
WBC # BLD AUTO: 2.3 K/UL (ref 4.1–11.1)
WBC MORPH BLD: ABNORMAL

## 2023-04-12 PROCEDURE — 77030012965 HC NDL HUBR BBMI -A

## 2023-04-12 PROCEDURE — 84165 PROTEIN E-PHORESIS SERUM: CPT

## 2023-04-12 PROCEDURE — 82784 ASSAY IGA/IGD/IGG/IGM EACH: CPT

## 2023-04-12 PROCEDURE — 83521 IG LIGHT CHAINS FREE EACH: CPT

## 2023-04-12 PROCEDURE — 74011250636 HC RX REV CODE- 250/636: Performed by: INTERNAL MEDICINE

## 2023-04-12 PROCEDURE — 85025 COMPLETE CBC W/AUTO DIFF WBC: CPT

## 2023-04-12 PROCEDURE — 80053 COMPREHEN METABOLIC PANEL: CPT

## 2023-04-12 PROCEDURE — 36591 DRAW BLOOD OFF VENOUS DEVICE: CPT

## 2023-04-12 PROCEDURE — 74011000250 HC RX REV CODE- 250: Performed by: INTERNAL MEDICINE

## 2023-04-12 RX ORDER — SODIUM CHLORIDE 0.9 % (FLUSH) 0.9 %
5-10 SYRINGE (ML) INJECTION AS NEEDED
Status: DISPENSED | OUTPATIENT
Start: 2023-04-12 | End: 2023-04-12

## 2023-04-12 RX ORDER — HEPARIN 100 UNIT/ML
500 SYRINGE INTRAVENOUS AS NEEDED
Status: ACTIVE | OUTPATIENT
Start: 2023-04-12 | End: 2023-04-12

## 2023-04-12 RX ORDER — SODIUM CHLORIDE 9 MG/ML
10 INJECTION INTRAVENOUS AS NEEDED
Status: ACTIVE | OUTPATIENT
Start: 2023-04-12 | End: 2023-04-12

## 2023-04-12 RX ADMIN — SODIUM CHLORIDE, PRESERVATIVE FREE 10 ML: 5 INJECTION INTRAVENOUS at 11:31

## 2023-04-12 RX ADMIN — Medication 500 UNITS: at 11:31

## 2023-04-12 RX ADMIN — SODIUM CHLORIDE, PRESERVATIVE FREE 10 ML: 5 INJECTION INTRAVENOUS at 11:30

## 2023-04-12 NOTE — PROGRESS NOTES
[facility] VISIT NOTE    1120  Pt arrived at [facility] ambulatory and in no distress for labs from port. Blood pressure (!) 144/93, pulse 90, temperature 98.1 °F (36.7 °C), resp. rate 16, weight 83.3 kg (183 lb 9.6 oz), SpO2 98 %. Right chest port accessed with 0.75 in eddy no difficulty. Positive blood return noted and labs drawn. Port de-accessed and flushed per protocol. 1130  D/C'd from [facility] ambulatory and in no distress accompanied by his wife.  Next appointment is 5/10/23 at 10am.

## 2023-04-13 LAB
KAPPA LC FREE SER-MCNC: 133.8 MG/L (ref 3.3–19.4)
KAPPA LC FREE/LAMBDA FREE SER: 4.94 (ref 0.26–1.65)
LAMBDA LC FREE SERPL-MCNC: 27.1 MG/L (ref 5.7–26.3)

## 2023-04-14 LAB
IGA SERPL-MCNC: 858 MG/DL (ref 61–437)
IGG SERPL-MCNC: 1107 MG/DL (ref 603–1613)
IGM SERPL-MCNC: 20 MG/DL (ref 20–172)
PROT PATTERN SERPL IFE-IMP: ABNORMAL

## 2023-04-17 ENCOUNTER — APPOINTMENT (OUTPATIENT)
Dept: INFUSION THERAPY | Age: 68
End: 2023-04-17

## 2023-04-18 ENCOUNTER — DOCUMENTATION ONLY (OUTPATIENT)
Dept: ONCOLOGY | Age: 68
End: 2023-04-18

## 2023-04-18 LAB
ALBUMIN SERPL ELPH-MCNC: 3.9 G/DL (ref 2.9–4.4)
ALBUMIN/GLOB SERPL: 1.1 (ref 0.7–1.7)
ALPHA1 GLOB SERPL ELPH-MCNC: 0.2 G/DL (ref 0–0.4)
ALPHA2 GLOB SERPL ELPH-MCNC: 0.8 G/DL (ref 0.4–1)
B-GLOBULIN SERPL ELPH-MCNC: 0.9 G/DL (ref 0.7–1.3)
GAMMA GLOB SERPL ELPH-MCNC: 1.5 G/DL (ref 0.4–1.8)
GLOBULIN SER CALC-MCNC: 3.4 G/DL (ref 2.2–3.9)
M PROTEIN SERPL ELPH-MCNC: 0.6 G/DL
PROT SERPL-MCNC: 7.3 G/DL (ref 6–8.5)

## 2023-04-18 NOTE — PROGRESS NOTES
Pomalyst 2mg Once Daily #28 - 0 Refills     Office Follow Up Scheduled 4/21/23     For Pharmacy Admin Tracking Only     Program: Medication Refill  CPA in place: Yes  Time Spent (min): 5

## 2023-05-04 RX ORDER — SODIUM CHLORIDE 0.9 % (FLUSH) 0.9 %
5-40 SYRINGE (ML) INJECTION PRN
Status: CANCELLED | OUTPATIENT
Start: 2023-05-10

## 2023-05-04 RX ORDER — SODIUM CHLORIDE 9 MG/ML
25 INJECTION, SOLUTION INTRAVENOUS PRN
Status: CANCELLED | OUTPATIENT
Start: 2023-05-10

## 2023-05-04 RX ORDER — HEPARIN SODIUM (PORCINE) LOCK FLUSH IV SOLN 100 UNIT/ML 100 UNIT/ML
500 SOLUTION INTRAVENOUS PRN
Status: CANCELLED | OUTPATIENT
Start: 2023-05-10

## 2023-05-10 ENCOUNTER — APPOINTMENT (OUTPATIENT)
Dept: INFUSION THERAPY | Age: 68
End: 2023-05-10

## 2023-05-10 ENCOUNTER — HOSPITAL ENCOUNTER (OUTPATIENT)
Facility: HOSPITAL | Age: 68
Setting detail: INFUSION SERIES
Discharge: HOME | End: 2023-05-10

## 2023-05-10 ENCOUNTER — TELEPHONE (OUTPATIENT)
Age: 68
End: 2023-05-10

## 2023-05-10 DIAGNOSIS — C90.00 MULTIPLE MYELOMA, REMISSION STATUS UNSPECIFIED (HCC): Primary | ICD-10-CM

## 2023-05-12 ENCOUNTER — HOSPITAL ENCOUNTER (OUTPATIENT)
Facility: HOSPITAL | Age: 68
Setting detail: INFUSION SERIES
End: 2023-05-12
Payer: MEDICARE

## 2023-05-12 VITALS
RESPIRATION RATE: 16 BRPM | OXYGEN SATURATION: 97 % | WEIGHT: 187.5 LBS | SYSTOLIC BLOOD PRESSURE: 126 MMHG | DIASTOLIC BLOOD PRESSURE: 72 MMHG | BODY MASS INDEX: 28.51 KG/M2 | HEART RATE: 76 BPM

## 2023-05-12 DIAGNOSIS — C90.00 MULTIPLE MYELOMA, REMISSION STATUS UNSPECIFIED (HCC): Primary | ICD-10-CM

## 2023-05-12 LAB
ALBUMIN SERPL-MCNC: 3.7 G/DL (ref 3.5–5)
ALBUMIN/GLOB SERPL: 0.8 (ref 1.1–2.2)
ALP SERPL-CCNC: 71 U/L (ref 45–117)
ALT SERPL-CCNC: 25 U/L (ref 12–78)
ANION GAP SERPL CALC-SCNC: 6 MMOL/L (ref 5–15)
AST SERPL-CCNC: 15 U/L (ref 15–37)
BASOPHILS # BLD: 0 K/UL (ref 0–0.1)
BASOPHILS NFR BLD: 1 % (ref 0–1)
BILIRUB SERPL-MCNC: 0.4 MG/DL (ref 0.2–1)
BUN SERPL-MCNC: 22 MG/DL (ref 6–20)
BUN/CREAT SERPL: 14 (ref 12–20)
CALCIUM SERPL-MCNC: 9.3 MG/DL (ref 8.5–10.1)
CHLORIDE SERPL-SCNC: 107 MMOL/L (ref 97–108)
CO2 SERPL-SCNC: 26 MMOL/L (ref 21–32)
CREAT SERPL-MCNC: 1.59 MG/DL (ref 0.7–1.3)
DIFFERENTIAL METHOD BLD: ABNORMAL
EOSINOPHIL # BLD: 0 K/UL (ref 0–0.4)
EOSINOPHIL NFR BLD: 1 % (ref 0–7)
ERYTHROCYTE [DISTWIDTH] IN BLOOD BY AUTOMATED COUNT: 18.9 % (ref 11.5–14.5)
GLOBULIN SER CALC-MCNC: 4.5 G/DL (ref 2–4)
GLUCOSE SERPL-MCNC: 136 MG/DL (ref 65–100)
HCT VFR BLD AUTO: 37.1 % (ref 36.6–50.3)
HGB BLD-MCNC: 11.6 G/DL (ref 12.1–17)
IMM GRANULOCYTES # BLD AUTO: 0 K/UL (ref 0–0.04)
IMM GRANULOCYTES NFR BLD AUTO: 1 % (ref 0–0.5)
LYMPHOCYTES # BLD: 0.8 K/UL (ref 0.8–3.5)
LYMPHOCYTES NFR BLD: 27 % (ref 12–49)
MAGNESIUM SERPL-MCNC: 1.9 MG/DL (ref 1.6–2.4)
MCH RBC QN AUTO: 27.8 PG (ref 26–34)
MCHC RBC AUTO-ENTMCNC: 31.3 G/DL (ref 30–36.5)
MCV RBC AUTO: 88.8 FL (ref 80–99)
MONOCYTES # BLD: 0.5 K/UL (ref 0–1)
MONOCYTES NFR BLD: 17 % (ref 5–13)
NEUTS SEG # BLD: 1.7 K/UL (ref 1.8–8)
NEUTS SEG NFR BLD: 53 % (ref 32–75)
NRBC # BLD: 0 K/UL (ref 0–0.01)
NRBC BLD-RTO: 0 PER 100 WBC
PHOSPHATE SERPL-MCNC: 3.4 MG/DL (ref 2.6–4.7)
PLATELET # BLD AUTO: 109 K/UL (ref 150–400)
PMV BLD AUTO: 10.8 FL (ref 8.9–12.9)
POTASSIUM SERPL-SCNC: 4.1 MMOL/L (ref 3.5–5.1)
PROT SERPL-MCNC: 8.2 G/DL (ref 6.4–8.2)
RBC # BLD AUTO: 4.18 M/UL (ref 4.1–5.7)
SODIUM SERPL-SCNC: 139 MMOL/L (ref 136–145)
WBC # BLD AUTO: 3.1 K/UL (ref 4.1–11.1)

## 2023-05-12 PROCEDURE — 83521 IG LIGHT CHAINS FREE EACH: CPT

## 2023-05-12 PROCEDURE — 2580000003 HC RX 258: Performed by: INTERNAL MEDICINE

## 2023-05-12 PROCEDURE — 84100 ASSAY OF PHOSPHORUS: CPT

## 2023-05-12 PROCEDURE — 80053 COMPREHEN METABOLIC PANEL: CPT

## 2023-05-12 PROCEDURE — 83735 ASSAY OF MAGNESIUM: CPT

## 2023-05-12 PROCEDURE — 82784 ASSAY IGA/IGD/IGG/IGM EACH: CPT

## 2023-05-12 PROCEDURE — 86334 IMMUNOFIX E-PHORESIS SERUM: CPT

## 2023-05-12 PROCEDURE — 85025 COMPLETE CBC W/AUTO DIFF WBC: CPT

## 2023-05-12 PROCEDURE — 36415 COLL VENOUS BLD VENIPUNCTURE: CPT

## 2023-05-12 PROCEDURE — 6360000002 HC RX W HCPCS: Performed by: INTERNAL MEDICINE

## 2023-05-12 PROCEDURE — 84165 PROTEIN E-PHORESIS SERUM: CPT

## 2023-05-12 PROCEDURE — 36591 DRAW BLOOD OFF VENOUS DEVICE: CPT

## 2023-05-12 RX ORDER — SODIUM CHLORIDE 9 MG/ML
25 INJECTION, SOLUTION INTRAVENOUS PRN
Status: CANCELLED | OUTPATIENT
Start: 2023-06-04

## 2023-05-12 RX ORDER — SODIUM CHLORIDE 9 MG/ML
25 INJECTION, SOLUTION INTRAVENOUS PRN
Status: DISCONTINUED | OUTPATIENT
Start: 2023-05-12 | End: 2023-05-13 | Stop reason: HOSPADM

## 2023-05-12 RX ORDER — SODIUM CHLORIDE 0.9 % (FLUSH) 0.9 %
5-40 SYRINGE (ML) INJECTION PRN
Status: CANCELLED | OUTPATIENT
Start: 2023-06-04

## 2023-05-12 RX ORDER — HEPARIN SODIUM (PORCINE) LOCK FLUSH IV SOLN 100 UNIT/ML 100 UNIT/ML
500 SOLUTION INTRAVENOUS PRN
Status: DISCONTINUED | OUTPATIENT
Start: 2023-05-12 | End: 2023-05-13 | Stop reason: HOSPADM

## 2023-05-12 RX ORDER — SODIUM CHLORIDE 0.9 % (FLUSH) 0.9 %
5-40 SYRINGE (ML) INJECTION PRN
Status: DISCONTINUED | OUTPATIENT
Start: 2023-05-12 | End: 2023-05-13 | Stop reason: HOSPADM

## 2023-05-12 RX ORDER — HEPARIN SODIUM (PORCINE) LOCK FLUSH IV SOLN 100 UNIT/ML 100 UNIT/ML
500 SOLUTION INTRAVENOUS PRN
Status: CANCELLED | OUTPATIENT
Start: 2023-06-04

## 2023-05-12 RX ADMIN — Medication 500 UNITS: at 15:55

## 2023-05-12 RX ADMIN — SODIUM CHLORIDE, PRESERVATIVE FREE 10 ML: 5 INJECTION INTRAVENOUS at 15:55

## 2023-05-12 NOTE — PROGRESS NOTES
[facility] Visit Note    2056 Pt arrived at [facility] ambulatory and in no distress for port flush and labs. No new complaints voiced. Port accessed per protocol with positive blood return. Labs drawn. Port flushed and de-accessed. Patient denied having any symptoms of COVID-19, i.e. SOB, coughing, fever, or generally not feeling well. /72   Pulse 76   Resp 16   SpO2 97%     Recent Results (from the past 12 hour(s))   CBC with Auto Differential    Collection Time: 05/12/23  3:45 PM   Result Value Ref Range    WBC 3.1 (L) 4.1 - 11.1 K/uL    RBC 4.18 4.10 - 5.70 M/uL    Hemoglobin 11.6 (L) 12.1 - 17.0 g/dL    Hematocrit 37.1 36.6 - 50.3 %    MCV 88.8 80.0 - 99.0 FL    MCH 27.8 26.0 - 34.0 PG    MCHC 31.3 30.0 - 36.5 g/dL    RDW 18.9 (H) 11.5 - 14.5 %    Platelets 552 (L) 324 - 400 K/uL    MPV 10.8 8.9 - 12.9 FL    Nucleated RBCs 0.0 0  WBC    nRBC 0.00 0.00 - 0.01 K/uL    Neutrophils % 53 32 - 75 %    Lymphocytes % 27 12 - 49 %    Monocytes % 17 (H) 5 - 13 %    Eosinophils % 1 0 - 7 %    Basophils % 1 0 - 1 %    Immature Granulocytes 1 (H) 0.0 - 0.5 %    Neutrophils Absolute 1.7 (L) 1.8 - 8.0 K/UL    Lymphocytes Absolute 0.8 0.8 - 3.5 K/UL    Monocytes Absolute 0.5 0.0 - 1.0 K/UL    Eosinophils Absolute 0.0 0.0 - 0.4 K/UL    Basophils Absolute 0.0 0.0 - 0.1 K/UL    Absolute Immature Granulocyte 0.0 0.00 - 0.04 K/UL    Differential Type AUTOMATED        See connect care for pended labs    Medications received:  Medications Administered         heparin flush 100 UNIT/ML injection 500 Units Admin Date  05/12/2023 Action  Given Dose  500 Units Route  IntraCATHeter Administered By  Kelton Rodriguez, RN        sodium chloride flush 0.9 % injection 5-40 mL Admin Date  05/12/2023 Action  Given Dose  10 mL Route  IntraVENous Administered By  Kelton Rodriguez, RN             5240 Tolerated treatment well, no adverse reaction noted.   D/Cd from [facility] ambulatory and in no distress

## 2023-05-16 RX ORDER — POMALIDOMIDE 2 MG/1
CAPSULE ORAL
Qty: 28 CAPSULE | Refills: 0 | Status: ACTIVE | OUTPATIENT
Start: 2023-05-16 | End: 2023-05-19

## 2023-05-16 NOTE — TELEPHONE ENCOUNTER
Orders Placed This Encounter    POMALYST 2 MG chemo capsule     Sig: TAKE 1 CAPSULE BY MOUTH ONCE DAILY     Dispense:  28 capsule     Refill:  0     REMS Refill 5/16/23 [id number]     Follow Up Scheduled 5/18/2023    For Pharmacy Admin Tracking Only    Program: Medical Group  CPA in place:  Yes  Recommendation Provided To: Patient/Caregiver: 1 via Telephone  Intervention Detail: Refill(s) Provided  Intervention Accepted By: Patient/Caregiver: 1  Time Spent (min): 5

## 2023-05-16 NOTE — PROGRESS NOTES
Imtiaz Anderson. is a 74 y.o. male here for follow up for Multiple Myeloma. He takes Pomalyst.  Pt states he is fair. 1. Have you been to the ER, urgent care clinic since your last visit? Hospitalized since your last visit?   no    2. Have you seen or consulted any other health care providers outside of the [address] [place] [address] since your last visit? Include any pap smears or colon screening.   no

## 2023-05-17 LAB
ALBUMIN SERPL ELPH-MCNC: 4 G/DL (ref 2.9–4.4)
ALBUMIN/GLOB SERPL: 1.1 (ref 0.7–1.7)
ALPHA1 GLOB SERPL ELPH-MCNC: 0.2 G/DL (ref 0–0.4)
ALPHA2 GLOB SERPL ELPH-MCNC: 0.8 G/DL (ref 0.4–1)
B-GLOBULIN SERPL ELPH-MCNC: 1 G/DL (ref 0.7–1.3)
GAMMA GLOB SERPL ELPH-MCNC: 1.8 G/DL (ref 0.4–1.8)
GLOBULIN SER-MCNC: 3.8 G/DL (ref 2.2–3.9)
IGA SERPL-MCNC: 1059 MG/DL (ref 61–437)
IGG SERPL-MCNC: 1196 MG/DL (ref 603–1613)
IGM SERPL-MCNC: 15 MG/DL (ref 20–172)
INTERPRETATION SERPL IEP-IMP: ABNORMAL
KAPPA LC FREE SER-MCNC: 167.6 MG/L (ref 3.3–19.4)
KAPPA LC FREE/LAMBDA FREE SER: 7.87 (ref 0.26–1.65)
LAMBDA LC FREE SERPL-MCNC: 21.3 MG/L (ref 5.7–26.3)
M PROTEIN SERPL ELPH-MCNC: 1 G/DL
PROT SERPL-MCNC: 7.8 G/DL (ref 6–8.5)

## 2023-05-18 ENCOUNTER — OFFICE VISIT (OUTPATIENT)
Age: 68
End: 2023-05-18
Payer: MEDICARE

## 2023-05-18 VITALS
HEIGHT: 68 IN | HEART RATE: 86 BPM | SYSTOLIC BLOOD PRESSURE: 138 MMHG | WEIGHT: 188.4 LBS | DIASTOLIC BLOOD PRESSURE: 83 MMHG | BODY MASS INDEX: 28.55 KG/M2 | TEMPERATURE: 98.2 F | OXYGEN SATURATION: 98 %

## 2023-05-18 DIAGNOSIS — Z79.899 ENCOUNTER FOR MONITORING CARDIOTOXIC DRUG THERAPY: ICD-10-CM

## 2023-05-18 DIAGNOSIS — R10.84 GENERALIZED ABDOMINAL PAIN: ICD-10-CM

## 2023-05-18 DIAGNOSIS — Z51.81 ENCOUNTER FOR MONITORING CARDIOTOXIC DRUG THERAPY: ICD-10-CM

## 2023-05-18 DIAGNOSIS — C90.00 MULTIPLE MYELOMA NOT HAVING ACHIEVED REMISSION (HCC): Primary | ICD-10-CM

## 2023-05-18 PROCEDURE — 3079F DIAST BP 80-89 MM HG: CPT | Performed by: INTERNAL MEDICINE

## 2023-05-18 PROCEDURE — G8419 CALC BMI OUT NRM PARAM NOF/U: HCPCS | Performed by: INTERNAL MEDICINE

## 2023-05-18 PROCEDURE — 99215 OFFICE O/P EST HI 40 MIN: CPT | Performed by: INTERNAL MEDICINE

## 2023-05-18 PROCEDURE — G8427 DOCREV CUR MEDS BY ELIG CLIN: HCPCS | Performed by: INTERNAL MEDICINE

## 2023-05-18 PROCEDURE — 3017F COLORECTAL CA SCREEN DOC REV: CPT | Performed by: INTERNAL MEDICINE

## 2023-05-18 PROCEDURE — 1036F TOBACCO NON-USER: CPT | Performed by: INTERNAL MEDICINE

## 2023-05-18 PROCEDURE — 3075F SYST BP GE 130 - 139MM HG: CPT | Performed by: INTERNAL MEDICINE

## 2023-05-18 PROCEDURE — 1123F ACP DISCUSS/DSCN MKR DOCD: CPT | Performed by: INTERNAL MEDICINE

## 2023-05-18 NOTE — PROGRESS NOTES
[address] [employer]   at [address], 210 [facility], [address] [place] [address], 200 [facility]   [phone]                Progress Note       Patient: Oma Veloz Sr. MRN: [id number]   SSN: [id number]    YOB: 1955              Diagnosis:         1. Multiple Myeloma, IgA Kappa    Durie [place] stage IIIB      Cytogenetics/FISH: t(14;16) - high risk        Ttreatment:        1. Maintenance therapy - Pomalyst 2 mg daily - 11/19/2018    2. Maintenance therapy - Ixazomib - started 10/15/2018 - stopped 11/12/2018   3. S/P tandem  Autotransplant    First on 2/28/2018    2nd on 06/13/2018   4. Carfilzomib/Pom/Dex - s/p 5 cycles   5. VD-PACE s/p 1 cycle   6. RVD - s/p 4 cycles        Subjective:        Oma Veloz Sr. is a 74 y.o. male with a diagnosis of IgA kappa myeloma. Bone marrow shows 100% aberrant plasma cells. He suffers with DM but it is diet controlled. He has received systemic anti-viral  treatment for chronic Hep C with successful eradication of the virus. Mr. Peter Jeffrey received 4 cycles of systemic therapy with RVd. He had an initial good response to treatment. However his paraprotein level started rising and the myeloma became refractory  to treatment. I then administered one cycle of VD-PACE in the hospital. He then received Carfilzomib/Pom/Dex. He achieved VGPR. He underwent tandem autotransplant at [facility]. According to the patient he achieved complete response with therapy. He has undergone  second/tandem transplant in June. He took Pomalyst maintenance until Aug 2019 then had to stop due to recurrent prostatitis. He had a repeat BM biopsy in February 2019 at [facility] which showed complete molecular remission. He is taking Pomalyst. He has seen  Dr. Sridevi An for movement disorder. He has started taking a new medication for tardive dyskinesia. Interval history:     He has been experiencing pain in the neck and arms.  MRI of the

## 2023-05-19 ENCOUNTER — CLINICAL DOCUMENTATION (OUTPATIENT)
Age: 68
End: 2023-05-19

## 2023-05-19 NOTE — PROGRESS NOTES
Pharmacy Note- Chemotherapy Education    Kerrie Nick Sr. is a  74 y. o.male  diagnosed with multiple myeloma here today for chemotherapy counseling and consent. Mr. Marimar Hercules is being treated with Isatuximab, Carfilzomib, Dexamethasone. Side effects of chemotherapy reviewed included s/s infection, anemia, appetite changes, thrombocytopenia, fatigue, bone pain, skin and nail changes, diarrhea/constipation and peripheral neuropathy. Patient given ways to manage these side effects and when to contact office. Dr. Martín Alfred would like CT scans PRIOR to starting therapy but his bone marrow biopsy can be before or after the start. [employer] Handout of medications provided to patient. Mr. Marimar Hercules verbalized understanding of the information presented, consent signed, and all of the patient's questions were answered.     Nicholas Richardson, PharmD, [facility]    Program: Medical Group  CPA in place:  Yes  Recommendation Provided To: Patient/Caregiver: 1 via In person  Intervention Accepted By: Patient/Caregiver: 1  Time Spent (min): 30

## 2023-05-23 ENCOUNTER — HOSPITAL ENCOUNTER (OUTPATIENT)
Facility: HOSPITAL | Age: 68
Discharge: HOME | End: 2023-05-26
Payer: MEDICARE

## 2023-05-23 VITALS
RESPIRATION RATE: 12 BRPM | HEART RATE: 60 BPM | DIASTOLIC BLOOD PRESSURE: 87 MMHG | HEIGHT: 68 IN | OXYGEN SATURATION: 100 % | WEIGHT: 188 LBS | SYSTOLIC BLOOD PRESSURE: 130 MMHG | BODY MASS INDEX: 28.49 KG/M2 | TEMPERATURE: 98.1 F

## 2023-05-23 DIAGNOSIS — C90.00 MULTIPLE MYELOMA NOT HAVING ACHIEVED REMISSION (HCC): ICD-10-CM

## 2023-05-23 LAB
BASOPHILS # BLD: 0 K/UL (ref 0–0.1)
BASOPHILS NFR BLD: 0 % (ref 0–1)
DIFFERENTIAL METHOD BLD: ABNORMAL
EOSINOPHIL # BLD: 0 K/UL (ref 0–0.4)
EOSINOPHIL NFR BLD: 0 % (ref 0–7)
ERYTHROCYTE [DISTWIDTH] IN BLOOD BY AUTOMATED COUNT: 19 % (ref 11.5–14.5)
HCT VFR BLD AUTO: 35.2 % (ref 36.6–50.3)
HGB BLD-MCNC: 11.1 G/DL (ref 12.1–17)
IMM GRANULOCYTES # BLD AUTO: 0 K/UL (ref 0–0.04)
IMM GRANULOCYTES NFR BLD AUTO: 0 % (ref 0–0.5)
LYMPHOCYTES # BLD: 0.8 K/UL (ref 0.8–3.5)
LYMPHOCYTES NFR BLD: 30 % (ref 12–49)
MCH RBC QN AUTO: 28 PG (ref 26–34)
MCHC RBC AUTO-ENTMCNC: 31.5 G/DL (ref 30–36.5)
MCV RBC AUTO: 88.7 FL (ref 80–99)
MONOCYTES # BLD: 0.5 K/UL (ref 0–1)
MONOCYTES NFR BLD: 18 % (ref 5–13)
NEUTS SEG # BLD: 1.2 K/UL (ref 1.8–8)
NEUTS SEG NFR BLD: 52 % (ref 32–75)
NRBC # BLD: 0 K/UL (ref 0–0.01)
NRBC BLD-RTO: 0 PER 100 WBC
PLATELET # BLD AUTO: 95 K/UL (ref 150–400)
PMV BLD AUTO: 9.4 FL (ref 8.9–12.9)
RBC # BLD AUTO: 3.97 M/UL (ref 4.1–5.7)
RBC MORPH BLD: ABNORMAL
WBC # BLD AUTO: 2.5 K/UL (ref 4.1–11.1)

## 2023-05-23 PROCEDURE — 88184 FLOWCYTOMETRY/ TC 1 MARKER: CPT

## 2023-05-23 PROCEDURE — 2580000003 HC RX 258: Performed by: STUDENT IN AN ORGANIZED HEALTH CARE EDUCATION/TRAINING PROGRAM

## 2023-05-23 PROCEDURE — 88313 SPECIAL STAINS GROUP 2: CPT

## 2023-05-23 PROCEDURE — 88311 DECALCIFY TISSUE: CPT

## 2023-05-23 PROCEDURE — 88305 TISSUE EXAM BY PATHOLOGIST: CPT

## 2023-05-23 PROCEDURE — 99156 MOD SED OTH PHYS/QHP 5/>YRS: CPT

## 2023-05-23 PROCEDURE — 88185 FLOWCYTOMETRY/TC ADD-ON: CPT

## 2023-05-23 PROCEDURE — 36415 COLL VENOUS BLD VENIPUNCTURE: CPT

## 2023-05-23 PROCEDURE — 88342 IMHCHEM/IMCYTCHM 1ST ANTB: CPT

## 2023-05-23 PROCEDURE — 88364 INSITU HYBRIDIZATION (FISH): CPT

## 2023-05-23 PROCEDURE — 6360000002 HC RX W HCPCS: Performed by: PHYSICIAN ASSISTANT

## 2023-05-23 PROCEDURE — 88365 INSITU HYBRIDIZATION (FISH): CPT

## 2023-05-23 PROCEDURE — 88341 IMHCHEM/IMCYTCHM EA ADD ANTB: CPT

## 2023-05-23 PROCEDURE — 85025 COMPLETE CBC W/AUTO DIFF WBC: CPT

## 2023-05-23 RX ORDER — MIDAZOLAM HYDROCHLORIDE 1 MG/ML
INJECTION, SOLUTION INTRAMUSCULAR; INTRAVENOUS PRN
Status: COMPLETED | OUTPATIENT
Start: 2023-05-23 | End: 2023-05-23

## 2023-05-23 RX ORDER — MIDAZOLAM HYDROCHLORIDE 1 MG/ML
5 INJECTION INTRAMUSCULAR; INTRAVENOUS
Status: DISCONTINUED | OUTPATIENT
Start: 2023-05-23 | End: 2023-05-23

## 2023-05-23 RX ORDER — HEPARIN SODIUM (PORCINE) LOCK FLUSH IV SOLN 100 UNIT/ML 100 UNIT/ML
500 SOLUTION INTRAVENOUS PRN
Status: DISCONTINUED | OUTPATIENT
Start: 2023-05-23 | End: 2023-05-27 | Stop reason: HOSPADM

## 2023-05-23 RX ORDER — FENTANYL CITRATE 50 UG/ML
100 INJECTION, SOLUTION INTRAMUSCULAR; INTRAVENOUS
Status: DISCONTINUED | OUTPATIENT
Start: 2023-05-23 | End: 2023-05-23

## 2023-05-23 RX ORDER — HEPARIN SODIUM (PORCINE) LOCK FLUSH IV SOLN 100 UNIT/ML 100 UNIT/ML
SOLUTION INTRAVENOUS PRN
Status: DISCONTINUED | OUTPATIENT
Start: 2023-05-23 | End: 2023-05-27 | Stop reason: HOSPADM

## 2023-05-23 RX ORDER — FENTANYL CITRATE 50 UG/ML
INJECTION, SOLUTION INTRAMUSCULAR; INTRAVENOUS PRN
Status: COMPLETED | OUTPATIENT
Start: 2023-05-23 | End: 2023-05-23

## 2023-05-23 RX ORDER — SODIUM CHLORIDE 9 MG/ML
INJECTION, SOLUTION INTRAVENOUS CONTINUOUS
Status: DISCONTINUED | OUTPATIENT
Start: 2023-05-23 | End: 2023-05-23

## 2023-05-23 RX ADMIN — FENTANYL CITRATE 25 MCG: 50 INJECTION, SOLUTION INTRAMUSCULAR; INTRAVENOUS at 11:50

## 2023-05-23 RX ADMIN — SODIUM CHLORIDE, PRESERVATIVE FREE 5 ML: 5 INJECTION INTRAVENOUS at 12:45

## 2023-05-23 RX ADMIN — MIDAZOLAM HYDROCHLORIDE 2 MG: 1 INJECTION, SOLUTION INTRAMUSCULAR; INTRAVENOUS at 11:50

## 2023-05-23 RX ADMIN — FENTANYL CITRATE 25 MCG: 50 INJECTION, SOLUTION INTRAMUSCULAR; INTRAVENOUS at 11:55

## 2023-05-23 RX ADMIN — MIDAZOLAM HYDROCHLORIDE 2 MG: 1 INJECTION, SOLUTION INTRAMUSCULAR; INTRAVENOUS at 11:55

## 2023-05-23 RX ADMIN — SODIUM CHLORIDE: 9 INJECTION, SOLUTION INTRAVENOUS at 11:50

## 2023-05-23 NOTE — PROGRESS NOTES
1050:    Pt arrived to XR recovery for Bone Marrow Biopsy. Pt is A/O x4 with no complaints of pain. VS to be collected and consent to be obtained. Pt bed is locked and in lowest position and call bell is within reach. Pt family member is waiting at the bedside and can be contacted at [phone]. Pt denies taking any blood thinners. Pt also denies having CDIFF,  MRSA, VRE, or TB.      1200:    Name of procedure: Bone Marrow Biopsy    Sedation medications given:    Versed: 4mg    Fentanyl: 50 mcg    Sedation tolerated: Yes, no reversal used    Sedation start: 1150  Sedation end:  1200    Vital Signs: Stable    Fluids removed: None    Samples sent to lab: take to lab by Gregoria Yost in cytology    Any complications related to procedure: No    Post Procedure Care Needed/order sets placed in connect care. Yes    0111:    Pt d/c home. Pt tolerating PO intake and procedure site is has minimal drainage. I reviewed all d/c instructions. Pt stated understanding. Removed all IV/tele. Pt left with all personal belongings.

## 2023-05-23 NOTE — DISCHARGE INSTRUCTIONS
[facility]  [facility]  [phone]    Radiologist: Oksana Molina PA-C    Date: 5/23/2023       Bone Marrow Biopsy Discharge Instructions      Go home and rest  and restrict your activity the next 24 hours. You have been given sedating medications, so do not drive or make important decisions today. Resume your previous diet and prescribed medications. You may shower in 24 hours. Do not soak or swim until the biopsy site has healed completely to minimize any risk of infection. Watch site for redness, drainage, pus, foul odor, increasing pain and fevers. Should this occur call you doctor immediatly. You may take Tylenol if allowed, as directed on the label, for pain or discomfort. Avoid Ibuprofen (Advil, Motrin etc.) and Aspirin today as they may increase your risk of bleeding. Be sure to follow up with your physician, and let him know how you are progressing and to receive your results. If you have any questions about your procedure today please call and ask to speak to a radiology nurse.        I

## 2023-05-23 NOTE — H&P
INTERVENTIONAL RADIOLOGY  Preoperative History and Physical      Patient:  Peter Duke Sr.  :  1955  Age:  74 y.o. MRN:  [id number]  Today's Date:  2023      CC / KRISTOPHER   Maury Garcia is a 74 y.o. male with a history of multiple myeloma who presents for CT guided bone marrow biopsy.     PAST MEDICAL HISTORY  Past Medical History:   Diagnosis Date    Acid reflux     Arrhythmia     pvc's    Cancer ([facility] [address]     multiple myeloma    Chronic pain     neck    Depression     Diabetes ([facility] [address]     Femoral hernia 2012    Hepatitis C     Hypertension     Inguinal hernia 2012    Liver disease     hepatitis c    Multiple myeloma ([facility] [address]     Other ill-defined conditions(799.89)     Hx of PVCs since     Prostatitis, acute     Psychiatric disorder     depression       PAST SURGICAL HISTORY  Past Surgical History:   Procedure Laterality Date    APPENDECTOMY      CERVICAL FUSION  2008    x 1 as of 2014    COLONOSCOPY N/A 2016    COLONOSCOPY performed by Armand Olivia MD at [facility] ENDOSCOPY    HEENT      foreign body removed from right eye    HERNIA REPAIR      [facility]      IR PORT PLACEMENT EQUAL OR GREATER THAN 5 YEARS  2017    IR PORT PLACEMENT EQUAL OR GREATER THAN 5 YEARS 2017 [facility] RAD ANGIO IR    ORTHOPEDIC SURGERY      cervical fusion    OTHER SURGICAL HISTORY      liver bx - chronic hepatitis       SOCIAL HISTORY  Social History     Socioeconomic History    Marital status:      Spouse name: Not on file    Number of children: Not on file    Years of education: Not on file    Highest education level: Not on file   Occupational History    Not on file   Tobacco Use    Smoking status: Former     Packs/day: 1.00     Types: Cigarettes     Quit date: 1989     Years since quittin.4    Smokeless tobacco: Never   Substance and Sexual Activity    Alcohol use: No    Drug use: No    Sexual activity: Not on file   Other Topics Concern    Not on file   Social History

## 2023-05-24 ENCOUNTER — CLINICAL DOCUMENTATION (OUTPATIENT)
Age: 68
End: 2023-05-24

## 2023-05-24 DIAGNOSIS — C90.00 MULTIPLE MYELOMA NOT HAVING ACHIEVED REMISSION (HCC): Primary | ICD-10-CM

## 2023-05-24 NOTE — PROGRESS NOTES
Rose Stiles NP changed pt scans to WITHOUT contrast.  This RN called scheduling to ensure they change out the orders.

## 2023-05-26 ENCOUNTER — TELEPHONE (OUTPATIENT)
Age: 68
End: 2023-05-26

## 2023-05-31 NOTE — TELEPHONE ENCOUNTER
Called pt. HIPAA verified by two patient identifiers. EKG should be done with [facility]. He will get it done tomorrow before or after his CT scan. Pt asking for bone marrow biopsy results. Please review and let pt know results. Also patient asking about his steroids prescription, I reassured him that he will be getting it at the infusion center since he will be coming weekly. Pt confused as he thought he heard 3 times a week. I reassured him it is weekly and can discuss how long he will be coming weekly vs every other week etc.    Pt verbalized understanding.

## 2023-06-01 ENCOUNTER — HOSPITAL ENCOUNTER (OUTPATIENT)
Facility: HOSPITAL | Age: 68
Discharge: HOME | End: 2023-06-01
Payer: MEDICARE

## 2023-06-01 ENCOUNTER — HOSPITAL ENCOUNTER (OUTPATIENT)
Facility: HOSPITAL | Age: 68
End: 2023-06-01
Payer: MEDICARE

## 2023-06-01 DIAGNOSIS — C90.00 MULTIPLE MYELOMA NOT HAVING ACHIEVED REMISSION (HCC): ICD-10-CM

## 2023-06-01 PROCEDURE — A9503 TC99M MEDRONATE: HCPCS | Performed by: NURSE PRACTITIONER

## 2023-06-01 PROCEDURE — 74176 CT ABD & PELVIS W/O CONTRAST: CPT

## 2023-06-01 PROCEDURE — 3430000000 HC RX DIAGNOSTIC RADIOPHARMACEUTICAL: Performed by: NURSE PRACTITIONER

## 2023-06-01 PROCEDURE — 71250 CT THORAX DX C-: CPT

## 2023-06-01 PROCEDURE — 78306 BONE IMAGING WHOLE BODY: CPT | Performed by: NURSE PRACTITIONER

## 2023-06-01 RX ORDER — TC 99M MEDRONATE 20 MG/10ML
20.2 INJECTION, POWDER, LYOPHILIZED, FOR SOLUTION INTRAVENOUS
Status: COMPLETED | OUTPATIENT
Start: 2023-06-01 | End: 2023-06-01

## 2023-06-01 RX ADMIN — TC 99M MEDRONATE 20.2 MILLICURIE: 20 INJECTION, POWDER, LYOPHILIZED, FOR SOLUTION INTRAVENOUS at 10:15

## 2023-06-07 ENCOUNTER — APPOINTMENT (OUTPATIENT)
Dept: INFUSION THERAPY | Age: 68
End: 2023-06-07

## 2023-06-09 ENCOUNTER — HOSPITAL ENCOUNTER (OUTPATIENT)
Facility: HOSPITAL | Age: 68
End: 2023-06-09
Payer: MEDICARE

## 2023-06-09 VITALS
SYSTOLIC BLOOD PRESSURE: 130 MMHG | DIASTOLIC BLOOD PRESSURE: 87 MMHG | WEIGHT: 188 LBS | HEIGHT: 68 IN | BODY MASS INDEX: 28.49 KG/M2

## 2023-06-09 DIAGNOSIS — Z51.81 ENCOUNTER FOR MONITORING CARDIOTOXIC DRUG THERAPY: ICD-10-CM

## 2023-06-09 DIAGNOSIS — Z79.899 ENCOUNTER FOR MONITORING CARDIOTOXIC DRUG THERAPY: ICD-10-CM

## 2023-06-09 DIAGNOSIS — C90.00 MULTIPLE MYELOMA NOT HAVING ACHIEVED REMISSION (HCC): ICD-10-CM

## 2023-06-09 LAB
ECHO AV AREA PEAK VELOCITY: 3 CM2
ECHO AV AREA VTI: 3.1 CM2
ECHO AV AREA/BSA PEAK VELOCITY: 1.5 CM2/M2
ECHO AV AREA/BSA VTI: 1.6 CM2/M2
ECHO AV MEAN GRADIENT: 3 MMHG
ECHO AV MEAN VELOCITY: 0.8 M/S
ECHO AV PEAK GRADIENT: 6 MMHG
ECHO AV PEAK VELOCITY: 1.2 M/S
ECHO AV VELOCITY RATIO: 0.75
ECHO AV VTI: 21.4 CM
ECHO BSA: 2.02 M2
ECHO LA DIAMETER INDEX: 1.96 CM/M2
ECHO LA DIAMETER: 3.9 CM
ECHO LA VOL 4C: 41 ML (ref 18–58)
ECHO LA VOL 4C: 45 ML (ref 18–58)
ECHO LV FRACTIONAL SHORTENING: 20 % (ref 28–44)
ECHO LV INTERNAL DIMENSION DIASTOLE INDEX: 2.21 CM/M2
ECHO LV INTERNAL DIMENSION DIASTOLIC: 4.4 CM (ref 4.2–5.9)
ECHO LV INTERNAL DIMENSION SYSTOLIC INDEX: 1.76 CM/M2
ECHO LV INTERNAL DIMENSION SYSTOLIC: 3.5 CM
ECHO LV IVSD: 1 CM (ref 0.6–1)
ECHO LV MASS 2D: 180 G (ref 88–224)
ECHO LV MASS INDEX 2D: 90.4 G/M2 (ref 49–115)
ECHO LV POSTERIOR WALL DIASTOLIC: 1.3 CM (ref 0.6–1)
ECHO LV RELATIVE WALL THICKNESS RATIO: 0.59
ECHO LVOT AREA: 4.2 CM2
ECHO LVOT AV VTI INDEX: 0.75
ECHO LVOT DIAM: 2.3 CM
ECHO LVOT MEAN GRADIENT: 2 MMHG
ECHO LVOT PEAK GRADIENT: 3 MMHG
ECHO LVOT PEAK VELOCITY: 0.9 M/S
ECHO LVOT STROKE VOLUME INDEX: 33.6 ML/M2
ECHO LVOT SV: 66.9 ML
ECHO LVOT VTI: 16.1 CM
ECHO MV A VELOCITY: 0.88 M/S
ECHO MV E DECELERATION TIME (DT): 313 MS
ECHO MV E VELOCITY: 0.51 M/S
ECHO MV E/A RATIO: 0.58
ECHO PULMONARY ARTERY END DIASTOLIC PRESSURE: 4 MMHG
ECHO PV MAX VELOCITY: 1 M/S
ECHO PV PEAK GRADIENT: 4 MMHG
ECHO PV REGURGITANT MAX VELOCITY: 1 M/S
ECHO TV REGURGITANT MAX VELOCITY: 2.36 M/S
ECHO TV REGURGITANT PEAK GRADIENT: 22 MMHG

## 2023-06-09 PROCEDURE — 93306 TTE W/DOPPLER COMPLETE: CPT

## 2023-06-12 ENCOUNTER — HOSPITAL ENCOUNTER (OUTPATIENT)
Facility: HOSPITAL | Age: 68
Setting detail: INFUSION SERIES
Discharge: HOME | End: 2023-06-12
Payer: COMMERCIAL

## 2023-06-12 VITALS
RESPIRATION RATE: 16 BRPM | SYSTOLIC BLOOD PRESSURE: 128 MMHG | DIASTOLIC BLOOD PRESSURE: 76 MMHG | TEMPERATURE: 98.6 F | BODY MASS INDEX: 28.04 KG/M2 | HEIGHT: 68 IN | WEIGHT: 185 LBS | HEART RATE: 104 BPM | OXYGEN SATURATION: 98 %

## 2023-06-12 DIAGNOSIS — C90.00 MULTIPLE MYELOMA, REMISSION STATUS UNSPECIFIED (HCC): ICD-10-CM

## 2023-06-12 DIAGNOSIS — Z11.59 ENCOUNTER FOR SCREENING FOR OTHER VIRAL DISEASES: Primary | ICD-10-CM

## 2023-06-12 LAB
ABO + RH BLD: NORMAL
ALBUMIN SERPL-MCNC: 3.6 G/DL (ref 3.5–5)
ALBUMIN/GLOB SERPL: 0.8 (ref 1.1–2.2)
ALP SERPL-CCNC: 55 U/L (ref 45–117)
ALT SERPL-CCNC: 30 U/L (ref 12–78)
ANION GAP SERPL CALC-SCNC: 7 MMOL/L (ref 5–15)
AST SERPL-CCNC: 19 U/L (ref 15–37)
BASO+EOS+MONOS # BLD AUTO: 0.4 K/UL (ref 0.2–1.2)
BASO+EOS+MONOS NFR BLD AUTO: 9 % (ref 3.2–16.9)
BILIRUB SERPL-MCNC: 0.4 MG/DL (ref 0.2–1)
BLOOD GROUP ANTIBODIES SERPL: NORMAL
BUN SERPL-MCNC: 18 MG/DL (ref 6–20)
BUN/CREAT SERPL: 12 (ref 12–20)
CALCIUM SERPL-MCNC: 8.9 MG/DL (ref 8.5–10.1)
CHLORIDE SERPL-SCNC: 106 MMOL/L (ref 97–108)
CO2 SERPL-SCNC: 28 MMOL/L (ref 21–32)
CREAT SERPL-MCNC: 1.54 MG/DL (ref 0.7–1.3)
DIFFERENTIAL METHOD BLD: ABNORMAL
ERYTHROCYTE [DISTWIDTH] IN BLOOD BY AUTOMATED COUNT: 21.2 % (ref 11.8–15.8)
GLOBULIN SER CALC-MCNC: 4.4 G/DL (ref 2–4)
GLUCOSE SERPL-MCNC: 151 MG/DL (ref 65–100)
HBV SURFACE AB SER QL: NONREACTIVE
HBV SURFACE AB SER-ACNC: <3.1 MIU/ML
HBV SURFACE AG SER QL: 0.38 INDEX
HBV SURFACE AG SER QL: NEGATIVE
HCT VFR BLD AUTO: 35 % (ref 36.6–50.3)
HGB BLD-MCNC: 11 G/DL (ref 12.1–17)
LYMPHOCYTES # BLD: 0.8 K/UL (ref 0.8–3.5)
LYMPHOCYTES NFR BLD: 18 % (ref 12–49)
MCH RBC QN AUTO: 29 PG (ref 26–34)
MCHC RBC AUTO-ENTMCNC: 31.4 G/DL (ref 30–36.5)
MCV RBC AUTO: 92.3 FL (ref 80–99)
NEUTS SEG # BLD: 3.2 K/UL (ref 1.8–8)
NEUTS SEG NFR BLD: 73 % (ref 32–75)
PLATELET # BLD AUTO: 148 K/UL (ref 150–400)
POTASSIUM SERPL-SCNC: 4 MMOL/L (ref 3.5–5.1)
PROT SERPL-MCNC: 8 G/DL (ref 6.4–8.2)
RBC # BLD AUTO: 3.79 M/UL (ref 4.1–5.7)
SODIUM SERPL-SCNC: 141 MMOL/L (ref 136–145)
SPECIMEN EXP DATE BLD: NORMAL
WBC # BLD AUTO: 4.4 K/UL (ref 4.1–11.1)

## 2023-06-12 PROCEDURE — A4216 STERILE WATER/SALINE, 10 ML: HCPCS | Performed by: NURSE PRACTITIONER

## 2023-06-12 PROCEDURE — 86901 BLOOD TYPING SEROLOGIC RH(D): CPT

## 2023-06-12 PROCEDURE — 86706 HEP B SURFACE ANTIBODY: CPT

## 2023-06-12 PROCEDURE — 96413 CHEMO IV INFUSION 1 HR: CPT

## 2023-06-12 PROCEDURE — 96417 CHEMO IV INFUS EACH ADDL SEQ: CPT

## 2023-06-12 PROCEDURE — 80053 COMPREHEN METABOLIC PANEL: CPT

## 2023-06-12 PROCEDURE — 96415 CHEMO IV INFUSION ADDL HR: CPT

## 2023-06-12 PROCEDURE — 86900 BLOOD TYPING SEROLOGIC ABO: CPT

## 2023-06-12 PROCEDURE — 85025 COMPLETE CBC W/AUTO DIFF WBC: CPT

## 2023-06-12 PROCEDURE — 36415 COLL VENOUS BLD VENIPUNCTURE: CPT

## 2023-06-12 PROCEDURE — 2580000003 HC RX 258: Performed by: NURSE PRACTITIONER

## 2023-06-12 PROCEDURE — 83521 IG LIGHT CHAINS FREE EACH: CPT

## 2023-06-12 PROCEDURE — 86704 HEP B CORE ANTIBODY TOTAL: CPT

## 2023-06-12 PROCEDURE — 96375 TX/PRO/DX INJ NEW DRUG ADDON: CPT

## 2023-06-12 PROCEDURE — 86334 IMMUNOFIX E-PHORESIS SERUM: CPT

## 2023-06-12 PROCEDURE — 82784 ASSAY IGA/IGD/IGG/IGM EACH: CPT

## 2023-06-12 PROCEDURE — 84165 PROTEIN E-PHORESIS SERUM: CPT

## 2023-06-12 PROCEDURE — 86850 RBC ANTIBODY SCREEN: CPT

## 2023-06-12 PROCEDURE — 2500000003 HC RX 250 WO HCPCS: Performed by: NURSE PRACTITIONER

## 2023-06-12 PROCEDURE — 6370000000 HC RX 637 (ALT 250 FOR IP): Performed by: NURSE PRACTITIONER

## 2023-06-12 PROCEDURE — 6360000002 HC RX W HCPCS: Performed by: NURSE PRACTITIONER

## 2023-06-12 PROCEDURE — 87340 HEPATITIS B SURFACE AG IA: CPT

## 2023-06-12 RX ORDER — SODIUM CHLORIDE 0.9 % (FLUSH) 0.9 %
5-40 SYRINGE (ML) INJECTION PRN
Status: DISCONTINUED | OUTPATIENT
Start: 2023-06-12 | End: 2023-06-13 | Stop reason: HOSPADM

## 2023-06-12 RX ORDER — ACETAMINOPHEN 325 MG/1
650 TABLET ORAL ONCE
Status: COMPLETED | OUTPATIENT
Start: 2023-06-12 | End: 2023-06-12

## 2023-06-12 RX ORDER — ONDANSETRON 2 MG/ML
8 INJECTION INTRAMUSCULAR; INTRAVENOUS
Status: DISCONTINUED | OUTPATIENT
Start: 2023-06-12 | End: 2023-06-13 | Stop reason: HOSPADM

## 2023-06-12 RX ORDER — HEPARIN 100 UNIT/ML
500 SYRINGE INTRAVENOUS PRN
Status: DISCONTINUED | OUTPATIENT
Start: 2023-06-12 | End: 2023-06-13 | Stop reason: HOSPADM

## 2023-06-12 RX ORDER — DIPHENHYDRAMINE HYDROCHLORIDE 50 MG/ML
25 INJECTION INTRAMUSCULAR; INTRAVENOUS ONCE
Status: COMPLETED | OUTPATIENT
Start: 2023-06-12 | End: 2023-06-12

## 2023-06-12 RX ORDER — SODIUM CHLORIDE 9 MG/ML
5-250 INJECTION, SOLUTION INTRAVENOUS PRN
Status: DISCONTINUED | OUTPATIENT
Start: 2023-06-12 | End: 2023-06-13 | Stop reason: HOSPADM

## 2023-06-12 RX ORDER — DEXTROSE MONOHYDRATE 50 MG/ML
5-250 INJECTION, SOLUTION INTRAVENOUS PRN
Status: DISCONTINUED | OUTPATIENT
Start: 2023-06-12 | End: 2023-06-13 | Stop reason: HOSPADM

## 2023-06-12 RX ORDER — DIPHENHYDRAMINE HYDROCHLORIDE 50 MG/ML
50 INJECTION INTRAMUSCULAR; INTRAVENOUS
Status: DISCONTINUED | OUTPATIENT
Start: 2023-06-12 | End: 2023-06-13 | Stop reason: HOSPADM

## 2023-06-12 RX ADMIN — DEXTROSE MONOHYDRATE 25 ML/HR: 50 INJECTION, SOLUTION INTRAVENOUS at 13:35

## 2023-06-12 RX ADMIN — SODIUM CHLORIDE, PRESERVATIVE FREE 10 ML: 5 INJECTION INTRAVENOUS at 17:50

## 2023-06-12 RX ADMIN — DEXAMETHASONE SODIUM PHOSPHATE 20 MG: 4 INJECTION, SOLUTION INTRAMUSCULAR; INTRAVENOUS at 12:50

## 2023-06-12 RX ADMIN — HYDROCORTISONE SODIUM SUCCINATE 100 MG: 100 INJECTION, POWDER, FOR SOLUTION INTRAMUSCULAR; INTRAVENOUS at 14:47

## 2023-06-12 RX ADMIN — ACETAMINOPHEN 650 MG: 325 TABLET ORAL at 12:42

## 2023-06-12 RX ADMIN — FAMOTIDINE 20 MG: 10 INJECTION, SOLUTION INTRAVENOUS at 12:47

## 2023-06-12 RX ADMIN — SODIUM CHLORIDE 25 ML/HR: 9 INJECTION, SOLUTION INTRAVENOUS at 12:41

## 2023-06-12 RX ADMIN — DIPHENHYDRAMINE HYDROCHLORIDE 25 MG: 50 INJECTION, SOLUTION INTRAMUSCULAR; INTRAVENOUS at 12:44

## 2023-06-12 RX ADMIN — CARFILZOMIB 40.6 MG: 10 INJECTION, POWDER, LYOPHILIZED, FOR SOLUTION INTRAVENOUS at 13:40

## 2023-06-12 RX ADMIN — SODIUM CHLORIDE 855 MG: 9 INJECTION, SOLUTION INTRAVENOUS at 14:25

## 2023-06-12 ASSESSMENT — PAIN DESCRIPTION - LOCATION: LOCATION: NECK

## 2023-06-12 ASSESSMENT — PAIN DESCRIPTION - DESCRIPTORS: DESCRIPTORS: ACHING

## 2023-06-12 ASSESSMENT — PAIN DESCRIPTION - PAIN TYPE: TYPE: CHRONIC PAIN

## 2023-06-12 ASSESSMENT — PAIN SCALES - GENERAL: PAINLEVEL_OUTOF10: 6

## 2023-06-12 NOTE — PROGRESS NOTES
[facility] Visit Note    Pt arrived to [facility] ambulatory in no acute distress for C1D1 Sarclisa/Kyprolis. Assessment unremarkable except numbness/tingling to hands and feet and fatigue. R chest port accessed without issue and positive blood return noted. Patient to MD office for appt. Patient states he is still waiting on dental work so Zometa remains on hold. Noted that when I was about to hang Sarclisa that Kyprolis would  before finishing [facility] Kelle, Pharmacist to see if Kyprolis could be given first so that it would not  which she said would be fine. About 25 minutes into  [address] patient reported his throat was scratchy and had a cough.  [address] stopped and NS started. Vitals remained stable and Solucortef given. Suhas Nguyen NP notified.  [address] restarted about 20 mins later, all symptoms had resolved and patient felt back to baseline.     Labs obtained - CBCap, CMP, Myeloma labs, Acute Hep Panel, Type and screen  Recent Results (from the past 12 hour(s))   TYPE AND SCREEN    Collection Time: 23 10:48 AM   Result Value Ref Range    Crossmatch expiration date 06/15/2023,2359     ABO/Rh O NEGATIVE     Antibody Screen NEG    Comprehensive metabolic panel    Collection Time: 23 10:48 AM   Result Value Ref Range    Sodium 141 136 - 145 mmol/L    Potassium 4.0 3.5 - 5.1 mmol/L    Chloride 106 97 - 108 mmol/L    CO2 28 21 - 32 mmol/L    Anion Gap 7 5 - 15 mmol/L    Glucose 151 (H) 65 - 100 mg/dL    BUN 18 6 - 20 MG/DL    Creatinine 1.54 (H) 0.70 - 1.30 MG/DL    Bun/Cre Ratio 12 12 - 20      Est, Glom Filt Rate 50 (L) >60 ml/min/1.73m2    Calcium 8.9 8.5 - 10.1 MG/DL    Total Bilirubin 0.4 0.2 - 1.0 MG/DL    ALT 30 12 - 78 U/L    AST 19 15 - 37 U/L    Alk Phosphatase 55 45 - 117 U/L    Total Protein 8.0 6.4 - 8.2 g/dL    Albumin 3.6 3.5 - 5.0 g/dL    Globulin 4.4 (H) 2.0 - 4.0 g/dL    Albumin/Globulin Ratio 0.8 (L) 1.1 - 2.2     CBC with Partial

## 2023-06-13 LAB — HBV CORE AB SERPL QL IA: POSITIVE

## 2023-06-19 ENCOUNTER — HOSPITAL ENCOUNTER (OUTPATIENT)
Facility: HOSPITAL | Age: 68
Setting detail: INFUSION SERIES
End: 2023-06-19
Payer: MEDICARE

## 2023-06-19 VITALS
HEART RATE: 77 BPM | TEMPERATURE: 98.7 F | RESPIRATION RATE: 16 BRPM | SYSTOLIC BLOOD PRESSURE: 145 MMHG | WEIGHT: 188.7 LBS | BODY MASS INDEX: 28.69 KG/M2 | OXYGEN SATURATION: 99 % | DIASTOLIC BLOOD PRESSURE: 84 MMHG

## 2023-06-19 DIAGNOSIS — Z11.59 ENCOUNTER FOR SCREENING FOR OTHER VIRAL DISEASES: Primary | ICD-10-CM

## 2023-06-19 DIAGNOSIS — C90.00 MULTIPLE MYELOMA, REMISSION STATUS UNSPECIFIED (HCC): ICD-10-CM

## 2023-06-19 LAB
ALBUMIN SERPL ELPH-MCNC: 3.8 G/DL (ref 2.9–4.4)
ALBUMIN/GLOB SERPL: 1.1 (ref 0.7–1.7)
ALPHA1 GLOB SERPL ELPH-MCNC: 0.2 G/DL (ref 0–0.4)
ALPHA2 GLOB SERPL ELPH-MCNC: 0.8 G/DL (ref 0.4–1)
B-GLOBULIN SERPL ELPH-MCNC: 0.9 G/DL (ref 0.7–1.3)
BASO+EOS+MONOS # BLD AUTO: 0.6 K/UL (ref 0.2–1.2)
BASO+EOS+MONOS NFR BLD AUTO: 19 % (ref 3.2–16.9)
DIFFERENTIAL METHOD BLD: ABNORMAL
ERYTHROCYTE [DISTWIDTH] IN BLOOD BY AUTOMATED COUNT: 20.9 % (ref 11.8–15.8)
GAMMA GLOB SERPL ELPH-MCNC: 1.7 G/DL (ref 0.4–1.8)
GLOBULIN SER-MCNC: 3.6 G/DL (ref 2.2–3.9)
HCT VFR BLD AUTO: 33.7 % (ref 36.6–50.3)
HGB BLD-MCNC: 10.6 G/DL (ref 12.1–17)
IGA SERPL-MCNC: 1134 MG/DL (ref 61–437)
IGG SERPL-MCNC: 940 MG/DL (ref 603–1613)
IGM SERPL-MCNC: 11 MG/DL (ref 20–172)
INTERPRETATION SERPL IEP-IMP: ABNORMAL
KAPPA LC FREE SER-MCNC: 118.4 MG/L (ref 3.3–19.4)
KAPPA LC FREE/LAMBDA FREE SER: 11.17 (ref 0.26–1.65)
LAMBDA LC FREE SERPL-MCNC: 10.6 MG/L (ref 5.7–26.3)
LYMPHOCYTES # BLD: 0.6 K/UL (ref 0.8–3.5)
LYMPHOCYTES NFR BLD: 20 % (ref 12–49)
M PROTEIN SERPL ELPH-MCNC: 1 G/DL
MCH RBC QN AUTO: 29.5 PG (ref 26–34)
MCHC RBC AUTO-ENTMCNC: 31.5 G/DL (ref 30–36.5)
MCV RBC AUTO: 93.9 FL (ref 80–99)
NEUTS SEG # BLD: 2.1 K/UL (ref 1.8–8)
NEUTS SEG NFR BLD: 62 % (ref 32–75)
PLATELET # BLD AUTO: 134 K/UL (ref 150–400)
PROT SERPL-MCNC: 7.4 G/DL (ref 6–8.5)
RBC # BLD AUTO: 3.59 M/UL (ref 4.1–5.7)
WBC # BLD AUTO: 3.3 K/UL (ref 4.1–11.1)

## 2023-06-19 PROCEDURE — 2580000003 HC RX 258: Performed by: NURSE PRACTITIONER

## 2023-06-19 PROCEDURE — 96417 CHEMO IV INFUS EACH ADDL SEQ: CPT

## 2023-06-19 PROCEDURE — 6370000000 HC RX 637 (ALT 250 FOR IP): Performed by: NURSE PRACTITIONER

## 2023-06-19 PROCEDURE — 96375 TX/PRO/DX INJ NEW DRUG ADDON: CPT

## 2023-06-19 PROCEDURE — 6360000002 HC RX W HCPCS: Performed by: NURSE PRACTITIONER

## 2023-06-19 PROCEDURE — 36415 COLL VENOUS BLD VENIPUNCTURE: CPT

## 2023-06-19 PROCEDURE — 85025 COMPLETE CBC W/AUTO DIFF WBC: CPT

## 2023-06-19 PROCEDURE — 2500000003 HC RX 250 WO HCPCS: Performed by: NURSE PRACTITIONER

## 2023-06-19 PROCEDURE — A4216 STERILE WATER/SALINE, 10 ML: HCPCS | Performed by: NURSE PRACTITIONER

## 2023-06-19 PROCEDURE — 96413 CHEMO IV INFUSION 1 HR: CPT

## 2023-06-19 PROCEDURE — 96415 CHEMO IV INFUSION ADDL HR: CPT

## 2023-06-19 RX ORDER — MEPERIDINE HYDROCHLORIDE 25 MG/ML
12.5 INJECTION INTRAMUSCULAR; INTRAVENOUS; SUBCUTANEOUS PRN
Status: DISCONTINUED | OUTPATIENT
Start: 2023-06-19 | End: 2023-07-19

## 2023-06-19 RX ORDER — DEXTROSE MONOHYDRATE 50 MG/ML
5-250 INJECTION, SOLUTION INTRAVENOUS PRN
Status: DISCONTINUED | OUTPATIENT
Start: 2023-06-19 | End: 2023-06-20 | Stop reason: HOSPADM

## 2023-06-19 RX ORDER — EPINEPHRINE 1 MG/ML
0.3 INJECTION, SOLUTION, CONCENTRATE INTRAVENOUS PRN
Status: DISCONTINUED | OUTPATIENT
Start: 2023-06-19 | End: 2023-07-19

## 2023-06-19 RX ORDER — ACETAMINOPHEN 325 MG/1
650 TABLET ORAL ONCE
Status: COMPLETED | OUTPATIENT
Start: 2023-06-19 | End: 2023-06-19

## 2023-06-19 RX ORDER — DIPHENHYDRAMINE HYDROCHLORIDE 50 MG/ML
25 INJECTION INTRAMUSCULAR; INTRAVENOUS ONCE
Status: COMPLETED | OUTPATIENT
Start: 2023-06-19 | End: 2023-06-19

## 2023-06-19 RX ORDER — ALBUTEROL SULFATE 90 UG/1
4 AEROSOL, METERED RESPIRATORY (INHALATION) PRN
Status: DISCONTINUED | OUTPATIENT
Start: 2023-06-19 | End: 2023-06-20 | Stop reason: HOSPADM

## 2023-06-19 RX ORDER — ONDANSETRON 2 MG/ML
8 INJECTION INTRAMUSCULAR; INTRAVENOUS
Status: DISCONTINUED | OUTPATIENT
Start: 2023-06-19 | End: 2023-06-20 | Stop reason: HOSPADM

## 2023-06-19 RX ORDER — DIPHENHYDRAMINE HYDROCHLORIDE 50 MG/ML
50 INJECTION INTRAMUSCULAR; INTRAVENOUS
Status: DISCONTINUED | OUTPATIENT
Start: 2023-06-19 | End: 2023-06-20 | Stop reason: HOSPADM

## 2023-06-19 RX ORDER — SODIUM CHLORIDE 0.9 % (FLUSH) 0.9 %
5-40 SYRINGE (ML) INJECTION PRN
Status: DISCONTINUED | OUTPATIENT
Start: 2023-06-19 | End: 2023-06-20 | Stop reason: HOSPADM

## 2023-06-19 RX ADMIN — ACETAMINOPHEN 650 MG: 325 TABLET ORAL at 12:00

## 2023-06-19 RX ADMIN — SODIUM CHLORIDE 855 MG: 9 INJECTION, SOLUTION INTRAVENOUS at 13:00

## 2023-06-19 RX ADMIN — DIPHENHYDRAMINE HYDROCHLORIDE 25 MG: 50 INJECTION, SOLUTION INTRAMUSCULAR; INTRAVENOUS at 12:00

## 2023-06-19 RX ADMIN — DEXAMETHASONE SODIUM PHOSPHATE 20 MG: 4 INJECTION, SOLUTION INTRAMUSCULAR; INTRAVENOUS at 12:16

## 2023-06-19 RX ADMIN — FAMOTIDINE 20 MG: 10 INJECTION INTRAVENOUS at 12:11

## 2023-06-19 RX ADMIN — DEXTROSE MONOHYDRATE 25 ML/HR: 50 INJECTION, SOLUTION INTRAVENOUS at 11:57

## 2023-06-19 RX ADMIN — SODIUM CHLORIDE, PRESERVATIVE FREE 20 ML: 5 INJECTION INTRAVENOUS at 15:42

## 2023-06-19 RX ADMIN — CARFILZOMIB 113.6 MG: 60 INJECTION, POWDER, LYOPHILIZED, FOR SOLUTION INTRAVENOUS at 15:01

## 2023-06-19 ASSESSMENT — PAIN DESCRIPTION - DESCRIPTORS: DESCRIPTORS: ACHING

## 2023-06-19 ASSESSMENT — PAIN DESCRIPTION - LOCATION: LOCATION: NECK

## 2023-06-19 ASSESSMENT — PAIN DESCRIPTION - PAIN TYPE: TYPE: CHRONIC PAIN

## 2023-06-19 ASSESSMENT — PAIN SCALES - GENERAL: PAINLEVEL_OUTOF10: 6

## 2023-06-19 NOTE — PROGRESS NOTES
[facility] Visit Note  Leigh Richards Sr.  1955  [id number]    Pt arrived to [facility] ambulatory in no acute distress at 1100 for C1D8 Sarclisa+ Kyprolis . Assessment unremarkable except patient is fatigued. port accessed without issue and positive blood return noted. Labs obtained, as ordered. Patient sent to MD office for follow-up visit. Prior to administration of medication, labs resulted/reviewed and BSA verified. Patient denied having any symptoms of COVID-19, i.e. SOB, coughing, fever, or generally not feeling well.       Patient Vitals for the past 24 hrs:   BP Temp Temp src Pulse Resp SpO2 Weight   06/19/23 1530 (!) 145/84 -- -- 77 16 99 % --   06/19/23 1400 (!) 126/53 -- -- 77 -- 96 % --   06/19/23 1330 113/67 98.7 °F (37.1 °C) -- 76 -- -- --   06/19/23 1120 118/75 98.6 °F (37 °C) Temporal 81 17 98 % 85.6 kg (188 lb 11.2 oz)         Recent Results (from the past 12 hour(s))   CBC with Partial Differential    Collection Time: 06/19/23 11:21 AM   Result Value Ref Range    WBC 3.3 (L) 4.1 - 11.1 K/uL    RBC 3.59 (L) 4.10 - 5.70 M/uL    Hemoglobin 10.6 (L) 12.1 - 17.0 g/dL    Hematocrit 33.7 (L) 36.6 - 50.3 %    MCV 93.9 80.0 - 99.0 FL    MCH 29.5 26.0 - 34.0 PG    MCHC 31.5 30.0 - 36.5 g/dL    RDW 20.9 (H) 11.8 - 15.8 %    Platelets 530 (L) 371 - 400 K/uL    Neutrophils % 62 32 - 75 %    Mixed Cells 19 (H) 3.2 - 16.9 %    Lymphocytes % 20 12 - 49 %    Neutrophils Absolute 2.1 1.8 - 8.0 K/UL    ABSOLUTE MIXED CELLS 0.6 0.2 - 1.2 K/uL    Lymphocytes Absolute 0.6 (L) 0.8 - 3.5 K/UL    Differential Type AUTOMATED         Two nurses verified prior to administering:  Drug name  Drug dose  Infusion volume or drug volume when prepared in a syringe  Rate of administration  Route of administration  Expiration dates and/or times  Appearance and physical integrity of the drugs  Rate set on infusion pump, when used  Sequencing of drug administration     The following medications

## 2023-06-26 ENCOUNTER — OFFICE VISIT (OUTPATIENT)
Age: 68
End: 2023-06-26
Payer: MEDICARE

## 2023-06-26 ENCOUNTER — HOSPITAL ENCOUNTER (OUTPATIENT)
Facility: HOSPITAL | Age: 68
Setting detail: INFUSION SERIES
End: 2023-06-26
Payer: MEDICARE

## 2023-06-26 VITALS
BODY MASS INDEX: 27.05 KG/M2 | HEART RATE: 71 BPM | OXYGEN SATURATION: 100 % | SYSTOLIC BLOOD PRESSURE: 128 MMHG | HEIGHT: 68 IN | RESPIRATION RATE: 18 BRPM | DIASTOLIC BLOOD PRESSURE: 84 MMHG | WEIGHT: 178.5 LBS | TEMPERATURE: 97 F

## 2023-06-26 VITALS
HEIGHT: 68 IN | DIASTOLIC BLOOD PRESSURE: 74 MMHG | RESPIRATION RATE: 18 BRPM | HEART RATE: 97 BPM | WEIGHT: 178.57 LBS | TEMPERATURE: 97 F | BODY MASS INDEX: 27.06 KG/M2 | OXYGEN SATURATION: 100 % | SYSTOLIC BLOOD PRESSURE: 119 MMHG

## 2023-06-26 DIAGNOSIS — C90.00 MULTIPLE MYELOMA, REMISSION STATUS UNSPECIFIED (HCC): ICD-10-CM

## 2023-06-26 DIAGNOSIS — Z51.11 ENCOUNTER FOR ANTINEOPLASTIC CHEMOTHERAPY: ICD-10-CM

## 2023-06-26 DIAGNOSIS — C90.02 MULTIPLE MYELOMA IN RELAPSE (HCC): Primary | ICD-10-CM

## 2023-06-26 DIAGNOSIS — Z11.59 ENCOUNTER FOR SCREENING FOR OTHER VIRAL DISEASES: Primary | ICD-10-CM

## 2023-06-26 LAB
BASOPHILS # BLD: 0 K/UL (ref 0–0.1)
BASOPHILS NFR BLD: 0 % (ref 0–1)
DIFFERENTIAL METHOD BLD: ABNORMAL
EOSINOPHIL # BLD: 0 K/UL (ref 0–0.4)
EOSINOPHIL NFR BLD: 0 % (ref 0–7)
ERYTHROCYTE [DISTWIDTH] IN BLOOD BY AUTOMATED COUNT: 20.7 % (ref 11.5–14.5)
HCT VFR BLD AUTO: 36.4 % (ref 36.6–50.3)
HGB BLD-MCNC: 11.5 G/DL (ref 12.1–17)
IMM GRANULOCYTES # BLD AUTO: 0 K/UL (ref 0–0.04)
IMM GRANULOCYTES NFR BLD AUTO: 0 % (ref 0–0.5)
LYMPHOCYTES # BLD: 0.6 K/UL (ref 0.8–3.5)
LYMPHOCYTES NFR BLD: 18 % (ref 12–49)
MCH RBC QN AUTO: 29.9 PG (ref 26–34)
MCHC RBC AUTO-ENTMCNC: 31.6 G/DL (ref 30–36.5)
MCV RBC AUTO: 94.8 FL (ref 80–99)
MONOCYTES # BLD: 0.5 K/UL (ref 0–1)
MONOCYTES NFR BLD: 17 % (ref 5–13)
NEUTS SEG # BLD: 2.1 K/UL (ref 1.8–8)
NEUTS SEG NFR BLD: 65 % (ref 32–75)
NRBC # BLD: 0 K/UL (ref 0–0.01)
NRBC BLD-RTO: 0 PER 100 WBC
PLATELET # BLD AUTO: 162 K/UL (ref 150–400)
PMV BLD AUTO: 12.8 FL (ref 8.9–12.9)
RBC # BLD AUTO: 3.84 M/UL (ref 4.1–5.7)
RBC MORPH BLD: ABNORMAL
WBC # BLD AUTO: 3.2 K/UL (ref 4.1–11.1)

## 2023-06-26 PROCEDURE — A4216 STERILE WATER/SALINE, 10 ML: HCPCS | Performed by: NURSE PRACTITIONER

## 2023-06-26 PROCEDURE — 6360000002 HC RX W HCPCS: Performed by: NURSE PRACTITIONER

## 2023-06-26 PROCEDURE — 2500000003 HC RX 250 WO HCPCS: Performed by: NURSE PRACTITIONER

## 2023-06-26 PROCEDURE — 96413 CHEMO IV INFUSION 1 HR: CPT

## 2023-06-26 PROCEDURE — 6370000000 HC RX 637 (ALT 250 FOR IP): Performed by: NURSE PRACTITIONER

## 2023-06-26 PROCEDURE — 85025 COMPLETE CBC W/AUTO DIFF WBC: CPT

## 2023-06-26 PROCEDURE — 99215 OFFICE O/P EST HI 40 MIN: CPT | Performed by: INTERNAL MEDICINE

## 2023-06-26 PROCEDURE — 96375 TX/PRO/DX INJ NEW DRUG ADDON: CPT

## 2023-06-26 PROCEDURE — 3074F SYST BP LT 130 MM HG: CPT | Performed by: INTERNAL MEDICINE

## 2023-06-26 PROCEDURE — 1123F ACP DISCUSS/DSCN MKR DOCD: CPT | Performed by: INTERNAL MEDICINE

## 2023-06-26 PROCEDURE — 36415 COLL VENOUS BLD VENIPUNCTURE: CPT

## 2023-06-26 PROCEDURE — 2580000003 HC RX 258: Performed by: NURSE PRACTITIONER

## 2023-06-26 PROCEDURE — 3078F DIAST BP <80 MM HG: CPT | Performed by: INTERNAL MEDICINE

## 2023-06-26 RX ORDER — ACETAMINOPHEN 325 MG/1
650 TABLET ORAL ONCE
Status: COMPLETED | OUTPATIENT
Start: 2023-06-26 | End: 2023-06-26

## 2023-06-26 RX ORDER — DIPHENHYDRAMINE HYDROCHLORIDE 50 MG/ML
25 INJECTION INTRAMUSCULAR; INTRAVENOUS ONCE
Status: COMPLETED | OUTPATIENT
Start: 2023-06-26 | End: 2023-06-26

## 2023-06-26 RX ORDER — DEXTROSE MONOHYDRATE 50 MG/ML
5-250 INJECTION, SOLUTION INTRAVENOUS PRN
Status: DISCONTINUED | OUTPATIENT
Start: 2023-06-26 | End: 2023-06-27 | Stop reason: HOSPADM

## 2023-06-26 RX ORDER — SODIUM CHLORIDE 0.9 % (FLUSH) 0.9 %
5-40 SYRINGE (ML) INJECTION PRN
Status: DISCONTINUED | OUTPATIENT
Start: 2023-06-26 | End: 2023-06-27 | Stop reason: HOSPADM

## 2023-06-26 RX ADMIN — DEXAMETHASONE SODIUM PHOSPHATE 20 MG: 4 INJECTION, SOLUTION INTRAMUSCULAR; INTRAVENOUS at 13:37

## 2023-06-26 RX ADMIN — SODIUM CHLORIDE 20 MG: 9 INJECTION INTRAMUSCULAR; INTRAVENOUS; SUBCUTANEOUS at 13:31

## 2023-06-26 RX ADMIN — SODIUM CHLORIDE 855 MG: 9 INJECTION, SOLUTION INTRAVENOUS at 14:25

## 2023-06-26 RX ADMIN — SODIUM CHLORIDE, PRESERVATIVE FREE 10 ML: 5 INJECTION INTRAVENOUS at 11:29

## 2023-06-26 RX ADMIN — ACETAMINOPHEN 650 MG: 325 TABLET ORAL at 13:29

## 2023-06-26 RX ADMIN — DIPHENHYDRAMINE HYDROCHLORIDE 25 MG: 50 INJECTION, SOLUTION INTRAMUSCULAR; INTRAVENOUS at 13:32

## 2023-06-26 RX ADMIN — SODIUM CHLORIDE, PRESERVATIVE FREE 30 ML: 5 INJECTION INTRAVENOUS at 15:43

## 2023-06-26 ASSESSMENT — PAIN DESCRIPTION - DESCRIPTORS: DESCRIPTORS: ACHING

## 2023-06-26 ASSESSMENT — PAIN DESCRIPTION - PAIN TYPE: TYPE: CHRONIC PAIN

## 2023-06-26 ASSESSMENT — PAIN SCALES - GENERAL: PAINLEVEL_OUTOF10: 6

## 2023-06-26 ASSESSMENT — PAIN DESCRIPTION - LOCATION: LOCATION: NECK

## 2023-07-01 RX ORDER — METFORMIN HYDROCHLORIDE 500 MG/1
TABLET, EXTENDED RELEASE ORAL
Qty: 180 TABLET | Refills: 0 | Status: SHIPPED | OUTPATIENT
Start: 2023-07-01

## 2023-07-03 ENCOUNTER — HOSPITAL ENCOUNTER (OUTPATIENT)
Facility: HOSPITAL | Age: 68
Setting detail: INFUSION SERIES
End: 2023-07-03
Payer: MEDICARE

## 2023-07-03 VITALS
TEMPERATURE: 97.6 F | DIASTOLIC BLOOD PRESSURE: 84 MMHG | OXYGEN SATURATION: 99 % | WEIGHT: 184.4 LBS | SYSTOLIC BLOOD PRESSURE: 135 MMHG | BODY MASS INDEX: 27.95 KG/M2 | RESPIRATION RATE: 18 BRPM | HEART RATE: 69 BPM | HEIGHT: 68 IN

## 2023-07-03 DIAGNOSIS — C90.00 MULTIPLE MYELOMA, REMISSION STATUS UNSPECIFIED (HCC): ICD-10-CM

## 2023-07-03 DIAGNOSIS — Z11.59 ENCOUNTER FOR SCREENING FOR OTHER VIRAL DISEASES: Primary | ICD-10-CM

## 2023-07-03 LAB
BASO+EOS+MONOS # BLD AUTO: 0.4 K/UL (ref 0.2–1.2)
BASO+EOS+MONOS NFR BLD AUTO: 14 % (ref 3.2–16.9)
DIFFERENTIAL METHOD BLD: ABNORMAL
ERYTHROCYTE [DISTWIDTH] IN BLOOD BY AUTOMATED COUNT: 20.7 % (ref 11.8–15.8)
HCT VFR BLD AUTO: 33.9 % (ref 36.6–50.3)
HGB BLD-MCNC: 10.7 G/DL (ref 12.1–17)
LYMPHOCYTES # BLD: 0.7 K/UL (ref 0.8–3.5)
LYMPHOCYTES NFR BLD: 22 % (ref 12–49)
MCH RBC QN AUTO: 30.8 PG (ref 26–34)
MCHC RBC AUTO-ENTMCNC: 31.6 G/DL (ref 30–36.5)
MCV RBC AUTO: 97.7 FL (ref 80–99)
NEUTS SEG # BLD: 2 K/UL (ref 1.8–8)
NEUTS SEG NFR BLD: 64 % (ref 32–75)
PLATELET # BLD AUTO: 176 K/UL (ref 150–400)
RBC # BLD AUTO: 3.47 M/UL (ref 4.1–5.7)
WBC # BLD AUTO: 3.1 K/UL (ref 4.1–11.1)

## 2023-07-03 PROCEDURE — 2580000003 HC RX 258: Performed by: NURSE PRACTITIONER

## 2023-07-03 PROCEDURE — 6360000002 HC RX W HCPCS: Performed by: NURSE PRACTITIONER

## 2023-07-03 PROCEDURE — 36415 COLL VENOUS BLD VENIPUNCTURE: CPT

## 2023-07-03 PROCEDURE — 2500000003 HC RX 250 WO HCPCS: Performed by: NURSE PRACTITIONER

## 2023-07-03 PROCEDURE — 96413 CHEMO IV INFUSION 1 HR: CPT

## 2023-07-03 PROCEDURE — 6360000002 HC RX W HCPCS: Performed by: INTERNAL MEDICINE

## 2023-07-03 PROCEDURE — A4216 STERILE WATER/SALINE, 10 ML: HCPCS | Performed by: NURSE PRACTITIONER

## 2023-07-03 PROCEDURE — 2580000003 HC RX 258: Performed by: INTERNAL MEDICINE

## 2023-07-03 PROCEDURE — 85025 COMPLETE CBC W/AUTO DIFF WBC: CPT

## 2023-07-03 PROCEDURE — 96366 THER/PROPH/DIAG IV INF ADDON: CPT

## 2023-07-03 PROCEDURE — 96375 TX/PRO/DX INJ NEW DRUG ADDON: CPT

## 2023-07-03 PROCEDURE — 6370000000 HC RX 637 (ALT 250 FOR IP): Performed by: NURSE PRACTITIONER

## 2023-07-03 RX ORDER — DIPHENHYDRAMINE HYDROCHLORIDE 50 MG/ML
25 INJECTION INTRAMUSCULAR; INTRAVENOUS ONCE
Status: COMPLETED | OUTPATIENT
Start: 2023-07-03 | End: 2023-07-03

## 2023-07-03 RX ORDER — SODIUM CHLORIDE 0.9 % (FLUSH) 0.9 %
5-40 SYRINGE (ML) INJECTION PRN
Status: DISCONTINUED | OUTPATIENT
Start: 2023-07-03 | End: 2023-07-04 | Stop reason: HOSPADM

## 2023-07-03 RX ORDER — SODIUM CHLORIDE 9 MG/ML
5-250 INJECTION, SOLUTION INTRAVENOUS PRN
Status: DISCONTINUED | OUTPATIENT
Start: 2023-07-03 | End: 2023-07-04 | Stop reason: HOSPADM

## 2023-07-03 RX ORDER — ACETAMINOPHEN 325 MG/1
650 TABLET ORAL ONCE
Status: COMPLETED | OUTPATIENT
Start: 2023-07-03 | End: 2023-07-03

## 2023-07-03 RX ADMIN — SODIUM CHLORIDE, PRESERVATIVE FREE 10 ML: 5 INJECTION INTRAVENOUS at 16:17

## 2023-07-03 RX ADMIN — DIPHENHYDRAMINE HYDROCHLORIDE 25 MG: 50 INJECTION, SOLUTION INTRAMUSCULAR; INTRAVENOUS at 14:26

## 2023-07-03 RX ADMIN — SODIUM CHLORIDE 25 ML/HR: 9 INJECTION, SOLUTION INTRAVENOUS at 13:50

## 2023-07-03 RX ADMIN — DEXAMETHASONE SODIUM PHOSPHATE 20 MG: 4 INJECTION, SOLUTION INTRAMUSCULAR; INTRAVENOUS at 13:53

## 2023-07-03 RX ADMIN — FAMOTIDINE 20 MG: 10 INJECTION INTRAVENOUS at 13:51

## 2023-07-03 RX ADMIN — SODIUM CHLORIDE 855 MG: 0.9 INJECTION, SOLUTION INTRAVENOUS at 14:55

## 2023-07-03 RX ADMIN — SODIUM CHLORIDE, PRESERVATIVE FREE 10 ML: 5 INJECTION INTRAVENOUS at 13:23

## 2023-07-03 RX ADMIN — ACETAMINOPHEN 650 MG: 325 TABLET ORAL at 13:50

## 2023-07-03 RX ADMIN — SODIUM CHLORIDE 150 MG: 9 INJECTION, SOLUTION INTRAVENOUS at 14:29

## 2023-07-03 ASSESSMENT — PAIN SCALES - GENERAL: PAINLEVEL_OUTOF10: 6

## 2023-07-03 ASSESSMENT — PAIN DESCRIPTION - LOCATION: LOCATION: NECK

## 2023-07-05 ENCOUNTER — APPOINTMENT (OUTPATIENT)
Facility: HOSPITAL | Age: 68
End: 2023-07-05
Payer: MEDICARE

## 2023-07-05 RX ORDER — DIPHENHYDRAMINE HYDROCHLORIDE 50 MG/ML
50 INJECTION INTRAMUSCULAR; INTRAVENOUS
Status: CANCELLED | OUTPATIENT
Start: 2023-07-26

## 2023-07-05 RX ORDER — ACETAMINOPHEN 325 MG/1
650 TABLET ORAL ONCE
Status: CANCELLED | OUTPATIENT
Start: 2023-07-26 | End: 2023-07-26

## 2023-07-05 RX ORDER — DIPHENHYDRAMINE HYDROCHLORIDE 50 MG/ML
25 INJECTION INTRAMUSCULAR; INTRAVENOUS ONCE
Status: CANCELLED | OUTPATIENT
Start: 2023-07-12 | End: 2023-07-12

## 2023-07-05 RX ORDER — ONDANSETRON 2 MG/ML
8 INJECTION INTRAMUSCULAR; INTRAVENOUS
Status: CANCELLED | OUTPATIENT
Start: 2023-07-12

## 2023-07-05 RX ORDER — SODIUM CHLORIDE 9 MG/ML
5-250 INJECTION, SOLUTION INTRAVENOUS PRN
Status: CANCELLED | OUTPATIENT
Start: 2023-07-26

## 2023-07-05 RX ORDER — DIPHENHYDRAMINE HYDROCHLORIDE 50 MG/ML
50 INJECTION INTRAMUSCULAR; INTRAVENOUS
Status: CANCELLED | OUTPATIENT
Start: 2023-07-12

## 2023-07-05 RX ORDER — SODIUM CHLORIDE 9 MG/ML
INJECTION, SOLUTION INTRAVENOUS CONTINUOUS
Status: CANCELLED | OUTPATIENT
Start: 2023-07-26

## 2023-07-05 RX ORDER — EPINEPHRINE 1 MG/ML
0.3 INJECTION, SOLUTION, CONCENTRATE INTRAVENOUS PRN
Status: CANCELLED | OUTPATIENT
Start: 2023-07-26

## 2023-07-05 RX ORDER — MEPERIDINE HYDROCHLORIDE 25 MG/ML
12.5 INJECTION INTRAMUSCULAR; INTRAVENOUS; SUBCUTANEOUS PRN
Status: CANCELLED | OUTPATIENT
Start: 2023-07-19

## 2023-07-05 RX ORDER — SODIUM CHLORIDE 0.9 % (FLUSH) 0.9 %
5-40 SYRINGE (ML) INJECTION PRN
Status: CANCELLED | OUTPATIENT
Start: 2023-07-19

## 2023-07-05 RX ORDER — ONDANSETRON 2 MG/ML
8 INJECTION INTRAMUSCULAR; INTRAVENOUS
Status: CANCELLED | OUTPATIENT
Start: 2023-07-26

## 2023-07-05 RX ORDER — ALBUTEROL SULFATE 90 UG/1
4 AEROSOL, METERED RESPIRATORY (INHALATION) PRN
Status: CANCELLED | OUTPATIENT
Start: 2023-07-19

## 2023-07-05 RX ORDER — MEPERIDINE HYDROCHLORIDE 25 MG/ML
12.5 INJECTION INTRAMUSCULAR; INTRAVENOUS; SUBCUTANEOUS PRN
Status: CANCELLED | OUTPATIENT
Start: 2023-07-26

## 2023-07-05 RX ORDER — EPINEPHRINE 1 MG/ML
0.3 INJECTION, SOLUTION, CONCENTRATE INTRAVENOUS PRN
Status: CANCELLED | OUTPATIENT
Start: 2023-07-19

## 2023-07-05 RX ORDER — DEXTROSE MONOHYDRATE 50 MG/ML
5-250 INJECTION, SOLUTION INTRAVENOUS PRN
Status: CANCELLED | OUTPATIENT
Start: 2023-07-19

## 2023-07-05 RX ORDER — DEXAMETHASONE SODIUM PHOSPHATE 10 MG/ML
8 INJECTION, SOLUTION INTRAMUSCULAR; INTRAVENOUS ONCE
Status: CANCELLED | OUTPATIENT
Start: 2023-07-19 | End: 2023-07-19

## 2023-07-05 RX ORDER — ALBUTEROL SULFATE 90 UG/1
4 AEROSOL, METERED RESPIRATORY (INHALATION) PRN
Status: CANCELLED | OUTPATIENT
Start: 2023-07-12

## 2023-07-05 RX ORDER — ACETAMINOPHEN 325 MG/1
650 TABLET ORAL
Status: CANCELLED | OUTPATIENT
Start: 2023-07-26

## 2023-07-05 RX ORDER — DIPHENHYDRAMINE HYDROCHLORIDE 50 MG/ML
50 INJECTION INTRAMUSCULAR; INTRAVENOUS
Status: CANCELLED | OUTPATIENT
Start: 2023-07-19

## 2023-07-05 RX ORDER — SODIUM CHLORIDE 9 MG/ML
5-250 INJECTION, SOLUTION INTRAVENOUS PRN
Status: CANCELLED | OUTPATIENT
Start: 2023-07-19

## 2023-07-05 RX ORDER — ONDANSETRON 2 MG/ML
8 INJECTION INTRAMUSCULAR; INTRAVENOUS
Status: CANCELLED | OUTPATIENT
Start: 2023-07-19

## 2023-07-05 RX ORDER — ACETAMINOPHEN 325 MG/1
650 TABLET ORAL
Status: CANCELLED | OUTPATIENT
Start: 2023-07-12

## 2023-07-05 RX ORDER — HEPARIN 100 UNIT/ML
500 SYRINGE INTRAVENOUS PRN
Status: CANCELLED | OUTPATIENT
Start: 2023-07-19

## 2023-07-05 RX ORDER — ACETAMINOPHEN 325 MG/1
650 TABLET ORAL
Status: CANCELLED | OUTPATIENT
Start: 2023-07-19

## 2023-07-05 RX ORDER — ALBUTEROL SULFATE 90 UG/1
4 AEROSOL, METERED RESPIRATORY (INHALATION) PRN
Status: CANCELLED | OUTPATIENT
Start: 2023-07-26

## 2023-07-05 RX ORDER — DIPHENHYDRAMINE HYDROCHLORIDE 50 MG/ML
25 INJECTION INTRAMUSCULAR; INTRAVENOUS ONCE
Status: CANCELLED | OUTPATIENT
Start: 2023-07-26 | End: 2023-07-26

## 2023-07-05 RX ORDER — SODIUM CHLORIDE 9 MG/ML
INJECTION, SOLUTION INTRAVENOUS CONTINUOUS
Status: CANCELLED | OUTPATIENT
Start: 2023-07-12

## 2023-07-05 RX ORDER — MEPERIDINE HYDROCHLORIDE 25 MG/ML
12.5 INJECTION INTRAMUSCULAR; INTRAVENOUS; SUBCUTANEOUS PRN
Status: CANCELLED | OUTPATIENT
Start: 2023-07-12

## 2023-07-05 RX ORDER — SODIUM CHLORIDE 0.9 % (FLUSH) 0.9 %
5-40 SYRINGE (ML) INJECTION PRN
Status: CANCELLED | OUTPATIENT
Start: 2023-07-26

## 2023-07-05 RX ORDER — DEXTROSE MONOHYDRATE 50 MG/ML
5-250 INJECTION, SOLUTION INTRAVENOUS PRN
Status: CANCELLED | OUTPATIENT
Start: 2023-07-26

## 2023-07-05 RX ORDER — EPINEPHRINE 1 MG/ML
0.3 INJECTION, SOLUTION, CONCENTRATE INTRAVENOUS PRN
Status: CANCELLED | OUTPATIENT
Start: 2023-07-12

## 2023-07-05 RX ORDER — SODIUM CHLORIDE 9 MG/ML
INJECTION, SOLUTION INTRAVENOUS CONTINUOUS
Status: CANCELLED | OUTPATIENT
Start: 2023-07-19

## 2023-07-05 RX ORDER — HEPARIN 100 UNIT/ML
500 SYRINGE INTRAVENOUS PRN
Status: CANCELLED | OUTPATIENT
Start: 2023-07-26

## 2023-07-11 NOTE — PROGRESS NOTES
Joe Khan. is a 74 y.o. male here for treatment for Multiple Myeloma. Pt states cramps come and go. He feels sluggish. 1. Have you been to the ER, urgent care clinic since your last visit? Hospitalized since your last visit?   no    2. Have you seen or consulted any other health care providers outside of the [address] [place] [address] since your last visit? Include any pap smears or colon screening.    no

## 2023-07-12 ENCOUNTER — OFFICE VISIT (OUTPATIENT)
Age: 68
End: 2023-07-12
Payer: MEDICARE

## 2023-07-12 ENCOUNTER — HOSPITAL ENCOUNTER (OUTPATIENT)
Facility: HOSPITAL | Age: 68
Setting detail: INFUSION SERIES
End: 2023-07-12
Payer: MEDICARE

## 2023-07-12 VITALS
HEIGHT: 68 IN | SYSTOLIC BLOOD PRESSURE: 116 MMHG | RESPIRATION RATE: 18 BRPM | DIASTOLIC BLOOD PRESSURE: 79 MMHG | BODY MASS INDEX: 27.5 KG/M2 | HEART RATE: 90 BPM | WEIGHT: 181.44 LBS | OXYGEN SATURATION: 99 % | TEMPERATURE: 98 F

## 2023-07-12 VITALS
HEART RATE: 80 BPM | SYSTOLIC BLOOD PRESSURE: 118 MMHG | BODY MASS INDEX: 27.49 KG/M2 | RESPIRATION RATE: 16 BRPM | DIASTOLIC BLOOD PRESSURE: 75 MMHG | TEMPERATURE: 98 F | OXYGEN SATURATION: 99 % | WEIGHT: 181.4 LBS | HEIGHT: 68 IN

## 2023-07-12 DIAGNOSIS — C90.00 MULTIPLE MYELOMA, REMISSION STATUS UNSPECIFIED (HCC): ICD-10-CM

## 2023-07-12 DIAGNOSIS — F32.2 CURRENT SEVERE EPISODE OF MAJOR DEPRESSIVE DISORDER WITHOUT PSYCHOTIC FEATURES, UNSPECIFIED WHETHER RECURRENT (HCC): ICD-10-CM

## 2023-07-12 DIAGNOSIS — C90.02 MULTIPLE MYELOMA IN RELAPSE (HCC): Primary | ICD-10-CM

## 2023-07-12 DIAGNOSIS — Z11.59 ENCOUNTER FOR SCREENING FOR OTHER VIRAL DISEASES: Primary | ICD-10-CM

## 2023-07-12 DIAGNOSIS — Z51.11 ENCOUNTER FOR ANTINEOPLASTIC CHEMOTHERAPY: ICD-10-CM

## 2023-07-12 LAB
ALBUMIN SERPL-MCNC: 3.6 G/DL (ref 3.5–5)
ALBUMIN/GLOB SERPL: 1 (ref 1.1–2.2)
ALP SERPL-CCNC: 75 U/L (ref 45–117)
ALT SERPL-CCNC: 32 U/L (ref 12–78)
ANION GAP SERPL CALC-SCNC: 4 MMOL/L (ref 5–15)
AST SERPL-CCNC: 16 U/L (ref 15–37)
BASOPHILS # BLD: 0 K/UL (ref 0–0.1)
BASOPHILS NFR BLD: 0 % (ref 0–1)
BILIRUB SERPL-MCNC: 0.2 MG/DL (ref 0.2–1)
BUN SERPL-MCNC: 23 MG/DL (ref 6–20)
BUN/CREAT SERPL: 15 (ref 12–20)
CALCIUM SERPL-MCNC: 8.9 MG/DL (ref 8.5–10.1)
CHLORIDE SERPL-SCNC: 106 MMOL/L (ref 97–108)
CO2 SERPL-SCNC: 28 MMOL/L (ref 21–32)
CREAT SERPL-MCNC: 1.57 MG/DL (ref 0.7–1.3)
DIFFERENTIAL METHOD BLD: ABNORMAL
EOSINOPHIL # BLD: 0 K/UL (ref 0–0.4)
EOSINOPHIL NFR BLD: 0 % (ref 0–7)
ERYTHROCYTE [DISTWIDTH] IN BLOOD BY AUTOMATED COUNT: 20 % (ref 11.5–14.5)
GLOBULIN SER CALC-MCNC: 3.5 G/DL (ref 2–4)
GLUCOSE SERPL-MCNC: 128 MG/DL (ref 65–100)
HCT VFR BLD AUTO: 33.7 % (ref 36.6–50.3)
HGB BLD-MCNC: 10.7 G/DL (ref 12.1–17)
IMM GRANULOCYTES # BLD AUTO: 0 K/UL (ref 0–0.04)
IMM GRANULOCYTES NFR BLD AUTO: 0 % (ref 0–0.5)
LYMPHOCYTES # BLD: 0.7 K/UL (ref 0.8–3.5)
LYMPHOCYTES NFR BLD: 22 % (ref 12–49)
MCH RBC QN AUTO: 30.7 PG (ref 26–34)
MCHC RBC AUTO-ENTMCNC: 31.8 G/DL (ref 30–36.5)
MCV RBC AUTO: 96.6 FL (ref 80–99)
MONOCYTES # BLD: 0.6 K/UL (ref 0–1)
MONOCYTES NFR BLD: 21 % (ref 5–13)
NEUTS SEG # BLD: 1.7 K/UL (ref 1.8–8)
NEUTS SEG NFR BLD: 57 % (ref 32–75)
NRBC # BLD: 0 K/UL (ref 0–0.01)
NRBC BLD-RTO: 0 PER 100 WBC
PLATELET # BLD AUTO: 150 K/UL (ref 150–400)
PMV BLD AUTO: 11.5 FL (ref 8.9–12.9)
POTASSIUM SERPL-SCNC: 3.9 MMOL/L (ref 3.5–5.1)
PROT SERPL-MCNC: 7.1 G/DL (ref 6.4–8.2)
RBC # BLD AUTO: 3.49 M/UL (ref 4.1–5.7)
RBC MORPH BLD: ABNORMAL
RBC MORPH BLD: ABNORMAL
SODIUM SERPL-SCNC: 138 MMOL/L (ref 136–145)
WBC # BLD AUTO: 3 K/UL (ref 4.1–11.1)

## 2023-07-12 PROCEDURE — 99215 OFFICE O/P EST HI 40 MIN: CPT | Performed by: INTERNAL MEDICINE

## 2023-07-12 PROCEDURE — 96417 CHEMO IV INFUS EACH ADDL SEQ: CPT

## 2023-07-12 PROCEDURE — 6370000000 HC RX 637 (ALT 250 FOR IP): Performed by: INTERNAL MEDICINE

## 2023-07-12 PROCEDURE — 1123F ACP DISCUSS/DSCN MKR DOCD: CPT | Performed by: INTERNAL MEDICINE

## 2023-07-12 PROCEDURE — 80053 COMPREHEN METABOLIC PANEL: CPT

## 2023-07-12 PROCEDURE — 85025 COMPLETE CBC W/AUTO DIFF WBC: CPT

## 2023-07-12 PROCEDURE — 36415 COLL VENOUS BLD VENIPUNCTURE: CPT

## 2023-07-12 PROCEDURE — 2580000003 HC RX 258: Performed by: INTERNAL MEDICINE

## 2023-07-12 PROCEDURE — 6360000002 HC RX W HCPCS: Performed by: INTERNAL MEDICINE

## 2023-07-12 PROCEDURE — 2500000003 HC RX 250 WO HCPCS: Performed by: INTERNAL MEDICINE

## 2023-07-12 PROCEDURE — 3078F DIAST BP <80 MM HG: CPT | Performed by: INTERNAL MEDICINE

## 2023-07-12 PROCEDURE — 82784 ASSAY IGA/IGD/IGG/IGM EACH: CPT

## 2023-07-12 PROCEDURE — 96375 TX/PRO/DX INJ NEW DRUG ADDON: CPT

## 2023-07-12 PROCEDURE — 96413 CHEMO IV INFUSION 1 HR: CPT

## 2023-07-12 PROCEDURE — 96367 TX/PROPH/DG ADDL SEQ IV INF: CPT

## 2023-07-12 PROCEDURE — 86334 IMMUNOFIX E-PHORESIS SERUM: CPT

## 2023-07-12 PROCEDURE — 3074F SYST BP LT 130 MM HG: CPT | Performed by: INTERNAL MEDICINE

## 2023-07-12 PROCEDURE — 83521 IG LIGHT CHAINS FREE EACH: CPT

## 2023-07-12 PROCEDURE — A4216 STERILE WATER/SALINE, 10 ML: HCPCS | Performed by: INTERNAL MEDICINE

## 2023-07-12 PROCEDURE — 84165 PROTEIN E-PHORESIS SERUM: CPT

## 2023-07-12 RX ORDER — ACETAMINOPHEN 325 MG/1
650 TABLET ORAL ONCE
Status: COMPLETED | OUTPATIENT
Start: 2023-07-12 | End: 2023-07-12

## 2023-07-12 RX ORDER — HEPARIN 100 UNIT/ML
500 SYRINGE INTRAVENOUS PRN
Status: DISCONTINUED | OUTPATIENT
Start: 2023-07-12 | End: 2023-07-13 | Stop reason: HOSPADM

## 2023-07-12 RX ORDER — SODIUM CHLORIDE 9 MG/ML
5-250 INJECTION, SOLUTION INTRAVENOUS PRN
Status: DISCONTINUED | OUTPATIENT
Start: 2023-07-12 | End: 2023-07-13 | Stop reason: HOSPADM

## 2023-07-12 RX ORDER — DEXTROSE MONOHYDRATE 50 MG/ML
5-250 INJECTION, SOLUTION INTRAVENOUS PRN
Status: DISCONTINUED | OUTPATIENT
Start: 2023-07-12 | End: 2023-07-13 | Stop reason: HOSPADM

## 2023-07-12 RX ORDER — SODIUM CHLORIDE 0.9 % (FLUSH) 0.9 %
5-40 SYRINGE (ML) INJECTION PRN
Status: DISCONTINUED | OUTPATIENT
Start: 2023-07-12 | End: 2023-07-13 | Stop reason: HOSPADM

## 2023-07-12 RX ADMIN — SODIUM CHLORIDE 855 MG: 0.9 INJECTION, SOLUTION INTRAVENOUS at 16:10

## 2023-07-12 RX ADMIN — SODIUM CHLORIDE 150 MG: 9 INJECTION, SOLUTION INTRAVENOUS at 15:12

## 2023-07-12 RX ADMIN — SODIUM CHLORIDE 25 ML/HR: 9 INJECTION, SOLUTION INTRAVENOUS at 15:10

## 2023-07-12 RX ADMIN — DEXTROSE MONOHYDRATE 25 ML/HR: 50 INJECTION, SOLUTION INTRAVENOUS at 17:30

## 2023-07-12 RX ADMIN — FAMOTIDINE 20 MG: 10 INJECTION, SOLUTION INTRAVENOUS at 15:55

## 2023-07-12 RX ADMIN — SODIUM CHLORIDE, PRESERVATIVE FREE 10 ML: 5 INJECTION INTRAVENOUS at 18:05

## 2023-07-12 RX ADMIN — CARFILZOMIB 54.8 MG: 60 INJECTION, POWDER, LYOPHILIZED, FOR SOLUTION INTRAVENOUS at 17:30

## 2023-07-12 RX ADMIN — ACETAMINOPHEN 650 MG: 325 TABLET ORAL at 15:35

## 2023-07-12 RX ADMIN — DEXAMETHASONE SODIUM PHOSPHATE 20 MG: 4 INJECTION, SOLUTION INTRAMUSCULAR; INTRAVENOUS at 15:35

## 2023-07-12 NOTE — PROGRESS NOTES
[facility] Visit Note    Pt arrived to [facility] ambulatory in no acute distress for C2D1 Sarclisa/Kyprolis. Assessment unremarkable except patient reports feeling very fatigued. R chest port accessed without issue and positive blood return noted. Patient to MD office for appt.      Labs obtained - CBC w/ diff, CMP, Myeloma labs  Recent Results (from the past 12 hour(s))   Comprehensive metabolic panel    Collection Time: 07/12/23  1:21 PM   Result Value Ref Range    Sodium 138 136 - 145 mmol/L    Potassium 3.9 3.5 - 5.1 mmol/L    Chloride 106 97 - 108 mmol/L    CO2 28 21 - 32 mmol/L    Anion Gap 4 (L) 5 - 15 mmol/L    Glucose 128 (H) 65 - 100 mg/dL    BUN 23 (H) 6 - 20 MG/DL    Creatinine 1.57 (H) 0.70 - 1.30 MG/DL    Bun/Cre Ratio 15 12 - 20      Est, Glom Filt Rate 49 (L) >60 ml/min/1.73m2    Calcium 8.9 8.5 - 10.1 MG/DL    Total Bilirubin 0.2 0.2 - 1.0 MG/DL    ALT 32 12 - 78 U/L    AST 16 15 - 37 U/L    Alk Phosphatase 75 45 - 117 U/L    Total Protein 7.1 6.4 - 8.2 g/dL    Albumin 3.6 3.5 - 5.0 g/dL    Globulin 3.5 2.0 - 4.0 g/dL    Albumin/Globulin Ratio 1.0 (L) 1.1 - 2.2     CBC with Auto Differential    Collection Time: 07/12/23  1:21 PM   Result Value Ref Range    WBC 3.0 (L) 4.1 - 11.1 K/uL    RBC 3.49 (L) 4.10 - 5.70 M/uL    Hemoglobin 10.7 (L) 12.1 - 17.0 g/dL    Hematocrit 33.7 (L) 36.6 - 50.3 %    MCV 96.6 80.0 - 99.0 FL    MCH 30.7 26.0 - 34.0 PG    MCHC 31.8 30.0 - 36.5 g/dL    RDW 20.0 (H) 11.5 - 14.5 %    Platelets 376 003 - 520 K/uL    MPV 11.5 8.9 - 12.9 FL    Nucleated RBCs 0.0 0  WBC    nRBC 0.00 0.00 - 0.01 K/uL    Neutrophils % 57 32 - 75 %    Lymphocytes % 22 12 - 49 %    Monocytes % 21 (H) 5 - 13 %    Eosinophils % 0 0 - 7 %    Basophils % 0 0 - 1 %    Immature Granulocytes 0 0.0 - 0.5 %    Neutrophils Absolute 1.7 (L) 1.8 - 8.0 K/UL    Lymphocytes Absolute 0.7 (L) 0.8 - 3.5 K/UL    Monocytes Absolute 0.6 0.0 - 1.0 K/UL    Eosinophils Absolute 0.0 0.0 - 0.4

## 2023-07-12 NOTE — PROGRESS NOTES
[facility]   at [address], [address] [facility] [address], [address] [place] [address]   [phone]             Progress Note       Patient: Clarisse Stewart Sr. MRN: [id number]   SSN: [id number]    YOB: 1955              Diagnosis:         1. Multiple Myeloma, IgA Kappa    Durie Patterson stage IIIB      Cytogenetics/FISH: t(14;16) - high risk        Ttreatment:        1. Maintenance therapy - Pomalyst 2 mg daily - 11/19/2018    2. Maintenance therapy - Ixazomib - started 10/15/2018 - stopped 11/12/2018   3. S/P tandem  Autotransplant    First on 2/28/2018    2nd on 06/13/2018   4. Carfilzomib/Pom/Dex - s/p 5 cycles   5. VD-PACE s/p 1 cycle   6. RVD - s/p 4 cycles    7. Isatuximab/Carfilzomib/Dex cycle 2 day 8       Subjective:        Clarisse Stewart Sr. is a 74 y.o. male with a diagnosis of IgA kappa myeloma. Bone marrow shows 100% aberrant plasma cells. He suffers with DM but it is diet controlled. He has received systemic anti-viral  treatment for chronic Hep C with successful eradication of the virus. Mr. Aquiles Washington received 4 cycles of systemic therapy with RVd. He had an initial good response to treatment. However his paraprotein level started rising and the myeloma became refractory  to treatment. I then administered one cycle of VD-PACE in the hospital. He then received Carfilzomib/Pom/Dex. He achieved VGPR. He underwent tandem autotransplant at [facility]. According to the patient he achieved complete response with therapy. He has undergone  second/tandem transplant in June. He took Pomalyst maintenance until Aug 2019 then had to stop due to recurrent prostatitis. He had a repeat BM biopsy in February 2019 at [facility] which showed complete molecular remission. He is taking Pomalyst. He has seen  Dr. Hector Blanc for movement disorder. He has started taking a new medication for tardive dyskinesia. Mr. Aquiles Washington was noted to have relapsed last month

## 2023-07-13 ENCOUNTER — CLINICAL DOCUMENTATION (OUTPATIENT)
Age: 68
End: 2023-07-13

## 2023-07-13 ENCOUNTER — TELEPHONE (OUTPATIENT)
Age: 68
End: 2023-07-13

## 2023-07-13 NOTE — TELEPHONE ENCOUNTER
[facility]  Oncology Social Work  Encounter     Patient Name:  Jennifer Pod    Medical History: dx multiple myeloma    Advance Directives:    Narrative: MD requested pt to be seen for mental health evaluation, seemed manic in the office, depression, inability to cope (crying during appt. PT son stated he would follow up and agreed pt is depressed and having a hard time coping.      Barriers to Care:     Plan:    Referral/Handouts:     Behavioral health referral

## 2023-07-13 NOTE — PROGRESS NOTES
[employer]  Oncology Social Work  Encounter     Patient Name:  Tra Taylor Sr    Medical History: dx multiple myeloma    Advance Directives:    Narrative: pt sees Dr. Felisha Wilson ([facility]) psychologist who is at [facility]   [phone]. SW called and spoke with [place] who stated they didn't have psychiatrist.   Gilford Alexanders asked for # for pt to call for [facility] psychiatry. RADHA provided with [phone]. SW spoke with pt and provided the number.   Pt stated I wanted you to talk with MD.      Barriers to Care:     Plan:    Referral/Handouts:     Behavioral health referral

## 2023-07-14 ENCOUNTER — TELEPHONE (OUTPATIENT)
Age: 68
End: 2023-07-14

## 2023-07-14 NOTE — TELEPHONE ENCOUNTER
[employer]  Oncology Social Work  Encounter     Patient Name:  Brianna Steinberg Sr    Medical History: dx Multiple myeloma    Advance Directives:    Narrative:  Pt tearful, physical manistation from mental health evident. Seems pt is fearful that he is needing to change treatment plan as multiple myeloma has relapsed. MD wants pt to see a psychiatrist for mental health evaluation. DR. [place] office says pt needs to call [phone] and get an appt. SW provided that number to pt and pt son and provided pt another list of options and told him to check in with his insurance provider for who is in network.       Barriers to Care:     Plan:    Referral/Handouts: Behavioral health referral

## 2023-07-17 LAB
ALBUMIN SERPL ELPH-MCNC: 3.8 G/DL (ref 2.9–4.4)
ALBUMIN/GLOB SERPL: 1.4 (ref 0.7–1.7)
ALPHA1 GLOB SERPL ELPH-MCNC: 0.2 G/DL (ref 0–0.4)
ALPHA2 GLOB SERPL ELPH-MCNC: 0.8 G/DL (ref 0.4–1)
B-GLOBULIN SERPL ELPH-MCNC: 0.8 G/DL (ref 0.7–1.3)
GAMMA GLOB SERPL ELPH-MCNC: 1.1 G/DL (ref 0.4–1.8)
GLOBULIN SER-MCNC: 2.9 G/DL (ref 2.2–3.9)
IGA SERPL-MCNC: 509 MG/DL (ref 61–437)
IGG SERPL-MCNC: 719 MG/DL (ref 603–1613)
IGM SERPL-MCNC: 6 MG/DL (ref 20–172)
INTERPRETATION SERPL IEP-IMP: ABNORMAL
KAPPA LC FREE SER-MCNC: 84.8 MG/L (ref 3.3–19.4)
KAPPA LC FREE/LAMBDA FREE SER: 32.62 (ref 0.26–1.65)
LAMBDA LC FREE SERPL-MCNC: 2.6 MG/L (ref 5.7–26.3)
M PROTEIN SERPL ELPH-MCNC: 0.4 G/DL
PROT SERPL-MCNC: 6.7 G/DL (ref 6–8.5)

## 2023-07-19 ENCOUNTER — HOSPITAL ENCOUNTER (OUTPATIENT)
Facility: HOSPITAL | Age: 68
Setting detail: INFUSION SERIES
End: 2023-07-19
Payer: MEDICARE

## 2023-07-19 VITALS
DIASTOLIC BLOOD PRESSURE: 67 MMHG | WEIGHT: 184.3 LBS | HEART RATE: 88 BPM | RESPIRATION RATE: 16 BRPM | HEIGHT: 68 IN | BODY MASS INDEX: 27.93 KG/M2 | SYSTOLIC BLOOD PRESSURE: 113 MMHG | TEMPERATURE: 97.7 F | OXYGEN SATURATION: 98 %

## 2023-07-19 DIAGNOSIS — C90.00 MULTIPLE MYELOMA, REMISSION STATUS UNSPECIFIED (HCC): ICD-10-CM

## 2023-07-19 DIAGNOSIS — Z11.59 ENCOUNTER FOR SCREENING FOR OTHER VIRAL DISEASES: Primary | ICD-10-CM

## 2023-07-19 LAB
BASOPHILS # BLD: 0 K/UL (ref 0–0.1)
BASOPHILS NFR BLD: 0 % (ref 0–1)
DIFFERENTIAL METHOD BLD: ABNORMAL
EOSINOPHIL # BLD: 0 K/UL (ref 0–0.4)
EOSINOPHIL NFR BLD: 0 % (ref 0–7)
ERYTHROCYTE [DISTWIDTH] IN BLOOD BY AUTOMATED COUNT: 19.8 % (ref 11.5–14.5)
HCT VFR BLD AUTO: 31.9 % (ref 36.6–50.3)
HGB BLD-MCNC: 10.3 G/DL (ref 12.1–17)
IMM GRANULOCYTES # BLD AUTO: 0 K/UL (ref 0–0.04)
IMM GRANULOCYTES NFR BLD AUTO: 0 % (ref 0–0.5)
LYMPHOCYTES # BLD: 0.6 K/UL (ref 0.8–3.5)
LYMPHOCYTES NFR BLD: 21 % (ref 12–49)
MCH RBC QN AUTO: 30.7 PG (ref 26–34)
MCHC RBC AUTO-ENTMCNC: 32.3 G/DL (ref 30–36.5)
MCV RBC AUTO: 95.2 FL (ref 80–99)
MONOCYTES # BLD: 0.6 K/UL (ref 0–1)
MONOCYTES NFR BLD: 21 % (ref 5–13)
NEUTS SEG # BLD: 1.7 K/UL (ref 1.8–8)
NEUTS SEG NFR BLD: 58 % (ref 32–75)
NRBC # BLD: 0 K/UL (ref 0–0.01)
NRBC BLD-RTO: 0 PER 100 WBC
PLATELET # BLD AUTO: 125 K/UL (ref 150–400)
PLATELET COMMENT: ABNORMAL
PMV BLD AUTO: 11.7 FL (ref 8.9–12.9)
RBC # BLD AUTO: 3.35 M/UL (ref 4.1–5.7)
RBC MORPH BLD: ABNORMAL
WBC # BLD AUTO: 2.9 K/UL (ref 4.1–11.1)
WBC MORPH BLD: ABNORMAL

## 2023-07-19 PROCEDURE — 6360000002 HC RX W HCPCS: Performed by: INTERNAL MEDICINE

## 2023-07-19 PROCEDURE — 96413 CHEMO IV INFUSION 1 HR: CPT

## 2023-07-19 PROCEDURE — 96375 TX/PRO/DX INJ NEW DRUG ADDON: CPT

## 2023-07-19 PROCEDURE — 36415 COLL VENOUS BLD VENIPUNCTURE: CPT

## 2023-07-19 PROCEDURE — 96367 TX/PROPH/DG ADDL SEQ IV INF: CPT

## 2023-07-19 PROCEDURE — 85025 COMPLETE CBC W/AUTO DIFF WBC: CPT

## 2023-07-19 PROCEDURE — 2580000003 HC RX 258: Performed by: INTERNAL MEDICINE

## 2023-07-19 RX ORDER — SODIUM CHLORIDE 9 MG/ML
5-250 INJECTION, SOLUTION INTRAVENOUS PRN
Status: DISCONTINUED | OUTPATIENT
Start: 2023-07-19 | End: 2023-07-20 | Stop reason: HOSPADM

## 2023-07-19 RX ORDER — HEPARIN 100 UNIT/ML
500 SYRINGE INTRAVENOUS PRN
Status: DISCONTINUED | OUTPATIENT
Start: 2023-07-19 | End: 2023-07-20 | Stop reason: HOSPADM

## 2023-07-19 RX ORDER — SODIUM CHLORIDE 0.9 % (FLUSH) 0.9 %
5-40 SYRINGE (ML) INJECTION PRN
Status: DISCONTINUED | OUTPATIENT
Start: 2023-07-19 | End: 2023-07-20 | Stop reason: HOSPADM

## 2023-07-19 RX ORDER — DEXTROSE MONOHYDRATE 50 MG/ML
5-250 INJECTION, SOLUTION INTRAVENOUS PRN
Status: DISCONTINUED | OUTPATIENT
Start: 2023-07-19 | End: 2023-07-20 | Stop reason: HOSPADM

## 2023-07-19 RX ORDER — DEXAMETHASONE SODIUM PHOSPHATE 10 MG/ML
8 INJECTION, SOLUTION INTRAMUSCULAR; INTRAVENOUS ONCE
Status: COMPLETED | OUTPATIENT
Start: 2023-07-19 | End: 2023-07-19

## 2023-07-19 RX ADMIN — DEXAMETHASONE SODIUM PHOSPHATE 8 MG: 10 INJECTION, SOLUTION INTRAMUSCULAR; INTRAVENOUS at 15:00

## 2023-07-19 RX ADMIN — DEXTROSE MONOHYDRATE 25 ML/HR: 50 INJECTION, SOLUTION INTRAVENOUS at 14:59

## 2023-07-19 RX ADMIN — SODIUM CHLORIDE 150 MG: 9 INJECTION, SOLUTION INTRAVENOUS at 15:02

## 2023-07-19 RX ADMIN — CARFILZOMIB 54.8 MG: 60 INJECTION, POWDER, LYOPHILIZED, FOR SOLUTION INTRAVENOUS at 15:58

## 2023-07-19 RX ADMIN — SODIUM CHLORIDE, PRESERVATIVE FREE 10 ML: 5 INJECTION INTRAVENOUS at 16:36

## 2023-07-19 ASSESSMENT — PAIN SCALES - GENERAL: PAINLEVEL_OUTOF10: 5

## 2023-07-19 ASSESSMENT — PAIN DESCRIPTION - ORIENTATION: ORIENTATION: MID

## 2023-07-19 ASSESSMENT — PAIN DESCRIPTION - DESCRIPTORS: DESCRIPTORS: ACHING

## 2023-07-19 ASSESSMENT — PAIN DESCRIPTION - LOCATION: LOCATION: NECK

## 2023-07-26 ENCOUNTER — CLINICAL DOCUMENTATION (OUTPATIENT)
Age: 68
End: 2023-07-26

## 2023-07-26 ENCOUNTER — TELEPHONE (OUTPATIENT)
Age: 68
End: 2023-07-26

## 2023-07-26 NOTE — TELEPHONE ENCOUNTER
[facility]  Oncology Social Work  Encounter     Patient Name:  Param Toledo.     Medical History:     Advance Directives:    Narrative: pt frustrated he missed appt today. SW called to also inquire if he had made a psychiatrist visit and he said he called and no one returned call. SW asked him to call again and also call [facility].      Barriers to Care:     Plan:    Referral/Handouts:     Behavioral health referral

## 2023-07-26 NOTE — TELEPHONE ENCOUNTER
[facility]  Oncology Social Work  Encounter     Patient Name:  Dav Hawkins .     Medical History:     Advance Directives:    Narrative: Sw encouraged pt to call [facility].      Barriers to Care:     Plan:    Referral/Handouts:     Behavioral health referral

## 2023-07-26 NOTE — PROGRESS NOTES
Case d/w MD.    Since pt accidentally mixed up his chemo times for today we will skip this day and see him on 8/7 as day 1.

## 2023-07-31 RX ORDER — ACETAMINOPHEN 325 MG/1
650 TABLET ORAL ONCE
Status: CANCELLED | OUTPATIENT
Start: 2023-08-21 | End: 2023-08-23

## 2023-07-31 RX ORDER — ACETAMINOPHEN 325 MG/1
650 TABLET ORAL
Status: CANCELLED | OUTPATIENT
Start: 2023-08-07

## 2023-07-31 RX ORDER — SODIUM CHLORIDE 0.9 % (FLUSH) 0.9 %
5-40 SYRINGE (ML) INJECTION PRN
Status: CANCELLED | OUTPATIENT
Start: 2023-08-21

## 2023-07-31 RX ORDER — HEPARIN 100 UNIT/ML
500 SYRINGE INTRAVENOUS PRN
Status: CANCELLED | OUTPATIENT
Start: 2023-08-21

## 2023-07-31 RX ORDER — ALBUTEROL SULFATE 90 UG/1
4 AEROSOL, METERED RESPIRATORY (INHALATION) PRN
Status: CANCELLED | OUTPATIENT
Start: 2023-08-14

## 2023-07-31 RX ORDER — DIPHENHYDRAMINE HYDROCHLORIDE 50 MG/ML
50 INJECTION INTRAMUSCULAR; INTRAVENOUS
Status: CANCELLED | OUTPATIENT
Start: 2023-08-07

## 2023-07-31 RX ORDER — SODIUM CHLORIDE 9 MG/ML
INJECTION, SOLUTION INTRAVENOUS CONTINUOUS
Status: CANCELLED | OUTPATIENT
Start: 2023-08-14

## 2023-07-31 RX ORDER — EPINEPHRINE 1 MG/ML
0.3 INJECTION, SOLUTION, CONCENTRATE INTRAVENOUS PRN
Status: CANCELLED | OUTPATIENT
Start: 2023-08-07

## 2023-07-31 RX ORDER — SODIUM CHLORIDE 9 MG/ML
INJECTION, SOLUTION INTRAVENOUS CONTINUOUS
Status: CANCELLED | OUTPATIENT
Start: 2023-08-07

## 2023-07-31 RX ORDER — HEPARIN 100 UNIT/ML
500 SYRINGE INTRAVENOUS PRN
Status: CANCELLED | OUTPATIENT
Start: 2023-08-07

## 2023-07-31 RX ORDER — ALBUTEROL SULFATE 90 UG/1
4 AEROSOL, METERED RESPIRATORY (INHALATION) PRN
Status: CANCELLED | OUTPATIENT
Start: 2023-08-21

## 2023-07-31 RX ORDER — ONDANSETRON 2 MG/ML
8 INJECTION INTRAMUSCULAR; INTRAVENOUS
Status: CANCELLED | OUTPATIENT
Start: 2023-08-07

## 2023-07-31 RX ORDER — ONDANSETRON 2 MG/ML
8 INJECTION INTRAMUSCULAR; INTRAVENOUS
Status: CANCELLED | OUTPATIENT
Start: 2023-08-14

## 2023-07-31 RX ORDER — ONDANSETRON 2 MG/ML
8 INJECTION INTRAMUSCULAR; INTRAVENOUS
Status: CANCELLED | OUTPATIENT
Start: 2023-08-21

## 2023-07-31 RX ORDER — SODIUM CHLORIDE 0.9 % (FLUSH) 0.9 %
5-40 SYRINGE (ML) INJECTION PRN
Status: CANCELLED | OUTPATIENT
Start: 2023-08-14

## 2023-07-31 RX ORDER — MEPERIDINE HYDROCHLORIDE 25 MG/ML
12.5 INJECTION INTRAMUSCULAR; INTRAVENOUS; SUBCUTANEOUS PRN
Status: CANCELLED | OUTPATIENT
Start: 2023-08-21

## 2023-07-31 RX ORDER — SODIUM CHLORIDE 9 MG/ML
5-250 INJECTION, SOLUTION INTRAVENOUS PRN
Status: CANCELLED | OUTPATIENT
Start: 2023-08-21

## 2023-07-31 RX ORDER — DIPHENHYDRAMINE HYDROCHLORIDE 50 MG/ML
50 INJECTION INTRAMUSCULAR; INTRAVENOUS
Status: CANCELLED | OUTPATIENT
Start: 2023-08-14

## 2023-07-31 RX ORDER — SODIUM CHLORIDE 9 MG/ML
5-250 INJECTION, SOLUTION INTRAVENOUS PRN
Status: CANCELLED | OUTPATIENT
Start: 2023-08-14

## 2023-07-31 RX ORDER — DEXTROSE MONOHYDRATE 50 MG/ML
5-250 INJECTION, SOLUTION INTRAVENOUS PRN
Status: CANCELLED | OUTPATIENT
Start: 2023-08-21

## 2023-07-31 RX ORDER — SODIUM CHLORIDE 9 MG/ML
INJECTION, SOLUTION INTRAVENOUS CONTINUOUS
Status: CANCELLED | OUTPATIENT
Start: 2023-08-21

## 2023-07-31 RX ORDER — DEXTROSE MONOHYDRATE 50 MG/ML
5-250 INJECTION, SOLUTION INTRAVENOUS PRN
Status: CANCELLED | OUTPATIENT
Start: 2023-08-14

## 2023-07-31 RX ORDER — ACETAMINOPHEN 325 MG/1
650 TABLET ORAL
Status: CANCELLED | OUTPATIENT
Start: 2023-08-14

## 2023-07-31 RX ORDER — ALBUTEROL SULFATE 90 UG/1
4 AEROSOL, METERED RESPIRATORY (INHALATION) PRN
Status: CANCELLED | OUTPATIENT
Start: 2023-08-07

## 2023-07-31 RX ORDER — MEPERIDINE HYDROCHLORIDE 25 MG/ML
12.5 INJECTION INTRAMUSCULAR; INTRAVENOUS; SUBCUTANEOUS PRN
Status: CANCELLED | OUTPATIENT
Start: 2023-08-07

## 2023-07-31 RX ORDER — DIPHENHYDRAMINE HYDROCHLORIDE 50 MG/ML
50 INJECTION INTRAMUSCULAR; INTRAVENOUS
Status: CANCELLED | OUTPATIENT
Start: 2023-08-21

## 2023-07-31 RX ORDER — DEXAMETHASONE SODIUM PHOSPHATE 10 MG/ML
8 INJECTION, SOLUTION INTRAMUSCULAR; INTRAVENOUS ONCE
Status: CANCELLED | OUTPATIENT
Start: 2023-08-14 | End: 2023-08-16

## 2023-07-31 RX ORDER — MEPERIDINE HYDROCHLORIDE 25 MG/ML
12.5 INJECTION INTRAMUSCULAR; INTRAVENOUS; SUBCUTANEOUS PRN
Status: CANCELLED | OUTPATIENT
Start: 2023-08-14

## 2023-07-31 RX ORDER — ACETAMINOPHEN 325 MG/1
650 TABLET ORAL
Status: CANCELLED | OUTPATIENT
Start: 2023-08-21

## 2023-07-31 RX ORDER — DIPHENHYDRAMINE HYDROCHLORIDE 50 MG/ML
25 INJECTION INTRAMUSCULAR; INTRAVENOUS ONCE
Status: CANCELLED | OUTPATIENT
Start: 2023-08-21 | End: 2023-08-23

## 2023-07-31 RX ORDER — EPINEPHRINE 1 MG/ML
0.3 INJECTION, SOLUTION, CONCENTRATE INTRAVENOUS PRN
Status: CANCELLED | OUTPATIENT
Start: 2023-08-14

## 2023-07-31 RX ORDER — HEPARIN 100 UNIT/ML
500 SYRINGE INTRAVENOUS PRN
Status: CANCELLED | OUTPATIENT
Start: 2023-08-14

## 2023-07-31 RX ORDER — EPINEPHRINE 1 MG/ML
0.3 INJECTION, SOLUTION, CONCENTRATE INTRAVENOUS PRN
Status: CANCELLED | OUTPATIENT
Start: 2023-08-21

## 2023-08-07 ENCOUNTER — HOSPITAL ENCOUNTER (OUTPATIENT)
Facility: HOSPITAL | Age: 68
Setting detail: INFUSION SERIES
End: 2023-08-07
Payer: MEDICARE

## 2023-08-07 ENCOUNTER — OFFICE VISIT (OUTPATIENT)
Age: 68
End: 2023-08-07
Payer: MEDICARE

## 2023-08-07 VITALS
TEMPERATURE: 97.8 F | WEIGHT: 194.3 LBS | OXYGEN SATURATION: 95 % | BODY MASS INDEX: 29.54 KG/M2 | DIASTOLIC BLOOD PRESSURE: 68 MMHG | HEART RATE: 94 BPM | RESPIRATION RATE: 17 BRPM | SYSTOLIC BLOOD PRESSURE: 103 MMHG

## 2023-08-07 VITALS
HEIGHT: 68 IN | TEMPERATURE: 97.8 F | SYSTOLIC BLOOD PRESSURE: 116 MMHG | DIASTOLIC BLOOD PRESSURE: 72 MMHG | HEART RATE: 93 BPM | WEIGHT: 194.22 LBS | BODY MASS INDEX: 29.44 KG/M2 | RESPIRATION RATE: 17 BRPM | OXYGEN SATURATION: 96 %

## 2023-08-07 DIAGNOSIS — R53.83 CHEMOTHERAPY-INDUCED FATIGUE: ICD-10-CM

## 2023-08-07 DIAGNOSIS — Z11.59 ENCOUNTER FOR SCREENING FOR OTHER VIRAL DISEASES: Primary | ICD-10-CM

## 2023-08-07 DIAGNOSIS — Z51.11 ENCOUNTER FOR ANTINEOPLASTIC CHEMOTHERAPY: ICD-10-CM

## 2023-08-07 DIAGNOSIS — C90.02 MULTIPLE MYELOMA IN RELAPSE (HCC): Primary | ICD-10-CM

## 2023-08-07 DIAGNOSIS — T45.1X5A CHEMOTHERAPY-INDUCED FATIGUE: ICD-10-CM

## 2023-08-07 DIAGNOSIS — C90.00 MULTIPLE MYELOMA, REMISSION STATUS UNSPECIFIED (HCC): ICD-10-CM

## 2023-08-07 LAB
ALBUMIN SERPL-MCNC: 3.3 G/DL (ref 3.5–5)
ALBUMIN/GLOB SERPL: 1.2 (ref 1.1–2.2)
ALP SERPL-CCNC: 84 U/L (ref 45–117)
ALT SERPL-CCNC: 33 U/L (ref 12–78)
ANION GAP SERPL CALC-SCNC: 6 MMOL/L (ref 5–15)
AST SERPL-CCNC: 17 U/L (ref 15–37)
BASOPHILS # BLD: 0 K/UL (ref 0–0.1)
BASOPHILS NFR BLD: 0 % (ref 0–1)
BILIRUB SERPL-MCNC: 0.2 MG/DL (ref 0.2–1)
BUN SERPL-MCNC: 19 MG/DL (ref 6–20)
BUN/CREAT SERPL: 13 (ref 12–20)
CALCIUM SERPL-MCNC: 8.4 MG/DL (ref 8.5–10.1)
CHLORIDE SERPL-SCNC: 107 MMOL/L (ref 97–108)
CO2 SERPL-SCNC: 28 MMOL/L (ref 21–32)
CREAT SERPL-MCNC: 1.48 MG/DL (ref 0.7–1.3)
DIFFERENTIAL METHOD BLD: ABNORMAL
EOSINOPHIL # BLD: 0 K/UL (ref 0–0.4)
EOSINOPHIL NFR BLD: 0 % (ref 0–7)
ERYTHROCYTE [DISTWIDTH] IN BLOOD BY AUTOMATED COUNT: 21.6 % (ref 11.5–14.5)
GLOBULIN SER CALC-MCNC: 2.8 G/DL (ref 2–4)
GLUCOSE SERPL-MCNC: 113 MG/DL (ref 65–100)
HCT VFR BLD AUTO: 28.6 % (ref 36.6–50.3)
HGB BLD-MCNC: 8.8 G/DL (ref 12.1–17)
IMM GRANULOCYTES # BLD AUTO: 0 K/UL (ref 0–0.04)
IMM GRANULOCYTES NFR BLD AUTO: 0 % (ref 0–0.5)
LYMPHOCYTES # BLD: 0.6 K/UL (ref 0.8–3.5)
LYMPHOCYTES NFR BLD: 19 % (ref 12–49)
MAGNESIUM SERPL-MCNC: 2.1 MG/DL (ref 1.6–2.4)
MCH RBC QN AUTO: 31.9 PG (ref 26–34)
MCHC RBC AUTO-ENTMCNC: 30.8 G/DL (ref 30–36.5)
MCV RBC AUTO: 103.6 FL (ref 80–99)
MONOCYTES # BLD: 0.7 K/UL (ref 0–1)
MONOCYTES NFR BLD: 21 % (ref 5–13)
NEUTS SEG # BLD: 2 K/UL (ref 1.8–8)
NEUTS SEG NFR BLD: 60 % (ref 32–75)
NRBC # BLD: 0 K/UL (ref 0–0.01)
NRBC BLD-RTO: 0 PER 100 WBC
PHOSPHATE SERPL-MCNC: 2.3 MG/DL (ref 2.6–4.7)
PLATELET # BLD AUTO: 177 K/UL (ref 150–400)
PMV BLD AUTO: 10.8 FL (ref 8.9–12.9)
POTASSIUM SERPL-SCNC: 4.3 MMOL/L (ref 3.5–5.1)
PROT SERPL-MCNC: 6.1 G/DL (ref 6.4–8.2)
RBC # BLD AUTO: 2.76 M/UL (ref 4.1–5.7)
RBC MORPH BLD: ABNORMAL
SODIUM SERPL-SCNC: 141 MMOL/L (ref 136–145)
WBC # BLD AUTO: 3.3 K/UL (ref 4.1–11.1)

## 2023-08-07 PROCEDURE — 80053 COMPREHEN METABOLIC PANEL: CPT

## 2023-08-07 PROCEDURE — 1123F ACP DISCUSS/DSCN MKR DOCD: CPT | Performed by: INTERNAL MEDICINE

## 2023-08-07 PROCEDURE — 3078F DIAST BP <80 MM HG: CPT | Performed by: INTERNAL MEDICINE

## 2023-08-07 PROCEDURE — 99215 OFFICE O/P EST HI 40 MIN: CPT | Performed by: INTERNAL MEDICINE

## 2023-08-07 PROCEDURE — 6360000002 HC RX W HCPCS: Performed by: INTERNAL MEDICINE

## 2023-08-07 PROCEDURE — A4216 STERILE WATER/SALINE, 10 ML: HCPCS | Performed by: INTERNAL MEDICINE

## 2023-08-07 PROCEDURE — 6370000000 HC RX 637 (ALT 250 FOR IP): Performed by: INTERNAL MEDICINE

## 2023-08-07 PROCEDURE — 83735 ASSAY OF MAGNESIUM: CPT

## 2023-08-07 PROCEDURE — 2500000003 HC RX 250 WO HCPCS: Performed by: INTERNAL MEDICINE

## 2023-08-07 PROCEDURE — 36415 COLL VENOUS BLD VENIPUNCTURE: CPT

## 2023-08-07 PROCEDURE — 82784 ASSAY IGA/IGD/IGG/IGM EACH: CPT

## 2023-08-07 PROCEDURE — 96413 CHEMO IV INFUSION 1 HR: CPT

## 2023-08-07 PROCEDURE — 86334 IMMUNOFIX E-PHORESIS SERUM: CPT

## 2023-08-07 PROCEDURE — 3074F SYST BP LT 130 MM HG: CPT | Performed by: INTERNAL MEDICINE

## 2023-08-07 PROCEDURE — 85025 COMPLETE CBC W/AUTO DIFF WBC: CPT

## 2023-08-07 PROCEDURE — 2580000003 HC RX 258: Performed by: INTERNAL MEDICINE

## 2023-08-07 PROCEDURE — 84165 PROTEIN E-PHORESIS SERUM: CPT

## 2023-08-07 PROCEDURE — 96417 CHEMO IV INFUS EACH ADDL SEQ: CPT

## 2023-08-07 PROCEDURE — 96375 TX/PRO/DX INJ NEW DRUG ADDON: CPT

## 2023-08-07 PROCEDURE — 84100 ASSAY OF PHOSPHORUS: CPT

## 2023-08-07 PROCEDURE — 83521 IG LIGHT CHAINS FREE EACH: CPT

## 2023-08-07 PROCEDURE — 96367 TX/PROPH/DG ADDL SEQ IV INF: CPT

## 2023-08-07 RX ORDER — SODIUM CHLORIDE 0.9 % (FLUSH) 0.9 %
5-40 SYRINGE (ML) INJECTION PRN
Status: DISCONTINUED | OUTPATIENT
Start: 2023-08-07 | End: 2023-08-08 | Stop reason: HOSPADM

## 2023-08-07 RX ORDER — SODIUM CHLORIDE 9 MG/ML
5-250 INJECTION, SOLUTION INTRAVENOUS PRN
Status: DISCONTINUED | OUTPATIENT
Start: 2023-08-07 | End: 2023-08-08 | Stop reason: HOSPADM

## 2023-08-07 RX ORDER — ACETAMINOPHEN 325 MG/1
650 TABLET ORAL ONCE
Status: COMPLETED | OUTPATIENT
Start: 2023-08-07 | End: 2023-08-07

## 2023-08-07 RX ORDER — DIPHENHYDRAMINE HYDROCHLORIDE 50 MG/ML
25 INJECTION INTRAMUSCULAR; INTRAVENOUS ONCE
Status: COMPLETED | OUTPATIENT
Start: 2023-08-07 | End: 2023-08-07

## 2023-08-07 RX ORDER — DEXTROSE MONOHYDRATE 50 MG/ML
5-250 INJECTION, SOLUTION INTRAVENOUS PRN
Status: DISCONTINUED | OUTPATIENT
Start: 2023-08-07 | End: 2023-08-08 | Stop reason: HOSPADM

## 2023-08-07 RX ADMIN — DEXTROSE MONOHYDRATE 25 ML/HR: 50 INJECTION, SOLUTION INTRAVENOUS at 15:46

## 2023-08-07 RX ADMIN — CARFILZOMIB 40.6 MG: 10 INJECTION, POWDER, LYOPHILIZED, FOR SOLUTION INTRAVENOUS at 15:52

## 2023-08-07 RX ADMIN — ACETAMINOPHEN 650 MG: 325 TABLET ORAL at 13:08

## 2023-08-07 RX ADMIN — SODIUM CHLORIDE 855 MG: 9 INJECTION, SOLUTION INTRAVENOUS at 14:21

## 2023-08-07 RX ADMIN — FAMOTIDINE 20 MG: 10 INJECTION, SOLUTION INTRAVENOUS at 13:08

## 2023-08-07 RX ADMIN — SODIUM CHLORIDE 25 ML/HR: 9 INJECTION, SOLUTION INTRAVENOUS at 13:06

## 2023-08-07 RX ADMIN — SODIUM CHLORIDE, PRESERVATIVE FREE 10 ML: 5 INJECTION INTRAVENOUS at 16:31

## 2023-08-07 RX ADMIN — SODIUM CHLORIDE 150 MG: 9 INJECTION, SOLUTION INTRAVENOUS at 13:22

## 2023-08-07 RX ADMIN — DIPHENHYDRAMINE HYDROCHLORIDE 25 MG: 50 INJECTION, SOLUTION INTRAMUSCULAR; INTRAVENOUS at 13:13

## 2023-08-07 ASSESSMENT — PAIN SCALES - GENERAL: PAINLEVEL_OUTOF10: 0

## 2023-08-07 NOTE — PROGRESS NOTES
Diamond Ann. is a 74 y.o. male here for treatment for Multiple Myeloma. +13 lbs since last visit. Level 5 neck pain. Nothing new to report. 1. Have you been to the ER, urgent care clinic since your last visit? Hospitalized since your last visit?   no    2. Have you seen or consulted any other health care providers outside of the [address] [place] [address] since your last visit? Include any pap smears or colon screening.    no
Marital status:    Tobacco Use    Smoking status: Former     Packs/day: 1.00     Types: Cigarettes     Quit date: 1989     Years since quittin.6    Smokeless tobacco: Never   Substance and Sexual Activity    Alcohol use: No    Drug use: No       Current Outpatient Medications   Medication Sig Dispense Refill    metFORMIN (GLUCOPHAGE-XR) 500 MG extended release tablet TAKE 1 TABLET BY MOUTH TWICE A  tablet 0    ondansetron (ZOFRAN-ODT) 8 MG TBDP disintegrating tablet Take 0.5 tablets by mouth every 8 hours as needed for Nausea 25 tablet 0    prochlorperazine (COMPAZINE) 10 MG tablet Take 1 tablet by mouth every 6 hours as needed (Cinv) 30 tablet 1    dexamethasone 20 MG TABS Take 40 mg by mouth every 7 days 24 tablet 3    ARIPiprazole (ABILIFY) 5 MG tablet Take by mouth      ascorbic acid (VITAMIN C) 100 MG tablet Take by mouth daily      aspirin 81 MG EC tablet Take by mouth daily      buPROPion (WELLBUTRIN XL) 300 MG extended release tablet TAKE 1 TABLET BY MOUTH EVERY DAY      chlorproMAZINE (THORAZINE) 25 MG tablet Take by mouth 3 times daily as needed      Cholecalciferol 50 MCG ( UT) TABS Take by mouth daily      deutetrabenazine (AUSTEDO) 6 MG tablet Take by mouth 2 times daily      ibuprofen (ADVIL;MOTRIN) 800 MG tablet TAKE 1 TABLET BY MOUTH 3 TIMES A DAY AS NEEDED FOR PAIN      naproxen (NAPROSYN) 500 MG tablet TAKE 1 TABLET BY MOUTH TWICE A DAY      omeprazole (PRILOSEC) 20 MG delayed release capsule Take by mouth daily as needed      SODIUM FLUORIDE, DENTAL GEL, 1.1 % CREA USE AS DIRECTED TWO TIMES DAILY      Suvorexant (BELSOMRA) 15 MG TABS Take by mouth.       tamsulosin (FLOMAX) 0.4 MG capsule Take by mouth daily      tapentadol (NUCYNTA) 100 MG TABS TAKE 2 TABLETS 3 TIMES A DAY      tenofovir disoproxil fumarate (VIREAD) 300 MG tablet TAKE 1 TABLET BY MOUTH 1 TIME A DAY      lisinopril (PRINIVIL;ZESTRIL) 10 MG tablet Take by mouth daily as needed (Patient not taking: Reported

## 2023-08-08 ENCOUNTER — TELEPHONE (OUTPATIENT)
Age: 68
End: 2023-08-08

## 2023-08-08 RX ORDER — DEXAMETHASONE 4 MG/1
40 TABLET ORAL
Qty: 40 TABLET | Refills: 5 | Status: SHIPPED | OUTPATIENT
Start: 2023-08-08

## 2023-08-08 NOTE — TELEPHONE ENCOUNTER
Pt stated that rx was not filled by pharm because they stated it is not covered with insurance. Pt is not sure what to do about rx.

## 2023-08-09 LAB
ALBUMIN SERPL ELPH-MCNC: 3.4 G/DL (ref 2.9–4.4)
ALBUMIN/GLOB SERPL: 1.4 (ref 0.7–1.7)
ALPHA1 GLOB SERPL ELPH-MCNC: 0.2 G/DL (ref 0–0.4)
ALPHA2 GLOB SERPL ELPH-MCNC: 0.8 G/DL (ref 0.4–1)
B-GLOBULIN SERPL ELPH-MCNC: 0.7 G/DL (ref 0.7–1.3)
GAMMA GLOB SERPL ELPH-MCNC: 0.8 G/DL (ref 0.4–1.8)
GLOBULIN SER-MCNC: 2.5 G/DL (ref 2.2–3.9)
IGA SERPL-MCNC: 342 MG/DL (ref 61–437)
IGG SERPL-MCNC: 550 MG/DL (ref 603–1613)
IGM SERPL-MCNC: <5 MG/DL (ref 20–172)
INTERPRETATION SERPL IEP-IMP: ABNORMAL
KAPPA LC FREE SER-MCNC: 44.5 MG/L (ref 3.3–19.4)
KAPPA LC FREE/LAMBDA FREE SER: 21.19 (ref 0.26–1.65)
LAMBDA LC FREE SERPL-MCNC: 2.1 MG/L (ref 5.7–26.3)
M PROTEIN SERPL ELPH-MCNC: 0.3 G/DL
PROT SERPL-MCNC: 5.9 G/DL (ref 6–8.5)

## 2023-08-10 ENCOUNTER — TELEPHONE (OUTPATIENT)
Age: 68
End: 2023-08-10

## 2023-08-10 NOTE — TELEPHONE ENCOUNTER
Called pharmacy to inquire if pt has picked up dexamethasone 4mg tabs due to dexamethasone 20mg not being approved. No answer. Called pt to confirm. He will take it today and then again on Monday and continue once a week on mondays.

## 2023-08-21 ENCOUNTER — OFFICE VISIT (OUTPATIENT)
Age: 68
End: 2023-08-21
Payer: MEDICARE

## 2023-08-21 ENCOUNTER — HOSPITAL ENCOUNTER (OUTPATIENT)
Facility: HOSPITAL | Age: 68
Setting detail: INFUSION SERIES
End: 2023-08-21
Payer: MEDICARE

## 2023-08-21 VITALS
DIASTOLIC BLOOD PRESSURE: 83 MMHG | WEIGHT: 185.19 LBS | TEMPERATURE: 97.4 F | SYSTOLIC BLOOD PRESSURE: 147 MMHG | HEART RATE: 86 BPM | RESPIRATION RATE: 18 BRPM | HEIGHT: 68 IN | BODY MASS INDEX: 28.07 KG/M2 | OXYGEN SATURATION: 98 %

## 2023-08-21 VITALS
SYSTOLIC BLOOD PRESSURE: 130 MMHG | TEMPERATURE: 97.4 F | HEIGHT: 68 IN | BODY MASS INDEX: 28.07 KG/M2 | DIASTOLIC BLOOD PRESSURE: 77 MMHG | WEIGHT: 185.2 LBS | OXYGEN SATURATION: 99 % | HEART RATE: 64 BPM | RESPIRATION RATE: 18 BRPM

## 2023-08-21 DIAGNOSIS — Z11.59 ENCOUNTER FOR SCREENING FOR OTHER VIRAL DISEASES: Primary | ICD-10-CM

## 2023-08-21 DIAGNOSIS — C90.02 MULTIPLE MYELOMA IN RELAPSE (HCC): Primary | ICD-10-CM

## 2023-08-21 DIAGNOSIS — C90.00 MULTIPLE MYELOMA, REMISSION STATUS UNSPECIFIED (HCC): ICD-10-CM

## 2023-08-21 DIAGNOSIS — Z51.11 ENCOUNTER FOR ANTINEOPLASTIC CHEMOTHERAPY: ICD-10-CM

## 2023-08-21 LAB
BASO+EOS+MONOS # BLD AUTO: 0.4 K/UL (ref 0.2–1.2)
BASO+EOS+MONOS NFR BLD AUTO: 11 % (ref 3.2–16.9)
DIFFERENTIAL METHOD BLD: ABNORMAL
ERYTHROCYTE [DISTWIDTH] IN BLOOD BY AUTOMATED COUNT: 19.5 % (ref 11.8–15.8)
HCT VFR BLD AUTO: 35.2 % (ref 36.6–50.3)
HGB BLD-MCNC: 11.3 G/DL (ref 12.1–17)
LYMPHOCYTES # BLD: 0.4 K/UL (ref 0.8–3.5)
LYMPHOCYTES NFR BLD: 12 % (ref 12–49)
MCH RBC QN AUTO: 33.3 PG (ref 26–34)
MCHC RBC AUTO-ENTMCNC: 32.1 G/DL (ref 30–36.5)
MCV RBC AUTO: 103.8 FL (ref 80–99)
NEUTS SEG # BLD: 2.6 K/UL (ref 1.8–8)
NEUTS SEG NFR BLD: 77 % (ref 32–75)
PLATELET # BLD AUTO: 210 K/UL (ref 150–400)
RBC # BLD AUTO: 3.39 M/UL (ref 4.1–5.7)
WBC # BLD AUTO: 3.4 K/UL (ref 4.1–11.1)

## 2023-08-21 PROCEDURE — 36415 COLL VENOUS BLD VENIPUNCTURE: CPT

## 2023-08-21 PROCEDURE — 96361 HYDRATE IV INFUSION ADD-ON: CPT

## 2023-08-21 PROCEDURE — 85025 COMPLETE CBC W/AUTO DIFF WBC: CPT

## 2023-08-21 PROCEDURE — 3079F DIAST BP 80-89 MM HG: CPT | Performed by: INTERNAL MEDICINE

## 2023-08-21 PROCEDURE — 99215 OFFICE O/P EST HI 40 MIN: CPT | Performed by: INTERNAL MEDICINE

## 2023-08-21 PROCEDURE — 36593 DECLOT VASCULAR DEVICE: CPT

## 2023-08-21 PROCEDURE — 96413 CHEMO IV INFUSION 1 HR: CPT

## 2023-08-21 PROCEDURE — 6370000000 HC RX 637 (ALT 250 FOR IP): Performed by: INTERNAL MEDICINE

## 2023-08-21 PROCEDURE — 96375 TX/PRO/DX INJ NEW DRUG ADDON: CPT

## 2023-08-21 PROCEDURE — 3077F SYST BP >= 140 MM HG: CPT | Performed by: INTERNAL MEDICINE

## 2023-08-21 PROCEDURE — A4216 STERILE WATER/SALINE, 10 ML: HCPCS | Performed by: INTERNAL MEDICINE

## 2023-08-21 PROCEDURE — 2500000003 HC RX 250 WO HCPCS: Performed by: INTERNAL MEDICINE

## 2023-08-21 PROCEDURE — 1123F ACP DISCUSS/DSCN MKR DOCD: CPT | Performed by: INTERNAL MEDICINE

## 2023-08-21 PROCEDURE — 6360000002 HC RX W HCPCS: Performed by: INTERNAL MEDICINE

## 2023-08-21 PROCEDURE — 2580000003 HC RX 258: Performed by: INTERNAL MEDICINE

## 2023-08-21 PROCEDURE — 96417 CHEMO IV INFUS EACH ADDL SEQ: CPT

## 2023-08-21 RX ORDER — DIPHENHYDRAMINE HYDROCHLORIDE 50 MG/ML
25 INJECTION INTRAMUSCULAR; INTRAVENOUS ONCE
Status: COMPLETED | OUTPATIENT
Start: 2023-08-21 | End: 2023-08-21

## 2023-08-21 RX ORDER — ACETAMINOPHEN 325 MG/1
650 TABLET ORAL ONCE
Status: COMPLETED | OUTPATIENT
Start: 2023-08-21 | End: 2023-08-21

## 2023-08-21 RX ORDER — DEXTROSE MONOHYDRATE 50 MG/ML
5-250 INJECTION, SOLUTION INTRAVENOUS PRN
Status: DISCONTINUED | OUTPATIENT
Start: 2023-08-21 | End: 2023-08-22 | Stop reason: HOSPADM

## 2023-08-21 RX ORDER — TENOFOVIR DISOPROXIL FUMARATE 300 MG/1
TABLET, FILM COATED ORAL
Qty: 30 TABLET | Refills: 3 | Status: ACTIVE | OUTPATIENT
Start: 2023-08-21

## 2023-08-21 RX ORDER — SODIUM CHLORIDE 0.9 % (FLUSH) 0.9 %
5-40 SYRINGE (ML) INJECTION PRN
Status: DISCONTINUED | OUTPATIENT
Start: 2023-08-21 | End: 2023-08-22 | Stop reason: HOSPADM

## 2023-08-21 RX ADMIN — DIPHENHYDRAMINE HYDROCHLORIDE 25 MG: 50 INJECTION, SOLUTION INTRAMUSCULAR; INTRAVENOUS at 14:02

## 2023-08-21 RX ADMIN — DEXTROSE MONOHYDRATE 25 ML/HR: 50 INJECTION, SOLUTION INTRAVENOUS at 13:53

## 2023-08-21 RX ADMIN — SODIUM CHLORIDE 150 MG: 9 INJECTION, SOLUTION INTRAVENOUS at 14:09

## 2023-08-21 RX ADMIN — ACETAMINOPHEN 650 MG: 325 TABLET ORAL at 13:54

## 2023-08-21 RX ADMIN — SODIUM CHLORIDE 855 MG: 9 INJECTION, SOLUTION INTRAVENOUS at 14:56

## 2023-08-21 RX ADMIN — FAMOTIDINE 20 MG: 10 INJECTION, SOLUTION INTRAVENOUS at 13:58

## 2023-08-21 RX ADMIN — CARFILZOMIB 40.6 MG: 10 INJECTION, POWDER, LYOPHILIZED, FOR SOLUTION INTRAVENOUS at 16:20

## 2023-08-21 RX ADMIN — WATER 2 MG: 1 INJECTION INTRAMUSCULAR; INTRAVENOUS; SUBCUTANEOUS at 11:57

## 2023-08-21 NOTE — PROGRESS NOTES
Macrina Aguilar. is a 74 y.o. male here for treatment for Multiple Myeloma. -9 lbs since last visit. Moderate numbness/tingling. Level 5 neck pain. Nothing new to report. 1. Have you been to the ER, urgent care clinic since your last visit? Hospitalized since your last visit?    no    2. Have you seen or consulted any other health care providers outside of the [address] [place] [address] since your last visit? Include any pap smears or colon screening.    DR Guerita Monteiro
08/07/2023    GLOB 2.8 08/07/2023       M-protein: 1 > 0.4            Assessment:        1. Multiple Myeloma, IgA Kappa    Durie Davey stage IIIB      Cytogenetics/FISH: t(14;16) - high risk   ECOG PS - 0    Intent of therapy: palliative      This is a life threatening illness      Received 3 cycles of RVd    Dose reduced Revlimid and Velcade d/t continued cytopenias. M-spike started rising. He received one cycle of VD-PACE (bortezomib, dexamethasone, thalidomide, cisplatin, adriamycin, cyclophosphamide, and etoposide). Achieved DE with once cycle      Receivied KPd - s/p 5 cycles   S/p tandem autotransplant     First on 2/28/2018    2nd on 06/13/2018      Achieved CR after the first transplant. Repeat bone marrow @ [facility] - Complete response. Took Ixazomib briefly   Patient prefered to switch therapy to Pomalyst.       Pomalyst 2 mg daily - started 11/19/2018 - 05/2023      M protein has come up to 1.0 gm/dl   Light chain ratio is also rising. No anemia. Creatinine stable. Myeloma has relapsed   D/C Pomalyst   Bone marrow - 35% myeloma     Isatuximab/Carfilzomib/Dex - cycle 3 week 15    Blood counts are acceptable. M protein level is coming down. Results reviewed with the patient. Symptom management form reviewed with patient. Anxiety is less. Jimmey Catching is better. A detailed system by system evaluation of side effect was performed to assess adverse events. Blood counts are acceptable. Results reviewed with the patient    Chemotherapy and immunotherapy administration is associated with myriad of side effects and would be considered high risk medication. 2. Type 2 DM with complication      Managed by Endocrine         3. Chronic Hep C      In sustained virological   Tenofovir  I asked him to make an appt with Hepatology at [facility]      4. Depression, anxiety    Psychiatry appt is still pending             Plan:          > Continue systemic therapy.    > Zometa on hold for root canal    >

## 2023-08-25 ENCOUNTER — TELEPHONE (OUTPATIENT)
Age: 68
End: 2023-08-25

## 2023-08-25 RX ORDER — ONDANSETRON 2 MG/ML
8 INJECTION INTRAMUSCULAR; INTRAVENOUS
Status: CANCELLED | OUTPATIENT
Start: 2023-09-18

## 2023-08-25 RX ORDER — MEPERIDINE HYDROCHLORIDE 25 MG/ML
12.5 INJECTION INTRAMUSCULAR; INTRAVENOUS; SUBCUTANEOUS PRN
Status: CANCELLED | OUTPATIENT
Start: 2023-09-11

## 2023-08-25 RX ORDER — SODIUM CHLORIDE 9 MG/ML
INJECTION, SOLUTION INTRAVENOUS CONTINUOUS
Status: CANCELLED | OUTPATIENT
Start: 2023-09-05

## 2023-08-25 RX ORDER — SODIUM CHLORIDE 9 MG/ML
INJECTION, SOLUTION INTRAVENOUS CONTINUOUS
Status: CANCELLED | OUTPATIENT
Start: 2023-09-18

## 2023-08-25 RX ORDER — ALBUTEROL SULFATE 90 UG/1
4 AEROSOL, METERED RESPIRATORY (INHALATION) PRN
Status: CANCELLED | OUTPATIENT
Start: 2023-09-18

## 2023-08-25 RX ORDER — ALBUTEROL SULFATE 90 UG/1
4 AEROSOL, METERED RESPIRATORY (INHALATION) PRN
Status: CANCELLED | OUTPATIENT
Start: 2023-09-05

## 2023-08-25 RX ORDER — DIPHENHYDRAMINE HYDROCHLORIDE 50 MG/ML
50 INJECTION INTRAMUSCULAR; INTRAVENOUS
Status: CANCELLED | OUTPATIENT
Start: 2023-09-18

## 2023-08-25 RX ORDER — MEPERIDINE HYDROCHLORIDE 25 MG/ML
12.5 INJECTION INTRAMUSCULAR; INTRAVENOUS; SUBCUTANEOUS PRN
Status: CANCELLED | OUTPATIENT
Start: 2023-09-05

## 2023-08-25 RX ORDER — ACETAMINOPHEN 325 MG/1
650 TABLET ORAL
Status: CANCELLED | OUTPATIENT
Start: 2023-09-11

## 2023-08-25 RX ORDER — MEPERIDINE HYDROCHLORIDE 25 MG/ML
12.5 INJECTION INTRAMUSCULAR; INTRAVENOUS; SUBCUTANEOUS PRN
Status: CANCELLED | OUTPATIENT
Start: 2023-09-18

## 2023-08-25 RX ORDER — ACETAMINOPHEN 325 MG/1
650 TABLET ORAL ONCE
Status: CANCELLED | OUTPATIENT
Start: 2023-09-18 | End: 2023-09-18

## 2023-08-25 RX ORDER — ONDANSETRON 2 MG/ML
8 INJECTION INTRAMUSCULAR; INTRAVENOUS
Status: CANCELLED | OUTPATIENT
Start: 2023-09-11

## 2023-08-25 RX ORDER — SODIUM CHLORIDE 9 MG/ML
INJECTION, SOLUTION INTRAVENOUS CONTINUOUS
Status: CANCELLED | OUTPATIENT
Start: 2023-09-11

## 2023-08-25 RX ORDER — DEXTROSE MONOHYDRATE 50 MG/ML
5-250 INJECTION, SOLUTION INTRAVENOUS PRN
Status: CANCELLED | OUTPATIENT
Start: 2023-09-11

## 2023-08-25 RX ORDER — EPINEPHRINE 1 MG/ML
0.3 INJECTION, SOLUTION, CONCENTRATE INTRAVENOUS PRN
Status: CANCELLED | OUTPATIENT
Start: 2023-09-05

## 2023-08-25 RX ORDER — ALBUTEROL SULFATE 90 UG/1
4 AEROSOL, METERED RESPIRATORY (INHALATION) PRN
Status: CANCELLED | OUTPATIENT
Start: 2023-09-11

## 2023-08-25 RX ORDER — DIPHENHYDRAMINE HYDROCHLORIDE 50 MG/ML
50 INJECTION INTRAMUSCULAR; INTRAVENOUS
Status: CANCELLED | OUTPATIENT
Start: 2023-09-11

## 2023-08-25 RX ORDER — SODIUM CHLORIDE 9 MG/ML
5-250 INJECTION, SOLUTION INTRAVENOUS PRN
Status: CANCELLED | OUTPATIENT
Start: 2023-09-11

## 2023-08-25 RX ORDER — SODIUM CHLORIDE 0.9 % (FLUSH) 0.9 %
5-40 SYRINGE (ML) INJECTION PRN
Status: CANCELLED | OUTPATIENT
Start: 2023-09-18

## 2023-08-25 RX ORDER — SODIUM CHLORIDE 9 MG/ML
5-250 INJECTION, SOLUTION INTRAVENOUS PRN
Status: CANCELLED | OUTPATIENT
Start: 2023-09-05

## 2023-08-25 RX ORDER — ONDANSETRON 2 MG/ML
8 INJECTION INTRAMUSCULAR; INTRAVENOUS
Status: CANCELLED | OUTPATIENT
Start: 2023-09-05

## 2023-08-25 RX ORDER — EPINEPHRINE 1 MG/ML
0.3 INJECTION, SOLUTION, CONCENTRATE INTRAVENOUS PRN
Status: CANCELLED | OUTPATIENT
Start: 2023-09-18

## 2023-08-25 RX ORDER — DIPHENHYDRAMINE HYDROCHLORIDE 50 MG/ML
25 INJECTION INTRAMUSCULAR; INTRAVENOUS ONCE
Status: CANCELLED | OUTPATIENT
Start: 2023-09-18 | End: 2023-09-18

## 2023-08-25 RX ORDER — DIPHENHYDRAMINE HYDROCHLORIDE 50 MG/ML
50 INJECTION INTRAMUSCULAR; INTRAVENOUS
Status: CANCELLED | OUTPATIENT
Start: 2023-09-05

## 2023-08-25 RX ORDER — ACETAMINOPHEN 325 MG/1
650 TABLET ORAL
Status: CANCELLED | OUTPATIENT
Start: 2023-09-05

## 2023-08-25 RX ORDER — SODIUM CHLORIDE 0.9 % (FLUSH) 0.9 %
5-40 SYRINGE (ML) INJECTION PRN
Status: CANCELLED | OUTPATIENT
Start: 2023-09-11

## 2023-08-25 RX ORDER — SODIUM CHLORIDE 9 MG/ML
5-250 INJECTION, SOLUTION INTRAVENOUS PRN
Status: CANCELLED | OUTPATIENT
Start: 2023-09-18

## 2023-08-25 RX ORDER — HEPARIN 100 UNIT/ML
500 SYRINGE INTRAVENOUS PRN
Status: CANCELLED | OUTPATIENT
Start: 2023-09-11

## 2023-08-25 RX ORDER — EPINEPHRINE 1 MG/ML
0.3 INJECTION, SOLUTION, CONCENTRATE INTRAVENOUS PRN
Status: CANCELLED | OUTPATIENT
Start: 2023-09-11

## 2023-08-25 RX ORDER — DEXTROSE MONOHYDRATE 50 MG/ML
5-250 INJECTION, SOLUTION INTRAVENOUS PRN
Status: CANCELLED | OUTPATIENT
Start: 2023-09-18

## 2023-08-25 RX ORDER — HEPARIN 100 UNIT/ML
500 SYRINGE INTRAVENOUS PRN
Status: CANCELLED | OUTPATIENT
Start: 2023-09-18

## 2023-08-25 RX ORDER — ACETAMINOPHEN 325 MG/1
650 TABLET ORAL
Status: CANCELLED | OUTPATIENT
Start: 2023-09-18

## 2023-08-25 RX ORDER — HEPARIN 100 UNIT/ML
500 SYRINGE INTRAVENOUS PRN
Status: CANCELLED | OUTPATIENT
Start: 2023-09-05

## 2023-08-25 NOTE — TELEPHONE ENCOUNTER
Returned call to pt,  HIPAA verified by two patient identifiers. He is aware we skipped day 8 due to his daughter's wedding. And proceeded with day 15 on 8/21. Off next week and back on 9/5.

## 2023-09-05 ENCOUNTER — HOSPITAL ENCOUNTER (OUTPATIENT)
Facility: HOSPITAL | Age: 68
Setting detail: INFUSION SERIES
End: 2023-09-05
Payer: MEDICARE

## 2023-09-05 VITALS
RESPIRATION RATE: 16 BRPM | TEMPERATURE: 97.6 F | WEIGHT: 187.9 LBS | SYSTOLIC BLOOD PRESSURE: 123 MMHG | OXYGEN SATURATION: 98 % | BODY MASS INDEX: 28.48 KG/M2 | HEART RATE: 74 BPM | HEIGHT: 68 IN | DIASTOLIC BLOOD PRESSURE: 77 MMHG

## 2023-09-05 DIAGNOSIS — Z11.59 ENCOUNTER FOR SCREENING FOR OTHER VIRAL DISEASES: Primary | ICD-10-CM

## 2023-09-05 DIAGNOSIS — C90.00 MULTIPLE MYELOMA, REMISSION STATUS UNSPECIFIED (HCC): ICD-10-CM

## 2023-09-05 LAB
ALBUMIN SERPL-MCNC: 3.5 G/DL (ref 3.5–5)
ALBUMIN/GLOB SERPL: 1 (ref 1.1–2.2)
ALP SERPL-CCNC: 90 U/L (ref 45–117)
ALT SERPL-CCNC: 60 U/L (ref 12–78)
ANION GAP SERPL CALC-SCNC: 4 MMOL/L (ref 5–15)
AST SERPL-CCNC: 28 U/L (ref 15–37)
BASOPHILS # BLD: 0 K/UL (ref 0–0.1)
BASOPHILS NFR BLD: 0 % (ref 0–1)
BILIRUB SERPL-MCNC: 0.2 MG/DL (ref 0.2–1)
BUN SERPL-MCNC: 16 MG/DL (ref 6–20)
BUN/CREAT SERPL: 11 (ref 12–20)
CALCIUM SERPL-MCNC: 8.6 MG/DL (ref 8.5–10.1)
CHLORIDE SERPL-SCNC: 106 MMOL/L (ref 97–108)
CO2 SERPL-SCNC: 28 MMOL/L (ref 21–32)
CREAT SERPL-MCNC: 1.48 MG/DL (ref 0.7–1.3)
DIFFERENTIAL METHOD BLD: ABNORMAL
EOSINOPHIL # BLD: 0 K/UL (ref 0–0.4)
EOSINOPHIL NFR BLD: 0 % (ref 0–7)
ERYTHROCYTE [DISTWIDTH] IN BLOOD BY AUTOMATED COUNT: 15.9 % (ref 11.5–14.5)
GLOBULIN SER CALC-MCNC: 3.4 G/DL (ref 2–4)
GLUCOSE SERPL-MCNC: 138 MG/DL (ref 65–100)
HCT VFR BLD AUTO: 33.6 % (ref 36.6–50.3)
HGB BLD-MCNC: 10.7 G/DL (ref 12.1–17)
IMM GRANULOCYTES # BLD AUTO: 0 K/UL (ref 0–0.04)
IMM GRANULOCYTES NFR BLD AUTO: 1 % (ref 0–0.5)
LYMPHOCYTES # BLD: 0.5 K/UL (ref 0.8–3.5)
LYMPHOCYTES NFR BLD: 14 % (ref 12–49)
MAGNESIUM SERPL-MCNC: 2.1 MG/DL (ref 1.6–2.4)
MCH RBC QN AUTO: 33.1 PG (ref 26–34)
MCHC RBC AUTO-ENTMCNC: 31.8 G/DL (ref 30–36.5)
MCV RBC AUTO: 104 FL (ref 80–99)
MONOCYTES # BLD: 0.6 K/UL (ref 0–1)
MONOCYTES NFR BLD: 18 % (ref 5–13)
NEUTS SEG # BLD: 2.3 K/UL (ref 1.8–8)
NEUTS SEG NFR BLD: 67 % (ref 32–75)
NRBC # BLD: 0 K/UL (ref 0–0.01)
NRBC BLD-RTO: 0 PER 100 WBC
PHOSPHATE SERPL-MCNC: 3.3 MG/DL (ref 2.6–4.7)
PLATELET # BLD AUTO: 138 K/UL (ref 150–400)
PMV BLD AUTO: 11.8 FL (ref 8.9–12.9)
POTASSIUM SERPL-SCNC: 4.4 MMOL/L (ref 3.5–5.1)
PROT SERPL-MCNC: 6.9 G/DL (ref 6.4–8.2)
RBC # BLD AUTO: 3.23 M/UL (ref 4.1–5.7)
SODIUM SERPL-SCNC: 138 MMOL/L (ref 136–145)
WBC # BLD AUTO: 3.5 K/UL (ref 4.1–11.1)

## 2023-09-05 PROCEDURE — 2580000003 HC RX 258: Performed by: INTERNAL MEDICINE

## 2023-09-05 PROCEDURE — 36415 COLL VENOUS BLD VENIPUNCTURE: CPT

## 2023-09-05 PROCEDURE — 96413 CHEMO IV INFUSION 1 HR: CPT

## 2023-09-05 PROCEDURE — 96375 TX/PRO/DX INJ NEW DRUG ADDON: CPT

## 2023-09-05 PROCEDURE — 83521 IG LIGHT CHAINS FREE EACH: CPT

## 2023-09-05 PROCEDURE — A4216 STERILE WATER/SALINE, 10 ML: HCPCS | Performed by: INTERNAL MEDICINE

## 2023-09-05 PROCEDURE — 96417 CHEMO IV INFUS EACH ADDL SEQ: CPT

## 2023-09-05 PROCEDURE — 6360000002 HC RX W HCPCS: Performed by: INTERNAL MEDICINE

## 2023-09-05 PROCEDURE — 83735 ASSAY OF MAGNESIUM: CPT

## 2023-09-05 PROCEDURE — 84165 PROTEIN E-PHORESIS SERUM: CPT

## 2023-09-05 PROCEDURE — 84100 ASSAY OF PHOSPHORUS: CPT

## 2023-09-05 PROCEDURE — 85025 COMPLETE CBC W/AUTO DIFF WBC: CPT

## 2023-09-05 PROCEDURE — 80053 COMPREHEN METABOLIC PANEL: CPT

## 2023-09-05 PROCEDURE — 6370000000 HC RX 637 (ALT 250 FOR IP): Performed by: INTERNAL MEDICINE

## 2023-09-05 PROCEDURE — 86334 IMMUNOFIX E-PHORESIS SERUM: CPT

## 2023-09-05 PROCEDURE — 82784 ASSAY IGA/IGD/IGG/IGM EACH: CPT

## 2023-09-05 PROCEDURE — 2500000003 HC RX 250 WO HCPCS: Performed by: INTERNAL MEDICINE

## 2023-09-05 RX ORDER — DEXTROSE MONOHYDRATE 50 MG/ML
5-250 INJECTION, SOLUTION INTRAVENOUS PRN
Status: DISCONTINUED | OUTPATIENT
Start: 2023-09-05 | End: 2023-09-06 | Stop reason: HOSPADM

## 2023-09-05 RX ORDER — ACETAMINOPHEN 325 MG/1
650 TABLET ORAL ONCE
Status: COMPLETED | OUTPATIENT
Start: 2023-09-05 | End: 2023-09-05

## 2023-09-05 RX ORDER — SODIUM CHLORIDE 0.9 % (FLUSH) 0.9 %
5-40 SYRINGE (ML) INJECTION PRN
Status: DISCONTINUED | OUTPATIENT
Start: 2023-09-05 | End: 2023-09-06 | Stop reason: HOSPADM

## 2023-09-05 RX ORDER — DIPHENHYDRAMINE HYDROCHLORIDE 50 MG/ML
25 INJECTION INTRAMUSCULAR; INTRAVENOUS ONCE
Status: COMPLETED | OUTPATIENT
Start: 2023-09-05 | End: 2023-09-05

## 2023-09-05 RX ADMIN — DEXTROSE MONOHYDRATE 50 ML/HR: 50 INJECTION, SOLUTION INTRAVENOUS at 13:34

## 2023-09-05 RX ADMIN — FAMOTIDINE 20 MG: 10 INJECTION, SOLUTION INTRAVENOUS at 11:36

## 2023-09-05 RX ADMIN — CARFILZOMIB 40.6 MG: 10 INJECTION, POWDER, LYOPHILIZED, FOR SOLUTION INTRAVENOUS at 13:36

## 2023-09-05 RX ADMIN — SODIUM CHLORIDE, PRESERVATIVE FREE 10 ML: 5 INJECTION INTRAVENOUS at 14:14

## 2023-09-05 RX ADMIN — DIPHENHYDRAMINE HYDROCHLORIDE 25 MG: 50 INJECTION INTRAMUSCULAR; INTRAVENOUS at 11:37

## 2023-09-05 RX ADMIN — ACETAMINOPHEN 650 MG: 325 TABLET ORAL at 11:35

## 2023-09-05 RX ADMIN — SODIUM CHLORIDE 150 MG: 9 INJECTION, SOLUTION INTRAVENOUS at 11:41

## 2023-09-05 RX ADMIN — SODIUM CHLORIDE 855 MG: 9 INJECTION, SOLUTION INTRAVENOUS at 12:09

## 2023-09-05 ASSESSMENT — PAIN DESCRIPTION - DESCRIPTORS: DESCRIPTORS: ACHING

## 2023-09-05 ASSESSMENT — PAIN SCALES - GENERAL: PAINLEVEL_OUTOF10: 5

## 2023-09-05 ASSESSMENT — PAIN DESCRIPTION - LOCATION: LOCATION: NECK

## 2023-09-08 ENCOUNTER — TELEPHONE (OUTPATIENT)
Age: 68
End: 2023-09-08

## 2023-09-08 NOTE — TELEPHONE ENCOUNTER
Called and spoke with Mr. Ca Muir. Is taking his glucose levels multiple times a day and has been noticing that his sugars go up and then back down after steroids. Explained that this is a normal occurrence but he thinks that the \"on / off\" is faster when getting IV. Explained that there should not be a difference, but patient is adamant that there is. stated that we can change his steroids from PO to IV but he will STILL need to take PO steroids the week he does not come to the infusion center. Stated understanding and agreed on plan of action. Did state to the patient that he is doing well on this current regimen and needs to get in with a PCP or Endocrinologist. Prior endo was Dr. Kiara Vernon who has not left [facility]. Asked that he call his replacement and did agree to do that next week. Self increased metformin from 500mg BID to 1000mg in AM and 500mg in PM. Stated that this is okay, but we very much need endocrinology on board to help us manage his glucose levels. Currently running in the low 200s but blood glucose levels we obtain in [facility] are running low 100s. Reviewed s/s of hyperglycemia and patient verbalized understanding.      For Pharmacy Admin Tracking Only    Program: Medical Group  CPA in place:  Yes  Time Spent (min): 30

## 2023-09-08 NOTE — TELEPHONE ENCOUNTER
Patient added additional info to message. He said he has increased his metformin to 3 pills instead of his normal 2, pills to help get his sugar down.

## 2023-09-08 NOTE — TELEPHONE ENCOUNTER
Patient called asking if he can go back to taking dexamethasone intravenously because the pill is running his sugar up for a week a time stays in 220 to 240, as to where when he did it intravenously it would only run high a day or two. Please call.

## 2023-09-09 ENCOUNTER — APPOINTMENT (OUTPATIENT)
Facility: HOSPITAL | Age: 68
End: 2023-09-09
Payer: MEDICARE

## 2023-09-09 ENCOUNTER — HOSPITAL ENCOUNTER (OUTPATIENT)
Facility: HOSPITAL | Age: 68
Setting detail: OBSERVATION
Discharge: HOME | End: 2023-09-10
Attending: STUDENT IN AN ORGANIZED HEALTH CARE EDUCATION/TRAINING PROGRAM | Admitting: INTERNAL MEDICINE
Payer: MEDICARE

## 2023-09-09 DIAGNOSIS — R00.2 PALPITATIONS: ICD-10-CM

## 2023-09-09 DIAGNOSIS — E86.0 DEHYDRATION: Primary | ICD-10-CM

## 2023-09-09 PROBLEM — R00.0 TACHYCARDIA: Status: ACTIVE | Noted: 2023-09-09

## 2023-09-09 LAB
ALBUMIN SERPL-MCNC: 3.6 G/DL (ref 3.5–5)
ALBUMIN/GLOB SERPL: 1.1 (ref 1.1–2.2)
ALP SERPL-CCNC: 68 U/L (ref 45–117)
ALT SERPL-CCNC: 40 U/L (ref 12–78)
ANION GAP SERPL CALC-SCNC: 5 MMOL/L (ref 5–15)
APPEARANCE UR: CLEAR
AST SERPL-CCNC: 14 U/L (ref 15–37)
BACTERIA URNS QL MICRO: NEGATIVE /HPF
BASOPHILS # BLD: 0 K/UL (ref 0–0.1)
BASOPHILS NFR BLD: 0 % (ref 0–1)
BILIRUB SERPL-MCNC: 0.8 MG/DL (ref 0.2–1)
BILIRUB UR QL: NEGATIVE
BUN SERPL-MCNC: 21 MG/DL (ref 6–20)
BUN/CREAT SERPL: 14 (ref 12–20)
CALCIUM SERPL-MCNC: 9.4 MG/DL (ref 8.5–10.1)
CHLORIDE SERPL-SCNC: 104 MMOL/L (ref 97–108)
CO2 SERPL-SCNC: 29 MMOL/L (ref 21–32)
COLOR UR: NORMAL
CREAT SERPL-MCNC: 1.55 MG/DL (ref 0.7–1.3)
D DIMER PPP FEU-MCNC: 0.36 MG/L FEU (ref 0–0.65)
DIFFERENTIAL METHOD BLD: ABNORMAL
EOSINOPHIL # BLD: 0 K/UL (ref 0–0.4)
EOSINOPHIL NFR BLD: 0 % (ref 0–7)
EPITH CASTS URNS QL MICRO: NORMAL /LPF
ERYTHROCYTE [DISTWIDTH] IN BLOOD BY AUTOMATED COUNT: 15 % (ref 11.5–14.5)
GLOBULIN SER CALC-MCNC: 3.4 G/DL (ref 2–4)
GLUCOSE BLD STRIP.AUTO-MCNC: 154 MG/DL (ref 65–117)
GLUCOSE SERPL-MCNC: 197 MG/DL (ref 65–100)
GLUCOSE UR STRIP.AUTO-MCNC: NEGATIVE MG/DL
HCT VFR BLD AUTO: 35.2 % (ref 36.6–50.3)
HGB BLD-MCNC: 11.6 G/DL (ref 12.1–17)
HGB UR QL STRIP: NEGATIVE
HYALINE CASTS URNS QL MICRO: NORMAL /LPF (ref 0–2)
IMM GRANULOCYTES # BLD AUTO: 0 K/UL (ref 0–0.04)
IMM GRANULOCYTES NFR BLD AUTO: 0 % (ref 0–0.5)
KETONES UR QL STRIP.AUTO: NEGATIVE MG/DL
LACTATE BLD-SCNC: 0.88 MMOL/L (ref 0.4–2)
LEUKOCYTE ESTERASE UR QL STRIP.AUTO: NEGATIVE
LYMPHOCYTES # BLD: 0.6 K/UL (ref 0.8–3.5)
LYMPHOCYTES NFR BLD: 15 % (ref 12–49)
MCH RBC QN AUTO: 33.6 PG (ref 26–34)
MCHC RBC AUTO-ENTMCNC: 33 G/DL (ref 30–36.5)
MCV RBC AUTO: 102 FL (ref 80–99)
MONOCYTES # BLD: 0.6 K/UL (ref 0–1)
MONOCYTES NFR BLD: 14 % (ref 5–13)
NEUTS SEG # BLD: 2.9 K/UL (ref 1.8–8)
NEUTS SEG NFR BLD: 71 % (ref 32–75)
NITRITE UR QL STRIP.AUTO: NEGATIVE
NRBC # BLD: 0 K/UL (ref 0–0.01)
NRBC BLD-RTO: 0 PER 100 WBC
PH UR STRIP: 6.5 (ref 5–8)
PLATELET # BLD AUTO: 153 K/UL (ref 150–400)
PMV BLD AUTO: 11.8 FL (ref 8.9–12.9)
POTASSIUM SERPL-SCNC: 4.2 MMOL/L (ref 3.5–5.1)
PROT SERPL-MCNC: 7 G/DL (ref 6.4–8.2)
PROT UR STRIP-MCNC: NEGATIVE MG/DL
RBC # BLD AUTO: 3.45 M/UL (ref 4.1–5.7)
RBC #/AREA URNS HPF: NORMAL /HPF (ref 0–5)
RBC MORPH BLD: ABNORMAL
SERVICE CMNT-IMP: ABNORMAL
SODIUM SERPL-SCNC: 138 MMOL/L (ref 136–145)
SP GR UR REFRACTOMETRY: 1.01
TROPONIN I SERPL HS-MCNC: 8 NG/L (ref 0–76)
TROPONIN I SERPL HS-MCNC: 8 NG/L (ref 0–76)
TSH SERPL DL<=0.05 MIU/L-ACNC: 0.84 UIU/ML (ref 0.36–3.74)
URINE CULTURE IF INDICATED: NORMAL
UROBILINOGEN UR QL STRIP.AUTO: 0.2 EU/DL (ref 0.2–1)
WBC # BLD AUTO: 4.1 K/UL (ref 4.1–11.1)
WBC URNS QL MICRO: NORMAL /HPF (ref 0–4)

## 2023-09-09 PROCEDURE — 85379 FIBRIN DEGRADATION QUANT: CPT

## 2023-09-09 PROCEDURE — 85025 COMPLETE CBC W/AUTO DIFF WBC: CPT

## 2023-09-09 PROCEDURE — 84443 ASSAY THYROID STIM HORMONE: CPT

## 2023-09-09 PROCEDURE — 2580000003 HC RX 258: Performed by: INTERNAL MEDICINE

## 2023-09-09 PROCEDURE — G0378 HOSPITAL OBSERVATION PER HR: HCPCS

## 2023-09-09 PROCEDURE — 81001 URINALYSIS AUTO W/SCOPE: CPT

## 2023-09-09 PROCEDURE — 96360 HYDRATION IV INFUSION INIT: CPT

## 2023-09-09 PROCEDURE — 93005 ELECTROCARDIOGRAM TRACING: CPT | Performed by: STUDENT IN AN ORGANIZED HEALTH CARE EDUCATION/TRAINING PROGRAM

## 2023-09-09 PROCEDURE — 99285 EMERGENCY DEPT VISIT HI MDM: CPT

## 2023-09-09 PROCEDURE — 71046 X-RAY EXAM CHEST 2 VIEWS: CPT

## 2023-09-09 PROCEDURE — 83605 ASSAY OF LACTIC ACID: CPT

## 2023-09-09 PROCEDURE — 36415 COLL VENOUS BLD VENIPUNCTURE: CPT

## 2023-09-09 PROCEDURE — 87040 BLOOD CULTURE FOR BACTERIA: CPT

## 2023-09-09 PROCEDURE — 96361 HYDRATE IV INFUSION ADD-ON: CPT

## 2023-09-09 PROCEDURE — 80053 COMPREHEN METABOLIC PANEL: CPT

## 2023-09-09 PROCEDURE — 2580000003 HC RX 258: Performed by: STUDENT IN AN ORGANIZED HEALTH CARE EDUCATION/TRAINING PROGRAM

## 2023-09-09 PROCEDURE — 82962 GLUCOSE BLOOD TEST: CPT

## 2023-09-09 PROCEDURE — 84484 ASSAY OF TROPONIN QUANT: CPT

## 2023-09-09 RX ORDER — SODIUM CHLORIDE 0.9 % (FLUSH) 0.9 %
5-40 SYRINGE (ML) INJECTION PRN
Status: DISCONTINUED | OUTPATIENT
Start: 2023-09-09 | End: 2023-09-10 | Stop reason: HOSPADM

## 2023-09-09 RX ORDER — ONDANSETRON 2 MG/ML
4 INJECTION INTRAMUSCULAR; INTRAVENOUS EVERY 4 HOURS PRN
Status: DISCONTINUED | OUTPATIENT
Start: 2023-09-09 | End: 2023-09-09

## 2023-09-09 RX ORDER — ASPIRIN 81 MG/1
81 TABLET ORAL DAILY
Status: DISCONTINUED | OUTPATIENT
Start: 2023-09-10 | End: 2023-09-10 | Stop reason: HOSPADM

## 2023-09-09 RX ORDER — ACETAMINOPHEN 325 MG/1
650 TABLET ORAL EVERY 6 HOURS PRN
Status: DISCONTINUED | OUTPATIENT
Start: 2023-09-09 | End: 2023-09-10 | Stop reason: HOSPADM

## 2023-09-09 RX ORDER — CHLORPROMAZINE HYDROCHLORIDE 25 MG/1
25 TABLET, FILM COATED ORAL 3 TIMES DAILY PRN
Status: DISCONTINUED | OUTPATIENT
Start: 2023-09-09 | End: 2023-09-10 | Stop reason: HOSPADM

## 2023-09-09 RX ORDER — PANTOPRAZOLE SODIUM 40 MG/1
40 TABLET, DELAYED RELEASE ORAL
Status: DISCONTINUED | OUTPATIENT
Start: 2023-09-10 | End: 2023-09-10 | Stop reason: HOSPADM

## 2023-09-09 RX ORDER — ACETAMINOPHEN 650 MG/1
650 SUPPOSITORY RECTAL EVERY 6 HOURS PRN
Status: DISCONTINUED | OUTPATIENT
Start: 2023-09-09 | End: 2023-09-10 | Stop reason: HOSPADM

## 2023-09-09 RX ORDER — INSULIN LISPRO 100 [IU]/ML
0-8 INJECTION, SOLUTION INTRAVENOUS; SUBCUTANEOUS
Status: DISCONTINUED | OUTPATIENT
Start: 2023-09-10 | End: 2023-09-10 | Stop reason: HOSPADM

## 2023-09-09 RX ORDER — 0.9 % SODIUM CHLORIDE 0.9 %
1000 INTRAVENOUS SOLUTION INTRAVENOUS ONCE
Status: COMPLETED | OUTPATIENT
Start: 2023-09-09 | End: 2023-09-09

## 2023-09-09 RX ORDER — SODIUM CHLORIDE 9 MG/ML
INJECTION, SOLUTION INTRAVENOUS PRN
Status: DISCONTINUED | OUTPATIENT
Start: 2023-09-09 | End: 2023-09-10 | Stop reason: HOSPADM

## 2023-09-09 RX ORDER — TAMSULOSIN HYDROCHLORIDE 0.4 MG/1
0.4 CAPSULE ORAL DAILY
Status: DISCONTINUED | OUTPATIENT
Start: 2023-09-10 | End: 2023-09-10 | Stop reason: HOSPADM

## 2023-09-09 RX ORDER — SODIUM CHLORIDE 9 MG/ML
INJECTION, SOLUTION INTRAVENOUS CONTINUOUS
Status: DISCONTINUED | OUTPATIENT
Start: 2023-09-09 | End: 2023-09-10 | Stop reason: HOSPADM

## 2023-09-09 RX ORDER — DEXTROSE MONOHYDRATE 100 MG/ML
INJECTION, SOLUTION INTRAVENOUS CONTINUOUS PRN
Status: DISCONTINUED | OUTPATIENT
Start: 2023-09-09 | End: 2023-09-10 | Stop reason: HOSPADM

## 2023-09-09 RX ORDER — ONDANSETRON 2 MG/ML
4 INJECTION INTRAMUSCULAR; INTRAVENOUS EVERY 6 HOURS PRN
Status: DISCONTINUED | OUTPATIENT
Start: 2023-09-09 | End: 2023-09-10 | Stop reason: HOSPADM

## 2023-09-09 RX ORDER — ONDANSETRON 4 MG/1
4 TABLET, ORALLY DISINTEGRATING ORAL EVERY 8 HOURS PRN
Status: DISCONTINUED | OUTPATIENT
Start: 2023-09-09 | End: 2023-09-10 | Stop reason: HOSPADM

## 2023-09-09 RX ORDER — ACETAMINOPHEN 325 MG/1
650 TABLET ORAL EVERY 4 HOURS PRN
Status: DISCONTINUED | OUTPATIENT
Start: 2023-09-09 | End: 2023-09-09

## 2023-09-09 RX ORDER — INSULIN LISPRO 100 [IU]/ML
0-4 INJECTION, SOLUTION INTRAVENOUS; SUBCUTANEOUS NIGHTLY
Status: DISCONTINUED | OUTPATIENT
Start: 2023-09-09 | End: 2023-09-10 | Stop reason: HOSPADM

## 2023-09-09 RX ORDER — SODIUM CHLORIDE 0.9 % (FLUSH) 0.9 %
5-40 SYRINGE (ML) INJECTION EVERY 12 HOURS SCHEDULED
Status: DISCONTINUED | OUTPATIENT
Start: 2023-09-09 | End: 2023-09-10 | Stop reason: HOSPADM

## 2023-09-09 RX ORDER — ENOXAPARIN SODIUM 100 MG/ML
40 INJECTION SUBCUTANEOUS DAILY
Status: DISCONTINUED | OUTPATIENT
Start: 2023-09-10 | End: 2023-09-10 | Stop reason: HOSPADM

## 2023-09-09 RX ORDER — LANOLIN ALCOHOL/MO/W.PET/CERES
3 CREAM (GRAM) TOPICAL NIGHTLY PRN
Status: DISCONTINUED | OUTPATIENT
Start: 2023-09-09 | End: 2023-09-10 | Stop reason: HOSPADM

## 2023-09-09 RX ORDER — BISACODYL 10 MG
10 SUPPOSITORY, RECTAL RECTAL DAILY PRN
Status: DISCONTINUED | OUTPATIENT
Start: 2023-09-09 | End: 2023-09-10 | Stop reason: HOSPADM

## 2023-09-09 RX ORDER — OXYCODONE HYDROCHLORIDE 5 MG/1
5 TABLET ORAL EVERY 4 HOURS PRN
Status: DISCONTINUED | OUTPATIENT
Start: 2023-09-09 | End: 2023-09-10 | Stop reason: HOSPADM

## 2023-09-09 RX ADMIN — SODIUM CHLORIDE 1000 ML: 9 INJECTION, SOLUTION INTRAVENOUS at 14:39

## 2023-09-09 RX ADMIN — SODIUM CHLORIDE: 9 INJECTION, SOLUTION INTRAVENOUS at 21:45

## 2023-09-09 RX ADMIN — SODIUM CHLORIDE, PRESERVATIVE FREE 5 ML: 5 INJECTION INTRAVENOUS at 21:47

## 2023-09-09 ASSESSMENT — PAIN SCALES - GENERAL: PAINLEVEL_OUTOF10: 0

## 2023-09-09 NOTE — ED PROVIDER NOTES
PROCEDURES   Unless otherwise noted below, none  Procedures       CRITICAL CARE TIME   N/a    SCREENINGS   NIH Stroke Score       Heart Score       Curb-65          EMERGENCY DEPARTMENT COURSE and DIFFERENTIAL DIAGNOSIS/MDM   Vitals:    Vitals:    09/09/23 1430 09/09/23 1445 09/09/23 1500 09/09/23 1515   BP: 116/84 127/79 114/83 118/78   Pulse: 95 93 100 95   Resp: 18 20 19 23   Temp:       TempSrc:       SpO2: 94% 94% 93% 93%            Patient was given the following medications:  Medications   sodium chloride 0.9 % bolus 1,000 mL (1,000 mLs IntraVENous New Bag 9/9/23 1434)       CONSULTS: (Who and What was discussed)  None      Chronic Conditions: multiple myeloma     Social Determinants affecting Dx or Tx: None    Records Reviewed (source and summary of external notes): Nursing Notes    CC/HPI Summary, DDx, ED Course, and Reassessment: 73 y/o M with hx of multiple myeloma presenting to the ED with palpitations. Patient tachycardic on arrival with EKG showing sinus tachycardia. Differential includes infection (UTI, pneumonia), PE, anemia, dehydration, hyperthyroidism. Will send dimer and obtain CTA chest if positive. Will attempt to hydrate with IVF and reassess. Patient's initial workup reassuring, with negative dimer and no signs of infection. On re-evaluation patient reported improvement in symptoms with HR in the 70s. As patient was being prepared for discharge he developed recurrent tachycardia with rate in the 110s. Decision made to draw blood cultures and admit for obs. Disposition Considerations (Tests not done, Shared Decision Making, Pt Expectation of Test or Tx.):      FINAL IMPRESSION   No diagnosis found. DISPOSITION/PLAN   DISPOSITION        Admit Note: Pt is being admitted by hospitalist. The results of their tests and reason(s) for their admission have been discussed with pt and/or available family.  They convey agreement and understanding for the need to be

## 2023-09-09 NOTE — H&P
Basophils Absolute 0.0 0.0 - 0.1 K/UL    Absolute Immature Granulocyte 0.0 0.00 - 0.04 K/UL    Differential Type SMEAR SCANNED      RBC Comment ANISOCYTOSIS  PRESENT       Comprehensive Metabolic Panel    Collection Time: 09/09/23  1:43 PM   Result Value Ref Range    Sodium 138 136 - 145 mmol/L    Potassium 4.2 3.5 - 5.1 mmol/L    Chloride 104 97 - 108 mmol/L    CO2 29 21 - 32 mmol/L    Anion Gap 5 5 - 15 mmol/L    Glucose 197 (H) 65 - 100 mg/dL    BUN 21 (H) 6 - 20 MG/DL    Creatinine 1.55 (H) 0.70 - 1.30 MG/DL    Bun/Cre Ratio 14 12 - 20      Est, Glom Filt Rate 49 (L) >60 ml/min/1.73m2    Calcium 9.4 8.5 - 10.1 MG/DL    Total Bilirubin 0.8 0.2 - 1.0 MG/DL    ALT 40 12 - 78 U/L    AST 14 (L) 15 - 37 U/L    Alk Phosphatase 68 45 - 117 U/L    Total Protein 7.0 6.4 - 8.2 g/dL    Albumin 3.6 3.5 - 5.0 g/dL    Globulin 3.4 2.0 - 4.0 g/dL    Albumin/Globulin Ratio 1.1 1.1 - 2.2     Troponin    Collection Time: 09/09/23  1:43 PM   Result Value Ref Range    Troponin, High Sensitivity 8 0 - 76 ng/L   TSH    Collection Time: 09/09/23  2:58 PM   Result Value Ref Range    TSH, 3RD GENERATION 0.84 0.36 - 3.74 uIU/mL   D-Dimer, Quantitative    Collection Time: 09/09/23  2:58 PM   Result Value Ref Range    D-Dimer, Quant 0.36 0.00 - 0.65 mg/L FEU   Urinalysis with Reflex to Culture    Collection Time: 09/09/23  4:17 PM    Specimen: Urine   Result Value Ref Range    Color, UA YELLOW/STRAW      Appearance CLEAR CLEAR      Specific Gravity, UA 1.010      pH, Urine 6.5 5.0 - 8.0      Protein, UA Negative NEG mg/dL    Glucose, UA Negative NEG mg/dL    Ketones, Urine Negative NEG mg/dL    Bilirubin Urine Negative NEG      Blood, Urine Negative NEG      Urobilinogen, Urine 0.2 0.2 - 1.0 EU/dL    Nitrite, Urine Negative NEG      Leukocyte Esterase, Urine Negative NEG      Urine Culture if Indicated CULTURE NOT INDICATED BY UA RESULT CNI      WBC, UA 0-4 0 - 4 /hpf    RBC, UA 0-5 0 - 5 /hpf    Epithelial Cells UA FEW FEW /lpf

## 2023-09-09 NOTE — ED NOTES
TRANSFER - OUT REPORT:    Verbal report given to ELIDA Portillo on [employer]  being transferred to [address] [place] [address] for routine progression of patient care       Report consisted of patient's Situation, Background, Assessment and   Recommendations(SBAR). Information from the following report(s) Nurse Handoff Report, ED Encounter Summary, ED SBAR, MAR, Recent Results, Cardiac Rhythm ST, and Event Log was reviewed with the receiving nurse. [place] Fall Assessment:    Presents to emergency department  because of falls (Syncope, seizure, or loss of consciousness): No  Age > 70: No  Altered Mental Status, Intoxication with alcohol or substance confusion (Disorientation, impaired judgment, poor safety awaremess, or inability to follow instructions): No  Impaired Mobility: Ambulates or transfers with assistive devices or assistance; Unable to ambulate or transer.: No  Nursing Judgement: No          Lines:   Single Lumen Implantable Port Right Chest (Active)       Peripheral IV 09/09/23 Distal;Left;Posterior Wrist (Active)   Site Assessment Clean, dry & intact 09/09/23 1818   Line Status Blood return noted; Flushed;Normal saline locked 09/09/23 1818   Phlebitis Assessment No symptoms 09/09/23 1818   Infiltration Assessment 0 09/09/23 1818   Dressing Status New dressing applied 09/09/23 1818   Dressing Type Transparent 09/09/23 1818   Dressing Intervention New 09/09/23 1818        Opportunity for questions and clarification was provided.       Patient transported with:  [address], RN  09/09/23 7965

## 2023-09-09 NOTE — DISCHARGE INSTRUCTIONS
You were seen in the emergency department for palpitations, you initially had a fast heart rate which resolved with IV fluids, it is possible that you are dehydrated. The rest of your work-up was reassuring. Schedule an appoint with your primary care doctor or return to the emergency department if you develop new or worsening symptoms.     Please follow up with your primary care physician in 3 days for lab work: CBC and BMP and renal function test.

## 2023-09-10 VITALS
OXYGEN SATURATION: 100 % | DIASTOLIC BLOOD PRESSURE: 88 MMHG | RESPIRATION RATE: 18 BRPM | SYSTOLIC BLOOD PRESSURE: 138 MMHG | TEMPERATURE: 98.8 F | HEART RATE: 82 BPM

## 2023-09-10 LAB
ALBUMIN SERPL-MCNC: 3.4 G/DL (ref 3.5–5)
ALBUMIN/GLOB SERPL: 1.1 (ref 1.1–2.2)
ALP SERPL-CCNC: 63 U/L (ref 45–117)
ALT SERPL-CCNC: 36 U/L (ref 12–78)
ANION GAP SERPL CALC-SCNC: 7 MMOL/L (ref 5–15)
AST SERPL-CCNC: 13 U/L (ref 15–37)
BASOPHILS # BLD: 0 K/UL (ref 0–0.1)
BASOPHILS NFR BLD: 0 % (ref 0–1)
BILIRUB SERPL-MCNC: 0.7 MG/DL (ref 0.2–1)
BUN SERPL-MCNC: 20 MG/DL (ref 6–20)
BUN/CREAT SERPL: 14 (ref 12–20)
CALCIUM SERPL-MCNC: 8.7 MG/DL (ref 8.5–10.1)
CHLORIDE SERPL-SCNC: 107 MMOL/L (ref 97–108)
CO2 SERPL-SCNC: 25 MMOL/L (ref 21–32)
CREAT SERPL-MCNC: 1.41 MG/DL (ref 0.7–1.3)
DIFFERENTIAL METHOD BLD: ABNORMAL
EKG ATRIAL RATE: 101 BPM
EKG DIAGNOSIS: NORMAL
EKG P AXIS: 32 DEGREES
EKG P-R INTERVAL: 132 MS
EKG Q-T INTERVAL: 350 MS
EKG QRS DURATION: 104 MS
EKG QTC CALCULATION (BAZETT): 453 MS
EKG R AXIS: -41 DEGREES
EKG T AXIS: -45 DEGREES
EKG VENTRICULAR RATE: 101 BPM
EOSINOPHIL # BLD: 0 K/UL (ref 0–0.4)
EOSINOPHIL NFR BLD: 1 % (ref 0–7)
ERYTHROCYTE [DISTWIDTH] IN BLOOD BY AUTOMATED COUNT: 15.1 % (ref 11.5–14.5)
EST. AVERAGE GLUCOSE BLD GHB EST-MCNC: 120 MG/DL
GLOBULIN SER CALC-MCNC: 3.2 G/DL (ref 2–4)
GLUCOSE BLD STRIP.AUTO-MCNC: 115 MG/DL (ref 65–117)
GLUCOSE BLD STRIP.AUTO-MCNC: 152 MG/DL (ref 65–117)
GLUCOSE SERPL-MCNC: 182 MG/DL (ref 65–100)
HBA1C MFR BLD: 5.8 % (ref 4–5.6)
HCT VFR BLD AUTO: 33.2 % (ref 36.6–50.3)
HGB BLD-MCNC: 10.8 G/DL (ref 12.1–17)
IMM GRANULOCYTES # BLD AUTO: 0 K/UL (ref 0–0.04)
IMM GRANULOCYTES NFR BLD AUTO: 0 % (ref 0–0.5)
LYMPHOCYTES # BLD: 0.5 K/UL (ref 0.8–3.5)
LYMPHOCYTES NFR BLD: 14 % (ref 12–49)
MCH RBC QN AUTO: 33.4 PG (ref 26–34)
MCHC RBC AUTO-ENTMCNC: 32.5 G/DL (ref 30–36.5)
MCV RBC AUTO: 102.8 FL (ref 80–99)
MONOCYTES # BLD: 0.6 K/UL (ref 0–1)
MONOCYTES NFR BLD: 15 % (ref 5–13)
NEUTS SEG # BLD: 2.7 K/UL (ref 1.8–8)
NEUTS SEG NFR BLD: 70 % (ref 32–75)
NRBC # BLD: 0 K/UL (ref 0–0.01)
NRBC BLD-RTO: 0 PER 100 WBC
PLATELET # BLD AUTO: 143 K/UL (ref 150–400)
PMV BLD AUTO: 11.9 FL (ref 8.9–12.9)
POTASSIUM SERPL-SCNC: 4.2 MMOL/L (ref 3.5–5.1)
PROCALCITONIN SERPL-MCNC: <0.05 NG/ML
PROT SERPL-MCNC: 6.6 G/DL (ref 6.4–8.2)
RBC # BLD AUTO: 3.23 M/UL (ref 4.1–5.7)
SERVICE CMNT-IMP: ABNORMAL
SERVICE CMNT-IMP: NORMAL
SODIUM SERPL-SCNC: 139 MMOL/L (ref 136–145)
TROPONIN I SERPL HS-MCNC: 7 NG/L (ref 0–76)
WBC # BLD AUTO: 3.8 K/UL (ref 4.1–11.1)

## 2023-09-10 PROCEDURE — 36415 COLL VENOUS BLD VENIPUNCTURE: CPT

## 2023-09-10 PROCEDURE — 6370000000 HC RX 637 (ALT 250 FOR IP): Performed by: INTERNAL MEDICINE

## 2023-09-10 PROCEDURE — 96372 THER/PROPH/DIAG INJ SC/IM: CPT

## 2023-09-10 PROCEDURE — 82962 GLUCOSE BLOOD TEST: CPT

## 2023-09-10 PROCEDURE — G0378 HOSPITAL OBSERVATION PER HR: HCPCS

## 2023-09-10 PROCEDURE — 84484 ASSAY OF TROPONIN QUANT: CPT

## 2023-09-10 PROCEDURE — 6360000002 HC RX W HCPCS: Performed by: INTERNAL MEDICINE

## 2023-09-10 PROCEDURE — 84145 PROCALCITONIN (PCT): CPT

## 2023-09-10 PROCEDURE — 80053 COMPREHEN METABOLIC PANEL: CPT

## 2023-09-10 PROCEDURE — 6370000000 HC RX 637 (ALT 250 FOR IP): Performed by: NURSE PRACTITIONER

## 2023-09-10 PROCEDURE — 96361 HYDRATE IV INFUSION ADD-ON: CPT

## 2023-09-10 PROCEDURE — 83036 HEMOGLOBIN GLYCOSYLATED A1C: CPT

## 2023-09-10 PROCEDURE — 85025 COMPLETE CBC W/AUTO DIFF WBC: CPT

## 2023-09-10 PROCEDURE — 2580000003 HC RX 258: Performed by: INTERNAL MEDICINE

## 2023-09-10 RX ORDER — LISINOPRIL 5 MG/1
2.5 TABLET ORAL DAILY
Status: DISCONTINUED | OUTPATIENT
Start: 2023-09-10 | End: 2023-09-10 | Stop reason: HOSPADM

## 2023-09-10 RX ORDER — BUPROPION HYDROCHLORIDE 150 MG/1
300 TABLET ORAL DAILY
Status: DISCONTINUED | OUTPATIENT
Start: 2023-09-10 | End: 2023-09-10 | Stop reason: HOSPADM

## 2023-09-10 RX ORDER — METOPROLOL SUCCINATE 25 MG/1
25 TABLET, EXTENDED RELEASE ORAL DAILY
Status: DISCONTINUED | OUTPATIENT
Start: 2023-09-10 | End: 2023-09-10 | Stop reason: HOSPADM

## 2023-09-10 RX ORDER — TENOFOVIR DISOPROXIL FUMARATE 300 MG/1
300 TABLET, FILM COATED ORAL DAILY
Status: DISCONTINUED | OUTPATIENT
Start: 2023-09-10 | End: 2023-09-10 | Stop reason: HOSPADM

## 2023-09-10 RX ORDER — LISINOPRIL 2.5 MG/1
2.5 TABLET ORAL DAILY
Qty: 30 TABLET | Refills: 3 | Status: SHIPPED | OUTPATIENT
Start: 2023-09-11 | End: 2023-09-16

## 2023-09-10 RX ORDER — METOPROLOL SUCCINATE 25 MG/1
25 TABLET, EXTENDED RELEASE ORAL DAILY
Qty: 30 TABLET | Refills: 3 | Status: ON HOLD | OUTPATIENT
Start: 2023-09-11

## 2023-09-10 RX ADMIN — TENOFOVIR DISPROXIL FUMARATE 300 MG: 300 TABLET ORAL at 15:46

## 2023-09-10 RX ADMIN — TAMSULOSIN HYDROCHLORIDE 0.4 MG: 0.4 CAPSULE ORAL at 10:28

## 2023-09-10 RX ADMIN — LISINOPRIL 2.5 MG: 5 TABLET ORAL at 14:04

## 2023-09-10 RX ADMIN — ASPIRIN 81 MG: 81 TABLET, COATED ORAL at 10:28

## 2023-09-10 RX ADMIN — SODIUM CHLORIDE, PRESERVATIVE FREE 10 ML: 5 INJECTION INTRAVENOUS at 10:30

## 2023-09-10 RX ADMIN — METOPROLOL SUCCINATE 25 MG: 25 TABLET, EXTENDED RELEASE ORAL at 14:05

## 2023-09-10 RX ADMIN — ENOXAPARIN SODIUM 40 MG: 100 INJECTION SUBCUTANEOUS at 10:29

## 2023-09-10 RX ADMIN — PANTOPRAZOLE SODIUM 40 MG: 40 TABLET, DELAYED RELEASE ORAL at 10:31

## 2023-09-10 NOTE — DISCHARGE SUMMARY
Discharge Summary    Name: Diamond Ann  [id number]  YOB: 1955 (Age: 74 y.o.)   Date of Admission: 9/9/2023  Date of Discharge: 9/10/2023  Attending Physician: Shani Kolb MD    Discharge Diagnosis: Palpitations, chronic renal failure    Subsequent diagnosis: History of HFrEF (not in acute exacerbation), multiple myeloma, and Type II DM. Consultations:  IP CONSULT TO CARDIOLOGY      Brief Admission History/Reason for Admission/Brief Hospital Course by Main Problems:    Mr. Zara Handy is a 74year old male with a past medical history of PVC's, multiple myeloma, acid reflux, depression, diabetes, hepatitis C, HTN, depression and anxiety who presented to the emergency room with worsening palpitations than at baseline    Patient denied any headaches, fever, sore throat, shortness of breath, chest pain, abdominal  pain, nausea/vomiting, diarrhea, dysuria. EKG on admission showed sinus tachycardia at 101. Troponin unremarkable. Heart rate improved with IV fluids. Chest xray shows no acute intrathoracic disease. Blood cultures showed no growth after 13 hours. Lactic normal. Urinalysis negative for UTI. Echocardiogram on 6/2023 shows an EF of 45%-50%, mild global hypokinesis, no significant valvular disease. No antibiotics were administered during hospitalization as no source of possible sepsis identified. Patient has a history of IgA kappa myeloma. Initial remission after chemo and underwent tandem autotransplant at [address] [place] [address] in 2018. Maintained on pomalyst 2mg daily, started 11/19/2018 through 5/2023. Recent recurrence 2 months ago, is on isatuximab/carfilzomib/dexamethasone cycle 3 week 15. Patient follows Dr. Anabelle Reed and is scheduled for chemotherapy on tomorrow. Patient to continue home medications for Type II DM, chronic hepatitis C and depression and anxiety. Cardiology evaluated patient during hospitalization.  Started on lisinopril low

## 2023-09-10 NOTE — CARE COORDINATION
Care Management Initial Assessment       RUR: n.a obs   Readmission?  No       09/10/23 1044   Service Assessment   Patient Orientation Alert and Oriented   Cognition Alert   History Provided By Patient   Primary Caregiver Self   Support Systems Spouse/Significant Other   Patient's Healthcare Decision Maker is: Legal Next of Kin   PCP Verified by CM Yes   Prior Functional Level Independent in ADLs/IADLs   Current Functional Level Independent in ADLs/IADLs   Can patient return to prior living arrangement Yes   Family able to assist with home care needs: Yes   Financial Resources Medicare   Social/Functional History   Lives With Spouse   Type of [facility]   ([address])   Home Equipment None   Active  Yes   Discharge Planning   Type of Residence House   Current Services Prior To Admission None   Patient expects to be discharged to: [address]

## 2023-09-10 NOTE — PROGRESS NOTES
End of Shift Note    Bedside shift change report given to ELIDA Cooley (oncoming nurse) by Regi Ramirez RN (offgoing nurse). Report included the following information SBAR, Kardex, Intake/Output, and MAR    Shift worked:  5493-9577     Shift summary and any significant changes:     Patient arrived to the floor at 1900. VSs were taken, dual skin assessment is done. Patient is on continuous infusion. Morning labs were drawn. Frequent rounding is done. Patient stated in the evening that he doesn't want no Insulin, he has his Metformin with him.      Concerns for physician to address:       Zone phone for oncoming shift:              Regi Ramirez RN
I have reviewed discharge instructions with the patient and spouse. The patient and spouse verbalized understanding. Discharge medications reviewed with patient and spouse and appropriate educational materials and side effects teaching were provided. Follow-up appointments reviewed. Opportunity for questions and clarification was provided. Venous access removed without difficulty. Patient's belongings gathered and sent with patient. Patient is ready for discharge.      David Ann RN
197* 182*   BUN 21* 20   CREATININE 1.55* 1.41*   CALCIUM 9.4 8.7   LABALBU 3.6 3.4*   BILITOT 0.8 0.7   AST 14* 13*   ALT 40 36       Signed: TAVARES Lawrence - NP

## 2023-09-11 ENCOUNTER — HOSPITAL ENCOUNTER (OUTPATIENT)
Facility: HOSPITAL | Age: 68
Setting detail: INFUSION SERIES
End: 2023-09-11
Payer: COMMERCIAL

## 2023-09-11 ENCOUNTER — TELEPHONE (OUTPATIENT)
Age: 68
End: 2023-09-11

## 2023-09-11 ENCOUNTER — OFFICE VISIT (OUTPATIENT)
Age: 68
End: 2023-09-11
Payer: MEDICARE

## 2023-09-11 VITALS
RESPIRATION RATE: 18 BRPM | HEIGHT: 68 IN | TEMPERATURE: 97.8 F | WEIGHT: 184.3 LBS | SYSTOLIC BLOOD PRESSURE: 159 MMHG | OXYGEN SATURATION: 98 % | DIASTOLIC BLOOD PRESSURE: 75 MMHG | HEART RATE: 84 BPM | BODY MASS INDEX: 27.93 KG/M2

## 2023-09-11 VITALS
OXYGEN SATURATION: 98 % | RESPIRATION RATE: 18 BRPM | WEIGHT: 184.3 LBS | HEIGHT: 68 IN | SYSTOLIC BLOOD PRESSURE: 156 MMHG | HEART RATE: 73 BPM | DIASTOLIC BLOOD PRESSURE: 84 MMHG | TEMPERATURE: 97.8 F | BODY MASS INDEX: 27.93 KG/M2

## 2023-09-11 DIAGNOSIS — C90.00 MULTIPLE MYELOMA, REMISSION STATUS UNSPECIFIED (HCC): ICD-10-CM

## 2023-09-11 DIAGNOSIS — C90.02 MULTIPLE MYELOMA IN RELAPSE (HCC): Primary | ICD-10-CM

## 2023-09-11 DIAGNOSIS — Z51.11 ENCOUNTER FOR ANTINEOPLASTIC CHEMOTHERAPY: ICD-10-CM

## 2023-09-11 DIAGNOSIS — Z11.59 ENCOUNTER FOR SCREENING FOR OTHER VIRAL DISEASES: Primary | ICD-10-CM

## 2023-09-11 LAB
BASOPHILS # BLD: 0 K/UL (ref 0–0.1)
BASOPHILS NFR BLD: 0 % (ref 0–1)
DIFFERENTIAL METHOD BLD: ABNORMAL
EOSINOPHIL # BLD: 0 K/UL (ref 0–0.4)
EOSINOPHIL NFR BLD: 0 % (ref 0–7)
ERYTHROCYTE [DISTWIDTH] IN BLOOD BY AUTOMATED COUNT: 14.6 % (ref 11.5–14.5)
HCT VFR BLD AUTO: 33.6 % (ref 36.6–50.3)
HGB BLD-MCNC: 10.9 G/DL (ref 12.1–17)
IMM GRANULOCYTES # BLD AUTO: 0 K/UL (ref 0–0.04)
IMM GRANULOCYTES NFR BLD AUTO: 0 % (ref 0–0.5)
LYMPHOCYTES # BLD: 0.5 K/UL (ref 0.8–3.5)
LYMPHOCYTES NFR BLD: 13 % (ref 12–49)
MCH RBC QN AUTO: 33.2 PG (ref 26–34)
MCHC RBC AUTO-ENTMCNC: 32.4 G/DL (ref 30–36.5)
MCV RBC AUTO: 102.4 FL (ref 80–99)
MONOCYTES # BLD: 0.5 K/UL (ref 0–1)
MONOCYTES NFR BLD: 13 % (ref 5–13)
NEUTS SEG # BLD: 2.7 K/UL (ref 1.8–8)
NEUTS SEG NFR BLD: 74 % (ref 32–75)
NRBC # BLD: 0 K/UL (ref 0–0.01)
NRBC BLD-RTO: 0 PER 100 WBC
PLATELET # BLD AUTO: 104 K/UL (ref 150–400)
PMV BLD AUTO: 11.3 FL (ref 8.9–12.9)
RBC # BLD AUTO: 3.28 M/UL (ref 4.1–5.7)
RBC MORPH BLD: ABNORMAL
WBC # BLD AUTO: 3.7 K/UL (ref 4.1–11.1)

## 2023-09-11 PROCEDURE — 6360000002 HC RX W HCPCS: Performed by: INTERNAL MEDICINE

## 2023-09-11 PROCEDURE — 3077F SYST BP >= 140 MM HG: CPT | Performed by: INTERNAL MEDICINE

## 2023-09-11 PROCEDURE — 3078F DIAST BP <80 MM HG: CPT | Performed by: INTERNAL MEDICINE

## 2023-09-11 PROCEDURE — 1123F ACP DISCUSS/DSCN MKR DOCD: CPT | Performed by: INTERNAL MEDICINE

## 2023-09-11 PROCEDURE — 96367 TX/PROPH/DG ADDL SEQ IV INF: CPT

## 2023-09-11 PROCEDURE — 36415 COLL VENOUS BLD VENIPUNCTURE: CPT

## 2023-09-11 PROCEDURE — 99215 OFFICE O/P EST HI 40 MIN: CPT | Performed by: INTERNAL MEDICINE

## 2023-09-11 PROCEDURE — 96413 CHEMO IV INFUSION 1 HR: CPT

## 2023-09-11 PROCEDURE — 2580000003 HC RX 258: Performed by: INTERNAL MEDICINE

## 2023-09-11 PROCEDURE — 85025 COMPLETE CBC W/AUTO DIFF WBC: CPT

## 2023-09-11 PROCEDURE — 96375 TX/PRO/DX INJ NEW DRUG ADDON: CPT

## 2023-09-11 RX ORDER — MORPHINE SULFATE 15 MG/1
TABLET, FILM COATED, EXTENDED RELEASE ORAL
Status: ON HOLD | COMMUNITY
Start: 2023-08-09

## 2023-09-11 RX ORDER — DEXAMETHASONE SODIUM PHOSPHATE 4 MG/ML
4 INJECTION, SOLUTION INTRA-ARTICULAR; INTRALESIONAL; INTRAMUSCULAR; INTRAVENOUS; SOFT TISSUE EVERY 6 HOURS
Status: CANCELLED
Start: 2023-09-18

## 2023-09-11 RX ORDER — NALOXONE HYDROCHLORIDE 4 MG/.1ML
SPRAY NASAL
Status: ON HOLD | COMMUNITY
Start: 2021-09-30

## 2023-09-11 RX ORDER — NAPROXEN 500 MG/1
TABLET ORAL
COMMUNITY
Start: 2023-09-11 | End: 2023-09-16

## 2023-09-11 RX ORDER — CLONAZEPAM 0.5 MG/1
0.5 TABLET ORAL 2 TIMES DAILY PRN
Status: ON HOLD | COMMUNITY
Start: 2023-06-27

## 2023-09-11 RX ORDER — PREGABALIN 150 MG/1
CAPSULE ORAL
Status: ON HOLD | COMMUNITY
Start: 2023-09-11

## 2023-09-11 RX ORDER — SODIUM CHLORIDE 0.9 % (FLUSH) 0.9 %
5-40 SYRINGE (ML) INJECTION PRN
Status: DISCONTINUED | OUTPATIENT
Start: 2023-09-11 | End: 2023-09-12 | Stop reason: HOSPADM

## 2023-09-11 RX ORDER — TESTOSTERONE CYPIONATE 200 MG/ML
INJECTION, SOLUTION INTRAMUSCULAR
Status: ON HOLD | COMMUNITY
Start: 2023-08-16

## 2023-09-11 RX ORDER — DEXTROSE MONOHYDRATE 50 MG/ML
5-250 INJECTION, SOLUTION INTRAVENOUS PRN
Status: DISCONTINUED | OUTPATIENT
Start: 2023-09-11 | End: 2023-09-12 | Stop reason: HOSPADM

## 2023-09-11 RX ORDER — DEXAMETHASONE SODIUM PHOSPHATE 4 MG/ML
4 INJECTION, SOLUTION INTRA-ARTICULAR; INTRALESIONAL; INTRAMUSCULAR; INTRAVENOUS; SOFT TISSUE EVERY 6 HOURS
Status: DISCONTINUED | OUTPATIENT
Start: 2023-09-11 | End: 2023-09-12

## 2023-09-11 RX ORDER — SILDENAFIL CITRATE 20 MG/1
TABLET ORAL
Status: ON HOLD | COMMUNITY
Start: 2021-06-16

## 2023-09-11 RX ORDER — ESCITALOPRAM OXALATE 10 MG/1
TABLET ORAL
Status: ON HOLD | COMMUNITY
Start: 2023-08-16

## 2023-09-11 RX ADMIN — DEXTROSE MONOHYDRATE 25 ML/HR: 50 INJECTION, SOLUTION INTRAVENOUS at 15:25

## 2023-09-11 RX ADMIN — SODIUM CHLORIDE 150 MG: 9 INJECTION, SOLUTION INTRAVENOUS at 16:05

## 2023-09-11 RX ADMIN — CARFILZOMIB 40.6 MG: 10 INJECTION, POWDER, LYOPHILIZED, FOR SOLUTION INTRAVENOUS at 16:38

## 2023-09-11 RX ADMIN — SODIUM CHLORIDE, PRESERVATIVE FREE 20 ML: 5 INJECTION INTRAVENOUS at 13:10

## 2023-09-11 RX ADMIN — SODIUM CHLORIDE, PRESERVATIVE FREE 10 ML: 5 INJECTION INTRAVENOUS at 17:17

## 2023-09-11 RX ADMIN — DEXAMETHASONE SODIUM PHOSPHATE 4 MG: 4 INJECTION INTRA-ARTICULAR; INTRALESIONAL; INTRAMUSCULAR; INTRAVENOUS; SOFT TISSUE at 15:25

## 2023-09-11 NOTE — TELEPHONE ENCOUNTER
Patient is scheduled for opic at 1:00. He was at [facility] and was dx with a weak heart. His HR went to 133, he is concerned if he should see Dr. Brooke Fox before he goes for treatment today. He said they put him on meds, notes are in epic.

## 2023-09-11 NOTE — PROGRESS NOTES
[facility]   at [address] [place], [address], [facility], [address] [place] [address]   [phone]             Progress Note       Patient: Janet Heck Sr. MRN: [id number]   SSN: [id number]    YOB: 1955              Diagnosis:         1. Multiple Myeloma, IgA Kappa    Durie Lacrosse stage IIIB      Cytogenetics/FISH: t(14;16) - high risk        Ttreatment:        1. Maintenance therapy - Pomalyst 2 mg daily - 11/19/2018    2. Maintenance therapy - Ixazomib - started 10/15/2018 - stopped 11/12/2018   3. S/P tandem  Autotransplant    First on 2/28/2018    2nd on 06/13/2018   4. Carfilzomib/Pom/Dex - s/p 5 cycles   5. VD-PACE s/p 1 cycle   6. RVD - s/p 4 cycles    7. Isatuximab/Carfilzomib/Dex cycle 4 day 8       Subjective:        Janet Heck Sr. is a 74 y.o. male with a diagnosis of IgA kappa myeloma. Bone marrow shows 100% aberrant plasma cells. He suffers with DM but it is diet controlled. He has received systemic anti-viral  treatment for chronic Hep C with successful eradication of the virus. Mr. Kenneth Wong received 4 cycles of systemic therapy with RVd. He had an initial good response to treatment. However his paraprotein level started rising and the myeloma became refractory  to treatment. I then administered one cycle of VD-PACE in the hospital. He then received Carfilzomib/Pom/Dex. He achieved VGPR. He underwent tandem autotransplant at [facility]. According to the patient he achieved complete response with therapy. He has undergone  second/tandem transplant in June. He took Pomalyst maintenance until Aug 2019 then had to stop due to recurrent prostatitis. He had a repeat BM biopsy in February 2019 at [facility] which showed complete molecular remission. He is taking Pomalyst. He has seen  Dr. Brittnee Allen for movement disorder. He has started taking a new medication for tardive dyskinesia. Mr. Kenneth Wong was noted to have relapsed last month

## 2023-09-12 ENCOUNTER — TELEPHONE (OUTPATIENT)
Age: 68
End: 2023-09-12

## 2023-09-12 LAB
ALBUMIN SERPL ELPH-MCNC: 3.9 G/DL (ref 2.9–4.4)
ALBUMIN/GLOB SERPL: 1.7 (ref 0.7–1.7)
ALPHA1 GLOB SERPL ELPH-MCNC: 0.1 G/DL (ref 0–0.4)
ALPHA2 GLOB SERPL ELPH-MCNC: 0.7 G/DL (ref 0.4–1)
B-GLOBULIN SERPL ELPH-MCNC: 0.8 G/DL (ref 0.7–1.3)
GAMMA GLOB SERPL ELPH-MCNC: 0.8 G/DL (ref 0.4–1.8)
GLOBULIN SER-MCNC: 2.4 G/DL (ref 2.2–3.9)
IGA SERPL-MCNC: 468 MG/DL (ref 61–437)
IGG SERPL-MCNC: 528 MG/DL (ref 603–1613)
IGM SERPL-MCNC: <5 MG/DL (ref 20–172)
INTERPRETATION SERPL IEP-IMP: ABNORMAL
KAPPA LC FREE SER-MCNC: 79.5 MG/L (ref 3.3–19.4)
KAPPA LC FREE/LAMBDA FREE SER: 34.57 (ref 0.26–1.65)
LAMBDA LC FREE SERPL-MCNC: 2.3 MG/L (ref 5.7–26.3)
M PROTEIN SERPL ELPH-MCNC: 0.5 G/DL
PROT SERPL-MCNC: 6.3 G/DL (ref 6–8.5)

## 2023-09-12 NOTE — TELEPHONE ENCOUNTER
Hold off until next office visit with MD.    Also, please cancel upcoming office appt. He will be need to be seen in office in 4 weeks but needs OPIC appt's added as well. Please call him with appointment date and times.

## 2023-09-16 ENCOUNTER — HOSPITAL ENCOUNTER (INPATIENT)
Facility: HOSPITAL | Age: 68
LOS: 4 days | Discharge: HOME | DRG: 287 | End: 2023-09-20
Attending: STUDENT IN AN ORGANIZED HEALTH CARE EDUCATION/TRAINING PROGRAM | Admitting: STUDENT IN AN ORGANIZED HEALTH CARE EDUCATION/TRAINING PROGRAM
Payer: MEDICARE

## 2023-09-16 ENCOUNTER — APPOINTMENT (OUTPATIENT)
Facility: HOSPITAL | Age: 68
DRG: 287 | End: 2023-09-16
Payer: MEDICARE

## 2023-09-16 DIAGNOSIS — R07.9 CHEST PAIN: ICD-10-CM

## 2023-09-16 DIAGNOSIS — I20.0 UNSTABLE ANGINA (HCC): ICD-10-CM

## 2023-09-16 DIAGNOSIS — C90.00 MULTIPLE MYELOMA, REMISSION STATUS UNSPECIFIED (HCC): ICD-10-CM

## 2023-09-16 DIAGNOSIS — R07.9 CHEST PAIN, UNSPECIFIED TYPE: Primary | ICD-10-CM

## 2023-09-16 DIAGNOSIS — E08.65 DIABETES MELLITUS DUE TO UNDERLYING CONDITION WITH HYPERGLYCEMIA, WITHOUT LONG-TERM CURRENT USE OF INSULIN (HCC): ICD-10-CM

## 2023-09-16 DIAGNOSIS — Z11.59 ENCOUNTER FOR SCREENING FOR OTHER VIRAL DISEASES: ICD-10-CM

## 2023-09-16 LAB
ALBUMIN SERPL-MCNC: 4.1 G/DL (ref 3.5–5)
ALBUMIN/GLOB SERPL: 1.2 (ref 1.1–2.2)
ALP SERPL-CCNC: 76 U/L (ref 45–117)
ALT SERPL-CCNC: 40 U/L (ref 12–78)
ANION GAP SERPL CALC-SCNC: 7 MMOL/L (ref 5–15)
AST SERPL-CCNC: 15 U/L (ref 15–37)
BASOPHILS # BLD: 0 K/UL (ref 0–0.1)
BASOPHILS NFR BLD: 0 % (ref 0–1)
BILIRUB SERPL-MCNC: 0.3 MG/DL (ref 0.2–1)
BUN SERPL-MCNC: 21 MG/DL (ref 6–20)
BUN/CREAT SERPL: 13 (ref 12–20)
CALCIUM SERPL-MCNC: 9.5 MG/DL (ref 8.5–10.1)
CHLORIDE SERPL-SCNC: 104 MMOL/L (ref 97–108)
CO2 SERPL-SCNC: 29 MMOL/L (ref 21–32)
CREAT SERPL-MCNC: 1.61 MG/DL (ref 0.7–1.3)
DIFFERENTIAL METHOD BLD: ABNORMAL
EOSINOPHIL # BLD: 0 K/UL (ref 0–0.4)
EOSINOPHIL NFR BLD: 0 % (ref 0–7)
ERYTHROCYTE [DISTWIDTH] IN BLOOD BY AUTOMATED COUNT: 14.1 % (ref 11.5–14.5)
GLOBULIN SER CALC-MCNC: 3.3 G/DL (ref 2–4)
GLUCOSE SERPL-MCNC: 121 MG/DL (ref 65–100)
HCT VFR BLD AUTO: 37.9 % (ref 36.6–50.3)
HGB BLD-MCNC: 12.4 G/DL (ref 12.1–17)
IMM GRANULOCYTES # BLD AUTO: 0 K/UL (ref 0–0.04)
IMM GRANULOCYTES NFR BLD AUTO: 0 % (ref 0–0.5)
LYMPHOCYTES # BLD: 0.8 K/UL (ref 0.8–3.5)
LYMPHOCYTES NFR BLD: 15 % (ref 12–49)
MCH RBC QN AUTO: 33.6 PG (ref 26–34)
MCHC RBC AUTO-ENTMCNC: 32.7 G/DL (ref 30–36.5)
MCV RBC AUTO: 102.7 FL (ref 80–99)
MONOCYTES # BLD: 0.8 K/UL (ref 0–1)
MONOCYTES NFR BLD: 15 % (ref 5–13)
NEUTS SEG # BLD: 3.4 K/UL (ref 1.8–8)
NEUTS SEG NFR BLD: 70 % (ref 32–75)
NRBC # BLD: 0 K/UL (ref 0–0.01)
NRBC BLD-RTO: 0 PER 100 WBC
PLATELET # BLD AUTO: 165 K/UL (ref 150–400)
PMV BLD AUTO: 12 FL (ref 8.9–12.9)
POTASSIUM SERPL-SCNC: 4.2 MMOL/L (ref 3.5–5.1)
PROT SERPL-MCNC: 7.4 G/DL (ref 6.4–8.2)
RBC # BLD AUTO: 3.69 M/UL (ref 4.1–5.7)
RBC MORPH BLD: ABNORMAL
RBC MORPH BLD: ABNORMAL
SODIUM SERPL-SCNC: 140 MMOL/L (ref 136–145)
TROPONIN I SERPL HS-MCNC: 7 NG/L (ref 0–76)
TROPONIN I SERPL HS-MCNC: 8 NG/L (ref 0–76)
WBC # BLD AUTO: 5 K/UL (ref 4.1–11.1)

## 2023-09-16 PROCEDURE — 71045 X-RAY EXAM CHEST 1 VIEW: CPT

## 2023-09-16 PROCEDURE — 1100000000 HC RM PRIVATE

## 2023-09-16 PROCEDURE — 6360000004 HC RX CONTRAST MEDICATION: Performed by: STUDENT IN AN ORGANIZED HEALTH CARE EDUCATION/TRAINING PROGRAM

## 2023-09-16 PROCEDURE — 6370000000 HC RX 637 (ALT 250 FOR IP): Performed by: STUDENT IN AN ORGANIZED HEALTH CARE EDUCATION/TRAINING PROGRAM

## 2023-09-16 PROCEDURE — 85025 COMPLETE CBC W/AUTO DIFF WBC: CPT

## 2023-09-16 PROCEDURE — 93005 ELECTROCARDIOGRAM TRACING: CPT | Performed by: STUDENT IN AN ORGANIZED HEALTH CARE EDUCATION/TRAINING PROGRAM

## 2023-09-16 PROCEDURE — 84484 ASSAY OF TROPONIN QUANT: CPT

## 2023-09-16 PROCEDURE — 99285 EMERGENCY DEPT VISIT HI MDM: CPT

## 2023-09-16 PROCEDURE — 71275 CT ANGIOGRAPHY CHEST: CPT

## 2023-09-16 PROCEDURE — 80053 COMPREHEN METABOLIC PANEL: CPT

## 2023-09-16 PROCEDURE — 6360000002 HC RX W HCPCS: Performed by: STUDENT IN AN ORGANIZED HEALTH CARE EDUCATION/TRAINING PROGRAM

## 2023-09-16 PROCEDURE — 36415 COLL VENOUS BLD VENIPUNCTURE: CPT

## 2023-09-16 RX ORDER — TAMSULOSIN HYDROCHLORIDE 0.4 MG/1
0.4 CAPSULE ORAL DAILY
Status: DISCONTINUED | OUTPATIENT
Start: 2023-09-17 | End: 2023-09-20 | Stop reason: HOSPADM

## 2023-09-16 RX ORDER — ASPIRIN 81 MG/1
243 TABLET, CHEWABLE ORAL DAILY
Status: DISCONTINUED | OUTPATIENT
Start: 2023-09-16 | End: 2023-09-19

## 2023-09-16 RX ORDER — ZOLPIDEM TARTRATE 5 MG/1
5 TABLET ORAL NIGHTLY PRN
Status: DISCONTINUED | OUTPATIENT
Start: 2023-09-16 | End: 2023-09-20 | Stop reason: HOSPADM

## 2023-09-16 RX ORDER — 0.9 % SODIUM CHLORIDE 0.9 %
500 INTRAVENOUS SOLUTION INTRAVENOUS ONCE
Status: DISCONTINUED | OUTPATIENT
Start: 2023-09-16 | End: 2023-09-16

## 2023-09-16 RX ORDER — TENOFOVIR DISOPROXIL FUMARATE 300 MG/1
300 TABLET, FILM COATED ORAL DAILY
Status: DISCONTINUED | OUTPATIENT
Start: 2023-09-17 | End: 2023-09-20 | Stop reason: HOSPADM

## 2023-09-16 RX ORDER — POLYETHYLENE GLYCOL 3350 17 G/17G
17 POWDER, FOR SOLUTION ORAL DAILY PRN
Status: DISCONTINUED | OUTPATIENT
Start: 2023-09-16 | End: 2023-09-20 | Stop reason: HOSPADM

## 2023-09-16 RX ORDER — DEXTROSE MONOHYDRATE 100 MG/ML
INJECTION, SOLUTION INTRAVENOUS CONTINUOUS PRN
Status: DISCONTINUED | OUTPATIENT
Start: 2023-09-16 | End: 2023-09-20 | Stop reason: HOSPADM

## 2023-09-16 RX ORDER — SODIUM CHLORIDE 0.9 % (FLUSH) 0.9 %
5-40 SYRINGE (ML) INJECTION EVERY 12 HOURS SCHEDULED
Status: DISCONTINUED | OUTPATIENT
Start: 2023-09-16 | End: 2023-09-20 | Stop reason: HOSPADM

## 2023-09-16 RX ORDER — SODIUM CHLORIDE 0.9 % (FLUSH) 0.9 %
5-40 SYRINGE (ML) INJECTION PRN
Status: DISCONTINUED | OUTPATIENT
Start: 2023-09-16 | End: 2023-09-20 | Stop reason: HOSPADM

## 2023-09-16 RX ORDER — SODIUM CHLORIDE 9 MG/ML
INJECTION, SOLUTION INTRAVENOUS PRN
Status: DISCONTINUED | OUTPATIENT
Start: 2023-09-16 | End: 2023-09-20 | Stop reason: HOSPADM

## 2023-09-16 RX ORDER — ESCITALOPRAM OXALATE 10 MG/1
10 TABLET ORAL DAILY
Status: DISCONTINUED | OUTPATIENT
Start: 2023-09-17 | End: 2023-09-20 | Stop reason: HOSPADM

## 2023-09-16 RX ORDER — ONDANSETRON 2 MG/ML
4 INJECTION INTRAMUSCULAR; INTRAVENOUS EVERY 4 HOURS PRN
Status: DISCONTINUED | OUTPATIENT
Start: 2023-09-16 | End: 2023-09-16

## 2023-09-16 RX ORDER — CLONAZEPAM 0.5 MG/1
0.5 TABLET ORAL 2 TIMES DAILY PRN
Status: DISCONTINUED | OUTPATIENT
Start: 2023-09-16 | End: 2023-09-20 | Stop reason: HOSPADM

## 2023-09-16 RX ORDER — ONDANSETRON 4 MG/1
4 TABLET, ORALLY DISINTEGRATING ORAL EVERY 8 HOURS PRN
Status: DISCONTINUED | OUTPATIENT
Start: 2023-09-16 | End: 2023-09-20 | Stop reason: HOSPADM

## 2023-09-16 RX ORDER — OXYCODONE HYDROCHLORIDE 5 MG/1
15 TABLET ORAL EVERY 4 HOURS PRN
Status: DISCONTINUED | OUTPATIENT
Start: 2023-09-16 | End: 2023-09-16

## 2023-09-16 RX ORDER — ONDANSETRON 2 MG/ML
4 INJECTION INTRAMUSCULAR; INTRAVENOUS EVERY 6 HOURS PRN
Status: DISCONTINUED | OUTPATIENT
Start: 2023-09-16 | End: 2023-09-20 | Stop reason: HOSPADM

## 2023-09-16 RX ORDER — INSULIN LISPRO 100 [IU]/ML
0-4 INJECTION, SOLUTION INTRAVENOUS; SUBCUTANEOUS NIGHTLY
Status: DISCONTINUED | OUTPATIENT
Start: 2023-09-16 | End: 2023-09-20 | Stop reason: HOSPADM

## 2023-09-16 RX ORDER — ACETAMINOPHEN 325 MG/1
650 TABLET ORAL EVERY 6 HOURS PRN
Status: DISCONTINUED | OUTPATIENT
Start: 2023-09-16 | End: 2023-09-20 | Stop reason: HOSPADM

## 2023-09-16 RX ORDER — ACETAMINOPHEN 650 MG/1
650 SUPPOSITORY RECTAL EVERY 6 HOURS PRN
Status: DISCONTINUED | OUTPATIENT
Start: 2023-09-16 | End: 2023-09-20 | Stop reason: HOSPADM

## 2023-09-16 RX ORDER — OXYCODONE HYDROCHLORIDE 5 MG/1
15 TABLET ORAL EVERY 4 HOURS PRN
Status: DISCONTINUED | OUTPATIENT
Start: 2023-09-16 | End: 2023-09-20 | Stop reason: HOSPADM

## 2023-09-16 RX ORDER — OXYCODONE HYDROCHLORIDE 5 MG/1
5 TABLET ORAL EVERY 4 HOURS PRN
Status: DISCONTINUED | OUTPATIENT
Start: 2023-09-16 | End: 2023-09-20 | Stop reason: HOSPADM

## 2023-09-16 RX ORDER — ENOXAPARIN SODIUM 100 MG/ML
1 INJECTION SUBCUTANEOUS ONCE
Status: COMPLETED | OUTPATIENT
Start: 2023-09-16 | End: 2023-09-16

## 2023-09-16 RX ORDER — PANTOPRAZOLE SODIUM 40 MG/1
40 TABLET, DELAYED RELEASE ORAL
Status: DISCONTINUED | OUTPATIENT
Start: 2023-09-17 | End: 2023-09-20 | Stop reason: HOSPADM

## 2023-09-16 RX ORDER — ACETAMINOPHEN 325 MG/1
650 TABLET ORAL EVERY 6 HOURS PRN
Status: DISCONTINUED | OUTPATIENT
Start: 2023-09-16 | End: 2023-09-16

## 2023-09-16 RX ORDER — ASPIRIN 81 MG/1
81 TABLET ORAL DAILY
Status: DISCONTINUED | OUTPATIENT
Start: 2023-09-17 | End: 2023-09-20 | Stop reason: HOSPADM

## 2023-09-16 RX ORDER — OXYCODONE HYDROCHLORIDE 5 MG/1
10 TABLET ORAL EVERY 4 HOURS PRN
Status: DISCONTINUED | OUTPATIENT
Start: 2023-09-16 | End: 2023-09-20 | Stop reason: HOSPADM

## 2023-09-16 RX ORDER — PREGABALIN 75 MG/1
150 CAPSULE ORAL 2 TIMES DAILY
Status: DISCONTINUED | OUTPATIENT
Start: 2023-09-16 | End: 2023-09-20 | Stop reason: HOSPADM

## 2023-09-16 RX ORDER — INSULIN LISPRO 100 [IU]/ML
0-8 INJECTION, SOLUTION INTRAVENOUS; SUBCUTANEOUS
Status: DISCONTINUED | OUTPATIENT
Start: 2023-09-17 | End: 2023-09-20 | Stop reason: HOSPADM

## 2023-09-16 RX ADMIN — PREGABALIN 150 MG: 75 CAPSULE ORAL at 23:44

## 2023-09-16 RX ADMIN — ASPIRIN 243 MG: 81 TABLET, CHEWABLE ORAL at 17:17

## 2023-09-16 RX ADMIN — IOPAMIDOL 100 ML: 755 INJECTION, SOLUTION INTRAVENOUS at 20:04

## 2023-09-16 RX ADMIN — ENOXAPARIN SODIUM 80 MG: 100 INJECTION SUBCUTANEOUS at 23:45

## 2023-09-16 RX ADMIN — ZOLPIDEM TARTRATE 5 MG: 5 TABLET ORAL at 23:49

## 2023-09-16 ASSESSMENT — PAIN - FUNCTIONAL ASSESSMENT: PAIN_FUNCTIONAL_ASSESSMENT: NONE - DENIES PAIN

## 2023-09-16 NOTE — ED PROVIDER NOTES
\[facility]\"" EMERGENCY DEPT  EMERGENCY DEPARTMENT ENCOUNTER       Pt Name: Veda Shell MRN: [id number]  Birthdate 1955  Date of evaluation: 9/16/2023  Provider: Ronan Alaniz MD   PCP: Jigar Garcia MD  Note Started: 5:21 PM EDT 9/16/23     CHIEF COMPLAINT       Chief Complaint   Patient presents with    Chest Pain     Was at the [employer] and had sudden onset of chest pain and diaphoresis, by the time EMS arrived, pt was asymptomatic, all symptoms resolved, history of CHF, EF 40-45, NAD currently     HISTORY OF PRESENT ILLNESS: 1 or more elements      History From: patient, History limited by: none     Angel Alvarez Sr. is a 74 y.o. male with past medical history of multiple myeloma, hypertension, diabetes who presents emergency department with chest pain, sweating, and shortness of breath that began suddenly at approximately 3:00. EMS was called and they arrived approximately 15 minutes later and his symptoms have resolved. He had no complaints when they arrived. No additional medications were given. On arrival to the emergency department he reported that he had a brief episode of return of chest pain, but this also resolved. He was recently admitted to the hospital after episodes of palpitations and was evaluated by cardiology who ended up recommending discharge. He has been seen by cardiology as an outpatient and they have recommended stress test.  He denies any known history of coronary artery disease. Please See MDM for Additional Details of the HPI/PMH  Nursing Notes were all reviewed and agreed with or any disagreements were addressed in the HPI. REVIEW OF SYSTEMS        Positives and Pertinent negatives as per HPI.     PAST HISTORY     Past Medical History:  Past Medical History:   Diagnosis Date    Acid reflux     Arrhythmia     pvc's    Cancer ([address])     multiple myeloma    Chronic pain     neck    Depression     Diabetes ([address])     Femoral hernia 7/11/2012    Hepatitis C mouth daily    CLONAZEPAM (KLONOPIN) 0.5 MG TABLET    Take 1 tablet by mouth 2 times daily as needed. DEUTETRABENAZINE (AUSTEDO) 6 MG TABLET    Take by mouth 2 times daily    DEXAMETHASONE (DECADRON) 4 MG TABLET    Take 10 tablets by mouth every 7 days    EMPAGLIFLOZIN (JARDIANCE) 10 MG TABLET    Take 1 tablet by mouth daily    ESCITALOPRAM (LEXAPRO) 10 MG TABLET    TAKE 1 TABLET (10 MG) BY MOUTH DAILY. LISINOPRIL (PRINIVIL;ZESTRIL) 2.5 MG TABLET    Take 1 tablet by mouth daily    METFORMIN (GLUCOPHAGE-XR) 500 MG EXTENDED RELEASE TABLET    TAKE 1 TABLET BY MOUTH TWICE A DAY    METOPROLOL SUCCINATE (TOPROL XL) 25 MG EXTENDED RELEASE TABLET    Take 1 tablet by mouth daily    MORPHINE (MS CONTIN) 15 MG EXTENDED RELEASE TABLET        MULTIPLE VITAMIN (MULTIVITAMIN PO)    Take 1 tablet by mouth daily    NALOXONE (NARCAN) 4 MG/0.1ML LIQD NASAL SPRAY    Administer 1 spray into one nostril as needed for opioid reversal or respiratory depression. Call 911. If patient does not respond, or responds and then relapses, give additional doses every 2 to 3 minutes, alternating nostrils, until medical help arrives. NAPROXEN (NAPROSYN) 500 MG TABLET        NONFORMULARY    Cannabis prescription    OMEPRAZOLE (PRILOSEC) 20 MG DELAYED RELEASE CAPSULE    Take by mouth daily as needed    ONDANSETRON (ZOFRAN-ODT) 8 MG TBDP DISINTEGRATING TABLET    Take 0.5 tablets by mouth every 8 hours as needed for Nausea    PREGABALIN (LYRICA) 150 MG CAPSULE        PROCHLORPERAZINE (COMPAZINE) 10 MG TABLET    Take 1 tablet by mouth every 6 hours as needed (Cinv)    SILDENAFIL (REVATIO) 20 MG TABLET        SODIUM FLUORIDE, DENTAL GEL, 1.1 % CREA    USE AS DIRECTED TWO TIMES DAILY    SUVOREXANT (BELSOMRA) 15 MG TABS    Take by mouth.     TAMSULOSIN (FLOMAX) 0.4 MG CAPSULE    Take by mouth daily    TAPENTADOL (NUCYNTA) 100 MG TABS    TAKE 2 TABLETS 3 TIMES A DAY    TAPENTADOL (NUCYNTA) 100 MG TABS    Take 1 tablet by mouth every 6 hours as needed

## 2023-09-17 LAB
AMPHET UR QL SCN: NEGATIVE
ANION GAP SERPL CALC-SCNC: 7 MMOL/L (ref 5–15)
BARBITURATES UR QL SCN: NEGATIVE
BASOPHILS # BLD: 0 K/UL (ref 0–0.1)
BASOPHILS NFR BLD: 0 % (ref 0–1)
BENZODIAZ UR QL: NEGATIVE
BUN SERPL-MCNC: 23 MG/DL (ref 6–20)
BUN/CREAT SERPL: 17 (ref 12–20)
CALCIUM SERPL-MCNC: 9.1 MG/DL (ref 8.5–10.1)
CANNABINOIDS UR QL SCN: POSITIVE
CHLORIDE SERPL-SCNC: 106 MMOL/L (ref 97–108)
CO2 SERPL-SCNC: 24 MMOL/L (ref 21–32)
COCAINE UR QL SCN: NEGATIVE
CREAT SERPL-MCNC: 1.39 MG/DL (ref 0.7–1.3)
DIFFERENTIAL METHOD BLD: ABNORMAL
EKG ATRIAL RATE: 58 BPM
EKG DIAGNOSIS: NORMAL
EKG P AXIS: 39 DEGREES
EKG P-R INTERVAL: 154 MS
EKG Q-T INTERVAL: 446 MS
EKG QRS DURATION: 102 MS
EKG QTC CALCULATION (BAZETT): 437 MS
EKG R AXIS: -31 DEGREES
EKG T AXIS: -67 DEGREES
EKG VENTRICULAR RATE: 58 BPM
EOSINOPHIL # BLD: 0 K/UL (ref 0–0.4)
EOSINOPHIL NFR BLD: 0 % (ref 0–7)
ERYTHROCYTE [DISTWIDTH] IN BLOOD BY AUTOMATED COUNT: 14.4 % (ref 11.5–14.5)
GLUCOSE BLD STRIP.AUTO-MCNC: 126 MG/DL (ref 65–117)
GLUCOSE BLD STRIP.AUTO-MCNC: 132 MG/DL (ref 65–117)
GLUCOSE BLD STRIP.AUTO-MCNC: 138 MG/DL (ref 65–117)
GLUCOSE BLD STRIP.AUTO-MCNC: 174 MG/DL (ref 65–117)
GLUCOSE SERPL-MCNC: 94 MG/DL (ref 65–100)
HCT VFR BLD AUTO: 34.8 % (ref 36.6–50.3)
HGB BLD-MCNC: 11.4 G/DL (ref 12.1–17)
IMM GRANULOCYTES # BLD AUTO: 0 K/UL (ref 0–0.04)
IMM GRANULOCYTES NFR BLD AUTO: 0 % (ref 0–0.5)
LYMPHOCYTES # BLD: 0.7 K/UL (ref 0.8–3.5)
LYMPHOCYTES NFR BLD: 17 % (ref 12–49)
Lab: ABNORMAL
MCH RBC QN AUTO: 33.5 PG (ref 26–34)
MCHC RBC AUTO-ENTMCNC: 32.8 G/DL (ref 30–36.5)
MCV RBC AUTO: 102.4 FL (ref 80–99)
METHADONE UR QL: NEGATIVE
MONOCYTES # BLD: 0.6 K/UL (ref 0–1)
MONOCYTES NFR BLD: 13 % (ref 5–13)
NEUTS SEG # BLD: 3.2 K/UL (ref 1.8–8)
NEUTS SEG NFR BLD: 71 % (ref 32–75)
NRBC # BLD: 0 K/UL (ref 0–0.01)
NRBC BLD-RTO: 0 PER 100 WBC
OPIATES UR QL: NEGATIVE
PCP UR QL: NEGATIVE
PLATELET # BLD AUTO: 163 K/UL (ref 150–400)
PMV BLD AUTO: 11.8 FL (ref 8.9–12.9)
POTASSIUM SERPL-SCNC: 4.2 MMOL/L (ref 3.5–5.1)
RBC # BLD AUTO: 3.4 M/UL (ref 4.1–5.7)
SERVICE CMNT-IMP: ABNORMAL
SODIUM SERPL-SCNC: 137 MMOL/L (ref 136–145)
WBC # BLD AUTO: 4.5 K/UL (ref 4.1–11.1)

## 2023-09-17 PROCEDURE — 6370000000 HC RX 637 (ALT 250 FOR IP): Performed by: STUDENT IN AN ORGANIZED HEALTH CARE EDUCATION/TRAINING PROGRAM

## 2023-09-17 PROCEDURE — 6360000002 HC RX W HCPCS: Performed by: STUDENT IN AN ORGANIZED HEALTH CARE EDUCATION/TRAINING PROGRAM

## 2023-09-17 PROCEDURE — 1100000003 HC PRIVATE W/ TELEMETRY

## 2023-09-17 PROCEDURE — 80307 DRUG TEST PRSMV CHEM ANLYZR: CPT

## 2023-09-17 PROCEDURE — 82962 GLUCOSE BLOOD TEST: CPT

## 2023-09-17 PROCEDURE — 36415 COLL VENOUS BLD VENIPUNCTURE: CPT

## 2023-09-17 PROCEDURE — 85025 COMPLETE CBC W/AUTO DIFF WBC: CPT

## 2023-09-17 PROCEDURE — 80048 BASIC METABOLIC PNL TOTAL CA: CPT

## 2023-09-17 PROCEDURE — 2580000003 HC RX 258: Performed by: STUDENT IN AN ORGANIZED HEALTH CARE EDUCATION/TRAINING PROGRAM

## 2023-09-17 RX ORDER — ENOXAPARIN SODIUM 100 MG/ML
1 INJECTION SUBCUTANEOUS 2 TIMES DAILY
Status: COMPLETED | OUTPATIENT
Start: 2023-09-17 | End: 2023-09-18

## 2023-09-17 RX ADMIN — ESCITALOPRAM OXALATE 10 MG: 10 TABLET ORAL at 09:26

## 2023-09-17 RX ADMIN — EMPAGLIFLOZIN 10 MG: 10 TABLET, FILM COATED ORAL at 09:26

## 2023-09-17 RX ADMIN — PANTOPRAZOLE SODIUM 40 MG: 40 TABLET, DELAYED RELEASE ORAL at 06:17

## 2023-09-17 RX ADMIN — ASPIRIN 81 MG: 81 TABLET, COATED ORAL at 09:26

## 2023-09-17 RX ADMIN — ZOLPIDEM TARTRATE 5 MG: 5 TABLET ORAL at 21:41

## 2023-09-17 RX ADMIN — TENOFOVIR DISPROXIL FUMARATE 300 MG: 300 TABLET ORAL at 10:03

## 2023-09-17 RX ADMIN — TAMSULOSIN HYDROCHLORIDE 0.4 MG: 0.4 CAPSULE ORAL at 09:26

## 2023-09-17 RX ADMIN — ENOXAPARIN SODIUM 80 MG: 100 INJECTION SUBCUTANEOUS at 21:41

## 2023-09-17 RX ADMIN — ASPIRIN 243 MG: 81 TABLET, CHEWABLE ORAL at 09:26

## 2023-09-17 RX ADMIN — PREGABALIN 150 MG: 75 CAPSULE ORAL at 09:26

## 2023-09-17 RX ADMIN — OXYCODONE HYDROCHLORIDE 15 MG: 5 TABLET ORAL at 21:44

## 2023-09-17 RX ADMIN — OXYCODONE HYDROCHLORIDE 10 MG: 5 TABLET ORAL at 04:57

## 2023-09-17 RX ADMIN — SODIUM CHLORIDE, PRESERVATIVE FREE 10 ML: 5 INJECTION INTRAVENOUS at 09:28

## 2023-09-17 RX ADMIN — SODIUM CHLORIDE, PRESERVATIVE FREE 10 ML: 5 INJECTION INTRAVENOUS at 00:27

## 2023-09-17 RX ADMIN — PREGABALIN 150 MG: 75 CAPSULE ORAL at 23:53

## 2023-09-17 RX ADMIN — ENOXAPARIN SODIUM 80 MG: 100 INJECTION SUBCUTANEOUS at 09:55

## 2023-09-17 ASSESSMENT — PAIN SCALES - GENERAL
PAINLEVEL_OUTOF10: 0
PAINLEVEL_OUTOF10: 5
PAINLEVEL_OUTOF10: 4
PAINLEVEL_OUTOF10: 0

## 2023-09-17 ASSESSMENT — PAIN DESCRIPTION - LOCATION: LOCATION: NECK

## 2023-09-17 ASSESSMENT — PAIN DESCRIPTION - DESCRIPTORS: DESCRIPTORS: ACHING

## 2023-09-17 NOTE — PROGRESS NOTES
Spiritual Care Assessment/Progress Note  Wai    Name: Ernestine Ramsay Sr. MRN: [id number]    Age: 74 y.o. Sex: male   Language: English     Date: 9/17/2023            Total Time Calculated: 18 min              Spiritual Assessment begun in [facility] [address]  Service Provided For[de-identified] Patient and family together  Referral/Consult From[de-identified] Nurse  Encounter Overview/Reason : Initial Encounter    Spiritual beliefs:      [x] Involved in a elvia tradition/spiritual practice:      [] Supported by a elvia community:      [] Claims no spiritual orientation:      [] Seeking spiritual identity:           [] Adheres to an individual form of spirituality:      [] Not able to assess:                Identified resources for coping and support system:   Support System: Spouse       [x] Prayer                  [] Devotional reading               [] Music                  [] Guided Imagery     [] Pet visits                                        [] Other: (COMMENT)     Specific area/focus of visit   Encounter:    Crisis:    Spiritual/Emotional needs: Type: Spiritual Support  Ritual, Rites and Sacraments:    Grief, Loss, and Adjustments:    Ethics/Mediation:    Behavioral Health:    Palliative Care: Advance Care Planning:      Plan/Referrals: Continue Support (comment)    Narrative:  Responded to spiritual care consult to offer pastoral support to patient on CMS[facility] unit. Patient's wife was present. Patient appeared to be in good spirits and was welcoming of visit. Provided supportive listening presence as he spoke some of the effects of his chemotherapy. ..namely \"chemo brain\". He seems to lose his train of thought at times. His elvia is a strength for coping with his illness as is the support of his wife. Shared prayer together. Patient and his wife expressed appreciation for visit and prayer.    available upon referral by staff or by patient/family request.    YOANDY Villagomez, [facility], Staff [address] [facility] Paging Service  [phone])

## 2023-09-17 NOTE — H&P
symmetrical, trachea midline     Lungs:   Clear to auscultation bilaterally. Symmetric expansion. Good aeration. Normal respiratory effort. Chest wall:   No tenderness. No Accessory muscle use. Cardiovascular:   Regular rate and rhythm, no murmurs rubs gallops appreciated, no JVD. Radial/AT/DP pulses 2+     GI/:    Soft, non-tender. Not distended. Bowel sounds normal. No HSM/Masses      Extremities  No edema. No cyanosis. Skin:  No significant lesions/ jaundice/ rashes noted. Neurologic:  PERRL. EOMI. No facial asymmetry. No aphasia. No slurred speech. Moves all extremities. No overt focal deficits appreciated     Psych:   Normal affect. Good insight. Cooperative     Other:    N/A       LAB DATA REVIEWED:    Recent Results (from the past 12 hour(s))   EKG 12 Lead    Collection Time: 09/16/23  5:26 PM   Result Value Ref Range    Ventricular Rate 58 BPM    Atrial Rate 58 BPM    P-R Interval 154 ms    QRS Duration 102 ms    Q-T Interval 446 ms    QTc Calculation (Bazett) 437 ms    P Axis 39 degrees    R Axis -31 degrees    T Axis -67 degrees    Diagnosis       Sinus bradycardia with ventricular escape complexes  Left axis deviation  Voltage criteria for left ventricular hypertrophy  ST & T wave abnormality, consider inferolateral ischemia  When compared with ECG of 09-SEP-2023 13:37,  Sinus rhythm is now with ventricular escape complexes  Vent.  rate has decreased BY  43 BPM  T wave inversion more evident in Inferior leads  Inverted T waves have replaced nonspecific T wave abnormality in Lateral   leads     CBC with Auto Differential    Collection Time: 09/16/23  5:30 PM   Result Value Ref Range    WBC 5.0 4.1 - 11.1 K/uL    RBC 3.69 (L) 4.10 - 5.70 M/uL    Hemoglobin 12.4 12.1 - 17.0 g/dL    Hematocrit 37.9 36.6 - 50.3 %    .7 (H) 80.0 - 99.0 FL    MCH 33.6 26.0 - 34.0 PG    MCHC 32.7 30.0 - 36.5 g/dL    RDW 14.1 11.5 - 14.5 %    Platelets 855 225 - 247 K/uL    MPV 12.0 8.9 - 12.9 FL use of a standardized protocol tailored for  this examination and automatic exposure control for dose modulation. Intensity projection images were obtained and reconstructed    FINDINGS:    Right chest wall Port-A-Cath. THYROID: Unremarkable. MEDIASTINUM/KATHE: No mass or lymphadenopathy. HEART/PERICARDIUM: Unremarkable. AORTA:  No aneurysm. LUNGS: No pulmonary edema, pleural effusion or focal airspace disease. INCIDENTALLY IMAGED UPPER ABDOMEN: No significant abnormality. BONES: No destructive bone lesion. ADDITIONAL COMMENTS: N/A. Impression  No evidence of aortic dissection. No acute cardiopulmonary process. González Carey Result (most recent):  XR CHEST PORTABLE 09/16/2023    Narrative  INDICATION:  chest pain    COMPARISON: September 9    FINDINGS: Single AP portable view of the chest obtained at 1720 demonstrates a  stable cardiomediastinal silhouette. There is a right chest portacatheter. The  lungs are clear bilaterally. No osseous abnormalities are seen. Impression  No evidence of acute cardiopulmonary process. _______________________________________________________________________    TOTAL TIME:  75 Minutes   TOBACCO CESSATION COUNSELING: No    Critical Care Provided  N/A  (Minutes non procedure based)        [address] [place] [address] - Internal Medicine Hospitalist/ Nocturnist  9/16/2023 11:21 PM    Please do not hesitate to reach out to myself or assigned provider on-call via [facility] paging system with questions or concerns. Procedures: see electronic medical records for all procedures/Xrays and details which were not copied into this note but were reviewed prior to creation of Plan.

## 2023-09-17 NOTE — PLAN OF CARE
Problem: Discharge Planning  Goal: Discharge to home or other facility with appropriate resources  Outcome: Progressing  Flowsheets (Taken 9/17/2023 0029 by Melinda Forbes RN)  Discharge to home or other facility with appropriate resources: Identify barriers to discharge with patient and caregiver     Problem: ABCDS Injury Assessment  Goal: Absence of physical injury  Outcome: Progressing

## 2023-09-17 NOTE — PROGRESS NOTES
Hospitalist Progress Note    NAME:   Mayra Fitch Sr.   : 1955   MRN: [id number]     Date/Time: 2023 7:26 AM  Patient PCP: Ana Saleh MD    Estimated discharge date:   Barriers: Cardio and oncology evaluation       Assessment / Plan:  Unstable angina  Syncope -likely cardiac  Negative troponin   EKG- Sinus rhythm, LAD, Inferolateral t wave inversion   Essential hypertension  Hyperlipidemia  HFmrEF-EF 45-50%  CTA chest-no evidence of aortic dissection no acute cardiopulmonary process    -Continue  telemetry monitoring  -Cardiology consulted in ED   -Therapeutic Lovenox x1, continue with full dose for now ,until evaluated by cardio team  -Dr. Yesica Winchester consulted awaiting recommendation  -Continue with 81 mg aspirin daily thereafter  -Taken off of lisinopril by oncology out of concern for hypotension  -Patient declining beta-blocker reporting it causes symptoms of malaise  -Defer statin initiation to oncology and cardiology-May be contraindicated ongoing chemotherapy     Multiple myeloma on chemotherapy  Status post bone marrow transplant  Chronic cancer related pain with opioid dependence  -Plan for chemotherapy Monday-defer to Dr. Patricia Ferrer  -Continue PTA Lyrica  -Pain management during hospitalization with  oxycodone and address as needed  -Continue PTA Viread-reportedly for viral prophylaxis  -Oncology evaluation awaiting     Insulin-dependent diabetes mellitus-HbA1c 5.8  Continue PTA Jardiance  Corrective coverage insulin  Accu-Cheks  Diabetic diet started on   Hypoglycemia protocol in place     MDD/EDER/insomnia  Continue PTA Lexapro  Formulary substitution Ambien     BPH  Continue PTA tamsulosin     Intractable hiccups  Hold PTA as needed Thorazine  Continue PTA Austedo     GERD  Formulary substitution Protonix            Medical Decision Making:   I personally reviewed labs: CBC, BMP, LFT, Urine toxicology, troponin   I personally reviewed imaging: CTA chest, CXR   I personally radiology test results   YES  Review and summation of old records today    NO  Reviewed patient's current orders and MAR    YES  PMH/SH reviewed - no change compared to H&P  ________________________________________________________________________  Care Plan discussed with:    Comments   Patient x    Family      RN x    Care Manager     Consultant                        Multidiciplinary team rounds were held today with , nursing, pharmacist and clinical coordinator. Patient's plan of care was discussed; medications were reviewed and discharge planning was addressed. ________________________________________________________________________  Total NON critical care TIME:  55  Minutes    Total CRITICAL CARE TIME Spent:   Minutes non procedure based      Comments   >50% of visit spent in counseling and coordination of care     ________________________________________________________________________  Lenny Baker MD     Procedures: see electronic medical records for all procedures/Xrays and details which were not copied into this note but were reviewed prior to creation of Plan. LABS:  I reviewed today's most current labs and imaging studies.   Pertinent labs include:  Recent Labs     09/16/23  1730 09/17/23  0440   WBC 5.0 4.5   HGB 12.4 11.4*   HCT 37.9 34.8*    163     Recent Labs     09/16/23  1730 09/17/23  0440    137   K 4.2 4.2    106   CO2 29 24   GLUCOSE 121* 94   BUN 21* 23*   CREATININE 1.61* 1.39*   CALCIUM 9.5 9.1   LABALBU 4.1  --    BILITOT 0.3  --    AST 15  --    ALT 40  --        Signed: Lenny Baker MD

## 2023-09-17 NOTE — PROGRESS NOTES
0700: Bedside and Verbal shift change report given to Lashay Leblanc RN (oncoming nurse) by Juan Pablo Frazier RN (offgoing nurse). Report included the following information Nurse Handoff Report, Index, Intake/Output, MAR, and Recent Results. 1000: RN called consult to cardiology, to MD Meenu Shultz, and consult to oncology. End of Shift Note    Bedside shift change report given to RN (oncoming nurse) by An Allen RN (offgoing nurse). Report included the following information SBAR, Kardex, Intake/Output, MAR, and Recent Results    Shift worked:  [id number]     Shift summary and any significant changes:     See above     Concerns for physician to address: N/a     Zone phone for oncoming shift:       Activity:     Number times ambulated in hallways past shift: 2  Number of times OOB to chair past shift: 5    Cardiac:   Cardiac Monitoring: Yes           Access:  Current line(s): PIV     Genitourinary:   Urinary status: voiding    Respiratory:      Chronic home O2 use?: NO  Incentive spirometer at bedside: N/A       GI:     Current diet:  ADULT DIET; Regular; Low Fat/Low Chol/High Fiber/2 gm Na  Passing flatus: YES  Tolerating current diet: YES       Pain Management:   Patient states pain is manageable on current regimen: YES    Skin:     Interventions: turn team, specialty bed, float heels, increase time out of bed, limit briefs, and nutritional support    Patient Safety:  Fall Score:    Interventions: bed/chair alarm, assistive device (walker, cane.  etc), gripper socks, pt to call before getting OOB, and stay with me (per policy)       Length of Stay:  Expected LOS: 2  Actual LOS: 1      An Allen RN

## 2023-09-17 NOTE — CARE COORDINATION
Care Management Initial Assessment       RUR:  14%   Readmission? Yes   1st IM letter given? No  1st  letter given: No         09/17/23 1128   Service Assessment   Patient Orientation Alert and Oriented;Person;Place;Situation;Self   Cognition Alert   History Provided By Patient   Primary [address] Radha Castaneda is: Legal Next of Kin  (CM provided pt with an ACD for pt and spouse to complete.)   PCP Verified by CM Yes  (Pt sees Dr. Phani Choe.)   Last Visit to PCP Within last 3 months  (last Wednesday)   Prior Functional Level Independent in ADLs/IADLs   Current Functional Level Independent in ADLs/IADLs   Can patient return to prior living arrangement Yes   Ability to make needs known: Good   Family able to assist with home care needs: Yes   Would you like for me to discuss the discharge plan with any other family members/significant others, and if so, who? Yes  Joana Carnes; Other (Comment)  ([employer] supplemental)   Community Resources None   Social/Functional History   Lives With Significant other   Type of Home House  (multi level with 5 TODD)   Home Equipment Cane;Walker, rolling; Wheelchair-manual  (Pt has a cane, stair lift, rollator, walker and w/c but does not use.)   Active  Yes   Discharge Planning   Type of Residence House   Patient expects to be discharged to: House       Pt is a readmission. CM met with pt and pt's spouse at bedside to introduce self/role, verify demographics and complete readmission assessment. Pt lives with his spouse and plans on returning at d/c.  Pt's spouse will transport at d/c.          09/17/23 1140   Readmission Assessment   Number of Days since last admission?  1-7 days   Previous Disposition Home with Family   Who is being Interviewed Patient   What was the patient's/caregiver's perception as to why they think they needed to return back to the hospital?

## 2023-09-18 ENCOUNTER — TELEPHONE (OUTPATIENT)
Age: 68
End: 2023-09-18

## 2023-09-18 LAB
ANION GAP SERPL CALC-SCNC: 5 MMOL/L (ref 5–15)
BASOPHILS # BLD: 0 K/UL (ref 0–0.1)
BASOPHILS NFR BLD: 0 % (ref 0–1)
BUN SERPL-MCNC: 29 MG/DL (ref 6–20)
BUN/CREAT SERPL: 18 (ref 12–20)
CALCIUM SERPL-MCNC: 8.8 MG/DL (ref 8.5–10.1)
CHLORIDE SERPL-SCNC: 107 MMOL/L (ref 97–108)
CO2 SERPL-SCNC: 26 MMOL/L (ref 21–32)
CREAT SERPL-MCNC: 1.61 MG/DL (ref 0.7–1.3)
DIFFERENTIAL METHOD BLD: ABNORMAL
EOSINOPHIL # BLD: 0 K/UL (ref 0–0.4)
EOSINOPHIL NFR BLD: 0 % (ref 0–7)
ERYTHROCYTE [DISTWIDTH] IN BLOOD BY AUTOMATED COUNT: 14.3 % (ref 11.5–14.5)
GLUCOSE BLD STRIP.AUTO-MCNC: 127 MG/DL (ref 65–117)
GLUCOSE BLD STRIP.AUTO-MCNC: 143 MG/DL (ref 65–117)
GLUCOSE BLD STRIP.AUTO-MCNC: 146 MG/DL (ref 65–117)
GLUCOSE BLD STRIP.AUTO-MCNC: 162 MG/DL (ref 65–117)
GLUCOSE SERPL-MCNC: 135 MG/DL (ref 65–100)
HCT VFR BLD AUTO: 36.3 % (ref 36.6–50.3)
HGB BLD-MCNC: 11.9 G/DL (ref 12.1–17)
IMM GRANULOCYTES # BLD AUTO: 0 K/UL (ref 0–0.04)
IMM GRANULOCYTES NFR BLD AUTO: 0 % (ref 0–0.5)
LYMPHOCYTES # BLD: 0.7 K/UL (ref 0.8–3.5)
LYMPHOCYTES NFR BLD: 29 % (ref 12–49)
MAGNESIUM SERPL-MCNC: 2.7 MG/DL (ref 1.6–2.4)
MCH RBC QN AUTO: 33.8 PG (ref 26–34)
MCHC RBC AUTO-ENTMCNC: 32.8 G/DL (ref 30–36.5)
MCV RBC AUTO: 103.1 FL (ref 80–99)
MONOCYTES # BLD: 0.4 K/UL (ref 0–1)
MONOCYTES NFR BLD: 17 % (ref 5–13)
NEUTS SEG # BLD: 1.4 K/UL (ref 1.8–8)
NEUTS SEG NFR BLD: 54 % (ref 32–75)
NRBC # BLD: 0 K/UL (ref 0–0.01)
NRBC BLD-RTO: 0 PER 100 WBC
PLATELET # BLD AUTO: 156 K/UL (ref 150–400)
PMV BLD AUTO: 12.1 FL (ref 8.9–12.9)
POTASSIUM SERPL-SCNC: 4.5 MMOL/L (ref 3.5–5.1)
RBC # BLD AUTO: 3.52 M/UL (ref 4.1–5.7)
RBC MORPH BLD: ABNORMAL
SERVICE CMNT-IMP: ABNORMAL
SODIUM SERPL-SCNC: 138 MMOL/L (ref 136–145)
WBC # BLD AUTO: 2.5 K/UL (ref 4.1–11.1)

## 2023-09-18 PROCEDURE — 80048 BASIC METABOLIC PNL TOTAL CA: CPT

## 2023-09-18 PROCEDURE — 1100000003 HC PRIVATE W/ TELEMETRY

## 2023-09-18 PROCEDURE — 6370000000 HC RX 637 (ALT 250 FOR IP): Performed by: STUDENT IN AN ORGANIZED HEALTH CARE EDUCATION/TRAINING PROGRAM

## 2023-09-18 PROCEDURE — 2580000003 HC RX 258: Performed by: STUDENT IN AN ORGANIZED HEALTH CARE EDUCATION/TRAINING PROGRAM

## 2023-09-18 PROCEDURE — 6360000002 HC RX W HCPCS: Performed by: INTERNAL MEDICINE

## 2023-09-18 PROCEDURE — 36415 COLL VENOUS BLD VENIPUNCTURE: CPT

## 2023-09-18 PROCEDURE — 83735 ASSAY OF MAGNESIUM: CPT

## 2023-09-18 PROCEDURE — 82962 GLUCOSE BLOOD TEST: CPT

## 2023-09-18 PROCEDURE — 6360000002 HC RX W HCPCS: Performed by: STUDENT IN AN ORGANIZED HEALTH CARE EDUCATION/TRAINING PROGRAM

## 2023-09-18 PROCEDURE — 85025 COMPLETE CBC W/AUTO DIFF WBC: CPT

## 2023-09-18 RX ORDER — SODIUM CHLORIDE, SODIUM LACTATE, POTASSIUM CHLORIDE, CALCIUM CHLORIDE 600; 310; 30; 20 MG/100ML; MG/100ML; MG/100ML; MG/100ML
INJECTION, SOLUTION INTRAVENOUS CONTINUOUS
Status: DISCONTINUED | OUTPATIENT
Start: 2023-09-18 | End: 2023-09-20

## 2023-09-18 RX ADMIN — ENOXAPARIN SODIUM 80 MG: 100 INJECTION SUBCUTANEOUS at 22:20

## 2023-09-18 RX ADMIN — TAMSULOSIN HYDROCHLORIDE 0.4 MG: 0.4 CAPSULE ORAL at 09:12

## 2023-09-18 RX ADMIN — ZOLPIDEM TARTRATE 5 MG: 5 TABLET ORAL at 22:20

## 2023-09-18 RX ADMIN — SODIUM CHLORIDE, POTASSIUM CHLORIDE, SODIUM LACTATE AND CALCIUM CHLORIDE: 600; 310; 30; 20 INJECTION, SOLUTION INTRAVENOUS at 09:21

## 2023-09-18 RX ADMIN — POLYETHYLENE GLYCOL 3350 17 G: 17 POWDER, FOR SOLUTION ORAL at 15:32

## 2023-09-18 RX ADMIN — SODIUM CHLORIDE, PRESERVATIVE FREE 10 ML: 5 INJECTION INTRAVENOUS at 22:25

## 2023-09-18 RX ADMIN — SODIUM CHLORIDE, PRESERVATIVE FREE 10 ML: 5 INJECTION INTRAVENOUS at 09:13

## 2023-09-18 RX ADMIN — PANTOPRAZOLE SODIUM 40 MG: 40 TABLET, DELAYED RELEASE ORAL at 06:50

## 2023-09-18 RX ADMIN — ASPIRIN 243 MG: 81 TABLET, CHEWABLE ORAL at 12:21

## 2023-09-18 RX ADMIN — PREGABALIN 150 MG: 75 CAPSULE ORAL at 22:20

## 2023-09-18 RX ADMIN — PREGABALIN 150 MG: 75 CAPSULE ORAL at 09:11

## 2023-09-18 RX ADMIN — SODIUM CHLORIDE, POTASSIUM CHLORIDE, SODIUM LACTATE AND CALCIUM CHLORIDE: 600; 310; 30; 20 INJECTION, SOLUTION INTRAVENOUS at 22:33

## 2023-09-18 RX ADMIN — ASPIRIN 81 MG: 81 TABLET, COATED ORAL at 09:10

## 2023-09-18 RX ADMIN — TENOFOVIR DISPROXIL FUMARATE 300 MG: 300 TABLET ORAL at 09:15

## 2023-09-18 RX ADMIN — ENOXAPARIN SODIUM 80 MG: 100 INJECTION SUBCUTANEOUS at 09:12

## 2023-09-18 RX ADMIN — EMPAGLIFLOZIN 10 MG: 10 TABLET, FILM COATED ORAL at 09:12

## 2023-09-18 RX ADMIN — OXYCODONE HYDROCHLORIDE 10 MG: 5 TABLET ORAL at 14:19

## 2023-09-18 RX ADMIN — ESCITALOPRAM OXALATE 10 MG: 10 TABLET ORAL at 09:11

## 2023-09-18 ASSESSMENT — PAIN SCALES - GENERAL
PAINLEVEL_OUTOF10: 0
PAINLEVEL_OUTOF10: 5

## 2023-09-18 ASSESSMENT — PAIN DESCRIPTION - DESCRIPTORS: DESCRIPTORS: ACHING

## 2023-09-18 NOTE — PROGRESS NOTES
Bedside and Verbal shift change report given to Annemarie RN (oncoming nurse) by Gian Ma RN (offgoing nurse). Report included the following information Nurse Handoff Report and Cardiac Rhythm NSR .     2220: PRN zolpidem administered for sleep    0430: AM labs drawn and sent to lab    End of Shift Note    Bedside shift change report given to Alyssa MARRERO (oncoming nurse) by Geneva Neely RN (offgoing nurse).   Report included the following information SBAR, MAR, Recent Results, and Cardiac Rhythm NSR    Shift worked:  7p-7a     Shift summary and any significant changes:    See above     Concerns for physician to address:    Zone phone for oncoming shift:       Activity:     Number times ambulated in hallways past shift: 1  Number of times OOB to chair past shift: 2    Cardiac:   Cardiac Monitoring: Yes           Access:  Current line(s): PIV and port     Genitourinary:   Urinary status: voiding    Respiratory:      Chronic home O2 use?: NO  Incentive spirometer at bedside: NO       GI:     Current diet:  Diet NPO Exceptions are: Sips of Water with Meds, Ice Chips  Passing flatus: YES  Tolerating current diet: YES       Pain Management:   Patient states pain is manageable on current regimen: YES    Skin:     Interventions: float heels, increase time out of bed, PT/OT consult, limit briefs, and nutritional support    Patient Safety:  Fall Score:    Interventions: gripper socks, pt to call before getting OOB, stay with me (per policy), and gait belt       Length of Stay:  Expected LOS: 3  Actual LOS: 3      Geneva Neely RN

## 2023-09-18 NOTE — PLAN OF CARE
Problem: Discharge Planning  Goal: Discharge to home or other facility with appropriate resources  Outcome: Progressing     Problem: ABCDS Injury Assessment  Goal: Absence of physical injury  Outcome: Progressing  Flowsheets (Taken 9/18/2023 6275)  Absence of Physical Injury: Implement safety measures based on patient assessment     Problem: Pain  Goal: Verbalizes/displays adequate comfort level or baseline comfort level  Outcome: Progressing

## 2023-09-18 NOTE — PROGRESS NOTES
Patient seen, full note to follow. All questions answered for the patient and wife (and daughter by phone). Likely unstable angina by history with classic discomfort, associated diaphoresis. Hypotension. Negative troponins are perplexing. Nondiagnostic ECG with prominent inferolateral T inversions. NPO after MN. Will see if one of my partners sees enough to warrant cardiac catheterization. Discussed with nursing. Estimated Creatinine Clearance: 44 mL/min (A) (based on SCr of 1.61 mg/dL (H)).     Rhonda Guardado MD

## 2023-09-18 NOTE — CONSULTS
[facility]   at [address] [place], [address], [facility], [address] [place] [address]   [phone]             Progress Note       Patient: Funmilayo Healy Sr. MRN: [id number]   SSN: [id number]    YOB: 1955              Diagnosis:         1. Multiple Myeloma, IgA Kappa    Durie Glenshaw stage IIIB      Cytogenetics/FISH: t(14;16) - high risk        Ttreatment:        1. Maintenance therapy - Pomalyst 2 mg daily - 11/19/2018    2. Maintenance therapy - Ixazomib - started 10/15/2018 - stopped 11/12/2018   3. S/P tandem  Autotransplant    First on 2/28/2018    2nd on 06/13/2018   4. Carfilzomib/Pom/Dex - s/p 5 cycles   5. VD-PACE s/p 1 cycle   6. RVD - s/p 4 cycles    7. Isatuximab/Carfilzomib/Dex cycle 4 day 8       Subjective:        Funmilayo Healy Sr. is a 74 y.o. male with a diagnosis of IgA kappa myeloma. Bone marrow shows 100% aberrant plasma cells. He suffers with DM but it is diet controlled. He has received systemic anti-viral  treatment for chronic Hep C with successful eradication of the virus. Mr. Ca Muir received 4 cycles of systemic therapy with RVd. He had an initial good response to treatment. However his paraprotein level started rising and the myeloma became refractory  to treatment. I then administered one cycle of VD-PACE in the hospital. He then received Carfilzomib/Pom/Dex. He achieved VGPR. He underwent tandem autotransplant at [facility]. According to the patient he achieved complete response with therapy. He has undergone  second/tandem transplant in June. He took Pomalyst maintenance until Aug 2019 then had to stop due to recurrent prostatitis. He had a repeat BM biopsy in February 2019 at [facility] which showed complete molecular remission. He is taking Pomalyst. He has seen  Dr. Yodit Beckham for movement disorder. He has started taking a new medication for tardive dyskinesia. Mr. Ca Muir was noted to have relapsed last month

## 2023-09-19 LAB
ANION GAP SERPL CALC-SCNC: 6 MMOL/L (ref 5–15)
BASOPHILS # BLD: 0 K/UL (ref 0–0.1)
BASOPHILS NFR BLD: 0 % (ref 0–1)
BUN SERPL-MCNC: 26 MG/DL (ref 6–20)
BUN/CREAT SERPL: 18 (ref 12–20)
CALCIUM SERPL-MCNC: 8.5 MG/DL (ref 8.5–10.1)
CHLORIDE SERPL-SCNC: 108 MMOL/L (ref 97–108)
CO2 SERPL-SCNC: 25 MMOL/L (ref 21–32)
CREAT SERPL-MCNC: 1.47 MG/DL (ref 0.7–1.3)
DIFFERENTIAL METHOD BLD: ABNORMAL
EOSINOPHIL # BLD: 0 K/UL (ref 0–0.4)
EOSINOPHIL NFR BLD: 0 % (ref 0–7)
ERYTHROCYTE [DISTWIDTH] IN BLOOD BY AUTOMATED COUNT: 14.2 % (ref 11.5–14.5)
GLUCOSE BLD STRIP.AUTO-MCNC: 125 MG/DL (ref 65–117)
GLUCOSE BLD STRIP.AUTO-MCNC: 159 MG/DL (ref 65–117)
GLUCOSE BLD STRIP.AUTO-MCNC: 200 MG/DL (ref 65–117)
GLUCOSE BLD STRIP.AUTO-MCNC: 219 MG/DL (ref 65–117)
GLUCOSE SERPL-MCNC: 139 MG/DL (ref 65–100)
HCT VFR BLD AUTO: 33.7 % (ref 36.6–50.3)
HGB BLD-MCNC: 10.6 G/DL (ref 12.1–17)
IMM GRANULOCYTES # BLD AUTO: 0 K/UL (ref 0–0.04)
IMM GRANULOCYTES NFR BLD AUTO: 0 % (ref 0–0.5)
LYMPHOCYTES # BLD: 0.8 K/UL (ref 0.8–3.5)
LYMPHOCYTES NFR BLD: 22 % (ref 12–49)
MCH RBC QN AUTO: 32.8 PG (ref 26–34)
MCHC RBC AUTO-ENTMCNC: 31.5 G/DL (ref 30–36.5)
MCV RBC AUTO: 104.3 FL (ref 80–99)
MONOCYTES # BLD: 0.5 K/UL (ref 0–1)
MONOCYTES NFR BLD: 16 % (ref 5–13)
NEUTS SEG # BLD: 2 K/UL (ref 1.8–8)
NEUTS SEG NFR BLD: 61 % (ref 32–75)
NRBC # BLD: 0 K/UL (ref 0–0.01)
NRBC BLD-RTO: 0 PER 100 WBC
PLATELET # BLD AUTO: 146 K/UL (ref 150–400)
PMV BLD AUTO: 11.8 FL (ref 8.9–12.9)
POTASSIUM SERPL-SCNC: 4.1 MMOL/L (ref 3.5–5.1)
RBC # BLD AUTO: 3.23 M/UL (ref 4.1–5.7)
SERVICE CMNT-IMP: ABNORMAL
SODIUM SERPL-SCNC: 139 MMOL/L (ref 136–145)
WBC # BLD AUTO: 3.4 K/UL (ref 4.1–11.1)

## 2023-09-19 PROCEDURE — 6370000000 HC RX 637 (ALT 250 FOR IP): Performed by: NURSE PRACTITIONER

## 2023-09-19 PROCEDURE — 82962 GLUCOSE BLOOD TEST: CPT

## 2023-09-19 PROCEDURE — 6370000000 HC RX 637 (ALT 250 FOR IP): Performed by: STUDENT IN AN ORGANIZED HEALTH CARE EDUCATION/TRAINING PROGRAM

## 2023-09-19 PROCEDURE — 1100000003 HC PRIVATE W/ TELEMETRY

## 2023-09-19 PROCEDURE — 36415 COLL VENOUS BLD VENIPUNCTURE: CPT

## 2023-09-19 PROCEDURE — 85025 COMPLETE CBC W/AUTO DIFF WBC: CPT

## 2023-09-19 PROCEDURE — 2580000003 HC RX 258: Performed by: STUDENT IN AN ORGANIZED HEALTH CARE EDUCATION/TRAINING PROGRAM

## 2023-09-19 PROCEDURE — 80048 BASIC METABOLIC PNL TOTAL CA: CPT

## 2023-09-19 RX ORDER — SENNA AND DOCUSATE SODIUM 50; 8.6 MG/1; MG/1
2 TABLET, FILM COATED ORAL DAILY
Status: DISCONTINUED | OUTPATIENT
Start: 2023-09-19 | End: 2023-09-20 | Stop reason: HOSPADM

## 2023-09-19 RX ADMIN — ESCITALOPRAM OXALATE 10 MG: 10 TABLET ORAL at 09:13

## 2023-09-19 RX ADMIN — SODIUM CHLORIDE, PRESERVATIVE FREE 10 ML: 5 INJECTION INTRAVENOUS at 21:15

## 2023-09-19 RX ADMIN — ASPIRIN 243 MG: 81 TABLET, CHEWABLE ORAL at 09:13

## 2023-09-19 RX ADMIN — TAMSULOSIN HYDROCHLORIDE 0.4 MG: 0.4 CAPSULE ORAL at 09:13

## 2023-09-19 RX ADMIN — ASPIRIN 81 MG: 81 TABLET, COATED ORAL at 09:12

## 2023-09-19 RX ADMIN — PREGABALIN 150 MG: 75 CAPSULE ORAL at 09:13

## 2023-09-19 RX ADMIN — EMPAGLIFLOZIN 10 MG: 10 TABLET, FILM COATED ORAL at 09:13

## 2023-09-19 RX ADMIN — SENNOSIDES AND DOCUSATE SODIUM 2 TABLET: 50; 8.6 TABLET ORAL at 17:38

## 2023-09-19 RX ADMIN — PREGABALIN 150 MG: 75 CAPSULE ORAL at 21:16

## 2023-09-19 RX ADMIN — ZOLPIDEM TARTRATE 5 MG: 5 TABLET ORAL at 21:16

## 2023-09-19 RX ADMIN — TENOFOVIR DISPROXIL FUMARATE 300 MG: 300 TABLET ORAL at 09:14

## 2023-09-19 RX ADMIN — INSULIN LISPRO 2 UNITS: 100 INJECTION, SOLUTION INTRAVENOUS; SUBCUTANEOUS at 11:33

## 2023-09-19 RX ADMIN — PANTOPRAZOLE SODIUM 40 MG: 40 TABLET, DELAYED RELEASE ORAL at 09:13

## 2023-09-19 NOTE — PLAN OF CARE
Problem: Discharge Planning  Goal: Discharge to home or other facility with appropriate resources  9/19/2023 0027 by Jayne Peña RN  Outcome: Progressing  9/18/2023 1116 by Afshin Durán RN  Outcome: Progressing     Problem: ABCDS Injury Assessment  Goal: Absence of physical injury  9/19/2023 0027 by Jayne Peña RN  Outcome: Progressing  9/18/2023 1116 by Afshin Durán RN  Outcome: Progressing  Flowsheets (Taken 9/18/2023 0724)  Absence of Physical Injury: Implement safety measures based on patient assessment     Problem: Pain  Goal: Verbalizes/displays adequate comfort level or baseline comfort level  9/19/2023 0027 by Jayne Peña RN  Outcome: Progressing  9/18/2023 1116 by Afshin Durán RN  Outcome: Progressing     Problem: Chronic Conditions and Co-morbidities  Goal: Patient's chronic conditions and co-morbidity symptoms are monitored and maintained or improved  Outcome: Progressing     Problem: Safety - Adult  Goal: Free from fall injury  Outcome: Progressing

## 2023-09-19 NOTE — CONSULTS
[facility]        Consult    NAME: Ima Epley .   :  1955   MRN:  [id number]     Date/Time:  2023 9:07 AM    Patient PCP: Yony Quiñones MD  ________________________________________________________________________     Assessment:     Unstable Angina   HFmrEF - LVEF 45-50%, no significant valvular disease 2023  Multiple Myeloma s/p Bone Marrow Transplant and current chemotherapy  Essential Hypertension  Hyperlipidemia  CKD 3 GFR 53, creat 1.47  Type 2 Diabetes  GERD  BPH  Anemia due to Chronic Disease  Mild thrombocytopenia - 146 - Previous Hep C TX  Constipation         Plan:   I discussed plan with hospitalist. Further plan per physician    1 Unstable Angina  = troponins flat. No further episodes of pain, ASA 81, check lipids in AM. On Toprol PTA. Will plan on Cath today with Dr. Figueroa Patel. NPO please  2 HFmrEF - LVEF 45-50%, no significant valvular disease 2023  3 Multiple Myeloma IgA Kappa IIIB s/p Bone Marrow Transplant and current chemotherapy - Dr. Mele Ortega  4 Essential Hypertension  5 Hyperlipidemia - Lipid Panel in AM  6 CKD 3 GFR 53, creat 1.47  7 Type 2 Diabetes - on Metformin PTA, no recent Hgb A1C, Lab in AM  8 GERD - PPI  9 BPH - Flomax  10 Anemia due to Chronic Disease  11 Mild thrombocytopenia - 146 - Previous Hep C TX  12 Constipation - requested meds for this     [x]       High complexity decision making was performed in this patient at high risk for decompensation with multiple organ involvement. Subjective:   CHIEF COMPLAINT: Chest pain and dizziness    HISTORY OF PRESENT ILLNESS:     Héctor Lofton is a 74 y.o.  male who was admitted due to chest discomfort and [facility]. He states he became dizzy and had to lower to the floor. His wife states he became diaphoretic. He said resting made him feel better.  HE has not had this pain in the past but intially it was progressive and then let off with rest. He Carotids good upstrokes, with no bruit. Chest: Lungs clear to auscultation bilaterally. Cardiovascular: Regular rate and rhythm, S1S2 normal, no murmur, rubs, gallops. Abdomen: Soft, non-tender, bowel sounds are active. No organomegaly. Extremities: No edema bilaterally. Femoral pulses +2, Distal Pulses +1. Skin: Warm and dry. Neuro: CN II-XII grossly intact, Strength and sensation grossly intact. Data:      Prior to Admission medications    Medication Sig Start Date End Date Taking? Authorizing Provider   clonazePAM (KLONOPIN) 0.5 MG tablet Take 1 tablet by mouth 2 times daily as needed. 6/27/23  Yes Historical Provider, MD   pregabalin (LYRICA) 150 MG capsule  9/11/23  Yes Historical Provider, MD   testosterone cypionate (DEPOTESTOTERONE CYPIONATE) 200 MG/ML injection INJECT 2 ML INTRAMUSCULARLY ONCE WEEKLY 8/16/23  Yes Historical Provider, MD   escitalopram (LEXAPRO) 10 MG tablet TAKE 1 TABLET (10 MG) BY MOUTH DAILY.  8/16/23  Yes Historical Provider, MD   NONFORMULARY Cannabis prescription   Yes Historical Provider, MD   empagliflozin (JARDIANCE) 10 MG tablet Take 1 tablet by mouth daily 9/10/23  Yes TAVARES Chavira NP   metoprolol succinate (TOPROL XL) 25 MG extended release tablet Take 1 tablet by mouth daily 9/11/23  Yes TAVARES Chavira NP   tenofovir disoproxil fumarate (VIREAD) 300 MG tablet TAKE 1 TABLET BY MOUTH 1 TIME A DAY 8/21/23  Yes Geni Bloch, MD   dexamethasone (DECADRON) 4 MG tablet Take 10 tablets by mouth every 7 days 8/8/23  Yes Geni Bloch, MD   metFORMIN (GLUCOPHAGE-XR) 500 MG extended release tablet TAKE 1 TABLET BY MOUTH TWICE A DAY 7/1/23  Yes Stoney Cazraes MD   prochlorperazine (COMPAZINE) 10 MG tablet Take 1 tablet by mouth every 6 hours as needed (Cinv) 6/12/23  Yes TAVARES Foss - NP   ascorbic acid (VITAMIN C) 100 MG tablet Take by mouth daily   Yes Ar Automatic Reconciliation   aspirin 81 MG EC tablet Take by mouth daily   Yes Ar Automatic Reconciliation   chlorproMAZINE (THORAZINE) 25 MG tablet Take by mouth 3 times daily as needed 8/22/22  Yes Ar Automatic Reconciliation   omeprazole (PRILOSEC) 20 MG delayed release capsule Take by mouth daily as needed   Yes Ar Automatic Reconciliation   Suvorexant (BELSOMRA) 15 MG TABS Take by mouth. 5/5/22  Yes Ar Automatic Reconciliation   tamsulosin (FLOMAX) 0.4 MG capsule Take by mouth daily 4/4/17  Yes Ar Automatic Reconciliation   tapentadol (NUCYNTA) 100 MG TABS TAKE 2 TABLETS 3 TIMES A DAY 7/27/19  Yes Ar Automatic Reconciliation   morphine (MS CONTIN) 15 MG extended release tablet  8/9/23   Historical Provider, MD   naloxone (NARCAN) 4 MG/0.1ML LIQD nasal spray Administer 1 spray into one nostril as needed for opioid reversal or respiratory depression. Call 911. If patient does not respond, or responds and then relapses, give additional doses every 2 to 3 minutes, alternating nostrils, until medical help arrives. 9/30/21   Historical Provider, MD   sildenafil (REVATIO) 20 MG tablet  6/16/21   Historical Provider, MD   Multiple Vitamin (MULTIVITAMIN PO) Take 1 tablet by mouth daily    Historical Provider, MD   tapentadol (NUCYNTA) 100 MG TABS Take 1 tablet by mouth every 6 hours as needed for Pain.     Historical Provider, MD   ondansetron (ZOFRAN-ODT) 8 MG TBDP disintegrating tablet Take 0.5 tablets by mouth every 8 hours as needed for Nausea 6/12/23   Bhavin Riley APRN - NP   Cholecalciferol 50 MCG (2000 UT) TABS Take by mouth daily    Ar Automatic Reconciliation   deutetrabenazine (AUSTEDO) 6 MG tablet Take by mouth 2 times daily 11/28/22   Ar Automatic Reconciliation   SODIUM FLUORIDE, DENTAL GEL, 1.1 % CREA USE AS DIRECTED TWO TIMES DAILY 8/12/20   Ar Automatic Reconciliation       Recent Results (from the past 24 hour(s))   POCT Glucose    Collection Time: 09/18/23 11:03 AM   Result Value Ref Range    POC Glucose 143 (H) 65 - 117 mg/dL    Performed by: [facility] PCT    POCT Glucose    Collection

## 2023-09-19 NOTE — PLAN OF CARE
Problem: Discharge Planning  Goal: Discharge to home or other facility with appropriate resources  Outcome: Progressing     Problem: ABCDS Injury Assessment  Goal: Absence of physical injury  Outcome: Progressing     Problem: Pain  Goal: Verbalizes/displays adequate comfort level or baseline comfort level  Outcome: Progressing     Problem: Chronic Conditions and Co-morbidities  Goal: Patient's chronic conditions and co-morbidity symptoms are monitored and maintained or improved  Outcome: Progressing     Problem: Safety - Adult  Goal: Free from fall injury  Outcome: Progressing

## 2023-09-20 ENCOUNTER — APPOINTMENT (OUTPATIENT)
Facility: HOSPITAL | Age: 68
DRG: 287 | End: 2023-09-20
Attending: STUDENT IN AN ORGANIZED HEALTH CARE EDUCATION/TRAINING PROGRAM
Payer: MEDICARE

## 2023-09-20 VITALS
DIASTOLIC BLOOD PRESSURE: 82 MMHG | TEMPERATURE: 98 F | RESPIRATION RATE: 14 BRPM | OXYGEN SATURATION: 100 % | SYSTOLIC BLOOD PRESSURE: 131 MMHG | BODY MASS INDEX: 26.9 KG/M2 | HEART RATE: 80 BPM | WEIGHT: 177.47 LBS | HEIGHT: 68 IN

## 2023-09-20 LAB
ALBUMIN SERPL-MCNC: 3 G/DL (ref 3.5–5)
ALBUMIN/GLOB SERPL: 0.9 (ref 1.1–2.2)
ALP SERPL-CCNC: 71 U/L (ref 45–117)
ALT SERPL-CCNC: 78 U/L (ref 12–78)
ANION GAP SERPL CALC-SCNC: 6 MMOL/L (ref 5–15)
AST SERPL-CCNC: 88 U/L (ref 15–37)
BASOPHILS # BLD: 0 K/UL (ref 0–0.1)
BASOPHILS NFR BLD: 0 % (ref 0–1)
BILIRUB SERPL-MCNC: 0.3 MG/DL (ref 0.2–1)
BUN SERPL-MCNC: 20 MG/DL (ref 6–20)
BUN/CREAT SERPL: 14 (ref 12–20)
CALCIUM SERPL-MCNC: 8 MG/DL (ref 8.5–10.1)
CHLORIDE SERPL-SCNC: 110 MMOL/L (ref 97–108)
CHOLEST SERPL-MCNC: 115 MG/DL
CO2 SERPL-SCNC: 22 MMOL/L (ref 21–32)
CREAT SERPL-MCNC: 1.43 MG/DL (ref 0.7–1.3)
DIFFERENTIAL METHOD BLD: ABNORMAL
ECHO BSA: 1.97 M2
ECHO BSA: 1.97 M2
EOSINOPHIL # BLD: 0 K/UL (ref 0–0.4)
EOSINOPHIL NFR BLD: 0 % (ref 0–7)
ERYTHROCYTE [DISTWIDTH] IN BLOOD BY AUTOMATED COUNT: 14.2 % (ref 11.5–14.5)
EST. AVERAGE GLUCOSE BLD GHB EST-MCNC: 128 MG/DL
GLOBULIN SER CALC-MCNC: 3.3 G/DL (ref 2–4)
GLUCOSE BLD STRIP.AUTO-MCNC: 137 MG/DL (ref 65–117)
GLUCOSE BLD STRIP.AUTO-MCNC: 152 MG/DL (ref 65–117)
GLUCOSE SERPL-MCNC: 139 MG/DL (ref 65–100)
HBA1C MFR BLD: 6.1 % (ref 4–5.6)
HCT VFR BLD AUTO: 29.8 % (ref 36.6–50.3)
HDLC SERPL-MCNC: 34 MG/DL
HDLC SERPL: 3.4 (ref 0–5)
HGB BLD-MCNC: 9.4 G/DL (ref 12.1–17)
IMM GRANULOCYTES # BLD AUTO: 0 K/UL (ref 0–0.04)
IMM GRANULOCYTES NFR BLD AUTO: 0 % (ref 0–0.5)
LDLC SERPL CALC-MCNC: 44.6 MG/DL (ref 0–100)
LYMPHOCYTES # BLD: 0.6 K/UL (ref 0.8–3.5)
LYMPHOCYTES NFR BLD: 23 % (ref 12–49)
MAGNESIUM SERPL-MCNC: 2.6 MG/DL (ref 1.6–2.4)
MCH RBC QN AUTO: 33.1 PG (ref 26–34)
MCHC RBC AUTO-ENTMCNC: 31.5 G/DL (ref 30–36.5)
MCV RBC AUTO: 104.9 FL (ref 80–99)
MONOCYTES # BLD: 0.5 K/UL (ref 0–1)
MONOCYTES NFR BLD: 18 % (ref 5–13)
NEUTS SEG # BLD: 1.6 K/UL (ref 1.8–8)
NEUTS SEG NFR BLD: 58 % (ref 32–75)
NRBC # BLD: 0 K/UL (ref 0–0.01)
NRBC BLD-RTO: 0 PER 100 WBC
PLATELET # BLD AUTO: 132 K/UL (ref 150–400)
PMV BLD AUTO: 11.5 FL (ref 8.9–12.9)
POTASSIUM SERPL-SCNC: 4.4 MMOL/L (ref 3.5–5.1)
POTASSIUM SERPL-SCNC: 5.5 MMOL/L (ref 3.5–5.1)
PROT SERPL-MCNC: 6.3 G/DL (ref 6.4–8.2)
RBC # BLD AUTO: 2.84 M/UL (ref 4.1–5.7)
SERVICE CMNT-IMP: ABNORMAL
SERVICE CMNT-IMP: ABNORMAL
SODIUM SERPL-SCNC: 138 MMOL/L (ref 136–145)
TRIGL SERPL-MCNC: 182 MG/DL
VLDLC SERPL CALC-MCNC: 36.4 MG/DL
WBC # BLD AUTO: 2.7 K/UL (ref 4.1–11.1)

## 2023-09-20 PROCEDURE — 82962 GLUCOSE BLOOD TEST: CPT

## 2023-09-20 PROCEDURE — 6370000000 HC RX 637 (ALT 250 FOR IP): Performed by: STUDENT IN AN ORGANIZED HEALTH CARE EDUCATION/TRAINING PROGRAM

## 2023-09-20 PROCEDURE — B2111ZZ FLUOROSCOPY OF MULTIPLE CORONARY ARTERIES USING LOW OSMOLAR CONTRAST: ICD-10-PCS | Performed by: STUDENT IN AN ORGANIZED HEALTH CARE EDUCATION/TRAINING PROGRAM

## 2023-09-20 PROCEDURE — 80061 LIPID PANEL: CPT

## 2023-09-20 PROCEDURE — 6360000004 HC RX CONTRAST MEDICATION: Performed by: STUDENT IN AN ORGANIZED HEALTH CARE EDUCATION/TRAINING PROGRAM

## 2023-09-20 PROCEDURE — 6360000002 HC RX W HCPCS: Performed by: STUDENT IN AN ORGANIZED HEALTH CARE EDUCATION/TRAINING PROGRAM

## 2023-09-20 PROCEDURE — 93571 IV DOP VEL&/PRESS C FLO 1ST: CPT | Performed by: STUDENT IN AN ORGANIZED HEALTH CARE EDUCATION/TRAINING PROGRAM

## 2023-09-20 PROCEDURE — 2709999900 HC NON-CHARGEABLE SUPPLY: Performed by: STUDENT IN AN ORGANIZED HEALTH CARE EDUCATION/TRAINING PROGRAM

## 2023-09-20 PROCEDURE — 36415 COLL VENOUS BLD VENIPUNCTURE: CPT

## 2023-09-20 PROCEDURE — 2580000003 HC RX 258: Performed by: STUDENT IN AN ORGANIZED HEALTH CARE EDUCATION/TRAINING PROGRAM

## 2023-09-20 PROCEDURE — C1769 GUIDE WIRE: HCPCS | Performed by: STUDENT IN AN ORGANIZED HEALTH CARE EDUCATION/TRAINING PROGRAM

## 2023-09-20 PROCEDURE — 4A023N7 MEASUREMENT OF CARDIAC SAMPLING AND PRESSURE, LEFT HEART, PERCUTANEOUS APPROACH: ICD-10-PCS | Performed by: STUDENT IN AN ORGANIZED HEALTH CARE EDUCATION/TRAINING PROGRAM

## 2023-09-20 PROCEDURE — C1887 CATHETER, GUIDING: HCPCS | Performed by: STUDENT IN AN ORGANIZED HEALTH CARE EDUCATION/TRAINING PROGRAM

## 2023-09-20 PROCEDURE — 80053 COMPREHEN METABOLIC PANEL: CPT

## 2023-09-20 PROCEDURE — 85025 COMPLETE CBC W/AUTO DIFF WBC: CPT

## 2023-09-20 PROCEDURE — 6370000000 HC RX 637 (ALT 250 FOR IP): Performed by: NURSE PRACTITIONER

## 2023-09-20 PROCEDURE — 84132 ASSAY OF SERUM POTASSIUM: CPT

## 2023-09-20 PROCEDURE — 93306 TTE W/DOPPLER COMPLETE: CPT

## 2023-09-20 PROCEDURE — C1894 INTRO/SHEATH, NON-LASER: HCPCS | Performed by: STUDENT IN AN ORGANIZED HEALTH CARE EDUCATION/TRAINING PROGRAM

## 2023-09-20 PROCEDURE — 83036 HEMOGLOBIN GLYCOSYLATED A1C: CPT

## 2023-09-20 PROCEDURE — 2500000003 HC RX 250 WO HCPCS: Performed by: STUDENT IN AN ORGANIZED HEALTH CARE EDUCATION/TRAINING PROGRAM

## 2023-09-20 PROCEDURE — 83735 ASSAY OF MAGNESIUM: CPT

## 2023-09-20 PROCEDURE — 99153 MOD SED SAME PHYS/QHP EA: CPT | Performed by: STUDENT IN AN ORGANIZED HEALTH CARE EDUCATION/TRAINING PROGRAM

## 2023-09-20 PROCEDURE — 99152 MOD SED SAME PHYS/QHP 5/>YRS: CPT | Performed by: STUDENT IN AN ORGANIZED HEALTH CARE EDUCATION/TRAINING PROGRAM

## 2023-09-20 PROCEDURE — 93458 L HRT ARTERY/VENTRICLE ANGIO: CPT | Performed by: STUDENT IN AN ORGANIZED HEALTH CARE EDUCATION/TRAINING PROGRAM

## 2023-09-20 RX ORDER — HEPARIN SODIUM 10000 [USP'U]/ML
INJECTION, SOLUTION INTRAVENOUS; SUBCUTANEOUS PRN
Status: DISCONTINUED | OUTPATIENT
Start: 2023-09-20 | End: 2023-09-20 | Stop reason: HOSPADM

## 2023-09-20 RX ORDER — PRAVASTATIN SODIUM 10 MG
10 TABLET ORAL NIGHTLY
Status: DISCONTINUED | OUTPATIENT
Start: 2023-09-20 | End: 2023-09-20 | Stop reason: HOSPADM

## 2023-09-20 RX ORDER — HEPARIN SODIUM 1000 [USP'U]/ML
INJECTION, SOLUTION INTRAVENOUS; SUBCUTANEOUS PRN
Status: DISCONTINUED | OUTPATIENT
Start: 2023-09-20 | End: 2023-09-20 | Stop reason: HOSPADM

## 2023-09-20 RX ORDER — PRAVASTATIN SODIUM 10 MG
10 TABLET ORAL NIGHTLY
Qty: 30 TABLET | Refills: 3 | Status: SHIPPED | OUTPATIENT
Start: 2023-09-20

## 2023-09-20 RX ORDER — FENTANYL CITRATE 50 UG/ML
INJECTION, SOLUTION INTRAMUSCULAR; INTRAVENOUS PRN
Status: DISCONTINUED | OUTPATIENT
Start: 2023-09-20 | End: 2023-09-20 | Stop reason: HOSPADM

## 2023-09-20 RX ORDER — LIDOCAINE HYDROCHLORIDE 10 MG/ML
INJECTION, SOLUTION INFILTRATION; PERINEURAL PRN
Status: DISCONTINUED | OUTPATIENT
Start: 2023-09-20 | End: 2023-09-20 | Stop reason: HOSPADM

## 2023-09-20 RX ORDER — VERAPAMIL HYDROCHLORIDE 2.5 MG/ML
INJECTION, SOLUTION INTRAVENOUS PRN
Status: DISCONTINUED | OUTPATIENT
Start: 2023-09-20 | End: 2023-09-20 | Stop reason: HOSPADM

## 2023-09-20 RX ADMIN — SENNOSIDES AND DOCUSATE SODIUM 2 TABLET: 50; 8.6 TABLET ORAL at 08:44

## 2023-09-20 RX ADMIN — ESCITALOPRAM OXALATE 10 MG: 10 TABLET ORAL at 08:44

## 2023-09-20 RX ADMIN — ASPIRIN 81 MG: 81 TABLET, COATED ORAL at 08:44

## 2023-09-20 RX ADMIN — TAMSULOSIN HYDROCHLORIDE 0.4 MG: 0.4 CAPSULE ORAL at 08:45

## 2023-09-20 RX ADMIN — SODIUM CHLORIDE, POTASSIUM CHLORIDE, SODIUM LACTATE AND CALCIUM CHLORIDE: 600; 310; 30; 20 INJECTION, SOLUTION INTRAVENOUS at 04:05

## 2023-09-20 RX ADMIN — PANTOPRAZOLE SODIUM 40 MG: 40 TABLET, DELAYED RELEASE ORAL at 08:45

## 2023-09-20 RX ADMIN — TENOFOVIR DISPROXIL FUMARATE 300 MG: 300 TABLET ORAL at 08:45

## 2023-09-20 RX ADMIN — PREGABALIN 150 MG: 75 CAPSULE ORAL at 08:45

## 2023-09-20 RX ADMIN — EMPAGLIFLOZIN 10 MG: 10 TABLET, FILM COATED ORAL at 11:40

## 2023-09-20 ASSESSMENT — PAIN SCALES - GENERAL: PAINLEVEL_OUTOF10: 0

## 2023-09-20 NOTE — PROGRESS NOTES
Physician Progress Note      PATIENT:               Swathi Sanchez  CSN #:                  [id number]  :                       1955  ADMIT DATE:       2023 4:48 PM  [facility] DATE:  RESPONDING  PROVIDER #:        Heri Baca MD          QUERY TEXT:    80yoM patient admitted with [place] and syncope - like cardiac origin. Noted   documentation of JARROD on CKD on . Prior Crea on 9/10 of 1.41 In order to   support the diagnosis of JARROD, please include additional clinical indicators in   your documentation. ? Or please document if the diagnosis of JARROD has been   ruled out after further study. The medical record reflects the following:  Risk Factors: 67yoM w/ HTN, CKD, IDDM HbA1c 5.8, Multiple myeloma on chemotx,   hypotension, Lisinopril medication  Clinical Indicators: crea 1.61 > 1.39 > 1.61 > 1.47 ()  Last Crea 1.41 on 9/10/23  Treatment:  Received 500 ml IVF bolus in ED, monitor labs. Defined by Kidney Disease Improving Global Outcomes (KDIGO) clinical practice   guideline for acute kidney injury:  -Increase in SCr by greater than or equal to 0.3 mg/dl within 48 hours; or  -Increase or decrease in SCr to greater than or equal to 1.5 times baseline,   which is known or presumed to have occurred within the prior 7 days; or  -Urine volume < 0.5ml/kg/h for 6 hours. Options provided:  -- Acute kidney injury evidenced by, Please document evidence as well as a   numerical baseline creatinine, if known. -- Acute kidney injury ruled out after study  -- Other - I will add my own diagnosis  -- Disagree - Not applicable / Not valid  -- Disagree - Clinically unable to determine / Unknown  -- Refer to Clinical Documentation Reviewer    PROVIDER RESPONSE TEXT:    Acute kidney injury was ruled out after study and pt has CKD stage ***.     Query created by: Suzy Bowers on 2023 4:03 PM      Electronically signed by:  Heri Baca MD 2023 7:35 AM

## 2023-09-20 NOTE — PLAN OF CARE
Problem: Discharge Planning  Goal: Discharge to home or other facility with appropriate resources  9/20/2023 1032 by Cayla Carroll RN  Outcome: Progressing  Flowsheets (Taken 9/20/2023 0800)  Discharge to home or other facility with appropriate resources: Identify barriers to discharge with patient and caregiver  9/20/2023 0221 by Bernadette Wilson RN  Outcome: Progressing     Problem: ABCDS Injury Assessment  Goal: Absence of physical injury  9/20/2023 1032 by Cayla Carroll RN  Outcome: Progressing  9/20/2023 0221 by Bernadette Wilson RN  Outcome: Progressing     Problem: Pain  Goal: Verbalizes/displays adequate comfort level or baseline comfort level  9/20/2023 1032 by Cayla Carroll RN  Outcome: Progressing  Flowsheets (Taken 9/20/2023 0714)  Verbalizes/displays adequate comfort level or baseline comfort level: Assess pain using appropriate pain scale  9/20/2023 0221 by Bernadette Wilson RN  Outcome: Progressing     Problem: Chronic Conditions and Co-morbidities  Goal: Patient's chronic conditions and co-morbidity symptoms are monitored and maintained or improved  9/20/2023 1032 by Cayla Carroll RN  Outcome: Progressing  Flowsheets (Taken 9/20/2023 0800)  Care Plan - Patient's Chronic Conditions and Co-Morbidity Symptoms are Monitored and Maintained or Improved: Monitor and assess patient's chronic conditions and comorbid symptoms for stability, deterioration, or improvement  9/20/2023 0221 by Bernadette Wilson RN  Outcome: Progressing     Problem: Safety - Adult  Goal: Free from fall injury  9/20/2023 1032 by Cayla Carroll RN  Outcome: Progressing  9/20/2023 0221 by Bernadette Wilson RN  Outcome: Progressing

## 2023-09-20 NOTE — PLAN OF CARE
Problem: Discharge Planning  Goal: Discharge to home or other facility with appropriate resources  9/20/2023 1639 by Aye Pimentel RN  Outcome: Adequate for Discharge  9/20/2023 1032 by José Bravo RN  Outcome: Progressing  Flowsheets (Taken 9/20/2023 0800)  Discharge to home or other facility with appropriate resources: Identify barriers to discharge with patient and caregiver     Problem: ABCDS Injury Assessment  Goal: Absence of physical injury  9/20/2023 1639 by Aye Pimentel RN  Outcome: Adequate for Discharge  9/20/2023 1032 by José Bravo RN  Outcome: Progressing     Problem: Pain  Goal: Verbalizes/displays adequate comfort level or baseline comfort level  9/20/2023 1639 by Aye Pimentel RN  Outcome: Adequate for Discharge  9/20/2023 1032 by José Bravo RN  Outcome: Progressing  Flowsheets (Taken 9/20/2023 0714)  Verbalizes/displays adequate comfort level or baseline comfort level: Assess pain using appropriate pain scale     Problem: Chronic Conditions and Co-morbidities  Goal: Patient's chronic conditions and co-morbidity symptoms are monitored and maintained or improved  9/20/2023 1639 by Aye Pimentel RN  Outcome: Adequate for Discharge  9/20/2023 1032 by José Bravo RN  Outcome: Progressing  Flowsheets (Taken 9/20/2023 0800)  Care Plan - Patient's Chronic Conditions and Co-Morbidity Symptoms are Monitored and Maintained or Improved: Monitor and assess patient's chronic conditions and comorbid symptoms for stability, deterioration, or improvement     Problem: Safety - Adult  Goal: Free from fall injury  9/20/2023 1639 by Aye Pimentel RN  Outcome: Adequate for Discharge  9/20/2023 1032 by José Bravo RN  Outcome: Progressing

## 2023-09-20 NOTE — PLAN OF CARE
Problem: Discharge Planning  Goal: Discharge to home or other facility with appropriate resources  9/20/2023 0221 by Hiram Dillon RN  Outcome: Progressing  9/19/2023 1437 by Miranda Mckenna RN  Outcome: Progressing     Problem: ABCDS Injury Assessment  Goal: Absence of physical injury  9/20/2023 0221 by Hiram Dillon RN  Outcome: Progressing  9/19/2023 1437 by Miranda Mckenna RN  Outcome: Progressing     Problem: Pain  Goal: Verbalizes/displays adequate comfort level or baseline comfort level  9/20/2023 0221 by Hiram Dillon RN  Outcome: Progressing  9/19/2023 1437 by Miranda Mckenna RN  Outcome: Progressing     Problem: Chronic Conditions and Co-morbidities  Goal: Patient's chronic conditions and co-morbidity symptoms are monitored and maintained or improved  9/20/2023 0221 by Hiram Dillon RN  Outcome: Progressing  9/19/2023 1437 by Miranda Mckenna RN  Outcome: Progressing     Problem: Safety - Adult  Goal: Free from fall injury  9/20/2023 0221 by Hiram Dillon RN  Outcome: Progressing  9/19/2023 1437 by Miranda Mckenna RN  Outcome: Progressing

## 2023-09-20 NOTE — PROGRESS NOTES
Patient is alert and oriented and complains of no pain. Morning meds were given. Patient was able to have a BM this morning. VSS. Patient did not need any insulin coverage. Patient left for cath procedure before A1C and potassium labs were drawn. Will pass on this information to RN receiving patient after procedure. Will continue to monitor patient according to MD orders and unit protocol. 1009 Patient was taken to cath lab and is off the unit. Lindsay report to Pepe Quezada RN on [employer].

## 2023-09-20 NOTE — PROGRESS NOTES
66 91 21 Patient is ambulating at baseline and vitals stable. Right radial site clean, dry, intact. Discharge and follow-up care given. Site care instructions and medication changes reviewed. Patient discharged home with wife as .

## 2023-09-20 NOTE — CARE COORDINATION
Transition of Care Plan:    RUR: 18% (moderate RUR)  Prior Level of Functioning: Independent  Disposition: Home with follow ups. If SNF or IPR: Date FOC offered: N/A  Date FOC received: N/A  Accepting facility: N/A  Date authorization started with reference number: N/A  Date authorization received and expires: N/A  Follow up appointments: PCP/specialists if needed. DME needed: None. Transportation at discharge: Spouse  IM/IMM Medicare/ letter given: 2nd IM needed prior to discharge. Is patient a  and connected with [facility]? No.   If yes, was [place] transfer form completed and [facility] notified? N/A  Caregiver Contact: Toro Anne - spouse - [phone]  Discharge Caregiver contacted prior to discharge? Patient to contact. Care Conference needed? No.  Barriers to discharge: cards/onc clearance, 9/20 cath?       ANY Flores, RN    Care Management  [phone]

## 2023-09-20 NOTE — PROGRESS NOTES
[facility]          Progress Note    NAME: Garrison Mcduffie Sr.   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 No further episodes of pain, ASA 81, check lipids in AM. On Toprol PTA. Will plan on Cath today with Dr. Yaneth Lawler. NPO please  2 HFmrEF - LVEF 45-50%, no significant valvular disease 2023  3 Multiple Myeloma IgA Kappa IIIB s/p Bone Marrow Transplant and current chemotherapy - Dr. Johnny Huitron  4 Essential Hypertension  5 Hyperlipidemia - Lipid Panel in AM  6 CKD 3 GFR 53, creat 1.47  7 Type 2 Diabetes - on Metformin PTA, no recent Hgb A1C, Lab in AM  8 GERD - PPI  9 BPH - Flomax  10 Anemia due to Chronic Disease  11 Mild thrombocytopenia - 146 - Previous Hep C TX  12 Constipation - requested meds for this     update:  Cath anticipated today. I answered all his and his wife's questions. [x]       High complexity decision making was performed in this patient at high risk for decompensation with multiple organ involvement. Subjective: Today, no chest pain, syncope, dizziness, or palpitations. In good spirits. No CHF symptoms.     Allergies   Allergen Reactions    Mercury Other (See Comments)     blisters    Phenylmercuric Nitrate Hives     Blisters      Meds:  See below  Social History     Tobacco Use    Smoking status: Former     Packs/day: 1     Types: Cigarettes     Quit date: 1989     Years since quittin.7    Smokeless Esteban Apodaca PCT    POCT Glucose    Collection Time: 09/19/23  4:26 PM   Result Value Ref Range    POC Glucose 125 (H) 65 - 117 mg/dL    Performed by: Yulissa Wilkins (JAY RN)    POCT Glucose    Collection Time: 09/19/23  8:45 PM   Result Value Ref Range    POC Glucose 219 (H) 65 - 117 mg/dL    Performed by: Demarcus Zayas PCT    Comprehensive Metabolic Panel    Collection Time: 09/20/23  4:15 AM   Result Value Ref Range    Sodium 138 136 - 145 mmol/L    Potassium 5.5 (H) 3.5 - 5.1 mmol/L    Chloride 110 (H) 97 - 108 mmol/L    CO2 22 21 - 32 mmol/L    Anion Gap 6 5 - 15 mmol/L    Glucose 139 (H) 65 - 100 mg/dL    BUN 20 6 - 20 MG/DL    Creatinine 1.43 (H) 0.70 - 1.30 MG/DL    Bun/Cre Ratio 14 12 - 20      Est, Glom Filt Rate 54 (L) >60 ml/min/1.73m2    Calcium 8.0 (L) 8.5 - 10.1 MG/DL    Total Bilirubin 0.3 0.2 - 1.0 MG/DL    ALT 78 12 - 78 U/L    AST 88 (H) 15 - 37 U/L    Alk Phosphatase 71 45 - 117 U/L    Total Protein 6.3 (L) 6.4 - 8.2 g/dL    Albumin 3.0 (L) 3.5 - 5.0 g/dL    Globulin 3.3 2.0 - 4.0 g/dL    Albumin/Globulin Ratio 0.9 (L) 1.1 - 2.2     CBC with Auto Differential    Collection Time: 09/20/23  4:15 AM   Result Value Ref Range    WBC 2.7 (L) 4.1 - 11.1 K/uL    RBC 2.84 (L) 4.10 - 5.70 M/uL    Hemoglobin 9.4 (L) 12.1 - 17.0 g/dL    Hematocrit 29.8 (L) 36.6 - 50.3 %    .9 (H) 80.0 - 99.0 FL    MCH 33.1 26.0 - 34.0 PG    MCHC 31.5 30.0 - 36.5 g/dL    RDW 14.2 11.5 - 14.5 %    Platelets 561 (L) 522 - 400 K/uL    MPV 11.5 8.9 - 12.9 FL    Nucleated RBCs 0.0 0  WBC    nRBC 0.00 0.00 - 0.01 K/uL    Neutrophils % 58 32 - 75 %    Lymphocytes % 23 12 - 49 %    Monocytes % 18 (H) 5 - 13 %    Eosinophils % 0 0 - 7 %    Basophils % 0 0 - 1 %    Immature Granulocytes 0 0.0 - 0.5 %    Neutrophils Absolute 1.6 (L) 1.8 - 8.0 K/UL    Lymphocytes Absolute 0.6 (L) 0.8 - 3.5 K/UL    Monocytes Absolute 0.5 0.0 - 1.0 K/UL    Eosinophils Absolute 0.0 0.0 - 0.4 K/UL    Basophils Absolute 0.0 0.0 - 0.1 K/UL

## 2023-09-20 NOTE — DISCHARGE SUMMARY
Discharge Summary    Name: Rich Peñaloza  [id number]  YOB: 1955 (Age: 74 y.o.)   Date of Admission: 9/16/2023  Date of Discharge: 9/20/2023  Attending Physician: Farrah Iyer MD    Discharge Diagnosis:   Unstable angina S?p Cath on 9/20 -Moderate 50% LAD disease, not significant by IFR   Syncope -likely cardiac  Essential hypertension  Hyperlipidemia  HFmrEF-EF 45-50%  Multiple myeloma on chemotherapy  Status post bone marrow transplant  Chronic cancer related pain with opioid dependence   CKD   Insulin-dependent diabetes mellitus-HbA1c 5.8  MDD/EDER/insomnia  BPH  Intractable hiccups  GERD    Consultations:  IP CONSULT TO HOSPITALIST  IP CONSULT TO CARDIOLOGY  IP CONSULT TO ONCOLOGY  IP CONSULT TO [address] [place] [address]      Brief Admission History/Reason for Admission Per Bhanu Luevano DO:   Eusebio Kennedy is a 74 y.o.  male with PMHx as listed below presenting to the emergency department with complaints of sudden onset crushing chest pain associated with nausea, shortness of breath, profuse diaphoresis and radiation into left arm/neck lasting approximately 15 minutes without clear precipitant (was sitting at rest at the time). Patient reports prior episodes of discomfort and occasional palpitations, but has never had an episode as severe as this. He reports it was so debilitating he collapsed to the ground and could not move prompting family to call EMS. They report on EMS arrival patient was hypotensive with systolics 26M/94L. ROS otherwise negative. Denies tobacco, alcohol, illicit drugs. Of note, he is currently receiving chemotherapy for multiple myeloma and is status post bone marrow transplant.     Brief Hospital Course by Main Problems:   Unstable angina  Syncope -likely cardiac  Negative troponin   EKG- Sinus rhythm, LAD, Inferolateral t wave inversion   Essential hypertension  Hyperlipidemia  HFmrEF-EF 45-50%  CTA chest-no evidence of Cholecalciferol 50 MCG (2000 UT) Tabs     clonazePAM 0.5 MG tablet  Commonly known as: KLONOPIN     deutetrabenazine 6 MG tablet  Commonly known as: AUSTEDO     dexamethasone 4 MG tablet  Commonly known as: DECADRON  Take 10 tablets by mouth every 7 days     empagliflozin 10 MG tablet  Commonly known as: JARDIANCE  Take 1 tablet by mouth daily     escitalopram 10 MG tablet  Commonly known as: LEXAPRO     metFORMIN 500 MG extended release tablet  Commonly known as: GLUCOPHAGE-XR  TAKE 1 TABLET BY MOUTH TWICE A DAY     metoprolol succinate 25 MG extended release tablet  Commonly known as: TOPROL XL  Take 1 tablet by mouth daily     morphine 15 MG extended release tablet  Commonly known as: MS CONTIN     MULTIVITAMIN PO     Narcan 4 MG/0.1ML Liqd nasal spray  Generic drug: naloxone     NONFORMULARY     omeprazole 20 MG delayed release capsule  Commonly known as: PRILOSEC     ondansetron 8 MG Tbdp disintegrating tablet  Commonly known as: ZOFRAN-ODT  Take 0.5 tablets by mouth every 8 hours as needed for Nausea     pregabalin 150 MG capsule  Commonly known as: LYRICA     prochlorperazine 10 MG tablet  Commonly known as: COMPAZINE  Take 1 tablet by mouth every 6 hours as needed (Cinv)     sildenafil 20 MG tablet  Commonly known as: REVATIO     SODIUM FLUORIDE (DENTAL GEL) 1.1 % Crea     tamsulosin 0.4 MG capsule  Commonly known as: FLOMAX     * Nucynta 100 MG Tabs  Generic drug: tapentadol     * tapentadol 100 MG Tabs  Commonly known as: NUCYNTA     tenofovir disoproxil fumarate 300 MG tablet  Commonly known as: VIREAD  TAKE 1 TABLET BY MOUTH 1 TIME A DAY     testosterone cypionate 200 MG/ML injection  Commonly known as: DEPOTESTOTERONE CYPIONATE           * This list has 2 medication(s) that are the same as other medications prescribed for you. Read the directions carefully, and ask your doctor or other care provider to review them with you.                    Where to Get Your Medications        These medications were

## 2023-09-20 NOTE — DISCHARGE INSTRUCTIONS
HOSPITALIST DISCHARGE INSTRUCTIONS    NAME: Irwin Wilson Sr.   :  1955   MRN:  [id number]     Date/Time:  2023 2:14 PM    ADMIT DATE: 2023     DISCHARGE DATE: 2023     DISCHARGE DIAGNOSIS:  Unstable angina  Syncope -likely cardiac  Negative troponin   EKG- Sinus rhythm, LAD, Inferolateral t wave inversion   Essential hypertension  Hyperlipidemia  HFmrEF-EF 45-50%  CTA chest-no evidence of aortic dissection no acute cardiopulmonary process  S/p cath with no intervention     MEDICATIONS:  As per medication reconciliation  list  It is important that you take the medication exactly as they are prescribed. Keep your medication in the bottles provided by the pharmacist and keep a list of the medication names, dosages, and times to be taken in your wallet. Do not take other medications without consulting your doctor. Pain Management: per above medications    What to do at Home:  Needs to follow up with cardiologist in 1-2 weeks  Follow up  with oncologist as scheduled. Have not started Imdur now , as his BP on soft side. Please follow up with cardiologist and start as per their recommendations. Recommended diet:  cardiac diet    Recommended activity: activity as tolerated    If you have questions regarding the hospital related prescriptions or hospital related issues please call at [phone]. If you experience any of the following symptoms then please call your primary care physician or return to the emergency room if you cannot get hold of your doctor:  Fever, chills, nausea, vomiting, diarrhea, change in mentation, falling, bleeding, shortness of breath, syncope. Follow Up:   @ENG@  you are to call and set up an appointment to see them in 7-10 days. Information obtained by :  I understand that if any problems occur once I am at home I am to contact my physician. I understand and acknowledge receipt of the instructions indicated above. If bleeding of the wrist occurs at home:   If the site on your wrist where you had the catheterization procedure begins to bleed, do not panic. Place 1 or 2 fingers over the puncture site and hold pressure to stop the bleeding. You may be able to feel your pulse as you hold pressure. Lift your fingers after 5 minutes to see if the bleeding has stopped. Once the bleeding has stopped, gently wipe the wrist area clean and cover with a bandage. If the bleeding from your wrist does not stop after 15 minutes, or if there is a large amount of bleeding or spurting, call 911 immediately (do not drive yourself to the hospital). Other concerns: The site may be slightly bruised and sore following your procedure. Should any of the following occur, contact your physician immediately:   Any cool or coldness of the arm, discoloration over a large area, ongoing numbness or any abnormal sensations , moderate to severe pain or swelling in the arm. Redness, soreness, swelling, chills or fever, or colored drainage at the procedure site within 3-7 days after your procedure. If you have any further questions or concerns regarding your procedure please call the Cardiac Cath Lab office at [phone]. During regular business hours ask to speak to Dr. Karie Gary. During non-business hours the answering service will answer. Ask to speak to the physician on call for [facility].     Transfemoral Catheterization Discharge Instructions (GROIN)    Do not drive, operate any machinery, or sign any legal documents for 24 hours after your procedure. You must have someone to drive you home. You may take a shower 24 hours after your cardiac catheterization. Be sure to get the dressing wet and then remove it; gently wash the area with warm soapy water. Pat dry and leave open to air. To help prevent infections, be sure to keep the cath site clean and dry.   No lotions, creams, powders, ointments, etc. Date/Time      MD Ryan OwusuKettering Health Troy, [address]    [phone]  [address]    [email or web address]

## 2023-09-20 NOTE — PROGRESS NOTES
Brief Procedure Note:         Indication:   Chest pain     Procedure:     LHC, Cors   IFR of LAD      Complications: None   Blood loss: Minimal   Condition: Stable     Brief procedure Result:    Moderate 50% LAD disease, not significant by IFR       Recommendations:   Medical therapy for non obstructive CAD  Cont aspirin, add pravastatin   Cont metoprolol   If BP tolerates add low dose of imdur   Check 2d echo    Full note and recommendations to follow. '

## 2023-09-20 NOTE — PROGRESS NOTES
Verbal shift change report given to Annemarie MARRERO (oncoming nurse) by Yolanda Calvert RN (offgoing nurse). Report included the following information Nurse Handoff Report and Cardiac Rhythm NSR .     2116: Administered prn zolpidem for sleep    2144: Patient concerned about his blood sugar reading of 219. This RN explained that his readings have been good and no coverage is needed tonight per MD order. Patient wants to resume metformin. This RN asked MD if okay for patient to bring in home supply of metformin to take while at hospital and MD advised to continue current orders with no metformin. Patient notified. 2348: Patient did eat today despite NPO order since midnight overnight. This RN told patient he will need to be NPO starting at midnight tonight due to heart cath procedure tomorrow and patient is agreeable. MD notified. NPO diet order updated from midnight on 9/19 to midnight 9/20.     0415: AM blood work drawn and sent to lab    1601: Asked MD to DC austedo order per pharmacy. Patient reported he is no longer taking this medication when asked earlier tonight about family bringing in this medication supply from home for administration while in hospital.       End of Shift Note    Bedside shift change report given to [facility] (oncoming nurse) by Catalina Rosado RN (offgoing nurse).   Report included the following information SBAR, MAR, and Cardiac Rhythm NSR    Shift worked: 7p-7a     Shift summary and any significant changes:    See above     Concerns for physician to address:    Zone phone for oncoming shift:       Activity:     Number times ambulated in hallways past shift: 0  Number of times OOB to chair past shift: 3    Cardiac:   Cardiac Monitoring: Yes           Access:  Current line(s): PIV     Genitourinary:   Urinary status: voiding    Respiratory:      Chronic home O2 use?: NO  Incentive spirometer at bedside: NO       GI:     Current diet:  Diet NPO Exceptions are: Sips of Water with Meds, Ice Chips  Passing flatus: YES  Tolerating current diet: YES       Pain Management:   Patient states pain is manageable on current regimen: YES    Skin:     Interventions: float heels, increase time out of bed, PT/OT consult, limit briefs, and nutritional support    Patient Safety:  Fall Score:    Interventions: gripper socks, pt to call before getting OOB, stay with me (per policy), and gait belt       Length of Stay:  Expected LOS: 4  Actual LOS: 4      Sejal Medina RN

## 2023-09-21 ENCOUNTER — TELEPHONE (OUTPATIENT)
Age: 68
End: 2023-09-21

## 2023-09-21 NOTE — TELEPHONE ENCOUNTER
Patient informed we will have scheduling call him to schedule a f/u appt. Pt verbalized understanding.

## 2023-09-21 NOTE — CARDIO/PULMONARY
Chart reviewed: Patient is 74 y.o. male admitted with Unstable angina ([address]) [I20.0]  Chest pain, unspecified type [R07.9]    Cardiac cath 9/19/21 without intervention:  \"Brief procedure Result:    Moderate 50% LAD disease, not significant by IFR   Recommendations:   Medical therapy for non obstructive CAD\"    Ryan Grijalva RN

## 2023-09-22 RX ORDER — DIPHENHYDRAMINE HYDROCHLORIDE 50 MG/ML
50 INJECTION INTRAMUSCULAR; INTRAVENOUS
Status: CANCELLED | OUTPATIENT
Start: 2023-10-02

## 2023-09-22 RX ORDER — EPINEPHRINE 1 MG/ML
0.3 INJECTION, SOLUTION, CONCENTRATE INTRAVENOUS PRN
Status: CANCELLED | OUTPATIENT
Start: 2023-10-02

## 2023-09-22 RX ORDER — DIPHENHYDRAMINE HYDROCHLORIDE 50 MG/ML
25 INJECTION INTRAMUSCULAR; INTRAVENOUS ONCE
Status: CANCELLED | OUTPATIENT
Start: 2023-10-16 | End: 2023-10-16

## 2023-09-22 RX ORDER — DEXAMETHASONE SODIUM PHOSPHATE 4 MG/ML
4 INJECTION, SOLUTION INTRA-ARTICULAR; INTRALESIONAL; INTRAMUSCULAR; INTRAVENOUS; SOFT TISSUE ONCE
Status: CANCELLED
Start: 2023-10-16 | End: 2023-10-16

## 2023-09-22 RX ORDER — MEPERIDINE HYDROCHLORIDE 25 MG/ML
12.5 INJECTION INTRAMUSCULAR; INTRAVENOUS; SUBCUTANEOUS PRN
Status: CANCELLED | OUTPATIENT
Start: 2023-10-16

## 2023-09-22 RX ORDER — DEXTROSE MONOHYDRATE 50 MG/ML
5-250 INJECTION, SOLUTION INTRAVENOUS PRN
Status: CANCELLED | OUTPATIENT
Start: 2023-10-16

## 2023-09-22 RX ORDER — ACETAMINOPHEN 325 MG/1
650 TABLET ORAL
Status: CANCELLED | OUTPATIENT
Start: 2023-10-02

## 2023-09-22 RX ORDER — MEPERIDINE HYDROCHLORIDE 25 MG/ML
12.5 INJECTION INTRAMUSCULAR; INTRAVENOUS; SUBCUTANEOUS PRN
Status: CANCELLED | OUTPATIENT
Start: 2023-10-02

## 2023-09-22 RX ORDER — ONDANSETRON 2 MG/ML
8 INJECTION INTRAMUSCULAR; INTRAVENOUS
Status: CANCELLED | OUTPATIENT
Start: 2023-10-16

## 2023-09-22 RX ORDER — SODIUM CHLORIDE 9 MG/ML
INJECTION, SOLUTION INTRAVENOUS CONTINUOUS
Status: CANCELLED | OUTPATIENT
Start: 2023-10-02

## 2023-09-22 RX ORDER — SODIUM CHLORIDE 9 MG/ML
INJECTION, SOLUTION INTRAVENOUS CONTINUOUS
Status: CANCELLED | OUTPATIENT
Start: 2023-10-16

## 2023-09-22 RX ORDER — ACETAMINOPHEN 325 MG/1
650 TABLET ORAL ONCE
Status: CANCELLED | OUTPATIENT
Start: 2023-10-16 | End: 2023-10-16

## 2023-09-22 RX ORDER — HEPARIN 100 UNIT/ML
500 SYRINGE INTRAVENOUS PRN
Status: CANCELLED | OUTPATIENT
Start: 2023-10-16

## 2023-09-22 RX ORDER — SODIUM CHLORIDE 9 MG/ML
5-250 INJECTION, SOLUTION INTRAVENOUS PRN
Status: CANCELLED | OUTPATIENT
Start: 2023-10-16

## 2023-09-22 RX ORDER — ALBUTEROL SULFATE 90 UG/1
4 AEROSOL, METERED RESPIRATORY (INHALATION) PRN
Status: CANCELLED | OUTPATIENT
Start: 2023-10-02

## 2023-09-22 RX ORDER — SODIUM CHLORIDE 0.9 % (FLUSH) 0.9 %
5-40 SYRINGE (ML) INJECTION PRN
Status: CANCELLED | OUTPATIENT
Start: 2023-10-16

## 2023-09-22 RX ORDER — EPINEPHRINE 1 MG/ML
0.3 INJECTION, SOLUTION, CONCENTRATE INTRAVENOUS PRN
Status: CANCELLED | OUTPATIENT
Start: 2023-10-16

## 2023-09-22 RX ORDER — ACETAMINOPHEN 325 MG/1
650 TABLET ORAL
Status: CANCELLED | OUTPATIENT
Start: 2023-10-16

## 2023-09-22 RX ORDER — ONDANSETRON 2 MG/ML
8 INJECTION INTRAMUSCULAR; INTRAVENOUS
Status: CANCELLED | OUTPATIENT
Start: 2023-10-02

## 2023-09-22 RX ORDER — DIPHENHYDRAMINE HYDROCHLORIDE 50 MG/ML
50 INJECTION INTRAMUSCULAR; INTRAVENOUS
Status: CANCELLED | OUTPATIENT
Start: 2023-10-16

## 2023-09-22 RX ORDER — ALBUTEROL SULFATE 90 UG/1
4 AEROSOL, METERED RESPIRATORY (INHALATION) PRN
Status: CANCELLED | OUTPATIENT
Start: 2023-10-16

## 2023-09-22 RX ORDER — SODIUM CHLORIDE 9 MG/ML
5-250 INJECTION, SOLUTION INTRAVENOUS PRN
Status: CANCELLED | OUTPATIENT
Start: 2023-10-02

## 2023-09-22 RX ORDER — HEPARIN 100 UNIT/ML
500 SYRINGE INTRAVENOUS PRN
Status: CANCELLED | OUTPATIENT
Start: 2023-10-02

## 2023-09-25 ENCOUNTER — TELEPHONE (OUTPATIENT)
Age: 68
End: 2023-09-25

## 2023-09-25 NOTE — TELEPHONE ENCOUNTER
Will discontinue kyprolis. On 10/2 he will come to [facility] and office appointment to discuss next steps. (Selenxior or ?)    Pt confirmed appt for 10/2. Please add for office visit. He said while inpt they didn't find any problems with his heart, no blockages. His ECHO was \"perfect\" he saw him this morning. , whom he said he was faxing the notes over to our office. Explained that Deon Kidd is immunotherapy.

## 2023-09-26 PROBLEM — E08.65 DIABETES MELLITUS DUE TO UNDERLYING CONDITION WITH HYPERGLYCEMIA, WITHOUT LONG-TERM CURRENT USE OF INSULIN (HCC): Status: ACTIVE | Noted: 2023-09-26

## 2023-09-28 ENCOUNTER — TELEPHONE (OUTPATIENT)
Age: 68
End: 2023-09-28

## 2023-09-28 RX ORDER — METFORMIN HYDROCHLORIDE 500 MG/1
TABLET, EXTENDED RELEASE ORAL
Qty: 180 TABLET | Refills: 0 | OUTPATIENT
Start: 2023-09-28

## 2023-09-28 NOTE — TELEPHONE ENCOUNTER
Patient notified of message per Dr. Alexi Beckett and voiced understanding of what was read to them.

## 2023-09-28 NOTE — TELEPHONE ENCOUNTER
Please call patient and have him call his PCP to refill his metformin since he is no longer under Dr. Dustin Lopez care since he left our practice in May 2021.

## 2023-09-29 NOTE — PROGRESS NOTES
Mena Roy is a 74 y.o. male here for treatment for Multiple Myeloma. Pt states he is doing marvelous. No concerns brought up. 1. Have you been to the ER, urgent care clinic since your last visit? Hospitalized since your last visit? 9/16/23 - 9/20/23 chest pain, N/V, SOB, Diaphoresis    2. Have you seen or consulted any other health care providers outside of the [address] [place] [address] since your last visit? Include any pap smears or colon screening.   no

## 2023-10-02 ENCOUNTER — OFFICE VISIT (OUTPATIENT)
Age: 68
End: 2023-10-02
Payer: MEDICARE

## 2023-10-02 ENCOUNTER — HOSPITAL ENCOUNTER (OUTPATIENT)
Facility: HOSPITAL | Age: 68
Setting detail: INFUSION SERIES
End: 2023-10-02
Payer: MEDICARE

## 2023-10-02 VITALS
OXYGEN SATURATION: 96 % | DIASTOLIC BLOOD PRESSURE: 75 MMHG | HEART RATE: 93 BPM | TEMPERATURE: 97.6 F | RESPIRATION RATE: 18 BRPM | WEIGHT: 184.4 LBS | HEIGHT: 68 IN | SYSTOLIC BLOOD PRESSURE: 121 MMHG | BODY MASS INDEX: 27.95 KG/M2

## 2023-10-02 VITALS
DIASTOLIC BLOOD PRESSURE: 82 MMHG | HEIGHT: 68 IN | BODY MASS INDEX: 27.93 KG/M2 | SYSTOLIC BLOOD PRESSURE: 126 MMHG | RESPIRATION RATE: 18 BRPM | HEART RATE: 92 BPM | WEIGHT: 184.3 LBS | OXYGEN SATURATION: 96 % | TEMPERATURE: 97.6 F

## 2023-10-02 DIAGNOSIS — E08.65 DIABETES MELLITUS DUE TO UNDERLYING CONDITION WITH HYPERGLYCEMIA, WITHOUT LONG-TERM CURRENT USE OF INSULIN (HCC): ICD-10-CM

## 2023-10-02 DIAGNOSIS — C90.00 MULTIPLE MYELOMA, REMISSION STATUS UNSPECIFIED (HCC): ICD-10-CM

## 2023-10-02 DIAGNOSIS — C90.02 MULTIPLE MYELOMA IN RELAPSE (HCC): Primary | ICD-10-CM

## 2023-10-02 DIAGNOSIS — Z11.59 ENCOUNTER FOR SCREENING FOR OTHER VIRAL DISEASES: Primary | ICD-10-CM

## 2023-10-02 DIAGNOSIS — Z51.11 ENCOUNTER FOR ANTINEOPLASTIC CHEMOTHERAPY: ICD-10-CM

## 2023-10-02 LAB
ALBUMIN SERPL-MCNC: 3.4 G/DL (ref 3.5–5)
ALBUMIN/GLOB SERPL: 1 (ref 1.1–2.2)
ALP SERPL-CCNC: 106 U/L (ref 45–117)
ALT SERPL-CCNC: 34 U/L (ref 12–78)
ANION GAP SERPL CALC-SCNC: 2 MMOL/L (ref 5–15)
AST SERPL-CCNC: 12 U/L (ref 15–37)
BASOPHILS # BLD: 0 K/UL (ref 0–0.1)
BASOPHILS NFR BLD: 0 % (ref 0–1)
BILIRUB SERPL-MCNC: 0.2 MG/DL (ref 0.2–1)
BUN SERPL-MCNC: 26 MG/DL (ref 6–20)
BUN/CREAT SERPL: 17 (ref 12–20)
CALCIUM SERPL-MCNC: 8.8 MG/DL (ref 8.5–10.1)
CHLORIDE SERPL-SCNC: 109 MMOL/L (ref 97–108)
CO2 SERPL-SCNC: 29 MMOL/L (ref 21–32)
CREAT SERPL-MCNC: 1.52 MG/DL (ref 0.7–1.3)
DIFFERENTIAL METHOD BLD: ABNORMAL
EOSINOPHIL # BLD: 0 K/UL (ref 0–0.4)
EOSINOPHIL NFR BLD: 0 % (ref 0–7)
ERYTHROCYTE [DISTWIDTH] IN BLOOD BY AUTOMATED COUNT: 13.8 % (ref 11.5–14.5)
EST. AVERAGE GLUCOSE BLD GHB EST-MCNC: 137 MG/DL
GLOBULIN SER CALC-MCNC: 3.5 G/DL (ref 2–4)
GLUCOSE SERPL-MCNC: 142 MG/DL (ref 65–100)
HBA1C MFR BLD: 6.4 % (ref 4–5.6)
HCT VFR BLD AUTO: 30.8 % (ref 36.6–50.3)
HGB BLD-MCNC: 10 G/DL (ref 12.1–17)
IMM GRANULOCYTES # BLD AUTO: 0 K/UL (ref 0–0.04)
IMM GRANULOCYTES NFR BLD AUTO: 1 % (ref 0–0.5)
LYMPHOCYTES # BLD: 0.5 K/UL (ref 0.8–3.5)
LYMPHOCYTES NFR BLD: 12 % (ref 12–49)
MCH RBC QN AUTO: 34.2 PG (ref 26–34)
MCHC RBC AUTO-ENTMCNC: 32.5 G/DL (ref 30–36.5)
MCV RBC AUTO: 105.5 FL (ref 80–99)
MONOCYTES # BLD: 0.7 K/UL (ref 0–1)
MONOCYTES NFR BLD: 18 % (ref 5–13)
NEUTS SEG # BLD: 2.8 K/UL (ref 1.8–8)
NEUTS SEG NFR BLD: 69 % (ref 32–75)
NRBC # BLD: 0 K/UL (ref 0–0.01)
NRBC BLD-RTO: 0 PER 100 WBC
PLATELET # BLD AUTO: 137 K/UL (ref 150–400)
PMV BLD AUTO: 11.8 FL (ref 8.9–12.9)
POTASSIUM SERPL-SCNC: 4.3 MMOL/L (ref 3.5–5.1)
PROT SERPL-MCNC: 6.9 G/DL (ref 6.4–8.2)
RBC # BLD AUTO: 2.92 M/UL (ref 4.1–5.7)
RBC MORPH BLD: ABNORMAL
RBC MORPH BLD: ABNORMAL
SODIUM SERPL-SCNC: 140 MMOL/L (ref 136–145)
WBC # BLD AUTO: 4 K/UL (ref 4.1–11.1)

## 2023-10-02 PROCEDURE — 83521 IG LIGHT CHAINS FREE EACH: CPT

## 2023-10-02 PROCEDURE — 2500000003 HC RX 250 WO HCPCS: Performed by: INTERNAL MEDICINE

## 2023-10-02 PROCEDURE — 2580000003 HC RX 258: Performed by: INTERNAL MEDICINE

## 2023-10-02 PROCEDURE — 96413 CHEMO IV INFUSION 1 HR: CPT

## 2023-10-02 PROCEDURE — 6360000002 HC RX W HCPCS: Performed by: INTERNAL MEDICINE

## 2023-10-02 PROCEDURE — 80053 COMPREHEN METABOLIC PANEL: CPT

## 2023-10-02 PROCEDURE — 99215 OFFICE O/P EST HI 40 MIN: CPT | Performed by: INTERNAL MEDICINE

## 2023-10-02 PROCEDURE — 3074F SYST BP LT 130 MM HG: CPT | Performed by: INTERNAL MEDICINE

## 2023-10-02 PROCEDURE — 84165 PROTEIN E-PHORESIS SERUM: CPT

## 2023-10-02 PROCEDURE — 6370000000 HC RX 637 (ALT 250 FOR IP): Performed by: INTERNAL MEDICINE

## 2023-10-02 PROCEDURE — 85025 COMPLETE CBC W/AUTO DIFF WBC: CPT

## 2023-10-02 PROCEDURE — 96367 TX/PROPH/DG ADDL SEQ IV INF: CPT

## 2023-10-02 PROCEDURE — 96375 TX/PRO/DX INJ NEW DRUG ADDON: CPT

## 2023-10-02 PROCEDURE — A4216 STERILE WATER/SALINE, 10 ML: HCPCS | Performed by: INTERNAL MEDICINE

## 2023-10-02 PROCEDURE — 1123F ACP DISCUSS/DSCN MKR DOCD: CPT | Performed by: INTERNAL MEDICINE

## 2023-10-02 PROCEDURE — 86334 IMMUNOFIX E-PHORESIS SERUM: CPT

## 2023-10-02 PROCEDURE — 36415 COLL VENOUS BLD VENIPUNCTURE: CPT

## 2023-10-02 PROCEDURE — 83036 HEMOGLOBIN GLYCOSYLATED A1C: CPT

## 2023-10-02 PROCEDURE — 82784 ASSAY IGA/IGD/IGG/IGM EACH: CPT

## 2023-10-02 PROCEDURE — 3078F DIAST BP <80 MM HG: CPT | Performed by: INTERNAL MEDICINE

## 2023-10-02 RX ORDER — SODIUM CHLORIDE 0.9 % (FLUSH) 0.9 %
5-40 SYRINGE (ML) INJECTION PRN
Status: DISCONTINUED | OUTPATIENT
Start: 2023-10-02 | End: 2023-10-03 | Stop reason: HOSPADM

## 2023-10-02 RX ORDER — ACETAMINOPHEN 325 MG/1
650 TABLET ORAL ONCE
Status: COMPLETED | OUTPATIENT
Start: 2023-10-02 | End: 2023-10-02

## 2023-10-02 RX ORDER — DIPHENHYDRAMINE HYDROCHLORIDE 50 MG/ML
25 INJECTION INTRAMUSCULAR; INTRAVENOUS ONCE
Status: DISCONTINUED | OUTPATIENT
Start: 2023-10-02 | End: 2023-10-06

## 2023-10-02 RX ORDER — DEXAMETHASONE SODIUM PHOSPHATE 4 MG/ML
4 INJECTION, SOLUTION INTRA-ARTICULAR; INTRALESIONAL; INTRAMUSCULAR; INTRAVENOUS; SOFT TISSUE ONCE
Status: COMPLETED | OUTPATIENT
Start: 2023-10-02 | End: 2023-10-02

## 2023-10-02 RX ORDER — DEXTROSE MONOHYDRATE 50 MG/ML
5-250 INJECTION, SOLUTION INTRAVENOUS PRN
Status: DISCONTINUED | OUTPATIENT
Start: 2023-10-02 | End: 2023-10-03 | Stop reason: HOSPADM

## 2023-10-02 RX ADMIN — SODIUM CHLORIDE 855 MG: 9 INJECTION, SOLUTION INTRAVENOUS at 14:09

## 2023-10-02 RX ADMIN — SODIUM CHLORIDE, PRESERVATIVE FREE 10 ML: 5 INJECTION INTRAVENOUS at 15:26

## 2023-10-02 RX ADMIN — ACETAMINOPHEN 650 MG: 325 TABLET ORAL at 13:30

## 2023-10-02 RX ADMIN — SODIUM CHLORIDE 150 MG: 9 INJECTION, SOLUTION INTRAVENOUS at 13:43

## 2023-10-02 RX ADMIN — FAMOTIDINE 20 MG: 10 INJECTION, SOLUTION INTRAVENOUS at 13:34

## 2023-10-02 RX ADMIN — DEXTROSE MONOHYDRATE 25 ML/HR: 50 INJECTION, SOLUTION INTRAVENOUS at 13:31

## 2023-10-02 RX ADMIN — SODIUM CHLORIDE, PRESERVATIVE FREE 20 ML: 5 INJECTION INTRAVENOUS at 11:20

## 2023-10-02 RX ADMIN — DEXAMETHASONE SODIUM PHOSPHATE 4 MG: 4 INJECTION, SOLUTION INTRAMUSCULAR; INTRAVENOUS at 13:40

## 2023-10-02 ASSESSMENT — PAIN DESCRIPTION - LOCATION: LOCATION: GENERALIZED

## 2023-10-02 ASSESSMENT — PAIN SCALES - GENERAL: PAINLEVEL_OUTOF10: 4

## 2023-10-02 ASSESSMENT — PAIN DESCRIPTION - DESCRIPTORS: DESCRIPTORS: ACHING

## 2023-10-02 NOTE — PROGRESS NOTES
[facility]   at [address] [place], [address], [facility], [address] [place], [place] [address]marisol   [phone]             Progress Note       Patient: Laura Najera Sr. MRN: [id number]   SSN: [id number]    YOB: 1955              Diagnosis:         1. Multiple Myeloma, IgA Kappa    Durie Lohman stage IIIB      Cytogenetics/FISH: t(14;16) - high risk        Ttreatment:        1. Maintenance therapy - Pomalyst 2 mg daily - 11/19/2018    2. Maintenance therapy - Ixazomib - started 10/15/2018 - stopped 11/12/2018   3. S/P tandem  Autotransplant    First on 2/28/2018    2nd on 06/13/2018   4. Carfilzomib/Pom/Dex - s/p 5 cycles   5. VD-PACE s/p 1 cycle   6. RVD - s/p 4 cycles    7. Isatuximab/Carfilzomib/Dex cycle 5 day 1       Subjective:        Laura Najera Sr. is a 74 y.o. male with a diagnosis of IgA kappa myeloma. Bone marrow shows 100% aberrant plasma cells. He suffers with DM but it is diet controlled. He has received systemic anti-viral  treatment for chronic Hep C with successful eradication of the virus. Mr. Micky Burkitt received 4 cycles of systemic therapy with RVd. He had an initial good response to treatment. However his paraprotein level started rising and the myeloma became refractory  to treatment. I then administered one cycle of VD-PACE in the hospital. He then received Carfilzomib/Pom/Dex. He achieved VGPR. He underwent tandem autotransplant at [facility]. According to the patient he achieved complete response with therapy. He has undergone  second/tandem transplant in June. He took Pomalyst maintenance until Aug 2019 then had to stop due to recurrent prostatitis. He had a repeat BM biopsy in February 2019 at [facility] which showed complete molecular remission. He is taking Pomalyst. He has seen  Dr. Arnav Zambrano for movement disorder. He has started taking a new medication for tardive dyskinesia. Mr. Micky Burkitt was noted to have relapsed last month

## 2023-10-03 ENCOUNTER — TELEPHONE (OUTPATIENT)
Age: 68
End: 2023-10-03

## 2023-10-03 NOTE — TELEPHONE ENCOUNTER
Familia Lombardi with Dr. Leigh Valentine  called to let you know he is having a EGD and Colonoscopy on 12/1/2023. He wanted to make sure you were aware, wondering if anything special the patient needs to do?

## 2023-10-04 NOTE — PROGRESS NOTES
Physician Progress Note      PATIENT:               Robby Squires  CSN #:                  [id number]  :                       1955  ADMIT DATE:       2023 4:48 PM  [facility] DATE:        2023 4:50 PM  RESPONDING  PROVIDER #:        Yolande Bermudez MD          QUERY TEXT:    69VIH pt admitted with Syncope-likely cardiac and has HFmrEF with an LVEF   45-50%, no significant valvular disease documented. If possible, please   document in progress notes and discharge summary further specificity regarding   the type and acuity of CHF:    The medical record reflects the following:  Risk Factors: DM, HTN former smoker and multiple myeloma on cardiotoxic   chemotx  Clinical Indicators: per DC summary note \"HFmrEF-EF 45-50%\"      Cardiology consult note:  HFmrEF - LVEF 45-50%, no significant valvular   disease 2023  Treatment: Continue Metoprolol, Cardiology consult, CHF protocol. Options provided:  -- Acute Systolic CHF/HFrEF  -- Chronic Systolic CHF/HFrEF  -- Other - I will add my own diagnosis  -- Disagree - Not applicable / Not valid  -- Disagree - Clinically unable to determine / Unknown  -- Refer to Clinical Documentation Reviewer    PROVIDER RESPONSE TEXT:    This patient has chronic systolic CHF/HFrEF.     Query created by: Chad Huerta on 2023 11:30 AM      Electronically signed by:  Yolande Bermudez MD 10/4/2023 8:29 AM

## 2023-10-05 LAB
ALBUMIN SERPL ELPH-MCNC: 3.5 G/DL (ref 2.9–4.4)
ALBUMIN/GLOB SERPL: 1.5 (ref 0.7–1.7)
ALPHA1 GLOB SERPL ELPH-MCNC: 0.2 G/DL (ref 0–0.4)
ALPHA2 GLOB SERPL ELPH-MCNC: 0.8 G/DL (ref 0.4–1)
B-GLOBULIN SERPL ELPH-MCNC: 0.8 G/DL (ref 0.7–1.3)
GAMMA GLOB SERPL ELPH-MCNC: 0.8 G/DL (ref 0.4–1.8)
GLOBULIN SER-MCNC: 2.5 G/DL (ref 2.2–3.9)
IGA SERPL-MCNC: 527 MG/DL (ref 61–437)
IGG SERPL-MCNC: 472 MG/DL (ref 603–1613)
IGM SERPL-MCNC: <5 MG/DL (ref 20–172)
INTERPRETATION SERPL IEP-IMP: ABNORMAL
KAPPA LC FREE SER-MCNC: 74.9 MG/L (ref 3.3–19.4)
KAPPA LC FREE/LAMBDA FREE SER: 32.57 (ref 0.26–1.65)
LAMBDA LC FREE SERPL-MCNC: 2.3 MG/L (ref 5.7–26.3)
M PROTEIN SERPL ELPH-MCNC: 0.4 G/DL
PROT SERPL-MCNC: 6 G/DL (ref 6–8.5)

## 2023-10-16 ENCOUNTER — HOSPITAL ENCOUNTER (OUTPATIENT)
Facility: HOSPITAL | Age: 68
Setting detail: INFUSION SERIES
End: 2023-10-16
Payer: MEDICARE

## 2023-10-16 VITALS
BODY MASS INDEX: 28.31 KG/M2 | RESPIRATION RATE: 18 BRPM | DIASTOLIC BLOOD PRESSURE: 72 MMHG | WEIGHT: 186.8 LBS | OXYGEN SATURATION: 100 % | SYSTOLIC BLOOD PRESSURE: 130 MMHG | TEMPERATURE: 98.2 F | HEIGHT: 68 IN | HEART RATE: 75 BPM

## 2023-10-16 DIAGNOSIS — Z11.59 ENCOUNTER FOR SCREENING FOR OTHER VIRAL DISEASES: ICD-10-CM

## 2023-10-16 DIAGNOSIS — C90.00 MULTIPLE MYELOMA, REMISSION STATUS UNSPECIFIED (HCC): ICD-10-CM

## 2023-10-16 DIAGNOSIS — E08.65 DIABETES MELLITUS DUE TO UNDERLYING CONDITION WITH HYPERGLYCEMIA, WITHOUT LONG-TERM CURRENT USE OF INSULIN (HCC): Primary | ICD-10-CM

## 2023-10-16 LAB
BASO+EOS+MONOS # BLD AUTO: 0.7 K/UL (ref 0.2–1.2)
BASO+EOS+MONOS NFR BLD AUTO: 20 % (ref 3.2–16.9)
DIFFERENTIAL METHOD BLD: ABNORMAL
ERYTHROCYTE [DISTWIDTH] IN BLOOD BY AUTOMATED COUNT: 14.5 % (ref 11.8–15.8)
HCT VFR BLD AUTO: 31.1 % (ref 36.6–50.3)
HGB BLD-MCNC: 10.1 G/DL (ref 12.1–17)
LYMPHOCYTES # BLD: 0.5 K/UL (ref 0.8–3.5)
LYMPHOCYTES NFR BLD: 14 % (ref 12–49)
MCH RBC QN AUTO: 33.2 PG (ref 26–34)
MCHC RBC AUTO-ENTMCNC: 32.5 G/DL (ref 30–36.5)
MCV RBC AUTO: 102.3 FL (ref 80–99)
NEUTS SEG # BLD: 2.2 K/UL (ref 1.8–8)
NEUTS SEG NFR BLD: 66 % (ref 32–75)
PLATELET # BLD AUTO: 130 K/UL (ref 150–400)
RBC # BLD AUTO: 3.04 M/UL (ref 4.1–5.7)
WBC # BLD AUTO: 3.4 K/UL (ref 4.1–11.1)

## 2023-10-16 PROCEDURE — 85025 COMPLETE CBC W/AUTO DIFF WBC: CPT

## 2023-10-16 PROCEDURE — 36415 COLL VENOUS BLD VENIPUNCTURE: CPT

## 2023-10-16 PROCEDURE — 96401 CHEMO ANTI-NEOPL SQ/IM: CPT

## 2023-10-16 PROCEDURE — 2500000003 HC RX 250 WO HCPCS: Performed by: INTERNAL MEDICINE

## 2023-10-16 PROCEDURE — 6360000002 HC RX W HCPCS: Performed by: INTERNAL MEDICINE

## 2023-10-16 PROCEDURE — A4216 STERILE WATER/SALINE, 10 ML: HCPCS | Performed by: INTERNAL MEDICINE

## 2023-10-16 PROCEDURE — 96375 TX/PRO/DX INJ NEW DRUG ADDON: CPT

## 2023-10-16 PROCEDURE — 2580000003 HC RX 258: Performed by: INTERNAL MEDICINE

## 2023-10-16 PROCEDURE — 6370000000 HC RX 637 (ALT 250 FOR IP): Performed by: INTERNAL MEDICINE

## 2023-10-16 PROCEDURE — 96367 TX/PROPH/DG ADDL SEQ IV INF: CPT

## 2023-10-16 PROCEDURE — 96413 CHEMO IV INFUSION 1 HR: CPT

## 2023-10-16 RX ORDER — SODIUM CHLORIDE 9 MG/ML
5-250 INJECTION, SOLUTION INTRAVENOUS PRN
Status: DISCONTINUED | OUTPATIENT
Start: 2023-10-16 | End: 2023-10-17 | Stop reason: HOSPADM

## 2023-10-16 RX ORDER — ACETAMINOPHEN 325 MG/1
650 TABLET ORAL ONCE
Status: COMPLETED | OUTPATIENT
Start: 2023-10-16 | End: 2023-10-16

## 2023-10-16 RX ORDER — SODIUM CHLORIDE 0.9 % (FLUSH) 0.9 %
5-40 SYRINGE (ML) INJECTION PRN
Status: DISCONTINUED | OUTPATIENT
Start: 2023-10-16 | End: 2023-10-17 | Stop reason: HOSPADM

## 2023-10-16 RX ORDER — DIPHENHYDRAMINE HYDROCHLORIDE 50 MG/ML
25 INJECTION INTRAMUSCULAR; INTRAVENOUS ONCE
Status: COMPLETED | OUTPATIENT
Start: 2023-10-16 | End: 2023-10-16

## 2023-10-16 RX ORDER — DEXAMETHASONE SODIUM PHOSPHATE 4 MG/ML
4 INJECTION, SOLUTION INTRA-ARTICULAR; INTRALESIONAL; INTRAMUSCULAR; INTRAVENOUS; SOFT TISSUE ONCE
Status: COMPLETED | OUTPATIENT
Start: 2023-10-16 | End: 2023-10-16

## 2023-10-16 RX ADMIN — SODIUM CHLORIDE 855 MG: 0.9 INJECTION, SOLUTION INTRAVENOUS at 12:55

## 2023-10-16 RX ADMIN — SODIUM CHLORIDE, PRESERVATIVE FREE 10 ML: 5 INJECTION INTRAVENOUS at 14:32

## 2023-10-16 RX ADMIN — SODIUM CHLORIDE 25 ML/HR: 9 INJECTION, SOLUTION INTRAVENOUS at 11:40

## 2023-10-16 RX ADMIN — SODIUM CHLORIDE 2.75 MG: 9 INJECTION INTRAMUSCULAR; INTRAVENOUS; SUBCUTANEOUS at 12:51

## 2023-10-16 RX ADMIN — SODIUM CHLORIDE, PRESERVATIVE FREE 20 ML: 5 INJECTION INTRAVENOUS at 11:18

## 2023-10-16 RX ADMIN — FAMOTIDINE 20 MG: 10 INJECTION, SOLUTION INTRAVENOUS at 11:41

## 2023-10-16 RX ADMIN — DEXAMETHASONE SODIUM PHOSPHATE 4 MG: 4 INJECTION INTRA-ARTICULAR; INTRALESIONAL; INTRAMUSCULAR; INTRAVENOUS; SOFT TISSUE at 11:46

## 2023-10-16 RX ADMIN — ACETAMINOPHEN 650 MG: 325 TABLET ORAL at 11:37

## 2023-10-16 RX ADMIN — DIPHENHYDRAMINE HYDROCHLORIDE 25 MG: 50 INJECTION INTRAMUSCULAR; INTRAVENOUS at 11:49

## 2023-10-16 RX ADMIN — SODIUM CHLORIDE 150 MG: 9 INJECTION, SOLUTION INTRAVENOUS at 12:01

## 2023-10-16 NOTE — DISCHARGE INSTRUCTIONS
bortezomib  Pronunciation:  bor RANJANA oh mib  Brand:  Velcade  What is the most important information I should know about bortezomib? Follow all directions on your medicine label and package. Tell each of your healthcare providers about all your medical conditions, allergies, and all medicines you use. What is bortezomib? Bortezomib is used to treat multiple myeloma and mantle cell lymphoma. Bortezomib may also be used for purposes not listed in this medication guide. What should I discuss with my healthcare provider before receiving bortezomib? You should not be treated with this medicine if you are allergic to bortezomib, mannitol, or boron. Tell your doctor if you have ever had:  nerve problems such as numbness, tingling, or burning pain;  diabetes;  liver disease;  kidney disease, or if you are on dialysis;  a low level of platelets or white or red blood cells;  heart disease, congestive heart failure;  lung disease or breathing problems;  herpes or shingles (herpes zoster);  high or low blood pressure; or  if you are dehydrated. You may need to have a negative pregnancy test before starting this treatment. Bortezomib can harm an unborn baby or cause birth defects  if the mother or the father is using this medicine. If you are a woman, do not use bortezomib if you are pregnant. Use effective birth control to prevent pregnancy while you are using this medicine and for at least 7 months after your last dose. If you are a man, use effective birth control if your sex partner is able to get pregnant. Keep using birth control for at least 4 months after your last dose. Tell your doctor right away if a pregnancy occurs while either the mother or the father is using bortezomib. This medicine may affect fertility (ability to have children) in both men and women. However, it is important to use birth control to prevent pregnancy because bortezomib may harm the baby if a pregnancy does occur.   You should using.  Where can I get more information? Your pharmacist can provide more information about bortezomib. Remember, keep this and all other medicines out of the reach of children, never share your medicines with others, and use this medication only for the indication prescribed. Every effort has been made to ensure that the information provided by [address] [place] [address] is accurate, up-to-date, and complete, but no guarantee is made to that effect. Drug information contained herein may be time sensitive. [facility] information has been compiled for use by healthcare practitioners and consumers in the [facility] and therefore [facility] does not warrant that uses outside of the [facility] are appropriate, unless specifically indicated otherwise. [facility]'s drug information does not endorse drugs, diagnose patients or recommend therapy. [facility]'s drug information is an informational resource designed to assist licensed healthcare practitioners in caring for their patients and/or to serve consumers viewing this service as a supplement to, and not a substitute for, the expertise, skill, knowledge and judgment of healthcare practitioners. The absence of a warning for a given drug or drug combination in no way should be construed to indicate that the drug or drug combination is safe, effective or appropriate for any given patient. [facility] does not assume any responsibility for any aspect of healthcare administered with the aid of information [facility] provides. The information contained herein is not intended to cover all possible uses, directions, precautions, warnings, drug interactions, allergic reactions, or adverse effects. If you have questions about the drugs you are taking, check with your doctor, nurse or pharmacist.  Copyright 8253-8087 [address] [place] [address]. Version: 11.03. Revision date: 3/15/2020. Care instructions adapted under license by [facility] ([facility]).  If you have questions about a medical condition or this

## 2023-10-19 RX ORDER — ACETAMINOPHEN 325 MG/1
650 TABLET ORAL
OUTPATIENT
Start: 2023-11-13

## 2023-10-19 RX ORDER — ALBUTEROL SULFATE 90 UG/1
4 AEROSOL, METERED RESPIRATORY (INHALATION) PRN
Status: CANCELLED | OUTPATIENT
Start: 2023-10-30

## 2023-10-19 RX ORDER — DIPHENHYDRAMINE HYDROCHLORIDE 50 MG/ML
50 INJECTION INTRAMUSCULAR; INTRAVENOUS
Status: CANCELLED | OUTPATIENT
Start: 2023-10-30

## 2023-10-19 RX ORDER — MEPERIDINE HYDROCHLORIDE 25 MG/ML
12.5 INJECTION INTRAMUSCULAR; INTRAVENOUS; SUBCUTANEOUS PRN
Status: CANCELLED | OUTPATIENT
Start: 2023-10-30

## 2023-10-19 RX ORDER — DIPHENHYDRAMINE HYDROCHLORIDE 50 MG/ML
50 INJECTION INTRAMUSCULAR; INTRAVENOUS
OUTPATIENT
Start: 2023-11-13

## 2023-10-19 RX ORDER — DEXTROSE MONOHYDRATE 50 MG/ML
5-250 INJECTION, SOLUTION INTRAVENOUS PRN
Status: CANCELLED | OUTPATIENT
Start: 2023-10-30

## 2023-10-19 RX ORDER — ACETAMINOPHEN 325 MG/1
650 TABLET ORAL ONCE
OUTPATIENT
Start: 2023-11-13 | End: 2023-11-13

## 2023-10-19 RX ORDER — EPINEPHRINE 1 MG/ML
0.3 INJECTION, SOLUTION INTRAMUSCULAR; SUBCUTANEOUS PRN
Status: CANCELLED | OUTPATIENT
Start: 2023-10-30

## 2023-10-19 RX ORDER — ONDANSETRON 2 MG/ML
8 INJECTION INTRAMUSCULAR; INTRAVENOUS
OUTPATIENT
Start: 2023-11-13

## 2023-10-19 RX ORDER — HEPARIN 100 UNIT/ML
500 SYRINGE INTRAVENOUS PRN
Status: CANCELLED | OUTPATIENT
Start: 2023-10-30

## 2023-10-19 RX ORDER — ALBUTEROL SULFATE 90 UG/1
4 AEROSOL, METERED RESPIRATORY (INHALATION) PRN
OUTPATIENT
Start: 2023-11-13

## 2023-10-19 RX ORDER — SODIUM CHLORIDE 9 MG/ML
INJECTION, SOLUTION INTRAVENOUS CONTINUOUS
OUTPATIENT
Start: 2023-11-13

## 2023-10-19 RX ORDER — SODIUM CHLORIDE 9 MG/ML
5-250 INJECTION, SOLUTION INTRAVENOUS PRN
OUTPATIENT
Start: 2023-11-13

## 2023-10-19 RX ORDER — DEXAMETHASONE SODIUM PHOSPHATE 4 MG/ML
4 INJECTION, SOLUTION INTRA-ARTICULAR; INTRALESIONAL; INTRAMUSCULAR; INTRAVENOUS; SOFT TISSUE ONCE
Start: 2023-11-13 | End: 2023-11-13

## 2023-10-19 RX ORDER — EPINEPHRINE 1 MG/ML
0.3 INJECTION, SOLUTION INTRAMUSCULAR; SUBCUTANEOUS PRN
OUTPATIENT
Start: 2023-11-13

## 2023-10-19 RX ORDER — ONDANSETRON 2 MG/ML
8 INJECTION INTRAMUSCULAR; INTRAVENOUS
Status: CANCELLED | OUTPATIENT
Start: 2023-10-30

## 2023-10-19 RX ORDER — HEPARIN 100 UNIT/ML
500 SYRINGE INTRAVENOUS PRN
OUTPATIENT
Start: 2023-11-13

## 2023-10-19 RX ORDER — SODIUM CHLORIDE 0.9 % (FLUSH) 0.9 %
5-40 SYRINGE (ML) INJECTION PRN
OUTPATIENT
Start: 2023-11-13

## 2023-10-19 RX ORDER — ACETAMINOPHEN 325 MG/1
650 TABLET ORAL
Status: CANCELLED | OUTPATIENT
Start: 2023-10-30

## 2023-10-19 RX ORDER — DIPHENHYDRAMINE HYDROCHLORIDE 50 MG/ML
25 INJECTION INTRAMUSCULAR; INTRAVENOUS ONCE
OUTPATIENT
Start: 2023-11-13 | End: 2023-11-13

## 2023-10-19 RX ORDER — SODIUM CHLORIDE 9 MG/ML
INJECTION, SOLUTION INTRAVENOUS CONTINUOUS
Status: CANCELLED | OUTPATIENT
Start: 2023-10-30

## 2023-10-19 RX ORDER — DEXTROSE MONOHYDRATE 50 MG/ML
5-250 INJECTION, SOLUTION INTRAVENOUS PRN
OUTPATIENT
Start: 2023-11-13

## 2023-10-19 RX ORDER — MEPERIDINE HYDROCHLORIDE 25 MG/ML
12.5 INJECTION INTRAMUSCULAR; INTRAVENOUS; SUBCUTANEOUS PRN
OUTPATIENT
Start: 2023-11-13

## 2023-10-23 ENCOUNTER — TELEPHONE (OUTPATIENT)
Age: 68
End: 2023-10-23

## 2023-10-23 NOTE — TELEPHONE ENCOUNTER
Call Isidro Safe calling with [employer] on behalf of [facility]. Trying to verify a medication dates. 06/12/2023 and 6/19/23 needs to knows volume and how many viles.  [phone] direct number

## 2023-10-24 ENCOUNTER — TELEPHONE (OUTPATIENT)
Age: 68
End: 2023-10-24

## 2023-10-24 NOTE — TELEPHONE ENCOUNTER
Pt wife return call. Nurse could not take call. Pt wife stated that pt is not feeling well, and is very sick. Pt wife asked to get a return call as soon as possible.

## 2023-10-24 NOTE — TELEPHONE ENCOUNTER
Returned call. No answer. VM box is full. If pt calls back please tell her to clear her VM so I can leave a detailed message if I miss her at next call.

## 2023-10-25 ENCOUNTER — TELEPHONE (OUTPATIENT)
Age: 68
End: 2023-10-25

## 2023-10-25 NOTE — TELEPHONE ENCOUNTER
Clinic Care Coordination Contact  Care Team Conversations    Writer called triage nurse and they will reach out to patient.    IMELDA Regan   Social Work Clinic Care Coordinator   [facility]  PH: [phone]  [email or web address]    Order sent for refill on pomalyst.  VORYAMILETH FROM DR Longo Or POMALUST 2MG CAP TAKE 1 CAP BY MOUTH ONCE DAILY DISPENSE 28 R 11/.

## 2023-10-25 NOTE — TELEPHONE ENCOUNTER
Called [facility] back to clarify amount of Kyprolis used on 6/12-6/19. For Pharmacy Admin Tracking Only    Program: Medical Group  CPA in place:  Yes  Recommendation Provided To:  Other: 1  Intervention Detail: Benefit Assistance  Intervention Accepted By: Other: 1  Time Spent (min): 10

## 2023-10-27 ENCOUNTER — TELEPHONE (OUTPATIENT)
Age: 68
End: 2023-10-27

## 2023-10-27 NOTE — PROGRESS NOTES
Sahil Lopez. is a 79 y.o. male here for treatment for Multiple Myeloma. Pt doing well. No concerns brought upl     1. Have you been to the ER, urgent care clinic since your last visit? Hospitalized since your last visit?   no    2. Have you seen or consulted any other health care providers outside of the [address] [place] [address] since your last visit? Include any pap smears or colon screening.    no

## 2023-10-27 NOTE — TELEPHONE ENCOUNTER
Patient calling again, never touched base . Calls were returned but couldn't leave a message due to her full mailbox. Anyhow she asked if Dr. Erickson Garcia could call.
Please call patient's wife he has been ill since his shot asking what to do. Up and low temp, negative covid. Very concerned asking me to put this in as urgent. She also mentioned they called the on call doctor Sat and spoke with Dr. Quincy Norman whom hung up on her.
Returned call. VMB is full.
negative...
You can access the FollowMyHealth Patient Portal offered by [facility] by registering at the following website: [email or web address]. By joining [email or web address] FollowMyHealth portal, you will also be able to view your health information using other applications (apps) compatible with our system.

## 2023-10-30 ENCOUNTER — OFFICE VISIT (OUTPATIENT)
Age: 68
End: 2023-10-30
Payer: MEDICARE

## 2023-10-30 ENCOUNTER — HOSPITAL ENCOUNTER (OUTPATIENT)
Facility: HOSPITAL | Age: 68
Setting detail: INFUSION SERIES
Discharge: HOME | End: 2023-10-30
Payer: MEDICARE

## 2023-10-30 ENCOUNTER — CLINICAL DOCUMENTATION (OUTPATIENT)
Age: 68
End: 2023-10-30

## 2023-10-30 VITALS
RESPIRATION RATE: 18 BRPM | OXYGEN SATURATION: 97 % | HEART RATE: 85 BPM | HEIGHT: 68 IN | TEMPERATURE: 98.7 F | SYSTOLIC BLOOD PRESSURE: 116 MMHG | DIASTOLIC BLOOD PRESSURE: 75 MMHG | WEIGHT: 182 LBS | BODY MASS INDEX: 27.58 KG/M2

## 2023-10-30 VITALS
HEIGHT: 68 IN | WEIGHT: 182 LBS | BODY MASS INDEX: 27.58 KG/M2 | RESPIRATION RATE: 18 BRPM | DIASTOLIC BLOOD PRESSURE: 82 MMHG | SYSTOLIC BLOOD PRESSURE: 122 MMHG | OXYGEN SATURATION: 94 % | TEMPERATURE: 98.7 F | HEART RATE: 82 BPM

## 2023-10-30 DIAGNOSIS — C90.00 MULTIPLE MYELOMA, REMISSION STATUS UNSPECIFIED (HCC): ICD-10-CM

## 2023-10-30 DIAGNOSIS — E08.65 DIABETES MELLITUS DUE TO UNDERLYING CONDITION WITH HYPERGLYCEMIA, WITHOUT LONG-TERM CURRENT USE OF INSULIN (HCC): Primary | ICD-10-CM

## 2023-10-30 DIAGNOSIS — C90.02 MULTIPLE MYELOMA IN RELAPSE (HCC): Primary | ICD-10-CM

## 2023-10-30 DIAGNOSIS — Z11.59 ENCOUNTER FOR SCREENING FOR OTHER VIRAL DISEASES: ICD-10-CM

## 2023-10-30 DIAGNOSIS — Z51.11 ENCOUNTER FOR ANTINEOPLASTIC CHEMOTHERAPY: ICD-10-CM

## 2023-10-30 LAB
ALBUMIN SERPL-MCNC: 3.3 G/DL (ref 3.5–5)
ALBUMIN/GLOB SERPL: 0.8 (ref 1.1–2.2)
ALP SERPL-CCNC: 102 U/L (ref 45–117)
ALT SERPL-CCNC: 49 U/L (ref 12–78)
ANION GAP SERPL CALC-SCNC: 5 MMOL/L (ref 5–15)
AST SERPL-CCNC: 25 U/L (ref 15–37)
BASOPHILS # BLD: 0 K/UL (ref 0–0.1)
BASOPHILS NFR BLD: 0 % (ref 0–1)
BILIRUB SERPL-MCNC: 0.2 MG/DL (ref 0.2–1)
BUN SERPL-MCNC: 20 MG/DL (ref 6–20)
BUN/CREAT SERPL: 13 (ref 12–20)
CALCIUM SERPL-MCNC: 9.2 MG/DL (ref 8.5–10.1)
CHLORIDE SERPL-SCNC: 105 MMOL/L (ref 97–108)
CO2 SERPL-SCNC: 28 MMOL/L (ref 21–32)
CREAT SERPL-MCNC: 1.59 MG/DL (ref 0.7–1.3)
DIFFERENTIAL METHOD BLD: ABNORMAL
EOSINOPHIL # BLD: 0 K/UL (ref 0–0.4)
EOSINOPHIL NFR BLD: 1 % (ref 0–7)
ERYTHROCYTE [DISTWIDTH] IN BLOOD BY AUTOMATED COUNT: 14.5 % (ref 11.5–14.5)
GLOBULIN SER CALC-MCNC: 4 G/DL (ref 2–4)
GLUCOSE SERPL-MCNC: 221 MG/DL (ref 65–100)
HCT VFR BLD AUTO: 29.7 % (ref 36.6–50.3)
HGB BLD-MCNC: 9.6 G/DL (ref 12.1–17)
IMM GRANULOCYTES # BLD AUTO: 0 K/UL (ref 0–0.04)
IMM GRANULOCYTES NFR BLD AUTO: 0 % (ref 0–0.5)
LYMPHOCYTES # BLD: 0.4 K/UL (ref 0.8–3.5)
LYMPHOCYTES NFR BLD: 10 % (ref 12–49)
MCH RBC QN AUTO: 33.4 PG (ref 26–34)
MCHC RBC AUTO-ENTMCNC: 32.3 G/DL (ref 30–36.5)
MCV RBC AUTO: 103.5 FL (ref 80–99)
MONOCYTES # BLD: 0.7 K/UL (ref 0–1)
MONOCYTES NFR BLD: 18 % (ref 5–13)
NEUTS SEG # BLD: 2.8 K/UL (ref 1.8–8)
NEUTS SEG NFR BLD: 71 % (ref 32–75)
NRBC # BLD: 0 K/UL (ref 0–0.01)
NRBC BLD-RTO: 0 PER 100 WBC
PLATELET # BLD AUTO: 162 K/UL (ref 150–400)
PMV BLD AUTO: 10.9 FL (ref 8.9–12.9)
POTASSIUM SERPL-SCNC: 3.9 MMOL/L (ref 3.5–5.1)
PROT SERPL-MCNC: 7.3 G/DL (ref 6.4–8.2)
RBC # BLD AUTO: 2.87 M/UL (ref 4.1–5.7)
RBC MORPH BLD: ABNORMAL
RBC MORPH BLD: ABNORMAL
SODIUM SERPL-SCNC: 138 MMOL/L (ref 136–145)
WBC # BLD AUTO: 3.9 K/UL (ref 4.1–11.1)

## 2023-10-30 PROCEDURE — 3074F SYST BP LT 130 MM HG: CPT | Performed by: INTERNAL MEDICINE

## 2023-10-30 PROCEDURE — 1123F ACP DISCUSS/DSCN MKR DOCD: CPT | Performed by: INTERNAL MEDICINE

## 2023-10-30 PROCEDURE — 2500000003 HC RX 250 WO HCPCS: Performed by: INTERNAL MEDICINE

## 2023-10-30 PROCEDURE — 80053 COMPREHEN METABOLIC PANEL: CPT

## 2023-10-30 PROCEDURE — 96417 CHEMO IV INFUS EACH ADDL SEQ: CPT

## 2023-10-30 PROCEDURE — 6370000000 HC RX 637 (ALT 250 FOR IP): Performed by: INTERNAL MEDICINE

## 2023-10-30 PROCEDURE — A4216 STERILE WATER/SALINE, 10 ML: HCPCS | Performed by: INTERNAL MEDICINE

## 2023-10-30 PROCEDURE — 82784 ASSAY IGA/IGD/IGG/IGM EACH: CPT

## 2023-10-30 PROCEDURE — 83521 IG LIGHT CHAINS FREE EACH: CPT

## 2023-10-30 PROCEDURE — 6360000002 HC RX W HCPCS: Performed by: INTERNAL MEDICINE

## 2023-10-30 PROCEDURE — 86334 IMMUNOFIX E-PHORESIS SERUM: CPT

## 2023-10-30 PROCEDURE — 96367 TX/PROPH/DG ADDL SEQ IV INF: CPT

## 2023-10-30 PROCEDURE — 84165 PROTEIN E-PHORESIS SERUM: CPT

## 2023-10-30 PROCEDURE — 99215 OFFICE O/P EST HI 40 MIN: CPT | Performed by: INTERNAL MEDICINE

## 2023-10-30 PROCEDURE — 85025 COMPLETE CBC W/AUTO DIFF WBC: CPT

## 2023-10-30 PROCEDURE — 2580000003 HC RX 258: Performed by: INTERNAL MEDICINE

## 2023-10-30 PROCEDURE — 36415 COLL VENOUS BLD VENIPUNCTURE: CPT

## 2023-10-30 PROCEDURE — 96401 CHEMO ANTI-NEOPL SQ/IM: CPT

## 2023-10-30 PROCEDURE — 96375 TX/PRO/DX INJ NEW DRUG ADDON: CPT

## 2023-10-30 PROCEDURE — 3078F DIAST BP <80 MM HG: CPT | Performed by: INTERNAL MEDICINE

## 2023-10-30 PROCEDURE — 96413 CHEMO IV INFUSION 1 HR: CPT

## 2023-10-30 RX ORDER — DEXAMETHASONE SODIUM PHOSPHATE 4 MG/ML
4 INJECTION, SOLUTION INTRA-ARTICULAR; INTRALESIONAL; INTRAMUSCULAR; INTRAVENOUS; SOFT TISSUE ONCE
Status: COMPLETED | OUTPATIENT
Start: 2023-10-30 | End: 2023-10-30

## 2023-10-30 RX ORDER — SODIUM CHLORIDE 9 MG/ML
5-250 INJECTION, SOLUTION INTRAVENOUS PRN
Status: DISCONTINUED | OUTPATIENT
Start: 2023-10-30 | End: 2023-10-31 | Stop reason: HOSPADM

## 2023-10-30 RX ORDER — ACETAMINOPHEN 325 MG/1
650 TABLET ORAL ONCE
Status: COMPLETED | OUTPATIENT
Start: 2023-10-30 | End: 2023-10-30

## 2023-10-30 RX ORDER — DIPHENHYDRAMINE HYDROCHLORIDE 50 MG/ML
25 INJECTION INTRAMUSCULAR; INTRAVENOUS ONCE
Status: COMPLETED | OUTPATIENT
Start: 2023-10-30 | End: 2023-10-30

## 2023-10-30 RX ORDER — SODIUM CHLORIDE 0.9 % (FLUSH) 0.9 %
5-40 SYRINGE (ML) INJECTION PRN
Status: DISCONTINUED | OUTPATIENT
Start: 2023-10-30 | End: 2023-10-31 | Stop reason: HOSPADM

## 2023-10-30 RX ADMIN — SODIUM CHLORIDE 150 MG: 9 INJECTION, SOLUTION INTRAVENOUS at 13:18

## 2023-10-30 RX ADMIN — SODIUM CHLORIDE 855 MG: 9 INJECTION, SOLUTION INTRAVENOUS at 14:40

## 2023-10-30 RX ADMIN — FAMOTIDINE 20 MG: 10 INJECTION, SOLUTION INTRAVENOUS at 13:44

## 2023-10-30 RX ADMIN — DEXAMETHASONE SODIUM PHOSPHATE 4 MG: 4 INJECTION INTRA-ARTICULAR; INTRALESIONAL; INTRAMUSCULAR; INTRAVENOUS; SOFT TISSUE at 13:47

## 2023-10-30 RX ADMIN — SODIUM CHLORIDE 25 ML/HR: 9 INJECTION, SOLUTION INTRAVENOUS at 13:12

## 2023-10-30 RX ADMIN — ACETAMINOPHEN 650 MG: 325 TABLET ORAL at 13:43

## 2023-10-30 RX ADMIN — SODIUM CHLORIDE 2.75 MG: 9 INJECTION INTRAMUSCULAR; INTRAVENOUS; SUBCUTANEOUS at 14:23

## 2023-10-30 RX ADMIN — DIPHENHYDRAMINE HYDROCHLORIDE 25 MG: 50 INJECTION INTRAMUSCULAR; INTRAVENOUS at 13:50

## 2023-10-30 NOTE — PROGRESS NOTES
[facility]   at [address] [place], [address], [facility], [address] [place] [address] Tonya   [phone]             Progress Note       Patient: Laura Najera Sr. MRN: [id number]   SSN: [id number]    YOB: 1955              Diagnosis:         1. Multiple Myeloma, IgA Kappa    Durie Nesbit stage IIIB      Cytogenetics/FISH: t(14;16) - high risk        Ttreatment:        1. Maintenance therapy - Pomalyst 2 mg daily - 11/19/2018    2. Maintenance therapy - Ixazomib - started 10/15/2018 - stopped 11/12/2018   3. S/P tandem  Autotransplant    First on 2/28/2018    2nd on 06/13/2018   4. Carfilzomib/Pom/Dex - s/p 5 cycles   5. VD-PACE s/p 1 cycle   6. RVD - s/p 4 cycles    7. Isatuximab/Carfilzomib/Dex 5 cycles   8. Anne/Velcade/Dex, Cycle 2 day 1       Subjective:        Laura Najera Sr. is a 74 y.o. male with a diagnosis of IgA kappa myeloma. Bone marrow shows 100% aberrant plasma cells. He suffers with DM but it is diet controlled. He has received systemic anti-viral  treatment for chronic Hep C with successful eradication of the virus. Mr. Micky Burkitt received 4 cycles of systemic therapy with RVd. He had an initial good response to treatment. However his paraprotein level started rising and the myeloma became refractory  to treatment. I then administered one cycle of VD-PACE in the hospital. He then received Carfilzomib/Pom/Dex. He achieved VGPR. He underwent tandem autotransplant at [facility]. According to the patient he achieved complete response with therapy. He has undergone  second/tandem transplant in June. He took Pomalyst maintenance until Aug 2019 then had to stop due to recurrent prostatitis. He had a repeat BM biopsy in February 2019 at [facility] which showed complete molecular remission. He is taking Pomalyst. He has seen  Dr. Anrav Zambrano for movement disorder. He has started taking a new medication for tardive dyskinesia. Mr. Micky Burkitt was

## 2023-10-30 NOTE — PROGRESS NOTES
[employer]  Oncology Social Work  Encounter     Patient Name:  Goldie Arnett.     Medical History: dx MM    Advance Directives:     Narrative: SW present with MD.   Pt expressing his symptoms post treatment which lead to him calling On call, Dr. Luis E Lu, and then he went to [facility] free standing on [address]. SW inquired if pt had a psychiatrist appt and pt stated not yet, been playing phone tag with them. SW encouraged them to leave another msg about getting a new pt appt.      Barriers to Care:     Plan:     Referral/Handouts:   Behavioral health referral

## 2023-11-03 LAB
ALBUMIN SERPL ELPH-MCNC: 3.5 G/DL (ref 2.9–4.4)
ALBUMIN/GLOB SERPL: 1.2 (ref 0.7–1.7)
ALPHA1 GLOB SERPL ELPH-MCNC: 0.3 G/DL (ref 0–0.4)
ALPHA2 GLOB SERPL ELPH-MCNC: 1.1 G/DL (ref 0.4–1)
B-GLOBULIN SERPL ELPH-MCNC: 0.8 G/DL (ref 0.7–1.3)
GAMMA GLOB SERPL ELPH-MCNC: 1 G/DL (ref 0.4–1.8)
GLOBULIN SER-MCNC: 3.1 G/DL (ref 2.2–3.9)
IGA SERPL-MCNC: 709 MG/DL (ref 61–437)
IGG SERPL-MCNC: 415 MG/DL (ref 603–1613)
IGM SERPL-MCNC: 6 MG/DL (ref 20–172)
INTERPRETATION SERPL IEP-IMP: ABNORMAL
KAPPA LC FREE SER-MCNC: 155.1 MG/L (ref 3.3–19.4)
KAPPA LC FREE/LAMBDA FREE SER: 44.31 (ref 0.26–1.65)
LAMBDA LC FREE SERPL-MCNC: 3.5 MG/L (ref 5.7–26.3)
M PROTEIN SERPL ELPH-MCNC: 0.5 G/DL
PROT SERPL-MCNC: 6.6 G/DL (ref 6–8.5)

## 2023-11-06 ENCOUNTER — APPOINTMENT (OUTPATIENT)
Facility: HOSPITAL | Age: 68
End: 2023-11-06
Payer: MEDICARE

## 2023-11-13 ENCOUNTER — HOSPITAL ENCOUNTER (OUTPATIENT)
Facility: HOSPITAL | Age: 68
Setting detail: INFUSION SERIES
Discharge: HOME | End: 2023-11-13
Payer: MEDICARE

## 2023-11-13 VITALS
BODY MASS INDEX: 28.17 KG/M2 | TEMPERATURE: 97.2 F | WEIGHT: 185.9 LBS | SYSTOLIC BLOOD PRESSURE: 123 MMHG | HEIGHT: 68 IN | RESPIRATION RATE: 16 BRPM | DIASTOLIC BLOOD PRESSURE: 80 MMHG | HEART RATE: 89 BPM | OXYGEN SATURATION: 98 %

## 2023-11-13 DIAGNOSIS — C90.00 MULTIPLE MYELOMA, REMISSION STATUS UNSPECIFIED (HCC): ICD-10-CM

## 2023-11-13 DIAGNOSIS — Z11.59 ENCOUNTER FOR SCREENING FOR OTHER VIRAL DISEASES: Primary | ICD-10-CM

## 2023-11-13 DIAGNOSIS — E08.65 DIABETES MELLITUS DUE TO UNDERLYING CONDITION WITH HYPERGLYCEMIA, WITHOUT LONG-TERM CURRENT USE OF INSULIN (HCC): ICD-10-CM

## 2023-11-13 LAB
BASO+EOS+MONOS # BLD AUTO: 0.8 K/UL (ref 0.2–1.2)
BASO+EOS+MONOS NFR BLD AUTO: 16 % (ref 3.2–16.9)
DIFFERENTIAL METHOD BLD: ABNORMAL
ERYTHROCYTE [DISTWIDTH] IN BLOOD BY AUTOMATED COUNT: 16.8 % (ref 11.8–15.8)
HCT VFR BLD AUTO: 30 % (ref 36.6–50.3)
HGB BLD-MCNC: 9.7 G/DL (ref 12.1–17)
LYMPHOCYTES # BLD: 0.6 K/UL (ref 0.8–3.5)
LYMPHOCYTES NFR BLD: 12 % (ref 12–49)
MCH RBC QN AUTO: 33.2 PG (ref 26–34)
MCHC RBC AUTO-ENTMCNC: 32.3 G/DL (ref 30–36.5)
MCV RBC AUTO: 102.7 FL (ref 80–99)
NEUTS SEG # BLD: 3.8 K/UL (ref 1.8–8)
NEUTS SEG NFR BLD: 72 % (ref 32–75)
PLATELET # BLD AUTO: 189 K/UL (ref 150–400)
RBC # BLD AUTO: 2.92 M/UL (ref 4.1–5.7)
WBC # BLD AUTO: 5.2 K/UL (ref 4.1–11.1)

## 2023-11-13 PROCEDURE — 96375 TX/PRO/DX INJ NEW DRUG ADDON: CPT

## 2023-11-13 PROCEDURE — 6370000000 HC RX 637 (ALT 250 FOR IP): Performed by: INTERNAL MEDICINE

## 2023-11-13 PROCEDURE — 96367 TX/PROPH/DG ADDL SEQ IV INF: CPT

## 2023-11-13 PROCEDURE — 2500000003 HC RX 250 WO HCPCS: Performed by: INTERNAL MEDICINE

## 2023-11-13 PROCEDURE — A4216 STERILE WATER/SALINE, 10 ML: HCPCS | Performed by: INTERNAL MEDICINE

## 2023-11-13 PROCEDURE — 96413 CHEMO IV INFUSION 1 HR: CPT

## 2023-11-13 PROCEDURE — 36415 COLL VENOUS BLD VENIPUNCTURE: CPT

## 2023-11-13 PROCEDURE — 6360000002 HC RX W HCPCS: Performed by: INTERNAL MEDICINE

## 2023-11-13 PROCEDURE — 2580000003 HC RX 258: Performed by: INTERNAL MEDICINE

## 2023-11-13 PROCEDURE — 96401 CHEMO ANTI-NEOPL SQ/IM: CPT

## 2023-11-13 PROCEDURE — 85025 COMPLETE CBC W/AUTO DIFF WBC: CPT

## 2023-11-13 RX ORDER — DIPHENHYDRAMINE HYDROCHLORIDE 50 MG/ML
25 INJECTION INTRAMUSCULAR; INTRAVENOUS ONCE
Status: DISCONTINUED | OUTPATIENT
Start: 2023-11-13 | End: 2023-11-14 | Stop reason: HOSPADM

## 2023-11-13 RX ORDER — ACETAMINOPHEN 325 MG/1
650 TABLET ORAL ONCE
Status: COMPLETED | OUTPATIENT
Start: 2023-11-13 | End: 2023-11-13

## 2023-11-13 RX ORDER — SODIUM CHLORIDE 0.9 % (FLUSH) 0.9 %
5-40 SYRINGE (ML) INJECTION PRN
Status: DISCONTINUED | OUTPATIENT
Start: 2023-11-13 | End: 2023-11-14 | Stop reason: HOSPADM

## 2023-11-13 RX ORDER — SODIUM CHLORIDE 9 MG/ML
5-250 INJECTION, SOLUTION INTRAVENOUS PRN
Status: DISCONTINUED | OUTPATIENT
Start: 2023-11-13 | End: 2023-11-14 | Stop reason: HOSPADM

## 2023-11-13 RX ORDER — DEXAMETHASONE SODIUM PHOSPHATE 4 MG/ML
4 INJECTION, SOLUTION INTRA-ARTICULAR; INTRALESIONAL; INTRAMUSCULAR; INTRAVENOUS; SOFT TISSUE ONCE
Status: COMPLETED | OUTPATIENT
Start: 2023-11-13 | End: 2023-11-13

## 2023-11-13 RX ORDER — HEPARIN 100 UNIT/ML
500 SYRINGE INTRAVENOUS PRN
Status: DISCONTINUED | OUTPATIENT
Start: 2023-11-13 | End: 2023-11-14 | Stop reason: HOSPADM

## 2023-11-13 RX ORDER — DEXTROSE MONOHYDRATE 50 MG/ML
5-250 INJECTION, SOLUTION INTRAVENOUS PRN
Status: DISCONTINUED | OUTPATIENT
Start: 2023-11-13 | End: 2023-11-14 | Stop reason: HOSPADM

## 2023-11-13 RX ADMIN — DEXAMETHASONE SODIUM PHOSPHATE 4 MG: 4 INJECTION, SOLUTION INTRAMUSCULAR; INTRAVENOUS at 10:47

## 2023-11-13 RX ADMIN — FAMOTIDINE 20 MG: 10 INJECTION, SOLUTION INTRAVENOUS at 10:49

## 2023-11-13 RX ADMIN — SODIUM CHLORIDE, PRESERVATIVE FREE 10 ML: 5 INJECTION INTRAVENOUS at 13:13

## 2023-11-13 RX ADMIN — SODIUM CHLORIDE 855 MG: 9 INJECTION, SOLUTION INTRAVENOUS at 11:33

## 2023-11-13 RX ADMIN — SODIUM CHLORIDE 2.75 MG: 9 INJECTION INTRAMUSCULAR; INTRAVENOUS; SUBCUTANEOUS at 11:30

## 2023-11-13 RX ADMIN — ACETAMINOPHEN 650 MG: 325 TABLET ORAL at 10:44

## 2023-11-13 RX ADMIN — SODIUM CHLORIDE 25 ML/HR: 9 INJECTION, SOLUTION INTRAVENOUS at 10:46

## 2023-11-13 RX ADMIN — SODIUM CHLORIDE 150 MG: 9 INJECTION, SOLUTION INTRAVENOUS at 10:55

## 2023-11-13 ASSESSMENT — PAIN SCALES - GENERAL: PAINLEVEL_OUTOF10: 5

## 2023-11-13 ASSESSMENT — PAIN DESCRIPTION - LOCATION: LOCATION: NECK

## 2023-11-13 ASSESSMENT — PAIN DESCRIPTION - DESCRIPTORS: DESCRIPTORS: ACHING

## 2023-11-13 ASSESSMENT — PAIN DESCRIPTION - PAIN TYPE: TYPE: CHRONIC PAIN

## 2023-11-13 NOTE — PROGRESS NOTES
[facility] Visit Note    Pt arrived to [facility] ambulatory in no acute distress for X9P13 Velcade/Sarclisa. Assessment unchanged. R chest port accessed without issue and positive blood return noted.       Labs obtained - CBC w/ diff  Recent Results (from the past 12 hour(s))   CBC with Partial Differential    Collection Time: 11/13/23 10:09 AM   Result Value Ref Range    WBC 5.2 4.1 - 11.1 K/uL    RBC 2.92 (L) 4.10 - 5.70 M/uL    Hemoglobin 9.7 (L) 12.1 - 17.0 g/dL    Hematocrit 30.0 (L) 36.6 - 50.3 %    .7 (H) 80.0 - 99.0 FL    MCH 33.2 26.0 - 34.0 PG    MCHC 32.3 30.0 - 36.5 g/dL    RDW 16.8 (H) 11.8 - 15.8 %    Platelets 742 497 - 084 K/uL    Neutrophils % 72 32 - 75 %    Mixed Cells 16 3.2 - 16.9 %    Lymphocytes % 12 12 - 49 %    Neutrophils Absolute 3.8 1.8 - 8.0 K/UL    ABSOLUTE MIXED CELLS 0.8 0.2 - 1.2 K/uL    Lymphocytes Absolute 0.6 (L) 0.8 - 3.5 K/UL    Differential Type AUTOMATED       Two nurses verified prior to administering:  Drug name  Drug dose  Infusion volume or drug volume when prepared in a syringe  Rate of administration  Route of administration  Expiration dates and/or times  Appearance and physical integrity of the drugs  Rate set on infusion pump, when used  Sequencing of drug administration     The following medications administered:  Medications Administered         0.9 % sodium chloride infusion Admin Date  11/13/2023 Action  New Bag Dose  25 mL/hr Rate  25 mL/hr Route  IntraVENous Administered By  Jordyn Hays RN        acetaminophen (TYLENOL) tablet 650 mg Admin Date  11/13/2023 Action  Given Dose  650 mg Rate   Route  Oral Administered By  Jordyn Hays RN        bortezomib (VELCADE) 2.75 mg in sodium chloride (PF) 0.9 % 1.1 mL chemo subcutaneous syringe Admin Date  11/13/2023 Action  Given Dose  2.75 mg Rate   Route  SubCUTAneous Administered By  Jordyn Hays RN        dexamethasone (DECADRON) injection 4 mg Admin

## 2023-11-16 RX ORDER — ACETAMINOPHEN 325 MG/1
650 TABLET ORAL
OUTPATIENT
Start: 2023-12-11

## 2023-11-16 RX ORDER — MEPERIDINE HYDROCHLORIDE 25 MG/ML
12.5 INJECTION INTRAMUSCULAR; INTRAVENOUS; SUBCUTANEOUS PRN
OUTPATIENT
Start: 2023-12-11

## 2023-11-16 RX ORDER — SODIUM CHLORIDE 9 MG/ML
INJECTION, SOLUTION INTRAVENOUS CONTINUOUS
OUTPATIENT
Start: 2023-12-11

## 2023-11-16 RX ORDER — ALBUTEROL SULFATE 90 UG/1
4 AEROSOL, METERED RESPIRATORY (INHALATION) PRN
OUTPATIENT
Start: 2023-12-11

## 2023-11-16 RX ORDER — ACETAMINOPHEN 325 MG/1
650 TABLET ORAL ONCE
OUTPATIENT
Start: 2023-12-11 | End: 2023-12-11

## 2023-11-16 RX ORDER — SODIUM CHLORIDE 0.9 % (FLUSH) 0.9 %
5-40 SYRINGE (ML) INJECTION PRN
OUTPATIENT
Start: 2023-12-11

## 2023-11-16 RX ORDER — DEXTROSE MONOHYDRATE 50 MG/ML
5-250 INJECTION, SOLUTION INTRAVENOUS PRN
OUTPATIENT
Start: 2023-12-11

## 2023-11-16 RX ORDER — SODIUM CHLORIDE 9 MG/ML
5-250 INJECTION, SOLUTION INTRAVENOUS PRN
Status: CANCELLED | OUTPATIENT
Start: 2023-11-27

## 2023-11-16 RX ORDER — DIPHENHYDRAMINE HYDROCHLORIDE 50 MG/ML
50 INJECTION INTRAMUSCULAR; INTRAVENOUS
OUTPATIENT
Start: 2023-12-11

## 2023-11-16 RX ORDER — EPINEPHRINE 1 MG/ML
0.3 INJECTION, SOLUTION INTRAMUSCULAR; SUBCUTANEOUS PRN
OUTPATIENT
Start: 2023-12-11

## 2023-11-16 RX ORDER — ONDANSETRON 2 MG/ML
8 INJECTION INTRAMUSCULAR; INTRAVENOUS
Status: CANCELLED | OUTPATIENT
Start: 2023-11-27

## 2023-11-16 RX ORDER — DIPHENHYDRAMINE HYDROCHLORIDE 50 MG/ML
25 INJECTION INTRAMUSCULAR; INTRAVENOUS ONCE
OUTPATIENT
Start: 2023-12-11 | End: 2023-12-11

## 2023-11-16 RX ORDER — ONDANSETRON 2 MG/ML
8 INJECTION INTRAMUSCULAR; INTRAVENOUS
OUTPATIENT
Start: 2023-12-11

## 2023-11-16 RX ORDER — HEPARIN 100 UNIT/ML
500 SYRINGE INTRAVENOUS PRN
Status: CANCELLED | OUTPATIENT
Start: 2023-11-27

## 2023-11-16 RX ORDER — EPINEPHRINE 1 MG/ML
0.3 INJECTION, SOLUTION INTRAMUSCULAR; SUBCUTANEOUS PRN
Status: CANCELLED | OUTPATIENT
Start: 2023-11-27

## 2023-11-16 RX ORDER — SODIUM CHLORIDE 9 MG/ML
INJECTION, SOLUTION INTRAVENOUS CONTINUOUS
Status: CANCELLED | OUTPATIENT
Start: 2023-11-27

## 2023-11-16 RX ORDER — DIPHENHYDRAMINE HYDROCHLORIDE 50 MG/ML
50 INJECTION INTRAMUSCULAR; INTRAVENOUS
Status: CANCELLED | OUTPATIENT
Start: 2023-11-27

## 2023-11-16 RX ORDER — ALBUTEROL SULFATE 90 UG/1
4 AEROSOL, METERED RESPIRATORY (INHALATION) PRN
Status: CANCELLED | OUTPATIENT
Start: 2023-11-27

## 2023-11-16 RX ORDER — HEPARIN 100 UNIT/ML
500 SYRINGE INTRAVENOUS PRN
OUTPATIENT
Start: 2023-12-11

## 2023-11-16 RX ORDER — ACETAMINOPHEN 325 MG/1
650 TABLET ORAL
Status: CANCELLED | OUTPATIENT
Start: 2023-11-27

## 2023-11-16 RX ORDER — MEPERIDINE HYDROCHLORIDE 25 MG/ML
12.5 INJECTION INTRAMUSCULAR; INTRAVENOUS; SUBCUTANEOUS PRN
Status: CANCELLED | OUTPATIENT
Start: 2023-11-27

## 2023-11-16 RX ORDER — SODIUM CHLORIDE 9 MG/ML
5-250 INJECTION, SOLUTION INTRAVENOUS PRN
OUTPATIENT
Start: 2023-12-11

## 2023-11-16 RX ORDER — DEXAMETHASONE SODIUM PHOSPHATE 4 MG/ML
4 INJECTION, SOLUTION INTRA-ARTICULAR; INTRALESIONAL; INTRAMUSCULAR; INTRAVENOUS; SOFT TISSUE ONCE
Start: 2023-12-11 | End: 2023-12-11

## 2023-11-27 ENCOUNTER — HOSPITAL ENCOUNTER (OUTPATIENT)
Facility: HOSPITAL | Age: 68
Setting detail: INFUSION SERIES
Discharge: HOME | End: 2023-11-27
Payer: MEDICARE

## 2023-11-27 ENCOUNTER — OFFICE VISIT (OUTPATIENT)
Age: 68
End: 2023-11-27
Payer: MEDICARE

## 2023-11-27 VITALS
DIASTOLIC BLOOD PRESSURE: 90 MMHG | RESPIRATION RATE: 16 BRPM | WEIGHT: 186.07 LBS | TEMPERATURE: 97.6 F | HEIGHT: 68 IN | HEART RATE: 112 BPM | BODY MASS INDEX: 28.2 KG/M2 | SYSTOLIC BLOOD PRESSURE: 143 MMHG | OXYGEN SATURATION: 96 %

## 2023-11-27 VITALS
DIASTOLIC BLOOD PRESSURE: 85 MMHG | WEIGHT: 186 LBS | BODY MASS INDEX: 28.19 KG/M2 | HEIGHT: 68 IN | RESPIRATION RATE: 16 BRPM | TEMPERATURE: 97.6 F | SYSTOLIC BLOOD PRESSURE: 140 MMHG | OXYGEN SATURATION: 96 % | HEART RATE: 89 BPM

## 2023-11-27 DIAGNOSIS — Z51.11 ENCOUNTER FOR ANTINEOPLASTIC CHEMOTHERAPY: ICD-10-CM

## 2023-11-27 DIAGNOSIS — C90.02 MULTIPLE MYELOMA IN RELAPSE (HCC): Primary | ICD-10-CM

## 2023-11-27 DIAGNOSIS — C90.00 MULTIPLE MYELOMA, REMISSION STATUS UNSPECIFIED (HCC): ICD-10-CM

## 2023-11-27 DIAGNOSIS — Z11.59 ENCOUNTER FOR SCREENING FOR OTHER VIRAL DISEASES: ICD-10-CM

## 2023-11-27 DIAGNOSIS — E08.65 DIABETES MELLITUS DUE TO UNDERLYING CONDITION WITH HYPERGLYCEMIA, WITHOUT LONG-TERM CURRENT USE OF INSULIN (HCC): Primary | ICD-10-CM

## 2023-11-27 LAB
ALBUMIN SERPL-MCNC: 3.4 G/DL (ref 3.5–5)
ALBUMIN/GLOB SERPL: 0.8 (ref 1.1–2.2)
ALP SERPL-CCNC: 80 U/L (ref 45–117)
ALT SERPL-CCNC: 44 U/L (ref 12–78)
ANION GAP SERPL CALC-SCNC: 7 MMOL/L (ref 5–15)
AST SERPL-CCNC: 19 U/L (ref 15–37)
BASOPHILS # BLD: 0 K/UL (ref 0–0.1)
BASOPHILS NFR BLD: 0 % (ref 0–1)
BILIRUB SERPL-MCNC: 0.3 MG/DL (ref 0.2–1)
BUN SERPL-MCNC: 19 MG/DL (ref 6–20)
BUN/CREAT SERPL: 14 (ref 12–20)
CALCIUM SERPL-MCNC: 8.9 MG/DL (ref 8.5–10.1)
CHLORIDE SERPL-SCNC: 102 MMOL/L (ref 97–108)
CO2 SERPL-SCNC: 31 MMOL/L (ref 21–32)
CREAT SERPL-MCNC: 1.37 MG/DL (ref 0.7–1.3)
DIFFERENTIAL METHOD BLD: ABNORMAL
EOSINOPHIL # BLD: 0 K/UL (ref 0–0.4)
EOSINOPHIL NFR BLD: 0 % (ref 0–7)
ERYTHROCYTE [DISTWIDTH] IN BLOOD BY AUTOMATED COUNT: 17.8 % (ref 11.5–14.5)
FERRITIN SERPL-MCNC: 63 NG/ML (ref 26–388)
GLOBULIN SER CALC-MCNC: 4.1 G/DL (ref 2–4)
GLUCOSE SERPL-MCNC: 163 MG/DL (ref 65–100)
HCT VFR BLD AUTO: 30.8 % (ref 36.6–50.3)
HGB BLD-MCNC: 9.5 G/DL (ref 12.1–17)
IMM GRANULOCYTES # BLD AUTO: 0.1 K/UL (ref 0–0.04)
IMM GRANULOCYTES NFR BLD AUTO: 1 % (ref 0–0.5)
IRON SATN MFR SERPL: 31 % (ref 20–50)
IRON SERPL-MCNC: 108 UG/DL (ref 35–150)
LYMPHOCYTES # BLD: 0.6 K/UL (ref 0.8–3.5)
LYMPHOCYTES NFR BLD: 10 % (ref 12–49)
MAGNESIUM SERPL-MCNC: 2 MG/DL (ref 1.6–2.4)
MCH RBC QN AUTO: 32.1 PG (ref 26–34)
MCHC RBC AUTO-ENTMCNC: 30.8 G/DL (ref 30–36.5)
MCV RBC AUTO: 104.1 FL (ref 80–99)
MONOCYTES # BLD: 1 K/UL (ref 0–1)
MONOCYTES NFR BLD: 16 % (ref 5–13)
NEUTS SEG # BLD: 4.5 K/UL (ref 1.8–8)
NEUTS SEG NFR BLD: 73 % (ref 32–75)
NRBC # BLD: 0.02 K/UL (ref 0–0.01)
NRBC BLD-RTO: 0.3 PER 100 WBC
PHOSPHATE SERPL-MCNC: 3.4 MG/DL (ref 2.6–4.7)
PLATELET # BLD AUTO: 213 K/UL (ref 150–400)
PMV BLD AUTO: 10.6 FL (ref 8.9–12.9)
POTASSIUM SERPL-SCNC: 3.8 MMOL/L (ref 3.5–5.1)
PROT SERPL-MCNC: 7.5 G/DL (ref 6.4–8.2)
RBC # BLD AUTO: 2.96 M/UL (ref 4.1–5.7)
RBC MORPH BLD: ABNORMAL
RBC MORPH BLD: ABNORMAL
SODIUM SERPL-SCNC: 140 MMOL/L (ref 136–145)
TIBC SERPL-MCNC: 344 UG/DL (ref 250–450)
WBC # BLD AUTO: 6.2 K/UL (ref 4.1–11.1)

## 2023-11-27 PROCEDURE — 83521 IG LIGHT CHAINS FREE EACH: CPT

## 2023-11-27 PROCEDURE — 3080F DIAST BP >= 90 MM HG: CPT | Performed by: INTERNAL MEDICINE

## 2023-11-27 PROCEDURE — 83540 ASSAY OF IRON: CPT

## 2023-11-27 PROCEDURE — 85025 COMPLETE CBC W/AUTO DIFF WBC: CPT

## 2023-11-27 PROCEDURE — 99215 OFFICE O/P EST HI 40 MIN: CPT | Performed by: INTERNAL MEDICINE

## 2023-11-27 PROCEDURE — 96375 TX/PRO/DX INJ NEW DRUG ADDON: CPT

## 2023-11-27 PROCEDURE — 82784 ASSAY IGA/IGD/IGG/IGM EACH: CPT

## 2023-11-27 PROCEDURE — 2580000003 HC RX 258: Performed by: INTERNAL MEDICINE

## 2023-11-27 PROCEDURE — 83550 IRON BINDING TEST: CPT

## 2023-11-27 PROCEDURE — 6360000002 HC RX W HCPCS: Performed by: INTERNAL MEDICINE

## 2023-11-27 PROCEDURE — 83735 ASSAY OF MAGNESIUM: CPT

## 2023-11-27 PROCEDURE — 84100 ASSAY OF PHOSPHORUS: CPT

## 2023-11-27 PROCEDURE — 6370000000 HC RX 637 (ALT 250 FOR IP): Performed by: INTERNAL MEDICINE

## 2023-11-27 PROCEDURE — 1123F ACP DISCUSS/DSCN MKR DOCD: CPT | Performed by: INTERNAL MEDICINE

## 2023-11-27 PROCEDURE — 80053 COMPREHEN METABOLIC PANEL: CPT

## 2023-11-27 PROCEDURE — 86334 IMMUNOFIX E-PHORESIS SERUM: CPT

## 2023-11-27 PROCEDURE — 84165 PROTEIN E-PHORESIS SERUM: CPT

## 2023-11-27 PROCEDURE — 2500000003 HC RX 250 WO HCPCS: Performed by: INTERNAL MEDICINE

## 2023-11-27 PROCEDURE — 96413 CHEMO IV INFUSION 1 HR: CPT

## 2023-11-27 PROCEDURE — 96367 TX/PROPH/DG ADDL SEQ IV INF: CPT

## 2023-11-27 PROCEDURE — 3077F SYST BP >= 140 MM HG: CPT | Performed by: INTERNAL MEDICINE

## 2023-11-27 PROCEDURE — 36415 COLL VENOUS BLD VENIPUNCTURE: CPT

## 2023-11-27 PROCEDURE — 82728 ASSAY OF FERRITIN: CPT

## 2023-11-27 RX ORDER — DIPHENHYDRAMINE HYDROCHLORIDE 50 MG/ML
25 INJECTION INTRAMUSCULAR; INTRAVENOUS ONCE
Status: COMPLETED | OUTPATIENT
Start: 2023-11-27 | End: 2023-11-27

## 2023-11-27 RX ORDER — SODIUM CHLORIDE 9 MG/ML
5-250 INJECTION, SOLUTION INTRAVENOUS PRN
Status: DISCONTINUED | OUTPATIENT
Start: 2023-11-27 | End: 2023-11-28 | Stop reason: HOSPADM

## 2023-11-27 RX ORDER — EPINEPHRINE 1 MG/ML
0.3 INJECTION, SOLUTION INTRAMUSCULAR; SUBCUTANEOUS PRN
OUTPATIENT
Start: 2023-12-25

## 2023-11-27 RX ORDER — SODIUM CHLORIDE 0.9 % (FLUSH) 0.9 %
5-40 SYRINGE (ML) INJECTION PRN
OUTPATIENT
Start: 2023-12-25

## 2023-11-27 RX ORDER — ZOLEDRONIC ACID 0.04 MG/ML
4 INJECTION, SOLUTION INTRAVENOUS ONCE
OUTPATIENT
Start: 2023-12-25 | End: 2023-12-25

## 2023-11-27 RX ORDER — DEXTROSE MONOHYDRATE 50 MG/ML
5-250 INJECTION, SOLUTION INTRAVENOUS PRN
Status: DISCONTINUED | OUTPATIENT
Start: 2023-11-27 | End: 2023-11-28 | Stop reason: HOSPADM

## 2023-11-27 RX ORDER — DIPHENHYDRAMINE HYDROCHLORIDE 50 MG/ML
50 INJECTION INTRAMUSCULAR; INTRAVENOUS
OUTPATIENT
Start: 2023-12-25

## 2023-11-27 RX ORDER — SODIUM CHLORIDE 9 MG/ML
INJECTION, SOLUTION INTRAVENOUS CONTINUOUS
OUTPATIENT
Start: 2023-12-25

## 2023-11-27 RX ORDER — ACETAMINOPHEN 325 MG/1
650 TABLET ORAL
OUTPATIENT
Start: 2023-12-25

## 2023-11-27 RX ORDER — ACETAMINOPHEN 325 MG/1
650 TABLET ORAL ONCE
Status: COMPLETED | OUTPATIENT
Start: 2023-11-27 | End: 2023-11-27

## 2023-11-27 RX ORDER — HEPARIN 100 UNIT/ML
500 SYRINGE INTRAVENOUS PRN
OUTPATIENT
Start: 2023-12-25

## 2023-11-27 RX ORDER — SODIUM CHLORIDE 0.9 % (FLUSH) 0.9 %
5-40 SYRINGE (ML) INJECTION PRN
Status: DISCONTINUED | OUTPATIENT
Start: 2023-11-27 | End: 2023-11-28 | Stop reason: HOSPADM

## 2023-11-27 RX ORDER — ALBUTEROL SULFATE 90 UG/1
4 AEROSOL, METERED RESPIRATORY (INHALATION) PRN
OUTPATIENT
Start: 2023-12-25

## 2023-11-27 RX ORDER — ONDANSETRON 2 MG/ML
8 INJECTION INTRAMUSCULAR; INTRAVENOUS
OUTPATIENT
Start: 2023-12-25

## 2023-11-27 RX ORDER — DEXAMETHASONE SODIUM PHOSPHATE 4 MG/ML
4 INJECTION, SOLUTION INTRA-ARTICULAR; INTRALESIONAL; INTRAMUSCULAR; INTRAVENOUS; SOFT TISSUE ONCE
Status: COMPLETED | OUTPATIENT
Start: 2023-11-27 | End: 2023-11-27

## 2023-11-27 RX ORDER — ZOLEDRONIC ACID 0.04 MG/ML
4 INJECTION, SOLUTION INTRAVENOUS ONCE
Status: COMPLETED | OUTPATIENT
Start: 2023-11-27 | End: 2023-11-27

## 2023-11-27 RX ORDER — SODIUM CHLORIDE 9 MG/ML
5-250 INJECTION, SOLUTION INTRAVENOUS PRN
OUTPATIENT
Start: 2023-12-25

## 2023-11-27 RX ADMIN — SODIUM CHLORIDE 150 MG: 9 INJECTION, SOLUTION INTRAVENOUS at 12:25

## 2023-11-27 RX ADMIN — SODIUM CHLORIDE, PRESERVATIVE FREE 20 MG: 5 INJECTION INTRAVENOUS at 12:21

## 2023-11-27 RX ADMIN — SODIUM CHLORIDE 855 MG: 9 INJECTION, SOLUTION INTRAVENOUS at 13:05

## 2023-11-27 RX ADMIN — SODIUM CHLORIDE, PRESERVATIVE FREE 10 ML: 5 INJECTION INTRAVENOUS at 14:54

## 2023-11-27 RX ADMIN — DEXTROSE MONOHYDRATE 25 ML/HR: 50 INJECTION, SOLUTION INTRAVENOUS at 12:13

## 2023-11-27 RX ADMIN — DEXAMETHASONE SODIUM PHOSPHATE 4 MG: 4 INJECTION INTRA-ARTICULAR; INTRALESIONAL; INTRAMUSCULAR; INTRAVENOUS; SOFT TISSUE at 12:19

## 2023-11-27 RX ADMIN — ZOLEDRONIC ACID 4 MG: 0.04 INJECTION, SOLUTION INTRAVENOUS at 14:33

## 2023-11-27 RX ADMIN — ACETAMINOPHEN 650 MG: 325 TABLET ORAL at 12:14

## 2023-11-27 RX ADMIN — DIPHENHYDRAMINE HYDROCHLORIDE 25 MG: 50 INJECTION INTRAMUSCULAR; INTRAVENOUS at 12:22

## 2023-11-27 RX ADMIN — SODIUM CHLORIDE, PRESERVATIVE FREE 10 ML: 5 INJECTION INTRAVENOUS at 10:10

## 2023-11-27 ASSESSMENT — PAIN DESCRIPTION - PAIN TYPE: TYPE: CHRONIC PAIN

## 2023-11-27 ASSESSMENT — PAIN DESCRIPTION - ORIENTATION: ORIENTATION: MID;LEFT

## 2023-11-27 ASSESSMENT — PAIN DESCRIPTION - LOCATION: LOCATION: NECK

## 2023-11-27 ASSESSMENT — PAIN DESCRIPTION - DESCRIPTORS: DESCRIPTORS: ACHING

## 2023-11-27 ASSESSMENT — PAIN SCALES - GENERAL: PAINLEVEL_OUTOF10: 5

## 2023-11-27 NOTE — PROGRESS NOTES
Alex Diallo. is a 79 y.o. male here for treatment for Multiple Myeloma. No new symptoms since last visit. He does have trouble sleeping, severe numbness/tingling, and some itching. Level 5 neck pain. 1. Have you been to the ER, urgent care clinic since your last visit? Hospitalized since your last visit?   no    2. Have you seen or consulted any other health care providers outside of the [address] [place] [address] since your last visit? Include any pap smears or colon screening.    no
Hold Velcade today     Results reviewed with the patient. Symptom management form reviewed with patient. Anxiety is less. Due to recent cardiovascular related hospitalization - chest pain and recognition of CAD in cardiac cath (no PCI performed), I think its best to hold off Kyprolis at this time. A detailed system by system evaluation of side effect was performed to assess adverse events. Blood counts are acceptable. Results reviewed with the patient    Chemotherapy and immunotherapy administration is associated with myriad of side effects and would be considered high risk medication. 2. Type 2 DM with complication      Managed by Endocrine         3. Chronic Hep C     In sustained virological   Tenofovir  I asked him to make an appt with Hepatology at [facility]      4. Depression, anxiety    Psychiatry appt is still pending             Plan:          > Continue systemic therapy.   > Anne/Velcade/Dex every other week. Hold Velcade today. > Zometa    > Iron studies   > RTC in 4 weeks       Signed by: Luis Silveira MD                     November 27, 2023        CC.  Kat Brody MD

## 2023-11-29 LAB
ALBUMIN SERPL ELPH-MCNC: 3.6 G/DL (ref 2.9–4.4)
ALBUMIN/GLOB SERPL: 1.1 (ref 0.7–1.7)
ALPHA1 GLOB SERPL ELPH-MCNC: 0.3 G/DL (ref 0–0.4)
ALPHA2 GLOB SERPL ELPH-MCNC: 1 G/DL (ref 0.4–1)
B-GLOBULIN SERPL ELPH-MCNC: 0.9 G/DL (ref 0.7–1.3)
GAMMA GLOB SERPL ELPH-MCNC: 1.2 G/DL (ref 0.4–1.8)
GLOBULIN SER-MCNC: 3.4 G/DL (ref 2.2–3.9)
IGA SERPL-MCNC: 1010 MG/DL (ref 61–437)
IGG SERPL-MCNC: 464 MG/DL (ref 603–1613)
IGM SERPL-MCNC: 15 MG/DL (ref 20–172)
INTERPRETATION SERPL IEP-IMP: ABNORMAL
KAPPA LC FREE SER-MCNC: 202.2 MG/L (ref 3.3–19.4)
KAPPA LC FREE/LAMBDA FREE SER: 77.77 (ref 0.26–1.65)
LAMBDA LC FREE SERPL-MCNC: 2.6 MG/L (ref 5.7–26.3)
M PROTEIN SERPL ELPH-MCNC: 0.7 G/DL
PROT SERPL-MCNC: 7 G/DL (ref 6–8.5)

## 2023-12-04 ENCOUNTER — APPOINTMENT (OUTPATIENT)
Facility: HOSPITAL | Age: 68
End: 2023-12-04
Payer: MEDICARE

## 2023-12-11 ENCOUNTER — HOSPITAL ENCOUNTER (OUTPATIENT)
Facility: HOSPITAL | Age: 68
Setting detail: INFUSION SERIES
Discharge: HOME | End: 2023-12-11
Payer: MEDICARE

## 2023-12-11 VITALS
OXYGEN SATURATION: 99 % | BODY MASS INDEX: 29.16 KG/M2 | DIASTOLIC BLOOD PRESSURE: 72 MMHG | RESPIRATION RATE: 16 BRPM | SYSTOLIC BLOOD PRESSURE: 117 MMHG | HEART RATE: 80 BPM | HEIGHT: 68 IN | WEIGHT: 192.4 LBS | TEMPERATURE: 99.2 F

## 2023-12-11 DIAGNOSIS — Z11.59 ENCOUNTER FOR SCREENING FOR OTHER VIRAL DISEASES: ICD-10-CM

## 2023-12-11 DIAGNOSIS — C90.00 MULTIPLE MYELOMA, REMISSION STATUS UNSPECIFIED (HCC): Primary | ICD-10-CM

## 2023-12-11 DIAGNOSIS — E08.65 DIABETES MELLITUS DUE TO UNDERLYING CONDITION WITH HYPERGLYCEMIA, WITHOUT LONG-TERM CURRENT USE OF INSULIN (HCC): ICD-10-CM

## 2023-12-11 LAB
BASO+EOS+MONOS # BLD AUTO: 0.6 K/UL (ref 0.2–1.2)
BASO+EOS+MONOS NFR BLD AUTO: 17 % (ref 3.2–16.9)
DIFFERENTIAL METHOD BLD: ABNORMAL
ERYTHROCYTE [DISTWIDTH] IN BLOOD BY AUTOMATED COUNT: 18.4 % (ref 11.8–15.8)
HCT VFR BLD AUTO: 28.9 % (ref 36.6–50.3)
HGB BLD-MCNC: 9 G/DL (ref 12.1–17)
LYMPHOCYTES # BLD: 0.5 K/UL (ref 0.8–3.5)
LYMPHOCYTES NFR BLD: 15 % (ref 12–49)
MCH RBC QN AUTO: 32.3 PG (ref 26–34)
MCHC RBC AUTO-ENTMCNC: 31.1 G/DL (ref 30–36.5)
MCV RBC AUTO: 103.6 FL (ref 80–99)
NEUTS SEG # BLD: 2.3 K/UL (ref 1.8–8)
NEUTS SEG NFR BLD: 67 % (ref 32–75)
PLATELET # BLD AUTO: 160 K/UL (ref 150–400)
RBC # BLD AUTO: 2.79 M/UL (ref 4.1–5.7)
WBC # BLD AUTO: 3.4 K/UL (ref 4.1–11.1)

## 2023-12-11 PROCEDURE — 2580000003 HC RX 258: Performed by: INTERNAL MEDICINE

## 2023-12-11 PROCEDURE — 6370000000 HC RX 637 (ALT 250 FOR IP): Performed by: INTERNAL MEDICINE

## 2023-12-11 PROCEDURE — 85025 COMPLETE CBC W/AUTO DIFF WBC: CPT

## 2023-12-11 PROCEDURE — 2500000003 HC RX 250 WO HCPCS: Performed by: INTERNAL MEDICINE

## 2023-12-11 PROCEDURE — 36415 COLL VENOUS BLD VENIPUNCTURE: CPT

## 2023-12-11 PROCEDURE — A4216 STERILE WATER/SALINE, 10 ML: HCPCS | Performed by: INTERNAL MEDICINE

## 2023-12-11 PROCEDURE — 96367 TX/PROPH/DG ADDL SEQ IV INF: CPT

## 2023-12-11 PROCEDURE — 96413 CHEMO IV INFUSION 1 HR: CPT

## 2023-12-11 PROCEDURE — 96375 TX/PRO/DX INJ NEW DRUG ADDON: CPT

## 2023-12-11 PROCEDURE — 6360000002 HC RX W HCPCS: Performed by: INTERNAL MEDICINE

## 2023-12-11 RX ORDER — ACETAMINOPHEN 325 MG/1
650 TABLET ORAL ONCE
Status: COMPLETED | OUTPATIENT
Start: 2023-12-11 | End: 2023-12-11

## 2023-12-11 RX ORDER — SODIUM CHLORIDE 9 MG/ML
5-250 INJECTION, SOLUTION INTRAVENOUS PRN
Status: DISCONTINUED | OUTPATIENT
Start: 2023-12-11 | End: 2023-12-12 | Stop reason: HOSPADM

## 2023-12-11 RX ORDER — DIPHENHYDRAMINE HYDROCHLORIDE 50 MG/ML
25 INJECTION INTRAMUSCULAR; INTRAVENOUS ONCE
Status: COMPLETED | OUTPATIENT
Start: 2023-12-11 | End: 2023-12-11

## 2023-12-11 RX ORDER — SODIUM CHLORIDE 0.9 % (FLUSH) 0.9 %
5-40 SYRINGE (ML) INJECTION PRN
Status: DISCONTINUED | OUTPATIENT
Start: 2023-12-11 | End: 2023-12-12 | Stop reason: HOSPADM

## 2023-12-11 RX ORDER — DEXAMETHASONE SODIUM PHOSPHATE 4 MG/ML
4 INJECTION, SOLUTION INTRA-ARTICULAR; INTRALESIONAL; INTRAMUSCULAR; INTRAVENOUS; SOFT TISSUE ONCE
Status: COMPLETED | OUTPATIENT
Start: 2023-12-11 | End: 2023-12-11

## 2023-12-11 RX ADMIN — FAMOTIDINE 20 MG: 10 INJECTION, SOLUTION INTRAVENOUS at 12:44

## 2023-12-11 RX ADMIN — SODIUM CHLORIDE 150 MG: 9 INJECTION, SOLUTION INTRAVENOUS at 12:13

## 2023-12-11 RX ADMIN — DIPHENHYDRAMINE HYDROCHLORIDE 25 MG: 50 INJECTION INTRAMUSCULAR; INTRAVENOUS at 12:54

## 2023-12-11 RX ADMIN — ACETAMINOPHEN 650 MG: 325 TABLET ORAL at 12:37

## 2023-12-11 RX ADMIN — SODIUM CHLORIDE 25 ML/HR: 9 INJECTION, SOLUTION INTRAVENOUS at 12:12

## 2023-12-11 RX ADMIN — SODIUM CHLORIDE 855 MG: 9 INJECTION, SOLUTION INTRAVENOUS at 13:14

## 2023-12-11 RX ADMIN — SODIUM CHLORIDE, PRESERVATIVE FREE 20 ML: 5 INJECTION INTRAVENOUS at 11:23

## 2023-12-11 RX ADMIN — DEXAMETHASONE SODIUM PHOSPHATE 4 MG: 4 INJECTION INTRA-ARTICULAR; INTRALESIONAL; INTRAMUSCULAR; INTRAVENOUS; SOFT TISSUE at 12:40

## 2023-12-11 RX ADMIN — SODIUM CHLORIDE, PRESERVATIVE FREE 10 ML: 5 INJECTION INTRAVENOUS at 14:33

## 2023-12-11 ASSESSMENT — PAIN DESCRIPTION - LOCATION: LOCATION: NECK

## 2023-12-11 ASSESSMENT — PAIN DESCRIPTION - DESCRIPTORS: DESCRIPTORS: ACHING

## 2023-12-11 ASSESSMENT — PAIN SCALES - GENERAL: PAINLEVEL_OUTOF10: 5

## 2023-12-11 ASSESSMENT — PAIN DESCRIPTION - ORIENTATION: ORIENTATION: MID

## 2023-12-11 ASSESSMENT — PAIN DESCRIPTION - PAIN TYPE: TYPE: CHRONIC PAIN

## 2023-12-26 ENCOUNTER — OFFICE VISIT (OUTPATIENT)
Age: 68
End: 2023-12-26
Payer: MEDICARE

## 2023-12-26 ENCOUNTER — HOSPITAL ENCOUNTER (OUTPATIENT)
Facility: HOSPITAL | Age: 68
Setting detail: INFUSION SERIES
Discharge: HOME | End: 2023-12-26
Payer: MEDICARE

## 2023-12-26 VITALS
WEIGHT: 193.36 LBS | OXYGEN SATURATION: 97 % | DIASTOLIC BLOOD PRESSURE: 82 MMHG | HEART RATE: 98 BPM | HEIGHT: 68 IN | SYSTOLIC BLOOD PRESSURE: 133 MMHG | BODY MASS INDEX: 29.31 KG/M2 | TEMPERATURE: 98 F | RESPIRATION RATE: 18 BRPM

## 2023-12-26 VITALS
WEIGHT: 193 LBS | TEMPERATURE: 98 F | SYSTOLIC BLOOD PRESSURE: 159 MMHG | DIASTOLIC BLOOD PRESSURE: 84 MMHG | RESPIRATION RATE: 18 BRPM | OXYGEN SATURATION: 97 % | BODY MASS INDEX: 29.25 KG/M2 | HEART RATE: 104 BPM | HEIGHT: 68 IN

## 2023-12-26 DIAGNOSIS — C90.02 MULTIPLE MYELOMA IN RELAPSE (HCC): Primary | ICD-10-CM

## 2023-12-26 DIAGNOSIS — Z51.11 ENCOUNTER FOR ANTINEOPLASTIC CHEMOTHERAPY: ICD-10-CM

## 2023-12-26 DIAGNOSIS — C90.00 MULTIPLE MYELOMA, REMISSION STATUS UNSPECIFIED (HCC): Primary | ICD-10-CM

## 2023-12-26 DIAGNOSIS — Z11.59 ENCOUNTER FOR SCREENING FOR OTHER VIRAL DISEASES: ICD-10-CM

## 2023-12-26 DIAGNOSIS — E08.65 DIABETES MELLITUS DUE TO UNDERLYING CONDITION WITH HYPERGLYCEMIA, WITHOUT LONG-TERM CURRENT USE OF INSULIN (HCC): ICD-10-CM

## 2023-12-26 LAB
ALBUMIN SERPL-MCNC: 3.5 G/DL (ref 3.5–5)
ALBUMIN/GLOB SERPL: 0.9 (ref 1.1–2.2)
ALP SERPL-CCNC: 68 U/L (ref 45–117)
ALT SERPL-CCNC: 15 U/L (ref 12–78)
ANION GAP SERPL CALC-SCNC: 4 MMOL/L (ref 5–15)
AST SERPL-CCNC: 15 U/L (ref 15–37)
BASOPHILS # BLD: 0 K/UL (ref 0–0.1)
BASOPHILS NFR BLD: 0 % (ref 0–1)
BILIRUB SERPL-MCNC: 0.2 MG/DL (ref 0.2–1)
BUN SERPL-MCNC: 12 MG/DL (ref 6–20)
BUN/CREAT SERPL: 9 (ref 12–20)
CALCIUM SERPL-MCNC: 9.4 MG/DL (ref 8.5–10.1)
CHLORIDE SERPL-SCNC: 107 MMOL/L (ref 97–108)
CO2 SERPL-SCNC: 30 MMOL/L (ref 21–32)
CREAT SERPL-MCNC: 1.33 MG/DL (ref 0.7–1.3)
DIFFERENTIAL METHOD BLD: ABNORMAL
EOSINOPHIL # BLD: 0 K/UL (ref 0–0.4)
EOSINOPHIL NFR BLD: 0 % (ref 0–7)
ERYTHROCYTE [DISTWIDTH] IN BLOOD BY AUTOMATED COUNT: 17.7 % (ref 11.5–14.5)
GLOBULIN SER CALC-MCNC: 4.1 G/DL (ref 2–4)
GLUCOSE SERPL-MCNC: 143 MG/DL (ref 65–100)
HCT VFR BLD AUTO: 28.9 % (ref 36.6–50.3)
HGB BLD-MCNC: 8.9 G/DL (ref 12.1–17)
IMM GRANULOCYTES # BLD AUTO: 0 K/UL (ref 0–0.04)
IMM GRANULOCYTES NFR BLD AUTO: 1 % (ref 0–0.5)
LYMPHOCYTES # BLD: 0.5 K/UL (ref 0.8–3.5)
LYMPHOCYTES NFR BLD: 12 % (ref 12–49)
MCH RBC QN AUTO: 32.2 PG (ref 26–34)
MCHC RBC AUTO-ENTMCNC: 30.8 G/DL (ref 30–36.5)
MCV RBC AUTO: 104.7 FL (ref 80–99)
MONOCYTES # BLD: 0.9 K/UL (ref 0–1)
MONOCYTES NFR BLD: 22 % (ref 5–13)
NEUTS SEG # BLD: 2.7 K/UL (ref 1.8–8)
NEUTS SEG NFR BLD: 65 % (ref 32–75)
NRBC # BLD: 0.02 K/UL (ref 0–0.01)
NRBC BLD-RTO: 0.5 PER 100 WBC
PLATELET # BLD AUTO: 136 K/UL (ref 150–400)
PMV BLD AUTO: 9.8 FL (ref 8.9–12.9)
POTASSIUM SERPL-SCNC: 3.6 MMOL/L (ref 3.5–5.1)
PROT SERPL-MCNC: 7.6 G/DL (ref 6.4–8.2)
RBC # BLD AUTO: 2.76 M/UL (ref 4.1–5.7)
RBC MORPH BLD: ABNORMAL
RBC MORPH BLD: ABNORMAL
SODIUM SERPL-SCNC: 141 MMOL/L (ref 136–145)
WBC # BLD AUTO: 4.1 K/UL (ref 4.1–11.1)

## 2023-12-26 PROCEDURE — 86334 IMMUNOFIX E-PHORESIS SERUM: CPT

## 2023-12-26 PROCEDURE — 36415 COLL VENOUS BLD VENIPUNCTURE: CPT

## 2023-12-26 PROCEDURE — A4216 STERILE WATER/SALINE, 10 ML: HCPCS | Performed by: NURSE PRACTITIONER

## 2023-12-26 PROCEDURE — 96375 TX/PRO/DX INJ NEW DRUG ADDON: CPT

## 2023-12-26 PROCEDURE — 96417 CHEMO IV INFUS EACH ADDL SEQ: CPT

## 2023-12-26 PROCEDURE — 99215 OFFICE O/P EST HI 40 MIN: CPT | Performed by: INTERNAL MEDICINE

## 2023-12-26 PROCEDURE — 96367 TX/PROPH/DG ADDL SEQ IV INF: CPT

## 2023-12-26 PROCEDURE — 6360000002 HC RX W HCPCS: Performed by: NURSE PRACTITIONER

## 2023-12-26 PROCEDURE — 2580000003 HC RX 258: Performed by: NURSE PRACTITIONER

## 2023-12-26 PROCEDURE — 3079F DIAST BP 80-89 MM HG: CPT | Performed by: INTERNAL MEDICINE

## 2023-12-26 PROCEDURE — 96413 CHEMO IV INFUSION 1 HR: CPT

## 2023-12-26 PROCEDURE — 80053 COMPREHEN METABOLIC PANEL: CPT

## 2023-12-26 PROCEDURE — 83521 IG LIGHT CHAINS FREE EACH: CPT

## 2023-12-26 PROCEDURE — 1123F ACP DISCUSS/DSCN MKR DOCD: CPT | Performed by: INTERNAL MEDICINE

## 2023-12-26 PROCEDURE — 3077F SYST BP >= 140 MM HG: CPT | Performed by: INTERNAL MEDICINE

## 2023-12-26 PROCEDURE — 82784 ASSAY IGA/IGD/IGG/IGM EACH: CPT

## 2023-12-26 PROCEDURE — 84165 PROTEIN E-PHORESIS SERUM: CPT

## 2023-12-26 PROCEDURE — 85025 COMPLETE CBC W/AUTO DIFF WBC: CPT

## 2023-12-26 PROCEDURE — 6370000000 HC RX 637 (ALT 250 FOR IP): Performed by: NURSE PRACTITIONER

## 2023-12-26 PROCEDURE — 2500000003 HC RX 250 WO HCPCS: Performed by: NURSE PRACTITIONER

## 2023-12-26 RX ORDER — DEXTROSE MONOHYDRATE 50 MG/ML
5-250 INJECTION, SOLUTION INTRAVENOUS PRN
Status: DISCONTINUED | OUTPATIENT
Start: 2023-12-26 | End: 2023-12-27 | Stop reason: HOSPADM

## 2023-12-26 RX ORDER — DIPHENHYDRAMINE HYDROCHLORIDE 50 MG/ML
25 INJECTION INTRAMUSCULAR; INTRAVENOUS ONCE
Status: COMPLETED | OUTPATIENT
Start: 2023-12-26 | End: 2023-12-26

## 2023-12-26 RX ORDER — DEXAMETHASONE SODIUM PHOSPHATE 4 MG/ML
4 INJECTION, SOLUTION INTRA-ARTICULAR; INTRALESIONAL; INTRAMUSCULAR; INTRAVENOUS; SOFT TISSUE ONCE
Status: COMPLETED | OUTPATIENT
Start: 2023-12-26 | End: 2023-12-26

## 2023-12-26 RX ORDER — SODIUM CHLORIDE 0.9 % (FLUSH) 0.9 %
5-40 SYRINGE (ML) INJECTION PRN
Status: DISCONTINUED | OUTPATIENT
Start: 2023-12-26 | End: 2023-12-27 | Stop reason: HOSPADM

## 2023-12-26 RX ORDER — SODIUM CHLORIDE 9 MG/ML
5-250 INJECTION, SOLUTION INTRAVENOUS PRN
Status: DISCONTINUED | OUTPATIENT
Start: 2023-12-26 | End: 2023-12-27 | Stop reason: HOSPADM

## 2023-12-26 RX ORDER — ACETAMINOPHEN 325 MG/1
650 TABLET ORAL ONCE
Status: COMPLETED | OUTPATIENT
Start: 2023-12-26 | End: 2023-12-26

## 2023-12-26 RX ADMIN — DEXAMETHASONE SODIUM PHOSPHATE 4 MG: 4 INJECTION INTRA-ARTICULAR; INTRALESIONAL; INTRAMUSCULAR; INTRAVENOUS; SOFT TISSUE at 11:44

## 2023-12-26 RX ADMIN — SODIUM CHLORIDE 855 MG: 9 INJECTION, SOLUTION INTRAVENOUS at 12:57

## 2023-12-26 RX ADMIN — FAMOTIDINE 20 MG: 10 INJECTION, SOLUTION INTRAVENOUS at 11:42

## 2023-12-26 RX ADMIN — SODIUM CHLORIDE 25 ML/HR: 9 INJECTION, SOLUTION INTRAVENOUS at 12:57

## 2023-12-26 RX ADMIN — CARFILZOMIB 40.6 MG: 10 INJECTION, POWDER, LYOPHILIZED, FOR SOLUTION INTRAVENOUS at 12:21

## 2023-12-26 RX ADMIN — DEXTROSE MONOHYDRATE 100 ML/HR: 50 INJECTION, SOLUTION INTRAVENOUS at 11:40

## 2023-12-26 RX ADMIN — SODIUM CHLORIDE, PRESERVATIVE FREE 10 ML: 5 INJECTION INTRAVENOUS at 10:09

## 2023-12-26 RX ADMIN — SODIUM CHLORIDE 150 MG: 9 INJECTION, SOLUTION INTRAVENOUS at 11:58

## 2023-12-26 RX ADMIN — ACETAMINOPHEN 650 MG: 325 TABLET ORAL at 11:40

## 2023-12-26 RX ADMIN — DIPHENHYDRAMINE HYDROCHLORIDE 25 MG: 50 INJECTION INTRAMUSCULAR; INTRAVENOUS at 11:40

## 2023-12-26 RX ADMIN — SODIUM CHLORIDE, PRESERVATIVE FREE 10 ML: 5 INJECTION INTRAVENOUS at 14:25

## 2023-12-26 ASSESSMENT — PAIN SCALES - GENERAL: PAINLEVEL_OUTOF10: 5

## 2023-12-26 ASSESSMENT — PAIN DESCRIPTION - LOCATION: LOCATION: NECK

## 2023-12-26 NOTE — PROGRESS NOTES
[facility]   at [address] [place], [address], [facility], [address] [place] [address]   [phone]             Progress Note       Patient: Tom Nelson Sr. MRN: [id number]   SSN: [id number]    YOB: 1955              Diagnosis:         1. Multiple Myeloma, IgA Kappa    Durie [place] stage IIIB      Cytogenetics/FISH: t(14;16) - high risk        Ttreatment:        1. Maintenance therapy - Pomalyst 2 mg daily - 11/19/2018    2. Maintenance therapy - Ixazomib - started 10/15/2018 - stopped 11/12/2018   3. S/P tandem  Autotransplant    First on 2/28/2018    2nd on 06/13/2018   4. Carfilzomib/Pom/Dex - s/p 5 cycles   5. VD-PACE s/p 1 cycle   6. RVD - s/p 4 cycles    7. Anne/Velcade/Dex, 2 Cycles   8. Isatuximab/Carfilzomib/Dex, cycle 6 day 1      Subjective:        Tom Nelson Sr. is a 74 y.o. male with a diagnosis of IgA kappa myeloma. Bone marrow shows 100% aberrant plasma cells. He suffers with DM but it is diet controlled. He has received systemic anti-viral  treatment for chronic Hep C with successful eradication of the virus. Mr. Cynthia Curry received 4 cycles of systemic therapy with RVd. He had an initial good response to treatment. However his paraprotein level started rising and the myeloma became refractory  to treatment. I then administered one cycle of VD-PACE in the hospital. He then received Carfilzomib/Pom/Dex. He achieved VGPR. He underwent tandem autotransplant at [facility]. According to the patient he achieved complete response with therapy. He has undergone  second/tandem transplant in June. He took Pomalyst maintenance until Aug 2019 then had to stop due to recurrent prostatitis. He had a repeat BM biopsy in February 2019 at [facility] which showed complete molecular remission. He is taking Pomalyst. He has seen  Dr. Seth Decker for movement disorder. He has started taking a new medication for tardive dyskinesia. Mr. Cynthia Curry was

## 2023-12-26 NOTE — PROGRESS NOTES
Joe Khan. is a 79 y.o. male who presents for follow up of   Chief Complaint   Patient presents with    Follow-up     Multiple myeloma     The patient reports no new clinical symptoms or new complaints since last clinic evaluation. He reports some neck pain today 5/10 today. A rash, sob, swelling of hand and feet headaches with some dizziness at times. No interval hospitalizations reported    No interval surgery or procedures reported    No reported new medication changes reported       Medications reviewed with the patient, and chart updated to reflect changes.

## 2023-12-26 NOTE — PROGRESS NOTES
[facility] Visit Note    Pt arrived to [facility] ambulatory in no acute distress at 1000 for Sarclisa/Kyprolis C8D1.  Assessment unremarkable. R chest port accessed without issue and positive blood return noted.  Labs obtained, CBC, CMP, Gammopathy Eval.    Patient denied having any symptoms of COVID-19, i.e. SOB, coughing, fever, or generally not feeling well.      BP (!) 159/84   Pulse (!) 104   Temp 98 °F (36.7 °C)   Resp 18   Ht 1.727 m (5' 8\")   Wt 87.7 kg (193 lb 5.8 oz)   SpO2 97%   BMI 29.40 kg/m²     Recent Results (from the past 12 hour(s))   CBC with Auto Differential    Collection Time: 12/26/23 10:05 AM   Result Value Ref Range    WBC 4.1 4.1 - 11.1 K/uL    RBC 2.76 (L) 4.10 - 5.70 M/uL    Hemoglobin 8.9 (L) 12.1 - 17.0 g/dL    Hematocrit 28.9 (L) 36.6 - 50.3 %    .7 (H) 80.0 - 99.0 FL    MCH 32.2 26.0 - 34.0 PG    MCHC 30.8 30.0 - 36.5 g/dL    RDW 17.7 (H) 11.5 - 14.5 %    Platelets 136 (L) 150 - 400 K/uL    MPV 9.8 8.9 - 12.9 FL    Nucleated RBCs 0.5 (H) 0  WBC    nRBC 0.02 (H) 0.00 - 0.01 K/uL    Neutrophils % 65 32 - 75 %    Lymphocytes % 12 12 - 49 %    Monocytes % 22 (H) 5 - 13 %    Eosinophils % 0 0 - 7 %    Basophils % 0 0 - 1 %    Immature Granulocytes 1 (H) 0.0 - 0.5 %    Neutrophils Absolute 2.7 1.8 - 8.0 K/UL    Lymphocytes Absolute 0.5 (L) 0.8 - 3.5 K/UL    Monocytes Absolute 0.9 0.0 - 1.0 K/UL    Eosinophils Absolute 0.0 0.0 - 0.4 K/UL    Basophils Absolute 0.0 0.0 - 0.1 K/UL    Absolute Immature Granulocyte 0.0 0.00 - 0.04 K/UL    Differential Type SMEAR SCANNED      RBC Comment MACROCYTOSIS  1+        RBC Comment ANISOCYTOSIS  1+       Comprehensive metabolic panel    Collection Time: 12/26/23 10:05 AM   Result Value Ref Range    Sodium 141 136 - 145 mmol/L    Potassium 3.6 3.5 - 5.1 mmol/L    Chloride 107 97 - 108 mmol/L    CO2 30 21 - 32 mmol/L    Anion Gap 4 (L) 5 - 15 mmol/L    Glucose 143 (H) 65 - 100 mg/dL    BUN 12 6 - 20 MG/DL     Creatinine 1.33 (H) 0.70 - 1.30 MG/DL    Bun/Cre Ratio 9 (L) 12 - 20      Est, Glom Filt Rate 59 (L) >60 ml/min/1.73m2    Calcium 9.4 8.5 - 10.1 MG/DL    Total Bilirubin 0.2 0.2 - 1.0 MG/DL    ALT 15 12 - 78 U/L    AST 15 15 - 37 U/L    Alk Phosphatase 68 45 - 117 U/L    Total Protein 7.6 6.4 - 8.2 g/dL    Albumin 3.5 3.5 - 5.0 g/dL    Globulin 4.1 (H) 2.0 - 4.0 g/dL    Albumin/Globulin Ratio 0.9 (L) 1.1 - 2.2        Two nurses verified prior to administering:  Drug name  Drug dose  Infusion volume or drug volume when prepared in a syringe  Rate of administration  Route of administration  Expiration dates and/or times  Appearance and physical integrity of the drugs  Rate set on infusion pump, when used  Sequencing of drug administration     The following medications administered:  Medications Administered         0.9 % sodium chloride infusion Admin Date  12/26/2023 Action  New Bag Dose  25 mL/hr Rate  25 mL/hr Route  IntraVENous Administered By  Bharath Hassan RN        acetaminophen (TYLENOL) tablet 650 mg Admin Date  12/26/2023 Action  Given Dose  650 mg Rate   Route  Oral Administered By  Bharath Hassan RN        carfilzomib (KYPROLIS) 40.6 mg in dextrose 5 % 100 mL chemo IVPB Admin Date  12/26/2023 Action  New Bag Dose  40.6 mg Rate  260.6 mL/hr Route  IntraVENous Administered By  Bharath Hassan RN        dexAMETHasone (DECADRON) injection 4 mg Admin Date  12/26/2023 Action  Given Dose  4 mg Rate   Route  IntraVENous Administered By  Bharath Hassan RN        dextrose 5 % solution Admin Date  12/26/2023 Action  New Bag Dose  100 mL/hr Rate  100 mL/hr Route  IntraVENous Administered By  Bharath Hassan RN        diphenhydrAMINE (BENADRYL) injection 25 mg Admin Date  12/26/2023 Action  Given Dose  25 mg Rate   Route  IntraVENous Administered By  Bharath Hassan RN        famotidine (PEPCID) 20 mg in sodium chloride (PF) 0.9 % 10 mL injection Admin Date  12/26/2023 Action  Given Dose  20 mg Rate

## 2024-01-02 LAB
ALBUMIN SERPL ELPH-MCNC: 3.8 G/DL (ref 2.9–4.4)
ALBUMIN/GLOB SERPL: 1.2 (ref 0.7–1.7)
ALPHA1 GLOB SERPL ELPH-MCNC: 0.2 G/DL (ref 0–0.4)
ALPHA2 GLOB SERPL ELPH-MCNC: 0.8 G/DL (ref 0.4–1)
B-GLOBULIN SERPL ELPH-MCNC: 0.8 G/DL (ref 0.7–1.3)
GAMMA GLOB SERPL ELPH-MCNC: 1.5 G/DL (ref 0.4–1.8)
GLOBULIN SER-MCNC: 3.4 G/DL (ref 2.2–3.9)
IGA SERPL-MCNC: 1294 MG/DL (ref 61–437)
IGG SERPL-MCNC: 408 MG/DL (ref 603–1613)
IGM SERPL-MCNC: 9 MG/DL (ref 20–172)
INTERPRETATION SERPL IEP-IMP: ABNORMAL
KAPPA LC FREE SER-MCNC: 262.1 MG/L (ref 3.3–19.4)
KAPPA LC FREE/LAMBDA FREE SER: 145.61 (ref 0.26–1.65)
LAMBDA LC FREE SERPL-MCNC: 1.8 MG/L (ref 5.7–26.3)
M PROTEIN SERPL ELPH-MCNC: 1.1 G/DL
PROT SERPL-MCNC: 7.2 G/DL (ref 6–8.5)

## 2024-01-03 ENCOUNTER — HOSPITAL ENCOUNTER (OUTPATIENT)
Facility: HOSPITAL | Age: 69
Discharge: HOME | End: 2024-01-06
Attending: INTERNAL MEDICINE
Payer: MEDICARE

## 2024-01-03 VITALS
SYSTOLIC BLOOD PRESSURE: 153 MMHG | OXYGEN SATURATION: 99 % | HEIGHT: 68 IN | HEART RATE: 83 BPM | DIASTOLIC BLOOD PRESSURE: 86 MMHG | TEMPERATURE: 98.3 F | WEIGHT: 193 LBS | RESPIRATION RATE: 18 BRPM | BODY MASS INDEX: 29.25 KG/M2

## 2024-01-03 DIAGNOSIS — C90.02 MULTIPLE MYELOMA IN RELAPSE (HCC): ICD-10-CM

## 2024-01-03 LAB
BASOPHILS # BLD: 0 K/UL (ref 0–0.1)
BASOPHILS NFR BLD: 0 % (ref 0–1)
DIFFERENTIAL METHOD BLD: ABNORMAL
EOSINOPHIL # BLD: 0 K/UL (ref 0–0.4)
EOSINOPHIL NFR BLD: 0 % (ref 0–7)
ERYTHROCYTE [DISTWIDTH] IN BLOOD BY AUTOMATED COUNT: 17.9 % (ref 11.5–14.5)
HCT VFR BLD AUTO: 30.8 % (ref 36.6–50.3)
HGB BLD-MCNC: 9.8 G/DL (ref 12.1–17)
IMM GRANULOCYTES # BLD AUTO: 0 K/UL (ref 0–0.04)
IMM GRANULOCYTES NFR BLD AUTO: 1 % (ref 0–0.5)
LYMPHOCYTES # BLD: 0.5 K/UL (ref 0.8–3.5)
LYMPHOCYTES NFR BLD: 13 % (ref 12–49)
MCH RBC QN AUTO: 32.3 PG (ref 26–34)
MCHC RBC AUTO-ENTMCNC: 31.8 G/DL (ref 30–36.5)
MCV RBC AUTO: 101.7 FL (ref 80–99)
MONOCYTES # BLD: 0.7 K/UL (ref 0–1)
MONOCYTES NFR BLD: 18 % (ref 5–13)
NEUTS SEG # BLD: 2.5 K/UL (ref 1.8–8)
NEUTS SEG NFR BLD: 68 % (ref 32–75)
NRBC # BLD: 0 K/UL (ref 0–0.01)
NRBC BLD-RTO: 0 PER 100 WBC
PLATELET # BLD AUTO: 152 K/UL (ref 150–400)
PMV BLD AUTO: 12.5 FL (ref 8.9–12.9)
RBC # BLD AUTO: 3.03 M/UL (ref 4.1–5.7)
RBC MORPH BLD: ABNORMAL
WBC # BLD AUTO: 3.7 K/UL (ref 4.1–11.1)

## 2024-01-03 PROCEDURE — 2580000003 HC RX 258: Performed by: STUDENT IN AN ORGANIZED HEALTH CARE EDUCATION/TRAINING PROGRAM

## 2024-01-03 PROCEDURE — 88311 DECALCIFY TISSUE: CPT

## 2024-01-03 PROCEDURE — 88305 TISSUE EXAM BY PATHOLOGIST: CPT

## 2024-01-03 PROCEDURE — 2709999900 CT BONE MARROW BIOPSY AND ASPIRATION

## 2024-01-03 PROCEDURE — 36415 COLL VENOUS BLD VENIPUNCTURE: CPT

## 2024-01-03 PROCEDURE — 88313 SPECIAL STAINS GROUP 2: CPT

## 2024-01-03 PROCEDURE — 88342 IMHCHEM/IMCYTCHM 1ST ANTB: CPT

## 2024-01-03 PROCEDURE — 85025 COMPLETE CBC W/AUTO DIFF WBC: CPT

## 2024-01-03 PROCEDURE — 6360000002 HC RX W HCPCS: Performed by: STUDENT IN AN ORGANIZED HEALTH CARE EDUCATION/TRAINING PROGRAM

## 2024-01-03 RX ORDER — FENTANYL CITRATE 50 UG/ML
100 INJECTION, SOLUTION INTRAMUSCULAR; INTRAVENOUS
Status: DISCONTINUED | OUTPATIENT
Start: 2024-01-03 | End: 2024-01-03

## 2024-01-03 RX ORDER — SODIUM CHLORIDE 9 MG/ML
INJECTION, SOLUTION INTRAVENOUS CONTINUOUS
Status: DISCONTINUED | OUTPATIENT
Start: 2024-01-03 | End: 2024-01-03

## 2024-01-03 RX ORDER — MIDAZOLAM HYDROCHLORIDE 1 MG/ML
5 INJECTION, SOLUTION INTRAMUSCULAR; INTRAVENOUS
Status: DISCONTINUED | OUTPATIENT
Start: 2024-01-03 | End: 2024-01-03

## 2024-01-03 RX ADMIN — MIDAZOLAM HYDROCHLORIDE 1 MG: 1 INJECTION, SOLUTION INTRAMUSCULAR; INTRAVENOUS at 11:55

## 2024-01-03 RX ADMIN — MIDAZOLAM HYDROCHLORIDE 1 MG: 1 INJECTION, SOLUTION INTRAMUSCULAR; INTRAVENOUS at 11:50

## 2024-01-03 RX ADMIN — FENTANYL CITRATE 25 MCG: 50 INJECTION INTRAMUSCULAR; INTRAVENOUS at 11:50

## 2024-01-03 RX ADMIN — FENTANYL CITRATE 25 MCG: 50 INJECTION INTRAMUSCULAR; INTRAVENOUS at 11:55

## 2024-01-03 RX ADMIN — SODIUM CHLORIDE: 9 INJECTION, SOLUTION INTRAVENOUS at 11:43

## 2024-01-03 RX ADMIN — MIDAZOLAM HYDROCHLORIDE 2 MG: 1 INJECTION, SOLUTION INTRAMUSCULAR; INTRAVENOUS at 11:45

## 2024-01-03 RX ADMIN — FENTANYL CITRATE 50 MCG: 50 INJECTION INTRAMUSCULAR; INTRAVENOUS at 11:45

## 2024-01-03 ASSESSMENT — PAIN SCALES - GENERAL: PAINLEVEL_OUTOF10: 0

## 2024-01-03 NOTE — DISCHARGE INSTRUCTIONS
[facility]  Radiology Department  [phone]    Radiologist: Peggy Childs NP    Date: 01/03/2024       Bone Marrow Biopsy Discharge Instructions      Go home and rest  and restrict your activity the next 24 hours.     You have been given sedating medications, so do not drive or make important decisions today.      Resume your previous diet and prescribed medications.    You may shower in 24 hours.  Do not soak or swim until the biopsy site has healed completely to minimize any risk of infection.  Watch site for redness, drainage, pus, foul odor, increasing pain and fevers.  Should this occur call you doctor immediatly.     You may take Tylenol if allowed, as directed on the label, for pain or discomfort.  Avoid Ibuprofen (Advil, Motrin etc.) and Aspirin today as they may increase your risk of bleeding.       Be sure to follow up with your physician, and let him know how you are progressing and to receive your results.       If you have any questions about your procedure today please call and ask to speak to a radiology nurse.

## 2024-01-03 NOTE — H&P
INTERVENTIONAL RADIOLOGY  Preoperative History and Physical      Patient:  Sameer Iglesias Sr.  :  1955  Age:  68 y.o.  MRN:  [id number]  Today's Date:  1/3/2024      CC / HPI   Sameer Iglesias Sr. is a 68 y.o. male with a history of multiple myeloma who presents for bone marrow biopsy.    PAST MEDICAL HISTORY  Past Medical History:   Diagnosis Date    Acid reflux     Arrhythmia     pvc's    Cancer (HCC)     multiple myeloma    Chronic pain     neck    Depression     Diabetes (HCC)     Femoral hernia 2012    Hepatitis C     Hypertension     Inguinal hernia 2012    Liver disease     hepatitis c    Multiple myeloma (HCC)     Other ill-defined conditions(799.89)     Hx of PVCs since     Prostatitis, acute     Psychiatric disorder     depression       PAST SURGICAL HISTORY  Past Surgical History:   Procedure Laterality Date    APPENDECTOMY      CARDIAC PROCEDURE N/A 2023    Left heart cath / coronary angiography performed by Siva Ribeiro MD at [facility] CARDIAC CATH LAB    CARDIAC PROCEDURE N/A 2023    Fractional flow reserve (FFR) performed by Siva Ribeiro MD at [facility] CARDIAC CATH LAB    CERVICAL FUSION  2008    x 1 as of 2014    COLONOSCOPY N/A 2016    COLONOSCOPY performed by Dom Barrett MD at [facility] ENDOSCOPY    HEENT      foreign body removed from right eye    HERNIA REPAIR      [facility]      IR PORT PLACEMENT EQUAL OR GREATER THAN 5 YEARS  2017    IR PORT PLACEMENT EQUAL OR GREATER THAN 5 YEARS 2017 [facility] RAD ANGIO IR    ORTHOPEDIC SURGERY      cervical fusion    OTHER SURGICAL HISTORY      liver bx - chronic hepatitis       SOCIAL HISTORY  Social History     Socioeconomic History    Marital status:      Spouse name: Not on file    Number of children: Not on file    Years of education: Not on file    Highest education level: Not on file   Occupational History    Not on file   Tobacco Use    Smoking status: Former     Current packs/day: 0.00

## 2024-01-03 NOTE — PROGRESS NOTES
1020: Patient arrived ambulatory to [facility] recovery area. Patient is A&Ox4 on RA in NAD at this time.  NPO status,code status, and allergies reviewed with patient prior to procedure.     1055: CBC walked to lab.     1200: Name of Procedure: Bone Marrow Biopsy     Sedation medications given:      Versed: 4 mg     Fentanyl:  100 mcg     Sedation Tolerated: well     Procedure and sedation times are the same.      Sedation Start: 1145  Sedation End: 1200     Vital Signs:  VSS throughout     Fluids Removed: n/a     Samples sent to lab: samples sent for cytology     Any complications related to procedure: none identified at this time     Patient is A&Ox4, on RA, and is in NAD at this time. Patient is eating crackers and drinking water at this time. Pts wife at bedside.     This patient is at an increased risk of falling because they have received sedating medications. Please evaluate and implement fall precautions/fall prevention practices as appropriate.     1230: Patient is A&Ox4 on RA in NAD at time of discharge.  Discharge instructions reviewed with patient. Opportunity for questions and clarification given. Patient verbalized understanding.  Patient wheeled to meet wife for discharge home.

## 2024-01-08 ENCOUNTER — OFFICE VISIT (OUTPATIENT)
Age: 69
End: 2024-01-08
Payer: COMMERCIAL

## 2024-01-08 ENCOUNTER — HOSPITAL ENCOUNTER (OUTPATIENT)
Facility: HOSPITAL | Age: 69
Setting detail: INFUSION SERIES
Discharge: HOME | End: 2024-01-08
Payer: COMMERCIAL

## 2024-01-08 VITALS
RESPIRATION RATE: 18 BRPM | HEART RATE: 84 BPM | DIASTOLIC BLOOD PRESSURE: 78 MMHG | BODY MASS INDEX: 28.19 KG/M2 | HEIGHT: 68 IN | OXYGEN SATURATION: 99 % | TEMPERATURE: 98 F | WEIGHT: 186 LBS | SYSTOLIC BLOOD PRESSURE: 131 MMHG

## 2024-01-08 VITALS
TEMPERATURE: 98 F | HEART RATE: 84 BPM | DIASTOLIC BLOOD PRESSURE: 78 MMHG | SYSTOLIC BLOOD PRESSURE: 131 MMHG | OXYGEN SATURATION: 99 % | RESPIRATION RATE: 18 BRPM

## 2024-01-08 DIAGNOSIS — Z11.59 ENCOUNTER FOR SCREENING FOR OTHER VIRAL DISEASES: ICD-10-CM

## 2024-01-08 DIAGNOSIS — C90.00 MULTIPLE MYELOMA, REMISSION STATUS UNSPECIFIED (HCC): Primary | ICD-10-CM

## 2024-01-08 DIAGNOSIS — Z51.11 ENCOUNTER FOR ANTINEOPLASTIC CHEMOTHERAPY: ICD-10-CM

## 2024-01-08 DIAGNOSIS — D61.82 ANEMIA ASSOCIATED WITH BONE MARROW INFILTRATION (HCC): ICD-10-CM

## 2024-01-08 DIAGNOSIS — C90.02 MULTIPLE MYELOMA IN RELAPSE (HCC): Primary | ICD-10-CM

## 2024-01-08 DIAGNOSIS — E08.65 DIABETES MELLITUS DUE TO UNDERLYING CONDITION WITH HYPERGLYCEMIA, WITHOUT LONG-TERM CURRENT USE OF INSULIN (HCC): ICD-10-CM

## 2024-01-08 LAB
ALBUMIN SERPL-MCNC: 3.5 G/DL (ref 3.5–5)
ALBUMIN/GLOB SERPL: 0.9 (ref 1.1–2.2)
ALP SERPL-CCNC: 73 U/L (ref 45–117)
ALT SERPL-CCNC: 15 U/L (ref 12–78)
ANION GAP SERPL CALC-SCNC: 3 MMOL/L (ref 5–15)
AST SERPL-CCNC: 12 U/L (ref 15–37)
BASO+EOS+MONOS # BLD AUTO: 0.6 K/UL (ref 0.2–1.2)
BASO+EOS+MONOS NFR BLD AUTO: 16 % (ref 3.2–16.9)
BILIRUB SERPL-MCNC: 0.2 MG/DL (ref 0.2–1)
BUN SERPL-MCNC: 16 MG/DL (ref 6–20)
BUN/CREAT SERPL: 12 (ref 12–20)
CALCIUM SERPL-MCNC: 8.8 MG/DL (ref 8.5–10.1)
CHLORIDE SERPL-SCNC: 106 MMOL/L (ref 97–108)
CO2 SERPL-SCNC: 27 MMOL/L (ref 21–32)
CREAT SERPL-MCNC: 1.39 MG/DL (ref 0.7–1.3)
DIFFERENTIAL METHOD BLD: ABNORMAL
ERYTHROCYTE [DISTWIDTH] IN BLOOD BY AUTOMATED COUNT: 18.3 % (ref 11.8–15.8)
GLOBULIN SER CALC-MCNC: 4 G/DL (ref 2–4)
GLUCOSE SERPL-MCNC: 153 MG/DL (ref 65–100)
HCT VFR BLD AUTO: 30.7 % (ref 36.6–50.3)
HGB BLD-MCNC: 9.5 G/DL (ref 12.1–17)
LYMPHOCYTES # BLD: 0.6 K/UL (ref 0.8–3.5)
LYMPHOCYTES NFR BLD: 17 % (ref 12–49)
MAGNESIUM SERPL-MCNC: 2.4 MG/DL (ref 1.6–2.4)
MCH RBC QN AUTO: 32.2 PG (ref 26–34)
MCHC RBC AUTO-ENTMCNC: 30.9 G/DL (ref 30–36.5)
MCV RBC AUTO: 104.1 FL (ref 80–99)
NEUTS SEG # BLD: 2.6 K/UL (ref 1.8–8)
NEUTS SEG NFR BLD: 67 % (ref 32–75)
PHOSPHATE SERPL-MCNC: 1.8 MG/DL (ref 2.6–4.7)
PLATELET # BLD AUTO: 160 K/UL (ref 150–400)
POTASSIUM SERPL-SCNC: 3.9 MMOL/L (ref 3.5–5.1)
PROT SERPL-MCNC: 7.5 G/DL (ref 6.4–8.2)
RBC # BLD AUTO: 2.95 M/UL (ref 4.1–5.7)
SODIUM SERPL-SCNC: 136 MMOL/L (ref 136–145)
WBC # BLD AUTO: 3.8 K/UL (ref 4.1–11.1)

## 2024-01-08 PROCEDURE — 84100 ASSAY OF PHOSPHORUS: CPT

## 2024-01-08 PROCEDURE — 2580000003 HC RX 258: Performed by: NURSE PRACTITIONER

## 2024-01-08 PROCEDURE — A4216 STERILE WATER/SALINE, 10 ML: HCPCS | Performed by: NURSE PRACTITIONER

## 2024-01-08 PROCEDURE — G8484 FLU IMMUNIZE NO ADMIN: HCPCS | Performed by: INTERNAL MEDICINE

## 2024-01-08 PROCEDURE — 96413 CHEMO IV INFUSION 1 HR: CPT

## 2024-01-08 PROCEDURE — 85025 COMPLETE CBC W/AUTO DIFF WBC: CPT

## 2024-01-08 PROCEDURE — 1036F TOBACCO NON-USER: CPT | Performed by: INTERNAL MEDICINE

## 2024-01-08 PROCEDURE — 96417 CHEMO IV INFUS EACH ADDL SEQ: CPT

## 2024-01-08 PROCEDURE — 1123F ACP DISCUSS/DSCN MKR DOCD: CPT | Performed by: INTERNAL MEDICINE

## 2024-01-08 PROCEDURE — 83735 ASSAY OF MAGNESIUM: CPT

## 2024-01-08 PROCEDURE — G8427 DOCREV CUR MEDS BY ELIG CLIN: HCPCS | Performed by: INTERNAL MEDICINE

## 2024-01-08 PROCEDURE — G8419 CALC BMI OUT NRM PARAM NOF/U: HCPCS | Performed by: INTERNAL MEDICINE

## 2024-01-08 PROCEDURE — 96375 TX/PRO/DX INJ NEW DRUG ADDON: CPT

## 2024-01-08 PROCEDURE — 3078F DIAST BP <80 MM HG: CPT | Performed by: INTERNAL MEDICINE

## 2024-01-08 PROCEDURE — 3017F COLORECTAL CA SCREEN DOC REV: CPT | Performed by: INTERNAL MEDICINE

## 2024-01-08 PROCEDURE — 36415 COLL VENOUS BLD VENIPUNCTURE: CPT

## 2024-01-08 PROCEDURE — 2500000003 HC RX 250 WO HCPCS: Performed by: NURSE PRACTITIONER

## 2024-01-08 PROCEDURE — 3075F SYST BP GE 130 - 139MM HG: CPT | Performed by: INTERNAL MEDICINE

## 2024-01-08 PROCEDURE — 99215 OFFICE O/P EST HI 40 MIN: CPT | Performed by: INTERNAL MEDICINE

## 2024-01-08 PROCEDURE — 80053 COMPREHEN METABOLIC PANEL: CPT

## 2024-01-08 PROCEDURE — 6370000000 HC RX 637 (ALT 250 FOR IP): Performed by: NURSE PRACTITIONER

## 2024-01-08 PROCEDURE — 6360000002 HC RX W HCPCS: Performed by: NURSE PRACTITIONER

## 2024-01-08 RX ORDER — ALBUTEROL SULFATE 90 UG/1
4 AEROSOL, METERED RESPIRATORY (INHALATION) PRN
OUTPATIENT
Start: 2024-01-23

## 2024-01-08 RX ORDER — SODIUM CHLORIDE 9 MG/ML
5-250 INJECTION, SOLUTION INTRAVENOUS PRN
OUTPATIENT
Start: 2024-01-23

## 2024-01-08 RX ORDER — ACETAMINOPHEN 325 MG/1
650 TABLET ORAL ONCE
Status: COMPLETED | OUTPATIENT
Start: 2024-01-08 | End: 2024-01-08

## 2024-01-08 RX ORDER — HEPARIN 100 UNIT/ML
500 SYRINGE INTRAVENOUS PRN
OUTPATIENT
Start: 2024-01-23

## 2024-01-08 RX ORDER — DEXTROSE MONOHYDRATE 50 MG/ML
5-250 INJECTION, SOLUTION INTRAVENOUS PRN
Status: DISCONTINUED | OUTPATIENT
Start: 2024-01-08 | End: 2024-01-09 | Stop reason: HOSPADM

## 2024-01-08 RX ORDER — DIPHENHYDRAMINE HYDROCHLORIDE 50 MG/ML
25 INJECTION INTRAMUSCULAR; INTRAVENOUS ONCE
Status: COMPLETED | OUTPATIENT
Start: 2024-01-08 | End: 2024-01-08

## 2024-01-08 RX ORDER — ZOLEDRONIC ACID 0.04 MG/ML
4 INJECTION, SOLUTION INTRAVENOUS ONCE
OUTPATIENT
Start: 2024-01-23 | End: 2024-01-23

## 2024-01-08 RX ORDER — ACETAMINOPHEN 325 MG/1
650 TABLET ORAL
OUTPATIENT
Start: 2024-01-23

## 2024-01-08 RX ORDER — SODIUM CHLORIDE 0.9 % (FLUSH) 0.9 %
5-40 SYRINGE (ML) INJECTION PRN
Status: DISCONTINUED | OUTPATIENT
Start: 2024-01-08 | End: 2024-01-09 | Stop reason: HOSPADM

## 2024-01-08 RX ORDER — DEXAMETHASONE SODIUM PHOSPHATE 4 MG/ML
4 INJECTION, SOLUTION INTRA-ARTICULAR; INTRALESIONAL; INTRAMUSCULAR; INTRAVENOUS; SOFT TISSUE ONCE
Status: COMPLETED | OUTPATIENT
Start: 2024-01-08 | End: 2024-01-08

## 2024-01-08 RX ORDER — ONDANSETRON 2 MG/ML
8 INJECTION INTRAMUSCULAR; INTRAVENOUS
OUTPATIENT
Start: 2024-01-23

## 2024-01-08 RX ORDER — SODIUM CHLORIDE 9 MG/ML
INJECTION, SOLUTION INTRAVENOUS CONTINUOUS
OUTPATIENT
Start: 2024-01-23

## 2024-01-08 RX ORDER — SODIUM CHLORIDE 0.9 % (FLUSH) 0.9 %
5-40 SYRINGE (ML) INJECTION PRN
OUTPATIENT
Start: 2024-01-23

## 2024-01-08 RX ORDER — DIPHENHYDRAMINE HYDROCHLORIDE 50 MG/ML
50 INJECTION INTRAMUSCULAR; INTRAVENOUS
OUTPATIENT
Start: 2024-01-23

## 2024-01-08 RX ORDER — EPINEPHRINE 1 MG/ML
0.3 INJECTION, SOLUTION INTRAMUSCULAR; SUBCUTANEOUS PRN
OUTPATIENT
Start: 2024-01-23

## 2024-01-08 RX ADMIN — SODIUM CHLORIDE 855 MG: 0.9 INJECTION, SOLUTION INTRAVENOUS at 13:41

## 2024-01-08 RX ADMIN — CARFILZOMIB 40.6 MG: 10 INJECTION, POWDER, LYOPHILIZED, FOR SOLUTION INTRAVENOUS at 15:22

## 2024-01-08 RX ADMIN — WATER 2 MG: 1 INJECTION INTRAMUSCULAR; INTRAVENOUS; SUBCUTANEOUS at 12:14

## 2024-01-08 RX ADMIN — ACETAMINOPHEN 650 MG: 325 TABLET ORAL at 13:07

## 2024-01-08 RX ADMIN — DEXAMETHASONE SODIUM PHOSPHATE 4 MG: 4 INJECTION INTRA-ARTICULAR; INTRALESIONAL; INTRAMUSCULAR; INTRAVENOUS; SOFT TISSUE at 13:08

## 2024-01-08 RX ADMIN — SODIUM CHLORIDE 150 MG: 900 INJECTION, SOLUTION INTRAVENOUS at 13:18

## 2024-01-08 RX ADMIN — DEXTROSE MONOHYDRATE 25 ML/HR: 50 INJECTION, SOLUTION INTRAVENOUS at 13:07

## 2024-01-08 RX ADMIN — DIPHENHYDRAMINE HYDROCHLORIDE 25 MG: 50 INJECTION INTRAMUSCULAR; INTRAVENOUS at 13:12

## 2024-01-08 RX ADMIN — FAMOTIDINE 20 MG: 10 INJECTION, SOLUTION INTRAVENOUS at 13:10

## 2024-01-08 NOTE — PROGRESS NOTES
[facility] Visit Note    Pt arrived to [facility] ambulatory in no acute distress for Kyprolis C8D15.  Assessment completed.  R chest port accessed without issue but no positive blood return noted; cathflo administered - brisk blood return noted after 30 minutes of cathflo.    Labs obtained peripherally: CBC, CMP, mag/phos.    Recent Results (from the past 12 hour(s))   Comprehensive Metabolic Panel    Collection Time: 01/08/24 12:03 PM   Result Value Ref Range    Sodium 136 136 - 145 mmol/L    Potassium 3.9 3.5 - 5.1 mmol/L    Chloride 106 97 - 108 mmol/L    CO2 27 21 - 32 mmol/L    Anion Gap 3 (L) 5 - 15 mmol/L    Glucose 153 (H) 65 - 100 mg/dL    BUN 16 6 - 20 MG/DL    Creatinine 1.39 (H) 0.70 - 1.30 MG/DL    Bun/Cre Ratio 12 12 - 20      Est, Glom Filt Rate 56 (L) >60 ml/min/1.73m2    Calcium 8.8 8.5 - 10.1 MG/DL    Total Bilirubin 0.2 0.2 - 1.0 MG/DL    ALT 15 12 - 78 U/L    AST 12 (L) 15 - 37 U/L    Alk Phosphatase 73 45 - 117 U/L    Total Protein 7.5 6.4 - 8.2 g/dL    Albumin 3.5 3.5 - 5.0 g/dL    Globulin 4.0 2.0 - 4.0 g/dL    Albumin/Globulin Ratio 0.9 (L) 1.1 - 2.2     Magnesium    Collection Time: 01/08/24 12:03 PM   Result Value Ref Range    Magnesium 2.4 1.6 - 2.4 mg/dL   Phosphorus    Collection Time: 01/08/24 12:03 PM   Result Value Ref Range    Phosphorus 1.8 (L) 2.6 - 4.7 MG/DL   CBC with Partial Differential    Collection Time: 01/08/24 12:06 PM   Result Value Ref Range    WBC 3.8 (L) 4.1 - 11.1 K/uL    RBC 2.95 (L) 4.10 - 5.70 M/uL    Hemoglobin 9.5 (L) 12.1 - 17.0 g/dL    Hematocrit 30.7 (L) 36.6 - 50.3 %    .1 (H) 80.0 - 99.0 FL    MCH 32.2 26.0 - 34.0 PG    MCHC 30.9 30.0 - 36.5 g/dL    RDW 18.3 (H) 11.8 - 15.8 %    Platelets 160 150 - 400 K/uL    Neutrophils % 67 32 - 75 %    Mixed Cells 16 3.2 - 16.9 %    Lymphocytes % 17 12 - 49 %    Neutrophils Absolute 2.6 1.8 - 8.0 K/UL    ABSOLUTE MIXED CELLS 0.6 0.2 - 1.2 K/uL    Lymphocytes Absolute 0.6 (L) 0.8 - 3.5

## 2024-01-08 NOTE — PROGRESS NOTES
Sameer Iglesias Sr. is a 68 y.o. male here for treatment for Multiple Myeloma.  -7 lbs since last visit.

## 2024-01-08 NOTE — PROGRESS NOTES
[facility] [place]   at [facility]   [address]   [place] [address]   [phone]             Progress Note       Patient: Sameer Iglesias Sr.  MRN: [id number]   SSN: [id number]    YOB: 1955              Diagnosis:         1. Multiple Myeloma, IgA Kappa    Durie [place] stage IIIB      Cytogenetics/FISH: t(14;16) - high risk        Ttreatment:        1. Maintenance therapy - Pomalyst 2 mg daily - 11/19/2018    2. Maintenance therapy - Ixazomib - started 10/15/2018 - stopped 11/12/2018   3. S/P tandem  Autotransplant    First on 2/28/2018    2nd on 06/13/2018   4. Carfilzomib/Pom/Dex - s/p 5 cycles   5. VD-PACE s/p 1 cycle   6. RVD - s/p 4 cycles    7. Anne/Velcade/Dex, 2 Cycles   8. Isatuximab/Carfilzomib/Dex, cycle 8 day 15      Subjective:        Sameer Iglesias Sr. is a 67 y.o. male with a diagnosis of IgA kappa myeloma. Bone marrow shows 100% aberrant plasma cells. He suffers with DM but it is diet controlled. He has received systemic anti-viral  treatment for chronic Hep C with successful eradication of the virus. Mr. Iglesias received 4 cycles of systemic therapy with RVd. He had an initial good response to treatment. However his paraprotein level started rising and the myeloma became refractory  to treatment. I then administered one cycle of VD-PACE in the hospital. He then received Carfilzomib/Pom/Dex. He achieved VGPR. He underwent tandem autotransplant at [facility]. According to the patient he achieved complete response with therapy. He has undergone  second/tandem transplant in June. He took Pomalyst maintenance until Aug 2019 then had to stop due to recurrent prostatitis. He had a repeat BM biopsy in February 2019 at [facility] which showed complete molecular remission. He is taking Pomalyst. He has seen  Dr. Mendoza for movement disorder. He has started taking a new medication for tardive dyskinesia.Mr. Iglesias was

## 2024-01-10 DIAGNOSIS — C90.02 MULTIPLE MYELOMA IN RELAPSE (HCC): Primary | ICD-10-CM

## 2024-01-12 ENCOUNTER — TELEPHONE (OUTPATIENT)
Age: 69
End: 2024-01-12

## 2024-01-12 ENCOUNTER — HOSPITAL ENCOUNTER (OUTPATIENT)
Facility: HOSPITAL | Age: 69
End: 2024-01-12
Attending: INTERNAL MEDICINE
Payer: MEDICARE

## 2024-01-12 DIAGNOSIS — C90.02 MULTIPLE MYELOMA IN RELAPSE (HCC): Primary | ICD-10-CM

## 2024-01-12 DIAGNOSIS — N18.31 CHRONIC KIDNEY DISEASE, STAGE 3A (HCC): ICD-10-CM

## 2024-01-12 DIAGNOSIS — Z11.59 ENCOUNTER FOR SCREENING FOR OTHER VIRAL DISEASES: Primary | ICD-10-CM

## 2024-01-12 DIAGNOSIS — C90.00 MULTIPLE MYELOMA, REMISSION STATUS UNSPECIFIED (HCC): ICD-10-CM

## 2024-01-12 PROCEDURE — 76770 US EXAM ABDO BACK WALL COMP: CPT

## 2024-01-12 RX ORDER — DEXTROSE MONOHYDRATE 50 MG/ML
5-250 INJECTION, SOLUTION INTRAVENOUS PRN
OUTPATIENT
Start: 2024-01-15

## 2024-01-12 RX ORDER — DEXAMETHASONE 4 MG/1
40 TABLET ORAL
Qty: 40 TABLET | Refills: 5 | Status: SHIPPED | OUTPATIENT
Start: 2024-01-12

## 2024-01-12 RX ORDER — ONDANSETRON 2 MG/ML
8 INJECTION INTRAMUSCULAR; INTRAVENOUS
OUTPATIENT
Start: 2024-01-15

## 2024-01-12 NOTE — TELEPHONE ENCOUNTER
Case d/w .  He did say he wants him to go back to 3 weeks on 1 week off as we had him doing every other week for his preference.   Will see if pt can come in on Monday and we will SWITCH his regimen. Pt has medicare.

## 2024-01-12 NOTE — TELEPHONE ENCOUNTER
Pt left VM on 1/12/24 at 1:41pm. Pt wanted to let nurse know that the medication will be delivered today sometime this afternoon. If nurse needs anymore information to call him.

## 2024-01-12 NOTE — TELEPHONE ENCOUNTER
Called pt and spoke with pt wife.  HIPAA verified by two patient identifiers.   He will bring his medicine with him Monday.

## 2024-01-12 NOTE — TELEPHONE ENCOUNTER
Pt called he has a treatment on 1/22 and he talked to  at his last appt about having treatment on this coming Monday. PT is asking for a call back.

## 2024-01-12 NOTE — TELEPHONE ENCOUNTER
Pt can come Monday at 11am.    Returned call to pt.    HIPAA verified by two patient identifiers.     He is aware to call us when he gets his pills in the mail.

## 2024-01-15 ENCOUNTER — HOSPITAL ENCOUNTER (OUTPATIENT)
Facility: HOSPITAL | Age: 69
Setting detail: INFUSION SERIES
Discharge: HOME | End: 2024-01-15
Payer: MEDICARE

## 2024-01-15 ENCOUNTER — OFFICE VISIT (OUTPATIENT)
Age: 69
End: 2024-01-15
Payer: MEDICARE

## 2024-01-15 VITALS
WEIGHT: 179.3 LBS | HEIGHT: 68 IN | SYSTOLIC BLOOD PRESSURE: 122 MMHG | HEART RATE: 92 BPM | OXYGEN SATURATION: 98 % | TEMPERATURE: 97 F | RESPIRATION RATE: 16 BRPM | BODY MASS INDEX: 27.17 KG/M2 | DIASTOLIC BLOOD PRESSURE: 76 MMHG

## 2024-01-15 VITALS
BODY MASS INDEX: 28.2 KG/M2 | TEMPERATURE: 97 F | DIASTOLIC BLOOD PRESSURE: 84 MMHG | HEIGHT: 68 IN | SYSTOLIC BLOOD PRESSURE: 125 MMHG | RESPIRATION RATE: 16 BRPM | OXYGEN SATURATION: 98 % | HEART RATE: 95 BPM | WEIGHT: 186.07 LBS

## 2024-01-15 DIAGNOSIS — Z51.11 ENCOUNTER FOR ANTINEOPLASTIC CHEMOTHERAPY: ICD-10-CM

## 2024-01-15 DIAGNOSIS — Z11.59 ENCOUNTER FOR SCREENING FOR OTHER VIRAL DISEASES: ICD-10-CM

## 2024-01-15 DIAGNOSIS — D61.82 ANEMIA ASSOCIATED WITH BONE MARROW INFILTRATION (HCC): ICD-10-CM

## 2024-01-15 DIAGNOSIS — C90.00 MULTIPLE MYELOMA, REMISSION STATUS UNSPECIFIED (HCC): Primary | ICD-10-CM

## 2024-01-15 DIAGNOSIS — C90.02 MULTIPLE MYELOMA IN RELAPSE (HCC): Primary | ICD-10-CM

## 2024-01-15 LAB
ALBUMIN SERPL-MCNC: 4 G/DL (ref 3.5–5)
ALBUMIN/GLOB SERPL: 0.9 (ref 1.1–2.2)
ALP SERPL-CCNC: 60 U/L (ref 45–117)
ALT SERPL-CCNC: 16 U/L (ref 12–78)
ANION GAP SERPL CALC-SCNC: 5 MMOL/L (ref 5–15)
AST SERPL-CCNC: 10 U/L (ref 15–37)
BASOPHILS # BLD: 0 K/UL (ref 0–0.1)
BASOPHILS NFR BLD: 0 % (ref 0–1)
BILIRUB SERPL-MCNC: 0.3 MG/DL (ref 0.2–1)
BUN SERPL-MCNC: 22 MG/DL (ref 6–20)
BUN/CREAT SERPL: 14 (ref 12–20)
CALCIUM SERPL-MCNC: 8.6 MG/DL (ref 8.5–10.1)
CHLORIDE SERPL-SCNC: 104 MMOL/L (ref 97–108)
CO2 SERPL-SCNC: 26 MMOL/L (ref 21–32)
CREAT SERPL-MCNC: 1.57 MG/DL (ref 0.7–1.3)
DIFFERENTIAL METHOD BLD: ABNORMAL
EOSINOPHIL # BLD: 0 K/UL (ref 0–0.4)
EOSINOPHIL NFR BLD: 0 % (ref 0–7)
ERYTHROCYTE [DISTWIDTH] IN BLOOD BY AUTOMATED COUNT: 17.4 % (ref 11.5–14.5)
GLOBULIN SER CALC-MCNC: 4.4 G/DL (ref 2–4)
GLUCOSE SERPL-MCNC: 164 MG/DL (ref 65–100)
HCT VFR BLD AUTO: 34.8 % (ref 36.6–50.3)
HGB BLD-MCNC: 10.8 G/DL (ref 12.1–17)
IMM GRANULOCYTES # BLD AUTO: 0 K/UL (ref 0–0.04)
IMM GRANULOCYTES NFR BLD AUTO: 0 % (ref 0–0.5)
LYMPHOCYTES # BLD: 0.6 K/UL (ref 0.8–3.5)
LYMPHOCYTES NFR BLD: 15 % (ref 12–49)
MCH RBC QN AUTO: 32.2 PG (ref 26–34)
MCHC RBC AUTO-ENTMCNC: 31 G/DL (ref 30–36.5)
MCV RBC AUTO: 103.9 FL (ref 80–99)
MONOCYTES # BLD: 0.7 K/UL (ref 0–1)
MONOCYTES NFR BLD: 17 % (ref 5–13)
NEUTS SEG # BLD: 2.6 K/UL (ref 1.8–8)
NEUTS SEG NFR BLD: 68 % (ref 32–75)
NRBC # BLD: 0 K/UL (ref 0–0.01)
NRBC BLD-RTO: 0 PER 100 WBC
PLATELET # BLD AUTO: 177 K/UL (ref 150–400)
PMV BLD AUTO: 11.3 FL (ref 8.9–12.9)
POTASSIUM SERPL-SCNC: 3.8 MMOL/L (ref 3.5–5.1)
PROT SERPL-MCNC: 8.4 G/DL (ref 6.4–8.2)
RBC # BLD AUTO: 3.35 M/UL (ref 4.1–5.7)
RBC MORPH BLD: ABNORMAL
SODIUM SERPL-SCNC: 135 MMOL/L (ref 136–145)
WBC # BLD AUTO: 3.9 K/UL (ref 4.1–11.1)

## 2024-01-15 PROCEDURE — 99215 OFFICE O/P EST HI 40 MIN: CPT | Performed by: INTERNAL MEDICINE

## 2024-01-15 PROCEDURE — 2580000003 HC RX 258: Performed by: INTERNAL MEDICINE

## 2024-01-15 PROCEDURE — 85025 COMPLETE CBC W/AUTO DIFF WBC: CPT

## 2024-01-15 PROCEDURE — 83521 IG LIGHT CHAINS FREE EACH: CPT

## 2024-01-15 PROCEDURE — G8419 CALC BMI OUT NRM PARAM NOF/U: HCPCS | Performed by: INTERNAL MEDICINE

## 2024-01-15 PROCEDURE — 82784 ASSAY IGA/IGD/IGG/IGM EACH: CPT

## 2024-01-15 PROCEDURE — 96413 CHEMO IV INFUSION 1 HR: CPT

## 2024-01-15 PROCEDURE — 1123F ACP DISCUSS/DSCN MKR DOCD: CPT | Performed by: INTERNAL MEDICINE

## 2024-01-15 PROCEDURE — 3079F DIAST BP 80-89 MM HG: CPT | Performed by: INTERNAL MEDICINE

## 2024-01-15 PROCEDURE — 96375 TX/PRO/DX INJ NEW DRUG ADDON: CPT

## 2024-01-15 PROCEDURE — 86334 IMMUNOFIX E-PHORESIS SERUM: CPT

## 2024-01-15 PROCEDURE — 6360000002 HC RX W HCPCS: Performed by: INTERNAL MEDICINE

## 2024-01-15 PROCEDURE — G8484 FLU IMMUNIZE NO ADMIN: HCPCS | Performed by: INTERNAL MEDICINE

## 2024-01-15 PROCEDURE — 36415 COLL VENOUS BLD VENIPUNCTURE: CPT

## 2024-01-15 PROCEDURE — 2580000003 HC RX 258: Performed by: NURSE PRACTITIONER

## 2024-01-15 PROCEDURE — 84165 PROTEIN E-PHORESIS SERUM: CPT

## 2024-01-15 PROCEDURE — G8428 CUR MEDS NOT DOCUMENT: HCPCS | Performed by: INTERNAL MEDICINE

## 2024-01-15 PROCEDURE — 1036F TOBACCO NON-USER: CPT | Performed by: INTERNAL MEDICINE

## 2024-01-15 PROCEDURE — 3074F SYST BP LT 130 MM HG: CPT | Performed by: INTERNAL MEDICINE

## 2024-01-15 PROCEDURE — 3017F COLORECTAL CA SCREEN DOC REV: CPT | Performed by: INTERNAL MEDICINE

## 2024-01-15 PROCEDURE — 80053 COMPREHEN METABOLIC PANEL: CPT

## 2024-01-15 RX ORDER — ONDANSETRON 2 MG/ML
8 INJECTION INTRAMUSCULAR; INTRAVENOUS
Status: CANCELLED | OUTPATIENT
Start: 2024-01-15

## 2024-01-15 RX ORDER — SODIUM CHLORIDE 9 MG/ML
INJECTION, SOLUTION INTRAVENOUS CONTINUOUS
Status: CANCELLED | OUTPATIENT
Start: 2024-01-15

## 2024-01-15 RX ORDER — DEXTROSE MONOHYDRATE 50 MG/ML
5-250 INJECTION, SOLUTION INTRAVENOUS PRN
OUTPATIENT
Start: 2024-01-22

## 2024-01-15 RX ORDER — DIPHENHYDRAMINE HYDROCHLORIDE 50 MG/ML
50 INJECTION INTRAMUSCULAR; INTRAVENOUS
OUTPATIENT
Start: 2024-01-22

## 2024-01-15 RX ORDER — ACETAMINOPHEN 325 MG/1
650 TABLET ORAL
OUTPATIENT
Start: 2024-01-29

## 2024-01-15 RX ORDER — EPINEPHRINE 1 MG/ML
0.3 INJECTION, SOLUTION, CONCENTRATE INTRAVENOUS PRN
OUTPATIENT
Start: 2024-01-22

## 2024-01-15 RX ORDER — SODIUM CHLORIDE 9 MG/ML
5-250 INJECTION, SOLUTION INTRAVENOUS PRN
Status: CANCELLED | OUTPATIENT
Start: 2024-01-15

## 2024-01-15 RX ORDER — SODIUM CHLORIDE 0.9 % (FLUSH) 0.9 %
5-40 SYRINGE (ML) INJECTION PRN
OUTPATIENT
Start: 2024-01-22

## 2024-01-15 RX ORDER — HEPARIN 100 UNIT/ML
500 SYRINGE INTRAVENOUS PRN
Status: CANCELLED | OUTPATIENT
Start: 2024-01-15

## 2024-01-15 RX ORDER — EPINEPHRINE 1 MG/ML
0.3 INJECTION, SOLUTION, CONCENTRATE INTRAVENOUS PRN
OUTPATIENT
Start: 2024-01-29

## 2024-01-15 RX ORDER — DIPHENHYDRAMINE HYDROCHLORIDE 50 MG/ML
50 INJECTION INTRAMUSCULAR; INTRAVENOUS
OUTPATIENT
Start: 2024-01-29

## 2024-01-15 RX ORDER — ACETAMINOPHEN 325 MG/1
650 TABLET ORAL
OUTPATIENT
Start: 2024-01-22

## 2024-01-15 RX ORDER — OLANZAPINE 5 MG/1
5 TABLET ORAL NIGHTLY
Qty: 30 TABLET | Refills: 3 | Status: SHIPPED | OUTPATIENT
Start: 2024-01-15

## 2024-01-15 RX ORDER — DEXTROSE MONOHYDRATE 50 MG/ML
5-250 INJECTION, SOLUTION INTRAVENOUS PRN
OUTPATIENT
Start: 2024-01-29

## 2024-01-15 RX ORDER — ALBUTEROL SULFATE 90 UG/1
4 AEROSOL, METERED RESPIRATORY (INHALATION) PRN
OUTPATIENT
Start: 2024-01-22

## 2024-01-15 RX ORDER — SODIUM CHLORIDE 0.9 % (FLUSH) 0.9 %
5-40 SYRINGE (ML) INJECTION PRN
Status: CANCELLED | OUTPATIENT
Start: 2024-01-15

## 2024-01-15 RX ORDER — SODIUM CHLORIDE 9 MG/ML
INJECTION, SOLUTION INTRAVENOUS CONTINUOUS
OUTPATIENT
Start: 2024-01-22

## 2024-01-15 RX ORDER — EPINEPHRINE 1 MG/ML
0.3 INJECTION, SOLUTION, CONCENTRATE INTRAVENOUS PRN
Status: CANCELLED | OUTPATIENT
Start: 2024-01-15

## 2024-01-15 RX ORDER — SODIUM CHLORIDE 9 MG/ML
5-250 INJECTION, SOLUTION INTRAVENOUS PRN
OUTPATIENT
Start: 2024-01-29

## 2024-01-15 RX ORDER — SODIUM CHLORIDE 0.9 % (FLUSH) 0.9 %
5-40 SYRINGE (ML) INJECTION PRN
OUTPATIENT
Start: 2024-01-29

## 2024-01-15 RX ORDER — ACETAMINOPHEN 325 MG/1
650 TABLET ORAL
Status: CANCELLED | OUTPATIENT
Start: 2024-01-15

## 2024-01-15 RX ORDER — HEPARIN 100 UNIT/ML
500 SYRINGE INTRAVENOUS PRN
OUTPATIENT
Start: 2024-01-29

## 2024-01-15 RX ORDER — ONDANSETRON 8 MG/1
8 TABLET, ORALLY DISINTEGRATING ORAL EVERY 8 HOURS PRN
Qty: 60 TABLET | Refills: 3 | Status: SHIPPED | OUTPATIENT
Start: 2024-01-15

## 2024-01-15 RX ORDER — ALBUTEROL SULFATE 90 UG/1
4 AEROSOL, METERED RESPIRATORY (INHALATION) PRN
OUTPATIENT
Start: 2024-01-29

## 2024-01-15 RX ORDER — MEPERIDINE HYDROCHLORIDE 25 MG/ML
12.5 INJECTION INTRAMUSCULAR; INTRAVENOUS; SUBCUTANEOUS PRN
OUTPATIENT
Start: 2024-01-29

## 2024-01-15 RX ORDER — ONDANSETRON 2 MG/ML
8 INJECTION INTRAMUSCULAR; INTRAVENOUS
OUTPATIENT
Start: 2024-01-22

## 2024-01-15 RX ORDER — ONDANSETRON 2 MG/ML
8 INJECTION INTRAMUSCULAR; INTRAVENOUS
OUTPATIENT
Start: 2024-01-29

## 2024-01-15 RX ORDER — SODIUM CHLORIDE 0.9 % (FLUSH) 0.9 %
5-40 SYRINGE (ML) INJECTION PRN
Status: DISCONTINUED | OUTPATIENT
Start: 2024-01-15 | End: 2024-01-16 | Stop reason: HOSPADM

## 2024-01-15 RX ORDER — MEPERIDINE HYDROCHLORIDE 25 MG/ML
12.5 INJECTION INTRAMUSCULAR; INTRAVENOUS; SUBCUTANEOUS PRN
Status: CANCELLED | OUTPATIENT
Start: 2024-01-15

## 2024-01-15 RX ORDER — DIPHENHYDRAMINE HYDROCHLORIDE 50 MG/ML
50 INJECTION INTRAMUSCULAR; INTRAVENOUS
Status: CANCELLED | OUTPATIENT
Start: 2024-01-15

## 2024-01-15 RX ORDER — SODIUM CHLORIDE 9 MG/ML
5-250 INJECTION, SOLUTION INTRAVENOUS PRN
OUTPATIENT
Start: 2024-01-22

## 2024-01-15 RX ORDER — MEPERIDINE HYDROCHLORIDE 25 MG/ML
12.5 INJECTION INTRAMUSCULAR; INTRAVENOUS; SUBCUTANEOUS PRN
OUTPATIENT
Start: 2024-01-22

## 2024-01-15 RX ORDER — DEXTROSE MONOHYDRATE 50 MG/ML
5-250 INJECTION, SOLUTION INTRAVENOUS PRN
Status: DISCONTINUED | OUTPATIENT
Start: 2024-01-15 | End: 2024-01-16 | Stop reason: HOSPADM

## 2024-01-15 RX ORDER — HEPARIN 100 UNIT/ML
500 SYRINGE INTRAVENOUS PRN
OUTPATIENT
Start: 2024-01-22

## 2024-01-15 RX ORDER — SODIUM CHLORIDE 9 MG/ML
INJECTION, SOLUTION INTRAVENOUS CONTINUOUS
OUTPATIENT
Start: 2024-01-29

## 2024-01-15 RX ORDER — ALBUTEROL SULFATE 90 UG/1
4 AEROSOL, METERED RESPIRATORY (INHALATION) PRN
Status: CANCELLED | OUTPATIENT
Start: 2024-01-15

## 2024-01-15 RX ADMIN — DEXAMETHASONE SODIUM PHOSPHATE 40 MG: 4 INJECTION, SOLUTION INTRAMUSCULAR; INTRAVENOUS at 12:46

## 2024-01-15 RX ADMIN — CARFILZOMIB 112.6 MG: 60 INJECTION, POWDER, LYOPHILIZED, FOR SOLUTION INTRAVENOUS at 13:54

## 2024-01-15 RX ADMIN — DEXTROSE MONOHYDRATE 25 ML/HR: 50 INJECTION, SOLUTION INTRAVENOUS at 12:43

## 2024-01-15 ASSESSMENT — PAIN DESCRIPTION - ORIENTATION: ORIENTATION: POSTERIOR;MID

## 2024-01-15 ASSESSMENT — PAIN SCALES - GENERAL: PAINLEVEL_OUTOF10: 5

## 2024-01-15 ASSESSMENT — PAIN DESCRIPTION - DESCRIPTORS: DESCRIPTORS: ACHING

## 2024-01-15 ASSESSMENT — PAIN DESCRIPTION - LOCATION: LOCATION: NECK

## 2024-01-15 NOTE — PATIENT INSTRUCTIONS
prescriptions for Ondansetron and Olanzapine at [facility] pharmacy. These are for nausea.     You should also have a prescription at [facility] for Dexamethasone to take once per week if you haven't picked it up already.

## 2024-01-15 NOTE — PROGRESS NOTES
Identified pt with two pt identifiers(name and ). Reviewed record in preparation for visit and have obtained necessary documentation.  Chief Complaint   Patient presents with    Chemotherapy        Vitals:    01/15/24 1137   BP: 125/84   Pulse: 95   Resp: 16   Temp: 97 °F (36.1 °C)   SpO2: 98%   Weight: 84.4 kg (186 lb 1.1 oz)   Height: 1.727 m (5' 7.99\")      Pain Scale: /10    Coordination of Care Questionnaire:  :   1. Have you been to the ER, urgent care clinic since your last visit?  Hospitalized since your last visit?No    2. Have you seen or consulted any other health care providers outside of the [facility] since your last visit?  Include any pap smears or colon screening. No    
    liver bx - chronic hepatitis       Family History   Problem Relation Age of Onset    Diabetes Maternal Grandfather     Hypertension Maternal Grandfather     Diabetes Maternal Grandmother     Hypertension Maternal Grandmother     Diabetes Father     Osteoarthritis Mother     Osteoarthritis Father        Social History     Socioeconomic History    Marital status:    Tobacco Use    Smoking status: Former     Current packs/day: 0.00     Types: Cigarettes     Quit date: 1989     Years since quittin.0    Smokeless tobacco: Never   Substance and Sexual Activity    Alcohol use: No    Drug use: No       Current Outpatient Medications   Medication Sig Dispense Refill    ondansetron (ZOFRAN-ODT) 8 MG TBDP disintegrating tablet Place 1 tablet under the tongue every 8 hours as needed for Nausea or Vomiting 60 tablet 3    OLANZapine (ZYPREXA) 5 MG tablet Take 1 tablet by mouth nightly For 3 days after each selinexor dose. 30 tablet 3    dexAMETHasone (DECADRON) 4 MG tablet Take 10 tablets by mouth every 7 days 40 tablet 5    Selinexor, 60 MG Once Weekly, 60 MG TBPK Take 1 tablet by mouth every 7 days 4 each 5    pravastatin (PRAVACHOL) 10 MG tablet Take 1 tablet by mouth nightly 30 tablet 3    clonazePAM (KLONOPIN) 0.5 MG tablet Take 1 tablet by mouth 2 times daily as needed.      morphine (MS CONTIN) 15 MG extended release tablet       naloxone (NARCAN) 4 MG/0.1ML LIQD nasal spray Administer 1 spray into one nostril as needed for opioid reversal or respiratory depression. Call 911. If patient does not respond, or responds and then relapses, give additional doses every 2 to 3 minutes, alternating nostrils, until medical help arrives.      pregabalin (LYRICA) 150 MG capsule       sildenafil (REVATIO) 20 MG tablet       testosterone cypionate (DEPOTESTOTERONE CYPIONATE) 200 MG/ML injection INJECT 2 ML INTRAMUSCULARLY ONCE WEEKLY      escitalopram (LEXAPRO) 10 MG tablet TAKE 1 TABLET (10 MG) BY MOUTH DAILY.

## 2024-01-15 NOTE — PROGRESS NOTES
Name: Sameer Iglesias Sr.    MRN: [id number]         : 1955    Mr. Iglesias Arrived ambulatory and in no distress for C1 D1 of Kyprolis Regimen.  Assessment was completed, no acute issues at this time, no new complaints voiced.  Right chest wall port accessed without difficulty, labs drawn & sent for processing.    Patient proceed to appointment with Dr. Wong.    Mr. Iglesias's vitals were reviewed.  Patient Vitals for the past 24 hrs:   BP Temp Temp src Pulse Resp SpO2 Height Weight   01/15/24 1425 122/76 -- -- 92 16 -- -- --   01/15/24 1106 125/84 97 °F (36.1 °C) Temporal 95 16 98 % -- --   01/15/24 1055 -- -- -- -- -- -- 1.727 m (5' 7.99\") 81.3 kg (179 lb 4.8 oz)     Lab results were obtained and reviewed.  Recent Results (from the past 12 hour(s))   Comprehensive metabolic panel    Collection Time: 01/15/24 11:09 AM   Result Value Ref Range    Sodium 135 (L) 136 - 145 mmol/L    Potassium 3.8 3.5 - 5.1 mmol/L    Chloride 104 97 - 108 mmol/L    CO2 26 21 - 32 mmol/L    Anion Gap 5 5 - 15 mmol/L    Glucose 164 (H) 65 - 100 mg/dL    BUN 22 (H) 6 - 20 MG/DL    Creatinine 1.57 (H) 0.70 - 1.30 MG/DL    Bun/Cre Ratio 14 12 - 20      Est, Glom Filt Rate 48 (L) >60 ml/min/1.73m2    Calcium 8.6 8.5 - 10.1 MG/DL    Total Bilirubin 0.3 0.2 - 1.0 MG/DL    ALT 16 12 - 78 U/L    AST 10 (L) 15 - 37 U/L    Alk Phosphatase 60 45 - 117 U/L    Total Protein 8.4 (H) 6.4 - 8.2 g/dL    Albumin 4.0 3.5 - 5.0 g/dL    Globulin 4.4 (H) 2.0 - 4.0 g/dL    Albumin/Globulin Ratio 0.9 (L) 1.1 - 2.2     CBC with Auto Differential    Collection Time: 01/15/24 11:10 AM   Result Value Ref Range    WBC 3.9 (L) 4.1 - 11.1 K/uL    RBC 3.35 (L) 4.10 - 5.70 M/uL    Hemoglobin 10.8 (L) 12.1 - 17.0 g/dL    Hematocrit 34.8 (L) 36.6 - 50.3 %    .9 (H) 80.0 - 99.0 FL    MCH 32.2 26.0 - 34.0 PG    MCHC 31.0 30.0 - 36.5 g/dL    RDW 17.4 (H) 11.5 - 14.5 %    Platelets 177 150 - 400 K/uL    MPV 11.3 8.9 - 12.9 FL    Nucleated RBCs 0.0 0   WBC    nRBC 0.00 0.00 - 0.01 K/uL    Neutrophils % 68 32 - 75 %    Lymphocytes % 15 12 - 49 %    Monocytes % 17 (H) 5 - 13 %    Eosinophils % 0 0 - 7 %    Basophils % 0 0 - 1 %    Immature Granulocytes 0 0.0 - 0.5 %    Neutrophils Absolute 2.6 1.8 - 8.0 K/UL    Lymphocytes Absolute 0.6 (L) 0.8 - 3.5 K/UL    Monocytes Absolute 0.7 0.0 - 1.0 K/UL    Eosinophils Absolute 0.0 0.0 - 0.4 K/UL    Basophils Absolute 0.0 0.0 - 0.1 K/UL    Absolute Immature Granulocyte 0.0 0.00 - 0.04 K/UL    Differential Type SMEAR SCANNED      RBC Comment ANISOCYTOSIS  1+           Medications:    Medications Administered         carfilzomib (KYPROLIS) 112.6 mg in dextrose 5 % 100 mL chemo IVPB Admin Date  01/15/2024 Action  New Bag Dose  112.6 mg Rate  332.6 mL/hr Route  IntraVENous Administered By  Nimo Herron RN        dexAMETHasone (DECADRON) 40 mg in sodium chloride 0.9 % 50 mL IVPB Admin Date  01/15/2024 Action  New Bag Dose  40 mg Rate  200 mL/hr Route  IntraVENous Administered By  Nimo Herron RN        dextrose 5 % solution Admin Date  01/15/2024 Action  New Bag Dose  25 mL/hr Rate  25 mL/hr Route  IntraVENous Administered By  Nimo Herron RN            Two nurses verified prior to administering: Drug name, drug dose, infusion volume or drug volume when prepared in a syringe, rate of administration, route of administration,  expiration dates and/or times, appearance and physical integrity of the drugs, rate set on infusion pump, when used sequencing of drug administration.     Mr. Iglesias tolerated treatment well and [facility] in [facility] condition at 1435.   Port de-accessed, flushed per protocol. He is to return on 01/22/2024 for his next appointment.    NIMO HERRON RN  January 15, 2024

## 2024-01-19 LAB
ALBUMIN SERPL ELPH-MCNC: 4.1 G/DL (ref 2.9–4.4)
ALBUMIN/GLOB SERPL: 1.2 (ref 0.7–1.7)
ALPHA1 GLOB SERPL ELPH-MCNC: 0.2 G/DL (ref 0–0.4)
ALPHA2 GLOB SERPL ELPH-MCNC: 0.8 G/DL (ref 0.4–1)
B-GLOBULIN SERPL ELPH-MCNC: 0.9 G/DL (ref 0.7–1.3)
GAMMA GLOB SERPL ELPH-MCNC: 1.6 G/DL (ref 0.4–1.8)
GLOBULIN SER-MCNC: 3.6 G/DL (ref 2.2–3.9)
IGA SERPL-MCNC: 1399 MG/DL (ref 61–437)
IGG SERPL-MCNC: 448 MG/DL (ref 603–1613)
IGM SERPL-MCNC: 7 MG/DL (ref 20–172)
INTERPRETATION SERPL IEP-IMP: ABNORMAL
KAPPA LC FREE SER-MCNC: 290.7 MG/L (ref 3.3–19.4)
KAPPA LC FREE/LAMBDA FREE SER: 181.69 (ref 0.26–1.65)
LAMBDA LC FREE SERPL-MCNC: 1.6 MG/L (ref 5.7–26.3)
M PROTEIN SERPL ELPH-MCNC: 1.2 G/DL
PROT SERPL-MCNC: 7.7 G/DL (ref 6–8.5)

## 2024-01-22 ENCOUNTER — HOSPITAL ENCOUNTER (OUTPATIENT)
Facility: HOSPITAL | Age: 69
Setting detail: INFUSION SERIES
Discharge: HOME | End: 2024-01-22
Payer: MEDICARE

## 2024-01-22 ENCOUNTER — APPOINTMENT (OUTPATIENT)
Facility: HOSPITAL | Age: 69
End: 2024-01-22
Payer: MEDICARE

## 2024-01-22 VITALS
SYSTOLIC BLOOD PRESSURE: 126 MMHG | OXYGEN SATURATION: 100 % | DIASTOLIC BLOOD PRESSURE: 69 MMHG | BODY MASS INDEX: 27.66 KG/M2 | RESPIRATION RATE: 16 BRPM | WEIGHT: 182.5 LBS | TEMPERATURE: 98.2 F | HEART RATE: 79 BPM | HEIGHT: 68 IN

## 2024-01-22 DIAGNOSIS — Z11.59 ENCOUNTER FOR SCREENING FOR OTHER VIRAL DISEASES: Primary | ICD-10-CM

## 2024-01-22 DIAGNOSIS — C90.00 MULTIPLE MYELOMA, REMISSION STATUS UNSPECIFIED (HCC): ICD-10-CM

## 2024-01-22 LAB
BASOPHILS # BLD: 0 K/UL (ref 0–0.1)
BASOPHILS NFR BLD: 0 % (ref 0–1)
DIFFERENTIAL METHOD BLD: ABNORMAL
EOSINOPHIL # BLD: 0 K/UL (ref 0–0.4)
EOSINOPHIL NFR BLD: 0 % (ref 0–7)
ERYTHROCYTE [DISTWIDTH] IN BLOOD BY AUTOMATED COUNT: 17 % (ref 11.5–14.5)
HCT VFR BLD AUTO: 30.5 % (ref 36.6–50.3)
HGB BLD-MCNC: 9.4 G/DL (ref 12.1–17)
IMM GRANULOCYTES # BLD AUTO: 0 K/UL (ref 0–0.04)
IMM GRANULOCYTES NFR BLD AUTO: 0 % (ref 0–0.5)
LYMPHOCYTES # BLD: 0.5 K/UL (ref 0.8–3.5)
LYMPHOCYTES NFR BLD: 19 % (ref 12–49)
MCH RBC QN AUTO: 31.4 PG (ref 26–34)
MCHC RBC AUTO-ENTMCNC: 30.8 G/DL (ref 30–36.5)
MCV RBC AUTO: 102 FL (ref 80–99)
MONOCYTES # BLD: 0.6 K/UL (ref 0–1)
MONOCYTES NFR BLD: 22 % (ref 5–13)
NEUTS SEG # BLD: 1.4 K/UL (ref 1.8–8)
NEUTS SEG NFR BLD: 59 % (ref 32–75)
NRBC # BLD: 0 K/UL (ref 0–0.01)
NRBC BLD-RTO: 0 PER 100 WBC
PLATELET # BLD AUTO: 82 K/UL (ref 150–400)
PLATELET COMMENT: ABNORMAL
PMV BLD AUTO: 11.4 FL (ref 8.9–12.9)
RBC # BLD AUTO: 2.99 M/UL (ref 4.1–5.7)
RBC MORPH BLD: ABNORMAL
WBC # BLD AUTO: 2.5 K/UL (ref 4.1–11.1)

## 2024-01-22 PROCEDURE — 96375 TX/PRO/DX INJ NEW DRUG ADDON: CPT

## 2024-01-22 PROCEDURE — 36415 COLL VENOUS BLD VENIPUNCTURE: CPT

## 2024-01-22 PROCEDURE — 96413 CHEMO IV INFUSION 1 HR: CPT

## 2024-01-22 PROCEDURE — 6360000002 HC RX W HCPCS: Performed by: INTERNAL MEDICINE

## 2024-01-22 PROCEDURE — 85025 COMPLETE CBC W/AUTO DIFF WBC: CPT

## 2024-01-22 PROCEDURE — 2580000003 HC RX 258: Performed by: INTERNAL MEDICINE

## 2024-01-22 RX ORDER — DIPHENHYDRAMINE HYDROCHLORIDE 50 MG/ML
50 INJECTION INTRAMUSCULAR; INTRAVENOUS
OUTPATIENT
Start: 2024-02-19

## 2024-01-22 RX ORDER — ACETAMINOPHEN 325 MG/1
650 TABLET ORAL
OUTPATIENT
Start: 2024-02-19

## 2024-01-22 RX ORDER — EPINEPHRINE 1 MG/ML
0.3 INJECTION, SOLUTION INTRAMUSCULAR; SUBCUTANEOUS PRN
OUTPATIENT
Start: 2024-02-19

## 2024-01-22 RX ORDER — DEXTROSE MONOHYDRATE 50 MG/ML
5-250 INJECTION, SOLUTION INTRAVENOUS PRN
Status: DISCONTINUED | OUTPATIENT
Start: 2024-01-22 | End: 2024-01-23 | Stop reason: HOSPADM

## 2024-01-22 RX ORDER — SODIUM CHLORIDE 0.9 % (FLUSH) 0.9 %
5-40 SYRINGE (ML) INJECTION PRN
OUTPATIENT
Start: 2024-02-19

## 2024-01-22 RX ORDER — HEPARIN 100 UNIT/ML
500 SYRINGE INTRAVENOUS PRN
OUTPATIENT
Start: 2024-02-19

## 2024-01-22 RX ORDER — SODIUM CHLORIDE 9 MG/ML
INJECTION, SOLUTION INTRAVENOUS CONTINUOUS
OUTPATIENT
Start: 2024-02-19

## 2024-01-22 RX ORDER — ZOLEDRONIC ACID 0.04 MG/ML
4 INJECTION, SOLUTION INTRAVENOUS ONCE
OUTPATIENT
Start: 2024-02-19 | End: 2024-02-19

## 2024-01-22 RX ORDER — SODIUM CHLORIDE 9 MG/ML
5-250 INJECTION, SOLUTION INTRAVENOUS PRN
OUTPATIENT
Start: 2024-02-19

## 2024-01-22 RX ORDER — SODIUM CHLORIDE 0.9 % (FLUSH) 0.9 %
5-40 SYRINGE (ML) INJECTION PRN
Status: DISCONTINUED | OUTPATIENT
Start: 2024-01-22 | End: 2024-01-23 | Stop reason: HOSPADM

## 2024-01-22 RX ORDER — ONDANSETRON 2 MG/ML
8 INJECTION INTRAMUSCULAR; INTRAVENOUS
OUTPATIENT
Start: 2024-02-19

## 2024-01-22 RX ORDER — ALBUTEROL SULFATE 90 UG/1
4 AEROSOL, METERED RESPIRATORY (INHALATION) PRN
OUTPATIENT
Start: 2024-02-19

## 2024-01-22 RX ADMIN — SODIUM CHLORIDE, PRESERVATIVE FREE 30 ML: 5 INJECTION INTRAVENOUS at 10:20

## 2024-01-22 RX ADMIN — DEXAMETHASONE SODIUM PHOSPHATE 40 MG: 4 INJECTION, SOLUTION INTRAMUSCULAR; INTRAVENOUS at 11:58

## 2024-01-22 RX ADMIN — SODIUM CHLORIDE, PRESERVATIVE FREE 10 ML: 5 INJECTION INTRAVENOUS at 13:05

## 2024-01-22 RX ADMIN — CARFILZOMIB 112.6 MG: 60 INJECTION, POWDER, LYOPHILIZED, FOR SOLUTION INTRAVENOUS at 12:25

## 2024-01-22 RX ADMIN — DEXTROSE MONOHYDRATE 100 ML/HR: 50 INJECTION, SOLUTION INTRAVENOUS at 11:57

## 2024-01-22 ASSESSMENT — PAIN DESCRIPTION - LOCATION: LOCATION: NECK

## 2024-01-22 ASSESSMENT — PAIN DESCRIPTION - DESCRIPTORS: DESCRIPTORS: ACHING

## 2024-01-22 ASSESSMENT — PAIN DESCRIPTION - PAIN TYPE: TYPE: CHRONIC PAIN

## 2024-01-22 ASSESSMENT — PAIN SCALES - GENERAL: PAINLEVEL_OUTOF10: 5

## 2024-01-22 NOTE — PROGRESS NOTES
[facility] Visit Note    Pt arrived to [facility] ambulatory in no acute distress at 1005 for Kyprolis C1D8.  Assessment unremarkable, no new concerns voiced.  R chest port accessed without issue and positive blood return noted.  Labs obtained- CBC with diff.    Patient denied having any symptoms of COVID-19, i.e. SOB, coughing, fever, or generally not feeling well.      BP (!) 152/86   Pulse (!) 102   Temp 98.2 °F (36.8 °C) (Temporal)   Resp 16   Ht 1.727 m (5' 8\")   Wt 82.8 kg (182 lb 8 oz)   SpO2 100%   BMI 27.75 kg/m²     Recent Results (from the past 12 hour(s))   CBC with Auto Differential    Collection Time: 01/22/24 10:29 AM   Result Value Ref Range    WBC 2.5 (L) 4.1 - 11.1 K/uL    RBC 2.99 (L) 4.10 - 5.70 M/uL    Hemoglobin 9.4 (L) 12.1 - 17.0 g/dL    Hematocrit 30.5 (L) 36.6 - 50.3 %    .0 (H) 80.0 - 99.0 FL    MCH 31.4 26.0 - 34.0 PG    MCHC 30.8 30.0 - 36.5 g/dL    RDW 17.0 (H) 11.5 - 14.5 %    Platelets 82 (L) 150 - 400 K/uL    MPV 11.4 8.9 - 12.9 FL    Nucleated RBCs 0.0 0  WBC    nRBC 0.00 0.00 - 0.01 K/uL    Neutrophils % 59 32 - 75 %    Lymphocytes % 19 12 - 49 %    Monocytes % 22 (H) 5 - 13 %    Eosinophils % 0 0 - 7 %    Basophils % 0 0 - 1 %    Immature Granulocytes 0 0.0 - 0.5 %    Neutrophils Absolute 1.4 (L) 1.8 - 8.0 K/UL    Lymphocytes Absolute 0.5 (L) 0.8 - 3.5 K/UL    Monocytes Absolute 0.6 0.0 - 1.0 K/UL    Eosinophils Absolute 0.0 0.0 - 0.4 K/UL    Basophils Absolute 0.0 0.0 - 0.1 K/UL    Absolute Immature Granulocyte 0.0 0.00 - 0.04 K/UL    Differential Type SMEAR SCANNED      Platelet Comment DECREASED PLATELETS      RBC Comment TEARDROP CELLS  PRESENT        RBC Comment ANISOCYTOSIS  1+        RBC Comment MACROCYTOSIS  1+        RBC Comment RBC FRAGMENTS       Two nurses verified prior to administering:  Drug name  Drug dose  Infusion volume or drug volume when prepared in a syringe  Rate of administration  Route of

## 2024-01-26 NOTE — PROGRESS NOTES
Sameer Iglesias  is a 68 y.o. male here for treatment for Multiple Myeloma.  Level 5/6 pain in neck and legs.   Severe numbness/tingling.   States he is doing marvelous.    1. Have you been to the ER, urgent care clinic since your last visit?  Hospitalized since your last visit?    2. Have you seen or consulted any other health care providers outside of the [facility] since your last visit?  Include any pap smears or colon screening. Psychiatrist Dr Cavazos

## 2024-01-29 ENCOUNTER — HOSPITAL ENCOUNTER (OUTPATIENT)
Facility: HOSPITAL | Age: 69
Setting detail: INFUSION SERIES
Discharge: HOME | End: 2024-01-29
Payer: MEDICARE

## 2024-01-29 ENCOUNTER — OFFICE VISIT (OUTPATIENT)
Age: 69
End: 2024-01-29
Payer: MEDICARE

## 2024-01-29 VITALS
HEART RATE: 87 BPM | WEIGHT: 187.83 LBS | BODY MASS INDEX: 28.47 KG/M2 | SYSTOLIC BLOOD PRESSURE: 124 MMHG | OXYGEN SATURATION: 99 % | DIASTOLIC BLOOD PRESSURE: 75 MMHG | HEIGHT: 68 IN | TEMPERATURE: 97.5 F | RESPIRATION RATE: 16 BRPM

## 2024-01-29 VITALS
RESPIRATION RATE: 16 BRPM | TEMPERATURE: 97.5 F | OXYGEN SATURATION: 99 % | SYSTOLIC BLOOD PRESSURE: 131 MMHG | BODY MASS INDEX: 28.48 KG/M2 | HEART RATE: 78 BPM | WEIGHT: 187.9 LBS | DIASTOLIC BLOOD PRESSURE: 80 MMHG | HEIGHT: 68 IN

## 2024-01-29 DIAGNOSIS — C90.02 MULTIPLE MYELOMA IN RELAPSE (HCC): Primary | ICD-10-CM

## 2024-01-29 DIAGNOSIS — C90.00 MULTIPLE MYELOMA, REMISSION STATUS UNSPECIFIED (HCC): ICD-10-CM

## 2024-01-29 DIAGNOSIS — D61.82 ANEMIA ASSOCIATED WITH BONE MARROW INFILTRATION (HCC): ICD-10-CM

## 2024-01-29 DIAGNOSIS — Z51.11 ENCOUNTER FOR ANTINEOPLASTIC CHEMOTHERAPY: ICD-10-CM

## 2024-01-29 DIAGNOSIS — Z11.59 ENCOUNTER FOR SCREENING FOR OTHER VIRAL DISEASES: Primary | ICD-10-CM

## 2024-01-29 LAB
BASOPHILS # BLD: 0 K/UL (ref 0–0.1)
BASOPHILS NFR BLD: 0 % (ref 0–1)
DIFFERENTIAL METHOD BLD: ABNORMAL
EOSINOPHIL # BLD: 0 K/UL (ref 0–0.4)
EOSINOPHIL NFR BLD: 0 % (ref 0–7)
ERYTHROCYTE [DISTWIDTH] IN BLOOD BY AUTOMATED COUNT: 17.5 % (ref 11.5–14.5)
HCT VFR BLD AUTO: 27.6 % (ref 36.6–50.3)
HGB BLD-MCNC: 8.7 G/DL (ref 12.1–17)
IMM GRANULOCYTES # BLD AUTO: 0 K/UL (ref 0–0.04)
IMM GRANULOCYTES NFR BLD AUTO: 1 % (ref 0–0.5)
LYMPHOCYTES # BLD: 0.4 K/UL (ref 0.8–3.5)
LYMPHOCYTES NFR BLD: 13 % (ref 12–49)
MCH RBC QN AUTO: 33 PG (ref 26–34)
MCHC RBC AUTO-ENTMCNC: 31.5 G/DL (ref 30–36.5)
MCV RBC AUTO: 104.5 FL (ref 80–99)
MONOCYTES # BLD: 0.5 K/UL (ref 0–1)
MONOCYTES NFR BLD: 18 % (ref 5–13)
NEUTS SEG # BLD: 2.1 K/UL (ref 1.8–8)
NEUTS SEG NFR BLD: 68 % (ref 32–75)
NRBC # BLD: 0.02 K/UL (ref 0–0.01)
NRBC BLD-RTO: 0.7 PER 100 WBC
PLATELET # BLD AUTO: 103 K/UL (ref 150–400)
PMV BLD AUTO: 12.9 FL (ref 8.9–12.9)
RBC # BLD AUTO: 2.64 M/UL (ref 4.1–5.7)
RBC MORPH BLD: ABNORMAL
WBC # BLD AUTO: 3 K/UL (ref 4.1–11.1)

## 2024-01-29 PROCEDURE — 99215 OFFICE O/P EST HI 40 MIN: CPT | Performed by: INTERNAL MEDICINE

## 2024-01-29 PROCEDURE — 96413 CHEMO IV INFUSION 1 HR: CPT

## 2024-01-29 PROCEDURE — 96375 TX/PRO/DX INJ NEW DRUG ADDON: CPT

## 2024-01-29 PROCEDURE — 1036F TOBACCO NON-USER: CPT | Performed by: INTERNAL MEDICINE

## 2024-01-29 PROCEDURE — 6360000002 HC RX W HCPCS: Performed by: INTERNAL MEDICINE

## 2024-01-29 PROCEDURE — 85025 COMPLETE CBC W/AUTO DIFF WBC: CPT

## 2024-01-29 PROCEDURE — G8419 CALC BMI OUT NRM PARAM NOF/U: HCPCS | Performed by: INTERNAL MEDICINE

## 2024-01-29 PROCEDURE — 36415 COLL VENOUS BLD VENIPUNCTURE: CPT

## 2024-01-29 PROCEDURE — 3074F SYST BP LT 130 MM HG: CPT | Performed by: INTERNAL MEDICINE

## 2024-01-29 PROCEDURE — 1123F ACP DISCUSS/DSCN MKR DOCD: CPT | Performed by: INTERNAL MEDICINE

## 2024-01-29 PROCEDURE — 3017F COLORECTAL CA SCREEN DOC REV: CPT | Performed by: INTERNAL MEDICINE

## 2024-01-29 PROCEDURE — 3078F DIAST BP <80 MM HG: CPT | Performed by: INTERNAL MEDICINE

## 2024-01-29 PROCEDURE — 2580000003 HC RX 258: Performed by: INTERNAL MEDICINE

## 2024-01-29 PROCEDURE — G8427 DOCREV CUR MEDS BY ELIG CLIN: HCPCS | Performed by: INTERNAL MEDICINE

## 2024-01-29 PROCEDURE — G8484 FLU IMMUNIZE NO ADMIN: HCPCS | Performed by: INTERNAL MEDICINE

## 2024-01-29 RX ORDER — SODIUM CHLORIDE 0.9 % (FLUSH) 0.9 %
5-40 SYRINGE (ML) INJECTION PRN
Status: DISCONTINUED | OUTPATIENT
Start: 2024-01-29 | End: 2024-01-30 | Stop reason: HOSPADM

## 2024-01-29 RX ORDER — DEXTROSE MONOHYDRATE 50 MG/ML
5-250 INJECTION, SOLUTION INTRAVENOUS PRN
Status: DISCONTINUED | OUTPATIENT
Start: 2024-01-29 | End: 2024-01-30 | Stop reason: HOSPADM

## 2024-01-29 RX ADMIN — SODIUM CHLORIDE, PRESERVATIVE FREE 20 ML: 5 INJECTION INTRAVENOUS at 11:09

## 2024-01-29 RX ADMIN — DEXAMETHASONE SODIUM PHOSPHATE 40 MG: 4 INJECTION, SOLUTION INTRAMUSCULAR; INTRAVENOUS at 12:37

## 2024-01-29 RX ADMIN — DEXTROSE MONOHYDRATE 50 ML/HR: 50 INJECTION, SOLUTION INTRAVENOUS at 13:25

## 2024-01-29 RX ADMIN — CARFILZOMIB 112.6 MG: 60 INJECTION, POWDER, LYOPHILIZED, FOR SOLUTION INTRAVENOUS at 13:28

## 2024-01-29 ASSESSMENT — PAIN DESCRIPTION - LOCATION: LOCATION: NECK

## 2024-01-29 ASSESSMENT — PAIN DESCRIPTION - PAIN TYPE: TYPE: CHRONIC PAIN

## 2024-01-29 ASSESSMENT — PAIN DESCRIPTION - DESCRIPTORS: DESCRIPTORS: ACHING

## 2024-01-29 ASSESSMENT — PAIN SCALES - GENERAL: PAINLEVEL_OUTOF10: 5

## 2024-01-29 NOTE — PROGRESS NOTES
[facility] [place]   at [facility]   [address], [address]   [place] [address]   [phone]             Progress Note       Patient: Sameer Iglesias Sr.  MRN: [id number]   SSN: [id number]    YOB: 1955              Diagnosis:         1. Multiple Myeloma, IgA Kappa    Durie [place] stage IIIB      Cytogenetics/FISH: t(14;16) - high risk        Ttreatment:        1. Maintenance therapy - Pomalyst 2 mg daily - 11/19/2018    2. Maintenance therapy - Ixazomib - started 10/15/2018 - stopped 11/12/2018   3. S/P tandem  Autotransplant    First on 2/28/2018    2nd on 06/13/2018   4. Carfilzomib/Pom/Dex - s/p 5 cycles   5. VD-PACE s/p 1 cycle   6. RVD - s/p 4 cycles    7. Anne/Velcade/Dex, 2 Cycles   8. Isatuximab/Carfilzomib/Dex, 8 cycles  9. Carfilzomib/Selinexor/Dex, cycle 1 day 15      Subjective:        Sameer Iglesias Sr. is a 67 y.o. male with a diagnosis of IgA kappa myeloma. Bone marrow shows 100% aberrant plasma cells. He suffers with DM but it is diet controlled. He has received systemic anti-viral  treatment for chronic Hep C with successful eradication of the virus. Mr. Iglesias received 4 cycles of systemic therapy with RVd. He had an initial good response to treatment. However his paraprotein level started rising and the myeloma became refractory  to treatment. I then administered one cycle of VD-PACE in the hospital. He then received Carfilzomib/Pom/Dex. He achieved VGPR. He underwent tandem autotransplant at [facility]. According to the patient he achieved complete response with therapy. He has undergone  second/tandem transplant in June. He took Pomalyst maintenance until Aug 2019 then had to stop due to recurrent prostatitis. He had a repeat BM biopsy in February 2019 at [facility] which showed complete molecular remission. He is taking Pomalyst. He has seen  Dr. Mendoza for movement disorder. He has started taking a new medication

## 2024-01-29 NOTE — PROGRESS NOTES
[facility] Progress Note    Date: 2024    Name: Sameer Iglesisa Sr.    MRN: [id number]         : 1955    Mr. Iglesias arrived (ambulation) at 1100 and in no distress for cycle 1 day 15 of Kyprolis regimen.  Assessment was completed, no acute issues at this time, no new complaints voiced.      Covid Screening      1.Do you have any symptoms of COVID-19?  SOB, coughing, fever, or generally not feeling well ? No  2. Have you been exposed to COVID-19 recently? No  3. Have you had any recent contact with family/friend that has a pending COVID test? No    Single Lumen Implantable Port Right Chest (Active)   Port A Cath Status Accessed 24 1100   Criteria for Appropriate Use Irritant/vesicant medication 24 1100   Site Assessment Clean, dry & intact 24 1100   Line Care Ports disinfected 23 1015   Alcohol Cap Used Yes 24 1100   Date of Last Dressing Change 24 1100   Dressing Type Transparent 24 1100   Dressing Status New dressing applied 24 1100   Dressing Intervention New 24 1100   Date Accessed  24 1100   Access Attempts  1 24 1100   Access Needle Gauge 20 G 24 1100   Access Needle Length 0.75 inches 24 1100   Accessed By: ZAHEER 24 1100   Single Lumen Status Blood return noted;Lab draw;Flushed 24 1100   De-Access Date 24 1100   De-Access Time 1405 24 1100   De-Accessed By AJ 24 1100     + blood return noted, labs drawn and sent.      Proceeded to appt with Dr. Knag Kelsie's vitals were reviewed.  Patient Vitals for the past 24 hrs:   BP Temp Temp src Pulse Resp SpO2 Height Weight   24 1400 131/80 -- -- 78 -- -- -- --   24 1100 124/75 97.5 °F (36.4 °C) Temporal 87 16 99 % 1.727 m (5' 7.99\") 85.2 kg (187 lb 14.4 oz)       Lab results were obtained and reviewed.  Recent Results (from the past 12 hour(s))   CBC with Auto Differential    Collection Time: 24 11:10  AM   Result Value Ref Range    WBC 3.0 (L) 4.1 - 11.1 K/uL    RBC 2.64 (L) 4.10 - 5.70 M/uL    Hemoglobin 8.7 (L) 12.1 - 17.0 g/dL    Hematocrit 27.6 (L) 36.6 - 50.3 %    .5 (H) 80.0 - 99.0 FL    MCH 33.0 26.0 - 34.0 PG    MCHC 31.5 30.0 - 36.5 g/dL    RDW 17.5 (H) 11.5 - 14.5 %    Platelets 103 (L) 150 - 400 K/uL    MPV 12.9 8.9 - 12.9 FL    Nucleated RBCs 0.7 (H) 0  WBC    nRBC 0.02 (H) 0.00 - 0.01 K/uL    Neutrophils % 68 32 - 75 %    Lymphocytes % 13 12 - 49 %    Monocytes % 18 (H) 5 - 13 %    Eosinophils % 0 0 - 7 %    Basophils % 0 0 - 1 %    Immature Granulocytes 1 (H) 0.0 - 0.5 %    Neutrophils Absolute 2.1 1.8 - 8.0 K/UL    Lymphocytes Absolute 0.4 (L) 0.8 - 3.5 K/UL    Monocytes Absolute 0.5 0.0 - 1.0 K/UL    Eosinophils Absolute 0.0 0.0 - 0.4 K/UL    Basophils Absolute 0.0 0.0 - 0.1 K/UL    Absolute Immature Granulocyte 0.0 0.00 - 0.04 K/UL    Differential Type SMEAR SCANNED      RBC Comment ANISOCYTOSIS  1+        RBC Comment MACROCYTOSIS  1+        RBC Comment TEARDROP CELLS  PRESENT        RBC Comment OVALOCYTES  PRESENT         Patient's labs within parameters for treatment.    Pre-medications  were administered as ordered and chemotherapy was initiated.     CHEMO ADMINISTRATION CHECKLIST:  Two nurses verified prior to administering  Drug name  Drug dose  Infusion volume or drug volume when prepared in a syringe  Rate of administration  Route of administration  Expiration dates and/or times  Appearance and physical integrity of the drugs  Rate set on infusion pump, when used  Sequencing of drug administration        Medication:  Medications Administered         carfilzomib (KYPROLIS) 112.6 mg in dextrose 5 % 100 mL chemo IVPB Admin Date  01/29/2024 Action  New Bag Dose  112.6 mg Rate  332.6 mL/hr Route  IntraVENous Administered By  Shwetha Ng RN        dexAMETHasone (DECADRON) 40 mg in sodium chloride 0.9 % 50 mL IVPB Admin Date  01/29/2024 Action  New Bag Dose  40 mg Rate  200

## 2024-01-30 RX ORDER — TENOFOVIR DISOPROXIL FUMARATE 300 MG/1
TABLET, FILM COATED ORAL
Qty: 30 TABLET | Refills: 3 | Status: SHIPPED | OUTPATIENT
Start: 2024-01-30

## 2024-02-01 ENCOUNTER — TELEPHONE (OUTPATIENT)
Age: 69
End: 2024-02-01

## 2024-02-01 DIAGNOSIS — C90.02 MULTIPLE MYELOMA IN RELAPSE (HCC): Primary | ICD-10-CM

## 2024-02-01 NOTE — TELEPHONE ENCOUNTER
Sarai from central scheduling called left  on 2/1/24. She stated they want an updated order on the pet scan to do be a whole body pet scan. The department is asking for it due to the diagnosis of pt.

## 2024-02-01 NOTE — TELEPHONE ENCOUNTER
Returned call to scheduling.  I did not get Sarai but spoke with someone else.in scheduling who does not see any notes on the case yet so she is calling the actual PET.    Updated order at this time.  RAYMOND FROM DR DOWD PET CT WHO BODY STAT

## 2024-02-02 RX ORDER — SODIUM CHLORIDE 9 MG/ML
5-250 INJECTION, SOLUTION INTRAVENOUS PRN
OUTPATIENT
Start: 2024-02-26

## 2024-02-02 RX ORDER — HEPARIN 100 UNIT/ML
500 SYRINGE INTRAVENOUS PRN
OUTPATIENT
Start: 2024-02-19

## 2024-02-02 RX ORDER — ACETAMINOPHEN 325 MG/1
650 TABLET ORAL
Status: CANCELLED | OUTPATIENT
Start: 2024-02-12

## 2024-02-02 RX ORDER — ONDANSETRON 2 MG/ML
8 INJECTION INTRAMUSCULAR; INTRAVENOUS
OUTPATIENT
Start: 2024-02-19

## 2024-02-02 RX ORDER — MEPERIDINE HYDROCHLORIDE 25 MG/ML
12.5 INJECTION INTRAMUSCULAR; INTRAVENOUS; SUBCUTANEOUS PRN
Status: CANCELLED | OUTPATIENT
Start: 2024-02-12

## 2024-02-02 RX ORDER — MEPERIDINE HYDROCHLORIDE 25 MG/ML
12.5 INJECTION INTRAMUSCULAR; INTRAVENOUS; SUBCUTANEOUS PRN
OUTPATIENT
Start: 2024-02-26

## 2024-02-02 RX ORDER — SODIUM CHLORIDE 9 MG/ML
5-250 INJECTION, SOLUTION INTRAVENOUS PRN
OUTPATIENT
Start: 2024-02-19

## 2024-02-02 RX ORDER — EPINEPHRINE 1 MG/ML
0.3 INJECTION, SOLUTION INTRAMUSCULAR; SUBCUTANEOUS PRN
OUTPATIENT
Start: 2024-02-26

## 2024-02-02 RX ORDER — ONDANSETRON 2 MG/ML
8 INJECTION INTRAMUSCULAR; INTRAVENOUS
Status: CANCELLED | OUTPATIENT
Start: 2024-02-12

## 2024-02-02 RX ORDER — MEPERIDINE HYDROCHLORIDE 25 MG/ML
12.5 INJECTION INTRAMUSCULAR; INTRAVENOUS; SUBCUTANEOUS PRN
OUTPATIENT
Start: 2024-02-19

## 2024-02-02 RX ORDER — ONDANSETRON 2 MG/ML
8 INJECTION INTRAMUSCULAR; INTRAVENOUS
OUTPATIENT
Start: 2024-02-26

## 2024-02-02 RX ORDER — ALBUTEROL SULFATE 90 UG/1
4 AEROSOL, METERED RESPIRATORY (INHALATION) PRN
Status: CANCELLED | OUTPATIENT
Start: 2024-02-12

## 2024-02-02 RX ORDER — ALBUTEROL SULFATE 90 UG/1
4 AEROSOL, METERED RESPIRATORY (INHALATION) PRN
OUTPATIENT
Start: 2024-02-26

## 2024-02-02 RX ORDER — DIPHENHYDRAMINE HYDROCHLORIDE 50 MG/ML
50 INJECTION INTRAMUSCULAR; INTRAVENOUS
OUTPATIENT
Start: 2024-02-19

## 2024-02-02 RX ORDER — SODIUM CHLORIDE 0.9 % (FLUSH) 0.9 %
5-40 SYRINGE (ML) INJECTION PRN
OUTPATIENT
Start: 2024-02-26

## 2024-02-02 RX ORDER — SODIUM CHLORIDE 9 MG/ML
INJECTION, SOLUTION INTRAVENOUS CONTINUOUS
OUTPATIENT
Start: 2024-02-19

## 2024-02-02 RX ORDER — ALBUTEROL SULFATE 90 UG/1
4 AEROSOL, METERED RESPIRATORY (INHALATION) PRN
OUTPATIENT
Start: 2024-02-19

## 2024-02-02 RX ORDER — EPINEPHRINE 1 MG/ML
0.3 INJECTION, SOLUTION INTRAMUSCULAR; SUBCUTANEOUS PRN
Status: CANCELLED | OUTPATIENT
Start: 2024-02-12

## 2024-02-02 RX ORDER — DIPHENHYDRAMINE HYDROCHLORIDE 50 MG/ML
50 INJECTION INTRAMUSCULAR; INTRAVENOUS
Status: CANCELLED | OUTPATIENT
Start: 2024-02-12

## 2024-02-02 RX ORDER — DIPHENHYDRAMINE HYDROCHLORIDE 50 MG/ML
50 INJECTION INTRAMUSCULAR; INTRAVENOUS
OUTPATIENT
Start: 2024-02-26

## 2024-02-02 RX ORDER — SODIUM CHLORIDE 9 MG/ML
5-250 INJECTION, SOLUTION INTRAVENOUS PRN
Status: CANCELLED | OUTPATIENT
Start: 2024-02-12

## 2024-02-02 RX ORDER — HEPARIN 100 UNIT/ML
500 SYRINGE INTRAVENOUS PRN
OUTPATIENT
Start: 2024-02-26

## 2024-02-02 RX ORDER — EPINEPHRINE 1 MG/ML
0.3 INJECTION, SOLUTION INTRAMUSCULAR; SUBCUTANEOUS PRN
OUTPATIENT
Start: 2024-02-19

## 2024-02-02 RX ORDER — DEXTROSE MONOHYDRATE 50 MG/ML
5-250 INJECTION, SOLUTION INTRAVENOUS PRN
OUTPATIENT
Start: 2024-02-19

## 2024-02-02 RX ORDER — ACETAMINOPHEN 325 MG/1
650 TABLET ORAL
OUTPATIENT
Start: 2024-02-26

## 2024-02-02 RX ORDER — SODIUM CHLORIDE 9 MG/ML
INJECTION, SOLUTION INTRAVENOUS CONTINUOUS
OUTPATIENT
Start: 2024-02-26

## 2024-02-02 RX ORDER — SODIUM CHLORIDE 9 MG/ML
INJECTION, SOLUTION INTRAVENOUS CONTINUOUS
Status: CANCELLED | OUTPATIENT
Start: 2024-02-12

## 2024-02-02 RX ORDER — HEPARIN 100 UNIT/ML
500 SYRINGE INTRAVENOUS PRN
Status: CANCELLED | OUTPATIENT
Start: 2024-02-12

## 2024-02-02 RX ORDER — ACETAMINOPHEN 325 MG/1
650 TABLET ORAL
OUTPATIENT
Start: 2024-02-19

## 2024-02-02 RX ORDER — DEXTROSE MONOHYDRATE 50 MG/ML
5-250 INJECTION, SOLUTION INTRAVENOUS PRN
OUTPATIENT
Start: 2024-02-26

## 2024-02-02 RX ORDER — SODIUM CHLORIDE 0.9 % (FLUSH) 0.9 %
5-40 SYRINGE (ML) INJECTION PRN
OUTPATIENT
Start: 2024-02-19

## 2024-02-06 RX ORDER — TENOFOVIR DISOPROXIL FUMARATE 300 MG/1
TABLET, FILM COATED ORAL
Qty: 30 TABLET | Refills: 3 | OUTPATIENT
Start: 2024-02-06

## 2024-02-08 ENCOUNTER — TELEPHONE (OUTPATIENT)
Age: 69
End: 2024-02-08

## 2024-02-12 ENCOUNTER — OFFICE VISIT (OUTPATIENT)
Age: 69
End: 2024-02-12
Payer: MEDICARE

## 2024-02-12 ENCOUNTER — HOSPITAL ENCOUNTER (OUTPATIENT)
Facility: HOSPITAL | Age: 69
Setting detail: INFUSION SERIES
Discharge: HOME | End: 2024-02-12
Payer: COMMERCIAL

## 2024-02-12 VITALS
DIASTOLIC BLOOD PRESSURE: 72 MMHG | TEMPERATURE: 97.7 F | RESPIRATION RATE: 18 BRPM | WEIGHT: 196 LBS | OXYGEN SATURATION: 99 % | HEART RATE: 80 BPM | SYSTOLIC BLOOD PRESSURE: 115 MMHG | HEIGHT: 67 IN | BODY MASS INDEX: 30.76 KG/M2

## 2024-02-12 VITALS
BODY MASS INDEX: 29.35 KG/M2 | WEIGHT: 187 LBS | SYSTOLIC BLOOD PRESSURE: 108 MMHG | DIASTOLIC BLOOD PRESSURE: 70 MMHG | OXYGEN SATURATION: 96 % | HEART RATE: 81 BPM | RESPIRATION RATE: 18 BRPM | HEIGHT: 67 IN

## 2024-02-12 DIAGNOSIS — T45.1X5A CHEMOTHERAPY-INDUCED FATIGUE: ICD-10-CM

## 2024-02-12 DIAGNOSIS — C90.02 MULTIPLE MYELOMA IN RELAPSE (HCC): Primary | ICD-10-CM

## 2024-02-12 DIAGNOSIS — D61.82 ANEMIA ASSOCIATED WITH BONE MARROW INFILTRATION (HCC): ICD-10-CM

## 2024-02-12 DIAGNOSIS — Z11.59 ENCOUNTER FOR SCREENING FOR OTHER VIRAL DISEASES: Primary | ICD-10-CM

## 2024-02-12 DIAGNOSIS — Z51.11 ENCOUNTER FOR ANTINEOPLASTIC CHEMOTHERAPY: ICD-10-CM

## 2024-02-12 DIAGNOSIS — C90.00 MULTIPLE MYELOMA, REMISSION STATUS UNSPECIFIED (HCC): ICD-10-CM

## 2024-02-12 DIAGNOSIS — R53.83 CHEMOTHERAPY-INDUCED FATIGUE: ICD-10-CM

## 2024-02-12 LAB
ALBUMIN SERPL-MCNC: 3.3 G/DL (ref 3.5–5)
ALBUMIN/GLOB SERPL: 1.2 (ref 1.1–2.2)
ALP SERPL-CCNC: 71 U/L (ref 45–117)
ALT SERPL-CCNC: 23 U/L (ref 12–78)
ANION GAP SERPL CALC-SCNC: 3 MMOL/L (ref 5–15)
AST SERPL-CCNC: 9 U/L (ref 15–37)
BASOPHILS # BLD: 0 K/UL (ref 0–0.1)
BASOPHILS NFR BLD: 0 % (ref 0–1)
BILIRUB SERPL-MCNC: 0.3 MG/DL (ref 0.2–1)
BUN SERPL-MCNC: 20 MG/DL (ref 6–20)
BUN/CREAT SERPL: 14 (ref 12–20)
CALCIUM SERPL-MCNC: 8.4 MG/DL (ref 8.5–10.1)
CHLORIDE SERPL-SCNC: 112 MMOL/L (ref 97–108)
CO2 SERPL-SCNC: 28 MMOL/L (ref 21–32)
CREAT SERPL-MCNC: 1.44 MG/DL (ref 0.7–1.3)
DIFFERENTIAL METHOD BLD: ABNORMAL
EOSINOPHIL # BLD: 0 K/UL (ref 0–0.4)
EOSINOPHIL NFR BLD: 0 % (ref 0–7)
ERYTHROCYTE [DISTWIDTH] IN BLOOD BY AUTOMATED COUNT: 18.6 % (ref 11.5–14.5)
GLOBULIN SER CALC-MCNC: 2.8 G/DL (ref 2–4)
GLUCOSE SERPL-MCNC: 176 MG/DL (ref 65–100)
HCT VFR BLD AUTO: 29.6 % (ref 36.6–50.3)
HGB BLD-MCNC: 8.8 G/DL (ref 12.1–17)
IMM GRANULOCYTES # BLD AUTO: 0 K/UL (ref 0–0.04)
IMM GRANULOCYTES NFR BLD AUTO: 0 % (ref 0–0.5)
LYMPHOCYTES # BLD: 0.3 K/UL (ref 0.8–3.5)
LYMPHOCYTES NFR BLD: 10 % (ref 12–49)
MCH RBC QN AUTO: 31.9 PG (ref 26–34)
MCHC RBC AUTO-ENTMCNC: 29.7 G/DL (ref 30–36.5)
MCV RBC AUTO: 107.2 FL (ref 80–99)
MONOCYTES # BLD: 0.5 K/UL (ref 0–1)
MONOCYTES NFR BLD: 16 % (ref 5–13)
NEUTS SEG # BLD: 2.4 K/UL (ref 1.8–8)
NEUTS SEG NFR BLD: 74 % (ref 32–75)
NRBC # BLD: 0 K/UL (ref 0–0.01)
NRBC BLD-RTO: 0 PER 100 WBC
PLATELET # BLD AUTO: 166 K/UL (ref 150–400)
PMV BLD AUTO: 11.4 FL (ref 8.9–12.9)
POTASSIUM SERPL-SCNC: 4.5 MMOL/L (ref 3.5–5.1)
PROT SERPL-MCNC: 6.1 G/DL (ref 6.4–8.2)
RBC # BLD AUTO: 2.76 M/UL (ref 4.1–5.7)
RBC MORPH BLD: ABNORMAL
SODIUM SERPL-SCNC: 143 MMOL/L (ref 136–145)
WBC # BLD AUTO: 3.2 K/UL (ref 4.1–11.1)

## 2024-02-12 PROCEDURE — 80053 COMPREHEN METABOLIC PANEL: CPT

## 2024-02-12 PROCEDURE — 82784 ASSAY IGA/IGD/IGG/IGM EACH: CPT

## 2024-02-12 PROCEDURE — 99215 OFFICE O/P EST HI 40 MIN: CPT | Performed by: INTERNAL MEDICINE

## 2024-02-12 PROCEDURE — G8484 FLU IMMUNIZE NO ADMIN: HCPCS | Performed by: INTERNAL MEDICINE

## 2024-02-12 PROCEDURE — 1036F TOBACCO NON-USER: CPT | Performed by: INTERNAL MEDICINE

## 2024-02-12 PROCEDURE — 3017F COLORECTAL CA SCREEN DOC REV: CPT | Performed by: INTERNAL MEDICINE

## 2024-02-12 PROCEDURE — 3078F DIAST BP <80 MM HG: CPT | Performed by: INTERNAL MEDICINE

## 2024-02-12 PROCEDURE — 86334 IMMUNOFIX E-PHORESIS SERUM: CPT

## 2024-02-12 PROCEDURE — 83521 IG LIGHT CHAINS FREE EACH: CPT

## 2024-02-12 PROCEDURE — G8419 CALC BMI OUT NRM PARAM NOF/U: HCPCS | Performed by: INTERNAL MEDICINE

## 2024-02-12 PROCEDURE — 2580000003 HC RX 258: Performed by: INTERNAL MEDICINE

## 2024-02-12 PROCEDURE — 96413 CHEMO IV INFUSION 1 HR: CPT

## 2024-02-12 PROCEDURE — 3074F SYST BP LT 130 MM HG: CPT | Performed by: INTERNAL MEDICINE

## 2024-02-12 PROCEDURE — 36415 COLL VENOUS BLD VENIPUNCTURE: CPT

## 2024-02-12 PROCEDURE — 85025 COMPLETE CBC W/AUTO DIFF WBC: CPT

## 2024-02-12 PROCEDURE — G8427 DOCREV CUR MEDS BY ELIG CLIN: HCPCS | Performed by: INTERNAL MEDICINE

## 2024-02-12 PROCEDURE — 1123F ACP DISCUSS/DSCN MKR DOCD: CPT | Performed by: INTERNAL MEDICINE

## 2024-02-12 PROCEDURE — 6360000002 HC RX W HCPCS: Performed by: NURSE PRACTITIONER

## 2024-02-12 PROCEDURE — 84165 PROTEIN E-PHORESIS SERUM: CPT

## 2024-02-12 PROCEDURE — 6360000002 HC RX W HCPCS: Performed by: INTERNAL MEDICINE

## 2024-02-12 RX ORDER — DEXAMETHASONE 4 MG/1
40 TABLET ORAL
Status: COMPLETED | OUTPATIENT
Start: 2024-02-12 | End: 2024-02-12

## 2024-02-12 RX ORDER — SODIUM CHLORIDE 0.9 % (FLUSH) 0.9 %
5-40 SYRINGE (ML) INJECTION PRN
Status: DISCONTINUED | OUTPATIENT
Start: 2024-02-12 | End: 2024-02-13 | Stop reason: HOSPADM

## 2024-02-12 RX ORDER — METOPROLOL SUCCINATE 25 MG/1
25 TABLET, EXTENDED RELEASE ORAL DAILY
Qty: 30 TABLET | Refills: 3 | Status: SHIPPED | OUTPATIENT
Start: 2024-02-12 | End: 2024-02-12

## 2024-02-12 RX ORDER — DEXTROSE MONOHYDRATE 50 MG/ML
5-250 INJECTION, SOLUTION INTRAVENOUS PRN
Status: DISCONTINUED | OUTPATIENT
Start: 2024-02-12 | End: 2024-02-13 | Stop reason: HOSPADM

## 2024-02-12 RX ADMIN — DEXTROSE MONOHYDRATE 25 ML/HR: 50 INJECTION, SOLUTION INTRAVENOUS at 12:43

## 2024-02-12 RX ADMIN — CARFILZOMIB 112.6 MG: 60 INJECTION, POWDER, LYOPHILIZED, FOR SOLUTION INTRAVENOUS at 12:47

## 2024-02-12 RX ADMIN — DEXAMETHASONE 40 MG: 4 TABLET ORAL at 12:40

## 2024-02-12 RX ADMIN — SODIUM CHLORIDE, PRESERVATIVE FREE 10 ML: 5 INJECTION INTRAVENOUS at 13:27

## 2024-02-12 ASSESSMENT — PAIN SCALES - GENERAL: PAINLEVEL_OUTOF10: 3

## 2024-02-12 ASSESSMENT — PAIN DESCRIPTION - LOCATION: LOCATION: NECK

## 2024-02-12 ASSESSMENT — PAIN DESCRIPTION - DESCRIPTORS: DESCRIPTORS: ACHING

## 2024-02-12 ASSESSMENT — PAIN DESCRIPTION - PAIN TYPE: TYPE: CHRONIC PAIN

## 2024-02-12 ASSESSMENT — PAIN DESCRIPTION - ORIENTATION: ORIENTATION: MID;POSTERIOR

## 2024-02-12 NOTE — PROGRESS NOTES
Name: Sameer Iglesias .    MRN: [id number]         : 1955    Mr. Iglesias Arrived ambulatory and in no distress for C2 D1 of Kyprolis Regimen.  Assessment was completed. Pt reports increased fatigue past few days. Right chest wall port accessed without difficulty, labs drawn & sent for processing.    Patient proceed to appointment with Dr. Wong.    Patient Vitals for the past 24 hrs:   BP Temp Temp src Pulse Resp SpO2 Height Weight   24 1328 115/72 -- -- 80 18 -- -- --   24 1028 107/72 97.7 °F (36.5 °C) Temporal 82 18 99 % 1.702 m (5' 7.01\") 88.9 kg (196 lb)     Lab results were obtained and reviewed.  Recent Results (from the past 12 hour(s))   CBC With Auto Differential    Collection Time: 24 10:33 AM   Result Value Ref Range    WBC 3.2 (L) 4.1 - 11.1 K/uL    RBC 2.76 (L) 4.10 - 5.70 M/uL    Hemoglobin 8.8 (L) 12.1 - 17.0 g/dL    Hematocrit 29.6 (L) 36.6 - 50.3 %    .2 (H) 80.0 - 99.0 FL    MCH 31.9 26.0 - 34.0 PG    MCHC 29.7 (L) 30.0 - 36.5 g/dL    RDW 18.6 (H) 11.5 - 14.5 %    Platelets 166 150 - 400 K/uL    MPV 11.4 8.9 - 12.9 FL    Nucleated RBCs 0.0 0  WBC    nRBC 0.00 0.00 - 0.01 K/uL    Neutrophils % 74 32 - 75 %    Lymphocytes % 10 (L) 12 - 49 %    Monocytes % 16 (H) 5 - 13 %    Eosinophils % 0 0 - 7 %    Basophils % 0 0 - 1 %    Immature Granulocytes 0 0.0 - 0.5 %    Neutrophils Absolute 2.4 1.8 - 8.0 K/UL    Lymphocytes Absolute 0.3 (L) 0.8 - 3.5 K/UL    Monocytes Absolute 0.5 0.0 - 1.0 K/UL    Eosinophils Absolute 0.0 0.0 - 0.4 K/UL    Basophils Absolute 0.0 0.0 - 0.1 K/UL    Absolute Immature Granulocyte 0.0 0.00 - 0.04 K/UL    Differential Type SMEAR SCANNED      RBC Comment ANISOCYTOSIS  1+       Comprehensive metabolic panel    Collection Time: 24 10:33 AM   Result Value Ref Range    Sodium 143 136 - 145 mmol/L    Potassium 4.5 3.5 - 5.1 mmol/L    Chloride 112 (H) 97 - 108 mmol/L    CO2 28 21 - 32 mmol/L    Anion Gap 3 (L) 5 - 15 mmol/L    Glucose

## 2024-02-12 NOTE — PROGRESS NOTES
[facility] [place]   at [facility]   [address], [address], [address]   [place] [address]   [phone]             Progress Note       Patient: Sameer Iglesias Sr.  MRN: [id number]   SSN: [id number]    YOB: 1955              Diagnosis:         1. Multiple Myeloma, IgA Kappa    Durie [place] stage IIIB      Cytogenetics/FISH: t(14;16) - high risk        Ttreatment:        1. Maintenance therapy - Pomalyst 2 mg daily - 11/19/2018    2. Maintenance therapy - Ixazomib - started 10/15/2018 - stopped 11/12/2018   3. S/P tandem  Autotransplant    First on 2/28/2018    2nd on 06/13/2018   4. Carfilzomib/Pom/Dex - s/p 5 cycles   5. VD-PACE s/p 1 cycle   6. RVD - s/p 4 cycles    7. Anne/Velcade/Dex, 2 Cycles   8. Isatuximab/Carfilzomib/Dex, 8 cycles  9. Carfilzomib/Selinexor/Dex, cycle 2 day 1      Subjective:        Sameer Iglesias Sr. is a 67 y.o. male with a diagnosis of IgA kappa myeloma. Bone marrow shows 100% aberrant plasma cells. He suffers with DM but it is diet controlled. He has received systemic anti-viral  treatment for chronic Hep C with successful eradication of the virus. Mr. Iglesias received 4 cycles of systemic therapy with RVd. He had an initial good response to treatment. However his paraprotein level started rising and the myeloma became refractory  to treatment. I then administered one cycle of VD-PACE in the hospital. He then received Carfilzomib/Pom/Dex. He achieved VGPR. He underwent tandem autotransplant at [facility]. According to the patient he achieved complete response with therapy. He has undergone  second/tandem transplant in June. He took Pomalyst maintenance until Aug 2019 then had to stop due to recurrent prostatitis. He had a repeat BM biopsy in February 2019 at [facility] which showed complete molecular remission. He took Pomalyst as maintenance therapy.     Mr. Iglesias was noted to have relapsed myeloma in 2023. He

## 2024-02-12 NOTE — PROGRESS NOTES
Patient identified with two identification factors, Name and Date of Birth.    Chief Complaint   Patient presents with    Chemotherapy    Multiple Myeloma     Patient requested to have blood pressure rechecked due to feeling fatigue. Patient denies feeling lightheaded, dizzy.  /70   Pulse 81   Resp 18   Ht 1.702 m (5' 7\")   Wt 84.8 kg (187 lb)   SpO2 96%   BMI 29.29 kg/m²     Health Maintenance Due   Topic    Diabetic retinal exam     Hepatitis A vaccine (1 of 2 - Risk 2-dose series)    Hepatitis B vaccine (1 of 3 - Risk 3-dose series)    Shingles vaccine (1 of 2)    Diabetic Alb to Cr ratio (uACR) test     Diabetic foot exam     COVID-19 Vaccine (5 - 2023-24 season)    Depression Monitoring     Annual Wellness Visit (Medicare)         1. \"Have you been to the ER, urgent care clinic since your last visit?  Hospitalized since your last visit?\" no    2. \"Have you seen or consulted any other health care providers outside of the [facility] since your last visit?\" no

## 2024-02-19 ENCOUNTER — APPOINTMENT (OUTPATIENT)
Facility: HOSPITAL | Age: 69
End: 2024-02-19
Payer: MEDICARE

## 2024-02-19 ENCOUNTER — TELEPHONE (OUTPATIENT)
Age: 69
End: 2024-02-19

## 2024-02-19 LAB
ALBUMIN SERPL ELPH-MCNC: 3.6 G/DL (ref 2.9–4.4)
ALBUMIN/GLOB SERPL: 1.9 (ref 0.7–1.7)
ALPHA1 GLOB SERPL ELPH-MCNC: 0.2 G/DL (ref 0–0.4)
ALPHA2 GLOB SERPL ELPH-MCNC: 0.6 G/DL (ref 0.4–1)
B-GLOBULIN SERPL ELPH-MCNC: 0.8 G/DL (ref 0.7–1.3)
GAMMA GLOB SERPL ELPH-MCNC: 0.5 G/DL (ref 0.4–1.8)
GLOBULIN SER-MCNC: 2 G/DL (ref 2.2–3.9)
IGA SERPL-MCNC: 314 MG/DL (ref 61–437)
IGG SERPL-MCNC: 301 MG/DL (ref 603–1613)
IGM SERPL-MCNC: <5 MG/DL (ref 20–172)
INTERPRETATION SERPL IEP-IMP: ABNORMAL
KAPPA LC FREE SER-MCNC: 55.6 MG/L (ref 3.3–19.4)
KAPPA LC FREE/LAMBDA FREE SER: >37.07 {RATIO} (ref 0.26–1.65)
LAMBDA LC FREE SERPL-MCNC: <1.5 MG/L (ref 5.7–26.3)
M PROTEIN SERPL ELPH-MCNC: 0.2 G/DL
PROT SERPL-MCNC: 5.6 G/DL (ref 6–8.5)

## 2024-02-19 NOTE — TELEPHONE ENCOUNTER
Received a call from [facility] BONE MARROW TRANSPLANT team.   put in a urgent referral for CAR-T. Early march so he needs to be off of chemo x 2 weeks prior. He likely will not be coming today to [facility] since he is already aware of this by .  He is going to get collected 3/7/24 and then the manufacturing period can take up to 2 months so during that time he can restart and ttransplant team will reach out to us to let us know the date of transfer and will have to hold chemo 2 weeks prior to that also.    This RN notified  of this and also called pt to make sure he knows to not take selenexor or dex either.    He is aware and thanked me for the call.      Please cancel [facility] and his  2/26 [facility] appt and keep them thereafter.

## 2024-02-21 ENCOUNTER — HOSPITAL ENCOUNTER (OUTPATIENT)
Facility: HOSPITAL | Age: 69
Discharge: HOME | End: 2024-02-24
Attending: INTERNAL MEDICINE
Payer: MEDICARE

## 2024-02-21 DIAGNOSIS — C90.02 MULTIPLE MYELOMA IN RELAPSE (HCC): ICD-10-CM

## 2024-02-21 LAB
GLUCOSE BLD STRIP.AUTO-MCNC: 109 MG/DL (ref 65–117)
SERVICE CMNT-IMP: NORMAL

## 2024-02-21 PROCEDURE — 3430000000 HC RX DIAGNOSTIC RADIOPHARMACEUTICAL: Performed by: INTERNAL MEDICINE

## 2024-02-21 PROCEDURE — A9609 HC RX DIAGNOSTIC RADIOPHARMACEUTICAL: HCPCS | Performed by: INTERNAL MEDICINE

## 2024-02-21 PROCEDURE — 78816 PET IMAGE W/CT FULL BODY: CPT

## 2024-02-21 PROCEDURE — 82962 GLUCOSE BLOOD TEST: CPT

## 2024-02-21 RX ORDER — FLUDEOXYGLUCOSE F-18 500 MCI/ML
10 INJECTION INTRAVENOUS ONCE
Status: COMPLETED | OUTPATIENT
Start: 2024-02-21 | End: 2024-02-21

## 2024-02-21 RX ADMIN — FLUDEOXYGLUCOSE F-18 10 MILLICURIE: 500 INJECTION INTRAVENOUS at 12:16

## 2024-02-22 ENCOUNTER — TELEPHONE (OUTPATIENT)
Age: 69
End: 2024-02-22

## 2024-02-22 NOTE — TELEPHONE ENCOUNTER
Pt spouse called because pt went and had a pet scan yesterday. Pt started having symptoms which include lost of apettite, and having to use the restroom a lot more. Stated that pt stool went from regular to liquid. Pt spouse stated he had temp of 99.3 last night, but it is normal this morning.

## 2024-02-23 ENCOUNTER — TELEPHONE (OUTPATIENT)
Age: 69
End: 2024-02-23

## 2024-02-23 NOTE — TELEPHONE ENCOUNTER
Returned call to pt.  HIPAA verified by two patient identifiers.   He is aware PET scan is good.  As far as diarrhea, likely from the PET, take imodium and stay hydrated.  Pt verbalized understanding.

## 2024-02-23 NOTE — TELEPHONE ENCOUNTER
Pt called stated that he has been having diarrhea for 2 days. Stated that he was taking pepto bismol.

## 2024-02-24 ENCOUNTER — HOSPITAL ENCOUNTER (EMERGENCY)
Facility: HOSPITAL | Age: 69
Discharge: HOME | End: 2024-02-24
Attending: STUDENT IN AN ORGANIZED HEALTH CARE EDUCATION/TRAINING PROGRAM
Payer: MEDICARE

## 2024-02-24 VITALS
WEIGHT: 179.45 LBS | SYSTOLIC BLOOD PRESSURE: 127 MMHG | TEMPERATURE: 98.2 F | RESPIRATION RATE: 16 BRPM | OXYGEN SATURATION: 98 % | HEART RATE: 85 BPM | HEIGHT: 68 IN | BODY MASS INDEX: 27.2 KG/M2 | DIASTOLIC BLOOD PRESSURE: 97 MMHG

## 2024-02-24 DIAGNOSIS — E86.0 DEHYDRATION: ICD-10-CM

## 2024-02-24 DIAGNOSIS — R19.7 DIARRHEA, UNSPECIFIED TYPE: Primary | ICD-10-CM

## 2024-02-24 LAB
ALBUMIN SERPL-MCNC: 3.8 G/DL (ref 3.5–5)
ALBUMIN/GLOB SERPL: 1.1 (ref 1.1–2.2)
ALP SERPL-CCNC: 63 U/L (ref 45–117)
ALT SERPL-CCNC: 22 U/L (ref 12–78)
ANION GAP SERPL CALC-SCNC: 6 MMOL/L (ref 5–15)
AST SERPL-CCNC: 21 U/L (ref 15–37)
BASOPHILS # BLD: 0 K/UL (ref 0–0.1)
BASOPHILS NFR BLD: 0 % (ref 0–1)
BILIRUB SERPL-MCNC: 0.8 MG/DL (ref 0.2–1)
BUN SERPL-MCNC: 11 MG/DL (ref 6–20)
BUN/CREAT SERPL: 9 (ref 12–20)
CALCIUM SERPL-MCNC: 7.4 MG/DL (ref 8.5–10.1)
CHLORIDE SERPL-SCNC: 111 MMOL/L (ref 97–108)
CO2 SERPL-SCNC: 23 MMOL/L (ref 21–32)
CREAT SERPL-MCNC: 1.25 MG/DL (ref 0.7–1.3)
DIFFERENTIAL METHOD BLD: ABNORMAL
EOSINOPHIL # BLD: 0 K/UL (ref 0–0.4)
EOSINOPHIL NFR BLD: 0 % (ref 0–7)
ERYTHROCYTE [DISTWIDTH] IN BLOOD BY AUTOMATED COUNT: 17.3 % (ref 11.5–14.5)
FLUAV AG NPH QL IA: NEGATIVE
FLUBV AG NOSE QL IA: NEGATIVE
GLOBULIN SER CALC-MCNC: 3.4 G/DL (ref 2–4)
GLUCOSE SERPL-MCNC: 128 MG/DL (ref 65–100)
HCT VFR BLD AUTO: 35.5 % (ref 36.6–50.3)
HGB BLD-MCNC: 11 G/DL (ref 12.1–17)
IMM GRANULOCYTES # BLD AUTO: 0 K/UL (ref 0–0.04)
IMM GRANULOCYTES NFR BLD AUTO: 0 % (ref 0–0.5)
LIPASE SERPL-CCNC: 22 U/L (ref 13–75)
LYMPHOCYTES # BLD: 0.1 K/UL (ref 0.8–3.5)
LYMPHOCYTES NFR BLD: 4 % (ref 12–49)
MCH RBC QN AUTO: 32.5 PG (ref 26–34)
MCHC RBC AUTO-ENTMCNC: 31 G/DL (ref 30–36.5)
MCV RBC AUTO: 105 FL (ref 80–99)
MONOCYTES # BLD: 0.6 K/UL (ref 0–1)
MONOCYTES NFR BLD: 19 % (ref 5–13)
NEUTS SEG # BLD: 2.7 K/UL (ref 1.8–8)
NEUTS SEG NFR BLD: 77 % (ref 32–75)
NRBC # BLD: 0 K/UL (ref 0–0.01)
NRBC BLD-RTO: 0 PER 100 WBC
PLATELET # BLD AUTO: 187 K/UL (ref 150–400)
PMV BLD AUTO: 12.2 FL (ref 8.9–12.9)
POTASSIUM SERPL-SCNC: 3.7 MMOL/L (ref 3.5–5.1)
PROT SERPL-MCNC: 7.2 G/DL (ref 6.4–8.2)
RBC # BLD AUTO: 3.38 M/UL (ref 4.1–5.7)
RBC MORPH BLD: ABNORMAL
SARS-COV-2 RDRP RESP QL NAA+PROBE: NOT DETECTED
SODIUM SERPL-SCNC: 140 MMOL/L (ref 136–145)
SOURCE: NORMAL
WBC # BLD AUTO: 3.4 K/UL (ref 4.1–11.1)

## 2024-02-24 PROCEDURE — 96361 HYDRATE IV INFUSION ADD-ON: CPT

## 2024-02-24 PROCEDURE — 85025 COMPLETE CBC W/AUTO DIFF WBC: CPT

## 2024-02-24 PROCEDURE — 83690 ASSAY OF LIPASE: CPT

## 2024-02-24 PROCEDURE — 96360 HYDRATION IV INFUSION INIT: CPT

## 2024-02-24 PROCEDURE — 36415 COLL VENOUS BLD VENIPUNCTURE: CPT

## 2024-02-24 PROCEDURE — 87635 SARS-COV-2 COVID-19 AMP PRB: CPT

## 2024-02-24 PROCEDURE — 2580000003 HC RX 258: Performed by: STUDENT IN AN ORGANIZED HEALTH CARE EDUCATION/TRAINING PROGRAM

## 2024-02-24 PROCEDURE — 99284 EMERGENCY DEPT VISIT MOD MDM: CPT

## 2024-02-24 PROCEDURE — 80053 COMPREHEN METABOLIC PANEL: CPT

## 2024-02-24 PROCEDURE — 87804 INFLUENZA ASSAY W/OPTIC: CPT

## 2024-02-24 RX ORDER — 0.9 % SODIUM CHLORIDE 0.9 %
1000 INTRAVENOUS SOLUTION INTRAVENOUS ONCE
Status: COMPLETED | OUTPATIENT
Start: 2024-02-24 | End: 2024-02-24

## 2024-02-24 RX ADMIN — SODIUM CHLORIDE 1000 ML: 9 INJECTION, SOLUTION INTRAVENOUS at 12:51

## 2024-02-24 ASSESSMENT — PAIN - FUNCTIONAL ASSESSMENT: PAIN_FUNCTIONAL_ASSESSMENT: NONE - DENIES PAIN

## 2024-02-24 NOTE — DISCHARGE INSTRUCTIONS
Please make a followup with your primary care physician and oncologist.  If you have any new or worsening concerning medical symptoms please return to the emergency department.

## 2024-02-24 NOTE — ED NOTES
Patient states he can't have a BM right now because he took an immodium earlier and he is ready to go home

## 2024-02-24 NOTE — ED PROVIDER NOTES
[facility] EMERGENCY DEPT  EMERGENCY DEPARTMENT ENCOUNTER       Pt Name: Sameer Iglesias Sr.  MRN: [id number]  Birthdate 1955  Date of evaluation: 2/24/2024  Provider: Long Iniguez MD   PCP: Isaak Benson MD  Note Started: 3:03 PM EST 2/24/24     CHIEF COMPLAINT       Chief Complaint   Patient presents with    Diarrhea     Pt reports to ed complaining of diarrhea started on Wednesday, pt is a cancer patient and last received chemo 2 weeks ago     HISTORY OF PRESENT ILLNESS: 1 or more elements      History From: patient, History limited by: none     Sameer Iglesias Sr. is a 68 y.o. male w a hx of multiple myeloma who is receiving active chemotherapy and has an appointment to discuss CAR-T therapy at [facility] who presents to the ED for three days of persistent diarrhea.  He has about four episodes per day.  He has had some elevated temperatures over this time, but no fevers >100.4F.  He denies nausea, vomiting, or abdominal pain.  No sick contacts that he is aware of.  Last antibiotic use one month ago.  He has never had C diff before.    Please See MDM for Additional Details of the HPI/PMH  Nursing Notes were all reviewed and agreed with or any disagreements were addressed in the HPI.     REVIEW OF SYSTEMS      Positives and Pertinent negatives as per HPI.    PAST HISTORY     Past Medical History:  Past Medical History:   Diagnosis Date    Acid reflux     Arrhythmia     pvc's    Cancer (HCC)     multiple myeloma    Chronic pain     neck    Depression     Diabetes (HCC)     Femoral hernia 7/11/2012    Hepatitis C     Hypertension     Inguinal hernia 7/11/2012    Liver disease     hepatitis c    Multiple myeloma (HCC)     Other ill-defined conditions(799.89)     Hx of PVCs since 2009    Prostatitis, acute     Psychiatric disorder     depression     Past Surgical History:  Past Surgical History:   Procedure Laterality Date    APPENDECTOMY      CARDIAC PROCEDURE N/A 9/20/2023    Left heart cath / coronary  81 MG EC tablet     Belsomra 15 MG Tabs  Generic drug: Suvorexant     chlorproMAZINE 25 MG tablet  Commonly known as: THORAZINE     Cholecalciferol 50 MCG (2000 UT) Tabs     clonazePAM 0.5 MG tablet  Commonly known as: KLONOPIN     deutetrabenazine 6 MG tablet  Commonly known as: AUSTEDO     dexAMETHasone 4 MG tablet  Commonly known as: DECADRON  Take 10 tablets by mouth every 7 days     empagliflozin 10 MG tablet  Commonly known as: JARDIANCE  Take 1 tablet by mouth daily     escitalopram 10 MG tablet  Commonly known as: LEXAPRO     metFORMIN 500 MG extended release tablet  Commonly known as: GLUCOPHAGE-XR  TAKE 1 TABLET BY MOUTH TWICE A DAY     morphine 15 MG extended release tablet  Commonly known as: MS CONTIN     MULTIVITAMIN PO     Narcan 4 MG/0.1ML Liqd nasal spray  Generic drug: naloxone     NONFORMULARY     OLANZapine 5 MG tablet  Commonly known as: ZyPREXA  Take 1 tablet by mouth nightly For 3 days after each selinexor dose.     omeprazole 20 MG delayed release capsule  Commonly known as: PRILOSEC     * ondansetron 8 MG Tbdp disintegrating tablet  Commonly known as: ZOFRAN-ODT  Take 0.5 tablets by mouth every 8 hours as needed for Nausea     * ondansetron 8 MG Tbdp disintegrating tablet  Commonly known as: ZOFRAN-ODT  Place 1 tablet under the tongue every 8 hours as needed for Nausea or Vomiting     pravastatin 10 MG tablet  Commonly known as: PRAVACHOL  Take 1 tablet by mouth nightly     pregabalin 150 MG capsule  Commonly known as: LYRICA     prochlorperazine 10 MG tablet  Commonly known as: COMPAZINE  Take 1 tablet by mouth every 6 hours as needed (Cinv)     Selinexor (60 MG Once Weekly) 60 MG Tbpk  Take 1 tablet by mouth every 7 days     sildenafil 20 MG tablet  Commonly known as: REVATIO     SODIUM FLUORIDE (DENTAL GEL) 1.1 % Crea     tamsulosin 0.4 MG capsule  Commonly known as: FLOMAX     * Nucynta 100 MG Tabs  Generic drug: tapentadol     * tapentadol 100 MG Tabs  Commonly known as: NUCYNTA

## 2024-03-18 ENCOUNTER — OFFICE VISIT (OUTPATIENT)
Age: 69
End: 2024-03-18
Payer: MEDICARE

## 2024-03-18 ENCOUNTER — HOSPITAL ENCOUNTER (OUTPATIENT)
Facility: HOSPITAL | Age: 69
Setting detail: INFUSION SERIES
Discharge: HOME | End: 2024-03-18
Payer: MEDICARE

## 2024-03-18 VITALS
HEART RATE: 86 BPM | SYSTOLIC BLOOD PRESSURE: 111 MMHG | WEIGHT: 192.4 LBS | DIASTOLIC BLOOD PRESSURE: 68 MMHG | TEMPERATURE: 97.1 F | BODY MASS INDEX: 29.16 KG/M2 | HEIGHT: 68 IN | RESPIRATION RATE: 16 BRPM | OXYGEN SATURATION: 98 %

## 2024-03-18 VITALS
TEMPERATURE: 97.5 F | OXYGEN SATURATION: 95 % | BODY MASS INDEX: 29.04 KG/M2 | DIASTOLIC BLOOD PRESSURE: 67 MMHG | WEIGHT: 191 LBS | SYSTOLIC BLOOD PRESSURE: 105 MMHG | HEART RATE: 88 BPM

## 2024-03-18 DIAGNOSIS — Z11.59 ENCOUNTER FOR SCREENING FOR OTHER VIRAL DISEASES: Primary | ICD-10-CM

## 2024-03-18 DIAGNOSIS — D61.82 ANEMIA ASSOCIATED WITH BONE MARROW INFILTRATION (HCC): ICD-10-CM

## 2024-03-18 DIAGNOSIS — Z51.11 ENCOUNTER FOR ANTINEOPLASTIC CHEMOTHERAPY: ICD-10-CM

## 2024-03-18 DIAGNOSIS — C90.00 MULTIPLE MYELOMA, REMISSION STATUS UNSPECIFIED (HCC): ICD-10-CM

## 2024-03-18 DIAGNOSIS — C90.02 MULTIPLE MYELOMA IN RELAPSE (HCC): Primary | ICD-10-CM

## 2024-03-18 LAB
ALBUMIN SERPL-MCNC: 3.7 G/DL (ref 3.5–5)
ALBUMIN/GLOB SERPL: 1.5 (ref 1.1–2.2)
ALP SERPL-CCNC: 104 U/L (ref 45–117)
ALT SERPL-CCNC: 20 U/L (ref 12–78)
ANION GAP SERPL CALC-SCNC: 5 MMOL/L (ref 5–15)
AST SERPL-CCNC: 11 U/L (ref 15–37)
BASOPHILS # BLD: 0 K/UL (ref 0–0.1)
BASOPHILS NFR BLD: 0 % (ref 0–1)
BILIRUB SERPL-MCNC: 0.3 MG/DL (ref 0.2–1)
BUN SERPL-MCNC: 21 MG/DL (ref 6–20)
BUN/CREAT SERPL: 14 (ref 12–20)
CALCIUM SERPL-MCNC: 8.6 MG/DL (ref 8.5–10.1)
CHLORIDE SERPL-SCNC: 108 MMOL/L (ref 97–108)
CO2 SERPL-SCNC: 28 MMOL/L (ref 21–32)
CREAT SERPL-MCNC: 1.49 MG/DL (ref 0.7–1.3)
DIFFERENTIAL METHOD BLD: ABNORMAL
EOSINOPHIL # BLD: 0 K/UL (ref 0–0.4)
EOSINOPHIL NFR BLD: 1 % (ref 0–7)
ERYTHROCYTE [DISTWIDTH] IN BLOOD BY AUTOMATED COUNT: 15.3 % (ref 11.5–14.5)
GLOBULIN SER CALC-MCNC: 2.4 G/DL (ref 2–4)
GLUCOSE SERPL-MCNC: 107 MG/DL (ref 65–100)
HCT VFR BLD AUTO: 33.7 % (ref 36.6–50.3)
HGB BLD-MCNC: 10.1 G/DL (ref 12.1–17)
IMM GRANULOCYTES # BLD AUTO: 0 K/UL (ref 0–0.04)
IMM GRANULOCYTES NFR BLD AUTO: 0 % (ref 0–0.5)
LYMPHOCYTES # BLD: 0.5 K/UL (ref 0.8–3.5)
LYMPHOCYTES NFR BLD: 16 % (ref 12–49)
MCH RBC QN AUTO: 31.2 PG (ref 26–34)
MCHC RBC AUTO-ENTMCNC: 30 G/DL (ref 30–36.5)
MCV RBC AUTO: 104 FL (ref 80–99)
MONOCYTES # BLD: 0.6 K/UL (ref 0–1)
MONOCYTES NFR BLD: 19 % (ref 5–13)
NEUTS SEG # BLD: 2 K/UL (ref 1.8–8)
NEUTS SEG NFR BLD: 64 % (ref 32–75)
NRBC # BLD: 0 K/UL (ref 0–0.01)
NRBC BLD-RTO: 0 PER 100 WBC
PLATELET # BLD AUTO: 164 K/UL (ref 150–400)
PMV BLD AUTO: 11.7 FL (ref 8.9–12.9)
POTASSIUM SERPL-SCNC: 4.1 MMOL/L (ref 3.5–5.1)
PROT SERPL-MCNC: 6.1 G/DL (ref 6.4–8.2)
RBC # BLD AUTO: 3.24 M/UL (ref 4.1–5.7)
RBC MORPH BLD: ABNORMAL
RBC MORPH BLD: ABNORMAL
SODIUM SERPL-SCNC: 141 MMOL/L (ref 136–145)
WBC # BLD AUTO: 3.1 K/UL (ref 4.1–11.1)

## 2024-03-18 PROCEDURE — 1123F ACP DISCUSS/DSCN MKR DOCD: CPT | Performed by: INTERNAL MEDICINE

## 2024-03-18 PROCEDURE — 3074F SYST BP LT 130 MM HG: CPT | Performed by: INTERNAL MEDICINE

## 2024-03-18 PROCEDURE — 6360000002 HC RX W HCPCS: Performed by: INTERNAL MEDICINE

## 2024-03-18 PROCEDURE — 3078F DIAST BP <80 MM HG: CPT | Performed by: INTERNAL MEDICINE

## 2024-03-18 PROCEDURE — 83521 IG LIGHT CHAINS FREE EACH: CPT

## 2024-03-18 PROCEDURE — 85025 COMPLETE CBC W/AUTO DIFF WBC: CPT

## 2024-03-18 PROCEDURE — 84165 PROTEIN E-PHORESIS SERUM: CPT

## 2024-03-18 PROCEDURE — 80053 COMPREHEN METABOLIC PANEL: CPT

## 2024-03-18 PROCEDURE — 2580000003 HC RX 258: Performed by: INTERNAL MEDICINE

## 2024-03-18 PROCEDURE — 82784 ASSAY IGA/IGD/IGG/IGM EACH: CPT

## 2024-03-18 PROCEDURE — 36415 COLL VENOUS BLD VENIPUNCTURE: CPT

## 2024-03-18 PROCEDURE — 99215 OFFICE O/P EST HI 40 MIN: CPT | Performed by: INTERNAL MEDICINE

## 2024-03-18 PROCEDURE — 86334 IMMUNOFIX E-PHORESIS SERUM: CPT

## 2024-03-18 PROCEDURE — 96413 CHEMO IV INFUSION 1 HR: CPT

## 2024-03-18 RX ORDER — PRAVASTATIN SODIUM 10 MG
10 TABLET ORAL NIGHTLY
Qty: 30 TABLET | Refills: 3 | Status: SHIPPED | OUTPATIENT
Start: 2024-03-18

## 2024-03-18 RX ORDER — DEXTROSE MONOHYDRATE 50 MG/ML
5-250 INJECTION, SOLUTION INTRAVENOUS PRN
Status: DISCONTINUED | OUTPATIENT
Start: 2024-03-18 | End: 2024-03-19 | Stop reason: HOSPADM

## 2024-03-18 RX ORDER — DEXAMETHASONE 4 MG/1
40 TABLET ORAL
Status: COMPLETED | OUTPATIENT
Start: 2024-03-18 | End: 2024-03-18

## 2024-03-18 RX ORDER — SODIUM CHLORIDE 0.9 % (FLUSH) 0.9 %
5-40 SYRINGE (ML) INJECTION PRN
Status: DISCONTINUED | OUTPATIENT
Start: 2024-03-18 | End: 2024-03-19 | Stop reason: HOSPADM

## 2024-03-18 RX ADMIN — SODIUM CHLORIDE, PRESERVATIVE FREE 10 ML: 5 INJECTION INTRAVENOUS at 13:44

## 2024-03-18 RX ADMIN — DEXAMETHASONE 40 MG: 4 TABLET ORAL at 12:18

## 2024-03-18 RX ADMIN — CARFILZOMIB 112.6 MG: 60 INJECTION, POWDER, LYOPHILIZED, FOR SOLUTION INTRAVENOUS at 13:10

## 2024-03-18 RX ADMIN — SODIUM CHLORIDE, PRESERVATIVE FREE 10 ML: 5 INJECTION INTRAVENOUS at 10:10

## 2024-03-18 RX ADMIN — DEXTROSE MONOHYDRATE 25 ML/HR: 50 INJECTION, SOLUTION INTRAVENOUS at 13:07

## 2024-03-18 ASSESSMENT — PAIN DESCRIPTION - LOCATION: LOCATION: NECK

## 2024-03-18 ASSESSMENT — PAIN SCALES - GENERAL: PAINLEVEL_OUTOF10: 5

## 2024-03-18 ASSESSMENT — PAIN DESCRIPTION - ORIENTATION: ORIENTATION: LEFT

## 2024-03-18 ASSESSMENT — PAIN DESCRIPTION - PAIN TYPE: TYPE: CHRONIC PAIN

## 2024-03-18 ASSESSMENT — PAIN DESCRIPTION - DESCRIPTORS: DESCRIPTORS: OTHER (COMMENT)

## 2024-03-18 NOTE — PROGRESS NOTES
[facility] [place]   at [facility]   [address], [address]   [place] [address]   [phone]             Progress Note       Patient: Sameer Iglesias Sr.  MRN: [id number]   SSN: [id number]    YOB: 1955              Diagnosis:         1. Multiple Myeloma, IgA Kappa    Durie [place] stage IIIB      Cytogenetics/FISH: t(14;16) - high risk        Ttreatment:        1. Maintenance therapy - Pomalyst 2 mg daily - 11/19/2018    2. Maintenance therapy - Ixazomib - started 10/15/2018 - stopped 11/12/2018   3. S/P tandem  Autotransplant    First on 2/28/2018    2nd on 06/13/2018   4. Carfilzomib/Pom/Dex - s/p 5 cycles   5. VD-PACE s/p 1 cycle   6. RVD - s/p 4 cycles    7. Anne/Velcade/Dex, 2 Cycles   8. Isatuximab/Carfilzomib/Dex, 8 cycles  9. Carfilzomib/Selinexor/Dex, cycle 3 day 1      Subjective:        Sameer Iglesias Sr. is a 67 y.o. male with a diagnosis of IgA kappa myeloma. Bone marrow shows 100% aberrant plasma cells. He suffers with DM but it is diet controlled. He has received systemic anti-viral  treatment for chronic Hep C with successful eradication of the virus. Mr. Iglesias received 4 cycles of systemic therapy with RVd. He had an initial good response to treatment. However his paraprotein level started rising and the myeloma became refractory  to treatment. I then administered one cycle of VD-PACE in the hospital. He then received Carfilzomib/Pom/Dex. He achieved VGPR. He underwent tandem autotransplant at [facility]. According to the patient he achieved complete response with therapy. He has undergone  second/tandem transplant in June. He took Pomalyst maintenance until Aug 2019 then had to stop due to recurrent prostatitis. He had a repeat BM biopsy in February 2019 at [facility] which showed complete molecular remission. He took Pomalyst as maintenance therapy.     Mr. Iglesias was noted to have relapsed myeloma in 2023. He

## 2024-03-18 NOTE — PROGRESS NOTES
Chief Complaint   Patient presents with    Multiple Myeloma     Pt states here for f/up and wants to discuss car-T cell        /67 (Site: Left Upper Arm, Position: Sitting, Cuff Size: Medium Adult)   Pulse 88   Temp 97.5 °F (36.4 °C) (Oral)   Wt 86.6 kg (191 lb)   SpO2 95%   BMI 29.04 kg/m²     Pain Scale: 5/10  Pain Location: Neck       \"Have you been to the ER, urgent care clinic since your last visit?  Hospitalized since your last visit?\"    NO    “Have you seen or consulted any other health care providers outside of [facility] since your last visit?”    NO

## 2024-03-18 NOTE — PROGRESS NOTES
[facility] Progress Note    Date: 2024    Name: Sameer Iglesias Sr.    MRN: [id number]         : 1955    Mr. Iglesias arrived (ambulation) at 0957 and in no distress for cycle 3 day 1 of Kyprolis regimen.  Assessment was completed. Pt reported continued lower extremity peripheral neuropathy with some balance issues as a result. No other complaints reported.        Single Lumen Implantable Port Right Chest (Active)   Port A Cath Status Accessed 24 1012   Criteria for Appropriate Use Irritant/vesicant medication 24 1012   Site Assessment Intact 24 1012   Line Care Ports disinfected 23 1015   Alcohol Cap Used Yes 24 1012   Date of Last Dressing Change 24 1040   Dressing Type Transparent 24 1012   Dressing Status New dressing applied 24 1012   Dressing Intervention New 24 1012   Date Accessed  24 1012   Access Attempts  1 24 1012   Access Needle Gauge 20 G 24 1012   Access Needle Length 0.75 inches 24 1012   Accessed By:  24 1012   Single Lumen Status Flushed;Brisk blood return;Alcohol cap applied 24 1012   De-Access Date 24 1012   De-Access Time 1345 24 1040   De-Accessed By  24 1012     Right chest port accessed without difficulty with 0.75 inch smiley needle; + blood return noted, labs drawn and sent.    1015:  Proceeded to appt with Dr. Wong.    Mr. Iglesias's vitals were reviewed.  Patient Vitals for the past 24 hrs:   BP Temp Temp src Pulse Resp SpO2 Height Weight   24 1344 111/68 -- -- 86 -- 98 % -- --   24 1002 115/72 97.1 °F (36.2 °C) Temporal 86 16 96 % 1.727 m (5' 8\") 87.3 kg (192 lb 6.4 oz)     Lab results were obtained and reviewed.  Recent Results (from the past 12 hour(s))   Comprehensive metabolic panel    Collection Time: 24 10:04 AM   Result Value Ref Range    Sodium 141 136 - 145 mmol/L    Potassium 4.1 3.5 - 5.1 mmol/L    Chloride

## 2024-03-22 LAB
ALBUMIN SERPL ELPH-MCNC: 3.5 G/DL (ref 2.9–4.4)
ALBUMIN/GLOB SERPL: 1.6 (ref 0.7–1.7)
ALPHA1 GLOB SERPL ELPH-MCNC: 0.2 G/DL (ref 0–0.4)
ALPHA2 GLOB SERPL ELPH-MCNC: 0.7 G/DL (ref 0.4–1)
B-GLOBULIN SERPL ELPH-MCNC: 0.8 G/DL (ref 0.7–1.3)
GAMMA GLOB SERPL ELPH-MCNC: 0.5 G/DL (ref 0.4–1.8)
GLOBULIN SER-MCNC: 2.3 G/DL (ref 2.2–3.9)
IGA SERPL-MCNC: 257 MG/DL (ref 61–437)
IGG SERPL-MCNC: 319 MG/DL (ref 603–1613)
IGM SERPL-MCNC: 6 MG/DL (ref 20–172)
INTERPRETATION SERPL IEP-IMP: ABNORMAL
KAPPA LC FREE SER-MCNC: 40.8 MG/L (ref 3.3–19.4)
KAPPA LC FREE/LAMBDA FREE SER: 14.57 (ref 0.26–1.65)
LAMBDA LC FREE SERPL-MCNC: 2.8 MG/L (ref 5.7–26.3)
M PROTEIN SERPL ELPH-MCNC: 0.2 G/DL
PROT SERPL-MCNC: 5.8 G/DL (ref 6–8.5)

## 2024-03-25 ENCOUNTER — HOSPITAL ENCOUNTER (OUTPATIENT)
Facility: HOSPITAL | Age: 69
Setting detail: INFUSION SERIES
Discharge: HOME | End: 2024-03-25
Payer: MEDICARE

## 2024-03-25 VITALS
SYSTOLIC BLOOD PRESSURE: 113 MMHG | OXYGEN SATURATION: 99 % | HEART RATE: 97 BPM | BODY MASS INDEX: 28.3 KG/M2 | TEMPERATURE: 98.7 F | WEIGHT: 186.1 LBS | RESPIRATION RATE: 16 BRPM | DIASTOLIC BLOOD PRESSURE: 80 MMHG

## 2024-03-25 DIAGNOSIS — C90.00 MULTIPLE MYELOMA, REMISSION STATUS UNSPECIFIED (HCC): Primary | ICD-10-CM

## 2024-03-25 DIAGNOSIS — Z11.59 ENCOUNTER FOR SCREENING FOR OTHER VIRAL DISEASES: ICD-10-CM

## 2024-03-25 LAB
ALBUMIN SERPL-MCNC: 3.9 G/DL (ref 3.5–5)
ALBUMIN/GLOB SERPL: 1.3 (ref 1.1–2.2)
ALP SERPL-CCNC: 131 U/L (ref 45–117)
ALT SERPL-CCNC: 47 U/L (ref 12–78)
ANION GAP SERPL CALC-SCNC: 5 MMOL/L (ref 5–15)
AST SERPL-CCNC: 25 U/L (ref 15–37)
BASOPHILS # BLD: 0 K/UL (ref 0–0.1)
BASOPHILS NFR BLD: 0 % (ref 0–1)
BILIRUB SERPL-MCNC: 0.4 MG/DL (ref 0.2–1)
BUN SERPL-MCNC: 16 MG/DL (ref 6–20)
BUN/CREAT SERPL: 11 (ref 12–20)
CALCIUM SERPL-MCNC: 8.6 MG/DL (ref 8.5–10.1)
CHLORIDE SERPL-SCNC: 107 MMOL/L (ref 97–108)
CO2 SERPL-SCNC: 25 MMOL/L (ref 21–32)
CREAT SERPL-MCNC: 1.46 MG/DL (ref 0.7–1.3)
DIFFERENTIAL METHOD BLD: ABNORMAL
EOSINOPHIL # BLD: 0 K/UL (ref 0–0.4)
EOSINOPHIL NFR BLD: 0 % (ref 0–7)
ERYTHROCYTE [DISTWIDTH] IN BLOOD BY AUTOMATED COUNT: 15 % (ref 11.5–14.5)
GLOBULIN SER CALC-MCNC: 3 G/DL (ref 2–4)
GLUCOSE SERPL-MCNC: 203 MG/DL (ref 65–100)
HCT VFR BLD AUTO: 36.7 % (ref 36.6–50.3)
HGB BLD-MCNC: 11.2 G/DL (ref 12.1–17)
IMM GRANULOCYTES # BLD AUTO: 0 K/UL (ref 0–0.04)
IMM GRANULOCYTES NFR BLD AUTO: 0 % (ref 0–0.5)
LYMPHOCYTES # BLD: 0.4 K/UL (ref 0.8–3.5)
LYMPHOCYTES NFR BLD: 15 % (ref 12–49)
MAGNESIUM SERPL-MCNC: 2.6 MG/DL (ref 1.6–2.4)
MCH RBC QN AUTO: 31.2 PG (ref 26–34)
MCHC RBC AUTO-ENTMCNC: 30.5 G/DL (ref 30–36.5)
MCV RBC AUTO: 102.2 FL (ref 80–99)
MONOCYTES # BLD: 0.6 K/UL (ref 0–1)
MONOCYTES NFR BLD: 24 % (ref 5–13)
NEUTS SEG # BLD: 1.6 K/UL (ref 1.8–8)
NEUTS SEG NFR BLD: 61 % (ref 32–75)
NRBC # BLD: 0 K/UL (ref 0–0.01)
NRBC BLD-RTO: 0 PER 100 WBC
PHOSPHATE SERPL-MCNC: 2.7 MG/DL (ref 2.6–4.7)
PLATELET # BLD AUTO: 103 K/UL (ref 150–400)
POTASSIUM SERPL-SCNC: 4.2 MMOL/L (ref 3.5–5.1)
PROT SERPL-MCNC: 6.9 G/DL (ref 6.4–8.2)
RBC # BLD AUTO: 3.59 M/UL (ref 4.1–5.7)
RBC MORPH BLD: ABNORMAL
SODIUM SERPL-SCNC: 137 MMOL/L (ref 136–145)
WBC # BLD AUTO: 2.6 K/UL (ref 4.1–11.1)

## 2024-03-25 PROCEDURE — 36415 COLL VENOUS BLD VENIPUNCTURE: CPT

## 2024-03-25 PROCEDURE — 80053 COMPREHEN METABOLIC PANEL: CPT

## 2024-03-25 PROCEDURE — 83735 ASSAY OF MAGNESIUM: CPT

## 2024-03-25 PROCEDURE — 96413 CHEMO IV INFUSION 1 HR: CPT

## 2024-03-25 PROCEDURE — 6360000002 HC RX W HCPCS: Performed by: INTERNAL MEDICINE

## 2024-03-25 PROCEDURE — 84100 ASSAY OF PHOSPHORUS: CPT

## 2024-03-25 PROCEDURE — 2580000003 HC RX 258: Performed by: INTERNAL MEDICINE

## 2024-03-25 PROCEDURE — 85025 COMPLETE CBC W/AUTO DIFF WBC: CPT

## 2024-03-25 RX ORDER — ALBUTEROL SULFATE 90 UG/1
4 AEROSOL, METERED RESPIRATORY (INHALATION) PRN
OUTPATIENT
Start: 2024-04-15

## 2024-03-25 RX ORDER — HEPARIN 100 UNIT/ML
500 SYRINGE INTRAVENOUS PRN
OUTPATIENT
Start: 2024-04-15

## 2024-03-25 RX ORDER — DEXTROSE MONOHYDRATE 50 MG/ML
5-250 INJECTION, SOLUTION INTRAVENOUS PRN
Status: DISCONTINUED | OUTPATIENT
Start: 2024-03-25 | End: 2024-03-26 | Stop reason: HOSPADM

## 2024-03-25 RX ORDER — ACETAMINOPHEN 325 MG/1
650 TABLET ORAL
OUTPATIENT
Start: 2024-04-15

## 2024-03-25 RX ORDER — DIPHENHYDRAMINE HYDROCHLORIDE 50 MG/ML
50 INJECTION INTRAMUSCULAR; INTRAVENOUS
OUTPATIENT
Start: 2024-04-15

## 2024-03-25 RX ORDER — ONDANSETRON 2 MG/ML
8 INJECTION INTRAMUSCULAR; INTRAVENOUS
OUTPATIENT
Start: 2024-04-15

## 2024-03-25 RX ORDER — SODIUM CHLORIDE 9 MG/ML
5-250 INJECTION, SOLUTION INTRAVENOUS PRN
OUTPATIENT
Start: 2024-04-15

## 2024-03-25 RX ORDER — ZOLEDRONIC ACID 0.04 MG/ML
4 INJECTION, SOLUTION INTRAVENOUS ONCE
OUTPATIENT
Start: 2024-04-15 | End: 2024-04-15

## 2024-03-25 RX ORDER — SODIUM CHLORIDE 9 MG/ML
5-250 INJECTION, SOLUTION INTRAVENOUS PRN
Status: DISCONTINUED | OUTPATIENT
Start: 2024-03-25 | End: 2024-03-26 | Stop reason: HOSPADM

## 2024-03-25 RX ORDER — SODIUM CHLORIDE 9 MG/ML
INJECTION, SOLUTION INTRAVENOUS CONTINUOUS
OUTPATIENT
Start: 2024-04-15

## 2024-03-25 RX ORDER — DEXAMETHASONE 4 MG/1
40 TABLET ORAL
Status: COMPLETED | OUTPATIENT
Start: 2024-03-25 | End: 2024-03-25

## 2024-03-25 RX ORDER — SODIUM CHLORIDE 0.9 % (FLUSH) 0.9 %
5-40 SYRINGE (ML) INJECTION PRN
Status: DISCONTINUED | OUTPATIENT
Start: 2024-03-25 | End: 2024-03-26 | Stop reason: HOSPADM

## 2024-03-25 RX ORDER — SODIUM CHLORIDE 0.9 % (FLUSH) 0.9 %
5-40 SYRINGE (ML) INJECTION PRN
OUTPATIENT
Start: 2024-04-15

## 2024-03-25 RX ORDER — EPINEPHRINE 1 MG/ML
0.3 INJECTION, SOLUTION INTRAMUSCULAR; SUBCUTANEOUS PRN
OUTPATIENT
Start: 2024-04-15

## 2024-03-25 RX ADMIN — CARFILZOMIB 112.6 MG: 60 INJECTION, POWDER, LYOPHILIZED, FOR SOLUTION INTRAVENOUS at 12:38

## 2024-03-25 RX ADMIN — DEXTROSE MONOHYDRATE 25 ML/HR: 50 INJECTION, SOLUTION INTRAVENOUS at 12:33

## 2024-03-25 RX ADMIN — DEXAMETHASONE 40 MG: 4 TABLET ORAL at 11:48

## 2024-03-25 ASSESSMENT — PAIN DESCRIPTION - PAIN TYPE: TYPE: CHRONIC PAIN

## 2024-03-25 ASSESSMENT — PAIN DESCRIPTION - LOCATION: LOCATION: NECK

## 2024-03-25 ASSESSMENT — PAIN SCALES - GENERAL: PAINLEVEL_OUTOF10: 4

## 2024-03-25 NOTE — PROGRESS NOTES
[facility] Visit Note    Pt arrived to [facility] ambulatory in no acute distress for C3D1 Kyprolis and Zometa.  Assessment unremarkable except See FLOWSHEET.  R chest port accessed without issue and positive blood return noted.    Labs obtained, CBC and CMP.    Patient unable to recall last dose of Zometa. Patient questioning whether he van get Zometa prior to transplant. Patient also has a root canal planned. Patient reached out to [facility] transplant coordinator and [facility]. Per Dr. Wong's office, ok to hold Zometa.    Recent Results (from the past 12 hour(s))   CBC With Auto Differential    Collection Time: 03/25/24 10:15 AM   Result Value Ref Range    WBC 2.6 (L) 4.1 - 11.1 K/uL    RBC 3.59 (L) 4.10 - 5.70 M/uL    Hemoglobin 11.2 (L) 12.1 - 17.0 g/dL    Hematocrit 36.7 36.6 - 50.3 %    .2 (H) 80.0 - 99.0 FL    MCH 31.2 26.0 - 34.0 PG    MCHC 30.5 30.0 - 36.5 g/dL    RDW 15.0 (H) 11.5 - 14.5 %    Platelets 103 (L) 150 - 400 K/uL    Nucleated RBCs 0.0 0  WBC    nRBC 0.00 0.00 - 0.01 K/uL    Neutrophils % 61 32 - 75 %    Lymphocytes % 15 12 - 49 %    Monocytes % 24 (H) 5 - 13 %    Eosinophils % 0 0 - 7 %    Basophils % 0 0 - 1 %    Immature Granulocytes 0 0.0 - 0.5 %    Neutrophils Absolute 1.6 (L) 1.8 - 8.0 K/UL    Lymphocytes Absolute 0.4 (L) 0.8 - 3.5 K/UL    Monocytes Absolute 0.6 0.0 - 1.0 K/UL    Eosinophils Absolute 0.0 0.0 - 0.4 K/UL    Basophils Absolute 0.0 0.0 - 0.1 K/UL    Absolute Immature Granulocyte 0.0 0.00 - 0.04 K/UL    Differential Type SMEAR SCANNED      RBC Comment ANISOCYTOSIS  PRESENT        RBC Comment MACROCYTOSIS  1+        RBC Comment OVALOCYTES  1+        RBC Comment TEARDROP CELLS  PRESENT       Phosphorus    Collection Time: 03/25/24 10:15 AM   Result Value Ref Range    Phosphorus 2.7 2.6 - 4.7 MG/DL   Magnesium    Collection Time: 03/25/24 10:15 AM   Result Value Ref Range    Magnesium 2.6 (H) 1.6 - 2.4 mg/dL   Comprehensive metabolic panel

## 2024-04-01 ENCOUNTER — HOSPITAL ENCOUNTER (OUTPATIENT)
Facility: HOSPITAL | Age: 69
Setting detail: INFUSION SERIES
Discharge: HOME | End: 2024-04-01
Payer: MEDICARE

## 2024-04-01 VITALS
DIASTOLIC BLOOD PRESSURE: 75 MMHG | OXYGEN SATURATION: 97 % | TEMPERATURE: 98.1 F | WEIGHT: 188.7 LBS | RESPIRATION RATE: 18 BRPM | HEIGHT: 68 IN | SYSTOLIC BLOOD PRESSURE: 110 MMHG | BODY MASS INDEX: 28.6 KG/M2 | HEART RATE: 82 BPM

## 2024-04-01 DIAGNOSIS — C90.00 MULTIPLE MYELOMA, REMISSION STATUS UNSPECIFIED (HCC): ICD-10-CM

## 2024-04-01 DIAGNOSIS — Z11.59 ENCOUNTER FOR SCREENING FOR OTHER VIRAL DISEASES: Primary | ICD-10-CM

## 2024-04-01 LAB
BASOPHILS # BLD: 0 K/UL (ref 0–0.1)
BASOPHILS NFR BLD: 0 % (ref 0–1)
DIFFERENTIAL METHOD BLD: ABNORMAL
EOSINOPHIL # BLD: 0 K/UL (ref 0–0.4)
EOSINOPHIL NFR BLD: 0 % (ref 0–7)
ERYTHROCYTE [DISTWIDTH] IN BLOOD BY AUTOMATED COUNT: 15.7 % (ref 11.5–14.5)
HCT VFR BLD AUTO: 31.4 % (ref 36.6–50.3)
HGB BLD-MCNC: 9.6 G/DL (ref 12.1–17)
IMM GRANULOCYTES # BLD AUTO: 0 K/UL (ref 0–0.04)
IMM GRANULOCYTES NFR BLD AUTO: 0 % (ref 0–0.5)
LYMPHOCYTES # BLD: 0.6 K/UL (ref 0.8–3.5)
LYMPHOCYTES NFR BLD: 17 % (ref 12–49)
MCH RBC QN AUTO: 31 PG (ref 26–34)
MCHC RBC AUTO-ENTMCNC: 30.6 G/DL (ref 30–36.5)
MCV RBC AUTO: 101.3 FL (ref 80–99)
MONOCYTES # BLD: 0.7 K/UL (ref 0–1)
MONOCYTES NFR BLD: 22 % (ref 5–13)
NEUTS SEG # BLD: 2 K/UL (ref 1.8–8)
NEUTS SEG NFR BLD: 61 % (ref 32–75)
NRBC # BLD: 0 K/UL (ref 0–0.01)
NRBC BLD-RTO: 0 PER 100 WBC
PLATELET # BLD AUTO: 102 K/UL (ref 150–400)
PLATELET COMMENT: ABNORMAL
RBC # BLD AUTO: 3.1 M/UL (ref 4.1–5.7)
RBC MORPH BLD: ABNORMAL
WBC # BLD AUTO: 3.3 K/UL (ref 4.1–11.1)

## 2024-04-01 PROCEDURE — 6360000002 HC RX W HCPCS: Performed by: INTERNAL MEDICINE

## 2024-04-01 PROCEDURE — 96413 CHEMO IV INFUSION 1 HR: CPT

## 2024-04-01 PROCEDURE — 2580000003 HC RX 258: Performed by: INTERNAL MEDICINE

## 2024-04-01 PROCEDURE — 85025 COMPLETE CBC W/AUTO DIFF WBC: CPT

## 2024-04-01 PROCEDURE — 36415 COLL VENOUS BLD VENIPUNCTURE: CPT

## 2024-04-01 RX ORDER — DEXAMETHASONE 4 MG/1
40 TABLET ORAL
Status: COMPLETED | OUTPATIENT
Start: 2024-04-01 | End: 2024-04-01

## 2024-04-01 RX ORDER — DEXTROSE MONOHYDRATE 50 MG/ML
5-250 INJECTION, SOLUTION INTRAVENOUS PRN
Status: DISCONTINUED | OUTPATIENT
Start: 2024-04-01 | End: 2024-04-02 | Stop reason: HOSPADM

## 2024-04-01 RX ORDER — SODIUM CHLORIDE 0.9 % (FLUSH) 0.9 %
5-40 SYRINGE (ML) INJECTION PRN
Status: DISCONTINUED | OUTPATIENT
Start: 2024-04-01 | End: 2024-04-02 | Stop reason: HOSPADM

## 2024-04-01 RX ADMIN — DEXTROSE MONOHYDRATE 25 ML/HR: 50 INJECTION, SOLUTION INTRAVENOUS at 15:28

## 2024-04-01 RX ADMIN — SODIUM CHLORIDE, PRESERVATIVE FREE 10 ML: 5 INJECTION INTRAVENOUS at 13:11

## 2024-04-01 RX ADMIN — SODIUM CHLORIDE, PRESERVATIVE FREE 10 ML: 5 INJECTION INTRAVENOUS at 16:04

## 2024-04-01 RX ADMIN — CARFILZOMIB 112.6 MG: 60 INJECTION, POWDER, LYOPHILIZED, FOR SOLUTION INTRAVENOUS at 15:30

## 2024-04-01 RX ADMIN — DEXAMETHASONE 40 MG: 4 TABLET ORAL at 14:43

## 2024-04-01 ASSESSMENT — PAIN DESCRIPTION - PAIN TYPE: TYPE: CHRONIC PAIN

## 2024-04-01 ASSESSMENT — PAIN SCALES - GENERAL: PAINLEVEL_OUTOF10: 5

## 2024-04-01 ASSESSMENT — PAIN DESCRIPTION - LOCATION: LOCATION: NECK

## 2024-04-01 NOTE — PROGRESS NOTES
[facility] Progress Note    Date: 2024    Name: Sameer Iglesias Sr.    MRN: [id number]         : 1955    Mr. Iglesias arrived (ambulation) at 1256 and in no distress for cycle 3 day 15 of Kyprolis regimen.  Assessment was completed, no acute issues at this time, no new complaints voiced.      Single Lumen Implantable Port Right Chest (Active)   Port A Cath Status Accessed 24 1608   Criteria for Appropriate Use Irritant/vesicant medication 24 1608   Site Assessment Intact 24 1608   Alcohol Cap Used Yes 24 1608   Dressing Type Transparent 24 1608   Dressing Status New dressing applied 24 1608   Dressing Intervention New 24 1608   Date Accessed  24 1608   Access Attempts  1 24 1608   Access Needle Gauge 20 G 24 1608   Access Needle Length 0.75 inches 24 1608   Accessed By:  24 1608   Single Lumen Status Brisk blood return;Flushed;Alcohol cap applied 24 1608   De-Access Date 24 1608   De-Access Time 1605 24 1608   De-Accessed By  24 1608     Mr. Iglesias's vitals were reviewed.  Patient Vitals for the past 24 hrs:   BP Temp Temp src Pulse Resp SpO2 Height Weight   24 1602 110/75 -- -- 82 -- 97 % -- --   24 1302 108/73 98.1 °F (36.7 °C) Temporal 72 18 98 % 1.727 m (5' 8\") 85.6 kg (188 lb 11.2 oz)       Lab results were obtained and reviewed.  Recent Results (from the past 12 hour(s))   CBC with Auto Differential    Collection Time: 24  1:05 PM   Result Value Ref Range    WBC 3.3 (L) 4.1 - 11.1 K/uL    RBC 3.10 (L) 4.10 - 5.70 M/uL    Hemoglobin 9.6 (L) 12.1 - 17.0 g/dL    Hematocrit 31.4 (L) 36.6 - 50.3 %    .3 (H) 80.0 - 99.0 FL    MCH 31.0 26.0 - 34.0 PG    MCHC 30.6 30.0 - 36.5 g/dL    RDW 15.7 (H) 11.5 - 14.5 %    Platelets 102 (L) 150 - 400 K/uL    Nucleated RBCs 0.0 0  WBC    nRBC 0.00 0.00 - 0.01 K/uL    Neutrophils % 61 32 - 75 %    Lymphocytes % 17 12 -  treatment in the [facility]. He is scheduled to do CAR-T therapy the end of April.    Mr. Iglesias tolerated treatment well and was [facility] .     Future Appointments   Date Time Provider Department Center   4/15/2024 10:00 AM [facility]   4/22/2024 10:00 AM [facility]   4/29/2024  2:00 PM [facility] 6 [facility]   5/13/2024  1:00 PM [facility]   5/20/2024 10:00 AM [facility]   5/28/2024 11:00 AM [facility]   6/10/2024  1:30 PM [facility] 4 [facility]   6/17/2024  1:00 PM [facility]   6/24/2024 11:00 AM [facility] 2 [facility]       Camilla Del Rosario RN  April 1, 2024

## 2024-04-08 ENCOUNTER — APPOINTMENT (OUTPATIENT)
Facility: HOSPITAL | Age: 69
End: 2024-04-08
Payer: MEDICARE

## 2024-04-08 ENCOUNTER — TELEPHONE (OUTPATIENT)
Age: 69
End: 2024-04-08

## 2024-04-08 RX ORDER — EPINEPHRINE 1 MG/ML
0.3 INJECTION, SOLUTION INTRAMUSCULAR; SUBCUTANEOUS PRN
Status: CANCELLED | OUTPATIENT
Start: 2024-04-15

## 2024-04-08 RX ORDER — ALBUTEROL SULFATE 90 UG/1
4 AEROSOL, METERED RESPIRATORY (INHALATION) PRN
Status: CANCELLED | OUTPATIENT
Start: 2024-04-15

## 2024-04-08 RX ORDER — ONDANSETRON 2 MG/ML
8 INJECTION INTRAMUSCULAR; INTRAVENOUS
Status: CANCELLED | OUTPATIENT
Start: 2024-04-15

## 2024-04-08 RX ORDER — MEPERIDINE HYDROCHLORIDE 25 MG/ML
12.5 INJECTION INTRAMUSCULAR; INTRAVENOUS; SUBCUTANEOUS PRN
Status: CANCELLED | OUTPATIENT
Start: 2024-04-15

## 2024-04-08 RX ORDER — HEPARIN 100 UNIT/ML
500 SYRINGE INTRAVENOUS PRN
Status: CANCELLED | OUTPATIENT
Start: 2024-04-15

## 2024-04-08 RX ORDER — SODIUM CHLORIDE 9 MG/ML
INJECTION, SOLUTION INTRAVENOUS CONTINUOUS
Status: CANCELLED | OUTPATIENT
Start: 2024-04-15

## 2024-04-08 RX ORDER — SODIUM CHLORIDE 0.9 % (FLUSH) 0.9 %
5-40 SYRINGE (ML) INJECTION PRN
Status: CANCELLED | OUTPATIENT
Start: 2024-04-15

## 2024-04-08 RX ORDER — DIPHENHYDRAMINE HYDROCHLORIDE 50 MG/ML
50 INJECTION INTRAMUSCULAR; INTRAVENOUS
Status: CANCELLED | OUTPATIENT
Start: 2024-04-15

## 2024-04-08 RX ORDER — DEXTROSE MONOHYDRATE 50 MG/ML
5-250 INJECTION, SOLUTION INTRAVENOUS PRN
Status: CANCELLED | OUTPATIENT
Start: 2024-04-15

## 2024-04-08 RX ORDER — SODIUM CHLORIDE 9 MG/ML
5-250 INJECTION, SOLUTION INTRAVENOUS PRN
Status: CANCELLED | OUTPATIENT
Start: 2024-04-15

## 2024-04-08 RX ORDER — ACETAMINOPHEN 325 MG/1
650 TABLET ORAL
Status: CANCELLED | OUTPATIENT
Start: 2024-04-15

## 2024-04-08 RX ORDER — DEXAMETHASONE 4 MG/1
40 TABLET ORAL
Status: CANCELLED
Start: 2024-04-15

## 2024-04-08 NOTE — TELEPHONE ENCOUNTER
Call received from [facility], Esther.  She reports that patient is planning on admission to start CAR-T on 4/24 and needs 2 weeks without treatment prior.  Chemotherapy plan d/c'd and [facility] appts cancelled. Patient aware, no follow up at present.  Will see as directed by [facility] after CAR-T completed.

## 2024-04-15 ENCOUNTER — APPOINTMENT (OUTPATIENT)
Facility: HOSPITAL | Age: 69
End: 2024-04-15
Payer: MEDICARE

## 2024-04-15 ENCOUNTER — HOSPITAL ENCOUNTER (OUTPATIENT)
Facility: HOSPITAL | Age: 69
Setting detail: INFUSION SERIES
End: 2024-04-15

## 2024-04-15 DIAGNOSIS — C90.00 MULTIPLE MYELOMA, REMISSION STATUS UNSPECIFIED (HCC): ICD-10-CM

## 2024-04-15 DIAGNOSIS — Z11.59 ENCOUNTER FOR SCREENING FOR OTHER VIRAL DISEASES: Primary | ICD-10-CM

## 2024-04-29 ENCOUNTER — TELEPHONE (OUTPATIENT)
Age: 69
End: 2024-04-29

## 2024-04-29 ENCOUNTER — APPOINTMENT (OUTPATIENT)
Facility: HOSPITAL | Age: 69
End: 2024-04-29
Payer: MEDICARE

## 2024-04-29 NOTE — TELEPHONE ENCOUNTER
Pt called in to let the team know he is getting his transplant cells back at 1:00 today. He also wanted to tell the team THANK YOU THANK YOU THANK YOU, he would not be here today without Dr Wong (the [facility])    # [phone]

## 2024-06-06 ENCOUNTER — TELEPHONE (OUTPATIENT)
Age: 69
End: 2024-06-06

## 2024-06-06 NOTE — TELEPHONE ENCOUNTER
See below note from  [facility]:    \"  6/04/24: Concerns regarding Mr. Miranda ability to be active were brought up during the visit. Multiple myeloma labs look well and patient is currently in complete remission at day +30. A bone marrow and PET scan will be performed at D+100. CBC and chemistry panel was reviewed with the patient. His creatinine looked is slightly over baseline, his liver test looked normal, and his blood counts have began to recover. WBC at 2.4, Hgb at 11.2, and platelets at 144. He will start Bactrim now on a MWF schedule. Mr. Miranda antibody levels are low (hypogammaglobulinemia) and he will receive IvIg monthly for three months. Physically Mr. Iglesias has been improving but mentally he has been struggling as heard in reports from others. He has a history of depression. His cymbalta was recently increased to 90mg, but he has been only taking 60mg. I reassured him he could take 3 of the 20mg tabs. Parkinson's was discussed with the patient and will be the primary reason for his appointment tomorrow with the neurological oncologist. He has no evidence today of parkinsons. He has no masked facies, makes excellent ey contact, no rigidity with passive movement, coordination was normal and no tremor. Visit with his psychiatrist as depression seems most likely given his recent and long term history. Mr. Iglesias was given a 90 mg prescription of Cymbalta for one month, will need to be refilled or changed by Physciatry. Physical exam was performed and no abnormalities seen. Neurological testing was also performed during today's visit and results were normal. \"

## 2024-06-17 RX ORDER — PRAVASTATIN SODIUM 10 MG
10 TABLET ORAL NIGHTLY
Qty: 90 TABLET | Refills: 1 | OUTPATIENT
Start: 2024-06-17

## 2024-07-25 ENCOUNTER — OFFICE VISIT (OUTPATIENT)
Age: 69
End: 2024-07-25
Payer: MEDICARE

## 2024-07-25 VITALS
HEIGHT: 68 IN | TEMPERATURE: 98.1 F | DIASTOLIC BLOOD PRESSURE: 66 MMHG | HEART RATE: 98 BPM | WEIGHT: 177 LBS | SYSTOLIC BLOOD PRESSURE: 101 MMHG | BODY MASS INDEX: 26.83 KG/M2 | OXYGEN SATURATION: 96 %

## 2024-07-25 DIAGNOSIS — C90.01 MULTIPLE MYELOMA IN REMISSION (HCC): Primary | ICD-10-CM

## 2024-07-25 PROCEDURE — 3078F DIAST BP <80 MM HG: CPT | Performed by: INTERNAL MEDICINE

## 2024-07-25 PROCEDURE — 99213 OFFICE O/P EST LOW 20 MIN: CPT | Performed by: INTERNAL MEDICINE

## 2024-07-25 PROCEDURE — 3074F SYST BP LT 130 MM HG: CPT | Performed by: INTERNAL MEDICINE

## 2024-07-25 PROCEDURE — 1123F ACP DISCUSS/DSCN MKR DOCD: CPT | Performed by: INTERNAL MEDICINE

## 2024-07-25 RX ORDER — PANTOPRAZOLE SODIUM 20 MG/1
20 TABLET, DELAYED RELEASE ORAL DAILY
COMMUNITY

## 2024-07-25 RX ORDER — LIDOCAINE 50 MG/G
1 PATCH TOPICAL DAILY
COMMUNITY

## 2024-07-25 RX ORDER — LEVOFLOXACIN 500 MG/1
500 TABLET, FILM COATED ORAL DAILY
COMMUNITY

## 2024-07-25 RX ORDER — FAMOTIDINE 10 MG/ML
20 INJECTION, SOLUTION INTRAVENOUS
OUTPATIENT
Start: 2024-08-25

## 2024-07-25 RX ORDER — SODIUM CHLORIDE 0.9 % (FLUSH) 0.9 %
5-40 SYRINGE (ML) INJECTION PRN
OUTPATIENT
Start: 2024-08-25

## 2024-07-25 RX ORDER — ONDANSETRON 2 MG/ML
8 INJECTION INTRAMUSCULAR; INTRAVENOUS
OUTPATIENT
Start: 2024-08-25

## 2024-07-25 RX ORDER — VALBENAZINE 40 MG/1
CAPSULE ORAL
COMMUNITY

## 2024-07-25 RX ORDER — ACETAMINOPHEN 325 MG/1
650 TABLET ORAL
OUTPATIENT
Start: 2024-08-25

## 2024-07-25 RX ORDER — SENNOSIDES A AND B 8.6 MG/1
1 TABLET, FILM COATED ORAL DAILY
COMMUNITY

## 2024-07-25 RX ORDER — ALBUTEROL SULFATE 90 UG/1
4 AEROSOL, METERED RESPIRATORY (INHALATION) PRN
OUTPATIENT
Start: 2024-08-25

## 2024-07-25 RX ORDER — SODIUM CHLORIDE 9 MG/ML
INJECTION, SOLUTION INTRAVENOUS CONTINUOUS
OUTPATIENT
Start: 2024-08-25

## 2024-07-25 RX ORDER — SODIUM CHLORIDE 9 MG/ML
25 INJECTION, SOLUTION INTRAVENOUS PRN
OUTPATIENT
Start: 2024-08-25

## 2024-07-25 RX ORDER — DIPHENHYDRAMINE HYDROCHLORIDE 50 MG/ML
50 INJECTION INTRAMUSCULAR; INTRAVENOUS
OUTPATIENT
Start: 2024-08-25

## 2024-07-25 RX ORDER — MIRTAZAPINE 7.5 MG/1
7.5 TABLET, FILM COATED ORAL
COMMUNITY

## 2024-07-25 RX ORDER — EPINEPHRINE 1 MG/ML
0.3 INJECTION, SOLUTION, CONCENTRATE INTRAVENOUS PRN
OUTPATIENT
Start: 2024-08-25

## 2024-07-25 RX ORDER — ACYCLOVIR 400 MG/1
400 TABLET ORAL
COMMUNITY

## 2024-07-25 RX ORDER — DULOXETIN HYDROCHLORIDE 60 MG/1
60 CAPSULE, DELAYED RELEASE ORAL NIGHTLY
COMMUNITY

## 2024-07-25 RX ORDER — HEPARIN SODIUM (PORCINE) LOCK FLUSH IV SOLN 100 UNIT/ML 100 UNIT/ML
500 SOLUTION INTRAVENOUS PRN
OUTPATIENT
Start: 2024-08-25

## 2024-07-25 NOTE — PROGRESS NOTES
Sameer RUCKER Kelsie Rich. is a 68 y.o. male here for follow up for Multiple Myeloma.  No concerns brought up.    1. Have you been to the ER, urgent care clinic since your last visit?  Hospitalized since your last visit?    2. Have you seen or consulted any other health care providers outside of the [facility] since your last visit?  Include any pap smears or colon screening. [facility]  
03/25/2024    K 4.2 03/25/2024     03/25/2024    CO2 25 03/25/2024    BUN 16 03/25/2024    CREATININE 1.46 (H) 03/25/2024    GLUCOSE 203 (H) 03/25/2024    CALCIUM 8.6 03/25/2024    BILITOT 0.4 03/25/2024    ALKPHOS 131 (H) 03/25/2024    AST 25 03/25/2024    ALT 47 03/25/2024    LABGLOM 53 (L) 03/25/2024    GFRAA 55 (L) 09/28/2022    AGRATIO 0.8 (L) 04/12/2023    GLOB 3.0 03/25/2024       M-protein: 1 > 0.4 > 0.5 > 0.7            Assessment:        1. Multiple Myeloma, IgA Kappa    Durie [place] stage IIIB      Cytogenetics/FISH: t(14;16) - high risk   ECOG PS - 0    Intent of therapy: palliative      This is a life threatening illness      Received 3 cycles of RVd    Dose reduced Revlimid and Velcade d/t continued cytopenias. M-spike started rising.      He received one cycle of VD-PACE (bortezomib, dexamethasone, thalidomide, cisplatin, adriamycin, cyclophosphamide, and etoposide).   Achieved AR with once cycle      Receivied KPd - s/p 5 cycles   S/p tandem autotransplant     First on 2/28/2018    2nd on 06/13/2018      Achieved CR after the first transplant.    Repeat bone marrow @ [facility] - Complete response.    Took Ixazomib briefly   Patient prefered to switch therapy to Pomalyst.       Pomalyst 2 mg daily - started 11/19/2018 - 05/2023      M protein has come up to 1.0 gm/dl   Light chain ratio is also rising.     Myeloma has relapsed   D/C Pomalyst   Bone marrow - 35% myeloma     Isatuximab/Carfilzomib/Dex - 8 cycles   Anne/Velcade/Dex - 2 cycles  Carfilzomib/Selinexor/Dex 3 Cycles  Carvykti CART on 04/29/2024     In CR  Continue surveillance        2. Type 2 DM with complication      Managed by Endocrine         3. Chronic Hep C     In sustained virological   Tenofovir  [facility]atology at [facility]       Plan:         > Labs monthly  > F/U monthly  > Bone marrow Biopsy in the future       Signed by: Suraj Wong MD                     July 25, 2024        CC. Robert Clifford MD

## 2024-08-27 ENCOUNTER — TELEPHONE (OUTPATIENT)
Age: 69
End: 2024-08-27

## 2024-09-06 ENCOUNTER — TELEPHONE (OUTPATIENT)
Age: 69
End: 2024-09-06

## 2024-09-06 NOTE — TELEPHONE ENCOUNTER
Called patient regarding missed [facility] and [facility] appts today. Patient stated he forgot. Appts rescheduled and he is aware.

## 2024-09-09 ENCOUNTER — TELEPHONE (OUTPATIENT)
Age: 69
End: 2024-09-09

## 2024-09-10 NOTE — TELEPHONE ENCOUNTER
Patient called to report that he has been diagnosed with Pneumonia. He went to [facility] as his temp was 103 but he has been discharged. He was tested for Covid but this was negative. Ambulatory

## 2024-09-25 ENCOUNTER — HOSPITAL ENCOUNTER (OUTPATIENT)
Facility: HOSPITAL | Age: 69
Setting detail: INFUSION SERIES
Discharge: HOME | End: 2024-09-25
Payer: MEDICARE

## 2024-09-25 ENCOUNTER — OFFICE VISIT (OUTPATIENT)
Age: 69
End: 2024-09-25
Payer: MEDICARE

## 2024-09-25 VITALS
RESPIRATION RATE: 16 BRPM | HEIGHT: 68 IN | SYSTOLIC BLOOD PRESSURE: 140 MMHG | HEART RATE: 92 BPM | TEMPERATURE: 97.5 F | DIASTOLIC BLOOD PRESSURE: 88 MMHG | BODY MASS INDEX: 26.92 KG/M2 | OXYGEN SATURATION: 96 %

## 2024-09-25 VITALS
SYSTOLIC BLOOD PRESSURE: 112 MMHG | TEMPERATURE: 97.5 F | WEIGHT: 168 LBS | DIASTOLIC BLOOD PRESSURE: 76 MMHG | HEART RATE: 82 BPM | HEIGHT: 68 IN | OXYGEN SATURATION: 99 % | BODY MASS INDEX: 25.46 KG/M2

## 2024-09-25 DIAGNOSIS — C90.01 MULTIPLE MYELOMA IN REMISSION (HCC): Primary | ICD-10-CM

## 2024-09-25 LAB
ALBUMIN SERPL-MCNC: 4.2 G/DL (ref 3.5–5)
ALBUMIN/GLOB SERPL: 1.4 (ref 1.1–2.2)
ALP SERPL-CCNC: 76 U/L (ref 45–117)
ALT SERPL-CCNC: 19 U/L (ref 12–78)
ANION GAP SERPL CALC-SCNC: 7 MMOL/L (ref 2–12)
AST SERPL-CCNC: 20 U/L (ref 15–37)
BASOPHILS # BLD: 0 K/UL (ref 0–0.1)
BASOPHILS NFR BLD: 1 % (ref 0–1)
BILIRUB SERPL-MCNC: 0.4 MG/DL (ref 0.2–1)
BUN SERPL-MCNC: 21 MG/DL (ref 6–20)
BUN/CREAT SERPL: 12 (ref 12–20)
CALCIUM SERPL-MCNC: 8.8 MG/DL (ref 8.5–10.1)
CHLORIDE SERPL-SCNC: 105 MMOL/L (ref 97–108)
CO2 SERPL-SCNC: 24 MMOL/L (ref 21–32)
CREAT SERPL-MCNC: 1.8 MG/DL (ref 0.7–1.3)
DIFFERENTIAL METHOD BLD: ABNORMAL
EOSINOPHIL # BLD: 0 K/UL (ref 0–0.4)
EOSINOPHIL NFR BLD: 0 % (ref 0–7)
ERYTHROCYTE [DISTWIDTH] IN BLOOD BY AUTOMATED COUNT: 20.2 % (ref 11.5–14.5)
GLOBULIN SER CALC-MCNC: 3.1 G/DL (ref 2–4)
GLUCOSE SERPL-MCNC: 125 MG/DL (ref 65–100)
HCT VFR BLD AUTO: 38 % (ref 36.6–50.3)
HGB BLD-MCNC: 12 G/DL (ref 12.1–17)
IMM GRANULOCYTES # BLD AUTO: 0 K/UL (ref 0–0.04)
IMM GRANULOCYTES NFR BLD AUTO: 1 % (ref 0–0.5)
LYMPHOCYTES # BLD: 0.2 K/UL (ref 0.8–3.5)
LYMPHOCYTES NFR BLD: 8 % (ref 12–49)
MCH RBC QN AUTO: 24.6 PG (ref 26–34)
MCHC RBC AUTO-ENTMCNC: 31.6 G/DL (ref 30–36.5)
MCV RBC AUTO: 78 FL (ref 80–99)
MONOCYTES # BLD: 0.6 K/UL (ref 0–1)
MONOCYTES NFR BLD: 20 % (ref 5–13)
NEUTS SEG # BLD: 2.3 K/UL (ref 1.8–8)
NEUTS SEG NFR BLD: 70 % (ref 32–75)
NRBC # BLD: 0 K/UL (ref 0–0.01)
NRBC BLD-RTO: 0 PER 100 WBC
PLATELET # BLD AUTO: 215 K/UL (ref 150–400)
PMV BLD AUTO: 10.1 FL (ref 8.9–12.9)
POTASSIUM SERPL-SCNC: 3.9 MMOL/L (ref 3.5–5.1)
PROT SERPL-MCNC: 7.3 G/DL (ref 6.4–8.2)
RBC # BLD AUTO: 4.87 M/UL (ref 4.1–5.7)
RBC MORPH BLD: ABNORMAL
SODIUM SERPL-SCNC: 136 MMOL/L (ref 136–145)
WBC # BLD AUTO: 3.1 K/UL (ref 4.1–11.1)

## 2024-09-25 PROCEDURE — 1123F ACP DISCUSS/DSCN MKR DOCD: CPT | Performed by: INTERNAL MEDICINE

## 2024-09-25 PROCEDURE — 86334 IMMUNOFIX E-PHORESIS SERUM: CPT

## 2024-09-25 PROCEDURE — 3074F SYST BP LT 130 MM HG: CPT | Performed by: INTERNAL MEDICINE

## 2024-09-25 PROCEDURE — 80053 COMPREHEN METABOLIC PANEL: CPT

## 2024-09-25 PROCEDURE — 84165 PROTEIN E-PHORESIS SERUM: CPT

## 2024-09-25 PROCEDURE — 82784 ASSAY IGA/IGD/IGG/IGM EACH: CPT

## 2024-09-25 PROCEDURE — 99213 OFFICE O/P EST LOW 20 MIN: CPT | Performed by: INTERNAL MEDICINE

## 2024-09-25 PROCEDURE — 85025 COMPLETE CBC W/AUTO DIFF WBC: CPT

## 2024-09-25 PROCEDURE — 36415 COLL VENOUS BLD VENIPUNCTURE: CPT

## 2024-09-25 PROCEDURE — 6360000002 HC RX W HCPCS: Performed by: NURSE PRACTITIONER

## 2024-09-25 PROCEDURE — 3078F DIAST BP <80 MM HG: CPT | Performed by: INTERNAL MEDICINE

## 2024-09-25 PROCEDURE — 83521 IG LIGHT CHAINS FREE EACH: CPT

## 2024-09-25 PROCEDURE — 2580000003 HC RX 258: Performed by: NURSE PRACTITIONER

## 2024-09-25 PROCEDURE — 96523 IRRIG DRUG DELIVERY DEVICE: CPT

## 2024-09-25 RX ORDER — SODIUM CHLORIDE 0.9 % (FLUSH) 0.9 %
5-40 SYRINGE (ML) INJECTION PRN
OUTPATIENT
Start: 2024-10-23

## 2024-09-25 RX ORDER — ACETAMINOPHEN 325 MG/1
650 TABLET ORAL
OUTPATIENT
Start: 2024-10-23

## 2024-09-25 RX ORDER — ALBUTEROL SULFATE 90 UG/1
4 INHALANT RESPIRATORY (INHALATION) PRN
OUTPATIENT
Start: 2024-10-23

## 2024-09-25 RX ORDER — HEPARIN 100 UNIT/ML
500 SYRINGE INTRAVENOUS PRN
OUTPATIENT
Start: 2024-10-23

## 2024-09-25 RX ORDER — EPINEPHRINE 1 MG/ML
0.3 INJECTION, SOLUTION INTRAMUSCULAR; SUBCUTANEOUS PRN
OUTPATIENT
Start: 2024-10-23

## 2024-09-25 RX ORDER — ONDANSETRON 2 MG/ML
8 INJECTION INTRAMUSCULAR; INTRAVENOUS
OUTPATIENT
Start: 2024-10-23

## 2024-09-25 RX ORDER — SODIUM CHLORIDE 9 MG/ML
25 INJECTION, SOLUTION INTRAVENOUS PRN
OUTPATIENT
Start: 2024-10-23

## 2024-09-25 RX ORDER — SODIUM CHLORIDE 9 MG/ML
INJECTION, SOLUTION INTRAVENOUS CONTINUOUS
OUTPATIENT
Start: 2024-10-23

## 2024-09-25 RX ORDER — DIPHENHYDRAMINE HYDROCHLORIDE 50 MG/ML
50 INJECTION INTRAMUSCULAR; INTRAVENOUS
OUTPATIENT
Start: 2024-10-23

## 2024-09-25 RX ADMIN — WATER 2 MG: 1 INJECTION INTRAMUSCULAR; INTRAVENOUS; SUBCUTANEOUS at 11:44

## 2024-09-25 ASSESSMENT — PAIN DESCRIPTION - LOCATION: LOCATION: NECK

## 2024-09-25 ASSESSMENT — PAIN DESCRIPTION - PAIN TYPE: TYPE: CHRONIC PAIN

## 2024-09-25 ASSESSMENT — PAIN SCALES - GENERAL: PAINLEVEL_OUTOF10: 4

## 2024-09-25 NOTE — PROGRESS NOTES
[facility] Visit Note    Pt arrived to [facility] ambulatory in no acute distress for port flush/labs.  R chest port accessed without issue and but no blood return noted.  Cathflo administered and pt went across malcolm for MD appointment. Upon return from MD office, port demonstrated positive blood return. Labs obtained: CBC, CMP and gammopathy eval (labs still pending at time of this note).    BP (!) 140/88   Pulse 92   Temp 97.5 °F (36.4 °C) (Temporal)   Resp 16   Ht 1.727 m (5' 7.99\")   SpO2 96%   BMI 26.92 kg/m²     Port flushed per policy and de-accessed.  Pt discharged ambulatory in no acute distress. Next appointment 's:  Future Appointments   Date Time Provider Department Center   11/1/2024 11:00 AM SHAUNA DIAZ CHAIR 1 [facility]   11/29/2024 10:30 AM SHAUNA DIAZ CHAIR 1 [facility]   12/27/2024 11:00 AM SHAUNA DIAZ CHAIR 1 [facility]   12/27/2024 11:30 AM Jenna Barrera, APRN - CNP [facility]

## 2024-09-29 LAB
ALBUMIN SERPL ELPH-MCNC: 4.1 G/DL (ref 2.9–4.4)
ALBUMIN/GLOB SERPL: 1.3 (ref 0.7–1.7)
ALPHA1 GLOB SERPL ELPH-MCNC: 0.2 G/DL (ref 0–0.4)
ALPHA2 GLOB SERPL ELPH-MCNC: 1.2 G/DL (ref 0.4–1)
B-GLOBULIN SERPL ELPH-MCNC: 1.4 G/DL (ref 0.7–1.3)
GAMMA GLOB SERPL ELPH-MCNC: 0.3 G/DL (ref 0.4–1.8)
GLOBULIN SER-MCNC: 3.2 G/DL (ref 2.2–3.9)
IGA SERPL-MCNC: <5 MG/DL (ref 61–437)
IGG SERPL-MCNC: 312 MG/DL (ref 603–1613)
IGM SERPL-MCNC: <5 MG/DL (ref 20–172)
INTERPRETATION SERPL IEP-IMP: ABNORMAL
KAPPA LC FREE SER-MCNC: <0.7 MG/L (ref 3.3–19.4)
KAPPA LC FREE/LAMBDA FREE SER: ABNORMAL
LAMBDA LC FREE SERPL-MCNC: <1.5 MG/L (ref 5.7–26.3)
M PROTEIN SERPL ELPH-MCNC: ABNORMAL G/DL
PROT SERPL-MCNC: 7.3 G/DL (ref 6–8.5)

## 2024-10-18 ENCOUNTER — APPOINTMENT (OUTPATIENT)
Facility: HOSPITAL | Age: 69
End: 2024-10-18
Payer: MEDICARE

## 2024-11-01 ENCOUNTER — HOSPITAL ENCOUNTER (OUTPATIENT)
Facility: HOSPITAL | Age: 69
Setting detail: INFUSION SERIES
End: 2024-11-01
Payer: MEDICARE

## 2024-11-01 ENCOUNTER — TELEPHONE (OUTPATIENT)
Age: 69
End: 2024-11-01

## 2024-11-01 NOTE — TELEPHONE ENCOUNTER
Pt called to speak to Janice about appt pt said someone called him an rescheduled his appt please call pt to clarify

## 2024-11-04 ENCOUNTER — HOSPITAL ENCOUNTER (OUTPATIENT)
Facility: HOSPITAL | Age: 69
Setting detail: INFUSION SERIES
Discharge: HOME | End: 2024-11-04
Payer: MEDICARE

## 2024-11-04 VITALS
DIASTOLIC BLOOD PRESSURE: 71 MMHG | OXYGEN SATURATION: 95 % | HEART RATE: 82 BPM | SYSTOLIC BLOOD PRESSURE: 108 MMHG | TEMPERATURE: 97.8 F | BODY MASS INDEX: 26.28 KG/M2 | WEIGHT: 172.8 LBS

## 2024-11-04 DIAGNOSIS — C90.01 MULTIPLE MYELOMA IN REMISSION (HCC): Primary | ICD-10-CM

## 2024-11-04 LAB
ALBUMIN SERPL-MCNC: 3.8 G/DL (ref 3.5–5)
ALBUMIN/GLOB SERPL: 1.4 (ref 1.1–2.2)
ALP SERPL-CCNC: 83 U/L (ref 45–117)
ALT SERPL-CCNC: 21 U/L (ref 12–78)
ANION GAP SERPL CALC-SCNC: 4 MMOL/L (ref 2–12)
AST SERPL-CCNC: 24 U/L (ref 15–37)
BASOPHILS # BLD: 0 K/UL (ref 0–0.1)
BASOPHILS NFR BLD: 0 % (ref 0–1)
BILIRUB SERPL-MCNC: 0.7 MG/DL (ref 0.2–1)
BUN SERPL-MCNC: 23 MG/DL (ref 6–20)
BUN/CREAT SERPL: 15 (ref 12–20)
CALCIUM SERPL-MCNC: 8.6 MG/DL (ref 8.5–10.1)
CHLORIDE SERPL-SCNC: 110 MMOL/L (ref 97–108)
CO2 SERPL-SCNC: 27 MMOL/L (ref 21–32)
CREAT SERPL-MCNC: 1.5 MG/DL (ref 0.7–1.3)
DIFFERENTIAL METHOD BLD: ABNORMAL
EOSINOPHIL # BLD: 0 K/UL (ref 0–0.4)
EOSINOPHIL NFR BLD: 0 % (ref 0–7)
ERYTHROCYTE [DISTWIDTH] IN BLOOD BY AUTOMATED COUNT: 23.8 % (ref 11.5–14.5)
GLOBULIN SER CALC-MCNC: 2.7 G/DL (ref 2–4)
GLUCOSE SERPL-MCNC: 116 MG/DL (ref 65–100)
HCT VFR BLD AUTO: 35.2 % (ref 36.6–50.3)
HGB BLD-MCNC: 10.4 G/DL (ref 12.1–17)
IMM GRANULOCYTES # BLD AUTO: 0 K/UL (ref 0–0.04)
IMM GRANULOCYTES NFR BLD AUTO: 1 % (ref 0–0.5)
LYMPHOCYTES # BLD: 0.3 K/UL (ref 0.8–3.5)
LYMPHOCYTES NFR BLD: 10 % (ref 12–49)
MCH RBC QN AUTO: 24 PG (ref 26–34)
MCHC RBC AUTO-ENTMCNC: 29.5 G/DL (ref 30–36.5)
MCV RBC AUTO: 81.3 FL (ref 80–99)
MONOCYTES # BLD: 0.6 K/UL (ref 0–1)
MONOCYTES NFR BLD: 22 % (ref 5–13)
NEUTS SEG # BLD: 1.7 K/UL (ref 1.8–8)
NEUTS SEG NFR BLD: 67 % (ref 32–75)
NRBC # BLD: 0 K/UL (ref 0–0.01)
NRBC BLD-RTO: 0 PER 100 WBC
PLATELET # BLD AUTO: 204 K/UL (ref 150–400)
PMV BLD AUTO: 10.8 FL (ref 8.9–12.9)
POTASSIUM SERPL-SCNC: 3.9 MMOL/L (ref 3.5–5.1)
PROT SERPL-MCNC: 6.5 G/DL (ref 6.4–8.2)
RBC # BLD AUTO: 4.33 M/UL (ref 4.1–5.7)
RBC MORPH BLD: ABNORMAL
SODIUM SERPL-SCNC: 141 MMOL/L (ref 136–145)
WBC # BLD AUTO: 2.6 K/UL (ref 4.1–11.1)

## 2024-11-04 PROCEDURE — 86334 IMMUNOFIX E-PHORESIS SERUM: CPT

## 2024-11-04 PROCEDURE — 84165 PROTEIN E-PHORESIS SERUM: CPT

## 2024-11-04 PROCEDURE — 36415 COLL VENOUS BLD VENIPUNCTURE: CPT

## 2024-11-04 PROCEDURE — 80053 COMPREHEN METABOLIC PANEL: CPT

## 2024-11-04 PROCEDURE — 85025 COMPLETE CBC W/AUTO DIFF WBC: CPT

## 2024-11-04 PROCEDURE — 83521 IG LIGHT CHAINS FREE EACH: CPT

## 2024-11-04 PROCEDURE — 36591 DRAW BLOOD OFF VENOUS DEVICE: CPT

## 2024-11-04 PROCEDURE — 82784 ASSAY IGA/IGD/IGG/IGM EACH: CPT

## 2024-11-04 PROCEDURE — 2580000003 HC RX 258: Performed by: NURSE PRACTITIONER

## 2024-11-04 RX ORDER — SODIUM CHLORIDE 0.9 % (FLUSH) 0.9 %
5-40 SYRINGE (ML) INJECTION PRN
OUTPATIENT
Start: 2024-11-18

## 2024-11-04 RX ORDER — ONDANSETRON 2 MG/ML
8 INJECTION INTRAMUSCULAR; INTRAVENOUS
OUTPATIENT
Start: 2024-11-18

## 2024-11-04 RX ORDER — ACETAMINOPHEN 325 MG/1
650 TABLET ORAL
OUTPATIENT
Start: 2024-11-18

## 2024-11-04 RX ORDER — SODIUM CHLORIDE 9 MG/ML
INJECTION, SOLUTION INTRAVENOUS CONTINUOUS
OUTPATIENT
Start: 2024-11-18

## 2024-11-04 RX ORDER — SODIUM CHLORIDE 0.9 % (FLUSH) 0.9 %
5-40 SYRINGE (ML) INJECTION PRN
Status: DISCONTINUED | OUTPATIENT
Start: 2024-11-04 | End: 2024-11-05 | Stop reason: HOSPADM

## 2024-11-04 RX ORDER — ALBUTEROL SULFATE 90 UG/1
4 INHALANT RESPIRATORY (INHALATION) PRN
OUTPATIENT
Start: 2024-11-18

## 2024-11-04 RX ORDER — HEPARIN 100 UNIT/ML
500 SYRINGE INTRAVENOUS PRN
OUTPATIENT
Start: 2024-11-18

## 2024-11-04 RX ORDER — SODIUM CHLORIDE 9 MG/ML
25 INJECTION, SOLUTION INTRAVENOUS PRN
OUTPATIENT
Start: 2024-11-18

## 2024-11-04 RX ORDER — DIPHENHYDRAMINE HYDROCHLORIDE 50 MG/ML
50 INJECTION INTRAMUSCULAR; INTRAVENOUS
OUTPATIENT
Start: 2024-11-18

## 2024-11-04 RX ORDER — EPINEPHRINE 1 MG/ML
0.3 INJECTION, SOLUTION INTRAMUSCULAR; SUBCUTANEOUS PRN
OUTPATIENT
Start: 2024-11-18

## 2024-11-04 RX ADMIN — SODIUM CHLORIDE, PRESERVATIVE FREE 10 ML: 5 INJECTION INTRAVENOUS at 13:49

## 2024-11-04 ASSESSMENT — PAIN DESCRIPTION - LOCATION: LOCATION: NECK

## 2024-11-04 ASSESSMENT — PAIN SCALES - GENERAL: PAINLEVEL_OUTOF10: 4

## 2024-11-04 NOTE — PROGRESS NOTES
[facility]fusion [facility] Note:  Arrived - 1340   Labs obtained: CBC, CMP, Travis Eval    /71   Pulse 82   Temp 97.8 °F (36.6 °C)   Wt 78.4 kg (172 lb 12.8 oz)   SpO2 95%   BMI 26.28 kg/m²     Assessment - unchanged.     Port accessed & flushed per protocol w/o difficulty. Love needle removed.   1350 - Tolerated well. Pt denies any acute problems/changes. Discharged from \[facility]\"" ambulatory. No distress. Next appt: 12/2/24 at 1130

## 2024-11-07 LAB
ALBUMIN SERPL ELPH-MCNC: 3.8 G/DL (ref 2.9–4.4)
ALBUMIN/GLOB SERPL: 1.8 (ref 0.7–1.7)
ALPHA1 GLOB SERPL ELPH-MCNC: 0.2 G/DL (ref 0–0.4)
ALPHA2 GLOB SERPL ELPH-MCNC: 1 G/DL (ref 0.4–1)
B-GLOBULIN SERPL ELPH-MCNC: 0.8 G/DL (ref 0.7–1.3)
GAMMA GLOB SERPL ELPH-MCNC: 0.2 G/DL (ref 0.4–1.8)
GLOBULIN SER-MCNC: 2.2 G/DL (ref 2.2–3.9)
IGA SERPL-MCNC: <5 MG/DL (ref 61–437)
IGG SERPL-MCNC: 207 MG/DL (ref 603–1613)
IGM SERPL-MCNC: <5 MG/DL (ref 20–172)
INTERPRETATION SERPL IEP-IMP: ABNORMAL
KAPPA LC FREE SER-MCNC: <0.7 MG/L (ref 3.3–19.4)
KAPPA LC FREE/LAMBDA FREE SER: ABNORMAL
LAMBDA LC FREE SERPL-MCNC: <1.5 MG/L (ref 5.7–26.3)
M PROTEIN SERPL ELPH-MCNC: ABNORMAL G/DL
PROT SERPL-MCNC: 6 G/DL (ref 6–8.5)

## 2024-11-15 ENCOUNTER — APPOINTMENT (OUTPATIENT)
Facility: HOSPITAL | Age: 69
End: 2024-11-15
Payer: MEDICARE

## 2024-11-29 ENCOUNTER — APPOINTMENT (OUTPATIENT)
Facility: HOSPITAL | Age: 69
End: 2024-11-29
Payer: MEDICARE

## 2024-12-02 ENCOUNTER — HOSPITAL ENCOUNTER (OUTPATIENT)
Facility: HOSPITAL | Age: 69
Setting detail: INFUSION SERIES
Discharge: HOME | End: 2024-12-02
Payer: MEDICARE

## 2024-12-02 VITALS
OXYGEN SATURATION: 97 % | BODY MASS INDEX: 27.01 KG/M2 | WEIGHT: 177.6 LBS | TEMPERATURE: 98.9 F | DIASTOLIC BLOOD PRESSURE: 76 MMHG | SYSTOLIC BLOOD PRESSURE: 121 MMHG | HEART RATE: 82 BPM | RESPIRATION RATE: 16 BRPM

## 2024-12-02 DIAGNOSIS — C90.01 MULTIPLE MYELOMA IN REMISSION (HCC): Primary | ICD-10-CM

## 2024-12-02 LAB
ALBUMIN SERPL-MCNC: 3.4 G/DL (ref 3.5–5)
ALBUMIN/GLOB SERPL: 1.3 (ref 1.1–2.2)
ALP SERPL-CCNC: 67 U/L (ref 45–117)
ALT SERPL-CCNC: 21 U/L (ref 12–78)
ANION GAP SERPL CALC-SCNC: 4 MMOL/L (ref 2–12)
AST SERPL-CCNC: 15 U/L (ref 15–37)
BASOPHILS # BLD: 0 K/UL (ref 0–0.1)
BASOPHILS NFR BLD: 0 % (ref 0–1)
BILIRUB SERPL-MCNC: 0.2 MG/DL (ref 0.2–1)
BUN SERPL-MCNC: 8 MG/DL (ref 6–20)
BUN/CREAT SERPL: 6 (ref 12–20)
CALCIUM SERPL-MCNC: 8.6 MG/DL (ref 8.5–10.1)
CHLORIDE SERPL-SCNC: 109 MMOL/L (ref 97–108)
CO2 SERPL-SCNC: 28 MMOL/L (ref 21–32)
CREAT SERPL-MCNC: 1.29 MG/DL (ref 0.7–1.3)
DIFFERENTIAL METHOD BLD: ABNORMAL
EOSINOPHIL # BLD: 0 K/UL (ref 0–0.4)
EOSINOPHIL NFR BLD: 0 % (ref 0–7)
ERYTHROCYTE [DISTWIDTH] IN BLOOD BY AUTOMATED COUNT: 25.2 % (ref 11.5–14.5)
GLOBULIN SER CALC-MCNC: 2.7 G/DL (ref 2–4)
GLUCOSE SERPL-MCNC: 123 MG/DL (ref 65–100)
HCT VFR BLD AUTO: 32.6 % (ref 36.6–50.3)
HGB BLD-MCNC: 9.9 G/DL (ref 12.1–17)
IMM GRANULOCYTES # BLD AUTO: 0 K/UL (ref 0–0.04)
IMM GRANULOCYTES NFR BLD AUTO: 1 % (ref 0–0.5)
LYMPHOCYTES # BLD: 0.2 K/UL (ref 0.8–3.5)
LYMPHOCYTES NFR BLD: 7 % (ref 12–49)
MCH RBC QN AUTO: 25.1 PG (ref 26–34)
MCHC RBC AUTO-ENTMCNC: 30.4 G/DL (ref 30–36.5)
MCV RBC AUTO: 82.7 FL (ref 80–99)
MONOCYTES # BLD: 0.5 K/UL (ref 0–1)
MONOCYTES NFR BLD: 20 % (ref 5–13)
NEUTS SEG # BLD: 1.9 K/UL (ref 1.8–8)
NEUTS SEG NFR BLD: 72 % (ref 32–75)
NRBC # BLD: 0 K/UL (ref 0–0.01)
NRBC BLD-RTO: 0 PER 100 WBC
PLATELET # BLD AUTO: 184 K/UL (ref 150–400)
PMV BLD AUTO: 10.1 FL (ref 8.9–12.9)
POTASSIUM SERPL-SCNC: 3.8 MMOL/L (ref 3.5–5.1)
PROT SERPL-MCNC: 6.1 G/DL (ref 6.4–8.2)
RBC # BLD AUTO: 3.94 M/UL (ref 4.1–5.7)
RBC MORPH BLD: ABNORMAL
RBC MORPH BLD: ABNORMAL
SODIUM SERPL-SCNC: 141 MMOL/L (ref 136–145)
WBC # BLD AUTO: 2.6 K/UL (ref 4.1–11.1)

## 2024-12-02 PROCEDURE — 82784 ASSAY IGA/IGD/IGG/IGM EACH: CPT

## 2024-12-02 PROCEDURE — 80053 COMPREHEN METABOLIC PANEL: CPT

## 2024-12-02 PROCEDURE — 36591 DRAW BLOOD OFF VENOUS DEVICE: CPT

## 2024-12-02 PROCEDURE — 84165 PROTEIN E-PHORESIS SERUM: CPT

## 2024-12-02 PROCEDURE — 85025 COMPLETE CBC W/AUTO DIFF WBC: CPT

## 2024-12-02 PROCEDURE — 83521 IG LIGHT CHAINS FREE EACH: CPT

## 2024-12-02 PROCEDURE — 86334 IMMUNOFIX E-PHORESIS SERUM: CPT

## 2024-12-02 PROCEDURE — 36415 COLL VENOUS BLD VENIPUNCTURE: CPT

## 2024-12-02 RX ORDER — ALBUTEROL SULFATE 90 UG/1
4 INHALANT RESPIRATORY (INHALATION) PRN
OUTPATIENT
Start: 2024-12-30

## 2024-12-02 RX ORDER — ACETAMINOPHEN 325 MG/1
650 TABLET ORAL
OUTPATIENT
Start: 2024-12-30

## 2024-12-02 RX ORDER — EPINEPHRINE 1 MG/ML
0.3 INJECTION, SOLUTION INTRAMUSCULAR; SUBCUTANEOUS PRN
OUTPATIENT
Start: 2024-12-30

## 2024-12-02 RX ORDER — DIPHENHYDRAMINE HYDROCHLORIDE 50 MG/ML
50 INJECTION INTRAMUSCULAR; INTRAVENOUS
OUTPATIENT
Start: 2024-12-30

## 2024-12-02 RX ORDER — SODIUM CHLORIDE 9 MG/ML
25 INJECTION, SOLUTION INTRAVENOUS PRN
OUTPATIENT
Start: 2024-12-30

## 2024-12-02 RX ORDER — HYDROCORTISONE SODIUM SUCCINATE 100 MG/2ML
100 INJECTION INTRAMUSCULAR; INTRAVENOUS
OUTPATIENT
Start: 2024-12-30

## 2024-12-02 RX ORDER — SODIUM CHLORIDE 0.9 % (FLUSH) 0.9 %
5-40 SYRINGE (ML) INJECTION PRN
OUTPATIENT
Start: 2024-12-30

## 2024-12-02 RX ORDER — SODIUM CHLORIDE 9 MG/ML
INJECTION, SOLUTION INTRAVENOUS CONTINUOUS
OUTPATIENT
Start: 2024-12-30

## 2024-12-02 RX ORDER — ONDANSETRON 2 MG/ML
8 INJECTION INTRAMUSCULAR; INTRAVENOUS
OUTPATIENT
Start: 2024-12-30

## 2024-12-02 RX ORDER — HEPARIN 100 UNIT/ML
500 SYRINGE INTRAVENOUS PRN
OUTPATIENT
Start: 2024-12-30

## 2024-12-02 ASSESSMENT — PAIN SCALES - GENERAL: PAINLEVEL_OUTOF10: 0

## 2024-12-02 NOTE — PROGRESS NOTES
Name: Sameer Iglesias Sr.    MRN: [id number]         : 1955    Mr. Iglesias Arrived ambulatory and in no distress for PF/Labs (CBC,CMP,GAMMOPATHY).  Right chest wall port accessed without difficulty, labs drawn & sent for processing.    Mr. Iglesias's vitals were reviewed.  Vitals:    24 1545   BP: 121/76   Pulse: 82   Resp: 16   Temp: 98.9 °F (37.2 °C)   SpO2: 97%     Labs pending at this time of note.     Mr. Iglesias tolerated treatment well and was discharged with wife from [facility] in stable condition.  Port de-accessed & flushed. He is to return on 24 at 1100 for his next appointment.    Jerry Jaime RN  2024

## 2024-12-05 LAB
ALBUMIN SERPL ELPH-MCNC: 3.5 G/DL (ref 2.9–4.4)
ALBUMIN/GLOB SERPL: 1.6 (ref 0.7–1.7)
ALPHA1 GLOB SERPL ELPH-MCNC: 0.2 G/DL (ref 0–0.4)
ALPHA2 GLOB SERPL ELPH-MCNC: 1.1 G/DL (ref 0.4–1)
B-GLOBULIN SERPL ELPH-MCNC: 0.9 G/DL (ref 0.7–1.3)
GAMMA GLOB SERPL ELPH-MCNC: 0.1 G/DL (ref 0.4–1.8)
GLOBULIN SER-MCNC: 2.3 G/DL (ref 2.2–3.9)
IGA SERPL-MCNC: <5 MG/DL (ref 61–437)
IGG SERPL-MCNC: 156 MG/DL (ref 603–1613)
IGM SERPL-MCNC: <5 MG/DL (ref 20–172)
INTERPRETATION SERPL IEP-IMP: ABNORMAL
KAPPA LC FREE SER-MCNC: <0.7 MG/L (ref 3.3–19.4)
KAPPA LC FREE/LAMBDA FREE SER: ABNORMAL
LAMBDA LC FREE SERPL-MCNC: <1.5 MG/L (ref 5.7–26.3)
M PROTEIN SERPL ELPH-MCNC: ABNORMAL G/DL
PROT SERPL-MCNC: 5.8 G/DL (ref 6–8.5)

## 2024-12-13 ENCOUNTER — APPOINTMENT (OUTPATIENT)
Facility: HOSPITAL | Age: 69
End: 2024-12-13
Payer: MEDICARE

## 2024-12-27 ENCOUNTER — HOSPITAL ENCOUNTER (OUTPATIENT)
Facility: HOSPITAL | Age: 69
Setting detail: INFUSION SERIES
Discharge: HOME | End: 2024-12-27
Payer: MEDICARE

## 2024-12-27 VITALS
HEIGHT: 68 IN | OXYGEN SATURATION: 96 % | HEART RATE: 93 BPM | SYSTOLIC BLOOD PRESSURE: 123 MMHG | TEMPERATURE: 97.4 F | DIASTOLIC BLOOD PRESSURE: 84 MMHG | RESPIRATION RATE: 16 BRPM | BODY MASS INDEX: 26.16 KG/M2 | WEIGHT: 172.6 LBS

## 2024-12-27 DIAGNOSIS — C90.01 MULTIPLE MYELOMA IN REMISSION (HCC): Primary | ICD-10-CM

## 2024-12-27 LAB
ALBUMIN SERPL-MCNC: 3.7 G/DL (ref 3.5–5)
ALBUMIN/GLOB SERPL: 1 (ref 1.1–2.2)
ALP SERPL-CCNC: 67 U/L (ref 45–117)
ALT SERPL-CCNC: 23 U/L (ref 12–78)
ANION GAP SERPL CALC-SCNC: 2 MMOL/L (ref 2–12)
AST SERPL-CCNC: 33 U/L (ref 15–37)
BASOPHILS # BLD: 0 K/UL (ref 0–0.1)
BASOPHILS NFR BLD: 1 % (ref 0–1)
BILIRUB SERPL-MCNC: 0.5 MG/DL (ref 0.2–1)
BUN SERPL-MCNC: 17 MG/DL (ref 6–20)
BUN/CREAT SERPL: 10 (ref 12–20)
CALCIUM SERPL-MCNC: 8.7 MG/DL (ref 8.5–10.1)
CHLORIDE SERPL-SCNC: 107 MMOL/L (ref 97–108)
CO2 SERPL-SCNC: 28 MMOL/L (ref 21–32)
CREAT SERPL-MCNC: 1.69 MG/DL (ref 0.7–1.3)
DIFFERENTIAL METHOD BLD: ABNORMAL
EOSINOPHIL # BLD: 0 K/UL (ref 0–0.4)
EOSINOPHIL NFR BLD: 1 % (ref 0–7)
ERYTHROCYTE [DISTWIDTH] IN BLOOD BY AUTOMATED COUNT: 21.2 % (ref 11.5–14.5)
GLOBULIN SER CALC-MCNC: 3.7 G/DL (ref 2–4)
GLUCOSE SERPL-MCNC: 161 MG/DL (ref 65–100)
HCT VFR BLD AUTO: 36.7 % (ref 36.6–50.3)
HGB BLD-MCNC: 11.1 G/DL (ref 12.1–17)
IMM GRANULOCYTES # BLD AUTO: 0 K/UL (ref 0–0.04)
IMM GRANULOCYTES NFR BLD AUTO: 1 % (ref 0–0.5)
LYMPHOCYTES # BLD: 0.2 K/UL (ref 0.8–3.5)
LYMPHOCYTES NFR BLD: 13 % (ref 12–49)
MCH RBC QN AUTO: 24.5 PG (ref 26–34)
MCHC RBC AUTO-ENTMCNC: 30.2 G/DL (ref 30–36.5)
MCV RBC AUTO: 81 FL (ref 80–99)
MONOCYTES # BLD: 0.4 K/UL (ref 0–1)
MONOCYTES NFR BLD: 22 % (ref 5–13)
NEUTS SEG # BLD: 1.3 K/UL (ref 1.8–8)
NEUTS SEG NFR BLD: 62 % (ref 32–75)
NRBC # BLD: 0 K/UL (ref 0–0.01)
NRBC BLD-RTO: 0 PER 100 WBC
PLATELET # BLD AUTO: 145 K/UL (ref 150–400)
PMV BLD AUTO: 10.5 FL (ref 8.9–12.9)
POTASSIUM SERPL-SCNC: 3.8 MMOL/L (ref 3.5–5.1)
PROT SERPL-MCNC: 7.4 G/DL (ref 6.4–8.2)
RBC # BLD AUTO: 4.53 M/UL (ref 4.1–5.7)
RBC MORPH BLD: ABNORMAL
RBC MORPH BLD: ABNORMAL
SODIUM SERPL-SCNC: 137 MMOL/L (ref 136–145)
WBC # BLD AUTO: 1.9 K/UL (ref 4.1–11.1)

## 2024-12-27 PROCEDURE — 84165 PROTEIN E-PHORESIS SERUM: CPT

## 2024-12-27 PROCEDURE — 80053 COMPREHEN METABOLIC PANEL: CPT

## 2024-12-27 PROCEDURE — 36415 COLL VENOUS BLD VENIPUNCTURE: CPT

## 2024-12-27 PROCEDURE — 2500000003 HC RX 250 WO HCPCS: Performed by: NURSE PRACTITIONER

## 2024-12-27 PROCEDURE — 85025 COMPLETE CBC W/AUTO DIFF WBC: CPT

## 2024-12-27 PROCEDURE — 82784 ASSAY IGA/IGD/IGG/IGM EACH: CPT

## 2024-12-27 PROCEDURE — 86334 IMMUNOFIX E-PHORESIS SERUM: CPT

## 2024-12-27 PROCEDURE — 83521 IG LIGHT CHAINS FREE EACH: CPT

## 2024-12-27 PROCEDURE — 36591 DRAW BLOOD OFF VENOUS DEVICE: CPT

## 2024-12-27 RX ORDER — ACETAMINOPHEN 325 MG/1
650 TABLET ORAL
OUTPATIENT
Start: 2024-12-30

## 2024-12-27 RX ORDER — HYDROCORTISONE SODIUM SUCCINATE 100 MG/2ML
100 INJECTION INTRAMUSCULAR; INTRAVENOUS
OUTPATIENT
Start: 2024-12-30

## 2024-12-27 RX ORDER — ONDANSETRON 2 MG/ML
8 INJECTION INTRAMUSCULAR; INTRAVENOUS
OUTPATIENT
Start: 2024-12-30

## 2024-12-27 RX ORDER — ALBUTEROL SULFATE 90 UG/1
4 INHALANT RESPIRATORY (INHALATION) PRN
OUTPATIENT
Start: 2024-12-30

## 2024-12-27 RX ORDER — SODIUM CHLORIDE 0.9 % (FLUSH) 0.9 %
5-40 SYRINGE (ML) INJECTION PRN
Status: DISCONTINUED | OUTPATIENT
Start: 2024-12-27 | End: 2024-12-28 | Stop reason: HOSPADM

## 2024-12-27 RX ORDER — SODIUM CHLORIDE 9 MG/ML
25 INJECTION, SOLUTION INTRAVENOUS PRN
OUTPATIENT
Start: 2024-12-30

## 2024-12-27 RX ORDER — HEPARIN 100 UNIT/ML
500 SYRINGE INTRAVENOUS PRN
OUTPATIENT
Start: 2024-12-30

## 2024-12-27 RX ORDER — EPINEPHRINE 1 MG/ML
0.3 INJECTION, SOLUTION INTRAMUSCULAR; SUBCUTANEOUS PRN
OUTPATIENT
Start: 2024-12-30

## 2024-12-27 RX ORDER — DIPHENHYDRAMINE HYDROCHLORIDE 50 MG/ML
50 INJECTION INTRAMUSCULAR; INTRAVENOUS
OUTPATIENT
Start: 2024-12-30

## 2024-12-27 RX ORDER — SODIUM CHLORIDE 9 MG/ML
INJECTION, SOLUTION INTRAVENOUS CONTINUOUS
OUTPATIENT
Start: 2024-12-30

## 2024-12-27 RX ORDER — SODIUM CHLORIDE 0.9 % (FLUSH) 0.9 %
5-40 SYRINGE (ML) INJECTION PRN
OUTPATIENT
Start: 2024-12-30

## 2024-12-27 RX ADMIN — SODIUM CHLORIDE, PRESERVATIVE FREE 10 ML: 5 INJECTION INTRAVENOUS at 11:03

## 2024-12-27 ASSESSMENT — PAIN DESCRIPTION - LOCATION: LOCATION: NECK

## 2024-12-27 ASSESSMENT — PAIN SCALES - GENERAL: PAINLEVEL_OUTOF10: 4

## 2024-12-27 NOTE — PROGRESS NOTES
[facility] Infusion Center Note:  Arrived - 1051 for Port Flush + labs     /84   Pulse 93   Temp 97.4 °F (36.3 °C) (Temporal)   Resp 16   Ht 1.727 m (5' 8\")   Wt 78.3 kg (172 lb 9.6 oz)   SpO2 96%   BMI 26.24 kg/m²     Port accessed, labs drawn- CBC with diff, CMP, and Gammopathy Eval, & flushed per protocol w/o difficulty. Love needle removed.     Recent Results (from the past 12 hour(s))   CBC with Auto Differential    Collection Time: 12/27/24 10:57 AM   Result Value Ref Range    WBC 1.9 (L) 4.1 - 11.1 K/uL    RBC 4.53 4.10 - 5.70 M/uL    Hemoglobin 11.1 (L) 12.1 - 17.0 g/dL    Hematocrit 36.7 36.6 - 50.3 %    MCV 81.0 80.0 - 99.0 FL    MCH 24.5 (L) 26.0 - 34.0 PG    MCHC 30.2 30.0 - 36.5 g/dL    RDW 21.2 (H) 11.5 - 14.5 %    Platelets 145 (L) 150 - 400 K/uL    MPV 10.5 8.9 - 12.9 FL    Nucleated RBCs 0.0 0  WBC    nRBC 0.00 0.00 - 0.01 K/uL    Neutrophils % 62 32 - 75 %    Lymphocytes % 13 12 - 49 %    Monocytes % 22 (H) 5 - 13 %    Eosinophils % 1 0 - 7 %    Basophils % 1 0 - 1 %    Immature Granulocytes % 1 (H) 0.0 - 0.5 %    Neutrophils Absolute 1.3 (L) 1.8 - 8.0 K/UL    Lymphocytes Absolute 0.2 (L) 0.8 - 3.5 K/UL    Monocytes Absolute 0.4 0.0 - 1.0 K/UL    Eosinophils Absolute 0.0 0.0 - 0.4 K/UL    Basophils Absolute 0.0 0.0 - 0.1 K/UL    Immature Granulocytes Absolute 0.0 0.00 - 0.04 K/UL    Differential Type SMEAR SCANNED      RBC Comment ANISOCYTOSIS  2+        RBC Comment OVALOCYTES  PRESENT       Comprehensive Metabolic Panel    Collection Time: 12/27/24 10:57 AM   Result Value Ref Range    Sodium 137 136 - 145 mmol/L    Potassium 3.8 3.5 - 5.1 mmol/L    Chloride 107 97 - 108 mmol/L    CO2 28 21 - 32 mmol/L    Anion Gap 2 2 - 12 mmol/L    Glucose 161 (H) 65 - 100 mg/dL    BUN 17 6 - 20 MG/DL    Creatinine 1.69 (H) 0.70 - 1.30 MG/DL    BUN/Creatinine Ratio 10 (L) 12 - 20      Est, Glom Filt Rate 44 (L) >60 ml/min/1.73m2    Calcium 8.7 8.5 - 10.1 MG/DL    Total Bilirubin 0.5

## 2025-01-05 LAB
ALBUMIN SERPL ELPH-MCNC: 3.7 G/DL (ref 2.9–4.4)
ALBUMIN/GLOB SERPL: 1.2 (ref 0.7–1.7)
ALPHA1 GLOB SERPL ELPH-MCNC: 0.2 G/DL (ref 0–0.4)
ALPHA2 GLOB SERPL ELPH-MCNC: 1.1 G/DL (ref 0.4–1)
B-GLOBULIN SERPL ELPH-MCNC: 1 G/DL (ref 0.7–1.3)
GAMMA GLOB SERPL ELPH-MCNC: 0.9 G/DL (ref 0.4–1.8)
GLOBULIN SER-MCNC: 3.2 G/DL (ref 2.2–3.9)
IGA SERPL-MCNC: <5 MG/DL (ref 61–437)
IGG SERPL-MCNC: 1071 MG/DL (ref 603–1613)
IGM SERPL-MCNC: <5 MG/DL (ref 20–172)
INTERPRETATION SERPL IEP-IMP: ABNORMAL
KAPPA LC FREE SER-MCNC: <0.7 MG/L (ref 3.3–19.4)
KAPPA LC FREE/LAMBDA FREE SER: ABNORMAL
LAMBDA LC FREE SERPL-MCNC: <1.5 MG/L (ref 5.7–26.3)
M PROTEIN SERPL ELPH-MCNC: ABNORMAL G/DL
PROT SERPL-MCNC: 6.9 G/DL (ref 6–8.5)

## 2025-01-21 NOTE — PROGRESS NOTES
Sameer Gauthiercasie Rich. is a 69 y.o. male here for 3 month follow up for Multiple Myeloma.  Labs done 12/27/24.   12/26/24 bone marrow biopsy  Pt states he is doing ok. No concerns brought up.   Pt wanted to pull vitals from \[facility]\"" since they were just done.     1. Have you been to the ER, urgent care clinic since your last visit?  Hospitalized since your last visit?  12/8/24 - 12/11/24 depression    2. Have you seen or consulted any other health care providers outside of the [facility] since your last visit?  Include any pap smears or colon screening. [facility]

## 2025-01-24 ENCOUNTER — OFFICE VISIT (OUTPATIENT)
Age: 70
End: 2025-01-24
Payer: MEDICARE

## 2025-01-24 ENCOUNTER — HOSPITAL ENCOUNTER (OUTPATIENT)
Facility: HOSPITAL | Age: 70
Setting detail: INFUSION SERIES
Discharge: HOME | End: 2025-01-24
Payer: MEDICARE

## 2025-01-24 VITALS
HEART RATE: 102 BPM | SYSTOLIC BLOOD PRESSURE: 136 MMHG | DIASTOLIC BLOOD PRESSURE: 92 MMHG | RESPIRATION RATE: 16 BRPM | OXYGEN SATURATION: 98 % | BODY MASS INDEX: 25.54 KG/M2 | HEIGHT: 68 IN | TEMPERATURE: 97.9 F | WEIGHT: 168.5 LBS

## 2025-01-24 VITALS
HEART RATE: 102 BPM | TEMPERATURE: 97.9 F | OXYGEN SATURATION: 98 % | BODY MASS INDEX: 25.53 KG/M2 | HEIGHT: 68 IN | SYSTOLIC BLOOD PRESSURE: 136 MMHG | DIASTOLIC BLOOD PRESSURE: 92 MMHG | WEIGHT: 168.43 LBS

## 2025-01-24 DIAGNOSIS — C90.01 MULTIPLE MYELOMA IN REMISSION (HCC): Primary | ICD-10-CM

## 2025-01-24 LAB
ALBUMIN SERPL-MCNC: 3.4 G/DL (ref 3.5–5)
ALBUMIN/GLOB SERPL: 0.9 (ref 1.1–2.2)
ALP SERPL-CCNC: 93 U/L (ref 45–117)
ALT SERPL-CCNC: 29 U/L (ref 12–78)
ANION GAP SERPL CALC-SCNC: 6 MMOL/L (ref 2–12)
AST SERPL-CCNC: 34 U/L (ref 15–37)
BASOPHILS # BLD: 0.01 K/UL (ref 0–0.1)
BASOPHILS NFR BLD: 0.5 % (ref 0–1)
BILIRUB SERPL-MCNC: 0.3 MG/DL (ref 0.2–1)
BUN SERPL-MCNC: 15 MG/DL (ref 6–20)
BUN/CREAT SERPL: 9 (ref 12–20)
CALCIUM SERPL-MCNC: 9.2 MG/DL (ref 8.5–10.1)
CHLORIDE SERPL-SCNC: 105 MMOL/L (ref 97–108)
CO2 SERPL-SCNC: 27 MMOL/L (ref 21–32)
CREAT SERPL-MCNC: 1.7 MG/DL (ref 0.7–1.3)
DIFFERENTIAL METHOD BLD: ABNORMAL
EOSINOPHIL # BLD: 0.01 K/UL (ref 0–0.4)
EOSINOPHIL NFR BLD: 0.5 % (ref 0–7)
ERYTHROCYTE [DISTWIDTH] IN BLOOD BY AUTOMATED COUNT: 21.6 % (ref 11.5–14.5)
GLOBULIN SER CALC-MCNC: 4 G/DL (ref 2–4)
GLUCOSE SERPL-MCNC: 161 MG/DL (ref 65–100)
HCT VFR BLD AUTO: 38.1 % (ref 36.6–50.3)
HGB BLD-MCNC: 11.7 G/DL (ref 12.1–17)
IMM GRANULOCYTES # BLD AUTO: 0.02 K/UL (ref 0–0.04)
IMM GRANULOCYTES NFR BLD AUTO: 1 % (ref 0–0.5)
LYMPHOCYTES # BLD: 0.43 K/UL (ref 0.8–3.5)
LYMPHOCYTES NFR BLD: 20.3 % (ref 12–49)
MCH RBC QN AUTO: 25.4 PG (ref 26–34)
MCHC RBC AUTO-ENTMCNC: 30.7 G/DL (ref 30–36.5)
MCV RBC AUTO: 82.6 FL (ref 80–99)
MONOCYTES # BLD: 0.63 K/UL (ref 0–1)
MONOCYTES NFR BLD: 30 % (ref 5–13)
NEUTS SEG # BLD: 1 K/UL (ref 1.8–8)
NEUTS SEG NFR BLD: 47.7 % (ref 32–75)
NRBC # BLD: 0 K/UL (ref 0–0.01)
NRBC BLD-RTO: 0 PER 100 WBC
PLATELET # BLD AUTO: 114 K/UL (ref 150–400)
PMV BLD AUTO: 11.4 FL (ref 8.9–12.9)
POTASSIUM SERPL-SCNC: 4 MMOL/L (ref 3.5–5.1)
PROT SERPL-MCNC: 7.4 G/DL (ref 6.4–8.2)
RBC # BLD AUTO: 4.61 M/UL (ref 4.1–5.7)
RBC MORPH BLD: ABNORMAL
SODIUM SERPL-SCNC: 138 MMOL/L (ref 136–145)
WBC # BLD AUTO: 2.1 K/UL (ref 4.1–11.1)

## 2025-01-24 PROCEDURE — 36591 DRAW BLOOD OFF VENOUS DEVICE: CPT

## 2025-01-24 PROCEDURE — 1036F TOBACCO NON-USER: CPT | Performed by: INTERNAL MEDICINE

## 2025-01-24 PROCEDURE — 99215 OFFICE O/P EST HI 40 MIN: CPT | Performed by: INTERNAL MEDICINE

## 2025-01-24 PROCEDURE — 1159F MED LIST DOCD IN RCRD: CPT | Performed by: INTERNAL MEDICINE

## 2025-01-24 PROCEDURE — 3075F SYST BP GE 130 - 139MM HG: CPT | Performed by: INTERNAL MEDICINE

## 2025-01-24 PROCEDURE — 36415 COLL VENOUS BLD VENIPUNCTURE: CPT

## 2025-01-24 PROCEDURE — 84165 PROTEIN E-PHORESIS SERUM: CPT

## 2025-01-24 PROCEDURE — 86334 IMMUNOFIX E-PHORESIS SERUM: CPT

## 2025-01-24 PROCEDURE — 3017F COLORECTAL CA SCREEN DOC REV: CPT | Performed by: INTERNAL MEDICINE

## 2025-01-24 PROCEDURE — 3080F DIAST BP >= 90 MM HG: CPT | Performed by: INTERNAL MEDICINE

## 2025-01-24 PROCEDURE — 82784 ASSAY IGA/IGD/IGG/IGM EACH: CPT

## 2025-01-24 PROCEDURE — 83521 IG LIGHT CHAINS FREE EACH: CPT

## 2025-01-24 PROCEDURE — 85025 COMPLETE CBC W/AUTO DIFF WBC: CPT

## 2025-01-24 PROCEDURE — 1126F AMNT PAIN NOTED NONE PRSNT: CPT | Performed by: INTERNAL MEDICINE

## 2025-01-24 PROCEDURE — G8428 CUR MEDS NOT DOCUMENT: HCPCS | Performed by: INTERNAL MEDICINE

## 2025-01-24 PROCEDURE — 1124F ACP DISCUSS-NO DSCNMKR DOCD: CPT | Performed by: INTERNAL MEDICINE

## 2025-01-24 PROCEDURE — 80053 COMPREHEN METABOLIC PANEL: CPT

## 2025-01-24 PROCEDURE — G8419 CALC BMI OUT NRM PARAM NOF/U: HCPCS | Performed by: INTERNAL MEDICINE

## 2025-01-24 RX ORDER — SODIUM CHLORIDE 9 MG/ML
INJECTION, SOLUTION INTRAVENOUS CONTINUOUS
OUTPATIENT
Start: 2025-02-21

## 2025-01-24 RX ORDER — ACETAMINOPHEN 325 MG/1
650 TABLET ORAL
OUTPATIENT
Start: 2025-02-21

## 2025-01-24 RX ORDER — HYDROCORTISONE SODIUM SUCCINATE 100 MG/2ML
100 INJECTION INTRAMUSCULAR; INTRAVENOUS
OUTPATIENT
Start: 2025-02-21

## 2025-01-24 RX ORDER — DIPHENHYDRAMINE HYDROCHLORIDE 50 MG/ML
50 INJECTION INTRAMUSCULAR; INTRAVENOUS
OUTPATIENT
Start: 2025-02-21

## 2025-01-24 RX ORDER — SODIUM CHLORIDE 9 MG/ML
25 INJECTION, SOLUTION INTRAVENOUS PRN
OUTPATIENT
Start: 2025-02-21

## 2025-01-24 RX ORDER — HEPARIN 100 UNIT/ML
500 SYRINGE INTRAVENOUS PRN
OUTPATIENT
Start: 2025-02-21

## 2025-01-24 RX ORDER — EPINEPHRINE 1 MG/ML
0.3 INJECTION, SOLUTION INTRAMUSCULAR; SUBCUTANEOUS PRN
OUTPATIENT
Start: 2025-02-21

## 2025-01-24 RX ORDER — SODIUM CHLORIDE 0.9 % (FLUSH) 0.9 %
5-40 SYRINGE (ML) INJECTION PRN
OUTPATIENT
Start: 2025-02-21

## 2025-01-24 RX ORDER — ALBUTEROL SULFATE 90 UG/1
4 INHALANT RESPIRATORY (INHALATION) PRN
OUTPATIENT
Start: 2025-02-21

## 2025-01-24 RX ORDER — ONDANSETRON 2 MG/ML
8 INJECTION INTRAMUSCULAR; INTRAVENOUS
OUTPATIENT
Start: 2025-02-21

## 2025-01-24 NOTE — PROGRESS NOTES
[facility]   at [facility]   [address], [address]   [place] [address]   [phone]             Progress Note       Patient: Sameer Iglesias Sr.  MRN: [id number]   SSN: [id number]    YOB: 1955              Diagnosis:         1. Multiple Myeloma, IgA Kappa    Durie [place] stage IIIB      Cytogenetics/FISH: t(14;16) - high risk        Ttreatment:        1. Maintenance therapy - Pomalyst 2 mg daily - 11/19/2018    2. Maintenance therapy - Ixazomib - started 10/15/2018 - stopped 11/12/2018   3. S/P tandem  Autotransplant    First on 2/28/2018    2nd on 06/13/2018   4. Carfilzomib/Pom/Dex - s/p 5 cycles   5. VD-PACE s/p 1 cycle   6. RVD - s/p 4 cycles    7. Anne/Velcade/Dex, 2 Cycles   8. Isatuximab/Carfilzomib/Dex, 8 cycles  9. Carfilzomib/Selinexor/Dex, 3 cycles  10.Carvykti CAR T in April 2024      Subjective:        Sameer Iglesias Sr. is a 67 y.o. male with a diagnosis of IgA kappa myeloma. Bone marrow shows 100% aberrant plasma cells. He suffers with DM but it is diet controlled. He has received systemic anti-viral  treatment for chronic Hep C with successful eradication of the virus. Mr. Iglesias received 4 cycles of systemic therapy with RVd. He had an initial good response to treatment. However his paraprotein level started rising and the myeloma became refractory  to treatment. I then administered one cycle of VD-PACE in the hospital. He then received Carfilzomib/Pom/Dex. He achieved VGPR. He underwent tandem autotransplant at [facility]. According to the patient he achieved complete response with therapy. He has undergone  second/tandem transplant in June. He took Pomalyst maintenance until Aug 2019 then had to stop due to recurrent prostatitis. He had a repeat BM biopsy in February 2019 at [facility] which showed complete molecular remission. He took Pomalyst as maintenance therapy.     Mr. Iglesias was noted to have

## 2025-01-24 NOTE — PROGRESS NOTES
[facility] Note:  Arrived for Port Flush + labs     BP (!) 136/92   Pulse (!) 102   Temp 97.9 °F (36.6 °C) (Temporal)   Resp 16   Ht 1.727 m (5' 8\")   Wt 76.4 kg (168 lb 8 oz)   SpO2 98%   BMI 25.62 kg/m²     Port accessed, labs drawn & flushed per protocol w/o difficulty. Love needle removed.     No results found for this or any previous visit (from the past 12 hour(s)).     Tolerated well. Pt denies any acute problems/changes. Discharged from [facility] ambulatory. No distress. Next appt: Patient aware.

## 2025-01-28 LAB
ALBUMIN SERPL ELPH-MCNC: 3.4 G/DL (ref 2.9–4.4)
ALBUMIN/GLOB SERPL: 1.1 (ref 0.7–1.7)
ALPHA1 GLOB SERPL ELPH-MCNC: 0.3 G/DL (ref 0–0.4)
ALPHA2 GLOB SERPL ELPH-MCNC: 1.2 G/DL (ref 0.4–1)
B-GLOBULIN SERPL ELPH-MCNC: 1 G/DL (ref 0.7–1.3)
GAMMA GLOB SERPL ELPH-MCNC: 0.7 G/DL (ref 0.4–1.8)
GLOBULIN SER-MCNC: 3.2 G/DL (ref 2.2–3.9)
IGA SERPL-MCNC: <5 MG/DL (ref 61–437)
IGG SERPL-MCNC: 762 MG/DL (ref 603–1613)
IGM SERPL-MCNC: <5 MG/DL (ref 20–172)
INTERPRETATION SERPL IEP-IMP: ABNORMAL
KAPPA LC FREE SER-MCNC: <0.7 MG/L (ref 3.3–19.4)
KAPPA LC FREE/LAMBDA FREE SER: ABNORMAL
LAMBDA LC FREE SERPL-MCNC: <1.5 MG/L (ref 5.7–26.3)
M PROTEIN SERPL ELPH-MCNC: ABNORMAL G/DL
PROT SERPL-MCNC: 6.6 G/DL (ref 6–8.5)

## 2025-03-21 ENCOUNTER — HOSPITAL ENCOUNTER (OUTPATIENT)
Facility: HOSPITAL | Age: 70
Setting detail: INFUSION SERIES
Discharge: HOME | End: 2025-03-21
Payer: MEDICARE

## 2025-03-21 VITALS
TEMPERATURE: 98.5 F | OXYGEN SATURATION: 97 % | RESPIRATION RATE: 17 BRPM | SYSTOLIC BLOOD PRESSURE: 114 MMHG | HEART RATE: 83 BPM | DIASTOLIC BLOOD PRESSURE: 77 MMHG

## 2025-03-21 DIAGNOSIS — C90.01 MULTIPLE MYELOMA IN REMISSION (HCC): Primary | ICD-10-CM

## 2025-03-21 LAB
ALBUMIN SERPL-MCNC: 3.4 G/DL (ref 3.5–5)
ALBUMIN/GLOB SERPL: 1.2 (ref 1.1–2.2)
ALP SERPL-CCNC: 64 U/L (ref 45–117)
ALT SERPL-CCNC: 19 U/L (ref 12–78)
ANION GAP SERPL CALC-SCNC: 6 MMOL/L (ref 2–12)
AST SERPL-CCNC: 18 U/L (ref 15–37)
BASOPHILS # BLD: 0.07 K/UL (ref 0–0.1)
BASOPHILS NFR BLD: 2 % (ref 0–1)
BILIRUB SERPL-MCNC: 0.3 MG/DL (ref 0.2–1)
BUN SERPL-MCNC: 14 MG/DL (ref 6–20)
BUN/CREAT SERPL: 9 (ref 12–20)
CALCIUM SERPL-MCNC: 8.8 MG/DL (ref 8.5–10.1)
CHLORIDE SERPL-SCNC: 106 MMOL/L (ref 97–108)
CO2 SERPL-SCNC: 28 MMOL/L (ref 21–32)
CREAT SERPL-MCNC: 1.58 MG/DL (ref 0.7–1.3)
DIFFERENTIAL METHOD BLD: ABNORMAL
EOSINOPHIL # BLD: 0.03 K/UL (ref 0–0.4)
EOSINOPHIL NFR BLD: 1 % (ref 0–7)
ERYTHROCYTE [DISTWIDTH] IN BLOOD BY AUTOMATED COUNT: 19.7 % (ref 11.5–14.5)
GLOBULIN SER CALC-MCNC: 2.9 G/DL (ref 2–4)
GLUCOSE SERPL-MCNC: 82 MG/DL (ref 65–100)
HCT VFR BLD AUTO: 32.3 % (ref 36.6–50.3)
HGB BLD-MCNC: 9.7 G/DL (ref 12.1–17)
IMM GRANULOCYTES # BLD AUTO: 0 K/UL (ref 0–0.04)
IMM GRANULOCYTES NFR BLD AUTO: 0 % (ref 0–0.5)
LYMPHOCYTES # BLD: 0.23 K/UL (ref 0.8–3.5)
LYMPHOCYTES NFR BLD: 7 % (ref 12–49)
MCH RBC QN AUTO: 25.5 PG (ref 26–34)
MCHC RBC AUTO-ENTMCNC: 30 G/DL (ref 30–36.5)
MCV RBC AUTO: 84.8 FL (ref 80–99)
MONOCYTES # BLD: 0.36 K/UL (ref 0–1)
MONOCYTES NFR BLD: 11 % (ref 5–13)
NEUTS SEG # BLD: 2.61 K/UL (ref 1.8–8)
NEUTS SEG NFR BLD: 79 % (ref 32–75)
NRBC # BLD: 0 K/UL (ref 0–0.01)
NRBC BLD-RTO: 0 PER 100 WBC
PLATELET # BLD AUTO: 165 K/UL (ref 150–400)
PLATELET COMMENT: ABNORMAL
PMV BLD AUTO: 10.5 FL (ref 8.9–12.9)
POTASSIUM SERPL-SCNC: 3.4 MMOL/L (ref 3.5–5.1)
PROT SERPL-MCNC: 6.3 G/DL (ref 6.4–8.2)
RBC # BLD AUTO: 3.81 M/UL (ref 4.1–5.7)
RBC MORPH BLD: ABNORMAL
SODIUM SERPL-SCNC: 140 MMOL/L (ref 136–145)
URATE SERPL-MCNC: 3.5 MG/DL (ref 3.5–7.2)
WBC # BLD AUTO: 3.3 K/UL (ref 4.1–11.1)
WBC MORPH BLD: ABNORMAL

## 2025-03-21 PROCEDURE — 85025 COMPLETE CBC W/AUTO DIFF WBC: CPT

## 2025-03-21 PROCEDURE — 80053 COMPREHEN METABOLIC PANEL: CPT

## 2025-03-21 PROCEDURE — 82784 ASSAY IGA/IGD/IGG/IGM EACH: CPT

## 2025-03-21 PROCEDURE — 84165 PROTEIN E-PHORESIS SERUM: CPT

## 2025-03-21 PROCEDURE — 83521 IG LIGHT CHAINS FREE EACH: CPT

## 2025-03-21 PROCEDURE — 84550 ASSAY OF BLOOD/URIC ACID: CPT

## 2025-03-21 PROCEDURE — 36591 DRAW BLOOD OFF VENOUS DEVICE: CPT

## 2025-03-21 PROCEDURE — 36415 COLL VENOUS BLD VENIPUNCTURE: CPT

## 2025-03-21 PROCEDURE — 86334 IMMUNOFIX E-PHORESIS SERUM: CPT

## 2025-03-21 RX ORDER — SODIUM CHLORIDE 0.9 % (FLUSH) 0.9 %
5-40 SYRINGE (ML) INJECTION PRN
Status: DISCONTINUED | OUTPATIENT
Start: 2025-03-21 | End: 2025-03-22 | Stop reason: HOSPADM

## 2025-03-21 RX ORDER — HYDROCORTISONE SODIUM SUCCINATE 100 MG/2ML
100 INJECTION INTRAMUSCULAR; INTRAVENOUS
OUTPATIENT
Start: 2025-04-18

## 2025-03-21 RX ORDER — SODIUM CHLORIDE 0.9 % (FLUSH) 0.9 %
5-40 SYRINGE (ML) INJECTION PRN
OUTPATIENT
Start: 2025-04-18

## 2025-03-21 RX ORDER — ALBUTEROL SULFATE 90 UG/1
4 INHALANT RESPIRATORY (INHALATION) PRN
OUTPATIENT
Start: 2025-04-18

## 2025-03-21 RX ORDER — PREDNISONE 5 MG/1
TABLET ORAL
COMMUNITY
Start: 2025-03-18

## 2025-03-21 RX ORDER — VILAZODONE HYDROCHLORIDE 40 MG/1
40 TABLET ORAL
COMMUNITY

## 2025-03-21 RX ORDER — HEPARIN 100 UNIT/ML
500 SYRINGE INTRAVENOUS PRN
OUTPATIENT
Start: 2025-04-18

## 2025-03-21 RX ORDER — ACETAMINOPHEN 325 MG/1
650 TABLET ORAL
OUTPATIENT
Start: 2025-04-18

## 2025-03-21 RX ORDER — ONDANSETRON 2 MG/ML
8 INJECTION INTRAMUSCULAR; INTRAVENOUS
OUTPATIENT
Start: 2025-04-18

## 2025-03-21 RX ORDER — SODIUM CHLORIDE 9 MG/ML
25 INJECTION, SOLUTION INTRAVENOUS PRN
OUTPATIENT
Start: 2025-04-18

## 2025-03-21 RX ORDER — DIPHENHYDRAMINE HYDROCHLORIDE 50 MG/ML
50 INJECTION, SOLUTION INTRAMUSCULAR; INTRAVENOUS
OUTPATIENT
Start: 2025-04-18

## 2025-03-21 RX ORDER — SODIUM CHLORIDE 9 MG/ML
INJECTION, SOLUTION INTRAVENOUS CONTINUOUS
OUTPATIENT
Start: 2025-04-18

## 2025-03-21 RX ORDER — EPINEPHRINE 1 MG/ML
0.3 INJECTION, SOLUTION INTRAMUSCULAR; SUBCUTANEOUS PRN
OUTPATIENT
Start: 2025-04-18

## 2025-03-21 ASSESSMENT — PAIN SCALES - GENERAL: PAINLEVEL_OUTOF10: 5

## 2025-03-21 ASSESSMENT — PAIN DESCRIPTION - LOCATION: LOCATION: NECK

## 2025-03-21 NOTE — PROGRESS NOTES
Name: Sameer Iglesias Sr.    MRN: [id number]         : 1955    Mr. Iglesias arrived ambulatory and in no distress for PF/Labs (CBC, CMP, Gammo).  Right chest wall port accessed without difficulty. Labs drawn & sent for processing.    Mr. Iglesias's vitals were reviewed.  Patient Vitals for the past 24 hrs:   BP Temp Pulse Resp SpO2   25 1353 114/77 98.5 °F (36.9 °C) 83 17 97 %       Lab results were not available at the time of this note.    Mr. Iglesias tolerated treatment well. Port flushed and de-accessed per protocol and pt was discharged from [facility]patient [facility]fusion Center in stable condition at 1408. He is to return on 25 for his next appointment.    Tanesha Baig RN  2025

## 2025-03-26 LAB
ALBUMIN SERPL ELPH-MCNC: 3.3 G/DL (ref 2.9–4.4)
ALBUMIN/GLOB SERPL: 1.4 (ref 0.7–1.7)
ALPHA1 GLOB SERPL ELPH-MCNC: 0.2 G/DL (ref 0–0.4)
ALPHA2 GLOB SERPL ELPH-MCNC: 1 G/DL (ref 0.4–1)
B-GLOBULIN SERPL ELPH-MCNC: 0.9 G/DL (ref 0.7–1.3)
GAMMA GLOB SERPL ELPH-MCNC: 0.3 G/DL (ref 0.4–1.8)
GLOBULIN SER-MCNC: 2.5 G/DL (ref 2.2–3.9)
IGA SERPL-MCNC: <5 MG/DL (ref 61–437)
IGG SERPL-MCNC: 396 MG/DL (ref 603–1613)
IGM SERPL-MCNC: <5 MG/DL (ref 20–172)
INTERPRETATION SERPL IEP-IMP: ABNORMAL
KAPPA LC FREE SER-MCNC: <0.7 MG/L (ref 3.3–19.4)
KAPPA LC FREE/LAMBDA FREE SER: ABNORMAL
LAMBDA LC FREE SERPL-MCNC: <1.5 MG/L (ref 5.7–26.3)
M PROTEIN SERPL ELPH-MCNC: ABNORMAL G/DL
PROT SERPL-MCNC: 5.8 G/DL (ref 6–8.5)

## 2025-03-26 RX ORDER — PRAVASTATIN SODIUM 10 MG
10 TABLET ORAL NIGHTLY
Qty: 90 TABLET | Refills: 1 | OUTPATIENT
Start: 2025-03-26

## 2025-04-09 NOTE — PROGRESS NOTES
Preprocedure reminder call Knee injection    Arrival time 1145 am    Location: [facility] [address]    Has the patient had a flu shot or any other vaccinations within the past 7 days? no    If yes, explain that for the vaccine to work best they need to:              wait 1 week before and 1 week after getting any Vaccine           wait 1 week before and 2 weeks after getting any Covid Vaccine    If patient has concerns about the timing, send to RN pool    Have you had an oral steroid or antibiotic in the last five days? no  (Oral steroid ok per [place] but note the patient is taking it or not)    No oral steroids or antibiotics for at least five days before the day of procedure    If patient is diabetic have them bring their glucometer    If the above recommendations have not been met, report to the nurse.     Its ok to eat and drink as usual and take your  BP and diabetes medications if this applies to you. ok (note patient has been informed yes or no)      If the procedure needs to be rescheduled call CS to reschedule or to talk to the nurse about any questions.    [phone]    Spoke with patient after verifying identity with /ADDRESS. Patient stated he has a hosp f/u appt with Dr. Gayle Ledesma on 2018 @ 9:00AM. Patient encouraged to attend appt.

## 2025-04-22 NOTE — PROGRESS NOTES
Sameer RUCKER Kelsie Nava is a 69 y.o. male here for 3 month follow up for Multiple Myeloma  Pt says he is marvelous. No concerns brought up.   No medication changes per patient.     1. Have you been to the ER, urgent care clinic since your last visit?  Hospitalized since your last visit? no    2. Have you seen or consulted any other health care providers outside of the [facility] since your last visit?  Include any pap smears or colon screening. no

## 2025-04-23 ENCOUNTER — TELEPHONE (OUTPATIENT)
Age: 70
End: 2025-04-23

## 2025-04-23 NOTE — TELEPHONE ENCOUNTER
Patient called [facility] to reschedule missed appointment, message sent to me. Called patient to reschedule, no answer, left  requesting a return call.

## 2025-04-25 ENCOUNTER — OFFICE VISIT (OUTPATIENT)
Age: 70
End: 2025-04-25
Payer: MEDICARE

## 2025-04-25 VITALS
BODY MASS INDEX: 26.8 KG/M2 | DIASTOLIC BLOOD PRESSURE: 84 MMHG | HEIGHT: 68 IN | TEMPERATURE: 98.1 F | WEIGHT: 176.8 LBS | SYSTOLIC BLOOD PRESSURE: 128 MMHG | OXYGEN SATURATION: 99 % | HEART RATE: 98 BPM

## 2025-04-25 DIAGNOSIS — C90.01 MULTIPLE MYELOMA IN REMISSION (HCC): Primary | ICD-10-CM

## 2025-04-25 PROCEDURE — 99213 OFFICE O/P EST LOW 20 MIN: CPT | Performed by: INTERNAL MEDICINE

## 2025-04-25 PROCEDURE — 1124F ACP DISCUSS-NO DSCNMKR DOCD: CPT | Performed by: INTERNAL MEDICINE

## 2025-04-25 PROCEDURE — G8428 CUR MEDS NOT DOCUMENT: HCPCS | Performed by: INTERNAL MEDICINE

## 2025-04-25 PROCEDURE — G8419 CALC BMI OUT NRM PARAM NOF/U: HCPCS | Performed by: INTERNAL MEDICINE

## 2025-04-25 PROCEDURE — 1036F TOBACCO NON-USER: CPT | Performed by: INTERNAL MEDICINE

## 2025-04-25 PROCEDURE — 3079F DIAST BP 80-89 MM HG: CPT | Performed by: INTERNAL MEDICINE

## 2025-04-25 PROCEDURE — 3017F COLORECTAL CA SCREEN DOC REV: CPT | Performed by: INTERNAL MEDICINE

## 2025-04-25 PROCEDURE — 3074F SYST BP LT 130 MM HG: CPT | Performed by: INTERNAL MEDICINE

## 2025-04-25 PROCEDURE — 1126F AMNT PAIN NOTED NONE PRSNT: CPT | Performed by: INTERNAL MEDICINE

## 2025-04-25 NOTE — PROGRESS NOTES
[facility] [place]   at [facility]   [address], [address]   [place] [address]   [phone]             Progress Note       Patient: Sameer Iglesias Sr.  MRN: [id number]   SSN: [id number]    YOB: 1955              Diagnosis:         1. Multiple Myeloma, IgA Kappa    Durie [place] stage IIIB      Cytogenetics/FISH: t(14;16) - high risk        Ttreatment:        1. Maintenance therapy - Pomalyst 2 mg daily - 11/19/2018    2. Maintenance therapy - Ixazomib - started 10/15/2018 - stopped 11/12/2018   3. S/P tandem  Autotransplant    First on 2/28/2018    2nd on 06/13/2018   4. Carfilzomib/Pom/Dex - s/p 5 cycles   5. VD-PACE s/p 1 cycle   6. RVD - s/p 4 cycles    7. Anne/Velcade/Dex, 2 Cycles   8. Isatuximab/Carfilzomib/Dex, 8 cycles  9. Carfilzomib/Selinexor/Dex, 3 cycles  10.Carvykti CAR T in April 2024      Subjective:        Sameer Iglesias Sr. is a 67 y.o. male with a diagnosis of IgA kappa myeloma. Bone marrow shows 100% aberrant plasma cells. He suffers with DM but it is diet controlled. He has received systemic anti-viral  treatment for chronic Hep C with successful eradication of the virus. Mr. Iglesias received 4 cycles of systemic therapy with RVd. He had an initial good response to treatment. However his paraprotein level started rising and the myeloma became refractory  to treatment. I then administered one cycle of VD-PACE in the hospital. He then received Carfilzomib/Pom/Dex. He achieved VGPR. He underwent tandem autotransplant at [facility]. According to the patient he achieved complete response with therapy. He has undergone  second/tandem transplant in June. He took Pomalyst maintenance until Aug 2019 then had to stop due to recurrent prostatitis. He had a repeat BM biopsy in February 2019 at [facility] which showed complete molecular remission. He took Pomalyst as maintenance therapy.     Mr. Iglesias was noted to have

## 2025-05-20 NOTE — TELEPHONE ENCOUNTER
2nd call. Patient has missed 2 port flush appointments. Patient did not answer, left  requesting a return call.

## 2025-06-02 ENCOUNTER — TELEPHONE (OUTPATIENT)
Age: 70
End: 2025-06-02

## 2025-06-02 NOTE — TELEPHONE ENCOUNTER
Pt was a no show for labs and port flush.  [place] plan is in. Please contact to schedule [facility] appt. We can wait on labs before scheduling sooner OV appt.

## 2025-06-13 ENCOUNTER — HOSPITAL ENCOUNTER (OUTPATIENT)
Facility: HOSPITAL | Age: 70
Setting detail: INFUSION SERIES
Discharge: HOME | End: 2025-06-13
Payer: MEDICARE

## 2025-06-13 VITALS
BODY MASS INDEX: 26.3 KG/M2 | TEMPERATURE: 97.9 F | OXYGEN SATURATION: 97 % | RESPIRATION RATE: 16 BRPM | WEIGHT: 173 LBS | SYSTOLIC BLOOD PRESSURE: 122 MMHG | DIASTOLIC BLOOD PRESSURE: 80 MMHG | HEART RATE: 87 BPM

## 2025-06-13 DIAGNOSIS — C90.01 MULTIPLE MYELOMA IN REMISSION (HCC): Primary | ICD-10-CM

## 2025-06-13 LAB
ALBUMIN SERPL-MCNC: 3.6 G/DL (ref 3.5–5)
ALBUMIN/GLOB SERPL: 1.2 (ref 1.1–2.2)
ALP SERPL-CCNC: 70 U/L (ref 45–117)
ALT SERPL-CCNC: 34 U/L (ref 12–78)
ANION GAP SERPL CALC-SCNC: 6 MMOL/L (ref 2–12)
AST SERPL-CCNC: 30 U/L (ref 15–37)
BASOPHILS # BLD: 0.01 K/UL (ref 0–0.1)
BASOPHILS NFR BLD: 0.4 % (ref 0–1)
BILIRUB SERPL-MCNC: 0.3 MG/DL (ref 0.2–1)
BUN SERPL-MCNC: 14 MG/DL (ref 6–20)
BUN/CREAT SERPL: 9 (ref 12–20)
CALCIUM SERPL-MCNC: 8.8 MG/DL (ref 8.5–10.1)
CHLORIDE SERPL-SCNC: 106 MMOL/L (ref 97–108)
CO2 SERPL-SCNC: 28 MMOL/L (ref 21–32)
CREAT SERPL-MCNC: 1.51 MG/DL (ref 0.7–1.3)
DIFFERENTIAL METHOD BLD: ABNORMAL
EOSINOPHIL # BLD: 0 K/UL (ref 0–0.4)
EOSINOPHIL NFR BLD: 0 % (ref 0–7)
ERYTHROCYTE [DISTWIDTH] IN BLOOD BY AUTOMATED COUNT: 24.9 % (ref 11.5–14.5)
GLOBULIN SER CALC-MCNC: 2.9 G/DL (ref 2–4)
GLUCOSE SERPL-MCNC: 132 MG/DL (ref 65–100)
HCT VFR BLD AUTO: 36.3 % (ref 36.6–50.3)
HGB BLD-MCNC: 10.7 G/DL (ref 12.1–17)
IMM GRANULOCYTES # BLD AUTO: 0.02 K/UL (ref 0–0.04)
IMM GRANULOCYTES NFR BLD AUTO: 0.8 % (ref 0–0.5)
LYMPHOCYTES # BLD: 0.19 K/UL (ref 0.8–3.5)
LYMPHOCYTES NFR BLD: 7.9 % (ref 12–49)
MCH RBC QN AUTO: 23.9 PG (ref 26–34)
MCHC RBC AUTO-ENTMCNC: 29.5 G/DL (ref 30–36.5)
MCV RBC AUTO: 81 FL (ref 80–99)
MONOCYTES # BLD: 0.49 K/UL (ref 0–1)
MONOCYTES NFR BLD: 20.5 % (ref 5–13)
NEUTS SEG # BLD: 1.69 K/UL (ref 1.8–8)
NEUTS SEG NFR BLD: 70.4 % (ref 32–75)
NRBC # BLD: 0 K/UL (ref 0–0.01)
NRBC BLD-RTO: 0 PER 100 WBC
PLATELET # BLD AUTO: 156 K/UL (ref 150–400)
POTASSIUM SERPL-SCNC: 4 MMOL/L (ref 3.5–5.1)
PROT SERPL-MCNC: 6.5 G/DL (ref 6.4–8.2)
RBC # BLD AUTO: 4.48 M/UL (ref 4.1–5.7)
RBC MORPH BLD: ABNORMAL
SODIUM SERPL-SCNC: 140 MMOL/L (ref 136–145)
WBC # BLD AUTO: 2.4 K/UL (ref 4.1–11.1)

## 2025-06-13 PROCEDURE — 82784 ASSAY IGA/IGD/IGG/IGM EACH: CPT

## 2025-06-13 PROCEDURE — 36591 DRAW BLOOD OFF VENOUS DEVICE: CPT

## 2025-06-13 PROCEDURE — 84165 PROTEIN E-PHORESIS SERUM: CPT

## 2025-06-13 PROCEDURE — 86334 IMMUNOFIX E-PHORESIS SERUM: CPT

## 2025-06-13 PROCEDURE — 83521 IG LIGHT CHAINS FREE EACH: CPT

## 2025-06-13 PROCEDURE — 85025 COMPLETE CBC W/AUTO DIFF WBC: CPT

## 2025-06-13 PROCEDURE — 80053 COMPREHEN METABOLIC PANEL: CPT

## 2025-06-13 PROCEDURE — 2500000003 HC RX 250 WO HCPCS: Performed by: NURSE PRACTITIONER

## 2025-06-13 PROCEDURE — 36415 COLL VENOUS BLD VENIPUNCTURE: CPT

## 2025-06-13 RX ORDER — HEPARIN 100 UNIT/ML
500 SYRINGE INTRAVENOUS PRN
OUTPATIENT
Start: 2025-07-11

## 2025-06-13 RX ORDER — ONDANSETRON 2 MG/ML
8 INJECTION INTRAMUSCULAR; INTRAVENOUS
OUTPATIENT
Start: 2025-07-11

## 2025-06-13 RX ORDER — DIPHENHYDRAMINE HYDROCHLORIDE 50 MG/ML
50 INJECTION, SOLUTION INTRAMUSCULAR; INTRAVENOUS
OUTPATIENT
Start: 2025-07-11

## 2025-06-13 RX ORDER — ALBUTEROL SULFATE 90 UG/1
4 INHALANT RESPIRATORY (INHALATION) PRN
OUTPATIENT
Start: 2025-07-11

## 2025-06-13 RX ORDER — SODIUM CHLORIDE 9 MG/ML
25 INJECTION, SOLUTION INTRAVENOUS PRN
OUTPATIENT
Start: 2025-07-11

## 2025-06-13 RX ORDER — HYDROCORTISONE SODIUM SUCCINATE 100 MG/2ML
100 INJECTION INTRAMUSCULAR; INTRAVENOUS
OUTPATIENT
Start: 2025-07-11

## 2025-06-13 RX ORDER — SODIUM CHLORIDE 9 MG/ML
INJECTION, SOLUTION INTRAVENOUS CONTINUOUS
OUTPATIENT
Start: 2025-07-11

## 2025-06-13 RX ORDER — SODIUM CHLORIDE 0.9 % (FLUSH) 0.9 %
5-40 SYRINGE (ML) INJECTION PRN
Status: DISCONTINUED | OUTPATIENT
Start: 2025-06-13 | End: 2025-06-14 | Stop reason: HOSPADM

## 2025-06-13 RX ORDER — EPINEPHRINE 1 MG/ML
0.3 INJECTION, SOLUTION INTRAMUSCULAR; SUBCUTANEOUS PRN
OUTPATIENT
Start: 2025-07-11

## 2025-06-13 RX ORDER — ACETAMINOPHEN 325 MG/1
650 TABLET ORAL
OUTPATIENT
Start: 2025-07-11

## 2025-06-13 RX ORDER — SODIUM CHLORIDE 0.9 % (FLUSH) 0.9 %
5-40 SYRINGE (ML) INJECTION PRN
OUTPATIENT
Start: 2025-07-11

## 2025-06-13 RX ADMIN — SODIUM CHLORIDE, PRESERVATIVE FREE 10 ML: 5 INJECTION INTRAVENOUS at 11:51

## 2025-06-13 RX ADMIN — SODIUM CHLORIDE, PRESERVATIVE FREE 10 ML: 5 INJECTION INTRAVENOUS at 11:50

## 2025-06-13 NOTE — PROGRESS NOTES
[facility] Note:  Arrived for Port Flush + labs     /80   Pulse 87   Temp 97.9 °F (36.6 °C)   Resp 16   Wt 78.5 kg (173 lb)   SpO2 97%   BMI 26.30 kg/m²     Port accessed, labs drawn- (CBC w/ diff, CMP, Myeloma labs), & flushed per protocol w/o difficulty. Love needle removed.     Tolerated well. Pt denies any acute problems/changes. Discharged from [facility] ambulatory. No distress. Next appt: 7/11/25

## 2025-06-17 LAB
ALBUMIN SERPL ELPH-MCNC: 3.4 G/DL (ref 2.9–4.4)
ALBUMIN/GLOB SERPL: 1.4 (ref 0.7–1.7)
ALPHA1 GLOB SERPL ELPH-MCNC: 0.2 G/DL (ref 0–0.4)
ALPHA2 GLOB SERPL ELPH-MCNC: 1.2 G/DL (ref 0.4–1)
B-GLOBULIN SERPL ELPH-MCNC: 1 G/DL (ref 0.7–1.3)
GAMMA GLOB SERPL ELPH-MCNC: 0.2 G/DL (ref 0.4–1.8)
GLOBULIN SER-MCNC: 2.5 G/DL (ref 2.2–3.9)
IGA SERPL-MCNC: <5 MG/DL (ref 61–437)
IGG SERPL-MCNC: 248 MG/DL (ref 603–1613)
IGM SERPL-MCNC: <5 MG/DL (ref 20–172)
INTERPRETATION SERPL IEP-IMP: ABNORMAL
KAPPA LC FREE SER-MCNC: <0.7 MG/L (ref 3.3–19.4)
KAPPA LC FREE/LAMBDA FREE SER: ABNORMAL
LAMBDA LC FREE SERPL-MCNC: <1.5 MG/L (ref 5.7–26.3)
M PROTEIN SERPL ELPH-MCNC: ABNORMAL G/DL
PROT SERPL-MCNC: 5.9 G/DL (ref 6–8.5)

## 2025-07-11 ENCOUNTER — HOSPITAL ENCOUNTER (OUTPATIENT)
Facility: HOSPITAL | Age: 70
Setting detail: INFUSION SERIES
End: 2025-07-11

## 2025-07-11 ENCOUNTER — TELEPHONE (OUTPATIENT)
Age: 70
End: 2025-07-11

## 2025-07-11 NOTE — TELEPHONE ENCOUNTER
Patient called hospitals to cancel today's appointments. Called patient to reschedule, no answer, left  requesting a return call.

## 2025-07-29 ENCOUNTER — TELEPHONE (OUTPATIENT)
Age: 70
End: 2025-07-29

## 2025-07-29 DIAGNOSIS — M79.89 PAIN AND SWELLING OF LEFT UPPER EXTREMITY: Primary | ICD-10-CM

## 2025-07-29 DIAGNOSIS — M79.602 PAIN AND SWELLING OF LEFT UPPER EXTREMITY: Primary | ICD-10-CM

## 2025-07-29 NOTE — TELEPHONE ENCOUNTER
Called pt to inform of scan ordered.    HIPAA verified by two patient identifiers.     Pt aware.

## 2025-07-29 NOTE — TELEPHONE ENCOUNTER
Spoke w/patient. His is going to keep his next scheduled appointment 8/8 @2pm. Patient is aware of date and time.

## 2025-07-29 NOTE — TELEPHONE ENCOUNTER
Returned call to pt.  HIPAA verified by two patient identifiers.     Pt reports his left arm is swollen down to his fingertips,which has been going on for a couple of months.  He has a port but it is on the right side.  No discoloration to the left arm. Sensation  still present. No numbness or tingling. Warm to touch.  Pt takes aspirin 81mg.  Pt is right handed and he hasn't sustained any injuries that he knows of and has Full ROM still noted.    Please advise.    Also, he would like to get his missed July appointment rescheduled.  Will defer to Janice.

## 2025-07-29 NOTE — TELEPHONE ENCOUNTER
Pt is calling stating that he needs to have an appt with  as soon as possible due to swelling in his arms and hands. Pt advised that this has been going on for over a year.

## 2025-07-30 ENCOUNTER — HOSPITAL ENCOUNTER (OUTPATIENT)
Facility: HOSPITAL | Age: 70
Discharge: HOME | End: 2025-08-01
Payer: COMMERCIAL

## 2025-07-30 DIAGNOSIS — M79.89 PAIN AND SWELLING OF LEFT UPPER EXTREMITY: ICD-10-CM

## 2025-07-30 DIAGNOSIS — M79.602 PAIN AND SWELLING OF LEFT UPPER EXTREMITY: ICD-10-CM

## 2025-07-30 PROCEDURE — 93971 EXTREMITY STUDY: CPT

## 2025-07-31 NOTE — TELEPHONE ENCOUNTER
Per  pt should go to an urgent care.  We can see him with his next [facility] appointment.  I discussed that he may need to see vascular at some point.

## 2025-07-31 NOTE — TELEPHONE ENCOUNTER
Called pt.   HIPAA verified by two patient identifiers.   He is aware the doppler is negative. He asked if there is any other suggestions.  Per  pt should follow up with PCP and we can see him in follow up with his upcoming infusion appointment.  Pt does not have a PCP.  I advised pt I will call him back with an update of next steps.

## 2025-08-04 RX ORDER — ONDANSETRON 2 MG/ML
8 INJECTION INTRAMUSCULAR; INTRAVENOUS
OUTPATIENT
Start: 2025-08-04

## 2025-08-04 RX ORDER — SODIUM CHLORIDE 9 MG/ML
25 INJECTION, SOLUTION INTRAVENOUS PRN
OUTPATIENT
Start: 2025-08-04

## 2025-08-04 RX ORDER — SODIUM CHLORIDE 9 MG/ML
INJECTION, SOLUTION INTRAVENOUS CONTINUOUS
OUTPATIENT
Start: 2025-08-04

## 2025-08-04 RX ORDER — EPINEPHRINE 1 MG/ML
0.3 INJECTION, SOLUTION, CONCENTRATE INTRAVENOUS PRN
OUTPATIENT
Start: 2025-08-04

## 2025-08-04 RX ORDER — ALBUTEROL SULFATE 90 UG/1
4 INHALANT RESPIRATORY (INHALATION) PRN
OUTPATIENT
Start: 2025-08-04

## 2025-08-04 RX ORDER — FAMOTIDINE 10 MG/ML
20 INJECTION, SOLUTION INTRAVENOUS
OUTPATIENT
Start: 2025-08-04

## 2025-08-04 RX ORDER — ACETAMINOPHEN 325 MG/1
650 TABLET ORAL
OUTPATIENT
Start: 2025-08-04

## 2025-08-04 RX ORDER — DIPHENHYDRAMINE HYDROCHLORIDE 50 MG/ML
50 INJECTION, SOLUTION INTRAMUSCULAR; INTRAVENOUS
OUTPATIENT
Start: 2025-08-04

## 2025-08-04 RX ORDER — SODIUM CHLORIDE 0.9 % (FLUSH) 0.9 %
5-40 SYRINGE (ML) INJECTION PRN
OUTPATIENT
Start: 2025-08-04

## 2025-08-04 RX ORDER — HEPARIN SODIUM (PORCINE) LOCK FLUSH IV SOLN 100 UNIT/ML 100 UNIT/ML
500 SOLUTION INTRAVENOUS PRN
OUTPATIENT
Start: 2025-08-04

## 2025-08-04 RX ORDER — HYDROCORTISONE SODIUM SUCCINATE 100 MG/2ML
100 INJECTION INTRAMUSCULAR; INTRAVENOUS
OUTPATIENT
Start: 2025-08-04

## 2025-08-07 ENCOUNTER — HOSPITAL ENCOUNTER (EMERGENCY)
Facility: HOSPITAL | Age: 70
Discharge: HOME | End: 2025-08-07
Payer: MEDICARE

## 2025-08-07 VITALS
WEIGHT: 175.49 LBS | HEIGHT: 68 IN | BODY MASS INDEX: 26.6 KG/M2 | SYSTOLIC BLOOD PRESSURE: 131 MMHG | HEART RATE: 74 BPM | OXYGEN SATURATION: 99 % | DIASTOLIC BLOOD PRESSURE: 78 MMHG | RESPIRATION RATE: 18 BRPM | TEMPERATURE: 97.7 F

## 2025-08-07 DIAGNOSIS — J18.9 PNEUMONIA DUE TO INFECTIOUS ORGANISM, UNSPECIFIED LATERALITY, UNSPECIFIED PART OF LUNG: Primary | ICD-10-CM

## 2025-08-07 PROCEDURE — 99283 EMERGENCY DEPT VISIT LOW MDM: CPT

## 2025-08-07 RX ORDER — PREDNISONE 10 MG/1
TABLET ORAL
Qty: 1 EACH | Refills: 0 | Status: SHIPPED | OUTPATIENT
Start: 2025-08-07

## 2025-08-07 RX ORDER — AMOXICILLIN 500 MG/1
1000 CAPSULE ORAL 3 TIMES DAILY
Qty: 30 CAPSULE | Refills: 0 | Status: SHIPPED | OUTPATIENT
Start: 2025-08-07 | End: 2025-08-12

## 2025-08-07 RX ORDER — DEXTROMETHORPHAN HYDROBROMIDE AND PROMETHAZINE HYDROCHLORIDE 15; 6.25 MG/5ML; MG/5ML
5 SYRUP ORAL 4 TIMES DAILY PRN
Qty: 180 ML | Refills: 0 | Status: SHIPPED | OUTPATIENT
Start: 2025-08-07

## 2025-08-07 RX ORDER — ALBUTEROL SULFATE 90 UG/1
2 INHALANT RESPIRATORY (INHALATION) 4 TIMES DAILY PRN
Qty: 18 G | Refills: 0 | Status: SHIPPED | OUTPATIENT
Start: 2025-08-07

## 2025-08-07 RX ORDER — AZITHROMYCIN 250 MG/1
TABLET, FILM COATED ORAL
Qty: 1 PACKET | Refills: 0 | Status: SHIPPED | OUTPATIENT
Start: 2025-08-07 | End: 2025-08-11

## 2025-08-07 ASSESSMENT — LIFESTYLE VARIABLES
HOW MANY STANDARD DRINKS CONTAINING ALCOHOL DO YOU HAVE ON A TYPICAL DAY: PATIENT DOES NOT DRINK
HOW OFTEN DO YOU HAVE A DRINK CONTAINING ALCOHOL: NEVER

## 2025-08-07 ASSESSMENT — PAIN SCALES - GENERAL: PAINLEVEL_OUTOF10: 0

## 2025-08-07 ASSESSMENT — PAIN - FUNCTIONAL ASSESSMENT: PAIN_FUNCTIONAL_ASSESSMENT: 0-10

## 2025-09-03 ENCOUNTER — APPOINTMENT (OUTPATIENT)
Facility: HOSPITAL | Age: 70
End: 2025-09-03
Payer: COMMERCIAL

## 2025-09-03 ENCOUNTER — HOSPITAL ENCOUNTER (OUTPATIENT)
Facility: HOSPITAL | Age: 70
Setting detail: OBSERVATION
Discharge: HOME | End: 2025-09-04
Attending: EMERGENCY MEDICINE | Admitting: HOSPITALIST
Payer: COMMERCIAL

## 2025-09-03 DIAGNOSIS — R41.82 ALTERED MENTAL STATUS, UNSPECIFIED ALTERED MENTAL STATUS TYPE: Primary | ICD-10-CM

## 2025-09-03 PROBLEM — R47.01 APHASIA: Status: ACTIVE | Noted: 2025-09-03

## 2025-09-03 LAB
ALBUMIN SERPL-MCNC: 3.7 G/DL (ref 3.5–5.2)
ALBUMIN/GLOB SERPL: 1.2 (ref 1.1–2.2)
ALP SERPL-CCNC: 68 U/L (ref 40–129)
ALT SERPL-CCNC: 16 U/L (ref 10–50)
ANION GAP SERPL CALC-SCNC: 11 MMOL/L (ref 2–14)
AST SERPL-CCNC: 29 U/L (ref 10–50)
BASOPHILS # BLD: 0.01 K/UL (ref 0–0.1)
BASOPHILS NFR BLD: 0.4 % (ref 0–1)
BILIRUB SERPL-MCNC: <0.2 MG/DL (ref 0–1.2)
BUN SERPL-MCNC: 14 MG/DL (ref 8–23)
BUN/CREAT SERPL: 10 (ref 12–20)
CALCIUM SERPL-MCNC: 9.1 MG/DL (ref 8.8–10.2)
CHLORIDE SERPL-SCNC: 103 MMOL/L (ref 98–107)
CO2 SERPL-SCNC: 24 MMOL/L (ref 20–29)
COMMENT:: NORMAL
CREAT SERPL-MCNC: 1.51 MG/DL (ref 0.7–1.2)
DIFFERENTIAL METHOD BLD: ABNORMAL
EKG ATRIAL RATE: 80 BPM
EKG DIAGNOSIS: NORMAL
EKG P AXIS: 51 DEGREES
EKG P-R INTERVAL: 140 MS
EKG Q-T INTERVAL: 394 MS
EKG QRS DURATION: 102 MS
EKG QTC CALCULATION (BAZETT): 454 MS
EKG R AXIS: -36 DEGREES
EKG T AXIS: -47 DEGREES
EKG VENTRICULAR RATE: 80 BPM
EOSINOPHIL # BLD: 0.02 K/UL (ref 0–0.4)
EOSINOPHIL NFR BLD: 0.9 % (ref 0–7)
ERYTHROCYTE [DISTWIDTH] IN BLOOD BY AUTOMATED COUNT: 19.5 % (ref 11.5–14.5)
GLOBULIN SER CALC-MCNC: 3 G/DL (ref 2–4)
GLUCOSE BLD STRIP.AUTO-MCNC: 132 MG/DL (ref 65–117)
GLUCOSE BLD STRIP.AUTO-MCNC: 96 MG/DL (ref 65–117)
GLUCOSE SERPL-MCNC: 128 MG/DL (ref 65–100)
HCT VFR BLD AUTO: 36.6 % (ref 36.6–50.3)
HGB BLD-MCNC: 11.4 G/DL (ref 12.1–17)
IMM GRANULOCYTES # BLD AUTO: 0.02 K/UL (ref 0–0.04)
IMM GRANULOCYTES NFR BLD AUTO: 0.9 % (ref 0–0.5)
INR PPP: 1 (ref 0.9–1.1)
LYMPHOCYTES # BLD: 0.38 K/UL (ref 0.8–3.5)
LYMPHOCYTES NFR BLD: 16.5 % (ref 12–49)
MCH RBC QN AUTO: 27 PG (ref 26–34)
MCHC RBC AUTO-ENTMCNC: 31.1 G/DL (ref 30–36.5)
MCV RBC AUTO: 86.7 FL (ref 80–99)
MONOCYTES # BLD: 0.64 K/UL (ref 0–1)
MONOCYTES NFR BLD: 27.7 % (ref 5–13)
NEUTS SEG # BLD: 1.23 K/UL (ref 1.8–8)
NEUTS SEG NFR BLD: 53.6 % (ref 32–75)
NRBC # BLD: 0 K/UL (ref 0–0.01)
NRBC BLD-RTO: 0 PER 100 WBC
PLATELET # BLD AUTO: 158 K/UL (ref 150–400)
PMV BLD AUTO: 10.7 FL (ref 8.9–12.9)
POTASSIUM SERPL-SCNC: 4.4 MMOL/L (ref 3.5–5.1)
PROT SERPL-MCNC: 6.7 G/DL (ref 6.4–8.3)
PROTHROMBIN TIME: 11 SEC (ref 9.2–11.2)
RBC # BLD AUTO: 4.22 M/UL (ref 4.1–5.7)
RBC MORPH BLD: ABNORMAL
SERVICE CMNT-IMP: ABNORMAL
SERVICE CMNT-IMP: NORMAL
SODIUM SERPL-SCNC: 138 MMOL/L (ref 136–145)
SPECIMEN HOLD: NORMAL
TROPONIN T SERPL HS-MCNC: 20.7 NG/L (ref 0–22)
WBC # BLD AUTO: 2.3 K/UL (ref 4.1–11.1)

## 2025-09-03 PROCEDURE — 85025 COMPLETE CBC W/AUTO DIFF WBC: CPT

## 2025-09-03 PROCEDURE — 2580000003 HC RX 258: Performed by: HOSPITALIST

## 2025-09-03 PROCEDURE — 80053 COMPREHEN METABOLIC PANEL: CPT

## 2025-09-03 PROCEDURE — 6370000000 HC RX 637 (ALT 250 FOR IP): Performed by: HOSPITALIST

## 2025-09-03 PROCEDURE — 2500000003 HC RX 250 WO HCPCS: Performed by: HOSPITALIST

## 2025-09-03 PROCEDURE — 99285 EMERGENCY DEPT VISIT HI MDM: CPT

## 2025-09-03 PROCEDURE — 6360000004 HC RX CONTRAST MEDICATION: Performed by: EMERGENCY MEDICINE

## 2025-09-03 PROCEDURE — 6360000002 HC RX W HCPCS: Performed by: HOSPITALIST

## 2025-09-03 PROCEDURE — 93005 ELECTROCARDIOGRAM TRACING: CPT | Performed by: EMERGENCY MEDICINE

## 2025-09-03 PROCEDURE — APPNB45 APP NON BILLABLE 31-45 MINUTES

## 2025-09-03 PROCEDURE — G0378 HOSPITAL OBSERVATION PER HR: HCPCS

## 2025-09-03 PROCEDURE — 85610 PROTHROMBIN TIME: CPT

## 2025-09-03 PROCEDURE — 82962 GLUCOSE BLOOD TEST: CPT

## 2025-09-03 PROCEDURE — 70450 CT HEAD/BRAIN W/O DYE: CPT

## 2025-09-03 PROCEDURE — 84484 ASSAY OF TROPONIN QUANT: CPT

## 2025-09-03 PROCEDURE — 0042T CT BRAIN PERFUSION: CPT

## 2025-09-03 PROCEDURE — 93010 ELECTROCARDIOGRAM REPORT: CPT | Performed by: SPECIALIST

## 2025-09-03 PROCEDURE — 70496 CT ANGIOGRAPHY HEAD: CPT

## 2025-09-03 PROCEDURE — 96372 THER/PROPH/DIAG INJ SC/IM: CPT

## 2025-09-03 RX ORDER — TRAZODONE HYDROCHLORIDE 100 MG/1
100 TABLET ORAL NIGHTLY
COMMUNITY

## 2025-09-03 RX ORDER — ENOXAPARIN SODIUM 100 MG/ML
40 INJECTION SUBCUTANEOUS DAILY
Status: DISCONTINUED | OUTPATIENT
Start: 2025-09-03 | End: 2025-09-04 | Stop reason: HOSPADM

## 2025-09-03 RX ORDER — IOPAMIDOL 755 MG/ML
100 INJECTION, SOLUTION INTRAVASCULAR
Status: COMPLETED | OUTPATIENT
Start: 2025-09-03 | End: 2025-09-03

## 2025-09-03 RX ORDER — SODIUM CHLORIDE 0.9 % (FLUSH) 0.9 %
5-40 SYRINGE (ML) INJECTION PRN
Status: DISCONTINUED | OUTPATIENT
Start: 2025-09-03 | End: 2025-09-04 | Stop reason: HOSPADM

## 2025-09-03 RX ORDER — SODIUM CHLORIDE 450 MG/100ML
INJECTION, SOLUTION INTRAVENOUS CONTINUOUS
Status: DISCONTINUED | OUTPATIENT
Start: 2025-09-03 | End: 2025-09-04 | Stop reason: HOSPADM

## 2025-09-03 RX ORDER — ACYCLOVIR 800 MG/1
400 TABLET ORAL 2 TIMES DAILY
Status: DISCONTINUED | OUTPATIENT
Start: 2025-09-03 | End: 2025-09-04 | Stop reason: HOSPADM

## 2025-09-03 RX ORDER — POLYETHYLENE GLYCOL 3350 17 G/17G
17 POWDER, FOR SOLUTION ORAL DAILY PRN
Status: DISCONTINUED | OUTPATIENT
Start: 2025-09-03 | End: 2025-09-04 | Stop reason: HOSPADM

## 2025-09-03 RX ORDER — INSULIN LISPRO 100 [IU]/ML
0-8 INJECTION, SOLUTION INTRAVENOUS; SUBCUTANEOUS
Status: DISCONTINUED | OUTPATIENT
Start: 2025-09-03 | End: 2025-09-04 | Stop reason: HOSPADM

## 2025-09-03 RX ORDER — SODIUM CHLORIDE 9 MG/ML
INJECTION, SOLUTION INTRAVENOUS PRN
Status: DISCONTINUED | OUTPATIENT
Start: 2025-09-03 | End: 2025-09-04 | Stop reason: HOSPADM

## 2025-09-03 RX ORDER — DULOXETIN HYDROCHLORIDE 60 MG/1
60 CAPSULE, DELAYED RELEASE ORAL NIGHTLY
Status: DISCONTINUED | OUTPATIENT
Start: 2025-09-03 | End: 2025-09-03

## 2025-09-03 RX ORDER — ONDANSETRON 2 MG/ML
4 INJECTION INTRAMUSCULAR; INTRAVENOUS EVERY 6 HOURS PRN
Status: DISCONTINUED | OUTPATIENT
Start: 2025-09-03 | End: 2025-09-04 | Stop reason: HOSPADM

## 2025-09-03 RX ORDER — SULFAMETHOXAZOLE AND TRIMETHOPRIM 400; 80 MG/1; MG/1
1 TABLET ORAL 2 TIMES DAILY
COMMUNITY

## 2025-09-03 RX ORDER — ATORVASTATIN CALCIUM 40 MG/1
80 TABLET, FILM COATED ORAL NIGHTLY
Status: DISCONTINUED | OUTPATIENT
Start: 2025-09-03 | End: 2025-09-04 | Stop reason: HOSPADM

## 2025-09-03 RX ORDER — DIAZEPAM 10 MG/2ML
2.5 INJECTION, SOLUTION INTRAMUSCULAR; INTRAVENOUS PRN
Status: DISCONTINUED | OUTPATIENT
Start: 2025-09-03 | End: 2025-09-04 | Stop reason: HOSPADM

## 2025-09-03 RX ORDER — ALBUTEROL SULFATE 90 UG/1
2 INHALANT RESPIRATORY (INHALATION) 4 TIMES DAILY PRN
Status: DISCONTINUED | OUTPATIENT
Start: 2025-09-03 | End: 2025-09-04 | Stop reason: HOSPADM

## 2025-09-03 RX ORDER — ASPIRIN 300 MG/1
300 SUPPOSITORY RECTAL DAILY
Status: DISCONTINUED | OUTPATIENT
Start: 2025-09-03 | End: 2025-09-04 | Stop reason: HOSPADM

## 2025-09-03 RX ORDER — MIRTAZAPINE 15 MG/1
7.5 TABLET, FILM COATED ORAL
Status: DISCONTINUED | OUTPATIENT
Start: 2025-09-03 | End: 2025-09-03

## 2025-09-03 RX ORDER — SODIUM CHLORIDE 0.9 % (FLUSH) 0.9 %
5-40 SYRINGE (ML) INJECTION EVERY 12 HOURS SCHEDULED
Status: DISCONTINUED | OUTPATIENT
Start: 2025-09-03 | End: 2025-09-04 | Stop reason: HOSPADM

## 2025-09-03 RX ORDER — ONDANSETRON 4 MG/1
4 TABLET, ORALLY DISINTEGRATING ORAL EVERY 8 HOURS PRN
Status: DISCONTINUED | OUTPATIENT
Start: 2025-09-03 | End: 2025-09-04 | Stop reason: HOSPADM

## 2025-09-03 RX ORDER — VILAZODONE HYDROCHLORIDE 10 MG/1
10 TABLET ORAL DAILY
Status: DISCONTINUED | OUTPATIENT
Start: 2025-09-04 | End: 2025-09-04 | Stop reason: HOSPADM

## 2025-09-03 RX ORDER — ASPIRIN 81 MG/1
81 TABLET, CHEWABLE ORAL DAILY
Status: DISCONTINUED | OUTPATIENT
Start: 2025-09-03 | End: 2025-09-04 | Stop reason: HOSPADM

## 2025-09-03 RX ORDER — ASCORBIC ACID 500 MG
500 TABLET ORAL DAILY
Status: DISCONTINUED | OUTPATIENT
Start: 2025-09-03 | End: 2025-09-04 | Stop reason: HOSPADM

## 2025-09-03 RX ADMIN — IOPAMIDOL 40 ML: 755 INJECTION, SOLUTION INTRAVENOUS at 14:33

## 2025-09-03 RX ADMIN — ATORVASTATIN CALCIUM 80 MG: 40 TABLET, FILM COATED ORAL at 21:22

## 2025-09-03 RX ADMIN — SODIUM CHLORIDE, PRESERVATIVE FREE 10 ML: 5 INJECTION INTRAVENOUS at 21:23

## 2025-09-03 RX ADMIN — ENOXAPARIN SODIUM 40 MG: 100 INJECTION SUBCUTANEOUS at 15:52

## 2025-09-03 RX ADMIN — SODIUM CHLORIDE: 0.45 INJECTION, SOLUTION INTRAVENOUS at 16:48

## 2025-09-03 RX ADMIN — IOPAMIDOL 100 ML: 755 INJECTION, SOLUTION INTRAVENOUS at 14:34

## 2025-09-03 RX ADMIN — OXYCODONE HYDROCHLORIDE AND ACETAMINOPHEN 500 MG: 500 TABLET ORAL at 16:49

## 2025-09-03 RX ADMIN — ACYCLOVIR 400 MG: 800 TABLET ORAL at 21:22

## 2025-09-03 RX ADMIN — ASPIRIN 81 MG: 81 TABLET, CHEWABLE ORAL at 15:52

## 2025-09-03 ASSESSMENT — PAIN DESCRIPTION - PAIN TYPE: TYPE: CHRONIC PAIN

## 2025-09-03 ASSESSMENT — PAIN - FUNCTIONAL ASSESSMENT: PAIN_FUNCTIONAL_ASSESSMENT: PREVENTS OR INTERFERES SOME ACTIVE ACTIVITIES AND ADLS

## 2025-09-03 ASSESSMENT — PAIN DESCRIPTION - ORIENTATION: ORIENTATION: MID

## 2025-09-03 ASSESSMENT — PAIN DESCRIPTION - DESCRIPTORS: DESCRIPTORS: THROBBING

## 2025-09-03 ASSESSMENT — PAIN SCALES - GENERAL: PAINLEVEL_OUTOF10: 6

## 2025-09-03 ASSESSMENT — PAIN DESCRIPTION - ONSET: ONSET: ON-GOING

## 2025-09-03 ASSESSMENT — PAIN DESCRIPTION - FREQUENCY: FREQUENCY: CONTINUOUS

## 2025-09-03 ASSESSMENT — PAIN DESCRIPTION - LOCATION: LOCATION: NECK

## 2025-09-04 ENCOUNTER — APPOINTMENT (OUTPATIENT)
Facility: HOSPITAL | Age: 70
End: 2025-09-04
Attending: HOSPITALIST
Payer: COMMERCIAL

## 2025-09-04 ENCOUNTER — APPOINTMENT (OUTPATIENT)
Facility: HOSPITAL | Age: 70
End: 2025-09-04
Payer: COMMERCIAL

## 2025-09-04 VITALS
OXYGEN SATURATION: 97 % | WEIGHT: 173.06 LBS | TEMPERATURE: 98.2 F | HEIGHT: 68 IN | RESPIRATION RATE: 16 BRPM | BODY MASS INDEX: 26.23 KG/M2 | SYSTOLIC BLOOD PRESSURE: 113 MMHG | DIASTOLIC BLOOD PRESSURE: 74 MMHG | HEART RATE: 76 BPM

## 2025-09-04 PROBLEM — R41.82 ALTERED MENTAL STATUS: Status: ACTIVE | Noted: 2025-09-04

## 2025-09-04 LAB
ANION GAP SERPL CALC-SCNC: 10 MMOL/L (ref 2–14)
BUN SERPL-MCNC: 17 MG/DL (ref 8–23)
BUN/CREAT SERPL: 12 (ref 12–20)
CALCIUM SERPL-MCNC: 8.5 MG/DL (ref 8.8–10.2)
CHLORIDE SERPL-SCNC: 106 MMOL/L (ref 98–107)
CHOLEST SERPL-MCNC: 140 MG/DL (ref 0–200)
CO2 SERPL-SCNC: 24 MMOL/L (ref 20–29)
CREAT SERPL-MCNC: 1.37 MG/DL (ref 0.7–1.2)
ECHO AV AREA PEAK VELOCITY: 3.4 CM2
ECHO AV AREA VTI: 3.3 CM2
ECHO AV AREA/BSA PEAK VELOCITY: 1.8 CM2/M2
ECHO AV AREA/BSA VTI: 1.7 CM2/M2
ECHO AV MEAN GRADIENT: 2 MMHG
ECHO AV MEAN VELOCITY: 0.7 M/S
ECHO AV PEAK GRADIENT: 3 MMHG
ECHO AV PEAK VELOCITY: 0.9 M/S
ECHO AV VELOCITY RATIO: 0.67
ECHO AV VTI: 17.4 CM
ECHO BSA: 1.91 M2
ECHO EST RA PRESSURE: 3 MMHG
ECHO LA DIAMETER INDEX: 1.61 CM/M2
ECHO LA DIAMETER: 3.1 CM
ECHO LV E' LATERAL VELOCITY: 4.34 CM/S
ECHO LV E' SEPTAL VELOCITY: 5.57 CM/S
ECHO LV EF PHYSICIAN: 40 %
ECHO LV FRACTIONAL SHORTENING: 18 % (ref 28–44)
ECHO LV INTERNAL DIMENSION DIASTOLE INDEX: 2.08 CM/M2
ECHO LV INTERNAL DIMENSION DIASTOLIC: 4 CM (ref 4.2–5.9)
ECHO LV INTERNAL DIMENSION SYSTOLIC INDEX: 1.72 CM/M2
ECHO LV INTERNAL DIMENSION SYSTOLIC: 3.3 CM
ECHO LV IVSD: 1.5 CM (ref 0.6–1)
ECHO LV MASS 2D: 232.7 G (ref 88–224)
ECHO LV MASS INDEX 2D: 121.2 G/M2 (ref 49–115)
ECHO LV POSTERIOR WALL DIASTOLIC: 1.5 CM (ref 0.6–1)
ECHO LV RELATIVE WALL THICKNESS RATIO: 0.75
ECHO LVOT AREA: 5.3 CM2
ECHO LVOT AV VTI INDEX: 0.64
ECHO LVOT DIAM: 2.6 CM
ECHO LVOT MEAN GRADIENT: 1 MMHG
ECHO LVOT PEAK GRADIENT: 1 MMHG
ECHO LVOT PEAK VELOCITY: 0.6 M/S
ECHO LVOT STROKE VOLUME INDEX: 31 ML/M2
ECHO LVOT SV: 59.4 ML
ECHO LVOT VTI: 11.2 CM
ECHO MV A VELOCITY: 0.68 M/S
ECHO MV AREA PHT: 2.2 CM2
ECHO MV E DECELERATION TIME (DT): 347.3 MS
ECHO MV E VELOCITY: 0.27 M/S
ECHO MV E/A RATIO: 0.4
ECHO MV E/E' LATERAL: 6.22
ECHO MV E/E' RATIO (AVERAGED): 5.53
ECHO MV E/E' SEPTAL: 4.85
ECHO MV PRESSURE HALF TIME (PHT): 100.7 MS
ECHO PV MAX VELOCITY: 0.6 M/S
ECHO PV PEAK GRADIENT: 2 MMHG
ECHO RV TAPSE: 1.7 CM (ref 1.7–?)
ERYTHROCYTE [DISTWIDTH] IN BLOOD BY AUTOMATED COUNT: 19.8 % (ref 11.5–14.5)
EST. AVERAGE GLUCOSE BLD GHB EST-MCNC: 140 MG/DL
GLUCOSE BLD STRIP.AUTO-MCNC: 113 MG/DL (ref 65–117)
GLUCOSE BLD STRIP.AUTO-MCNC: 94 MG/DL (ref 65–117)
GLUCOSE SERPL-MCNC: 95 MG/DL (ref 65–100)
HBA1C MFR BLD: 6.5 % (ref 4–5.6)
HCT VFR BLD AUTO: 33.1 % (ref 36.6–50.3)
HDLC SERPL-MCNC: 45 MG/DL (ref 40–60)
HDLC SERPL: 3.1 (ref 0–5)
HGB BLD-MCNC: 10.5 G/DL (ref 12.1–17)
LDLC SERPL CALC-MCNC: 67 MG/DL (ref 0–100)
MCH RBC QN AUTO: 27.1 PG (ref 26–34)
MCHC RBC AUTO-ENTMCNC: 31.7 G/DL (ref 30–36.5)
MCV RBC AUTO: 85.5 FL (ref 80–99)
NRBC # BLD: 0 K/UL (ref 0–0.01)
NRBC BLD-RTO: 0 PER 100 WBC
PLATELET # BLD AUTO: 136 K/UL (ref 150–400)
PMV BLD AUTO: 10.2 FL (ref 8.9–12.9)
POTASSIUM SERPL-SCNC: 4.1 MMOL/L (ref 3.5–5.1)
RBC # BLD AUTO: 3.87 M/UL (ref 4.1–5.7)
SERVICE CMNT-IMP: NORMAL
SERVICE CMNT-IMP: NORMAL
SODIUM SERPL-SCNC: 139 MMOL/L (ref 136–145)
T4 FREE SERPL-MCNC: 0.9 NG/DL (ref 0.9–1.6)
TRIGL SERPL-MCNC: 139 MG/DL (ref 0–150)
TSH, 3RD GENERATION: 3.37 UIU/ML (ref 0.27–4.2)
VLDLC SERPL CALC-MCNC: 28 MG/DL
WBC # BLD AUTO: 1.5 K/UL (ref 4.1–11.1)

## 2025-09-04 PROCEDURE — 85027 COMPLETE CBC AUTOMATED: CPT

## 2025-09-04 PROCEDURE — 84443 ASSAY THYROID STIM HORMONE: CPT

## 2025-09-04 PROCEDURE — 99223 1ST HOSP IP/OBS HIGH 75: CPT | Performed by: NURSE PRACTITIONER

## 2025-09-04 PROCEDURE — G0378 HOSPITAL OBSERVATION PER HR: HCPCS

## 2025-09-04 PROCEDURE — 80061 LIPID PANEL: CPT

## 2025-09-04 PROCEDURE — 96372 THER/PROPH/DIAG INJ SC/IM: CPT

## 2025-09-04 PROCEDURE — 6360000002 HC RX W HCPCS: Performed by: HOSPITALIST

## 2025-09-04 PROCEDURE — 80048 BASIC METABOLIC PNL TOTAL CA: CPT

## 2025-09-04 PROCEDURE — 95957 EEG DIGITAL ANALYSIS: CPT

## 2025-09-04 PROCEDURE — 97112 NEUROMUSCULAR REEDUCATION: CPT | Performed by: OCCUPATIONAL THERAPIST

## 2025-09-04 PROCEDURE — 83036 HEMOGLOBIN GLYCOSYLATED A1C: CPT

## 2025-09-04 PROCEDURE — 2580000003 HC RX 258: Performed by: HOSPITALIST

## 2025-09-04 PROCEDURE — 70551 MRI BRAIN STEM W/O DYE: CPT

## 2025-09-04 PROCEDURE — 93306 TTE W/DOPPLER COMPLETE: CPT | Performed by: SPECIALIST

## 2025-09-04 PROCEDURE — 82962 GLUCOSE BLOOD TEST: CPT

## 2025-09-04 PROCEDURE — 94760 N-INVAS EAR/PLS OXIMETRY 1: CPT

## 2025-09-04 PROCEDURE — 84439 ASSAY OF FREE THYROXINE: CPT

## 2025-09-04 PROCEDURE — 93306 TTE W/DOPPLER COMPLETE: CPT

## 2025-09-04 PROCEDURE — 97161 PT EVAL LOW COMPLEX 20 MIN: CPT

## 2025-09-04 PROCEDURE — 97530 THERAPEUTIC ACTIVITIES: CPT

## 2025-09-04 PROCEDURE — 95819 EEG AWAKE AND ASLEEP: CPT

## 2025-09-04 PROCEDURE — 97165 OT EVAL LOW COMPLEX 30 MIN: CPT | Performed by: OCCUPATIONAL THERAPIST

## 2025-09-04 PROCEDURE — 6370000000 HC RX 637 (ALT 250 FOR IP): Performed by: HOSPITALIST

## 2025-09-04 RX ORDER — ACYCLOVIR 400 MG/1
400 TABLET ORAL 2 TIMES DAILY
Qty: 20 TABLET | Refills: 0 | Status: SHIPPED | OUTPATIENT
Start: 2025-09-04 | End: 2025-09-14

## 2025-09-04 RX ADMIN — OXYCODONE HYDROCHLORIDE AND ACETAMINOPHEN 500 MG: 500 TABLET ORAL at 10:13

## 2025-09-04 RX ADMIN — SODIUM CHLORIDE: 0.45 INJECTION, SOLUTION INTRAVENOUS at 04:18

## 2025-09-04 RX ADMIN — ENOXAPARIN SODIUM 40 MG: 100 INJECTION SUBCUTANEOUS at 10:13

## 2025-09-04 RX ADMIN — ACYCLOVIR 400 MG: 800 TABLET ORAL at 10:13

## 2025-09-04 RX ADMIN — ASPIRIN 81 MG: 81 TABLET, CHEWABLE ORAL at 10:13

## 2025-09-04 ASSESSMENT — PAIN SCALES - GENERAL: PAINLEVEL_OUTOF10: 0

## (undated) DEVICE — 3M™ TEGADERM™ TRANSPARENT FILM DRESSING FRAME STYLE, 1626W, 4 IN X 4-3/4 IN (10 CM X 12 CM), 50/CT 4CT/CASE: Brand: 3M™ TEGADERM™

## (undated) DEVICE — TR BAND RADIAL ARTERY COMPRESSION DEVICE: Brand: TR BAND

## (undated) DEVICE — SC 3W HP RA OFF NB - PG: Brand: NAMIC

## (undated) DEVICE — SYRINGE ANGIO 10 CC BRL STD PRNT POLYCARB LT BLU MEDALLION

## (undated) DEVICE — HEART CATH-MRMC: Brand: MEDLINE INDUSTRIES, INC.

## (undated) DEVICE — GUIDEWIRE VASC L260CM 0.035IN J TIP L3MM PTFE FIX COR NAMIC

## (undated) DEVICE — CATHETER GUID 6FR L100CM DIA0.071IN NYL SHFT EBU3.5

## (undated) DEVICE — GLIDESHEATH SLENDER ACCESS KIT: Brand: GLIDESHEATH SLENDER

## (undated) DEVICE — RADIFOCUS OPTITORQUE ANGIOGRAPHIC CATHETER: Brand: OPTITORQUE

## (undated) DEVICE — COPILOT BLEEDBACK CONTROL VALVE: Brand: COPILOT

## (undated) DEVICE — PACK PROCEDURE SURG HRT CATH

## (undated) DEVICE — TUBING PRSS MON L6IN PVC M FEM CONN

## (undated) DEVICE — SPLINT WR VELC FOAM NEUT POS DISP FOR RAD ART ACC SFT STRP

## (undated) DEVICE — PRESSURE GUIDEWIRE: Brand: COMET™ II

## (undated) DEVICE — HI-TORQUE VERSACORE FLOPPY GUIDE WIRE SYSTEM 145 CM: Brand: HI-TORQUE VERSACORE